# Patient Record
Sex: MALE | Race: WHITE | NOT HISPANIC OR LATINO | Employment: FULL TIME | URBAN - METROPOLITAN AREA
[De-identification: names, ages, dates, MRNs, and addresses within clinical notes are randomized per-mention and may not be internally consistent; named-entity substitution may affect disease eponyms.]

---

## 2017-01-21 ENCOUNTER — ALLSCRIPTS OFFICE VISIT (OUTPATIENT)
Dept: OTHER | Facility: OTHER | Age: 52
End: 2017-01-21

## 2017-06-13 ENCOUNTER — TRANSCRIBE ORDERS (OUTPATIENT)
Dept: ADMINISTRATIVE | Facility: HOSPITAL | Age: 52
End: 2017-06-13

## 2017-06-13 DIAGNOSIS — J34.1 CYST OF NASAL SINUS: Primary | ICD-10-CM

## 2017-06-13 DIAGNOSIS — K09.0 DEVELOPMENTAL ODONTOGENIC CYSTS: ICD-10-CM

## 2017-06-17 ENCOUNTER — HOSPITAL ENCOUNTER (OUTPATIENT)
Dept: CT IMAGING | Facility: HOSPITAL | Age: 52
Discharge: HOME | End: 2017-06-17
Payer: COMMERCIAL

## 2017-06-17 DIAGNOSIS — J34.1 CYST OF NASAL SINUS: ICD-10-CM

## 2017-06-17 DIAGNOSIS — K09.0 DEVELOPMENTAL ODONTOGENIC CYSTS: ICD-10-CM

## 2017-06-17 PROCEDURE — 70486 CT MAXILLOFACIAL W/O DYE: CPT

## 2017-12-07 ENCOUNTER — ALLSCRIPTS OFFICE VISIT (OUTPATIENT)
Dept: OTHER | Facility: OTHER | Age: 52
End: 2017-12-07

## 2017-12-20 ENCOUNTER — GENERIC CONVERSION - ENCOUNTER (OUTPATIENT)
Dept: OTHER | Facility: OTHER | Age: 52
End: 2017-12-20

## 2017-12-20 LAB — PROSTATE SPECIFIC ANTIGEN (HISTORICAL): 5.2 NG/ML (ref 0–4)

## 2017-12-21 ENCOUNTER — GENERIC CONVERSION - ENCOUNTER (OUTPATIENT)
Dept: OTHER | Facility: OTHER | Age: 52
End: 2017-12-21

## 2017-12-21 LAB
A/G RATIO (HISTORICAL): 2 (ref 1.2–2.2)
ALBUMIN SERPL BCP-MCNC: 4.5 G/DL (ref 3.5–5.5)
ALP SERPL-CCNC: 89 IU/L (ref 39–117)
ALT SERPL W P-5'-P-CCNC: 33 IU/L (ref 0–44)
AST SERPL W P-5'-P-CCNC: 18 IU/L (ref 0–40)
BILIRUB SERPL-MCNC: 0.3 MG/DL (ref 0–1.2)
BUN SERPL-MCNC: 22 MG/DL (ref 6–24)
BUN/CREA RATIO (HISTORICAL): 24 (ref 9–20)
CALCIUM SERPL-MCNC: 9.5 MG/DL (ref 8.7–10.2)
CHLORIDE SERPL-SCNC: 106 MMOL/L (ref 96–106)
CHOLEST SERPL-MCNC: 185 MG/DL (ref 100–199)
CO2 SERPL-SCNC: 25 MMOL/L (ref 18–29)
CREAT SERPL-MCNC: 0.9 MG/DL (ref 0.76–1.27)
EGFR AFRICAN AMERICAN (HISTORICAL): 115 ML/MIN/1.73
EGFR-AMERICAN CALC (HISTORICAL): 99 ML/MIN/1.73
GLUCOSE SERPL-MCNC: 98 MG/DL (ref 65–99)
HDLC SERPL-MCNC: 29 MG/DL
LDLC SERPL CALC-MCNC: 111 MG/DL (ref 0–99)
POTASSIUM SERPL-SCNC: 4.4 MMOL/L (ref 3.5–5.2)
SODIUM SERPL-SCNC: 146 MMOL/L (ref 134–144)
TOT. GLOBULIN, SERUM (HISTORICAL): 2.3 G/DL (ref 1.5–4.5)
TOTAL PROTEIN (HISTORICAL): 6.8 G/DL (ref 6–8.5)
TRIGL SERPL-MCNC: 224 MG/DL (ref 0–149)

## 2017-12-26 ENCOUNTER — GENERIC CONVERSION - ENCOUNTER (OUTPATIENT)
Dept: OTHER | Facility: OTHER | Age: 52
End: 2017-12-26

## 2018-01-03 ENCOUNTER — GENERIC CONVERSION - ENCOUNTER (OUTPATIENT)
Dept: FAMILY MEDICINE CLINIC | Facility: CLINIC | Age: 53
End: 2018-01-03

## 2018-01-13 VITALS
WEIGHT: 197 LBS | TEMPERATURE: 98 F | DIASTOLIC BLOOD PRESSURE: 80 MMHG | OXYGEN SATURATION: 98 % | HEIGHT: 67 IN | HEART RATE: 104 BPM | BODY MASS INDEX: 30.92 KG/M2 | SYSTOLIC BLOOD PRESSURE: 118 MMHG

## 2018-01-17 NOTE — RESULT NOTES
Message   Recorded as Task   Date: 11/09/2016 09:58 AM, Created By: Jared Quispe   Task Name: Call Patient with results   Assigned To:  Reece Paulino   Regarding Patient: Jackie Wells, Status: Active   CommentJeannie Guess - 09 Nov 2016 9:58 AM     Patient Phone: [phone]    Chest is nrmal- no pneumonia   Lynette Alejandro - 09 Nov 2016 10:12 AM     TASK EDITED                 Msg left C# with results        Signatures   Electronically signed by : Raymon Barahona, ; Nov 9 2016 10:12AM EST                       (Author)

## 2018-01-22 VITALS
SYSTOLIC BLOOD PRESSURE: 130 MMHG | DIASTOLIC BLOOD PRESSURE: 90 MMHG | HEIGHT: 67 IN | OXYGEN SATURATION: 97 % | WEIGHT: 190 LBS | HEART RATE: 74 BPM | RESPIRATION RATE: 16 BRPM | BODY MASS INDEX: 29.82 KG/M2

## 2018-01-23 ENCOUNTER — ALLSCRIPTS OFFICE VISIT (OUTPATIENT)
Dept: OTHER | Facility: OTHER | Age: 53
End: 2018-01-23

## 2018-01-23 DIAGNOSIS — R97.20 ELEVATED PROSTATE SPECIFIC ANTIGEN (PSA): ICD-10-CM

## 2018-01-23 LAB
CLARITY UR: NORMAL
COLOR UR: YELLOW
HGB UR QL STRIP.AUTO: NORMAL
PH UR STRIP.AUTO: 6 [PH]
PROT UR STRIP-MCNC: 30 MG/DL
SP GR UR STRIP.AUTO: 1.01

## 2018-01-23 NOTE — PROGRESS NOTES
Assessment    1  Encounter for preventive health examination (V70 0) (Z00 00)   2  Encounter for prostate cancer screening (V76 44) (Z12 5)   3  Encounter for screening colonoscopy (V76 51) (Z12 11)   4  Screening for cardiovascular condition (V81 2) (Z13 6)    Plan  Encounter for prostate cancer screening, Screening for cardiovascular condition    · (1) COMPREHENSIVE METABOLIC PANEL; Status:Active; Requested for:52Wip7613;    · (1) LIPID PANEL FASTING W DIRECT LDL REFLEX; Status:Active; Requested  for:42Jdn3422;    · (1) PSA (SCREEN) (Dx V76 44 Screen for Prostate Cancer); Status:Active; Requested  for:32Rlv4492;   Encounter for screening colonoscopy    · COLONOSCOPY; Status:Active; Requested for:59Lhv1495; Insomnia, unspecified type    · Temazepam 15 MG Oral Capsule; take 1 capsule by mouth at bedtime if needed  Strain of lumbar region, initial encounter    · Hydrocodone-Acetaminophen 5-325 MG Oral Tablet; take 1 tablet PO EVERY 6-8  HOURS AS NEEDED    Discussion/Summary  health maintenance visit eats an adequate diet Prostate cancer screening: PSA was ordered  Testicular cancer screening: testicular cancer screening is not indicated  Colorectal cancer screening: colonoscopy has been ordered  Screening lab work includes glucose and lipid profile  The risks and benefits of immunizations were discussed  He was advised to be evaluated by an optometrist and a dentist  Advice and education were given regarding aerobic exercise and seat belt use  Declines vaccines today  Labs ordered  Id colonoscopy ordered  The treatment plan was reviewed with the patient/guardian  The patient/guardian understands and agrees with the treatment plan      Chief Complaint  annual hm    Advance Directives  Advance Directive [facility]:   NO - Patient does not have an advance health care directive     Durable Power of  For Healthcare:    Name: Nancy Cooper   Relationship: spouse   Phone (home): [phone]   Phone (cell): [phone]      History of Present Illness  HM, Adult Male: The patient is being seen for a health maintenance evaluation  Social History: Household members include spouse and 2 son(s)  He is   Work status: working full time  The patient has never smoked cigarettes, is a former smokeless tobacco user and quit using smokeless tobacco 2005  He reports rare alcohol use  General Health: The patient's health since the last visit is described as good  He does not have regular dental visits  He complains of vision problems  Vision care includes wearing glasses  He has hearing loss  Immunizations status: up to date  Lifestyle:  He consumes a diverse and healthy diet  He has weight concerns  He exercises regularly  He does not use tobacco  The patient has never smoked cigarettes, is a former smokeless tobacco user and quit using smokeless tobacco: 2005  He consumes alcohol  He reports occasional alcohol use  He denies drug use  Screening: Active Problems    1  Encounter for screening colonoscopy (V76 51) (Z12 11)   2  WALE on CPAP (327 23,V46 8) (G47 33,Z99 89)   3  Strain of lumbar region, initial encounter (847 2) (S39 012A)    Surgical History    · History of Jaw Surgery   · History of Oral Surgery    Family History  Mother    · Family history of Breast CA (174 9) (C50 959)  Family History    · Denied: Family history of substance abuse   · No family history of mental disorder    Social History    · Never a smoker    Allergies    1  No Known Drug Allergies    Vitals   Recorded: 06QTA7984 08:56AM   Heart Rate 74   Respiration 16   Systolic 439   Diastolic 90   Height 5 ft 7 in   Weight 190 lb    BMI Calculated 29 76   BSA Calculated 1 98   O2 Saturation 97     Physical Exam    Constitutional   General appearance: No acute distress, well appearing and well nourished  Head and Face   Head and face: Normal     Palpation of the face and sinuses: No sinus tenderness      Eyes   Conjunctiva and lids: No erythema, swelling or discharge  Pupils and irises: Equal, round, reactive to light  Ears, Nose, Mouth, and Throat   External inspection of ears and nose: Normal     Nasal mucosa, septum, and turbinates: Normal without edema or erythema  Oropharynx: Normal with no erythema, edema, exudate or lesions  Neck   Neck: Supple, symmetric, trachea midline, no masses  Thyroid: Normal, no thyromegaly  Pulmonary   Respiratory effort: No increased work of breathing or signs of respiratory distress  Cardiovascular   Auscultation of heart: Normal rate and rhythm, normal S1 and S2, no murmurs  Carotid pulses: 2+ bilaterally  Abdominal aorta: Normal     Pedal pulses: 2+ bilaterally  Examination of extremities for edema and/or varicosities: Normal     Abdomen   Abdomen: Non-tender, no masses  Liver and spleen: No hepatomegaly or splenomegaly  Lymphatic   Palpation of lymph nodes in neck: No lymphadenopathy  Psychiatric   Orientation to person, place and time: Normal     Mood and affect: Normal        Results/Data  PHQ-2 Adult Depression Screening 50Aya4555 09:04AM User, Ahs     Test Name Result Flag Reference   PHQ-2 Adult Depression Score 0     Over the last two weeks, how often have you been bothered by any of the following problems?   Little interest or pleasure in doing things: Not at all - 0  Feeling down, depressed, or hopeless: Not at all - 0   PHQ-2 Adult Depression Screening Negative         Signatures   Electronically signed by : Kaylee Ramirez MD; Dec  7 2017  5:15PM EST                       (Author)

## 2018-01-23 NOTE — RESULT NOTES
Verified Results  (1) COMPREHENSIVE METABOLIC PANEL 29BOF7983 22:60LF Sandrine Melton     Test Name Result Flag Reference   Glucose, Serum 98 mg/dL  65-99   BUN 22 mg/dL  6-24   Creatinine, Serum 0 90 mg/dL  0 76-1 27   BUN/Creatinine Ratio 24 H 9-20   Sodium, Serum 146 mmol/L H 134-144   Potassium, Serum 4 4 mmol/L  3 5-5 2   Chloride, Serum 106 mmol/L     Carbon Dioxide, Total 25 mmol/L  18-29   Calcium, Serum 9 5 mg/dL  8 7-10 2   Protein, Total, Serum 6 8 g/dL  6 0-8 5   Albumin, Serum 4 5 g/dL  3 5-5 5   Globulin, Total 2 3 g/dL  1 5-4 5   A/G Ratio 2 0  1 2-2 2   Bilirubin, Total 0 3 mg/dL  0 0-1 2   Alkaline Phosphatase, S 89 IU/L     AST (SGOT) 18 IU/L  0-40   ALT (SGPT) 33 IU/L  0-44   eGFR If NonAfricn Am 99 mL/min/1 73  >59   eGFR If Africn Am 115 mL/min/1 73  >59     (1) LIPID PANEL FASTING W DIRECT LDL REFLEX 31Lra7539 07:27AM Sandrine Contix     Test Name Result Flag Reference   Cholesterol, Total 185 mg/dL  100-199   Triglycerides 224 mg/dL H 0-149   HDL Cholesterol 29 mg/dL L >39   LDL Cholesterol Calc 111 mg/dL H 0-99     (1) PSA (SCREEN) (Dx V76 44 Screen for Prostate Cancer) 19EZJ0112 07:27AM Deluca Contix     Test Name Result Flag Reference   Prostate Specific Ag, Serum 5 2 ng/mL H 0 0-4 0   Roche ECLIA methodology  According to the American Urological Association, Serum PSA should  decrease and remain at undetectable levels after radical  prostatectomy  The AUA defines biochemical recurrence as an initial  PSA value 0 2 ng/mL or greater followed by a subsequent confirmatory  PSA value 0 2 ng/mL or greater  Values obtained with different assay methods or kits cannot be used  interchangeably  Results cannot be interpreted as absolute evidence  of the presence or absence of malignant disease

## 2018-01-24 VITALS
HEART RATE: 102 BPM | SYSTOLIC BLOOD PRESSURE: 130 MMHG | RESPIRATION RATE: 16 BRPM | HEIGHT: 67 IN | OXYGEN SATURATION: 97 % | BODY MASS INDEX: 29.82 KG/M2 | WEIGHT: 190 LBS | DIASTOLIC BLOOD PRESSURE: 90 MMHG

## 2018-01-24 NOTE — CONSULTS
Assessment    1  Elevated PSA, less than 10 ng/ml (790 93) (R97 20)    Plan   Elevated PSA, less than 10 ng/ml    · Ciprofloxacin HCl - 500 MG Oral Tablet; TAKE 1 TABLET Every twelve hours starting  the day before procedure   Rx By: Tiana [employer]; Dispense: 3 Days ; #:6 Tablet; Refill: 0; For: Elevated PSA, less than 10 ng/ml; LEO = N; Verified Transmission to [facility]; Last Updated By: System, [employer]; 1/23/2018 9:00:12 AM   · Dulcolax 5 MG Oral Tablet Delayed Release; TAKE 2 TABLET Once on evening  before procedure   Rx By: In[employer]ger; Dispense: 1 Days ; #:2 Tablet Delayed Release; Refill: 0; For: Elevated PSA, less than 10 ng/ml; LEO = N; Verified Transmission to [facility]; Last Updated By: System, [employer]; 1/23/2018 9:00:12 AM   · LORazepam 2 MG Oral Tablet; TAKE 1 TABLET Once 1 hr prior to procedure   Rx By: InEdger; Dispense: 1 Days ; #:1 Tablet; Refill: 0; For: Elevated PSA, less than 10 ng/ml; LEO = N; Print Rx   · (1) PSA, DIAGNOSTIC (FOLLOW-UP); Status:Hold For - Exact Date; Requested  for:Approx [id number]; Perform:[facility]; Last Updated By:Samantha Merrill; 1/23/2018 9:00:12 AM;Ordered;  For:Elevated PSA, less than 10 ng/ml; Ordered By:Melina Ashford;   · Urine Dip Non-Automated- POC; Status:Complete - Retrospective By Protocol  Authorization;   Done: 18YZG6508 08:30AM   Performed: In Office; SGV:[id number]; Last Updated By:Smith Santacruz; 1/23/2018 8:31:23 AM;Ordered;  For:Elevated PSA, less than 10 ng/ml; Ordered By:Flako Ashford;    Prostate Needle Biopsy - POC; Status:Active; Requested ZQL:[id number]; Perform: In Office; ZZJ:[id number];DWXWTSO;    For:Elevated PSA, less than 10 ng/ml; Ordered By:Melina Ashford; Discussion/Summary  Discussion Summary:   49 y/o male with elevated PSA    Patient referred by Dr Belinda Villavicencio for elevated initial PSA of 5 2  Patient denies any significant family history  Prostate exam negative for any significant findings  Will repeat PSA level  We discussed possible causes of elevated PSA  Will schedule for prostate biopsy if PSA remains elevated  Reviewed risk factors, and what to expect with biopsy  Will prescribe ativan per patient request, and cipro prior to procedure  All questions answered, patient agrees with plan  Counseling Documentation With Imm: The patient was counseled regarding diagnostic results, instructions for management, prognosis, patient and family education, impressions, risks and benefits of treatment options, importance of compliance with treatment  Patient's Capacity to Self-Care: Patient is able to Self-Care  Goals and Barriers: The patent has the current Barriers: None  Medication SE Review and Pt Understands Tx: The treatment plan was reviewed with the patient/guardian  The patient/guardian understands and agrees with the treatment plan   Self Referrals:   Self Referrals: No Dr Maggi Lucas      Chief Complaint  Chief Complaint Free Text Note Form: Patient presents for elevated PSA = 5/2 (12/21/17)      History of Present Illness  HPI: Krish Iglesias is a 49 y/o male who was referred by PCP, Dr Maggi Lucas, for elevated PSA of 5 2  Patient states that this was his first PSA level, and no abnormal findings during LJ by his PCP  He denies any family history of prostate cancer  No lower urinary symptoms or gross hematuria, except occasional nocturia which is not bothersome  Had kidney stone removed with lithotripsy 10 years ago  Review of Systems  Complete-Male Urology:   Constitutional: No fever or chills, feels well, no tiredness, no recent weight gain or weight loss  Respiratory: No complaints of shortness of breath, no wheezing, no cough, no SOB on exertion, no orthopnea or PND  Cardiovascular: No complaints of slow heart rate, no fast heart rate, no chest pain, no palpitations, no leg claudication, no lower extremity     Gastrointestinal: No complaints of abdominal pain, no constipation, no nausea or vomiting, no diarrhea or bloody stools  Genitourinary: Empty sensation and stream quality good, but No complaints of dysuria, no incontinence, no hesitancy, no nocturia, no genital lesion, no testicular pain  Musculoskeletal: No complaints of arthralgia, no myalgias, no joint swelling or stiffness, no limb pain or swelling  Integumentary: No complaints of skin rash or skin lesions, no itching, no skin wound, no dry skin  Hematologic/Lymphatic: No complaints of swollen glands, no swollen glands in the neck, does not bleed easily, no easy bruising  Neurological: No compliants of headache, no confusion, no convulsions, no numbness or tingling, no dizziness or fainting, no limb weakness, no difficulty walking  ROS Reviewed:   ROS reviewed  Active Problems    1  Acute bronchitis (466 0) (J20 9)   2  Elevated PSA, less than 10 ng/ml (790 93) (R97 20)   3  Insomnia, unspecified type (780 52) (G47 00)   4  Mixed dyslipidemia (272 2) (E78 2)   5  WALE on CPAP (327 23,V46 8) (G47 33,Z99 89)   6  Strain of lumbar region, initial encounter (847 2) (H76 161Y)    Past Medical History  Active Problems And Past Medical History Reviewed: The active problems and past medical history were reviewed and updated today  Surgical History    1  History of Jaw Surgery   2  History of Oral Surgery  Surgical History Reviewed: The surgical history was reviewed and updated today  Family History  Mother    1  Family history of Breast CA (174 9) (Q71 049)  Family History    2  Denied: Family history of substance abuse   3  No family history of mental disorder  Family History Reviewed: The family history was reviewed and updated today  Social History    · Never a smoker  Social History Reviewed: The social history was reviewed and updated today  The social history was reviewed and is unchanged  Current Meds   1   Azithromycin 250 MG Oral Tablet; TAKE 2 TABLETS ON DAY 1 THEN TAKE 1 TABLET A   DAY FOR 4 DAYS;   Therapy: 28GEA7183 to (Last Rx:23Jan2018)  Requested for: 23Jan2018 Ordered   2  Hydrocodone-Acetaminophen 5-325 MG Oral Tablet; take 1 tablet PO EVERY 6-8 HOURS   AS NEEDED; Therapy: 29YXV4246 to (Last Rx:91Lhw5860) Ordered   3  Temazepam 15 MG Oral Capsule; take 1 capsule by mouth at bedtime if needed; Therapy: 10OIS0461 to (Evaluate:94Qms2526); Last Rx:86Sbr7248 Ordered  Medication List Reviewed: The medication list was reviewed and updated today  Allergies    1  No Known Drug Allergies    Vitals  Vital Signs    Recorded: 80DOZ0259 08:28AM   Heart Rate 72   Systolic 617   Diastolic 80   Height 5 ft 6 in   Weight 188 lb 8 oz   BMI Calculated 30 43   BSA Calculated 1 95     Physical Exam    Constitutional   General appearance: No acute distress, well appearing and well nourished  well developed and well nourished  Pulmonary   Respiratory effort: No increased work of breathing or signs of respiratory distress  Respiratory rate: normal  Assessment of respiratory effort revealed normal rhythm and effort  Cardiovascular   Examination of extremities for edema and/or varicosities: Normal   no edema  Abdomen   Abdomen: Non-tender, no masses  The abdomen was flat  The abdomen was soft and nontender  Genitourinary   Anus and perineum: Normal   Anus: normal  Perineum: normal    Scrotum contents: Normal size, no masses  Scrotum findings: normal    Urethral meatus: Normal, no lesions  Urethral meatus: normal    Penis: Normal, no lesions  Examination of the penis showed normal findings, no lesions and a normal meatus  Digital rectal exam of prostate: Normal size, no masses  The prostate was normal, had no palpable nodules and was not fluctuant  35 gm, smooth, no nodules, nontender  Digital rectal exam of seminal vesicles: Normal size, no masses  Seminal vesicles: normal    Anus, perineum, and rectum: Normal   Rectum examination showed a normal anus  There were no anal fissures   Internal hemorrhoid(s) were not present  External hemorrhoid(s) were not present  The sphincter tone was normal  There was no rectal tenderness  There were no rectal abscesses  No rectal masses palpated  Musculoskeletal   Gait and station: Normal   Gait evaluation demonstrated a normal gait  Skin   Skin and subcutaneous tissue: Normal without rashes or lesions  Lymphatic   Palpation of lymph nodes in groin: Normal     Additional Exam:  Neuro exam nonfocal       Results/Data  Urine Dip Non-Automated- POC 06FKX4564 08:30AM Justin Bud     Test Name Result Flag Reference   Color Yellow     Clarity Transparent     Blood -     Protein 30     Ph 6 0     Specific Gravity 1 015       (1) PSA (SCREEN) (Dx V76 44 Screen for Prostate Cancer) 37Wed9761 07:27AM Bree Oyster     Test Name Result Flag Reference   Prostate Specific Ag, Serum 5 2 ng/mL H 0 0-4 0   Roche ECLIA methodology  According to the American Urological Association, Serum PSA should  decrease and remain at undetectable levels after radical  prostatectomy  The AUA defines biochemical recurrence as an initial  PSA value 0 2 ng/mL or greater followed by a subsequent confirmatory  PSA value 0 2 ng/mL or greater  Values obtained with different assay methods or kits cannot be used  interchangeably  Results cannot be interpreted as absolute evidence  of the presence or absence of malignant disease       Signatures   Electronically signed by : AARON Washington ; Jan 23 2018 11:10AM EST                       (Author)

## 2018-01-24 NOTE — PROGRESS NOTES
Assessment    1  Acute bronchitis (466 0) (J20 9)    Plan  Acute bronchitis    · Azithromycin 250 MG Oral Tablet; TAKE 2 TABLETS ON DAY 1 THEN TAKE 1  TABLET A DAY FOR 4 DAYS    Chief Complaint  cough, congestion x 2 days, sob, cihills    History of Present Illness  HPI: see cc      Review of Systems    Constitutional: no fever or chills, feels well, no tiredness, no recent weight loss or weight gain  ENT: as noted in HPI  Cardiovascular: no complaints of slow or fast heart rate, no chest pain, no palpitations, no leg claudication or lower extremity edema  Respiratory: no complaints of shortness of breath, no wheezing or cough, no dyspnea on exertion, no orthopnea or PND  Active Problems    1  Elevated PSA, less than 10 ng/ml (790 93) (R97 20)   2  Insomnia, unspecified type (780 52) (G47 00)   3  Mixed dyslipidemia (272 2) (E78 2)   4  WALE on CPAP (327 23,V46 8) (G47 33,Z99 89)   5  Strain of lumbar region, initial encounter (847 2) (B36 617Y)    Past Medical History  Active Problems And Past Medical History Reviewed: The active problems and past medical history were reviewed and updated today  Family History  Mother    1  Family history of Breast CA (174 9) (C52 919)  Family History    2  Denied: Family history of substance abuse   3  No family history of mental disorder    Social History    · Never a smoker    Surgical History    1  History of Jaw Surgery   2  History of Oral Surgery    Current Meds   1  Hydrocodone-Acetaminophen 5-325 MG Oral Tablet; take 1 tablet PO EVERY 6-8   HOURS AS NEEDED; Therapy: 39SBB9955 to (Last Rx:56Xbr9248) Ordered   2  Temazepam 15 MG Oral Capsule; take 1 capsule by mouth at bedtime if needed; Therapy: 55OAN1659 to (Evaluate:50Sau8618); Last Rx:49Lax9166 Ordered    Allergies    1   No Known Drug Allergies    Vitals   Recorded: 72DPN6311 07:40AM   Temperature 98 3 F   Heart Rate 100   Respiration 18   Systolic 114   Diastolic 70   Height 5 ft 7 in Weight 188 lb    BMI Calculated 29 44   BSA Calculated 1 97   O2 Saturation 98     Physical Exam    Constitutional   General appearance: No acute distress, well appearing and well nourished  Ears, Nose, Mouth, and Throat   Otoscopic examination: Tympanic membrance translucent with normal light reflex  Canals patent without erythema  Nasal mucosa, septum, and turbinates: Normal without edema or erythema  Oropharynx: Normal with no erythema, edema, exudate or lesions  Pulmonary   Auscultation of lungs: Abnormal   bilat rhonchi  Cardiovascular   Auscultation of heart: Normal rate and rhythm, normal S1 and S2, without murmurs  Future Appointments    Date/Time Provider Specialty Site   01/23/2018 08:15 AM AARON Rivas   Urology [address] [facility]     Signatures   Electronically signed by : Jetty Duverney, MD; Jan 23 2018  7:54AM EST                       (Author)

## 2018-02-01 ENCOUNTER — OFFICE VISIT (OUTPATIENT)
Dept: FAMILY MEDICINE CLINIC | Facility: CLINIC | Age: 53
End: 2018-02-01
Payer: COMMERCIAL

## 2018-02-01 VITALS
BODY MASS INDEX: 29.19 KG/M2 | HEART RATE: 82 BPM | HEIGHT: 67 IN | DIASTOLIC BLOOD PRESSURE: 72 MMHG | WEIGHT: 186 LBS | SYSTOLIC BLOOD PRESSURE: 130 MMHG | OXYGEN SATURATION: 97 %

## 2018-02-01 DIAGNOSIS — R09.81 CONGESTION OF NASAL SINUS: ICD-10-CM

## 2018-02-01 DIAGNOSIS — R42 VERTIGO: Primary | ICD-10-CM

## 2018-02-01 PROCEDURE — 99213 OFFICE O/P EST LOW 20 MIN: CPT | Performed by: NURSE PRACTITIONER

## 2018-02-01 RX ORDER — MECLIZINE HYDROCHLORIDE 25 MG/1
25 TABLET ORAL EVERY 8 HOURS PRN
Qty: 30 TABLET | Refills: 1 | Status: SHIPPED | OUTPATIENT
Start: 2018-02-01

## 2018-02-01 RX ORDER — TEMAZEPAM 15 MG/1
CAPSULE ORAL
COMMUNITY
Start: 2017-12-07 | End: 2021-04-14 | Stop reason: SDUPTHER

## 2018-02-01 RX ORDER — FLUTICASONE PROPIONATE 50 MCG
2 SPRAY, SUSPENSION (ML) NASAL AS NEEDED
COMMUNITY
Start: 2017-07-19 | End: 2022-04-11

## 2018-02-01 RX ORDER — LORAZEPAM 2 MG/1
1 TABLET ORAL
COMMUNITY
Start: 2018-01-23 | End: 2021-04-14 | Stop reason: ALTCHOICE

## 2018-02-01 RX ORDER — PREDNISONE 20 MG/1
20 TABLET ORAL 2 TIMES DAILY WITH MEALS
Qty: 10 TABLET | Refills: 0 | Status: SHIPPED | OUTPATIENT
Start: 2018-02-01 | End: 2018-08-09

## 2018-02-01 NOTE — PROGRESS NOTES
Assessment/Plan:    No problem-specific Assessment & Plan notes found for this encounter  Diagnoses and all orders for this visit:    Vertigo  -     meclizine (ANTIVERT) 25 mg tablet; Take 1 tablet (25 mg total) by mouth every 8 (eight) hours as needed for dizziness    Congestion of nasal sinus  -     predniSONE 20 mg tablet; Take 1 tablet (20 mg total) by mouth 2 (two) times a day with meals    Other orders  -     temazepam (RESTORIL) 15 mg capsule; Take by mouth  -     Albuterol Sulfate (PROAIR RESPICLICK) 620 (90 Base) MCG/ACT AEPB; Inhale  -     fluticasone (FLONASE) 50 mcg/act nasal spray; 2 sprays  -     LORazepam (ATIVAN) 2 mg tablet; Take 1 tablet by mouth          Subjective:      Patient ID: Nakia Faustin is a 50 y o  male  Patient c/o sinus pressure, vertigo and dizziness x 10days  States was recently on an antibiotic for URI  Dizziness   Associated symptoms include congestion, coughing and nausea  Pertinent negatives include no chest pain, rash or vomiting  Nausea   Associated symptoms include congestion, coughing and nausea  Pertinent negatives include no chest pain, rash or vomiting  The following portions of the patient's history were reviewed and updated as appropriate: allergies, current medications, past family history, past medical history, past social history, past surgical history and problem list     Review of Systems   HENT: Positive for congestion, ear pain, postnasal drip, rhinorrhea and sinus pressure  Eyes: Negative for pain, discharge and redness  Respiratory: Positive for cough  Negative for wheezing  Cardiovascular: Negative for chest pain  Gastrointestinal: Positive for nausea  Negative for constipation, diarrhea and vomiting  Skin: Negative for rash  Neurological: Positive for dizziness  Patient Instructions   Start Meclizine and Prednisone as directed  Call or return with any problems or concerns        Objective:     Physical Exam Constitutional: He is oriented to person, place, and time  He appears well-developed and well-nourished  No distress  HENT:   Head: Normocephalic and atraumatic  Right Ear: Tympanic membrane, external ear and ear canal normal    Left Ear: Tympanic membrane, external ear and ear canal normal    Nose: Rhinorrhea present  No epistaxis  Right sinus exhibits maxillary sinus tenderness  Left sinus exhibits maxillary sinus tenderness  Mouth/Throat: Uvula is midline, oropharynx is clear and moist and mucous membranes are normal    Cardiovascular: Normal rate, regular rhythm and normal heart sounds  Exam reveals no gallop and no friction rub  No murmur heard  Pulmonary/Chest: Effort normal and breath sounds normal  No respiratory distress  He has no wheezes  He has no rales  Cough noted   Abdominal: He exhibits no distension  There is no tenderness  Neurological: He is alert and oriented to person, place, and time  Skin: He is not diaphoretic  Psychiatric: He has a normal mood and affect  His behavior is normal  Judgment and thought content normal    Nursing note and vitals reviewed

## 2018-02-08 LAB — PSA SERPL-MCNC: 3.5 NG/ML (ref 0–4)

## 2018-03-12 ENCOUNTER — TELEPHONE (OUTPATIENT)
Dept: UROLOGY | Facility: HOSPITAL | Age: 53
End: 2018-03-12

## 2018-03-12 DIAGNOSIS — R97.20 ELEVATED PSA: Primary | ICD-10-CM

## 2018-03-12 NOTE — TELEPHONE ENCOUNTER
Patient called for PSA results which dropped from 5 6 to 3 5 - per Dr Jenny Alfonso he does not need BX he is to followup in a year with a PSA

## 2018-03-20 ENCOUNTER — TELEPHONE (OUTPATIENT)
Dept: UROLOGY | Facility: AMBULATORY SURGERY CENTER | Age: 53
End: 2018-03-20

## 2018-04-27 ENCOUNTER — TRANSCRIBE ORDERS (OUTPATIENT)
Dept: ADMINISTRATIVE | Facility: HOSPITAL | Age: 53
End: 2018-04-27

## 2018-04-27 DIAGNOSIS — M23.91 DERANGEMENT OF COLLATERAL LIGAMENT OF RIGHT KNEE: Primary | ICD-10-CM

## 2018-05-01 ENCOUNTER — HOSPITAL ENCOUNTER (OUTPATIENT)
Dept: MRI IMAGING | Facility: HOSPITAL | Age: 53
Discharge: HOME | End: 2018-05-01
Payer: COMMERCIAL

## 2018-05-01 DIAGNOSIS — M23.91 DERANGEMENT OF COLLATERAL LIGAMENT OF RIGHT KNEE: ICD-10-CM

## 2018-05-01 PROCEDURE — 73721 MRI JNT OF LWR EXTRE W/O DYE: CPT

## 2018-08-09 ENCOUNTER — OFFICE VISIT (OUTPATIENT)
Dept: FAMILY MEDICINE CLINIC | Facility: CLINIC | Age: 53
End: 2018-08-09
Payer: COMMERCIAL

## 2018-08-09 VITALS
HEART RATE: 112 BPM | RESPIRATION RATE: 16 BRPM | BODY MASS INDEX: 31.86 KG/M2 | SYSTOLIC BLOOD PRESSURE: 120 MMHG | DIASTOLIC BLOOD PRESSURE: 80 MMHG | OXYGEN SATURATION: 98 % | HEIGHT: 67 IN | WEIGHT: 203 LBS

## 2018-08-09 DIAGNOSIS — S39.012A STRAIN OF LUMBAR REGION, INITIAL ENCOUNTER: Primary | ICD-10-CM

## 2018-08-09 PROBLEM — E78.2 MIXED DYSLIPIDEMIA: Status: ACTIVE | Noted: 2017-12-26

## 2018-08-09 PROCEDURE — 99214 OFFICE O/P EST MOD 30 MIN: CPT | Performed by: FAMILY MEDICINE

## 2018-08-09 PROCEDURE — 1036F TOBACCO NON-USER: CPT | Performed by: FAMILY MEDICINE

## 2018-08-09 RX ORDER — HYDROCODONE BITARTRATE AND ACETAMINOPHEN 5; 325 MG/1; MG/1
1 TABLET ORAL EVERY 6 HOURS PRN
Qty: 15 TABLET | Refills: 0 | Status: SHIPPED | OUTPATIENT
Start: 2018-08-09 | End: 2018-08-23 | Stop reason: SDUPTHER

## 2018-08-09 RX ORDER — PREDNISONE 20 MG/1
TABLET ORAL
Qty: 15 TABLET | Refills: 0 | Status: SHIPPED | OUTPATIENT
Start: 2018-08-09 | End: 2018-09-20 | Stop reason: HOSPADM

## 2018-08-09 RX ORDER — CYCLOBENZAPRINE HCL 10 MG
10 TABLET ORAL 3 TIMES DAILY PRN
Qty: 30 TABLET | Refills: 0 | Status: SHIPPED | OUTPATIENT
Start: 2018-08-09 | End: 2018-09-20 | Stop reason: HOSPADM

## 2018-08-09 NOTE — PATIENT INSTRUCTIONS
Ice for 1-2 days then switch over to medium heat  This could be 4 times a day  Prescriptions as ordered  Recheck as needed  No work note needed

## 2018-08-09 NOTE — PROGRESS NOTES
[id number]         NAME: Radha Arvizu is a 50 y o  male  : 1965    MRN: [id number]  DATE: 2018  TIME: 3:12 PM    Assessment and Plan   Strain of lumbar region, initial encounter [S39 012A]  1  Strain of lumbar region, initial encounter  cyclobenzaprine (FLEXERIL) 10 mg tablet    predniSONE 20 mg tablet    HYDROcodone-acetaminophen (NORCO) 5-325 mg per tablet         Patient Instructions     Patient Instructions   Ice for 1-2 days then switch over to medium heat  This could be 4 times a day  Prescriptions as ordered  Recheck as needed  No work note needed  Chief Complaint     Chief Complaint   Patient presents with    Back Pain     back pain         History of Present Illness       Patient comes in today valuation of back pain  He has lip in the garage and had flare of his episodic low back pain  Has been using over-the-counter medications along with heat and a stimulating unit  Has not resolved  In the past has been able to resolve with muscle relaxer and hydrocodone  Review of Systems   Review of Systems   Constitutional: Negative for chills, diaphoresis and fever  Respiratory: Negative for cough, shortness of breath and wheezing  Cardiovascular: Negative for chest pain and palpitations  Gastrointestinal: Negative for abdominal pain, constipation, diarrhea and nausea  Genitourinary: Negative for difficulty urinating and testicular pain  Musculoskeletal: Positive for back pain  Skin: Negative for rash  Neurological: Positive for numbness           Current Medications       Current Outpatient Prescriptions:     Albuterol Sulfate (PROAIR RESPICLICK) 225 (90 Base) MCG/ACT AEPB, Inhale, Disp: , Rfl:     cyclobenzaprine (FLEXERIL) 10 mg tablet, Take 1 tablet (10 mg total) by mouth 3 (three) times a day as needed for muscle spasms, Disp: 30 tablet, Rfl: 0    fluticasone (FLONASE) 50 mcg/act nasal spray, 2 sprays, Disp: , Rfl:   HYDROcodone-acetaminophen (NORCO) 5-325 mg per tablet, Take 1 tablet by mouth every 6 (six) hours as needed for pain Max Daily Amount: 4 tablets, Disp: 15 tablet, Rfl: 0    LORazepam (ATIVAN) 2 mg tablet, Take 1 tablet by mouth, Disp: , Rfl:     meclizine (ANTIVERT) 25 mg tablet, Take 1 tablet (25 mg total) by mouth every 8 (eight) hours as needed for dizziness, Disp: 30 tablet, Rfl: 1    predniSONE 20 mg tablet, 1 tab twice daily for 5 days, then 1 tab daily for 5 days  , Disp: 15 tablet, Rfl: 0    temazepam (RESTORIL) 15 mg capsule, Take by mouth, Disp: , Rfl:     Current Allergies     Allergies as of 08/09/2018    (No Known Allergies)            The following portions of the patient's history were reviewed and updated as appropriate: allergies, current medications, past family history, past medical history, past social history, past surgical history and problem list      No past medical history on file  Past Surgical History:   Procedure Laterality Date    HERNIA REPAIR      MANDIBLE FRACTURE SURGERY      MOUTH SURGERY      cyst in cheek       Family History   Problem Relation Age of Onset    Breast cancer Mother     Diabetes Father     No Known Problems Family     Substance Abuse Neg Hx     Mental illness Neg Hx          Medications have been verified  Objective   /80 (BP Location: Right arm, Patient Position: Sitting, Cuff Size: Large)   Pulse (!) 112   Resp 16   Ht 5' 7" (1 702 m)   Wt 92 1 kg (203 lb)   SpO2 98%   BMI 31 79 kg/m²        Physical Exam     Physical Exam   Constitutional: He appears well-developed and well-nourished  No distress  HENT:   Head: Atraumatic  Eyes: Conjunctivae are normal    Neck: Normal range of motion  Neck supple  No thyromegaly present  Cardiovascular: Normal rate and regular rhythm  No murmur heard  Pulmonary/Chest: Breath sounds normal  No respiratory distress     Musculoskeletal:        Lumbar back: He exhibits decreased range of motion, tenderness, bony tenderness and spasm     Hips-good range of motion negative straight leg raising for radiation of pain

## 2018-08-23 DIAGNOSIS — S39.012A STRAIN OF LUMBAR REGION, INITIAL ENCOUNTER: ICD-10-CM

## 2018-08-24 RX ORDER — HYDROCODONE BITARTRATE AND ACETAMINOPHEN 5; 325 MG/1; MG/1
1 TABLET ORAL EVERY 6 HOURS PRN
Qty: 15 TABLET | Refills: 0 | Status: SHIPPED | OUTPATIENT
Start: 2018-08-24 | End: 2018-09-20 | Stop reason: HOSPADM

## 2018-08-24 NOTE — TELEPHONE ENCOUNTER
I will send refill  This should be the last 1  He should get a physical therapy referral   See if he is out of work due to this  Should have a follow-up office visit here next week  If he starts physical therapy cm 2-3 weeks into the physical therapy

## 2018-08-24 NOTE — TELEPHONE ENCOUNTER
PC CALLED TO GET ANOTHER RX FOR HYDROCODONE--WAS SEEN 8/9/18--YOU GAVE HIM #15 HYDRO--PREDNISONE & FLEXERIL 10MG---WHAT WOULD LIKE TO DO?

## 2018-09-20 ENCOUNTER — OFFICE VISIT (OUTPATIENT)
Dept: FAMILY MEDICINE CLINIC | Facility: CLINIC | Age: 53
End: 2018-09-20
Payer: COMMERCIAL

## 2018-09-20 VITALS
WEIGHT: 204 LBS | HEART RATE: 109 BPM | OXYGEN SATURATION: 96 % | BODY MASS INDEX: 32.02 KG/M2 | HEIGHT: 67 IN | DIASTOLIC BLOOD PRESSURE: 70 MMHG | SYSTOLIC BLOOD PRESSURE: 112 MMHG

## 2018-09-20 DIAGNOSIS — G43.109 MIGRAINE WITH AURA AND WITHOUT STATUS MIGRAINOSUS, NOT INTRACTABLE: Primary | ICD-10-CM

## 2018-09-20 PROCEDURE — 99213 OFFICE O/P EST LOW 20 MIN: CPT | Performed by: NURSE PRACTITIONER

## 2018-09-20 PROCEDURE — 3008F BODY MASS INDEX DOCD: CPT | Performed by: NURSE PRACTITIONER

## 2018-09-20 RX ORDER — ELETRIPTAN HYDROBROMIDE 40 MG/1
40 TABLET, FILM COATED ORAL ONCE AS NEEDED
Qty: 18 TABLET | Refills: 3 | Status: SHIPPED | OUTPATIENT
Start: 2018-09-20 | End: 2021-04-14 | Stop reason: SDUPTHER

## 2018-09-20 NOTE — PROGRESS NOTES
[id number]         NAME: Jessica Alvarez is a 48 y o  male  : 1965    MRN: [id number]  DATE: 2018  TIME: 8:26 AM    Assessment and Plan   Migraine with aura and without status migrainosus, not intractable [G43 109]  1  Migraine with aura and without status migrainosus, not intractable  eletriptan (RELPAX) 40 MG tablet         Patient Instructions     Patient Instructions   Re-start Relpex PRN for migraines  Follow-up with any concerns or if symptoms remain uncontrolled  Chief Complaint     Chief Complaint   Patient presents with    migraine meds     refills         History of Present Illness       Patient repots long-standing history of migraines with aura  Reports several episodes per year  Typically takes Relpex however has not had a migraine in several months and prescription is   Patient here for to re-start medication  Review of Systems   Review of Systems   Constitutional: Negative for activity change, diaphoresis, fatigue and fever  HENT: Negative for congestion, facial swelling, hearing loss, rhinorrhea, sinus pain, sinus pressure, sneezing, sore throat and voice change  Eyes: Positive for photophobia (with migraines) and visual disturbance ("fireworks" with migraines)  Negative for discharge  Respiratory: Negative for cough, choking, chest tightness, shortness of breath, wheezing and stridor  Cardiovascular: Negative for chest pain, palpitations and leg swelling  Gastrointestinal: Negative for abdominal distention, abdominal pain, constipation, diarrhea, nausea and vomiting  Endocrine: Negative for polydipsia, polyphagia and polyuria  Genitourinary: Negative for difficulty urinating, dysuria, frequency and urgency  Musculoskeletal: Negative for arthralgias, back pain, gait problem, joint swelling, myalgias, neck pain and neck stiffness  Skin: Negative for color change, rash and wound     Neurological: Negative for dizziness, syncope, speech difficulty, weakness, light-headedness and headaches  Hematological: Negative for adenopathy  Does not bruise/bleed easily  Psychiatric/Behavioral: Negative for agitation, behavioral problems, confusion, hallucinations, sleep disturbance and suicidal ideas  The patient is not nervous/anxious  Current Medications       Current Outpatient Prescriptions:     Albuterol Sulfate (PROAIR RESPICLICK) 065 (90 Base) MCG/ACT AEPB, Inhale, Disp: , Rfl:     fluticasone (FLONASE) 50 mcg/act nasal spray, 2 sprays, Disp: , Rfl:     LORazepam (ATIVAN) 2 mg tablet, Take 1 tablet by mouth, Disp: , Rfl:     meclizine (ANTIVERT) 25 mg tablet, Take 1 tablet (25 mg total) by mouth every 8 (eight) hours as needed for dizziness, Disp: 30 tablet, Rfl: 1    temazepam (RESTORIL) 15 mg capsule, Take by mouth, Disp: , Rfl:     eletriptan (RELPAX) 40 MG tablet, Take 1 tablet (40 mg total) by mouth once as needed for migraine for up to 1 dose may repeat in 2 hours if necessary, Disp: 18 tablet, Rfl: 3    Current Allergies     Allergies as of 09/20/2018    (No Known Allergies)            The following portions of the patient's history were reviewed and updated as appropriate: allergies, current medications, past family history, past medical history, past social history, past surgical history and problem list      No past medical history on file  Past Surgical History:   Procedure Laterality Date    HERNIA REPAIR      MANDIBLE FRACTURE SURGERY      MOUTH SURGERY      cyst in cheek       Family History   Problem Relation Age of Onset    Breast cancer Mother     Diabetes Father     No Known Problems Family     Substance Abuse Neg Hx     Mental illness Neg Hx          Medications have been verified          Objective   /70   Pulse (!) 109   Ht 5' 7" (1 702 m)   Wt 92 5 kg (204 lb)   SpO2 96%   BMI 31 95 kg/m²        Physical Exam     Physical Exam   Constitutional: He is oriented to person, place, and time  He appears well-developed and well-nourished  No distress  Cardiovascular: Normal rate, regular rhythm and normal heart sounds  No murmur heard  Pulmonary/Chest: Effort normal and breath sounds normal  No respiratory distress  He has no wheezes  Musculoskeletal: Normal range of motion  Neurological: He is alert and oriented to person, place, and time  Skin: Skin is warm and dry  He is not diaphoretic  Psychiatric: He has a normal mood and affect  His behavior is normal  Judgment and thought content normal    Nursing note and vitals reviewed

## 2019-03-06 ENCOUNTER — TRANSCRIBE ORDERS (OUTPATIENT)
Dept: ADMINISTRATIVE | Facility: HOSPITAL | Age: 54
End: 2019-03-06

## 2019-03-06 DIAGNOSIS — M54.5 LOW BACK PAIN, UNSPECIFIED BACK PAIN LATERALITY, UNSPECIFIED CHRONICITY, WITH SCIATICA PRESENCE UNSPECIFIED: Primary | ICD-10-CM

## 2019-03-11 ENCOUNTER — OFFICE VISIT (OUTPATIENT)
Dept: URGENT CARE | Facility: CLINIC | Age: 54
End: 2019-03-11
Payer: COMMERCIAL

## 2019-03-11 ENCOUNTER — HOSPITAL ENCOUNTER (OUTPATIENT)
Dept: MRI IMAGING | Facility: HOSPITAL | Age: 54
Discharge: HOME | End: 2019-03-11
Payer: COMMERCIAL

## 2019-03-11 VITALS
TEMPERATURE: 98.3 F | RESPIRATION RATE: 16 BRPM | HEIGHT: 67 IN | DIASTOLIC BLOOD PRESSURE: 94 MMHG | BODY MASS INDEX: 31.71 KG/M2 | WEIGHT: 202 LBS | HEART RATE: 127 BPM | OXYGEN SATURATION: 97 % | SYSTOLIC BLOOD PRESSURE: 124 MMHG

## 2019-03-11 DIAGNOSIS — M54.5 LOW BACK PAIN, UNSPECIFIED BACK PAIN LATERALITY, UNSPECIFIED CHRONICITY, WITH SCIATICA PRESENCE UNSPECIFIED: ICD-10-CM

## 2019-03-11 DIAGNOSIS — Z23 NEED FOR TETANUS BOOSTER: ICD-10-CM

## 2019-03-11 DIAGNOSIS — L03.312 CELLULITIS OF BACK EXCEPT BUTTOCK: Primary | ICD-10-CM

## 2019-03-11 PROCEDURE — G0382 LEV 3 HOSP TYPE B ED VISIT: HCPCS | Performed by: PHYSICIAN ASSISTANT

## 2019-03-11 PROCEDURE — 90715 TDAP VACCINE 7 YRS/> IM: CPT

## 2019-03-11 PROCEDURE — 72148 MRI LUMBAR SPINE W/O DYE: CPT

## 2019-03-11 RX ORDER — SULFAMETHOXAZOLE AND TRIMETHOPRIM 800; 160 MG/1; MG/1
1 TABLET ORAL EVERY 12 HOURS SCHEDULED
Qty: 14 TABLET | Refills: 0 | Status: SHIPPED | OUTPATIENT
Start: 2019-03-11 | End: 2019-03-18

## 2019-03-11 RX ORDER — TRAMADOL HYDROCHLORIDE 50 MG/1
50 TABLET ORAL EVERY 8 HOURS PRN
Refills: 0 | COMMUNITY
Start: 2019-03-06 | End: 2021-04-14

## 2019-03-11 RX ORDER — ELETRIPTAN HYDROBROMIDE 40 MG/1
TABLET, FILM COATED ORAL
COMMUNITY
End: 2021-04-14 | Stop reason: SDUPTHER

## 2019-03-11 RX ORDER — CYCLOBENZAPRINE HCL 10 MG
TABLET ORAL
COMMUNITY
End: 2021-04-14

## 2019-03-11 NOTE — PROGRESS NOTES
NAME: Jose Champagne II is a 48 y o  male  : 1965    MRN: [id number]      Assessment and Plan   Cellulitis of back except buttock [L03 312]  1  Cellulitis of back except buttock  sulfamethoxazole-trimethoprim (BACTRIM DS) 800-160 mg per tablet   2  Need for tetanus booster  TDAP VACCINE GREATER THAN OR EQUAL TO 8YO IM       Bryan Snyedr was seen today for wound infection  Diagnoses and all orders for this visit:    Cellulitis of back except buttock  -     sulfamethoxazole-trimethoprim (BACTRIM DS) 800-160 mg per tablet; Take 1 tablet by mouth every 12 (twelve) hours for 7 days smx-tmp DS (BACTRIM) 800-160 mg tabs (1tab q12 D10)    Need for tetanus booster  -     TDAP VACCINE GREATER THAN OR EQUAL TO 8YO IM        Patient Instructions   Patient Instructions   Exam findings are consistent with skin infection  At this time will provide patient with Bactrim  Take medication as noted  Tetanus provided today  Recommended use of antibacterial soap  Recommended use of over-the-counter Neosporin  If fever, headache, severe pain in area go to ER  [place] Patient understands and agrees with treatment plans  Proceed to ER if symptoms worsen  Chief Complaint     Chief Complaint   Patient presents with    Wound Infection     infection on back for a week  Ortho doc told him to come here  Did not go to PCP  History of Present Illness     48year old Pt presents for possible skin infection on back x  1 wk  Patient reports he was told by ortho that he had a possible skin infection on right lower back  Pt is currently being seen by Ortho for back strain  Admits to an area of redness, mild localized swelling, and soreness and burning sensation when in shower  Pt admits to use of Neosporin and keeping area clean  Pt denies pain  Denies known injury to the area  Denies open wound or discharge from the area  Unsure  Denies any skin rashes  Denies itching  Denies any raised skin lesions or masses     Denies fever or chills  Denies any other redness or swelling  Denies streaking redness  Admits history of MRSA , 3 years ago  Last tetanus? Unsure, states he might of been 20 years ago when he was in the [employer]  Review of Systems   Review of Systems   Constitutional: Negative for chills, fatigue and fever  Respiratory: Negative for shortness of breath  Cardiovascular: Negative for chest pain and palpitations  Musculoskeletal: Positive for back pain  Skin: Positive for wound (Right lower back)  Negative for pallor and rash  Neurological: Negative for numbness  Current Medications       Current Outpatient Medications:     cyclobenzaprine (FLEXERIL) 10 mg tablet, cyclobenzaprine 10 mg tablet, Disp: , Rfl:     eletriptan (RELPAX) 40 MG tablet, Take 1 tablet (40 mg total) by mouth once as needed for migraine for up to 1 dose may repeat in 2 hours if necessary, Disp: 18 tablet, Rfl: 3    LORazepam (ATIVAN) 2 mg tablet, Take 1 tablet by mouth, Disp: , Rfl:     traMADol (ULTRAM) 50 mg tablet, Take 50 mg by mouth every 8 (eight) hours as needed, Disp: , Rfl: 0    Albuterol Sulfate (PROAIR RESPICLICK) 099 (90 Base) MCG/ACT AEPB, Inhale, Disp: , Rfl:     eletriptan (RELPAX) 40 MG tablet, eletriptan 40 mg tablet, Disp: , Rfl:     fluticasone (FLONASE) 50 mcg/act nasal spray, 2 sprays, Disp: , Rfl:     meclizine (ANTIVERT) 25 mg tablet, Take 1 tablet (25 mg total) by mouth every 8 (eight) hours as needed for dizziness (Patient not taking: Reported on 3/11/2019), Disp: 30 tablet, Rfl: 1    sulfamethoxazole-trimethoprim (BACTRIM DS) 800-160 mg per tablet, Take 1 tablet by mouth every 12 (twelve) hours for 7 days smx-tmp DS (BACTRIM) 800-160 mg tabs (1tab q12 D10), Disp: 14 tablet, Rfl: 0    temazepam (RESTORIL) 15 mg capsule, Take by mouth, Disp: , Rfl:     Current Allergies     Allergies as of 03/11/2019    (No Known Allergies)              No past medical history on file      Past Surgical History:   Procedure Laterality Date    HERNIA REPAIR      MANDIBLE FRACTURE SURGERY      MOUTH SURGERY      cyst in cheek       Family History   Problem Relation Age of Onset    Breast cancer Mother     Diabetes Father     No Known Problems Family     Substance Abuse Neg Hx     Mental illness Neg Hx          Medications have been verified  The following portions of the patient's history were reviewed and updated as appropriate: allergies, current medications, past family history, past medical history, past social history, past surgical history and problem list     Objective   /94   Pulse (!) 127   Temp 98 3 °F (36 8 °C)   Resp 16   Ht 5' 7" (1 702 m)   Wt 91 6 kg (202 lb)   SpO2 97%   BMI 31 64 kg/m²      Physical Exam     Physical Exam   Constitutional: He is oriented to person, place, and time  He appears well-developed and well-nourished  No distress  Cardiovascular: Normal rate, regular rhythm and normal heart sounds  Exam reveals no gallop and no friction rub  No murmur heard  Pulmonary/Chest: Effort normal and breath sounds normal  No stridor  No respiratory distress  He has no wheezes  He has no rales  He exhibits no tenderness  Neurological: He is alert and oriented to person, place, and time  Skin: Skin is warm  Abrasion noted  No bruising, no ecchymosis, no lesion and no rash noted  He is not diaphoretic  There is erythema  Nursing note and vitals reviewed        Julieth Mcmanus PA-C

## 2019-03-11 NOTE — PATIENT INSTRUCTIONS
Exam findings are consistent with skin infection  At this time will provide patient with Bactrim  Take medication as noted  Tetanus provided today  Recommended use of antibacterial soap  Recommended use of over-the-counter Neosporin  If fever, headache, severe pain in area go to ER  [place] Patient understands and agrees with treatment plans

## 2019-04-13 PROBLEM — J34.2 DNS (DEVIATED NASAL SEPTUM): Status: ACTIVE | Noted: 2019-04-13

## 2019-04-13 PROBLEM — J34.3 NASAL TURBINATE HYPERTROPHY: Status: ACTIVE | Noted: 2019-04-13

## 2019-04-13 PROBLEM — J30.2 SEASONAL ALLERGIC RHINITIS: Status: ACTIVE | Noted: 2019-04-13

## 2019-05-09 PROBLEM — J34.89 NASAL OBSTRUCTION: Status: ACTIVE | Noted: 2019-05-09

## 2019-05-30 PROBLEM — J34.3 HYPERTROPHY OF NASAL TURBINATES: Status: ACTIVE | Noted: 2019-05-30

## 2019-05-30 PROBLEM — J34.2 DEVIATED NASAL SEPTUM: Status: ACTIVE | Noted: 2019-05-30

## 2020-01-10 ENCOUNTER — APPOINTMENT (EMERGENCY)
Dept: CT IMAGING | Facility: HOSPITAL | Age: 55
End: 2020-01-10
Payer: COMMERCIAL

## 2020-01-10 ENCOUNTER — HOSPITAL ENCOUNTER (EMERGENCY)
Facility: HOSPITAL | Age: 55
Discharge: HOME | End: 2020-01-10
Attending: EMERGENCY MEDICINE
Payer: COMMERCIAL

## 2020-01-10 VITALS
RESPIRATION RATE: 16 BRPM | SYSTOLIC BLOOD PRESSURE: 152 MMHG | TEMPERATURE: 98 F | HEART RATE: 112 BPM | OXYGEN SATURATION: 94 % | DIASTOLIC BLOOD PRESSURE: 94 MMHG

## 2020-01-10 DIAGNOSIS — N23 RENAL COLIC ON RIGHT SIDE: ICD-10-CM

## 2020-01-10 DIAGNOSIS — N20.1 RIGHT URETERAL STONE: Primary | ICD-10-CM

## 2020-01-10 DIAGNOSIS — N20.0 RIGHT KIDNEY STONE: ICD-10-CM

## 2020-01-10 LAB
ALBUMIN SERPL BCP-MCNC: 4.2 G/DL (ref 3.5–5)
ALP SERPL-CCNC: 120 U/L (ref 46–116)
ALT SERPL W P-5'-P-CCNC: 44 U/L (ref 12–78)
ANION GAP SERPL CALCULATED.3IONS-SCNC: 13 MMOL/L (ref 4–13)
AST SERPL W P-5'-P-CCNC: 22 U/L (ref 5–45)
BASOPHILS # BLD AUTO: 0.16 THOUSANDS/ΜL (ref 0–0.1)
BASOPHILS NFR BLD AUTO: 1 % (ref 0–1)
BILIRUB SERPL-MCNC: 0.3 MG/DL (ref 0.2–1)
BUN SERPL-MCNC: 18 MG/DL (ref 5–25)
CALCIUM SERPL-MCNC: 9.2 MG/DL (ref 8.3–10.1)
CHLORIDE SERPL-SCNC: 105 MMOL/L (ref 100–108)
CLARITY, POC: NORMAL
CO2 SERPL-SCNC: 26 MMOL/L (ref 21–32)
COLOR, POC: NORMAL
CREAT SERPL-MCNC: 1.25 MG/DL (ref 0.6–1.3)
EOSINOPHIL # BLD AUTO: 0.21 THOUSAND/ΜL (ref 0–0.61)
EOSINOPHIL NFR BLD AUTO: 1 % (ref 0–6)
ERYTHROCYTE [DISTWIDTH] IN BLOOD BY AUTOMATED COUNT: 13.1 % (ref 11.6–15.1)
EXT BILIRUBIN, UA: NORMAL
EXT BLOOD URINE: NORMAL
EXT GLUCOSE, UA: NORMAL
EXT KETONES: NORMAL
EXT NITRITE, UA: NORMAL
EXT PH, UA: 5.5
EXT PROTEIN, UA: 30
EXT SPECIFIC GRAVITY, UA: 1.01
EXT UROBILINOGEN: 0.2
GFR SERPL CREATININE-BSD FRML MDRD: 66 ML/MIN/1.73SQ M
GLUCOSE SERPL-MCNC: 140 MG/DL (ref 65–140)
HCT VFR BLD AUTO: 46.4 % (ref 36.5–49.3)
HGB BLD-MCNC: 16.3 G/DL (ref 12–17)
IMM GRANULOCYTES # BLD AUTO: 0.25 THOUSAND/UL (ref 0–0.2)
IMM GRANULOCYTES NFR BLD AUTO: 2 % (ref 0–2)
LIPASE SERPL-CCNC: 54 U/L (ref 73–393)
LYMPHOCYTES # BLD AUTO: 2.36 THOUSANDS/ΜL (ref 0.6–4.47)
LYMPHOCYTES NFR BLD AUTO: 16 % (ref 14–44)
MCH RBC QN AUTO: 30.1 PG (ref 26.8–34.3)
MCHC RBC AUTO-ENTMCNC: 35.1 G/DL (ref 31.4–37.4)
MCV RBC AUTO: 86 FL (ref 82–98)
MONOCYTES # BLD AUTO: 0.91 THOUSAND/ΜL (ref 0.17–1.22)
MONOCYTES NFR BLD AUTO: 6 % (ref 4–12)
NEUTROPHILS # BLD AUTO: 11.36 THOUSANDS/ΜL (ref 1.85–7.62)
NEUTS SEG NFR BLD AUTO: 74 % (ref 43–75)
NRBC BLD AUTO-RTO: 0 /100 WBCS
PLATELET # BLD AUTO: 229 THOUSANDS/UL (ref 149–390)
PMV BLD AUTO: 8.3 FL (ref 8.9–12.7)
POTASSIUM SERPL-SCNC: 3.6 MMOL/L (ref 3.5–5.3)
PROT SERPL-MCNC: 7.8 G/DL (ref 6.4–8.2)
RBC # BLD AUTO: 5.41 MILLION/UL (ref 3.88–5.62)
SODIUM SERPL-SCNC: 144 MMOL/L (ref 136–145)
WBC # BLD AUTO: 15.25 THOUSAND/UL (ref 4.31–10.16)
WBC # BLD EST: NORMAL 10*3/UL

## 2020-01-10 PROCEDURE — 96375 TX/PRO/DX INJ NEW DRUG ADDON: CPT

## 2020-01-10 PROCEDURE — 83690 ASSAY OF LIPASE: CPT | Performed by: EMERGENCY MEDICINE

## 2020-01-10 PROCEDURE — 93005 ELECTROCARDIOGRAM TRACING: CPT

## 2020-01-10 PROCEDURE — 99284 EMERGENCY DEPT VISIT MOD MDM: CPT

## 2020-01-10 PROCEDURE — 74177 CT ABD & PELVIS W/CONTRAST: CPT

## 2020-01-10 PROCEDURE — 81002 URINALYSIS NONAUTO W/O SCOPE: CPT | Performed by: EMERGENCY MEDICINE

## 2020-01-10 PROCEDURE — 36415 COLL VENOUS BLD VENIPUNCTURE: CPT | Performed by: EMERGENCY MEDICINE

## 2020-01-10 PROCEDURE — 80053 COMPREHEN METABOLIC PANEL: CPT | Performed by: EMERGENCY MEDICINE

## 2020-01-10 PROCEDURE — 85025 COMPLETE CBC W/AUTO DIFF WBC: CPT | Performed by: EMERGENCY MEDICINE

## 2020-01-10 PROCEDURE — 99285 EMERGENCY DEPT VISIT HI MDM: CPT | Performed by: EMERGENCY MEDICINE

## 2020-01-10 PROCEDURE — 96374 THER/PROPH/DIAG INJ IV PUSH: CPT

## 2020-01-10 RX ORDER — OXYCODONE HYDROCHLORIDE 5 MG/1
5 TABLET ORAL EVERY 6 HOURS PRN
Qty: 12 TABLET | Refills: 0 | Status: SHIPPED | OUTPATIENT
Start: 2020-01-10 | End: 2021-04-14

## 2020-01-10 RX ORDER — KETOROLAC TROMETHAMINE 30 MG/ML
15 INJECTION, SOLUTION INTRAMUSCULAR; INTRAVENOUS ONCE
Status: COMPLETED | OUTPATIENT
Start: 2020-01-10 | End: 2020-01-10

## 2020-01-10 RX ORDER — ONDANSETRON 2 MG/ML
4 INJECTION INTRAMUSCULAR; INTRAVENOUS ONCE
Status: COMPLETED | OUTPATIENT
Start: 2020-01-10 | End: 2020-01-10

## 2020-01-10 RX ORDER — HYDROCODONE BITARTRATE AND ACETAMINOPHEN 5; 325 MG/1; MG/1
1 TABLET ORAL ONCE
Status: COMPLETED | OUTPATIENT
Start: 2020-01-10 | End: 2020-01-10

## 2020-01-10 RX ADMIN — IOHEXOL 100 ML: 350 INJECTION, SOLUTION INTRAVENOUS at 21:25

## 2020-01-10 RX ADMIN — ONDANSETRON 4 MG: 2 INJECTION INTRAMUSCULAR; INTRAVENOUS at 20:40

## 2020-01-10 RX ADMIN — HYDROCODONE BITARTRATE AND ACETAMINOPHEN 1 TABLET: 5; 325 TABLET ORAL at 22:31

## 2020-01-10 RX ADMIN — KETOROLAC TROMETHAMINE 15 MG: 30 INJECTION, SOLUTION INTRAMUSCULAR; INTRAVENOUS at 20:39

## 2020-01-11 ENCOUNTER — HOSPITAL ENCOUNTER (EMERGENCY)
Facility: HOSPITAL | Age: 55
Discharge: HOME | End: 2020-01-12
Attending: EMERGENCY MEDICINE | Admitting: EMERGENCY MEDICINE
Payer: COMMERCIAL

## 2020-01-11 DIAGNOSIS — R11.2 NAUSEA AND VOMITING: ICD-10-CM

## 2020-01-11 DIAGNOSIS — N20.1 RIGHT URETERAL STONE: Primary | ICD-10-CM

## 2020-01-11 LAB
ATRIAL RATE: 75 BPM
P AXIS: 56 DEGREES
PR INTERVAL: 166 MS
QRS AXIS: -13 DEGREES
QRSD INTERVAL: 94 MS
QT INTERVAL: 388 MS
QTC INTERVAL: 433 MS
T WAVE AXIS: 38 DEGREES
VENTRICULAR RATE: 75 BPM

## 2020-01-11 PROCEDURE — 99284 EMERGENCY DEPT VISIT MOD MDM: CPT

## 2020-01-11 PROCEDURE — 93010 ELECTROCARDIOGRAM REPORT: CPT | Performed by: INTERNAL MEDICINE

## 2020-01-11 PROCEDURE — 99284 EMERGENCY DEPT VISIT MOD MDM: CPT | Performed by: EMERGENCY MEDICINE

## 2020-01-11 RX ORDER — KETOROLAC TROMETHAMINE 30 MG/ML
15 INJECTION, SOLUTION INTRAMUSCULAR; INTRAVENOUS ONCE
Status: COMPLETED | OUTPATIENT
Start: 2020-01-12 | End: 2020-01-12

## 2020-01-11 RX ORDER — ONDANSETRON 2 MG/ML
4 INJECTION INTRAMUSCULAR; INTRAVENOUS ONCE
Status: COMPLETED | OUTPATIENT
Start: 2020-01-12 | End: 2020-01-12

## 2020-01-11 NOTE — DISCHARGE INSTRUCTIONS
Kidney Stones   WHAT YOU NEED TO KNOW:   Kidney stones form in the urinary system when the water and waste in your urine are out of balance  When this happens, certain types of waste crystals separate from the urine  The crystals build up and form kidney stones  You may have 1 or more kidney stones  DISCHARGE INSTRUCTIONS:   Return to the emergency department if:   · You have vomiting that is not relieved by medicine  Contact your healthcare provider if:   · You have a fever  · You have trouble passing urine  · You see blood in your urine  · You have severe pain  · You have any questions or concerns about your condition or care  Medicines:   · NSAIDs , such as ibuprofen, help decrease swelling, pain, and fever  This medicine is available with or without a doctor's order  NSAIDs can cause stomach bleeding or kidney problems in certain people  If you take blood thinner medicine, always ask your healthcare provider if NSAIDs are safe for you  Always read the medicine label and follow directions  · Prescription medicine  may be given  Ask how to take this medicine safely  · Medicines  to balance your electrolytes may be needed  · Take your medicine as directed  Contact your healthcare provider if you think your medicine is not helping or if you have side effects  Tell him or her if you are allergic to any medicine  Keep a list of the medicines, vitamins, and herbs you take  Include the amounts, and when and why you take them  Bring the list or the pill bottles to follow-up visits  Carry your medicine list with you in case of an emergency  Follow up with your healthcare provider as directed: You may need to return for more tests  Write down your questions so you remember to ask them during your visits  Self-care:   · Drink plenty of liquids  Your healthcare provider may tell you to drink at least 8 to 12 (eight-ounce) cups of liquids each day   This helps flush out the kidney stones when you urinate  Water is the best liquid to drink  · Strain your urine every time you go to the bathroom  Urinate through a strainer or a piece of thin cloth to catch the stones  Take the stones to your healthcare provider so they can be sent to the lab for tests  This will help your healthcare providers plan the best treatment for you  · Eat a variety of healthy foods  Healthy foods include fruits, vegetables, whole-grain breads, low-fat dairy products, beans, and fish  You may need to limit how much sodium (salt) or protein you eat  Ask for information about the best foods for you  · Stay active  Your stones may pass more easily by if you stay active  Ask about the best activities for you  After you pass your kidney stones:  Once you have passed your kidney stones, your healthcare provider may  order a 24-hour urine test  Results from a 24-hour urine test will help your healthcare provider plan ways to prevent more stones from forming  If you are told to do a 24-hour test, your healthcare provider will give you more instructions  © 2017 [facility] Information is for End User's use only and may not be sold, redistributed or otherwise used for commercial purposes  All illustrations and images included in CareNotes® are the copyrighted property of [employer] D [employer]  or [employer]  The above information is an  only  It is not intended as medical advice for individual conditions or treatments  Talk to your doctor, nurse or pharmacist before following any medical regimen to see if it is safe and effective for you

## 2020-01-11 NOTE — ED PROVIDER NOTES
History  No chief complaint on file  48year old male presents for evaluation of severe right flank pain radiating to the umbilicus beginning suddenly around 6:45 pm this evening  Pain is associated with nausea and vomiting with 2 episodes of emesis prior to arrival  Patient has not taken anything for pain prior to arrival  Patient reports history of left sided kidney stone, but states this is more severe  Patient has history of chronic lumbar back pain, but states this pain is different from prior episodes of back pain  Normal bowel movement this morning  No recent illness  Denies urinary symptoms  History provided by:  Patient  Abdominal Pain   Pain location:  R flank  Pain quality: sharp    Pain radiates to:  Periumbilical region  Pain severity:  Severe  Onset quality:  Sudden  Duration:  2 hours  Timing:  Constant  Progression:  Worsening  Chronicity:  New  Relieved by:  None tried  Worsened by:  Nothing  Ineffective treatments:  None tried  Associated symptoms: nausea and vomiting    Associated symptoms: no chest pain, no constipation, no cough, no diarrhea, no dysuria, no fatigue, no fever, no hematuria, no shortness of breath and no sore throat    Nausea:     Severity:  Moderate    Onset quality:  Sudden    Duration:  2 hours    Timing:  Constant    Progression:  Unchanged  Vomiting:     Quality:  Stomach contents    Number of occurrences:  2    Severity:  Moderate    Duration:  2 hours    Timing:  Sporadic    Progression:  Unchanged  Risk factors comment:  History of prior hernia repair      Prior to Admission Medications   Prescriptions Last Dose Informant Patient Reported? Taking?    Albuterol Sulfate (PROAIR RESPICLICK) 388 (90 Base) MCG/ACT AEPB  Self Yes No   Sig: Inhale   LORazepam (ATIVAN) 2 mg tablet  Self Yes No   Sig: Take 1 tablet by mouth   cyclobenzaprine (FLEXERIL) 10 mg tablet  Self Yes No   Sig: cyclobenzaprine 10 mg tablet   diclofenac sodium (VOLTAREN) 50 mg EC tablet   Yes No   Sig: Take 1 tablet(s) twice a day by oral route as needed  eletriptan (RELPAX) 40 MG tablet  Self No No   Sig: Take 1 tablet (40 mg total) by mouth once as needed for migraine for up to 1 dose may repeat in 2 hours if necessary   eletriptan (RELPAX) 40 MG tablet  Self Yes No   Sig: eletriptan 40 mg tablet   fluticasone (FLONASE) 50 mcg/act nasal spray  Self Yes No   Si sprays   meclizine (ANTIVERT) 25 mg tablet  Self No No   Sig: Take 1 tablet (25 mg total) by mouth every 8 (eight) hours as needed for dizziness   methylPREDNISolone 4 MG tablet therapy pack   Yes No   Sig: Take 1 dose pk(s) every day by oral route as directed for 6 days  oxyCODONE (ROXICODONE) 5 mg immediate release tablet   Yes No   oxyCODONE (ROXICODONE) 5 mg immediate release tablet   No No   Sig: Take 1 tablet (5 mg total) by mouth every 6 (six) hours as needed for moderate painMax Daily Amount: 20 mg   temazepam (RESTORIL) 15 mg capsule  Self Yes No   Sig: Take by mouth   traMADol (ULTRAM) 50 mg tablet  Self Yes No   Sig: Take 50 mg by mouth every 8 (eight) hours as needed      Facility-Administered Medications: None       History reviewed  No pertinent past medical history  Past Surgical History:   Procedure Laterality Date    HERNIA REPAIR      MANDIBLE FRACTURE SURGERY      MOUTH SURGERY      cyst in cheek       Family History   Problem Relation Age of Onset    Breast cancer Mother     Diabetes Father     No Known Problems Family     Substance Abuse Neg Hx     Mental illness Neg Hx      I have reviewed and agree with the history as documented  Social History     Tobacco Use    Smoking status: Never Smoker    Smokeless tobacco: Former User     Types: Snuff, Chew   Substance Use Topics    Alcohol use: No    Drug use: No        Review of Systems   Constitutional: Negative for appetite change, diaphoresis, fatigue and fever  HENT: Negative for congestion, rhinorrhea and sore throat      Respiratory: Negative for cough, chest tightness and shortness of breath  Cardiovascular: Negative for chest pain, palpitations and leg swelling  Gastrointestinal: Positive for abdominal pain, nausea and vomiting  Negative for constipation and diarrhea  Genitourinary: Negative for difficulty urinating, dysuria, frequency and hematuria  Musculoskeletal: Negative for myalgias, neck pain and neck stiffness  Skin: Negative for pallor  Neurological: Negative for syncope, weakness and headaches  All other systems reviewed and are negative  Physical Exam  Physical Exam   Constitutional: He appears well-developed and well-nourished  Non-toxic appearance  No distress  HENT:   Head: Normocephalic and atraumatic  Eyes: Pupils are equal, round, and reactive to light  EOM are normal    Neck: Normal range of motion  No tracheal deviation present  No thyromegaly present  Cardiovascular: Normal rate, regular rhythm, normal heart sounds and intact distal pulses  Pulmonary/Chest: Effort normal and breath sounds normal    Abdominal: Soft  Bowel sounds are normal  He exhibits no distension  There is tenderness in the right upper quadrant and right lower quadrant  There is CVA tenderness (right)  There is no rigidity, no rebound and no guarding  Lymphadenopathy:     He has no cervical adenopathy  Neurological: He is alert  Skin: Skin is warm and dry  He is not diaphoretic  Nursing note and vitals reviewed        Vital Signs  ED Triage Vitals   Temperature Pulse Respirations Blood Pressure SpO2   01/10/20 2021 01/10/20 2021 01/10/20 2021 01/10/20 2021 01/10/20 2021   98 °F (36 7 °C) 96 18 150/86 98 %      Temp Source Heart Rate Source Patient Position - Orthostatic VS BP Location FiO2 (%)   01/10/20 2021 01/10/20 2021 01/10/20 2021 01/10/20 2021 --   Temporal Monitor Sitting Left arm       Pain Score       01/10/20 2231       9           Vitals:    01/10/20 2021 01/10/20 2100   BP: 150/86 149/92   Pulse: 96 88   Patient Position - Orthostatic VS: Sitting          Visual Acuity      ED Medications  Medications   ketorolac (TORADOL) injection 15 mg (15 mg Intravenous Given 1/10/20 2039)   ondansetron (ZOFRAN) injection 4 mg (4 mg Intravenous Given 1/10/20 2040)   iohexol (OMNIPAQUE) 350 MG/ML injection (SINGLE-DOSE) 100 mL (100 mL Intravenous Given 1/10/20 2125)   HYDROcodone-acetaminophen (NORCO) 5-325 mg per tablet 1 tablet (1 tablet Oral Given 1/10/20 2231)       Diagnostic Studies  Results Reviewed     Procedure Component Value Units Date/Time    POCT urinalysis dipstick [[id number]]  (Normal) Resulted:  01/10/20 2150    Lab Status:  Final result Specimen:  Urine Updated:  01/10/20 2151     Color, UA HILDA     Clarity, UA HAZY     Glucose, UA (Ref: Negative) NEG     Bilirubin, UA (Ref: Negative) SMALL     Ketones, UA (Ref: Negative) TRACE     Spec Grav, UA (Ref:1 003-1 030) 1 015     Blood, UA (Ref: Negative) LARGE     pH, UA (Ref: 4 5-8 0) 5 5     Protein, UA (Ref: Negative) 30     Urobilinogen, UA (Ref: 0 2- 1 0) 0 2      Leukocytes, UA (Ref: Negative) NEG     Nitrite, UA (Ref: Negative) NEG    Comprehensive metabolic panel [[id number]]  (Abnormal) Collected:  01/10/20 2031    Lab Status:  Final result Specimen:  Blood from Arm, Right Updated:  01/10/20 2059     Sodium 144 mmol/L      Potassium 3 6 mmol/L      Chloride 105 mmol/L      CO2 26 mmol/L      ANION GAP 13 mmol/L      BUN 18 mg/dL      Creatinine 1 25 mg/dL      Glucose 140 mg/dL      Calcium 9 2 mg/dL      AST 22 U/L      ALT 44 U/L      Alkaline Phosphatase 120 U/L      Total Protein 7 8 g/dL      Albumin 4 2 g/dL      Total Bilirubin 0 30 mg/dL      eGFR 66 ml/min/1 73sq m     Narrative:       [place] guidelines for Chronic Kidney Disease (CKD):     Stage 1 with normal or high GFR (GFR > 90 mL/min/1 73 square meters)    Stage 2 Mild CKD (GFR = 60-89 mL/min/1 73 square meters)    Stage 3A Moderate CKD (GFR = 45-59 mL/min/1 73 square meters)   Stage 3B Moderate CKD (GFR = 30-44 mL/min/1 73 square meters)    Stage 4 Severe CKD (GFR = 15-29 mL/min/1 73 square meters)    Stage 5 End Stage CKD (GFR <15 mL/min/1 73 square meters)  Note: GFR calculation is accurate only with a steady state creatinine    Lipase [[id number]]  (Abnormal) Collected:  01/10/20 2031    Lab Status:  Final result Specimen:  Blood from Arm, Right Updated:  01/10/20 2059     Lipase 54 u/L     CBC and differential [[id number]]  (Abnormal) Collected:  01/10/20 2031    Lab Status:  Final result Specimen:  Blood from Arm, Right Updated:  01/10/20 2037     WBC 15 25 Thousand/uL      RBC 5 41 Million/uL      Hemoglobin 16 3 g/dL      Hematocrit 46 4 %      MCV 86 fL      MCH 30 1 pg      MCHC 35 1 g/dL      RDW 13 1 %      MPV 8 3 fL      Platelets 559 Thousands/uL      nRBC 0 /100 WBCs      Neutrophils Relative 74 %      Immat GRANS % 2 %      Lymphocytes Relative 16 %      Monocytes Relative 6 %      Eosinophils Relative 1 %      Basophils Relative 1 %      Neutrophils Absolute 11 36 Thousands/µL      Immature Grans Absolute 0 25 Thousand/uL      Lymphocytes Absolute 2 36 Thousands/µL      Monocytes Absolute 0 91 Thousand/µL      Eosinophils Absolute 0 21 Thousand/µL      Basophils Absolute 0 16 Thousands/µL                  CT abdomen pelvis with contrast   Final Result by Kamala Soriano MD (01/10 2148)      2 mm right mid ureteral calculus resulting in obstructive uropathy  2 mm right renal nonobstructing calculus  The study was marked in [facility] for immediate notification        Workstation performed: [id number]                    Procedures  ECG 12 Lead Documentation Only  Date/Time: 1/10/2020 8:48 PM  Performed by: Kylah Centeno MD  Authorized by: Kylah Centeno MD     Indications / Diagnosis:  Abdominal pain  ECG reviewed by me, the ED Provider: yes    Patient location:  ED  Previous ECG:     Previous ECG:  Compared to current    Comparison ECG info:  8/15/19 normal sinus rhythm with incomplete RBBB    Similarity:  Changes noted (left axis now present)  Interpretation:     Interpretation: abnormal    Rate:     ECG rate:  75    ECG rate assessment: normal    Rhythm:     Rhythm: other rhythm      Rhythm comment:  Sinus arrythmia  Ectopy:     Ectopy: none    QRS:     QRS axis:  Left    QRS intervals:  Normal  Conduction:     Conduction: abnormal      Abnormal conduction: incomplete RBBB    ST segments:     ST segments:  Normal  T waves:     T waves: normal               ED Course                   Initial Sepsis Screening     Row Name 01/10/20 4667                Is the patient's history suggestive of a new or worsening infection? No  -EE        Suspected source of infection          Are two or more of the following signs & symptoms of infection both present and new to the patient?         Indicate SIRS criteria          If the answer is yes to both questions, suspicion of sepsis is present          If severe sepsis is present AND tissue hypoperfusion perists in the hour after fluid resuscitation or lactate > 4, the patient meets criteria for SEPTIC SHOCK          Are any of the following organ dysfunction criteria present within 6 hours of suspected infection and SIRS criteria that are NOT considered to be chronic conditions?         Organ dysfunction          Date of presentation of severe sepsis          Time of presentation of severe sepsis          Tissue hypoperfusion persists in the hour after crystalloid fluid administration, evidenced, by either:          Was hypotension present within one hour of the conclusion of crystalloid fluid administration?           Date of presentation of septic shock          Time of presentation of septic shock            User Key  (r) = Recorded By, (t) = Taken By, (c) = Cosigned By    [address] Name Provider Type    EE Betty Louise MD Physician                  MDM  Number of Diagnoses or Management Options  Renal colic on right side: new and requires workup  Right kidney stone: new and requires workup  Right ureteral stone: new and requires workup  Diagnosis management comments: 48year old male presents for evaluation of severe right flank pain radiating to the abdomen  Right CVA as well as RUQ and RLQ tenderness on exam  Toradol given for pain  Zofran for nausea  Moderate improvement with toradol; however, prior to discharge, pain increased in intensity  Norco given with improvement  Short course of oxycodone prescribed  Patient instructed to attempt pain relief with tylenol or motrin prior to taking norco  Urology follow up  Discussed return precautions with patient  Amount and/or Complexity of Data Reviewed  Clinical lab tests: ordered and reviewed  Tests in the radiology section of CPT®: ordered and reviewed    Patient Progress  Patient progress: stable        Disposition  Final diagnoses:   Right ureteral stone - 2 mm   Renal colic on right side   Right kidney stone     Time reflects when diagnosis was documented in both MDM as applicable and the Disposition within this note     Time User Action Codes Description Comment    1/10/2020 10:21 PM [place] Add [N20 1] Right ureteral stone     1/10/2020 10:21 PM [place] Add [H90] Renal colic on right side     1/10/2020 10:21 PM Jed Zehra J Add [N20 0] Right kidney stone     1/10/2020 10:21 PM [place] Arch Modify [N20 1] Right ureteral stone 2 mm      ED Disposition     ED Disposition Condition Date/Time Comment    Discharge Stable Fri Brad 10, 2020 11:03 PM Judy Beard II discharge to home/self care              Follow-up Information     Follow up With Specialties Details Why Contact Info Additional [address] [facility] [address] [facility] Schedule an appointment as soon as possible for a visit in 1 week if stone has not passed [address] [address] [address] [facility] [id number] 050  [facility] [address], [facility], [place], [place], [address]     [address] Emergency Department Emergency Medicine Go to  If symptoms worsen, fever >101 F, unable to urinate [address] [place], [id number] [phone]  ED, [address] [place], [facility], [address]          Current Discharge Medication List      CONTINUE these medications which have CHANGED    Details   oxyCODONE (ROXICODONE) 5 mg immediate release tablet Take 1 tablet (5 mg total) by mouth every 6 (six) hours as needed for moderate painMax Daily Amount: 20 mg  Qty: 12 tablet, Refills: 0    Associated Diagnoses: Right ureteral stone         CONTINUE these medications which have NOT CHANGED    Details   Albuterol Sulfate (PROAIR RESPICLICK) 635 (90 Base) MCG/ACT AEPB Inhale      cyclobenzaprine (FLEXERIL) 10 mg tablet cyclobenzaprine 10 mg tablet      diclofenac sodium (VOLTAREN) 50 mg EC tablet Take 1 tablet(s) twice a day by oral route as needed  Refills: 0      !! eletriptan (RELPAX) 40 MG tablet Take 1 tablet (40 mg total) by mouth once as needed for migraine for up to 1 dose may repeat in 2 hours if necessary  Qty: 18 tablet, Refills: 3    Associated Diagnoses: Migraine with aura and without status migrainosus, not intractable      !! eletriptan (RELPAX) 40 MG tablet eletriptan 40 mg tablet      fluticasone (FLONASE) 50 mcg/act nasal spray 2 sprays      LORazepam (ATIVAN) 2 mg tablet Take 1 tablet by mouth      meclizine (ANTIVERT) 25 mg tablet Take 1 tablet (25 mg total) by mouth every 8 (eight) hours as needed for dizziness  Qty: 30 tablet, Refills: 1    Associated Diagnoses: Vertigo      methylPREDNISolone 4 MG tablet therapy pack Take 1 dose pk(s) every day by oral route as directed for 6 days    Refills: 0      temazepam (RESTORIL) 15 mg capsule Take by mouth      traMADol (ULTRAM) 50 mg tablet Take 50 mg by mouth every 8 (eight) hours as needed  Refills: 0       !! - Potential duplicate medications found  Please discuss with provider  No discharge procedures on file      ED Provider  Electronically Signed by           Wilbur Belle MD  01/10/20 1785

## 2020-01-12 VITALS
RESPIRATION RATE: 19 BRPM | HEIGHT: 67 IN | OXYGEN SATURATION: 93 % | WEIGHT: 201.94 LBS | DIASTOLIC BLOOD PRESSURE: 88 MMHG | SYSTOLIC BLOOD PRESSURE: 121 MMHG | TEMPERATURE: 98 F | BODY MASS INDEX: 31.7 KG/M2 | HEART RATE: 108 BPM

## 2020-01-12 LAB
ANION GAP SERPL CALCULATED.3IONS-SCNC: 11 MMOL/L (ref 4–13)
BASOPHILS # BLD AUTO: 0.09 THOUSANDS/ΜL (ref 0–0.1)
BASOPHILS NFR BLD AUTO: 1 % (ref 0–1)
BUN SERPL-MCNC: 21 MG/DL (ref 5–25)
CALCIUM SERPL-MCNC: 9.1 MG/DL (ref 8.3–10.1)
CHLORIDE SERPL-SCNC: 109 MMOL/L (ref 100–108)
CO2 SERPL-SCNC: 27 MMOL/L (ref 21–32)
CREAT SERPL-MCNC: 1.21 MG/DL (ref 0.6–1.3)
EOSINOPHIL # BLD AUTO: 0.18 THOUSAND/ΜL (ref 0–0.61)
EOSINOPHIL NFR BLD AUTO: 1 % (ref 0–6)
ERYTHROCYTE [DISTWIDTH] IN BLOOD BY AUTOMATED COUNT: 14.5 % (ref 11.6–15.1)
GFR SERPL CREATININE-BSD FRML MDRD: 68 ML/MIN/1.73SQ M
GLUCOSE SERPL-MCNC: 149 MG/DL (ref 65–140)
HCT VFR BLD AUTO: 46.1 % (ref 36.5–49.3)
HGB BLD-MCNC: 15.8 G/DL (ref 12–17)
IMM GRANULOCYTES # BLD AUTO: 0.06 THOUSAND/UL (ref 0–0.2)
IMM GRANULOCYTES NFR BLD AUTO: 0 % (ref 0–2)
LYMPHOCYTES # BLD AUTO: 1.64 THOUSANDS/ΜL (ref 0.6–4.47)
LYMPHOCYTES NFR BLD AUTO: 11 % (ref 14–44)
MCH RBC QN AUTO: 29.5 PG (ref 26.8–34.3)
MCHC RBC AUTO-ENTMCNC: 34.3 G/DL (ref 31.4–37.4)
MCV RBC AUTO: 86 FL (ref 82–98)
MONOCYTES # BLD AUTO: 1.1 THOUSAND/ΜL (ref 0.17–1.22)
MONOCYTES NFR BLD AUTO: 8 % (ref 4–12)
NEUTROPHILS # BLD AUTO: 11.42 THOUSANDS/ΜL (ref 1.85–7.62)
NEUTS SEG NFR BLD AUTO: 79 % (ref 43–75)
PLATELET # BLD AUTO: 236 THOUSANDS/UL (ref 149–390)
PMV BLD AUTO: 8.3 FL (ref 8.9–12.7)
POTASSIUM SERPL-SCNC: 4.2 MMOL/L (ref 3.5–5.3)
RBC # BLD AUTO: 5.36 MILLION/UL (ref 3.88–5.62)
SODIUM SERPL-SCNC: 147 MMOL/L (ref 136–145)
WBC # BLD AUTO: 14.49 THOUSAND/UL (ref 4.31–10.16)

## 2020-01-12 PROCEDURE — 96375 TX/PRO/DX INJ NEW DRUG ADDON: CPT

## 2020-01-12 PROCEDURE — 85025 COMPLETE CBC W/AUTO DIFF WBC: CPT | Performed by: EMERGENCY MEDICINE

## 2020-01-12 PROCEDURE — 80048 BASIC METABOLIC PNL TOTAL CA: CPT | Performed by: EMERGENCY MEDICINE

## 2020-01-12 PROCEDURE — 36415 COLL VENOUS BLD VENIPUNCTURE: CPT | Performed by: EMERGENCY MEDICINE

## 2020-01-12 PROCEDURE — 96374 THER/PROPH/DIAG INJ IV PUSH: CPT

## 2020-01-12 RX ORDER — ONDANSETRON 4 MG/1
4 TABLET, ORALLY DISINTEGRATING ORAL EVERY 8 HOURS PRN
Qty: 6 TABLET | Refills: 0 | Status: SHIPPED | OUTPATIENT
Start: 2020-01-12 | End: 2021-04-14

## 2020-01-12 RX ORDER — ACETAMINOPHEN 325 MG/1
975 TABLET ORAL ONCE
Status: COMPLETED | OUTPATIENT
Start: 2020-01-12 | End: 2020-01-12

## 2020-01-12 RX ADMIN — KETOROLAC TROMETHAMINE 15 MG: 30 INJECTION, SOLUTION INTRAMUSCULAR; INTRAVENOUS at 00:04

## 2020-01-12 RX ADMIN — ACETAMINOPHEN 975 MG: 325 TABLET, FILM COATED ORAL at 00:59

## 2020-01-12 RX ADMIN — ONDANSETRON 4 MG: 2 INJECTION INTRAMUSCULAR; INTRAVENOUS at 00:04

## 2020-01-12 NOTE — DISCHARGE INSTRUCTIONS
Take 1000 mg tylenol (acetaminophen) with 400 mg ibuprofen every 6-8 hours as needed for pain  If pain is not improved after 1 hour, take the oxycodone that was prescribed at your prior visit  Ureteral Stones   WHAT YOU NEED TO KNOW:   A ureteral stone is a stone that forms in the kidney and moves down the ureter and gets stuck there  The ureter is the tube that takes urine from the kidney to the bladder  Stones can form in the urinary system when your urine has high levels of minerals and salts  Urinary stones can be made of uric acid, calcium, phosphate, or oxalate crystals  DISCHARGE INSTRUCTIONS:   Seek care immediately if:   You have severe pain that does not improve, even after you take medicine  You have vomiting that is not relieved by medicine  Contact your healthcare provider if:   You develop a fever  You have any questions or concerns about your condition or care  Follow up with your healthcare provider as directed: You may need to return for more tests  Write down your questions so you remember to ask them during your visits  Medicines: You may need any of the following:  NSAIDs , such as ibuprofen, help decrease swelling, pain, and fever  This medicine is available with or without a doctor's order  NSAIDs can cause stomach bleeding or kidney problems in certain people  If you take blood thinner medicine, always ask your healthcare provider if NSAIDs are safe for you  Always read the medicine label and follow directions  Prescription pain medicine  may help decrease pain or help your ureteral stone pass  Do not wait until the pain is severe before you take pain medicine  Nausea medicine  may help calm your stomach and prevent vomiting  Take your medicine as directed  Contact your healthcare provider if you think your medicine is not helping or if you have side effects  Tell him or her if you are allergic to any medicine   Keep a list of the medicines, vitamins, and herbs you take  Include the amounts, and when and why you take them  Bring the list or the pill bottles to follow-up visits  Carry your medicine list with you in case of an emergency  Self-care:   Drink plenty of liquids  Your healthcare provider may tell you to drink at least 8 to 12 (eight-ounce) cups of liquids each day  This helps flush out the ureteral stones when you urinate  Water is the best liquid to drink  Strain your urine every time you go to the bathroom  Urinate through a strainer or a piece of thin cloth to catch the stones  Take the stones to your healthcare provider so they can be sent to the lab for tests  This will help your healthcare providers plan the best treatment for you  Ask your healthcare provider about any nutrition changes you need to make  You may need to limit certain foods such as foods high in sodium (salt), certain protein foods, or foods high in oxalate  After you pass your ureteral stone: Once you have passed your ureteral stone, you may need to do a 24-hour urine test  You may need to save all of your urine for 24 hours  Each time you go to the bathroom, you will urinate into a container  Then you will pour your urine into a larger container that is kept cold  You may be told to write down the time and amount of urine you passed  At the end of 24 hours, the urine is sent to a lab for tests  Results from the test will help your healthcare provider plan ways to prevent more stones from forming  © 2017 [address] Information is for End User's use only and may not be sold, redistributed or otherwise used for commercial purposes  All illustrations and images included in CareNotes® are the copyrighted property of [employer] , [employer]  or [employer]  The above information is an  only  It is not intended as medical advice for individual conditions or treatments   Talk to your doctor, nurse or pharmacist before following any medical regimen to see if it is safe and effective for you

## 2020-01-12 NOTE — ED PROVIDER NOTES
History  Chief Complaint   Patient presents with    Abdominal Pain     Pt presnest back to er again after being seen here last night and diagnose wiht 2mm kidney stone, pt c/o pain is much wors eand that he ahs been throwing up all day  48year old male presents for evaluation of severe right lower quadrant abdominal pain radiating to his testicles which has been worsening throughout the day today  Patient took oxycodone prior to coming to the ED with no relief in symptoms  He has not taken tylenol or ibuprofen  He states he has been able to urinate without difficulty  Pain is associated with nausea and vomiting  Patient was seen in this emergency department yesterday for right flank pain secondary to 2 mm ureteral stone  Pain had improved upon discharge from the ED  No fevers or chills  History provided by:  Patient and medical records  Abdominal Pain   Pain location:  RLQ  Pain quality: sharp    Pain radiates to:  Groin  Pain severity:  Severe  Onset quality:  Gradual  Duration:  1 day  Timing:  Constant  Progression:  Worsening  Chronicity:  New  Context comment:  Known 2 mm kidney stone  Relieved by:  Nothing  Worsened by:  Nothing  Ineffective treatments: oxycodone  Associated symptoms: nausea and vomiting    Associated symptoms: no chest pain, no constipation, no cough, no diarrhea, no dysuria, no fatigue, no fever, no hematuria, no shortness of breath and no sore throat    Nausea:     Severity:  Severe    Onset quality:  Gradual    Duration:  1 day    Timing:  Constant    Progression:  Waxing and waning  Vomiting:     Quality:  Stomach contents    Severity:  Mild    Duration:  1 day    Timing:  Sporadic    Progression:  Unchanged      Prior to Admission Medications   Prescriptions Last Dose Informant Patient Reported? Taking?    Albuterol Sulfate (PROAIR RESPICLICK) 105 (90 Base) MCG/ACT AEPB  Self Yes No   Sig: Inhale   LORazepam (ATIVAN) 2 mg tablet  Self Yes No   Sig: Take 1 tablet by mouth cyclobenzaprine (FLEXERIL) 10 mg tablet  Self Yes No   Sig: cyclobenzaprine 10 mg tablet   diclofenac sodium (VOLTAREN) 50 mg EC tablet   Yes No   Sig: Take 1 tablet(s) twice a day by oral route as needed  eletriptan (RELPAX) 40 MG tablet  Self No No   Sig: Take 1 tablet (40 mg total) by mouth once as needed for migraine for up to 1 dose may repeat in 2 hours if necessary   eletriptan (RELPAX) 40 MG tablet  Self Yes No   Sig: eletriptan 40 mg tablet   fluticasone (FLONASE) 50 mcg/act nasal spray  Self Yes No   Si sprays   meclizine (ANTIVERT) 25 mg tablet  Self No No   Sig: Take 1 tablet (25 mg total) by mouth every 8 (eight) hours as needed for dizziness   methylPREDNISolone 4 MG tablet therapy pack   Yes No   Sig: Take 1 dose pk(s) every day by oral route as directed for 6 days  oxyCODONE (ROXICODONE) 5 mg immediate release tablet   No No   Sig: Take 1 tablet (5 mg total) by mouth every 6 (six) hours as needed for moderate painMax Daily Amount: 20 mg   temazepam (RESTORIL) 15 mg capsule  Self Yes No   Sig: Take by mouth   traMADol (ULTRAM) 50 mg tablet  Self Yes No   Sig: Take 50 mg by mouth every 8 (eight) hours as needed      Facility-Administered Medications: None       History reviewed  No pertinent past medical history  Past Surgical History:   Procedure Laterality Date    HERNIA REPAIR      MANDIBLE FRACTURE SURGERY      MOUTH SURGERY      cyst in cheek       Family History   Problem Relation Age of Onset    Breast cancer Mother     Diabetes Father     No Known Problems Family     Substance Abuse Neg Hx     Mental illness Neg Hx      I have reviewed and agree with the history as documented  Social History     Tobacco Use    Smoking status: Never Smoker    Smokeless tobacco: Former User     Types: Snuff, Chew   Substance Use Topics    Alcohol use: No    Drug use: No        Review of Systems   Constitutional: Negative for appetite change, diaphoresis, fatigue and fever     HENT: Negative for congestion, rhinorrhea and sore throat  Respiratory: Negative for cough, chest tightness and shortness of breath  Cardiovascular: Negative for chest pain, palpitations and leg swelling  Gastrointestinal: Positive for abdominal pain, nausea and vomiting  Negative for constipation and diarrhea  Genitourinary: Negative for difficulty urinating, dysuria, frequency and hematuria  Musculoskeletal: Negative for myalgias, neck pain and neck stiffness  Skin: Negative for pallor  Neurological: Negative for syncope, weakness and headaches  All other systems reviewed and are negative  Physical Exam  Physical Exam   Constitutional: He appears well-developed and well-nourished  Non-toxic appearance  No distress  HENT:   Head: Normocephalic and atraumatic  Eyes: Pupils are equal, round, and reactive to light  EOM are normal    Neck: Normal range of motion  No tracheal deviation present  No thyromegaly present  Cardiovascular: Normal rate, regular rhythm, normal heart sounds and intact distal pulses  Pulmonary/Chest: Effort normal and breath sounds normal    Abdominal: Soft  Bowel sounds are normal  He exhibits no distension  There is tenderness in the right lower quadrant  There is no rigidity, no rebound, no guarding and no CVA tenderness  Lymphadenopathy:     He has no cervical adenopathy  Skin: Skin is warm and dry  He is not diaphoretic  Nursing note and vitals reviewed        Vital Signs  ED Triage Vitals [01/11/20 2352]   Temperature Pulse Respirations Blood Pressure SpO2   98 °F (36 7 °C) 70 19 121/88 96 %      Temp Source Heart Rate Source Patient Position - Orthostatic VS BP Location FiO2 (%)   Tympanic -- -- -- --      Pain Score       Worst Possible Pain           Vitals:    01/11/20 2352   BP: 121/88   Pulse: 70         Visual Acuity      ED Medications  Medications   acetaminophen (TYLENOL) tablet 975 mg (has no administration in time range)   ketorolac (TORADOL) injection 15 mg (15 mg Intravenous Given 1/12/20 0004)   ondansetron (ZOFRAN) injection 4 mg (4 mg Intravenous Given 1/12/20 0004)       Diagnostic Studies  Results Reviewed     Procedure Component Value Units Date/Time    Basic metabolic panel [[id number]]  (Abnormal) Collected:  01/12/20 0006    Lab Status:  Final result Specimen:  Blood from Line, Venous Updated:  01/12/20 0025     Sodium 147 mmol/L      Potassium 4 2 mmol/L      Chloride 109 mmol/L      CO2 27 mmol/L      ANION GAP 11 mmol/L      BUN 21 mg/dL      Creatinine 1 21 mg/dL      Glucose 149 mg/dL      Calcium 9 1 mg/dL      eGFR 68 ml/min/1 73sq m     Narrative:       [place] guidelines for Chronic Kidney Disease (CKD):     Stage 1 with normal or high GFR (GFR > 90 mL/min/1 73 square meters)    Stage 2 Mild CKD (GFR = 60-89 mL/min/1 73 square meters)    Stage 3A Moderate CKD (GFR = 45-59 mL/min/1 73 square meters)    Stage 3B Moderate CKD (GFR = 30-44 mL/min/1 73 square meters)    Stage 4 Severe CKD (GFR = 15-29 mL/min/1 73 square meters)    Stage 5 End Stage CKD (GFR <15 mL/min/1 73 square meters)  Note: GFR calculation is accurate only with a steady state creatinine    CBC and differential [[id number]]  (Abnormal) Collected:  01/12/20 0006    Lab Status:  Final result Specimen:  Blood from Line, Venous Updated:  01/12/20 0011     WBC 14 49 Thousand/uL      RBC 5 36 Million/uL      Hemoglobin 15 8 g/dL      Hematocrit 46 1 %      MCV 86 fL      MCH 29 5 pg      MCHC 34 3 g/dL      RDW 14 5 %      MPV 8 3 fL      Platelets 559 Thousands/uL      Neutrophils Relative 79 %      Immat GRANS % 0 %      Lymphocytes Relative 11 %      Monocytes Relative 8 %      Eosinophils Relative 1 %      Basophils Relative 1 %      Neutrophils Absolute 11 42 Thousands/µL      Immature Grans Absolute 0 06 Thousand/uL      Lymphocytes Absolute 1 64 Thousands/µL      Monocytes Absolute 1 10 Thousand/µL      Eosinophils Absolute 0 18 Thousand/µL      Basophils Absolute 0 09 Thousands/µL                  No orders to display              Procedures  Procedures         ED Course  ED Course as of Jan 12 0058   Sun Jan 12, 2020 0054 Pain significantly improved after toradol  Discussed the importance of taking tylenol and motrin in addition to the oxycodone for the pain  Zofran prescribed for nausea/vomiting associated with pain in order to improve tolerance of PO medications  Urology follow up  Patient in agreement with plan  MDM  Number of Diagnoses or Management Options  Nausea and vomiting: established and worsening  Right ureteral stone: established and worsening  Diagnosis management comments: 48year old male presents with severe pain from 2mm right ureteral stone which had been over the flank yesterday, but moved to [facility] today with radiation to the groin  Patient took oxycodone prior to arrival without improvement  He has not taken NSAIDs or tylenol  Toradol given for pain  Zofran for nausea  Significant improvement in symptoms after toradol  Tylenol added  Again discussed with patient that he needs to take tylenol and motrin before the oxycodone  Urology follow up  Return precautions provided  Amount and/or Complexity of Data Reviewed  Clinical lab tests: ordered and reviewed    Patient Progress  Patient progress: stable        Disposition  Final diagnoses:   Right ureteral stone   Nausea and vomiting     Time reflects when diagnosis was documented in both MDM as applicable and the Disposition within this note     Time User Action Codes Description Comment    1/12/2020 12:15 AM Mickey [place] Add [N20 1] Right ureteral stone     1/12/2020 12:53 AM Breanne Maguire Add [R11 2] Nausea and vomiting       ED Disposition     ED Disposition Condition Date/Time Comment    Discharge Stable Sun Jan 12, 2020 12:51 AM Marianela Pittman II discharge to home/self care              Follow-up Information     Follow up With Specialties Details Why Contact Info Additional [address] [facility] [address] [place] [facility] Urology Schedule an appointment as soon as possible for a visit in 3 days for re-evaluation if stone has still not passed [address] [address] [address] [facility] [phone]  [address]  [facility] [place] For Urology [facility], [facility], [facility] [place], [facility] [address] Emergency Department Emergency Medicine Go to  If symptoms worsen [address] [place], [id number] [phone]  ED, [address] [place], [facility], [address]          Patient's Medications   Discharge Prescriptions    ONDANSETRON (ZOFRAN-ODT) 4 MG DISINTEGRATING TABLET    Take 1 tablet (4 mg total) by mouth every 8 (eight) hours as needed for nausea or vomiting       Start Date: 1/12/2020 End Date: --       Order Dose: 4 mg       Quantity: 6 tablet    Refills: 0     No discharge procedures on file      ED Provider  Electronically Signed by           Thurl Saint, MD  01/12/20 8766

## 2020-01-13 ENCOUNTER — TELEPHONE (OUTPATIENT)
Dept: UROLOGY | Facility: MEDICAL CENTER | Age: 55
End: 2020-01-13

## 2020-01-13 NOTE — TELEPHONE ENCOUNTER
Complaint/diagnosis: Kidney stones    Insurance:[employer] PPO    History of Cancer: no    Previous Urologist:no    Outside testing/where:no    Records requested/where:no    Preferred Location:[place]

## 2020-01-13 NOTE — TELEPHONE ENCOUNTER
Called pt to schedule an appointment  I was able to get him in on 1/16/20 with Dr Guzman at the [facility] office

## 2020-01-16 ENCOUNTER — OFFICE VISIT (OUTPATIENT)
Dept: UROLOGY | Facility: CLINIC | Age: 55
End: 2020-01-16
Payer: COMMERCIAL

## 2020-01-16 VITALS
BODY MASS INDEX: 32.49 KG/M2 | HEIGHT: 67 IN | SYSTOLIC BLOOD PRESSURE: 124 MMHG | HEART RATE: 97 BPM | WEIGHT: 207 LBS | DIASTOLIC BLOOD PRESSURE: 80 MMHG

## 2020-01-16 DIAGNOSIS — N20.0 CALCULUS OF KIDNEY: Primary | ICD-10-CM

## 2020-01-16 PROCEDURE — 99213 OFFICE O/P EST LOW 20 MIN: CPT | Performed by: UROLOGY

## 2020-01-16 PROCEDURE — 82360 CALCULUS ASSAY QUANT: CPT | Performed by: UROLOGY

## 2020-01-16 PROCEDURE — 3008F BODY MASS INDEX DOCD: CPT | Performed by: UROLOGY

## 2020-01-16 NOTE — PROGRESS NOTES
1/16/2020    Ankita Narvaez III  1965  [id number]        Assessment  Nephrolithiasis    Plan  He has passed his calculus and brought in for evaluation  We will send out  We discussed the likelihood that this is a calcium oxalate type stone  Recommendation will be for increased hydration at least 64 oz water daily  He complains about drinking water, so he is advised to flavor it if needed  No other treatment or workup is necessary for at this time  If a stone composition returns in unusual results, we will call him and arrange further evaluation  Otherwise he will proceed with routine prostate evaluation with his primary care provider at his request   He will follow up here only as needed  History of Present Illness  Samm Mosqueda is a 48 y o  male developed severe right flank pain over the past week  He has history of chronic back pain and lumbar issues and assumed that his chronic pains were mostly related to his back problems  However about a week ago he developed severe flank pain and went to the emergency department  He was discharged with diagnosis of kidney stone but had to return due to the pain  He was discharged once again due to the small size of the stone  Fortunately, he passed a stone 3 days ago  He brought in to the office for evaluation  He does have history of prior calculus requiring intervention with stent placement and lithotripsy many years ago  He also has history of elevated PSA, seen by Dr Oziel Suazo 2 years ago  PSA was greater than 5 but on re-evaluation it decreased  He has not been followed up since then  He has no family history of prostate problems or prostate cancer  We reviewed his CT scan images together revealing a 2 mm right ureteral calculus on 1/10/2019  Review of Systems  Review of Systems   Constitutional: Negative  HENT: Negative  Respiratory: Negative  Cardiovascular: Negative  Gastrointestinal: Negative      Genitourinary: As per HPI   Musculoskeletal: Negative  Skin: Negative  Neurological: Negative  Hematological: Negative  Past Medical History  History reviewed  No pertinent past medical history      Past Social History  Past Surgical History:   Procedure Laterality Date    HERNIA REPAIR      MANDIBLE FRACTURE SURGERY      MOUTH SURGERY      cyst in cheek       Past Family History  Family History   Problem Relation Age of Onset   [address] [facility] Breast cancer Mother     Diabetes Father     No Known Problems Family     Substance Abuse Neg Hx     Mental illness Neg Hx        Past Social history  Social History     Socioeconomic History    Marital status: /Civil Union     Spouse name: Not on file    Number of children: Not on file    Years of education: Not on file    Highest education level: Not on file   Occupational History    Not on file   Social Needs    Financial resource strain: Not on file    Food insecurity:     Worry: Not on file     Inability: Not on file    Transportation needs:     Medical: Not on file     Non-medical: Not on file   Tobacco Use    Smoking status: Never Smoker    Smokeless tobacco: Former User     Types: Snuff, Chew   Substance and Sexual Activity    Alcohol use: No    Drug use: No    Sexual activity: Not on file   Lifestyle    Physical activity:     Days per week: Not on file     Minutes per session: Not on file    Stress: Not on file   Relationships    Social connections:     Talks on phone: Not on file     Gets together: Not on file     Attends Tenriism service: Not on file     Active member of club or organization: Not on file     Attends meetings of clubs or organizations: Not on file     Relationship status: Not on file    Intimate partner violence:     Fear of current or ex partner: Not on file     Emotionally abused: Not on file     Physically abused: Not on file     Forced sexual activity: Not on file   Other Topics Concern    Not on file   Social History Narrative    Not on file     Social History     Tobacco Use   Smoking Status Never Smoker   Smokeless Tobacco Former User    Types: Snuff, Chew       Current Medications  Current Outpatient Medications   Medication Sig Dispense Refill    Albuterol Sulfate (PROAIR RESPICLICK) 057 (90 Base) MCG/ACT AEPB Inhale      cyclobenzaprine (FLEXERIL) 10 mg tablet cyclobenzaprine 10 mg tablet      diclofenac sodium (VOLTAREN) 50 mg EC tablet Take 1 tablet(s) twice a day by oral route as needed  0    eletriptan (RELPAX) 40 MG tablet Take 1 tablet (40 mg total) by mouth once as needed for migraine for up to 1 dose may repeat in 2 hours if necessary 18 tablet 3    eletriptan (RELPAX) 40 MG tablet eletriptan 40 mg tablet      fluticasone (FLONASE) 50 mcg/act nasal spray 2 sprays      LORazepam (ATIVAN) 2 mg tablet Take 1 tablet by mouth      meclizine (ANTIVERT) 25 mg tablet Take 1 tablet (25 mg total) by mouth every 8 (eight) hours as needed for dizziness 30 tablet 1    methylPREDNISolone 4 MG tablet therapy pack Take 1 dose pk(s) every day by oral route as directed for 6 days  0    ondansetron (ZOFRAN-ODT) 4 mg disintegrating tablet Take 1 tablet (4 mg total) by mouth every 8 (eight) hours as needed for nausea or vomiting 6 tablet 0    oxyCODONE (ROXICODONE) 5 mg immediate release tablet Take 1 tablet (5 mg total) by mouth every 6 (six) hours as needed for moderate painMax Daily Amount: 20 mg 12 tablet 0    temazepam (RESTORIL) 15 mg capsule Take by mouth      traMADol (ULTRAM) 50 mg tablet Take 50 mg by mouth every 8 (eight) hours as needed  0     No current facility-administered medications for this visit  Allergies  No Known Allergies    Past Medical History, Social History, Family History, medications and allergies were reviewed      Vitals  Vitals:    01/16/20 1103   BP: 124/80   Pulse: 97   Weight: 93 9 kg (207 lb)   Height: 5' 7" (1 702 m)       Physical Exam  Physical Exam   Constitutional: He is oriented to person, place, and time  He appears well-developed and well-nourished  Cardiovascular: Normal rate  Pulmonary/Chest: Effort normal    Abdominal: Soft  Genitourinary:   Genitourinary Comments: No CVA tenderness  Musculoskeletal: Normal range of motion  Neurological: He is alert and oriented to person, place, and time  Skin: Skin is warm, dry and intact  Psychiatric: He has a normal mood and affect  Vitals reviewed          Results  Lab Results   Component Value Date    PSA 3 5 02/06/2018    PSA 5 2 (H) 12/20/2017     Lab Results   Component Value Date    GLUCOSE 98 12/20/2017    CALCIUM 9 1 01/12/2020     (H) 12/20/2017    K 4 2 01/12/2020    CO2 27 01/12/2020     (H) 01/12/2020    BUN 21 01/12/2020    CREATININE 1 21 01/12/2020     Lab Results   Component Value Date    WBC 14 49 (H) 01/12/2020    HGB 15 8 01/12/2020    HCT 46 1 01/12/2020    MCV 86 01/12/2020     01/12/2020

## 2020-02-03 LAB
CALCIUM OXALATE DIHYDRATE MFR STONE IR: 10 %
COLOR STONE: NORMAL
COM MFR STONE: 90 %
COMMENT-STONE3: NORMAL
COMPOSITION: NORMAL
LABORATORY COMMENT REPORT: NORMAL
NIDUS STONE QL: NORMAL
PHOTO: NORMAL
SIZE STONE: NORMAL MM
STONE ANALYSIS-IMP: NORMAL
WT STONE: 13.3 MG

## 2021-04-14 ENCOUNTER — OFFICE VISIT (OUTPATIENT)
Dept: FAMILY MEDICINE CLINIC | Facility: CLINIC | Age: 56
End: 2021-04-14
Payer: COMMERCIAL

## 2021-04-14 VITALS
WEIGHT: 194.8 LBS | OXYGEN SATURATION: 98 % | BODY MASS INDEX: 30.57 KG/M2 | HEIGHT: 67 IN | SYSTOLIC BLOOD PRESSURE: 126 MMHG | TEMPERATURE: 97.8 F | HEART RATE: 74 BPM | DIASTOLIC BLOOD PRESSURE: 84 MMHG

## 2021-04-14 DIAGNOSIS — R51.9 FRONTAL HEADACHE: ICD-10-CM

## 2021-04-14 DIAGNOSIS — Z00.00 ANNUAL PHYSICAL EXAM: Primary | ICD-10-CM

## 2021-04-14 DIAGNOSIS — G43.109 MIGRAINE WITH AURA AND WITHOUT STATUS MIGRAINOSUS, NOT INTRACTABLE: ICD-10-CM

## 2021-04-14 PROCEDURE — 99214 OFFICE O/P EST MOD 30 MIN: CPT | Performed by: NURSE PRACTITIONER

## 2021-04-14 PROCEDURE — 99396 PREV VISIT EST AGE 40-64: CPT | Performed by: NURSE PRACTITIONER

## 2021-04-14 PROCEDURE — 3725F SCREEN DEPRESSION PERFORMED: CPT | Performed by: NURSE PRACTITIONER

## 2021-04-14 RX ORDER — ELETRIPTAN HYDROBROMIDE 40 MG/1
40 TABLET, FILM COATED ORAL ONCE AS NEEDED
Qty: 18 TABLET | Refills: 3 | Status: SHIPPED | OUTPATIENT
Start: 2021-04-14 | End: 2021-05-26

## 2021-04-14 RX ORDER — METHOCARBAMOL 750 MG/1
TABLET, FILM COATED ORAL
COMMUNITY

## 2021-04-14 RX ORDER — TEMAZEPAM 15 MG/1
CAPSULE ORAL
COMMUNITY
Start: 2020-11-02

## 2021-04-14 NOTE — PROGRESS NOTES
[address]  [facility]        NAME: Marquez Chinchilla is a 50 y o  male  : 1965    MRN: [id number]  DATE: 2021  TIME: 11:29 AM    Assessment and Plan   Annual physical exam [M88 81]  1  Annual physical exam  Comprehensive metabolic panel    Lipid panel    Comprehensive metabolic panel    Lipid panel   2  Migraine with aura and without status migrainosus, not intractable  eletriptan (RELPAX) 40 MG tablet   3  Frontal headache  CT head wo contrast         Patient Instructions     Patient Instructions     Routine labs as ordered  CT scan head as ordered  Go to ER with severe headache, abdominal pain, leg pain, weakness, shortness of breath  Call with any problems or concerns  Wellness Visit for Adults   AMBULATORY CARE:   A wellness visit  is when you see your healthcare provider to get screened for health problems  Your healthcare provider will also give you advice on how to stay healthy  Write down your questions so you remember to ask them  Ask your healthcare provider how often you should have a wellness visit  What happens at a wellness visit:  Your healthcare provider will ask about your health, and your family history of health problems  This includes high blood pressure, heart disease, and cancer  He or she will ask if you have symptoms that concern you, if you smoke, and about your mood  You may also be asked about your intake of medicines, supplements, food, and alcohol  Any of the following may be done:  · Your weight  will be checked  Your height may also be checked so your body mass index (BMI) can be calculated  Your BMI shows if you are at a healthy weight  · Your blood pressure  and heart rate will be checked  Your temperature may also be checked  · Blood and urine tests  may be done  Blood tests may be done to check your cholesterol levels  Abnormal cholesterol levels increase your risk for heart disease and stroke   You may also need a blood or urine test to check for diabetes if you are at increased risk  Urine tests may be done to look for signs of an infection or kidney disease  · A physical exam  includes checking your heartbeat and lungs with a stethoscope  Your healthcare provider may also check your skin to look for sun damage  · Screening tests  may be recommended  A screening test is done to check for diseases that may not cause symptoms  The screening tests you may need depend on your age, gender, family history, and lifestyle habits  For example, colorectal screening may be recommended if you are 48years old or older  Screening tests you need if you are a woman:   · A Pap smear  is used to screen for cervical cancer  Pap smears are usually done every 3 to 5 years depending on your age  You may need them more often if you have had abnormal Pap smear test results in the past  Ask your healthcare provider how often you should have a Pap smear  · A mammogram  is an x-ray of your breasts to screen for breast cancer  Experts recommend mammograms every 2 years starting at age 48 years  You may need a mammogram at age 52 years or younger if you have an increased risk for breast cancer  Talk to your healthcare provider about when you should start having mammograms and how often you need them  Vaccines you may need:   · Get an influenza vaccine  every year  The influenza vaccine protects you from the flu  Several types of viruses cause the flu  The viruses change over time, so new vaccines are made each year  · Get a tetanus-diphtheria (Td) booster vaccine  every 10 years  This vaccine protects you against tetanus and diphtheria  Tetanus is a severe infection that may cause painful muscle spasms and lockjaw  Diphtheria is a severe bacterial infection that causes a thick covering in the back of your mouth and throat  · Get a human papillomavirus (HPV) vaccine  if you are female and aged 23 to 32 or male 23 to 24 and never received it   This vaccine protects you from HPV infection  HPV is the most common infection spread by sexual contact  HPV may also cause vaginal, penile, and anal cancers  · Get a pneumococcal vaccine  if you are aged 72 years or older  The pneumococcal vaccine is an injection given to protect you from pneumococcal disease  Pneumococcal disease is an infection caused by pneumococcal bacteria  The infection may cause pneumonia, meningitis, or an ear infection  · Get a shingles vaccine  if you are 60 or older, even if you have had shingles before  The shingles vaccine is an injection to protect you from the varicella-zoster virus  This is the same virus that causes chickenpox  Shingles is a painful rash that develops in people who had chickenpox or have been exposed to the virus  How to eat healthy:  My Plate is a model for planning healthy meals  It shows the types and amounts of foods that should go on your plate  Fruits and vegetables make up about half of your plate, and grains and protein make up the other half  A serving of dairy is included on the side of your plate  The amount of calories and serving sizes you need depends on your age, gender, weight, and height  Examples of healthy foods are listed below:  · Eat a variety of vegetables  such as dark green, red, and orange vegetables  You can also include canned vegetables low in sodium (salt) and frozen vegetables without added butter or sauces  · Eat a variety of fresh fruits , canned fruit in 100% juice, frozen fruit, and dried fruit  · Include whole grains  At least half of the grains you eat should be whole grains  Examples include whole-wheat bread, wheat pasta, brown rice, and whole-grain cereals such as oatmeal     · Eat a variety of protein foods such as seafood (fish and shellfish), lean meat, and poultry without skin (turkey and chicken)   Examples of lean meats include pork leg, shoulder, or tenderloin, and beef round, sirloin, tenderloin, and extra lean ground beef  Other protein foods include eggs and egg substitutes, beans, peas, soy products, nuts, and seeds  · Choose low-fat dairy products such as skim or 1% milk or low-fat yogurt, cheese, and cottage cheese  · Limit unhealthy fats  such as butter, hard margarine, and shortening  Exercise:  Exercise at least 30 minutes per day on most days of the week  Some examples of exercise include walking, biking, dancing, and swimming  You can also fit in more physical activity by taking the stairs instead of the elevator or parking farther away from stores  Include muscle strengthening activities 2 days each week  Regular exercise provides many health benefits  It helps you manage your weight, and decreases your risk for type 2 diabetes, heart disease, stroke, and high blood pressure  Exercise can also help improve your mood  Ask your healthcare provider about the best exercise plan for you  General health and safety guidelines:   · Do not smoke  Nicotine and other chemicals in cigarettes and cigars can cause lung damage  Ask your healthcare provider for information if you currently smoke and need help to quit  E-cigarettes or smokeless tobacco still contain nicotine  Talk to your healthcare provider before you use these products  · Limit alcohol  A drink of alcohol is 12 ounces of beer, 5 ounces of wine, or 1½ ounces of liquor  · Lose weight, if needed  Being overweight increases your risk of certain health conditions  These include heart disease, high blood pressure, type 2 diabetes, and certain types of cancer  · Protect your skin  Do not sunbathe or use tanning beds  Use sunscreen with a SPF 15 or higher  Apply sunscreen at least 15 minutes before you go outside  Reapply sunscreen every 2 hours  Wear protective clothing, hats, and sunglasses when you are outside  · Drive safely  Always wear your seatbelt  Make sure everyone in your car wears a seatbelt   A seatbelt can save your life if you are in an accident  Do not use your cell phone when you are driving  This could distract you and cause an accident  Pull over if you need to make a call or send a text message  · Practice safe sex  Use latex condoms if are sexually active and have more than one partner  Your healthcare provider may recommend screening tests for sexually transmitted infections (STIs)  · Wear helmets, lifejackets, and protective gear  Always wear a helmet when you ride a bike or motorcycle, go skiing, or play sports that could cause a head injury  Wear protective equipment when you play sports  Wear a lifejacket when you are on a boat or doing water sports  © Copyright [address] Information is for End User's use only and may not be sold, redistributed or otherwise used for commercial purposes  All illustrations and images included in CareNotes® are the copyrighted property of [employer]  or [employer]   The above information is an  only  It is not intended as medical advice for individual conditions or treatments  Talk to your doctor, nurse or pharmacist before following any medical regimen to see if it is safe and effective for you  Chief Complaint     Chief Complaint   Patient presents with    Headache     x1 week -- Concerned had J&J vaccine 3/24/21    Physical Exam         History of Present Illness       C/o headache and nausea x 1 week  Patient had J&J vaccine 3/24/21  Has Hx of migraines but this headache is different, Taking aspirin and Relpax which did not give him any relief  Constant squeezing pain front of head  Nausea and dry heaves  Denies shortness of breath, no chest pain, no pain in legs, no visual disturbances  He has made no changes in the last few weeks, he is drinking water keeping hydrated, limits caffeine  Review of Systems   Review of Systems   Constitutional: Positive for activity change (due to headache)   Negative for chills, diaphoresis, fatigue and fever  HENT: Positive for tinnitus  Eyes: Negative for photophobia, pain, discharge, redness, itching and visual disturbance  Respiratory: Negative for cough, chest tightness, shortness of breath and wheezing  Cardiovascular: Negative for chest pain, palpitations and leg swelling  Gastrointestinal: Positive for nausea and vomiting (dry heaves)  Negative for abdominal pain and diarrhea  Musculoskeletal: Negative for myalgias  Skin: Negative for color change and pallor  Neurological: Positive for headaches  Negative for dizziness, tremors, seizures, syncope, facial asymmetry, speech difficulty, weakness, light-headedness and numbness  Psychiatric/Behavioral: Negative for dysphoric mood  The patient is not nervous/anxious  Current Medications       Current Outpatient Medications:     temazepam (RESTORIL) 15 mg capsule, temazepam 15 mg capsule  as needed, Disp: , Rfl:     diclofenac sodium (VOLTAREN) 50 mg EC tablet, Take 1 tablet(s) twice a day by oral route as needed  , Disp: , Rfl: 0    eletriptan (RELPAX) 40 MG tablet, Take 1 tablet (40 mg total) by mouth once as needed for migraine for up to 1 dose may repeat in 2 hours if necessary, Disp: 18 tablet, Rfl: 3    fluticasone (FLONASE) 50 mcg/act nasal spray, 2 sprays, Disp: , Rfl:     meclizine (ANTIVERT) 25 mg tablet, Take 1 tablet (25 mg total) by mouth every 8 (eight) hours as needed for dizziness, Disp: 30 tablet, Rfl: 1    methocarbamol (ROBAXIN) 750 mg tablet, methocarbamol 750 mg tablet  take 1 tablet by mouth every 8 hours if needed, Disp: , Rfl:     Current Allergies     Allergies as of 04/14/2021    (No Known Allergies)            The following portions of the patient's history were reviewed and updated as appropriate: allergies, current medications, past family history, past medical history, past social history, past surgical history and problem list      History reviewed   No pertinent past medical history  Past Surgical History:   Procedure Laterality Date    HERNIA REPAIR      MANDIBLE FRACTURE SURGERY      MOUTH SURGERY      cyst in cheek       Family History   Problem Relation Age of Onset    Breast cancer Mother     Diabetes Father     No Known Problems Family     Substance Abuse Neg Hx     Mental illness Neg Hx          Medications have been verified  Objective   /84 (BP Location: Left arm, Patient Position: Sitting, Cuff Size: Standard)   Pulse 74   Temp 97 8 °F (36 6 °C) (Oral)   Ht 5' 6 5" (1 689 m)   Wt 88 4 kg (194 lb 12 8 oz)   SpO2 98%   BMI 30 97 kg/m²        Physical Exam     Physical Exam  Vitals signs and nursing note reviewed  Constitutional:       General: He is in acute distress (mild distress due to pain  Had head down with hands on front of head when I came into room  )  Appearance: Normal appearance  He is well-developed  He is not diaphoretic  HENT:      Head: Normocephalic  Eyes:      General:         Right eye: No discharge  Left eye: No discharge  Extraocular Movements: Extraocular movements intact  Conjunctiva/sclera: Conjunctivae normal       Pupils: Pupils are equal, round, and reactive to light  Neck:      Musculoskeletal: Normal range of motion and neck supple  No neck rigidity or muscular tenderness  Thyroid: No thyromegaly  Vascular: No carotid bruit  Trachea: No tracheal deviation  Cardiovascular:      Rate and Rhythm: Normal rate and regular rhythm  Heart sounds: Normal heart sounds  No murmur  No gallop  Pulmonary:      Effort: Pulmonary effort is normal  No respiratory distress  Breath sounds: Normal breath sounds  No wheezing  Chest:      Chest wall: No tenderness  Musculoskeletal: Normal range of motion  General: No swelling or tenderness  Lymphadenopathy:      Cervical: No cervical adenopathy  Skin:     General: Skin is warm and dry        Coloration: Skin is not pale       Findings: No erythema  Neurological:      General: No focal deficit present  Mental Status: He is alert and oriented to person, place, and time  Cranial Nerves: No cranial nerve deficit  Sensory: No sensory deficit  Motor: No weakness  Coordination: Coordination normal       Gait: Gait normal       Deep Tendon Reflexes: Reflexes normal    Psychiatric:         Mood and Affect: Mood normal          Speech: Speech normal          Behavior: Behavior normal          Thought Content:  Thought content normal          Judgment: Judgment normal            PHQ-9 Depression Screening    PHQ-9:   Frequency of the following problems over the past two weeks:      Little interest or pleasure in doing things: 0 - not at all  Feeling down, depressed, or hopeless: 0 - not at all  PHQ-2 Score: 0

## 2021-04-14 NOTE — PATIENT INSTRUCTIONS
Routine labs as ordered  CT scan head as ordered  Go to ER with severe headache, abdominal pain, leg pain, weakness, shortness of breath  Call with any problems or concerns  Wellness Visit for Adults   AMBULATORY CARE:   A wellness visit  is when you see your healthcare provider to get screened for health problems  Your healthcare provider will also give you advice on how to stay healthy  Write down your questions so you remember to ask them  Ask your healthcare provider how often you should have a wellness visit  What happens at a wellness visit:  Your healthcare provider will ask about your health, and your family history of health problems  This includes high blood pressure, heart disease, and cancer  He or she will ask if you have symptoms that concern you, if you smoke, and about your mood  You may also be asked about your intake of medicines, supplements, food, and alcohol  Any of the following may be done:  · Your weight  will be checked  Your height may also be checked so your body mass index (BMI) can be calculated  Your BMI shows if you are at a healthy weight  · Your blood pressure  and heart rate will be checked  Your temperature may also be checked  · Blood and urine tests  may be done  Blood tests may be done to check your cholesterol levels  Abnormal cholesterol levels increase your risk for heart disease and stroke  You may also need a blood or urine test to check for diabetes if you are at increased risk  Urine tests may be done to look for signs of an infection or kidney disease  · A physical exam  includes checking your heartbeat and lungs with a stethoscope  Your healthcare provider may also check your skin to look for sun damage  · Screening tests  may be recommended  A screening test is done to check for diseases that may not cause symptoms  The screening tests you may need depend on your age, gender, family history, and lifestyle habits   For example, colorectal screening may be recommended if you are 48years old or older  Screening tests you need if you are a woman:   · A Pap smear  is used to screen for cervical cancer  Pap smears are usually done every 3 to 5 years depending on your age  You may need them more often if you have had abnormal Pap smear test results in the past  Ask your healthcare provider how often you should have a Pap smear  · A mammogram  is an x-ray of your breasts to screen for breast cancer  Experts recommend mammograms every 2 years starting at age 48 years  You may need a mammogram at age 52 years or younger if you have an increased risk for breast cancer  Talk to your healthcare provider about when you should start having mammograms and how often you need them  Vaccines you may need:   · Get an influenza vaccine  every year  The influenza vaccine protects you from the flu  Several types of viruses cause the flu  The viruses change over time, so new vaccines are made each year  · Get a tetanus-diphtheria (Td) booster vaccine  every 10 years  This vaccine protects you against tetanus and diphtheria  Tetanus is a severe infection that may cause painful muscle spasms and lockjaw  Diphtheria is a severe bacterial infection that causes a thick covering in the back of your mouth and throat  · Get a human papillomavirus (HPV) vaccine  if you are female and aged 23 to 32 or male 23 to 24 and never received it  This vaccine protects you from HPV infection  HPV is the most common infection spread by sexual contact  HPV may also cause vaginal, penile, and anal cancers  · Get a pneumococcal vaccine  if you are aged 72 years or older  The pneumococcal vaccine is an injection given to protect you from pneumococcal disease  Pneumococcal disease is an infection caused by pneumococcal bacteria  The infection may cause pneumonia, meningitis, or an ear infection  · Get a shingles vaccine  if you are 60 or older, even if you have had shingles before   The shingles vaccine is an injection to protect you from the varicella-zoster virus  This is the same virus that causes chickenpox  Shingles is a painful rash that develops in people who had chickenpox or have been exposed to the virus  How to eat healthy:  My Plate is a model for planning healthy meals  It shows the types and amounts of foods that should go on your plate  Fruits and vegetables make up about half of your plate, and grains and protein make up the other half  A serving of dairy is included on the side of your plate  The amount of calories and serving sizes you need depends on your age, gender, weight, and height  Examples of healthy foods are listed below:  · Eat a variety of vegetables  such as dark green, red, and orange vegetables  You can also include canned vegetables low in sodium (salt) and frozen vegetables without added butter or sauces  · Eat a variety of fresh fruits , canned fruit in 100% juice, frozen fruit, and dried fruit  · Include whole grains  At least half of the grains you eat should be whole grains  Examples include whole-wheat bread, wheat pasta, brown rice, and whole-grain cereals such as oatmeal     · Eat a variety of protein foods such as seafood (fish and shellfish), lean meat, and poultry without skin (turkey and chicken)  Examples of lean meats include pork leg, shoulder, or tenderloin, and beef round, sirloin, tenderloin, and extra lean ground beef  Other protein foods include eggs and egg substitutes, beans, peas, soy products, nuts, and seeds  · Choose low-fat dairy products such as skim or 1% milk or low-fat yogurt, cheese, and cottage cheese  · Limit unhealthy fats  such as butter, hard margarine, and shortening  Exercise:  Exercise at least 30 minutes per day on most days of the week  Some examples of exercise include walking, biking, dancing, and swimming   You can also fit in more physical activity by taking the stairs instead of the elevator or parking farther away from stores  Include muscle strengthening activities 2 days each week  Regular exercise provides many health benefits  It helps you manage your weight, and decreases your risk for type 2 diabetes, heart disease, stroke, and high blood pressure  Exercise can also help improve your mood  Ask your healthcare provider about the best exercise plan for you  General health and safety guidelines:   · Do not smoke  Nicotine and other chemicals in cigarettes and cigars can cause lung damage  Ask your healthcare provider for information if you currently smoke and need help to quit  E-cigarettes or smokeless tobacco still contain nicotine  Talk to your healthcare provider before you use these products  · Limit alcohol  A drink of alcohol is 12 ounces of beer, 5 ounces of wine, or 1½ ounces of liquor  · Lose weight, if needed  Being overweight increases your risk of certain health conditions  These include heart disease, high blood pressure, type 2 diabetes, and certain types of cancer  · Protect your skin  Do not sunbathe or use tanning beds  Use sunscreen with a SPF 15 or higher  Apply sunscreen at least 15 minutes before you go outside  Reapply sunscreen every 2 hours  Wear protective clothing, hats, and sunglasses when you are outside  · Drive safely  Always wear your seatbelt  Make sure everyone in your car wears a seatbelt  A seatbelt can save your life if you are in an accident  Do not use your cell phone when you are driving  This could distract you and cause an accident  Pull over if you need to make a call or send a text message  · Practice safe sex  Use latex condoms if are sexually active and have more than one partner  Your healthcare provider may recommend screening tests for sexually transmitted infections (STIs)  · Wear helmets, lifejackets, and protective gear    Always wear a helmet when you ride a bike or motorcycle, go skiing, or play sports that could cause a head injury  Wear protective equipment when you play sports  Wear a lifejacket when you are on a boat or doing water sports  © Copyright [address] Information is for End User's use only and may not be sold, redistributed or otherwise used for commercial purposes  All illustrations and images included in CareNotes® are the copyrighted property of [employer] M , Inc  or [facility] [employer]ian [employer]   The above information is an  only  It is not intended as medical advice for individual conditions or treatments  Talk to your doctor, nurse or pharmacist before following any medical regimen to see if it is safe and effective for you

## 2021-04-14 NOTE — PROGRESS NOTES
[address]    NAME: Shayna Peraza III  AGE: 50 y o  SEX: male  : 1965     DATE: 2021     Assessment and Plan:     Problem List Items Addressed This Visit        Cardiovascular and Mediastinum    Migraine with aura and without status migrainosus, not intractable    Relevant Medications    methocarbamol (ROBAXIN) 750 mg tablet    eletriptan (RELPAX) 40 MG tablet      Other Visit Diagnoses     Severe frontal headaches    -  Primary    Relevant Orders    CT head wo contrast    Annual physical exam        Relevant Orders    Comprehensive metabolic panel    Lipid panel          Immunizations and preventive care screenings were discussed with patient today  Appropriate education was printed on patient's after visit summary  Counseling:  Alcohol/drug use: discussed moderation in alcohol intake, the recommendations for healthy alcohol use, and avoidance of illicit drug use  Dental Health: discussed importance of regular tooth brushing, flossing, and dental visits  Injury prevention: discussed safety/seat belts, safety helmets, smoke detectors, carbon dioxide detectors, and smoking near bedding or upholstery  Sexual health: discussed sexually transmitted diseases, partner selection, use of condoms, avoidance of unintended pregnancy, and contraceptive alternatives  · Exercise: the importance of regular exercise/physical activity was discussed  Recommend exercise 3-5 times per week for at least 30 minutes  BMI Counseling: Body mass index is 30 97 kg/m²  The BMI is above normal  Nutrition recommendations include decreasing portion sizes, encouraging healthy choices of fruits and vegetables, moderation in carbohydrate intake and increasing intake of lean protein  Return if symptoms worsen or fail to improve       Chief Complaint:     Chief Complaint   Patient presents with    Headache     x1 week -- Concerned had J&J vaccine 3/24/21    Physical Exam      History of Present Illness:     Adult Annual Physical   Patient here for a comprehensive physical exam  The patient reports problems - headache x 1 week  Diet and Physical Activity  · Diet/Nutrition: well balanced diet  · Exercise: walking  Depression Screening  PHQ-9 Depression Screening    PHQ-9:   Frequency of the following problems over the past two weeks:      Little interest or pleasure in doing things: 0 - not at all  Feeling down, depressed, or hopeless: 0 - not at all  PHQ-2 Score: 0       General Health  · Sleep: sleeps well  · Hearing: normal - bilateral   · Vision: goes for regular eye exams  · Dental: regular dental visits   Health  · Symptoms include: none     Review of Systems:     Review of Systems   Constitutional: Positive for activity change  Negative for appetite change, chills (due to headache), diaphoresis, fatigue and fever  HENT: Positive for tinnitus  Negative for congestion, ear pain, facial swelling, hearing loss, postnasal drip, rhinorrhea, sinus pressure, sinus pain, sneezing, sore throat and voice change  Eyes: Negative for discharge and visual disturbance  Respiratory: Negative for cough, choking, chest tightness, shortness of breath, wheezing and stridor  Cardiovascular: Negative for chest pain, palpitations and leg swelling  Gastrointestinal: Positive for nausea and vomiting (dry heaves)  Negative for abdominal distention, abdominal pain, anal bleeding, blood in stool, constipation and diarrhea  Endocrine: Negative for polydipsia, polyphagia and polyuria  Genitourinary: Negative for decreased urine volume, difficulty urinating, dysuria, flank pain, frequency and urgency  Musculoskeletal: Negative for arthralgias, back pain, gait problem, joint swelling, myalgias, neck pain and neck stiffness  Skin: Negative for color change, rash and wound  Neurological: Positive for headaches  Negative for dizziness, tremors, seizures, syncope, speech difficulty, weakness, light-headedness and numbness  Hematological: Negative for adenopathy  Does not bruise/bleed easily  Psychiatric/Behavioral: Negative for agitation, behavioral problems, confusion, dysphoric mood, hallucinations, self-injury, sleep disturbance and suicidal ideas  The patient is not nervous/anxious and is not hyperactive  Past Medical History:     History reviewed  No pertinent past medical history     Past Surgical History:     Past Surgical History:   Procedure Laterality Date    HERNIA REPAIR      MANDIBLE FRACTURE SURGERY      MOUTH SURGERY      cyst in cheek      Family History:     Family History   Problem Relation Age of Onset    Breast cancer Mother     Diabetes Father     No Known Problems Family     Substance Abuse Neg Hx     Mental illness Neg Hx       Social History:        Social History     Socioeconomic History    Marital status: /Civil Union     Spouse name: Cathryn Shahid Number of children: 2    Years of education: None    Highest education level: None   Occupational History    Occupation:      Employer: [employer] [address] Needs    Financial resource strain: None    Food insecurity     Worry: None     Inability: None    Transportation needs     Medical: None     Non-medical: None   Tobacco Use    Smoking status: Never Smoker    Smokeless tobacco: Former User     Types: Snuff   Substance and Sexual Activity    Alcohol use: No    Drug use: No    Sexual activity: None   Lifestyle    Physical activity     Days per week: None     Minutes per session: None    Stress: None   Relationships    Social connections     Talks on phone: None     Gets together: None     Attends Sikh service: None     Active member of club or organization: None     Attends meetings of clubs or organizations: None     Relationship status: None    Intimate partner violence     Fear of current or ex partner: None     Emotionally abused: None     Physically abused: None     Forced sexual activity: None   Other Topics Concern    None   Social History Narrative    None      Current Medications:     Current Outpatient Medications   Medication Sig Dispense Refill    temazepam (RESTORIL) 15 mg capsule temazepam 15 mg capsule   as needed      diclofenac sodium (VOLTAREN) 50 mg EC tablet Take 1 tablet(s) twice a day by oral route as needed  0    eletriptan (RELPAX) 40 MG tablet Take 1 tablet (40 mg total) by mouth once as needed for migraine for up to 1 dose may repeat in 2 hours if necessary 18 tablet 3    fluticasone (FLONASE) 50 mcg/act nasal spray 2 sprays      meclizine (ANTIVERT) 25 mg tablet Take 1 tablet (25 mg total) by mouth every 8 (eight) hours as needed for dizziness 30 tablet 1    methocarbamol (ROBAXIN) 750 mg tablet methocarbamol 750 mg tablet   take 1 tablet by mouth every 8 hours if needed       No current facility-administered medications for this visit  Allergies:     No Known Allergies   Physical Exam:     /84 (BP Location: Left arm, Patient Position: Sitting, Cuff Size: Standard)   Pulse 74   Temp 97 8 °F (36 6 °C) (Oral)   Ht 5' 6 5" (1 689 m)   Wt 88 4 kg (194 lb 12 8 oz)   SpO2 98%   BMI 30 97 kg/m²     Physical Exam  Vitals signs and nursing note reviewed  Constitutional:       General: He is in acute distress (appears to have mild distress due to headache  patient had head down with hands on front of head when I came into room  )  Appearance: Normal appearance  He is well-developed  He is not ill-appearing or diaphoretic  HENT:      Head: Normocephalic and atraumatic  Right Ear: Tympanic membrane, ear canal and external ear normal  There is no impacted cerumen  Left Ear: Tympanic membrane, ear canal and external ear normal  There is no impacted cerumen  Nose: Nose normal  No congestion or rhinorrhea        Mouth/Throat: Mouth: Mucous membranes are moist       Pharynx: Oropharynx is clear  No oropharyngeal exudate  Eyes:      General:         Right eye: No discharge  Left eye: No discharge  Extraocular Movements: Extraocular movements intact  Conjunctiva/sclera: Conjunctivae normal       Pupils: Pupils are equal, round, and reactive to light  Neck:      Musculoskeletal: Normal range of motion and neck supple  No neck rigidity or muscular tenderness  Vascular: No carotid bruit  Cardiovascular:      Rate and Rhythm: Normal rate and regular rhythm  Heart sounds: Normal heart sounds  No murmur  No friction rub  No gallop  Pulmonary:      Effort: Pulmonary effort is normal  No respiratory distress  Breath sounds: Normal breath sounds  No wheezing  Chest:      Chest wall: No tenderness  Abdominal:      General: Bowel sounds are normal  There is no distension  Palpations: Abdomen is soft  There is no mass  Tenderness: There is no abdominal tenderness  There is no guarding or rebound  Hernia: No hernia is present  Musculoskeletal: Normal range of motion  General: No swelling or tenderness  Right lower leg: No edema  Left lower leg: No edema  Lymphadenopathy:      Cervical: No cervical adenopathy  Skin:     General: Skin is warm and dry  Coloration: Skin is not pale  Findings: No erythema  Neurological:      General: No focal deficit present  Mental Status: He is alert and oriented to person, place, and time  Cranial Nerves: No cranial nerve deficit  Sensory: No sensory deficit  Motor: No weakness  Coordination: Coordination normal       Gait: Gait normal       Deep Tendon Reflexes: Reflexes normal    Psychiatric:         Mood and Affect: Mood normal          Behavior: Behavior normal          Thought Content:  Thought content normal          Judgment: Judgment normal         PHQ-9 Depression Screening    PHQ-9: Frequency of the following problems over the past two weeks:      Little interest or pleasure in doing things: 0 - not at all  Feeling down, depressed, or hopeless: 0 - not at all  PHQ-2 Score: 0         India Alba, [address] [place], [place] [address]

## 2021-04-22 ENCOUNTER — APPOINTMENT (EMERGENCY)
Dept: CT IMAGING | Facility: HOSPITAL | Age: 56
End: 2021-04-22
Payer: COMMERCIAL

## 2021-04-22 ENCOUNTER — HOSPITAL ENCOUNTER (OUTPATIENT)
Facility: HOSPITAL | Age: 56
Setting detail: OBSERVATION
Discharge: HOME | End: 2021-04-23
Attending: EMERGENCY MEDICINE | Admitting: INTERNAL MEDICINE
Payer: COMMERCIAL

## 2021-04-22 DIAGNOSIS — R51.9 INTRACTABLE HEADACHE: Primary | ICD-10-CM

## 2021-04-22 PROBLEM — G43.119 INTRACTABLE MIGRAINE WITH AURA WITHOUT STATUS MIGRAINOSUS: Status: ACTIVE | Noted: 2018-09-20

## 2021-04-22 LAB
ANION GAP SERPL CALCULATED.3IONS-SCNC: 13 MMOL/L (ref 4–13)
BASOPHILS # BLD AUTO: 0.14 THOUSANDS/ΜL (ref 0–0.1)
BASOPHILS NFR BLD AUTO: 2 % (ref 0–1)
BUN SERPL-MCNC: 21 MG/DL (ref 5–25)
CALCIUM SERPL-MCNC: 9 MG/DL (ref 8.3–10.1)
CHLORIDE SERPL-SCNC: 108 MMOL/L (ref 100–108)
CO2 SERPL-SCNC: 23 MMOL/L (ref 21–32)
CREAT SERPL-MCNC: 0.94 MG/DL (ref 0.6–1.3)
EOSINOPHIL # BLD AUTO: 0.34 THOUSAND/ΜL (ref 0–0.61)
EOSINOPHIL NFR BLD AUTO: 5 % (ref 0–6)
ERYTHROCYTE [DISTWIDTH] IN BLOOD BY AUTOMATED COUNT: 12.3 % (ref 11.6–15.1)
ERYTHROCYTE [SEDIMENTATION RATE] IN BLOOD: 6 MM/HOUR (ref 0–19)
GFR SERPL CREATININE-BSD FRML MDRD: 92 ML/MIN/1.73SQ M
GLUCOSE SERPL-MCNC: 93 MG/DL (ref 65–140)
HCT VFR BLD AUTO: 49.4 % (ref 36.5–49.3)
HGB BLD-MCNC: 16.9 G/DL (ref 12–17)
IMM GRANULOCYTES # BLD AUTO: 0.03 THOUSAND/UL (ref 0–0.2)
IMM GRANULOCYTES NFR BLD AUTO: 0 % (ref 0–2)
LYMPHOCYTES # BLD AUTO: 2.15 THOUSANDS/ΜL (ref 0.6–4.47)
LYMPHOCYTES NFR BLD AUTO: 29 % (ref 14–44)
MCH RBC QN AUTO: 30 PG (ref 26.8–34.3)
MCHC RBC AUTO-ENTMCNC: 34.2 G/DL (ref 31.4–37.4)
MCV RBC AUTO: 88 FL (ref 82–98)
MONOCYTES # BLD AUTO: 0.51 THOUSAND/ΜL (ref 0.17–1.22)
MONOCYTES NFR BLD AUTO: 7 % (ref 4–12)
NEUTROPHILS # BLD AUTO: 4.14 THOUSANDS/ΜL (ref 1.85–7.62)
NEUTS SEG NFR BLD AUTO: 57 % (ref 43–75)
NRBC BLD AUTO-RTO: 0 /100 WBCS
PLATELET # BLD AUTO: 226 THOUSANDS/UL (ref 149–390)
PMV BLD AUTO: 8.5 FL (ref 8.9–12.7)
POTASSIUM SERPL-SCNC: 4.3 MMOL/L (ref 3.5–5.3)
RBC # BLD AUTO: 5.64 MILLION/UL (ref 3.88–5.62)
SODIUM SERPL-SCNC: 144 MMOL/L (ref 136–145)
WBC # BLD AUTO: 7.31 THOUSAND/UL (ref 4.31–10.16)

## 2021-04-22 PROCEDURE — 96365 THER/PROPH/DIAG IV INF INIT: CPT

## 2021-04-22 PROCEDURE — 85025 COMPLETE CBC W/AUTO DIFF WBC: CPT | Performed by: EMERGENCY MEDICINE

## 2021-04-22 PROCEDURE — 70450 CT HEAD/BRAIN W/O DYE: CPT

## 2021-04-22 PROCEDURE — 99285 EMERGENCY DEPT VISIT HI MDM: CPT

## 2021-04-22 PROCEDURE — 99285 EMERGENCY DEPT VISIT HI MDM: CPT | Performed by: EMERGENCY MEDICINE

## 2021-04-22 PROCEDURE — 80048 BASIC METABOLIC PNL TOTAL CA: CPT | Performed by: EMERGENCY MEDICINE

## 2021-04-22 PROCEDURE — 99219 PR INITIAL OBSERVATION CARE/DAY 50 MINUTES: CPT | Performed by: INTERNAL MEDICINE

## 2021-04-22 PROCEDURE — 99203 OFFICE O/P NEW LOW 30 MIN: CPT | Performed by: PSYCHIATRY & NEUROLOGY

## 2021-04-22 PROCEDURE — G1004 CDSM NDSC: HCPCS

## 2021-04-22 PROCEDURE — 85652 RBC SED RATE AUTOMATED: CPT | Performed by: EMERGENCY MEDICINE

## 2021-04-22 PROCEDURE — 96375 TX/PRO/DX INJ NEW DRUG ADDON: CPT

## 2021-04-22 PROCEDURE — 36415 COLL VENOUS BLD VENIPUNCTURE: CPT

## 2021-04-22 RX ORDER — DEXAMETHASONE SODIUM PHOSPHATE 10 MG/ML
10 INJECTION, SOLUTION INTRAMUSCULAR; INTRAVENOUS ONCE
Status: COMPLETED | OUTPATIENT
Start: 2021-04-22 | End: 2021-04-22

## 2021-04-22 RX ORDER — METOCLOPRAMIDE HYDROCHLORIDE 5 MG/ML
10 INJECTION INTRAMUSCULAR; INTRAVENOUS EVERY 8 HOURS SCHEDULED
Status: DISCONTINUED | OUTPATIENT
Start: 2021-04-22 | End: 2021-04-22

## 2021-04-22 RX ORDER — DIPHENHYDRAMINE HYDROCHLORIDE 50 MG/ML
25 INJECTION INTRAMUSCULAR; INTRAVENOUS ONCE
Status: COMPLETED | OUTPATIENT
Start: 2021-04-22 | End: 2021-04-22

## 2021-04-22 RX ORDER — SODIUM CHLORIDE 9 MG/ML
75 INJECTION, SOLUTION INTRAVENOUS CONTINUOUS
Status: DISCONTINUED | OUTPATIENT
Start: 2021-04-22 | End: 2021-04-23 | Stop reason: HOSPADM

## 2021-04-22 RX ORDER — METHOCARBAMOL 500 MG/1
750 TABLET, FILM COATED ORAL 3 TIMES DAILY PRN
Status: DISCONTINUED | OUTPATIENT
Start: 2021-04-22 | End: 2021-04-23 | Stop reason: HOSPADM

## 2021-04-22 RX ORDER — DIPHENHYDRAMINE HYDROCHLORIDE 50 MG/ML
25 INJECTION INTRAMUSCULAR; INTRAVENOUS EVERY 8 HOURS PRN
Status: DISCONTINUED | OUTPATIENT
Start: 2021-04-22 | End: 2021-04-22

## 2021-04-22 RX ORDER — MAGNESIUM SULFATE HEPTAHYDRATE 40 MG/ML
2 INJECTION, SOLUTION INTRAVENOUS DAILY
Status: DISCONTINUED | OUTPATIENT
Start: 2021-04-23 | End: 2021-04-23 | Stop reason: HOSPADM

## 2021-04-22 RX ORDER — MAGNESIUM SULFATE HEPTAHYDRATE 40 MG/ML
2 INJECTION, SOLUTION INTRAVENOUS ONCE
Status: DISCONTINUED | OUTPATIENT
Start: 2021-04-23 | End: 2021-04-22

## 2021-04-22 RX ORDER — MECLIZINE HCL 12.5 MG/1
25 TABLET ORAL EVERY 8 HOURS PRN
Status: DISCONTINUED | OUTPATIENT
Start: 2021-04-22 | End: 2021-04-23 | Stop reason: HOSPADM

## 2021-04-22 RX ORDER — METOCLOPRAMIDE HYDROCHLORIDE 5 MG/ML
10 INJECTION INTRAMUSCULAR; INTRAVENOUS ONCE
Status: COMPLETED | OUTPATIENT
Start: 2021-04-22 | End: 2021-04-22

## 2021-04-22 RX ORDER — MAGNESIUM SULFATE HEPTAHYDRATE 40 MG/ML
2 INJECTION, SOLUTION INTRAVENOUS ONCE
Status: COMPLETED | OUTPATIENT
Start: 2021-04-22 | End: 2021-04-22

## 2021-04-22 RX ORDER — KETOROLAC TROMETHAMINE 30 MG/ML
30 INJECTION, SOLUTION INTRAMUSCULAR; INTRAVENOUS ONCE
Status: COMPLETED | OUTPATIENT
Start: 2021-04-22 | End: 2021-04-22

## 2021-04-22 RX ORDER — KETOROLAC TROMETHAMINE 30 MG/ML
30 INJECTION, SOLUTION INTRAMUSCULAR; INTRAVENOUS EVERY 8 HOURS
Status: DISCONTINUED | OUTPATIENT
Start: 2021-04-22 | End: 2021-04-23 | Stop reason: HOSPADM

## 2021-04-22 RX ORDER — TEMAZEPAM 15 MG/1
15 CAPSULE ORAL
Status: DISCONTINUED | OUTPATIENT
Start: 2021-04-22 | End: 2021-04-23 | Stop reason: HOSPADM

## 2021-04-22 RX ORDER — METOCLOPRAMIDE HYDROCHLORIDE 5 MG/ML
10 INJECTION INTRAMUSCULAR; INTRAVENOUS EVERY 8 HOURS SCHEDULED
Status: DISCONTINUED | OUTPATIENT
Start: 2021-04-22 | End: 2021-04-23 | Stop reason: HOSPADM

## 2021-04-22 RX ORDER — DEXAMETHASONE SODIUM PHOSPHATE 4 MG/ML
4 INJECTION, SOLUTION INTRA-ARTICULAR; INTRALESIONAL; INTRAMUSCULAR; INTRAVENOUS; SOFT TISSUE EVERY 12 HOURS SCHEDULED
Status: DISCONTINUED | OUTPATIENT
Start: 2021-04-22 | End: 2021-04-23 | Stop reason: HOSPADM

## 2021-04-22 RX ORDER — DIPHENHYDRAMINE HYDROCHLORIDE 50 MG/ML
25 INJECTION INTRAMUSCULAR; INTRAVENOUS EVERY 8 HOURS
Status: DISCONTINUED | OUTPATIENT
Start: 2021-04-22 | End: 2021-04-23 | Stop reason: HOSPADM

## 2021-04-22 RX ADMIN — SODIUM CHLORIDE 1000 ML: 0.9 INJECTION, SOLUTION INTRAVENOUS at 12:46

## 2021-04-22 RX ADMIN — KETOROLAC TROMETHAMINE 30 MG: 30 INJECTION, SOLUTION INTRAMUSCULAR; INTRAVENOUS at 12:45

## 2021-04-22 RX ADMIN — MAGNESIUM SULFATE HEPTAHYDRATE 2 G: 40 INJECTION, SOLUTION INTRAVENOUS at 12:50

## 2021-04-22 RX ADMIN — DIPHENHYDRAMINE HYDROCHLORIDE 25 MG: 50 INJECTION, SOLUTION INTRAMUSCULAR; INTRAVENOUS at 19:59

## 2021-04-22 RX ADMIN — DEXAMETHASONE SODIUM PHOSPHATE 10 MG: 10 INJECTION, SOLUTION INTRAMUSCULAR; INTRAVENOUS at 14:45

## 2021-04-22 RX ADMIN — DIPHENHYDRAMINE HYDROCHLORIDE 25 MG: 50 INJECTION, SOLUTION INTRAMUSCULAR; INTRAVENOUS at 12:47

## 2021-04-22 RX ADMIN — METOCLOPRAMIDE HYDROCHLORIDE 10 MG: 5 INJECTION INTRAMUSCULAR; INTRAVENOUS at 12:48

## 2021-04-22 RX ADMIN — METOCLOPRAMIDE 10 MG: 5 INJECTION, SOLUTION INTRAMUSCULAR; INTRAVENOUS at 19:57

## 2021-04-22 RX ADMIN — DEXAMETHASONE SODIUM PHOSPHATE 4 MG: 4 INJECTION, SOLUTION INTRA-ARTICULAR; INTRALESIONAL; INTRAMUSCULAR; INTRAVENOUS; SOFT TISSUE at 23:01

## 2021-04-22 RX ADMIN — SODIUM CHLORIDE 75 ML/HR: 0.9 INJECTION, SOLUTION INTRAVENOUS at 17:42

## 2021-04-22 RX ADMIN — METHOCARBAMOL 750 MG: 500 TABLET ORAL at 20:17

## 2021-04-22 RX ADMIN — KETOROLAC TROMETHAMINE 30 MG: 30 INJECTION, SOLUTION INTRAMUSCULAR at 22:58

## 2021-04-22 NOTE — CONSULTS
Consultation - Neurology   Mike Monday III 50 y o  male MRN: [id number]  Unit/Bed#: [facility] Encounter: [id number]      Assessment/Plan     Intractable migraine with aura without status migrainosus  Assessment & Plan  50 y o  male with history of migraine and chronic back pain who presents to the ED with intractable HA x 2 weeks and associated photo and phonophobia  Type of pain is described as "squeezing his head like a pimple" with associated photophobia and phonophobia  Current head pain is rated 6/10  Movement, light and sound make it worsen and dark room makes it a little better  Typical migraine is described as an ice pick going through eye to top of head, occurs once every 2-3 months and resolves over hours with triptan  CTH negative for intracranial abnormality  Neuro exam unremarkable with no focal deficit  Proceed with below migraine regimen:  - Decadron 4mg IV bid  - Magnesium sulfate 2g daily x3  - Benadryl 25mg q8 hrs  - Reglan 10mg q8hrs (patient has normal QT)  - Ketoralac 30mg q12 hrs  - Will re-evaluate 4/23 and determine if additional agent(s) are needed      Recommendations for outpatient neurological follow up have yet to be determined  History of Present Illness     Reason for Consult / Principal Problem: Intractable migraine  Hx and PE limited by: NA  HPI: Ericka Camargo is a 50 y o  right handed male with history of migraine and chronic back pain who presents to the ED with intractable HA and associated photo and phonophobia  Patient states the headache started slowly 2 weeks ago but has persisted  Migraine is rated 6/10, is located on the top left of his head and states it feels like something is "squeezing his head like a pimple" with associated photophobia and phonophobia  Right now patient states he feels like he is in "sensory overload" between the headache and the ringing in his ears  Movement, light and sound make it worsen and dark room makes it a little better  Headache initially began 4/7  Patient reports he took is usual abortive Eletriptan x2 without relief  4/8 couldn't go to work  Since that time pain has fluctuated but never resolved  He has taken Acetaminophen intermittent as well  Last week, on 4/14 patient presented to his primary care office due to intractable migraine  Outpatient provider prescribed Robaxin 750mg q8hrs prn and a refill of Eletriptan 40mg prn, though patient reports he never got the Robaxin  At the outpatient visit the patient did mention concern about his headache stating he recently got the J&J vaccine on 3/24/21  However, he reports that he is no longer concerned anymore  BP on arrival 138/90  Labs grossly unremarkable  CTH negative for intracranial abnormality  Patient does acknowledge chronic migraines  He does not follow with a neurologist   He states he typically gets migraines once every few months  They first began at least 5 years ago  Typical migraine pain is described as an icepick going through his eye coming out the top of his head  He also develops flashing light during his migraine  Occasionally he has an aura described as the chronic ringing in his ears changes pitch  Typically he takes 1 or 2 Eletriptan and his symptoms resolve lasting less than a day  Inpatient consult to Neurology  Consult performed by: Daniela Purdy PA-C  Consult ordered by: Fabian Max MD          Review of Systems   Constitutional: Positive for fatigue  Eyes: Positive for photophobia  Respiratory: Negative for cough and shortness of breath  Cardiovascular: Negative for chest pain  Neurological: Positive for headaches  Negative for dizziness, weakness and numbness         Historical Information   Past Medical History:   Diagnosis Date    Chronic back pain     Migraine      Past Surgical History:   Procedure Laterality Date    HERNIA REPAIR      MANDIBLE FRACTURE SURGERY      MOUTH SURGERY      cyst in cheek    NASAL SEPTUM SURGERY       Social History   Social History     Substance and Sexual Activity   Alcohol Use No     Social History     Substance and Sexual Activity   Drug Use No     E-Cigarette/Vaping    E-Cigarette Use Never User      E-Cigarette/Vaping Substances     Social History     Tobacco Use   Smoking Status Never Smoker   Smokeless Tobacco Former User    Types: Snuff     Family History:   Family History   Problem Relation Age of Onset    Breast cancer Mother     Diabetes Father     No Known Problems Family     Substance Abuse Neg Hx     Mental illness Neg Hx        Review of previous medical records was completed  Meds/Allergies   all current active meds have been reviewed    No Known Allergies    Objective   Vitals:Blood pressure 131/89, pulse 85, temperature 98 °F (36 7 °C), temperature source Temporal, resp  rate 18, height 5' 7" (1 702 m), weight 83 5 kg (184 lb), SpO2 96 %  ,Body mass index is 28 82 kg/m²  Intake/Output Summary (Last 24 hours) at 4/22/2021 1757  Last data filed at 4/22/2021 1350  Gross per 24 hour   Intake 1050 ml   Output --   Net 1050 ml       Invasive Devices: Invasive Devices     Peripheral Intravenous Line            Peripheral IV 04/22/21 Right Antecubital less than 1 day                Physical Exam  Constitutional:       General: He is in acute distress (Mild)  Appearance: Normal appearance  He is well-developed  He is not ill-appearing or toxic-appearing  HENT:      Head: Normocephalic and atraumatic  Eyes:      General: No scleral icterus  Right eye: No discharge  Left eye: No discharge  Extraocular Movements: Extraocular movements intact and EOM normal       Conjunctiva/sclera: Conjunctivae normal       Pupils: Pupils are equal, round, and reactive to light  Neck:      Musculoskeletal: Normal range of motion and neck supple  Cardiovascular:      Rate and Rhythm: Normal rate and regular rhythm     Pulmonary:      Effort: Pulmonary effort is normal  No respiratory distress  Breath sounds: No stridor  Musculoskeletal: Normal range of motion  General: No tenderness or deformity  Lymphadenopathy:      Cervical: No cervical adenopathy  Skin:     General: Skin is warm and dry  Findings: No erythema or rash  Neurological:      Mental Status: He is alert and oriented to person, place, and time  Coordination: Finger-Nose-Finger Test, Heel to [employer] and Romberg Test normal       Gait: Gait is intact  Deep Tendon Reflexes: Strength normal    Psychiatric:         Speech: Speech normal          Behavior: Behavior normal          Thought Content: Thought content normal          Judgment: Judgment normal        Neurologic Exam     Mental Status   Oriented to person, place, and time  Follows 2 step commands  Attention: normal  Concentration: normal    Speech: speech is normal   Level of consciousness: alert  Able to perform simple calculations  Able to name object  Normal comprehension  Cranial Nerves     CN II   Visual fields full to confrontation  Visual acuity: (grossly)  Right visual field deficit: none  Left visual field deficit: none     CN III, IV, VI   Pupils are equal, round, and reactive to light  Extraocular motions are normal    Nystagmus: none   Diplopia: none  Ophthalmoparesis: none  Conjugate gaze: present    CN V   Facial sensation intact  CN VII   Facial expression full, symmetric  CN VIII   Hearing: intact (grossly)    CN XI   CN XI normal      CN XII   CN XII normal      Motor Exam   Muscle bulk: normal  Overall muscle tone: normal  Right arm pronator drift: absent  Left arm pronator drift: absent    Strength   Strength 5/5 throughout  Sensory Exam   Sensation intact to light touch, temperature, and vibration throughout       Gait, Coordination, and Reflexes     Gait  Gait: normal    Coordination   Romberg: negative  Finger to nose coordination: normal  Heel to shin coordination: normal    Tremor   Resting tremor: absent    Reflexes   Right plantar: equivocal  Left plantar: normal  2+ reflexes throughout except trace to 1+ in R patella and 1+ in bilateral achilles       Lab Results: I have personally reviewed pertinent reports  Imaging Studies: I have personally reviewed pertinent films in PACS  EKG, Pathology, and Other Studies: I have personally reviewed pertinent reports

## 2021-04-22 NOTE — ED PROVIDER NOTES
History  Chief Complaint   Patient presents with    Tinnitus     Patient states that he started with a headache and ringing in the ears two weeks ago and it is not getting any better       History provided by:  Patient   used: No      Patient is a 80-year-old male presenting to emergency department with a headache and tenderness  tinnitus is chronic, has had for years  Headache is similar to previous migraines but getting worse  Started slowly 2 weeks ago  Not getting any better  Photophobia  Loud noises bothering him  Has had some nausea  No vomiting  No neck pain  Neck is supple  No chest pain or shortness of breath  No fevers or chills  No rashes  Has tried medications at home provided by family doctor but not helping  No weakness numbness or tingling  No speech changes  No difficulty speaking  No facial asymmetry  as frontal headache has gotten worse the having pain around the left eye going to the back of the head  Does not see a neurologist     MDM will check sed rate, symptomatic treatment  Re-evaluate      Minimal relief after treatment  Will admit for observation and neuro consult    Prior to Admission Medications   Prescriptions Last Dose Informant Patient Reported? Taking?   diclofenac sodium (VOLTAREN) 50 mg EC tablet   Yes No   Sig: Take 1 tablet(s) twice a day by oral route as needed     eletriptan (RELPAX) 40 MG tablet   No No   Sig: Take 1 tablet (40 mg total) by mouth once as needed for migraine for up to 1 dose may repeat in 2 hours if necessary   fluticasone (FLONASE) 50 mcg/act nasal spray  Self Yes No   Si sprays   meclizine (ANTIVERT) 25 mg tablet  Self No No   Sig: Take 1 tablet (25 mg total) by mouth every 8 (eight) hours as needed for dizziness   methocarbamol (ROBAXIN) 750 mg tablet   Yes No   Sig: methocarbamol 750 mg tablet   take 1 tablet by mouth every 8 hours if needed   temazepam (RESTORIL) 15 mg capsule   Yes No   Sig: temazepam 15 mg capsule   as needed      Facility-Administered Medications: None       Past Medical History:   Diagnosis Date    Chronic back pain     Migraine        Past Surgical History:   Procedure Laterality Date    HERNIA REPAIR      MANDIBLE FRACTURE SURGERY      MOUTH SURGERY      cyst in cheek    NASAL SEPTUM SURGERY         Family History   Problem Relation Age of Onset    Breast cancer Mother     Diabetes Father     No Known Problems Family     Substance Abuse Neg Hx     Mental illness Neg Hx      I have reviewed and agree with the history as documented  E-Cigarette/Vaping    E-Cigarette Use Never User      E-Cigarette/Vaping Substances     Social History     Tobacco Use    Smoking status: Never Smoker    Smokeless tobacco: Former User     Types: Snuff   Substance Use Topics    Alcohol use: No    Drug use: No       Review of Systems   Constitutional: Negative for chills, diaphoresis and fever  HENT: Positive for tinnitus  Negative for congestion and sore throat  Eyes: Positive for photophobia  Negative for itching  Respiratory: Negative for cough, shortness of breath, wheezing and stridor  Cardiovascular: Negative for chest pain, palpitations and leg swelling  Gastrointestinal: Negative for abdominal pain, blood in stool, diarrhea, nausea and vomiting  Genitourinary: Negative for dysuria, frequency and urgency  Musculoskeletal: Negative for neck pain and neck stiffness  Skin: Negative for pallor and rash  Neurological: Positive for headaches  Negative for dizziness, syncope, weakness and light-headedness  All other systems reviewed and are negative  Physical Exam  Physical Exam  Vitals signs reviewed  Constitutional:       Appearance: Normal appearance  He is well-developed  HENT:      Head: Normocephalic and atraumatic  Eyes:      Extraocular Movements: Extraocular movements intact  Pupils: Pupils are equal, round, and reactive to light     Neck: Musculoskeletal: Normal range of motion and neck supple  Cardiovascular:      Rate and Rhythm: Normal rate and regular rhythm  Heart sounds: Normal heart sounds  Pulmonary:      Effort: Pulmonary effort is normal  No respiratory distress  Breath sounds: Normal breath sounds  Abdominal:      General: Bowel sounds are normal       Palpations: Abdomen is soft  Tenderness: There is no abdominal tenderness  Musculoskeletal: Normal range of motion  General: No swelling or tenderness  Skin:     General: Skin is warm and dry  Capillary Refill: Capillary refill takes less than 2 seconds  Neurological:      General: No focal deficit present  Mental Status: He is alert and oriented to person, place, and time  Cranial Nerves: No cranial nerve deficit  Sensory: No sensory deficit  Motor: No weakness        Coordination: Coordination normal       Gait: Gait normal          Vital Signs  ED Triage Vitals   Temperature Pulse Respirations Blood Pressure SpO2   04/22/21 1145 04/22/21 1145 04/22/21 1145 04/22/21 1145 04/22/21 1145   98 °F (36 7 °C) (!) 108 18 138/90 96 %      Temp Source Heart Rate Source Patient Position - Orthostatic VS BP Location FiO2 (%)   04/22/21 1145 04/22/21 1445 04/22/21 1145 04/22/21 1145 --   Temporal Monitor Lying Left arm       Pain Score       04/22/21 1145       8           Vitals:    04/22/21 1445 04/22/21 1600 04/22/21 1630 04/22/21 1700   BP: 133/90 127/88 (!) 146/103 156/99   Pulse: 75 83 94 90   Patient Position - Orthostatic VS:  Lying Lying          Visual Acuity      ED Medications  Medications   ketorolac (TORADOL) injection 30 mg (has no administration in time range)   diphenhydrAMINE (BENADRYL) injection 25 mg (has no administration in time range)   sodium chloride 0 9 % infusion (has no administration in time range)   enoxaparin (LOVENOX) subcutaneous injection 40 mg (has no administration in time range)   metoclopramide (REGLAN) injection 10 mg (has no administration in time range)   diphenhydrAMINE (BENADRYL) injection 25 mg (25 mg Intravenous Given 4/22/21 1247)   metoclopramide (REGLAN) injection 10 mg (10 mg Intravenous Given 4/22/21 1248)   ketorolac (TORADOL) injection 30 mg (30 mg Intravenous Given 4/22/21 1245)   magnesium sulfate 2 g/50 mL IVPB (premix) 2 g (0 g Intravenous Stopped 4/22/21 1350)   sodium chloride 0 9 % bolus 1,000 mL (0 mL Intravenous Stopped 4/22/21 1346)   dexamethasone (PF) (DECADRON) injection 10 mg (10 mg Intravenous Given 4/22/21 1445)       Diagnostic Studies  Results Reviewed     Procedure Component Value Units Date/Time    Sedimentation rate, automated [[id number]]  (Normal) Collected: 04/22/21 1215    Lab Status: Final result Specimen: Blood from Arm, Right Updated: 04/22/21 1255     Sed Rate 6 mm/hour     Basic metabolic panel [[id number]] Collected: 04/22/21 1215    Lab Status: Final result Specimen: Blood from Arm, Right Updated: 04/22/21 1233     Sodium 144 mmol/L      Potassium 4 3 mmol/L      Chloride 108 mmol/L      CO2 23 mmol/L      ANION GAP 13 mmol/L      BUN 21 mg/dL      Creatinine 0 94 mg/dL      Glucose 93 mg/dL      Calcium 9 0 mg/dL      eGFR 92 ml/min/1 73sq m     Narrative:      [place] guidelines for Chronic Kidney Disease (CKD):     Stage 1 with normal or high GFR (GFR > 90 mL/min/1 73 square meters)    Stage 2 Mild CKD (GFR = 60-89 mL/min/1 73 square meters)    Stage 3A Moderate CKD (GFR = 45-59 mL/min/1 73 square meters)    Stage 3B Moderate CKD (GFR = 30-44 mL/min/1 73 square meters)    Stage 4 Severe CKD (GFR = 15-29 mL/min/1 73 square meters)    Stage 5 End Stage CKD (GFR <15 mL/min/1 73 square meters)  Note: GFR calculation is accurate only with a steady state creatinine    CBC and differential [[id number]]  (Abnormal) Collected: 04/22/21 1215    Lab Status: Final result Specimen: Blood from Arm, Right Updated: 04/22/21 1220     WBC 7 31 Thousand/uL RBC 5 64 Million/uL      Hemoglobin 16 9 g/dL      Hematocrit 49 4 %      MCV 88 fL      MCH 30 0 pg      MCHC 34 2 g/dL      RDW 12 3 %      MPV 8 5 fL      Platelets 217 Thousands/uL      nRBC 0 /100 WBCs      Neutrophils Relative 57 %      Immat GRANS % 0 %      Lymphocytes Relative 29 %      Monocytes Relative 7 %      Eosinophils Relative 5 %      Basophils Relative 2 %      Neutrophils Absolute 4 14 Thousands/µL      Immature Grans Absolute 0 03 Thousand/uL      Lymphocytes Absolute 2 15 Thousands/µL      Monocytes Absolute 0 51 Thousand/µL      Eosinophils Absolute 0 34 Thousand/µL      Basophils Absolute 0 14 Thousands/µL                  CT head without contrast   Final Result by Fina Smallwood MD (04/22 1513)      No acute intracranial abnormality  Workstation performed: [id number]                    Procedures  Procedures         ED Course  ED Course as of Apr 22 1716   Thu Apr 22, 2021   1419 Headache not improved  Will give dose of Decadron and CT                                               MDM    Disposition  Final diagnoses:   Intractable headache     Time reflects when diagnosis was documented in both MDM as applicable and the Disposition within this note     Time User Action Codes Description Comment    4/22/2021  3:49 PM Afia Grady Add [R51 9] Intractable headache       ED Disposition     ED Disposition Condition Date/Time Comment    Admit Stable Thu Apr 22, 2021  3:48 PM Case was discussed with medicine and the patient's admission status was agreed to be Admission Status: inpatient status to the service of Dr Rosalina Kulkarni  Follow-up Information    None         Patient's Medications   Discharge Prescriptions    No medications on file     No discharge procedures on file      PDMP Review     None          ED Provider  Electronically Signed by           Alexia French MD  04/22/21 9049

## 2021-04-22 NOTE — H&P
[facility]  [facility]- Ajay Chowdhury III 1965, 50 y o  male MRN: [id number]  Unit/Bed#: ED 01 Encounter: [id number]  Primary Care Provider: Julianna Vinson MD   Date and time admitted to hospital: 4/22/2021 12:05 PM    Deviated nasal septum  Assessment & Plan  Repaired 2 years ago    Intractable migraine with aura without status migrainosus  Assessment & Plan  Patient presenting with complaints of intractable migraines, started 2 weeks ago  Place on migrain protocol - Benadryl, Toradol and Reglan  CT head - No acute intracranial abnormality  Keep blinds closed  Minimize noise around patient  IVFs ordered - for hydration  Conservative measures  Would consult neurology given the intractability and associated visual disturbance    WALE on CPAP  Assessment & Plan  NO longer on CPAP  Lost weight- 24lbs over 6 months    VTE Prophylaxis: Enoxaparin (Lovenox)  / sequential compression device   Code Status: full code  POLST: There is no POLST form on file for this patient (pre-hospital)  Discussion with family: He would like to update his wife himself    Anticipated Length of Stay:  Patient will be admitted on an Observation basis with an anticipated length of stay of  Less than 2 midnights  Justification for Hospital Stay: intractable migraines    Total Time for Visit, including Counseling / Coordination of Care: 70 minutes  Greater than 50% of this total time spent on direct patient counseling and coordination of care  Chief Complaint:   Migraines    History of Present Illness:    Ajay Chowdhury III is a 50 y o  male with PMH of migraines who presents with intractable migraines, which started 2 weeks ago, preceded by aura which he describes as 'ringing in his ear'  Headaches is located in the left parietal area, non radiating, associated with a stabbing sensation in his left eye, left ear tinnitus, phonophobia and photophobia   His symptoms were severe enough to affect his work, and he has been unable to work in the past 2 days  HE typically works with computers  He took his usual sumatriptan without any relief thus he presented in the ED  This is his first migraine-associated hospitalization  Review of Systems:    Review of Systems   Constitutional: Negative for activity change, appetite change, chills, fatigue and fever  Respiratory: Negative for cough, shortness of breath, wheezing and stridor  Cardiovascular: Negative for chest pain, palpitations and leg swelling  Gastrointestinal: Negative for diarrhea, nausea and vomiting  Genitourinary: Negative for enuresis, flank pain, frequency and hematuria  Musculoskeletal: Negative for arthralgias, back pain, gait problem, joint swelling, myalgias and neck pain  Skin: Negative for color change, pallor, rash and wound  Neurological: Positive for headaches  Negative for seizures, syncope, facial asymmetry, speech difficulty, weakness, light-headedness and numbness  Hematological: Negative for adenopathy  Does not bruise/bleed easily  Psychiatric/Behavioral: Negative for agitation, behavioral problems, confusion, decreased concentration, dysphoric mood and hallucinations  All other systems reviewed and are negative  Past Medical and Surgical History:     Past Medical History:   Diagnosis Date    Chronic back pain     Migraine        Past Surgical History:   Procedure Laterality Date    HERNIA REPAIR      MANDIBLE FRACTURE SURGERY      MOUTH SURGERY      cyst in cheek    NASAL SEPTUM SURGERY         Meds/Allergies:    Prior to Admission medications    Medication Sig Start Date End Date Taking? Authorizing Provider   diclofenac sodium (VOLTAREN) 50 mg EC tablet Take 1 tablet(s) twice a day by oral route as needed   12/9/19   Historical Provider, MD   eletriptan (RELPAX) 40 MG tablet Take 1 tablet (40 mg total) by mouth once as needed for migraine for up to 1 dose may repeat in 2 hours if necessary 4/14/21   Mira Pompa ROSALINO   fluticasone (FLONASE) 50 mcg/act nasal spray 2 sprays 7/19/17   Historical Provider, MD   meclizine (ANTIVERT) 25 mg tablet Take 1 tablet (25 mg total) by mouth every 8 (eight) hours as needed for dizziness 2/1/18   ROSALINO Edwards   methocarbamol (ROBAXIN) 750 mg tablet methocarbamol 750 mg tablet   take 1 tablet by mouth every 8 hours if needed    Historical Provider, MD   temazepam (RESTORIL) 15 mg capsule temazepam 15 mg capsule   as needed 11/2/20   Historical Provider, MD BARRERA have reviewed home medications using allscripts  Allergies: No Known Allergies    Social History:     Marital Status: /Civil Union   Occupation: Works with [employer]  Patient Pre-hospital Living Situation: lives at home  Patient Pre-hospital Level of Mobility: independent  Patient Pre-hospital Diet Restrictions: none  Substance Use History:   Social History     Substance and Sexual Activity   Alcohol Use No     Social History     Tobacco Use   Smoking Status Never Smoker   Smokeless Tobacco Former User    Types: Snuff     Social History     Substance and Sexual Activity   Drug Use No       Family History:    Family History   Problem Relation Age of Onset    Breast cancer Mother     Diabetes Father     No Known Problems Family     Substance Abuse Neg Hx     Mental illness Neg Hx        Physical Exam:     Vitals:   Blood Pressure: 131/89 (04/22/21 1730)  Pulse: 85 (04/22/21 1730)  Temperature: 98 °F (36 7 °C) (04/22/21 1145)  Temp Source: Temporal (04/22/21 1145)  Respirations: 18 (04/22/21 1730)  Height: 5' 7" (170 2 cm) (04/22/21 1145)  Weight - Scale: 83 5 kg (184 lb) (04/22/21 1145)  SpO2: 96 % (04/22/21 1730)    Physical Exam  Vitals signs and nursing note reviewed  Constitutional:       General: He is not in acute distress  Appearance: Normal appearance  He is not ill-appearing, toxic-appearing or diaphoretic        Comments: Appears uncomfortable   Cardiovascular:      Rate and Rhythm: Normal rate and regular rhythm  Pulses: Normal pulses  Heart sounds: No murmur  No gallop  Pulmonary:      Effort: Pulmonary effort is normal  No respiratory distress  Breath sounds: Normal breath sounds  No stridor  No wheezing, rhonchi or rales  Chest:      Chest wall: No tenderness  Abdominal:      General: Bowel sounds are normal  There is no distension  Palpations: Abdomen is soft  Tenderness: There is no abdominal tenderness  There is no right CVA tenderness, left CVA tenderness, guarding or rebound  Hernia: No hernia is present  Musculoskeletal: Normal range of motion  General: No swelling, tenderness, deformity or signs of injury  Right lower leg: No edema  Left lower leg: No edema  Skin:     General: Skin is warm and dry  Capillary Refill: Capillary refill takes less than 2 seconds  Coloration: Skin is not jaundiced or pale  Findings: No bruising, erythema, lesion or rash  Neurological:      General: No focal deficit present  Mental Status: He is alert and oriented to person, place, and time  Mental status is at baseline  Cranial Nerves: No cranial nerve deficit  Psychiatric:         Mood and Affect: Mood normal          Thought Content: Thought content normal          Judgment: Judgment normal          (     Additional Data:     Lab Results: I have personally reviewed pertinent reports  Results from last 7 days   Lab Units 04/22/21  1215   WBC Thousand/uL 7 31   HEMOGLOBIN g/dL 16 9   HEMATOCRIT % 49 4*   PLATELETS Thousands/uL 226   NEUTROS PCT % 57   LYMPHS PCT % 29   MONOS PCT % 7   EOS PCT % 5     Results from last 7 days   Lab Units 04/22/21  1215   SODIUM mmol/L 144   POTASSIUM mmol/L 4 3   CHLORIDE mmol/L 108   CO2 mmol/L 23   BUN mg/dL 21   CREATININE mg/dL 0 94   ANION GAP mmol/L 13   CALCIUM mg/dL 9 0   GLUCOSE RANDOM mg/dL 93                       Imaging: I have personally reviewed pertinent reports        CT head without contrast   Final Result by Tyron Boxer, MD (04/22 0253)      No acute intracranial abnormality  Workstation performed: [id number]             EKG, Pathology, and Other Studies Reviewed on Admission:   · EKG: none done in the ED    Allscripts / Epic Records Reviewed: Yes     ** Please Note: This note has been constructed using a voice recognition system   **

## 2021-04-22 NOTE — ASSESSMENT & PLAN NOTE
50 y o  male with history of migraine and chronic back pain who presents to the ED with intractable HA x 2 weeks and associated photo and phonophobia  Type of pain is described as "squeezing his head like a pimple" with associated photophobia and phonophobia  Head pain is rated 6/10 on arrival  Movement, light and sound make it worsen and dark room makes it a little better  Typical migraine is described as an ice pick going through eye to top of head, occurs once every 2-3 months and resolves over hours with triptan  HA improved with cocktail now 3/10  CTH negative for intracranial abnormality  Neuro exam unremarkable with no focal deficit  Migraine regimen:  - Decadron 4mg IV bid  - Magnesium sulfate 2g daily x3  - Benadryl 25mg q8 hrs  - Reglan 10mg q8hrs (patient has normal QT)  - Ketoralac 30mg q12 hrs  - Can take OTC mag oxide as an out patient 400 mg, and Riboflavin 400 mg to see if this helps with headaches    - DC planning

## 2021-04-22 NOTE — ASSESSMENT & PLAN NOTE
Patient presenting with complaints of intractable migraines, started 2 weeks ago  Place on migrain protocol - Benadryl, Toradol and Reglan  CT head - No acute intracranial abnormality  Keep blinds closed  Minimize noise around patient  IVFs ordered - for hydration  Conservative measures  Would consult neurology given the intractability and associated visual disturbance

## 2021-04-22 NOTE — PLAN OF CARE
Problem: PAIN - ADULT  Goal: Verbalizes/displays adequate comfort level or baseline comfort level  Description: Interventions:  - Encourage patient to monitor pain and request assistance  - Assess pain using appropriate pain scale  - Administer analgesics based on type and severity of pain and evaluate response  - Implement non-pharmacological measures as appropriate and evaluate response  - Consider cultural and social influences on pain and pain management  - Notify physician/advanced practitioner if interventions unsuccessful or patient reports new pain  Outcome: Progressing     Problem: INFECTION - ADULT  Goal: Absence or prevention of progression during hospitalization  Description: INTERVENTIONS:  - Assess and monitor for signs and symptoms of infection  - Monitor lab/diagnostic results  - Monitor all insertion sites, i e  indwelling lines, tubes, and drains  - Monitor endotracheal if appropriate and nasal secretions for changes in amount and color  - [place] appropriate cooling/warming therapies per order  - Administer medications as ordered  - Instruct and encourage patient and family to use good hand hygiene technique  - Identify and instruct in appropriate isolation precautions for identified infection/condition  Outcome: Progressing     Problem: SAFETY ADULT  Goal: Patient will remain free of falls  Description: INTERVENTIONS:  - Assess patient frequently for physical needs  -  Identify cognitive and physical deficits and behaviors that affect risk of falls    -  [place] fall precautions as indicated by assessment   - Educate patient/family on patient safety including physical limitations  - Instruct patient to call for assistance with activity based on assessment  - Modify environment to reduce risk of injury  - Consider OT/PT consult to assist with strengthening/mobility  Outcome: Progressing  Goal: Maintain or return to baseline ADL function  Description: INTERVENTIONS:  -  Assess patient's ability to carry out ADLs; assess patient's baseline for ADL function and identify physical deficits which impact ability to perform ADLs (bathing, care of mouth/teeth, toileting, grooming, dressing, etc )  - Assess/evaluate cause of self-care deficits   - Assess range of motion  - Assess patient's mobility; develop plan if impaired  - Assess patient's need for assistive devices and provide as appropriate  - Encourage maximum independence but intervene and supervise when necessary  - Involve family in performance of ADLs  - Assess for home care needs following discharge   - Consider OT consult to assist with ADL evaluation and planning for discharge  - Provide patient education as appropriate  Outcome: Progressing  Goal: Maintain or return mobility status to optimal level  Description: INTERVENTIONS:  - Assess patient's baseline mobility status (ambulation, transfers, stairs, etc )    - Identify cognitive and physical deficits and behaviors that affect mobility  - Identify mobility aids required to assist with transfers and/or ambulation (gait belt, sit-to-stand, lift, walker, cane, etc )  - [place] fall precautions as indicated by assessment  - Record patient progress and toleration of activity level on Mobility SBAR; progress patient to next Phase/Stage  - Instruct patient to call for assistance with activity based on assessment  - Consider rehabilitation consult to assist with strengthening/weightbearing, etc   Outcome: Progressing     Problem: DISCHARGE PLANNING  Goal: Discharge to home or other facility with appropriate resources  Description: INTERVENTIONS:  - Identify barriers to discharge w/patient and caregiver  - Arrange for needed discharge resources and transportation as appropriate  - Identify discharge learning needs (meds, wound care, etc )  - Arrange for interpretive services to assist at discharge as needed  - Refer to Case Management Department for coordinating discharge planning if the patient needs post-hospital services based on physician/advanced practitioner order or complex needs related to functional status, cognitive ability, or social support system  Outcome: Progressing     Problem: Knowledge Deficit  Goal: Patient/family/caregiver demonstrates understanding of disease process, treatment plan, medications, and discharge instructions  Description: Complete learning assessment and assess knowledge base    Interventions:  - Provide teaching at level of understanding  - Provide teaching via preferred learning methods  Outcome: Progressing

## 2021-04-23 VITALS
HEART RATE: 86 BPM | SYSTOLIC BLOOD PRESSURE: 124 MMHG | DIASTOLIC BLOOD PRESSURE: 69 MMHG | OXYGEN SATURATION: 97 % | BODY MASS INDEX: 28.88 KG/M2 | RESPIRATION RATE: 21 BRPM | TEMPERATURE: 97.7 F | WEIGHT: 184 LBS | HEIGHT: 67 IN

## 2021-04-23 LAB
ANION GAP SERPL CALCULATED.3IONS-SCNC: 11 MMOL/L (ref 4–13)
BUN SERPL-MCNC: 23 MG/DL (ref 5–25)
CALCIUM SERPL-MCNC: 8.4 MG/DL (ref 8.3–10.1)
CHLORIDE SERPL-SCNC: 111 MMOL/L (ref 100–108)
CO2 SERPL-SCNC: 20 MMOL/L (ref 21–32)
CREAT SERPL-MCNC: 0.98 MG/DL (ref 0.6–1.3)
ERYTHROCYTE [DISTWIDTH] IN BLOOD BY AUTOMATED COUNT: 12.2 % (ref 11.6–15.1)
GFR SERPL CREATININE-BSD FRML MDRD: 88 ML/MIN/1.73SQ M
GLUCOSE P FAST SERPL-MCNC: 119 MG/DL (ref 65–99)
GLUCOSE SERPL-MCNC: 119 MG/DL (ref 65–140)
HCT VFR BLD AUTO: 47 % (ref 36.5–49.3)
HGB BLD-MCNC: 16.3 G/DL (ref 12–17)
MCH RBC QN AUTO: 30.2 PG (ref 26.8–34.3)
MCHC RBC AUTO-ENTMCNC: 34.7 G/DL (ref 31.4–37.4)
MCV RBC AUTO: 87 FL (ref 82–98)
PLATELET # BLD AUTO: 231 THOUSANDS/UL (ref 149–390)
PMV BLD AUTO: 8.5 FL (ref 8.9–12.7)
POTASSIUM SERPL-SCNC: 4.4 MMOL/L (ref 3.5–5.3)
RBC # BLD AUTO: 5.4 MILLION/UL (ref 3.88–5.62)
SODIUM SERPL-SCNC: 142 MMOL/L (ref 136–145)
WBC # BLD AUTO: 14.32 THOUSAND/UL (ref 4.31–10.16)

## 2021-04-23 PROCEDURE — 99217 PR OBSERVATION CARE DISCHARGE MANAGEMENT: CPT | Performed by: PHYSICIAN ASSISTANT

## 2021-04-23 PROCEDURE — 85027 COMPLETE CBC AUTOMATED: CPT | Performed by: INTERNAL MEDICINE

## 2021-04-23 PROCEDURE — 99214 OFFICE O/P EST MOD 30 MIN: CPT | Performed by: PSYCHIATRY & NEUROLOGY

## 2021-04-23 PROCEDURE — 80048 BASIC METABOLIC PNL TOTAL CA: CPT | Performed by: INTERNAL MEDICINE

## 2021-04-23 RX ORDER — METHYLPREDNISOLONE 4 MG/1
TABLET ORAL
Qty: 21 TABLET | Refills: 0 | Status: SHIPPED | OUTPATIENT
Start: 2021-04-23 | End: 2021-05-26 | Stop reason: ALTCHOICE

## 2021-04-23 RX ADMIN — DIPHENHYDRAMINE HYDROCHLORIDE 25 MG: 50 INJECTION, SOLUTION INTRAMUSCULAR; INTRAVENOUS at 12:17

## 2021-04-23 RX ADMIN — KETOROLAC TROMETHAMINE 30 MG: 30 INJECTION, SOLUTION INTRAMUSCULAR at 04:17

## 2021-04-23 RX ADMIN — SODIUM CHLORIDE 75 ML/HR: 0.9 INJECTION, SOLUTION INTRAVENOUS at 07:21

## 2021-04-23 RX ADMIN — METOCLOPRAMIDE 10 MG: 5 INJECTION, SOLUTION INTRAMUSCULAR; INTRAVENOUS at 03:39

## 2021-04-23 RX ADMIN — KETOROLAC TROMETHAMINE 30 MG: 30 INJECTION, SOLUTION INTRAMUSCULAR at 12:17

## 2021-04-23 RX ADMIN — MAGNESIUM SULFATE HEPTAHYDRATE 2 G: 40 INJECTION, SOLUTION INTRAVENOUS at 09:08

## 2021-04-23 RX ADMIN — ENOXAPARIN SODIUM 40 MG: 40 INJECTION SUBCUTANEOUS at 09:05

## 2021-04-23 RX ADMIN — METOCLOPRAMIDE 10 MG: 5 INJECTION, SOLUTION INTRAMUSCULAR; INTRAVENOUS at 12:17

## 2021-04-23 RX ADMIN — DIPHENHYDRAMINE HYDROCHLORIDE 25 MG: 50 INJECTION, SOLUTION INTRAMUSCULAR; INTRAVENOUS at 03:43

## 2021-04-23 RX ADMIN — DEXAMETHASONE SODIUM PHOSPHATE 4 MG: 4 INJECTION, SOLUTION INTRA-ARTICULAR; INTRALESIONAL; INTRAMUSCULAR; INTRAVENOUS; SOFT TISSUE at 09:05

## 2021-04-23 NOTE — PLAN OF CARE
Problem: PAIN - ADULT  Goal: Verbalizes/displays adequate comfort level or baseline comfort level  Description: Interventions:  - Encourage patient to monitor pain and request assistance  - Assess pain using appropriate pain scale  - Administer analgesics based on type and severity of pain and evaluate response  - Implement non-pharmacological measures as appropriate and evaluate response  - Consider cultural and social influences on pain and pain management  - Notify physician/advanced practitioner if interventions unsuccessful or patient reports new pain  4/23/2021 1303 by Elyssa Medina RN  Outcome: Adequate for Discharge  4/23/2021 0741 by Elyssa Medina RN  Outcome: Progressing     Problem: INFECTION - ADULT  Goal: Absence or prevention of progression during hospitalization  Description: INTERVENTIONS:  - Assess and monitor for signs and symptoms of infection  - Monitor lab/diagnostic results  - Monitor all insertion sites, i e  indwelling lines, tubes, and drains  - Monitor endotracheal if appropriate and nasal secretions for changes in amount and color  - [place] appropriate cooling/warming therapies per order  - Administer medications as ordered  - Instruct and encourage patient and family to use good hand hygiene technique  - Identify and instruct in appropriate isolation precautions for identified infection/condition  4/23/2021 1303 by Elyssa Medina RN  Outcome: Adequate for Discharge  4/23/2021 0741 by Elyssa Medina RN  Outcome: Progressing     Problem: SAFETY ADULT  Goal: Patient will remain free of falls  Description: INTERVENTIONS:  - Assess patient frequently for physical needs  -  Identify cognitive and physical deficits and behaviors that affect risk of falls    -  [place] fall precautions as indicated by assessment   - Educate patient/family on patient safety including physical limitations  - Instruct patient to call for assistance with activity based on assessment  - Modify environment to reduce risk of injury  - Consider OT/PT consult to assist with strengthening/mobility  4/23/2021 1303 by Tish Kincaid RN  Outcome: Adequate for Discharge  4/23/2021 0741 by Tish Kincaid RN  Outcome: Progressing  Goal: Maintain or return to baseline ADL function  Description: INTERVENTIONS:  -  Assess patient's ability to carry out ADLs; assess patient's baseline for ADL function and identify physical deficits which impact ability to perform ADLs (bathing, care of mouth/teeth, toileting, grooming, dressing, etc )  - Assess/evaluate cause of self-care deficits   - Assess range of motion  - Assess patient's mobility; develop plan if impaired  - Assess patient's need for assistive devices and provide as appropriate  - Encourage maximum independence but intervene and supervise when necessary  - Involve family in performance of ADLs  - Assess for home care needs following discharge   - Consider OT consult to assist with ADL evaluation and planning for discharge  - Provide patient education as appropriate  4/23/2021 1303 by Tish Kincaid RN  Outcome: Adequate for Discharge  4/23/2021 0741 by Tish Kincaid RN  Outcome: Progressing  Goal: Maintain or return mobility status to optimal level  Description: INTERVENTIONS:  - Assess patient's baseline mobility status (ambulation, transfers, stairs, etc )    - Identify cognitive and physical deficits and behaviors that affect mobility  - Identify mobility aids required to assist with transfers and/or ambulation (gait belt, sit-to-stand, lift, walker, cane, etc )  - [place] fall precautions as indicated by assessment  - Record patient progress and toleration of activity level on Mobility SBAR; progress patient to next Phase/Stage  - Instruct patient to call for assistance with activity based on assessment  - Consider rehabilitation consult to assist with strengthening/weightbearing, etc   4/23/2021 1303 by Tish Kincaid RN  Outcome: Adequate for Discharge  4/23/2021 0741 by Anastacia Willett RN  Outcome: Progressing     Problem: DISCHARGE PLANNING  Goal: Discharge to home or other facility with appropriate resources  Description: INTERVENTIONS:  - Identify barriers to discharge w/patient and caregiver  - Arrange for needed discharge resources and transportation as appropriate  - Identify discharge learning needs (meds, wound care, etc )  - Arrange for interpretive services to assist at discharge as needed  - Refer to Case Management Department for coordinating discharge planning if the patient needs post-hospital services based on physician/advanced practitioner order or complex needs related to functional status, cognitive ability, or social support system  4/23/2021 1303 by Anastacia Willett RN  Outcome: Adequate for Discharge  4/23/2021 0741 by Anastacia Willett RN  Outcome: Progressing     Problem: Knowledge Deficit  Goal: Patient/family/caregiver demonstrates understanding of disease process, treatment plan, medications, and discharge instructions  Description: Complete learning assessment and assess knowledge base    Interventions:  - Provide teaching at level of understanding  - Provide teaching via preferred learning methods  4/23/2021 1303 by Anastacia Willett RN  Outcome: Adequate for Discharge  4/23/2021 0741 by Anastacia Willett RN  Outcome: Progressing

## 2021-04-23 NOTE — PLAN OF CARE
Problem: PAIN - ADULT  Goal: Verbalizes/displays adequate comfort level or baseline comfort level  Description: Interventions:  - Encourage patient to monitor pain and request assistance  - Assess pain using appropriate pain scale  - Administer analgesics based on type and severity of pain and evaluate response  - Implement non-pharmacological measures as appropriate and evaluate response  - Consider cultural and social influences on pain and pain management  - Notify physician/advanced practitioner if interventions unsuccessful or patient reports new pain  Outcome: Progressing     Problem: INFECTION - ADULT  Goal: Absence or prevention of progression during hospitalization  Description: INTERVENTIONS:  - Assess and monitor for signs and symptoms of infection  - Monitor lab/diagnostic results  - Monitor all insertion sites, i e  indwelling lines, tubes, and drains  - Monitor endotracheal if appropriate and nasal secretions for changes in amount and color  - [place] appropriate cooling/warming therapies per order  - Administer medications as ordered  - Instruct and encourage patient and family to use good hand hygiene technique  - Identify and instruct in appropriate isolation precautions for identified infection/condition  Outcome: Progressing     Problem: SAFETY ADULT  Goal: Patient will remain free of falls  Description: INTERVENTIONS:  - Assess patient frequently for physical needs  -  Identify cognitive and physical deficits and behaviors that affect risk of falls    -  [place] fall precautions as indicated by assessment   - Educate patient/family on patient safety including physical limitations  - Instruct patient to call for assistance with activity based on assessment  - Modify environment to reduce risk of injury  - Consider OT/PT consult to assist with strengthening/mobility  Outcome: Progressing  Goal: Maintain or return to baseline ADL function  Description: INTERVENTIONS:  -  Assess patient's ability to carry out ADLs; assess patient's baseline for ADL function and identify physical deficits which impact ability to perform ADLs (bathing, care of mouth/teeth, toileting, grooming, dressing, etc )  - Assess/evaluate cause of self-care deficits   - Assess range of motion  - Assess patient's mobility; develop plan if impaired  - Assess patient's need for assistive devices and provide as appropriate  - Encourage maximum independence but intervene and supervise when necessary  - Involve family in performance of ADLs  - Assess for home care needs following discharge   - Consider OT consult to assist with ADL evaluation and planning for discharge  - Provide patient education as appropriate  Outcome: Progressing  Goal: Maintain or return mobility status to optimal level  Description: INTERVENTIONS:  - Assess patient's baseline mobility status (ambulation, transfers, stairs, etc )    - Identify cognitive and physical deficits and behaviors that affect mobility  - Identify mobility aids required to assist with transfers and/or ambulation (gait belt, sit-to-stand, lift, walker, cane, etc )  - [place] fall precautions as indicated by assessment  - Record patient progress and toleration of activity level on Mobility SBAR; progress patient to next Phase/Stage  - Instruct patient to call for assistance with activity based on assessment  - Consider rehabilitation consult to assist with strengthening/weightbearing, etc   Outcome: Progressing     Problem: DISCHARGE PLANNING  Goal: Discharge to home or other facility with appropriate resources  Description: INTERVENTIONS:  - Identify barriers to discharge w/patient and caregiver  - Arrange for needed discharge resources and transportation as appropriate  - Identify discharge learning needs (meds, wound care, etc )  - Arrange for interpretive services to assist at discharge as needed  - Refer to Case Management Department for coordinating discharge planning if the patient needs post-hospital services based on physician/advanced practitioner order or complex needs related to functional status, cognitive ability, or social support system  Outcome: Progressing     Problem: Knowledge Deficit  Goal: Patient/family/caregiver demonstrates understanding of disease process, treatment plan, medications, and discharge instructions  Description: Complete learning assessment and assess knowledge base    Interventions:  - Provide teaching at level of understanding  - Provide teaching via preferred learning methods  Outcome: Progressing

## 2021-04-23 NOTE — UTILIZATION REVIEW
Initial Clinical Review    Admission: Date/Time/Statement:   Admission Orders (From admission, onward)     Ordered        04/22/21 1549  Place in Observation  Once                   Orders Placed This Encounter   Procedures    Place in Observation     Standing Status:   Standing     Number of Occurrences:   1     Order Specific Question:   Level of Care     Answer:   Med Surg [16]     ED Arrival Information     Expected Arrival Acuity Means of Arrival Escorted By Service Admission Type    - 4/22/2021 11:38 Urgent Walk-In Self General Medicine Urgent    Arrival Complaint    headache        Chief Complaint   Patient presents with    Tinnitus     Patient states that he started with a headache and ringing in the ears two weeks ago and it is not getting any better       Initial Presentation: 51 yo male PMH of migraines to ED from home with an intractable migraine  Reports 2 week worsening migraine preceded by aura  Placed on migraine medication; IVF , consult Neurology  4/22 Neurology  Status migrainosus  Will initiate treatment migraine cocktail;  and reassess tomorrow   If still intractable, will consider other agents such as Depacon, subq sumatriptan, and potentially a DHE regimen      Date: 4/23/2021   Day 2:     ED Triage Vitals   Temperature Pulse Respirations Blood Pressure SpO2   04/22/21 1145 04/22/21 1145 04/22/21 1145 04/22/21 1145 04/22/21 1145   98 °F (36 7 °C) (!) 108 18 138/90 96 %      Temp Source Heart Rate Source Patient Position - Orthostatic VS BP Location FiO2 (%)   04/22/21 1145 04/22/21 1445 04/22/21 1145 04/22/21 1145 --   Temporal Monitor Lying Left arm       Pain Score       04/22/21 1145       8          Wt Readings from Last 1 Encounters:   04/22/21 83 5 kg (184 lb)     Additional Vital Signs:   Date/Time  Temp  Pulse  Resp  BP  MAP (mmHg)  SpO2  O2 Device  Patient Position - Orthostatic VS   04/23/21 0803  97 7 °F (36 5 °C)  86  21  124/69  92  97 %  None (Room air)  Sitting   04/23/21 9148 97 8 °F (36 6 °C)  99  18  124/74  --  96 %  None (Room air)  Sitting   04/22/21 1845  --  --  --  --  --  95 %  None (Room air)  --   04/22/21 1825  98 3 °F (36 8 °C)  94  18  163/92  117  96 %  --  --   04/22/21 1730  --  85  18  131/89  105  96 %  --  --   04/22/21 1700  --  90  18  156/99  120  97 %  None (Room air)  --   04/22/21 1630  --  94  18  146/103Abnormal   121  97 %  None (Room air)  Lying   04/22/21 1600  --  83  18  127/88  102  97 %  None (Room air)  Lying   04/22/21 1445  --  75  20  133/90  104  98 %  --  --   04/22/21 1415  --  65  --  --  --  98 %  --  --   04/22/21 1400  --  62  --  128/82  100  97 %  --  --   04/22/21 1345  --  69  --  --  --  98 %  --  --   04/22/21 1330  --  78  --  132/85  104  98 %  --  --   04/22/21 1315  --  70  --  --  --  97 %  --  --   04/22/21 1300  --  105  --  127/67  89  93 %  --  --   04/22/21 1245  --  86  --  --  --  97 %  --  --   04/22/21 1233  --  --  --  --  --  --  None (Room air)  --   04/22/21 1230  --  73  --  --  --  96 %  --  --   04/22/21 1215  --  93  --  --  --  96 %  --  --   04/22/21 1145  98 °F (36 7 °C)  108Abnormal   18  138/90  --  96 %  None (Room air)  Lying      Weights (last 14 days)    Date/Time  Weight  Weight Method  Height   04/22/21 1145  83 5 kg (184 lb)  Stated  5' 7" (1 702 m       Pertinent Labs/Diagnostic Test Results:       Results from last 7 days   Lab Units 04/23/21  0624 04/22/21  1215   WBC Thousand/uL 14 32* 7 31   HEMOGLOBIN g/dL 16 3 16 9   HEMATOCRIT % 47 0 49 4*   PLATELETS Thousands/uL 231 226   NEUTROS ABS Thousands/µL  --  4 14         Results from last 7 days   Lab Units 04/23/21  0624 04/22/21  1215   SODIUM mmol/L 142 144   POTASSIUM mmol/L 4 4 4 3   CHLORIDE mmol/L 111* 108   CO2 mmol/L 20* 23   ANION GAP mmol/L 11 13   BUN mg/dL 23 21   CREATININE mg/dL 0 98 0 94   EGFR ml/min/1 73sq m 88 92   CALCIUM mg/dL 8 4 9 0             Results from last 7 days   Lab Units 04/23/21  0624 04/22/21  1215   GLUCOSE RANDOM mg/dL 119 93     Results from last 7 days   Lab Units 04/22/21  1215   SED RATE mm/hour 6          4/22 CT brain wo contrast No acute intracranial abnormality       ED Treatment:   Medication Administration from 04/22/2021 1137 to 04/22/2021 1812       Date/Time Order Dose Route Action     04/22/2021 1247 diphenhydrAMINE (BENADRYL) injection 25 mg 25 mg Intravenous Given     04/22/2021 1248 metoclopramide (REGLAN) injection 10 mg 10 mg Intravenous Given     04/22/2021 1245 ketorolac (TORADOL) injection 30 mg 30 mg Intravenous Given     04/22/2021 1250 magnesium sulfate 2 g/50 mL IVPB (premix) 2 g 2 g Intravenous New Bag     04/22/2021 1246 sodium chloride 0 9 % bolus 1,000 mL 1,000 mL Intravenous New Bag     04/22/2021 1445 dexamethasone (PF) (DECADRON) injection 10 mg 10 mg Intravenous Given     04/22/2021 1745 ketorolac (TORADOL) injection 30 mg 0 mg Intravenous Hold     04/22/2021 1742 sodium chloride 0 9 % infusion 75 mL/hr Intravenous New Bag        Past Medical History:   Diagnosis Date    Chronic back pain     Migraine      Present on Admission:   Intractable migraine with aura without status migrainosus   Deviated nasal septum      Admitting Diagnosis: Intractable headache [R51 9]  Headache [R51 9]  Age/Sex: 50 y o  male  Admission Orders:  Up OOB    Scheduled Medications:  dexamethasone, 4 mg, Intravenous, Q12H [facility] HOME  diphenhydrAMINE, 25 mg, Intravenous, Q8H  enoxaparin, 40 mg, Subcutaneous, Daily  ketorolac, 30 mg, Intravenous, Q8H  magnesium sulfate, 2 g, Intravenous, Daily  metoclopramide, 10 mg, Intravenous, Q8H [facility]      Continuous IV Infusions:  sodium chloride, 75 mL/hr, Intravenous, Continuous      PRN Meds:  meclizine, 25 mg, Oral, Q8H PRN  methocarbamol, 750 mg, Oral, TID PRN  temazepam, 15 mg, Oral, HS PRN    IP CONSULT TO NEUROLOGY    Network Utilization Review Department  ATTENTION: Please call with any questions or concerns to [phone] and carefully listen to the prompts so that you are directed to the right person  All voicemails are confidential   [facility] all requests for admission clinical reviews, approved or denied determinations and any other requests to dedicated fax number below belonging to the campus where the patient is receiving treatment   List of dedicated fax numbers for the Facilities:  [address] DENIALS (Administrative/Medical Necessity) [phone]   [address] (Maternity/NICU/Pediatrics) [phone] [address] [place] [address] [facility] [address] [address] [place] [address] [address] [facility] [place] [address] [phone]

## 2021-04-23 NOTE — DISCHARGE SUMMARY
[facility]  Discharge- Marlise Crass III 1965, 50 y o  male MRN: [id number]  Unit/Bed#: -01 Encounter: [id number]  Primary Care Provider: Kena Pemberton MD   Date and time admitted to hospital: 4/22/2021 12:05 PM    * Intractable migraine with aura without status migrainosus  Assessment & Plan  Patient presented w/ intractable migraine x 2 weeks; atypical of his usual migraines   Appreciate neuro recs, on migraine cocktail   [place] improving, will rx medrol dose donna   CT head - No acute intracranial abnormality  IVF hydration  Conservative measures   O/p f/u in migraine clinic     [place] on CPAP  Assessment & Plan  NO longer on CPAP  Lost weight- 24lbs over 6 months    Discharging Physician / Practitioner: Esteban Stroud PA-C  PCP: Kena Pemberton MD  Admission Date:   Admission Orders (From admission, onward)     Ordered        04/22/21 1549  Place in Observation  Once                   Discharge Date: 04/23/21    Resolved Problems  Date Reviewed: 4/23/2021    None        Consultations During Hospital Stay:  · Neurology     Procedures Performed:   · None     Significant Findings / Test Results:   · Intractable migraine, resolving  · CT head no acute intracranial abnormality    Incidental Findings:   · None     Test Results Pending at Discharge (will require follow up): · None     Outpatient Tests Requested:  · Follow-up with PCP, Neurology    Complications:  None    Reason for Admission:  Intractable migraine    Hospital Course:     Denice Cervantes is a 50 y o  male patient who originally presented to the hospital on 4/22/2021 due to intractable migraine  Patient was admitted for migraine cocktail and Neurology consult  He had significant improvement with IV Toradol, Decadron, Benadryl, Reglan, IV magnesium  Headache has nearly resolved  He was cleared for discharge by Neurology    He is medically stable at this time for discharge home with close outpatient follow-up with PCP and migraine Clinic  Please see above list of diagnoses and related plan for additional information  Condition at Discharge: stable     Discharge Day Visit / Exam:     * Please refer to separate progress note for these details *    Discussion with Family:  Patient declines    Discharge instructions/Information to patient and family:   See after visit summary for information provided to patient and family  Provisions for Follow-Up Care:  See after visit summary for information related to follow-up care and any pertinent home health orders  Disposition:     Home    For Discharges to [address]:   · Not Applicable to this Patient - Not Applicable to this Patient    Planned Readmission:  None     Discharge Statement:  I spent 45 minutes discharging the patient  This time was spent on the day of discharge  I had direct contact with the patient on the day of discharge  Greater than 50% of the total time was spent examining patient, answering all patient questions, arranging and discussing plan of care with patient as well as directly providing post-discharge instructions  Additional time then spent on discharge activities  Discharge Medications:  See after visit summary for reconciled discharge medications provided to patient and family        ** Please Note: This note has been constructed using a voice recognition system **

## 2021-04-23 NOTE — PROGRESS NOTES
[place]  Progress Note - Amaya Adams III 1965, 50 y o  male MRN: [id number]  Unit/Bed#: -01 Encounter: [id number]  Primary Care Provider: Justice Jaimes MD   Date and time admitted to hospital: 2021 12:05 PM    * Intractable migraine with aura without status migrainosus  Assessment & Plan  Patient presented w/ intractable migraine x 2 weeks; atypical of his usual migraines   Appreciate neuro recs, on migraine cocktail   WINSTON improving, follow up further neuro recs  Will re-eval after noon dose of cocktail to determine stability for d/c home  CT head - No acute intracranial abnormality  IVF hydration  Conservative measures     WALE on CPAP  Assessment & Plan  NO longer on CPAP  Lost weight- 24lbs over 6 months    VTE Pharmacologic Prophylaxis:   Pharmacologic: Enoxaparin (Lovenox)  Mechanical VTE Prophylaxis in Place: Yes    Patient Centered Rounds: I have performed bedside rounds with nursing staff today  Discussions with Specialists or Other Care Team Provider: nursing, neuro     Education and Discussions with Family / Patient: patient, pt declined call to family     Time Spent for Care: 30 minutes  More than 50% of total time spent on counseling and coordination of care as described above  Current Length of Stay: 0 day(s)    Current Patient Status: Observation   Certification Statement: The patient will continue to require additional inpatient hospital stay due to pending improvement in HA on migraine cocktail     Discharge Plan: pending clinical course     Code Status: Level 1 - Full Code    Subjective:   Pt seen and examined at bedside  Notes improvement in headache 3/10 today       Objective:     Vitals:   Temp (24hrs), Av °F (36 7 °C), Min:97 7 °F (36 5 °C), Max:98 3 °F (36 8 °C)    Temp:  [97 7 °F (36 5 °C)-98 3 °F (36 8 °C)] 97 7 °F (36 5 °C)  HR:  [] 86  Resp:  [18-21] 21  BP: (124-163)/() 124/69  SpO2:  [93 %-98 %] 97 %  Body mass index is 28 82 kg/m²  Input and Output Summary (last 24 hours): Intake/Output Summary (Last 24 hours) at 4/23/2021 1112  Last data filed at 4/23/2021 0803  Gross per 24 hour   Intake 1400 ml   Output --   Net 1400 ml       Physical Exam:     Physical Exam  Constitutional:       Appearance: Normal appearance  HENT:      Head: Normocephalic and atraumatic  Mouth/Throat:      Mouth: Mucous membranes are moist    Eyes:      Extraocular Movements: Extraocular movements intact  Neck:      Musculoskeletal: Normal range of motion and neck supple  Cardiovascular:      Rate and Rhythm: Normal rate and regular rhythm  Pulmonary:      Effort: Pulmonary effort is normal       Breath sounds: Normal breath sounds  Abdominal:      General: Abdomen is flat  Palpations: Abdomen is soft  Musculoskeletal: Normal range of motion  Skin:     General: Skin is warm and dry  Neurological:      General: No focal deficit present  Mental Status: He is alert and oriented to person, place, and time  Psychiatric:         Mood and Affect: Mood normal          Behavior: Behavior normal      Additional Data:     Labs:    Results from last 7 days   Lab Units 04/23/21  0624 04/22/21  1215   WBC Thousand/uL 14 32* 7 31   HEMOGLOBIN g/dL 16 3 16 9   HEMATOCRIT % 47 0 49 4*   PLATELETS Thousands/uL 231 226   NEUTROS PCT %  --  57   LYMPHS PCT %  --  29   MONOS PCT %  --  7   EOS PCT %  --  5     Results from last 7 days   Lab Units 04/23/21  0624   SODIUM mmol/L 142   POTASSIUM mmol/L 4 4   CHLORIDE mmol/L 111*   CO2 mmol/L 20*   BUN mg/dL 23   CREATININE mg/dL 0 98   ANION GAP mmol/L 11   CALCIUM mg/dL 8 4   GLUCOSE RANDOM mg/dL 119                       * I Have Reviewed All Lab Data Listed Above  * Additional Pertinent Lab Tests Reviewed:  All Labs For Current Hospital Admission Reviewed    Imaging:    Imaging Reports Reviewed Today Include: all  Imaging Personally Reviewed by Myself Includes:  none    Recent Cultures (last 7 days):           Last 24 Hours Medication List:   Current Facility-Administered Medications   Medication Dose Route Frequency Provider Last Rate    dexamethasone  4 mg Intravenous Q12H [address] Jorge Alberto Adam PA-C      diphenhydrAMINE  25 mg Intravenous [address]      enoxaparin  40 mg Subcutaneous Daily Bishnu Schneider MD      ketorolac  30 mg Intravenous Q8H Bishnu Schneider MD      magnesium sulfate  2 g Intravenous Daily Jorge Alberto Adam PA-C Stopped (04/23/21 1049)    meclizine  25 mg Oral Q8H PRN Bishnu Schneider MD      methocarbamol  750 mg Oral TID PRN Bishnu Schneider MD      metoclopramide  10 mg Intravenous [facility] Bishnu Schneider MD      sodium chloride  75 mL/hr Intravenous Continuous Bishnu Schneider MD 75 mL/hr (04/23/21 0721)    temazepam  15 mg Oral HS PRN Bishnu Schneider MD          Today, Patient Was Seen By: Jenna Sullivan PA-C    ** Please Note: Dictation voice to text software may have been used in the creation of this document   **

## 2021-04-23 NOTE — DISCHARGE INSTRUCTIONS
Can take OTC mag oxide as an out patient 400 mg, and Riboflavin 400 mg to see if this helps with headaches  Migraine Headache   WHAT YOU NEED TO KNOW:   What is a migraine headache? A migraine is a severe headache  The pain can be so severe that it interferes with your daily activities  A migraine can last a few hours up to several days  The exact cause of migraines is not known  What can trigger a migraine headache? · Stress, eye strain, oversleeping, or not getting enough sleep     · Hormone changes in women from birth control pills, pregnancy, menopause, or during a monthly period     · Skipping meals, going too long without eating, or not drinking enough liquids     · Certain foods or drinks such as chocolate, hard cheese, red wine, or drinks that contain caffeine     · Foods that contain gluten, nitrates, MSG, or artificial sweeteners     · Sunlight, bright or flashing lights, loud noises, smoke, or strong smells     · Heat, humidity, or changes in the weather     What are the warning signs that a migraine headache is about to start? Warning signs usually start 15 to 60 minutes before the headache:  · Visual changes (auras), such as blurred vision, temporary blind or bright spots, lines, or hallucinations     · Unusual tiredness or frequent yawning     · Tingling in an arm or leg     What are the signs and symptoms of a migraine headache? A migraine headache usually begins as a dull ache around the eye or temple  The pain may get worse with movement  You may also have the following:  · Pain in your head that may increase to the point that you cannot do everyday activities     · Pain on one or both sides of your head     · Throbbing, pulsing, or pounding pain in your head     · Nausea and vomiting     · Sensitivity to light, noise, or smells     How is a migraine headache diagnosed? Your healthcare provider will ask questions about your headaches   Describe the pain and any other symptoms, such as nausea  Tell the provider if you think anything triggered the pain  The provider will also want to know what you ate and drank before the pain started  Tell the provider about any medical conditions you have or that run in your family  Include any recent stressors you have had  You may also need any of the following:  · A neurologic exam is used to check how your pupils react to light  Your healthcare provider may check your memory, hand grasp, and balance       · CT or MRI pictures may be taken of your brain  You may be given contrast liquid to help your brain show up better in the pictures  Tell the healthcare provider if you have ever had an allergic reaction to contrast liquid  Do not enter the MRI room with anything metal  Metal can cause serious injury  Tell the healthcare provider if you have any metal in or on your body      How is a migraine headache treated? Migraines cannot be cured  The goal of treatment is to reduce your symptoms  Take medicine as soon as you feel a migraine begin  · Prescription pain medicine may be given  Do not wait until the pain is severe before you take your medicine       · Migraine medicines are used to help prevent a migraine or stop it once it starts       · Antinausea medicine may be given to calm your stomach and to help prevent vomiting  This medicine can also help relieve pain      What can I do to manage my symptoms? · Rest in a dark, quiet room  This will help decrease your pain  Sleep may also help relieve the pain      · Apply ice to decrease pain  Use an ice pack, or put crushed ice in a plastic bag  Cover the ice pack with a towel and place it on your head  Apply ice for 15 to 20 minutes every hour      · Apply heat to decrease pain and muscle spasms  Use a small towel dampened with warm water or a heating pad, or sit in a warm bath  Apply heat on the area for 20 to 30 minutes every 2 hours  You may alternate heat and ice      · Keep a migraine record   Write down when your migraines start and stop  Include your symptoms and what you were doing when a migraine began  Record what you ate or drank for 24 hours before the migraine started  Keep track of what you did to treat your migraine and if it worked  Bring the migraine record with you to visits with your healthcare provider      What can I do to prevent another migraine headache? · Do not smoke  Nicotine and other chemicals in cigarettes and cigars can trigger a migraine or make it worse  Ask your healthcare provider for information if you currently smoke and need help to quit  E-cigarettes or smokeless tobacco still contain nicotine  Talk to your healthcare provider before you use these products       · Do not drink alcohol  Alcohol can trigger a migraine  It can also keep medicines used to treat your migraines from working      · Get regular exercise  Exercise may help prevent migraines  Talk to your healthcare provider about the best exercise plan for you  Try to get at least 30 minutes of exercise on most days      · Manage stress  Stress may trigger a migraine  Learn new ways to relax, such as deep breathing      · Create a sleep schedule  Go to bed and get up at the same times each day  Do not watch television before bed      · Eat regular meals  Include healthy foods such as include fruit, vegetables, whole-grain breads, low-fat dairy products, beans, lean meat, and fish  Do not have food or drinks that trigger your migraines      When should I seek immediate care? · You have a headache that seems different or much worse than your usual migraine headache      · You have a severe headache with a fever or a stiff neck       · You have new problems with speech, vision, balance, or movement      · You feel like you are going to faint, you become confused, or you have a seizure      When should I contact my healthcare provider?    · Your migraines interfere with your daily activities       · Your medicines or treatments stop working      · You have questions or concerns about your condition or care      CARE AGREEMENT:   You have the right to help plan your care  Learn about your health condition and how it may be treated  Discuss treatment options with your healthcare providers to decide what care you want to receive  You always have the right to refuse treatment  The above information is an  only  It is not intended as medical advice for individual conditions or treatments  Talk to your doctor, nurse or pharmacist before following any medical regimen to see if it is safe and effective for you  © Copyright [address] Information is for End User's use only and may not be sold, redistributed or otherwise used for commercial purposes   All illustrations and images included in CareNotes® are the copyrighted property of A D A M [employer] Inc  or [address] [place] [address]

## 2021-04-23 NOTE — ASSESSMENT & PLAN NOTE
Patient presented w/ intractable migraine x 2 weeks; atypical of his usual migraines   Appreciate neuro recs, on migraine cocktail   WINSTON improving, will rx medrol dose donna   CT head - No acute intracranial abnormality  IVF hydration  Conservative measures   O/p f/u in migraine clinic

## 2021-04-23 NOTE — PROGRESS NOTES
Progress Note - Neurology   Yaya Guadalupe III 50 y o  male MRN: [id number]  Unit/Bed#: -01 Encounter: [id number]    Addendum - reviewed with attending can be discharge on medrol dose pack, follow up with headache clinic as an out patient  Assessment/Plan   * Intractable migraine with aura without status migrainosus  Assessment & Plan  50 y o  male with history of migraine and chronic back pain who presents to the ED with intractable HA x 2 weeks and associated photo and phonophobia  Type of pain is described as "squeezing his head like a pimple" with associated photophobia and phonophobia  Head pain is rated 6/10 on arrival  Movement, light and sound make it worsen and dark room makes it a little better  Typical migraine is described as an ice pick going through eye to top of head, occurs once every 2-3 months and resolves over hours with triptan  HA improved with cocktail now 3/10  CTH negative for intracranial abnormality  Neuro exam unremarkable with no focal deficit  Migraine regimen:  - Decadron 4mg IV bid  - Magnesium sulfate 2g daily x3  - Benadryl 25mg q8 hrs  - Reglan 10mg q8hrs (patient has normal QT)  - Ketoralac 30mg q12 hrs  - Can take OTC mag oxide as an out patient 400 mg, and Riboflavin 400 mg to see if this helps with headaches  - [address] will need follow up in in 4 weeks with headache attending or advance practitioner  He will not require outpatient neurological testing  Subjective:   He reports his headache improved with the current migraine regiment, he has about a 3/10  He reports he gets infrequent migraines may be 4 5 year, in typically when he gets this he will take eletriptan, and after 1 or 2 of these it will resolve  Would like to follow-up with the Headache Clinic as an outpatient  He would like to get another dose of IV cocktail and reassess if he is ready for discharge  ROS:  HA improved, still 3/10    No one-sided weakness no problems with vision no problems with speech no problems with one-sided numbness or ataxia  No other neurological complaints  Vitals: Blood pressure 124/69, pulse 86, temperature 97 7 °F (36 5 °C), temperature source Oral, resp  rate 21, height 5' 7" (1 702 m), weight 83 5 kg (184 lb), SpO2 97 %  ,Body mass index is 28 82 kg/m²  Current Facility-Administered Medications   Medication Dose Route Frequency Provider Last Rate    dexamethasone  4 mg Intravenous Q12H [address] Tony Thomas PA-C      diphenhydrAMINE  25 mg Intravenous [address]      enoxaparin  40 mg Subcutaneous Daily Ted Kong MD      ketorolac  30 mg Intravenous Q8H Ted Kong MD      magnesium sulfate  2 g Intravenous Daily Tony Thomas PA-C Stopped (04/23/21 1049)    meclizine  25 mg Oral Q8H PRN Ted Kong MD      methocarbamol  750 mg Oral TID PRN Ted Kong MD      metoclopramide  10 mg Intravenous [facility] Ted Kong MD      sodium chloride  75 mL/hr Intravenous Continuous Ted Kong MD 75 mL/hr (04/23/21 0721)    temazepam  15 mg Oral HS PRN Ted Kong MD       Physical Exam:   Vital signs reviewed  Constitutional - in NAD  HEENT - NC/AT  Cardiac - Rate regular  Lungs - NO respiratory distress noted observed  Abdomen - Non distended  Extremities - No edema noted  Skin - no rashes noted    Neurological    Mental status - the patient is awake alert oriented x 3, with no evidence of aphasia or dysarthria, patient is able to follow simple commands, is able to follow complex commands  No paraphasic errors noted      Cranial nerves 2 through 12 are intact    Motor - 5/5 upper extremities and lower extremities, without drift, normal tone and bulk    No tremor or fixed deficit noted    Rapid movements are equal bilaterally    Sensation - nonfocal to touch    Coordination -  no ataxia noted on finger-to-nose    Toes are downgoing bilaterally    No evidence of clonus or myoclonus or tremor  No evidence of seizure activity    Lab, Imaging and other studies:   I have personally reviewed pertinent reports  , CBC:   Results from last 7 days   Lab Units 04/23/21  0624 04/22/21  1215   WBC Thousand/uL 14 32* 7 31   RBC Million/uL 5 40 5 64*   HEMOGLOBIN g/dL 16 3 16 9   HEMATOCRIT % 47 0 49 4*   MCV fL 87 88   PLATELETS Thousands/uL 231 226   , BMP/CMP:   Results from last 7 days   Lab Units 04/23/21  0624 04/22/21  1215   SODIUM mmol/L 142 144   POTASSIUM mmol/L 4 4 4 3   CHLORIDE mmol/L 111* 108   CO2 mmol/L 20* 23   BUN mg/dL 23 21   CREATININE mg/dL 0 98 0 94   CALCIUM mg/dL 8 4 9 0   EGFR ml/min/1 73sq m 88 92   , Vitamin B12:   , HgBA1C:   , TSH:   , Coagulation:   , Lipid Profile:   , Ammonia:   , Urinalysis:       Invalid input(s): URIBILINOGEN, Drug Screen:   , Medication Drug Levels:       Invalid input(s): CARBAMAZEPINE,  PHENOBARB, LACOSAMIDE, OXCARBAZEPINE     Procedure: Ct Head Without Contrast    Result Date: 4/22/2021  Narrative: CT BRAIN - WITHOUT CONTRAST INDICATION:   Headache, acute, normal neuro exam intractable headache  COMPARISON:  None  TECHNIQUE:  CT examination of the brain was performed  In addition to axial images, sagittal and coronal 2D reformatted images were created and submitted for interpretation  Radiation dose length product (DLP) for this visit:  865 mGy-cm   This examination, like all CT scans performed in the [facility], was performed utilizing techniques to minimize radiation dose exposure, including the use of iterative reconstruction and automated exposure control  IMAGE QUALITY:  Diagnostic  FINDINGS: PARENCHYMA:  No intracranial mass, mass effect or midline shift  No CT signs of acute infarction  No acute parenchymal hemorrhage  VENTRICLES AND EXTRA-AXIAL SPACES:  Normal for the patient's age  VISUALIZED ORBITS AND PARANASAL SINUSES:  Unremarkable   CALVARIUM AND EXTRACRANIAL SOFT TISSUES:  Normal  Impression: No acute intracranial abnormality  Workstation performed: [id number]     Counseling / Coordination of Care  Reviewed prior notes, examined with attending plan of care per attending physician

## 2021-04-23 NOTE — ASSESSMENT & PLAN NOTE
Patient presented w/ intractable migraine x 2 weeks; atypical of his usual migraines   Appreciate neuro recs, on migraine cocktail   WINSTON improving, follow up further neuro recs  Will re-eval after noon dose of cocktail to determine stability for d/c home     CT head - No acute intracranial abnormality  IVF hydration  Conservative measures

## 2021-04-26 ENCOUNTER — TELEPHONE (OUTPATIENT)
Dept: NEUROLOGY | Facility: CLINIC | Age: 56
End: 2021-04-26

## 2021-04-26 ENCOUNTER — TRANSITIONAL CARE MANAGEMENT (OUTPATIENT)
Dept: FAMILY MEDICINE CLINIC | Facility: CLINIC | Age: 56
End: 2021-04-26

## 2021-04-26 NOTE — TELEPHONE ENCOUNTER
1ST ATTEMPT LMOM for pt to call in to sched HFU appt  SLUB/Intractable Migraine/[employer]    NOTE FROM CHART:  Reason for Consult / Principal Problem: Intractable migraine  Para Rakes III will need follow up in in 4 weeks with headache attending or advance practitioner   He will not require outpatient neurological testing

## 2021-04-27 NOTE — TELEPHONE ENCOUNTER
Sched HFU appt 5/26/2021 with Dr Virgie Valdes in [facility]  Pt wanted [facility] office  Mailed NP paperwork to pt's home

## 2021-05-17 ENCOUNTER — TELEPHONE (OUTPATIENT)
Dept: NEUROLOGY | Facility: CLINIC | Age: 56
End: 2021-05-17

## 2021-05-17 NOTE — TELEPHONE ENCOUNTER
Called and left a voicemail for patient - Please call back to confirm upcoming appointment with Dr Rhae Fothergill  Provided patient with apt date, time and location  Informed patient that check in is at least 15 minutes prior to apt time

## 2021-05-26 ENCOUNTER — OFFICE VISIT (OUTPATIENT)
Dept: NEUROLOGY | Facility: CLINIC | Age: 56
End: 2021-05-26
Payer: COMMERCIAL

## 2021-05-26 VITALS
BODY MASS INDEX: 30.61 KG/M2 | HEIGHT: 67 IN | SYSTOLIC BLOOD PRESSURE: 140 MMHG | HEART RATE: 101 BPM | DIASTOLIC BLOOD PRESSURE: 92 MMHG | WEIGHT: 195 LBS

## 2021-05-26 DIAGNOSIS — G43.019 INTRACTABLE MIGRAINE WITHOUT AURA AND WITHOUT STATUS MIGRAINOSUS: ICD-10-CM

## 2021-05-26 DIAGNOSIS — G43.009 MIGRAINE WITHOUT AURA AND WITHOUT STATUS MIGRAINOSUS, NOT INTRACTABLE: Primary | ICD-10-CM

## 2021-05-26 PROCEDURE — 99417 PROLNG OP E/M EACH 15 MIN: CPT

## 2021-05-26 PROCEDURE — 1036F TOBACCO NON-USER: CPT

## 2021-05-26 PROCEDURE — 99215 OFFICE O/P EST HI 40 MIN: CPT

## 2021-05-26 PROCEDURE — 3008F BODY MASS INDEX DOCD: CPT

## 2021-05-26 RX ORDER — RIZATRIPTAN BENZOATE 10 MG/1
10 TABLET ORAL ONCE AS NEEDED
Qty: 9 TABLET | Refills: 6 | Status: SHIPPED | OUTPATIENT
Start: 2021-05-26 | End: 2021-09-10

## 2021-05-26 RX ORDER — KETOROLAC TROMETHAMINE 30 MG/ML
60 INJECTION, SOLUTION INTRAMUSCULAR; INTRAVENOUS ONCE
Status: COMPLETED | OUTPATIENT
Start: 2021-05-26 | End: 2021-05-26

## 2021-05-26 RX ADMIN — KETOROLAC TROMETHAMINE 60 MG: 30 INJECTION, SOLUTION INTRAMUSCULAR; INTRAVENOUS at 16:20

## 2021-05-26 NOTE — PATIENT INSTRUCTIONS
Additional Testing:   Neurodiagnostic workup: MRI Brain ordered    I do recommend considering following up with sleep medicine to treat sleep apnea as this can increase migraines, dementia, stroke, heart attack, blood pressure, fatigue, mood etc    Headache/migraine treatment:   Abortive medications (for immediate treatment of a headache): It is ok to take ibuprofen, acetaminophen or naproxen (Advil, Tylenol,  Aleve, Excedrin) if they help your headaches you should limit these to No more than 3 times a week to avoid medication overuse/rebound headaches  For your more moderate to severe migraines take this medication early   Maxalt (rizatriptan) 10mg tabs - take one at the onset of headache  May repeat one time after 1-2 hours if pain has not resolved  (Max 2 a day and 9 a month)     - some people may have some side effects from this medication, the other half typically do not  Common side effects include making you feel tired, palpitations, tingling or tightness of the face or chest   Most people report the side effects are nothing compared to their migraines and do not mind these  If you have intolerable side effects we will stop  Over the counter preventive supplements for headaches/migraines (if you try, try for 3 months straight)  (to take every day to help prevent headaches - not to take at the time of headache):  [x] Magnesium 400mg daily (If any diarrhea or upset stomach, decrease dose  as tolerated)    Prescription preventive medications for headaches/migraines   (to take every day to help prevent headaches - not to take at the time of headache):  [x] we have options if needed     Lifestyle Recommendations:  [x] SLEEP - Maintain a regular sleep schedule: Adults need at least 7-8 hours of uninterrupted a night   Maintain good sleep hygiene:  Going to bed and waking up at consistent times, avoiding excessive daytime naps, avoiding caffeinated beverages in the evening, avoid excessive stimulation in the evening and generally using bed primarily for sleeping  One hour before bedtime would recommend turning lights down lower, decreasing your activity (may read quietly, listen to music at a low volume)  When you get into bed, should eliminate all technology (no texting, emailing, playing with your phone, iPad or tablet in bed)  [x] HYDRATION - Maintain good hydration  Drink  2L of fluid a day (4 typical small water bottles)  [x] DIET - Maintain good nutrition  In particular don't skip meals and try and eat healthy balanced meals regularly  [x] TRIGGERS - Look for other triggers and avoid them: Limit caffeine to 1-2 cups a day or less  Avoid dietary triggers that you have noticed bring on your headaches (this could include aged cheese, peanuts, MSG, aspartame and nitrates)  [x] EXERCISE - physical exercise as we all know is good for you in many ways, and not only is good for your heart, but also is beneficial for your mental health, cognitive health and  chronic pain/headaches  I would encourage at the least 5 days of physical exercise weekly for at least 30 minutes  Education and Follow-up  [x] Please call with any questions or concerns  Of course if any new concerning symptoms go to the emergency department  [x] Follow up 3 months, sooner if needed     - As we discussed if there are no abnormalities on the brain MRI that need urgent intervention (very often there are incidental findings that may not mean anything significant and are better discussed in person), you will not necessarily receive a call from us, but feel free to call in to check on the status of the report (since not knowing can be anxiety provoking) and the nurses will let you know the result or make sure I have nothing to pass on regarding the results first   Ideally, all of this will be discussed in detail in the follow-up visit

## 2021-05-26 NOTE — PROGRESS NOTES
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, s/p surgery for deviated septum, BPPV referred here for evaluation of headache  My initial evaluation 2021     Migraine without aura and without status migrainosus, not intractable  He reports a history of migraines dating back to mid 45s  He does not know if he has a family history of migraines  He reports severe migraines are diffuse pressure  That used to improve over 1-2 days with rest and eletriptan until recently,  moderate migraines are typically unilateral left retro-orbital stabbing like a narrow and out the other side  That typically improve with rest and NSAIDs although still last for over 4 hours  He denies classic aura,  Does feel like his chronic tinnitus changes prior to onset of migraine  Reports typical associated migrainous features without autonomic features  - as of 2021: on average severe migraines 5-6 times a year lasting 1-2 days, moderate migraines 3-4 times a month, most recently lasted 3 weeks in 2021  Trial of magnesium for prevention, rizatriptan for abortive  If his migraines remain uncontrolled will recommend prescription preventative  Recommended treating sleep apnea  Workup:  - due to increased frequency and severity of headaches and migraines I recommend further evaluation with MRI brain with without contrast to rule out structural or treatable causes of symptoms   He has not actually even had an MRI of his brain since migraines started and they are worsening  Preventative:  - we discussed headache hygiene and lifestyle factors that may improve headaches  - discussed he could do trial of headache preventative supplements if he would like -   Start with magnesium   - Past/ failed/contraindicated: topiramate contraindicated due to history of kidney stones, amitriptyline would be contraindicated due to age   - future options:  gabapentin, verapamil,  CGRP Med    Abortive:  - discussed not taking over-the-counter or prescription pain medications more than 3 days per week to prevent medication overuse/rebound headache  - trial of rizatriptan 10 mg  Discussed proper use, possible side effects and risks  - Past/ failed/contraindicated: eletriptan does not always work   - future options: Alternative Triptan,  prochlorperazine, Toradol IM or p o , could consider trial for 5 days of Depakote or dexamethasone 4 prolonged migraine, ubrelvy, reyvow, nurtec    Intractable migraine without aura and without status migrainosus  -     ketorolac (TORADOL) 60 mg/2 mL IM injection 60 mg  Discussed proper use, possible side effects and risks  Patient instructions      Additional Testing:   Neurodiagnostic workup: MRI Brain ordered    I do recommend considering following up with sleep medicine to treat sleep apnea as this can increase migraines, dementia, stroke, heart attack, blood pressure, fatigue, mood etc    Headache/migraine treatment:   Abortive medications (for immediate treatment of a headache): It is ok to take ibuprofen, acetaminophen or naproxen (Advil, Tylenol,  Aleve, Excedrin) if they help your headaches you should limit these to No more than 3 times a week to avoid medication overuse/rebound headaches  For your more moderate to severe migraines take this medication early   Maxalt (rizatriptan) 10mg tabs - take one at the onset of headache   May repeat one time after 1-2 hours if pain has not resolved  (Max 2 a day and 9 a month)     - some people may have some side effects from this medication, the other half typically do not  Common side effects include making you feel tired, palpitations, tingling or tightness of the face or chest   Most people report the side effects are nothing compared to their migraines and do not mind these  If you have intolerable side effects we will stop  Over the counter preventive supplements for headaches/migraines (if you try, try for 3 months straight)  (to take every day to help prevent headaches - not to take at the time of headache):  [x] Magnesium 400mg daily (If any diarrhea or upset stomach, decrease dose  as tolerated)    Prescription preventive medications for headaches/migraines   (to take every day to help prevent headaches - not to take at the time of headache):  [x] we have options if needed     Lifestyle Recommendations:  [x] SLEEP - Maintain a regular sleep schedule: Adults need at least 7-8 hours of uninterrupted a night  Maintain good sleep hygiene:  Going to bed and waking up at consistent times, avoiding excessive daytime naps, avoiding caffeinated beverages in the evening, avoid excessive stimulation in the evening and generally using bed primarily for sleeping  One hour before bedtime would recommend turning lights down lower, decreasing your activity (may read quietly, listen to music at a low volume)  When you get into bed, should eliminate all technology (no texting, emailing, playing with your phone, iPad or tablet in bed)  [x] HYDRATION - Maintain good hydration  Drink  2L of fluid a day (4 typical small water bottles)  [x] DIET - Maintain good nutrition  In particular don't skip meals and try and eat healthy balanced meals regularly  [x] TRIGGERS - Look for other triggers and avoid them: Limit caffeine to 1-2 cups a day or less   Avoid dietary triggers that you have noticed bring on your headaches (this could include aged cheese, peanuts, MSG, aspartame and nitrates)  [x] EXERCISE - physical exercise as we all know is good for you in many ways, and not only is good for your heart, but also is beneficial for your mental health, cognitive health and  chronic pain/headaches  I would encourage at the least 5 days of physical exercise weekly for at least 30 minutes  Education and Follow-up  [x] Please call with any questions or concerns  Of course if any new concerning symptoms go to the emergency department  [x] Follow up 3 months, sooner if needed     - As we discussed if there are no abnormalities on the brain MRI that need urgent intervention (very often there are incidental findings that may not mean anything significant and are better discussed in person), you will not necessarily receive a call from us, but feel free to call in to check on the status of the report (since not knowing can be anxiety provoking) and the nurses will let you know the result or make sure I have nothing to pass on regarding the results first   Ideally, all of this will be discussed in detail in the follow-up visit  CC:   We had the pleasure of evaluating Dee Murray III in neurological consultation today  Dee Murray III is a   Right and left handed male who presents today for evaluation of headaches  History obtained from patient as well as available medical record review  History of Present Illness:   Current medical illnesses  or past medical history include  WALE not tolerate of CPAP, seasonal allergic rhinitis, chronic migraine, chronic back pain, kidney stones, elevated alkaline phosphatase, degenerative changes of the spine, dyslipidemia, insomnia,  Paresthesias,  Right wrist pain, elbow pain, knee pain, chronic tinnitus, around early 45s 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 Worse around 47   How often do the headaches occur?    -  Typical migraine once every 2-3 months and resolves over hours with triptan  -  04/22/2021 went to emergency room for 2 weeks of migraine  - as of 5/26/2021: on average severe migraines 5-6 times a year lasting 1-2 days, moderate migraines 3-4 times a month, most recently lasted 3 weeks in April 2021  What time of the day do the headaches start? No particular time of day   How long do the headaches last? 1-2 days usually, over 4 hours for moderate   Are you ever headache free? Yes    Aura? without aura    Prodrome: frequency of tinnitus changes    Last eye exam: glasses 1-2 years ago     Where is your headache located and pain quality?   - severe migraines whole head - pressure  - can be unilateral on the left - stabbing to left eye like an arrow out the other side     What is the intensity of pain? Average: 4/10, worst 7 when at hospital/10  Associated symptoms:   [x] Nausea       [] Vomiting       [x] Photophobia     [x]Phonophobia      [x] Osmophobia  [] Blurred vision    [x] Light-headed or dizzy  - just recently    [x] Tinnitus - chronic bilateral and worse with migraine can be louder on one ear   [] Hands or feet tingle or feel numb/paresthesias      [] Ptosis      [] Facial droop  [] Lacrimation - both   [] Nasal congestion/rhinorrhea        Things that make the headache worse? No specific movements, any movement     Headache triggers:  Unknown     Have you seen someone else for headaches or pain? Yes, neurology just recently   Have you had trigger point injection performed and how often? No  Have you had Botox injection performed and how often? No    Have you had epidural injections or transforaminal injections performed? No  Have you ever had any Brain imaging? Yes several MRI Brain     What medications do you take or have you taken for your headaches?    ABORTIVE:    OTC medications have been ineffective      eletriptan/Relpax 40 mg - 1-2 usually minimizes it     Meclizine - for BPPV in the past helped Methocarbamol - for back - prn - maybe 3 times a month   Diclofenac 50 mg - for back prn - a couple times a month      past   Cyclobenzaprine  ED for 02/20/2021: Toradol 30 mg, Benadryl 25 mg, Reglan 10 mg,  IV fluids, Decadron   Medrol Dosepak - didn't seem to help     PREVENTIVE:        for sleep: Temazepam prn     Past/ failed/contraindicated:  -topiramate contraindicated due to history of kidney stones       LIFESTYLE  Sleep - WALE not tolerate of CPAP - has seen 2 different specialists  - averages: 3-8 hours, usually 5   Problems falling asleep?:   Yes  Problems staying asleep?:  Yes    Water: 3 - 20 oz per day  Caffeine: cup in am and at work - cutting back     Mood: denies recent stress   Denies history of anxiety or depression or other diagnosed mood disorder    The following portions of the patient's history were reviewed and updated as appropriate: allergies, current medications, past family history, past medical history, past social history, past surgical history and problem list     Pertinent family history:  Family history of headaches:  no known family members with significant headaches  Any family history of aneurysms - No    Pertinent social history:  Work:  at [employer] with wife, 2 kids - 32, 21    Illicit Drugs: denies  Alcohol/tobacco: Denies alcohol use, Denies tobacco use    Past Medical History:     Past Medical History:   Diagnosis Date    Chronic back pain     Migraine        Patient Active Problem List   Diagnosis    Low back pain    Strain of lumbar region    WALE on CPAP    Mixed dyslipidemia    Intractable migraine with aura without status migrainosus    Nasal septal deviation    Seasonal allergic rhinitis    Nasal turbinate hypertrophy    Nasal obstruction    Deviated nasal septum    Hypertrophy of nasal turbinates       Medications:      Current Outpatient Medications   Medication Sig Dispense Refill    diclofenac sodium (VOLTAREN) 50 mg EC tablet Take 1 tablet(s) twice a day by oral route as needed  0    fluticasone (FLONASE) 50 mcg/act nasal spray 2 sprays      meclizine (ANTIVERT) 25 mg tablet Take 1 tablet (25 mg total) by mouth every 8 (eight) hours as needed for dizziness 30 tablet 1    methocarbamol (ROBAXIN) 750 mg tablet methocarbamol 750 mg tablet   take 1 tablet by mouth every 8 hours if needed      temazepam (RESTORIL) 15 mg capsule temazepam 15 mg capsule   as needed      magnesium oxide (MAG-OX) 400 mg Take 1 tablet (400 mg total) by mouth daily Nightly 90 tablet 3    rizatriptan (MAXALT) 10 MG tablet Take 1 tablet (10 mg total) by mouth once as needed for migraine May repeat in 2 hours if needed  Max 2/24 hours, 9/month  9 tablet 6     No current facility-administered medications for this visit           Allergies:    No Known Allergies    Family History:     Family History   Problem Relation Age of Onset    Breast cancer Mother     Diabetes Father     No Known Problems Family     Substance Abuse Neg Hx     Mental illness Neg Hx        Social History:       Social History     Socioeconomic History    Marital status: /Civil Union     Spouse name: Cathryn Shahid Number of children: 2    Years of education: Not on file    Highest education level: Not on file   Occupational History    Occupation:      Employer: [address] [place] [address] Financial resource strain: Not on file    Food insecurity     Worry: Not on file     Inability: Not on file   [employer] needs     Medical: Not on file     Non-medical: Not on file   Tobacco Use    Smoking status: Never Smoker    Smokeless tobacco: Former User     Types: Snuff   Substance and Sexual Activity    Alcohol use: Never     Frequency: Never    Drug use: No    Sexual activity: Not on file   Lifestyle    Physical activity     Days per week: Not on file     Minutes per session: Not on file    Stress: Not on file Relationships    Social connections     Talks on phone: Not on file     Gets together: Not on file     Attends Yazidism service: Not on file     Active member of club or organization: Not on file     Attends meetings of clubs or organizations: Not on file     Relationship status: Not on file    Intimate partner violence     Fear of current or ex partner: Not on file     Emotionally abused: Not on file     Physically abused: Not on file     Forced sexual activity: Not on file   Other Topics Concern    Not on file   Social History Narrative    Not on file         Objective:       Physical Exam:                                                                 Vitals:            Constitutional:    /92 (BP Location: Left arm, Patient Position: Sitting, Cuff Size: Standard)   Pulse 101   Ht 5' 7" (1 702 m)   Wt 88 5 kg (195 lb)   BMI 30 54 kg/m²   BP Readings from Last 3 Encounters:   05/26/21 140/92   04/23/21 124/69   04/14/21 126/84     Pulse Readings from Last 3 Encounters:   05/26/21 101   04/23/21 86   04/14/21 74         Well developed, well nourished, well groomed  No dysmorphic features  Squinting right eye due to current migraine        HEENT:  Normocephalic atraumatic  No meningismus  Oropharynx is clear and moist  No oral mucosal lesions  Chest:  Respirations regular and unlabored  Cardiovascular:  Regular rate, intact distal pulses  Distal extremities warm without palpable edema or tenderness, no observed significant swelling  Musculoskeletal:  Full range of motion  (see below under neurologic exam for evaluation of motor function and gait)   Skin:  warm and dry, not diaphoretic  No apparent birthmarks or stigmata of neurocutaneous disease  Psychiatric:  Normal behavior and appropriate affect        Neurological Examination:     Mental status/cognitive function:    Recent and remote memory intact   Attention span and concentration as well as fund of knowledge are appropriate for age  Normal language and spontaneous speech  Cranial Nerves:  II-visual fields full  Fundi poorly visualized due to pupillary constriction  III, IV, VI-Pupils were equal, round, and reactive to light and accomodation  Extraocular movements were full and conjugate without nystagmus  V-facial sensation symmetric  VII-facial expression symmetric, intact forehead wrinkle, strong eye closure, symmetric smile    VIII-hearing grossly intact bilaterally   IX, X-palate elevation symmetric, no dysarthria  XI-shoulder shrug strength intact    XII-tongue protrusion midline  Motor Exam: symmetric bulk and tone throughout, no pronator drift  Power/strength 5/5 bilateral upper and lower extremities, no atrophy, fasciculations or abnormal movements noted  Sensory: grossly intact light touch in all extremities  Reflexes: brachioradialis 1+, biceps 1+, knee 2+, ankle 2+ bilaterally  No ankle clonus  Coordination: Finger nose finger intact bilaterally, no apparent dysmetria, ataxia or tremor noted  Gait: steady casual and tandem gait  Pertinent lab results:    04/22/2021 BMP and CBC unremarkable, ESR 6  01/10/2020 LFTs with elevated alkaline phosphatase     Imaging:   I have personally reviewed imaging and radiology read   -  noncontrast head CT 04/22/2021:  No acute intracranial abnormality       Review of Systems:   ROS obtained by medical assistant Personally reviewed and updated if indicated  Review of Systems   Constitutional: Negative  Negative for appetite change and fever  HENT: Negative  Negative for hearing loss, tinnitus, trouble swallowing and voice change  Eyes: Negative  Negative for photophobia and pain  Respiratory: Negative  Negative for shortness of breath  Cardiovascular: Negative  Negative for palpitations  Gastrointestinal: Negative  Negative for nausea and vomiting  Endocrine: Negative  Negative for cold intolerance  Genitourinary: Negative   Negative for dysuria, frequency and urgency  Musculoskeletal: Negative  Negative for myalgias and neck pain  Skin: Negative  Negative for rash  Neurological: Positive for headaches (4-5 per year)  Negative for dizziness, tremors, seizures, syncope, facial asymmetry, speech difficulty, weakness, light-headedness and numbness  Hematological: Negative  Does not bruise/bleed easily  Psychiatric/Behavioral: Negative  Negative for confusion, hallucinations and sleep disturbance  I have spent 60 minutes with Patient today in which greater than 50% of this time was spent in counseling/coordination of care regarding Diagnostic results, Prognosis, Risks and benefits of tx options, Intructions for management, Patient education, Importance of tx compliance, Risk factor reductions and Impressions  I also spent 30 minutes non face to face for this patient the same day           Author:  Simon Katz MD 5/26/2021 5:02 PM

## 2021-05-26 NOTE — PROGRESS NOTES
Review of Systems   Constitutional: Negative  Negative for appetite change and fever  HENT: Negative  Negative for hearing loss, tinnitus, trouble swallowing and voice change  Eyes: Negative  Negative for photophobia and pain  Respiratory: Negative  Negative for shortness of breath  Cardiovascular: Negative  Negative for palpitations  Gastrointestinal: Negative  Negative for nausea and vomiting  Endocrine: Negative  Negative for cold intolerance  Genitourinary: Negative  Negative for dysuria, frequency and urgency  Musculoskeletal: Negative  Negative for myalgias and neck pain  Skin: Negative  Negative for rash  Neurological: Positive for headaches (4-5 per year)  Negative for dizziness, tremors, seizures, syncope, facial asymmetry, speech difficulty, weakness, light-headedness and numbness  Hematological: Negative  Does not bruise/bleed easily  Psychiatric/Behavioral: Negative  Negative for confusion, hallucinations and sleep disturbance

## 2021-06-13 ENCOUNTER — HOSPITAL ENCOUNTER (OUTPATIENT)
Dept: MRI IMAGING | Facility: HOSPITAL | Age: 56
Discharge: HOME | End: 2021-06-13
Attending: PSYCHIATRY & NEUROLOGY
Payer: COMMERCIAL

## 2021-06-13 DIAGNOSIS — G43.009 MIGRAINE WITHOUT AURA AND WITHOUT STATUS MIGRAINOSUS, NOT INTRACTABLE: ICD-10-CM

## 2021-06-13 PROCEDURE — G1004 CDSM NDSC: HCPCS

## 2021-06-13 PROCEDURE — 70553 MRI BRAIN STEM W/O & W/DYE: CPT

## 2021-06-13 PROCEDURE — A9585 GADOBUTROL INJECTION: HCPCS | Performed by: PSYCHIATRY & NEUROLOGY

## 2021-06-13 RX ADMIN — GADOBUTROL 8 ML: 604.72 INJECTION INTRAVENOUS at 10:35

## 2021-06-16 NOTE — RESULT ENCOUNTER NOTE
Attempted to call patient 5 times this afternoon to discuss this finding  If he calls back please let him know I would love to talk to him 6/17/21 afternoon and reassure him about the MRI  I will also be placing a referral to neurosurgery for further discussion

## 2021-06-17 ENCOUNTER — TELEPHONE (OUTPATIENT)
Dept: NEUROLOGY | Facility: CLINIC | Age: 56
End: 2021-06-17

## 2021-06-17 DIAGNOSIS — D32.9 MENINGIOMA (HCC): Primary | ICD-10-CM

## 2021-06-17 NOTE — TELEPHONE ENCOUNTER
----- Message from Paddy Platt MD sent at 6/16/2021  5:40 PM EDT -----  Attempted to call patient 5 times this afternoon to discuss this finding  If he calls back please let him know I would love to talk to him 6/17/21 afternoon and reassure him about the MRI  I will also be placing a referral to neurosurgery for further discussion

## 2021-06-28 ENCOUNTER — OFFICE VISIT (OUTPATIENT)
Dept: NEUROSURGERY | Facility: CLINIC | Age: 56
End: 2021-06-28
Payer: COMMERCIAL

## 2021-06-28 VITALS
HEIGHT: 67 IN | TEMPERATURE: 98.5 F | WEIGHT: 187 LBS | DIASTOLIC BLOOD PRESSURE: 90 MMHG | SYSTOLIC BLOOD PRESSURE: 140 MMHG | BODY MASS INDEX: 29.35 KG/M2

## 2021-06-28 DIAGNOSIS — D32.9 MENINGIOMA (HCC): Primary | ICD-10-CM

## 2021-06-28 DIAGNOSIS — R51.9 INTRACTABLE HEADACHE: ICD-10-CM

## 2021-06-28 PROCEDURE — 99204 OFFICE O/P NEW MOD 45 MIN: CPT | Performed by: NEUROLOGICAL SURGERY

## 2021-06-28 PROCEDURE — 1036F TOBACCO NON-USER: CPT | Performed by: NEUROLOGICAL SURGERY

## 2021-06-28 PROCEDURE — 3008F BODY MASS INDEX DOCD: CPT | Performed by: NEUROLOGICAL SURGERY

## 2021-06-28 NOTE — ASSESSMENT & PLAN NOTE
2 8cm right anterior cranial fossa meningioma  · Discovered incidentally during migraine workup   · Patient is c/o left sided severe headaches for 7 years, follows with neurology    Imaging:  · MRI brain w/wo, 6/13/21: Right frontal meningioma laterally within the anterior cranial fossa measuring 1 5 x 2 4 x 2 8 cm  This extends into the sylvian fissure without edema in the adjacent brain  Plan:  · Reviewed imaging with the patient today  · Explained that this meningioma is very unlikely to be causing his worsening headaches  · Discussed NCCN guidelines with the patient; will obtain repeat MRI brain w/wo in 3 months, 6 months, 1 ear then annually for 5 years   · Various treatment options were discussed with the patient  These include imaging surveillance, SRS/radation, and surgical resection   At this time I recommend continuing with short term imaging follow up  · Recommend continuing to follow with neurology for headache management  · Follow up in 3 months with repeat MRI brain w/wo contrast

## 2021-06-28 NOTE — PROGRESS NOTES
Neurosurgery Office Note  Mckeon Monday III 50 y o  male MRN: [id number]      Assessment/Plan     Meningioma [facility])  2 8cm right anterior cranial fossa meningioma  · Discovered incidentally during migraine workup   · Patient is c/o left sided severe headaches for 7 years, follows with neurology    Imaging:  · MRI brain w/wo, 6/13/21: Right frontal meningioma laterally within the anterior cranial fossa measuring 1 5 x 2 4 x 2 8 cm  This extends into the sylvian fissure without edema in the adjacent brain  Plan:  · Reviewed imaging with the patient today  · Explained that this meningioma is very unlikely to be causing his worsening headaches  · Discussed NCCN guidelines with the patient; will obtain repeat MRI brain w/wo in 3 months, 6 months, 1 ear then annually for 5 years   · Various treatment options were discussed with the patient  These include imaging surveillance, SRS/radation, and surgical resection  At this time I recommend continuing with short term imaging follow up  · Recommend continuing to follow with neurology for headache management  · Follow up in 3 months with repeat MRI brain w/wo contrast         Diagnoses and all orders for this visit:    Meningioma [facility])  -     Ambulatory referral to Neurosurgery  -     MRI brain with and without contrast; Future            CHIEF COMPLAINT    Chief Complaint   Patient presents with    Consult     Meningioma       HISTORY    This is a 65-year-old male with a past medical history significant for migraines and chronic back pain who is here today to review an MRI brain  This was obtained for workup of worsening headaches by Neurology  MRI showed an incidental finding of a 2 8 cm right anterior cranial fossa mass consistent with meningioma  There is no evidence of edema on FLAIR images  There is no prior images to compare  Currently the patient does have a migraine    He states that is on his left side behind his eye and associated with blurry vision and tinnitus  He is following with Neurology for this  He is also complaining of worsening short-term memory  He states that he can go down the [place] working completely forget where he was going  He denies seizures, syncope, vision changes, speech changes, focal weakness, difficulty with ambulation or falls, urinary or bowel incontinence  MRI brain with without contrast was reviewed with the patient today  This shows a right anterior cranial fossa meningioma  We reviewed NCCN guidelines in all of his questions were answered  At this time, we will see him back in 3 months with a repeat MRI  REVIEW OF SYSTEMS    Review of Systems   Constitutional: Negative  HENT: Positive for tinnitus (Both ears )  Eyes: Positive for visual disturbance (Floaters (wears glasses))  Respiratory: Negative  Cardiovascular: Negative  Gastrointestinal: Negative  Endocrine: Negative  Genitourinary: Negative  Musculoskeletal: Positive for back pain (low back pain )  Skin: Negative  Allergic/Immunologic: Negative  Neurological: Positive for dizziness (occasional) and headaches (migraines started 7 years ago )  Hematological: Negative  Psychiatric/Behavioral: Negative  ROS was personally reviewed and changes made as needed     Meds/Allergies     Current Outpatient Medications   Medication Sig Dispense Refill    diclofenac sodium (VOLTAREN) 50 mg EC tablet Take 1 tablet(s) twice a day by oral route as needed    0    fluticasone (FLONASE) 50 mcg/act nasal spray 2 sprays      magnesium oxide (MAG-OX) 400 mg Take 1 tablet (400 mg total) by mouth daily Nightly 90 tablet 3    meclizine (ANTIVERT) 25 mg tablet Take 1 tablet (25 mg total) by mouth every 8 (eight) hours as needed for dizziness 30 tablet 1    methocarbamol (ROBAXIN) 750 mg tablet methocarbamol 750 mg tablet   take 1 tablet by mouth every 8 hours if needed      rizatriptan (MAXALT) 10 MG tablet Take 1 tablet (10 mg total) by mouth once as needed for migraine May repeat in 2 hours if needed  Max 2/24 hours, 9/month  9 tablet 6    temazepam (RESTORIL) 15 mg capsule temazepam 15 mg capsule   as needed       No current facility-administered medications for this visit  No Known Allergies    PAST HISTORY    Past Medical History:   Diagnosis Date    Chronic back pain     Migraine        Past Surgical History:   Procedure Laterality Date    HERNIA REPAIR      MANDIBLE FRACTURE SURGERY      MOUTH SURGERY      cyst in cheek    NASAL SEPTUM SURGERY         Social History     Tobacco Use    Smoking status: Never Smoker    Smokeless tobacco: Former User     Types: Snuff   Vaping Use    Vaping Use: Never used   Substance Use Topics    Alcohol use: Never    Drug use: No       Family History   Problem Relation Age of Onset    Breast cancer Mother     Diabetes Father     No Known Problems Family     Substance Abuse Neg Hx     Mental illness Neg Hx          Above history personally reviewed  EXAM    Vitals:Blood pressure 140/90, temperature 98 5 °F (36 9 °C), temperature source Temporal, height 5' 7" (1 702 m), weight 84 8 kg (187 lb)  ,Body mass index is 29 29 kg/m²  Physical Exam  Vitals and nursing note reviewed  Constitutional:       Appearance: Normal appearance  He is well-developed and normal weight  HENT:      Head: Normocephalic and atraumatic  Eyes:      Extraocular Movements: Extraocular movements intact  Pupils: Pupils are equal, round, and reactive to light  Cardiovascular:      Rate and Rhythm: Normal rate  Pulmonary:      Effort: Pulmonary effort is normal  No respiratory distress  Abdominal:      Palpations: Abdomen is soft  Musculoskeletal:         General: Normal range of motion  Cervical back: Normal range of motion  Skin:     General: Skin is warm and dry  Neurological:      General: No focal deficit present  Mental Status: He is alert and oriented to person, place, and time  Coordination: Finger-Nose-Finger Test normal       Gait: Gait is intact  Deep Tendon Reflexes: Strength normal       Reflex Scores:       Bicep reflexes are 2+ on the right side and 2+ on the left side  Brachioradialis reflexes are 2+ on the right side and 2+ on the left side  Patellar reflexes are 2+ on the right side and 2+ on the left side  Psychiatric:         Mood and Affect: Mood normal          Speech: Speech normal          Behavior: Behavior normal          Thought Content: Thought content normal          Judgment: Judgment normal          Neurologic Exam     Mental Status   Oriented to person, place, and time  Follows 2 step commands  Attention: normal  Concentration: normal    Speech: speech is normal   Level of consciousness: alert  Knowledge: good  Able to perform simple calculations  Able to name object  Able to repeat  Normal comprehension  Cranial Nerves   Cranial nerves II through XII intact  CN III, IV, VI   Pupils are equal, round, and reactive to light  Motor Exam   Muscle bulk: normal  Overall muscle tone: normal  Right arm pronator drift: absent  Left arm pronator drift: absent    Strength   Strength 5/5 throughout  Sensory Exam   Light touch normal      Gait, Coordination, and Reflexes     Gait  Gait: normal    Coordination   Finger to nose coordination: normal    Tremor   Resting tremor: absent    Reflexes   Right brachioradialis: 2+  Left brachioradialis: 2+  Right biceps: 2+  Left biceps: 2+  Right patellar: 2+  Left patellar: 2+  Right Leslie: absent  Left Leslie: absent  Right ankle clonus: absent  Left ankle clonus: absent        MEDICAL DECISION MAKING    Imaging Studies:     MRI brain w wo contrast    Result Date: 6/16/2021  Narrative: MRI BRAIN WITH AND WITHOUT CONTRAST INDICATION: G43 [id number]: Migraine without aura, not intractable, without status migrainosus   COMPARISON:  CT brain dated 4/22/2021 TECHNIQUE: Sagittal T1, axial T2, axial FLAIR, axial T1, axial Martin, axial diffusion  Sagittal, axial T1 postcontrast   Axial bravo postcontrast with coronal reconstructions  IV Contrast:  8 mL of Gadobutrol injection (SINGLE-DOSE)  IMAGE QUALITY:   Diagnostic  FINDINGS: BRAIN PARENCHYMA:  There is a homogeneously enhancing extra-axial mass identified within the right anterior cranial fossa measuring 1 5 x 2 4 x 2 8 cm, consistent with meningioma  This is resulting in mild mass effect upon the frontal operculum  No edema within the underlying brain  There is no intracranial hemorrhage  Normal posterior fossa  Diffusion imaging is unremarkable  Several white matter hyperintensities are seen on T2 and FLAIR imaging within the frontal lobes without mass effect, diffusion abnormality or enhancement of these white matter lesions  Findings are nonspecific and may represent chronic microangiopathic change  Postcontrast imaging of the remainder of the brain parenchyma is unremarkable VENTRICLES:  Normal for the patient's age  SELLA AND PITUITARY GLAND:  Normal  ORBITS:  Normal  PARANASAL SINUSES:  Normal  VASCULATURE:  Evaluation of the major intracranial vasculature demonstrates appropriate flow voids  CALVARIUM AND SKULL BASE:  Normal  EXTRACRANIAL SOFT TISSUES:  Normal      Impression: Right frontal meningioma laterally within the anterior cranial fossa measuring 1 5 x 2 4 x 2 8 cm  This extends into the sylvian fissure without edema in the adjacent brain  Several white matter hyperintensities on T2 and FLAIR imaging, primarily within the frontal lobes are nonspecific and may represent precocious chronic microangiopathic change  Workstation performed: [id number]       I have personally reviewed pertinent reports     and I have personally reviewed pertinent films in PACS

## 2021-07-31 LAB
ALBUMIN SERPL-MCNC: 4.5 G/DL (ref 3.8–4.9)
ALBUMIN/GLOB SERPL: 2 {RATIO} (ref 1.2–2.2)
ALP SERPL-CCNC: 99 IU/L (ref 48–121)
ALT SERPL-CCNC: 30 IU/L (ref 0–44)
AST SERPL-CCNC: 19 IU/L (ref 0–40)
BILIRUB SERPL-MCNC: 0.5 MG/DL (ref 0–1.2)
BUN SERPL-MCNC: 17 MG/DL (ref 6–24)
BUN/CREAT SERPL: 18 (ref 9–20)
CALCIUM SERPL-MCNC: 9.3 MG/DL (ref 8.7–10.2)
CHLORIDE SERPL-SCNC: 106 MMOL/L (ref 96–106)
CHOLEST SERPL-MCNC: 226 MG/DL (ref 100–199)
CO2 SERPL-SCNC: 23 MMOL/L (ref 20–29)
CREAT SERPL-MCNC: 0.94 MG/DL (ref 0.76–1.27)
GLOBULIN SER-MCNC: 2.3 G/DL (ref 1.5–4.5)
GLUCOSE SERPL-MCNC: 96 MG/DL (ref 65–99)
HDLC SERPL-MCNC: 32 MG/DL
LDLC SERPL CALC-MCNC: 155 MG/DL (ref 0–99)
POTASSIUM SERPL-SCNC: 4.3 MMOL/L (ref 3.5–5.2)
PROT SERPL-MCNC: 6.8 G/DL (ref 6–8.5)
SL AMB EGFR AFRICAN AMERICAN: 107 ML/MIN/1.73
SL AMB EGFR NON AFRICAN AMERICAN: 92 ML/MIN/1.73
SL AMB VLDL CHOLESTEROL CALC: 39 MG/DL (ref 5–40)
SODIUM SERPL-SCNC: 142 MMOL/L (ref 134–144)
TRIGL SERPL-MCNC: 209 MG/DL (ref 0–149)

## 2021-08-03 ENCOUNTER — TELEPHONE (OUTPATIENT)
Dept: FAMILY MEDICINE CLINIC | Facility: CLINIC | Age: 56
End: 2021-08-03

## 2021-08-03 NOTE — TELEPHONE ENCOUNTER
----- Message from [address] [place] [address],[address] sent at 8/3/2021  8:49 AM EDT -----  Call with labs  High cholesterol/triglycerides  Lifestyle modifications-diet/exercise  CMP normal  Repeat routine fasting labs 12 months

## 2021-08-27 ENCOUNTER — TELEPHONE (OUTPATIENT)
Dept: NEUROLOGY | Facility: CLINIC | Age: 56
End: 2021-08-27

## 2021-08-27 NOTE — TELEPHONE ENCOUNTER
Called and spoke to patient - confirmed upcoming appointment with Iona Flores 09/10/21 1:30 am at the [facility] office  Provided patient with apt date, time and location  Informed patient that check in is at least 15 minutes prior to apt time  The patient is not  having any issues or concerns at this time

## 2021-08-31 ENCOUNTER — TELEPHONE (OUTPATIENT)
Dept: NEUROSURGERY | Facility: CLINIC | Age: 56
End: 2021-08-31

## 2021-08-31 NOTE — TELEPHONE ENCOUNTER
Received a call from patient looking to clarify his appt info coming up  Returned call to patient and provided him with the date/time/location of his upcoming appt  Patient was appreciative of the call back

## 2021-09-10 ENCOUNTER — OFFICE VISIT (OUTPATIENT)
Dept: NEUROLOGY | Facility: CLINIC | Age: 56
End: 2021-09-10
Payer: COMMERCIAL

## 2021-09-10 VITALS
DIASTOLIC BLOOD PRESSURE: 83 MMHG | BODY MASS INDEX: 30.69 KG/M2 | WEIGHT: 195.5 LBS | SYSTOLIC BLOOD PRESSURE: 138 MMHG | HEART RATE: 101 BPM | HEIGHT: 67 IN

## 2021-09-10 DIAGNOSIS — G43.009 MIGRAINE WITHOUT AURA AND WITHOUT STATUS MIGRAINOSUS, NOT INTRACTABLE: Primary | ICD-10-CM

## 2021-09-10 DIAGNOSIS — D32.9 MENINGIOMA (HCC): ICD-10-CM

## 2021-09-10 DIAGNOSIS — G47.33 OSA (OBSTRUCTIVE SLEEP APNEA): ICD-10-CM

## 2021-09-10 PROBLEM — G43.119 INTRACTABLE MIGRAINE WITH AURA WITHOUT STATUS MIGRAINOSUS: Status: RESOLVED | Noted: 2018-09-20 | Resolved: 2021-09-10

## 2021-09-10 PROCEDURE — 3008F BODY MASS INDEX DOCD: CPT | Performed by: PSYCHIATRY & NEUROLOGY

## 2021-09-10 PROCEDURE — 99215 OFFICE O/P EST HI 40 MIN: CPT | Performed by: PSYCHIATRY & NEUROLOGY

## 2021-09-10 PROCEDURE — 1036F TOBACCO NON-USER: CPT | Performed by: PSYCHIATRY & NEUROLOGY

## 2021-09-10 RX ORDER — RIMEGEPANT SULFATE 75 MG/75MG
TABLET, ORALLY DISINTEGRATING ORAL
Qty: 8 TABLET | Refills: 3 | Status: SHIPPED | OUTPATIENT
Start: 2021-09-10 | End: 2022-03-18 | Stop reason: SDUPTHER

## 2021-09-10 NOTE — PROGRESS NOTES
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, s/p surgery for deviated septum, BPPV referred here for evaluation of headache  My initial evaluation 2021  Follow up 9/10/2021     Migraine without aura and without status migrainosus, not intractable  He reports a history of migraines dating back to mid 45s  He does not know if he has a family history of migraines  He reports severe migraines are diffuse pressure  That used to improve over 1-2 days with rest and eletriptan until recently,  moderate migraines are typically unilateral left retro-orbital stabbing like a narrow and out the other side  That typically improve with rest and NSAIDs although still last for over 4 hours  He denies classic aura,  Does feel like his chronic tinnitus changes prior to onset of migraine  Reports typical associated migrainous features without autonomic features  - as of 2021: on average severe migraines 5-6 times a year lasting 1-2 days, moderate migraines 3-4 times a month, most recently lasted 3 weeks in 2021  Trial of magnesium for prevention, rizatriptan for abortive  If his migraines remain uncontrolled will recommend prescription preventative  Recommended treating sleep apnea    - as of 9/10/2021: Reports  Mild headaches 2-3 times per week that improved with OTC meds and migraines are stable but to 3 per month and improved with rizatriptan although he reports side effects, therefore will start trial of nurtec for abortive  Last visit migraine improved with Toradol injection  Recommended trying to take magnesium consistently for prevention  Workup:  -   MRI brain with without contrast 06/13/2021: Right frontal meningioma laterally within the anterior cranial fossa measuring 1 5 x 2 4 x 2 8 cm  This extends into the sylvian fissure without edema in the adjacent brain  Several white matter hyperintensities on T2 and FLAIR imaging, primarily within the frontal lobes are nonspecific and may represent precocious chronic microangiopathic change  Preventative:  - we discussed headache hygiene and lifestyle factors that may improve headaches  - discussed he could do trial of headache preventative supplements if he would like -   Start with magnesium   - Past/ failed/contraindicated: topiramate contraindicated due to history of kidney stones, amitriptyline would be contraindicated due to age   - future options:  gabapentin, verapamil,  CGRP Med    Abortive:  - discussed not taking over-the-counter or prescription pain medications more than 3 days per week to prevent medication overuse/rebound headache  -  He is on through other providers:  Methocarbamol, Voltaren  - trial of nurtec 75 mg  Discussed proper use, possible side effects and risks  - Past/ failed/contraindicated: eletriptan does not always work, rizatriptan works but gets panic attack  - past/helped:  toradol shot worked    - future options:   prochlorperazine, Toradol IM or p o , could consider trial for 5 days of Depakote or dexamethasone 4 prolonged migraine, ubrelvy, reyvow, nurtec    WALE (obstructive sleep apnea)   -     Ambulatory referral to Sleep Medicine;  Future     Meningioma  - continue to follow with neurosurgery    Patient instructions      Continue to follow with neurosurgery for meningioma    Referral to sleep medicine for sleep apnea     Headache/migraine treatment:   Abortive medications (for immediate treatment of a headache): It is ok to take ibuprofen, acetaminophen or naproxen (Advil, Tylenol,  Aleve, Excedrin) if they help your headaches you should limit these to No more than 3 times a week to avoid medication overuse/rebound headaches  For your more moderate to severe migraines take this medication early   Trial of nurtec 75 mg under the tongue  Do not repeat in 24 hours  Over the counter preventive supplements for headaches/migraines (if you try, try for 3 months straight)  (to take every day to help prevent headaches - not to take at the time of headache):  [x] Magnesium 400mg daily (If any diarrhea or upset stomach, decrease dose  as tolerated)    Prescription preventive medications for headaches/migraines   (to take every day to help prevent headaches - not to take at the time of headache):  [x] we have options if needed      Lifestyle Recommendations:  [x] SLEEP - Maintain a regular sleep schedule: Adults need at least 7-8 hours of uninterrupted a night  Maintain good sleep hygiene:  Going to bed and waking up at consistent times, avoiding excessive daytime naps, avoiding caffeinated beverages in the evening, avoid excessive stimulation in the evening and generally using bed primarily for sleeping  One hour before bedtime would recommend turning lights down lower, decreasing your activity (may read quietly, listen to music at a low volume)  When you get into bed, should eliminate all technology (no texting, emailing, playing with your phone, iPad or tablet in bed)  [x] HYDRATION - Maintain good hydration  Drink  2L of fluid a day (4 typical small water bottles)  [x] DIET - Maintain good nutrition  In particular don't skip meals and try and eat healthy balanced meals regularly  [x] TRIGGERS - Look for other triggers and avoid them: Limit caffeine to 1-2 cups a day or less   Avoid dietary triggers that you have noticed bring on your headaches (this could include aged cheese, peanuts, MSG, aspartame and nitrates)  [x] EXERCISE - physical exercise as we all know is good for you in many ways, and not only is good for your heart, but also is beneficial for your mental health, cognitive health and  chronic pain/headaches  I would encourage at the least 5 days of physical exercise weekly for at least 30 minutes  Education and Follow-up  [x] Please call with any questions or concerns  Of course if any new concerning symptoms go to the emergency department  [x] Follow up 6 months, sooner if needed         CC: We had the pleasure of evaluating Vladimir Adam III in neurological consultation today  Vladimir Adam III is a   Right and left handed male who presents today for evaluation of headaches  History obtained from patient as well as available medical record review  History of Present Illness:   Interval history as of 9/10/2021  - denies any new or concerning neurologic symptoms since last visit   - -MRI brain with without contrast 06/13/2021: Right frontal meningioma laterally within the anterior cranial fossa measuring 1 5 x 2 4 x 2 8 cm  This extends into the sylvian fissure without edema in the adjacent brain  Several white matter hyperintensities on T2 and FLAIR imaging, primarily within the frontal lobes are nonspecific and may represent precocious chronic microangiopathic change  -  Follow-up with Neurosurgery and getting repeat scan  -has not followed up with Sleep Medicine, but using mask more     Headaches and migraines   - migraines 2-3 per month, stable  - mild headaches 2-3 times per week that improve with OTC meds     Preventative:    - magnesium - not taking consistently     Abortive:   - Rizatriptan causes heart racing/panic attacks   6-8 pills a week ibuprofen/tylenol  - last visit toradol shot worked      Headaches started at what age? Worse around 47   How often do the headaches occur?    -  Typical migraine once every 2-3 months and resolves over hours with triptan  -  04/22/2021 went to emergency room for 2 weeks of migraine  - as of 5/26/2021: on average severe migraines 5-6 times a year lasting 1-2 days, moderate migraines 3-4 times a month, most recently lasted 3 weeks in April 2021  - As of 9/10/2021, 2-3 times a month  Last for 2-3 hours with rizatriptan and go to sleep afterward  W/o rizatriptan could last from a 4-5 hours to 3 days  No change with tinnitus, flashes of light  Patients's other headaches has no other symptoms associated with it  What time of the day do the headaches start? No particular time of day   How long do the headaches last? 1-2 days usually, over 4 hours for moderate   Are you ever headache free? Yes    Aura? without aura    Prodrome: frequency of tinnitus changes    Last eye exam: glasses 1-2 years ago     Where is your headache located and pain quality?   - severe migraines whole head - pressure  - can be unilateral on the left - stabbing to left eye like an arrow out the other side     What is the intensity of pain? Average: 4/10, worst 7 when at hospital/10  Associated symptoms:   [x] Nausea       [] Vomiting       [x] Photophobia     [x]Phonophobia      [x] Osmophobia  [] Blurred vision    [x] Light-headed or dizzy  - just recently    [x] Tinnitus - chronic bilateral and worse with migraine can be louder on one ear   [] Hands or feet tingle or feel numb/paresthesias      [] Ptosis      [] Facial droop  [] Lacrimation - both   [] Nasal congestion/rhinorrhea        Things that make the headache worse? No specific movements, any movement     Headache triggers:  Unknown     Have you seen someone else for headaches or pain? Yes, neurology just recently   Have you had trigger point injection performed and how often? No  Have you had Botox injection performed and how often? No    Have you had epidural injections or transforaminal injections performed? No  Have you ever had any Brain imaging?  Yes several MRI Brain     What medications do you take or have you taken for your headaches?    ABORTIVE:    OTC medications have been ineffective      eletriptan/Relpax 40 mg - 1-2 usually minimizes it     Meclizine - for BPPV in the past helped   Methocarbamol - for back - prn - maybe 3 times a month   Diclofenac 50 mg - for back prn - a couple times a month      past   Cyclobenzaprine  ED for 02/20/2021: Toradol 30 mg, Benadryl 25 mg, Reglan 10 mg,  IV fluids, Decadron   Medrol Dosepak - didn't seem to help     PREVENTIVE:        for sleep: Temazepam prn     Past/ failed/contraindicated:  -topiramate contraindicated due to history of kidney stones       LIFESTYLE  Sleep - WALE not tolerate of CPAP - has seen 2 different specialists  - averages: 3-8 hours, usually 5   Problems falling asleep?:   Yes  Problems staying asleep?:  Yes    Water: 3 - 20 oz per day  Caffeine: cup in am and at work - cutting back     Mood: denies recent stress   Denies history of anxiety or depression or other diagnosed mood disorder    The following portions of the patient's history were reviewed and updated as appropriate: allergies, current medications, past family history, past medical history, past social history, past surgical history and problem list     Pertinent family history:  Family history of headaches:  no known family members with significant headaches  Any family history of aneurysms - No    Pertinent social history:  Work:  at [employer] with wife, 2 kids - 32, 21    Illicit Drugs: denies  Alcohol/tobacco: Denies alcohol use, Denies tobacco use    Past Medical History:     Past Medical History:   Diagnosis Date    Chronic back pain     Migraine        Patient Active Problem List   Diagnosis    Low back pain    Strain of lumbar region    WALE on CPAP    Mixed dyslipidemia    Nasal septal deviation    Seasonal allergic rhinitis    Nasal turbinate hypertrophy    Nasal obstruction    Deviated nasal septum    Hypertrophy of nasal turbinates    Meningioma (HCC)       Medications:      Current Outpatient Medications   Medication Sig Dispense Refill    diclofenac sodium (VOLTAREN) 50 mg EC tablet Take 1 tablet(s) twice a day by oral route as needed  0    fluticasone (FLONASE) 50 mcg/act nasal spray 2 sprays into each nostril as needed       magnesium oxide (MAG-OX) 400 mg Take 1 tablet (400 mg total) by mouth daily Nightly 90 tablet 3    methocarbamol (ROBAXIN) 750 mg tablet methocarbamol 750 mg tablet   take 1 tablet by mouth every 8 hours if needed      rizatriptan (MAXALT) 10 MG tablet Take 1 tablet (10 mg total) by mouth once as needed for migraine May repeat in 2 hours if needed  Max 2/24 hours, 9/month  9 tablet 6    meclizine (ANTIVERT) 25 mg tablet Take 1 tablet (25 mg total) by mouth every 8 (eight) hours as needed for dizziness (Patient not taking: Reported on 9/10/2021) 30 tablet 1    Rimegepant Sulfate (Nurtec) 75 MG TBDP Take one NURTEC 75 mg at onset under tongue  Limit 1 in 24 hours  8 a month  8 tablet 3    temazepam (RESTORIL) 15 mg capsule temazepam 15 mg capsule   as needed (Patient not taking: Reported on 9/10/2021)       No current facility-administered medications for this visit          Allergies:    No Known Allergies    Family History:     Family History   Problem Relation Age of Onset    Breast cancer Mother     Diabetes Father     No Known Problems Family     Substance Abuse Neg Hx     Mental illness Neg Hx        Social History:     Social History     Socioeconomic History    Marital status: /Civil Union     Spouse name: Haroldo Be Number of children: 2    Years of education: Not on file    Highest education level: Not on file   Occupational History    Occupation:      Employer: [address]   Tobacco Use    Smoking status: Never Smoker    Smokeless tobacco: Former User     Types: Snuff   Vaping Use    Vaping Use: Never used   Substance and Sexual Activity    Alcohol use: Never    Drug use: No    Sexual activity: Not on file   Other Topics Concern    Not on file   Social History Narrative    Not on file     Social Determinants of Health     Financial Resource Strain:     Difficulty of Paying Living Expenses:    Food Insecurity:     Worried About Running Out of Food in the Last Year:     [address] in the Last Year:    Transportation Needs:     Lack of Transportation (Medical):  Lack of Transportation (Non-Medical):    Physical Activity:     Days of Exercise per Week:     Minutes of Exercise per Session:    Stress:     Feeling of Stress :    Social Connections:     Frequency of Communication with Friends and Family:     Frequency of Social Gatherings with Friends and Family:     Attends [employer] Services:     Active Member of Clubs or Organizations:     Attends Club or Organization Meetings:     Marital Status:    Intimate Partner Violence:     Fear of Current or Ex-Partner:     Emotionally Abused:     Physically Abused:     Sexually Abused:          Objective:       Physical Exam:                                                                 Vitals:            Constitutional:    /83 (BP Location: Left arm, Patient Position: Sitting, Cuff Size: Standard)   Pulse 101   Ht 5' 7" (1 702 m)   Wt 88 7 kg (195 lb 8 oz)   BMI 30 62 kg/m²   BP Readings from Last 3 Encounters:   09/10/21 138/83   06/28/21 140/90   05/26/21 140/92     Pulse Readings from Last 3 Encounters:   09/10/21 101   05/26/21 101   04/23/21 86         Well developed, well nourished, well groomed  No dysmorphic features  HEENT:  Normocephalic atraumatic  See neuro exam   Chest:  Respirations appear regular and unlabored  Cardiovascular:  no observed significant swelling  Musculoskeletal:  (see below under neurologic exam for evaluation of motor function and gait)   Skin:  warm and dry, not diaphoretic  Psychiatric:  Normal behavior and appropriate affect        Neurological Examination:     Mental status/cognitive function:   Recent and remote memory intact  Attention span and concentration as well as fund of knowledge are appropriate for age  Normal language and spontaneous speech  Cranial Nerves:  III, IV, VI-Pupils were equal, round  Extraocular movements were full and conjugate   VII-facial expression symmetric  VIII-hearing grossly intact bilaterally   Motor Exam: symmetric bulk throughout  no atrophy, fasciculations or abnormal movements noted  Coordination:  no apparent dysmetria, ataxia or tremor noted  Gait: steady casual gait      Pertinent lab results:   See EMR for recent labs   04/22/2021 BMP and CBC unremarkable, ESR 6  01/10/2020 LFTs with elevated alkaline phosphatase     Imaging: I have personally reviewed imaging and radiology read   -   MRI brain with without contrast 06/13/2021: Right frontal meningioma laterally within the anterior cranial fossa measuring 1 5 x 2 4 x 2 8 cm  This extends into the sylvian fissure without edema in the adjacent brain  Several white matter hyperintensities on T2 and FLAIR imaging, primarily within the frontal lobes are nonspecific and may represent precocious chronic microangiopathic change  -  noncontrast head CT 04/22/2021:  No acute intracranial abnormality    Review of Systems:   ROS obtained by medical assistant Personally reviewed and updated if indicated  I recommended PCP follow up for non neurologic problems  Review of Systems   Constitutional: Negative  HENT: Negative  Eyes: Negative  Respiratory: Negative  Cardiovascular: Negative  Gastrointestinal: Negative  Endocrine: Negative  Genitourinary: Negative  Musculoskeletal: Negative  Skin: Negative  Allergic/Immunologic: Negative  Neurological: Positive for headaches  Hematological: Negative  Psychiatric/Behavioral: Negative        I have spent 20 minutes with Patient  today in which greater than 50% of this time was spent in counseling/coordination of care regarding Diagnostic results, Prognosis, Risks and benefits of tx options, Intructions for management, Patient education, Importance of tx compliance, Risk factor reductions and Impressions  I also spent 21 minutes non face to face for this patient the same day         Author:  Marah Desouza MD 9/10/2021 2:04 PM

## 2021-09-10 NOTE — PATIENT INSTRUCTIONS
Continue to follow with neurosurgery for meningioma    Referral to sleep medicine for sleep apnea     Headache/migraine treatment:   Abortive medications (for immediate treatment of a headache): It is ok to take ibuprofen, acetaminophen or naproxen (Advil, Tylenol,  Aleve, Excedrin) if they help your headaches you should limit these to No more than 3 times a week to avoid medication overuse/rebound headaches  For your more moderate to severe migraines take this medication early   Trial of nurtec 75 mg under the tongue  Do not repeat in 24 hours  Over the counter preventive supplements for headaches/migraines (if you try, try for 3 months straight)  (to take every day to help prevent headaches - not to take at the time of headache):  [x] Magnesium 400mg daily (If any diarrhea or upset stomach, decrease dose  as tolerated)    Prescription preventive medications for headaches/migraines   (to take every day to help prevent headaches - not to take at the time of headache):  [x] we have options if needed     Lifestyle Recommendations:  [x] SLEEP - Maintain a regular sleep schedule: Adults need at least 7-8 hours of uninterrupted a night  Maintain good sleep hygiene:  Going to bed and waking up at consistent times, avoiding excessive daytime naps, avoiding caffeinated beverages in the evening, avoid excessive stimulation in the evening and generally using bed primarily for sleeping  One hour before bedtime would recommend turning lights down lower, decreasing your activity (may read quietly, listen to music at a low volume)  When you get into bed, should eliminate all technology (no texting, emailing, playing with your phone, iPad or tablet in bed)  [x] HYDRATION - Maintain good hydration  Drink  2L of fluid a day (4 typical small water bottles)  [x] DIET - Maintain good nutrition  In particular don't skip meals and try and eat healthy balanced meals regularly    [x] TRIGGERS - Look for other triggers and avoid them: Limit caffeine to 1-2 cups a day or less  Avoid dietary triggers that you have noticed bring on your headaches (this could include aged cheese, peanuts, MSG, aspartame and nitrates)  [x] EXERCISE - physical exercise as we all know is good for you in many ways, and not only is good for your heart, but also is beneficial for your mental health, cognitive health and  chronic pain/headaches  I would encourage at the least 5 days of physical exercise weekly for at least 30 minutes  Education and Follow-up  [x] Please call with any questions or concerns  Of course if any new concerning symptoms go to the emergency department    [x] Follow up 6 months, sooner if needed

## 2021-09-15 ENCOUNTER — TELEPHONE (OUTPATIENT)
Dept: NEUROLOGY | Facility: CLINIC | Age: 56
End: 2021-09-15

## 2021-09-15 NOTE — TELEPHONE ENCOUNTER
Nurtec PA completed on [facility]  Key-[id number]    Received immediate approval on [facility]  CaseId:[id number];Status:Approved; Review Type:Prior Auth; Coverage Start Date:08/16/2021; Coverage End Date:09/15/2022;    Left message for pharm making them aware of approval

## 2021-09-30 ENCOUNTER — HOSPITAL ENCOUNTER (OUTPATIENT)
Dept: MRI IMAGING | Facility: HOSPITAL | Age: 56
Discharge: HOME | End: 2021-09-30
Payer: COMMERCIAL

## 2021-09-30 DIAGNOSIS — D32.9 MENINGIOMA (HCC): ICD-10-CM

## 2021-09-30 PROCEDURE — G1004 CDSM NDSC: HCPCS

## 2021-09-30 PROCEDURE — 70553 MRI BRAIN STEM W/O & W/DYE: CPT

## 2021-09-30 PROCEDURE — A9585 GADOBUTROL INJECTION: HCPCS | Performed by: PHYSICIAN ASSISTANT

## 2021-09-30 RX ADMIN — GADOBUTROL 8 ML: 604.72 INJECTION INTRAVENOUS at 09:08

## 2021-10-01 ENCOUNTER — OFFICE VISIT (OUTPATIENT)
Dept: NEUROSURGERY | Facility: CLINIC | Age: 56
End: 2021-10-01
Payer: COMMERCIAL

## 2021-10-01 VITALS
RESPIRATION RATE: 16 BRPM | BODY MASS INDEX: 29.82 KG/M2 | HEIGHT: 67 IN | WEIGHT: 190 LBS | HEART RATE: 93 BPM | SYSTOLIC BLOOD PRESSURE: 147 MMHG | DIASTOLIC BLOOD PRESSURE: 87 MMHG | TEMPERATURE: 98.1 F

## 2021-10-01 DIAGNOSIS — R90.89 ABNORMAL FINDING ON MRI OF BRAIN: Primary | ICD-10-CM

## 2021-10-01 DIAGNOSIS — D32.9 MENINGIOMA (HCC): ICD-10-CM

## 2021-10-01 DIAGNOSIS — D33.0 BENIGN NEOPLASM OF SUPRATENTORIAL REGION OF BRAIN (HCC): ICD-10-CM

## 2021-10-01 PROCEDURE — 99214 OFFICE O/P EST MOD 30 MIN: CPT | Performed by: PHYSICIAN ASSISTANT

## 2021-10-01 PROCEDURE — 1036F TOBACCO NON-USER: CPT | Performed by: PHYSICIAN ASSISTANT

## 2021-10-01 PROCEDURE — 3008F BODY MASS INDEX DOCD: CPT | Performed by: PHYSICIAN ASSISTANT

## 2022-01-06 ENCOUNTER — TELEMEDICINE (OUTPATIENT)
Dept: FAMILY MEDICINE CLINIC | Facility: CLINIC | Age: 57
End: 2022-01-06
Payer: COMMERCIAL

## 2022-01-06 DIAGNOSIS — U07.1 COVID-19: Primary | ICD-10-CM

## 2022-01-06 PROCEDURE — 1036F TOBACCO NON-USER: CPT | Performed by: FAMILY MEDICINE

## 2022-01-06 PROCEDURE — 99213 OFFICE O/P EST LOW 20 MIN: CPT | Performed by: FAMILY MEDICINE

## 2022-01-06 PROCEDURE — 3725F SCREEN DEPRESSION PERFORMED: CPT | Performed by: FAMILY MEDICINE

## 2022-01-06 RX ORDER — BENZONATATE 200 MG/1
200 CAPSULE ORAL 3 TIMES DAILY PRN
Qty: 20 CAPSULE | Refills: 0 | Status: SHIPPED | OUTPATIENT
Start: 2022-01-06 | End: 2022-04-11

## 2022-01-06 NOTE — PROGRESS NOTES
COVID-19 Outpatient Progress Note    Assessment/Plan:    Problem List Items Addressed This Visit     None      Visit Diagnoses     COVID-19    -  Primary    Relevant Medications    benzonatate (TESSALON) 200 MG capsule         Disposition:     Patient is fully vaccinated and I recommended self quarantine for 5 days followed by strict mask use for an additional 5 days  If patient were to develop symptoms, they should immediately self isolate and call our office for further guidance  I have spent 15 minutes directly with the patient  Greater than 50% of this time was spent in counseling/coordination of care regarding: instructions for management and patient and family education  Wife had Pete Roche in December  No current symptoms  2 children the house are vaccinated  Advised to only get tested if they start developing symptoms  He should isolate from family members  They 5 would be return to work on Monday  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 today's visits and meeting all other requirements  Future Appointments  No visits were found meeting these conditions  Showing future appointments within next 150 days and meeting all other requirements     This virtual check-in was done via [address] and patient was informed that this is a secure, HIPAA-compliant platform  He agrees to proceed      Patient agrees to participate in a virtual check in via telephone or video visit instead of presenting to the office to address urgent/immediate medical needs  Patient is aware this is a billable service  After connecting through [facility], the patient was identified by name and date of birth  Fred Matta III was informed that this was a telemedicine visit and that the exam was being conducted confidentially over secure lines  My office door was closed  No one else was in the room  Fred Matta III acknowledged consent and understanding of privacy and security of the telemedicine visit  I informed the patient that I have reviewed his record in Epic and presented the opportunity for him to ask any questions regarding the visit today  The patient agreed to participate  Verification of patient location:  Patient is located in the following state in which I hold an active license: [place]    Subjective:   Liam Echeverria is a 49 y o  male who is concerned about COVID-19  Patient's symptoms include malaise, nasal congestion, sore throat, cough and headache  Patient denies fever, chills, fatigue, rhinorrhea, loss of taste, shortness of breath, chest tightness, abdominal pain, nausea, vomiting, diarrhea and myalgias       Date of symptom onset: 1/5/2022  COVID-19 vaccination status: Fully vaccinated (primary series)    Exposure:   Contact with a person who is under investigation (PUI) for or who is positive for COVID-19 within the last 14 days?: No    Hospitalized recently for fever and/or lower respiratory symptoms?: No      Currently a healthcare worker that is involved in direct patient care?: No      Works in a special setting where the risk of COVID-19 transmission may be high? (this may include long-term care, correctional and [facility] facilities; homeless shelters; assisted-living facilities and group homes ): No      Resident in a special setting where the risk of COVID-19 transmission may be high? (this may include long-term care, correctional and [facility] facilities; homeless shelters; assisted-living facilities and group homes ): No      No results found for: [address] [facility] [address], [address] [facility], [address] [facility], [address], [address] [facility]  Past Medical History:   Diagnosis Date    Chronic back pain     Migraine      Past Surgical History:   Procedure Laterality Date    HERNIA REPAIR      MANDIBLE FRACTURE SURGERY      MOUTH SURGERY      cyst in cheek    NASAL SEPTUM SURGERY       Current Outpatient Medications   Medication Sig Dispense Refill    diclofenac sodium (VOLTAREN) 50 mg EC tablet Take 1 tablet(s) twice a day by oral route as needed  0    fluticasone (FLONASE) 50 mcg/act nasal spray 2 sprays into each nostril as needed       magnesium oxide (MAG-OX) 400 mg Take 1 tablet (400 mg total) by mouth daily Nightly 90 tablet 3    methocarbamol (ROBAXIN) 750 mg tablet methocarbamol 750 mg tablet   take 1 tablet by mouth every 8 hours if needed      Rimegepant Sulfate (Nurtec) 75 MG TBDP Take one NURTEC 75 mg at onset under tongue  Limit 1 in 24 hours  8 a month  8 tablet 3    benzonatate (TESSALON) 200 MG capsule Take 1 capsule (200 mg total) by mouth 3 (three) times a day as needed for cough 20 capsule 0    meclizine (ANTIVERT) 25 mg tablet Take 1 tablet (25 mg total) by mouth every 8 (eight) hours as needed for dizziness (Patient not taking: Reported on 9/10/2021) 30 tablet 1    temazepam (RESTORIL) 15 mg capsule temazepam 15 mg capsule   as needed (Patient not taking: Reported on 9/10/2021)       No current facility-administered medications for this visit  No Known Allergies    Review of Systems   Constitutional: Negative for chills, fatigue and fever  HENT: Positive for congestion and sore throat  Negative for rhinorrhea  Respiratory: Positive for cough  Negative for chest tightness and shortness of breath  Gastrointestinal: Negative for abdominal pain, diarrhea, nausea and vomiting  Musculoskeletal: Negative for myalgias  Neurological: Positive for headaches  Objective: There were no vitals filed for this visit  Physical Exam    VIRTUAL VISIT DISCLAIMER    Palmira Barron III verbally agrees to participate in [employer]  Pt is aware that [employer] could be limited without vital signs or the ability to perform a full hands-on physical exam  Hussein Perez III understands he or the provider may request at any time to terminate the video visit and request the patient to seek care or treatment in person

## 2022-03-04 ENCOUNTER — TELEPHONE (OUTPATIENT)
Dept: NEUROLOGY | Facility: CLINIC | Age: 57
End: 2022-03-04

## 2022-03-04 NOTE — TELEPHONE ENCOUNTER
Called and spoke to patient - confirmed upcoming appointment with Dr Goldie Stewart  Provided patient with apt date, time and location  Informed patient that check in is at least 15 minutes prior to apt time

## 2022-03-17 NOTE — PROGRESS NOTES
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, s/p surgery for deviated septum, BPPV referred here for evaluation of headache  My initial evaluation 2021     Migraine without aura and without status migrainosus, not intractable  He reports a history of migraines dating back to mid 45s  He does not know if he has a family history of migraines  He reports severe migraines are diffuse pressure that used to improve over 1-2 days with rest and eletriptan until recently,  moderate migraines are typically unilateral left retro-orbital stabbing like a narrow and out the other side  That typically improve with rest and NSAIDs although still last for over 4 hours  He denies classic aura,  Does feel like his chronic tinnitus changes prior to onset of migraine  Reports typical associated migrainous features without autonomic features  - as of 2021: on average severe migraines 5-6 times a year lasting 1-2 days, moderate migraines 3-4 times a month, most recently lasted 3 weeks in 2021  Trial of magnesium for prevention, rizatriptan for abortive  If his migraines remain uncontrolled will recommend prescription preventative  Recommended treating sleep apnea    - as of 9/10/2021: Reports  Mild headaches 2-3 times per week that improved with OTC meds and migraines are stable but to 3 per month and improved with rizatriptan although he reports side effects, therefore will start trial of nurtec for abortive  Last visit migraine improved with Toradol injection  Recommended trying to take magnesium consistently for prevention   - as of 3/18/2022: Migraines stable at about 3-4 times a month  Nurtec works for abortive, slower than rizatriptan, but without side effects  Workup:  -   MRI brain with without contrast 06/13/2021: Right frontal meningioma laterally within the anterior cranial fossa measuring 1 5 x 2 4 x 2 8 cm  This extends into the sylvian fissure without edema in the adjacent brain  Several white matter hyperintensities on T2 and FLAIR imaging, primarily within the frontal lobes are nonspecific and may represent precocious chronic microangiopathic change  - now following with Neurosurgery for this     Preventative:  - we discussed headache hygiene and lifestyle factors that may improve headaches  - discussed he could do trial of headache preventative supplements if he would like -   Start with magnesium   - Past/ failed/contraindicated: topiramate contraindicated due to history of kidney stones, amitriptyline would be contraindicated due to age   - future options:  gabapentin, verapamil,  CGRP Med    Abortive:  - discussed not taking over-the-counter or prescription pain medications more than 3 days per week to prevent medication overuse/rebound headache  -  He is on through other providers:  Methocarbamol, Voltaren  - nurtec 75 mg  Discussed proper use, possible side effects and risks    - Past/ failed/contraindicated: eletriptan does not always work, rizatriptan works but gets panic attack  - past/helped:  toradol shot worked    - future options:   prochlorperazine, Toradol IM or p o , could consider trial for 5 days of Depakote or dexamethasone 4 prolonged migraine, ubrelvy, reyvow, nurtec    WALE (obstructive sleep apnea)   -     Ambulatory referral to Sleep Medicine placed 9/10/22   3/18/2022 again discussed morbidity and mortality of untreated WALE, do not drive if not feeling cognitively well, he will consider following up for known WALE      Meningioma  - continue to follow with neurosurgery     Patient instructions      Continue to follow with neurosurgery for meningioma    Referral to sleep medicine for sleep apnea placed 9/10/21    Headache/migraine treatment:   Abortive medications (for immediate treatment of a headache): It is ok to take ibuprofen, acetaminophen or naproxen (Advil, Tylenol,  Aleve, Excedrin) if they help your headaches you should limit these to No more than 3 times a week to avoid medication overuse/rebound headaches  For your more moderate to severe migraines take this medication early   nurtec 75 mg under the tongue  Do not repeat in 24 hours  Over the counter preventive supplements for headaches/migraines (if you try, try for 3 months straight)  (to take every day to help prevent headaches - not to take at the time of headache):  [x] Magnesium 400mg daily (If any diarrhea or upset stomach, decrease dose  as tolerated)    Prescription preventive medications for headaches/migraines   (to take every day to help prevent headaches - not to take at the time of headache):  [x] we have options if needed      Lifestyle Recommendations:  [x] SLEEP - Maintain a regular sleep schedule: Adults need at least 7-8 hours of uninterrupted a night  Maintain good sleep hygiene:  Going to bed and waking up at consistent times, avoiding excessive daytime naps, avoiding caffeinated beverages in the evening, avoid excessive stimulation in the evening and generally using bed primarily for sleeping  One hour before bedtime would recommend turning lights down lower, decreasing your activity (may read quietly, listen to music at a low volume)  When you get into bed, should eliminate all technology (no texting, emailing, playing with your phone, iPad or tablet in bed)  [x] HYDRATION - Maintain good hydration    Drink  2L of fluid a day (4 typical small water bottles)  [x] DIET - Maintain good nutrition  In particular don't skip meals and try and eat healthy balanced meals regularly  [x] TRIGGERS - Look for other triggers and avoid them: Limit caffeine to 1-2 cups a day or less  Avoid dietary triggers that you have noticed bring on your headaches (this could include aged cheese, peanuts, MSG, aspartame and nitrates)  [x] EXERCISE - physical exercise as we all know is good for you in many ways, and not only is good for your heart, but also is beneficial for your mental health, cognitive health and  chronic pain/headaches  I would encourage at the least 5 days of physical exercise weekly for at least 30 minutes  Education and Follow-up  [x] Please call with any questions or concerns  Of course if any new concerning symptoms go to the emergency department  [x] Follow up 6 months, sooner if needed          CC: We had the pleasure of evaluating Evie Hardy III in neurological consultation today  Evie Hardy III is a   Right and left handed male who presents today for evaluation of headaches  History obtained from patient as well as available medical record review    History of Present Illness:   Interval history as of 3/18/2022  - denies any new or concerning neurologic symptoms since last visit   - he is following with Neurosurgery for  Meningioma  - known WALE not treated, has not followed up with them, has tried 6 masks, feels worse with the mask he thinks, will consider following with them     Headaches and migraines   Migraines 3-4 times a month, feels stable    Can not recall milder headaches number    Preventative: trial of magnesium for prevention  Abortive:  trial of Nurtec-approved 08/16/2021 - works without side effects but slower than rizatriptan which works faster but makes him tired     Interval history as of 9/10/2021  - denies any new or concerning neurologic symptoms since last visit   - -MRI brain with without contrast 06/13/2021: Right frontal meningioma laterally within the anterior cranial fossa measuring 1 5 x 2 4 x 2 8 cm  This extends into the sylvian fissure without edema in the adjacent brain  Several white matter hyperintensities on T2 and FLAIR imaging, primarily within the frontal lobes are nonspecific and may represent precocious chronic microangiopathic change  -  Follow-up with Neurosurgery and getting repeat scan  -has not followed up with Sleep Medicine, but using mask more     Headaches and migraines   - migraines 2-3 per month, stable  - mild headaches 2-3 times per week that improve with OTC meds     Preventative:    - magnesium - not taking consistently     Abortive:   - Rizatriptan causes heart racing/panic attacks   6-8 pills a week ibuprofen/tylenol  - last visit toradol shot worked      Headaches started at what age? Worse around 47   How often do the headaches occur? -  Typical migraine once every 2-3 months and resolves over hours with triptan  -  04/22/2021 went to emergency room for 2 weeks of migraine  - as of 5/26/2021: on average severe migraines 5-6 times a year lasting 1-2 days, moderate migraines 3-4 times a month, most recently lasted 3 weeks in April 2021  - As of 9/10/2021, 2-3 times a month  Last for 2-3 hours with rizatriptan and go to sleep afterward  W/o rizatriptan could last from a 4-5 hours to 3 days  No change with tinnitus, flashes of light  Patients's other headaches has no other symptoms associated with it  What time of the day do the headaches start? No particular time of day   How long do the headaches last? 1-2 days usually, over 4 hours for moderate   Are you ever headache free?  Yes    Aura? without aura    Prodrome: frequency of tinnitus changes    Last eye exam: glasses 1-2 years ago     Where is your headache located and pain quality?   - severe migraines whole head - pressure  - can be unilateral on the left - stabbing to left eye like an arrow out the other side     What is the intensity of pain? Average: 4/10, worst 7 when at hospital/10  Associated symptoms:   [x] Nausea       [] Vomiting       [x] Photophobia     [x]Phonophobia      [x] Osmophobia  [] Blurred vision    [x] Light-headed or dizzy  - just recently    [x] Tinnitus - chronic bilateral and worse with migraine can be louder on one ear   [] Hands or feet tingle or feel numb/paresthesias      [] Ptosis      [] Facial droop  [] Lacrimation - both   [] Nasal congestion/rhinorrhea        Things that make the headache worse? No specific movements, any movement     Headache triggers:  Unknown     Have you seen someone else for headaches or pain? Yes, neurology just recently   Have you had trigger point injection performed and how often? No  Have you had Botox injection performed and how often? No    Have you had epidural injections or transforaminal injections performed? No  Have you ever had any Brain imaging? Yes several MRI Brain     What medications do you take or have you taken for your headaches?    ABORTIVE:    OTC medications have been ineffective      eletriptan/Relpax 40 mg - 1-2 usually minimizes it     Meclizine - for BPPV in the past helped   Methocarbamol - for back - prn - maybe 3 times a month   Diclofenac 50 mg - for back prn - a couple times a month      past   Cyclobenzaprine  ED for 02/20/2021: Toradol 30 mg, Benadryl 25 mg, Reglan 10 mg,  IV fluids, Decadron   Medrol Dosepak - didn't seem to help     PREVENTIVE:        for sleep: Temazepam prn     Past/ failed/contraindicated:  -topiramate contraindicated due to history of kidney stones       LIFESTYLE  Sleep - WALE not tolerate of CPAP - has seen 2 different specialists  - averages: 3-8 hours, usually 5   Problems falling asleep?:   Yes  Problems staying asleep?:  Yes    Water: 3 - 20 oz per day  Caffeine: cup in am and at work - cutting back     Mood: denies recent stress   Denies history of anxiety or depression or other diagnosed mood disorder    The following portions of the patient's history were reviewed and updated as appropriate: allergies, current medications, past family history, past medical history, past social history, past surgical history and problem list     Pertinent family history:  Family history of headaches:  no known family members with significant headaches  Any family history of aneurysms - No    Pertinent social history:  Work:  at [employer] with wife, 2 kids - 32, 21    Illicit Drugs: denies  Alcohol/tobacco: Denies alcohol use, Denies tobacco use    Past Medical History:     Past Medical History:   Diagnosis Date    Chronic back pain     Migraine        Patient Active Problem List   Diagnosis    Low back pain    Strain of lumbar region    WALE on CPAP    Mixed dyslipidemia    Nasal septal deviation    Seasonal allergic rhinitis    Nasal turbinate hypertrophy    Nasal obstruction    Deviated nasal septum    Hypertrophy of nasal turbinates    [facility] [address] [facility]    Abnormal finding on MRI of brain    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[facility] [address] )       Medications:      Current Outpatient Medications   Medication Sig Dispense Refill    magnesium oxide (MAG-OX) 400 mg Take 1 tablet (400 mg total) by mouth daily Nightly 90 tablet 3    methocarbamol (ROBAXIN) 750 mg tablet methocarbamol 750 mg tablet   take 1 tablet by mouth every 8 hours if needed      predniSONE 50 mg tablet Take 50 mg by mouth daily      Rimegepant Sulfate (Nurtec) 75 MG TBDP Take one NURTEC 75 mg at onset under tongue  Limit 1 in 24 hours  8 a month  8 tablet 11    benzonatate (TESSALON) 200 MG capsule Take 1 capsule (200 mg total) by mouth 3 (three) times a day as needed for cough (Patient not taking: Reported on 3/18/2022 ) 20 capsule 0    diclofenac sodium (VOLTAREN) 50 mg EC tablet Take 1 tablet(s) twice a day by oral route as needed   (Patient not taking: Reported on 3/18/2022)  0    fluticasone (FLONASE) 50 mcg/act nasal spray 2 sprays into each nostril as needed  (Patient not taking: Reported on 3/18/2022 )      meclizine (ANTIVERT) 25 mg tablet Take 1 tablet (25 mg total) by mouth every 8 (eight) hours as needed for dizziness (Patient not taking: Reported on 9/10/2021) 30 tablet 1    temazepam (RESTORIL) 15 mg capsule temazepam 15 mg capsule   as needed (Patient not taking: Reported on 9/10/2021)       No current facility-administered medications for this visit          Allergies:    No Known Allergies    Family History:     Family History   Problem Relation Age of Onset    Breast cancer Mother     Diabetes Father     No Known Problems Family     Substance Abuse Neg Hx     Mental illness Neg Hx        Social History:     Social History     Socioeconomic History    Marital status: /Civil Union     Spouse name: Sam Man Number of children: 2    Years of education: Not on file    Highest education level: Not on file   Occupational History    Occupation:      Employer: [address]   Tobacco Use    Smoking status: Never Smoker    Smokeless tobacco: Former User     Types: Snuff   Vaping Use    Vaping Use: Never used   Substance and Sexual Activity    Alcohol use: Never    Drug use: No    Sexual activity: Not on file   Other Topics Concern    Not on file   Social History Narrative    Not on file     Social Determinants of Health     Financial Resource Strain: Not on file   Food Insecurity: Not on file   Transportation Needs: Not on file   Physical Activity: Not on file   Stress: Not on file   Social Connections: Not on file   Intimate Partner Violence: Not on file   Housing Stability: Not on file         Objective:       Physical Exam:                                                                 Vitals:            Constitutional:    /79 (BP Location: Left arm, Patient Position: Sitting, Cuff Size: Standard)   Pulse 84   Temp 97 8 °F (36 6 °C) (Temporal)   Ht 5' 7" (1 702 m)   Wt 91 1 kg (200 lb 12 8 oz)   BMI 31 45 kg/m²   BP Readings from Last 3 Encounters:   03/18/22 130/79   10/01/21 147/87   09/10/21 138/83     Pulse Readings from Last 3 Encounters:   03/18/22 84   10/01/21 93   09/10/21 101         Well developed, well nourished, well groomed  No dysmorphic features  HEENT:  Normocephalic atraumatic  See neuro exam   Chest:  Respirations appear regular and unlabored  Cardiovascular:  no observed significant swelling  Musculoskeletal:  (see below under neurologic exam for evaluation of motor function and gait)   Skin:  warm and dry, not diaphoretic  Psychiatric:  Normal behavior and appropriate affect        Neurological Examination:     Mental status/cognitive function:   Attention span and concentration as well as fund of knowledge are appropriate for age  Normal language and spontaneous speech  Cranial Nerves:  VII-facial expression symmetric  VIII-hearing grossly intact bilaterally   Motor Exam: symmetric bulk throughout  no atrophy, fasciculations or abnormal movements noted  Coordination:  no apparent dysmetria, ataxia or tremor noted  Gait: steady casual gait      Pertinent lab results:   See EMR for recent labs   04/22/2021 BMP and CBC unremarkable, ESR 6  01/10/2020 LFTs with elevated alkaline phosphatase     Imaging: I have personally reviewed imaging and radiology read   -   MRI brain with without contrast 06/13/2021: Right frontal meningioma laterally within the anterior cranial fossa measuring 1 5 x 2 4 x 2 8 cm   This extends into the sylvian fissure without edema in the adjacent brain  Several white matter hyperintensities on T2 and FLAIR imaging, primarily within the frontal lobes are nonspecific and may represent precocious chronic microangiopathic change    -  noncontrast head CT 04/22/2021:  No acute intracranial abnormality    Review of Systems:   ROS obtained by medical assistant Personally reviewed and updated if indicated  I recommended PCP follow up for non neurologic problems  Review of Systems   Constitutional: Negative  HENT: Negative  Eyes: Negative  Respiratory: Negative  Cardiovascular: Negative  Gastrointestinal: Negative  Endocrine: Negative  Genitourinary: Negative  Musculoskeletal: Negative  Skin: Negative  Allergic/Immunologic: Negative  Neurological: Positive for headaches  Hematological: Negative  Psychiatric/Behavioral: Negative  I have spent 15 minutes with Patient  today in which greater than 50% of this time was spent in counseling/coordination of care  I also spent 15 minutes non face to face for this patient the same day         Author:  Fatou Delgadillo MD 3/18/2022 12:53 PM

## 2022-03-18 ENCOUNTER — OFFICE VISIT (OUTPATIENT)
Dept: NEUROLOGY | Facility: CLINIC | Age: 57
End: 2022-03-18
Payer: COMMERCIAL

## 2022-03-18 VITALS
WEIGHT: 200.8 LBS | DIASTOLIC BLOOD PRESSURE: 79 MMHG | HEART RATE: 84 BPM | BODY MASS INDEX: 31.52 KG/M2 | SYSTOLIC BLOOD PRESSURE: 130 MMHG | HEIGHT: 67 IN | TEMPERATURE: 97.8 F

## 2022-03-18 DIAGNOSIS — G43.009 MIGRAINE WITHOUT AURA AND WITHOUT STATUS MIGRAINOSUS, NOT INTRACTABLE: Primary | ICD-10-CM

## 2022-03-18 PROCEDURE — 1036F TOBACCO NON-USER: CPT | Performed by: PSYCHIATRY & NEUROLOGY

## 2022-03-18 PROCEDURE — 3008F BODY MASS INDEX DOCD: CPT | Performed by: PSYCHIATRY & NEUROLOGY

## 2022-03-18 PROCEDURE — 99214 OFFICE O/P EST MOD 30 MIN: CPT | Performed by: PSYCHIATRY & NEUROLOGY

## 2022-03-18 RX ORDER — RIMEGEPANT SULFATE 75 MG/75MG
TABLET, ORALLY DISINTEGRATING ORAL
Qty: 8 TABLET | Refills: 11 | Status: SHIPPED | OUTPATIENT
Start: 2022-03-18

## 2022-03-18 RX ORDER — PREDNISONE 50 MG/1
50 TABLET ORAL DAILY
COMMUNITY
Start: 2022-01-18 | End: 2022-04-11

## 2022-03-18 NOTE — PATIENT INSTRUCTIONS
Continue to follow with neurosurgery for meningioma    Referral to sleep medicine for sleep apnea placed 9/10/21    Headache/migraine treatment:   Abortive medications (for immediate treatment of a headache): It is ok to take ibuprofen, acetaminophen or naproxen (Advil, Tylenol,  Aleve, Excedrin) if they help your headaches you should limit these to No more than 3 times a week to avoid medication overuse/rebound headaches  For your more moderate to severe migraines take this medication early   nurtec 75 mg under the tongue  Do not repeat in 24 hours  Over the counter preventive supplements for headaches/migraines (if you try, try for 3 months straight)  (to take every day to help prevent headaches - not to take at the time of headache):  [x] Magnesium 400mg daily (If any diarrhea or upset stomach, decrease dose  as tolerated)    Prescription preventive medications for headaches/migraines   (to take every day to help prevent headaches - not to take at the time of headache):  [x] we have options if needed      Lifestyle Recommendations:  [x] SLEEP - Maintain a regular sleep schedule: Adults need at least 7-8 hours of uninterrupted a night  Maintain good sleep hygiene:  Going to bed and waking up at consistent times, avoiding excessive daytime naps, avoiding caffeinated beverages in the evening, avoid excessive stimulation in the evening and generally using bed primarily for sleeping  One hour before bedtime would recommend turning lights down lower, decreasing your activity (may read quietly, listen to music at a low volume)  When you get into bed, should eliminate all technology (no texting, emailing, playing with your phone, iPad or tablet in bed)  [x] HYDRATION - Maintain good hydration  Drink  2L of fluid a day (4 typical small water bottles)  [x] DIET - Maintain good nutrition  In particular don't skip meals and try and eat healthy balanced meals regularly    [x] TRIGGERS - Look for other triggers and avoid them: Limit caffeine to 1-2 cups a day or less  Avoid dietary triggers that you have noticed bring on your headaches (this could include aged cheese, peanuts, MSG, aspartame and nitrates)  [x] EXERCISE - physical exercise as we all know is good for you in many ways, and not only is good for your heart, but also is beneficial for your mental health, cognitive health and  chronic pain/headaches  I would encourage at the least 5 days of physical exercise weekly for at least 30 minutes  Education and Follow-up  [x] Please call with any questions or concerns  Of course if any new concerning symptoms go to the emergency department    [x] Follow up 6 months, sooner if needed

## 2022-04-02 ENCOUNTER — HOSPITAL ENCOUNTER (OUTPATIENT)
Dept: MRI IMAGING | Facility: HOSPITAL | Age: 57
Discharge: HOME | End: 2022-04-02
Payer: COMMERCIAL

## 2022-04-02 DIAGNOSIS — D33.0 BENIGN NEOPLASM OF SUPRATENTORIAL REGION OF BRAIN (HCC): ICD-10-CM

## 2022-04-02 DIAGNOSIS — R90.89 ABNORMAL FINDING ON MRI OF BRAIN: ICD-10-CM

## 2022-04-02 DIAGNOSIS — D32.9 MENINGIOMA (HCC): ICD-10-CM

## 2022-04-02 PROCEDURE — G1004 CDSM NDSC: HCPCS

## 2022-04-02 PROCEDURE — A9585 GADOBUTROL INJECTION: HCPCS | Performed by: RADIOLOGY

## 2022-04-02 PROCEDURE — 70553 MRI BRAIN STEM W/O & W/DYE: CPT

## 2022-04-02 RX ADMIN — GADOBUTROL 9 ML: 604.72 INJECTION INTRAVENOUS at 13:39

## 2022-04-11 ENCOUNTER — OFFICE VISIT (OUTPATIENT)
Dept: NEUROSURGERY | Facility: CLINIC | Age: 57
End: 2022-04-11
Payer: COMMERCIAL

## 2022-04-11 VITALS
DIASTOLIC BLOOD PRESSURE: 90 MMHG | WEIGHT: 200 LBS | TEMPERATURE: 97.7 F | BODY MASS INDEX: 31.39 KG/M2 | SYSTOLIC BLOOD PRESSURE: 140 MMHG | HEIGHT: 67 IN

## 2022-04-11 DIAGNOSIS — D32.9 MENINGIOMA (HCC): Primary | ICD-10-CM

## 2022-04-11 PROCEDURE — 99215 OFFICE O/P EST HI 40 MIN: CPT | Performed by: NEUROLOGICAL SURGERY

## 2022-04-11 NOTE — ASSESSMENT & PLAN NOTE
Pleasant 49year old male that presents for follow up and surveillance of his 2 8cm right anterior cranial fossa meningioma  · Discovered incidentally during migraine workup   · Patient is c/o left sided severe headaches for 7 years, follows with neurology  · Patient is here for his 6 month follow and review of MRI brain imaging  Imaging:  · MRI brain w/wo, 6/13/21: Right frontal meningioma laterally within the anterior cranial fossa measuring 1 5 x 2 4 x 2 8 cm  This extends into the sylvian fissure without edema in the adjacent brain  · MRI brain 9/30/21: Stable right anterior frontal operculum meningioma  Stable small white matter hyperintensities on T2 and FLAIR imaging within the cerebral hemispheres, presumably precocious chronic microangiopathic change  · MR brain 4/2/22: Right frontal opercular region meningioma is stable, measuring approximately 2 2 cm  A few white matter lesions are also unchanged, possibly precocious microangiopathy  No acute infarction, intracranial hemorrhage or new mass lesion  Plan:  · Reviewed imaging with the patient today  · Explained that this meningioma is very unlikely to be causing his worsening headaches  · Discussed that the patient should follow up with his PCP, Dr Sharonda Saxena, for his memory/focus concerns  · Discussed NCCN guidelines with the patient; will obtain repeat MRI brain w/wo in in I year, then annually for 5 years   · Various treatment options were discussed with the patient  These include imaging surveillance, SRS/radation, and surgical resection   At this time I recommend continuing with short term imaging follow up  · Recommend continuing to follow with neurology for headache management  · Follow up in 1 year repeat MRI brain w/wo contrast

## 2022-04-11 NOTE — PROGRESS NOTES
Neurosurgery Office Note  [facility] 49 y o  male MRN: [id number]      Assessment/Plan     Meningioma [facility])  Pleasant 49year old male that presents for follow up and surveillance of his 2 8cm right anterior cranial fossa meningioma  · Discovered incidentally during migraine workup   · Patient is c/o left sided severe headaches for 7 years, follows with neurology  · Patient is here for his 6 month follow and review of MRI brain imaging  Imaging:  · MRI brain w/wo, 6/13/21: Right frontal meningioma laterally within the anterior cranial fossa measuring 1 5 x 2 4 x 2 8 cm  This extends into the sylvian fissure without edema in the adjacent brain  · MRI brain 9/30/21: Stable right anterior frontal operculum meningioma  Stable small white matter hyperintensities on T2 and FLAIR imaging within the cerebral hemispheres, presumably precocious chronic microangiopathic change  · MR brain 4/2/22: Right frontal opercular region meningioma is stable, measuring approximately 2 2 cm  A few white matter lesions are also unchanged, possibly precocious microangiopathy  No acute infarction, intracranial hemorrhage or new mass lesion  Plan:  · Reviewed imaging with the patient today  · Explained that this meningioma is very unlikely to be causing his worsening headaches  · Discussed that the patient should follow up with his PCP, Dr Og Bejarano, for his memory/focus concerns  · Discussed NCCN guidelines with the patient; will obtain repeat MRI brain w/wo in in I year, then annually for 5 years   · Various treatment options were discussed with the patient  These include imaging surveillance, SRS/radation, and surgical resection   At this time I recommend continuing with short term imaging follow up  · Recommend continuing to follow with neurology for headache management  · Follow up in 1 year repeat MRI brain w/wo contrast        CHIEF COMPLAINT    Chief Complaint   Patient presents with    Follow-up     Benign neoplasm of supratentorial region of brain       HISTORY    This is a pleasant 49year old male that presents for follow up and continued survelliece of his 2 8 cm right anterior cranial fossa meningioma  Patient continues to have hos baseline left sided migraines in which takes Nurtec for control  Also, notes some problems with memory recently and focus  Patient denies any symptoms of nausea, vomiting, numbness, tingling, weakness, bowel/bladder incontinence or saddle anesthesia  REVIEW OF SYSTEMS    Review of Systems   Constitutional: Negative for activity change  HENT: Negative  Eyes:        Wears glasses   Respiratory: Negative  Cardiovascular: Negative  Gastrointestinal: Negative  Negative for nausea and vomiting  Endocrine: Negative  Genitourinary: Negative  Musculoskeletal: Negative  Negative for gait problem  Skin: Negative  Allergic/Immunologic: Negative  Neurological: Positive for headaches  Hematological: Negative  Psychiatric/Behavioral: Negative for confusion  Forgetful       ROS was personally reviewed and changes made as needed       Meds/Allergies     Current Outpatient Medications   Medication Sig Dispense Refill    diclofenac sodium (VOLTAREN) 50 mg EC tablet Take 1 tablet(s) twice a day by oral route as needed  0    magnesium oxide (MAG-OX) 400 mg Take 1 tablet (400 mg total) by mouth daily Nightly 90 tablet 3    meclizine (ANTIVERT) 25 mg tablet Take 1 tablet (25 mg total) by mouth every 8 (eight) hours as needed for dizziness 30 tablet 1    methocarbamol (ROBAXIN) 750 mg tablet methocarbamol 750 mg tablet   take 1 tablet by mouth every 8 hours if needed      Rimegepant Sulfate (Nurtec) 75 MG TBDP Take one NURTEC 75 mg at onset under tongue  Limit 1 in 24 hours  8 a month   8 tablet 11    temazepam (RESTORIL) 15 mg capsule temazepam 15 mg capsule   as needed (Patient not taking: Reported on 9/10/2021)       No current facility-administered medications for this visit  No Known Allergies    PAST HISTORY    Past Medical History:   Diagnosis Date    Chronic back pain     Migraine        Past Surgical History:   Procedure Laterality Date    HERNIA REPAIR      MANDIBLE FRACTURE SURGERY      MOUTH SURGERY      cyst in cheek    NASAL SEPTUM SURGERY         Social History     Tobacco Use    Smoking status: Never Smoker    Smokeless tobacco: Former User     Types: Snuff   Vaping Use    Vaping Use: Never used   Substance Use Topics    Alcohol use: Never    Drug use: No       Family History   Problem Relation Age of Onset    Breast cancer Mother     Diabetes Father     No Known Problems Family     Substance Abuse Neg Hx     Mental illness Neg Hx          Above history personally reviewed  EXAM    Vitals:Blood pressure 140/90, temperature 97 7 °F (36 5 °C), temperature source Temporal, height 5' 7" (1 702 m), weight 90 7 kg (200 lb)  ,Body mass index is 31 32 kg/m²  Physical Exam  Vitals reviewed  Constitutional:       General: He is awake  He is not in acute distress  Appearance: Normal appearance  He is well-developed, well-groomed and normal weight  He is not ill-appearing  HENT:      Head: Normocephalic and atraumatic  Nose: Nose normal       Mouth/Throat:      Mouth: Mucous membranes are dry  Eyes:      Extraocular Movements: Extraocular movements intact  Conjunctiva/sclera: Conjunctivae normal       Pupils: Pupils are equal, round, and reactive to light  Pulmonary:      Effort: Pulmonary effort is normal       Breath sounds: Normal breath sounds  Abdominal:      General: Abdomen is flat  Musculoskeletal:      Cervical back: Normal range of motion  Skin:     General: Skin is warm and dry  Neurological:      General: No focal deficit present  Mental Status: He is alert and oriented to person, place, and time  Mental status is at baseline  GCS: GCS eye subscore is 4   GCS verbal subscore is 5  GCS motor subscore is 6  Cranial Nerves: Cranial nerves are intact  Sensory: Sensation is intact  Motor: Motor function is intact  No weakness or pronator drift  Coordination: Coordination is intact  Finger-Nose-Finger Test normal       Gait: Gait is intact  Deep Tendon Reflexes: Strength normal    Psychiatric:         Attention and Perception: Attention and perception normal          Mood and Affect: Mood and affect normal          Speech: Speech normal          Behavior: Behavior normal  Behavior is cooperative  Thought Content: Thought content normal          Cognition and Memory: Cognition and memory normal          Judgment: Judgment normal          Neurologic Exam     Mental Status   Oriented to person, place, and time  Follows 3 step commands  Attention: normal  Concentration: normal    Speech: speech is normal   Level of consciousness: alert  Knowledge: good  Normal comprehension  Cranial Nerves   Cranial nerves II through XII intact  CN III, IV, VI   Pupils are equal, round, and reactive to light  Motor Exam   Muscle bulk: normal  Overall muscle tone: normal  Right arm pronator drift: absent  Left arm pronator drift: absent    Strength   Strength 5/5 throughout  Sensory Exam   Light touch normal      Gait, Coordination, and Reflexes     Gait  Gait: normal    Coordination   Finger to nose coordination: normal        MEDICAL DECISION MAKING    Imaging Studies:     MRI brain with and without contrast    Result Date: 4/5/2022  Narrative: MRI BRAIN WITH AND WITHOUT CONTRAST INDICATION: R90 89: Other abnormal findings on diagnostic imaging of central nervous system D33 0: Benign neoplasm of brain, supratentorial D32 9: Benign neoplasm of meninges, unspecified  History of meningioma  Follow-up evaluation  COMPARISON:  9/30/2021 TECHNIQUE: Sagittal T1, axial T2, axial FLAIR, axial T1, axial [place], axial diffusion   Sagittal, axial T1 postcontrast   Axial bravo postcontrast with coronal reconstructions  IV Contrast:  9 mL of Gadobutrol injection (SINGLE-DOSE)  IMAGE QUALITY:   Diagnostic  FINDINGS: BRAIN PARENCHYMA:  Again demonstrated is somewhat triangular wedge-shaped pleural-based extra-axial enhancing mass with uniform exuberant enhancement measuring 2 2 x 1 4 cm on image 18, series 9 lateral to the right frontal operculum, demonstrating signal dropout on susceptibility weighted imaging, likely a meningioma  This is stable  Minimal local mass effect without significant adjacent edema is stable as well  A few scattered subcortical and periventricular foci of FLAIR hyperintensity elsewhere are stable  There is no diffusion restriction  No acute intracranial hemorrhage  No extra-axial fluid collection  Cerebellar tonsils are normally positioned  VENTRICLES:  Normal for the patient's age  SELLA AND PITUITARY GLAND:  Normal  ORBITS:  Normal  PARANASAL SINUSES:  Mucosal thickening of the sinuses with no air fluid levels  VASCULATURE:  Evaluation of the major intracranial vasculature demonstrates appropriate flow voids  CALVARIUM AND SKULL BASE:  Normal  EXTRACRANIAL SOFT TISSUES:  Normal      Impression: 1  Right frontal opercular region meningioma is stable, measuring approximately 2 2 cm  2   A few white matter lesions are also unchanged, possibly precocious microangiopathy  3   No acute infarction, intracranial hemorrhage or new mass lesion  Workstation performed: [id number]       I have personally reviewed pertinent films in PACS with Dr Francisco Oro

## 2022-04-22 ENCOUNTER — OFFICE VISIT (OUTPATIENT)
Dept: FAMILY MEDICINE CLINIC | Facility: CLINIC | Age: 57
End: 2022-04-22
Payer: COMMERCIAL

## 2022-04-22 VITALS
HEART RATE: 101 BPM | DIASTOLIC BLOOD PRESSURE: 76 MMHG | SYSTOLIC BLOOD PRESSURE: 122 MMHG | WEIGHT: 192 LBS | OXYGEN SATURATION: 97 % | HEIGHT: 67 IN | BODY MASS INDEX: 30.13 KG/M2 | RESPIRATION RATE: 16 BRPM

## 2022-04-22 DIAGNOSIS — R10.32 LEFT GROIN PAIN: Primary | ICD-10-CM

## 2022-04-22 PROCEDURE — 99213 OFFICE O/P EST LOW 20 MIN: CPT | Performed by: NURSE PRACTITIONER

## 2022-04-22 PROCEDURE — 1036F TOBACCO NON-USER: CPT | Performed by: NURSE PRACTITIONER

## 2022-04-22 PROCEDURE — 3008F BODY MASS INDEX DOCD: CPT | Performed by: NURSE PRACTITIONER

## 2022-04-22 NOTE — PATIENT INSTRUCTIONS
Groin Ultrasound as ordered  Ibuprofen/Tylenol as directed for pain  No heavy lifting or strenuous activity as instructed  Apply ice/heat as instructed to groin    Call with questions/concerns

## 2022-04-22 NOTE — PROGRESS NOTES
[address]  [facility]        NAME: Kisha Rock is a 49 y o  male  : 1965    MRN: [id number]  DATE: 2022  TIME: 8:48 AM    Assessment and Plan   Left groin pain [R10 32]  1  Left groin pain  US scrotum and groin area         Patient Instructions     Patient Instructions   Groin Ultrasound as ordered  Ibuprofen/Tylenol as directed for pain  No heavy lifting or strenuous activity as instructed  Apply ice/heat as instructed to groin  Call with questions/concerns          Chief Complaint     Chief Complaint   Patient presents with    Pain     Left groin pain X few weeks         History of Present Illness       C/o left groin pain for a few weeks  No injury  Pain comes and goes  Hx of left inguinal hernia repair with mesh 20 years ago  Last week was squatting down and when he got up pain became worse  Review of Systems   Review of Systems   Constitutional: Negative for fever  Cardiovascular: Negative for chest pain  Gastrointestinal: Negative for abdominal pain  Genitourinary: Negative for decreased urine volume, flank pain, penile pain and urgency  Left groin pain   Neurological: Negative for dizziness and weakness  Psychiatric/Behavioral: Negative for dysphoric mood  All other systems reviewed and are negative  Current Medications       Current Outpatient Medications:     diclofenac sodium (VOLTAREN) 50 mg EC tablet, Take 1 tablet(s) twice a day by oral route as needed  , Disp: , Rfl: 0    magnesium oxide (MAG-OX) 400 mg, Take 1 tablet (400 mg total) by mouth daily Nightly, Disp: 90 tablet, Rfl: 3    meclizine (ANTIVERT) 25 mg tablet, Take 1 tablet (25 mg total) by mouth every 8 (eight) hours as needed for dizziness, Disp: 30 tablet, Rfl: 1    methocarbamol (ROBAXIN) 750 mg tablet, methocarbamol 750 mg tablet  take 1 tablet by mouth every 8 hours if needed, Disp: , Rfl:     Rimegepant Sulfate (Nurtec) 75 MG TBDP, Take one NURTEC 75 mg at onset under tongue  Limit 1 in 24 hours  8 a month , Disp: 8 tablet, Rfl: 11    temazepam (RESTORIL) 15 mg capsule, temazepam 15 mg capsule  as needed (Patient not taking: Reported on 9/10/2021), Disp: , Rfl:     Current Allergies     Allergies as of 04/22/2022    (No Known Allergies)            The following portions of the patient's history were reviewed and updated as appropriate: allergies, current medications, past family history, past medical history, past social history, past surgical history and problem list      Past Medical History:   Diagnosis Date    Chronic back pain     Migraine        Past Surgical History:   Procedure Laterality Date    HERNIA REPAIR      MANDIBLE FRACTURE SURGERY      MOUTH SURGERY      cyst in cheek    NASAL SEPTUM SURGERY         Family History   Problem Relation Age of Onset    Breast cancer Mother     Diabetes Father     No Known Problems Family     Substance Abuse Neg Hx     Mental illness Neg Hx          Medications have been verified  Objective   /76   Pulse 101   Resp 16   Ht 5' 7" (1 702 m)   Wt 87 1 kg (192 lb)   SpO2 97%   BMI 30 07 kg/m²        Physical Exam     Physical Exam  Vitals and nursing note reviewed  Constitutional:       General: He is not in acute distress  Appearance: Normal appearance  He is not ill-appearing  HENT:      Head: Normocephalic  Cardiovascular:      Rate and Rhythm: Normal rate and regular rhythm  Heart sounds: Normal heart sounds  No murmur heard  No friction rub  No gallop  Pulmonary:      Effort: Pulmonary effort is normal  No respiratory distress  Breath sounds: Normal breath sounds  No wheezing  Abdominal:      General: There is no distension  Palpations: Abdomen is soft  Tenderness: There is no abdominal tenderness  Hernia: There is no hernia in the left inguinal area  Comments: Pain with palpation left groin  No bulging     Musculoskeletal:         General: Normal range of motion  Cervical back: Normal range of motion  Lymphadenopathy:      Lower Body: No left inguinal adenopathy  Skin:     General: Skin is warm and dry  Coloration: Skin is not pale  Neurological:      Mental Status: He is alert and oriented to person, place, and time     Psychiatric:         Mood and Affect: Mood normal          Behavior: Behavior normal

## 2022-04-23 ENCOUNTER — HOSPITAL ENCOUNTER (OUTPATIENT)
Dept: ULTRASOUND IMAGING | Facility: HOSPITAL | Age: 57
Discharge: HOME | End: 2022-04-23
Payer: COMMERCIAL

## 2022-04-23 DIAGNOSIS — R10.32 LEFT GROIN PAIN: ICD-10-CM

## 2022-04-23 PROCEDURE — 76705 ECHO EXAM OF ABDOMEN: CPT

## 2022-04-28 ENCOUNTER — TELEPHONE (OUTPATIENT)
Dept: FAMILY MEDICINE CLINIC | Facility: CLINIC | Age: 57
End: 2022-04-28

## 2022-04-28 NOTE — TELEPHONE ENCOUNTER
Pt is aware that the test results are not yet received  Pt is advised that he will receive a call from our office once the test is resulted and interpreted

## 2022-04-28 NOTE — TELEPHONE ENCOUNTER
----- Message from Spencer Tejada III sent at 4/28/2022 12:16 PM EDT -----  Regarding: Ultrasound Results  Good afternoon,  It's been 3 business days and I don't see the results in [address] yet  Have any results been forwarded to the office?   Thank you,  Hugh Mathew

## 2022-04-29 ENCOUNTER — TELEPHONE (OUTPATIENT)
Dept: FAMILY MEDICINE CLINIC | Facility: CLINIC | Age: 57
End: 2022-04-29

## 2022-04-29 NOTE — TELEPHONE ENCOUNTER
----- Message from [address] [place] [address],[address] sent at 4/29/2022  9:03 AM EDT -----  Us is normal- no abnormality or hernia

## 2022-04-29 NOTE — TELEPHONE ENCOUNTER
----- Message from [address] [place] [address],[address] sent at 4/29/2022  9:03 AM EDT -----  Us is normal- no abnormality or hernia

## 2022-05-14 ENCOUNTER — HOSPITAL ENCOUNTER (EMERGENCY)
Facility: HOSPITAL | Age: 57
Discharge: HOME | End: 2022-05-14
Attending: EMERGENCY MEDICINE
Payer: COMMERCIAL

## 2022-05-14 ENCOUNTER — APPOINTMENT (EMERGENCY)
Dept: CT IMAGING | Facility: HOSPITAL | Age: 57
End: 2022-05-14
Payer: COMMERCIAL

## 2022-05-14 VITALS
OXYGEN SATURATION: 95 % | DIASTOLIC BLOOD PRESSURE: 79 MMHG | BODY MASS INDEX: 30.61 KG/M2 | HEIGHT: 67 IN | RESPIRATION RATE: 18 BRPM | TEMPERATURE: 97.5 F | SYSTOLIC BLOOD PRESSURE: 131 MMHG | HEART RATE: 92 BPM | WEIGHT: 195 LBS

## 2022-05-14 DIAGNOSIS — N20.1 LEFT URETERAL STONE: Primary | ICD-10-CM

## 2022-05-14 LAB
ALBUMIN SERPL BCP-MCNC: 4.3 G/DL (ref 3.5–5)
ALP SERPL-CCNC: 99 U/L (ref 46–116)
ALT SERPL W P-5'-P-CCNC: 49 U/L (ref 12–78)
ANION GAP SERPL CALCULATED.3IONS-SCNC: 10 MMOL/L (ref 4–13)
AST SERPL W P-5'-P-CCNC: 28 U/L (ref 5–45)
BACTERIA UR QL AUTO: NORMAL /HPF
BASOPHILS # BLD AUTO: 0.1 THOUSANDS/ΜL (ref 0–0.1)
BASOPHILS NFR BLD AUTO: 1 % (ref 0–1)
BILIRUB SERPL-MCNC: 0.5 MG/DL (ref 0.2–1)
BILIRUB UR QL STRIP: NEGATIVE
BUN SERPL-MCNC: 22 MG/DL (ref 5–25)
CALCIUM SERPL-MCNC: 9.4 MG/DL (ref 8.3–10.1)
CHLORIDE SERPL-SCNC: 105 MMOL/L (ref 100–108)
CLARITY UR: CLEAR
CO2 SERPL-SCNC: 28 MMOL/L (ref 21–32)
COLOR UR: YELLOW
CREAT SERPL-MCNC: 1.11 MG/DL (ref 0.6–1.3)
EOSINOPHIL # BLD AUTO: 0.07 THOUSAND/ΜL (ref 0–0.61)
EOSINOPHIL NFR BLD AUTO: 1 % (ref 0–6)
ERYTHROCYTE [DISTWIDTH] IN BLOOD BY AUTOMATED COUNT: 12.9 % (ref 11.6–15.1)
GFR SERPL CREATININE-BSD FRML MDRD: 74 ML/MIN/1.73SQ M
GLUCOSE SERPL-MCNC: 95 MG/DL (ref 65–140)
GLUCOSE UR STRIP-MCNC: NEGATIVE MG/DL
HCT VFR BLD AUTO: 47.1 % (ref 36.5–49.3)
HGB BLD-MCNC: 16 G/DL (ref 12–17)
HGB UR QL STRIP.AUTO: ABNORMAL
IMM GRANULOCYTES # BLD AUTO: 0.05 THOUSAND/UL (ref 0–0.2)
IMM GRANULOCYTES NFR BLD AUTO: 0 % (ref 0–2)
KETONES UR STRIP-MCNC: NEGATIVE MG/DL
LEUKOCYTE ESTERASE UR QL STRIP: NEGATIVE
LYMPHOCYTES # BLD AUTO: 1.73 THOUSANDS/ΜL (ref 0.6–4.47)
LYMPHOCYTES NFR BLD AUTO: 14 % (ref 14–44)
MCH RBC QN AUTO: 29.7 PG (ref 26.8–34.3)
MCHC RBC AUTO-ENTMCNC: 34 G/DL (ref 31.4–37.4)
MCV RBC AUTO: 88 FL (ref 82–98)
MONOCYTES # BLD AUTO: 1.03 THOUSAND/ΜL (ref 0.17–1.22)
MONOCYTES NFR BLD AUTO: 8 % (ref 4–12)
NEUTROPHILS # BLD AUTO: 9.79 THOUSANDS/ΜL (ref 1.85–7.62)
NEUTS SEG NFR BLD AUTO: 76 % (ref 43–75)
NITRITE UR QL STRIP: NEGATIVE
NON-SQ EPI CELLS URNS QL MICRO: NORMAL /HPF
NRBC BLD AUTO-RTO: 0 /100 WBCS
PH UR STRIP.AUTO: 6 [PH]
PLATELET # BLD AUTO: 263 THOUSANDS/UL (ref 149–390)
PMV BLD AUTO: 8.3 FL (ref 8.9–12.7)
POTASSIUM SERPL-SCNC: 4.1 MMOL/L (ref 3.5–5.3)
PROT SERPL-MCNC: 7.5 G/DL (ref 6.4–8.2)
PROT UR STRIP-MCNC: NEGATIVE MG/DL
RBC # BLD AUTO: 5.38 MILLION/UL (ref 3.88–5.62)
RBC #/AREA URNS AUTO: NORMAL /HPF
SODIUM SERPL-SCNC: 143 MMOL/L (ref 136–145)
SP GR UR STRIP.AUTO: 1.02 (ref 1–1.03)
UROBILINOGEN UR QL STRIP.AUTO: 0.2 E.U./DL
WBC # BLD AUTO: 12.77 THOUSAND/UL (ref 4.31–10.16)
WBC #/AREA URNS AUTO: NORMAL /HPF

## 2022-05-14 PROCEDURE — 99284 EMERGENCY DEPT VISIT MOD MDM: CPT

## 2022-05-14 PROCEDURE — 96374 THER/PROPH/DIAG INJ IV PUSH: CPT

## 2022-05-14 PROCEDURE — 36415 COLL VENOUS BLD VENIPUNCTURE: CPT | Performed by: EMERGENCY MEDICINE

## 2022-05-14 PROCEDURE — G1004 CDSM NDSC: HCPCS

## 2022-05-14 PROCEDURE — 74176 CT ABD & PELVIS W/O CONTRAST: CPT

## 2022-05-14 PROCEDURE — 81001 URINALYSIS AUTO W/SCOPE: CPT | Performed by: EMERGENCY MEDICINE

## 2022-05-14 PROCEDURE — 99284 EMERGENCY DEPT VISIT MOD MDM: CPT | Performed by: EMERGENCY MEDICINE

## 2022-05-14 PROCEDURE — 80053 COMPREHEN METABOLIC PANEL: CPT | Performed by: EMERGENCY MEDICINE

## 2022-05-14 PROCEDURE — 85025 COMPLETE CBC W/AUTO DIFF WBC: CPT | Performed by: EMERGENCY MEDICINE

## 2022-05-14 RX ORDER — TAMSULOSIN HYDROCHLORIDE 0.4 MG/1
0.4 CAPSULE ORAL
Qty: 7 CAPSULE | Refills: 0 | Status: SHIPPED | OUTPATIENT
Start: 2022-05-14 | End: 2022-06-02

## 2022-05-14 RX ORDER — KETOROLAC TROMETHAMINE 30 MG/ML
15 INJECTION, SOLUTION INTRAMUSCULAR; INTRAVENOUS ONCE
Status: COMPLETED | OUTPATIENT
Start: 2022-05-14 | End: 2022-05-14

## 2022-05-14 RX ADMIN — KETOROLAC TROMETHAMINE 15 MG: 30 INJECTION, SOLUTION INTRAMUSCULAR at 15:41

## 2022-05-14 NOTE — ED PROVIDER NOTES
History  Chief Complaint   Patient presents with    Flank Pain     Pt reports left flank pain that started about a month ago  Thought it was a groin pull or hernia  Pain is intermittent  Had an ultra sound to rule out hernia  States that the pain is increasing  Urinary frequency and some burning  Hx of kidney stones - procedures were done to remove one      49year old male presents for evaluation of left groin pain which has been intermittent for the past month, becoming more severe and constant since 8 am this morning  Patient describes the pain as a tugging sensation of the left groin radiating to left testicle which has been waxing and waning  Pain worsens when trying to urinate with dysuria and frequency  Mild nausea earlier this morning which improved after eating  No episodes of emesis  No cough, congestion, fevers, chills or diarrhea  Patient had believed the pain had been from a prior inguinal hernia which had been repaired, but had a negative ultrasound of the groin on 4/23/22  History of calcium oxylate stones in the past with previous stone requiring intervention  Patient has not taken anything for his symptoms prior to arrival       History provided by:  Patient  Groin Pain  Presenting symptoms: dysuria    Presenting symptoms: no penile discharge, no penile pain and no swelling    Context: during urination    Relieved by:  Nothing  Exacerbated by: urination  Ineffective treatments: increased water intake today    Associated symptoms: abdominal pain, groin pain, nausea and urinary frequency    Associated symptoms: no diarrhea, no fever, no hematuria, no scrotal swelling and no vomiting    Abdominal pain:     Location:  LLQ    Quality: tugging      Severity:  Severe    Onset quality:  Gradual    Timing:  Intermittent    Progression:  Worsening    Chronicity:  New  Risk factors: kidney stones    Risk factors: no bladder surgery, no recent infection and no STI exposure    Risk factors comment:  History of calcium oxylate stones      Prior to Admission Medications   Prescriptions Last Dose Informant Patient Reported? Taking? Rimegepant Sulfate (Nurtec) 75 MG TBDP   No No   Sig: Take one NURTEC 75 mg at onset under tongue  Limit 1 in 24 hours  8 a month  diclofenac sodium (VOLTAREN) 50 mg EC tablet  Self Yes No   Sig: Take 1 tablet(s) twice a day by oral route as needed  magnesium oxide (MAG-OX) 400 mg  Self No No   Sig: Take 1 tablet (400 mg total) by mouth daily Nightly   meclizine (ANTIVERT) 25 mg tablet  Self No No   Sig: Take 1 tablet (25 mg total) by mouth every 8 (eight) hours as needed for dizziness   methocarbamol (ROBAXIN) 750 mg tablet  Self Yes No   Sig: methocarbamol 750 mg tablet   take 1 tablet by mouth every 8 hours if needed   temazepam (RESTORIL) 15 mg capsule  Self Yes No   Sig: temazepam 15 mg capsule   as needed   Patient not taking: Reported on 9/10/2021      Facility-Administered Medications: None       Past Medical History:   Diagnosis Date    Chronic back pain     Migraine        Past Surgical History:   Procedure Laterality Date    HERNIA REPAIR      MANDIBLE FRACTURE SURGERY      MOUTH SURGERY      cyst in cheek    NASAL SEPTUM SURGERY         Family History   Problem Relation Age of Onset    Breast cancer Mother     Diabetes Father     No Known Problems Family     Substance Abuse Neg Hx     Mental illness Neg Hx      I have reviewed and agree with the history as documented  E-Cigarette/Vaping    E-Cigarette Use Never User      E-Cigarette/Vaping Substances    Nicotine No     THC No     CBD No     Flavoring No     Other No     Unknown No      Social History     Tobacco Use    Smoking status: Never Smoker    Smokeless tobacco: Former User     Types: Snuff   Vaping Use    Vaping Use: Never used   Substance Use Topics    Alcohol use: Never    Drug use: No       Review of Systems   Constitutional: Negative for chills and fever  HENT: Negative for congestion  Respiratory: Negative for cough and shortness of breath  Gastrointestinal: Positive for abdominal pain and nausea  Negative for constipation, diarrhea and vomiting  Genitourinary: Positive for dysuria and frequency  Negative for hematuria, penile discharge, penile pain and scrotal swelling  Skin: Negative for rash and wound  All other systems reviewed and are negative  Physical Exam  Physical Exam  Vitals and nursing note reviewed  Constitutional:       General: He is not in acute distress  Appearance: He is well-developed  He is not toxic-appearing or diaphoretic  HENT:      Head: Normocephalic and atraumatic  Right Ear: External ear normal       Left Ear: External ear normal       Nose: Nose normal    Eyes:      General: No scleral icterus  Cardiovascular:      Rate and Rhythm: Normal rate and regular rhythm  Heart sounds: Normal heart sounds  Pulmonary:      Effort: Pulmonary effort is normal  No respiratory distress  Breath sounds: Normal breath sounds  Abdominal:      General: There is no distension  Palpations: Abdomen is soft  Tenderness: There is abdominal tenderness in the left lower quadrant  There is no guarding or rebound  Musculoskeletal:         General: No deformity  Normal range of motion  Skin:     Findings: No rash  Neurological:      General: No focal deficit present  Mental Status: He is alert        Gait: Gait normal    Psychiatric:         Mood and Affect: Mood normal          Vital Signs  ED Triage Vitals   Temperature Pulse Respirations Blood Pressure SpO2   05/14/22 1526 05/14/22 1527 05/14/22 1526 05/14/22 1526 05/14/22 1526   97 5 °F (36 4 °C) (!) 111 18 132/83 98 %      Temp Source Heart Rate Source Patient Position - Orthostatic VS BP Location FiO2 (%)   05/14/22 1526 05/14/22 1527 05/14/22 1526 05/14/22 1545 --   Temporal Monitor Sitting Left arm       Pain Score       05/14/22 1526       8           Vitals:    05/14/22 1526 05/14/22 1527 05/14/22 1545 05/14/22 1600   BP: 132/83  143/96 131/79   Pulse:  (!) 111 99 92   Patient Position - Orthostatic VS: Sitting  Sitting Sitting         Visual Acuity      ED Medications  Medications   ketorolac (TORADOL) injection 15 mg (15 mg Intravenous Given 5/14/22 1541)       Diagnostic Studies  Results Reviewed     Procedure Component Value Units Date/Time    Comprehensive metabolic panel [[id number]] Collected: 05/14/22 1541    Lab Status: Final result Specimen: Blood from Arm, Left Updated: 05/14/22 1635     Sodium 143 mmol/L      Potassium 4 1 mmol/L      Chloride 105 mmol/L      CO2 28 mmol/L      ANION GAP 10 mmol/L      BUN 22 mg/dL      Creatinine 1 11 mg/dL      Glucose 95 mg/dL      Calcium 9 4 mg/dL      AST 28 U/L      ALT 49 U/L      Alkaline Phosphatase 99 U/L      Total Protein 7 5 g/dL      Albumin 4 3 g/dL      Total Bilirubin 0 50 mg/dL      eGFR 74 ml/min/1 73sq m     Narrative:      [place] guidelines for Chronic Kidney Disease (CKD):     Stage 1 with normal or high GFR (GFR > 90 mL/min/1 73 square meters)    Stage 2 Mild CKD (GFR = 60-89 mL/min/1 73 square meters)    Stage 3A Moderate CKD (GFR = 45-59 mL/min/1 73 square meters)    Stage 3B Moderate CKD (GFR = 30-44 mL/min/1 73 square meters)    Stage 4 Severe CKD (GFR = 15-29 mL/min/1 73 square meters)    Stage 5 End Stage CKD (GFR <15 mL/min/1 73 square meters)  Note: GFR calculation is accurate only with a steady state creatinine    Urine Microscopic [[id number]]  (Normal) Collected: 05/14/22 1528    Lab Status: Final result Specimen: Urine, Clean Catch Updated: 05/14/22 1552     RBC, UA 0-1 /hpf      WBC, UA 0-1 /hpf      Epithelial Cells None Seen /hpf      Bacteria, UA None Seen /hpf     CBC and differential [[id number]]  (Abnormal) Collected: 05/14/22 1541    Lab Status: Final result Specimen: Blood from Arm, Left Updated: 05/14/22 1547     WBC 12 77 Thousand/uL      RBC 5 38 Million/uL Hemoglobin 16 0 g/dL      Hematocrit 47 1 %      MCV 88 fL      MCH 29 7 pg      MCHC 34 0 g/dL      RDW 12 9 %      MPV 8 3 fL      Platelets 018 Thousands/uL      nRBC 0 /100 WBCs      Neutrophils Relative 76 %      Immat GRANS % 0 %      Lymphocytes Relative 14 %      Monocytes Relative 8 %      Eosinophils Relative 1 %      Basophils Relative 1 %      Neutrophils Absolute 9 79 Thousands/µL      Immature Grans Absolute 0 05 Thousand/uL      Lymphocytes Absolute 1 73 Thousands/µL      Monocytes Absolute 1 03 Thousand/µL      Eosinophils Absolute 0 07 Thousand/µL      Basophils Absolute 0 10 Thousands/µL     UA w Reflex to Microscopic w Reflex to Culture [[id number]]  (Abnormal) Collected: 05/14/22 1528    Lab Status: Final result Specimen: Urine, Clean Catch Updated: 05/14/22 1536     Color, UA Yellow     Clarity, UA Clear     Specific [place], UA 1 020     pH, UA 6 0     Leukocytes, UA Negative     Nitrite, UA Negative     Protein, UA Negative mg/dl      Glucose, UA Negative mg/dl      Ketones, UA Negative mg/dl      Urobilinogen, UA 0 2 E U /dl      Bilirubin, UA Negative     Blood, UA Moderate                 CT renal stone study abdomen pelvis without contrast   Final Result by Damián Bolden MD (05/14 1623)      Obstructive 4 mm distal left ureteral calculus causes mild left hydroureteronephrosis  The study was marked in [facility] for immediate notification  Workstation performed: [id number]                    Procedures  Procedures         ED Course                               SBIRT 22yo+    Flowsheet Row Most Recent Value   SBIRT (25 yo +)    In order to provide better care to our patients, we are screening all of our patients for alcohol and drug use  Would it be okay to ask you these screening questions? Yes Filed at: 05/14/2022 1531   Initial Alcohol Screen: US AUDIT-C     1  How often do you have a drink containing alcohol? 0 Filed at: 05/14/2022 1531   2   How many drinks containing alcohol do you have on a typical day you are drinking? 0 Filed at: 05/14/2022 1531   3a  Male UNDER 65: How often do you have five or more drinks on one occasion? 0 Filed at: 05/14/2022 1531   Audit-C Score 0 Filed at: 05/14/2022 1531   CLEMENTINA: How many times in the past year have you    Used an illegal drug or used a prescription medication for non-medical reasons? Never Filed at: 05/14/2022 1531                    MDM  Number of Diagnoses or Management Options  Left ureteral stone: new and requires workup  Diagnosis management comments: 49year old male presents for evaluation of left groin pain intermittently for 1 month  Hematuria present on UA  History of stones requiring intervention in the past  Pain well controlled with toradol  4 mm obstructing left ureteral stone on CT  Flomax x 7 days  Urology follow up  Return precautions discussed with patient  Amount and/or Complexity of Data Reviewed  Clinical lab tests: ordered and reviewed  Tests in the radiology section of CPT®: ordered and reviewed    Patient Progress  Patient progress: stable      Disposition  Final diagnoses:   Left ureteral stone     Time reflects when diagnosis was documented in both MDM as applicable and the Disposition within this note     Time User Action Codes Description Comment    5/14/2022  4:25 PM Stacey Villavicencio Add [N20 1] Left ureteral stone       ED Disposition     ED Disposition   Discharge    Condition   Stable    Date/Time   Sat May 14, 2022  4:38 PM    Comment   Veronica Amin III discharge to home/self care                 Follow-up Information     Follow up With Specialties Details Why Contact Info Additional Information    Sammy Lozoya MD Urology Schedule an appointment as soon as possible for a visit in 1 week for re-evaluation [address] [facility]  [address] Emergency Department Emergency Medicine Go to  If symptoms worsen, fever >101F, unable to urinate [address] [place],[address] Emergency [address], [address] [place], [place], [address]          Discharge Medication List as of 5/14/2022  4:38 PM      START taking these medications    Details   tamsulosin (FLOMAX) 0 4 mg Take 1 capsule (0 4 mg total) by mouth daily with dinner for 7 days, Starting Sat 5/14/2022, Until Sat 5/21/2022, Normal         CONTINUE these medications which have NOT CHANGED    Details   diclofenac sodium (VOLTAREN) 50 mg EC tablet Take 1 tablet(s) twice a day by oral route as needed , Historical Med      magnesium oxide (MAG-OX) 400 mg Take 1 tablet (400 mg total) by mouth daily Nightly, Starting Wed 5/26/2021, Normal      meclizine (ANTIVERT) 25 mg tablet Take 1 tablet (25 mg total) by mouth every 8 (eight) hours as needed for dizziness, Starting Thu 2/1/2018, Normal      methocarbamol (ROBAXIN) 750 mg tablet methocarbamol 750 mg tablet   take 1 tablet by mouth every 8 hours if needed, Historical Med      Rimegepant Sulfate (Nurtec) 75 MG TBDP Take one NURTEC 75 mg at onset under tongue  Limit 1 in 24 hours  8 a month , Normal      temazepam (RESTORIL) 15 mg capsule temazepam 15 mg capsule   as needed, Historical Med             No discharge procedures on file      PDMP Review     None          ED Provider  Electronically Signed by           Thurl Saint, MD  05/14/22 2422

## 2022-05-14 NOTE — DISCHARGE INSTRUCTIONS
Take 1000 mg of acetaminophen (Tylenol) with 400 mg of ibuprofen (Advil) every 6-8 hours as needed for pain

## 2022-05-14 NOTE — ED NOTES
Patient transported to [facility], [address] [place] [address]  05/14/22 1958

## 2022-06-02 ENCOUNTER — OFFICE VISIT (OUTPATIENT)
Dept: UROLOGY | Facility: CLINIC | Age: 57
End: 2022-06-02
Payer: COMMERCIAL

## 2022-06-02 ENCOUNTER — APPOINTMENT (EMERGENCY)
Dept: RADIOLOGY | Facility: HOSPITAL | Age: 57
End: 2022-06-02
Payer: COMMERCIAL

## 2022-06-02 ENCOUNTER — HOSPITAL ENCOUNTER (OUTPATIENT)
Facility: HOSPITAL | Age: 57
Setting detail: OBSERVATION
Discharge: HOME | End: 2022-06-03
Attending: EMERGENCY MEDICINE | Admitting: INTERNAL MEDICINE
Payer: COMMERCIAL

## 2022-06-02 VITALS
WEIGHT: 195 LBS | HEIGHT: 67 IN | BODY MASS INDEX: 30.61 KG/M2 | HEART RATE: 87 BPM | SYSTOLIC BLOOD PRESSURE: 120 MMHG | DIASTOLIC BLOOD PRESSURE: 70 MMHG

## 2022-06-02 DIAGNOSIS — N20.0 KIDNEY STONE: ICD-10-CM

## 2022-06-02 DIAGNOSIS — N20.0 KIDNEY STONE: Primary | ICD-10-CM

## 2022-06-02 DIAGNOSIS — N20.1 LEFT URETERAL STONE: ICD-10-CM

## 2022-06-02 DIAGNOSIS — R10.9 LEFT FLANK PAIN: Primary | ICD-10-CM

## 2022-06-02 DIAGNOSIS — Z12.5 PROSTATE CANCER SCREENING: ICD-10-CM

## 2022-06-02 DIAGNOSIS — N13.2 URETERAL STONE WITH HYDRONEPHROSIS: ICD-10-CM

## 2022-06-02 LAB
ALBUMIN SERPL BCP-MCNC: 3.8 G/DL (ref 3.5–5)
ALP SERPL-CCNC: 86 U/L (ref 46–116)
ALT SERPL W P-5'-P-CCNC: 44 U/L (ref 12–78)
ANION GAP SERPL CALCULATED.3IONS-SCNC: 7 MMOL/L (ref 4–13)
AST SERPL W P-5'-P-CCNC: 31 U/L (ref 5–45)
BASOPHILS # BLD AUTO: 0.1 THOUSANDS/ΜL (ref 0–0.1)
BASOPHILS NFR BLD AUTO: 2 % (ref 0–1)
BILIRUB SERPL-MCNC: 0.44 MG/DL (ref 0.2–1)
BILIRUB UR QL STRIP: NEGATIVE
BUN SERPL-MCNC: 21 MG/DL (ref 5–25)
CALCIUM SERPL-MCNC: 9.4 MG/DL (ref 8.3–10.1)
CHLORIDE SERPL-SCNC: 110 MMOL/L (ref 100–108)
CLARITY UR: CLEAR
CO2 SERPL-SCNC: 23 MMOL/L (ref 21–32)
COLOR UR: NORMAL
CREAT SERPL-MCNC: 0.88 MG/DL (ref 0.6–1.3)
EOSINOPHIL # BLD AUTO: 0.17 THOUSAND/ΜL (ref 0–0.61)
EOSINOPHIL NFR BLD AUTO: 3 % (ref 0–6)
ERYTHROCYTE [DISTWIDTH] IN BLOOD BY AUTOMATED COUNT: 13.1 % (ref 11.6–15.1)
GFR SERPL CREATININE-BSD FRML MDRD: 97 ML/MIN/1.73SQ M
GLUCOSE SERPL-MCNC: 102 MG/DL (ref 65–140)
GLUCOSE UR STRIP-MCNC: NEGATIVE MG/DL
HCT VFR BLD AUTO: 42.9 % (ref 36.5–49.3)
HGB BLD-MCNC: 15 G/DL (ref 12–17)
HGB UR QL STRIP.AUTO: NEGATIVE
IMM GRANULOCYTES # BLD AUTO: 0.02 THOUSAND/UL (ref 0–0.2)
IMM GRANULOCYTES NFR BLD AUTO: 0 % (ref 0–2)
KETONES UR STRIP-MCNC: NEGATIVE MG/DL
LEUKOCYTE ESTERASE UR QL STRIP: NEGATIVE
LYMPHOCYTES # BLD AUTO: 1.4 THOUSANDS/ΜL (ref 0.6–4.47)
LYMPHOCYTES NFR BLD AUTO: 23 % (ref 14–44)
MCH RBC QN AUTO: 29.6 PG (ref 26.8–34.3)
MCHC RBC AUTO-ENTMCNC: 35 G/DL (ref 31.4–37.4)
MCV RBC AUTO: 85 FL (ref 82–98)
MONOCYTES # BLD AUTO: 0.46 THOUSAND/ΜL (ref 0.17–1.22)
MONOCYTES NFR BLD AUTO: 8 % (ref 4–12)
NEUTROPHILS # BLD AUTO: 4.02 THOUSANDS/ΜL (ref 1.85–7.62)
NEUTS SEG NFR BLD AUTO: 64 % (ref 43–75)
NITRITE UR QL STRIP: NEGATIVE
NRBC BLD AUTO-RTO: 0 /100 WBCS
PH UR STRIP.AUTO: 6 [PH]
PLATELET # BLD AUTO: 190 THOUSANDS/UL (ref 149–390)
PLATELET # BLD AUTO: 220 THOUSANDS/UL (ref 149–390)
PMV BLD AUTO: 8.2 FL (ref 8.9–12.7)
PMV BLD AUTO: 8.3 FL (ref 8.9–12.7)
POTASSIUM SERPL-SCNC: 3.8 MMOL/L (ref 3.5–5.3)
PROT SERPL-MCNC: 7 G/DL (ref 6.4–8.2)
PROT UR STRIP-MCNC: NEGATIVE MG/DL
RBC # BLD AUTO: 5.06 MILLION/UL (ref 3.88–5.62)
SL AMB  POCT GLUCOSE, UA: NORMAL
SL AMB LEUKOCYTE ESTERASE,UA: NORMAL
SL AMB POCT BILIRUBIN,UA: NORMAL
SL AMB POCT BLOOD,UA: NORMAL
SL AMB POCT CLARITY,UA: CLEAR
SL AMB POCT COLOR,UA: YELLOW
SL AMB POCT KETONES,UA: NORMAL
SL AMB POCT NITRITE,UA: NORMAL
SL AMB POCT PH,UA: NORMAL
SL AMB POCT SPECIFIC GRAVITY,UA: 1.01
SL AMB POCT URINE PROTEIN: NORMAL
SL AMB POCT UROBILINOGEN: NORMAL
SODIUM SERPL-SCNC: 140 MMOL/L (ref 136–145)
SP GR UR STRIP.AUTO: 1.02 (ref 1–1.03)
UROBILINOGEN UR STRIP-ACNC: <2 MG/DL
WBC # BLD AUTO: 6.17 THOUSAND/UL (ref 4.31–10.16)

## 2022-06-02 PROCEDURE — 96374 THER/PROPH/DIAG INJ IV PUSH: CPT

## 2022-06-02 PROCEDURE — 99285 EMERGENCY DEPT VISIT HI MDM: CPT | Performed by: PHYSICIAN ASSISTANT

## 2022-06-02 PROCEDURE — 81002 URINALYSIS NONAUTO W/O SCOPE: CPT | Performed by: PHYSICIAN ASSISTANT

## 2022-06-02 PROCEDURE — 99214 OFFICE O/P EST MOD 30 MIN: CPT | Performed by: PHYSICIAN ASSISTANT

## 2022-06-02 PROCEDURE — G1004 CDSM NDSC: HCPCS

## 2022-06-02 PROCEDURE — 85049 AUTOMATED PLATELET COUNT: CPT | Performed by: INTERNAL MEDICINE

## 2022-06-02 PROCEDURE — 80053 COMPREHEN METABOLIC PANEL: CPT | Performed by: PHYSICIAN ASSISTANT

## 2022-06-02 PROCEDURE — 81003 URINALYSIS AUTO W/O SCOPE: CPT | Performed by: PHYSICIAN ASSISTANT

## 2022-06-02 PROCEDURE — 74176 CT ABD & PELVIS W/O CONTRAST: CPT

## 2022-06-02 PROCEDURE — 85025 COMPLETE CBC W/AUTO DIFF WBC: CPT | Performed by: PHYSICIAN ASSISTANT

## 2022-06-02 PROCEDURE — 99285 EMERGENCY DEPT VISIT HI MDM: CPT

## 2022-06-02 PROCEDURE — 36415 COLL VENOUS BLD VENIPUNCTURE: CPT | Performed by: PHYSICIAN ASSISTANT

## 2022-06-02 PROCEDURE — 96361 HYDRATE IV INFUSION ADD-ON: CPT

## 2022-06-02 PROCEDURE — 99220 PR INITIAL OBSERVATION CARE/DAY 70 MINUTES: CPT | Performed by: INTERNAL MEDICINE

## 2022-06-02 RX ORDER — HEPARIN SODIUM 5000 [USP'U]/ML
5000 INJECTION, SOLUTION INTRAVENOUS; SUBCUTANEOUS EVERY 8 HOURS SCHEDULED
Status: DISCONTINUED | OUTPATIENT
Start: 2022-06-02 | End: 2022-06-03 | Stop reason: HOSPADM

## 2022-06-02 RX ORDER — TAMSULOSIN HYDROCHLORIDE 0.4 MG/1
0.4 CAPSULE ORAL
Status: DISCONTINUED | OUTPATIENT
Start: 2022-06-02 | End: 2022-06-03 | Stop reason: HOSPADM

## 2022-06-02 RX ORDER — DOCUSATE SODIUM 100 MG/1
100 CAPSULE, LIQUID FILLED ORAL 2 TIMES DAILY
Status: DISCONTINUED | OUTPATIENT
Start: 2022-06-02 | End: 2022-06-03 | Stop reason: HOSPADM

## 2022-06-02 RX ORDER — SENNOSIDES 8.6 MG
1 TABLET ORAL DAILY
Status: DISCONTINUED | OUTPATIENT
Start: 2022-06-02 | End: 2022-06-03 | Stop reason: HOSPADM

## 2022-06-02 RX ORDER — CEFAZOLIN SODIUM 2 G/50ML
2000 SOLUTION INTRAVENOUS ONCE
Status: CANCELLED | OUTPATIENT
Start: 2022-06-02 | End: 2022-06-02

## 2022-06-02 RX ORDER — MECLIZINE HYDROCHLORIDE 25 MG/1
25 TABLET ORAL EVERY 8 HOURS PRN
Status: DISCONTINUED | OUTPATIENT
Start: 2022-06-02 | End: 2022-06-03 | Stop reason: HOSPADM

## 2022-06-02 RX ORDER — ACETAMINOPHEN 325 MG/1
975 TABLET ORAL EVERY 8 HOURS SCHEDULED
Status: DISCONTINUED | OUTPATIENT
Start: 2022-06-02 | End: 2022-06-03 | Stop reason: HOSPADM

## 2022-06-02 RX ORDER — KETOROLAC TROMETHAMINE 30 MG/ML
15 INJECTION, SOLUTION INTRAMUSCULAR; INTRAVENOUS EVERY 6 HOURS PRN
Status: DISPENSED | OUTPATIENT
Start: 2022-06-02 | End: 2022-06-03

## 2022-06-02 RX ORDER — ONDANSETRON 2 MG/ML
4 INJECTION INTRAMUSCULAR; INTRAVENOUS EVERY 6 HOURS PRN
Status: DISCONTINUED | OUTPATIENT
Start: 2022-06-02 | End: 2022-06-03 | Stop reason: HOSPADM

## 2022-06-02 RX ORDER — TEMAZEPAM 15 MG/1
15 CAPSULE ORAL
Status: DISCONTINUED | OUTPATIENT
Start: 2022-06-02 | End: 2022-06-03 | Stop reason: HOSPADM

## 2022-06-02 RX ORDER — MAGNESIUM HYDROXIDE/ALUMINUM HYDROXICE/SIMETHICONE 120; 1200; 1200 MG/30ML; MG/30ML; MG/30ML
30 SUSPENSION ORAL EVERY 6 HOURS PRN
Status: DISCONTINUED | OUTPATIENT
Start: 2022-06-02 | End: 2022-06-03 | Stop reason: HOSPADM

## 2022-06-02 RX ORDER — METHOCARBAMOL 750 MG/1
750 TABLET, FILM COATED ORAL EVERY 6 HOURS PRN
Status: DISCONTINUED | OUTPATIENT
Start: 2022-06-02 | End: 2022-06-03 | Stop reason: HOSPADM

## 2022-06-02 RX ORDER — KETOROLAC TROMETHAMINE 30 MG/ML
15 INJECTION, SOLUTION INTRAMUSCULAR; INTRAVENOUS ONCE
Status: COMPLETED | OUTPATIENT
Start: 2022-06-02 | End: 2022-06-02

## 2022-06-02 RX ADMIN — HEPARIN SODIUM 5000 UNITS: 5000 INJECTION INTRAVENOUS; SUBCUTANEOUS at 16:36

## 2022-06-02 RX ADMIN — TAMSULOSIN HYDROCHLORIDE 0.4 MG: 0.4 CAPSULE ORAL at 16:36

## 2022-06-02 RX ADMIN — KETOROLAC TROMETHAMINE 15 MG: 30 INJECTION, SOLUTION INTRAMUSCULAR; INTRAVENOUS at 20:30

## 2022-06-02 RX ADMIN — METHOCARBAMOL TABLETS 750 MG: 750 TABLET, COATED ORAL at 22:26

## 2022-06-02 RX ADMIN — ACETAMINOPHEN 975 MG: 325 TABLET, FILM COATED ORAL at 22:26

## 2022-06-02 RX ADMIN — MAGNESIUM OXIDE TAB 400 MG (241.3 MG ELEMENTAL MG) 400 MG: 400 (241.3 MG) TAB at 16:36

## 2022-06-02 RX ADMIN — SODIUM CHLORIDE 1000 ML: 0.9 INJECTION, SOLUTION INTRAVENOUS at 11:57

## 2022-06-02 RX ADMIN — ACETAMINOPHEN 975 MG: 325 TABLET, FILM COATED ORAL at 16:36

## 2022-06-02 RX ADMIN — KETOROLAC TROMETHAMINE 15 MG: 30 INJECTION, SOLUTION INTRAMUSCULAR; INTRAVENOUS at 11:58

## 2022-06-02 NOTE — ED NOTES
Questioned Dr Reagan Stein regarding labs due to patient being called by [facility] as a private exam     Inocente Groves, RN  06/02/22 [address] [place] [address] Colletta Faster, RN  06/02/22 3071

## 2022-06-02 NOTE — ED NOTES
Pt resting on stretcher, no complaints at this time, IV patent     Katerine Gooden, RN  06/02/22 7541

## 2022-06-02 NOTE — H&P
[address]  H&P- Eros Lauren III 1965, 49 y o  male MRN: [id number]  Unit/Bed#: ED 03 Encounter: [id number]  Primary Care Provider: Jayy Islas MD   Date and time admitted to hospital: 6/2/2022 11:12 AM    * Ureteral stone with hydronephrosis  Assessment & Plan  Distal left ureteral colliculus  Patient has 6 weeks of obstruction now with mild hydronephrosis  He continues to have pain  Ketoralac as needed  Tylenol standing  Monitor pain  IV fluids normal saline at 75 cc an hour  Urology consult  Urology will take patient tomorrow to OR for cystoscopy  NPO past midnight      Meningioma [facility])  Assessment & Plan  · Stable on MRI April 2, 2022  · Outpatient follow-up with Neurosurgery    WALE on CPAP  Assessment & Plan  monitor      VTE Pharmacologic Prophylaxis:   Moderate Risk (Score 3-4) - Pharmacological DVT Prophylaxis Ordered: heparin  Code Status: Prior full code   Discussion with family: Patient declined call to   Anticipated Length of Stay: Patient will be admitted on an observation basis with an anticipated length of stay of less than 2 midnights secondary to needs to go to OR tomorrow for cystoscopy  Total Time for Visit, including Counseling / Coordination of Care: 45 minutes Greater than 50% of this total time spent on direct patient counseling and coordination of care  Chief Complaint: flank pain     History of Present Illness:  Eros Lauren III is a 49 y o  male with a PMH of meningioma,  who presents with 4 mm ureteral stone obstruction stone obstruction for a week  Case discussed with urology and he will be going tomorrow  Overnight hydration and pain management  Review of Systems:  Review of Systems   Constitutional: Negative for chills and fever  HENT: Negative for ear pain and sore throat  Eyes: Negative for pain and visual disturbance  Respiratory: Negative for cough and shortness of breath      Cardiovascular: Negative for chest pain and palpitations  Gastrointestinal: Negative for abdominal pain and vomiting  Genitourinary: Positive for flank pain and frequency  Negative for dysuria and hematuria  Musculoskeletal: Negative for arthralgias and back pain  Skin: Negative for color change and rash  Neurological: Negative for seizures and syncope  All other systems reviewed and are negative  Past Medical and Surgical History:   Past Medical History:   Diagnosis Date    Chronic back pain     Migraine        Past Surgical History:   Procedure Laterality Date    HERNIA REPAIR      MANDIBLE FRACTURE SURGERY      MOUTH SURGERY      cyst in cheek    NASAL SEPTUM SURGERY         Meds/Allergies:  Prior to Admission medications    Medication Sig Start Date End Date Taking? Authorizing Provider   magnesium oxide (MAG-OX) 400 mg Take 1 tablet (400 mg total) by mouth daily Nightly 5/26/21   Roderick Cheatham MD   meclizine (ANTIVERT) 25 mg tablet Take 1 tablet (25 mg total) by mouth every 8 (eight) hours as needed for dizziness  Patient not taking: Reported on 6/2/2022 2/1/18   ROSALINO Hood   methocarbamol (ROBAXIN) 750 mg tablet methocarbamol 750 mg tablet   take 1 tablet by mouth every 8 hours if needed    Historical Provider, MD   Rimegepant Sulfate (Nurtec) 75 MG TBDP Take one NURTEC 75 mg at onset under tongue  Limit 1 in 24 hours  8 a month  3/18/22   Roderick Cheatham MD   tamsulosin [facility]) 0 4 mg Take 1 capsule (0 4 mg total) by mouth daily with dinner for 7 days  Patient not taking: Reported on 6/2/2022 5/14/22 6/2/22  Fracisco Linares MD   temazepam (RESTORIL) 15 mg capsule temazepam 15 mg capsule   as needed  Patient not taking: No sig reported 11/2/20   Historical Provider, MD   diclofenac sodium (VOLTAREN) 50 mg EC tablet Take 1 tablet(s) twice a day by oral route as needed  12/9/19 6/2/22  Historical Provider, MD     I have reviewed home medications with patient personally      Allergies: No Known Allergies    Social History:  Marital Status: /Civil Union   Occupation:    Patient Pre-hospital Living Situation: Home  Patient Pre-hospital Level of Mobility: walks  Patient Pre-hospital Diet Restrictions: regular  Substance Use History:   Social History     Substance and Sexual Activity   Alcohol Use Never     Social History     Tobacco Use   Smoking Status Never Smoker   Smokeless Tobacco Former User    Types: Snuff     Social History     Substance and Sexual Activity   Drug Use No       Family History:  Family History   Problem Relation Age of Onset    Breast cancer Mother     Diabetes Father     No Known Problems Family     Substance Abuse Neg Hx     Mental illness Neg Hx        Physical Exam:     Vitals:   Blood Pressure: 147/87 (06/02/22 1114)  Pulse: 95 (06/02/22 1114)  Temperature: 98 7 °F (37 1 °C) (06/02/22 1114)  Respirations: 20 (06/02/22 1114)  SpO2: 99 % (06/02/22 1114)    Physical Exam  Vitals and nursing note reviewed  Constitutional:       Appearance: He is well-developed  HENT:      Head: Normocephalic and atraumatic  Eyes:      Conjunctiva/sclera: Conjunctivae normal    Cardiovascular:      Rate and Rhythm: Normal rate and regular rhythm  Heart sounds: No murmur heard  Pulmonary:      Effort: Pulmonary effort is normal  No respiratory distress  Breath sounds: Normal breath sounds  Abdominal:      Palpations: Abdomen is soft  Tenderness: There is no abdominal tenderness  Musculoskeletal:         General: Tenderness present  Cervical back: Neck supple  Skin:     General: Skin is warm and dry  Neurological:      General: No focal deficit present  Mental Status: He is alert and oriented to person, place, and time  Mental status is at baseline            Additional Data:     Lab Results:  Results from last 7 days   Lab Units 06/02/22  1156   WBC Thousand/uL 6 17   HEMOGLOBIN g/dL 15 0   HEMATOCRIT % 42 9   PLATELETS Thousands/uL 220   NEUTROS PCT % 64   LYMPHS PCT % 23   MONOS PCT % 8   EOS PCT % 3     Results from last 7 days   Lab Units 06/02/22  1156   SODIUM mmol/L 140   POTASSIUM mmol/L 3 8   CHLORIDE mmol/L 110*   CO2 mmol/L 23   BUN mg/dL 21   CREATININE mg/dL 0 88   ANION GAP mmol/L 7   CALCIUM mg/dL 9 4   ALBUMIN g/dL 3 8   TOTAL BILIRUBIN mg/dL 0 44   ALK PHOS U/L 86   ALT U/L 44   AST U/L 31   GLUCOSE RANDOM mg/dL 102                       Imaging: Reviewed radiology reports from this admission including: abdominal/pelvic CT  CT renal stone study abdomen pelvis without contrast   Final Result by Ghislaine Tillman MD (06/02 1349)      4 mm left distal ureteral calculus resulting in mild hydroureteronephrosis, unchanged from May 14, 2022  Workstation performed: [id number]             EKG and Other Studies Reviewed on Admission:   · EKG: NSR  HR 70     ** Please Note: This note has been constructed using a voice recognition system   **

## 2022-06-02 NOTE — ASSESSMENT & PLAN NOTE
Distal left ureteral colliculus  Patient has 6 weeks of obstruction now with mild hydronephrosis  He continues to have pain  Ketoralac as needed  Tylenol standing  Monitor pain  IV fluids normal saline at 75 cc an hour  Urology consult  Urology will take patient tomorrow to OR for cystoscopy  NPO past midnight

## 2022-06-02 NOTE — ED PROVIDER NOTES
History  Chief Complaint   Patient presents with    Flank Pain     Pt states was sent from urology to have surgery today to remove a kidney stone     This is a 28-year-old male patient states he has had a kidney stone is left-sided pain that was intermittent x2 months  Had a CT scan on May 14, 2022 which showed a 4 mm distal stone  Was seen by Urology today and was told to come to the hospital to be a direct admit to surgery to have the stone retrieved  According or charge nurse she contact neurologist who wanted blood work done in his department so he was now changed to in ED patient  Patient states originally the pain was intermittent he took some Flonase as directed taken Motrin and Tylenol without improvement over last few days has become more constant and is in his flank and left groin but is reproducible in the left groin without testicular pain  States that since last night he has had some chills and some urgency frequency dysuria  Does not appreciate hematuria  He is nontoxic in no acute distress  This time he will have CBC CMP, CT to evaluate stone analgesic medication I will then contact Urology  He has been NPO since 0500 hours this morning          Prior to Admission Medications   Prescriptions Last Dose Informant Patient Reported? Taking? Rimegepant Sulfate (Nurtec) 75 MG TBDP   No No   Sig: Take one NURTEC 75 mg at onset under tongue  Limit 1 in 24 hours   8 a month    magnesium oxide (MAG-OX) 400 mg  Self No No   Sig: Take 1 tablet (400 mg total) by mouth daily Nightly   meclizine (ANTIVERT) 25 mg tablet  Self No No   Sig: Take 1 tablet (25 mg total) by mouth every 8 (eight) hours as needed for dizziness   Patient not taking: Reported on 6/2/2022   methocarbamol (ROBAXIN) 750 mg tablet  Self Yes No   Sig: methocarbamol 750 mg tablet   take 1 tablet by mouth every 8 hours if needed   tamsulosin (FLOMAX) 0 4 mg   No No   Sig: Take 1 capsule (0 4 mg total) by mouth daily with dinner for 7 days Patient not taking: Reported on 6/2/2022   temazepam (RESTORIL) 15 mg capsule  Self Yes No   Sig: temazepam 15 mg capsule   as needed   Patient not taking: No sig reported      Facility-Administered Medications: None       Past Medical History:   Diagnosis Date    Chronic back pain     Migraine        Past Surgical History:   Procedure Laterality Date    HERNIA REPAIR      MANDIBLE FRACTURE SURGERY      MOUTH SURGERY      cyst in cheek    NASAL SEPTUM SURGERY         Family History   Problem Relation Age of Onset    Breast cancer Mother     Diabetes Father     No Known Problems Family     Substance Abuse Neg Hx     Mental illness Neg Hx      I have reviewed and agree with the history as documented  E-Cigarette/Vaping    E-Cigarette Use Never User      E-Cigarette/Vaping Substances    Nicotine No     THC No     CBD No     Flavoring No     Other No     Unknown No      Social History     Tobacco Use    Smoking status: Never Smoker    Smokeless tobacco: Former User     Types: Snuff   Vaping Use    Vaping Use: Never used   Substance Use Topics    Alcohol use: Never    Drug use: No       Review of Systems   Constitutional: Positive for chills  Negative for diaphoresis, fatigue and fever  HENT: Negative for congestion, ear pain, nosebleeds and sore throat  Eyes: Negative for photophobia, pain, discharge and visual disturbance  Respiratory: Negative for cough, choking, chest tightness, shortness of breath and wheezing  Cardiovascular: Negative for chest pain and palpitations  Gastrointestinal: Negative for abdominal distention, abdominal pain, diarrhea and vomiting  Genitourinary: Positive for flank pain, frequency and urgency  Negative for decreased urine volume, dysuria, hematuria, penile discharge, penile pain, penile swelling, scrotal swelling and testicular pain  Musculoskeletal: Negative for arthralgias, back pain, gait problem and joint swelling     Skin: Negative for color change and rash  Neurological: Negative for dizziness, seizures, syncope and headaches  Psychiatric/Behavioral: Negative for behavioral problems and confusion  The patient is not nervous/anxious  All other systems reviewed and are negative  Physical Exam  Physical Exam  Vitals and nursing note reviewed  Constitutional:       General: He is not in acute distress  Appearance: Normal appearance  He is well-developed  He is not ill-appearing, toxic-appearing or diaphoretic  HENT:      Head: Normocephalic and atraumatic  Right Ear: Tympanic membrane, ear canal and external ear normal       Left Ear: Tympanic membrane, ear canal and external ear normal       Nose: Nose normal       Mouth/Throat:      Mouth: Mucous membranes are moist       Pharynx: Oropharynx is clear  No oropharyngeal exudate or posterior oropharyngeal erythema  Eyes:      General: No scleral icterus  Right eye: No discharge  Left eye: No discharge  Conjunctiva/sclera: Conjunctivae normal       Pupils: Pupils are equal, round, and reactive to light  Cardiovascular:      Rate and Rhythm: Normal rate and regular rhythm  Pulmonary:      Effort: Pulmonary effort is normal       Breath sounds: Normal breath sounds  Abdominal:      General: Bowel sounds are normal       Palpations: Abdomen is soft  Tenderness: There is no abdominal tenderness  There is left CVA tenderness  Hernia: No hernia is present  Genitourinary:     Penis: Normal        Testes: Normal    Musculoskeletal:         General: Normal range of motion  Cervical back: Normal range of motion and neck supple  Right lower leg: No edema  Left lower leg: No edema  Skin:     General: Skin is warm  Capillary Refill: Capillary refill takes less than 2 seconds  Neurological:      General: No focal deficit present  Mental Status: He is alert and oriented to person, place, and time  Mental status is at baseline  Psychiatric:         Mood and Affect: Mood normal          Behavior: Behavior normal          Vital Signs  ED Triage Vitals [06/02/22 1114]   Temperature Pulse Respirations Blood Pressure SpO2   98 7 °F (37 1 °C) 95 20 147/87 99 %      Temp src Heart Rate Source Patient Position - Orthostatic VS BP Location FiO2 (%)   -- Monitor -- -- --      Pain Score       8           Vitals:    06/02/22 1114   BP: 147/87   Pulse: 95         Visual Acuity      ED Medications  Medications   sodium chloride 0 9 % bolus 1,000 mL (1,000 mL Intravenous New Bag 6/2/22 1157)   ketorolac (TORADOL) injection 15 mg (15 mg Intravenous Given 6/2/22 1158)       Diagnostic Studies  Results Reviewed     Procedure Component Value Units Date/Time    Comprehensive metabolic panel [[id number]]  (Abnormal) Collected: 06/02/22 1156    Lab Status: Final result Specimen: Blood from Arm, Left Updated: 06/02/22 1324     Sodium 140 mmol/L      Potassium 3 8 mmol/L      Chloride 110 mmol/L      CO2 23 mmol/L      ANION GAP 7 mmol/L      BUN 21 mg/dL      Creatinine 0 88 mg/dL      Glucose 102 mg/dL      Calcium 9 4 mg/dL      AST 31 U/L      ALT 44 U/L      Alkaline Phosphatase 86 U/L      Total Protein 7 0 g/dL      Albumin 3 8 g/dL      Total Bilirubin 0 44 mg/dL      eGFR 97 ml/min/1 73sq m     Narrative:      Shani guidelines for Chronic Kidney Disease (CKD):     Stage 1 with normal or high GFR (GFR > 90 mL/min/1 73 square meters)    Stage 2 Mild CKD (GFR = 60-89 mL/min/1 73 square meters)    Stage 3A Moderate CKD (GFR = 45-59 mL/min/1 73 square meters)    Stage 3B Moderate CKD (GFR = 30-44 mL/min/1 73 square meters)    Stage 4 Severe CKD (GFR = 15-29 mL/min/1 73 square meters)    Stage 5 End Stage CKD (GFR <15 mL/min/1 73 square meters)  Note: GFR calculation is accurate only with a steady state creatinine    UA w Reflex to Microscopic w Reflex to Culture [[id number]] Collected: 06/02/22 1156    Lab Status: Final result Specimen: Urine, Clean Catch Updated: 06/02/22 1213     Color, UA Light Yellow     Clarity, UA Clear     Specific Gravity, UA 1 022     pH, UA 6 0     Leukocytes, UA Negative     Nitrite, UA Negative     Protein, UA Negative mg/dl      Glucose, UA Negative mg/dl      Ketones, UA Negative mg/dl      Urobilinogen, UA <2 0 mg/dl      Bilirubin, UA Negative     Blood, UA Negative    CBC and differential [[id number]]  (Abnormal) Collected: 06/02/22 1156    Lab Status: Final result Specimen: Blood from Arm, Left Updated: 06/02/22 1206     WBC 6 17 Thousand/uL      RBC 5 06 Million/uL      Hemoglobin 15 0 g/dL      Hematocrit 42 9 %      MCV 85 fL      MCH 29 6 pg      MCHC 35 0 g/dL      RDW 13 1 %      MPV 8 3 fL      Platelets 785 Thousands/uL      nRBC 0 /100 WBCs      Neutrophils Relative 64 %      Immat GRANS % 0 %      Lymphocytes Relative 23 %      Monocytes Relative 8 %      Eosinophils Relative 3 %      Basophils Relative 2 %      Neutrophils Absolute 4 02 Thousands/µL      Immature Grans Absolute 0 02 Thousand/uL      Lymphocytes Absolute 1 40 Thousands/µL      Monocytes Absolute 0 46 Thousand/µL      Eosinophils Absolute 0 17 Thousand/µL      Basophils Absolute 0 10 Thousands/µL                  CT renal stone study abdomen pelvis without contrast   Final Result by Allan Durbin MD (06/02 1349)      4 mm left distal ureteral calculus resulting in mild hydroureteronephrosis, unchanged from May 14, 2022  Workstation performed: [id number]                    Procedures  Procedures         ED Course  ED Course as of 06/02/22 1448   Thu Jun 02, 2022   1355 Spoke to Urology reviewed case  There where patient would like him admitted to Medicine has stone removed tomorrow                               SBIRT 22yo+    Flowsheet Row Most Recent Value   SBIRT (25 yo +)    In order to provide better care to our patients, we are screening all of our patients for alcohol and drug use   Would it be okay to ask you these screening questions? Unable to answer at this time Filed at: 06/02/2022 1116                    MDM    Disposition  Final diagnoses:   Left flank pain   Kidney stone     Time reflects when diagnosis was documented in both MDM as applicable and the Disposition within this note     Time User Action Codes Description Comment    6/2/2022  2:15 PM Robin Duong Add [R10 9] Left flank pain     6/2/2022  2:15 PM Robin Duong Add [N20 0] Kidney stone       ED Disposition     ED Disposition   Admit    Condition   Stable    Date/Time   Thu Jun 2, 2022  2:15 PM    Comment   Case was discussed with Dr Puma Moody and the patient's admission status was agreed to be Admission Status: observation status to the service of Dr Puma Moody   Follow-up Information    None         Patient's Medications   Discharge Prescriptions    No medications on file       No discharge procedures on file      PDMP Review     None          ED Provider  Electronically Signed by           Teja Avila PA-C  06/02/22 2699

## 2022-06-02 NOTE — PROGRESS NOTES
6/2/2022      Chief Complaint   Patient presents with    Nephrolithiasis         Assessment and Plan    49 y o  male managed by Dr Liliya Barnes    1  Distal left ureteral calculus    Concern for persistent obstruction now going on six weeks with distal left ureteral calculus and hydronephrosis  In addition to continued pain he has some new systemic symptoms concerning for renal compromise  I have suggested surgical intervention by way of cystoscopy, left retrograde pyelogram ureteral stent insertion (with or without ureteroscopy and laser lithotripsy if safe to do so in the first stage procedure)  He is unable to provide a urine specimen the office today  He is directed to [address] [facility] for admission to the hospital   He has been NPO since 5:00 a m  This morning  He will need lab work including CBC and BMP, urinalysis when able to void, KUB, and IV fluid hydration analgesia  I attempted to range direct admission through the PACS, campus is currently at capacity and they have suggested patient present to the emergency department for ED eval instead, and can be admitted from there  Inpatient urology team made aware and ADT 21 order placed on ED tracking board  History of Present Illness  Gorge Bucio III is a 49 y o  male here for evaluation of ER follow-up  Left flank/groin pain x 1 month with new urinary frequency  CT shows 4mm left distal ureteral calculus with hydroureteronephrosis  Ua and labs unremarkable at the time  He presents to office today feeling terrible  Continued left lower quadrant pain now almost two months, past week flank pain decreased urination headache malaise, felt warm yesterday today not  Has been using tylenol, tamsulosin, and ibuprofen for the past 2 weeks last dose few days ago when rx ran out  Has passed other small distal stones in recent years, calcium oxalate stones  Due for updated prostate cancer screening this year          Review of Systems Constitutional: Positive for activity change, appetite change and fatigue  Negative for chills, fever and unexpected weight change  HENT: Negative  Negative for congestion  Respiratory: Negative  Negative for cough and shortness of breath  Cardiovascular: Negative  Negative for chest pain  Gastrointestinal: Positive for abdominal pain and nausea  Negative for constipation, diarrhea and vomiting  Endocrine: Negative  Genitourinary: Positive for decreased urine volume, difficulty urinating and flank pain  Negative for dysuria, frequency, hematuria, penile pain, testicular pain and urgency  Musculoskeletal: Negative for back pain and gait problem  Skin: Negative  Negative for color change, pallor, rash and wound  Allergic/Immunologic: Negative  Neurological: Positive for dizziness  Negative for syncope and weakness  Hematological: Negative for adenopathy  Does not bruise/bleed easily  Psychiatric/Behavioral: Negative for confusion  Vitals  Vitals:    06/02/22 0954   BP: 120/70   Pulse: 87   Weight: 88 5 kg (195 lb)   Height: 5' 7" (1 702 m)       Physical Exam  Vitals and nursing note reviewed  Constitutional:       Appearance: He is well-developed  He is ill-appearing (appears fatigued, pacing in pain)  HENT:      Head: Normocephalic and atraumatic  Cardiovascular:      Rate and Rhythm: Normal rate and regular rhythm  Heart sounds: Normal heart sounds  No murmur heard  Pulmonary:      Effort: Pulmonary effort is normal       Breath sounds: Normal breath sounds  Abdominal:      General: Bowel sounds are normal       Palpations: Abdomen is soft  Tenderness: There is no abdominal tenderness  There is left CVA tenderness  There is no right CVA tenderness  Hernia: No hernia is present  Genitourinary:     Comments: Deferred today  Musculoskeletal:         General: Normal range of motion  Skin:     General: Skin is warm and dry        Capillary Refill: Capillary refill takes less than 2 seconds  Coloration: Skin is not pale  Neurological:      Mental Status: He is alert and oriented to person, place, and time  Cranial Nerves: No cranial nerve deficit        Gait: Gait normal            Past History  Past Medical History:   Diagnosis Date    Chronic back pain     Migraine      Social History     Socioeconomic History    Marital status: /Civil Union     Spouse name: Marv Guajardo Number of children: 2    Years of education: None    Highest education level: None   Occupational History    Occupation:      Employer: [employer]   Tobacco Use    Smoking status: Never Smoker    Smokeless tobacco: Former User     Types: Snuff   Vaping Use    Vaping Use: Never used   Substance and Sexual Activity    Alcohol use: Never    Drug use: No    Sexual activity: None   Other Topics Concern    None   Social History Narrative    None     Social Determinants of Health     Financial Resource Strain: Not on file   Food Insecurity: Not on file   Transportation Needs: Not on file   Physical Activity: Not on file   Stress: Not on file   Social Connections: Not on file   Intimate Partner Violence: Not on file   Housing Stability: Not on file     Social History     Tobacco Use   Smoking Status Never Smoker   Smokeless Tobacco Former User    Types: Snuff     Family History   Problem Relation Age of Onset    Breast cancer Mother     Diabetes Father     No Known Problems Family     Substance Abuse Neg Hx     Mental illness Neg Hx        The following portions of the patient's history were reviewed and updated as appropriate: allergies, current medications, past medical history, past social history, past surgical history and problem list     Results  No results found for this or any previous visit (from the past 1 hour(s)) ]  Lab Results   Component Value Date    PSA 3 5 02/06/2018    PSA 5 2 (H) 12/20/2017     Lab Results Component Value Date    GLUCOSE 98 12/20/2017    CALCIUM 9 4 05/14/2022     (H) 12/20/2017    K 4 1 05/14/2022    CO2 28 05/14/2022     05/14/2022    BUN 22 05/14/2022    CREATININE 1 11 05/14/2022     Lab Results   Component Value Date    WBC 12 77 (H) 05/14/2022    HGB 16 0 05/14/2022    HCT 47 1 05/14/2022    MCV 88 05/14/2022     05/14/2022

## 2022-06-02 NOTE — ED NOTES
Dr Peoples Kin requesting that ER order labs on the patient "like every patient in the ER"     Johann Conner, RN  06/02/22 9557

## 2022-06-03 ENCOUNTER — APPOINTMENT (OUTPATIENT)
Dept: RADIOLOGY | Facility: HOSPITAL | Age: 57
End: 2022-06-03
Payer: COMMERCIAL

## 2022-06-03 ENCOUNTER — TELEPHONE (OUTPATIENT)
Dept: OTHER | Facility: HOSPITAL | Age: 57
End: 2022-06-03

## 2022-06-03 VITALS
TEMPERATURE: 98.3 F | SYSTOLIC BLOOD PRESSURE: 155 MMHG | DIASTOLIC BLOOD PRESSURE: 97 MMHG | OXYGEN SATURATION: 99 % | HEART RATE: 86 BPM | BODY MASS INDEX: 30.54 KG/M2 | RESPIRATION RATE: 17 BRPM | HEIGHT: 67 IN

## 2022-06-03 DIAGNOSIS — N13.2 URETERAL STONE WITH HYDRONEPHROSIS: Primary | ICD-10-CM

## 2022-06-03 LAB
ANION GAP SERPL CALCULATED.3IONS-SCNC: 5 MMOL/L (ref 4–13)
BASOPHILS # BLD AUTO: 0.08 THOUSANDS/ΜL (ref 0–0.1)
BASOPHILS NFR BLD AUTO: 1 % (ref 0–1)
BUN SERPL-MCNC: 22 MG/DL (ref 5–25)
CALCIUM SERPL-MCNC: 9.1 MG/DL (ref 8.3–10.1)
CHLORIDE SERPL-SCNC: 113 MMOL/L (ref 100–108)
CO2 SERPL-SCNC: 25 MMOL/L (ref 21–32)
CREAT SERPL-MCNC: 0.92 MG/DL (ref 0.6–1.3)
EOSINOPHIL # BLD AUTO: 0.21 THOUSAND/ΜL (ref 0–0.61)
EOSINOPHIL NFR BLD AUTO: 3 % (ref 0–6)
ERYTHROCYTE [DISTWIDTH] IN BLOOD BY AUTOMATED COUNT: 13.1 % (ref 11.6–15.1)
GFR SERPL CREATININE-BSD FRML MDRD: 93 ML/MIN/1.73SQ M
GLUCOSE SERPL-MCNC: 95 MG/DL (ref 65–140)
HCT VFR BLD AUTO: 40.3 % (ref 36.5–49.3)
HGB BLD-MCNC: 14.1 G/DL (ref 12–17)
IMM GRANULOCYTES # BLD AUTO: 0.02 THOUSAND/UL (ref 0–0.2)
IMM GRANULOCYTES NFR BLD AUTO: 0 % (ref 0–2)
LYMPHOCYTES # BLD AUTO: 1.29 THOUSANDS/ΜL (ref 0.6–4.47)
LYMPHOCYTES NFR BLD AUTO: 21 % (ref 14–44)
MAGNESIUM SERPL-MCNC: 2.4 MG/DL (ref 1.6–2.6)
MCH RBC QN AUTO: 30 PG (ref 26.8–34.3)
MCHC RBC AUTO-ENTMCNC: 35 G/DL (ref 31.4–37.4)
MCV RBC AUTO: 86 FL (ref 82–98)
MONOCYTES # BLD AUTO: 0.57 THOUSAND/ΜL (ref 0.17–1.22)
MONOCYTES NFR BLD AUTO: 9 % (ref 4–12)
NEUTROPHILS # BLD AUTO: 4.01 THOUSANDS/ΜL (ref 1.85–7.62)
NEUTS SEG NFR BLD AUTO: 66 % (ref 43–75)
NRBC BLD AUTO-RTO: 0 /100 WBCS
PHOSPHATE SERPL-MCNC: 2.7 MG/DL (ref 2.7–4.5)
PLATELET # BLD AUTO: 201 THOUSANDS/UL (ref 149–390)
PMV BLD AUTO: 8.6 FL (ref 8.9–12.7)
POTASSIUM SERPL-SCNC: 3.7 MMOL/L (ref 3.5–5.3)
RBC # BLD AUTO: 4.7 MILLION/UL (ref 3.88–5.62)
SODIUM SERPL-SCNC: 143 MMOL/L (ref 136–145)
WBC # BLD AUTO: 6.18 THOUSAND/UL (ref 4.31–10.16)

## 2022-06-03 PROCEDURE — 85025 COMPLETE CBC W/AUTO DIFF WBC: CPT | Performed by: INTERNAL MEDICINE

## 2022-06-03 PROCEDURE — 84100 ASSAY OF PHOSPHORUS: CPT | Performed by: INTERNAL MEDICINE

## 2022-06-03 PROCEDURE — 82360 CALCULUS ASSAY QUANT: CPT | Performed by: UROLOGY

## 2022-06-03 PROCEDURE — 99244 OFF/OP CNSLTJ NEW/EST MOD 40: CPT | Performed by: PHYSICIAN ASSISTANT

## 2022-06-03 PROCEDURE — 80048 BASIC METABOLIC PNL TOTAL CA: CPT | Performed by: INTERNAL MEDICINE

## 2022-06-03 PROCEDURE — 83735 ASSAY OF MAGNESIUM: CPT | Performed by: INTERNAL MEDICINE

## 2022-06-03 PROCEDURE — 99217 PR OBSERVATION CARE DISCHARGE MANAGEMENT: CPT | Performed by: INTERNAL MEDICINE

## 2022-06-03 RX ADMIN — MAGNESIUM OXIDE TAB 400 MG (241.3 MG ELEMENTAL MG) 400 MG: 400 (241.3 MG) TAB at 09:07

## 2022-06-03 RX ADMIN — ACETAMINOPHEN 975 MG: 325 TABLET, FILM COATED ORAL at 16:43

## 2022-06-03 RX ADMIN — HEPARIN SODIUM 5000 UNITS: 5000 INJECTION INTRAVENOUS; SUBCUTANEOUS at 05:28

## 2022-06-03 RX ADMIN — ACETAMINOPHEN 975 MG: 325 TABLET, FILM COATED ORAL at 05:27

## 2022-06-03 RX ADMIN — TAMSULOSIN HYDROCHLORIDE 0.4 MG: 0.4 CAPSULE ORAL at 16:43

## 2022-06-03 NOTE — TELEPHONE ENCOUNTER
Patient is a 61-year-old male who presented to the office and has been attempting to pass stone for over 6 weeks  Was kept for definitive ureteroscopy but was able to pass his stone with medical expulsive therapy  He will require follow-up in 3-6 months with ultrasound and KUB and PSA  Please assist with scheduling once discharged  Patient saw Kaylyn Hicks in [employer]

## 2022-06-03 NOTE — UTILIZATION REVIEW
Initial Clinical Review    Admission: Date/Time/Statement:   Admission Orders (From admission, onward)     Ordered        06/02/22 1416  Place in Observation  Once                      Orders Placed This Encounter   Procedures    Place in Observation     Standing Status:   Standing     Number of Occurrences:   1     Order Specific Question:   Level of Care     Answer:   Med Surg [16]     ED Arrival Information     Expected   6/2/2022     Arrival   6/2/2022 11:12    Acuity   Urgent            Means of arrival   Walk-In    Escorted by   -    Service   Hospitalist    Admission type   Urgent            Arrival complaint   Kidney Stone           Chief Complaint   Patient presents with    Flank Pain     Pt states was sent from urology to have surgery today to remove a kidney stone       Initial Presentation: 49 y o  male with a PMHx of meningioma presents to ED as a walk-in from Urology office with 4 mm ureteral stone obstruction stone obstruction for a week  Urology plans on OR tomorrow  Overnight hydration and pain management  Pt had 6 wks of obstruction now with mild hydration  Continues to have pain  Admit under observation to M/S unit with URETERAL STONE with HYDRONEPHROSIS --  Ketoralac as needed  Tylenol standing  Monitor pain  IV fluids normal saline at 75 cc an hour  Urology consult   Urology will take patient tomorrow to OR for cystoscopy  NPO past midnight    Urology consult 6/3 -- at this point in time patient has been attempting to pass ureteral stone and is unsuccessful  Will plan for ureteroscopy today  Keep NPO    ADDENDUM NOTE 6/3 3 pm --Pt passed stone-okay for discharge, ordered diet, see us in 3-6 months with a PSA          ED Triage Vitals   Temperature Pulse Respirations Blood Pressure SpO2   06/02/22 1114 06/02/22 1114 06/02/22 1114 06/02/22 1114 06/02/22 1114   98 7 °F (37 1 °C) 95 20 147/87 99 %      Temp Source Heart Rate Source Patient Position - Orthostatic VS BP Location FiO2 (%) 06/02/22 2148 06/02/22 1114 -- 06/03/22 0314 --   Oral Monitor  Left arm       Pain Score       06/02/22 1114       8          Wt Readings from Last 1 Encounters:   06/02/22 88 5 kg (195 lb)     Additional Vital Signs:   Date/Time Temp Pulse Resp BP MAP (mmHg) SpO2 O2 Device   06/02/22 2245 -- -- -- -- -- 98 % None (Room air)   06/02/22 21:48:34 98 °F (36 7 °C) 85 -- 141/89 106 98 % --   06/02/22 2148 98 °F (36 7 °C) 85 20 141/89 -- -- --   06/02/22 1119 -- -- -- -- -- -- None (Room air)   06/02/22 1114 98 7 °F (37 1 °C) 95 20 147/87 -- 99 % None (Room air)       Pertinent Labs/Diagnostic Test Results:   CT renal stone study abdomen pelvis without contrast   Final Result by Yousif Lynne MD (06/02 1349)      4 mm left distal ureteral calculus resulting in mild hydroureteronephrosis, unchanged from May 14, 2022           Workstation performed: [id number]               Results from last 7 days   Lab Units 06/03/22  0647 06/02/22  2100 06/02/22  1156   WBC Thousand/uL 6 18  --  6 17   HEMOGLOBIN g/dL 14 1  --  15 0   HEMATOCRIT % 40 3  --  42 9   PLATELETS Thousands/uL 201 190 220   NEUTROS ABS Thousands/µL 4 01  --  4 02         Results from last 7 days   Lab Units 06/03/22  0647 06/02/22  1156   SODIUM mmol/L 143 140   POTASSIUM mmol/L 3 7 3 8   CHLORIDE mmol/L 113* 110*   CO2 mmol/L 25 23   ANION GAP mmol/L 5 7   BUN mg/dL 22 21   CREATININE mg/dL 0 92 0 88   EGFR ml/min/1 73sq m 93 97   CALCIUM mg/dL 9 1 9 4   MAGNESIUM mg/dL 2 4  --    PHOSPHORUS mg/dL 2 7  --      Results from last 7 days   Lab Units 06/02/22  1156   AST U/L 31   ALT U/L 44   ALK PHOS U/L 86   TOTAL PROTEIN g/dL 7 0   ALBUMIN g/dL 3 8   TOTAL BILIRUBIN mg/dL 0 44         Results from last 7 days   Lab Units 06/03/22  0647 06/02/22  1156   GLUCOSE RANDOM mg/dL 95 102     Results from last 7 days   Lab Units 06/02/22  1156 06/02/22  0953   CLARITY UA  Clear clear   COLOR UA  Light Yellow yellow   SPEC GRAV UA  1 022  --    PH UA  6 0  -- GLUCOSE UA mg/dl Negative n   KETONES UA mg/dl Negative n   BLOOD UA  Negative n   PROTEIN UA mg/dl Negative n   NITRITE UA  Negative n   BILIRUBIN UA  Negative  --    BILIRUBIN UA POC   --  n   UROBILINOGEN UA   --  n   UROBILINOGEN UA (BE) mg/dl <2 0  --    LEUKOCYTES UA  Negative n         ED Treatment:   Medication Administration from 06/02/2022 1033 to 06/02/2022 2139       Date/Time Order Dose Route Action     06/02/2022 1157 sodium chloride 0 9 % bolus 1,000 mL 1,000 mL Intravenous New Bag     06/02/2022 1158 ketorolac (TORADOL) injection 15 mg 15 mg Intravenous Given     06/02/2022 1636 magnesium oxide (MAG-OX) tablet 400 mg 400 mg Oral Given     06/02/2022 1636 tamsulosin (FLOMAX) capsule 0 4 mg 0 4 mg Oral Given     06/02/2022 2030 ketorolac (TORADOL) injection 15 mg 15 mg Intravenous Given     06/02/2022 1636 acetaminophen (TYLENOL) tablet 975 mg 975 mg Oral Given     06/02/2022 1636 heparin (porcine) subcutaneous injection 5,000 Units 5,000 Units Subcutaneous Given     Past Medical History:   Diagnosis Date    Chronic back pain     Migraine      Present on Admission:   Meningioma ([facility] [address] )   Ureteral stone with hydronephrosis      Admitting Diagnosis: Kidney stone [N20 0]  Left flank pain [R10 9]  Ureteral stone with hydronephrosis [N13 2]  Age/Sex: 49 y o  male  Admission Orders:  Scheduled Medications:  acetaminophen, 975 mg, Oral, Q8H YEHUDA  docusate sodium, 100 mg, Oral, BID  heparin (porcine), 5,000 Units, Subcutaneous, Q8H [address]  magnesium oxide, 400 mg, Oral, Daily  senna, 1 tablet, Oral, Daily  tamsulosin, 0 4 mg, Oral, Daily With Dinner    PRN Meds:  aluminum-magnesium hydroxide-simethicone, 30 mL, Oral, Q6H PRN  ketorolac, 15 mg, Intravenous, Q6H PRN 6/2 x1  meclizine, 25 mg, Oral, Q8H PRN  methocarbamol, 750 mg, Oral, Q6H PRN 6/2 x1  ondansetron, 4 mg, Intravenous, Q6H PRN  temazepam, 15 mg, Oral, HS PRN        Network Utilization Review Department  ATTENTION: Please call with any questions or concerns to [phone] and carefully listen to the prompts so that you are directed to the right person  All voicemails are confidential   Izabella Raymond all requests for admission clinical reviews, approved or denied determinations and any other requests to dedicated fax number below belonging to the campus where the patient is receiving treatment   List of dedicated fax numbers for the Facilities:  [address] DENIALS (Administrative/Medical Necessity) [phone]   [address] (Maternity/NICU/Pediatrics) [phone]   [address]  [address] [facility] [place] [address] [address] [place] [address] [address] [facility] [place] [address] [phone]

## 2022-06-03 NOTE — ASSESSMENT & PLAN NOTE
Left distal ureteral calculus  Patient passed the stone spontaneously just before the procedure  Adequate hydration discussed with the patient  He has been cleared by Urology for discharge today  Outpatient follow-up with Urology

## 2022-06-03 NOTE — DISCHARGE SUMMARY
[address]  Discharge- Yassine Mccray III 1965, 49 y o  male MRN: [id number]  Unit/Bed#: [facility] 913-01 Encounter: [id number]  Primary Care Provider: Suni Ram MD   Date and time admitted to hospital: 6/2/2022 11:12 AM    * Ureteral stone with hydronephrosis  Assessment & Plan  Left distal ureteral calculus  Patient passed the stone spontaneously just before the procedure  Adequate hydration discussed with the patient  He has been cleared by Urology for discharge today  Outpatient follow-up with Urology      Meningioma [facility])  Assessment & Plan  · Stable on MRI April 2, 2022  · Outpatient follow-up with Neurosurgery    WALE on CPAP  Assessment & Plan  CPAP compliance encouraged          Discharge Summary - [place] [address] Internal Medicine    Patient Information: Yassine Mccray III 49 y o  male MRN: [id number]  Unit/Bed#: [facility] 913-01 Encounter: [id number]    Discharging Physician / Practitioner: Matthew Harding MD  PCP: Suni Ram MD  Admission Date: 6/2/2022  Discharge Date: 06/03/22    Disposition:     Home    Reason for Admission:  Flank pain    Discharge Diagnoses:     Principal Problem:    Ureteral stone with hydronephrosis  Active Problems:    WALE on CPAP    Meningioma ([address] )  Resolved Problems:    * No resolved hospital problems  *      Consultations During Hospital Stay:  · Urology    Procedures Performed:     · CT abdomen left distal ureter calculus 4 mm mild hydroureteronephrosis      Hospital Course:     Romana Regulus is a 49 y o  male patient who originally presented to the hospital on 6/2/2022 due to flank pain  Imaging studies revealed distal left ureter calculus  He was transferred to [facility] for urology intervention  His provided with analgesics IV fluids  It seems he has passed a stone just before the procedure and has been cleared by Urology for discharge today  He will follow-up with Urology on discharge    Adequate hydration discussed with the patient  Is symptomatically feeling better hemodynamically stable and is deemed ready for discharge today  Kindly review the chart for details  Condition at Discharge: fair     Discharge Day Visit / Exam:     Subjective:      Comfortably in bed  Reports feeling okay  Encouraged adequate hydration  Outpatient follow-up with Urology primary care physician  History chart labs medications reviewed    Vitals: Blood Pressure: 155/97 (06/03/22 1525)  Pulse: 86 (06/03/22 1525)  Temperature: 98 3 °F (36 8 °C) (06/03/22 1525)  Temp Source: Oral (06/02/22 2148)  Respirations: 17 (06/03/22 1525)  Height: 5' 7" (170 2 cm) (06/02/22 2148)  SpO2: 99 % (06/03/22 1525)  Exam:   Physical Exam    Comfortably sitting up in bed  Neck supple  Lungs clear to auscultation  Vesicular breath sounds  Heart sounds S1 and S2 noted  Abdomen soft nontender  Awake alert obeys simple commands  No pedal edema  No rash    Discharge instructions/Information to patient and family:   See after visit summary for information provided to patient and family  Discharge Plan with urology noted  Discharge plan discussed the patient  Outpatient follow-up with Urology, primary care physician    Provisions for Follow-Up Care:  See after visit summary for information related to follow-up care and any pertinent home health orders  Planned Readmission: no     Discharge Statement:  I spent 40 minutes discharging the patient  This time was spent on the day of discharge  I had direct contact with the patient on the day of discharge  Greater than 50% of the total time was spent examining patient, answering all patient questions, arranging and discussing plan of care with patient as well as directly providing post-discharge instructions  Additional time then spent on discharge activities  Discharge Medications:  See after visit summary for reconciled discharge medications provided to patient and family        ** Please Note: This note has been constructed using a voice recognition system **

## 2022-06-03 NOTE — CONSULTS
Inpatient consult to Urology  Consult performed by: Brooklyn Ayers PA-C  Consult ordered by: Evelyn Back MD      Consult:   Orion Doll III 49 y o  male [id number]   Unit/Bed #: [facility] 913-01  Encounter: [id number]        Assessment  & Plan  :    Nephrolithiasis:  -CT scan from 05/14 revealed a 4 mm left distal ureteral calculus with hydronephrosis  -repeat CT scan yesterday on 06/02/2020 revealed 4 mm left distal ureteral calculus with hydronephrosis unchanged from prior imaging   -creatinine 0 92  -no leukocytosis  -UA negative for infection  -this point in time patient has been attempting to pass ureteral stone and is unsuccessful  Will plan for ureteroscopy today   -discussed procedure in depth with patient  Discussed risks of procedure including bleeding, infection, damage to nearby structures such as kidney ureter bladder and need for additional stone procedures  -consent signed at bedside and placed in patient's chart  -keep NPO      Proceed to OR plan for cystoscopy ureteroscopy holmium laser lithotripsy basket extraction ureteral stent insertion on the left  Subjective :    Namrata Milligan III  is a 49 y o  male presented yesterday to the urology office for ER follow-up  Patient has been experience left-sided flank and groin pain x1 month with new urinary frequency  CT scan revealed 4 mm left distal ureteral calculus with hydronephrosis  UA and labs are unremarkable at time of initial diagnosis  Patient was also experiencing lower quadrant pain almost 2 months, past week flank pain with decreased urination, headache, malaise  He has been using Tylenol, tamsulosin ibuprofen for 2 weeks now  And has passed stones on his own in the past   Patient was sent over to the emergency room due to worsening pain and having to attempt passage of stone for over 1 month  Repeat CT scan on ER evaluation reveals persistent 4 mm left distal ureteral stone with hydronephrosis    Additional UA and labs continued to be with within normal limits  Patient evaluated at bedside this a m  Patient currently reporting that his pain is significantly improved  He had severe pain last night but was able to fall asleep after pain medication  Denies any nausea or vomiting  Currently denies any current pain  Reports that all of his pain primarily is in his left lower quadrant and radiates down into the testicles  He reports that his pain was worsening in the again began to experience flank pain in his back  He reports that he did require prior surgical intervention approximately 10 years ago for ureteral stones  Since that time he has been able to passed stones on his own  No Known Allergies   Current Outpatient Medications   Medication Instructions    magnesium oxide (MAG-OX) 400 mg, Oral, Daily, Nightly    meclizine (ANTIVERT) 25 mg, Oral, Every 8 hours PRN    methocarbamol (ROBAXIN) 750 mg tablet methocarbamol 750 mg tablet   take 1 tablet by mouth every 8 hours if needed    Rimegepant Sulfate (Nurtec) 75 MG TBDP Take one NURTEC 75 mg at onset under tongue  Limit 1 in 24 hours  8 a month      tamsulosin (FLOMAX) 0 4 mg, Oral, Daily with dinner    temazepam (RESTORIL) 15 mg capsule temazepam 15 mg capsule   as needed      Past Medical History:   Diagnosis Date    Chronic back pain     Migraine      Past Surgical History:   Procedure Laterality Date    HERNIA REPAIR      MANDIBLE FRACTURE SURGERY      MOUTH SURGERY      cyst in cheek    NASAL SEPTUM SURGERY       Family History   Problem Relation Age of Onset    Breast cancer Mother     Diabetes Father     No Known Problems Family     Substance Abuse Neg Hx     Mental illness Neg Hx      Social History     Socioeconomic History    Marital status: /Civil Union     Spouse name: Azucena Unger Number of children: 2    Years of education: None    Highest education level: None   Occupational History    Occupation:  Employer: [employer]   Tobacco Use    Smoking status: Never Smoker    Smokeless tobacco: Former User     Types: Snuff   Vaping Use    Vaping Use: Never used   Substance and Sexual Activity    Alcohol use: Never    Drug use: No    Sexual activity: None   Other Topics Concern    None   Social History Narrative    None     Social Determinants of Health     Financial Resource Strain: Not on file   Food Insecurity: Not on file   Transportation Needs: Not on file   Physical Activity: Not on file   Stress: Not on file   Social Connections: Not on file   Intimate Partner Violence: Not on file   Housing Stability: Not on file        Review of Systems   Constitutional: Negative  Negative for chills and fever  HENT: Negative  Eyes: Negative  Respiratory: Negative  Cardiovascular: Negative  Gastrointestinal: Positive for abdominal pain  Negative for nausea and vomiting  Endocrine: Negative  Genitourinary: Positive for flank pain, frequency and urgency  Negative for difficulty urinating and dysuria  Skin: Negative  Allergic/Immunologic: Negative  Neurological: Negative  Hematological: Negative  Psychiatric/Behavioral: Negative  Objective     Physical Exam  Constitutional:       General: He is not in acute distress  Appearance: He is normal weight  He is not ill-appearing, toxic-appearing or diaphoretic  HENT:      Head: Normocephalic and atraumatic  Right Ear: External ear normal       Left Ear: External ear normal       Nose: Nose normal       Mouth/Throat:      Pharynx: Oropharynx is clear  Eyes:      General: No scleral icterus  Conjunctiva/sclera: Conjunctivae normal    Cardiovascular:      Rate and Rhythm: Normal rate and regular rhythm  Pulses: Normal pulses  Heart sounds: No murmur heard  No friction rub  No gallop  Pulmonary:      Effort: Pulmonary effort is normal  No respiratory distress  Breath sounds:  No wheezing, rhonchi or rales  Abdominal:      General: Bowel sounds are normal  There is no distension  Tenderness: There is abdominal tenderness  There is no right CVA tenderness or left CVA tenderness  Comments: Mild left lower quadrant abdominal tenderness   Musculoskeletal:         General: Normal range of motion  Cervical back: Normal range of motion  Skin:     General: Skin is warm and dry  Neurological:      General: No focal deficit present  Mental Status: He is alert and oriented to person, place, and time  Psychiatric:         Mood and Affect: Mood normal          Behavior: Behavior normal          Thought Content: Thought content normal          Judgment: Judgment normal                 Imaging:  CT ABDOMEN AND PELVIS WITHOUT IV CONTRAST - LOW DOSE RENAL STONE      INDICATION:   Flank pain, kidney stone suspected  Ongoing left flank and groin pain history of stone      COMPARISON:  5/14/2022      TECHNIQUE:  Low radiation dose thin section CT examination of the abdomen and pelvis was performed without intravenous or oral contrast according to a protocol specifically designed to evaluate for urinary tract calculus  Axial, sagittal, and coronal 2D   reformatted images were created from the source data and submitted for interpretation  Evaluation for pathology in the abdomen and pelvis that is unrelated to urinary tract calculi is limited        Radiation dose length product (DLP) for this visit:  452 3 mGy-cm   This examination, like all CT scans performed in the [facility], was performed utilizing techniques to minimize radiation dose exposure, including the use of iterative   reconstruction and automated exposure control      URINARY TRACT FINDINGS:     RIGHT KIDNEY AND URETER:  No urinary tract calculi  No hydronephrosis or hydroureter    Unchanged 3 4 cm exophytic simple cyst      LEFT KIDNEY AND URETER:  Unchanged 4 mm left distal ureteral calculus, just above the UVJ, resulting in mild proximal hydroureteronephrosis (2/131, 601/89)      URINARY BLADDER:  Unremarkable         ADDITIONAL FINDINGS:     LOWER CHEST:  No clinically significant abnormality identified in the visualized lower chest      SOLID VISCERA: Limited low radiation dose noncontrast CT evaluation demonstrates no clinically significant abnormality of the imaged portions of the liver, spleen, pancreas, or adrenal glands        GALLBLADDER/BILIARY TREE:  No calcified gallstones  No pericholecystic inflammatory change  No biliary dilatation      STOMACH AND BOWEL:  Unremarkable      APPENDIX:  Normal      ABDOMINOPELVIC CAVITY:  No ascites  No pneumoperitoneum  No lymphadenopathy      REPRODUCTIVE ORGANS:  Mildly enlarged prostate gland with dystrophic calcifications      ABDOMINAL WALL/INGUINAL REGIONS:  Unremarkable      OSSEOUS STRUCTURES:  No acute fracture or destructive osseous lesion  Mild degenerative changes of the spine      IMPRESSION:     4 mm left distal ureteral calculus resulting in mild hydroureteronephrosis, unchanged from May 14, 2022      Labs:  Lab Results   Component Value Date    SODIUM 143 06/03/2022    K 3 7 06/03/2022     (H) 06/03/2022    CO2 25 06/03/2022    BUN 22 06/03/2022    CREATININE 0 92 06/03/2022    GLUC 95 06/03/2022    CALCIUM 9 1 06/03/2022         Lab Results   Component Value Date    WBC 6 18 06/03/2022    HGB 14 1 06/03/2022    HCT 40 3 06/03/2022    MCV 86 06/03/2022     06/03/2022         VTE Pharmacologic Prophylaxis: Heparin  VTE Mechanical Prophylaxis: sequential compression device     Elisha Sorto PA-C

## 2022-06-03 NOTE — CASE MANAGEMENT
Case Management Discharge Planning Note    Patient name Aisha Mancuso  Location [facility] 913/[facility] 400-49 MRN [id number]  : 1965 Date 6/3/2022       Current Admission Date: 2022  Current Admission Diagnosis:Ureteral stone with hydronephrosis   Patient Active Problem List    Diagnosis Date Noted    Ureteral stone with hydronephrosis 2022    Abnormal finding on MRI of brain 10/01/2021    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[facility] [address] ) 10/01/2021    Meningioma ([facility] [address] [facility] ) 2021    Deviated nasal septum 2019    Hypertrophy of nasal turbinates 2019    Nasal obstruction 2019    Nasal septal deviation 2019    Seasonal allergic rhinitis 2019    Nasal turbinate hypertrophy 2019    Mixed dyslipidemia 2017    WALE on CPAP 2016    Low back pain 2016    Strain of lumbar region 2015      LOS (days): 0  Geometric Mean LOS (GMLOS) (days):   Days to GMLOS:     OBJECTIVE:            Current admission status: Observation   Preferred Pharmacy:   [address], [address] [place] [address] - [phone] [place] [address] [address] [place] [id number]  Phone: [phone] Fax: [phone]    Primary Care Provider: Cruz Vasquez MD    Primary Insurance: Morena Ramos  Secondary Insurance:     DISCHARGE DETAILS:         Other Referral/Resources/Interventions Provided:  Referral Comments: patient is currently written for discharge and  has not been notified of any needs at time of discharge  Patient is currently classified as "observation" status

## 2022-06-06 ENCOUNTER — TRANSITIONAL CARE MANAGEMENT (OUTPATIENT)
Dept: FAMILY MEDICINE CLINIC | Facility: CLINIC | Age: 57
End: 2022-06-06

## 2022-06-06 NOTE — TELEPHONE ENCOUNTER
Voicemail message left for Albino Mathis to return call to the office to schedule 3-6 month follow up with AP with imaging/blood work prior to appointment

## 2022-06-07 NOTE — TELEPHONE ENCOUNTER
Called and left 2nd voicemail message for patient to please return call to schedule follow up with AP with imaging prior

## 2022-06-08 LAB
CALCIUM OXALATE DIHYDRATE MFR STONE IR: 30 %
COLOR STONE: NORMAL
COM MFR STONE: 70 %
COMMENT-STONE3: NORMAL
COMPOSITION: NORMAL
LABORATORY COMMENT REPORT: NORMAL
PHOTO: NORMAL
SIZE STONE: NORMAL MM
SPEC SOURCE SUBJ: NORMAL
STONE ANALYSIS-IMP: NORMAL
WT STONE: 34 MG

## 2022-06-08 NOTE — TELEPHONE ENCOUNTER
3rd voicemail left for patient to please return call to schedule appointment  Also attempted patients 2 other listed phone number and spouse's phone, however there was no answer  Will have letter sent

## 2022-06-15 ENCOUNTER — OFFICE VISIT (OUTPATIENT)
Dept: FAMILY MEDICINE CLINIC | Facility: CLINIC | Age: 57
End: 2022-06-15
Payer: COMMERCIAL

## 2022-06-15 VITALS
BODY MASS INDEX: 29.98 KG/M2 | SYSTOLIC BLOOD PRESSURE: 124 MMHG | HEIGHT: 67 IN | DIASTOLIC BLOOD PRESSURE: 82 MMHG | HEART RATE: 100 BPM | WEIGHT: 191 LBS | OXYGEN SATURATION: 96 % | TEMPERATURE: 98.1 F

## 2022-06-15 DIAGNOSIS — N20.0 CALCIUM OXALATE KIDNEY STONES: Primary | ICD-10-CM

## 2022-06-15 DIAGNOSIS — D33.0 BENIGN NEOPLASM OF SUPRATENTORIAL REGION OF BRAIN (HCC): ICD-10-CM

## 2022-06-15 PROCEDURE — 99495 TRANSJ CARE MGMT MOD F2F 14D: CPT | Performed by: FAMILY MEDICINE

## 2022-06-15 NOTE — PROGRESS NOTES
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]  1  Calcium oxalate kidney stones     2  Benign neoplasm of supratentorial region of brain [facility])         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[facility] [address] )  No meningioma  Being followed by Neurosurgery  Also sees neurology  Patient Instructions     Patient Instructions     Kidney Stones   WHAT YOU NEED TO KNOW:   Kidney stones form in the urinary system when the water and waste in your urine are out of balance  When this happens, certain types of waste crystals separate from the urine  The crystals build up and form kidney stones  You may have more than one kidney stone  DISCHARGE INSTRUCTIONS:   Return to the emergency department if:   · You are vomiting and it is not relieved with medicine  Call your doctor or kidney specialist if:   · You have a fever  · You have trouble urinating  · You see blood in your urine  · You have severe pain  · You have any questions or concerns about your condition or care  Medicines: You may need any of the following:  · NSAIDs , such as ibuprofen, help decrease swelling, pain, and fever  This medicine is available with or without a doctor's order  NSAIDs can cause stomach bleeding or kidney problems in certain people  If you take blood thinner medicine, always ask your healthcare provider if NSAIDs are safe for you  Always read the medicine label and follow directions  · Acetaminophen  decreases pain and fever  It is available without a doctor's order  Ask how much to take and how often to take it  Follow directions  Read the labels of all other medicines you are using to see if they also contain acetaminophen, or ask your doctor or pharmacist  Acetaminophen can cause liver damage if not taken correctly   Do not use more than 4 grams (4,000 milligrams) total of acetaminophen in one day  · Prescription pain medicine  may be given  Ask your healthcare provider how to take this medicine safely  Some prescription pain medicines contain acetaminophen  Do not take other medicines that contain acetaminophen without talking to your healthcare provider  Too much acetaminophen may cause liver damage  Prescription pain medicine may cause constipation  Ask your healthcare provider how to prevent or treat constipation  · Medicines  to balance your electrolytes may be needed  · Take your medicine as directed  Contact your healthcare provider if you think your medicine is not helping or if you have side effects  Tell him or her if you are allergic to any medicine  Keep a list of the medicines, vitamins, and herbs you take  Include the amounts, and when and why you take them  Bring the list or the pill bottles to follow-up visits  Carry your medicine list with you in case of an emergency  What you can do to manage kidney stones:   · Drink more liquids  Your healthcare provider may tell you to drink at least 8 to 12 (eight-ounce) cups of liquids each day  This helps flush out the kidney stones when you urinate  Water is the best liquid to drink  · Strain your urine every time you go to the bathroom  Urinate through a strainer or a piece of thin cloth to catch the stones  Take the stones to your healthcare provider so they can be sent to the lab for tests  This will help your healthcare providers plan the best treatment for you  · Ask if you should avoid any foods  You may need to limit oxalate  Oxalate is a chemical found in some plant foods  The most common type of kidney stone is made up of crystals that contain calcium and oxalate  Your healthcare provider or dietitian may recommend that you limit oxalate if you get this type of kidney stone often   You may need to limit how much sodium (salt) or protein you eat  Ask for information about the best foods for you  · Be physically active as directed  Your stones may pass more easily if you stay active  Physical activity can also help you manage your weight  Ask about the best activities for you  After you pass the kidney stones: Your healthcare provider may  order a 24-hour urine test  Results from a 24-hour urine test will help your healthcare provider plan ways to prevent more stones from forming  Your healthcare provider will give you more instructions  Follow up with your doctor or kidney specialist as directed:  Write down your questions so you remember to ask them during your visits  © Copyright [employer] 2022 Information is for End User's use only and may not be sold, redistributed or otherwise used for commercial purposes  All illustrations and images included in CareNotes® are the copyrighted property of [employer] M [employer]  or [employer]   The above information is an  only  It is not intended as medical advice for individual conditions or treatments  Talk to your doctor, nurse or pharmacist before following any medical regimen to see if it is safe and effective for you  Continue current medications  Arrange for follow-up Urology in September after ultrasound of the kidneys and bladder and flat plate of the abdomen  Get her PSA at that time also  Maintain good hydration to avoid recurrent kidney stones  When new insurance is in place and you know which lab, call and we will add cholesterol testing in general, call testing to your PSA to be done in September  Then schedule full physical after that            Chief Complaint     Chief Complaint   Patient presents with    Transition of Care Management         History of Present Illness       TCM Call (since 5/15/2022)     Date and time call was made  6/6/2022 10:14 AM    Hospital care reviewed  Records reviewed    Patient was hospitialized at  [facility]    Date of Admission  06/02/22    Date of discharge  06/03/22    Diagnosis  Ureteral stone with hydronephrosis    Disposition  Home      TCM Call (since 5/15/2022)     Scheduled for follow up? Yes      Patient here for TCM following hospital admission for kidney stone  Eventually passed it without a procedure  Has follow-up with urologist in September  They have requested ultrasound and x-ray prior to that  Hospital records reviewed  Medications reviewed and reconciled  Review of Systems   Review of Systems   Constitutional: Negative for activity change, appetite change, diaphoresis and fatigue  Respiratory: Negative for cough, chest tightness, shortness of breath and wheezing  Cardiovascular: Negative for chest pain, palpitations and leg swelling  Fast or slow heart rate   Gastrointestinal: Negative for abdominal pain, blood in stool, constipation, diarrhea, nausea and vomiting  Genitourinary: Negative for difficulty urinating, dysuria, frequency and hematuria  Musculoskeletal: Negative for arthralgias, gait problem, joint swelling and myalgias  Neurological: Negative for dizziness, light-headedness and headaches  Psychiatric/Behavioral: Negative for agitation, confusion, dysphoric mood and sleep disturbance  The patient is not nervous/anxious  Current Medications       Current Outpatient Medications:     magnesium oxide (MAG-OX) 400 mg, Take 1 tablet (400 mg total) by mouth daily Nightly, Disp: 90 tablet, Rfl: 3    meclizine (ANTIVERT) 25 mg tablet, Take 1 tablet (25 mg total) by mouth every 8 (eight) hours as needed for dizziness, Disp: 30 tablet, Rfl: 1    methocarbamol (ROBAXIN) 750 mg tablet, methocarbamol 750 mg tablet  take 1 tablet by mouth every 8 hours if needed, Disp: , Rfl:     Rimegepant Sulfate (Nurtec) 75 MG TBDP, Take one NURTEC 75 mg at onset under tongue  Limit 1 in 24 hours   8 a month , Disp: 8 tablet, Rfl: 11    tamsulosin (FLOMAX) 0 4 mg, Take 1 capsule (0 4 mg total) by mouth daily with dinner for 7 days (Patient not taking: Reported on 6/2/2022), Disp: 7 capsule, Rfl: 0    temazepam (RESTORIL) 15 mg capsule, temazepam 15 mg capsule  as needed (Patient not taking: No sig reported), Disp: , Rfl:     Current Allergies     Allergies as of 06/15/2022    (No Known Allergies)            The following portions of the patient's history were reviewed and updated as appropriate: allergies, current medications, past family history, past medical history, past social history, past surgical history and problem list      Past Medical History:   Diagnosis Date    Chronic back pain     Migraine        Past Surgical History:   Procedure Laterality Date    HERNIA REPAIR      MANDIBLE FRACTURE SURGERY      MOUTH SURGERY      cyst in cheek    NASAL SEPTUM SURGERY         Family History   Problem Relation Age of Onset    Breast cancer Mother     Diabetes Father     No Known Problems Family     Substance Abuse Neg Hx     Mental illness Neg Hx          Medications have been verified  Objective   /82   Pulse 100   Temp 98 1 °F (36 7 °C) (Tympanic)   Ht 5' 7" (1 702 m)   Wt 86 6 kg (191 lb)   SpO2 96%   BMI 29 91 kg/m²        Physical Exam     Physical Exam  Constitutional:       General: He is not in acute distress  Appearance: He is well-developed  He is not diaphoretic  HENT:      Head: Normocephalic and atraumatic  Nose: Nose normal    Eyes:      Extraocular Movements: Extraocular movements intact  Pupils: Pupils are equal, round, and reactive to light  Neck:      Thyroid: No thyromegaly  Cardiovascular:      Rate and Rhythm: Normal rate and regular rhythm  Heart sounds: Normal heart sounds  Pulmonary:      Effort: Pulmonary effort is normal  No respiratory distress  Breath sounds: Normal breath sounds  No wheezing  Abdominal:      General: Bowel sounds are normal  There is no distension  Palpations: Abdomen is soft  There is no mass  Tenderness: There is no abdominal tenderness  There is no right CVA tenderness, left CVA tenderness, guarding or rebound  Hernia: No hernia is present  Musculoskeletal:      Right shoulder: No tenderness  Cervical back: Neck supple  Right lower leg: No edema  Left lower leg: No edema  Lymphadenopathy:      Cervical: No cervical adenopathy  Neurological:      Mental Status: He is alert and oriented to person, place, and time     Psychiatric:         Mood and Affect: Mood normal          Behavior: Behavior normal

## 2022-06-15 NOTE — PATIENT INSTRUCTIONS
Kidney Stones   WHAT YOU NEED TO KNOW:   Kidney stones form in the urinary system when the water and waste in your urine are out of balance  When this happens, certain types of waste crystals separate from the urine  The crystals build up and form kidney stones  You may have more than one kidney stone  DISCHARGE INSTRUCTIONS:   Return to the emergency department if:   You are vomiting and it is not relieved with medicine  Call your doctor or kidney specialist if:   You have a fever  You have trouble urinating  You see blood in your urine  You have severe pain  You have any questions or concerns about your condition or care  Medicines: You may need any of the following:  NSAIDs , such as ibuprofen, help decrease swelling, pain, and fever  This medicine is available with or without a doctor's order  NSAIDs can cause stomach bleeding or kidney problems in certain people  If you take blood thinner medicine, always ask your healthcare provider if NSAIDs are safe for you  Always read the medicine label and follow directions  Acetaminophen  decreases pain and fever  It is available without a doctor's order  Ask how much to take and how often to take it  Follow directions  Read the labels of all other medicines you are using to see if they also contain acetaminophen, or ask your doctor or pharmacist  Acetaminophen can cause liver damage if not taken correctly  Do not use more than 4 grams (4,000 milligrams) total of acetaminophen in one day  Prescription pain medicine  may be given  Ask your healthcare provider how to take this medicine safely  Some prescription pain medicines contain acetaminophen  Do not take other medicines that contain acetaminophen without talking to your healthcare provider  Too much acetaminophen may cause liver damage  Prescription pain medicine may cause constipation  Ask your healthcare provider how to prevent or treat constipation       Medicines  to balance your electrolytes may be needed  Take your medicine as directed  Contact your healthcare provider if you think your medicine is not helping or if you have side effects  Tell him or her if you are allergic to any medicine  Keep a list of the medicines, vitamins, and herbs you take  Include the amounts, and when and why you take them  Bring the list or the pill bottles to follow-up visits  Carry your medicine list with you in case of an emergency  What you can do to manage kidney stones:   Drink more liquids  Your healthcare provider may tell you to drink at least 8 to 12 (eight-ounce) cups of liquids each day  This helps flush out the kidney stones when you urinate  Water is the best liquid to drink  Strain your urine every time you go to the bathroom  Urinate through a strainer or a piece of thin cloth to catch the stones  Take the stones to your healthcare provider so they can be sent to the lab for tests  This will help your healthcare providers plan the best treatment for you  Ask if you should avoid any foods  You may need to limit oxalate  Oxalate is a chemical found in some plant foods  The most common type of kidney stone is made up of crystals that contain calcium and oxalate  Your healthcare provider or dietitian may recommend that you limit oxalate if you get this type of kidney stone often  You may need to limit how much sodium (salt) or protein you eat  Ask for information about the best foods for you  Be physically active as directed  Your stones may pass more easily if you stay active  Physical activity can also help you manage your weight  Ask about the best activities for you  After you pass the kidney stones: Your healthcare provider may  order a 24-hour urine test  Results from a 24-hour urine test will help your healthcare provider plan ways to prevent more stones from forming  Your healthcare provider will give you more instructions    Follow up with your doctor or kidney specialist as directed:  Write down your questions so you remember to ask them during your visits  © Copyright [employer] 2022 Information is for End User's use only and may not be sold, redistributed or otherwise used for commercial purposes  All illustrations and images included in CareNotes® are the copyrighted property of [employer]  or [employer]   The above information is an  only  It is not intended as medical advice for individual conditions or treatments  Talk to your doctor, nurse or pharmacist before following any medical regimen to see if it is safe and effective for you  Continue current medications  Arrange for follow-up Urology in September after ultrasound of the kidneys and bladder and flat plate of the abdomen  Get her PSA at that time also  Maintain good hydration to avoid recurrent kidney stones  When new insurance is in place and you know which lab, call and we will add cholesterol testing in general, call testing to your PSA to be done in September  Then schedule full physical after that

## 2022-09-06 ENCOUNTER — HOSPITAL ENCOUNTER (OUTPATIENT)
Dept: RADIOLOGY | Facility: HOSPITAL | Age: 57
Discharge: HOME | End: 2022-09-06
Payer: COMMERCIAL

## 2022-09-06 ENCOUNTER — HOSPITAL ENCOUNTER (OUTPATIENT)
Dept: ULTRASOUND IMAGING | Facility: HOSPITAL | Age: 57
Discharge: HOME | End: 2022-09-06
Payer: COMMERCIAL

## 2022-09-06 DIAGNOSIS — N13.2 URETERAL STONE WITH HYDRONEPHROSIS: ICD-10-CM

## 2022-09-06 PROCEDURE — 76770 US EXAM ABDO BACK WALL COMP: CPT

## 2022-09-06 PROCEDURE — 74018 RADEX ABDOMEN 1 VIEW: CPT

## 2022-09-14 ENCOUNTER — TELEPHONE (OUTPATIENT)
Dept: NEUROLOGY | Facility: CLINIC | Age: 57
End: 2022-09-14

## 2022-09-14 NOTE — TELEPHONE ENCOUNTER
Called patient and left voicemail to confirm their upcoming appointment with Dr Marcella Woodard  Informed patient about arriving in the [place] location 15 minutes prior to appointment to get checked in and go over chart

## 2022-09-21 ENCOUNTER — OFFICE VISIT (OUTPATIENT)
Dept: NEUROLOGY | Facility: CLINIC | Age: 57
End: 2022-09-21
Payer: COMMERCIAL

## 2022-09-21 VITALS
WEIGHT: 206 LBS | BODY MASS INDEX: 32.33 KG/M2 | DIASTOLIC BLOOD PRESSURE: 97 MMHG | SYSTOLIC BLOOD PRESSURE: 161 MMHG | HEART RATE: 78 BPM | HEIGHT: 67 IN | TEMPERATURE: 97.6 F

## 2022-09-21 DIAGNOSIS — G43.009 MIGRAINE WITHOUT AURA AND WITHOUT STATUS MIGRAINOSUS, NOT INTRACTABLE: Primary | ICD-10-CM

## 2022-09-21 DIAGNOSIS — G47.33 OSA (OBSTRUCTIVE SLEEP APNEA): ICD-10-CM

## 2022-09-21 PROCEDURE — 99214 OFFICE O/P EST MOD 30 MIN: CPT | Performed by: PSYCHIATRY & NEUROLOGY

## 2022-09-21 RX ORDER — GABAPENTIN 100 MG/1
CAPSULE ORAL
Qty: 90 CAPSULE | Refills: 3 | Status: SHIPPED | OUTPATIENT
Start: 2022-09-21

## 2022-09-21 NOTE — PROGRESS NOTES
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, s/p surgery for deviated septum, BPPV referred here for evaluation of headache  My initial evaluation 2021     Migraine without aura and without status migrainosus, not intractable  He reports a history of migraines dating back to mid 45s  He does not know if he has a family history of migraines  He reports severe migraines are diffuse pressure that used to improve over 1-2 days with rest and eletriptan until recently,  moderate migraines are typically unilateral left retro-orbital stabbing like a narrow and out the other side  That typically improve with rest and NSAIDs although still last for over 4 hours  He denies classic aura,  Does feel like his chronic tinnitus changes prior to onset of migraine  Reports typical associated migrainous features without autonomic features  - as of 2021: on average severe migraines 5-6 times a year lasting 1-2 days, moderate migraines 3-4 times a month, most recently lasted 3 weeks in 2021  Trial of magnesium for prevention, rizatriptan for abortive  If his migraines remain uncontrolled will recommend prescription preventative  Recommended treating sleep apnea    - as of 9/10/2021: Reports  Mild headaches 2-3 times per week that improved with OTC meds and migraines are stable but to 3 per month and improved with rizatriptan although he reports side effects, therefore will start trial of nurtec for abortive  Last visit migraine improved with Toradol injection  Recommended trying to take magnesium consistently for prevention   - as of 3/18/2022: Migraines stable at about 3-4 times a month  Nurtec works for abortive, slower than rizatriptan, but without side effects    - as of 9/21/2022: He reports significant  migraines about 3-4 times a month, milder migraines/headaches 3-4 times a week  Working on treating WALE and will follow up with Sleep Medicine  Trial of low-dose gabapentin for prevention which may also help with other issues such as sleep for other pain  Continue Nurtec for rescue which works well suggesting other CGRP meds will work well in future if needed  Workup:  -   MRI brain with without contrast 06/13/2021: Right frontal meningioma laterally within the anterior cranial fossa measuring 1 5 x 2 4 x 2 8 cm  This extends into the sylvian fissure without edema in the adjacent brain  Several white matter hyperintensities on T2 and FLAIR imaging, primarily within the frontal lobes are nonspecific and may represent precocious chronic microangiopathic change  - now following with Neurosurgery for this     Preventative:  - we discussed headache hygiene and lifestyle factors that may improve headaches  - continue magnesium 400 mg and change to pm  Discussed proper use, possible side effects and risks  -  Trial of gabapentin with gradual titration up to 300 mg nightly with room to increase further in the future under physician supervision  Discussed proper use, possible side effects and risks    - Past/ failed/contraindicated: topiramate contraindicated due to history of kidney stones, amitriptyline would be contraindicated due to age   - future options:   verapamil, CGRP Med    Abortive:  - discussed not taking over-the-counter or prescription pain medications more than 3 days per week to prevent medication overuse/rebound headache  - He is on through other providers:  Methocarbamol, Voltaren  - continue nurtec 75 mg  Discussed proper use, possible side effects and risks  - Past/ failed/contraindicated: eletriptan does not always work, rizatriptan works but gets panic attack  - past/helped:  toradol shot worked  well  - future options:   prochlorperazine, Toradol IM or p o , could consider trial for 5 days of Depakote or dexamethasone 4 prolonged migraine, ubrelvy, reyvow, nurtec    WALE (obstructive sleep apnea)   -     Ambulatory referral to Sleep Medicine placed 9/10/21 and 9/21/22  3/18/2022 again discussed morbidity and mortality of untreated WALE, do not drive if not feeling cognitively well, he will consider following up for known WALE      Meningioma  - continue to follow with neurosurgery       Patient instructions      Continue to follow with neurosurgery for meningioma    Referral to sleep medicine for sleep apnea placed    Headache/migraine treatment:   Abortive medications (for immediate treatment of a headache): It is ok to take ibuprofen, acetaminophen or naproxen (Advil, Tylenol,  Aleve, Excedrin) if they help your headaches you should limit these to No more than 3 times a week to avoid medication overuse/rebound headaches  For your more moderate to severe migraines take this medication early   nurtec 75 mg under the tongue  Do not repeat in 24 hours         Over the counter preventive supplements for headaches/migraines (if you try, try for 3 months straight)  (to take every day to help prevent headaches - not to take at the time of headache):  [x] Magnesium 400mg daily (If any diarrhea or upset stomach, decrease dose  as tolerated)    Prescription preventive medications for headaches/migraines   (to take every day to help prevent headaches - not to take at the time of headache):  [x] trial of gabapentin - 100 mg nightly for 1 week, then if needed/tolerated increase to 200 mg nightly for 1 week, then if needed/tolerated increase to 300 mg nightly and continue for 1 month then check in with doctor   ----    Then would try verapamil with gradual titration up as tolerated (Blood pressure med so common SE are dizziness/drop in BP, constipation)  -------------    Emgality/Galcanezumab - the 1st dose is 240 mg loading dose of 2 consecutive 120 mg injections  Thereafter, 120 mg injections every 30 days    If needed there is a coupon card for the copay at [employer]  com     READ INSTRUCTIONS that come with the medication  REFRIGERATE  Keep out of direct sunlight  Prior to administration, allow to come to room temperature for 30 minutes  Do not warm using a heat source (eg, microwave or hot water)  Do not shake  Administer in preferably abdomen (avoiding 2 inches around the navel), thigh, upper arm, or buttocks avoiding areas of skin that are tender, bruised, red or hard  Deliver entire contents of single-use prefilled pen or syringe  Lifestyle Recommendations:  [x] SLEEP - Maintain a regular sleep schedule: Adults need at least 7-8 hours of uninterrupted a night  Maintain good sleep hygiene:  Going to bed and waking up at consistent times, avoiding excessive daytime naps, avoiding caffeinated beverages in the evening, avoid excessive stimulation in the evening and generally using bed primarily for sleeping  One hour before bedtime would recommend turning lights down lower, decreasing your activity (may read quietly, listen to music at a low volume)  When you get into bed, should eliminate all technology (no texting, emailing, playing with your phone, iPad or tablet in bed)  [x] HYDRATION - Maintain good hydration  Drink  2L of fluid a day (4 typical small water bottles)  [x] DIET - Maintain good nutrition  In particular don't skip meals and try and eat healthy balanced meals regularly  [x] TRIGGERS - Look for other triggers and avoid them: Limit caffeine to 1-2 cups a day or less   Avoid dietary triggers that you have noticed bring on your headaches (this could include aged cheese, peanuts, MSG, aspartame and nitrates)  [x] EXERCISE - physical exercise as we all know is good for you in many ways, and not only is good for your heart, but also is beneficial for your mental health, cognitive health and  chronic pain/headaches  I would encourage at the least 5 days of physical exercise weekly for at least 30 minutes  Education and Follow-up  [x] Please call with any questions or concerns  Of course if any new concerning symptoms go to the emergency department  [x] Follow up 3-4 months, sooner if needed          CC: We had the pleasure of evaluating Jose Champagne III in neurological consultation today  Jose Champagne III is a   Right and left handed male who presents today for evaluation of headaches  History obtained from patient as well as available medical record review    History of Present Illness:   Interval history as of 9/21/2022  - denies any new or concerning neurologic symptoms since last visit   - known WALE not treated, has not followed up with them, has tried 6 masks, feels worse with the mask he thinks, trying to use the mask now and will see them - 9/14/17    Headaches and migraines   He reports migraines with visual aura about 3 to 4 times a month and milder migraines/headaches 3 to 4 times a week, can wake up with them or middle of day     Preventative:  Magnesium 400 mg   Abortive: Nurtec helps within 20 mins   Denies bothersome side effects     Interval history as of 3/18/2022  - denies any new or concerning neurologic symptoms since last visit   - he is following with Neurosurgery for  Meningioma  - known WALE not treated, has not followed up with them, has tried 6 masks, feels worse with the mask he thinks, will consider following with them     Headaches and migraines   Migraines 3-4 times a month, feels stable    Can not recall milder headaches number    Preventative: trial of magnesium for prevention  Abortive: trial of Nurtec-approved 08/16/2021 - works without side effects but slower than rizatriptan which works faster but makes him tired     Interval history as of 9/10/2021  - denies any new or concerning neurologic symptoms since last visit   - -MRI brain with without contrast 06/13/2021: Right frontal meningioma laterally within the anterior cranial fossa measuring 1 5 x 2 4 x 2 8 cm  This extends into the sylvian fissure without edema in the adjacent brain  Several white matter hyperintensities on T2 and FLAIR imaging, primarily within the frontal lobes are nonspecific and may represent precocious chronic microangiopathic change  -  Follow-up with Neurosurgery and getting repeat scan  -has not followed up with Sleep Medicine, but using mask more     Headaches and migraines   - migraines 2-3 per month, stable  - mild headaches 2-3 times per week that improve with OTC meds     Preventative:    - magnesium - not taking consistently     Abortive:   - Rizatriptan causes heart racing/panic attacks   6-8 pills a week ibuprofen/tylenol  - last visit toradol shot worked      Headaches started at what age? Worse around 47   How often do the headaches occur? -  Typical migraine once every 2-3 months and resolves over hours with triptan  -  04/22/2021 went to emergency room for 2 weeks of migraine  - as of 5/26/2021: on average severe migraines 5-6 times a year lasting 1-2 days, moderate migraines 3-4 times a month, most recently lasted 3 weeks in April 2021  - As of 9/10/2021, 2-3 times a month  Last for 2-3 hours with rizatriptan and go to sleep afterward  W/o rizatriptan could last from a 4-5 hours to 3 days  No change with tinnitus, flashes of light  Patients's other headaches has no other symptoms associated with it  What time of the day do the headaches start? No particular time of day   How long do the headaches last? 1-2 days usually, over 4 hours for moderate   Are you ever headache free?  Yes    Aura? without aura    Prodrome: frequency of tinnitus changes    Last eye exam: glasses 1-2 years ago     Where is your headache located and pain quality?   - severe migraines whole head - pressure  - can be unilateral on the left - stabbing to left eye like an arrow out the other side     What is the intensity of pain? Average: 4/10, worst 7 when at hospital/10  Associated symptoms:   [x] Nausea       [] Vomiting       [x] Photophobia     [x]Phonophobia      [x] Osmophobia  [] Blurred vision    [x] Light-headed or dizzy  - just recently    [x] Tinnitus - chronic bilateral and worse with migraine can be louder on one ear   [] Hands or feet tingle or feel numb/paresthesias      [] Ptosis      [] Facial droop  [] Lacrimation - both   [] Nasal congestion/rhinorrhea        Things that make the headache worse? No specific movements, any movement     Headache triggers:  Unknown     Have you seen someone else for headaches or pain? Yes, neurology just recently   Have you had trigger point injection performed and how often? No  Have you had Botox injection performed and how often? No    Have you had epidural injections or transforaminal injections performed? No  Have you ever had any Brain imaging? Yes several MRI Brain     What medications do you take or have you taken for your headaches?    ABORTIVE:    OTC medications have been ineffective      eletriptan/Relpax 40 mg - 1-2 usually minimizes it     Meclizine - for BPPV in the past helped   Methocarbamol - for back - prn - maybe 3 times a month   Diclofenac 50 mg - for back prn - a couple times a month      past   Cyclobenzaprine  ED for 02/20/2021: Toradol 30 mg, Benadryl 25 mg, Reglan 10 mg,  IV fluids, Decadron   Medrol Dosepak - didn't seem to help     PREVENTIVE:        for sleep: Temazepam prn     Past/ failed/contraindicated:  -topiramate contraindicated due to history of kidney stones       LIFESTYLE  Sleep - WALE not tolerate of CPAP - has seen 2 different specialists  - averages: 3-8 hours, usually 5   Problems falling asleep?:   Yes  Problems staying asleep?:  Yes    Water: 3 - 20 oz per day  Caffeine: cup in am and at work - cutting back     Mood: denies recent stress   Denies history of anxiety or depression or other diagnosed mood disorder    The following portions of the patient's history were reviewed and updated as appropriate: allergies, current medications, past family history, past medical history, past social history, past surgical history and problem list     Pertinent family history:  Family history of headaches:  no known family members with significant headaches  Any family history of aneurysms - No    Pertinent social history:  Work:  at [employer] with wife, 2 kids - 32, 21    Illicit Drugs: denies  Alcohol/tobacco: Denies alcohol use, Denies tobacco use    Past Medical History:     Past Medical History:   Diagnosis Date    Chronic back pain     Migraine        Patient Active Problem List   Diagnosis    Low back pain    Strain of lumbar region    WALE on CPAP    Mixed dyslipidemia    Nasal septal deviation    Seasonal allergic rhinitis    Nasal turbinate hypertrophy    Nasal obstruction    Deviated nasal septum    Hypertrophy of nasal turbinates    Meningioma ([address] )    Abnormal finding on MRI of brain    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[address] )    Ureteral stone with hydronephrosis       Medications:      Current Outpatient Medications   Medication Sig Dispense Refill    gabapentin (Neurontin) 100 mg capsule 100 mg nightly for 1 week, then if needed/tolerated increase to 200 mg nightly for 1 week, then if needed/tolerated increase to 300 mg nightly and continue for 1 month then check in with doctor 90 capsule 3    magnesium oxide (MAG-OX) 400 mg Take 1 tablet (400 mg total) by mouth daily Nightly 90 tablet 3    methocarbamol (ROBAXIN) 750 mg tablet methocarbamol 750 mg tablet   take 1 tablet by mouth every 8 hours if needed      Rimegepant Sulfate (Nurtec) 75 MG TBDP Take one NURTEC 75 mg at onset under tongue  Limit 1 in 24 hours  8 a month  8 tablet 11    tamsulosin (FLOMAX) 0 4 mg Take 1 capsule (0 4 mg total) by mouth daily with dinner for 7 days 7 capsule 0    temazepam (RESTORIL) 15 mg capsule temazepam 15 mg capsule   as needed      meclizine (ANTIVERT) 25 mg tablet Take 1 tablet (25 mg total) by mouth every 8 (eight) hours as needed for dizziness (Patient not taking: Reported on 9/21/2022) 30 tablet 1     No current facility-administered medications for this visit          Allergies:    No Known Allergies    Family History:     Family History   Problem Relation Age of Onset    Breast cancer Mother     Diabetes Father     No Known Problems Family     Substance Abuse Neg Hx     Mental illness Neg Hx        Social History:     Social History     Socioeconomic History    Marital status: /Civil Union     Spouse name: Diane Agosto Number of children: 2    Years of education: Not on file    Highest education level: Not on file   Occupational History    Occupation:      Employer: [address]   Tobacco Use    Smoking status: Never Smoker    Smokeless tobacco: Former User     Types: Snuff   Vaping Use    Vaping Use: Never used   Substance and Sexual Activity    Alcohol use: Never    Drug use: No    Sexual activity: Not on file   Other Topics Concern    Not on file   Social History Narrative    Not on file     Social Determinants of Health     Financial Resource Strain: Not on file   Food Insecurity: Not on file   Transportation Needs: Not on file   Physical Activity: Not on file   Stress: Not on file   Social Connections: Not on file   Intimate Partner Violence: Not on file   Housing Stability: Not on file         Objective:     Physical Exam:                                                                 Vitals:            Constitutional:    /97 (BP Location: Right arm, Patient Position: Sitting, Cuff Size: Standard)   Pulse 78   Temp 97 6 °F (36 4 °C) (Tympanic)   Ht 5' 7" (1 702 m)   Wt 93 4 kg (206 lb)   BMI 32 26 kg/m²   BP Readings from Last 3 Encounters:   09/21/22 161/97   06/15/22 124/82   06/03/22 155/97     Pulse Readings from Last 3 Encounters:   09/21/22 78   06/15/22 100   06/03/22 86         Well developed, well nourished, well groomed  No dysmorphic features  HEENT:  Normocephalic atraumatic  See neuro exam   Chest:  Respirations appear regular and unlabored  Cardiovascular:  no observed significant swelling  Musculoskeletal:  (see below under neurologic exam for evaluation of motor function and gait)   Skin:  warm and dry, not diaphoretic  Psychiatric:  Normal behavior and appropriate affect        Neurological Examination:     Mental status/cognitive function:   Recent and remote memory intact  Attention span and concentration as well as fund of knowledge are appropriate for age  Normal language and spontaneous speech  Cranial Nerves:  III, IV, VI-Pupils were equal, round  Extraocular movements were full and conjugate   VII-facial expression symmetric  VIII-hearing grossly intact bilaterally   Motor Exam: symmetric bulk throughout  no atrophy, fasciculations or abnormal movements noted  Coordination:  no apparent dysmetria, ataxia or tremor noted  Gait: steady casual gait         Pertinent lab results:   See EMR for recent labs   04/22/2021 BMP and CBC unremarkable, ESR 6  01/10/2020 LFTs with elevated alkaline phosphatase     Imaging: I have personally reviewed imaging and radiology read   -   MRI brain with without contrast 06/13/2021: Right frontal meningioma laterally within the anterior cranial fossa measuring 1 5 x 2 4 x 2 8 cm   This extends into the sylvian fissure without edema in the adjacent brain    Several white matter hyperintensities on T2 and FLAIR imaging, primarily within the frontal lobes are nonspecific and may represent precocious chronic microangiopathic change  -  noncontrast head CT 04/22/2021:  No acute intracranial abnormality    Review of Systems:   ROS obtained by medical assistant Personally reviewed and updated if indicated  I recommended PCP follow up for non neurologic problems  Review of Systems   Constitutional: Negative for appetite change and fever  HENT: Negative  Negative for hearing loss, tinnitus, trouble swallowing and voice change  Eyes: Negative  Negative for photophobia and pain  Respiratory: Negative  Negative for shortness of breath  Cardiovascular: Negative  Negative for palpitations  Gastrointestinal: Negative  Negative for nausea and vomiting  Endocrine: Negative  Negative for cold intolerance  Genitourinary: Negative  Negative for dysuria, frequency and urgency  Musculoskeletal: Negative  Negative for myalgias and neck pain  Skin: Negative  Negative for rash  Neurological: Negative  Negative for dizziness, tremors, seizures, syncope, facial asymmetry, speech difficulty, weakness, light-headedness, numbness and headaches  Hematological: Negative  Does not bruise/bleed easily  Psychiatric/Behavioral: Negative  Negative for confusion, hallucinations and sleep disturbance  All other systems reviewed and are negative        I have spent 25 minutes with Patient  today in which greater than 50% of this time was spent in counseling/coordination of care  I also spent 12 minutes non face to face for this patient the same day         Author:  Carmenza Chiang MD 9/21/2022 5:26 PM

## 2022-09-21 NOTE — PATIENT INSTRUCTIONS
Continue to follow with neurosurgery for meningioma    Referral to sleep medicine for sleep apnea placed    Headache/migraine treatment:   Abortive medications (for immediate treatment of a headache): It is ok to take ibuprofen, acetaminophen or naproxen (Advil, Tylenol,  Aleve, Excedrin) if they help your headaches you should limit these to No more than 3 times a week to avoid medication overuse/rebound headaches  For your more moderate to severe migraines take this medication early   nurtec 75 mg under the tongue  Do not repeat in 24 hours  Over the counter preventive supplements for headaches/migraines (if you try, try for 3 months straight)  (to take every day to help prevent headaches - not to take at the time of headache):  [x] Magnesium 400mg daily (If any diarrhea or upset stomach, decrease dose  as tolerated)    Prescription preventive medications for headaches/migraines   (to take every day to help prevent headaches - not to take at the time of headache):  [x] trial of gabapentin - 100 mg nightly for 1 week, then if needed/tolerated increase to 200 mg nightly for 1 week, then if needed/tolerated increase to 300 mg nightly and continue for 1 month then check in with doctor   ----    Then would try verapamil with gradual titration up as tolerated (Blood pressure med so common SE are dizziness/drop in BP, constipation)  -------------    Emgality/Galcanezumab - the 1st dose is 240 mg loading dose of 2 consecutive 120 mg injections  Thereafter, 120 mg injections every 30 days    If needed there is a coupon card for the copay at [employer]     READ INSTRUCTIONS that come with the medication  REFRIGERATE  Keep out of direct sunlight  Prior to administration, allow to come to room temperature for 30 minutes  Do not warm using a heat source (eg, microwave or hot water)  Do not shake   Administer in preferably abdomen (avoiding 2 inches around the navel), thigh, upper arm, or buttocks avoiding areas of skin that are tender, bruised, red or hard  Deliver entire contents of single-use prefilled pen or syringe  Lifestyle Recommendations:  [x] SLEEP - Maintain a regular sleep schedule: Adults need at least 7-8 hours of uninterrupted a night  Maintain good sleep hygiene:  Going to bed and waking up at consistent times, avoiding excessive daytime naps, avoiding caffeinated beverages in the evening, avoid excessive stimulation in the evening and generally using bed primarily for sleeping  One hour before bedtime would recommend turning lights down lower, decreasing your activity (may read quietly, listen to music at a low volume)  When you get into bed, should eliminate all technology (no texting, emailing, playing with your phone, iPad or tablet in bed)  [x] HYDRATION - Maintain good hydration  Drink  2L of fluid a day (4 typical small water bottles)  [x] DIET - Maintain good nutrition  In particular don't skip meals and try and eat healthy balanced meals regularly  [x] TRIGGERS - Look for other triggers and avoid them: Limit caffeine to 1-2 cups a day or less  Avoid dietary triggers that you have noticed bring on your headaches (this could include aged cheese, peanuts, MSG, aspartame and nitrates)  [x] EXERCISE - physical exercise as we all know is good for you in many ways, and not only is good for your heart, but also is beneficial for your mental health, cognitive health and  chronic pain/headaches  I would encourage at the least 5 days of physical exercise weekly for at least 30 minutes  Education and Follow-up  [x] Please call with any questions or concerns  Of course if any new concerning symptoms go to the emergency department    [x] Follow up 3-4 months, sooner if needed

## 2022-10-07 ENCOUNTER — APPOINTMENT (OUTPATIENT)
Dept: LAB | Facility: HOSPITAL | Age: 57
End: 2022-10-07
Attending: UROLOGY
Payer: COMMERCIAL

## 2022-10-07 DIAGNOSIS — Z12.5 PROSTATE CANCER SCREENING: ICD-10-CM

## 2022-10-07 LAB — PSA SERPL-MCNC: 4.6 NG/ML (ref 0–4)

## 2022-10-07 PROCEDURE — G0103 PSA SCREENING: HCPCS

## 2022-10-07 PROCEDURE — 36415 COLL VENOUS BLD VENIPUNCTURE: CPT

## 2022-10-21 ENCOUNTER — OFFICE VISIT (OUTPATIENT)
Dept: UROLOGY | Facility: CLINIC | Age: 57
End: 2022-10-21
Payer: COMMERCIAL

## 2022-10-21 VITALS
WEIGHT: 209 LBS | SYSTOLIC BLOOD PRESSURE: 140 MMHG | DIASTOLIC BLOOD PRESSURE: 74 MMHG | BODY MASS INDEX: 32.8 KG/M2 | HEART RATE: 80 BPM | HEIGHT: 67 IN

## 2022-10-21 DIAGNOSIS — Z12.5 PROSTATE CANCER SCREENING: ICD-10-CM

## 2022-10-21 DIAGNOSIS — R97.20 ELEVATED PSA: ICD-10-CM

## 2022-10-21 DIAGNOSIS — N20.0 NEPHROLITHIASIS: Primary | ICD-10-CM

## 2022-10-21 PROBLEM — N13.2 URETERAL STONE WITH HYDRONEPHROSIS: Status: RESOLVED | Noted: 2022-06-02 | Resolved: 2022-10-21

## 2022-10-21 PROCEDURE — 99214 OFFICE O/P EST MOD 30 MIN: CPT | Performed by: PHYSICIAN ASSISTANT

## 2022-10-21 NOTE — PROGRESS NOTES
10/21/2022      Chief Complaint   Patient presents with   • Follow-up   • Nephrolithiasis         Assessment and Plan    56 y o  male managed by Dr Deann Maurice    1  Prostate cancer screening  2  Nephrolithiasis  3  Right simple renal cyst    Lab Results   Component Value Date    PSA 4 6 (H) 10/07/2022    PSA 3 5 02/06/2018    PSA 5 2 (H) 12/20/2017       Stone free, pain free  Voiding well  Simple cyst stable no workup  Does have fluctuating PSA over the last five years  Recent stone passage  He also reports vigorous exercise and biking just prior to his recent lab work  I suggest a repeat PSA total and free in 3-4 weeks from now with 72 hours prior no sex/ejaculation/biking/rowing/etc   I will call with results if it remains elevated greater than 4 0 will pursue multiparametric MRI of the pelvis and prostate that could then be used for fusion biopsy technology  Patient agrees to this plan above  History of Present Illness  Nely Garrison III is a 56 y o  male here for evaluation of three-month stone follow-up  I saw him in June there is concern for persistent obstruction ongoing for 6-7 weeks from a distal left ureteral calculus and hydronephrosis  He was admitted to the hospital that day for hydration and ureteroscopy  Coincidentally he passed the stone overnight and no surgery was needed  The stone was calcium oxalate consistent with his prior episodes  Here today with updated imaging stone free resolved hydronephrosis and normal b/l ureteral jets,  and need for prostate cancer screening        Review of Systems   Constitutional: Negative for activity change, appetite change, chills, fever and unexpected weight change  HENT: Negative  Respiratory: Negative  Negative for shortness of breath  Cardiovascular: Negative  Negative for chest pain  Gastrointestinal: Negative for abdominal pain, diarrhea, nausea and vomiting  Endocrine: Negative      Genitourinary: Negative for decreased urine volume, difficulty urinating, dysuria, flank pain, frequency, hematuria, testicular pain and urgency  Musculoskeletal: Negative for back pain and gait problem  Skin: Negative  Allergic/Immunologic: Negative  Neurological: Negative  Hematological: Negative for adenopathy  Does not bruise/bleed easily  AUA SYMPTOM SCORE    Flowsheet Row Most Recent Value   AUA SYMPTOM SCORE    How often have you had a sensation of not emptying your bladder completely after you finished urinating? 1   How often have you had to urinate again less than two hours after you finished urinating? 1   How often have you found you stopped and started again several times when you urinate? 0   How often have you found it difficult to postpone urination? 1   How often have you had a weak urinary stream? 0   How often have you had to push or strain to begin urination? 0   How many times did you most typically get up to urinate from the time you went to bed at night until the time you got up in the morning? 1   Quality of Life: If you were to spend the rest of your life with your urinary condition just the way it is now, how would you feel about that? 3   AUA SYMPTOM SCORE 4           Vitals  Vitals:    10/21/22 1541   BP: 140/74   BP Location: Right arm   Patient Position: Sitting   Cuff Size: Adult   Pulse: 80   Weight: 94 8 kg (209 lb)   Height: 5' 7" (1 702 m)       Physical Exam  Vitals and nursing note reviewed  Constitutional:       General: He is not in acute distress  Appearance: Normal appearance  He is well-developed  He is not diaphoretic  HENT:      Head: Normocephalic and atraumatic  Pulmonary:      Effort: Pulmonary effort is normal       Comments: No cough or audible wheeze  Abdominal:      General: There is no distension  Tenderness: There is no abdominal tenderness  There is no right CVA tenderness or left CVA tenderness     Genitourinary:     Comments: Circumcised penis, normal phallus, orthotopic patent meatus  Testes smooth descended bilaterally into the scrotum nontender with no palpable mass  Digital rectal exam smooth prostate 30-40g, without appreciable nodule, induration or asymmetry  Musculoskeletal:      Right lower leg: No edema  Left lower leg: No edema  Skin:     General: Skin is warm and dry  Neurological:      Mental Status: He is alert and oriented to person, place, and time        Gait: Gait normal    Psychiatric:         Speech: Speech normal          Behavior: Behavior normal            Past History  Past Medical History:   Diagnosis Date   • Chronic back pain    • Migraine      Social History     Socioeconomic History   • Marital status: /Civil Union     Spouse name: Lottie Chinchilla   • Number of children: 2   • Years of education: None   • Highest education level: None   Occupational History   • Occupation:      Employer: [employer]   Tobacco Use   • Smoking status: Never Smoker   • Smokeless tobacco: Former User     Types: Snuff   Vaping Use   • Vaping Use: Never used   Substance and Sexual Activity   • Alcohol use: Never   • Drug use: No   • Sexual activity: None   Other Topics Concern   • None   Social History Narrative   • None     Social Determinants of Health     Financial Resource Strain: Not on file   Food Insecurity: Not on file   Transportation Needs: Not on file   Physical Activity: Not on file   Stress: Not on file   Social Connections: Not on file   Intimate Partner Violence: Not on file   Housing Stability: Not on file     Social History     Tobacco Use   Smoking Status Never Smoker   Smokeless Tobacco Former User   • Types: Snuff     Family History   Problem Relation Age of Onset   • Breast cancer Mother    • Diabetes Father    • No Known Problems Family    • Substance Abuse Neg Hx    • Mental illness Neg Hx        The following portions of the patient's history were reviewed and updated as appropriate: allergies, current medications, past medical history, past social history, past surgical history and problem list     Results  No results found for this or any previous visit (from the past 1 hour(s)) ]  Lab Results   Component Value Date    PSA 4 6 (H) 10/07/2022    PSA 3 5 02/06/2018    PSA 5 2 (H) 12/20/2017     Lab Results   Component Value Date    GLUCOSE 98 12/20/2017    CALCIUM 9 1 06/03/2022     (H) 12/20/2017    K 3 7 06/03/2022    CO2 25 06/03/2022     (H) 06/03/2022    BUN 22 06/03/2022    CREATININE 0 92 06/03/2022     Lab Results   Component Value Date    WBC 6 18 06/03/2022    HGB 14 1 06/03/2022    HCT 40 3 06/03/2022    MCV 86 06/03/2022     06/03/2022

## 2023-01-11 ENCOUNTER — OFFICE VISIT (OUTPATIENT)
Dept: FAMILY MEDICINE CLINIC | Facility: CLINIC | Age: 58
End: 2023-01-11

## 2023-01-11 ENCOUNTER — TELEPHONE (OUTPATIENT)
Dept: OTHER | Facility: OTHER | Age: 58
End: 2023-01-11

## 2023-01-11 VITALS
DIASTOLIC BLOOD PRESSURE: 86 MMHG | WEIGHT: 209 LBS | BODY MASS INDEX: 32.8 KG/M2 | OXYGEN SATURATION: 98 % | SYSTOLIC BLOOD PRESSURE: 128 MMHG | HEART RATE: 120 BPM | HEIGHT: 67 IN

## 2023-01-11 DIAGNOSIS — D33.0 BENIGN NEOPLASM OF SUPRATENTORIAL REGION OF BRAIN (HCC): ICD-10-CM

## 2023-01-11 DIAGNOSIS — J20.9 ACUTE BRONCHITIS, UNSPECIFIED ORGANISM: Primary | ICD-10-CM

## 2023-01-11 RX ORDER — ALBUTEROL SULFATE 90 UG/1
AEROSOL, METERED RESPIRATORY (INHALATION)
COMMUNITY
Start: 2022-12-17

## 2023-01-11 RX ORDER — BENZONATATE 200 MG/1
200 CAPSULE ORAL 3 TIMES DAILY PRN
Qty: 20 CAPSULE | Refills: 0 | Status: SHIPPED | OUTPATIENT
Start: 2023-01-11

## 2023-01-11 RX ORDER — BENZONATATE 100 MG/1
100 CAPSULE ORAL 3 TIMES DAILY PRN
COMMUNITY
Start: 2022-12-17 | End: 2023-01-11

## 2023-01-11 RX ORDER — AZITHROMYCIN 250 MG/1
TABLET, FILM COATED ORAL
Qty: 6 TABLET | Refills: 0 | Status: SHIPPED | OUTPATIENT
Start: 2023-01-11 | End: 2023-01-16

## 2023-01-11 NOTE — TELEPHONE ENCOUNTER
Patient is calling to schedule a sick appointment for a cough  Patient was transferred to the office

## 2023-01-11 NOTE — PROGRESS NOTES
[facility]        NAME: Kelsey Greenwood is a 56 y o  male  : 1965    MRN: [id number]  DATE: 2023  TIME: 9:42 AM    Assessment and Plan   Acute bronchitis, unspecified organism [J20 [id number]]  1  Acute bronchitis, unspecified organism  benzonatate (TESSALON) 200 MG capsule    azithromycin (Zithromax) 250 mg tablet      2  Benign neoplasm of supratentorial region of brain [facility])              Patient Instructions     Patient Instructions   Discussed viral vs bacterial infection  RX as ordered  Continue OTC cough/cold medications as directed  Call or return for problems/concerns          Chief Complaint     Chief Complaint   Patient presents with   • Cough     [id number] -- went to [facility]          History of Present Illness       C/o persistent cough-went to urgent care  given albuterol inhaler and tessalon-not much improvement  Cough is productive  Taking Nyquil provides some relief  No fever  Hx of Benign neoplasm of supratentorial region of brain-followed by neurology  Review of Systems   Review of Systems   Constitutional: Negative for activity change, chills, fatigue and fever  HENT: Positive for congestion and postnasal drip  Negative for ear pain, rhinorrhea, sinus pressure and sore throat  Eyes: Negative for pain, discharge and redness  Respiratory: Positive for cough, chest tightness and shortness of breath  Negative for wheezing  Cardiovascular: Negative for chest pain  Gastrointestinal: Negative for constipation, diarrhea, nausea and vomiting  Musculoskeletal: Negative for myalgias  Skin: Negative for rash  Neurological: Negative for dizziness and headaches  Current Medications       Current Outpatient Medications:   •  azithromycin (Zithromax) 250 mg tablet, Take 2 tablets (500 mg total) by mouth daily for 1 day, THEN 1 tablet (250 mg total) daily for 4 days  , Disp: 6 tablet, Rfl: 0  •  benzonatate (TESSALON) 200 MG capsule, Take 1 capsule (200 mg total) by mouth 3 (three) times a day as needed for cough, Disp: 20 capsule, Rfl: 0  •  albuterol (PROVENTIL HFA,VENTOLIN HFA) 90 mcg/act inhaler, INHALE 2 PUFFS BY MOUTH EVERY 4 TO 6 HOURS AS NEEDED BREATHING, Disp: , Rfl:   •  gabapentin (Neurontin) 100 mg capsule, 100 mg nightly for 1 week, then if needed/tolerated increase to 200 mg nightly for 1 week, then if needed/tolerated increase to 300 mg nightly and continue for 1 month then check in with doctor, Disp: 90 capsule, Rfl: 3  •  magnesium oxide (MAG-OX) 400 mg, Take 1 tablet (400 mg total) by mouth daily Nightly, Disp: 90 tablet, Rfl: 3  •  methocarbamol (ROBAXIN) 750 mg tablet, methocarbamol 750 mg tablet  take 1 tablet by mouth every 8 hours if needed, Disp: , Rfl:   •  Rimegepant Sulfate (Nurtec) 75 MG TBDP, Take one NURTEC 75 mg at onset under tongue  Limit 1 in 24 hours  8 a month , Disp: 8 tablet, Rfl: 11  •  temazepam (RESTORIL) 15 mg capsule, temazepam 15 mg capsule  as needed, Disp: , Rfl:     Current Allergies     Allergies as of 01/11/2023   • (No Known Allergies)            The following portions of the patient's history were reviewed and updated as appropriate: allergies, current medications, past family history, past medical history, past social history, past surgical history and problem list      Past Medical History:   Diagnosis Date   • Chronic back pain    • Migraine        Past Surgical History:   Procedure Laterality Date   • HERNIA REPAIR     • MANDIBLE FRACTURE SURGERY     • MOUTH SURGERY      cyst in cheek   • NASAL SEPTUM SURGERY         Family History   Problem Relation Age of Onset   • Breast cancer Mother    • Diabetes Father    • No Known Problems Family    • Substance Abuse Neg Hx    • Mental illness Neg Hx          Medications have been verified          Objective   /86   Pulse (!) 120   Ht 5' 7" (1 702 m)   Wt 94 8 kg (209 lb)   SpO2 98%   BMI 32 73 kg/m²        Physical Exam Physical Exam  Vitals and nursing note reviewed  Constitutional:       General: He is not in acute distress  Appearance: Normal appearance  He is not toxic-appearing or diaphoretic  HENT:      Head: Normocephalic  Right Ear: Tympanic membrane, ear canal and external ear normal  There is no impacted cerumen  Left Ear: Tympanic membrane, ear canal and external ear normal  There is no impacted cerumen  Mouth/Throat:      Mouth: Mucous membranes are moist       Pharynx: Posterior oropharyngeal erythema present  No oropharyngeal exudate  Eyes:      General:         Right eye: No discharge  Left eye: No discharge  Extraocular Movements: Extraocular movements intact  Conjunctiva/sclera: Conjunctivae normal    Cardiovascular:      Rate and Rhythm: Normal rate and regular rhythm  Heart sounds: Normal heart sounds  No murmur heard  No friction rub  No gallop  Pulmonary:      Effort: Pulmonary effort is normal  No respiratory distress  Breath sounds: Rhonchi present  Comments: Cough present  Musculoskeletal:         General: Normal range of motion  Cervical back: Normal range of motion and neck supple  Skin:     General: Skin is warm and dry  Neurological:      Mental Status: He is alert and oriented to person, place, and time     Psychiatric:         Mood and Affect: Mood normal          Behavior: Behavior normal

## 2023-01-26 ENCOUNTER — CLINICAL SUPPORT (OUTPATIENT)
Dept: NEUROLOGY | Facility: CLINIC | Age: 58
End: 2023-01-26

## 2023-01-26 ENCOUNTER — TELEPHONE (OUTPATIENT)
Dept: NEUROLOGY | Facility: CLINIC | Age: 58
End: 2023-01-26

## 2023-01-26 ENCOUNTER — PATIENT MESSAGE (OUTPATIENT)
Dept: NEUROLOGY | Facility: CLINIC | Age: 58
End: 2023-01-26

## 2023-01-26 DIAGNOSIS — G43.011 INTRACTABLE MIGRAINE WITHOUT AURA AND WITH STATUS MIGRAINOSUS: Primary | ICD-10-CM

## 2023-01-26 RX ORDER — KETOROLAC TROMETHAMINE 30 MG/ML
60 INJECTION, SOLUTION INTRAMUSCULAR; INTRAVENOUS ONCE
Status: COMPLETED | OUTPATIENT
Start: 2023-01-26 | End: 2023-01-26

## 2023-01-26 RX ADMIN — KETOROLAC TROMETHAMINE 60 MG: 30 INJECTION, SOLUTION INTRAMUSCULAR; INTRAVENOUS at 10:39

## 2023-01-26 NOTE — TELEPHONE ENCOUNTER
January 26, 2023  Hussein Edward III  [facility] Clinical Team 5 (supporting Laxmi Camargo MD)      9:34 AM  I called and left a voicemail around 9:00 but wanted to follow up with an e-mail.     Can I come by today and to get a toradol injection?   I did have a Nurtec approximately 6:00 AM

## 2023-01-26 NOTE — PROGRESS NOTES
Administered toradol injection into patient upper right buttocks   Patient tolerated injection   No erythema   No induration

## 2023-01-26 NOTE — TELEPHONE ENCOUNTER
Pt left vm asking if he can come in for toradol injection.  States that he took his nurtec about 6am and migraine  down a little bit but it is still bothering him.  [phone]    Ok for toradol injection?

## 2023-03-10 ENCOUNTER — TELEPHONE (OUTPATIENT)
Dept: NEUROLOGY | Facility: CLINIC | Age: 58
End: 2023-03-10

## 2023-03-10 NOTE — TELEPHONE ENCOUNTER
Called patient and left voicemail to confirm their upcoming appointment with Dr Papo Silva  Informed patient about arriving in the [place] location 15 minutes prior to appointment to get checked in and go over chart

## 2023-03-14 ENCOUNTER — OFFICE VISIT (OUTPATIENT)
Dept: NEUROLOGY | Facility: CLINIC | Age: 58
End: 2023-03-14

## 2023-03-14 VITALS
SYSTOLIC BLOOD PRESSURE: 151 MMHG | WEIGHT: 208 LBS | HEART RATE: 102 BPM | DIASTOLIC BLOOD PRESSURE: 101 MMHG | BODY MASS INDEX: 32.65 KG/M2 | TEMPERATURE: 97.1 F | HEIGHT: 67 IN

## 2023-03-14 DIAGNOSIS — G43.009 MIGRAINE WITHOUT AURA AND WITHOUT STATUS MIGRAINOSUS, NOT INTRACTABLE: ICD-10-CM

## 2023-03-14 RX ORDER — RIMEGEPANT SULFATE 75 MG/75MG
TABLET, ORALLY DISINTEGRATING ORAL
Qty: 16 TABLET | Refills: 11 | Status: SHIPPED | OUTPATIENT
Start: 2023-03-14

## 2023-03-14 NOTE — PROGRESS NOTES
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, s/p surgery for deviated septum, BPPV referred here for evaluation of headache  My initial evaluation 2021     Migraine without aura and without status migrainosus, not intractable  He reports a history of migraines dating back to mid 45s  He does not know if he has a family history of migraines  He reports severe migraines are diffuse pressure that used to improve over 1-2 days with rest and eletriptan until recently,  moderate migraines are typically unilateral left retro-orbital stabbing like a narrow and out the other side  That typically improve with rest and NSAIDs although still last for over 4 hours  He denies classic aura,  Does feel like his chronic tinnitus changes prior to onset of migraine  Reports typical associated migrainous features without autonomic features  - as of 2021: on average severe migraines 5-6 times a year lasting 1-2 days, moderate migraines 3-4 times a month, most recently lasted 3 weeks in 2021  Trial of magnesium for prevention, rizatriptan for abortive  If his migraines remain uncontrolled will recommend prescription preventative  Recommended treating sleep apnea    - as of 9/10/2021: Reports  Mild headaches 2-3 times per week that improved with OTC meds and migraines are stable but to 3 per month and improved with rizatriptan although he reports side effects, therefore will start trial of nurtec for abortive  Last visit migraine improved with Toradol injection  Recommended trying to take magnesium consistently for prevention   - as of 3/18/2022: Migraines stable at about 3-4 times a month  Nurtec works for abortive, slower than rizatriptan, but without side effects    - as of 9/21/2022: He reports significant  migraines about 3-4 times a month, milder migraines/headaches 3-4 times a week  Working on treating WALE and will follow up with Sleep Medicine  Trial of low-dose gabapentin for prevention which may also help with other issues such as sleep for other pain  Continue Nurtec for rescue which works well suggesting other CGRP meds will work well in future if needed  - as of 3/14/2023: He did not follow-up with sleep medicine and is still having difficulty tolerating the CPAP machine and we discussed I highly recommend following up with sleep medicine to find any way to treat this better since it can impact many symptoms  Migraines 3-4 a month on average, but the last 6 weeks or so has been 4 to 5 days a week  MRI brain with and without contrast scheduled for 4/11/2023 to follow-up meningioma with mild mass effect and neurosurgery follow-up 4/17/2023  Gabapentin initially seemed to help but no longer seems to be helping and we will wean off  Magnesium is helping and so is Nurtec for migraine rescue which we will transition to be used for prevention instead  Toradol IM 1/26/2023 helped migraine flare and denies needing this today  Workup:  -Following with neurosurgery for meningioma monitoring  -   MRI brain with without contrast 06/13/2021: Right frontal meningioma laterally within the anterior cranial fossa measuring 1 5 x 2 4 x 2 8 cm  This extends into the sylvian fissure without edema in the adjacent brain   Several white matter hyperintensities on T2 and FLAIR imaging, primarily within the frontal lobes are nonspecific and may represent precocious chronic microangiopathic change  -MRI brain with without contrast 9/30/2021: Stable right anterior frontal operculum meningioma  Stable small white matter hyperintensities on T2 and FLAIR imaging within the cerebral hemispheres, presumably precocious chronic microangiopathic change  -MRI brain with and without contrast 4/2/2022: 1  Right frontal opercular region meningioma is stable, measuring approximately 2 2 cm   2   A few white matter lesions are also unchanged, possibly precocious microangiopathy  3   No acute infarction, intracranial hemorrhage or new mass lesion  Preventative:  - we discussed headache hygiene and lifestyle factors that may improve headaches  -Trial Rimegepant Sulfate (Nurtec) 75 MG TBDP; Take one NURTEC 75 mg under tongue every other day  Limit 1 in 24 hours  Discussed proper use, possible side effects and risks  - continue magnesium 400 mg and change to pm  Discussed proper use, possible side effects and risks  - We will wean off gabapentin  Discussed proper use, possible side effects and risks  - Past/ failed/contraindicated: topiramate contraindicated due to history of kidney stones, amitriptyline would be contraindicated due to age, gabapentin  - future options:   Verapamil discussed next, CGRP Med    Abortive:  - discussed not taking over-the-counter or prescription pain medications more than 3 days per week to prevent medication overuse/rebound headache  -  He is on through other providers:  Methocarbamol, Voltaren  - continue nurtec 75 mg as needed  Discussed proper use, possible side effects and risks    - Past/ failed/contraindicated: eletriptan does not always work, rizatriptan works but gets panic attack  - past/helped:  toradol shot worked  well  - future options:   prochlorperazine, Toradol IM or p o , could consider trial for 5 days of Depakote or dexamethasone 4 prolonged migraine, ubrelvy, reyvow, nurtec    WALE (obstructive sleep apnea)   -     Ambulatory referral to Sleep Medicine placed 9/10/21 and 9/21/22  3/18/2022 again discussed morbidity and mortality of untreated WALE, do not drive if not feeling cognitively well, he will consider following up for known WALE       Meningioma  - continue to follow with neurosurgery       Patient instructions        If you do not get the 16 tabs of nurtec in the next few weeks let me know  Continue to follow with neurosurgery for meningioma    Do whatever you can in your power to treat the sleep apnea   I am placing a referral to the sleep medicine specialist team to further evaluate your sleep issues  Please call [phone] to schedule and I recommend you see one of the following providers:    Loreatha Comer MD Marice Rhody MD Ofelia Daub MD Elvan Soulier CRNP Fernande Picket PA-C      Headache/migraine treatment:   Abortive medications (for immediate treatment of a headache): It is ok to take ibuprofen, acetaminophen or naproxen (Advil, Tylenol,  Aleve, Excedrin) if they help your headaches you should limit these to No more than 3 times a week to avoid medication overuse/rebound headaches  For your more moderate to severe migraines take this medication early   nurtec 75 mg under the tongue  Do not repeat in 24 hours          Over the counter preventive supplements for headaches/migraines (if you try, try for 3 months straight)  (to take every day to help prevent headaches - not to take at the time of headache):  [x] Magnesium 400mg daily (If any diarrhea or upset stomach, decrease dose  as tolerated)      Prescription preventive medications for headaches/migraines   (to take every day to help prevent headaches - not to take at the time of headache):  [x] nurtec every other day for migraine prevention     Wean off  gabapentin - once back at baseline, decrease to 200 mg nightly for 1 week and then 100 mg nightly for 1 week and then discontinue Then if needed would try verapamil with gradual titration up as tolerated (Blood pressure med so common SE are dizziness/drop in BP, constipation)   -------------     Emgality/Galcanezumab - the 1st dose is 240 mg loading dose of 2 consecutive 120 mg injections  Thereafter, 120 mg injections every 30 days    If needed there is a coupon card for the copay at [employer]  com     READ INSTRUCTIONS that come with the medication  REFRIGERATE  Keep out of direct sunlight  Prior to administration, allow to come to room temperature for 30 minutes  Do not warm using a heat source (eg, microwave or hot water)  Do not shake  Administer in preferably abdomen (avoiding 2 inches around the navel), thigh, upper arm, or buttocks avoiding areas of skin that are tender, bruised, red or hard  Deliver entire contents of single-use prefilled pen or syringe  Lifestyle Recommendations:  [x] SLEEP - Maintain a regular sleep schedule: Adults need at least 7-8 hours of uninterrupted a night  Maintain good sleep hygiene:  Going to bed and waking up at consistent times, avoiding excessive daytime naps, avoiding caffeinated beverages in the evening, avoid excessive stimulation in the evening and generally using bed primarily for sleeping  One hour before bedtime would recommend turning lights down lower, decreasing your activity (may read quietly, listen to music at a low volume)  When you get into bed, should eliminate all technology (no texting, emailing, playing with your phone, iPad or tablet in bed)  [x] HYDRATION - Maintain good hydration  Drink  2L of fluid a day (4 typical small water bottles)  [x] DIET - Maintain good nutrition  In particular don't skip meals and try and eat healthy balanced meals regularly  [x] TRIGGERS - Look for other triggers and avoid them: Limit caffeine to 1-2 cups a day or less   Avoid dietary triggers that you have noticed bring on your headaches (this could include aged cheese, peanuts, MSG, aspartame and nitrates)  [x] EXERCISE - physical exercise as we all know is good for you in many ways, and not only is good for your heart, but also is beneficial for your mental health, cognitive health and  chronic pain/headaches  I would encourage at the least 5 days of physical exercise weekly for at least 30 minutes  Education and Follow-up  [x] Please call with any questions or concerns  Of course if any new concerning symptoms go to the emergency department  [x] Follow up 3-4 months, sooner if needed          CC: We had the pleasure of evaluating Godfrey Winslow III in neurological consultation today  Godfrey Winslow III is a   Right and left handed male who presents today for evaluation of headaches  History obtained from patient as well as available medical record review    History of Present Illness:   Interval history as of 3/14/2023  - no significant new or concerning neurologic symptoms since last visit   - did not call sleep medicine yet, tries using nightly     Headaches and migraines   4-5 headaches a week lately for the last 6 weeks and prior to that was 3-4 a month     Preventative:   - continue magnesium 400 mg helping  -  Trial of gabapentin with gradual titration up to 300 mg nightly - helped initially and less so now    Abortive:   nurtec works - loves it brings from 8-9/10, 6-7 last month, typically 2-3 times a month   toradol IM 1/26/23  -  He is on through other providers:  Methocarbamol, Voltaren  Denies bothersome side effects     Interval history as of 9/21/2022  - denies any new or concerning neurologic symptoms since last visit   - known WALE not treated, has not followed up with them, has tried 6 masks, feels worse with the mask he thinks, trying to use the mask now and will see them - 9/14/17    Headaches and migraines   He reports migraines with visual aura about 3 to 4 times a month and milder migraines/headaches 3 to 4 times a week, can wake up with them or middle of day Preventative:  Magnesium 400 mg   Abortive: Nurtec helps within 20 mins   Denies bothersome side effects     Interval history as of 3/18/2022  - denies any new or concerning neurologic symptoms since last visit   - he is following with Neurosurgery for  Meningioma  - known WALE not treated, has not followed up with them, has tried 6 masks, feels worse with the mask he thinks, will consider following with them     Headaches and migraines   Migraines 3-4 times a month, feels stable    Can not recall milder headaches number    Preventative: trial of magnesium for prevention  Abortive:  trial of Nurtec-approved 08/16/2021 - works without side effects but slower than rizatriptan which works faster but makes him tired     Interval history as of 9/10/2021  - denies any new or concerning neurologic symptoms since last visit   - -MRI brain with without contrast 06/13/2021: Right frontal meningioma laterally within the anterior cranial fossa measuring 1 5 x 2 4 x 2 8 cm  This extends into the sylvian fissure without edema in the adjacent brain  Several white matter hyperintensities on T2 and FLAIR imaging, primarily within the frontal lobes are nonspecific and may represent precocious chronic microangiopathic change  -  Follow-up with Neurosurgery and getting repeat scan  -has not followed up with Sleep Medicine, but using mask more     Headaches and migraines   - migraines 2-3 per month, stable  - mild headaches 2-3 times per week that improve with OTC meds     Preventative:    - magnesium - not taking consistently     Abortive:   - Rizatriptan causes heart racing/panic attacks   6-8 pills a week ibuprofen/tylenol  - last visit toradol shot worked      Headaches started at what age? Worse around 47   How often do the headaches occur?    -  Typical migraine once every 2-3 months and resolves over hours with triptan  -  04/22/2021 went to emergency room for 2 weeks of migraine  - as of 5/26/2021: on average severe migraines 5-6 times a year lasting 1-2 days, moderate migraines 3-4 times a month, most recently lasted 3 weeks in April 2021  - As of 9/10/2021, 2-3 times a month  Last for 2-3 hours with rizatriptan and go to sleep afterward  W/o rizatriptan could last from a 4-5 hours to 3 days  No change with tinnitus, flashes of light  Patients's other headaches has no other symptoms associated with it  What time of the day do the headaches start? No particular time of day   How long do the headaches last? 1-2 days usually, over 4 hours for moderate   Are you ever headache free? Yes    Aura? without aura    Prodrome: frequency of tinnitus changes    Last eye exam: glasses 1-2 years ago     Where is your headache located and pain quality?   - severe migraines whole head - pressure  - can be unilateral on the left - stabbing to left eye like an arrow out the other side     What is the intensity of pain? Average: 4/10, worst 7 when at hospital/10  Associated symptoms:   [x] Nausea       [] Vomiting       [x] Photophobia     [x]Phonophobia      [x] Osmophobia  [] Blurred vision    [x] Light-headed or dizzy  - just recently    [x] Tinnitus - chronic bilateral and worse with migraine can be louder on one ear   [] Hands or feet tingle or feel numb/paresthesias      [] Ptosis      [] Facial droop  [] Lacrimation - both   [] Nasal congestion/rhinorrhea        Things that make the headache worse? No specific movements, any movement     Headache triggers:  Unknown     Have you seen someone else for headaches or pain? Yes, neurology just recently   Have you had trigger point injection performed and how often? No  Have you had Botox injection performed and how often? No    Have you had epidural injections or transforaminal injections performed? No  Have you ever had any Brain imaging? Yes several MRI Brain     What medications do you take or have you taken for your headaches?    ABORTIVE:    OTC medications have been ineffective eletriptan/Relpax 40 mg - 1-2 usually minimizes it     Meclizine - for BPPV in the past helped   Methocarbamol - for back - prn - maybe 3 times a month   Diclofenac 50 mg - for back prn - a couple times a month      past   Cyclobenzaprine  ED for 02/20/2021: Toradol 30 mg, Benadryl 25 mg, Reglan 10 mg,  IV fluids, Decadron   Medrol Dosepak - didn't seem to help     PREVENTIVE:        for sleep: Temazepam prn     Past/ failed/contraindicated:  -topiramate contraindicated due to history of kidney stones       LIFESTYLE  Sleep - WALE not tolerate of CPAP - has seen 3 different specialists    DATA REVIEW  Sleep Study: 12/19/16  SLEEP-DISORDERED BREATHING:   Type AHI Supine AHI Lateral AHI REM AHI GALINA SaO2 Derik % SpO2% ?88% min     Baseline 7 9 95 5 5 6 15 9 5 7 89 0 0 0     - averages: 3-8 hours, usually 5   Problems falling asleep?:   Yes  Problems staying asleep?:  Yes    Water: 3 - 20 oz per day  Caffeine: cup in am and at work - cutting back     Mood: denies recent stress   Denies history of anxiety or depression or other diagnosed mood disorder    The following portions of the patient's history were reviewed and updated as appropriate: allergies, current medications, past family history, past medical history, past social history, past surgical history and problem list     Pertinent family history:  Family history of headaches:  no known family members with significant headaches  Any family history of aneurysms - No    Pertinent social history:  Work:  at [employer] with wife, 2 kids - 32, 21    Illicit Drugs: denies  Alcohol/tobacco: Denies alcohol use, Denies tobacco use    Past Medical History:     Past Medical History:   Diagnosis Date   • Chronic back pain    • Migraine        Patient Active Problem List   Diagnosis   • Low back pain   • Strain of lumbar region   • WALE on CPAP   • Mixed dyslipidemia   • Nasal septal deviation   • Seasonal allergic rhinitis   • Nasal turbinate hypertrophy   • Nasal obstruction   • Deviated nasal septum   • Hypertrophy of nasal turbinates   • Meningioma (HCC)   • Abnormal finding on MRI of brain   • Benign neoplasm of supratentorial region of brain [facility])       Medications:      Current Outpatient Medications   Medication Sig Dispense Refill   • gabapentin (Neurontin) 100 mg capsule 100 mg nightly for 1 week, then if needed/tolerated increase to 200 mg nightly for 1 week, then if needed/tolerated increase to 300 mg nightly and continue for 1 month then check in with doctor (Patient taking differently: 300 mg daily 100 mg nightly for 1 week, then if needed/tolerated increase to 200 mg nightly for 1 week, then if needed/tolerated increase to 300 mg nightly and continue for 1 month then check in with doctor) 90 capsule 3   • magnesium oxide (MAG-OX) 400 mg Take 1 tablet (400 mg total) by mouth daily Nightly 90 tablet 3   • methocarbamol (ROBAXIN) 750 mg tablet methocarbamol 750 mg tablet   take 1 tablet by mouth every 8 hours if needed     • Rimegepant Sulfate (Nurtec) 75 MG TBDP Take one NURTEC 75 mg under tongue every other day  Limit 1 in 24 hours 16 tablet 11   • temazepam (RESTORIL) 15 mg capsule temazepam 15 mg capsule   as needed     • albuterol (PROVENTIL HFA,VENTOLIN HFA) 90 mcg/act inhaler INHALE 2 PUFFS BY MOUTH EVERY 4 TO 6 HOURS AS NEEDED BREATHING (Patient not taking: Reported on 3/14/2023)     • benzonatate (TESSALON) 200 MG capsule Take 1 capsule (200 mg total) by mouth 3 (three) times a day as needed for cough (Patient not taking: Reported on 3/14/2023) 20 capsule 0     No current facility-administered medications for this visit          Allergies:    No Known Allergies    Family History:     Family History   Problem Relation Age of Onset   • Breast cancer Mother    • Diabetes Father    • No Known Problems Family    • Substance Abuse Neg Hx    • Mental illness Neg Hx        Social History:     Social History     Socioeconomic History • Marital status: /Civil Union     Spouse name: Nata Leger   • Number of children: 2   • Years of education: Not on file   • Highest education level: Not on file   Occupational History   • Occupation:      Employer: [address]   Tobacco Use   • Smoking status: Never   • Smokeless tobacco: Former     Types: Snuff     Quit date: 2003   Vaping Use   • Vaping Use: Never used   Substance and Sexual Activity   • Alcohol use: Never   • Drug use: No   • Sexual activity: Not on file   Other Topics Concern   • Not on file   Social History Narrative   • Not on file     Social Determinants of Health     Financial Resource Strain: Not on file   Food Insecurity: Not on file   Transportation Needs: Not on file   Physical Activity: Not on file   Stress: Not on file   Social Connections: Not on file   Intimate Partner Violence: Not on file   Housing Stability: Not on file         Objective:       Physical Exam:                                                                 Vitals:            Constitutional:    BP (!) 151/101 (BP Location: Right arm, Patient Position: Sitting, Cuff Size: Standard)   Pulse 102   Temp (!) 97 1 °F (36 2 °C) (Tympanic)   Ht 5' 7" (1 702 m)   Wt 94 3 kg (208 lb)   BMI 32 58 kg/m²   BP Readings from Last 3 Encounters:   03/14/23 (!) 151/101   01/11/23 128/86   10/21/22 140/74     Pulse Readings from Last 3 Encounters:   03/14/23 102   01/11/23 (!) 120   10/21/22 80         Well developed, well nourished, well groomed  Psychiatric:  Normal behavior and appropriate affect        Neurological Examination:     Mental status/cognitive function:   Recent and remote memory appear intact  Attention span and concentration as well as fund of knowledge are appropriate for age  Normal language and spontaneous speech  Cranial Nerves:   VII-facial expression symmetric  Motor Exam: symmetric bulk throughout  no atrophy, fasciculations or abnormal movements noted  Coordination:  no apparent dysmetria, ataxia or tremor noted  Gait: steady casual gait          Pertinent lab results:   See EMR for recent labs   04/22/2021 BMP and CBC unremarkable, ESR 6  01/10/2020 LFTs with elevated alkaline phosphatase     Imaging: I have personally reviewed imaging and radiology read   -  noncontrast head CT 04/22/2021:  No acute intracranial abnormality (seen, but meningioma was there hidden)  -   MRI brain with without contrast 06/13/2021: Right frontal meningioma laterally within the anterior cranial fossa measuring 1 5 x 2 4 x 2 8 cm  This extends into the sylvian fissure without edema in the adjacent brain  Several white matter hyperintensities on T2 and FLAIR imaging, primarily within the frontal lobes are nonspecific and may represent precocious chronic microangiopathic change  -MRI brain with without contrast 9/30/2021: Stable right anterior frontal operculum meningioma  Stable small white matter hyperintensities on T2 and FLAIR imaging within the cerebral hemispheres, presumably precocious chronic microangiopathic change  -MRI brain with and without contrast 4/2/2022: 1  Right frontal opercular region meningioma is stable, measuring approximately 2 2 cm   2   A few white matter lesions are also unchanged, possibly precocious microangiopathy  3   No acute infarction, intracranial hemorrhage or new mass lesion  Review of Systems:   ROS obtained by medical assistant Personally reviewed and updated if indicated  I recommended PCP follow up for non neurologic problems  Review of Systems   Constitutional: Negative for appetite change and fever  HENT: Negative  Negative for hearing loss, tinnitus, trouble swallowing and voice change  Eyes: Negative  Negative for photophobia and pain  Respiratory: Negative  Negative for shortness of breath  Cardiovascular: Negative  Negative for palpitations  Gastrointestinal: Negative  Negative for nausea and vomiting  Endocrine: Negative  Negative for cold intolerance  Genitourinary: Negative  Negative for dysuria, frequency and urgency  Musculoskeletal: Negative  Negative for myalgias and neck pain  Skin: Negative  Negative for rash  Neurological: Positive for headaches  Negative for dizziness, tremors, seizures, syncope, facial asymmetry, speech difficulty, weakness, light-headedness and numbness  Hematological: Negative  Does not bruise/bleed easily  Psychiatric/Behavioral: Negative  Negative for confusion, hallucinations and sleep disturbance  All other systems reviewed and are negative  I have spent 28 minutes with Patient  today in which greater than 50% of this time was spent in counseling/coordination of care  I also spent 14 minutes non face to face for this patient the same day         Author:  Steven Tijerina MD 3/14/2023 5:07 PM

## 2023-03-14 NOTE — PATIENT INSTRUCTIONS
If you do not get the 16 tabs of nurtec in the next few weeks let me know  Continue to follow with neurosurgery for meningioma    Do whatever you can in your power to treat the sleep apnea   I am placing a referral to the sleep medicine specialist team to further evaluate your sleep issues  Please call [phone] to schedule and I recommend you see one of the following providers:    Marta Archibald PA-C      Headache/migraine treatment:   Abortive medications (for immediate treatment of a headache): It is ok to take ibuprofen, acetaminophen or naproxen (Advil, Tylenol,  Aleve, Excedrin) if they help your headaches you should limit these to No more than 3 times a week to avoid medication overuse/rebound headaches  For your more moderate to severe migraines take this medication early   nurtec 75 mg under the tongue  Do not repeat in 24 hours  Over the counter preventive supplements for headaches/migraines (if you try, try for 3 months straight)  (to take every day to help prevent headaches - not to take at the time of headache):  [x] Magnesium 400mg daily (If any diarrhea or upset stomach, decrease dose  as tolerated)      Prescription preventive medications for headaches/migraines   (to take every day to help prevent headaches - not to take at the time of headache):  [x] nurtec every other day for migraine prevention     Wean off  gabapentin - once back at baseline, decrease to 200 mg nightly for 1 week and then 100 mg nightly for 1 week and then discontinue     Then if needed would try verapamil with gradual titration up as tolerated (Blood pressure med so common SE are dizziness/drop in BP, constipation)   -------------     Emgality/Galcanezumab - the 1st dose is 240 mg loading dose of 2 consecutive 120 mg injections    Thereafter, 120 mg injections every 30 days    If needed there is a coupon card for the copay at [facility]  com     READ INSTRUCTIONS that come with the medication  REFRIGERATE  Keep out of direct sunlight  Prior to administration, allow to come to room temperature for 30 minutes  Do not warm using a heat source (eg, microwave or hot water)  Do not shake  Administer in preferably abdomen (avoiding 2 inches around the navel), thigh, upper arm, or buttocks avoiding areas of skin that are tender, bruised, red or hard  Deliver entire contents of single-use prefilled pen or syringe  Lifestyle Recommendations:  [x] SLEEP - Maintain a regular sleep schedule: Adults need at least 7-8 hours of uninterrupted a night  Maintain good sleep hygiene:  Going to bed and waking up at consistent times, avoiding excessive daytime naps, avoiding caffeinated beverages in the evening, avoid excessive stimulation in the evening and generally using bed primarily for sleeping  One hour before bedtime would recommend turning lights down lower, decreasing your activity (may read quietly, listen to music at a low volume)  When you get into bed, should eliminate all technology (no texting, emailing, playing with your phone, iPad or tablet in bed)  [x] HYDRATION - Maintain good hydration  Drink  2L of fluid a day (4 typical small water bottles)  [x] DIET - Maintain good nutrition  In particular don't skip meals and try and eat healthy balanced meals regularly  [x] TRIGGERS - Look for other triggers and avoid them: Limit caffeine to 1-2 cups a day or less  Avoid dietary triggers that you have noticed bring on your headaches (this could include aged cheese, peanuts, MSG, aspartame and nitrates)  [x] EXERCISE - physical exercise as we all know is good for you in many ways, and not only is good for your heart, but also is beneficial for your mental health, cognitive health and  chronic pain/headaches  I would encourage at the least 5 days of physical exercise weekly for at least 30 minutes       Education and Follow-up  [x] Please call with any questions or concerns  Of course if any new concerning symptoms go to the emergency department    [x] Follow up 3-4 months, sooner if needed

## 2023-06-08 ENCOUNTER — TELEPHONE (OUTPATIENT)
Dept: NEUROLOGY | Facility: CLINIC | Age: 58
End: 2023-06-08

## 2023-06-08 NOTE — TELEPHONE ENCOUNTER
Called and spoke to patient to confirm their upcoming appointment with Dr Cheli Weiss  Informed patient about arriving in the [place] location 15 minutes prior to their appointment to get checked in and going over chart

## 2023-06-12 ENCOUNTER — OFFICE VISIT (OUTPATIENT)
Dept: NEUROLOGY | Facility: CLINIC | Age: 58
End: 2023-06-12
Payer: COMMERCIAL

## 2023-06-12 VITALS
SYSTOLIC BLOOD PRESSURE: 123 MMHG | HEIGHT: 67 IN | HEART RATE: 104 BPM | WEIGHT: 192.4 LBS | BODY MASS INDEX: 30.2 KG/M2 | DIASTOLIC BLOOD PRESSURE: 83 MMHG

## 2023-06-12 DIAGNOSIS — G43.009 MIGRAINE WITHOUT AURA AND WITHOUT STATUS MIGRAINOSUS, NOT INTRACTABLE: Primary | ICD-10-CM

## 2023-06-12 PROCEDURE — 99215 OFFICE O/P EST HI 40 MIN: CPT | Performed by: PSYCHIATRY & NEUROLOGY

## 2023-06-12 RX ORDER — ACETAZOLAMIDE 125 MG/1
TABLET ORAL
Qty: 120 TABLET | Refills: 6 | Status: SHIPPED | OUTPATIENT
Start: 2023-06-12

## 2023-06-12 NOTE — PROGRESS NOTES
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, s/p surgery for deviated septum, BPPV referred here for evaluation of headache  My initial evaluation 2021     Migraine without aura and without status migrainosus, not intractable  WALE not on CPAP regularly (2016, AHI 7 9, supine 95 5, REM 15 9, oxygen down to 89%)  He reports a history of migraines dating back to mid 45s  He does not know if he has a family history of migraines  He reports severe migraines are diffuse pressure that used to improve over 1-2 days with rest and eletriptan until recently,  moderate migraines are typically unilateral left retro-orbital stabbing like a narrow and out the other side  That typically improve with rest and NSAIDs although still last for over 4 hours  He denies classic aura,  Does feel like his chronic tinnitus changes prior to onset of migraine  Reports typical associated migrainous features without autonomic features  - as of 2021: on average severe migraines 5-6 times a year lasting 1-2 days, moderate migraines 3-4 times a month, most recently lasted 3 weeks in 2021  Trial of magnesium for prevention, rizatriptan for abortive  If his migraines remain uncontrolled will recommend prescription preventative  Recommended treating sleep apnea    - as of 9/10/2021: Reports  Mild headaches 2-3 times per week that improved with OTC meds and migraines are stable but to 3 per month and improved with rizatriptan although he reports side effects, therefore will start trial of nurtec for abortive  Last visit migraine improved with Toradol injection  Recommended trying to take magnesium consistently for prevention   - as of 3/18/2022: Migraines stable at about 3-4 times a month  Nurtec works for abortive, slower than rizatriptan, but without side effects    - as of 9/21/2022: He reports significant  migraines about 3-4 times a month, milder migraines/headaches 3-4 times a week  Working on treating WALE and will follow up with Sleep Medicine  Trial of low-dose gabapentin for prevention which may also help with other issues such as sleep for other pain  Continue Nurtec for rescue which works well suggesting other CGRP meds will work well in future if needed  - as of 3/14/2023: He did not follow-up with sleep medicine and is still having difficulty tolerating the CPAP machine and we discussed I highly recommend following up with sleep medicine to find any way to treat this better since it can impact many symptoms  Migraines 3-4 a month on average, but the last 6 weeks or so has been 4 to 5 days a week  MRI brain with and without contrast scheduled for 4/11/2023 to follow-up meningioma with mild mass effect and neurosurgery follow-up 4/17/2023  Gabapentin initially seemed to help but no longer seems to be helping and we will wean off  Magnesium is helping and so is Nurtec for migraine rescue which we will transition to be used for prevention instead  Toradol IM 1/26/2023 helped migraine flare and denies needing this today      - as of 6/12/2023: Repeat MRI brain reviewed with unchanged meningioma although there are some findings consistent with 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 from subclinical IIH picture and we discussed trial of acetazolamide/Diamox to see if this can reduce his headaches and migraines which are currently well controlled at approximately 1 to 2 a week on Nurtec 16 tabs a month which he typically takes every other day or every few days and works well  We were able to wean off gabapentin 300 mg nightly and he has an appointment to follow-up with new sleep medicine provider to get help with tolerating the CPAP  Workup:  -Following with neurosurgery for meningioma monitoring  -   MRI brain with without contrast 06/13/2021: Right frontal meningioma laterally within the anterior cranial fossa measuring 1 5 x 2 4 x 2 8 cm  This extends into the sylvian fissure without edema in the adjacent brain  Several white matter hyperintensities on T2 and FLAIR imaging, primarily within the frontal lobes are nonspecific and may represent precocious chronic microangiopathic change  -MRI brain with without contrast 9/30/2021: Stable right anterior frontal operculum meningioma  Stable small white matter hyperintensities on T2 and FLAIR imaging within the cerebral hemispheres, presumably precocious chronic microangiopathic change  -MRI brain with and without contrast 4/2/2022: 1  Right frontal opercular region meningioma is stable, measuring approximately 2 2 cm   2   A few white matter lesions are also unchanged, possibly precocious microangiopathy  3   No acute infarction, intracranial hemorrhage or new mass lesion  -MRI brain with and without contrast 4/11/2023: 1  Stable lateral right frontal convexity meningioma compared to 4/2/2022   2  No acute infarction, edema, or pathologic intra-axial enhancement  3  Mild chronic microangiopathic ischemic changes  Preventative:  - we discussed headache hygiene and lifestyle factors that may improve headaches  -  Trial of   acetaZOLAMIDE (DIAMOX) 125 mg tablet; 125 mg PO nightly for 1 week, then increase to 125 mg BID for 1 week, then 125 mg in am and 250 mg in pm for 1 week, then 250 mg BID  Discussed proper use, possible side effects and risks    - continue magnesium 400 mg in pm  Discussed proper use, possible side effects and risks  - Through other providers: Temazepam/Restoril  - Past/ failed/contraindicated: topiramate contraindicated due to history of kidney stones, amitriptyline would be contraindicated due to age, gabapentin  - future options:   Verapamil discussed next, CGRP Med    Abortive:  - discussed not taking over-the-counter or prescription pain medications more than 3 days per week to prevent medication overuse/rebound headache  -  He is on through other providers:  Methocarbamol, Voltaren  - continue nurtec 75 mg as needed -up to 16 times a month and also can help for prevention  discussed proper use, possible side effects and risks  - Past/ failed/contraindicated: eletriptan does not always work, rizatriptan works but gets panic attack  - past/helped:  toradol shot worked  well  - future options:   prochlorperazine, Toradol IM or p o , could consider trial for 5 days of Depakote or dexamethasone 4 prolonged migraine, ubrelvy, reyvow, nurtec    WALE (obstructive sleep apnea)   -     Ambulatory referral to Sleep Medicine placed 9/10/21 and 9/21/22  3/18/2022 again discussed morbidity and mortality of untreated WALE, do not drive if not feeling cognitively well, he will consider following up for known WALE    - as of 6/12/2023: He is trying to use the machine but struggling and thankfully has upcoming sleep medicine follow-up to get help     Meningioma  - continue to follow with neurosurgery       Patient instructions        Consider Zzoma pillow     Continue to follow with neurosurgery for meningioma    Do whatever you can in your power to treat the sleep apnea   Please follow-up as scheduled 6/20/2023 - Tyler Montalvo MD      Headache/migraine treatment:   Abortive medications (for immediate treatment of a headache):    It is ok to take ibuprofen, acetaminophen or naproxen (Advil, Tylenol,  Aleve, Excedrin) if they help your headaches you should limit these to No more than 3 times a week to avoid medication overuse/rebound headaches  For your more moderate to severe migraines take this medication early   nurtec 75 mg under the tongue  Do not repeat in 24 hours  Over the counter preventive supplements for headaches/migraines (if you try, try for 3 months straight)  (to take every day to help prevent headaches - not to take at the time of headache):  [x] Magnesium 400mg daily (If any diarrhea or upset stomach, decrease dose  as tolerated)      Prescription preventive medications for headaches/migraines   (to take every day to help prevent headaches - not to take at the time of headache):  [x]   -     acetaZOLAMIDE (DIAMOX) 125 mg tablet; 125 mg PO nightly for 1 week, then increase to 125 mg BID for 1 week, then 125 mg in am and 250 mg in pm for 1 week, then 250 mg in a m  and in p m  We discussed that the typical dose if this were a severe issue with changes behind your eyes/called papilledema which you do not have currently, would be to get you all up to 500 mg twice a day or 1000 mg daily or I have had patients all up to 3000 mg daily, just to give you a reference as to how low dose you are starting at  If you have improvement at a lower dose or go up to say 2 pills twice a day and feel worse, go back down to the last tolerated dose as any dose even if lower than what I recommend could help the situation potentially  Lifestyle Recommendations:  [x] SLEEP - Maintain a regular sleep schedule: Adults need at least 7-8 hours of uninterrupted a night  Maintain good sleep hygiene:  Going to bed and waking up at consistent times, avoiding excessive daytime naps, avoiding caffeinated beverages in the evening, avoid excessive stimulation in the evening and generally using bed primarily for sleeping  One hour before bedtime would recommend turning lights down lower, decreasing your activity (may read quietly, listen to music at a low volume)   When you get into bed, should eliminate all technology (no texting, emailing, playing with your phone, iPad or tablet in bed)  [x] HYDRATION - Maintain good hydration  Drink  2L of fluid a day (4 typical small water bottles)  [x] DIET - Maintain good nutrition  In particular don't skip meals and try and eat healthy balanced meals regularly  [x] TRIGGERS - Look for other triggers and avoid them: Limit caffeine to 1-2 cups a day or less  Avoid dietary triggers that you have noticed bring on your headaches (this could include aged cheese, peanuts, MSG, aspartame and nitrates)  [x] EXERCISE - physical exercise as we all know is good for you in many ways, and not only is good for your heart, but also is beneficial for your mental health, cognitive health and  chronic pain/headaches  I would encourage at the least 5 days of physical exercise weekly for at least 30 minutes  Education and Follow-up  [x] Please call with any questions or concerns  Of course if any new concerning symptoms go to the emergency department  [x] Follow up 3-4 months, sooner if needed          CC: We had the pleasure of evaluating Jose Kulkarni III in neurological consultation today  Jose Kulkarni III is a   Right and left handed male who presents today for evaluation of headaches  History obtained from patient as well as available medical record review  History of Present Illness:   Interval history as of 6/12/2023  - no significant new or concerning neurologic symptoms since last visit, he was scheduled 6/16/2023 and could not make the appointment and therefore was moved to today  -Recent eye doctor evaluation without papilledema    -MRI brain with and without contrast 4/11/2023: 1  Stable lateral right frontal convexity meningioma compared to 4/2/2022   2  No acute infarction, edema, or pathologic intra-axial enhancement  3  Mild chronic microangiopathic ischemic changes      - did call sleep medicine, tries using nightly, occasionally would fall asleep without it, apmt next week   Unfortunately he has still not followed up with sleep medicine to treat the sleep apnea and we discussed this is driving many of his symptoms could not chronic headaches, tinnitus  - 3-8 hours, brain always going 100 miles an hour and hard to wind down   - sleeping almost on face     Headaches and migraines   Much better on nurtec, maybe 1-2 a week    Preventative:   -Trial of Nurtec every other day-16 tabs   -Magnesium 40 mg nightly  - Gabapentin 300 mg    Abortive:   -Nurtec typically works well  -  He is on through other providers:  Methocarbamol Voltaren  Denies bothersome side effects      Interval history as of 3/14/2023  - no significant new or concerning neurologic symptoms since last visit   - did not call sleep medicine yet, tries using nightly     Headaches and migraines   4-5 headaches a week lately for the last 6 weeks and prior to that was 3-4 a month     Preventative:   - continue magnesium 400 mg helping  -  Trial of gabapentin with gradual titration up to 300 mg nightly - helped initially and less so now    Abortive:   nurtec works - loves it brings from 8-9/10, 6-7 last month, typically 2-3 times a month   toradol IM 1/26/23  -  He is on through other providers:  Methocarbamol Voltaren  Denies bothersome side effects     Interval history as of 9/21/2022  - denies any new or concerning neurologic symptoms since last visit   - known WALE not treated, has not followed up with them, has tried 6 masks, feels worse with the mask he thinks, trying to use the mask now and will see them - 9/14/17    Headaches and migraines   He reports migraines with visual aura about 3 to 4 times a month and milder migraines/headaches 3 to 4 times a week, can wake up with them or middle of day     Preventative:  Magnesium 400 mg   Abortive: Nurtec helps within 20 mins   Denies bothersome side effects     Interval history as of 3/18/2022  - denies any new or concerning neurologic symptoms since last visit   - he is following with Neurosurgery for  Meningioma  - known WALE not treated, has not followed up with them, has tried 6 masks, feels worse with the mask he thinks, will consider following with them     Headaches and migraines   Migraines 3-4 times a month, feels stable    Can not recall milder headaches number    Preventative: trial of magnesium for prevention  Abortive:  trial of Nurtec-approved 08/16/2021 - works without side effects but slower than rizatriptan which works faster but makes him tired     Interval history as of 9/10/2021  - denies any new or concerning neurologic symptoms since last visit   - -MRI brain with without contrast 06/13/2021: Right frontal meningioma laterally within the anterior cranial fossa measuring 1 5 x 2 4 x 2 8 cm  This extends into the sylvian fissure without edema in the adjacent brain  Several white matter hyperintensities on T2 and FLAIR imaging, primarily within the frontal lobes are nonspecific and may represent precocious chronic microangiopathic change  -  Follow-up with Neurosurgery and getting repeat scan  -has not followed up with Sleep Medicine, but using mask more     Headaches and migraines   - migraines 2-3 per month, stable  - mild headaches 2-3 times per week that improve with OTC meds     Preventative:    - magnesium - not taking consistently     Abortive:   - Rizatriptan causes heart racing/panic attacks   6-8 pills a week ibuprofen/tylenol  - last visit toradol shot worked      Headaches started at what age? Worse around 47   How often do the headaches occur? -  Typical migraine once every 2-3 months and resolves over hours with triptan  -  04/22/2021 went to emergency room for 2 weeks of migraine  - as of 5/26/2021: on average severe migraines 5-6 times a year lasting 1-2 days, moderate migraines 3-4 times a month, most recently lasted 3 weeks in April 2021  - As of 9/10/2021, 2-3 times a month   Last for 2-3 hours with rizatriptan and go to sleep afterward  W/o rizatriptan could last from a 4-5 hours to 3 days  No change with tinnitus, flashes of light  Patients's other headaches has no other symptoms associated with it  What time of the day do the headaches start? No particular time of day   How long do the headaches last? 1-2 days usually, over 4 hours for moderate   Are you ever headache free? Yes    Aura? without aura    Prodrome: frequency of tinnitus changes    Last eye exam: glasses 1-2 years ago     Where is your headache located and pain quality?   - severe migraines whole head - pressure  - can be unilateral on the left - stabbing to left eye like an arrow out the other side     What is the intensity of pain? Average: 4/10, worst 7 when at hospital/10  Associated symptoms:   [x] Nausea       [] Vomiting       [x] Photophobia     [x]Phonophobia      [x] Osmophobia  [] Blurred vision    [x] Light-headed or dizzy  - just recently    [x] Tinnitus - chronic bilateral and worse with migraine can be louder on one ear   [] Hands or feet tingle or feel numb/paresthesias      [] Ptosis      [] Facial droop  [] Lacrimation - both   [] Nasal congestion/rhinorrhea        Things that make the headache worse? No specific movements, any movement     Headache triggers:  Unknown     Have you seen someone else for headaches or pain? Yes, neurology just recently   Have you had trigger point injection performed and how often? No  Have you had Botox injection performed and how often? No    Have you had epidural injections or transforaminal injections performed? No  Have you ever had any Brain imaging? Yes several MRI Brain     What medications do you take or have you taken for your headaches?    ABORTIVE:    OTC medications have been ineffective      eletriptan/Relpax 40 mg - 1-2 usually minimizes it     Meclizine - for BPPV in the past helped   Methocarbamol - for back - prn - maybe 3 times a month   Diclofenac 50 mg - for back prn - a couple times a month      past   Cyclobenzaprine  ED for 02/20/2021: Toradol 30 mg, Benadryl 25 mg, Reglan 10 mg,  IV fluids, Decadron   Medrol Dosepak - didn't seem to help     PREVENTIVE:        for sleep: Temazepam prn     Past/ failed/contraindicated:  -topiramate contraindicated due to history of kidney stones       LIFESTYLE WALE not on CPAP regularly (2016, AHI 7 9, supine 95 5, REM 15 9, oxygen down to 89%)  Sleep - WALE not tolerate of CPAP - has seen 3 different specialists    DATA REVIEW  Sleep Study: 12/19/16  SLEEP-DISORDERED BREATHING:   Type AHI Supine AHI Lateral AHI REM AHI GALINA SaO2 Derik % SpO2% ?88% min     Baseline 7 9 95 5 5 6 15 9 5 7 89 0 0 0     - averages: 3-8 hours, usually 5   Problems falling asleep?:   Yes  Problems staying asleep?:  Yes    Water: 3 - 20 oz per day  Caffeine: cup in am and at work - cutting back     Mood: denies recent stress   Denies history of anxiety or depression or other diagnosed mood disorder    The following portions of the patient's history were reviewed and updated as appropriate: allergies, current medications, past family history, past medical history, past social history, past surgical history and problem list     Pertinent family history:  Family history of headaches:  no known family members with significant headaches  Any family history of aneurysms - No    Pertinent social history:  Work:  at [employer] with wife, 2 kids - 32, 21    Illicit Drugs: denies  Alcohol/tobacco: Denies alcohol use, Denies tobacco use    Past Medical History:     Past Medical History:   Diagnosis Date   • Chronic back pain    • Migraine        Patient Active Problem List   Diagnosis   • Low back pain   • Strain of lumbar region   • WALE on CPAP   • Mixed dyslipidemia   • Nasal septal deviation   • Seasonal allergic rhinitis   • Nasal turbinate hypertrophy   • Nasal obstruction   • Deviated nasal septum   • Hypertrophy of nasal turbinates   • Meningioma ([facility] [address] )   • Abnormal finding on MRI of brain   • Benign neoplasm of supratentorial region of brain [facility])       Medications:      Current Outpatient Medications   Medication Sig Dispense Refill   • acetaZOLAMIDE (DIAMOX) 125 mg tablet 125 mg PO nightly for 1 week, then increase to 125 mg BID for 1 week, then 125 mg in am and 250 mg in pm for 1 week, then 250 mg  tablet 6   • magnesium oxide (MAG-OX) 400 mg Take 1 tablet (400 mg total) by mouth daily Nightly 90 tablet 3   • methocarbamol (ROBAXIN) 750 mg tablet methocarbamol 750 mg tablet   take 1 tablet by mouth every 8 hours if needed     • Rimegepant Sulfate (Nurtec) 75 MG TBDP Take one NURTEC 75 mg under tongue every other day  Limit 1 in 24 hours 16 tablet 11   • temazepam (RESTORIL) 15 mg capsule temazepam 15 mg capsule   as needed       No current facility-administered medications for this visit          Allergies:    No Known Allergies    Family History:     Family History   Problem Relation Age of Onset   • Breast cancer Mother    • Diabetes Father    • No Known Problems Family    • Substance Abuse Neg Hx    • Mental illness Neg Hx        Social History:     Social History     Socioeconomic History   • Marital status: /Civil Union     Spouse name: Estella Berry   • Number of children: 2   • Years of education: Not on file   • Highest education level: Not on file   Occupational History   • Occupation:      Employer: [address]   Tobacco Use   • Smoking status: Never   • Smokeless tobacco: Former     Types: Snuff     Quit date: 2003   Vaping Use   • Vaping Use: Never used   Substance and Sexual Activity   • Alcohol use: Never   • Drug use: No   • Sexual activity: Not on file   Other Topics Concern   • Not on file   Social History Narrative   • Not on file     Social Determinants of Health     Financial Resource Strain: Not on file   Food Insecurity: Not on file   Transportation Needs: Not on file   Physical "Activity: Not on file   Stress: Not on file   Social Connections: Not on file   Intimate Partner Violence: Not on file   Housing Stability: Not on file         Objective:       Physical Exam:                                                                 Vitals:            Constitutional:    /83 (BP Location: Left arm, Patient Position: Sitting, Cuff Size: Standard)   Pulse 104   Ht 5' 7\" (1 702 m)   Wt 87 3 kg (192 lb 6 4 oz)   BMI 30 13 kg/m²   BP Readings from Last 3 Encounters:   06/12/23 123/83   04/17/23 138/82   03/14/23 (!) 151/101     Pulse Readings from Last 3 Encounters:   06/12/23 104   04/17/23 83   03/14/23 102         Well developed, well nourished, well groomed  Psychiatric:  Normal behavior and appropriate affect        Neurological Examination:     Mental status/cognitive function:   Recent and remote memory appear intact  Attention span and concentration as well as fund of knowledge are appropriate for age  Normal language and spontaneous speech  Cranial Nerves:   VII-facial expression symmetric  Motor Exam: symmetric bulk throughout  no atrophy, fasciculations or abnormal movements noted  Coordination:  no apparent dysmetria, ataxia or tremor noted  Gait: steady casual gait         Pertinent lab results:   See EMR for recent labs   04/22/2021 BMP and CBC unremarkable, ESR 6  01/10/2020 LFTs with elevated alkaline phosphatase     Imaging: I have personally reviewed imaging and radiology read   -  noncontrast head CT 04/22/2021:  No acute intracranial abnormality (seen, but meningioma was there hidden)  -   MRI brain with without contrast 06/13/2021: Right frontal meningioma laterally within the anterior cranial fossa measuring 1 5 x 2 4 x 2 8 cm   This extends into the sylvian fissure without edema in the adjacent brain   Several white matter hyperintensities on T2 and FLAIR imaging, primarily within the frontal lobes are nonspecific and may represent precocious chronic " microangiopathic change  -MRI brain with without contrast 9/30/2021: Stable right anterior frontal operculum meningioma  Stable small white matter hyperintensities on T2 and FLAIR imaging within the cerebral hemispheres, presumably precocious chronic microangiopathic change  -MRI brain with and without contrast 4/2/2022: 1   Right frontal opercular region meningioma is stable, measuring approximately 2 2 cm  2   A few white matter lesions are also unchanged, possibly precocious microangiopathy  3   No acute infarction, intracranial hemorrhage or new mass lesion      Review of Systems:   ROS obtained by medical assistant Personally reviewed and updated if indicated  I recommended PCP follow up for non neurologic problems  Review of Systems   Constitutional: Negative  Negative for appetite change and fever  HENT: Negative  Negative for hearing loss, tinnitus, trouble swallowing and voice change  Eyes: Negative  Negative for photophobia, pain and visual disturbance  Respiratory: Negative  Negative for shortness of breath  Cardiovascular: Negative  Negative for palpitations  Gastrointestinal: Negative  Negative for nausea and vomiting  Endocrine: Negative  Negative for cold intolerance  Genitourinary: Negative  Negative for dysuria, frequency and urgency  Musculoskeletal: Negative  Negative for gait problem, myalgias and neck pain  Skin: Negative  Negative for rash  Allergic/Immunologic: Negative  Neurological: Negative  Negative for dizziness, tremors, seizures, syncope, facial asymmetry, speech difficulty, weakness, light-headedness, numbness and headaches  Hematological: Negative  Does not bruise/bleed easily  Psychiatric/Behavioral: Negative    Negative for confusion, hallucinations and sleep disturbance         I have spent 26 minutes with Patient  today in which greater than 50% of this time was spent in counseling/coordination of care regarding Diagnostic results, Prognosis, Risks and benefits of tx options, Instructions for management, Patient education, Importance of tx compliance, Risk factor reductions, Impressions, Counseling / Coordination of care, Documenting in the medical record,   and Obtaining or reviewing history    I also spent 15 minutes non face to face for this patient the same day         Author:  Sunny Cobb MD 6/12/2023 6:00 PM

## 2023-06-12 NOTE — PATIENT INSTRUCTIONS
Consider Zzoma pillow     Continue to follow with neurosurgery for meningioma    Do whatever you can in your power to treat the sleep apnea   Please follow-up as scheduled 6/20/2023 - Jonel Glass MD      Headache/migraine treatment:   Abortive medications (for immediate treatment of a headache): It is ok to take ibuprofen, acetaminophen or naproxen (Advil, Tylenol,  Aleve, Excedrin) if they help your headaches you should limit these to No more than 3 times a week to avoid medication overuse/rebound headaches  For your more moderate to severe migraines take this medication early   nurtec 75 mg under the tongue  Do not repeat in 24 hours  Over the counter preventive supplements for headaches/migraines (if you try, try for 3 months straight)  (to take every day to help prevent headaches - not to take at the time of headache):  [x] Magnesium 400mg daily (If any diarrhea or upset stomach, decrease dose  as tolerated)      Prescription preventive medications for headaches/migraines   (to take every day to help prevent headaches - not to take at the time of headache):  [x]   -     acetaZOLAMIDE (DIAMOX) 125 mg tablet; 125 mg PO nightly for 1 week, then increase to 125 mg BID for 1 week, then 125 mg in am and 250 mg in pm for 1 week, then 250 mg in a m  and in p m  We discussed that the typical dose if this were a severe issue with changes behind your eyes/called papilledema which you do not have currently, would be to get you all up to 500 mg twice a day or 1000 mg daily or I have had patients all up to 3000 mg daily, just to give you a reference as to how low dose you are starting at  If you have improvement at a lower dose or go up to say 2 pills twice a day and feel worse, go back down to the last tolerated dose as any dose even if lower than what I recommend could help the situation potentially           Lifestyle Recommendations:  [x] SLEEP - Maintain a regular sleep schedule: Adults need at least 7-8 hours of uninterrupted a night  Maintain good sleep hygiene:  Going to bed and waking up at consistent times, avoiding excessive daytime naps, avoiding caffeinated beverages in the evening, avoid excessive stimulation in the evening and generally using bed primarily for sleeping  One hour before bedtime would recommend turning lights down lower, decreasing your activity (may read quietly, listen to music at a low volume)  When you get into bed, should eliminate all technology (no texting, emailing, playing with your phone, iPad or tablet in bed)  [x] HYDRATION - Maintain good hydration  Drink  2L of fluid a day (4 typical small water bottles)  [x] DIET - Maintain good nutrition  In particular don't skip meals and try and eat healthy balanced meals regularly  [x] TRIGGERS - Look for other triggers and avoid them: Limit caffeine to 1-2 cups a day or less  Avoid dietary triggers that you have noticed bring on your headaches (this could include aged cheese, peanuts, MSG, aspartame and nitrates)  [x] EXERCISE - physical exercise as we all know is good for you in many ways, and not only is good for your heart, but also is beneficial for your mental health, cognitive health and  chronic pain/headaches  I would encourage at the least 5 days of physical exercise weekly for at least 30 minutes  Education and Follow-up  [x] Please call with any questions or concerns  Of course if any new concerning symptoms go to the emergency department    [x] Follow up 3-4 months, sooner if needed

## 2023-06-12 NOTE — PROGRESS NOTES
"Patient ID: Blanca Green is a 56 y o  male  Assessment/Plan:    No problem-specific Assessment & Plan notes found for this encounter  {Assess/PlanSmartLinks:83913}       Subjective:    HPI           {[facility] HPI texts:89926}    {Common ambulatory SmartLinks:49852}         Objective:    Blood pressure 123/83, pulse 104, height 5' 7\" (1 702 m), weight 87 3 kg (192 lb 6 4 oz)  Physical Exam    Neurological Exam      ROS:    Review of Systems   Constitutional: Negative  Negative for appetite change and fever  HENT: Negative  Negative for hearing loss, tinnitus, trouble swallowing and voice change  Eyes: Negative  Negative for photophobia, pain and visual disturbance  Respiratory: Negative  Negative for shortness of breath  Cardiovascular: Negative  Negative for palpitations  Gastrointestinal: Negative  Negative for nausea and vomiting  Endocrine: Negative  Negative for cold intolerance  Genitourinary: Negative  Negative for dysuria, frequency and urgency  Musculoskeletal: Negative  Negative for gait problem, myalgias and neck pain  Skin: Negative  Negative for rash  Allergic/Immunologic: Negative  Neurological: Negative  Negative for dizziness, tremors, seizures, syncope, facial asymmetry, speech difficulty, weakness, light-headedness, numbness and headaches  Hematological: Negative  Does not bruise/bleed easily  Psychiatric/Behavioral: Negative  Negative for confusion, hallucinations and sleep disturbance                   "

## 2023-06-12 NOTE — TELEPHONE ENCOUNTER
Pt called and can not make appt on 6/16  Pt stated he can come today  appt at 2:00 today was available and pt took that appt today with Dr Tracey Casillas in [facility]

## 2023-06-15 RX ORDER — TRAMADOL HYDROCHLORIDE 50 MG/1
50 TABLET ORAL 2 TIMES DAILY PRN
COMMUNITY
Start: 2023-04-17

## 2023-06-15 RX ORDER — DIAZEPAM 10 MG/1
TABLET ORAL
COMMUNITY
Start: 2023-03-20

## 2023-06-20 ENCOUNTER — CONSULT (OUTPATIENT)
Dept: PULMONOLOGY | Facility: HOSPITAL | Age: 58
End: 2023-06-20
Payer: COMMERCIAL

## 2023-06-20 VITALS
BODY MASS INDEX: 30.45 KG/M2 | RESPIRATION RATE: 18 BRPM | HEART RATE: 90 BPM | OXYGEN SATURATION: 98 % | SYSTOLIC BLOOD PRESSURE: 120 MMHG | HEIGHT: 67 IN | TEMPERATURE: 97.5 F | DIASTOLIC BLOOD PRESSURE: 86 MMHG | WEIGHT: 194 LBS

## 2023-06-20 DIAGNOSIS — G47.33 OSA (OBSTRUCTIVE SLEEP APNEA): Primary | ICD-10-CM

## 2023-06-20 DIAGNOSIS — F51.01 PRIMARY INSOMNIA: ICD-10-CM

## 2023-06-20 PROCEDURE — 99203 OFFICE O/P NEW LOW 30 MIN: CPT | Performed by: INTERNAL MEDICINE

## 2023-06-20 RX ORDER — ZOLPIDEM TARTRATE 5 MG/1
5 TABLET ORAL
Qty: 1 TABLET | Refills: 0 | Status: SHIPPED | OUTPATIENT
Start: 2023-06-20

## 2023-06-20 NOTE — PROGRESS NOTES
Sleep Consultation   Jose Landin III 56 y o  male MRN: [id number]      Reason for consultation: Sleep Apnea    Requesting physician: Radha Lama MD    Assessment/Plan  1  Primary insomnia  Assessment & Plan:  The patient was advised to change his sleeping habits to facilitate better sleep  Sleep hygiene was discussed, and the patient was advised to try white noise over leaving his TV on to mitigate the effect of his tinnitus on his sleep  He was recommended to limit activities in the bedroom to only sleeping, and to avoid watching TV into the night  He was encouraged to continue avoiding caffeine  in the afternoons and going to bed/waking on a standard schedule      -One time dose of ambien was prescribed to facilitate sleep study  Orders:  -     zolpidem (AMBIEN) 5 mg tablet; Take 1 tablet (5 mg total) by mouth daily at bedtime as needed for sleep (FOR SLEEP STUDY)    2  WALE (obstructive sleep apnea)  Assessment & Plan:  The patient was advised to continue sleeping on his side to mitigate obstruction  Discussed that hypoglossal nerve stimulation was not indicated based on his prior sleep study showing mild WALE, but will need re-evaluation  Also discussed oral appliance therapy as a future possible treatment option  Plan  -Continuing current nightly CPAP  -Ordered follow up home sleep study  Patient denied using tramadol for the past 3 months, and was instructed to avoid taking this medication on the day of his sleep study    -One dose of Ambien was prescribed to facilitate his sleep study  Orders:  -     Ambulatory referral to Sleep Medicine  -     Home Study; Future        History of Present Illness   HPI:  Nanette Vilchis is a 56 y o  male with PMHx of WALE, Migraine headaches, nasal septal deviation s/p surgical correction, low back pain, and menigioma who comes in for evaluation of sleep apnea  He continues to have symptoms including morning time headaches, and frequent migraines   He has witnessed apneic events overnight, seen by his wife  He also has morning sleepiness, even if he sleeps through the night  He does not take any medication specifically for sleep, though his neurologist recently started him on acetazolamide which may help his sleep  He denies taking diazepam or temazepam recently, and most recently took his prescribed tramadol 3 months ago for back pain  The patient reports been being treated with CPAP nightly for at least 10 years, however he feels that no CPAP mask has fit him well  He describes trying a variety of fits including full masks and nasal only masks, with each having leaks and causing hissing that wakes him from sleep  He reports having his most recent sleep study about 5 years ago which demonstrated mild WALE    His sleep routine typically involves eating between 8-9 PM and going to bed around 10 PM and getting out of bed at 4-4:30 AM, though he does watch or listen to TV at night time  He states that the TV helps to distract him from tinnitus  He gets 5 hour of sleep per night on average, but often will find himself getting less than 2 hours of sleep due to distracting thoughts  He reports waking about 7 times per night, due to the CPAP mask hissing, and once or twice per night to urinate  He sleeps on his side most nights  He only drinks coffee in the mornings, and denies caffeinated drinks in the afternoon  He had used chewing tobacco in the past, but quit 20 years ago and no longer uses nicotine  He rarely drinks alcohol, once or twice per year  He denies other drug use  Review of Systems      Genitourinary Often waking at night to urinate   Cardiology ankle/leg swelling, no palpitations   Gastrointestinal No nausea, no diarrhea   Neurology Poor concentration, morning headaches, migraines   Constitutional fatigue   Integumentary No rashes or skin/nail/hair changes  Reports occasional skin flushing     Psychiatry Denies anxiety, but does say his mind can wander/rheumenate over ideas, interfering with sleep  Musculoskeletal joint pain and back pain   Pulmonary Occasional wheezing with temperature changes in winter, relieved by albuterol inhaler  ENT ringing in ears   Endocrine none   Hematological none               Historical Information   Past Medical History:   Diagnosis Date   • Chronic back pain    • Migraine      Past Surgical History:   Procedure Laterality Date   • HERNIA REPAIR     • MANDIBLE FRACTURE SURGERY     • MOUTH SURGERY      cyst in cheek   • NASAL SEPTUM SURGERY       Family History   Problem Relation Age of Onset   • Breast cancer Mother    • Diabetes Father    • No Known Problems Family    • Substance Abuse Neg Hx    • Mental illness Neg Hx      Social History     Socioeconomic History   • Marital status: /Civil Union     Spouse name: Betsy Lopes   • Number of children: 2   • Years of education: Not on file   • Highest education level: Not on file   Occupational History   • Occupation:      Employer: [employer]   Tobacco Use   • Smoking status: Never   • Smokeless tobacco: Former     Types: Snuff     Quit date: 2003   Vaping Use   • Vaping Use: Never used   Substance and Sexual Activity   • Alcohol use: Never   • Drug use: No   • Sexual activity: Not on file   Other Topics Concern   • Not on file   Social History Narrative   • Not on file     Social Determinants of Health     Financial Resource Strain: Not on file   Food Insecurity: Not on file   Transportation Needs: Not on file   Physical Activity: Not on file   Stress: Not on file   Social Connections: Not on file   Intimate Partner Violence: Not on file   Housing Stability: Not on file       Occupational History: Works as a   Meds/Allergies   No Known Allergies    Home medications:  Prior to Admission medications    Medication Sig Start Date End Date Taking?  Authorizing Provider   acetaZOLAMIDE (DIAMOX) 125 mg tablet 125 mg PO nightly "for 1 week, then increase to 125 mg BID for 1 week, then 125 mg in am and 250 mg in pm for 1 week, then 250 mg BID 6/12/23  Yes Aleksandr Nieves MD   magnesium oxide (MAG-OX) 400 mg Take 1 tablet (400 mg total) by mouth daily Nightly 5/26/21  Yes Aleksandr Nieves MD   methocarbamol (ROBAXIN) 750 mg tablet methocarbamol 750 mg tablet   take 1 tablet by mouth every 8 hours if needed   Yes Historical Provider, MD   Rimegepant Sulfate (Nurtec) 75 MG TBDP Take one NURTEC 75 mg under tongue every other day  Limit 1 in 24 hours 3/14/23  Yes Aleksandr Nieves MD   temazepam (RESTORIL) 15 mg capsule temazepam 15 mg capsule   as needed 11/2/20  Yes Historical Provider, MD   diazepam (VALIUM) 10 mg tablet TAKE TWO TABLETS BY MOUTH ONE HOUR BEFORE PROCEDURE  DO NOT DRIVE AFTER TAKING  Patient not taking: Reported on 6/20/2023 3/20/23   Historical Provider, MD   traMADol (ULTRAM) 50 mg tablet Take 50 mg by mouth 2 (two) times a day as needed  Patient not taking: Reported on 6/20/2023 4/17/23   Historical Provider, MD   diclofenac sodium (VOLTAREN) 50 mg EC tablet Take 1 tablet(s) twice a day by oral route as needed  12/9/19 6/2/22  Historical Provider, MD       Vitals:   Blood pressure 120/86, pulse 90, temperature 97 5 °F (36 4 °C), resp  rate 18, height 5' 7\" (1 702 m), weight 88 kg (194 lb), SpO2 98 %  , Body mass index is 30 38 kg/m²  Neck Circumference: 17    Physical Exam  General: Well appearing, Awake alert and oriented x 3, conversant without conversational dyspnea, NAD, normal affect  HEENT:   Sclera noninjected, nonicteric OU, Nares patent,  no craniofacial abnormalities, Mucous membranes, moist, no oral lesions, normal dentition  NECK: minimal fat deposition around neck, Trachea midline, no accessory muscle use, no stridor    CARDIAC: Reg, single s1/S2, no m/r/g  PULM: CTA bilaterally no wheezing, rhonchi or rales  ABD: Soft nontender, nondistended, no rebound, no rigidity, no guarding  EXT: No cyanosis, no " "clubbing, no edema, normal capillary refill  NEURO: no focal neurologic deficits, AAOx3, moving all extremities appropriately    Labs: I have personally reviewed pertinent lab results  Lab Results   Component Value Date    WBC 6 18 06/03/2022    HGB 14 1 06/03/2022    HCT 40 3 06/03/2022    MCV 86 06/03/2022     06/03/2022      Lab Results   Component Value Date    GLUCOSE 98 12/20/2017    CALCIUM 9 1 06/03/2022     (H) 12/20/2017    K 3 7 06/03/2022    CO2 25 06/03/2022     (H) 06/03/2022    BUN 22 06/03/2022    CREATININE 0 92 06/03/2022     No results found for: \"IRON\", \"TIBC\", \"FERRITIN\"  No results found for: \"JEIMYTMB16\"  No results found for: \"FOLATE\"      • Sleep studies:   Sleep Study: 12/19/16  SLEEP-DISORDERED BREATHING:   Type AHI Supine AHI Lateral AHI REM AHI GALINA SaO2 Derik % SpO2% ?88% min  Baseline 7 9 95 5 5 6 15 9 5 7 89 0 0 0     Compliance Data:  Brought SIM card from his CPAP, not readable in-office  Uses [employer] brand products, though has been buying and trying various masks via [employer]  [place] sleepiness scale today: 17    [facility]       Portions of the record may have been created with voice recognition software  Occasional wrong word or \"sound a like\" substitutions may have occurred due to the inherent limitations of voice recognition software  Read the chart carefully and recognize, using context, where substitutions have occurred    "

## 2023-06-20 NOTE — ASSESSMENT & PLAN NOTE
The patient was advised to change his sleeping habits to facilitate better sleep  Sleep hygiene was discussed, and the patient was advised to try white noise over leaving his TV on to mitigate the effect of his tinnitus on his sleep  He was recommended to limit activities in the bedroom to only sleeping, and to avoid watching TV into the night  He was encouraged to continue avoiding caffeine  in the afternoons and going to bed/waking on a standard schedule      -One time dose of ambien was prescribed to facilitate sleep study

## 2023-06-20 NOTE — ASSESSMENT & PLAN NOTE
Previous diagnosis of mild obstructive sleep apnea with significant positional component in 2016, AHI 7 9  Supine AHI was 95 5  He has been on CPAP since but he has become very intolerant of it  He has tried fullface mask and nasal masks    The patient was advised to continue sleeping on his side to mitigate obstruction  Discussed that hypoglossal nerve stimulation was not indicated based on his prior sleep study showing mild WALE, but will need re-evaluation  Also discussed oral appliance therapy as a future possible treatment option  Plan  -Continuing current nightly CPAP  -Ordered follow up home sleep study   Patient denied using tramadol for the past 3 months, and was instructed to avoid taking this medication on the day of his sleep study    -One dose of Ambien was prescribed to facilitate his sleep study  -We will discuss the results of the study at the next visit to determine what he wants to do

## 2023-07-19 ENCOUNTER — HOSPITAL ENCOUNTER (OUTPATIENT)
Dept: SLEEP CENTER | Facility: HOSPITAL | Age: 58
Discharge: HOME | End: 2023-07-19
Payer: COMMERCIAL

## 2023-07-19 DIAGNOSIS — G47.33 OSA (OBSTRUCTIVE SLEEP APNEA): ICD-10-CM

## 2023-07-19 PROCEDURE — G0399 HOME SLEEP TEST/TYPE 3 PORTA: HCPCS

## 2023-07-20 NOTE — PROGRESS NOTES
Home Sleep Study Documentation    HOME STUDY DEVICE: Noxturnal no                                           Justyna G3 yes      Pre-Sleep Home Study:    Set-up and instructions performed by: MA-Tech    Technician performed demonstration for Patient: yes    Return demonstration performed by Patient: yes    Written instructions provided to Patient: yes    Patient signed consent form: yes        Post-Sleep Home Study:    Additional comments by Patient:         Home Sleep Study Failed:no:    Failure reason: N/A    Reported or Detected: N/A    Scored by: DONALD Rendon

## 2023-07-21 PROCEDURE — 95806 SLEEP STUDY UNATT&RESP EFFT: CPT | Performed by: INTERNAL MEDICINE

## 2023-08-01 ENCOUNTER — TELEPHONE (OUTPATIENT)
Dept: SLEEP CENTER | Facility: CLINIC | Age: 58
End: 2023-08-01

## 2023-08-01 NOTE — TELEPHONE ENCOUNTER
[facility] Call placed to patient. Advised home sleep study is resulted and shows severe WALE (SIMA-32. 5) with events related desaturations. Patient scheduled to follow-up with Dr. Gladys Vásquez 9/16/2023 to discuss treatment options.

## 2023-09-12 ENCOUNTER — OFFICE VISIT (OUTPATIENT)
Age: 58
End: 2023-09-12
Payer: COMMERCIAL

## 2023-09-12 VITALS
OXYGEN SATURATION: 97 % | HEART RATE: 86 BPM | TEMPERATURE: 98.3 F | SYSTOLIC BLOOD PRESSURE: 120 MMHG | DIASTOLIC BLOOD PRESSURE: 80 MMHG | BODY MASS INDEX: 31.39 KG/M2 | WEIGHT: 200 LBS | HEIGHT: 67 IN

## 2023-09-12 DIAGNOSIS — F51.01 PRIMARY INSOMNIA: ICD-10-CM

## 2023-09-12 DIAGNOSIS — G47.00 INSOMNIA, UNSPECIFIED TYPE: ICD-10-CM

## 2023-09-12 DIAGNOSIS — G47.33 OSA (OBSTRUCTIVE SLEEP APNEA): Primary | ICD-10-CM

## 2023-09-12 PROCEDURE — 99214 OFFICE O/P EST MOD 30 MIN: CPT | Performed by: INTERNAL MEDICINE

## 2023-09-12 RX ORDER — ESZOPICLONE 1 MG/1
1 TABLET, FILM COATED ORAL
Qty: 14 TABLET | Refills: 0 | Status: SHIPPED | OUTPATIENT
Start: 2023-09-12 | End: 2023-09-26

## 2023-09-12 NOTE — PROGRESS NOTES
Progress Note - Sleep Medicine  Natali Allen III 66 y.o. male MRN: [id number]       Impression & Plan:   Patient is a 65 yo M with PMH including R frontal meningioma, HLD, low back pain, migraines, nasal septal deviation s/p septoplasty, seasonal allergic rhinitis who presents for follow up of severe obstructive sleep apnea and insomnia. Do not have access to patient's compliance report at this time. He does not have his machine with him today, he will try to bring the machine to the next office visit. Patient is interested in Sandy implantation, however he would like to exhaust all options with CPAP mask and device fittings prior to consideration of device implantation at this time. He buys his own device supplies online from [employer] as he does not have a DME supplier. Recommend he try a different interface for better CPAP compliance and comfort. Additionally, we will help patient with sleep initiation by starting 2 week trial of lunesta to get accustomed to CPAP. Discussed risks and side effect profile with patient and he is agreeable to trying the medication. Patient will send [facility] message in approximately 2 weeks with update on how he is doing with new mask and with lunesta. Will see patient back in follow up in approximately 3 months. 1. WALE (obstructive sleep apnea)  - Mask fitting only; Future    2. Insomnia, unspecified type  - eszopiclone (LUNESTA) 1 mg tablet; Take 1 tablet (1 mg total) by mouth daily at bedtime as needed for sleep for up to 14 days Take immediately before bedtime  Dispense: 14 tablet; Refill: 0   ______________________________________________________________________    HPI:    Myke Lyn is a 66 y.o. male with PMH including R frontal meningioma, HLD, low back pain, migraines, nasal septal deviation s/p septoplasty, seasonal allergic rhinitis. He presents today for follow up of WALE and insomnia.     Previous sleep study:  2016, AHI 7.9, supine 95.5, REM 15.9, SpO2 denys 89%    Patient underwent HST completed 7/20/2023 demonstrating severe sleep apnea with AHI 32.6 with AHI in supine position 67 and in R position 59 events per hour. Patient is using CPAP intermittently, he is using S10 machine but we do not have access to compliance report today. He is using full face mask. He reports mask leaking and mask hissing which wakes him up at night. He still snores with the CPAP device. Patient feels worse when he wears the CPAP compared to when he is not using it. Has tried multiple interfaces with little improvement in comfort. Feels that the full face mask is the best so far, but doesn't work well when he sleeps on his side. States his mind always races at night, and he tends to focus on the mask hissing and making noise and is unable to sleep. Denies anxiety or excessive worry. Patient reported issues with the following symptoms:   Mask leaking: +yes  Skin irritation from the mask: no  Pain or discomfort: yes has some skin outbreaks at the nasal bridge   Feeling of claustrophobia: no  Aerophagia: no  Pressure intolerance: no  Nasal congestion or rhinorrhea: +yes   Nasal and oral dryness: +yes nasal dryness   Snoring with PAP: yes still snoring     Using PAP every night/day: using it most of the time - sometimes the mask falls off at night   Using PAP the entire duration of sleep: yes  Benefiting from PAP: no feels worse with CPAP     Goes to bed a 10PM, sleep onset latency is 5 minutes up to 5 hours depending on if his mind is racing. Wakes up around 5:30AM to 8:00AM depending on what his schedule is. Thinks he gets 3-7 of sleep per night. Has always had problems with sleep initiation. Wakes up 1x at night for nocturia. Does not take medications for sleep. He has tinnitus at night, watches TV if it is bothering him. When he cannot sleep, he watches TV or reads, plays on his phone or tablet.      He takes naps for 10-15 minutes once per week but states he would nap if he had the opportunity to do so. 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     Driving while drowsy: denies    [place]: 20/24  Sitting and reading: High chance of dozing  Watching TV: High chance of dozing  Sitting, inactive in a public place (e.g. a theatre or a meeting): High chance of dozing  As a passenger in a car for an hour without a break: High chance of dozing  Lying down to rest in the afternoon when circumstances permit: High chance of dozing  Sitting and talking to someone: Slight chance of dozing  Sitting quietly after a lunch without alcohol: High chance of dozing  In a car, while stopped for a few minutes in traffic: Slight chance of dozing  Total score: 20      Patient is interested in learning more about Inspire device implantation.      Social history updates:  Social History     Tobacco Use   Smoking Status Never   Smokeless Tobacco Former   • Types: Snuff   • Quit date: 2003     Social History     Socioeconomic History   • Marital status: /Civil Union     Spouse name: Millicent Ham   • Number of children: 2   • Years of education: Not on file   • Highest education level: Not on file   Occupational History   • Occupation:      Employer: [employer]   Tobacco Use   • Smoking status: Never   • Smokeless tobacco: Former     Types: Snuff     Quit date: 2003   Vaping Use   • Vaping Use: Never used   Substance and Sexual Activity   • Alcohol use: Never   • Drug use: No   • Sexual activity: Not on file   Other Topics Concern   • Not on file   Social History Narrative   • Not on file     Social Determinants of Health     Financial Resource Strain: Not on file   Food Insecurity: Not on file   Transportation Needs: Not on file   Physical Activity: Not on file   Stress: Not on file   Social Connections: Not on file   Intimate Partner Violence: Not on file   Housing Stability: Not on file       PhysicalExamination:  Vitals:   /80 (BP Location: Left arm, Patient Position: Sitting, Cuff Size: Large)   Pulse 86   Temp 98.3 °F (36.8 °C) (Tympanic)   Ht 5' 7" (1.702 m)   Wt 90.7 kg (200 lb)   SpO2 97%   BMI 31.32 kg/m²     Physical Exam:  General: Sitting in chair, awake alert and oriented to person, place, and time. No acute distress  HEENT: PERRL, nares patent, no craniofacial abnormalities. Mucous membranes, moist, no oral lesions, and normal dentition. Mallampati class IV, tonsils 1+  NECK:  Trachea midline, no accessory muscle use, and no stridor   CARDIAC: Regular rate and rhythm  PULM: CTA bilaterally no wheezing, rhonchi or rales. No conversational dyspnea  EXT: No cyanosis, no clubbing, and no peripheral edema    NEURO: No focal neurologic deficits, moving all extremities appropriately    Diagnostic Data:  Labs: I personally reviewed the most recent laboratory data pertinent to today's visit     Lab Results   Component Value Date    WBC 6.18 06/03/2022    HGB 14.1 06/03/2022    HCT 40.3 06/03/2022    MCV 86 06/03/2022     06/03/2022     Lab Results   Component Value Date    GLUCOSE 98 12/20/2017    CALCIUM 9.1 06/03/2022     (H) 12/20/2017    K 3.7 06/03/2022    CO2 25 06/03/2022     (H) 06/03/2022    BUN 22 06/03/2022    CREATININE 0.92 06/03/2022     No results found for: "IGE"  Lab Results   Component Value Date    ALT 44 06/02/2022    AST 31 06/02/2022    ALKPHOS 86 06/02/2022    BILITOT 0.3 12/20/2017     No results found for: "IRON", "TIBC", "FERRITIN"  No results found for: "[place]"  No results found for: "FOLATE"      Arterial Blood Gas result:  N/A    Sleep studies:    Previous sleep study:  2016, AHI 7.9, in supine position AHI 95.5, REM AHI 15.9, Spo2 denys 89%      HST 7/20/2023:  IMPRESSION:     1. This test is consistent with severe obstructive sleep apnea.   Total AHI of 32.5 events per hour (Supine AHI 67 events per hour, lateral side with discrepancy between right and left with significantly higher AHI on the right position of 59 events per hour, and only 10 events per hour on the left position)  2. Baseline oxygen saturation was normal, event related desaturations were present.     RECOMMENDATION:  Continue to use CPAP versus if CPAP is intolerant consideration for BiPAP titration or alternative therapy such as mandibular advancement device or hypoglossal nerve stimulation, recommend continuing encouragement for weight loss and healthy lifestyle modifications.       [id number] [facility]  Sleep Medicine Fellow

## 2023-09-12 NOTE — LETTER
September 12, 2023     Kameron Varela MD  [facility] [id number]    Patient: Marco Rodney III   YOB: 1965   Date of Visit: 9/12/2023       Dear Dr. Jimy Prescott: Thank you for referring Zuhair Merrill to me for evaluation. Below are my notes for this consultation. If you have questions, please do not hesitate to call me. I look forward to following your patient along with you. Sincerely,        Kaur Farmer MD        CC: No Recipients    Bora Galindo MD  9/12/2023 12:27 PM  Sign when Signing Visit  Progress Note - Sleep Medicine  Marco Rodney III 66 y.o. male MRN: [id number]       Impression & Plan:          ______________________________________________________________________    HPI:    Miguelina Kelly is a 66 y.o. male with PMH including R frontal meningioma, HLD, low back pain, migraines, nasal septal deviation s/p septoplasty, seasonal allergic rhinitis. He presents today for follow up of WALE. Previous sleep study:  2016, AHI 7.9, supine 95.5, REM 15.9, SpO2 denys 89%    Patient underwent HST completed 7/20/2023 demonstrating severe sleep apnea with AHI 32.6 with AHI in supine position 67 and in R position 59 events per hour. Patient is using CPAP intermittently, he is using S10 machine but we do not have access to compliance report today. He is using full face mask. He reports mask leaking and mask hissing which wakes him up at night. He still snores with the CPAP device. Patient feels worse when he wears the CPAP compared to when he is not using it. Has tried multiple interfaces with little improvement in comfort. Feels that the full face mask is the best so far, but doesn't work well when he sleeps on his side. States his mind always races at night, and he tends to focus on the mask hissing and making noise and is unable to sleep. Denies anxiety or excessive worry.      Patient reported issues with the following symptoms:   Mask leaking: +yes  Skin irritation from the mask: no  Pain or discomfort: yes has some skin outbreaks at the nasal bridge   Feeling of claustrophobia: no  Aerophagia: no  Pressure intolerance: no  Nasal congestion or rhinorrhea: +yes   Nasal and oral dryness: +yes nasal dryness   Snoring with PAP: yes still snoring     Using PAP every night/day: using it most of the time - sometimes the mask falls off at night   Using PAP the entire duration of sleep: yes  Benefiting from PAP: no feels worse with CPAP     Goes to bed a 10PM, sleep onset latency is 5 minutes up to 5 hours depending on if his mind is racing. Wakes up around 5:30AM to 8:00AM depending on what his schedule is. Thinks he gets 3-7 of sleep per night. Has always had problems with sleep initiation. Wakes up 1x at night for nocturia. Does not take medications for sleep. He has tinnitus at night, watches TV if it is bothering him. When he cannot sleep, he watches TV or reads, plays on his phone or tablet. He takes naps for 10-15 minutes once per week but states he would nap if he had the opportunity to do so. 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     Driving while drowsy: denies    [place]: 20/24  Sitting and reading: High chance of dozing  Watching TV: High chance of dozing  Sitting, inactive in a public place (e.g. a theatre or a meeting): High chance of dozing  As a passenger in a car for an hour without a break: High chance of dozing  Lying down to rest in the afternoon when circumstances permit: High chance of dozing  Sitting and talking to someone: Slight chance of dozing  Sitting quietly after a lunch without alcohol: High chance of dozing  In a car, while stopped for a few minutes in traffic: Slight chance of dozing  Total score: 20      Patient is interested in learning more about Inspire device implantation.      Social history updates:  Social History     Tobacco Use   Smoking Status Never   Smokeless Tobacco Former   • Types: Snuff   • Quit date: 2003     Social History     Socioeconomic History   • Marital status: /Civil Union     Spouse name: Ella Cuello   • Number of children: 2   • Years of education: Not on file   • Highest education level: Not on file   Occupational History   • Occupation:      Employer: [employer]   Tobacco Use   • Smoking status: Never   • Smokeless tobacco: Former     Types: Snuff     Quit date: 2003   Vaping Use   • Vaping Use: Never used   Substance and Sexual Activity   • Alcohol use: Never   • Drug use: No   • Sexual activity: Not on file   Other Topics Concern   • Not on file   Social History Narrative   • Not on file     Social Determinants of Health     Financial Resource Strain: Not on file   Food Insecurity: Not on file   Transportation Needs: Not on file   Physical Activity: Not on file   Stress: Not on file   Social Connections: Not on file   Intimate Partner Violence: Not on file   Housing Stability: Not on file       PhysicalExamination:  Vitals:   /80 (BP Location: Left arm, Patient Position: Sitting, Cuff Size: Large)   Pulse 86   Temp 98.3 °F (36.8 °C) (Tympanic)   Ht 5' 7" (1.702 m)   Wt 90.7 kg (200 lb)   SpO2 97%   BMI 31.32 kg/m²     Physical Exam:  General: Sitting in chair, awake alert and oriented to person, place, and time. No acute distress  HEENT: PERRL, nares patent, no craniofacial abnormalities. Mucous membranes, moist, no oral lesions, and normal dentition. Mallampati class IV, tonsils 1+  NECK:  Trachea midline, no accessory muscle use, and no stridor   CARDIAC: Regular rate and rhythm  PULM: CTA bilaterally no wheezing, rhonchi or rales. No conversational dyspnea  EXT: No cyanosis, no clubbing, and no peripheral edema    NEURO: No focal neurologic deficits, moving all extremities appropriately    Diagnostic Data:  Labs:   I personally reviewed the most recent laboratory data pertinent to today's visit     Lab Results   Component Value Date    WBC 6.18 06/03/2022    HGB 14.1 06/03/2022    HCT 40.3 06/03/2022    MCV 86 06/03/2022     06/03/2022     Lab Results   Component Value Date    GLUCOSE 98 12/20/2017    CALCIUM 9.1 06/03/2022     (H) 12/20/2017    K 3.7 06/03/2022    CO2 25 06/03/2022     (H) 06/03/2022    BUN 22 06/03/2022    CREATININE 0.92 06/03/2022     No results found for: "IGE"  Lab Results   Component Value Date    ALT 44 06/02/2022    AST 31 06/02/2022    ALKPHOS 86 06/02/2022    BILITOT 0.3 12/20/2017     No results found for: "IRON", "TIBC", "FERRITIN"  No results found for: "Misti Niranjan"  No results found for: "FOLATE"      Arterial Blood Gas result:  N/A    Sleep studies:    Previous sleep study:  2016, AHI 7.9, in supine position AHI 95.5, REM AHI 15.9, Spo2 denys 89%      HST 7/20/2023:  IMPRESSION:     1. This test is consistent with severe obstructive sleep apnea. Total AHI of 32.5 events per hour (Supine AHI 67 events per hour, lateral side with discrepancy between right and left with significantly higher AHI on the right position of 59 events per hour, and only 10 events per hour on the left position)  2. Baseline oxygen saturation was normal, event related desaturations were present. RECOMMENDATION:  Continue to use CPAP versus if CPAP is intolerant consideration for BiPAP titration or alternative therapy such as mandibular advancement device or hypoglossal nerve stimulation, recommend continuing encouragement for weight loss and healthy lifestyle modifications. [id number] [facility]  Sleep Medicine Fellow     Deborah Biggs MD  9/11/2023 10:40 PM  Sign when Signing Visit  Sleep Medicine Outpatient Follow Up Note   Boogie Ray III 66 y.o. male MRN: [id number]  9/11/2023      Reason for Consultation:  No chief complaint on file. Assessment/Plan:    {There are no diagnoses linked to this encounter. (Refresh or delete this SmartLink)}  1.  WALE (obstructive sleep apnea)  Assessment & Plan:  Previous diagnosis of mild obstructive sleep apnea with significant positional component in 2016, AHI 7.9. Supine AHI was 95.5. He has been on CPAP since but he has become very intolerant of it. He has tried fullface mask and nasal masks     The patient was advised to continue sleeping on his side to mitigate obstruction. Discussed that hypoglossal nerve stimulation was not indicated based on his prior sleep study showing mild WALE, but will need re-evaluation. Also discussed oral appliance therapy as a future possible treatment option. Plan  -Continuing current nightly CPAP  -Ordered follow up home sleep study. Patient denied using tramadol for the past 3 months, and was instructed to avoid taking this medication on the day of his sleep study.   -One dose of Ambien was prescribed to facilitate his sleep study  -We will discuss the results of the study at the next visit to determine what he wants to do     Orders:  -     Ambulatory referral to Sleep Medicine  -     Home Study; Future     2. Primary insomnia  Assessment & Plan:  The patient was advised to change his sleeping habits to facilitate better sleep. Sleep hygiene was discussed, and the patient was advised to try white noise over leaving his TV on to mitigate the effect of his tinnitus on his sleep. He was recommended to limit activities in the bedroom to only sleeping, and to avoid watching TV into the night. He was encouraged to continue avoiding caffeine  in the afternoons and going to bed/waking on a standard schedule.      -One time dose of ambien was prescribed to facilitate sleep study. Orders:  -     zolpidem (AMBIEN) 5 mg tablet;  Take 1 tablet (5 mg total) by mouth daily at bedtime as needed for sleep (FOR SLEEP STUDY)      Health Maintenance  Immunization History   Administered Date(s) Administered   • COVID-19 J&J (Samuel) vaccine 0.5 mL 03/24/2021   • Influenza Quadrivalent Preservative Free 3 years and older IM 12/07/2017   • Influenza, seasonal, injectable 11/02/2016   • Tdap 03/11/2019        No follow-ups on file. History of Present Illness   HPI:  Alfie Becerril is a 66 y.o. male who has a past medical history of obstructive sleep apnea, migraine headaches, nasal septal deviation status post nasal septoplasty, lower back pain, right frontal meningioma who is presenting for evaluation of obstructive sleep apnea. He endorses snoring, morning headaches, apneic events, seen by his wife. His neurologist recently started him on acetazolamide 125 mg a day. He is prescribed diazepam and temazepam but he has not taken this recently. He is taking tramadol a few times a week for back pain. He had previously been treated with CPAP for 8-10 years although he feels like CPAP was very uncomfortable. He did use nasal masks and fullface masks. He still has a CPAP and only uses it occasionally. Previous sleep study:  2016, AHI 7.9, supine 95.5, REM 15.9, Spo2 denys 89%    His sleep routine typically involves eating between 8-9 PM and going to bed around 10 PM and getting out of bed at 4-4:30 AM, though he does watch or listen to TV at night time. He states that the TV helps to distract him from tinnitus. He gets 5 hour of sleep per night on average, but often will find himself getting less than 2 hours of sleep due to distracting thoughts. He reports waking about 7 times per night, due to the CPAP mask hissing, and once or twice per night to urinate. He sleeps on his side most nights. He only drinks coffee in the mornings, and denies caffeinated drinks in the afternoon. He had used chewing tobacco in the past, but quit 20 years ago and no longer uses nicotine. He rarely drinks alcohol, once or twice per year. He denies other drug use.        Mask type: ***  Skin irritation from the mask: ***  Pain or discomfort: ***  Feeling of claustrophobia: ***  Aerophagia: ***  Pressure intolerance: ***  Nasal congestion or rhinorrhea: ***  Nasal and oral dryness: ***  Morning headache: ***  Acid reflux symptoms: ***  Snoring with PAP: ***  Parasomnias: ***  Naps: ***    Using PAP every night/day: ***  Using PAP the entire duration of sleep: ***  Benefiting from PAP: ***    [place]:    Historical Information   Past Medical History:   Diagnosis Date   • Chronic back pain    • Migraine      Past Surgical History:   Procedure Laterality Date   • HERNIA REPAIR     • MANDIBLE FRACTURE SURGERY     • MOUTH SURGERY      cyst in cheek   • NASAL SEPTUM SURGERY       Family History   Problem Relation Age of Onset   • Breast cancer Mother    • Diabetes Father    • No Known Problems Family    • Substance Abuse Neg Hx    • Mental illness Neg Hx          Meds/Allergies     Current Outpatient Medications:   •  acetaZOLAMIDE (DIAMOX) 125 mg tablet, 125 mg PO nightly for 1 week, then increase to 125 mg BID for 1 week, then 125 mg in am and 250 mg in pm for 1 week, then 250 mg BID, Disp: 120 tablet, Rfl: 6  •  diazepam (VALIUM) 10 mg tablet, TAKE TWO TABLETS BY MOUTH ONE HOUR BEFORE PROCEDURE. DO NOT DRIVE AFTER TAKING. (Patient not taking: Reported on 6/20/2023), Disp: , Rfl:   •  magnesium oxide (MAG-OX) 400 mg, Take 1 tablet (400 mg total) by mouth daily Nightly, Disp: 90 tablet, Rfl: 3  •  methocarbamol (ROBAXIN) 750 mg tablet, methocarbamol 750 mg tablet  take 1 tablet by mouth every 8 hours if needed, Disp: , Rfl:   •  Rimegepant Sulfate (Nurtec) 75 MG TBDP, Take one NURTEC 75 mg under tongue every other day.  Limit 1 in 24 hours, Disp: 16 tablet, Rfl: 11  •  temazepam (RESTORIL) 15 mg capsule, temazepam 15 mg capsule  as needed, Disp: , Rfl:   •  traMADol (ULTRAM) 50 mg tablet, Take 50 mg by mouth 2 (two) times a day as needed (Patient not taking: Reported on 6/20/2023), Disp: , Rfl:   •  zolpidem (AMBIEN) 5 mg tablet, Take 1 tablet (5 mg total) by mouth daily at bedtime as needed for sleep (FOR SLEEP STUDY), Disp: 1 tablet, Rfl: 0  No Known Allergies    Vitals: There were no vitals taken for this visit. There is no height or weight on file to calculate BMI. Physical Exam        Labs: I have personally reviewed pertinent lab results. ABG: No results found for: "PHART", "LDI7AKP", "PO2ART", "WUO5DCJ", "Q6ONZOPC", "BEART", "SOURCE",   BNP: No results found for: "BNP",   CBC:  Lab Results   Component Value Date    WBC 6.18 06/03/2022    HGB 14.1 06/03/2022    HCT 40.3 06/03/2022    MCV 86 06/03/2022     06/03/2022    EOSPCT 3 06/03/2022    EOSABS 0.21 06/03/2022    NEUTOPHILPCT 66 06/03/2022    LYMPHOPCT 21 06/03/2022   ,   CMP:   Lab Results   Component Value Date    SODIUM 143 06/03/2022    K 3.7 06/03/2022     (H) 06/03/2022    CO2 25 06/03/2022    BUN 22 06/03/2022    CREATININE 0.92 06/03/2022    GLUCOSE 98 12/20/2017    CALCIUM 9.1 06/03/2022    AST 31 06/02/2022    ALT 44 06/02/2022    ALKPHOS 86 06/02/2022    PROT 6.8 12/20/2017    BILITOT 0.3 12/20/2017    EGFR 93 06/03/2022   ,   PT/INR: No results found for: "PT", "INR",   Ferrtin: No components found for: "FERRTIN",  Magensium: No results found for: "MAGNESIUM",      Imaging and other studies: I have personally reviewed pertinent reports. and I have personally reviewed pertinent films in PACS      Sleep Study:  7/20/23     TESTING RESULTS:  The test results are from [facility]. The total time in bed (analysis time) was 436.2 minutes. The patient had a total of 230 respiratory events made up of 214 obstructive apneas, 0 central apneas, 0 mixed apneas and 16 hypopneas resulting in a respiratory event index (SIMA) of 32.5. The lowest SpO2 recorded is 70%. IMPRESSION:     1. This test is consistent with severe obstructive sleep apnea.   Total AHI of 32.5 events per hour (Supine AHI 67 events per hour, lateral side with discrepancy between right and left with significantly higher AHI on the right position of 59 events per hour, and only 10 events per hour on the left position)  2. Baseline oxygen saturation was normal, event related desaturations were present. Compliance data:      Transthoracic Echo:        Mei Romero MD  Pulmonary, Critical Care and Sleep Medicine  [facility]     Portions of the record may have been created with voice recognition software. Occasional wrong word or "sound a like" substitutions may have occurred due to the inherent limitations of voice recognition software. Please read the chart carefully and recognize, using context, where substitutions have occurred.

## 2023-09-12 NOTE — PATIENT INSTRUCTIONS
It is very important to avoid driving while drowsy, this can be very dangerous or even cause serious injury or death. If sleepy, it is not safe to get behind the wheel. If you are driving and feels sleepy, it is very important to pull over right away. Even losing control of the car for a split second can be deadly. If you feel you cannot control when sleepiness occurs and cannot prevented, it is important to not drive at all until this improves. Please let me know if you experience this as it is very important. Sleep hygiene tips:    Keep a consistent bedtime and wake up time. This will lead to a more regular sleep schedule and avoid periods of sleep deprivation or periods of extended wakefulness during the night. Avoid watching TV in bed. If needing a form of media to help with sleep - can try sleep aid podcasts such Sleep With Me - a free podcast that helps with sleep initiation    Avoid napping, especially naps lasting longer than 1 hour or naps late in the day, which will likely affect your ability to fall asleep that night. Limit caffeine, avoiding caffeine after lunch to allow it to get out of your system and not affect your ability to fall asleep or the quality of your sleep. I usually recommend avoiding caffeine at least 6 hours before bedstime    Limit alcohol, alcohol can be sedating but also activating as it metabolizes, causing you to awaken from sleep earlier than desired. It can affect the quality of your sleep by not letting you get into the more refreshing stages of sleep. Avoid nicotine, of course not smoking or vaping at all is best, but nicotine is a stimulant and should be avoided near bedtime and during the night    Exercise and daytime physical activity is encouraged, in particular 4-6 hours before bedtime, as this may help you to fall asleep more easily and quality of sleep is improved. Rigorous exercise within 3 hours of bedtime is discouraged.     Keep the sleep environment quiet and dark - Noise and light exposure during the night can disrupt sleep. White noise or ear plugs are often recommended to reduce noise. Using black out shades or an eye mask is commonly recommended to reduce light. This also includes avoiding exposure to television or technology near bedtime, as this can have an impact on circadian rhythms by shifting sleep time later. Bedroom clock - Avoid checking the time at night  This includes alarm clocks and other time pieces such as watches and phones. Checking the time increases cognitive arousal and prolongs wakefulness. Evening eating - Avoid a large meal near bedtime, but don't go to bed hungry. Eat a healthy and filling meal in the evening without over-eating and avoid late night snacks. Insomnia tips: With great difficulty falling asleep or with difficulty falling back asleep, laying in bed for a prolonged period time is not ideal.  This can increase worry and rumination (having racing thoughts, not able to "turn off your brain". After what feels like 15 minutes, if you feel wide awake, worried, anxious, or can't clear your brain, it is better to leave the bedroom to do something relaxing , The typical recommendation is to read a boring book in dim light. In general, if you leave the room, you want to do something that is relaxing, not stimulating. Avoid bright screens, doing work, doing chores, or anything that requires a lot of mental effort. Return to bed when you feel more calm, sleepy, or mentally clear. It is common in insomnia to look at the time at night to see what time it is, how long you have been awake, etc.  However, this can negatively affect sleep. I strongly recommend avoiding looking at the time at night. Here are some ways to do this  1) Turn the clock around so you cannot see the time at night  2) Avoid wearing a watch to bed.     3) Leave your phone on the other side of the room so you cannot look at it at night  4) Set an alarm if you need to wake up in he morning. If you have not heard the alarm, assume it is still time to sleep. If you practice this consistently, sleep quality and anxiety regarding sleep may improve. There are some on-line resources that do require a fee that can be of help. Some of which will require a fee. https://abad.info/. [place].gov/apps/insomnia/index.html#dashboard (FREE)  Http://99tests/cbt-online-insomnia-treatment.html  IndoorTheaters.si    Some apps that have helped people sleep: Insomnia  (FREE)  CBT-I   Go!  To Sleep by the [facility]

## 2023-09-12 NOTE — PROGRESS NOTES
Sleep Medicine Outpatient Follow Up Note   Natali Allen III 66 y.o. male MRN: [id number]  9/11/2023      Reason for Consultation:  No chief complaint on file. Assessment/Plan:    {There are no diagnoses linked to this encounter. (Refresh or delete this SmartLink)}  1. WALE (obstructive sleep apnea)  Assessment & Plan:  Previous diagnosis of mild obstructive sleep apnea with significant positional component in 2016, AHI 7.9. Supine AHI was 95. 5.     He has been on CPAP since but he has become very intolerant of it. He has tried fullface mask and nasal masks     The patient was advised to continue sleeping on his side to mitigate obstruction. Discussed that hypoglossal nerve stimulation was not indicated based on his prior sleep study showing mild WALE, but will need re-evaluation. Also discussed oral appliance therapy as a future possible treatment option.      Plan  -Continuing current nightly CPAP  -Ordered follow up home sleep study. Patient denied using tramadol for the past 3 months, and was instructed to avoid taking this medication on the day of his sleep study.   -One dose of Ambien was prescribed to facilitate his sleep study  -We will discuss the results of the study at the next visit to determine what he wants to do     Orders:  -     Ambulatory referral to Sleep Medicine  -     Home Study; Future     2. Primary insomnia  Assessment & Plan:  The patient was advised to change his sleeping habits to facilitate better sleep. Sleep hygiene was discussed, and the patient was advised to try white noise over leaving his TV on to mitigate the effect of his tinnitus on his sleep. He was recommended to limit activities in the bedroom to only sleeping, and to avoid watching TV into the night.  He was encouraged to continue avoiding caffeine  in the afternoons and going to bed/waking on a standard schedule.      -One time dose of ambien was prescribed to facilitate sleep study.     Orders:  -     zolpidem (AMBIEN) 5 mg tablet; Take 1 tablet (5 mg total) by mouth daily at bedtime as needed for sleep (FOR SLEEP STUDY)      Health Maintenance  Immunization History   Administered Date(s) Administered   • COVID-19 J&J (Samuel) vaccine 0.5 mL 03/24/2021   • Influenza Quadrivalent Preservative Free 3 years and older IM 12/07/2017   • Influenza, seasonal, injectable 11/02/2016   • Tdap 03/11/2019        No follow-ups on file. History of Present Illness   HPI:  Giuseppe Mcdonnell is a 66 y.o. male who has a past medical history of obstructive sleep apnea, migraine headaches, nasal septal deviation status post nasal septoplasty, lower back pain, right frontal meningioma who is presenting for evaluation of obstructive sleep apnea.     He endorses snoring, morning headaches, apneic events, seen by his wife. His neurologist recently started him on acetazolamide 125 mg a day. He is prescribed diazepam and temazepam but he has not taken this recently. He is taking tramadol a few times a week for back pain.     He had previously been treated with CPAP for 8-10 years although he feels like CPAP was very uncomfortable. He did use nasal masks and fullface masks. He still has a CPAP and only uses it occasionally. Previous sleep study:  2016, AHI 7.9, supine 95.5, REM 15.9, Spo2 denys 89%    His sleep routine typically involves eating between 8-9 PM and going to bed around 10 PM and getting out of bed at 4-4:30 AM, though he does watch or listen to TV at night time. He states that the TV helps to distract him from tinnitus. He gets 5 hour of sleep per night on average, but often will find himself getting less than 2 hours of sleep due to distracting thoughts.      He reports waking about 7 times per night, due to the CPAP mask hissing, and once or twice per night to urinate. He sleeps on his side most nights. He only drinks coffee in the mornings, and denies caffeinated drinks in the afternoon.  He had used chewing tobacco in the past, but quit 20 years ago and no longer uses nicotine. He rarely drinks alcohol, once or twice per year. He denies other drug use.       Mask type: ***  Skin irritation from the mask: ***  Pain or discomfort: ***  Feeling of claustrophobia: ***  Aerophagia: ***  Pressure intolerance: ***  Nasal congestion or rhinorrhea: ***  Nasal and oral dryness: ***  Morning headache: ***  Acid reflux symptoms: ***  Snoring with PAP: ***  Parasomnias: ***  Naps: ***    Using PAP every night/day: ***  Using PAP the entire duration of sleep: ***  Benefiting from PAP: ***    [place]:    Historical Information   Past Medical History:   Diagnosis Date   • Chronic back pain    • Migraine      Past Surgical History:   Procedure Laterality Date   • HERNIA REPAIR     • MANDIBLE FRACTURE SURGERY     • MOUTH SURGERY      cyst in cheek   • NASAL SEPTUM SURGERY       Family History   Problem Relation Age of Onset   • Breast cancer Mother    • Diabetes Father    • No Known Problems Family    • Substance Abuse Neg Hx    • Mental illness Neg Hx          Meds/Allergies     Current Outpatient Medications:   •  acetaZOLAMIDE (DIAMOX) 125 mg tablet, 125 mg PO nightly for 1 week, then increase to 125 mg BID for 1 week, then 125 mg in am and 250 mg in pm for 1 week, then 250 mg BID, Disp: 120 tablet, Rfl: 6  •  diazepam (VALIUM) 10 mg tablet, TAKE TWO TABLETS BY MOUTH ONE HOUR BEFORE PROCEDURE. DO NOT DRIVE AFTER TAKING. (Patient not taking: Reported on 6/20/2023), Disp: , Rfl:   •  magnesium oxide (MAG-OX) 400 mg, Take 1 tablet (400 mg total) by mouth daily Nightly, Disp: 90 tablet, Rfl: 3  •  methocarbamol (ROBAXIN) 750 mg tablet, methocarbamol 750 mg tablet  take 1 tablet by mouth every 8 hours if needed, Disp: , Rfl:   •  Rimegepant Sulfate (Nurtec) 75 MG TBDP, Take one NURTEC 75 mg under tongue every other day.  Limit 1 in 24 hours, Disp: 16 tablet, Rfl: 11  •  temazepam (RESTORIL) 15 mg capsule, temazepam 15 mg capsule  as needed, Disp: , Rfl: •  traMADol (ULTRAM) 50 mg tablet, Take 50 mg by mouth 2 (two) times a day as needed (Patient not taking: Reported on 6/20/2023), Disp: , Rfl:   •  zolpidem (AMBIEN) 5 mg tablet, Take 1 tablet (5 mg total) by mouth daily at bedtime as needed for sleep (FOR SLEEP STUDY), Disp: 1 tablet, Rfl: 0  No Known Allergies    Vitals: There were no vitals taken for this visit. There is no height or weight on file to calculate BMI. Physical Exam        Labs: I have personally reviewed pertinent lab results. ABG: No results found for: "PHART", "KVZ1UYX", "PO2ART", "GWR0ORW", "P7XIZNJU", "BEART", "SOURCE",   BNP: No results found for: "BNP",   CBC:  Lab Results   Component Value Date    WBC 6.18 06/03/2022    HGB 14.1 06/03/2022    HCT 40.3 06/03/2022    MCV 86 06/03/2022     06/03/2022    EOSPCT 3 06/03/2022    EOSABS 0.21 06/03/2022    NEUTOPHILPCT 66 06/03/2022    LYMPHOPCT 21 06/03/2022   ,   CMP:   Lab Results   Component Value Date    SODIUM 143 06/03/2022    K 3.7 06/03/2022     (H) 06/03/2022    CO2 25 06/03/2022    BUN 22 06/03/2022    CREATININE 0.92 06/03/2022    GLUCOSE 98 12/20/2017    CALCIUM 9.1 06/03/2022    AST 31 06/02/2022    ALT 44 06/02/2022    ALKPHOS 86 06/02/2022    PROT 6.8 12/20/2017    BILITOT 0.3 12/20/2017    EGFR 93 06/03/2022   ,   PT/INR: No results found for: "PT", "INR",   Ferrtin: No components found for: "FERRTIN",  Magensium: No results found for: "MAGNESIUM",      Imaging and other studies: I have personally reviewed pertinent reports. and I have personally reviewed pertinent films in PACS      Sleep Study:  7/20/23     TESTING RESULTS:  The test results are from 1Night. The total time in bed (analysis time) was 436.2 minutes. The patient had a total of 230 respiratory events made up of 214 obstructive apneas, 0 central apneas, 0 mixed apneas and 16 hypopneas resulting in a respiratory event index (SIMA) of 32.5.   The lowest SpO2 recorded is 70%.     IMPRESSION:     1. This test is consistent with severe obstructive sleep apnea. Total AHI of 32.5 events per hour (Supine AHI 67 events per hour, lateral side with discrepancy between right and left with significantly higher AHI on the right position of 59 events per hour, and only 10 events per hour on the left position)  2. Baseline oxygen saturation was normal, event related desaturations were present. Compliance data:      Transthoracic Echo:        Sharath Awad MD  Pulmonary, Critical Care and Sleep Medicine  [facility]     Portions of the record may have been created with voice recognition software. Occasional wrong word or "sound a like" substitutions may have occurred due to the inherent limitations of voice recognition software. Please read the chart carefully and recognize, using context, where substitutions have occurred.

## 2023-09-13 NOTE — ASSESSMENT & PLAN NOTE
He is improving sleep hygiene. He is doing more stimulus control. Sleep initiation is still difficult sometimes due to racing thoughts. Continue to limit non sleep bedroom activities. Avoid caffeine in the afternoons. Trial of Lunesta 1mg for 2 weeks also to facilitate acclimation of CPAP.

## 2023-09-13 NOTE — ASSESSMENT & PLAN NOTE
Repeat HST 2023 shows SIMA 35  He has a AirSense 10 which we do not have compliance data since he does not use a DME. Advised him to bring data from machine via HARISH or bring the machine next visit  He can trial the most comfortable mask he has. Trial of Lunesta to help with CPAP compliance. He is a candidate for HGN placement. We discussed pros/cons of Inspire placement and the need for ENT eval/DISE. He will think about it and re-evaluate next time.

## 2023-09-26 DIAGNOSIS — G47.00 INSOMNIA, UNSPECIFIED TYPE: ICD-10-CM

## 2023-09-27 RX ORDER — ESZOPICLONE 1 MG/1
1 TABLET, FILM COATED ORAL
Qty: 14 TABLET | Refills: 0 | Status: SHIPPED | OUTPATIENT
Start: 2023-09-27 | End: 2023-10-11

## 2023-10-04 ENCOUNTER — TELEPHONE (OUTPATIENT)
Dept: PULMONOLOGY | Facility: CLINIC | Age: 58
End: 2023-10-04

## 2023-10-04 NOTE — TELEPHONE ENCOUNTER
I called and left Gladys Gabriel a message regarding his mask fitting and a follow up appt with Dr. Jessica Mukherjee. Please transfer call to me when patient calls back. Thank you.

## 2023-10-06 ENCOUNTER — OFFICE VISIT (OUTPATIENT)
Dept: OBGYN CLINIC | Facility: OTHER | Age: 58
End: 2023-10-06
Payer: COMMERCIAL

## 2023-10-06 ENCOUNTER — APPOINTMENT (OUTPATIENT)
Dept: RADIOLOGY | Facility: OTHER | Age: 58
End: 2023-10-06
Payer: COMMERCIAL

## 2023-10-06 VITALS
HEIGHT: 67 IN | SYSTOLIC BLOOD PRESSURE: 131 MMHG | DIASTOLIC BLOOD PRESSURE: 93 MMHG | HEART RATE: 71 BPM | BODY MASS INDEX: 31.32 KG/M2

## 2023-10-06 DIAGNOSIS — M25.522 PAIN IN LEFT ELBOW: Primary | ICD-10-CM

## 2023-10-06 DIAGNOSIS — M25.522 PAIN IN LEFT ELBOW: ICD-10-CM

## 2023-10-06 DIAGNOSIS — S46.212A STRAIN OF LEFT BICEPS TENDON: ICD-10-CM

## 2023-10-06 PROCEDURE — 73080 X-RAY EXAM OF ELBOW: CPT

## 2023-10-06 PROCEDURE — 99203 OFFICE O/P NEW LOW 30 MIN: CPT | Performed by: ORTHOPAEDIC SURGERY

## 2023-10-06 RX ORDER — AMOXICILLIN AND CLAVULANATE POTASSIUM 875; 125 MG/1; MG/1
TABLET, FILM COATED ORAL
COMMUNITY
Start: 2023-08-11

## 2023-10-06 RX ORDER — GABAPENTIN 100 MG/1
CAPSULE ORAL
COMMUNITY

## 2023-10-06 RX ORDER — ZOLPIDEM TARTRATE 5 MG/1
TABLET ORAL
COMMUNITY

## 2023-10-06 NOTE — PROGRESS NOTES
[address]  [address] [address] [facility] [address] [id number]      ASSESSMENT & PLAN:      1. Pain in left elbow        No orders of the defined types were placed in this encounter. No orders of the defined types were placed in this encounter. We have reviewed clinical exam and findings and relevant study findings with patient as well as discussed management options, reviewed natural history, and engaged in shared decision-making. Plan and instruction below was discussed with patient:  There are no Patient Instructions on file for this visit. Instruction provided for ***    Follow-Up: No follow-ups on file. IMAGING STUDIES: (I personally reviewed images in PACS and report)    • ***  o ***    PAST REPORTS:    • ***  o ***    PAST PROCEDURES:    • ***    Subjective      HPI    No chief complaint on file. 66 y.o. male with PMHX chronic LBP and lumbar spondylosis presents for initial evaluation of {traumatic, non-traumatic, acute exacerbation:58806} left elbow pain. Patient was recently seen by *** on *** for this problem with concern for***. At that visit, patient was recommended *** and instructed to ***. Review of Systems:  Review of Systems         Objective     There were no vitals taken for this visit. There is no height or weight on file to calculate BMI.    Patient Active Problem List   Diagnosis   • Low back pain   • Strain of lumbar region   • WALE (obstructive sleep apnea)   • Mixed dyslipidemia   • Nasal septal deviation   • Seasonal allergic rhinitis   • Nasal turbinate hypertrophy   • Nasal obstruction   • Deviated nasal septum   • Hypertrophy of nasal turbinates   • Meningioma (HCC)   • Abnormal finding on MRI of brain   • Benign neoplasm of supratentorial region of brain [facility])   • Primary insomnia        Historical Information     Meds/Allergies   Current Outpatient Medications on File Prior to Visit   Medication Sig   • acetaZOLAMIDE (DIAMOX) 125 mg tablet 125 mg PO nightly for 1 week, then increase to 125 mg BID for 1 week, then 125 mg in am and 250 mg in pm for 1 week, then 250 mg BID   • diazepam (VALIUM) 10 mg tablet TAKE TWO TABLETS BY MOUTH ONE HOUR BEFORE PROCEDURE. DO NOT DRIVE AFTER TAKING. (Patient not taking: Reported on 6/20/2023)   • eszopiclone (LUNESTA) 1 mg tablet Take 1 tablet (1 mg total) by mouth daily at bedtime as needed for sleep for up to 14 days Take immediately before bedtime   • magnesium oxide (MAG-OX) 400 mg Take 1 tablet (400 mg total) by mouth daily Nightly   • methocarbamol (ROBAXIN) 750 mg tablet methocarbamol 750 mg tablet   take 1 tablet by mouth every 8 hours if needed   • Rimegepant Sulfate (Nurtec) 75 MG TBDP Take one NURTEC 75 mg under tongue every other day. Limit 1 in 24 hours   • traMADol (ULTRAM) 50 mg tablet Take 50 mg by mouth 2 (two) times a day as needed (Patient not taking: Reported on 6/20/2023)   • [DISCONTINUED] diclofenac sodium (VOLTAREN) 50 mg EC tablet Take 1 tablet(s) twice a day by oral route as needed.      No Known Allergies     Historical Information   Past Medical History:   Diagnosis Date   • Chronic back pain    • Migraine      Past Surgical History:   Procedure Laterality Date   • HERNIA REPAIR     • MANDIBLE FRACTURE SURGERY     • MOUTH SURGERY      cyst in cheek   • NASAL SEPTUM SURGERY       Family History   Problem Relation Age of Onset   • Breast cancer Mother    • Diabetes Father    • No Known Problems Family    • Substance Abuse Neg Hx    • Mental illness Neg Hx        Social History   Social Determinants of Health     Tobacco Use: Medium Risk (9/12/2023)    Patient History    • Smoking Tobacco Use: Never    • Smokeless Tobacco Use: Former    • Passive Exposure: Not on file   Alcohol Use: Not At Risk (5/26/2021)    AUDIT-C    • Frequency of Alcohol Consumption: Never    • Average Number of Drinks: Not on file    • Frequency of Binge Drinking: Not on file Financial Resource Strain: Not on file   Food Insecurity: Not on file   Transportation Needs: Not on file   Physical Activity: Not on file   Stress: Not on file   Social Connections: Not on file   Intimate Partner Violence: Not on file   Depression: Not at risk (1/11/2023)    PHQ-2    • PHQ-2 Score: 0   Housing Stability: Not on file   Utilities: Not on file      Social History     Substance and Sexual Activity   Alcohol Use Never     Social History     Substance and Sexual Activity   Drug Use No     Social History     Tobacco Use   Smoking Status Never   Smokeless Tobacco Former   • Types: Snuff   • Quit date: 2003       Objective     Physical Exam   Physical Exam  Ortho Exam      Karen Knight MD  10/06/23      --------------------------------------------------    Procedures

## 2023-10-06 NOTE — PROGRESS NOTES
Assessment:       1. Pain in left elbow    2. Strain of left biceps tendon          Plan:        Explained my current clinical findings and reviewed the radiological findings with the patient today. Clinically, I suspect a left elbow distal biceps strain/partial tearing. Symptoms have now persisted for over 2 months despite conservative management. Hence, I recommend further evaluation through MRI to evaluate for any associated distal biceps tendon tearing +/- associated flexor pronator tendon injury. In the meanwhile, I have advised him to avoid doing any type of heavy lifting pulling or pushing from his left upper extremity. We will follow-up after his left elbow MRI. Subjective:     Patient ID: Shavon Dodd is a 66 y.o. male. Chief Complaint:    ANNA Whitfield is a pleasant 79-year-old gentleman who presents today for evaluation of acute left elbow pain. He sustained an injury after falling on his left outstretched hand about 2 months ago. Had acute onset left anterior distal arm and proximal forearm pain. He did try rest and NSAIDs but has continued to experience pain in this region for over 2 months. He also experiences weakness with left elbow flexion and is currently having difficulty lifting objects. He does not have any known history of previous left elbow injury or surgery. No associated distal neurovascular symptoms reported. Social History     Occupational History   • Occupation: [employer]     Employer: [employer]   Tobacco Use   • Smoking status: Never   • Smokeless tobacco: Former     Types: Snuff     Quit date: 2003   Vaping Use   • Vaping Use: Never used   Substance and Sexual Activity   • Alcohol use: Never   • Drug use: No   • Sexual activity: Not on file      Review of Systems        Objective:     Left Elbow Exam     Tenderness   Left elbow tenderness location: Tender to palpation over the distal biceps tendon.   Also has some discomfort over the flexor pronator origin. Range of Motion   Extension: normal   Left elbow flexion: Discomfort in terminal flexion. Pronation: normal   Left elbow supination: Discomfort with supination. Muscle Strength   Pronation:  5/5   Supination:  4/5     Tests   Varus: negative  Valgus: negative  Tinel's sign (cubital tunnel): negative    Comments:  Increased discomfort with biceps hook test but able to clinically palpate the distal biceps tendon. Left elbow flexion strength is 4 -/5 and left forearm supination with the elbow flexed at 90 degrees is 4/5. Physical Exam  Vitals and nursing note reviewed. Constitutional:       Appearance: He is well-developed. HENT:      Head: Normocephalic and atraumatic. Cardiovascular:      Rate and Rhythm: Normal rate. Pulmonary:      Effort: Pulmonary effort is normal. No respiratory distress. Skin:     General: Skin is warm. Findings: No erythema. Neurological:      Mental Status: He is alert and oriented to person, place, and time. Psychiatric:         Behavior: Behavior normal.         Thought Content: Thought content normal.         Judgment: Judgment normal.           I have personally reviewed pertinent films in PACS and my interpretation is Plain radiograph of the left elbow performed today does not reveal any acute osseous injury or significant degenerative changes. Jesika Angles

## 2023-10-21 ENCOUNTER — HOSPITAL ENCOUNTER (OUTPATIENT)
Dept: MRI IMAGING | Facility: HOSPITAL | Age: 58
Discharge: HOME | End: 2023-10-21
Payer: COMMERCIAL

## 2023-10-21 DIAGNOSIS — M25.522 PAIN IN LEFT ELBOW: ICD-10-CM

## 2023-10-21 DIAGNOSIS — S46.212A STRAIN OF LEFT BICEPS TENDON: ICD-10-CM

## 2023-10-21 PROCEDURE — G1004 CDSM NDSC: HCPCS

## 2023-10-21 PROCEDURE — 73221 MRI JOINT UPR EXTREM W/O DYE: CPT

## 2023-10-27 NOTE — PROGRESS NOTES
Assessment:     Diagnosis ICD-10-CM Associated Orders   1. Pain in left elbow  M25.522 Ambulatory Referral to Physical Therapy      2. Traumatic partial tear of left biceps tendon, initial encounter  S46.212A Ambulatory Referral to Physical Therapy           Plan:    We discussed clinical examination and findings with [address] laboy as outlined below. He has tenderness over distal bicep tendon with complaints of worsening pain with elbow flexion. At this time, clinical presentation and findings are consistent with distal bicep tendon partial tear. We reviewed anatomical and biomechanics of affected area. Today, we discussed the non-operative treatment options that include, but are not limited to: rest, activity modification, anti-inflammatory medication, and physical therapy Today, patient was given a prescription for formal physical therapy. We also recommended avoiding pushing or pulling over 5 pounds and start physical therapy for ROM. May wear compression sleeve while working but do not wear for long period of time. Can also use topical Voltaren gel tid prn for pain. Given note for avoiding pushing or pulling over 5 pounds with left upper extremity. Shared decision making, patient agreeable to plan. Patient Instructions   May use compression sleeve for the left elbow at work. Take it off while off work to allow range of motion. May apply Voltaren gel over the counter up to 3 times a day as needed. Follow-Up:    2 months    Chief Complaint   Patient presents with    Left Elbow - Follow-up         HPI:    Keara Mcqueen is a 66years old male patient presented for a follow up of left elbow pain. He sustained an injury after falling on his outstretched hand 3 months ago. He was suspected to have distal biceps partial tearing. Therefore, MRI of the left elbow was ordered and showed distal biceps insertional tendinosis with small areas of partial interstitial tearing.  No complete tear or tendon retraction. He reported his elbow pain is worse. Rated 10/10, in the anterior elbow. He also reported weakness in his left arm. Bending his elbow aggravated the pain. Stated he only felt 30 % better. He is right handed. I have personally reviewed pertinent films in PACS and my interpretation is left elbow partial interstitial, mid substance tearing of distal bicep tendon. 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[facility])    Primary insomnia        Current Outpatient Medications on File Prior to Visit   Medication Sig Dispense Refill    acetaZOLAMIDE (DIAMOX) 125 mg tablet 125 mg PO nightly for 1 week, then increase to 125 mg BID for 1 week, then 125 mg in am and 250 mg in pm for 1 week, then 250 mg  tablet 6    gabapentin (NEURONTIN) 100 mg capsule TAKE 1 CAPSULE BY MOUTH NIGHTLY FOR 7 DAYS THEN IF NEEDED/TOLERATED INCREASE TO 2 CAPSULES NIGHTLY FOR 7 DAYS, THEN IF NEEDED/TOLERATED INCREASE TO 3 CAPS NIGHTLY AND CONTINUE FOR 1 MONTH THEN CHECK IN WITH DOCTOR      HYDROCODONE-ACETAMINOPHEN PO       magnesium oxide (MAG-OX) 400 mg Take 1 tablet (400 mg total) by mouth daily Nightly 90 tablet 3    methocarbamol (ROBAXIN) 750 mg tablet methocarbamol 750 mg tablet   take 1 tablet by mouth every 8 hours if needed      Rimegepant Sulfate (Nurtec) 75 MG TBDP Take one NURTEC 75 mg under tongue every other day.  Limit 1 in 24 hours 16 tablet 11    zolpidem (AMBIEN) 5 mg tablet TAKE 1 TABLET BY MOUTH ONCE DAILY AT BEDTIME AS NEEDED FOR SLEEP FOR SLEEP STUDY      amoxicillin-clavulanate (AUGMENTIN) 875-125 mg per tablet TAKE 1 TABLET BY MOUTH EVERY 12 HOURS UNTIL GONE (Patient not taking: Reported on 10/6/2023)      diazepam (VALIUM) 10 mg tablet TAKE TWO TABLETS BY MOUTH ONE HOUR BEFORE PROCEDURE. DO NOT DRIVE AFTER TAKING. (Patient not taking: Reported on 6/20/2023)      eszopiclone (LUNESTA) 1 mg tablet Take 1 tablet (1 mg total) by mouth daily at bedtime as needed for sleep for up to 14 days Take immediately before bedtime 14 tablet 0    traMADol (ULTRAM) 50 mg tablet Take 50 mg by mouth 2 (two) times a day as needed (Patient not taking: Reported on 6/20/2023)      [DISCONTINUED] diclofenac sodium (VOLTAREN) 50 mg EC tablet Take 1 tablet(s) twice a day by oral route as needed. 0     No current facility-administered medications on file prior to visit.         No Known Allergies     Tobacco Use: Medium Risk (10/30/2023)    Patient History     Smoking Tobacco Use: Never     Smokeless Tobacco Use: Former     Passive Exposure: Not on file        Social Determinants of Health     Tobacco Use: Medium Risk (10/30/2023)    Patient History     Smoking Tobacco Use: Never     Smokeless Tobacco Use: Former     Passive Exposure: Not on file   Alcohol Use: Not At Risk (5/26/2021)    AUDIT-C     Frequency of Alcohol Consumption: Never     Average Number of Drinks: Not on file     Frequency of Binge Drinking: Not on file   Financial Resource Strain: Not on file   Food Insecurity: Not on file   Transportation Needs: Not on file   Physical Activity: Not on file   Stress: Not on file   Social Connections: Not on file   Intimate Partner Violence: Not on file   Depression: Not at risk (1/11/2023)    PHQ-2     PHQ-2 Score: 0   Housing Stability: Not on file   Utilities: Not on file         Physical Exam  /83 (BP Location: Right arm, Patient Position: Sitting, Cuff Size: Standard)   Pulse 90   Ht 5' 7" (1.702 m)   Wt 91.8 kg (202 lb 4.8 oz)   BMI 31.68 kg/m²     Constitutional:  see vital signs  Gen: well-developed, normocephalic/atraumatic, well-groomed  Eyes: No inflammation or discharge of conjunctiva or lids; sclera clear   Neck: supple, no masses, non-distended  MSK: no inflammation, lesion, mass, or clubbing of nails and digits except for other than mentioned below  SKIN: no visible rashes or skin lesions  Pulmonary/Chest: Effort normal. No respiratory distress. NEURO: cranial nerves grossly intact  PSYCH:  Alert and oriented to person, place, and time; recent and remote memory intact; mood normal, no depression, anxiety, or agitation, judgment and insight good and intact     Ortho Exam  Left Elbow Exam      Tenderness   Left elbow tenderness location: Tender to palpation over the distal biceps tendon with elbow flexion. Comments:  Increased discomfort with biceps hook test but able to clinically palpate the distal biceps tendon. Left elbow flexion strength is 4 -/5 and left forearm supination with the elbow flexed at 90 degrees is 4/5. Procedures             Portions of the record may have been created with voice recognition software. Occasional wrong word or "sound alike" substitutions may have occurred due to the inherent limitations of voice recognition software. Please review the chart carefully and recognize, using context, where substitutions/typographical errors may have occurred.

## 2023-10-30 ENCOUNTER — OFFICE VISIT (OUTPATIENT)
Dept: OBGYN CLINIC | Facility: OTHER | Age: 58
End: 2023-10-30

## 2023-10-30 VITALS
WEIGHT: 202.3 LBS | SYSTOLIC BLOOD PRESSURE: 135 MMHG | HEIGHT: 67 IN | BODY MASS INDEX: 31.75 KG/M2 | HEART RATE: 90 BPM | DIASTOLIC BLOOD PRESSURE: 83 MMHG

## 2023-10-30 DIAGNOSIS — M25.522 PAIN IN LEFT ELBOW: Primary | ICD-10-CM

## 2023-10-30 DIAGNOSIS — S46.212A TRAUMATIC PARTIAL TEAR OF LEFT BICEPS TENDON, INITIAL ENCOUNTER: ICD-10-CM

## 2023-10-30 NOTE — PATIENT INSTRUCTIONS
May use compression sleeve for the left elbow at work. Take it off while off work to allow range of motion. May apply Voltaren gel over the counter up to 3 times a day as needed.

## 2023-10-30 NOTE — LETTER
October 30, 2023     Patient: Benita Machuca III  YOB: 1965  Date of Visit: 10/30/2023      To Whom it May Concern:    Erick Connors is under my professional care. Ghulam Diaz was seen in my office on 10/30/2023. Ghulam Diaz may return to work with limitations of avoiding pushing or pulling over 5 pounds of the left upper extremity. .    If you have any questions or concerns, please don't hesitate to call.          Sincerely,          Reena Driver MD        CC: No Recipients

## 2023-11-07 ENCOUNTER — OFFICE VISIT (OUTPATIENT)
Dept: NEUROLOGY | Facility: CLINIC | Age: 58
End: 2023-11-07
Payer: COMMERCIAL

## 2023-11-07 VITALS
HEIGHT: 67 IN | WEIGHT: 204 LBS | HEART RATE: 107 BPM | SYSTOLIC BLOOD PRESSURE: 152 MMHG | BODY MASS INDEX: 32.02 KG/M2 | TEMPERATURE: 98.7 F | DIASTOLIC BLOOD PRESSURE: 97 MMHG

## 2023-11-07 DIAGNOSIS — G43.009 MIGRAINE WITHOUT AURA AND WITHOUT STATUS MIGRAINOSUS, NOT INTRACTABLE: Primary | ICD-10-CM

## 2023-11-07 PROCEDURE — 99214 OFFICE O/P EST MOD 30 MIN: CPT | Performed by: PSYCHIATRY & NEUROLOGY

## 2023-11-07 RX ORDER — ACETAZOLAMIDE 500 MG/1
500 CAPSULE, EXTENDED RELEASE ORAL 2 TIMES DAILY
Qty: 180 CAPSULE | Refills: 11 | Status: SHIPPED | OUTPATIENT
Start: 2023-11-07

## 2023-11-07 RX ORDER — BENZONATATE 200 MG/1
1 CAPSULE ORAL 3 TIMES DAILY PRN
COMMUNITY

## 2023-11-07 RX ORDER — LEVOCETIRIZINE DIHYDROCHLORIDE 5 MG/1
1 TABLET, FILM COATED ORAL EVERY EVENING
COMMUNITY

## 2023-11-07 NOTE — PATIENT INSTRUCTIONS
Please do follow-up with eye doctor for dilated eye exam and let me know if they see any signs of papilledema behind your eyes although that would be shocking in your situation, regardless please try and obtain note. Continue to follow with neurosurgery for meningioma    Do whatever you can in your power to treat the sleep apnea       Headache/migraine treatment:   Abortive medications (for immediate treatment of a headache): It is ok to take ibuprofen, acetaminophen or naproxen (Advil, Tylenol,  Aleve, Excedrin) if they help your headaches you should limit these to No more than 3 times a week to avoid medication overuse/rebound headaches. For your more moderate to severe migraines take this medication early   nurtec 75 mg under the tongue. Do not repeat in 24 hours. Over the counter preventive supplements for headaches/migraines (if you try, try for 3 months straight)  (to take every day to help prevent headaches - not to take at the time of headache):  [x] Magnesium 400mg daily (If any diarrhea or upset stomach, decrease dose  as tolerated)      Prescription preventive medications for headaches/migraines   (to take every day to help prevent headaches - not to take at the time of headache):  [x]   We are transitioning you from 250 mg of acetazolamide/Diamox short acting twice a day to    500 mg long-acting twice a day  But do not throw out the shorter acting dose as if needed you can absolutely take this for wearing off anytime.    - make sure to stay hydrated while on this as can cause dehydration since that is it's purpose to take fluid off.   - most common side effect is tingling of the nerves at times, especially if you had neuropathy in the past it may cause tingling in these nerves, this is common and typically should get better over time.   It also can make carbonated beverages taste flat or given metallic taste to things.  -It can cause decreased appetite and weight loss in some individuals  - can cause electrolyte disturbances, but not typically in any significant way. However, be cautious if taking with other meds that lower potassium like hydrochlorothiazide etc         Lifestyle Recommendations:  [x] SLEEP - Maintain a regular sleep schedule: Adults need at least 7-8 hours of uninterrupted a night. Maintain good sleep hygiene:  Going to bed and waking up at consistent times, avoiding excessive daytime naps, avoiding caffeinated beverages in the evening, avoid excessive stimulation in the evening and generally using bed primarily for sleeping. One hour before bedtime would recommend turning lights down lower, decreasing your activity (may read quietly, listen to music at a low volume). When you get into bed, should eliminate all technology (no texting, emailing, playing with your phone, iPad or tablet in bed). [x] HYDRATION - Maintain good hydration. Drink  2L of fluid a day (4 typical small water bottles)  [x] DIET - Maintain good nutrition. In particular don't skip meals and try and eat healthy balanced meals regularly. [x] TRIGGERS - Look for other triggers and avoid them: Limit caffeine to 1-2 cups a day or less. Avoid dietary triggers that you have noticed bring on your headaches (this could include aged cheese, peanuts, MSG, aspartame and nitrates). [x] EXERCISE - physical exercise as we all know is good for you in many ways, and not only is good for your heart, but also is beneficial for your mental health, cognitive health and  chronic pain/headaches. I would encourage at the least 5 days of physical exercise weekly for at least 30 minutes. Education and Follow-up  [x] Please call with any questions or concerns. Of course if any new concerning symptoms go to the emergency department.   [x] Follow up 3 months, sooner if needed

## 2023-11-07 NOTE — PROGRESS NOTES
[facility] Consult - Follow up   PATIENT:  Christ George  MRN:  [id number]  :  1965  DATE OF SERVICE:  2023  REFERRED BY: No ref. provider found  PMD: Jenni Flanagan MD    Assessment/Plan:   Christ George is a very pleasant 66 y.o. 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 My initial evaluation 2021     Migraine without aura and without status migrainosus, not intractable  WALE not on CPAP regularly, but trying (2016, AHI 7.9, supine 95.5, REM 15.9, oxygen down to 89%)  He reports a history of migraines dating back to mid 45s. He does not know if he has a family history of migraines. He reports severe migraines are diffuse pressure that used to improve over 1-2 days with rest and eletriptan until recently,  moderate migraines are typically unilateral left retro-orbital stabbing like a narrow and out the other side  That typically improve with rest and NSAIDs although still last for over 4 hours. He denies classic aura,  Does feel like his chronic tinnitus changes prior to onset of migraine. Reports typical associated migrainous features without autonomic features. - as of 2021: on average severe migraines 5-6 times a year lasting 1-2 days, moderate migraines 3-4 times a month, most recently lasted 3 weeks in 2021. Trial of magnesium for prevention, rizatriptan for abortive. If his migraines remain uncontrolled will recommend prescription preventative. Recommended treating sleep apnea.   - as of 9/10/2021: Reports  Mild headaches 2-3 times per week that improved with OTC meds and migraines are stable but to 3 per month and improved with rizatriptan although he reports side effects, therefore will start trial of nurtec for abortive. Last visit migraine improved with Toradol injection. Recommended trying to take magnesium consistently for prevention.  - as of 3/18/2022: Migraines stable at about 3-4 times a month. Nurtec works for abortive, slower than rizatriptan, but without side effects.   - as of 9/21/2022: He reports significant  migraines about 3-4 times a month, milder migraines/headaches 3-4 times a week. Working on treating WALE and will follow up with Sleep Medicine. Trial of low-dose gabapentin for prevention which may also help with other issues such as sleep for other pain. Continue Nurtec for rescue which works well suggesting other CGRP meds will work well in future if needed. - as of 3/14/2023: He did not follow-up with sleep medicine and is still having difficulty tolerating the CPAP machine and we discussed I highly recommend following up with sleep medicine to find any way to treat this better since it can impact many symptoms. Migraines 3-4 a month on average, but the last 6 weeks or so has been 4 to 5 days a week. MRI brain with and without contrast scheduled for 4/11/2023 to follow-up meningioma with mild mass effect and neurosurgery follow-up 4/17/2023. Gabapentin initially seemed to help but no longer seems to be helping and we will wean off. Magnesium is helping and so is Nurtec for migraine rescue which we will transition to be used for prevention instead. Toradol IM 1/26/2023 helped migraine flare and denies needing this today.     - as of 6/12/2023: Repeat MRI brain reviewed with unchanged meningioma although there are some findings consistent with possible tight cervical medullary junction with slight increased intracranial pressure picture and we discussed trial of acetazolamide/Diamox to see if this can reduce his headaches and migraines which are currently well controlled at approximately 1 to 2 a week on Nurtec 16 tabs a month which he typically takes every other day or every few days and works well. We were able to wean off gabapentin 300 mg nightly and he has an appointment to follow-up with new sleep medicine provider to get help with tolerating the CPAP. - as of 11/7/2023: He reports ups and downs since last visit, he is having approximately 9 migraines per month that typically improve with Nurtec and sometimes rest needed as well. 10 headaches per month that typically improve with acetaminophen. We discussed I hope this will improve more transitioning from short acting acetazolamide/Diamox 250 mg twice a day to long-acting 500 mg twice a day. Nurtec helps for migraine rescue and the more he takes it also can help with prevention he will be following up with the sleep specialist soon to try and maximize treatment of sleep apnea which we discussed is imperative. He also will have improvement with upcoming facet injection which typically works very well for him lasting 6 months or more. Workup:  -Following with neurosurgery for meningioma monitoring  -   MRI brain with without contrast 06/13/2021: Right frontal meningioma laterally within the anterior cranial fossa measuring 1.5 x 2.4 x 2.8 cm. This extends into the sylvian fissure without edema in the adjacent brain. Several white matter hyperintensities on T2 and FLAIR imaging, primarily within the frontal lobes are nonspecific and may represent precocious chronic microangiopathic change. -MRI brain with without contrast 9/30/2021: Stable right anterior frontal operculum meningioma. Stable small white matter hyperintensities on T2 and FLAIR imaging within the cerebral hemispheres, presumably precocious chronic microangiopathic change. -MRI brain with and without contrast 4/2/2022: 1.   Right frontal opercular region meningioma is stable, measuring approximately 2.2 cm.  2.  A few white matter lesions are also unchanged, possibly precocious microangiopathy. 3.  No acute infarction, intracranial hemorrhage or new mass lesion. -MRI brain with and without contrast 4/11/2023: 1. Stable lateral right frontal convexity meningioma compared to 4/2/2022.  2. No acute infarction, edema, or pathologic intra-axial enhancement. 3. Mild chronic microangiopathic ischemic changes. Preventative:  - we discussed headache hygiene and lifestyle factors that may improve headaches  -  We are transitioning you from 250 mg of acetazolamide/Diamox short acting twice a day to  500 mg long-acting twice a day. Discussed proper use, possible side effects and risks. - continue magnesium 400 mg in pm. Discussed proper use, possible side effects and risks. - Through other providers: ambien 5 mg through sleep med - only use with CPAP  - Past/ failed/contraindicated: topiramate contraindicated due to history of kidney stones, amitriptyline would be contraindicated due to age, gabapentin,  Temazepam/Restoril  - future options:   Verapamil discussed next, CGRP Med    Abortive:  - discussed not taking over-the-counter or prescription pain medications more than 3 days per week to prevent medication overuse/rebound headache  -  He is on through other providers:  Methocarbamol, Voltaren, Valium is only when he is getting a procedure  - continue nurtec 75 mg as needed -up to 16 times a month and also can help for prevention. discussed proper use, possible side effects and risks.   - Past/ failed/contraindicated: eletriptan does not always work, rizatriptan works but gets panic attack  - past/helped:  toradol shot worked  well  - future options:   prochlorperazine, Toradol IM or p.o., could consider trial for 5 days of Depakote or dexamethasone 4 prolonged migraine, ubrelvy, reyvow, nurtec    WALE (obstructive sleep apnea)   -     Ambulatory referral to Sleep Medicine placed 9/10/21 and 9/21/22  3/18/2022 again discussed morbidity and mortality of untreated WALE, do not drive if not feeling cognitively well, he will consider following up for known WALE    - as of 6/12/2023: He is trying to use the machine but struggling and thankfully has upcoming sleep medicine follow-up to get help     Meningioma  - continue to follow with neurosurgery       Patient instructions   Please do follow-up with eye doctor for dilated eye exam and let me know if they see any signs of papilledema behind your eyes although that would be shocking in your situation, regardless please try and obtain note. Continue to follow with neurosurgery for meningioma    Do whatever you can in your power to treat the sleep apnea       Headache/migraine treatment:   Abortive medications (for immediate treatment of a headache): It is ok to take ibuprofen, acetaminophen or naproxen (Advil, Tylenol,  Aleve, Excedrin) if they help your headaches you should limit these to No more than 3 times a week to avoid medication overuse/rebound headaches. For your more moderate to severe migraines take this medication early   nurtec 75 mg under the tongue. Do not repeat in 24 hours.         Over the counter preventive supplements for headaches/migraines (if you try, try for 3 months straight)  (to take every day to help prevent headaches - not to take at the time of headache):  [x] Magnesium 400mg daily (If any diarrhea or upset stomach, decrease dose  as tolerated)      Prescription preventive medications for headaches/migraines   (to take every day to help prevent headaches - not to take at the time of headache):  [x]   We are transitioning you from 250 mg of acetazolamide/Diamox short acting twice a day to    500 mg long-acting twice a day  But do not throw out the shorter acting dose as if needed you can absolutely take this for wearing off anytime.    - make sure to stay hydrated while on this as can cause dehydration since that is it's purpose to take fluid off.   - most common side effect is tingling of the nerves at times, especially if you had neuropathy in the past it may cause tingling in these nerves, this is common and typically should get better over time. It also can make carbonated beverages taste flat or given metallic taste to things.  -It can cause decreased appetite and weight loss in some individuals  - can cause electrolyte disturbances, but not typically in any significant way. However, be cautious if taking with other meds that lower potassium like hydrochlorothiazide etc         Lifestyle Recommendations:  [x] SLEEP - Maintain a regular sleep schedule: Adults need at least 7-8 hours of uninterrupted a night. Maintain good sleep hygiene:  Going to bed and waking up at consistent times, avoiding excessive daytime naps, avoiding caffeinated beverages in the evening, avoid excessive stimulation in the evening and generally using bed primarily for sleeping. One hour before bedtime would recommend turning lights down lower, decreasing your activity (may read quietly, listen to music at a low volume). When you get into bed, should eliminate all technology (no texting, emailing, playing with your phone, iPad or tablet in bed). [x] HYDRATION - Maintain good hydration. Drink  2L of fluid a day (4 typical small water bottles)  [x] DIET - Maintain good nutrition. In particular don't skip meals and try and eat healthy balanced meals regularly. [x] TRIGGERS - Look for other triggers and avoid them: Limit caffeine to 1-2 cups a day or less. Avoid dietary triggers that you have noticed bring on your headaches (this could include aged cheese, peanuts, MSG, aspartame and nitrates). [x] EXERCISE - physical exercise as we all know is good for you in many ways, and not only is good for your heart, but also is beneficial for your mental health, cognitive health and  chronic pain/headaches.  I would encourage at the least 5 days of physical exercise weekly for at least 30 minutes. Education and Follow-up  [x] Please call with any questions or concerns. Of course if any new concerning symptoms go to the emergency department. [x] Follow up 3 months, sooner if needed          CC: We had the pleasure of evaluating Jaqueline Storey III in neurological consultation today. Jaqueline Storey III is a   Right and left handed male who presents today for evaluation of headaches. History obtained from patient as well as available medical record review.   History of Present Illness:   Interval history as of 11/7/2023  - no significant new or concerning neurologic symptoms since last visit   - about 3 months ago -accidentally tripped and fell in his yard in a divot that is now fixed and hurt the right knee and tore his left bicep tendon of the elbow but torn away that they cannot fix it and is just rehab  - wearing his CPAP and waking up with headaches, waiting on mask fitting, beard may be playing a role, working on it - wearing most of the time, wife will wake him up if off typically   - been sick off and on - lots of sinus infection   - facet injections next week  - knee doc thinks walking issues is due to back     Headaches and migraines   9 per month   Nurtec helps, may need sleep sometimes after  Frontal on left  Tinnitus that changes in tone and frequency     10 headaches a month better with tylenol     Preventative:   - trial of acetazolamide/Diamox up to 250 mg BID - 630 BID - doesn't think he has wearing off - tingling at times where he tore his left elbow tendon  - mag ox     Abortive:   Nurtec helps  Denies bothersome side effects       Interval history as of 6/12/2023  - no significant new or concerning neurologic symptoms since last visit, he was scheduled 6/16/2023 and could not make the appointment and therefore was moved to today  -Recent eye doctor evaluation without papilledema    -MRI brain with and without contrast 4/11/2023: 1. Stable lateral right frontal convexity meningioma compared to 4/2/2022.  2. No acute infarction, edema, or pathologic intra-axial enhancement. 3. Mild chronic microangiopathic ischemic changes.     - did call sleep medicine, tries using nightly, occasionally would fall asleep without it, apmt next week   Unfortunately he has still not followed up with sleep medicine to treat the sleep apnea and we discussed this is driving many of his symptoms could not chronic headaches, tinnitus  - 3-8 hours, brain always going 100 miles an hour and hard to wind down   - sleeping almost on face     Headaches and migraines   Much better on nurtec, maybe 1-2 a week    Preventative:   -Trial of Nurtec every other day-16 tabs   -Magnesium 40 mg nightly  - Gabapentin 300 mg    Abortive:   -Nurtec typically works well  -  He is on through other providers:  Methocarbjuan a Voltaren  Denies bothersome side effects      Interval history as of 3/14/2023  - no significant new or concerning neurologic symptoms since last visit   - did not call sleep medicine yet, tries using nightly     Headaches and migraines   4-5 headaches a week lately for the last 6 weeks and prior to that was 3-4 a month     Preventative:   - continue magnesium 400 mg helping  -  Trial of gabapentin with gradual titration up to 300 mg nightly - helped initially and less so now    Abortive:   nurtec works - loves it brings from 8-9/10, 6-7 last month, typically 2-3 times a month   toradol IM 1/26/23  -  He is on through other providers:  Methocarbamol Voltaren  Denies bothersome side effects     Interval history as of 9/21/2022  - denies any new or concerning neurologic symptoms since last visit   - known WALE not treated, has not followed up with them, has tried 6 masks, feels worse with the mask he thinks, trying to use the mask now and will see them - 9/14/17    Headaches and migraines   He reports migraines with visual aura about 3 to 4 times a month and milder migraines/headaches 3 to 4 times a week, can wake up with them or middle of day     Preventative:  Magnesium 400 mg   Abortive: Nurtec helps within 20 mins   Denies bothersome side effects     Interval history as of 3/18/2022  - denies any new or concerning neurologic symptoms since last visit   - he is following with Neurosurgery for  Meningioma  - known WALE not treated, has not followed up with them, has tried 6 masks, feels worse with the mask he thinks, will consider following with them     Headaches and migraines   Migraines 3-4 times a month, feels stable    Can not recall milder headaches number    Preventative: trial of magnesium for prevention  Abortive:  trial of Nurtec-approved 08/16/2021 - works without side effects but slower than rizatriptan which works faster but makes him tired     Interval history as of 9/10/2021  - denies any new or concerning neurologic symptoms since last visit   - -MRI brain with without contrast 06/13/2021: Right frontal meningioma laterally within the anterior cranial fossa measuring 1.5 x 2.4 x 2.8 cm. This extends into the sylvian fissure without edema in the adjacent brain. Several white matter hyperintensities on T2 and FLAIR imaging, primarily within the frontal lobes are nonspecific and may represent precocious chronic microangiopathic change. -  Follow-up with Neurosurgery and getting repeat scan  -has not followed up with Sleep Medicine, but using mask more     Headaches and migraines   - migraines 2-3 per month, stable  - mild headaches 2-3 times per week that improve with OTC meds     Preventative:    - magnesium - not taking consistently     Abortive:   - Rizatriptan causes heart racing/panic attacks   6-8 pills a week ibuprofen/tylenol  - last visit toradol shot worked      Headaches started at what age? Worse around 47   How often do the headaches occur?    -  Typical migraine once every 2-3 months and resolves over hours with triptan  -  04/22/2021 went to emergency room for 2 weeks of migraine  - as of 5/26/2021: on average severe migraines 5-6 times a year lasting 1-2 days, moderate migraines 3-4 times a month, most recently lasted 3 weeks in April 2021  - As of 9/10/2021, 2-3 times a month. Last for 2-3 hours with rizatriptan and go to sleep afterward. W/o rizatriptan could last from a 4-5 hours to 3 days. No change with tinnitus, flashes of light. Patients's other headaches has no other symptoms associated with it  What time of the day do the headaches start? No particular time of day   How long do the headaches last? 1-2 days usually, over 4 hours for moderate   Are you ever headache free? Yes    Aura? without aura    Prodrome: frequency of tinnitus changes    Last eye exam: glasses 1-2 years ago     Where is your headache located and pain quality?   - severe migraines whole head - pressure  - can be unilateral on the left - stabbing to left eye like an arrow out the other side     What is the intensity of pain? Average: 4/10, worst 7 when at hospital/10  Associated symptoms:   [x] Nausea       [] Vomiting       [x] Photophobia     [x]Phonophobia      [x] Osmophobia  [] Blurred vision    [x] Light-headed or dizzy  - just recently    [x] Tinnitus - chronic bilateral and worse with migraine can be louder on one ear   [] Hands or feet tingle or feel numb/paresthesias      [] Ptosis      [] Facial droop  [] Lacrimation - both   [] Nasal congestion/rhinorrhea        Things that make the headache worse? No specific movements, any movement     Headache triggers:  Unknown     Have you seen someone else for headaches or pain? Yes, neurology just recently   Have you had trigger point injection performed and how often? No  Have you had Botox injection performed and how often? No    Have you had epidural injections or transforaminal injections performed? No  Have you ever had any Brain imaging?  Yes several MRI Brain     What medications do you take or have you taken for your headaches? ABORTIVE:    OTC medications have been ineffective      eletriptan/Relpax 40 mg - 1-2 usually minimizes it     Meclizine - for BPPV in the past helped   Methocarbamol - for back - prn - maybe 3 times a month   Diclofenac 50 mg - for back prn - a couple times a month      past   Cyclobenzaprine  ED for 02/20/2021: Toradol 30 mg, Benadryl 25 mg, Reglan 10 mg,  IV fluids, Decadron   Medrol Dosepak - didn't seem to help     PREVENTIVE:        for sleep: Temazepam prn     Past/ failed/contraindicated:  -topiramate contraindicated due to history of kidney stones       LIFESTYLE WALE not on CPAP regularly (2016, AHI 7.9, supine 95.5, REM 15.9, oxygen down to 89%)  Sleep - WALE not tolerate of CPAP - has seen 3 different specialists    DATA REVIEW  Sleep Study: 12/19/16  SLEEP-DISORDERED BREATHING:   Type AHI Supine AHI Lateral AHI REM AHI GALINA SaO2 Derik % SpO2% ?88% min.    Baseline 7.9 95.5 5.6 15.9 5.7 89.0 0.0     - averages: 3-8 hours, usually 5   Problems falling asleep?:   Yes  Problems staying asleep?:  Yes    Water: 3 - 20 oz per day  Caffeine: cup in am and at work - cutting back     Mood: denies recent stress   Denies history of anxiety or depression or other diagnosed mood disorder    The following portions of the patient's history were reviewed and updated as appropriate: allergies, current medications, past family history, past medical history, past social history, past surgical history and problem list.    Pertinent family history:  Family history of headaches:  no known family members with significant headaches  Any family history of aneurysms - No    Pertinent social history:  Work:  at [employer] with wife, 2 kids - 32, 21    Illicit Drugs: denies  Alcohol/tobacco: Denies alcohol use, Denies tobacco use      Past Medical History:     Past Medical History:   Diagnosis Date    Chronic back pain     Migraine        Patient Active Problem List   Diagnosis Low back pain    Strain of lumbar region    WALE (obstructive sleep apnea)    Mixed dyslipidemia    Nasal septal deviation    Seasonal allergic rhinitis    Nasal turbinate hypertrophy    Nasal obstruction    Deviated nasal septum    Hypertrophy of nasal turbinates    Meningioma (HCC)    Abnormal finding on MRI of brain    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)    Primary insomnia       Medications:      Current Outpatient Medications   Medication Sig Dispense Refill    acetaZOLAMIDE (DIAMOX) 500 mg capsule Take 1 capsule (500 mg total) by mouth 2 (two) times a day 180 capsule 11    benzonatate (TESSALON) 200 MG capsule Take 1 capsule by mouth 3 (three) times a day as needed      eszopiclone (LUNESTA) 1 mg tablet Take 1 tablet (1 mg total) by mouth daily at bedtime as needed for sleep for up to 14 days Take immediately before bedtime 14 tablet 0    levocetirizine (XYZAL) 5 MG tablet Take 1 tablet by mouth every evening      magnesium oxide (MAG-OX) 400 mg Take 1 tablet (400 mg total) by mouth daily Nightly 90 tablet 3    methocarbamol (ROBAXIN) 750 mg tablet methocarbamol 750 mg tablet   take 1 tablet by mouth every 8 hours if needed      Rimegepant Sulfate (Nurtec) 75 MG TBDP Take one NURTEC 75 mg under tongue every other day. Limit 1 in 24 hours 16 tablet 11    zolpidem (AMBIEN) 5 mg tablet TAKE 1 TABLET BY MOUTH ONCE DAILY AT BEDTIME AS NEEDED FOR SLEEP FOR SLEEP STUDY      diazepam (VALIUM) 10 mg tablet TAKE TWO TABLETS BY MOUTH ONE HOUR BEFORE PROCEDURE. DO NOT DRIVE AFTER TAKING. (Patient not taking: Reported on 6/20/2023)       No current facility-administered medications for this visit.         Allergies:    No Known Allergies    Family History:     Family History   Problem Relation Age of Onset    Breast cancer Mother     Diabetes Father     No Known Problems Family     Substance Abuse Neg Hx     Mental illness Neg Hx        Social History:     Social History     Socioeconomic History Marital status: /Civil Union     Spouse name: Luly Lara    Number of children: 2    Years of education: Not on file    Highest education level: Not on file   Occupational History    Occupation:      Employer: [email or web address]   Tobacco Use    Smoking status: Never    Smokeless tobacco: Former     Types: Snuff     Quit date: 2003   Vaping Use    Vaping Use: Never used   Substance and Sexual Activity    Alcohol use: Never    Drug use: No    Sexual activity: Not on file   Other Topics Concern    Not on file   Social History Narrative    Not on file     Social Determinants of Health     Financial Resource Strain: Not on file   Food Insecurity: Not on file   Transportation Needs: Not on file   Physical Activity: Not on file   Stress: Not on file   Social Connections: Not on file   Intimate Partner Violence: Not on file   Housing Stability: Not on file         Objective:         Physical Exam:                                                                 Vitals:            Constitutional:    /97 (BP Location: Right arm, Patient Position: Sitting, Cuff Size: Standard)   Pulse (!) 107   Temp 98.7 °F (37.1 °C) (Tympanic)   Ht 5' 7" (1.702 m)   Wt 92.5 kg (204 lb)   BMI 31.95 kg/m²   BP Readings from Last 3 Encounters:   11/07/23 152/97   10/30/23 135/83   10/06/23 131/93     Pulse Readings from Last 3 Encounters:   11/07/23 (!) 107   10/30/23 90   10/06/23 71         Well developed, well nourished, well groomed. Psychiatric:  Normal behavior and appropriate affect        Neurological Examination:     Mental status/cognitive function:   Recent and remote memory appear intact. Attention span and concentration as well as fund of knowledge are appropriate for age. Normal language and spontaneous speech. Cranial Nerves:   VII-facial expression symmetric  Motor Exam: symmetric bulk throughout. no atrophy, fasciculations or abnormal movements noted.    Coordination:  no apparent dysmetria, ataxia or tremor noted  Gait: steady casual gait            Pertinent lab results:   See EMR for recent labs   04/22/2021 BMP and CBC unremarkable, ESR 6  01/10/2020 LFTs with elevated alkaline phosphatase     Imaging: I have personally reviewed imaging and radiology read   -  noncontrast head CT 04/22/2021:  No acute intracranial abnormality (seen, but meningioma was there hidden)  -   MRI brain with without contrast 06/13/2021: Right frontal meningioma laterally within the anterior cranial fossa measuring 1.5 x 2.4 x 2.8 cm. This extends into the sylvian fissure without edema in the adjacent brain. Several white matter hyperintensities on T2 and FLAIR imaging, primarily within the frontal lobes are nonspecific and may represent precocious chronic microangiopathic change. -MRI brain with without contrast 9/30/2021: Stable right anterior frontal operculum meningioma. Stable small white matter hyperintensities on T2 and FLAIR imaging within the cerebral hemispheres, presumably precocious chronic microangiopathic change. -MRI brain with and without contrast 4/2/2022: 1. Right frontal opercular region meningioma is stable, measuring approximately 2.2 cm.  2.  A few white matter lesions are also unchanged, possibly precocious microangiopathy. 3.  No acute infarction, intracranial hemorrhage or new mass lesion. Review of Systems:   ROS obtained by medical assistant and personally reviewed, but if any symptoms listed below say negative, does not mean patient has not had this symptom since last visit, please see HPI for details of symptoms discussed this visit. I recommended PCP follow up for non neurologic problems. Review of Systems   Constitutional:  Negative for appetite change and fever. HENT: Negative. Negative for hearing loss, tinnitus, trouble swallowing and voice change. Eyes: Negative. Negative for photophobia and pain. Respiratory: Negative. Negative for shortness of breath. Cardiovascular: Negative. Negative for palpitations. Gastrointestinal: Negative. Negative for nausea and vomiting. Endocrine: Negative. Negative for cold intolerance. Genitourinary: Negative. Negative for dysuria, frequency and urgency. Musculoskeletal: Negative. Negative for myalgias and neck pain. Skin: Negative. Negative for rash. Neurological:  Positive for headaches. Negative for dizziness, tremors, seizures, syncope, facial asymmetry, speech difficulty, weakness, light-headedness and numbness. Hematological: Negative. Does not bruise/bleed easily. Psychiatric/Behavioral:  Positive for sleep disturbance. Negative for confusion and hallucinations. All other systems reviewed and are negative. I have spent 21 minutes with Patient  today in which greater than 50% of this time was spent in counseling/coordination of care regarding Diagnostic results, Prognosis, Risks and benefits of tx options, Instructions for management, Importance of tx compliance, Risk factor reductions, Impressions, Counseling / Coordination of care, Documenting in the medical record, Reviewing / ordering tests, medicine, procedures  , and Obtaining or reviewing history  . I also spent 10 minutes non face to face for this patient the same day.        Author:  Sammy Seth MD 11/7/2023 6:10 PM

## 2023-11-14 ENCOUNTER — TELEPHONE (OUTPATIENT)
Dept: PULMONOLOGY | Facility: CLINIC | Age: 58
End: 2023-11-14

## 2023-11-14 DIAGNOSIS — G47.33 OBSTRUCTIVE SLEEP APNEA (ADULT) (PEDIATRIC): Primary | ICD-10-CM

## 2023-12-08 LAB

## 2023-12-20 DIAGNOSIS — G47.33 OSA (OBSTRUCTIVE SLEEP APNEA): Primary | ICD-10-CM

## 2023-12-20 DIAGNOSIS — G47.00 INSOMNIA, UNSPECIFIED TYPE: ICD-10-CM

## 2024-01-17 ENCOUNTER — TELEPHONE (OUTPATIENT)
Dept: OTHER | Facility: HOSPITAL | Age: 59
End: 2024-01-17

## 2024-01-17 DIAGNOSIS — N13.2 URETERAL STONE WITH HYDRONEPHROSIS: Primary | ICD-10-CM

## 2024-01-17 RX ORDER — TAMSULOSIN HYDROCHLORIDE 0.4 MG/1
0.4 CAPSULE ORAL
Qty: 30 CAPSULE | Refills: 0 | Status: SHIPPED | OUTPATIENT
Start: 2024-01-17

## 2024-01-17 NOTE — TELEPHONE ENCOUNTER
----- Message from Kaylynn Moreland MA sent at 1/8/2024  4:02 PM EST -----  Regarding: FW: Kidney stones  Contact: [phone]    ----- Message -----  From: Hussein Edward III  Sent: 1/8/2024  10:47 AM EST  To: [facility]  Subject: Kidney stones                                    Any update on a Flomax prescription?  I spent Saturday and Sunday trying to pass the stone with no avail. It's just swirling around in my bladder randomly causing pain and the urge to use the bathroom every 5-10 minutes.  I've been drinking plenty of liquids.

## 2024-01-17 NOTE — TELEPHONE ENCOUNTER
Contacted patient and made him aware Flomax was sent to his pharmacy to assist with stone passage.

## 2024-02-01 ENCOUNTER — TELEPHONE (OUTPATIENT)
Dept: SLEEP CENTER | Facility: CLINIC | Age: 59
End: 2024-02-01

## 2024-02-01 NOTE — TELEPHONE ENCOUNTER
I called pt and left him a VM requesting a return call to schedule follow up appointment and then we can get the prescription sent over

## 2024-02-01 NOTE — TELEPHONE ENCOUNTER
Patient of Dr. Cook at [facility].    Patient requesting refill for Quviviq-25mg tablets.    Request forwarded to [place] pulmonary clinical staff.

## 2024-02-05 ENCOUNTER — TELEPHONE (OUTPATIENT)
Dept: NEUROLOGY | Facility: CLINIC | Age: 59
End: 2024-02-05

## 2024-02-05 DIAGNOSIS — G47.00 INSOMNIA, UNSPECIFIED TYPE: ICD-10-CM

## 2024-02-05 NOTE — TELEPHONE ENCOUNTER
Called and spoke to patient to confirm their upcoming appointment with Dr. Camargo. Informed patient about arriving in the [place] location 15 minutes prior to their appointment to get checked in and going over chart.

## 2024-02-12 NOTE — TELEPHONE ENCOUNTER
Pt called in to r/s his appt due to being sick. Pt did not want virtual.   New appt is now 6/26 @ 10:00 with Dr. Camargo in [facility]. Added to wait list.

## 2024-02-15 ENCOUNTER — TELEPHONE (OUTPATIENT)
Dept: FAMILY MEDICINE CLINIC | Facility: CLINIC | Age: 59
End: 2024-02-15

## 2024-02-15 NOTE — LETTER
February 15, 2024     Patient: Hussein Edward III  YOB: 1965  Date of Visit: 2/15/2024      To Whom it May Concern:    Hussein Edward is under my professional care. Hussein was seen in my office on 2/15/2024. Hussein may return to work on 2/19/24 . Please excuse his absences from work 2/12/24-2/16/24 due to illness.     If you have any questions or concerns, please don't hesitate to call.         Sincerely,          ROSALINO Soares        CC: No Recipients

## 2024-02-15 NOTE — TELEPHONE ENCOUNTER
Pt was in the hospital, got dx with the flu and needs a return to work note.       Wanted to return to work Monday or Tuesday     Needs to be excused from the 12-16th.     Pt would like it on my chart.

## 2024-02-16 ENCOUNTER — APPOINTMENT (OUTPATIENT)
Dept: RADIOLOGY | Facility: HOSPITAL | Age: 59
DRG: 208 | End: 2024-02-16
Payer: COMMERCIAL

## 2024-02-16 ENCOUNTER — HOSPITAL ENCOUNTER (INPATIENT)
Facility: HOSPITAL | Age: 59
LOS: 1 days | DRG: 208 | End: 2024-02-19
Attending: INTERNAL MEDICINE | Admitting: INTERNAL MEDICINE
Payer: COMMERCIAL

## 2024-02-16 DIAGNOSIS — J18.9 PNEUMONIA: ICD-10-CM

## 2024-02-16 DIAGNOSIS — M62.82 RHABDOMYOLYSIS: ICD-10-CM

## 2024-02-16 DIAGNOSIS — N17.9 AKI (ACUTE KIDNEY INJURY) (HCC): ICD-10-CM

## 2024-02-16 DIAGNOSIS — J96.01 ACUTE RESPIRATORY FAILURE WITH HYPOXIA (HCC): ICD-10-CM

## 2024-02-16 DIAGNOSIS — J11.1 INFLUENZA: Primary | ICD-10-CM

## 2024-02-16 PROBLEM — N18.9 CHRONIC KIDNEY DISEASE: Status: ACTIVE | Noted: 2024-02-16

## 2024-02-16 LAB
2HR DELTA HS TROPONIN: 0 NG/L
ALBUMIN SERPL BCP-MCNC: 3.8 G/DL (ref 3.5–5)
ALP SERPL-CCNC: 86 U/L (ref 34–104)
ALT SERPL W P-5'-P-CCNC: 78 U/L (ref 7–52)
ANION GAP SERPL CALCULATED.3IONS-SCNC: 11 MMOL/L
AST SERPL W P-5'-P-CCNC: 80 U/L (ref 13–39)
ATRIAL RATE: 96 BPM
BASOPHILS # BLD AUTO: 0.01 THOUSANDS/ÂΜL (ref 0–0.1)
BASOPHILS NFR BLD AUTO: 0 % (ref 0–1)
BILIRUB SERPL-MCNC: 1.01 MG/DL (ref 0.2–1)
BNP SERPL-MCNC: 21 PG/ML (ref 0–100)
BUN SERPL-MCNC: 28 MG/DL (ref 5–25)
CALCIUM SERPL-MCNC: 8.9 MG/DL (ref 8.4–10.2)
CARDIAC TROPONIN I PNL SERPL HS: 14 NG/L
CARDIAC TROPONIN I PNL SERPL HS: 14 NG/L
CHLORIDE SERPL-SCNC: 112 MMOL/L (ref 96–108)
CK SERPL-CCNC: 1054 U/L (ref 39–308)
CO2 SERPL-SCNC: 19 MMOL/L (ref 21–32)
CREAT SERPL-MCNC: 1.47 MG/DL (ref 0.6–1.3)
EOSINOPHIL # BLD AUTO: 0 THOUSAND/ÂΜL (ref 0–0.61)
EOSINOPHIL NFR BLD AUTO: 0 % (ref 0–6)
ERYTHROCYTE [DISTWIDTH] IN BLOOD BY AUTOMATED COUNT: 13.2 % (ref 11.6–15.1)
GFR SERPL CREATININE-BSD FRML MDRD: 52 ML/MIN/1.73SQ M
GLUCOSE SERPL-MCNC: 94 MG/DL (ref 65–140)
HCT VFR BLD AUTO: 45.3 % (ref 36.5–49.3)
HGB BLD-MCNC: 14.8 G/DL (ref 12–17)
IMM GRANULOCYTES # BLD AUTO: 0.04 THOUSAND/UL (ref 0–0.2)
IMM GRANULOCYTES NFR BLD AUTO: 1 % (ref 0–2)
LACTATE SERPL-SCNC: 0.8 MMOL/L (ref 0.5–2)
LYMPHOCYTES # BLD AUTO: 0.65 THOUSANDS/ÂΜL (ref 0.6–4.47)
LYMPHOCYTES NFR BLD AUTO: 11 % (ref 14–44)
MCH RBC QN AUTO: 29.1 PG (ref 26.8–34.3)
MCHC RBC AUTO-ENTMCNC: 32.7 G/DL (ref 31.4–37.4)
MCV RBC AUTO: 89 FL (ref 82–98)
MONOCYTES # BLD AUTO: 0.22 THOUSAND/ÂΜL (ref 0.17–1.22)
MONOCYTES NFR BLD AUTO: 4 % (ref 4–12)
NEUTROPHILS # BLD AUTO: 5.29 THOUSANDS/ÂΜL (ref 1.85–7.62)
NEUTS SEG NFR BLD AUTO: 84 % (ref 43–75)
NRBC BLD AUTO-RTO: 0 /100 WBCS
P AXIS: 42 DEGREES
PLATELET # BLD AUTO: 146 THOUSANDS/UL (ref 149–390)
PMV BLD AUTO: 9.3 FL (ref 8.9–12.7)
POTASSIUM SERPL-SCNC: 3.8 MMOL/L (ref 3.5–5.3)
PR INTERVAL: 150 MS
PROCALCITONIN SERPL-MCNC: 0.51 NG/ML
PROT SERPL-MCNC: 6.7 G/DL (ref 6.4–8.4)
QRS AXIS: 3 DEGREES
QRSD INTERVAL: 84 MS
QT INTERVAL: 338 MS
QTC INTERVAL: 427 MS
RBC # BLD AUTO: 5.09 MILLION/UL (ref 3.88–5.62)
SODIUM SERPL-SCNC: 142 MMOL/L (ref 135–147)
T WAVE AXIS: 5 DEGREES
VENTRICULAR RATE: 96 BPM
WBC # BLD AUTO: 6.21 THOUSAND/UL (ref 4.31–10.16)

## 2024-02-16 PROCEDURE — 87040 BLOOD CULTURE FOR BACTERIA: CPT | Performed by: PHYSICIAN ASSISTANT

## 2024-02-16 PROCEDURE — 85025 COMPLETE CBC W/AUTO DIFF WBC: CPT | Performed by: INTERNAL MEDICINE

## 2024-02-16 PROCEDURE — 82550 ASSAY OF CK (CPK): CPT | Performed by: PHYSICIAN ASSISTANT

## 2024-02-16 PROCEDURE — 93010 ELECTROCARDIOGRAM REPORT: CPT | Performed by: INTERNAL MEDICINE

## 2024-02-16 PROCEDURE — 83605 ASSAY OF LACTIC ACID: CPT | Performed by: PHYSICIAN ASSISTANT

## 2024-02-16 PROCEDURE — 94664 DEMO&/EVAL PT USE INHALER: CPT

## 2024-02-16 PROCEDURE — 36415 COLL VENOUS BLD VENIPUNCTURE: CPT

## 2024-02-16 PROCEDURE — 84484 ASSAY OF TROPONIN QUANT: CPT | Performed by: INTERNAL MEDICINE

## 2024-02-16 PROCEDURE — 93005 ELECTROCARDIOGRAM TRACING: CPT

## 2024-02-16 PROCEDURE — 84145 PROCALCITONIN (PCT): CPT | Performed by: PHYSICIAN ASSISTANT

## 2024-02-16 PROCEDURE — 80053 COMPREHEN METABOLIC PANEL: CPT | Performed by: INTERNAL MEDICINE

## 2024-02-16 PROCEDURE — 94660 CPAP INITIATION&MGMT: CPT

## 2024-02-16 PROCEDURE — 94760 N-INVAS EAR/PLS OXIMETRY 1: CPT

## 2024-02-16 PROCEDURE — 99285 EMERGENCY DEPT VISIT HI MDM: CPT

## 2024-02-16 PROCEDURE — 83880 ASSAY OF NATRIURETIC PEPTIDE: CPT | Performed by: INTERNAL MEDICINE

## 2024-02-16 PROCEDURE — 99223 1ST HOSP IP/OBS HIGH 75: CPT | Performed by: INTERNAL MEDICINE

## 2024-02-16 PROCEDURE — 99285 EMERGENCY DEPT VISIT HI MDM: CPT | Performed by: PHYSICIAN ASSISTANT

## 2024-02-16 PROCEDURE — 94002 VENT MGMT INPAT INIT DAY: CPT

## 2024-02-16 RX ORDER — IPRATROPIUM BROMIDE AND ALBUTEROL SULFATE 2.5; .5 MG/3ML; MG/3ML
3 SOLUTION RESPIRATORY (INHALATION)
Status: DISCONTINUED | OUTPATIENT
Start: 2024-02-16 | End: 2024-02-16

## 2024-02-16 RX ORDER — CEFTRIAXONE 1 G/50ML
1000 INJECTION, SOLUTION INTRAVENOUS EVERY 24 HOURS
Status: DISCONTINUED | OUTPATIENT
Start: 2024-02-17 | End: 2024-02-19

## 2024-02-16 RX ORDER — ENOXAPARIN SODIUM 100 MG/ML
40 INJECTION SUBCUTANEOUS DAILY
Status: DISCONTINUED | OUTPATIENT
Start: 2024-02-17 | End: 2024-02-16

## 2024-02-16 RX ORDER — GUAIFENESIN 600 MG/1
600 TABLET, EXTENDED RELEASE ORAL 2 TIMES DAILY
Status: DISCONTINUED | OUTPATIENT
Start: 2024-02-16 | End: 2024-02-19

## 2024-02-16 RX ORDER — HEPARIN SODIUM 5000 [USP'U]/ML
5000 INJECTION, SOLUTION INTRAVENOUS; SUBCUTANEOUS EVERY 8 HOURS SCHEDULED
Status: DISCONTINUED | OUTPATIENT
Start: 2024-02-16 | End: 2024-02-19

## 2024-02-16 RX ORDER — CEFTRIAXONE 2 G/50ML
2000 INJECTION, SOLUTION INTRAVENOUS ONCE
Status: COMPLETED | OUTPATIENT
Start: 2024-02-16 | End: 2024-02-16

## 2024-02-16 RX ORDER — BENZONATATE 100 MG/1
200 CAPSULE ORAL 3 TIMES DAILY PRN
Status: DISCONTINUED | OUTPATIENT
Start: 2024-02-16 | End: 2024-02-19

## 2024-02-16 RX ORDER — ACETAZOLAMIDE 250 MG/1
500 TABLET ORAL 2 TIMES DAILY
Status: DISCONTINUED | OUTPATIENT
Start: 2024-02-16 | End: 2024-02-17

## 2024-02-16 RX ORDER — METHYLPREDNISOLONE SODIUM SUCCINATE 40 MG/ML
20 INJECTION, POWDER, LYOPHILIZED, FOR SOLUTION INTRAMUSCULAR; INTRAVENOUS EVERY 8 HOURS SCHEDULED
Status: DISCONTINUED | OUTPATIENT
Start: 2024-02-16 | End: 2024-02-17

## 2024-02-16 RX ORDER — SODIUM CHLORIDE, SODIUM GLUCONATE, SODIUM ACETATE, POTASSIUM CHLORIDE, MAGNESIUM CHLORIDE, SODIUM PHOSPHATE, DIBASIC, AND POTASSIUM PHOSPHATE .53; .5; .37; .037; .03; .012; .00082 G/100ML; G/100ML; G/100ML; G/100ML; G/100ML; G/100ML; G/100ML
75 INJECTION, SOLUTION INTRAVENOUS CONTINUOUS
Status: DISCONTINUED | OUTPATIENT
Start: 2024-02-16 | End: 2024-02-17

## 2024-02-16 RX ORDER — LEVALBUTEROL INHALATION SOLUTION 1.25 MG/3ML
1.25 SOLUTION RESPIRATORY (INHALATION)
Status: DISCONTINUED | OUTPATIENT
Start: 2024-02-16 | End: 2024-02-19

## 2024-02-16 RX ORDER — ACETAMINOPHEN 325 MG/1
650 TABLET ORAL EVERY 6 HOURS PRN
Status: DISCONTINUED | OUTPATIENT
Start: 2024-02-16 | End: 2024-02-19

## 2024-02-16 RX ORDER — TAMSULOSIN HYDROCHLORIDE 0.4 MG/1
0.4 CAPSULE ORAL
Status: DISCONTINUED | OUTPATIENT
Start: 2024-02-16 | End: 2024-02-19

## 2024-02-16 RX ORDER — LANOLIN ALCOHOL/MO/W.PET/CERES
400 CREAM (GRAM) TOPICAL DAILY
Status: DISCONTINUED | OUTPATIENT
Start: 2024-02-17 | End: 2024-02-19

## 2024-02-16 RX ADMIN — TAMSULOSIN HYDROCHLORIDE 0.4 MG: 0.4 CAPSULE ORAL at 18:15

## 2024-02-16 RX ADMIN — ACETAMINOPHEN 650 MG: 325 TABLET, FILM COATED ORAL at 18:12

## 2024-02-16 RX ADMIN — IPRATROPIUM BROMIDE 0.5 MG: 0.5 SOLUTION RESPIRATORY (INHALATION) at 20:09

## 2024-02-16 RX ADMIN — HEPARIN SODIUM 5000 UNITS: 5000 INJECTION, SOLUTION INTRAVENOUS; SUBCUTANEOUS at 21:17

## 2024-02-16 RX ADMIN — GUAIFENESIN 600 MG: 600 TABLET ORAL at 18:15

## 2024-02-16 RX ADMIN — CEFTRIAXONE 2000 MG: 2 INJECTION, SOLUTION INTRAVENOUS at 16:34

## 2024-02-16 RX ADMIN — ACETAZOLAMIDE 500 MG: 250 TABLET ORAL at 18:15

## 2024-02-16 RX ADMIN — SODIUM CHLORIDE, SODIUM GLUCONATE, SODIUM ACETATE, POTASSIUM CHLORIDE, MAGNESIUM CHLORIDE, SODIUM PHOSPHATE, DIBASIC, AND POTASSIUM PHOSPHATE 75 ML/HR: .53; .5; .37; .037; .03; .012; .00082 INJECTION, SOLUTION INTRAVENOUS at 18:04

## 2024-02-16 RX ADMIN — METHYLPREDNISOLONE SODIUM SUCCINATE 20 MG: 40 INJECTION, POWDER, FOR SOLUTION INTRAMUSCULAR; INTRAVENOUS at 18:15

## 2024-02-16 RX ADMIN — AZITHROMYCIN MONOHYDRATE 500 MG: 500 INJECTION, POWDER, LYOPHILIZED, FOR SOLUTION INTRAVENOUS at 18:12

## 2024-02-16 RX ADMIN — SODIUM CHLORIDE 1000 ML: 0.9 INJECTION, SOLUTION INTRAVENOUS at 15:59

## 2024-02-16 RX ADMIN — LEVALBUTEROL HYDROCHLORIDE 1.25 MG: 1.25 SOLUTION RESPIRATORY (INHALATION) at 20:09

## 2024-02-16 NOTE — RESPIRATORY THERAPY NOTE
RT Protocol Note  Hussein Edward III 58 y.o. male MRN: [id number]  Unit/Bed#: -01 Encounter: [id number]    Assessment    Active Problems:    WALE (obstructive sleep apnea)    Mixed dyslipidemia    Acute kidney failure (HCC)    Non-traumatic rhabdomyolysis      Home Pulmonary Medications:  none       Past Medical History:   Diagnosis Date    Chronic back pain     Migraine      Social History     Socioeconomic History    Marital status: /Civil Union     Spouse name: Kathi    Number of children: 2    Years of education: None    Highest education level: None   Occupational History    Occupation:      Employer: [employer]   Tobacco Use    Smoking status: Never    Smokeless tobacco: Former     Types: Snuff     Quit date: 2003   Vaping Use    Vaping status: Never Used   Substance and Sexual Activity    Alcohol use: Never    Drug use: No    Sexual activity: None   Other Topics Concern    None   Social History Narrative    None     Social Determinants of Health     Financial Resource Strain: Not on file   Food Insecurity: Not on file   Transportation Needs: Not on file   Physical Activity: Not on file   Stress: Not on file   Social Connections: Not on file   Intimate Partner Violence: Not on file   Housing Stability: Not on file       Subjective         Objective    Physical Exam:        Vitals:  Blood pressure 109/70, pulse 98, temperature 98.5 °F (36.9 °C), resp. rate 18, weight 92.1 kg (203 lb), SpO2 99%.          Imaging and other studies: I have personally reviewed pertinent films in PACS          Plan    Respiratory Plan: (P) Mild Distress pathway        Resp Comments: (P) physician ordered duoneb q6/as per protocol changed to xopenex/atrovent q6

## 2024-02-16 NOTE — ED PROVIDER NOTES
"History  Chief Complaint   Patient presents with    Flu Symptoms     Patient complaint of SOB and wheezing \" dx with flu on Monday at [facility] \"    Shortness of Breath     Patient is a 59 y/o M with h/o chronic back pain that presents to the ED with cough, generalized weakness, myalgias that started a week ago.  He states he was diagnosed with influenza A 4 days ago.  His symptoms started 1 week ago.  He has had nasal congestion, productive cough and body aches.  He had a couple days of diarrhea, but that resolved.  He no longer has fevers.  He was seen at [facility] on 2/14 and discharged.  He was went to [facility] today and had a CXR and was told to go to the ED for admission.  On CXR he has b/l pneumonia.        History provided by:  Patient  Flu Symptoms  Presenting symptoms: cough, fatigue, headache, myalgias and shortness of breath    Presenting symptoms: no fever    Associated symptoms: chills and nasal congestion    Shortness of Breath  Associated symptoms: cough and headaches    Associated symptoms: no chest pain, no fever and no rash        Prior to Admission Medications   Prescriptions Last Dose Informant Patient Reported? Taking?   Daridorexant HCl 25 MG TABS   No No   Sig: Take 25 mg by mouth daily at bedtime   Magnesium Oxide 400 MG CAPS   Yes No   Sig: Take 1 tablet by mouth in the morning   Rimegepant Sulfate (Nurtec) 75 MG TBDP  Self No No   Sig: Take one NURTEC 75 mg under tongue every other day. Limit 1 in 24 hours   acetaZOLAMIDE (DIAMOX) 500 mg capsule   No No   Sig: Take 1 capsule (500 mg total) by mouth 2 (two) times a day   benzonatate (TESSALON) 200 MG capsule   Yes No   Sig: Take 1 capsule by mouth 3 (three) times a day as needed   diazepam (VALIUM) 10 mg tablet  Self Yes No   Sig: TAKE TWO TABLETS BY MOUTH ONE HOUR BEFORE PROCEDURE. DO NOT DRIVE AFTER TAKING.   Patient not taking: Reported on 6/20/2023   eszopiclone (LUNESTA) 1 mg tablet   No No   Sig: Take 1 tablet " (1 mg total) by mouth daily at bedtime as needed for sleep for up to 14 days Take immediately before bedtime   levocetirizine (XYZAL) 5 MG tablet   Yes No   Sig: Take 1 tablet by mouth every evening   magnesium oxide (MAG-OX) 400 mg  Self No No   Sig: Take 1 tablet (400 mg total) by mouth daily Nightly   methocarbamol (ROBAXIN) 750 mg tablet  Self Yes No   Sig: methocarbamol 750 mg tablet   take 1 tablet by mouth every 8 hours if needed   oxyCODONE-acetaminophen (PERCOCET) 5-325 mg per tablet   Yes No   Sig: Take 1 tablet by mouth every 8 (eight) hours as needed   tamsulosin (FLOMAX) 0.4 mg   No No   Sig: Take 1 capsule (0.4 mg total) by mouth daily with dinner      Facility-Administered Medications: None       Past Medical History:   Diagnosis Date    Chronic back pain     Migraine        Past Surgical History:   Procedure Laterality Date    HERNIA REPAIR      MANDIBLE FRACTURE SURGERY      MOUTH SURGERY      cyst in cheek    NASAL SEPTUM SURGERY         Family History   Problem Relation Age of Onset    Breast cancer Mother     Diabetes Father     No Known Problems Family     Substance Abuse Neg Hx     Mental illness Neg Hx      I have reviewed and agree with the history as documented.    E-Cigarette/Vaping    E-Cigarette Use Never User      E-Cigarette/Vaping Substances    Nicotine No     THC No     CBD No     Flavoring No     Other No     Unknown No      Social History     Tobacco Use    Smoking status: Never    Smokeless tobacco: Former     Types: Snuff     Quit date: 2003   Vaping Use    Vaping status: Never Used   Substance Use Topics    Alcohol use: Never    Drug use: No       Review of Systems   Constitutional:  Positive for chills and fatigue. Negative for fever.   HENT:  Positive for congestion.    Respiratory:  Positive for cough and shortness of breath.    Cardiovascular:  Negative for chest pain and leg swelling.   Genitourinary:  Negative for dysuria.   Musculoskeletal:  Positive for myalgias.   Skin:   Negative for color change, pallor and rash.   Neurological:  Positive for weakness (generalized weakness.) and headaches. Negative for dizziness, light-headedness and numbness.   Psychiatric/Behavioral:  Negative for confusion.    All other systems reviewed and are negative.      Physical Exam  Physical Exam  Vitals and nursing note reviewed.   Constitutional:       General: He is in acute distress (patient ill appearing.).      Appearance: Normal appearance. He is well-developed, well-groomed and normal weight. He is ill-appearing. He is not diaphoretic.   HENT:      Head: Normocephalic and atraumatic.      Right Ear: Hearing normal.      Left Ear: Hearing normal.      Nose: Nose normal.      Mouth/Throat:      Mouth: Mucous membranes are pale and dry.   Eyes:      Conjunctiva/sclera: Conjunctivae normal.   Cardiovascular:      Rate and Rhythm: Regular rhythm. Tachycardia present.      Heart sounds: Normal heart sounds.   Pulmonary:      Effort: Tachypnea present. No accessory muscle usage, respiratory distress or retractions.      Breath sounds: Wheezing (mild expiratory wheeze b/l bases.) present. No rhonchi.   Abdominal:      General: Abdomen is flat. Bowel sounds are normal.      Palpations: Abdomen is soft.      Tenderness: There is no abdominal tenderness.   Musculoskeletal:         General: Normal range of motion.      Cervical back: Normal range of motion and neck supple.      Right lower leg: No edema.      Left lower leg: No edema.   Skin:     General: Skin is warm and dry.      Coloration: Skin is pale. Skin is not jaundiced.      Findings: No rash.   Neurological:      General: No focal deficit present.      Mental Status: He is alert and oriented to person, place, and time.      Motor: No weakness.   Psychiatric:         Mood and Affect: Mood normal.         Behavior: Behavior is cooperative.         Vital Signs  ED Triage Vitals   Temperature Pulse Respirations Blood Pressure SpO2   02/16/24 1344  02/16/24 1344 02/16/24 1344 02/16/24 1344 02/16/24 1344   98.5 °F (36.9 °C) 98 18 106/70 97 %      Temp src Heart Rate Source Patient Position - Orthostatic VS BP Location FiO2 (%)   -- 02/16/24 1631 -- -- --    Monitor         Pain Score       --                  Vitals:    02/16/24 1344 02/16/24 1600 02/16/24 1631   BP: 106/70 107/73    Pulse: 98 103 96         Visual Acuity      ED Medications  Medications   sodium chloride 0.9 % bolus 1,000 mL (1,000 mL Intravenous New Bag 2/16/24 1559)   cefTRIAXone (ROCEPHIN) IVPB (premix in dextrose) 2,000 mg 50 mL (has no administration in time range)   azithromycin (ZITHROMAX) 500 mg in sodium chloride 0.9% 250mL IVPB 500 mg (has no administration in time range)       Diagnostic Studies  Results Reviewed       Procedure Component Value Units Date/Time    Blood culture #1 [[id number]] Collected: 02/16/24 1614    Lab Status: In process Specimen: Blood from Arm, Left Updated: 02/16/24 1620    Blood culture #2 [[id number]] Collected: 02/16/24 1614    Lab Status: In process Specimen: Blood from Arm, Right Updated: 02/16/24 1620    Lactic acid, plasma (w/reflex if result > 2.0) [[id number]] Collected: 02/16/24 1614    Lab Status: In process Specimen: Blood from Arm, Right Updated: 02/16/24 1620    HS Troponin I 2hr [[id number]] Collected: 02/16/24 1614    Lab Status: In process Specimen: Blood from Arm, Right Updated: 02/16/24 1620    Procalcitonin [[id number]] Collected: 02/16/24 1614    Lab Status: In process Specimen: Blood from Arm, Right Updated: 02/16/24 1620    HS Troponin I 4hr [[id number]]     Lab Status: No result Specimen: Blood     CK [[id number]]  (Abnormal) Collected: 02/16/24 1349    Lab Status: Final result Specimen: Blood from Arm, Right Updated: 02/16/24 1521     Total CK 1,054 U/L     HS Troponin 0hr (reflex protocol) [[id number]]  (Normal) Collected: 02/16/24 1349    Lab Status: Final result Specimen: Blood from Arm, Right Updated: 02/16/24 1427     hs TnI 0hr 14 ng/L      Comprehensive metabolic panel [[id number]]  (Abnormal) Collected: 02/16/24 1349    Lab Status: Final result Specimen: Blood from Arm, Right Updated: 02/16/24 1420     Sodium 142 mmol/L      Potassium 3.8 mmol/L      Chloride 112 mmol/L      CO2 19 mmol/L      ANION GAP 11 mmol/L      BUN 28 mg/dL      Creatinine 1.47 mg/dL      Glucose 94 mg/dL      Calcium 8.9 mg/dL      AST 80 U/L      ALT 78 U/L      Alkaline Phosphatase 86 U/L      Total Protein 6.7 g/dL      Albumin 3.8 g/dL      Total Bilirubin 1.01 mg/dL      eGFR 52 ml/min/1.73sq m     Narrative:      National Kidney Disease Foundation guidelines for Chronic Kidney Disease (CKD):     Stage 1 with normal or high GFR (GFR > 90 mL/min/1.73 square meters)    Stage 2 Mild CKD (GFR = 60-89 mL/min/1.73 square meters)    Stage 3A Moderate CKD (GFR = 45-59 mL/min/1.73 square meters)    Stage 3B Moderate CKD (GFR = 30-44 mL/min/1.73 square meters)    Stage 4 Severe CKD (GFR = 15-29 mL/min/1.73 square meters)    Stage 5 End Stage CKD (GFR <15 mL/min/1.73 square meters)  Note: GFR calculation is accurate only with a steady state creatinine    CBC and differential [[id number]]  (Abnormal) Collected: 02/16/24 1349    Lab Status: Final result Specimen: Blood from Arm, Right Updated: 02/16/24 1403     WBC 6.21 Thousand/uL      RBC 5.09 Million/uL      Hemoglobin 14.8 g/dL      Hematocrit 45.3 %      MCV 89 fL      MCH 29.1 pg      MCHC 32.7 g/dL      RDW 13.2 %      MPV 9.3 fL      Platelets 146 Thousands/uL      nRBC 0 /100 WBCs      Neutrophils Relative 84 %      Immat GRANS % 1 %      Lymphocytes Relative 11 %      Monocytes Relative 4 %      Eosinophils Relative 0 %      Basophils Relative 0 %      Neutrophils Absolute 5.29 Thousands/µL      Immature Grans Absolute 0.04 Thousand/uL      Lymphocytes Absolute 0.65 Thousands/µL      Monocytes Absolute 0.22 Thousand/µL      Eosinophils Absolute 0.00 Thousand/µL      Basophils Absolute 0.01 Thousands/µL                     No orders to display              Procedures  Procedures         ED Course  ED Course as of 02/16/24 1634   Fri Feb 16, 2024   1608 SLIm paged for admission                                             Medical Decision Making  Patient diagnosed with influenza A presents with generalized weakness, worsening cough, myalgias, will order labs, EKG, to r/o LUIS ALBERTO, anemia, electrolyte abnormality, dehydration, rhabdo.   Reviewed outpt CXR, patient with B/L pneumonia, will admit for IV abx.   Patient with elevated CK, will admit for IV hydration.     Amount and/or Complexity of Data Reviewed  External Data Reviewed: ECG.  Labs: ordered.  Radiology: ordered and independent interpretation performed.  ECG/medicine tests: ordered and independent interpretation performed.    Risk  Prescription drug management.  Decision regarding hospitalization.             Disposition  Final diagnoses:   Influenza   LUIS ALBERTO (acute kidney injury) (HCC)   Rhabdomyolysis   Pneumonia     Time reflects when diagnosis was documented in both MDM as applicable and the Disposition within this note       Time User Action Codes Description Comment    2/16/2024  4:03 PM Yani Knight [J11.1] Influenza     2/16/2024  4:03 PM Yani Knight [N17.9] LUIS ALBERTO (acute kidney injury) (HCC)     2/16/2024  4:03 PM Yani Knight [M62.82] Rhabdomyolysis     2/16/2024  4:04 PM Yani Knight [J18.9] Pneumonia           ED Disposition       ED Disposition   Admit    Condition   Stable    Date/Time   Fri Feb 16, 2024  4:10 PM    Comment   Case was discussed with Dr. Barker and the patient's admission status was agreed to be Admission Status: observation status to the service of Dr. Barker .               Follow-up Information    None         Patient's Medications   Discharge Prescriptions    No medications on file       No discharge procedures on file.    PDMP Review         Value Time User    PDMP Reviewed  Yes 2/5/2024  12:29 PM ROSALINO Stevens            ED Provider  Electronically Signed by             Yani Knihgt PA-C  02/16/24 2194

## 2024-02-17 PROBLEM — E87.20 METABOLIC ACIDOSIS: Status: ACTIVE | Noted: 2024-02-17

## 2024-02-17 LAB
ANION GAP SERPL CALCULATED.3IONS-SCNC: 9 MMOL/L
BUN SERPL-MCNC: 22 MG/DL (ref 5–25)
CALCIUM SERPL-MCNC: 8.2 MG/DL (ref 8.4–10.2)
CHLORIDE SERPL-SCNC: 117 MMOL/L (ref 96–108)
CK SERPL-CCNC: 775 U/L (ref 39–308)
CO2 SERPL-SCNC: 15 MMOL/L (ref 21–32)
CREAT SERPL-MCNC: 1 MG/DL (ref 0.6–1.3)
ERYTHROCYTE [DISTWIDTH] IN BLOOD BY AUTOMATED COUNT: 13.3 % (ref 11.6–15.1)
GFR SERPL CREATININE-BSD FRML MDRD: 83 ML/MIN/1.73SQ M
GLUCOSE SERPL-MCNC: 173 MG/DL (ref 65–140)
HCT VFR BLD AUTO: 40.2 % (ref 36.5–49.3)
HGB BLD-MCNC: 12.9 G/DL (ref 12–17)
MCH RBC QN AUTO: 28.9 PG (ref 26.8–34.3)
MCHC RBC AUTO-ENTMCNC: 32.1 G/DL (ref 31.4–37.4)
MCV RBC AUTO: 90 FL (ref 82–98)
PLATELET # BLD AUTO: 129 THOUSANDS/UL (ref 149–390)
PMV BLD AUTO: 10 FL (ref 8.9–12.7)
POTASSIUM SERPL-SCNC: 4 MMOL/L (ref 3.5–5.3)
PROCALCITONIN SERPL-MCNC: 0.44 NG/ML
RBC # BLD AUTO: 4.46 MILLION/UL (ref 3.88–5.62)
SODIUM SERPL-SCNC: 141 MMOL/L (ref 135–147)
WBC # BLD AUTO: 4.76 THOUSAND/UL (ref 4.31–10.16)

## 2024-02-17 PROCEDURE — 85027 COMPLETE CBC AUTOMATED: CPT | Performed by: INTERNAL MEDICINE

## 2024-02-17 PROCEDURE — 84145 PROCALCITONIN (PCT): CPT | Performed by: INTERNAL MEDICINE

## 2024-02-17 PROCEDURE — 82550 ASSAY OF CK (CPK): CPT | Performed by: INTERNAL MEDICINE

## 2024-02-17 PROCEDURE — 80048 BASIC METABOLIC PNL TOTAL CA: CPT | Performed by: INTERNAL MEDICINE

## 2024-02-17 PROCEDURE — 94640 AIRWAY INHALATION TREATMENT: CPT

## 2024-02-17 PROCEDURE — 99232 SBSQ HOSP IP/OBS MODERATE 35: CPT | Performed by: INTERNAL MEDICINE

## 2024-02-17 PROCEDURE — 94760 N-INVAS EAR/PLS OXIMETRY 1: CPT

## 2024-02-17 RX ORDER — PREDNISONE 20 MG/1
40 TABLET ORAL DAILY
Status: DISCONTINUED | OUTPATIENT
Start: 2024-02-18 | End: 2024-02-18

## 2024-02-17 RX ORDER — ECHINACEA PURPUREA EXTRACT 125 MG
1 TABLET ORAL
Status: DISCONTINUED | OUTPATIENT
Start: 2024-02-17 | End: 2024-02-19

## 2024-02-17 RX ADMIN — HEPARIN SODIUM 5000 UNITS: 5000 INJECTION, SOLUTION INTRAVENOUS; SUBCUTANEOUS at 21:16

## 2024-02-17 RX ADMIN — GUAIFENESIN 600 MG: 600 TABLET ORAL at 09:10

## 2024-02-17 RX ADMIN — LEVALBUTEROL HYDROCHLORIDE 1.25 MG: 1.25 SOLUTION RESPIRATORY (INHALATION) at 19:45

## 2024-02-17 RX ADMIN — RIMEGEPANT SULFATE 75 MG: 75 TABLET, ORALLY DISINTEGRATING ORAL at 11:36

## 2024-02-17 RX ADMIN — METHYLPREDNISOLONE SODIUM SUCCINATE 20 MG: 40 INJECTION, POWDER, FOR SOLUTION INTRAMUSCULAR; INTRAVENOUS at 02:16

## 2024-02-17 RX ADMIN — IPRATROPIUM BROMIDE 0.5 MG: 0.5 SOLUTION RESPIRATORY (INHALATION) at 13:30

## 2024-02-17 RX ADMIN — LEVALBUTEROL HYDROCHLORIDE 1.25 MG: 1.25 SOLUTION RESPIRATORY (INHALATION) at 01:39

## 2024-02-17 RX ADMIN — LEVALBUTEROL HYDROCHLORIDE 1.25 MG: 1.25 SOLUTION RESPIRATORY (INHALATION) at 07:56

## 2024-02-17 RX ADMIN — LEVALBUTEROL HYDROCHLORIDE 1.25 MG: 1.25 SOLUTION RESPIRATORY (INHALATION) at 13:30

## 2024-02-17 RX ADMIN — CEFTRIAXONE 1000 MG: 1 INJECTION, SOLUTION INTRAVENOUS at 15:15

## 2024-02-17 RX ADMIN — IPRATROPIUM BROMIDE 0.5 MG: 0.5 SOLUTION RESPIRATORY (INHALATION) at 07:56

## 2024-02-17 RX ADMIN — GUAIFENESIN 600 MG: 600 TABLET ORAL at 17:18

## 2024-02-17 RX ADMIN — Medication 400 MG: at 09:10

## 2024-02-17 RX ADMIN — SALINE NASAL SPRAY 1 SPRAY: 1.5 SOLUTION NASAL at 20:26

## 2024-02-17 RX ADMIN — HEPARIN SODIUM 5000 UNITS: 5000 INJECTION, SOLUTION INTRAVENOUS; SUBCUTANEOUS at 13:55

## 2024-02-17 RX ADMIN — IPRATROPIUM BROMIDE 0.5 MG: 0.5 SOLUTION RESPIRATORY (INHALATION) at 01:39

## 2024-02-17 RX ADMIN — ACETAMINOPHEN 650 MG: 325 TABLET, FILM COATED ORAL at 16:20

## 2024-02-17 RX ADMIN — IPRATROPIUM BROMIDE 0.5 MG: 0.5 SOLUTION RESPIRATORY (INHALATION) at 19:45

## 2024-02-17 RX ADMIN — BENZONATATE 200 MG: 100 CAPSULE ORAL at 16:20

## 2024-02-17 RX ADMIN — METHYLPREDNISOLONE SODIUM SUCCINATE 20 MG: 40 INJECTION, POWDER, FOR SOLUTION INTRAMUSCULAR; INTRAVENOUS at 09:56

## 2024-02-17 NOTE — PLAN OF CARE
Problem: PAIN - ADULT  Goal: Verbalizes/displays adequate comfort level or baseline comfort level  Description: Interventions:  - Encourage patient to monitor pain and request assistance  - Assess pain using appropriate pain scale  - Administer analgesics based on type and severity of pain and evaluate response  - Implement non-pharmacological measures as appropriate and evaluate response  - Consider cultural and social influences on pain and pain management  - Notify physician/advanced practitioner if interventions unsuccessful or patient reports new pain  Outcome: Progressing     Problem: INFECTION - ADULT  Goal: Absence or prevention of progression during hospitalization  Description: INTERVENTIONS:  - Assess and monitor for signs and symptoms of infection  - Monitor lab/diagnostic results  - Monitor all insertion sites, i.e. indwelling lines, tubes, and drains  - Monitor endotracheal if appropriate and nasal secretions for changes in amount and color  - [place] appropriate cooling/warming therapies per order  - Administer medications as ordered  - Instruct and encourage patient and family to use good hand hygiene technique  - Identify and instruct in appropriate isolation precautions for identified infection/condition  Outcome: Progressing  Goal: Absence of fever/infection during neutropenic period  Description: INTERVENTIONS:  - Monitor WBC    Outcome: Progressing     Problem: DISCHARGE PLANNING  Goal: Discharge to home or other facility with appropriate resources  Description: INTERVENTIONS:  - Identify barriers to discharge w/patient and caregiver  - Arrange for needed discharge resources and transportation as appropriate  - Identify discharge learning needs (meds, wound care, etc.)  - Arrange for interpretive services to assist at discharge as needed  - Refer to Case Management Department for coordinating discharge planning if the patient needs post-hospital services based on physician/advanced  practitioner order or complex needs related to functional status, cognitive ability, or social support system  Outcome: Progressing     Problem: Knowledge Deficit  Goal: Patient/family/caregiver demonstrates understanding of disease process, treatment plan, medications, and discharge instructions  Description: Complete learning assessment and assess knowledge base.  Interventions:  - Provide teaching at level of understanding  - Provide teaching via preferred learning methods  Outcome: Progressing

## 2024-02-17 NOTE — ASSESSMENT & PLAN NOTE
CO2 15; anion gap 9  Likely secondary to acetazolamide use, diarrhea and saline infusion  Hold acetazolamide, discontinue saline infusion

## 2024-02-17 NOTE — ASSESSMENT & PLAN NOTE
Patient reported upper respiratory symptoms a week ago.  Stated he was diagnosed with influenza A 4 days ago.  Resented to urgent care again on Wednesday because of shortness of breath however he received fluids some medication and he was sent home.  [facility] had [facility] and was told to go to emergency department  Patient on nasal cannula oxygen with 2 L, saturating 98%.  No documentation about any hypoxia since presented to the hospital  Chest x-ray showed bilateral hazy opacities basal per my reading  Procalcitonin mildly elevated  Patient does not meet criteria for sepsis  Was started on ceftriaxone and is azithromycin    Plan:  Continue ceftriaxone and azithromycin  Started Solu-Medrol as the patient has significant wheezing on exam  DuoNebs  Mucinex and Tessalon Perles as needed  As needed

## 2024-02-17 NOTE — ASSESSMENT & PLAN NOTE
Lab Results   Component Value Date    EGFR 83 02/17/2024    EGFR 52 02/16/2024    EGFR 93 06/03/2022    CREATININE 1.00 02/17/2024    CREATININE 1.47 (H) 02/16/2024    CREATININE 0.92 06/03/2022       Creatinine 0.8-1.1  Does not meet criteria for acute kidney injury  Elevated creatinine likely hypovolemia.  Patient reported poor oral intake lately also he had diarrhea  Avoid hypotension and nephrotoxins  Monitor

## 2024-02-17 NOTE — PROGRESS NOTES
[facility]  Progress Note  Name: Hussein Edward III I  MRN: [id number]  Unit/Bed#: MS 212Len I Date of Admission: 2/16/2024   Date of Service: 2/17/2024 I Hospital Day: 0    Assessment/Plan   * Pneumonia  Assessment & Plan  Patient reported upper respiratory symptoms a week ago.  Stated he was diagnosed with influenza A 4 days ago.  Resented to urgent care again on Wednesday because of shortness of breath however he received fluids some medication and he was sent home.  [facility] and was told to go to emergency department  Patient on nasal cannula oxygen with 2 L, saturating 98%.  No documentation about any hypoxia since presented to the hospital  Chest x-ray showed bilateral hazy opacities basal per my reading  Procalcitonin mildly elevated  Patient does not meet criteria for sepsis  Was started on ceftriaxone     Plan:  Continue ceftriaxone   Started Solu-Medrol as the patient has significant wheezing on exam. Switch to oral tomorrow   DuoNebs  Mucinex and Tessalon Perles as needed  Wean off nasal cannula oxygen.      Metabolic acidosis  Assessment & Plan  CO2 15; anion gap 9  Likely secondary to acetazolamide use, diarrhea and saline infusion  Hold acetazolamide, discontinue saline infusion    Chronic kidney disease  Assessment & Plan  Lab Results   Component Value Date    EGFR 83 02/17/2024    EGFR 52 02/16/2024    EGFR 93 06/03/2022    CREATININE 1.00 02/17/2024    CREATININE 1.47 (H) 02/16/2024    CREATININE 0.92 06/03/2022       Creatinine 0.8-1.1  Does not meet criteria for acute kidney injury  Elevated creatinine likely hypovolemia.  Patient reported poor oral intake lately also he had diarrhea  Avoid hypotension and nephrotoxins  Monitor    Non-traumatic rhabdomyolysis  Assessment & Plan  CK elevated.  Patient denied any trauma.  Improved, monitor    Meningioma (HCC)  Assessment & Plan  History of meningioma  On Acetazolamide- will hold 2/2  metabolic acidosis     WALE (obstructive sleep apnea)  Assessment & Plan  Continue CPAP at bedtime             VTE Pharmacologic Prophylaxis: VTE Score: 2 Moderate Risk (Score 3-4) - Pharmacological DVT Prophylaxis Ordered: heparin.    Mobility:   Basic Mobility Inpatient Raw Score: 23  JH-HLM Goal: 7: Walk 25 feet or more  JH-HLM Achieved: 6: Walk 10 steps or more  HLM Goal achieved. Continue to encourage appropriate mobility.    Patient Centered Rounds: I performed bedside rounds with nursing staff today.   Discussions with Specialists or Other Care Team Provider: jae    Education and Discussions with Family / Patient: Patient declined call to .     Total Time Spent on Date of Encounter in care of patient: 40 mins. This time was spent on one or more of the following: performing physical exam; counseling and coordination of care; obtaining or reviewing history; documenting in the medical record; reviewing/ordering tests, medications or procedures; communicating with other healthcare professionals and discussing with patient's family/caregivers.    Current Length of Stay: 0 day(s)  Current Patient Status: Observation   Certification Statement: The patient will continue to require additional inpatient hospital stay due to metabolic acidosis, pneumonia  Discharge Plan: Anticipate discharge tomorrow to home.    Code Status: Level 1 - Full Code    Subjective:     Slightly improved however still with some shortness of breath.    Objective:     Vitals:   Temp (24hrs), Av.1 °F (36.7 °C), Min:97.9 °F (36.6 °C), Max:98.3 °F (36.8 °C)    Temp:  [97.9 °F (36.6 °C)-98.3 °F (36.8 °C)] 97.9 °F (36.6 °C)  HR:  [] 98  Resp:  [18-22] 20  BP: (107-118)/(63-73) 110/67  SpO2:  [93 %-99 %] 96 %  Body mass index is 31.79 kg/m².     Input and Output Summary (last 24 hours):     Intake/Output Summary (Last 24 hours) at 2024 1416  Last data filed at 2024 1704  Gross per 24 hour   Intake 50 ml   Output --    Net 50 ml       Physical Exam:   Physical Exam  Vitals and nursing note reviewed.   Constitutional:       General: He is not in acute distress.     Appearance: He is not diaphoretic.   HENT:      Head: Normocephalic.   Eyes:      General: No scleral icterus.        Right eye: No discharge.         Left eye: No discharge.   Cardiovascular:      Rate and Rhythm: Normal rate and regular rhythm.      Heart sounds: No murmur heard.  Pulmonary:      Effort: Pulmonary effort is normal. No respiratory distress.      Breath sounds: Normal breath sounds. No wheezing, rhonchi or rales.   Abdominal:      General: There is no distension.      Palpations: Abdomen is soft.      Tenderness: There is no abdominal tenderness. There is no guarding or rebound.   Musculoskeletal:      Cervical back: Normal range of motion.      Right lower leg: No edema.      Left lower leg: No edema.   Skin:     General: Skin is warm.   Neurological:      Mental Status: He is alert.   Psychiatric:         Mood and Affect: Mood normal.         Behavior: Behavior normal.          Additional Data:     Labs:  Results from last 7 days   Lab Units 02/17/24  0444 02/16/24  1349   WBC Thousand/uL 4.76 6.21   HEMOGLOBIN g/dL 12.9 14.8   HEMATOCRIT % 40.2 45.3   PLATELETS Thousands/uL 129* 146*   NEUTROS PCT %  --  84*   LYMPHS PCT %  --  11*   MONOS PCT %  --  4   EOS PCT %  --  0     Results from last 7 days   Lab Units 02/17/24  0444 02/16/24  1349   SODIUM mmol/L 141 142   POTASSIUM mmol/L 4.0 3.8   CHLORIDE mmol/L 117* 112*   CO2 mmol/L 15* 19*   BUN mg/dL 22 28*   CREATININE mg/dL 1.00 1.47*   ANION GAP mmol/L 9 11   CALCIUM mg/dL 8.2* 8.9   ALBUMIN g/dL  --  3.8   TOTAL BILIRUBIN mg/dL  --  1.01*   ALK PHOS U/L  --  86   ALT U/L  --  78*   AST U/L  --  80*   GLUCOSE RANDOM mg/dL 173* 94                 Results from last 7 days   Lab Units 02/17/24  0444 02/16/24  1614   LACTIC ACID mmol/L  --  0.8   PROCALCITONIN ng/ml 0.44* 0.51*        Lines/Drains:  Invasive Devices       Peripheral Intravenous Line  Duration             Peripheral IV 02/16/24 Right Antecubital <1 day                          Imaging: No pertinent imaging reviewed.    Recent Cultures (last 7 days):   Results from last 7 days   Lab Units 02/16/24  1614   BLOOD CULTURE  Received in Microbiology Lab. Culture in Progress.  Received in Microbiology Lab. Culture in Progress.       Last 24 Hours Medication List:   Current Facility-Administered Medications   Medication Dose Route Frequency Provider Last Rate    acetaminophen  650 mg Oral Q6H PRN Annmarie Barker MD      benzonatate  200 mg Oral TID PRN Annmarie Barker MD      cefTRIAXone  1,000 mg Intravenous Q24H Annmarie Barker MD      Daridorexant HCl  25 mg Oral HS PRN Annmarie Barker MD      guaiFENesin  600 mg Oral BID Annmarie Barker MD      heparin (porcine)  5,000 Units Subcutaneous Q8H [facility] Annmarie Barker MD      ipratropium  0.5 mg Nebulization Q6H Annmarie Barker MD      levalbuterol  1.25 mg Nebulization Q6H Annmarie Barker MD      magnesium Oxide  400 mg Oral Daily Annmarie Barker MD      [START ON 2/18/2024] predniSONE  40 mg Oral Daily Annmarie Barker MD      rimegepant sulfate  75 mg Oral Every Other Day Annmarie Barker MD      tamsulosin  0.4 mg Oral Daily With Dinner Annmarie Barker MD          Today, Patient Was Seen By: Annmarie Barker MD    **Please Note: This note may have been constructed using a voice recognition system.**

## 2024-02-17 NOTE — ASSESSMENT & PLAN NOTE
Patient reported upper respiratory symptoms a week ago.  Stated he was diagnosed with influenza A 4 days ago.  Resented to urgent care again on Wednesday because of shortness of breath however he received fluids some medication and he was sent home.  [facility] and was told to go to emergency department  Patient on nasal cannula oxygen with 2 L, saturating 98%.  No documentation about any hypoxia since presented to the hospital  Chest x-ray showed bilateral hazy opacities basal per my reading  Procalcitonin mildly elevated  Patient does not meet criteria for sepsis  Was started on ceftriaxone     Plan:  Continue ceftriaxone   Started Solu-Medrol as the patient has significant wheezing on exam. Switch to oral tomorrow   DuoNebs  Mucinex and Tessalon Perles as needed  Wean off nasal cannula oxygen.

## 2024-02-17 NOTE — CASE MANAGEMENT
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    Meningioma (HCC) 2021    Deviated nasal septum 2019    Hypertrophy of nasal turbinates 2019    Nasal obstruction 2019    Nasal septal deviation 2019    Seasonal allergic rhinitis 2019    Nasal turbinate hypertrophy 2019    Mixed dyslipidemia 2017    WALE (obstructive sleep apnea) 2016    Low back pain 2016    Strain of lumbar region 2015      LOS (days): 0  Geometric Mean LOS (GMLOS) (days):   Days to GMLOS:     OBJECTIVE:              Current admission status: Observation       Preferred Pharmacy:   [facility] [address] [place] [address]  Phone: [phone] Fax: [phone]    [facility] [address] [place] [address] [place] [facility] [address]  [facility] [address]  Phone: [phone] Fax: [phone]    Primary Care Provider: Iftikhar Roque MD    Primary Insurance: AET[employer]  Secondary Insurance:     ASSESSMENT:  Active Health Care Proxies       Kathi Edward Health Care Representative - Spouse   Primary Phone: [phone] (Mobile)  Home Phone: [phone]                 Advance Directives  Does patient have a Health Care POA?: No  Was patient offered paperwork?: Yes (declined)  Does patient currently have a Health Care decision maker?: Yes,  please see Health Care Proxy section  Does patient have Advance Directives?: No  Was patient offered paperwork?: Yes (declined)    Readmission Root Cause  30 Day Readmission: No    Patient Information  Admitted from:: Home  Mental Status: Alert  During Assessment patient was accompanied by: Not accompanied during assessment  Assessment information provided by:: Patient  Primary Caregiver: Self  Support Systems: Spouse/significant other, Son  Home entry access options. Select all that apply.: Stairs  Number of steps to enter home.: 2  Type of Current Residence: [facility]  Living Arrangements: Lives w/ Spouse/significant other    Activities of Daily Living Prior to Admission  Functional Status: Independent  Completes ADLs independently?: Yes  Ambulates independently?: Yes  Does patient use assisted devices?: No  Does patient currently own DME?: Yes  What DME does the patient currently own?: CPAP  Does patient have a history of Outpatient Therapy (PT/OT)?: No  Does the patient have a history of Short-Term Rehab?: No  Does patient have a history of HHC?: No  Does patient currently have HHC?: No      Patient Information Continued  Does patient have prescription coverage?: Yes  Does patient receive dialysis treatments?: No  Does patient have a history of substance abuse?: No  Does patient have a history of Mental Health Diagnosis?: No    Means of Transportation  Means of Transport to Appts:: Family transport      Social Determinants of Health (SDOH)      Flowsheet Row Most Recent Value   Housing Stability    In the last 12 months, was there a time when you were not able to pay the mortgage or rent on time? N   In the last 12 months, how many places have you lived? 1   In the last 12 months, was there a time when you did not have a steady place to sleep or slept in a shelter (including now)? N   Transportation Needs    In the past 12 months, has lack of transportation kept you from medical appointments or from getting  medications? no   In the past 12 months, has lack of transportation kept you from meetings, work, or from getting things needed for daily living? No   Food Insecurity    Within the past 12 months, you worried that your food would run out before you got the money to buy more. Never true   Within the past 12 months, the food you bought just didn't last and you didn't have money to get more. Never true   Utilities    In the past 12 months has the electric, gas, oil, or water company threatened to shut off services in your home? No            DISCHARGE DETAILS:    Discharge planning discussed with:: patient  Freedom of Choice: Yes  Comments - Freedom of Choice: no anticipated dc needs  CM contacted family/caregiver?: No- see comments (reports being in contact with family)  Were Treatment Team discharge recommendations reviewed with patient/caregiver?: Yes  Did patient/caregiver verbalize understanding of patient care needs?: Yes  Were patient/caregiver advised of the risks associated with not following Treatment Team discharge recommendations?: Yes    Requested Home Health Care         Is the patient interested in HHC at discharge?: No    DME Referral Provided  Referral made for DME?: No    Treatment Team Recommendation: Home  Discharge Destination Plan:: Home  Transport at Discharge : Family     Additional Comments: SPoke with pt to discuss the role of CM and to discuss any help pt may need prior to dc. Pt lives with his wife and son in a 1st floor home with 2 ROSA. Pt performed ADL's indptly pta, no use of ambulatory DME. Pt has a CPAP. No hx of HHC or rehab. No hx of mental health or D&A treatment. Pt's preferred pharmacy is [facility] in [place]. Pt drives but not recently. Pt's family will transport home at dc.

## 2024-02-17 NOTE — PLAN OF CARE
Problem: PAIN - ADULT  Goal: Verbalizes/displays adequate comfort level or baseline comfort level  Description: Interventions:  - Encourage patient to monitor pain and request assistance  - Assess pain using appropriate pain scale  - Administer analgesics based on type and severity of pain and evaluate response  - Implement non-pharmacological measures as appropriate and evaluate response  - Consider cultural and social influences on pain and pain management  - Notify physician/advanced practitioner if interventions unsuccessful or patient reports new pain  Outcome: Progressing     Problem: INFECTION - ADULT  Goal: Absence or prevention of progression during hospitalization  Description: INTERVENTIONS:  - Assess and monitor for signs and symptoms of infection  - Monitor lab/diagnostic results  - Monitor all insertion sites, i.e. indwelling lines, tubes, and drains  - Monitor endotracheal if appropriate and nasal secretions for changes in amount and color  - [place] appropriate cooling/warming therapies per order  - Administer medications as ordered  - Instruct and encourage patient and family to use good hand hygiene technique  - Identify and instruct in appropriate isolation precautions for identified infection/condition  Outcome: Progressing  Goal: Absence of fever/infection during neutropenic period  Description: INTERVENTIONS:  - Monitor WBC    Outcome: Progressing     Problem: DISCHARGE PLANNING  Goal: Discharge to home or other facility with appropriate resources  Description: INTERVENTIONS:  - Identify barriers to discharge w/patient and caregiver  - Arrange for needed discharge resources and transportation as appropriate  - Identify discharge learning needs (meds, wound care, etc.)  - Arrange for interpretive services to assist at discharge as needed  - Refer to Case Management Department for coordinating discharge planning if the patient needs post-hospital services based on physician/advanced  practitioner order or complex needs related to functional status, cognitive ability, or social support system  Outcome: Progressing     Problem: Knowledge Deficit  Goal: Patient/family/caregiver demonstrates understanding of disease process, treatment plan, medications, and discharge instructions  Description: Complete learning assessment and assess knowledge base.  Interventions:  - Provide teaching at level of understanding  - Provide teaching via preferred learning methods  Outcome: Progressing

## 2024-02-17 NOTE — ASSESSMENT & PLAN NOTE
Lab Results   Component Value Date    EGFR 52 02/16/2024    EGFR 93 06/03/2022    EGFR 97 06/02/2022    CREATININE 1.47 (H) 02/16/2024    CREATININE 0.92 06/03/2022    CREATININE 0.88 06/02/2022       Creatinine 0.8-1.1  Does not meet criteria for acute kidney injury  Elevated creatinine likely hypovolemia.  Patient reported poor oral intake lately also he had diarrhea  Avoid hypotension and nephrotoxins  Monitor

## 2024-02-18 ENCOUNTER — APPOINTMENT (OUTPATIENT)
Dept: CT IMAGING | Facility: HOSPITAL | Age: 59
DRG: 208 | End: 2024-02-18
Payer: COMMERCIAL

## 2024-02-18 PROBLEM — J96.01 ACUTE RESPIRATORY FAILURE WITH HYPOXIA (HCC): Status: ACTIVE | Noted: 2024-02-18

## 2024-02-18 LAB
ANION GAP SERPL CALCULATED.3IONS-SCNC: 10 MMOL/L
ANION GAP SERPL CALCULATED.3IONS-SCNC: 8 MMOL/L
BUN SERPL-MCNC: 21 MG/DL (ref 5–25)
BUN SERPL-MCNC: 25 MG/DL (ref 5–25)
CALCIUM SERPL-MCNC: 8.4 MG/DL (ref 8.4–10.2)
CALCIUM SERPL-MCNC: 8.5 MG/DL (ref 8.4–10.2)
CHLORIDE SERPL-SCNC: 112 MMOL/L (ref 96–108)
CHLORIDE SERPL-SCNC: 116 MMOL/L (ref 96–108)
CK SERPL-CCNC: 556 U/L (ref 39–308)
CO2 SERPL-SCNC: 17 MMOL/L (ref 21–32)
CO2 SERPL-SCNC: 18 MMOL/L (ref 21–32)
CREAT SERPL-MCNC: 0.88 MG/DL (ref 0.6–1.3)
CREAT SERPL-MCNC: 0.98 MG/DL (ref 0.6–1.3)
ERYTHROCYTE [DISTWIDTH] IN BLOOD BY AUTOMATED COUNT: 13.2 % (ref 11.6–15.1)
GFR SERPL CREATININE-BSD FRML MDRD: 85 ML/MIN/1.73SQ M
GFR SERPL CREATININE-BSD FRML MDRD: 96 ML/MIN/1.73SQ M
GLUCOSE P FAST SERPL-MCNC: 110 MG/DL (ref 65–99)
GLUCOSE P FAST SERPL-MCNC: 132 MG/DL (ref 65–99)
GLUCOSE SERPL-MCNC: 110 MG/DL (ref 65–140)
GLUCOSE SERPL-MCNC: 132 MG/DL (ref 65–140)
HCT VFR BLD AUTO: 39.9 % (ref 36.5–49.3)
HGB BLD-MCNC: 13.2 G/DL (ref 12–17)
MAGNESIUM SERPL-MCNC: 2.1 MG/DL (ref 1.9–2.7)
MCH RBC QN AUTO: 29.1 PG (ref 26.8–34.3)
MCHC RBC AUTO-ENTMCNC: 33.1 G/DL (ref 31.4–37.4)
MCV RBC AUTO: 88 FL (ref 82–98)
PLATELET # BLD AUTO: 175 THOUSANDS/UL (ref 149–390)
PMV BLD AUTO: 9 FL (ref 8.9–12.7)
POTASSIUM SERPL-SCNC: 3.6 MMOL/L (ref 3.5–5.3)
POTASSIUM SERPL-SCNC: 5.5 MMOL/L (ref 3.5–5.3)
RBC # BLD AUTO: 4.54 MILLION/UL (ref 3.88–5.62)
SODIUM SERPL-SCNC: 140 MMOL/L (ref 135–147)
SODIUM SERPL-SCNC: 141 MMOL/L (ref 135–147)
WBC # BLD AUTO: 8.06 THOUSAND/UL (ref 4.31–10.16)

## 2024-02-18 PROCEDURE — 94640 AIRWAY INHALATION TREATMENT: CPT

## 2024-02-18 PROCEDURE — 83735 ASSAY OF MAGNESIUM: CPT | Performed by: NURSE PRACTITIONER

## 2024-02-18 PROCEDURE — 80048 BASIC METABOLIC PNL TOTAL CA: CPT | Performed by: INTERNAL MEDICINE

## 2024-02-18 PROCEDURE — 82550 ASSAY OF CK (CPK): CPT | Performed by: INTERNAL MEDICINE

## 2024-02-18 PROCEDURE — 94003 VENT MGMT INPAT SUBQ DAY: CPT

## 2024-02-18 PROCEDURE — G1004 CDSM NDSC: HCPCS

## 2024-02-18 PROCEDURE — 94002 VENT MGMT INPAT INIT DAY: CPT

## 2024-02-18 PROCEDURE — 85027 COMPLETE CBC AUTOMATED: CPT | Performed by: INTERNAL MEDICINE

## 2024-02-18 PROCEDURE — 94760 N-INVAS EAR/PLS OXIMETRY 1: CPT

## 2024-02-18 PROCEDURE — 93005 ELECTROCARDIOGRAM TRACING: CPT

## 2024-02-18 PROCEDURE — 71275 CT ANGIOGRAPHY CHEST: CPT

## 2024-02-18 PROCEDURE — 80048 BASIC METABOLIC PNL TOTAL CA: CPT | Performed by: NURSE PRACTITIONER

## 2024-02-18 PROCEDURE — 99291 CRITICAL CARE FIRST HOUR: CPT | Performed by: INTERNAL MEDICINE

## 2024-02-18 PROCEDURE — 99233 SBSQ HOSP IP/OBS HIGH 50: CPT | Performed by: INTERNAL MEDICINE

## 2024-02-18 RX ORDER — AZITHROMYCIN 500 MG/1
500 TABLET, FILM COATED ORAL EVERY 24 HOURS
Status: DISCONTINUED | OUTPATIENT
Start: 2024-02-18 | End: 2024-02-19

## 2024-02-18 RX ORDER — LORAZEPAM 2 MG/ML
0.5 INJECTION INTRAMUSCULAR ONCE
Status: COMPLETED | OUTPATIENT
Start: 2024-02-18 | End: 2024-02-18

## 2024-02-18 RX ORDER — METHYLPREDNISOLONE SODIUM SUCCINATE 40 MG/ML
40 INJECTION, POWDER, LYOPHILIZED, FOR SOLUTION INTRAMUSCULAR; INTRAVENOUS EVERY 12 HOURS SCHEDULED
Status: DISCONTINUED | OUTPATIENT
Start: 2024-02-18 | End: 2024-02-19

## 2024-02-18 RX ORDER — LORAZEPAM 2 MG/ML
INJECTION INTRAMUSCULAR
Status: COMPLETED
Start: 2024-02-18 | End: 2024-02-18

## 2024-02-18 RX ORDER — OSELTAMIVIR PHOSPHATE 75 MG/1
75 CAPSULE ORAL EVERY 12 HOURS SCHEDULED
Status: DISCONTINUED | OUTPATIENT
Start: 2024-02-18 | End: 2024-02-19

## 2024-02-18 RX ORDER — DEXMEDETOMIDINE HYDROCHLORIDE 4 UG/ML
.1-.7 INJECTION, SOLUTION INTRAVENOUS
Status: DISCONTINUED | OUTPATIENT
Start: 2024-02-18 | End: 2024-02-19

## 2024-02-18 RX ORDER — FUROSEMIDE 10 MG/ML
20 INJECTION INTRAMUSCULAR; INTRAVENOUS ONCE
Status: COMPLETED | OUTPATIENT
Start: 2024-02-18 | End: 2024-02-18

## 2024-02-18 RX ADMIN — Medication 400 MG: at 08:54

## 2024-02-18 RX ADMIN — HEPARIN SODIUM 5000 UNITS: 5000 INJECTION, SOLUTION INTRAVENOUS; SUBCUTANEOUS at 13:03

## 2024-02-18 RX ADMIN — LEVALBUTEROL HYDROCHLORIDE 1.25 MG: 1.25 SOLUTION RESPIRATORY (INHALATION) at 13:57

## 2024-02-18 RX ADMIN — IOHEXOL 85 ML: 350 INJECTION, SOLUTION INTRAVENOUS at 01:52

## 2024-02-18 RX ADMIN — LEVALBUTEROL HYDROCHLORIDE 1.25 MG: 1.25 SOLUTION RESPIRATORY (INHALATION) at 19:51

## 2024-02-18 RX ADMIN — FUROSEMIDE 20 MG: 10 INJECTION, SOLUTION INTRAMUSCULAR; INTRAVENOUS at 13:03

## 2024-02-18 RX ADMIN — IPRATROPIUM BROMIDE 0.5 MG: 0.5 SOLUTION RESPIRATORY (INHALATION) at 08:00

## 2024-02-18 RX ADMIN — IPRATROPIUM BROMIDE 0.5 MG: 0.5 SOLUTION RESPIRATORY (INHALATION) at 19:51

## 2024-02-18 RX ADMIN — GUAIFENESIN 600 MG: 600 TABLET ORAL at 18:24

## 2024-02-18 RX ADMIN — PREDNISONE 40 MG: 20 TABLET ORAL at 08:54

## 2024-02-18 RX ADMIN — METHYLPREDNISOLONE SODIUM SUCCINATE 40 MG: 40 INJECTION, POWDER, FOR SOLUTION INTRAMUSCULAR; INTRAVENOUS at 21:33

## 2024-02-18 RX ADMIN — LORAZEPAM 0.5 MG: 2 INJECTION INTRAMUSCULAR; INTRAVENOUS at 11:07

## 2024-02-18 RX ADMIN — HEPARIN SODIUM 5000 UNITS: 5000 INJECTION, SOLUTION INTRAVENOUS; SUBCUTANEOUS at 22:29

## 2024-02-18 RX ADMIN — LEVALBUTEROL HYDROCHLORIDE 1.25 MG: 1.25 SOLUTION RESPIRATORY (INHALATION) at 01:02

## 2024-02-18 RX ADMIN — CEFTRIAXONE 1000 MG: 1 INJECTION, SOLUTION INTRAVENOUS at 15:40

## 2024-02-18 RX ADMIN — LORAZEPAM 0.5 MG: 2 INJECTION INTRAMUSCULAR; INTRAVENOUS at 23:43

## 2024-02-18 RX ADMIN — LEVALBUTEROL HYDROCHLORIDE 1.25 MG: 1.25 SOLUTION RESPIRATORY (INHALATION) at 08:01

## 2024-02-18 RX ADMIN — LORAZEPAM 0.5 MG: 2 INJECTION INTRAMUSCULAR at 23:43

## 2024-02-18 RX ADMIN — DEXMEDETOMIDINE HYDROCHLORIDE 0.2 MCG/KG/HR: 4 INJECTION, SOLUTION INTRAVENOUS at 19:28

## 2024-02-18 RX ADMIN — OSELTAMIVIR PHOSPHATE 75 MG: 75 CAPSULE ORAL at 21:51

## 2024-02-18 RX ADMIN — IPRATROPIUM BROMIDE 0.5 MG: 0.5 SOLUTION RESPIRATORY (INHALATION) at 01:02

## 2024-02-18 RX ADMIN — IPRATROPIUM BROMIDE 0.5 MG: 0.5 SOLUTION RESPIRATORY (INHALATION) at 13:57

## 2024-02-18 RX ADMIN — ACETAMINOPHEN 650 MG: 325 TABLET, FILM COATED ORAL at 01:55

## 2024-02-18 RX ADMIN — ACETAMINOPHEN 650 MG: 325 TABLET, FILM COATED ORAL at 15:51

## 2024-02-18 RX ADMIN — GUAIFENESIN 600 MG: 600 TABLET ORAL at 08:54

## 2024-02-18 RX ADMIN — AZITHROMYCIN 500 MG: 500 TABLET, FILM COATED ORAL at 08:54

## 2024-02-18 NOTE — UTILIZATION REVIEW
"Initial Clinical Review - admitted as OBS 2/16/24, converted to inpatient 2/18/24 due to acute respiratory failure.      Admission: Date/Time/Statement:   Admission Orders (From admission, onward)       Ordered        02/18/24 1309  Inpatient Admission  Once            02/16/24 1610  Place in Observation  Once                          Orders Placed This Encounter   Procedures    Inpatient Admission     Standing Status:   Standing     Number of Occurrences:   1     Order Specific Question:   Level of Care     Answer:   Level 2 Stepdown / HOT [14]     Order Specific Question:   Estimated length of stay     Answer:   More than 2 Midnights     Order Specific Question:   Certification     Answer:   I certify that inpatient services are medically necessary for this patient for a duration of greater than two midnights. See H&P and MD Progress Notes for additional information about the patient's course of treatment.     ED Arrival Information       Expected   -    Arrival   2/16/2024 13:38    Acuity   Urgent              Means of arrival   Walk-In    Escorted by   Spouse    Service   Hospitalist    Admission type   Emergency              Arrival complaint   caugh,chest pain             Chief Complaint   Patient presents with    Flu Symptoms     Patient complaint of SOB and wheezing \" dx with flu on Monday at [facility] \"    Shortness of Breath       Initial Presentation: 58 y.o. male with a PMH of meningioma and WALE, who presents to the ED from home with shortness of breath.  Patient reported he started to have upper respiratory symptoms about a week ago.  Four days ago he was diagnosed with influenza A.  He was seen in urgent care on Wednesday with shortness of breath however he was discharged home.  Today he presented to urgent care, CXR revealed B/L pneumonia and he was sent to the ED. ED labs revealed elevated CK. Patient denied trauma.  PE: AOx3. Wheezing.     2/16 Admit to Observation for evaluation and " treatment of pneumonia, non-traumatic rhabdomyolysis:  Antibiotics, Solu-Medrol, DuoNebs. IVF and monitor CK.     2/17 Internal Medicine: Slightly improved, however, still with some shortness of breath. Continue ceftriaxone, Solu-Medrol, switch to oral tomorrow. Wean nasal cannula O2. Metabolic acidosis, hold acetazolamide, D/C saline infusion. CK improved. PE: Normal breath sounds.     2/18 Internal Medicine: Patient was saturating at 98% 2 L nasal cannula oxygen yesterday morning however overnight and was placed on nonrebreather.  Patient has WALE however is not compliant with CPAP so he did not wear the CPAP last night.  This morning patient very anxious, on 15 L nasal cannula oxygen said he cannot take deep breaths. CT chest showed lateral groundglass opacities consistent with viral and/or pneumonia as influenza/atypical pneumonia.  No PE.  Etiology likely pneumonia also anxiety. No suspicion for fluid overload however will give one time lasix 20 mg. Will transfer to Stepdown Level 2.  PE: AOx3. Tachycardia. Rhonchi. The patient will continue to require additional inpatient hospital stay due to acute respiratory failure .  Critical Care consult: Acute hypoxic respiratory failure requiring high flow oxygen.  Influenza A pneumonia. Possible superimposed bacterial pneumonia. LUIS ALBERTO on CKD. Currently on HFNC, wean as tolerated. Give one dose of IV Lasix now.  Switch to IV Solu-Medrol, continue Rocephin and Zithromax. Given elevated procalcitonin. Start Tamiflu.  Continue Xopenex and Atrovent. Repeat BMP later today. Keep NPO except for medications. PE: AOx3. Tachypnea, coarse breath sounds, inspiratory and expiratory wheezing.     2/19 Critical Care:  24 hour events:   Overnight had continued tachypnea and was placed on bipap with mild improvement. Pt still able to converse and no increasing hypoxia. Started on Precedex.  Diuresed with 40 mg IV lasix. Remains high risk for intubation. Continue Solu-Medrol, increase to  Q8H dosing, continue ceftriaxone and azithromycin, Xopenex and atrovent. Maintain BiPap for now, wean FiO2 to maintain SpO2 >94%. Follow up urine antigens and sputum culture, trend BMP.  PE: AOx3. Tachypnea.          ED Triage Vitals   Temperature Pulse Respirations Blood Pressure SpO2   02/16/24 1344 02/16/24 1344 02/16/24 1344 02/16/24 1344 02/16/24 1344   98.5 °F (36.9 °C) 98 18 106/70 97 %      Temp Source Heart Rate Source Patient Position - Orthostatic VS BP Location FiO2 (%)   02/16/24 1812 02/16/24 1631 02/17/24 0630 02/16/24 2006 02/17/24 2352   Tympanic Monitor Lying Left arm (S) 55      Pain Score       02/16/24 1704       7          Wt Readings from Last 1 Encounters:   02/19/24 92.1 kg (203 lb)     Additional Vital Signs:         Date/Time Temp Pulse Resp BP MAP (mmHg) SpO2 FiO2 (%) Calculated FIO2 (%) - Nasal Cannula O2 Flow Rate (L/min) Nasal Cannula O2 Flow Rate (L/min) O2 Device O2 Interface Device   02/19/24 1100 -- 61 52 Abnormal  127/83 101 94 % -- -- -- -- -- --   02/19/24 1027 96.5 °F (35.8 °C) Abnormal  59 45 Abnormal  121/79 96 92 % -- -- -- -- -- --   02/19/24 1000 -- 57 47 Abnormal  120/78 94 92 % -- -- -- -- -- --   02/19/24 0945 -- 76 -- 122/71 -- -- -- -- -- -- -- --   02/19/24 0757 -- -- -- -- -- 95 % -- -- -- -- -- --   02/19/24 0743 -- -- -- -- -- 95 % 80 -- -- -- BiPAP Face mask   02/19/24 0700 96.4 °F (35.8 °C) Abnormal  54 Abnormal  41 Abnormal  149/88 114 94 % -- -- -- -- -- --   02/19/24 0600 -- 56 45 Abnormal  144/85 110 93 % -- -- -- -- -- --   02/19/24 0500 -- 60 51 Abnormal  148/92 115 94 % -- -- -- -- -- --   02/19/24 0400 98.2 °F (36.8 °C) 63 49 Abnormal  139/85 107 -- -- -- -- -- -- --   02/19/24 0241 -- -- -- -- -- 95 % 80 -- -- -- BiPAP Face mask   02/19/24 0211 -- -- -- -- -- 94 % 80 -- -- -- BiPAP Full face mask   02/19/24 0000 -- -- -- -- -- 99 % 100 -- -- -- -- --   02/18/24 2330 -- -- -- -- -- 98 % -- -- -- -- -- Face mask   02/18/24 2300 98.5 °F (36.9 °C) 76 54  Abnormal   122/71 92 94 % -- -- -- -- -- --   02/18/24 2200 -- 80 52 Abnormal  104/61 77 96 % -- -- -- -- -- --   02/18/24 2100 -- 98 58 Abnormal  101/65 78 93 % -- -- -- -- -- --   02/18/24 2000 -- 91 42 Abnormal  133/83 103 95 % 100 -- -- -- -- --   02/18/24 1951 -- -- -- -- -- 94 % 100 -- 50 L/min -- High flow nasal cannula HFNC prongs   02/18/24 1600 -- 99 29 Abnormal  146/86 108 96 % 100 -- 50 L/min -- High flow nasal cannula --       Date/Time Temp Pulse Resp BP MAP (mmHg) SpO2 FiO2 (%) Calculated FIO2 (%) - Nasal Cannula O2 Flow Rate (L/min) Nasal Cannula O2 Flow Rate (L/min) O2 Device O2 Interface Device   02/18/24 1536 98.5 °F (36.9 °C) -- -- -- -- -- -- -- -- -- -- --   02/18/24 1500 -- 104 37 Abnormal  134/84 103 94 % -- -- -- -- -- --   02/18/24 1400 98.2 °F (36.8 °C) 102 28 Abnormal  125/87 102 96 % -- -- -- -- -- --   02/18/24 1354 -- -- -- -- -- 97 % 100 -- 50 L/min -- High flow nasal cannula --   02/18/24 1353 -- -- -- -- -- 96 % -- -- -- -- -- HFNC prongs   02/18/24 0900 -- -- -- -- -- 95 % -- 80 -- 15 L/min Mid flow nasal cannula  --   02/18/24 0802 -- -- -- -- -- 90 % -- -- -- -- -- --   02/18/24 0801 -- -- -- -- -- 91 % -- 44 -- 6 L/min Nasal cannula --   02/18/24 06:51:31 97.9 °F (36.6 °C) 96 16 136/85 102 93 % -- -- -- -- Nasal cannula --   02/18/24 0242 -- -- -- -- -- -- 45  -- 10 L/min  -- Venturi mask --   02/18/24 0102 -- -- -- -- -- 95 % 55 -- 14 L/min -- Venturi mask --   02/17/24 2352 -- -- -- -- -- 94 % 55  -- 14 L/min  -- Venturi mask --   02/17/24 2327 -- -- -- -- -- 90 % -- 44 -- 6 L/min  Nasal cannula --   02/17/24 2208 -- -- -- -- -- -- -- 32 -- 3 L/min Nasal cannula Full face mask   02/17/24 21:18:59 97.9 °F (36.6 °C) 103 -- 133/83 100 87 % Abnormal  -- -- -- -- -- --   02/17/24 2100 -- -- -- -- -- 87 % Abnormal   -- 32 -- 3 L/min Nasal cannula --   SpO2: pt talking to wife at 02/17/24 2100 02/17/24 1945 -- -- -- -- -- 92 % -- 32 -- 3 L/min Nasal cannula --   02/17/24 15:31:59  97.7 °F (36.5 °C) 117 Abnormal  20 116/75 89 91 % -- -- -- -- -- --   02/17/24 1500 -- -- -- -- -- 93 % -- -- -- -- -- --   02/17/24 0900 -- -- -- -- -- -- -- 28 -- 2 L/min Nasal cannula --   02/17/24 0700 -- -- -- -- -- 98 % -- -- -- -- -- --   02/17/24 06:30:49 97.9 °F (36.6 °C) 98 20 110/67 81 96 % -- 28 -- 2 L/min Nasal cannula --   02/17/24 0141 -- -- -- -- -- 93 % -- 28 -- 2 L/min Nasal cannula --   02/16/24 2200 -- -- -- -- -- 95 % -- -- -- -- -- Full face mask   02/16/24 2100 -- -- -- -- -- 93 % -- 28 -- 2 L/min Nasal cannula --   02/16/24 2011 -- -- -- -- -- 95 % -- 28 -- 2 L/min Nasal cannula --   02/16/24 20:06:56 98.2 °F (36.8 °C) 104 -- 118/63 81 95 % -- -- -- -- Nasal cannula --   02/16/24 1812 98.3 °F (36.8 °C) 96 22 109/70 83 -- -- -- -- -- -- --   02/16/24 18:03:13 -- 98 -- 109/70 83 99 % -- -- -- -- -- --   02/16/24 1631 -- 96 -- -- -- -- -- -- -- -- -- --   02/16/24 1628 -- -- -- -- -- -- -- -- -- -- Nasal cannula --   02/16/24 1600 -- 103 18 107/73 87 98 % -- -- -- -- -- --           Pertinent Labs/Diagnostic Test Results:         XR chest portable ICU   Final Result by Nelida Kern MD (02/19 1144)      No change in multifocal bilateral consolidation.               Workstation performed: [id number]         CTA chest pe study   Final Result by Sukhjinder Eid MD (02/18 0304)      No evidence for pulmonary embolus.      Extensive bilateral groundglass and consolidative opacities with predominantly perihilar and peripheral distribution compatible with viral and/or atypical pneumonia, likely influenza given clinical history. Recommend clinical correlation and interval    follow-up to resolution.            Workstation performed: [id number]         XR chest portable ICU    (Results Pending)        2/16 EKG:    Normal sinus rhythm  Possible Left atrial enlargement  Borderline ECG  When compared with ECG of 10-MAGDALENE-2020 20:48,  No significant change was found    Results from last 7 days   Lab Units  02/19/24  1130 02/19/24 0250 02/18/24 0348 02/17/24 0444 02/16/24  1349   WBC Thousand/uL  --  7.40 8.06 4.76 6.21   HEMOGLOBIN g/dL  --  13.9 13.2 12.9 14.8   I STAT HEMOGLOBIN g/dl 13.3  --   --   --   --    HEMATOCRIT %  --  42.2 39.9 40.2 45.3   HEMATOCRIT, ISTAT % 39  --   --   --   --    PLATELETS Thousands/uL  --  206 175 129* 146*   NEUTROS ABS Thousands/µL  --   --   --   --  5.29         Results from last 7 days   Lab Units 02/19/24 1130 02/19/24 0250 02/19/24 0031 02/18/24 1943 02/18/24 0348 02/17/24 0444   SODIUM mmol/L  --  142 141 140 141 141   POTASSIUM mmol/L  --  4.3 4.1 5.5* 3.6 4.0   CHLORIDE mmol/L  --  112* 112* 112* 116* 117*   CO2 mmol/L  --  21 19* 18* 17* 15*   CO2, I-STAT mmol/L 22  --   --   --   --   --    ANION GAP mmol/L  --  9 10 10 8 9   BUN mg/dL  --  28* 27* 25 21 22   CREATININE mg/dL  --  0.90 0.90 0.98 0.88 1.00   EGFR ml/min/1.73sq m  --  95 95 85 96 83   CALCIUM mg/dL  --  8.4 8.5 8.4 8.5 8.2*   CALCIUM, IONIZED, ISTAT mmol/L 1.17  --   --   --   --   --    MAGNESIUM mg/dL  --  2.1  --  2.1  --   --    PHOSPHORUS mg/dL  --  3.9  --   --   --   --      Results from last 7 days   Lab Units 02/16/24  1349   AST U/L 80*   ALT U/L 78*   ALK PHOS U/L 86   TOTAL PROTEIN g/dL 6.7   ALBUMIN g/dL 3.8   TOTAL BILIRUBIN mg/dL 1.01*         Results from last 7 days   Lab Units 02/19/24 0250 02/19/24 0031 02/18/24 1943 02/18/24  0348 02/17/24  0444 02/16/24  1349   GLUCOSE RANDOM mg/dL 158* 139 110 132 173* 94       Results from last 7 days   Lab Units 02/19/24  1130   I STAT BASE EXC mmol/L -3*   I STAT O2 SAT % 95*   ISTAT PH ART  7.423   I STAT ART PCO2 mm HG 32.2*   I STAT ART PO2 mm HG 74.0*   I STAT ART HCO3 mmol/L 21.1*     Results from last 7 days   Lab Units 02/19/24  0250 02/18/24  0348 02/17/24  0444   CK TOTAL U/L 321* 556* 775*     Results from last 7 days   Lab Units 02/16/24  1614 02/16/24  1349   HS TNI 0HR ng/L  --  14   HS TNI 2HR ng/L 14  --    HSTNI D2 ng/L 0   --                  Results from last 7 days   Lab Units 02/19/24  0250 02/17/24  0444 02/16/24  1614   PROCALCITONIN ng/ml 0.31* 0.44* 0.51*     Results from last 7 days   Lab Units 02/19/24  0250 02/16/24  1614   LACTIC ACID mmol/L 1.0 0.8             Results from last 7 days   Lab Units 02/16/24  1349   BNP pg/mL 21               Results from last 7 days   Lab Units 02/19/24  0302 02/19/24  0249 02/16/24  1614   BLOOD CULTURE  Received in Microbiology Lab. Culture in Progress. Received in Microbiology Lab. Culture in Progress. No Growth at 48 hrs.  No Growth at 48 hrs.                   ED Treatment:   Medication Administration from 02/16/2024 1338 to 02/16/2024 1749         Date/Time Order Dose Route Action     02/16/2024 1559 EST sodium chloride 0.9 % bolus 1,000 mL 1,000 mL Intravenous New Bag     02/16/2024 1704 EST cefTRIAXone (ROCEPHIN) IVPB (premix in dextrose) 2,000 mg 50 mL 0 mg Intravenous Stopped     02/16/2024 1634 EST cefTRIAXone (ROCEPHIN) IVPB (premix in dextrose) 2,000 mg 50 mL 2,000 mg Intravenous New Bag          Past Medical History:   Diagnosis Date    Chronic back pain     Migraine      Present on Admission:   WALE (obstructive sleep apnea)   Non-traumatic rhabdomyolysis   (Resolved) Chronic kidney disease   Pneumonia   Metabolic acidosis   Influenza A   Insomnia   Migraine      Admitting Diagnosis: Rhabdomyolysis [M62.82]  Influenza [J11.1]  Pneumonia [J18.9]  LUIS ALBERTO (acute kidney injury) (HCC) [N17.9]  Flu-like symptoms [R68.89]  Age/Sex: 58 y.o. male      Admission Orders: HFNC, keep SpO2 above 88%, SCD.       Scheduled Medications:  azithromycin, 500 mg, Oral, Q24H  cefTRIAXone, 1,000 mg, Intravenous, Q24H  guaiFENesin, 600 mg, Oral, BID  heparin (porcine), 5,000 Units, Subcutaneous, Q8H YEHUDA  ipratropium, 0.5 mg, Nebulization, Q6H  levalbuterol, 1.25 mg, Nebulization, Q6H  magnesium Oxide, 400 mg, Oral, Daily  predniSONE, 40 mg, Oral, Daily  rimegepant sulfate, 75 mg, Oral, Every Other  Day  tamsulosin, 0.4 mg, Oral, Daily With Dinner    methylPREDNISolone sodium succinate (Solu-MEDROL) injection 125 mg  Dose: 125 mg  Freq: Once Route: IV  Start: 02/19/24 0945 End: 02/19/24 0946     methylPREDNISolone sodium succinate (Solu-MEDROL) injection 40 mg  Dose: 40 mg  Freq: Every 12 hours scheduled Route: IV  Start: 02/18/24 2100     predniSONE tablet 40 mg  Dose: 40 mg  Freq: Daily Route: PO  Start: 02/18/24 0900 End: 02/18/24 1635     methylPREDNISolone sodium succinate (Solu-MEDROL) injection 20 mg  Dose: 20 mg  Freq: Every 8 hours scheduled Route: IV  Start: 02/16/24 1730 End: 02/17/24 1416     furosemide (LASIX) injection 40 mg  Dose: 40 mg  Freq: Once Route: IV  Start: 02/19/24 0900 End: 02/19/24 0900       furosemide (LASIX) injection 20 mg  Dose: 20 mg  Freq: Once Route: IV  Start: 02/18/24 1230 End: 02/18/24 1303         Continuous IV Infusions:        dexmedeTOMIDine (Precedex) 400 mcg in sodium chloride 0.9% 100 mL  Rate: 2.3-16.1 mL/hr Dose: 0.1-0.7 mcg/kg/hr  Weight Dosing Info: 92.1 kg  Freq: Titrated Route: IV  Last Dose: Stopped (02/19/24 1241)  Start: 02/18/24 1915       multi-electrolyte (PLASMALYTE-A/ISOLYTE-S PH 7.4) IV solution  Rate: 75 mL/hr Dose: 75 mL/hr  Freq: Continuous Route: IV  Last Dose: Stopped (02/17/24 0744)  Start: 02/16/24 1730 End: 02/17/24 0740         PRN Meds:  acetaminophen, 650 mg, Oral, Q6H PRN  benzonatate, 200 mg, Oral, TID PRN  Daridorexant HCl, 25 mg, Oral, HS PRN  sodium chloride, 1 spray, Each Nare, Q1H PRN            Network Utilization Review Department  ATTENTION: Please call with any questions or concerns to [phone] and carefully listen to the prompts so that you are directed to the right person. All voicemails are confidential.   For Discharge needs, contact Care Management DC Support Team at [phone] opt. [phone]  Send all requests for admission clinical reviews, approved or denied determinations and any other requests to dedicated fax number below  belonging to the campus where the patient is receiving treatment. List of dedicated fax numbers for the Facilities:  FACILITY NAME UR FAX NUMBER   ADMISSION DENIALS (Administrative/Medical Necessity) [phone]   DISCHARGE SUPPORT TEAM (NETWORK) [phone]   PARENT CHILD HEALTH (Maternity/NICU/Pediatrics) [phone]   [facility] [phone]   [facility] [phone]   [facility] [phone]   [facility] [phone]   [facility] [phone]   [facility] [phone]   [facility] [phone]   [facility] [phone]   [facility] [phone]   [facility] [phone]   [facility] [phone]   [facility] [phone]

## 2024-02-18 NOTE — CONSULTS
Acceptance Note - Critical Care   Hussein Edward III 58 y.o. male MRN: [id number]  Unit/Bed#: -01 SDU Encounter: [id number]    Code Status: Level 1 - Full Code  POA:      Reason for Admission / Principal Problem: Pneumonia    Assessment   Acute hypoxic respiratory failure requiring high flow oxygen   Influenza A pneumonia  Possible superimposed bacterial pneumonia  LUIS ALBERTO on CKD  Metabolic acidosis - likely from LUIS ALBERTO +/- Rhabdo  Nontraumatic rhabdomyolysis   Meningioma  WALE on CPAP  Insomnia on Daridorexant   Migraine headaches on diamox, nurtek  BPH on flomax    Plan:  Neuro:  Continue daridorexant for insomnia.  Can consider addition of melatonin.  Continue nurtek for migaines.  Hold diamox for now.    CV: Hemodynamically stable at this time.  Gave 1 dose of lasix now.  Follow I/O and urine output.  May need to consider another dose.    Lung: Currently on HFNC.  Wean down as tolerated. Diuresis as above.  Although out of typical window, would consider starting Tamiflu in hope this will decrease severity of infection.   Continue Xopenex and Atrovent. Due to severity of infiltrates, would switch to IV solumedrol in place of PO prednisone to help with inflammation.  I agree to continue Rocephin and Zithromax for possible superimposed infection. Check strep, legionella urine antigens and sputum culture.  Renal/: Given lasix.  Follow urine output.  Continue Flomax.  Renal function has improved.  Repeat BMP after lasix later today.  Follow CK.    GI: Keep NPO for now except for medications.  ID: Continue Rocephin and Zithromax given elevated procalcitonin.  Start Tamiflu.  Await cultures.    Heme: Continue Heparin prophylaxis.    Endo: No acute issues.  Follow BMP daily.      Disposition:     CC time for this patient is 45 minutes not including time for procedures documented elsewhere or time spent teaching.    HPI: Hussein Edward III is a 58 y.o. male who presents to with a PMH of meningioma, insomnia, WALE, who  presents with shortness of breath.  He states that he went to ED at [place] [facility] for headache and viral syndrome and was diagnosed with influenza A and discharged home without any specific treatment.  He states that due to worsening dyspnea, cough, chest tightness, he decided to come to a different hospital.  He was then admitted to the hospitalist service for Influenza A treatment and LUIS ALBERTO.  He was given IV fluids which improved LUIS ALBERTO but he also noted progressive hypoxia with escalation of oxygen requirement.  He was on 15L and tachypneic to 30 bpm and was brought to Stepdown level 1 for high flow oxygen and management of influenza.       History obtained from chart review and some from patient but he could not provide much history as he was noting significant shortness of breath.    PMH:   Past Medical History:   Diagnosis Date    Chronic back pain     Migraine        PSH:   Past Surgical History:   Procedure Laterality Date    HERNIA REPAIR      MANDIBLE FRACTURE SURGERY      MOUTH SURGERY      cyst in cheek    NASAL SEPTUM SURGERY         Family History:   Family History   Problem Relation Age of Onset    Breast cancer Mother     Diabetes Father     No Known Problems Family     Substance Abuse Neg Hx     Mental illness Neg Hx        Social History:   Social History     Tobacco Use   Smoking Status Never   Smokeless Tobacco Former    Types: Snuff    Quit date: 2003      Social History     Substance and Sexual Activity   Alcohol Use Never      Marital Status: /Civil Union    ROS: 14-point ROS is negative and/or as stated in the HPI.   Allergies: No Known Allergies    Home Medications:   Prior to Admission medications    Medication Sig Start Date End Date Taking? Authorizing Provider   acetaZOLAMIDE (DIAMOX) 500 mg capsule Take 1 capsule (500 mg total) by mouth 2 (two) times a day 11/7/23  Yes Laxmi Camargo MD   benzonatate (TESSALON) 200 MG capsule Take 1 capsule by mouth 3 (three) times a day as needed    Yes Historical Provider, MD   Daridorexant HCl 25 MG TABS Take 25 mg by mouth daily at bedtime 24  Yes ROSALINO Stevens   magnesium oxide (MAG-OX) 400 mg Take 1 tablet (400 mg total) by mouth daily Nightly 21  Yes Laxmi Camargo MD   Rimegepant Sulfate (Nurtec) 75 MG TBDP Take one NURTEC 75 mg under tongue every other day. Limit 1 in 24 hours 3/14/23  Yes Laxmi Camargo MD   tamsulosin (FLOMAX) 0.4 mg Take 1 capsule (0.4 mg total) by mouth daily with dinner 24  Yes Leann Eugene PA-C   eszopiclone (LUNESTA) 1 mg tablet Take 1 tablet (1 mg total) by mouth daily at bedtime as needed for sleep for up to 14 days Take immediately before bedtime 23  Marlon Garcia MD   levocetirizine (XYZAL) 5 MG tablet Take 1 tablet by mouth every evening  Patient not taking: Reported on 2024    Historical Provider, MD   Magnesium Oxide 400 MG CAPS Take 1 tablet by mouth in the morning  Patient not taking: Reported on 2024    Historical Provider, MD   methocarbamol (ROBAXIN) 750 mg tablet methocarbamol 750 mg tablet   take 1 tablet by mouth every 8 hours if needed    Historical Provider, MD   diclofenac sodium (VOLTAREN) 50 mg EC tablet Take 1 tablet(s) twice a day by oral route as needed. 19  Historical Provider, MD       Invasive lines and devices:  Invasive Devices       Peripheral Intravenous Line  Duration             Peripheral IV 24 Right Antecubital 2 days                    Vitals:   Vitals:    24 1400 24 1500 24 1536 24 1600   BP: 125/87 134/84     BP Location: Left arm      Pulse: 102 104     Resp: (!) 28 (!) 37     Temp: 98.2 °F (36.8 °C)  98.5 °F (36.9 °C)    TempSrc: Oral  Oral    SpO2: 96% 94%  96%   Weight:         SpO2: SpO2: 96 % on HFNC 50L 100%    Temperature: Temp (24hrs), Av.1 °F (36.7 °C), Min:97.9 °F (36.6 °C), Max:98.5 °F (36.9 °C)  Current: Temperature: 98.5 °F (36.9 °C)    Weights:    Body mass index is 31.79  "kg/m².    VTE Pharmacologic Prophylaxis: Heparin  VTE Mechanical Prophylaxis: sequential compression device    Invasive/non-invasive ventilation settings:   Respiratory      Lab Data (Last 4 hours)      None           O2/Vent Data (Last 4 hours)        02/18 1353          Non-Invasive Ventilation Mode HFNC (High flow)                       Physical Exam  General: Anxious, tachypneic, Awake alert and oriented x 3, conversant with significant conversational dyspnea, NAD, normal affect  HEENT:  PERRL, Sclera noninjected, nonicteric OU, Nares patent,  no craniofacial abnormalities, Mucous membranes, moist, no oral lesions, normal dentition  NECK: Trachea midline, no accessory muscle use, no stridor, no cervical or supraclavicular adenopathy, JVP not elevated  CARDIAC: Reg, single s1/S2, no m/r/g  PULM: tachypnea, Coarse breath sounds, inspiratory and expiratory wheezing.    ABD: Normoactive bowel sounds, soft nontender, nondistended, no rebound, no rigidity, no guarding  EXT: No cyanosis, no clubbing, no edema, normal capillary refill  NEURO: no focal neurologic deficits, AAOx3, moving all extremities appropriately        Labs:   Results from last 7 days   Lab Units 02/18/24 0348 02/17/24  0444 02/16/24  1349   WBC Thousand/uL 8.06 4.76 6.21   HEMOGLOBIN g/dL 13.2 12.9 14.8   HEMATOCRIT % 39.9 40.2 45.3   PLATELETS Thousands/uL 175 129* 146*   NEUTROS PCT %  --   --  84*   MONOS PCT %  --   --  4   EOS PCT %  --   --  0     Results from last 7 days   Lab Units 02/18/24 0348 02/17/24  0444 02/16/24  1349   POTASSIUM mmol/L 3.6 4.0 3.8   CHLORIDE mmol/L 116* 117* 112*   CO2 mmol/L 17* 15* 19*   BUN mg/dL 21 22 28*   CREATININE mg/dL 0.88 1.00 1.47*   CALCIUM mg/dL 8.5 8.2* 8.9   ALK PHOS U/L  --   --  86   ALT U/L  --   --  78*   AST U/L  --   --  80*             No results found for: \"TROPONINT\"  ABG:No results found for: \"PHART\", \"DGR1AEB\", \"PO2ART\", \"FVR2AIC\", \"B4FXXWYQ\", \"BEART\", \"SOURCE\"    Imaging: CTA chest " "  IMPRESSION:  No evidence for pulmonary embolus.  Extensive bilateral groundglass and consolidative opacities with predominantly perihilar and peripheral distribution compatible with viral and/or atypical pneumonia, likely influenza given clinical history. Recommend clinical correlation and interval   follow-up to resolution.    I have personally reviewed pertinent reports.    EKG:         Narrative & Impression    Normal sinus rhythm  Possible Left atrial enlargement  Borderline ECG  When compared with ECG of 10-MAGDALENE-2020 20:48,  No significant change was found  Confirmed by Tylro Rey ([id number]) on 2/16/2024 5:04:02 PM          This was personally reviewed by myself.   Micro:  Blood Culture:   Lab Results   Component Value Date    BLOODCX No Growth at 24 hrs. 02/16/2024    BLOODCX No Growth at 24 hrs. 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

## 2024-02-18 NOTE — PROGRESS NOTES
[facility]  Progress Note  Name: Hussein Edward III I  MRN: [id number]  Unit/Bed#: MS 212Len I Date of Admission: 2/16/2024   Date of Service: 2/18/2024 I Hospital Day: 0    Assessment/Plan   * Pneumonia  Assessment & Plan  Patient reported upper respiratory symptoms a week ago.  Stated he was diagnosed with influenza A 4 days ago.  Resented to urgent care again on Wednesday because of shortness of breath however he received fluids some medication and he was sent home.  [facility] and was told to go to emergency department  Patient on nasal cannula oxygen with 2 L, saturating 98%.  No documentation about any hypoxia since presented to the hospital  Chest x-ray showed bilateral hazy opacities basal per my reading  Procalcitonin mildly elevated  Patient does not meet criteria for sepsis  Was started on ceftriaxone     Plan:  Continue ceftriaxone; add azithromycin   Continue prednisone 40 mg daily   DuoNebs  Mucinex and Tessalon Perles as needed  Wean off nasal cannula oxygen.      Acute respiratory failure with hypoxia (HCC)  Assessment & Plan  Saturating 98% 2 L nasal cannula oxygen yesterday morning however overnight and was placed on nonrebreather.  Patient has WALE however is not compliant with CPAP so he did not wear the CPAP last night  This morning examined the patient.  Patient very anxious, on 15 L nasal cannula oxygen said he cannot take deep breaths.  CT chest showed lateral groundglass opacities consistent with viral and/or pneumonia as influenza/atypical pneumonia.  No PE  Etiology likely pneumonia also anxiety. No suspicion for fluid overload however will give one time lasix 20 mg   Discussed with CC   Will transfer SD 2    Metabolic acidosis  Assessment & Plan  CO2 15; anion gap 9  Likely secondary to acetazolamide use, diarrhea and saline infusion  Continue to hold acetazolamide  Improved     Chronic kidney disease  Assessment & Plan  Lab  Results   Component Value Date    EGFR 96 02/18/2024    EGFR 83 02/17/2024    EGFR 52 02/16/2024    CREATININE 0.88 02/18/2024    CREATININE 1.00 02/17/2024    CREATININE 1.47 (H) 02/16/2024       Creatinine 0.8-1.1  Does not meet criteria for acute kidney injury  Elevated creatinine likely hypovolemia.  Patient reported poor oral intake lately also he had diarrhea  Avoid hypotension and nephrotoxins  Monitor    Non-traumatic rhabdomyolysis  Assessment & Plan  CK elevated.  Patient denied any trauma.  Improved    Meningioma (HCC)  Assessment & Plan  History of meningioma  On Acetazolamide- will hold 2/2 metabolic acidosis     WALE (obstructive sleep apnea)  Assessment & Plan  Continue CPAP at bedtime- noncompliant          VTE Pharmacologic Prophylaxis: VTE Score: 2 Moderate Risk (Score 3-4) - Pharmacological DVT Prophylaxis Ordered: heparin.    Mobility:   Basic Mobility Inpatient Raw Score: 23  JH-HLM Goal: 7: Walk 25 feet or more  JH-HLM Achieved: 6: Walk 10 steps or more  HLM Goal achieved. Continue to encourage appropriate mobility.    Patient Centered Rounds: I performed bedside rounds with nursing staff today.   Discussions with Specialists or Other Care Team Provider:  critical care     Education and Discussions with Family / Patient: Patient declined call to .     Total Time Spent on Date of Encounter in care of patient: 50 mins. This time was spent on one or more of the following: performing physical exam; counseling and coordination of care; obtaining or reviewing history; documenting in the medical record; reviewing/ordering tests, medications or procedures; communicating with other healthcare professionals and discussing with patient's family/caregivers.    Current Length of Stay: 0 day(s)  Current Patient Status: Inpatient   Certification Statement: The patient will continue to require additional inpatient hospital stay due to acute respiratory failure   Discharge Plan: Anticipate  discharge in 48-72 hrs to home.    Code Status: Level 1 - Full Code    Subjective:     Feeling short or breath and that he can not take a full breath. No cp    Objective:     Vitals:   Temp (24hrs), Av.8 °F (36.6 °C), Min:97.7 °F (36.5 °C), Max:97.9 °F (36.6 °C)    Temp:  [97.7 °F (36.5 °C)-97.9 °F (36.6 °C)] 97.9 °F (36.6 °C)  HR:  [] 96  Resp:  [16-20] 16  BP: (116-136)/(75-85) 136/85  SpO2:  [87 %-95 %] 95 %  Body mass index is 31.79 kg/m².     Input and Output Summary (last 24 hours):   No intake or output data in the 24 hours ending 24 1317    Physical Exam:   Physical Exam  Vitals and nursing note reviewed.   Constitutional:       General: He is not in acute distress.     Appearance: He is not diaphoretic.   HENT:      Head: Normocephalic.   Eyes:      General: No scleral icterus.        Right eye: No discharge.         Left eye: No discharge.   Cardiovascular:      Rate and Rhythm: Regular rhythm. Tachycardia present.   Pulmonary:      Effort: No respiratory distress.      Breath sounds: Rhonchi present. No wheezing or rales.   Abdominal:      General: There is no distension.      Palpations: Abdomen is soft.      Tenderness: There is no abdominal tenderness. There is no guarding or rebound.   Musculoskeletal:      Cervical back: Normal range of motion.      Right lower leg: No edema.      Left lower leg: No edema.   Skin:     General: Skin is warm.   Neurological:      Mental Status: He is alert and oriented to person, place, and time.   Psychiatric:         Mood and Affect: Mood normal.         Behavior: Behavior normal.         Thought Content: Thought content normal.         Judgment: Judgment normal.          Additional Data:     Labs:  Results from last 7 days   Lab Units 24  0348 24  0444 24  1349   WBC Thousand/uL 8.06   < > 6.21   HEMOGLOBIN g/dL 13.2   < > 14.8   HEMATOCRIT % 39.9   < > 45.3   PLATELETS Thousands/uL 175   < > 146*   NEUTROS PCT %  --   --  84*    LYMPHS PCT %  --   --  11*   MONOS PCT %  --   --  4   EOS PCT %  --   --  0    < > = values in this interval not displayed.     Results from last 7 days   Lab Units 02/18/24  0348 02/17/24  0444 02/16/24  1349   SODIUM mmol/L 141   < > 142   POTASSIUM mmol/L 3.6   < > 3.8   CHLORIDE mmol/L 116*   < > 112*   CO2 mmol/L 17*   < > 19*   BUN mg/dL 21   < > 28*   CREATININE mg/dL 0.88   < > 1.47*   ANION GAP mmol/L 8   < > 11   CALCIUM mg/dL 8.5   < > 8.9   ALBUMIN g/dL  --   --  3.8   TOTAL BILIRUBIN mg/dL  --   --  1.01*   ALK PHOS U/L  --   --  86   ALT U/L  --   --  78*   AST U/L  --   --  80*   GLUCOSE RANDOM mg/dL 132   < > 94    < > = values in this interval not displayed.                 Results from last 7 days   Lab Units 02/17/24  0444 02/16/24  1614   LACTIC ACID mmol/L  --  0.8   PROCALCITONIN ng/ml 0.44* 0.51*       Lines/Drains:  Invasive Devices       Peripheral Intravenous Line  Duration             Peripheral IV 02/16/24 Right Antecubital 1 day                          Imaging: Reviewed radiology reports from this admission including: chest CT scan    Recent Cultures (last 7 days):   Results from last 7 days   Lab Units 02/16/24  1614   BLOOD CULTURE  No Growth at 24 hrs.  No Growth at 24 hrs.       Last 24 Hours Medication List:   Current Facility-Administered Medications   Medication Dose Route Frequency Provider Last Rate    acetaminophen  650 mg Oral Q6H PRN Annmarie Barker MD      azithromycin  500 mg Oral Q24H Annmarie Barker MD      benzonatate  200 mg Oral TID PRN Annmarie Barker MD      cefTRIAXone  1,000 mg Intravenous Q24H Annmarie Barker MD 1,000 mg (02/17/24 1515)    Daridorexant HCl  25 mg Oral HS PRN Annmarie Barker MD      guaiFENesin  600 mg Oral BID Annmarie Barker MD      heparin (porcine)  5,000 Units Subcutaneous Q8H Pending sale to [facility] Annmarie Barker MD      ipratropium  0.5 mg Nebulization Q6H Annmarie Barker MD      levalbuterol  1.25 mg Nebulization  Q6H Annmarie Barker MD      magnesium Oxide  400 mg Oral Daily Annmarie Barker MD      predniSONE  40 mg Oral Daily Annmarie Barker MD      rimegepant sulfate  75 mg Oral Every Other Day Annmarie Barker MD      sodium chloride  1 spray Each Nare Q1H PRN Ada Lima MD      tamsulosin  0.4 mg Oral Daily With Dinner Annmarie Barker MD          Today, Patient Was Seen By: Annmarie Barker MD    **Please Note: This note may have been constructed using a voice recognition system.**

## 2024-02-18 NOTE — ASSESSMENT & PLAN NOTE
CO2 15; anion gap 9  Likely secondary to acetazolamide use, diarrhea and saline infusion  Continue to hold acetazolamide  Improved

## 2024-02-18 NOTE — ASSESSMENT & PLAN NOTE
Saturating 98% 2 L nasal cannula oxygen yesterday morning however overnight and was placed on nonrebreather.  Patient has WALE however is not compliant with CPAP so he did not wear the CPAP last night  This morning examined the patient.  Patient very anxious, on 15 L nasal cannula oxygen said he cannot take deep breaths.  CT chest showed lateral groundglass opacities consistent with viral and/or pneumonia as influenza/atypical pneumonia.  No PE  Etiology likely pneumonia also some anxiety. No suspicion for fluid overload however will give one time lasix 20 mg   Discussed with CC   Will transfer SD 2

## 2024-02-18 NOTE — ASSESSMENT & PLAN NOTE
Patient reported upper respiratory symptoms a week ago.  Stated he was diagnosed with influenza A 4 days ago.  Resented to urgent care again on Wednesday because of shortness of breath however he received fluids some medication and he was sent home.  [facility] and was told to go to emergency department  Patient on nasal cannula oxygen with 2 L, saturating 98%.  No documentation about any hypoxia since presented to the hospital  Chest x-ray showed bilateral hazy opacities basal per my reading  Procalcitonin mildly elevated  Patient does not meet criteria for sepsis  Was started on ceftriaxone     Plan:  Continue ceftriaxone; add azithromycin   Continue prednisone 40 mg daily   DuoNebs  Mucinex and Tessalon Perles as needed  Wean off nasal cannula oxygen.

## 2024-02-18 NOTE — PLAN OF CARE
Problem: PAIN - ADULT  Goal: Verbalizes/displays adequate comfort level or baseline comfort level  Description: Interventions:  - Encourage patient to monitor pain and request assistance  - Assess pain using appropriate pain scale  - Administer analgesics based on type and severity of pain and evaluate response  - Implement non-pharmacological measures as appropriate and evaluate response  - Consider cultural and social influences on pain and pain management  - Notify physician/advanced practitioner if interventions unsuccessful or patient reports new pain  Outcome: Progressing     Problem: INFECTION - ADULT  Goal: Absence or prevention of progression during hospitalization  Description: INTERVENTIONS:  - Assess and monitor for signs and symptoms of infection  - Monitor lab/diagnostic results  - Monitor all insertion sites, i.e. indwelling lines, tubes, and drains  - Monitor endotracheal if appropriate and nasal secretions for changes in amount and color  - Girard appropriate cooling/warming therapies per order  - Administer medications as ordered  - Instruct and encourage patient and family to use good hand hygiene technique  - Identify and instruct in appropriate isolation precautions for identified infection/condition  Outcome: Progressing  Goal: Absence of fever/infection during neutropenic period  Description: INTERVENTIONS:  - Monitor WBC    Outcome: Progressing     Problem: DISCHARGE PLANNING  Goal: Discharge to home or other facility with appropriate resources  Description: INTERVENTIONS:  - Identify barriers to discharge w/patient and caregiver  - Arrange for needed discharge resources and transportation as appropriate  - Identify discharge learning needs (meds, wound care, etc.)  - Arrange for interpretive services to assist at discharge as needed  - Refer to Case Management Department for coordinating discharge planning if the patient needs post-hospital services based on physician/advanced  practitioner order or complex needs related to functional status, cognitive ability, or social support system  Outcome: Progressing     Problem: Knowledge Deficit  Goal: Patient/family/caregiver demonstrates understanding of disease process, treatment plan, medications, and discharge instructions  Description: Complete learning assessment and assess knowledge base.  Interventions:  - Provide teaching at level of understanding  - Provide teaching via preferred learning methods  Outcome: Progressing

## 2024-02-18 NOTE — ASSESSMENT & PLAN NOTE
Lab Results   Component Value Date    EGFR 96 02/18/2024    EGFR 83 02/17/2024    EGFR 52 02/16/2024    CREATININE 0.88 02/18/2024    CREATININE 1.00 02/17/2024    CREATININE 1.47 (H) 02/16/2024       Creatinine 0.8-1.1  Does not meet criteria for acute kidney injury  Elevated creatinine likely hypovolemia.  Patient reported poor oral intake lately also he had diarrhea  Avoid hypotension and nephrotoxins  Monitor

## 2024-02-18 NOTE — PLAN OF CARE
Problem: PAIN - ADULT  Goal: Verbalizes/displays adequate comfort level or baseline comfort level  Description: Interventions:  - Encourage patient to monitor pain and request assistance  - Assess pain using appropriate pain scale  - Administer analgesics based on type and severity of pain and evaluate response  - Implement non-pharmacological measures as appropriate and evaluate response  - Consider cultural and social influences on pain and pain management  - Notify physician/advanced practitioner if interventions unsuccessful or patient reports new pain  Outcome: Progressing     Problem: INFECTION - ADULT  Goal: Absence or prevention of progression during hospitalization  Description: INTERVENTIONS:  - Assess and monitor for signs and symptoms of infection  - Monitor lab/diagnostic results  - Monitor all insertion sites, i.e. indwelling lines, tubes, and drains  - Monitor endotracheal if appropriate and nasal secretions for changes in amount and color  - [place] appropriate cooling/warming therapies per order  - Administer medications as ordered  - Instruct and encourage patient and family to use good hand hygiene technique  - Identify and instruct in appropriate isolation precautions for identified infection/condition  2/18/2024 1015 by Mala Balderas RN  Outcome: Progressing  Goal: Absence of fever/infection during neutropenic period  Description: INTERVENTIONS:  - Monitor WBC    2/18/2024 1015 by Mala Balderas RN  Outcome: Progressing       Problem: DISCHARGE PLANNING  Goal: Discharge to home or other facility with appropriate resources  Description: INTERVENTIONS:  - Identify barriers to discharge w/patient and caregiver  - Arrange for needed discharge resources and transportation as appropriate  - Identify discharge learning needs (meds, wound care, etc.)  - Arrange for interpretive services to assist at discharge as needed  - Refer to Case Management Department for coordinating discharge  planning if the patient needs post-hospital services based on physician/advanced practitioner order or complex needs related to functional status, cognitive ability, or social support system  2/18/2024 1015 by Mala Balderas RN  Outcome: Progressing    Problem: Knowledge Deficit  Goal: Patient/family/caregiver demonstrates understanding of disease process, treatment plan, medications, and discharge instructions  Description: Complete learning assessment and assess knowledge base.  Interventions:  - Provide teaching at level of understanding  - Provide teaching via preferred learning methods  2/18/2024 1015 by Mala Balderas RN  Outcome: Progressing       Problem: RESPIRATORY - ADULT  Goal: Achieves optimal ventilation and oxygenation  Description: INTERVENTIONS:  - Assess for changes in respiratory status  - Assess for changes in mentation and behavior  - Position to facilitate oxygenation and minimize respiratory effort  - Oxygen administered by appropriate delivery if ordered  - Initiate smoking cessation education as indicated  - Encourage broncho-pulmonary hygiene including cough, deep breathe, Incentive Spirometry  - Assess the need for suctioning and aspirate as needed  - Assess and instruct to report SOB or any respiratory difficulty  - Respiratory Therapy support as indicated  Outcome: Progressing

## 2024-02-18 NOTE — RESPIRATORY THERAPY NOTE
0815 Pt anxious upon RRT arrival. RR in high 30s, shallow, not able to finish sentence. NC liter flow increased to from 2 to 6. Spo2 92%. Bronchodilator tx was given as ordered. Plan: mid flow cannula ?    0900 Spo2 88% , mid flow 12 L initiated with spo2 still at 90%. Flow increased to 15 L  - spo2 93% . Asked pt to do IS, pt was able to take one deep breath of 700cc but stated it hurts and the following breaths were only 100-150cc despite encouragement. Dr. Barker made aware .    1200 spo2 continues to be low in 90-92% range . While sleeping spo2 improved to 95%. ICU team was notified of increased and continues oxygen requirements. Pt will be given lasix     1300 Spo2 consistently 88%     1400 Pt was transferred to ICU and placed on HFNC, report was given to ICU RRT

## 2024-02-18 NOTE — NURSING NOTE
Contacted provider around 23:30 on 2/17 bc pt complaining increase sob. Pt was noticeably anxious about his sob and was hyperventilating.Respiratory and RN was at bedside. RT and RN educated pt on breathing through nose and out through mouth with nc and to try to remain calm.  Provider came to access pt.

## 2024-02-18 NOTE — PROGRESS NOTES
"Patient seeming anxious upon nurse entering room. RR were 40. Breathing shallow. Instructed patient to take slower breaths in through his nose and out through his mouth. Asked patient sit upright instead on his side curled up. Patient states \" back hurts and I can not lay flat\". NC was placed at 6 liters after breathing tx was done. Patient Spo2 at 89%. Respiratory Therapist, Va Gonzalez, made aware.   "

## 2024-02-19 ENCOUNTER — APPOINTMENT (INPATIENT)
Dept: RADIOLOGY | Facility: HOSPITAL | Age: 59
DRG: 870 | End: 2024-02-19
Payer: COMMERCIAL

## 2024-02-19 ENCOUNTER — APPOINTMENT (OUTPATIENT)
Dept: RADIOLOGY | Facility: HOSPITAL | Age: 59
DRG: 208 | End: 2024-02-19
Payer: COMMERCIAL

## 2024-02-19 ENCOUNTER — ANESTHESIA EVENT (OUTPATIENT)
Dept: ANESTHESIOLOGY | Facility: HOSPITAL | Age: 59
DRG: 208 | End: 2024-02-19
Payer: COMMERCIAL

## 2024-02-19 ENCOUNTER — HOSPITAL ENCOUNTER (INPATIENT)
Facility: HOSPITAL | Age: 59
LOS: 24 days | DRG: 870 | End: 2024-03-14
Attending: INTERNAL MEDICINE | Admitting: INTERNAL MEDICINE
Payer: COMMERCIAL

## 2024-02-19 ENCOUNTER — ANESTHESIA (OUTPATIENT)
Dept: ANESTHESIOLOGY | Facility: HOSPITAL | Age: 59
DRG: 208 | End: 2024-02-19
Payer: COMMERCIAL

## 2024-02-19 ENCOUNTER — APPOINTMENT (INPATIENT)
Dept: GASTROENTEROLOGY | Facility: HOSPITAL | Age: 59
DRG: 870 | End: 2024-02-19
Payer: COMMERCIAL

## 2024-02-19 ENCOUNTER — APPOINTMENT (OUTPATIENT)
Dept: NON INVASIVE DIAGNOSTICS | Facility: HOSPITAL | Age: 59
DRG: 208 | End: 2024-02-19
Payer: COMMERCIAL

## 2024-02-19 VITALS
OXYGEN SATURATION: 95 % | WEIGHT: 203 LBS | HEART RATE: 113 BPM | DIASTOLIC BLOOD PRESSURE: 82 MMHG | HEIGHT: 65 IN | SYSTOLIC BLOOD PRESSURE: 134 MMHG | RESPIRATION RATE: 32 BRPM | BODY MASS INDEX: 33.82 KG/M2 | TEMPERATURE: 96.5 F

## 2024-02-19 DIAGNOSIS — R00.0 TACHYCARDIA: ICD-10-CM

## 2024-02-19 DIAGNOSIS — G47.33 OBSTRUCTIVE SLEEP APNEA (ADULT) (PEDIATRIC): ICD-10-CM

## 2024-02-19 DIAGNOSIS — K56.7 ILEUS (HCC): ICD-10-CM

## 2024-02-19 DIAGNOSIS — J30.2 SEASONAL ALLERGIC RHINITIS, UNSPECIFIED TRIGGER: ICD-10-CM

## 2024-02-19 DIAGNOSIS — J11.1 INFLUENZA: ICD-10-CM

## 2024-02-19 DIAGNOSIS — J96.01 ACUTE RESPIRATORY FAILURE WITH HYPOXIA (HCC): Primary | ICD-10-CM

## 2024-02-19 DIAGNOSIS — R78.81 BACTEREMIA: ICD-10-CM

## 2024-02-19 DIAGNOSIS — F41.9 ANXIETY: ICD-10-CM

## 2024-02-19 DIAGNOSIS — J80 ARDS (ADULT RESPIRATORY DISTRESS SYNDROME) (HCC): ICD-10-CM

## 2024-02-19 DIAGNOSIS — J34.2 NASAL SEPTAL DEVIATION: ICD-10-CM

## 2024-02-19 DIAGNOSIS — G93.41 ACUTE METABOLIC ENCEPHALOPATHY: ICD-10-CM

## 2024-02-19 PROBLEM — G43.909 MIGRAINE: Status: ACTIVE | Noted: 2024-02-19

## 2024-02-19 PROBLEM — R93.89 ABNORMAL CT OF THE CHEST: Status: ACTIVE | Noted: 2024-02-19

## 2024-02-19 PROBLEM — G47.00 INSOMNIA: Status: ACTIVE | Noted: 2024-02-19

## 2024-02-19 PROBLEM — N18.9 CHRONIC KIDNEY DISEASE: Status: RESOLVED | Noted: 2024-02-16 | Resolved: 2024-02-19

## 2024-02-19 PROBLEM — J10.1 INFLUENZA A: Status: ACTIVE | Noted: 2024-02-19

## 2024-02-19 PROBLEM — R93.89 ABNORMAL CT OF THE CHEST: Status: RESOLVED | Noted: 2024-02-19 | Resolved: 2024-02-19

## 2024-02-19 LAB
ALBUMIN SERPL BCP-MCNC: 3.2 G/DL (ref 3.5–5)
ALP SERPL-CCNC: 106 U/L (ref 34–104)
ALT SERPL W P-5'-P-CCNC: 59 U/L (ref 7–52)
ANION GAP SERPL CALCULATED.3IONS-SCNC: 10 MMOL/L
ANION GAP SERPL CALCULATED.3IONS-SCNC: 11 MMOL/L
ANION GAP SERPL CALCULATED.3IONS-SCNC: 9 MMOL/L
AORTIC ROOT: 3.5 CM
APICAL FOUR CHAMBER EJECTION FRACTION: 57 %
ASCENDING AORTA: 3.1 CM
AST SERPL W P-5'-P-CCNC: 49 U/L (ref 13–39)
BASE EX.OXY STD BLDV CALC-SCNC: 95.4 % (ref 60–80)
BASE EXCESS BLDA CALC-SCNC: -3 MMOL/L (ref -2–3)
BASE EXCESS BLDA CALC-SCNC: -3 MMOL/L (ref -2–3)
BASE EXCESS BLDA CALC-SCNC: -5.4 MMOL/L
BASE EXCESS BLDA CALC-SCNC: -7.6 MMOL/L
BASE EXCESS BLDV CALC-SCNC: -2.9 MMOL/L
BASOPHILS # BLD AUTO: 0.03 THOUSANDS/ÂΜL (ref 0–0.1)
BASOPHILS NFR BLD AUTO: 0 % (ref 0–1)
BILIRUB SERPL-MCNC: 1.77 MG/DL (ref 0.2–1)
BSA FOR ECHO PROCEDURE: 1.99 M2
BUN SERPL-MCNC: 27 MG/DL (ref 5–25)
BUN SERPL-MCNC: 28 MG/DL (ref 5–25)
BUN SERPL-MCNC: 38 MG/DL (ref 5–25)
CA-I BLD-SCNC: 1.16 MMOL/L (ref 1.12–1.32)
CA-I BLD-SCNC: 1.17 MMOL/L (ref 1.12–1.32)
CALCIUM ALBUM COR SERPL-MCNC: 8.7 MG/DL (ref 8.3–10.1)
CALCIUM SERPL-MCNC: 8.1 MG/DL (ref 8.4–10.2)
CALCIUM SERPL-MCNC: 8.4 MG/DL (ref 8.4–10.2)
CALCIUM SERPL-MCNC: 8.5 MG/DL (ref 8.4–10.2)
CHLORIDE SERPL-SCNC: 108 MMOL/L (ref 96–108)
CHLORIDE SERPL-SCNC: 112 MMOL/L (ref 96–108)
CHLORIDE SERPL-SCNC: 112 MMOL/L (ref 96–108)
CK SERPL-CCNC: 321 U/L (ref 39–308)
CO2 SERPL-SCNC: 19 MMOL/L (ref 21–32)
CO2 SERPL-SCNC: 21 MMOL/L (ref 21–32)
CO2 SERPL-SCNC: 24 MMOL/L (ref 21–32)
CREAT SERPL-MCNC: 0.9 MG/DL (ref 0.6–1.3)
CREAT SERPL-MCNC: 0.9 MG/DL (ref 0.6–1.3)
CREAT SERPL-MCNC: 1.12 MG/DL (ref 0.6–1.3)
CRP SERPL QL: 82.9 MG/L
DOP CALC LVOT AREA: 3.14 CM2
DOP CALC LVOT DIAMETER: 2 CM
E WAVE DECELERATION TIME: 204 MS
E/A RATIO: 0.7
EOSINOPHIL # BLD AUTO: 0 THOUSAND/ÂΜL (ref 0–0.61)
EOSINOPHIL NFR BLD AUTO: 0 % (ref 0–6)
ERYTHROCYTE [DISTWIDTH] IN BLOOD BY AUTOMATED COUNT: 13.1 % (ref 11.6–15.1)
ERYTHROCYTE [DISTWIDTH] IN BLOOD BY AUTOMATED COUNT: 13.4 % (ref 11.6–15.1)
FIO2 GAS DIL.REBREATH: 100 L
FIO2 GAS DIL.REBREATH: 80 L
FRACTIONAL SHORTENING: 39 (ref 28–44)
GFR SERPL CREATININE-BSD FRML MDRD: 73 ML/MIN/1.73SQ M
GFR SERPL CREATININE-BSD FRML MDRD: 95 ML/MIN/1.73SQ M
GFR SERPL CREATININE-BSD FRML MDRD: 95 ML/MIN/1.73SQ M
GLUCOSE P FAST SERPL-MCNC: 139 MG/DL (ref 65–99)
GLUCOSE P FAST SERPL-MCNC: 158 MG/DL (ref 65–99)
GLUCOSE SERPL-MCNC: 139 MG/DL (ref 65–140)
GLUCOSE SERPL-MCNC: 158 MG/DL (ref 65–140)
GLUCOSE SERPL-MCNC: 186 MG/DL (ref 65–140)
GLUCOSE SERPL-MCNC: 189 MG/DL (ref 65–140)
GLUCOSE SERPL-MCNC: 223 MG/DL (ref 65–140)
GLUCOSE SERPL-MCNC: 249 MG/DL (ref 65–140)
HCO3 BLDA-SCNC: 21.1 MMOL/L (ref 22–28)
HCO3 BLDA-SCNC: 22.2 MMOL/L (ref 22–28)
HCO3 BLDA-SCNC: 24.1 MMOL/L (ref 22–28)
HCO3 BLDA-SCNC: 26.7 MMOL/L (ref 22–28)
HCO3 BLDV-SCNC: 18.5 MMOL/L (ref 24–30)
HCT VFR BLD AUTO: 42.2 % (ref 36.5–49.3)
HCT VFR BLD AUTO: 45.6 % (ref 36.5–49.3)
HCT VFR BLD CALC: 39 % (ref 36.5–49.3)
HCT VFR BLD CALC: 42 % (ref 36.5–49.3)
HGB BLD-MCNC: 13.9 G/DL (ref 12–17)
HGB BLD-MCNC: 14.6 G/DL (ref 12–17)
HGB BLDA-MCNC: 13.3 G/DL (ref 12–17)
HGB BLDA-MCNC: 14.3 G/DL (ref 12–17)
IMM GRANULOCYTES # BLD AUTO: 0.25 THOUSAND/UL (ref 0–0.2)
IMM GRANULOCYTES NFR BLD AUTO: 2 % (ref 0–2)
INTERVENTRICULAR SEPTUM IN DIASTOLE (PARASTERNAL SHORT AXIS VIEW): 1.3 CM
INTERVENTRICULAR SEPTUM: 1.3 CM (ref 0.6–1.1)
IPAP: 10
L PNEUMO1 AG UR QL IA.RAPID: NEGATIVE
LAAS-AP2: 16.5 CM2
LAAS-AP4: 15.5 CM2
LACTATE SERPL-SCNC: 1 MMOL/L (ref 0.5–2)
LEFT ATRIUM AREA SYSTOLE SINGLE PLANE A4C: 15.5 CM2
LEFT ATRIUM SIZE: 4.3 CM
LEFT ATRIUM VOLUME (MOD BIPLANE): 46 ML
LEFT ATRIUM VOLUME INDEX (MOD BIPLANE): 23.1 ML/M2
LEFT INTERNAL DIMENSION IN SYSTOLE: 2.7 CM (ref 2.1–4)
LEFT VENTRICULAR INTERNAL DIMENSION IN DIASTOLE: 4.4 CM (ref 3.5–6)
LEFT VENTRICULAR POSTERIOR WALL IN END DIASTOLE: 1.1 CM
LEFT VENTRICULAR STROKE VOLUME: 63 ML
LVSV (TEICH): 63 ML
LYMPHOCYTES # BLD AUTO: 0.54 THOUSANDS/ÂΜL (ref 0.6–4.47)
LYMPHOCYTES NFR BLD AUTO: 18 %
LYMPHOCYTES NFR BLD AUTO: 19 %
LYMPHOCYTES NFR BLD AUTO: 4 % (ref 14–44)
MACROPHAGES NFR FLD: 32 %
MACROPHAGES NFR FLD: 37 %
MAGNESIUM SERPL-MCNC: 2.1 MG/DL (ref 1.9–2.7)
MCH RBC QN AUTO: 29.3 PG (ref 26.8–34.3)
MCH RBC QN AUTO: 29.4 PG (ref 26.8–34.3)
MCHC RBC AUTO-ENTMCNC: 32 G/DL (ref 31.4–37.4)
MCHC RBC AUTO-ENTMCNC: 32.9 G/DL (ref 31.4–37.4)
MCV RBC AUTO: 89 FL (ref 82–98)
MCV RBC AUTO: 91 FL (ref 82–98)
MONOCYTES # BLD AUTO: 0.52 THOUSAND/ÂΜL (ref 0.17–1.22)
MONOCYTES NFR BLD AUTO: 4 % (ref 4–12)
MV E'TISSUE VEL-SEP: 6 CM/S
MV PEAK A VEL: 0.88 M/S
MV PEAK E VEL: 62 CM/S
MV STENOSIS PRESSURE HALF TIME: 59 MS
MV VALVE AREA P 1/2 METHOD: 3.73
NEUTROPHILS # BLD AUTO: 13.15 THOUSANDS/ÂΜL (ref 1.85–7.62)
NEUTS SEG NFR BLD AUTO: 45 %
NEUTS SEG NFR BLD AUTO: 49 %
NEUTS SEG NFR BLD AUTO: 90 % (ref 43–75)
NON VENT- BIPAP: ABNORMAL
NRBC BLD AUTO-RTO: 0 /100 WBCS
O2 CT BLDA-SCNC: 18.5 ML/DL (ref 16–23)
O2 CT BLDA-SCNC: 24.3 ML/DL (ref 16–23)
O2 CT BLDV-SCNC: 19.8 ML/DL
OXYHGB MFR BLDA: 88.9 % (ref 94–97)
OXYHGB MFR BLDA: 97.6 % (ref 94–97)
PCO2 BLD: 22 MMOL/L (ref 21–32)
PCO2 BLD: 26 MMOL/L (ref 21–32)
PCO2 BLD: 32.2 MM HG (ref 36–44)
PCO2 BLD: 51.2 MM HG (ref 36–44)
PCO2 BLDA: 102.3 MM HG (ref 36–44)
PCO2 BLDA: 51.2 MM HG (ref 36–44)
PCO2 BLDV: 24.9 MM HG (ref 42–50)
PEEP MAX SETTING VENT: 6 CM[H2O]
PH BLD: 7.28 [PH] (ref 7.35–7.45)
PH BLD: 7.42 [PH] (ref 7.35–7.45)
PH BLDA: 7.04 [PH] (ref 7.35–7.45)
PH BLDA: 7.25 [PH] (ref 7.35–7.45)
PH BLDV: 7.49 [PH] (ref 7.3–7.4)
PHOSPHATE SERPL-MCNC: 3.9 MG/DL (ref 2.7–4.5)
PLATELET # BLD AUTO: 206 THOUSANDS/UL (ref 149–390)
PLATELET # BLD AUTO: 325 THOUSANDS/UL (ref 149–390)
PMV BLD AUTO: 8.6 FL (ref 8.9–12.7)
PMV BLD AUTO: 9.1 FL (ref 8.9–12.7)
PO2 BLD: 115 MM HG (ref 75–129)
PO2 BLD: 74 MM HG (ref 75–129)
PO2 BLDA: 163.7 MM HG (ref 75–129)
PO2 BLDA: 67.6 MM HG (ref 75–129)
PO2 BLDV: 107.6 MM HG (ref 35–45)
POTASSIUM BLD-SCNC: 3.6 MMOL/L (ref 3.5–5.3)
POTASSIUM BLD-SCNC: 3.8 MMOL/L (ref 3.5–5.3)
POTASSIUM SERPL-SCNC: 3.7 MMOL/L (ref 3.5–5.3)
POTASSIUM SERPL-SCNC: 4.1 MMOL/L (ref 3.5–5.3)
POTASSIUM SERPL-SCNC: 4.3 MMOL/L (ref 3.5–5.3)
PROCALCITONIN SERPL-MCNC: 0.31 NG/ML
PROT SERPL-MCNC: 6.1 G/DL (ref 6.4–8.4)
RBC # BLD AUTO: 4.72 MILLION/UL (ref 3.88–5.62)
RBC # BLD AUTO: 4.99 MILLION/UL (ref 3.88–5.62)
RIGHT ATRIUM AREA SYSTOLE A4C: 14.8 CM2
S PNEUM AG UR QL: NEGATIVE
SAO2 % BLD FROM PO2: 95 % (ref 60–85)
SAO2 % BLD FROM PO2: 98 % (ref 60–85)
SL CV LEFT ATRIUM LENGTH A2C: 5.3 CM
SL CV LV EF: 60
SL CV PED ECHO LEFT VENTRICLE DIASTOLIC VOLUME (MOD BIPLANE) 2D: 88 ML
SL CV PED ECHO LEFT VENTRICLE SYSTOLIC VOLUME (MOD BIPLANE) 2D: 26 ML
SODIUM BLD-SCNC: 143 MMOL/L (ref 136–145)
SODIUM BLD-SCNC: 145 MMOL/L (ref 136–145)
SODIUM SERPL-SCNC: 141 MMOL/L (ref 135–147)
SODIUM SERPL-SCNC: 142 MMOL/L (ref 135–147)
SODIUM SERPL-SCNC: 143 MMOL/L (ref 135–147)
SPECIMEN SOURCE: ABNORMAL
TOTAL CELLS COUNTED SPEC: 100
TOTAL CELLS COUNTED SPEC: 100
TRICUSPID ANNULAR PLANE SYSTOLIC EXCURSION: 2.5 CM
VENT BIPAP FIO2: 100 %
WBC # BLD AUTO: 14.49 THOUSAND/UL (ref 4.31–10.16)
WBC # BLD AUTO: 7.4 THOUSAND/UL (ref 4.31–10.16)

## 2024-02-19 PROCEDURE — 94002 VENT MGMT INPAT INIT DAY: CPT

## 2024-02-19 PROCEDURE — 99291 CRITICAL CARE FIRST HOUR: CPT | Performed by: INTERNAL MEDICINE

## 2024-02-19 PROCEDURE — 0B948ZZ DRAINAGE OF RIGHT UPPER LOBE BRONCHUS, VIA NATURAL OR ARTIFICIAL OPENING ENDOSCOPIC: ICD-10-PCS | Performed by: INTERNAL MEDICINE

## 2024-02-19 PROCEDURE — 80048 BASIC METABOLIC PNL TOTAL CA: CPT | Performed by: PHYSICIAN ASSISTANT

## 2024-02-19 PROCEDURE — 0B928ZZ DRAINAGE OF CARINA, VIA NATURAL OR ARTIFICIAL OPENING ENDOSCOPIC: ICD-10-PCS | Performed by: INTERNAL MEDICINE

## 2024-02-19 PROCEDURE — 94640 AIRWAY INHALATION TREATMENT: CPT

## 2024-02-19 PROCEDURE — NC001 PR NO CHARGE

## 2024-02-19 PROCEDURE — 87040 BLOOD CULTURE FOR BACTERIA: CPT

## 2024-02-19 PROCEDURE — 02HV33Z INSERTION OF INFUSION DEVICE INTO SUPERIOR VENA CAVA, PERCUTANEOUS APPROACH: ICD-10-PCS | Performed by: INTERNAL MEDICINE

## 2024-02-19 PROCEDURE — 0B9B8ZZ DRAINAGE OF LEFT LOWER LOBE BRONCHUS, VIA NATURAL OR ARTIFICIAL OPENING ENDOSCOPIC: ICD-10-PCS | Performed by: INTERNAL MEDICINE

## 2024-02-19 PROCEDURE — 4A133B1 MONITORING OF ARTERIAL PRESSURE, PERIPHERAL, PERCUTANEOUS APPROACH: ICD-10-PCS | Performed by: INTERNAL MEDICINE

## 2024-02-19 PROCEDURE — 87081 CULTURE SCREEN ONLY: CPT | Performed by: PHYSICIAN ASSISTANT

## 2024-02-19 PROCEDURE — 87252 VIRUS INOCULATION TISSUE: CPT | Performed by: STUDENT IN AN ORGANIZED HEALTH CARE EDUCATION/TRAINING PROGRAM

## 2024-02-19 PROCEDURE — 89051 BODY FLUID CELL COUNT: CPT | Performed by: STUDENT IN AN ORGANIZED HEALTH CARE EDUCATION/TRAINING PROGRAM

## 2024-02-19 PROCEDURE — 71045 X-RAY EXAM CHEST 1 VIEW: CPT

## 2024-02-19 PROCEDURE — 4A133J1 MONITORING OF ARTERIAL PULSE, PERIPHERAL, PERCUTANEOUS APPROACH: ICD-10-PCS | Performed by: INTERNAL MEDICINE

## 2024-02-19 PROCEDURE — 94150 VITAL CAPACITY TEST: CPT

## 2024-02-19 PROCEDURE — 0B978ZZ DRAINAGE OF LEFT MAIN BRONCHUS, VIA NATURAL OR ARTIFICIAL OPENING ENDOSCOPIC: ICD-10-PCS | Performed by: INTERNAL MEDICINE

## 2024-02-19 PROCEDURE — 82947 ASSAY GLUCOSE BLOOD QUANT: CPT

## 2024-02-19 PROCEDURE — 93005 ELECTROCARDIOGRAM TRACING: CPT

## 2024-02-19 PROCEDURE — 0B988ZZ DRAINAGE OF LEFT UPPER LOBE BRONCHUS, VIA NATURAL OR ARTIFICIAL OPENING ENDOSCOPIC: ICD-10-PCS | Performed by: INTERNAL MEDICINE

## 2024-02-19 PROCEDURE — 0B958ZZ DRAINAGE OF RIGHT MIDDLE LOBE BRONCHUS, VIA NATURAL OR ARTIFICIAL OPENING ENDOSCOPIC: ICD-10-PCS | Performed by: INTERNAL MEDICINE

## 2024-02-19 PROCEDURE — 0B9H8ZZ DRAINAGE OF LUNG LINGULA, VIA NATURAL OR ARTIFICIAL OPENING ENDOSCOPIC: ICD-10-PCS | Performed by: INTERNAL MEDICINE

## 2024-02-19 PROCEDURE — 0BH17EZ INSERTION OF ENDOTRACHEAL AIRWAY INTO TRACHEA, VIA NATURAL OR ARTIFICIAL OPENING: ICD-10-PCS | Performed by: INTERNAL MEDICINE

## 2024-02-19 PROCEDURE — 03HY32Z INSERTION OF MONITORING DEVICE INTO UPPER ARTERY, PERCUTANEOUS APPROACH: ICD-10-PCS | Performed by: INTERNAL MEDICINE

## 2024-02-19 PROCEDURE — 82948 REAGENT STRIP/BLOOD GLUCOSE: CPT

## 2024-02-19 PROCEDURE — 82805 BLOOD GASES W/O2 SATURATION: CPT | Performed by: PHYSICIAN ASSISTANT

## 2024-02-19 PROCEDURE — 80053 COMPREHEN METABOLIC PANEL: CPT

## 2024-02-19 PROCEDURE — 99292 CRITICAL CARE ADDL 30 MIN: CPT | Performed by: INTERNAL MEDICINE

## 2024-02-19 PROCEDURE — 31500 INSERT EMERGENCY AIRWAY: CPT

## 2024-02-19 PROCEDURE — 87070 CULTURE OTHR SPECIMN AEROBIC: CPT | Performed by: STUDENT IN AN ORGANIZED HEALTH CARE EDUCATION/TRAINING PROGRAM

## 2024-02-19 PROCEDURE — 36556 INSERT NON-TUNNEL CV CATH: CPT | Performed by: INTERNAL MEDICINE

## 2024-02-19 PROCEDURE — 82803 BLOOD GASES ANY COMBINATION: CPT

## 2024-02-19 PROCEDURE — 84295 ASSAY OF SERUM SODIUM: CPT

## 2024-02-19 PROCEDURE — 85014 HEMATOCRIT: CPT

## 2024-02-19 PROCEDURE — 94760 N-INVAS EAR/PLS OXIMETRY 1: CPT

## 2024-02-19 PROCEDURE — 84132 ASSAY OF SERUM POTASSIUM: CPT

## 2024-02-19 PROCEDURE — 87205 SMEAR GRAM STAIN: CPT | Performed by: STUDENT IN AN ORGANIZED HEALTH CARE EDUCATION/TRAINING PROGRAM

## 2024-02-19 PROCEDURE — 93306 TTE W/DOPPLER COMPLETE: CPT | Performed by: INTERNAL MEDICINE

## 2024-02-19 PROCEDURE — 31624 DX BRONCHOSCOPE/LAVAGE: CPT | Performed by: INTERNAL MEDICINE

## 2024-02-19 PROCEDURE — 85027 COMPLETE CBC AUTOMATED: CPT | Performed by: PHYSICIAN ASSISTANT

## 2024-02-19 PROCEDURE — 93306 TTE W/DOPPLER COMPLETE: CPT

## 2024-02-19 PROCEDURE — 84145 PROCALCITONIN (PCT): CPT | Performed by: PHYSICIAN ASSISTANT

## 2024-02-19 PROCEDURE — 87106 FUNGI IDENTIFICATION YEAST: CPT | Performed by: STUDENT IN AN ORGANIZED HEALTH CARE EDUCATION/TRAINING PROGRAM

## 2024-02-19 PROCEDURE — 87449 NOS EACH ORGANISM AG IA: CPT | Performed by: NURSE PRACTITIONER

## 2024-02-19 PROCEDURE — 5A1955Z RESPIRATORY VENTILATION, GREATER THAN 96 CONSECUTIVE HOURS: ICD-10-PCS | Performed by: INTERNAL MEDICINE

## 2024-02-19 PROCEDURE — 82330 ASSAY OF CALCIUM: CPT

## 2024-02-19 PROCEDURE — 87040 BLOOD CULTURE FOR BACTERIA: CPT | Performed by: PHYSICIAN ASSISTANT

## 2024-02-19 PROCEDURE — 82550 ASSAY OF CK (CPK): CPT | Performed by: PHYSICIAN ASSISTANT

## 2024-02-19 PROCEDURE — 83735 ASSAY OF MAGNESIUM: CPT | Performed by: PHYSICIAN ASSISTANT

## 2024-02-19 PROCEDURE — 76937 US GUIDE VASCULAR ACCESS: CPT | Performed by: INTERNAL MEDICINE

## 2024-02-19 PROCEDURE — 86140 C-REACTIVE PROTEIN: CPT

## 2024-02-19 PROCEDURE — 0B968ZZ DRAINAGE OF RIGHT LOWER LOBE BRONCHUS, VIA NATURAL OR ARTIFICIAL OPENING ENDOSCOPIC: ICD-10-PCS | Performed by: INTERNAL MEDICINE

## 2024-02-19 PROCEDURE — 82805 BLOOD GASES W/O2 SATURATION: CPT

## 2024-02-19 PROCEDURE — 82805 BLOOD GASES W/O2 SATURATION: CPT | Performed by: STUDENT IN AN ORGANIZED HEALTH CARE EDUCATION/TRAINING PROGRAM

## 2024-02-19 PROCEDURE — 84100 ASSAY OF PHOSPHORUS: CPT | Performed by: PHYSICIAN ASSISTANT

## 2024-02-19 PROCEDURE — 36600 WITHDRAWAL OF ARTERIAL BLOOD: CPT

## 2024-02-19 PROCEDURE — 94003 VENT MGMT INPAT SUBQ DAY: CPT

## 2024-02-19 PROCEDURE — 5A1935Z RESPIRATORY VENTILATION, LESS THAN 24 CONSECUTIVE HOURS: ICD-10-PCS | Performed by: INTERNAL MEDICINE

## 2024-02-19 PROCEDURE — 85025 COMPLETE CBC W/AUTO DIFF WBC: CPT

## 2024-02-19 PROCEDURE — 83605 ASSAY OF LACTIC ACID: CPT | Performed by: PHYSICIAN ASSISTANT

## 2024-02-19 PROCEDURE — 36620 INSERTION CATHETER ARTERY: CPT

## 2024-02-19 PROCEDURE — 0B938ZZ DRAINAGE OF RIGHT MAIN BRONCHUS, VIA NATURAL OR ARTIFICIAL OPENING ENDOSCOPIC: ICD-10-PCS | Performed by: INTERNAL MEDICINE

## 2024-02-19 RX ORDER — GUAIFENESIN 600 MG/1
600 TABLET, EXTENDED RELEASE ORAL 2 TIMES DAILY
Status: DISCONTINUED | OUTPATIENT
Start: 2024-02-19 | End: 2024-02-19

## 2024-02-19 RX ORDER — FENTANYL CITRATE 50 UG/ML
INJECTION, SOLUTION INTRAMUSCULAR; INTRAVENOUS
Status: COMPLETED
Start: 2024-02-19 | End: 2024-02-19

## 2024-02-19 RX ORDER — FUROSEMIDE 10 MG/ML
40 INJECTION INTRAMUSCULAR; INTRAVENOUS ONCE
Status: COMPLETED | OUTPATIENT
Start: 2024-02-19 | End: 2024-02-19

## 2024-02-19 RX ORDER — FENTANYL CITRATE 50 UG/ML
100 INJECTION, SOLUTION INTRAMUSCULAR; INTRAVENOUS ONCE
Status: COMPLETED | OUTPATIENT
Start: 2024-02-19 | End: 2024-02-19

## 2024-02-19 RX ORDER — CHLORHEXIDINE GLUCONATE ORAL RINSE 1.2 MG/ML
15 SOLUTION DENTAL EVERY 12 HOURS SCHEDULED
Status: CANCELLED | OUTPATIENT
Start: 2024-02-19

## 2024-02-19 RX ORDER — FAMOTIDINE 10 MG/ML
20 INJECTION, SOLUTION INTRAVENOUS 2 TIMES DAILY
Status: DISCONTINUED | OUTPATIENT
Start: 2024-02-19 | End: 2024-02-21

## 2024-02-19 RX ORDER — AZITHROMYCIN 500 MG/1
500 TABLET, FILM COATED ORAL EVERY 24 HOURS
Status: CANCELLED | OUTPATIENT
Start: 2024-02-20

## 2024-02-19 RX ORDER — AZITHROMYCIN 250 MG/1
500 TABLET, FILM COATED ORAL EVERY 24 HOURS
Status: DISCONTINUED | OUTPATIENT
Start: 2024-02-20 | End: 2024-02-20

## 2024-02-19 RX ORDER — HEPARIN SODIUM 5000 [USP'U]/ML
5000 INJECTION, SOLUTION INTRAVENOUS; SUBCUTANEOUS EVERY 8 HOURS SCHEDULED
Status: DISCONTINUED | OUTPATIENT
Start: 2024-02-19 | End: 2024-02-20

## 2024-02-19 RX ORDER — CEFTRIAXONE 1 G/50ML
1000 INJECTION, SOLUTION INTRAVENOUS EVERY 24 HOURS
Status: CANCELLED | OUTPATIENT
Start: 2024-02-20

## 2024-02-19 RX ORDER — INSULIN LISPRO 100 [IU]/ML
1-5 INJECTION, SOLUTION INTRAVENOUS; SUBCUTANEOUS EVERY 6 HOURS
Status: DISCONTINUED | OUTPATIENT
Start: 2024-02-19 | End: 2024-02-22

## 2024-02-19 RX ORDER — PROPOFOL 10 MG/ML
5-50 INJECTION, EMULSION INTRAVENOUS
Status: DISCONTINUED | OUTPATIENT
Start: 2024-02-19 | End: 2024-02-19

## 2024-02-19 RX ORDER — LEVALBUTEROL INHALATION SOLUTION 1.25 MG/3ML
1.25 SOLUTION RESPIRATORY (INHALATION)
Status: CANCELLED | OUTPATIENT
Start: 2024-02-19

## 2024-02-19 RX ORDER — OSELTAMIVIR PHOSPHATE 75 MG/1
75 CAPSULE ORAL EVERY 12 HOURS SCHEDULED
Status: CANCELLED | OUTPATIENT
Start: 2024-02-19 | End: 2024-02-23

## 2024-02-19 RX ORDER — MINERAL OIL AND PETROLATUM 150; 830 MG/G; MG/G
OINTMENT OPHTHALMIC
Status: CANCELLED | OUTPATIENT
Start: 2024-02-19

## 2024-02-19 RX ORDER — SUCCINYLCHOLINE/SOD CL,ISO/PF 100 MG/5ML
100 SYRINGE (ML) INTRAVENOUS ONCE
Status: COMPLETED | OUTPATIENT
Start: 2024-02-19 | End: 2024-02-19

## 2024-02-19 RX ORDER — VECURONIUM BROMIDE 1 MG/ML
10 INJECTION, POWDER, LYOPHILIZED, FOR SOLUTION INTRAVENOUS ONCE
Status: COMPLETED | OUTPATIENT
Start: 2024-02-19 | End: 2024-02-19

## 2024-02-19 RX ORDER — VECURONIUM BROMIDE 1 MG/ML
5 INJECTION, POWDER, LYOPHILIZED, FOR SOLUTION INTRAVENOUS ONCE
Status: DISCONTINUED | OUTPATIENT
Start: 2024-02-19 | End: 2024-02-20

## 2024-02-19 RX ORDER — VANCOMYCIN HYDROCHLORIDE 1 G/200ML
15 INJECTION, SOLUTION INTRAVENOUS EVERY 12 HOURS
Status: DISCONTINUED | OUTPATIENT
Start: 2024-02-20 | End: 2024-02-20

## 2024-02-19 RX ORDER — WATER 10 ML/10ML
INJECTION INTRAMUSCULAR; INTRAVENOUS; SUBCUTANEOUS
Status: DISPENSED
Start: 2024-02-19 | End: 2024-02-20

## 2024-02-19 RX ORDER — LANOLIN ALCOHOL/MO/W.PET/CERES
400 CREAM (GRAM) TOPICAL DAILY
Status: CANCELLED | OUTPATIENT
Start: 2024-02-20

## 2024-02-19 RX ORDER — CISATRACURIUM BESYLATE 2 MG/ML
0.2 INJECTION, SOLUTION INTRAVENOUS ONCE
Status: DISCONTINUED | OUTPATIENT
Start: 2024-02-19 | End: 2024-02-19

## 2024-02-19 RX ORDER — ETOMIDATE 2 MG/ML
20 INJECTION INTRAVENOUS ONCE
Status: COMPLETED | OUTPATIENT
Start: 2024-02-19 | End: 2024-02-19

## 2024-02-19 RX ORDER — MINERAL OIL AND PETROLATUM 150; 830 MG/G; MG/G
OINTMENT OPHTHALMIC
Status: DISCONTINUED | OUTPATIENT
Start: 2024-02-19 | End: 2024-02-21

## 2024-02-19 RX ORDER — CHLORHEXIDINE GLUCONATE ORAL RINSE 1.2 MG/ML
15 SOLUTION DENTAL EVERY 12 HOURS SCHEDULED
Status: DISCONTINUED | OUTPATIENT
Start: 2024-02-19 | End: 2024-02-19

## 2024-02-19 RX ORDER — PROPOFOL 10 MG/ML
INJECTION, EMULSION INTRAVENOUS
Status: COMPLETED
Start: 2024-02-19 | End: 2024-02-19

## 2024-02-19 RX ORDER — SODIUM CHLORIDE, SODIUM GLUCONATE, SODIUM ACETATE, POTASSIUM CHLORIDE, MAGNESIUM CHLORIDE, SODIUM PHOSPHATE, DIBASIC, AND POTASSIUM PHOSPHATE .53; .5; .37; .037; .03; .012; .00082 G/100ML; G/100ML; G/100ML; G/100ML; G/100ML; G/100ML; G/100ML
100 INJECTION, SOLUTION INTRAVENOUS ONCE
Status: COMPLETED | OUTPATIENT
Start: 2024-02-19 | End: 2024-02-19

## 2024-02-19 RX ORDER — FENTANYL CITRATE 50 UG/ML
50 INJECTION, SOLUTION INTRAMUSCULAR; INTRAVENOUS ONCE
Status: COMPLETED | OUTPATIENT
Start: 2024-02-19 | End: 2024-02-19

## 2024-02-19 RX ORDER — PROPOFOL 10 MG/ML
5-50 INJECTION, EMULSION INTRAVENOUS
Status: DISCONTINUED | OUTPATIENT
Start: 2024-02-19 | End: 2024-02-22

## 2024-02-19 RX ORDER — MIDAZOLAM HYDROCHLORIDE 2 MG/2ML
4 INJECTION, SOLUTION INTRAMUSCULAR; INTRAVENOUS ONCE
Status: COMPLETED | OUTPATIENT
Start: 2024-02-19 | End: 2024-02-19

## 2024-02-19 RX ORDER — TAMSULOSIN HYDROCHLORIDE 0.4 MG/1
0.4 CAPSULE ORAL
Status: DISCONTINUED | OUTPATIENT
Start: 2024-02-19 | End: 2024-02-19

## 2024-02-19 RX ORDER — LANOLIN ALCOHOL/MO/W.PET/CERES
400 CREAM (GRAM) TOPICAL DAILY
Status: DISCONTINUED | OUTPATIENT
Start: 2024-02-20 | End: 2024-03-14 | Stop reason: HOSPADM

## 2024-02-19 RX ORDER — FUROSEMIDE 10 MG/ML
40 INJECTION INTRAMUSCULAR; INTRAVENOUS ONCE
Status: DISCONTINUED | OUTPATIENT
Start: 2024-02-19 | End: 2024-02-19 | Stop reason: HOSPADM

## 2024-02-19 RX ORDER — VANCOMYCIN HYDROCHLORIDE 1 G/200ML
15 INJECTION, SOLUTION INTRAVENOUS EVERY 12 HOURS
Status: DISCONTINUED | OUTPATIENT
Start: 2024-02-19 | End: 2024-02-19

## 2024-02-19 RX ORDER — GUAIFENESIN 600 MG/1
600 TABLET, EXTENDED RELEASE ORAL 2 TIMES DAILY
Status: CANCELLED | OUTPATIENT
Start: 2024-02-19

## 2024-02-19 RX ORDER — EPINEPHRINE 0.1 MG/ML
INJECTION INTRAVENOUS
Status: DISCONTINUED
Start: 2024-02-19 | End: 2024-02-19 | Stop reason: HOSPADM

## 2024-02-19 RX ORDER — FENTANYL CITRATE 50 UG/ML
100 INJECTION, SOLUTION INTRAMUSCULAR; INTRAVENOUS ONCE
Status: DISCONTINUED | OUTPATIENT
Start: 2024-02-19 | End: 2024-02-20

## 2024-02-19 RX ORDER — ECHINACEA PURPUREA EXTRACT 125 MG
1 TABLET ORAL
Status: CANCELLED | OUTPATIENT
Start: 2024-02-19

## 2024-02-19 RX ORDER — PROPOFOL 10 MG/ML
5-50 INJECTION, EMULSION INTRAVENOUS
Status: CANCELLED | OUTPATIENT
Start: 2024-02-19

## 2024-02-19 RX ORDER — HEPARIN SODIUM 5000 [USP'U]/ML
5000 INJECTION, SOLUTION INTRAVENOUS; SUBCUTANEOUS EVERY 8 HOURS SCHEDULED
Status: CANCELLED | OUTPATIENT
Start: 2024-02-19

## 2024-02-19 RX ORDER — BENZONATATE 100 MG/1
200 CAPSULE ORAL 3 TIMES DAILY PRN
Status: DISCONTINUED | OUTPATIENT
Start: 2024-02-19 | End: 2024-02-19

## 2024-02-19 RX ORDER — ENOXAPARIN SODIUM 100 MG/ML
40 INJECTION SUBCUTANEOUS DAILY
Status: CANCELLED | OUTPATIENT
Start: 2024-02-19

## 2024-02-19 RX ORDER — FENTANYL CITRATE-0.9 % NACL/PF 10 MCG/ML
100 PLASTIC BAG, INJECTION (ML) INTRAVENOUS CONTINUOUS
Status: CANCELLED | OUTPATIENT
Start: 2024-02-19

## 2024-02-19 RX ORDER — TAMSULOSIN HYDROCHLORIDE 0.4 MG/1
0.4 CAPSULE ORAL
Status: CANCELLED | OUTPATIENT
Start: 2024-02-19

## 2024-02-19 RX ORDER — MIDAZOLAM HYDROCHLORIDE 2 MG/2ML
2 INJECTION, SOLUTION INTRAMUSCULAR; INTRAVENOUS ONCE
Status: DISCONTINUED | OUTPATIENT
Start: 2024-02-19 | End: 2024-02-20

## 2024-02-19 RX ORDER — BENZONATATE 100 MG/1
200 CAPSULE ORAL 3 TIMES DAILY PRN
Status: CANCELLED | OUTPATIENT
Start: 2024-02-19

## 2024-02-19 RX ORDER — MIDAZOLAM HYDROCHLORIDE 2 MG/2ML
2 INJECTION, SOLUTION INTRAMUSCULAR; INTRAVENOUS ONCE
Status: DISCONTINUED | OUTPATIENT
Start: 2024-02-19 | End: 2024-02-19 | Stop reason: HOSPADM

## 2024-02-19 RX ORDER — ACETAMINOPHEN 325 MG/1
650 TABLET ORAL EVERY 6 HOURS PRN
Status: DISCONTINUED | OUTPATIENT
Start: 2024-02-19 | End: 2024-02-23

## 2024-02-19 RX ORDER — CHLORHEXIDINE GLUCONATE ORAL RINSE 1.2 MG/ML
15 SOLUTION DENTAL EVERY 12 HOURS SCHEDULED
Status: DISCONTINUED | OUTPATIENT
Start: 2024-02-19 | End: 2024-03-01

## 2024-02-19 RX ORDER — METHYLPREDNISOLONE SODIUM SUCCINATE 40 MG/ML
40 INJECTION, POWDER, LYOPHILIZED, FOR SOLUTION INTRAMUSCULAR; INTRAVENOUS EVERY 12 HOURS SCHEDULED
Status: CANCELLED | OUTPATIENT
Start: 2024-02-19

## 2024-02-19 RX ORDER — OSELTAMIVIR PHOSPHATE 75 MG/1
75 CAPSULE ORAL EVERY 12 HOURS SCHEDULED
Status: DISCONTINUED | OUTPATIENT
Start: 2024-02-19 | End: 2024-02-23

## 2024-02-19 RX ORDER — MIDAZOLAM HYDROCHLORIDE 1 MG/ML
6 INJECTION INTRAMUSCULAR; INTRAVENOUS ONCE
Qty: 12 ML | Refills: 0 | Status: COMPLETED | OUTPATIENT
Start: 2024-02-19 | End: 2024-02-19

## 2024-02-19 RX ORDER — ACETAMINOPHEN 325 MG/1
650 TABLET ORAL EVERY 6 HOURS PRN
Status: CANCELLED | OUTPATIENT
Start: 2024-02-19

## 2024-02-19 RX ORDER — DIGOXIN 0.25 MG/ML
250 INJECTION INTRAMUSCULAR; INTRAVENOUS ONCE
Status: COMPLETED | OUTPATIENT
Start: 2024-02-19 | End: 2024-02-19

## 2024-02-19 RX ORDER — LIDOCAINE HYDROCHLORIDE 10 MG/ML
INJECTION, SOLUTION EPIDURAL; INFILTRATION; INTRACAUDAL; PERINEURAL
Status: DISPENSED
Start: 2024-02-19 | End: 2024-02-20

## 2024-02-19 RX ORDER — MINERAL OIL AND PETROLATUM 150; 830 MG/G; MG/G
OINTMENT OPHTHALMIC
Status: DISCONTINUED | OUTPATIENT
Start: 2024-02-19 | End: 2024-02-19

## 2024-02-19 RX ORDER — METHYLPREDNISOLONE SODIUM SUCCINATE 125 MG/2ML
125 INJECTION, POWDER, LYOPHILIZED, FOR SOLUTION INTRAMUSCULAR; INTRAVENOUS ONCE
Status: COMPLETED | OUTPATIENT
Start: 2024-02-19 | End: 2024-02-19

## 2024-02-19 RX ORDER — AMIODARONE HYDROCHLORIDE 50 MG/ML
INJECTION, SOLUTION INTRAVENOUS
Status: COMPLETED
Start: 2024-02-19 | End: 2024-02-19

## 2024-02-19 RX ORDER — VANCOMYCIN HYDROCHLORIDE 1 G/200ML
15 INJECTION, SOLUTION INTRAVENOUS EVERY 12 HOURS
Status: CANCELLED | OUTPATIENT
Start: 2024-02-20

## 2024-02-19 RX ORDER — FENTANYL CITRATE-0.9 % NACL/PF 10 MCG/ML
100 PLASTIC BAG, INJECTION (ML) INTRAVENOUS CONTINUOUS
Status: DISCONTINUED | OUTPATIENT
Start: 2024-02-19 | End: 2024-02-19

## 2024-02-19 RX ORDER — ECHINACEA PURPUREA EXTRACT 125 MG
1 TABLET ORAL
Status: DISCONTINUED | OUTPATIENT
Start: 2024-02-19 | End: 2024-03-14 | Stop reason: HOSPADM

## 2024-02-19 RX ORDER — METOPROLOL TARTRATE 1 MG/ML
5 INJECTION, SOLUTION INTRAVENOUS ONCE
Status: COMPLETED | OUTPATIENT
Start: 2024-02-19 | End: 2024-02-19

## 2024-02-19 RX ORDER — LEVALBUTEROL INHALATION SOLUTION 1.25 MG/3ML
1.25 SOLUTION RESPIRATORY (INHALATION)
Status: DISCONTINUED | OUTPATIENT
Start: 2024-02-19 | End: 2024-02-29

## 2024-02-19 RX ORDER — VECURONIUM BROMIDE 1 MG/ML
10 INJECTION, POWDER, LYOPHILIZED, FOR SOLUTION INTRAVENOUS ONCE
Status: DISCONTINUED | OUTPATIENT
Start: 2024-02-19 | End: 2024-02-19

## 2024-02-19 RX ORDER — FENTANYL CITRATE-0.9 % NACL/PF 10 MCG/ML
75 PLASTIC BAG, INJECTION (ML) INTRAVENOUS CONTINUOUS
Status: DISCONTINUED | OUTPATIENT
Start: 2024-02-19 | End: 2024-02-29

## 2024-02-19 RX ORDER — METHYLPREDNISOLONE SODIUM SUCCINATE 40 MG/ML
40 INJECTION, POWDER, LYOPHILIZED, FOR SOLUTION INTRAMUSCULAR; INTRAVENOUS EVERY 12 HOURS SCHEDULED
Status: DISCONTINUED | OUTPATIENT
Start: 2024-02-19 | End: 2024-02-23

## 2024-02-19 RX ADMIN — METHYLPREDNISOLONE SODIUM SUCCINATE 40 MG: 40 INJECTION, POWDER, FOR SOLUTION INTRAMUSCULAR; INTRAVENOUS at 20:34

## 2024-02-19 RX ADMIN — VECURONIUM BROMIDE 10 MG: 1 INJECTION, POWDER, LYOPHILIZED, FOR SOLUTION INTRAVENOUS at 13:38

## 2024-02-19 RX ADMIN — NOREPINEPHRINE BITARTRATE 5 MCG/MIN: 1 INJECTION, SOLUTION, CONCENTRATE INTRAVENOUS at 16:50

## 2024-02-19 RX ADMIN — PROPOFOL 50 MCG/KG/MIN: 10 INJECTION, EMULSION INTRAVENOUS at 17:30

## 2024-02-19 RX ADMIN — AMIODARONE HYDROCHLORIDE 150 MG: 50 INJECTION, SOLUTION INTRAVENOUS at 20:28

## 2024-02-19 RX ADMIN — Medication 50 MCG/HR: at 13:54

## 2024-02-19 RX ADMIN — DEXMEDETOMIDINE HYDROCHLORIDE 0.6 MCG/KG/HR: 4 INJECTION, SOLUTION INTRAVENOUS at 02:54

## 2024-02-19 RX ADMIN — ETOMIDATE 20 MG: 20 INJECTION, SOLUTION INTRAVENOUS at 12:45

## 2024-02-19 RX ADMIN — CISATRACURIUM BESYLATE 0.5 MCG/KG/MIN: 10 INJECTION INTRAVENOUS at 18:37

## 2024-02-19 RX ADMIN — DEXMEDETOMIDINE HYDROCHLORIDE 0.7 MCG/KG/HR: 4 INJECTION, SOLUTION INTRAVENOUS at 09:25

## 2024-02-19 RX ADMIN — IPRATROPIUM BROMIDE 0.5 MG: 0.5 SOLUTION RESPIRATORY (INHALATION) at 13:49

## 2024-02-19 RX ADMIN — SODIUM CHLORIDE, SODIUM GLUCONATE, SODIUM ACETATE, POTASSIUM CHLORIDE, MAGNESIUM CHLORIDE, SODIUM PHOSPHATE, DIBASIC, AND POTASSIUM PHOSPHATE 100 ML: .53; .5; .37; .037; .03; .012; .00082 INJECTION, SOLUTION INTRAVENOUS at 02:56

## 2024-02-19 RX ADMIN — WHITE PETROLATUM 57.7 %-MINERAL OIL 31.9 % EYE OINTMENT: at 20:05

## 2024-02-19 RX ADMIN — LEVALBUTEROL HYDROCHLORIDE 1.25 MG: 1.25 SOLUTION RESPIRATORY (INHALATION) at 02:41

## 2024-02-19 RX ADMIN — AMIODARONE HYDROCHLORIDE 1 MG/MIN: 50 INJECTION, SOLUTION INTRAVENOUS at 21:13

## 2024-02-19 RX ADMIN — CHLORHEXIDINE GLUCONATE 15 ML: 1.2 SOLUTION ORAL at 20:33

## 2024-02-19 RX ADMIN — Medication 100 MG: at 13:10

## 2024-02-19 RX ADMIN — LEVALBUTEROL HYDROCHLORIDE 1.25 MG: 1.25 SOLUTION RESPIRATORY (INHALATION) at 19:22

## 2024-02-19 RX ADMIN — PROPOFOL 50 MCG/KG/MIN: 10 INJECTION, EMULSION INTRAVENOUS at 15:22

## 2024-02-19 RX ADMIN — NOREPINEPHRINE BITARTRATE 4 MCG/MIN: 1 INJECTION, SOLUTION, CONCENTRATE INTRAVENOUS at 16:52

## 2024-02-19 RX ADMIN — DEXTROSE 150 MG: 50 INJECTION, SOLUTION INTRAVENOUS at 20:23

## 2024-02-19 RX ADMIN — PROPOFOL 10 MCG/KG/MIN: 10 INJECTION, EMULSION INTRAVENOUS at 12:41

## 2024-02-19 RX ADMIN — FAMOTIDINE 20 MG: 10 INJECTION INTRAVENOUS at 18:14

## 2024-02-19 RX ADMIN — WHITE PETROLATUM 57.7 %-MINERAL OIL 31.9 % EYE OINTMENT: at 18:39

## 2024-02-19 RX ADMIN — Medication 100 MG: at 12:50

## 2024-02-19 RX ADMIN — Medication 100 MCG/HR: at 16:35

## 2024-02-19 RX ADMIN — IPRATROPIUM BROMIDE 0.5 MG: 0.5 SOLUTION RESPIRATORY (INHALATION) at 19:22

## 2024-02-19 RX ADMIN — FENTANYL CITRATE 100 MCG: 50 INJECTION INTRAMUSCULAR; INTRAVENOUS at 16:34

## 2024-02-19 RX ADMIN — FENTANYL CITRATE 100 MCG: 50 INJECTION, SOLUTION INTRAMUSCULAR; INTRAVENOUS at 13:10

## 2024-02-19 RX ADMIN — IPRATROPIUM BROMIDE 0.5 MG: 0.5 SOLUTION RESPIRATORY (INHALATION) at 07:43

## 2024-02-19 RX ADMIN — DIGOXIN 250 MCG: 0.25 INJECTION INTRAMUSCULAR; INTRAVENOUS at 18:15

## 2024-02-19 RX ADMIN — HEPARIN SODIUM 5000 UNITS: 5000 INJECTION, SOLUTION INTRAVENOUS; SUBCUTANEOUS at 04:58

## 2024-02-19 RX ADMIN — PERFLUTREN 0.4 ML/MIN: 6.52 INJECTION, SUSPENSION INTRAVENOUS at 11:55

## 2024-02-19 RX ADMIN — KETAMINE HYDROCHLORIDE 0.5 MG/KG/HR: 100 INJECTION INTRAMUSCULAR; INTRAVENOUS at 18:37

## 2024-02-19 RX ADMIN — PROPOFOL 50 MCG/KG/MIN: 10 INJECTION, EMULSION INTRAVENOUS at 16:35

## 2024-02-19 RX ADMIN — METOROPROLOL TARTRATE 5 MG: 5 INJECTION, SOLUTION INTRAVENOUS at 18:33

## 2024-02-19 RX ADMIN — LEVALBUTEROL HYDROCHLORIDE 1.25 MG: 1.25 SOLUTION RESPIRATORY (INHALATION) at 07:43

## 2024-02-19 RX ADMIN — HEPARIN SODIUM 5000 UNITS: 5000 INJECTION INTRAVENOUS; SUBCUTANEOUS at 22:06

## 2024-02-19 RX ADMIN — FENTANYL CITRATE 100 MCG: 50 INJECTION INTRAMUSCULAR; INTRAVENOUS at 13:10

## 2024-02-19 RX ADMIN — LEVALBUTEROL HYDROCHLORIDE 1.25 MG: 1.25 SOLUTION RESPIRATORY (INHALATION) at 13:49

## 2024-02-19 RX ADMIN — FENTANYL CITRATE 50 MCG: 50 INJECTION, SOLUTION INTRAMUSCULAR; INTRAVENOUS at 13:30

## 2024-02-19 RX ADMIN — MIDAZOLAM 4 MG: 1 INJECTION INTRAMUSCULAR; INTRAVENOUS at 16:34

## 2024-02-19 RX ADMIN — NOREPINEPHRINE BITARTRATE 1 MCG/MIN: 1 SOLUTION INTRAVENOUS at 13:20

## 2024-02-19 RX ADMIN — IPRATROPIUM BROMIDE 0.5 MG: 0.5 SOLUTION RESPIRATORY (INHALATION) at 02:41

## 2024-02-19 RX ADMIN — ETOMIDATE 20 MG: 20 INJECTION, SOLUTION INTRAVENOUS at 13:10

## 2024-02-19 RX ADMIN — PROPOFOL 50 MCG/KG/MIN: 10 INJECTION, EMULSION INTRAVENOUS at 22:00

## 2024-02-19 RX ADMIN — FENTANYL CITRATE 50 MCG: 50 INJECTION INTRAMUSCULAR; INTRAVENOUS at 13:30

## 2024-02-19 RX ADMIN — VANCOMYCIN HYDROCHLORIDE 1000 MG: 1 INJECTION, SOLUTION INTRAVENOUS at 14:26

## 2024-02-19 RX ADMIN — FUROSEMIDE 40 MG: 10 INJECTION, SOLUTION INTRAMUSCULAR; INTRAVENOUS at 09:00

## 2024-02-19 RX ADMIN — METHYLPREDNISOLONE SODIUM SUCCINATE 40 MG: 40 INJECTION, POWDER, FOR SOLUTION INTRAMUSCULAR; INTRAVENOUS at 09:29

## 2024-02-19 RX ADMIN — VANCOMYCIN HYDROCHLORIDE 1750 MG: 1 INJECTION, POWDER, LYOPHILIZED, FOR SOLUTION INTRAVENOUS at 03:48

## 2024-02-19 RX ADMIN — VECURONIUM BROMIDE 10 MG: 10 INJECTION, POWDER, LYOPHILIZED, FOR SOLUTION INTRAVENOUS at 16:54

## 2024-02-19 RX ADMIN — Medication 150 MCG/HR: at 17:29

## 2024-02-19 RX ADMIN — METHYLPREDNISOLONE SODIUM SUCCINATE 125 MG: 125 INJECTION, POWDER, FOR SOLUTION INTRAMUSCULAR; INTRAVENOUS at 09:43

## 2024-02-19 NOTE — ASSESSMENT & PLAN NOTE
Presented with shortness of breath. Recently seen at [facility] and diagnosed with Flu A  Admitted for Flu A with likely bacterial superinfection  2/18 escalating O2 requirements to HFNC. Overnight started on bipap due to tachypnea  CTA PE: No evidence for pulmonary embolus. Extensive bilateral groundglass and consolidative opacities with predominantly perihilar and peripheral distribution compatible with viral and/or atypical pneumonia, likely influenza given clinical history.  Started on solumedrol for severe PNA  Procal 0.51 --> 0.44  Sputum culture and urine antigens pending    Plan:  Continue ceftriaxone and azithromycin  Continue solumedrol- consider increasing to q8h dosing  Continue xopenex and atrovent  Maintain on BiPAP for now. If he continues with severe tachypnea, may need to intubate for impending respiratory failure  Wean FiO2 to maintain spO2 > 94%

## 2024-02-19 NOTE — SEPSIS NOTE
Sepsis Note   Hussein Edward III 58 y.o. male MRN: [id number]  Unit/Bed#: -01 Encounter: [id number]       Initial Sepsis Screening       Row Name 02/19/24 0217                Is the patient's history suggestive of a new or worsening infection? Yes (Proceed)  -SB        Suspected source of infection pneumonia  -SB        Indicate SIRS criteria Tachycardia > 90 bpm;Tachypnea > 20 resp per min  -SB        Are two or more of the above signs & symptoms of infection both present and new to the patient? Yes (Proceed)  -SB        Assess for evidence of organ dysfunction: Are any of the below criteria present within 6 hours of suspected infection and SIRS criteria that are NOT considered to be chronic conditions? --                  User Key  (r) = Recorded By, (t) = Taken By, (c) = Cosigned By      Initials Name Provider Type    SB Judi Amezquita PA-C Physician Assistant                        Body mass index is 33.78 kg/m².  Wt Readings from Last 1 Encounters:   02/19/24 92.1 kg (203 lb)     IBW (Ideal Body Weight): 61.5 kg    Ideal body weight: 61.5 kg (135 lb 9.3 oz)  Adjusted ideal body weight: 73.7 kg (162 lb 8.8 oz)

## 2024-02-19 NOTE — ASSESSMENT & PLAN NOTE
CTA PE study: No evidence for pulmonary embolus. Extensive bilateral groundglass and consolidative opacities with predominantly perihilar and peripheral distribution compatible with viral and/or atypical pneumonia, likely influenza given clinical history.   Flu A + at [facility]  Procal 0.5 --> 0.4  Concern for possible bacterial superinfection  Started on Tamiflu (D2)  Continue CTX and Azithro (D4)  Follow-up urine antigens and sputum culture

## 2024-02-19 NOTE — H&P
[facility]  H&P: Critical Care  Name: Hussein Edward III 58 y.o. male I MRN: [id number]  [facility] Date of Admission: (Not on file)   Date of Service: 2/19/2024 I Hospital Day: 0      Assessment/Plan   Neuro:   Diagnosis: Post-intubation and sedation  Pt underwent RSI w/ etomidate and succinylcholine  Now maintained w/ propofol 50 mcg/kg/hr & fentanyl 100 mcg/hr  Plan: PRN fentanyl for agitation  RASS goal -5  On Ketamine, double concentrated  Paralysis: On Nimbex, Goal ToF 2/4  Diagnosis: hx of migraines  Home meds include nurtek and diamox  Plan: holding nurtek and diamox at this time  Diagnosis: hx of insomnia  Plan: hold daridorexant at this time     CV:   Diagnosis: Hypotension likely 2/2 induction for RSI  Plan: On levo 1 mcg peripherally, now on levo 5  A-line for hemodynamic monitoring  TTE 02/19: LVEF 55-60%. G1DD.  Diagnosis: New onset atrial fibrillation  Pt found to have rates to 150s s/p CVC placement  Plan: gave one time dose of lopressor  Continue to monitor    Pulm:  Diagnosis: Acute Respiratory Distress Syndrome  Pt initially presented influenza A positive w/ increasing O2 requirements, requiring HFNC -> BiPAP -> intubation  CTA on 02/18 without evidence of PE but findings of extensive bilateral groundglass and consolidative opacities with predominantly perihilar and peripheral distribution compatible with viral and/or atypical pneumonia, likely influenza given clinical history.  Procal 0.51 -> 0.44  On 02/19, pt began having increasing WOB and tachypnea to 50s  Subsequently required RSI, was on AC/VC 32/400/100%/10  Post-intubation ABG 7.2/51/115/24  Etiology appears likely 2/2 pulmonary infection from influenza superimposed with possible bacterial infection  Strep pneumo and legionella urine antigen negative  Plan: Antibiotics as mentioned below under ID but on Ceftriaxone, Azithromycin and Vancomycin  Q4H ABGs  Prone CXR  Underwent bronchoscopy on  02/19, will follow up on results  On Solumedrol IV 40 mg Q12H  Nebs- Xopenex, Atrovent  Sputum cultures pending  Blood cultures pending    GI:   Diagnosis: post-intubation  Plan: On GI prophylaxis with famotidine 20 mg IV  Currently NPO, will continue on this for now if plans for prone positioning  Can switch to tube feeds following this if tolerated (in non-prone hours)    :   Diagnosis: CKD Stage II  Plan: sCr  baseline range of 0.8-1.0  Avoid nephrotoxic medications  Monitor sCr with daily BMPs    F/E/N:    F: Not currently on any IV fluids  E: Will monitor and correct any electrolyte derangements  N: Currently NPO    Heme/Onc:   DVT prophylaxis: On heparin subcutaneous    Endo:   Diagnosis: Steroid induced hyperglycemia  Glucose within ranges of  158-249  No prior A1c for review but based on elevated glucose readings and this being day 4 of glucocorticoids, it maybe steroid induced hyperglycemia  Plan: On SSI #1 for now    ID:   Diagnosis: Flu with superimposed bacterial multifocal pneumonia  Pt had ongoing influenza that was untreated for 4 days PTA  Likely lead to new bacterial infection, in setting of viral infection  Plan: Will continue tamiflu for now, Tamiflu 75 mg Q12H  Procalcitonin reviewed, at 0.31  On Ceftriaxone 1g Q24H and azithromycin 500 mg Q24H  On Vancomycin 1g Q12H, MRSA pending  See plan above    MSK/Skin:   Frequent turning and repositioning  Wound care as needed    Disposition: Critical care       History of Present Illness     HPI: Hussein Edward III is a 58 y.o. with a PMH of meningioma, WALE who presented to [facility] on 02/16 with worsening shortness of breath. Pt was recently diagnosed w/ influenza A 4 days PTA. Was seen in urgent care for is sx d/t increasing SOB but was discharged home without any specific treatment. On presentation, there was concern pt may have pneumonia given CXR findings at [facility]. Otherwise, pt denied chest pain, light-headedness, numbness, weakness,  abdominal pain, nausea, vomiting, or diarrhea.    While at [facility], pt became increasingly hypoxic, requiring 15L and tachypneic to 30 bpm, requiring upgrading his level of care to critical care. Pt was started on Tamiflu in the hopes of decreasing severity of infection. He was also started on IV solumedrol to help w/ inflammation. Unfortunately, pt's oxygen demands continued to rise, requiring BiPAP, and continued to be increasingly tachypneic to 50s. Due to his worsening respiratory status, decision was made to intubate the pt, which pt was amenable to. Pt was subsequently transferred to [facility] for evaluation by CT surgery for possible VV-ECMO.    History obtained from chart review and unobtainable from patient due to being currently intubated and sedated.    Review of Systems   Unable to perform ROS: Intubated      Historical Information   Past Medical History:  No date: Chronic back pain  No date: Migraine Past Surgical History:  No date: HERNIA REPAIR  No date: MANDIBLE FRACTURE SURGERY  No date: MOUTH SURGERY      Comment:  cyst in cheek  No date: NASAL SEPTUM SURGERY   Current Outpatient Medications   Medication Instructions    acetaZOLAMIDE (DIAMOX) 500 mg, Oral, 2 times daily    benzonatate (TESSALON) 200 MG capsule 1 capsule, Oral, 3 times daily PRN    Daridorexant HCl 25 mg, Oral, Daily at bedtime    eszopiclone (LUNESTA) 1 mg, Oral, Daily at bedtime PRN, Take immediately before bedtime    levocetirizine (XYZAL) 5 MG tablet 1 tablet, Every evening    magnesium oxide (MAG-OX) 400 mg, Oral, Daily, Nightly    Magnesium Oxide 400 MG CAPS 1 tablet, Daily    methocarbamol (ROBAXIN) 750 mg tablet methocarbamol 750 mg tablet   take 1 tablet by mouth every 8 hours if needed    Rimegepant Sulfate (Nurtec) 75 MG TBDP Take one NURTEC 75 mg under tongue every other day. Limit 1 in 24 hours    tamsulosin (FLOMAX) 0.4 mg, Oral, Daily with dinner    No Known Allergies   Social History     Tobacco Use    Smoking  status: Never    Smokeless tobacco: Former     Types: Snuff     Quit date: 2003   Vaping Use    Vaping status: Never Used   Substance Use Topics    Alcohol use: Never    Drug use: No    Family History   Problem Relation Age of Onset    Breast cancer Mother     Diabetes Father     No Known Problems Family     Substance Abuse Neg Hx     Mental illness Neg Hx           Objective                            Vitals I/O      Most Recent Min/Max in 24hrs   Temp   Temp  Min: 96.4 °F (35.8 °C)  Max: 98.5 °F (36.9 °C)   Pulse   Pulse  Min: 54  Max: 116   Resp   Resp  Min: 29  Max: 58   BP   BP  Min: 101/65  Max: 196/121   O2 Sat   SpO2  Min: 91 %  Max: 99 %    No intake or output data in the 24 hours ending 02/19/24 1451    No diet orders on file    Invasive Monitoring           Physical Exam   Physical Exam  Vitals and nursing note reviewed.   Eyes:      Conjunctiva/sclera: Conjunctivae normal.      Pupils: Pupils are equal, round, and reactive to light.   Skin:     General: Skin is warm.      Capillary Refill: Capillary refill takes less than 2 seconds.      Coloration: Skin is not jaundiced.   HENT:      Head: Atraumatic.      Mouth/Throat:      Mouth: Mucous membranes are dry.   Cardiovascular:      Rate and Rhythm: Normal rate and regular rhythm.      Pulses: Normal pulses.      Heart sounds: Normal heart sounds.   Musculoskeletal:      Right lower leg: No edema.      Left lower leg: No edema.   Abdominal:      Palpations: Abdomen is soft.   Constitutional:       Interventions: He is sedated and intubated.   Pulmonary:      Effort: He is intubated.      Breath sounds: Normal breath sounds.   Neurological:      Mental Status: He is unresponsive.   Genitourinary/Anorectal:  Shields present.          Diagnostic Studies      EKG: Reviewed  Imaging: I have personally reviewed pertinent reports.       Medications:  Scheduled PRN        Continuous    No current facility-administered medications for this encounter.        Labs:    CBC    Recent Labs     02/18/24 0348 02/19/24  0250 02/19/24  1130 02/19/24  1435   WBC 8.06 7.40  --   --    HGB 13.2 13.9 13.3 14.3   HCT 39.9 42.2 39 42    206  --   --      BMP    Recent Labs     02/19/24  0031 02/19/24  0250 02/19/24  1130 02/19/24  1435   SODIUM 141 142  --   --    K 4.1 4.3  --   --    * 112*  --   --    CO2 19* 21 22 26   AGAP 10 9  --   --    BUN 27* 28*  --   --    CREATININE 0.90 0.90  --   --    CALCIUM 8.5 8.4  --   --        Coags    No recent results     Additional Electrolytes  Recent Labs     02/18/24 1943 02/19/24 0250 02/19/24  1130 02/19/24  1435   MG 2.1 2.1  --   --    PHOS  --  3.9  --   --    CAIONIZED  --   --  1.17 1.16          Blood Gas    No recent results  Recent Labs     02/19/24  0031   PHVEN 7.490*   [id number] 24.9*   PO2VEN 107.6*   [id number] 18.5*   BEVEN -2.9   [id number] 95.4*    LFTs  No recent results    Infectious  Recent Labs     02/19/24  0250   PROCALCITONI 0.31*     Glucose  Recent Labs     02/18/24 0348 02/18/24 1943 02/19/24  0031 02/19/24  0250   GLUC 132 110 139 158*                 Monty Pires MD

## 2024-02-19 NOTE — PROGRESS NOTES
[facility]  Progress Note  Name: Hussein Edward III I  MRN: [id number]  Unit/Bed#: -01 I Date of Admission: 2/16/2024   Date of Service: 2/19/2024 I Hospital Day: 1    Assessment/Plan   * Acute respiratory failure with hypoxia (HCC)  Assessment & Plan  Presented with shortness of breath. Recently seen at [facility] and diagnosed with Flu A  Admitted for Flu A with likely bacterial superinfection  2/18 escalating O2 requirements to HFNC. Overnight started on bipap due to tachypnea  CTA PE: No evidence for pulmonary embolus. Extensive bilateral groundglass and consolidative opacities with predominantly perihilar and peripheral distribution compatible with viral and/or atypical pneumonia, likely influenza given clinical history.  Started on solumedrol for severe PNA  Procal 0.51 --> 0.44  Sputum culture and urine antigens pending    Plan:  Continue ceftriaxone and azithromycin  Continue solumedrol- consider increasing to q8h dosing  Continue xopenex and atrovent  Maintain on BiPAP for now. If he continues with severe tachypnea, may need to intubate for impending respiratory failure  Wean FiO2 to maintain spO2 > 94%    Pneumonia  Assessment & Plan  CTA PE study: No evidence for pulmonary embolus. Extensive bilateral groundglass and consolidative opacities with predominantly perihilar and peripheral distribution compatible with viral and/or atypical pneumonia, likely influenza given clinical history.   Flu A + at [facility]  Procal 0.5 --> 0.4  Concern for possible bacterial superinfection  Started on Tamiflu (D2)  Continue CTX and Azithro (D4)  Follow-up urine antigens and sputum culture    Anxiety  Assessment & Plan  Severe tachypnea and anxiety  Continue precedex  PRN ativan    Influenza A  Assessment & Plan  Noted at [facility]   Continue Tamiflu (D2)    Migraine  Assessment & Plan  Home regimen: Diamox 500 mg BID, Nurtec, Daridorexant PRN  Hold home Diamox  for now    Insomnia  Assessment & Plan  Daridorexant PRN    Metabolic acidosis  Assessment & Plan  Likely secondary to primary respiratory alkalosis  Continue to trend bmp      Non-traumatic rhabdomyolysis  Assessment & Plan  Baseline Cr 0.8-0.9  Cr on admssion 1.47  CK 1054  Now resolved    Meningioma (HCC)  Assessment & Plan      WALE (obstructive sleep apnea)  Assessment & Plan  Continue BiPAP             Disposition: Critical care    ICU Core Measures     A: Assess, Prevent, and Manage Pain Has pain been assessed? Yes  Need for changes to pain regimen? No   B: Both SAT/SAT  N/A   C: Choice of Sedation RASS Goal: 0 Alert and Calm  Need for changes to sedation or analgesia regimen? No   D: Delirium CAM-ICU: Negative   E: Early Mobility  Plan for early mobility? Yes   F: Family Engagement Plan for family engagement today? Yes       Antibiotic Review: Awaiting culture results.       Prophylaxis:  VTE VTE covered by:  heparin (porcine), Subcutaneous, 5,000 Units at 02/18/24 2227       Stress Ulcer  not ordered         Significant 24hr Events     24hr events: Patient upgraded to CC service due to escalating oxygen requirements. Overnight had continued tachypnea and was placed on bipap with mild improvement. Pt still able to converse and no increasing hypoxia. Started on precedex and ativan. Diuresed with 40 mg IV lasix. Remains high risk for intubation.     Subjective   Review of Systems   Constitutional:  Negative for chills.   Respiratory:  Positive for shortness of breath. Negative for cough.    Cardiovascular:  Negative for chest pain and palpitations.   Gastrointestinal:  Negative for abdominal distention, abdominal pain, nausea and vomiting.   Neurological:  Negative for dizziness, weakness, light-headedness and headaches.   All other systems reviewed and are negative.     Objective                            Vitals I/O      Most Recent Min/Max in 24hrs   Temp 98.5 °F (36.9 °C) Temp  Min: 97.9 °F (36.6 °C)  Max:  98.5 °F (36.9 °C)   Pulse 76 Pulse  Min: 76  Max: 104   Resp (!) 54 (Simultaneous filing. User may not have seen previous data.) Resp  Min: 16  Max: 58   /71 BP  Min: 101/65  Max: 146/86   O2 Sat 94 % SpO2  Min: 90 %  Max: 97 %      Intake/Output Summary (Last 24 hours) at 2/19/2024 0131  Last data filed at 2/18/2024 2200  Gross per 24 hour   Intake 210 ml   Output 500 ml   Net -290 ml       Diet NPO    Invasive Monitoring           Physical Exam   Physical Exam  Vitals reviewed.   Eyes:      Extraocular Movements: Extraocular movements intact.      Pupils: Pupils are equal, round, and reactive to light.   Skin:     General: Skin is warm and dry.   HENT:      Mouth/Throat:      Mouth: Mucous membranes are dry.   Cardiovascular:      Rate and Rhythm: Normal rate and regular rhythm.      Heart sounds: No murmur heard.     No friction rub. No gallop.   Musculoskeletal:      Right lower leg: No edema.      Left lower leg: No edema.   Abdominal: General: There is no distension.      Palpations: Abdomen is soft.      Tenderness: There is no abdominal tenderness.   Constitutional:       Appearance: He is well-developed and well-nourished. He is ill-appearing.   Pulmonary:      Effort: Tachypnea present. No accessory muscle usage, respiratory distress or accessory muscle usage.      Breath sounds: No wheezing, rhonchi or rales.   Neurological:      General: No focal deficit present.      Mental Status: He is alert and oriented to person, place and time. Mental status is at baseline.      Motor: Strength full and intact in all extremities.            Diagnostic Studies      EKG: NSR  Imaging:  I have personally reviewed pertinent reports.       Medications:  Scheduled PRN   azithromycin, 500 mg, Q24H  cefTRIAXone, 1,000 mg, Q24H  furosemide, 40 mg, Once  guaiFENesin, 600 mg, BID  heparin (porcine), 5,000 Units, Q8H YEHUDA  ipratropium, 0.5 mg, Q6H  levalbuterol, 1.25 mg, Q6H  magnesium Oxide, 400 mg,  Daily  methylPREDNISolone sodium succinate, 40 mg, Q12H YEHUDA  oseltamivir, 75 mg, Q12H YEHUDA  rimegepant sulfate, 75 mg, Every Other Day  tamsulosin, 0.4 mg, Daily With Dinner      acetaminophen, 650 mg, Q6H PRN  benzonatate, 200 mg, TID PRN  Daridorexant HCl, 25 mg, HS PRN  sodium chloride, 1 spray, Q1H PRN       Continuous    dexmedetomidine, 0.1-0.7 mcg/kg/hr, Last Rate: 0.7 mcg/kg/hr (02/18/24 4238)         Labs:    CBC    Recent Labs     02/17/24 0444 02/18/24 0348   WBC 4.76 8.06   HGB 12.9 13.2   HCT 40.2 39.9   * 175     BMP    Recent Labs     02/18/24 1943 02/19/24  0031   SODIUM 140 141   K 5.5* 4.1   * 112*   CO2 18* 19*   AGAP 10 10   BUN 25 27*   CREATININE 0.98 0.90   CALCIUM 8.4 8.5       Coags    No recent results     Additional Electrolytes  Recent Labs     02/18/24 1943   MG 2.1          Blood Gas    No recent results  Recent Labs     02/19/24  0031   PHVEN 7.490*   [id number] 24.9*   PO2VEN 107.6*   HRT3LOW 18.5*   BEVEN -2.9   [id number] 95.4*    LFTs  No recent results    Infectious  Recent Labs     02/17/24 0444   PROCALCITONI 0.44*     Glucose  Recent Labs     02/17/24 0444 02/18/24 0348 02/18/24 1943 02/19/24  0031   GLUC 173* 132 110 139             Judi Amezquita PA-C

## 2024-02-19 NOTE — QUICK NOTE
Provided update to patients wife Kathi. I informed Kathi that Hussein began to get worse over the night and was on continuous BIPAP. I reported that the critical care team was concerned that Hussein's RR was in the 50-60's for a prolonged time and that their was a low threshold to intubate Hussein if he gets any worse. I did share with Kathi that Hussein's XR this morning was concerning for ARDS and that we were attempting to give him dieretics to help his breathing. I informed Kathi that it would be an ongoing assessment to determine if Hussein required intubation. If he required intubation he would likely need to be proned. In that case he would need an arterial line and central line for definitive access. I explained these procedures to Kathi and discussed their risks and benefits. Kathi was appreciative of update and consented for any procedure Hussein may require.

## 2024-02-19 NOTE — ASSESSMENT & PLAN NOTE
Presented with shortness of breath. Recently seen at [facility] and diagnosed with Flu A  Admitted for Flu A with likely bacterial superinfection  2/18 escalating O2 requirements to HFNC. Overnight started on bipap due to tachypnea  CTA PE: No evidence for pulmonary embolus. Extensive bilateral groundglass and consolidative opacities with predominantly perihilar and peripheral distribution compatible with viral and/or atypical pneumonia, likely influenza given clinical history.  Started on solumedrol for severe PNA  Procal 0.51 --> 0.44  Sputum culture and urine antigens pending  Patient throughout the day had tachypnea and increased work of breathing.  Over the afternoon patient started to get tired reported that his breathing was worse than before.  Patient intubated for acute respiratory failure.  Patient currently on AC/VC 32/400/100%/10  Patient required push of vecuronium for vent dyssynchrony post intubation  Postintubation ABG 7.2 80/51/115/24  P:F ratio 115.  Plateau pressure 25, peak pressure 27    Plan:  Continue ceftriaxone, Vancomycin, and azithromycin  Continue solumedrol 40 mg IV Q12 hours. Given 125 mg IV x 1 due to elevated CRP  Continue xopenex and atrovent  Continue Mechanical ventilation Wean FiO2 to maintain SPO2 > 88%  Continue with propofol at 50 mcg/KG/HR and fentanyl 100 mcg/HR RASS goal -5  Patient may benefit from paralytic drip  Allow for permissive hypercapnia for now  Place for frequent ABGs-ABG and 4 hours  Transfer to [facility] for CT surgery evaluation, patient may benefit from VV ECMO

## 2024-02-19 NOTE — PLAN OF CARE
Problem: PAIN - ADULT  Goal: Verbalizes/displays adequate comfort level or baseline comfort level  Description: Interventions:  - Encourage patient to monitor pain and request assistance  - Assess pain using appropriate pain scale  - Administer analgesics based on type and severity of pain and evaluate response  - Implement non-pharmacological measures as appropriate and evaluate response  - Consider cultural and social influences on pain and pain management  - Notify physician/advanced practitioner if interventions unsuccessful or patient reports new pain  Outcome: Progressing     Problem: INFECTION - ADULT  Goal: Absence or prevention of progression during hospitalization  Description: INTERVENTIONS:  - Assess and monitor for signs and symptoms of infection  - Monitor lab/diagnostic results  - Monitor all insertion sites, i.e. indwelling lines, tubes, and drains  - Monitor endotracheal if appropriate and nasal secretions for changes in amount and color  - Johnson appropriate cooling/warming therapies per order  - Administer medications as ordered  - Instruct and encourage patient and family to use good hand hygiene technique  - Identify and instruct in appropriate isolation precautions for identified infection/condition  Outcome: Progressing  Goal: Absence of fever/infection during neutropenic period  Description: INTERVENTIONS:  - Monitor WBC    Outcome: Progressing     Problem: DISCHARGE PLANNING  Goal: Discharge to home or other facility with appropriate resources  Description: INTERVENTIONS:  - Identify barriers to discharge w/patient and caregiver  - Arrange for needed discharge resources and transportation as appropriate  - Identify discharge learning needs (meds, wound care, etc.)  - Arrange for interpretive services to assist at discharge as needed  - Refer to Case Management Department for coordinating discharge planning if the patient needs post-hospital services based on physician/advanced  practitioner order or complex needs related to functional status, cognitive ability, or social support system  Outcome: Progressing     Problem: Knowledge Deficit  Goal: Patient/family/caregiver demonstrates understanding of disease process, treatment plan, medications, and discharge instructions  Description: Complete learning assessment and assess knowledge base.  Interventions:  - Provide teaching at level of understanding  - Provide teaching via preferred learning methods  Outcome: Progressing     Problem: RESPIRATORY - ADULT  Goal: Achieves optimal ventilation and oxygenation  Description: INTERVENTIONS:  - Assess for changes in respiratory status  - Assess for changes in mentation and behavior  - Position to facilitate oxygenation and minimize respiratory effort  - Oxygen administered by appropriate delivery if ordered  - Initiate smoking cessation education as indicated  - Encourage broncho-pulmonary hygiene including cough, deep breathe, Incentive Spirometry  - Assess the need for suctioning and aspirate as needed  - Assess and instruct to report SOB or any respiratory difficulty  - Respiratory Therapy support as indicated  Outcome: Progressing

## 2024-02-19 NOTE — PROCEDURES
Intubation    Date/Time: 2/19/2024 1:00 PM    Performed by: ROSALINO Riley  Authorized by: ROSALINO Riley    Patient location:  Bedside  Other Assisting Provider: No    Consent:     Consent obtained:  Verbal    Consent given by:  Spouse    Risks discussed:  Aspiration, brain injury, dental trauma, laryngeal injury, pneumothorax, hypoxia, death and bleeding    Alternatives discussed:  No treatment  Universal protocol:     Procedure explained and questions answered to patient or proxy's satisfaction: yes      Relevant documents present and verified: yes      Test results available and properly labeled: yes      Radiology Images displayed and confirmed.  If images not available, report reviewed: yes      Required blood products, implants, devices, and special equipment available: yes      Site/side marked: yes      Immediately prior to procedure, a time out was called: yes      Patient identity confirmed:  Arm band and hospital-assigned identification number  Pre-procedure details:     Patient status:  Awake    Mallampati score:  3    Pretreatment medications:  Etomidate    Paralytics:  Succinylcholine  Indications:     Indications for intubation: respiratory distress, respiratory failure and airway protection    Procedure details:     Preoxygenation:  BiPAP    CPR in progress: no      Intubation method:  Oral    Oral intubation technique:  Direct (McGraft)    Laryngoscope blade:  Mac 3    Tube size (mm):  7.5    Tube type:  Cuffed    Number of attempts:  3 or more    Ventilation between attempts: yes      Cricoid pressure: no      Tube visualized through cords: no    Post-procedure details:     Complication (if applicable):  Unable to secure airway. Pateint bagged until anesthesia able to come to bedside.

## 2024-02-19 NOTE — PROGRESS NOTES
"[facility]  Interval Progress Note: Critical Care  Name: Hussein CARVER I  MRN: [id number]  Unit/Bed#: -01 I Date of Admission: 2/16/2024   Date of Service: 2/19/2024 I Hospital Day: 1    Interval Events:        Worsening respiratory failure. Patient getting tired on BIPAP. Patient reported \"My breathing is worse than this morning, and I am getting tired\". Decision was made to intubate.     Pertinent New Data:   blood pressure, pulse, temperature, respirations, and pulse oximetry      ABG: No results found for: \"PHART\", \"OBR0WWN\", \"PO2ART\", \"QRW5IFD\", \"L0KWQGUA\", \"BEART\", \"SOURCE\"  chest xrayI have personally reviewed pertinent reports.   and I have personally reviewed pertinent films in PACS      Assessment and Plan  Neuro: Post intubation. Pain/sedation: Continue with Propofol 50 mcg/kg/hr & Fentanyl 100 mcg/hr. RASS goal -5. PRN Fentanyl for agitation.    CV: Hypotension. Borderline low BP after induction for RSI. Likely in the setting of sedation. Patient on 1 mcg of Levo peripherally. A-line placed for hemodynamic monitoring and frequent lab draws    Pulm: Intubated and mechanically ventilated. CXR confirmed placement of ETT. On the vent: ACVC 32/400/100%/10. Patient with ventilator de-synchrony after ventilation. Improved after 10 mg IV Vecuronium. Post Vec ABG- 7.280/51.2/115/24. Plateau pressures 25, Peak pressure 27. Allow for permissive hypercapnia. Patient to be transferred to [facility] for evaluation by CT surgery for VV-ECMO.    GI: OGT in place. Set to LIS. Abdominal distention much improved. Keep NPO.     : Received 40 mg IV lasix earlier in the day. Patient is  negative 1800 ml so far today.    Dispo: Transfer to [facility]    Billing Level:  During this visit, Critical care services were medically necessary as this patient had a high probability of imminent or life-threatening deterioration due to acute respiratory failure and ARDS , required my direct attention, " intervention, and personal management to implement the following: arterial puncture, CXR interpretation , vent management, directing of titration of vasoactive drugs, directing of titration of sedation, bedside management, case discussed with consultants , and direct patient care.    I have personally provided 45 minutes on 02/19/24 of critical care time, exclusive of procedures, teaching, family meetings, and any prior time recorded by providers other than myself.    SIGNATURE: ROSALINO Riley

## 2024-02-19 NOTE — PROCEDURES
Arterial Line Insertion    Date/Time: 2/19/2024 2:57 PM    Performed by: ROSALINO Riley  Authorized by: ROSALINO Riley    Patient location:  ICU  Other Assisting Provider: No    Consent:     Consent obtained:  Verbal    Consent given by:  Spouse    Risks discussed:  Bleeding, ischemia, repeat procedure, infection and pain  Universal protocol:     Procedure explained and questions answered to patient or proxy's satisfaction: yes      Relevant documents present and verified: yes      Test results available and properly labeled: yes      Radiology Images displayed and confirmed.  If images not available, report reviewed: yes      Required blood products, implants, devices, and special equipment available: yes      Site/side marked: yes      Immediately prior to procedure a time out was called: yes      Patient identity confirmed:  Verbally with patient and hospital-assigned identification number  Indications:     Indications: hemodynamic monitoring, multiple ABGs, continuous blood pressure monitoring and frequent labs / infusion    Pre-procedure details:     Skin preparation:  Chlorhexidine    Preparation: Patient was prepped and draped in sterile fashion    Anesthesia (see MAR for exact dosages):     Anesthesia method:  None  Procedure details:     Location / Tip of Catheter:  Radial    Laterality:  Right    Zay's test performed: yes      Zay's test abnormal: no      Needle gauge:  20 G    Placement technique:  Ultrasound guided    Ultrasound image availability:  Not obtained due to urgency    Number of attempts:  4    Successful placement: yes      Transducer: waveform confirmed    Post-procedure details:     Post-procedure:  Secured with tape, sterile dressing applied and sutured    CMS:  Normal and unchanged    Patient tolerance of procedure:  Tolerated well, no immediate complications

## 2024-02-19 NOTE — PROCEDURES
Central Line Insertion    Date/Time: 2/19/2024 6:28 PM    Performed by: Monty Pires MD  Authorized by: Zach Smith MD    Patient location:  ICU and bedside  Other Assisting Provider: Yes (comment) (Dr. Rashel Jain)    Consent:     Consent obtained:  Emergent situation    Alternatives discussed:  No treatment  Universal protocol:     Procedure explained and questions answered to patient or proxy's satisfaction: yes      Relevant documents present and verified: yes      Test results available and properly labeled: yes      Radiology Images displayed and confirmed.  If images not available, report reviewed: yes      Required blood products, implants, devices, and special equipment available: yes      Site/side marked: yes      Immediately prior to procedure, a time out was called: yes      Patient identity confirmed:  Arm band  Pre-procedure details:     Hand hygiene: Hand hygiene performed prior to insertion      Sterile barrier technique: All elements of maximal sterile technique followed      Skin preparation:  2% chlorhexidine    Skin preparation agent: Skin preparation agent completely dried prior to procedure    Indications:     Central line indications: medications requiring central line and hemodynamic monitoring      Site selection rationale:  L IJ in case R IJ needed for ECMO  Anesthesia (see MAR for exact dosages):     Anesthesia method:  Local infiltration    Local anesthetic:  Lidocaine 1% w/o epi  Procedure details:     Location:  Left internal jugular    Vessel type: vein      Laterality:  Left    Approach: percutaneous technique used      Patient position:  Trendelenburg    Catheter type:  Triple lumen 20cm    Catheter size:  7 Fr    Landmarks identified: yes      Ultrasound guidance: yes      Ultrasound image availability:  Images available in PACS    Sterile ultrasound techniques: Sterile gel and sterile probe covers were used      Manometry confirmation: yes      Number of attempts:  2     Successful placement: yes      Catheter tip vessel location: atriocaval junction    Post-procedure details:     Post-procedure:  Dressing applied and line sutured    Assessment:  Blood return through all ports, no pneumothorax on x-ray and placement verified by x-ray    Post-procedure complications: none      Patient tolerance of procedure:  Tolerated well, no immediate complications

## 2024-02-19 NOTE — ASSESSMENT & PLAN NOTE
Severe tachypnea and anxiety quired Precedex drip to be able to tolerate BiPAP.  Precedex discontinued prior to intubation as the patient was bradycardia down to the 40s.  Asymptomatic.

## 2024-02-19 NOTE — ASSESSMENT & PLAN NOTE
CTA PE study: No evidence for pulmonary embolus. Extensive bilateral groundglass and consolidative opacities with predominantly perihilar and peripheral distribution compatible with viral and/or atypical pneumonia, likely influenza given clinical history.   Flu A + at [facility]  Procal 0.5 --> 0.4  Concern for possible bacterial superinfection  Started on Tamiflu (D2)  Continue CTX, Vancomycin, and Azithro (D4)  Strep pneumo and Legionella negative

## 2024-02-19 NOTE — DISCHARGE SUMMARY
[facility]  Discharge- Hussein Edward III 1965, 58 y.o. male MRN: [id number]  Unit/Bed#: -01 Encounter: [id number]  Primary Care Provider: Iftikhar Roque MD   Date and time admitted to hospital: 2/16/2024  3:27 PM    * Acute respiratory failure with hypoxia (HCC)  Assessment & Plan  Presented with shortness of breath. Recently seen at [facility] and diagnosed with Flu A  Admitted for Flu A with likely bacterial superinfection  2/18 escalating O2 requirements to HFNC. Overnight started on bipap due to tachypnea  CTA PE: No evidence for pulmonary embolus. Extensive bilateral groundglass and consolidative opacities with predominantly perihilar and peripheral distribution compatible with viral and/or atypical pneumonia, likely influenza given clinical history.  Started on solumedrol for severe PNA  Procal 0.51 --> 0.44  Sputum culture and urine antigens pending  Patient throughout the day had tachypnea and increased work of breathing.  Over the afternoon patient started to get tired reported that his breathing was worse than before.  Patient intubated for acute respiratory failure.  Patient currently on AC/VC 32/400/100%/10  Patient required push of vecuronium for vent dyssynchrony post intubation  Postintubation ABG 7.2 80/51/115/24  P:F ratio 115.  Plateau pressure 25, peak pressure 27    Plan:  Continue ceftriaxone, Vancomycin, and azithromycin  Continue solumedrol 40 mg IV Q12 hours. Given 125 mg IV x 1 due to elevated CRP  Continue xopenex and atrovent  Continue Mechanical ventilation Wean FiO2 to maintain SPO2 > 88%  Continue with propofol at 50 mcg/KG/HR and fentanyl 100 mcg/HR RASS goal -5  Patient may benefit from paralytic drip  Allow for permissive hypercapnia for now  Place for frequent ABGs-ABG and 4 hours  Transfer to [facility] for CT surgery evaluation, patient may benefit from VV ECMO    Pneumonia  Assessment & Plan  CTA PE study: No evidence for pulmonary  embolus. Extensive bilateral groundglass and consolidative opacities with predominantly perihilar and peripheral distribution compatible with viral and/or atypical pneumonia, likely influenza given clinical history.   Flu A + at [facility]  Procal 0.5 --> 0.4  Concern for possible bacterial superinfection  Started on Tamiflu (D2)  Continue CTX, Vancomycin, and Azithro (D4)  Strep pneumo and Legionella negative    Anxiety  Assessment & Plan  Severe tachypnea and anxiety quired Precedex drip to be able to tolerate BiPAP.  Precedex discontinued prior to intubation as the patient was bradycardia down to the 40s.  Asymptomatic.    Influenza A  Assessment & Plan  Noted at [facility]   Continue Tamiflu (D2)    Migraine  Assessment & Plan  Home regimen: Diamox 500 mg BID, Nurtec, Daridorexant PRN  Hold home Diamox for now    Insomnia  Assessment & Plan  Hold home dose Daridorexant for now    Metabolic acidosis  Assessment & Plan  Likely secondary to primary respiratory alkalosis  Continue to trend bmp      Non-traumatic rhabdomyolysis  Assessment & Plan  Baseline Cr 0.8-0.9  Cr on admssion 1.47  CK 1054  Now resolved    WALE (obstructive sleep apnea)  Assessment & Plan  Continue with mechanical ventilation              Medical Problems       Resolved Problems  Date Reviewed: 2/19/2024            Resolved    Chronic kidney disease 2/19/2024     Resolved by  Judi Amezquita PA-C    Abnormal CT of the chest 2/19/2024     Resolved by  Judi Amezquita PA-C          Admission Date:   Admission Orders (From admission, onward)       Ordered        02/18/24 1309  Inpatient Admission  Once            02/16/24 1610  Place in Observation  Once                            Admitting Diagnosis: Rhabdomyolysis [M62.82]  Influenza [J11.1]  Pneumonia [J18.9]  LUIS ALBERTO (acute kidney injury) (HCC) [N17.9]  Flu-like symptoms [R68.89]    HPI: Hussein Edward is a 58-year-old male with a past medical history significant for: Meningioma,  insomnia, WALE.  Presented to [facility] ED 2/16/2024 for evaluation of shortness of breath.  Patient reported that he had went to the ED at [facility] for headache and viral syndrome and was diagnosed with influenza A.  Patient was discharged home without any specific treatment.  Patient states that he came back in the hospital for worsening dyspnea, cough, chest tightness, and this time he decided to come to a different hospital system.  Patient was admitted for influenza A treatment and LUIS ALBERTO.  Initially patient was given IV fluids which improved his LUIS ALBERTO however he had progressive hypoxia requiring up to 15 L of mid flow.  Patient started to get tachypneic the night of 2/18 and critical care was consulted for evaluation as the patient required high flow nasal cannula.  Patient was placed on high flow nasal cannula and was tachypneic in the 40s to 50s.  However, patient without distress, speaking in full sentences, not hypoxic, reported not feeling short of breath.  Attempted to help tachypnea by placing patient on BiPAP.  Initially patient was hesitant, but was placed on a Precedex drip to allow patient to tolerate the BiPAP.  On the BiPAP patient was still was tachypneic in the 40s and 50s but again was not in distress.  Patient was left on BiPAP over the night.  In the morning patient was on the BiPAP and was comfortable but still tachypneic.  Repeat chest x-ray showed fluffy bilateral infiltrates concerning for fluid overload versus ARDS based on patient's history of recent viral illness.  Patient was given 40 mg IV Lasix to try and stabilize respiratory pattern, however diuresis was not successful.  In the early afternoon patient began tripoding.  On speaking with the patient patient reported that he felt short of breath suddenly and that he was getting tired out.  Decision was made to intubate the patient.  Once intubated patient had difficulty with vent synchrony requiring paralytics.  CT surgery  was reached out to at [facility].  Recommended transfer for evaluation of VV ECMO.    Procedures Performed:   Orders Placed This Encounter   Procedures    Intubation       Summary of Hospital Course: Admitted 2/16/2024 for LUIS ALBERTO and influenza A.  Started on IV fluids, broad-spectrum antibiotics with ceftriaxone, azithromycin, and vancomycin.  LUIS ALBERTO improving but patient with increasing oxygen requirements.  Patient moved over to stepdown to for high amounts of mid flow.  Night of 2/18 patient requiring high flow nasal cannula, patient tachypneic with a respiratory rate between 40 and 50.  Patient placed on BiPAP with no improvement.  2/19 patient given IV Lasix for fluid overload, chest x-ray was concerning for ARDS.  Without improvement.  Afternoon of 2/19 patient intubated for respiratory failure.  Patient for transfer to [facility] for evaluation by CT surgery for VV ECMO    Significant Findings, Care, Treatment and Services Provided:   CTA chest PE study 2/18: Negative PE. Extensive bilateral groundglass and consolidative opacities with predominantly perihilar and peripheral distribution compatible with viral and/or atypical pneumonia, likely influenza given clinical history   CXR 2/19: No change in multifocal bilateral consolidation    Complications: N/a    Condition at Discharge: critical         Discharge instructions/Information to patient and family:   See after visit summary for information provided to patient and family.      Provisions for Follow-Up Care:  See after visit summary for information related to follow-up care and any pertinent home health orders.      PCP: Iftikhar Roque MD    Disposition:  To [facility] for CT surgery evaluation    Planned Readmission: Yes     Discharge Statement   I spent 20 minutes discharging the patient. This time was spent on the day of discharge. I had direct contact with the patient on the day of discharge. Additional documentation is required if more than 30 minutes were spent on discharge.      Discharge Medications:  See after visit summary for reconciled discharge medications provided to patient and family.

## 2024-02-19 NOTE — PROGRESS NOTES
Hussein Edward III is a 58 y.o. male who is currently ordered Vancomycin IV with management by the Pharmacy Consult service.  Relevant clinical data and objective / subjective history reviewed.  Vancomycin Assessment:  Indication and Goal AUC/Trough: Pneumonia (goal -600, trough >10)  Clinical Status:  Initial dosing  Micro:     Renal Function:  SCr: 0.9 mg/dL  CrCl: 95.5 mL/min  Renal replacement: Not on dialysis  Days of Therapy: 1  Current Dose: 1750mg loading dose (25mg/kg ABW)  Vancomycin Plan:  New Dosinmg q12h, starting at 1100  Estimated AUC: 443 mcg*hr/mL  Estimated Trough: 13.9 mcg/mL  Next Level: 0600 on   Renal Function Monitoring: Daily BMP and UOP  Pharmacy will continue to follow closely for s/sx of nephrotoxicity, infusion reactions and appropriateness of therapy.  BMP and CBC will be ordered per protocol. We will continue to follow the patient’s culture results and clinical progress daily.    Sebas Tran, Pharmacist

## 2024-02-19 NOTE — PROGRESS NOTES
Hussein Edward III is a 58 y.o. male who is currently ordered Vancomycin IV with management by the Pharmacy Consult service.  Relevant clinical data and objective / subjective history reviewed.  Vancomycin Assessment:  Indication and Goal AUC/Trough: Pneumonia (goal -600, trough >10)  Clinical Status: stable  Micro:     Renal Function:  SCr: 0.9 mg/dL  CrCl: 95.5 mL/min  Renal replacement: Not on dialysis  Days of Therapy: 1  Current Dose: 1000mg Q 12H  Vancomycin Plan:  New Dosing: Continue 1000mg Q 12H  Estimated AUC: 445 mcg*hr/mL  Estimated Trough: 14 mcg/mL  Next Level: 2/20 @ 06:00  Renal Function Monitoring: Daily BMP and UOP  Pharmacy will continue to follow closely for s/sx of nephrotoxicity, infusion reactions and appropriateness of therapy.  BMP and CBC will be ordered per protocol. We will continue to follow the patient’s culture results and clinical progress daily.    Bradley Robles, Pharmacist

## 2024-02-20 ENCOUNTER — APPOINTMENT (INPATIENT)
Dept: RADIOLOGY | Facility: HOSPITAL | Age: 59
DRG: 870 | End: 2024-02-20
Payer: COMMERCIAL

## 2024-02-20 ENCOUNTER — TELEPHONE (OUTPATIENT)
Dept: FAMILY MEDICINE CLINIC | Facility: CLINIC | Age: 59
End: 2024-02-20

## 2024-02-20 LAB
ANION GAP SERPL CALCULATED.3IONS-SCNC: 9 MMOL/L
ANISOCYTOSIS BLD QL SMEAR: PRESENT
APTT PPP: 68 SECONDS (ref 23–37)
ATRIAL RATE: 197 BPM
ATRIAL RATE: 82 BPM
ATRIAL RATE: 98 BPM
BASE EXCESS BLDA CALC-SCNC: -0.3 MMOL/L
BASE EXCESS BLDA CALC-SCNC: -0.6 MMOL/L
BASE EXCESS BLDA CALC-SCNC: -3.9 MMOL/L
BASE EXCESS BLDA CALC-SCNC: -4.8 MMOL/L
BASE EXCESS BLDA CALC-SCNC: -5.1 MMOL/L
BASE EXCESS BLDA CALC-SCNC: 0.1 MMOL/L
BASE EXCESS BLDA CALC-SCNC: 1.4 MMOL/L
BASE EXCESS BLDA CALC-SCNC: 2.8 MMOL/L
BASOPHILS # BLD MANUAL: 0 THOUSAND/UL (ref 0–0.1)
BASOPHILS NFR MAR MANUAL: 0 % (ref 0–1)
BODY TEMPERATURE: 97.7 DEGREES FEHRENHEIT
BODY TEMPERATURE: 97.8 DEGREES FEHRENHEIT
BUN SERPL-MCNC: 39 MG/DL (ref 5–25)
CALCIUM SERPL-MCNC: 7.6 MG/DL (ref 8.4–10.2)
CHLORIDE SERPL-SCNC: 109 MMOL/L (ref 96–108)
CO2 SERPL-SCNC: 26 MMOL/L (ref 21–32)
CREAT SERPL-MCNC: 1.12 MG/DL (ref 0.6–1.3)
CRP SERPL QL: 40.3 MG/L
EOSINOPHIL # BLD MANUAL: 0 THOUSAND/UL (ref 0–0.4)
EOSINOPHIL NFR BLD MANUAL: 0 % (ref 0–6)
ERYTHROCYTE [DISTWIDTH] IN BLOOD BY AUTOMATED COUNT: 13.5 % (ref 11.6–15.1)
GFR SERPL CREATININE-BSD FRML MDRD: 73 ML/MIN/1.73SQ M
GIANT PLATELETS BLD QL SMEAR: PRESENT
GLUCOSE SERPL-MCNC: 161 MG/DL (ref 65–140)
GLUCOSE SERPL-MCNC: 173 MG/DL (ref 65–140)
GLUCOSE SERPL-MCNC: 193 MG/DL (ref 65–140)
GLUCOSE SERPL-MCNC: 204 MG/DL (ref 65–140)
GLUCOSE SERPL-MCNC: 245 MG/DL (ref 65–140)
HCO3 BLDA-SCNC: 22 MMOL/L (ref 22–28)
HCO3 BLDA-SCNC: 24.3 MMOL/L (ref 22–28)
HCO3 BLDA-SCNC: 25.2 MMOL/L (ref 22–28)
HCO3 BLDA-SCNC: 25.6 MMOL/L (ref 22–28)
HCO3 BLDA-SCNC: 26 MMOL/L (ref 22–28)
HCO3 BLDA-SCNC: 27 MMOL/L (ref 22–28)
HCO3 BLDA-SCNC: 27.3 MMOL/L (ref 22–28)
HCO3 BLDA-SCNC: 27.8 MMOL/L (ref 22–28)
HCT VFR BLD AUTO: 43.9 % (ref 36.5–49.3)
HGB BLD-MCNC: 13.8 G/DL (ref 12–17)
HOROWITZ INDEX BLDA+IHG-RTO: 100 MM[HG]
HOROWITZ INDEX BLDA+IHG-RTO: 40 MM[HG]
HOROWITZ INDEX BLDA+IHG-RTO: 40 MM[HG]
HOROWITZ INDEX BLDA+IHG-RTO: 50 MM[HG]
HOROWITZ INDEX BLDA+IHG-RTO: 70 MM[HG]
LYMPHOCYTES # BLD AUTO: 1.09 THOUSAND/UL (ref 0.6–4.47)
LYMPHOCYTES # BLD AUTO: 3 % (ref 14–44)
MAGNESIUM SERPL-MCNC: 2.5 MG/DL (ref 1.9–2.7)
MCH RBC QN AUTO: 29.6 PG (ref 26.8–34.3)
MCHC RBC AUTO-ENTMCNC: 31.4 G/DL (ref 31.4–37.4)
MCV RBC AUTO: 94 FL (ref 82–98)
MICROCYTES BLD QL AUTO: PRESENT
MONOCYTES # BLD AUTO: 0.78 THOUSAND/UL (ref 0–1.22)
MONOCYTES NFR BLD: 5 % (ref 4–12)
MRSA NOSE QL CULT: NORMAL
NEUTROPHILS # BLD MANUAL: 13.7 THOUSAND/UL (ref 1.85–7.62)
NEUTS BAND NFR BLD MANUAL: 4 % (ref 0–8)
NEUTS SEG NFR BLD AUTO: 84 % (ref 43–75)
O2 CT BLDA-SCNC: 18.2 ML/DL (ref 16–23)
O2 CT BLDA-SCNC: 18.6 ML/DL (ref 16–23)
O2 CT BLDA-SCNC: 18.9 ML/DL (ref 16–23)
O2 CT BLDA-SCNC: 19 ML/DL (ref 16–23)
O2 CT BLDA-SCNC: 19.1 ML/DL (ref 16–23)
O2 CT BLDA-SCNC: 19.8 ML/DL (ref 16–23)
O2 CT BLDA-SCNC: 20.7 ML/DL (ref 16–23)
O2 CT BLDA-SCNC: 21.4 ML/DL (ref 16–23)
OXYHGB MFR BLDA: 95.4 % (ref 94–97)
OXYHGB MFR BLDA: 96.6 % (ref 94–97)
OXYHGB MFR BLDA: 96.8 % (ref 94–97)
OXYHGB MFR BLDA: 97.7 % (ref 94–97)
OXYHGB MFR BLDA: 97.8 % (ref 94–97)
OXYHGB MFR BLDA: 98.1 % (ref 94–97)
OXYHGB MFR BLDA: 98.1 % (ref 94–97)
OXYHGB MFR BLDA: 98.2 % (ref 94–97)
P AXIS: 41 DEGREES
P AXIS: 47 DEGREES
PCO2 BLDA: 44.4 MM HG (ref 36–44)
PCO2 BLDA: 45.8 MM HG (ref 36–44)
PCO2 BLDA: 46.2 MM HG (ref 36–44)
PCO2 BLDA: 47.2 MM HG (ref 36–44)
PCO2 BLDA: 49.2 MM HG (ref 36–44)
PCO2 BLDA: 54.5 MM HG (ref 36–44)
PCO2 BLDA: 57.3 MM HG (ref 36–44)
PCO2 BLDA: 75.2 MM HG (ref 36–44)
PCO2 TEMP ADJ BLDA: 43.4 MM HG (ref 36–44)
PCO2 TEMP ADJ BLDA: 46.4 MM HG (ref 36–44)
PEEP RESPIRATORY: 10 CM[H2O]
PEEP RESPIRATORY: 12 CM[H2O]
PEEP RESPIRATORY: 14 CM[H2O]
PH BLD: 7.29 [PH] (ref 7.35–7.45)
PH BLD: 7.42 [PH] (ref 7.35–7.45)
PH BLDA: 7.15 [PH] (ref 7.35–7.45)
PH BLDA: 7.25 [PH] (ref 7.35–7.45)
PH BLDA: 7.29 [PH] (ref 7.35–7.45)
PH BLDA: 7.32 [PH] (ref 7.35–7.45)
PH BLDA: 7.34 [PH] (ref 7.35–7.45)
PH BLDA: 7.36 [PH] (ref 7.35–7.45)
PH BLDA: 7.38 [PH] (ref 7.35–7.45)
PH BLDA: 7.42 [PH] (ref 7.35–7.45)
PHOSPHATE SERPL-MCNC: 3.3 MG/DL (ref 2.7–4.5)
PLATELET # BLD AUTO: 295 THOUSANDS/UL (ref 149–390)
PLATELET BLD QL SMEAR: ADEQUATE
PMV BLD AUTO: 8.9 FL (ref 8.9–12.7)
PO2 BLD: 83.9 MM HG (ref 75–129)
PO2 BLD: 95.4 MM HG (ref 75–129)
PO2 BLDA: 104.2 MM HG (ref 75–129)
PO2 BLDA: 126.6 MM HG (ref 75–129)
PO2 BLDA: 138.9 MM HG (ref 75–129)
PO2 BLDA: 148.2 MM HG (ref 75–129)
PO2 BLDA: 189.4 MM HG (ref 75–129)
PO2 BLDA: 269.9 MM HG (ref 75–129)
PO2 BLDA: 86.1 MM HG (ref 75–129)
PO2 BLDA: 98.3 MM HG (ref 75–129)
POLYCHROMASIA BLD QL SMEAR: PRESENT
POTASSIUM SERPL-SCNC: 4.4 MMOL/L (ref 3.5–5.3)
PR INTERVAL: 154 MS
PR INTERVAL: 162 MS
PROCALCITONIN SERPL-MCNC: 0.36 NG/ML
QRS AXIS: -11 DEGREES
QRS AXIS: -21 DEGREES
QRS AXIS: 5 DEGREES
QRSD INTERVAL: 88 MS
QRSD INTERVAL: 90 MS
QRSD INTERVAL: 92 MS
QT INTERVAL: 310 MS
QT INTERVAL: 370 MS
QT INTERVAL: 372 MS
QTC INTERVAL: 434 MS
QTC INTERVAL: 472 MS
QTC INTERVAL: 489 MS
RBC # BLD AUTO: 4.67 MILLION/UL (ref 3.88–5.62)
RBC MORPH BLD: PRESENT
SODIUM SERPL-SCNC: 144 MMOL/L (ref 135–147)
SPECIMEN SOURCE: ABNORMAL
T WAVE AXIS: 31 DEGREES
T WAVE AXIS: 35 DEGREES
T WAVE AXIS: 46 DEGREES
VANCOMYCIN SERPL-MCNC: 23.7 UG/ML (ref 10–20)
VARIANT LYMPHS # BLD AUTO: 4 %
VENT AC: 21
VENT AC: 25
VENT AC: 25
VENT AC: 30
VENT- AC: AC
VENTRICULAR RATE: 150 BPM
VENTRICULAR RATE: 82 BPM
VENTRICULAR RATE: 98 BPM
VT SETTING VENT: 350 ML
VT SETTING VENT: 370 ML
VT SETTING VENT: 375 ML
WBC # BLD AUTO: 15.57 THOUSAND/UL (ref 4.31–10.16)

## 2024-02-20 PROCEDURE — 94640 AIRWAY INHALATION TREATMENT: CPT

## 2024-02-20 PROCEDURE — 93010 ELECTROCARDIOGRAM REPORT: CPT | Performed by: INTERNAL MEDICINE

## 2024-02-20 PROCEDURE — 99291 CRITICAL CARE FIRST HOUR: CPT | Performed by: INTERNAL MEDICINE

## 2024-02-20 PROCEDURE — 83735 ASSAY OF MAGNESIUM: CPT

## 2024-02-20 PROCEDURE — 82948 REAGENT STRIP/BLOOD GLUCOSE: CPT

## 2024-02-20 PROCEDURE — 80202 ASSAY OF VANCOMYCIN: CPT

## 2024-02-20 PROCEDURE — 82805 BLOOD GASES W/O2 SATURATION: CPT | Performed by: STUDENT IN AN ORGANIZED HEALTH CARE EDUCATION/TRAINING PROGRAM

## 2024-02-20 PROCEDURE — 85730 THROMBOPLASTIN TIME PARTIAL: CPT | Performed by: INTERNAL MEDICINE

## 2024-02-20 PROCEDURE — 94760 N-INVAS EAR/PLS OXIMETRY 1: CPT

## 2024-02-20 PROCEDURE — 84145 PROCALCITONIN (PCT): CPT

## 2024-02-20 PROCEDURE — 71045 X-RAY EXAM CHEST 1 VIEW: CPT

## 2024-02-20 PROCEDURE — 94003 VENT MGMT INPAT SUBQ DAY: CPT

## 2024-02-20 PROCEDURE — 85007 BL SMEAR W/DIFF WBC COUNT: CPT

## 2024-02-20 PROCEDURE — 80048 BASIC METABOLIC PNL TOTAL CA: CPT

## 2024-02-20 PROCEDURE — 94664 DEMO&/EVAL PT USE INHALER: CPT

## 2024-02-20 PROCEDURE — 82805 BLOOD GASES W/O2 SATURATION: CPT

## 2024-02-20 PROCEDURE — 86140 C-REACTIVE PROTEIN: CPT

## 2024-02-20 PROCEDURE — 85027 COMPLETE CBC AUTOMATED: CPT

## 2024-02-20 PROCEDURE — 84100 ASSAY OF PHOSPHORUS: CPT

## 2024-02-20 RX ORDER — DIGOXIN 0.25 MG/ML
500 INJECTION INTRAMUSCULAR; INTRAVENOUS ONCE
Status: COMPLETED | OUTPATIENT
Start: 2024-02-20 | End: 2024-02-20

## 2024-02-20 RX ORDER — FUROSEMIDE 10 MG/ML
20 INJECTION INTRAMUSCULAR; INTRAVENOUS ONCE
Status: COMPLETED | OUTPATIENT
Start: 2024-02-20 | End: 2024-02-20

## 2024-02-20 RX ORDER — HEPARIN SODIUM 1000 [USP'U]/ML
4000 INJECTION, SOLUTION INTRAVENOUS; SUBCUTANEOUS ONCE
Status: COMPLETED | OUTPATIENT
Start: 2024-02-20 | End: 2024-02-20

## 2024-02-20 RX ORDER — HEPARIN SODIUM 10000 [USP'U]/100ML
3-20 INJECTION, SOLUTION INTRAVENOUS
Status: DISCONTINUED | OUTPATIENT
Start: 2024-02-20 | End: 2024-02-21

## 2024-02-20 RX ORDER — HEPARIN SODIUM 1000 [USP'U]/ML
2000 INJECTION, SOLUTION INTRAVENOUS; SUBCUTANEOUS EVERY 6 HOURS PRN
Status: DISCONTINUED | OUTPATIENT
Start: 2024-02-20 | End: 2024-02-21

## 2024-02-20 RX ORDER — HEPARIN SODIUM 1000 [USP'U]/ML
4000 INJECTION, SOLUTION INTRAVENOUS; SUBCUTANEOUS EVERY 6 HOURS PRN
Status: DISCONTINUED | OUTPATIENT
Start: 2024-02-20 | End: 2024-02-21

## 2024-02-20 RX ORDER — FUROSEMIDE 10 MG/ML
40 INJECTION INTRAMUSCULAR; INTRAVENOUS ONCE
Status: COMPLETED | OUTPATIENT
Start: 2024-02-20 | End: 2024-02-20

## 2024-02-20 RX ORDER — VANCOMYCIN HYDROCHLORIDE 750 MG/150ML
750 INJECTION, SOLUTION INTRAVENOUS EVERY 12 HOURS
Status: DISCONTINUED | OUTPATIENT
Start: 2024-02-20 | End: 2024-02-20

## 2024-02-20 RX ADMIN — FUROSEMIDE 40 MG: 10 INJECTION, SOLUTION INTRAMUSCULAR; INTRAVENOUS at 16:43

## 2024-02-20 RX ADMIN — HEPARIN SODIUM 11 UNITS/KG/HR: 10000 INJECTION, SOLUTION INTRAVENOUS at 12:47

## 2024-02-20 RX ADMIN — WHITE PETROLATUM 57.7 %-MINERAL OIL 31.9 % EYE OINTMENT: at 08:17

## 2024-02-20 RX ADMIN — OSELTAMIVIR PHOSPHATE 75 MG: 75 CAPSULE ORAL at 20:03

## 2024-02-20 RX ADMIN — HEPARIN SODIUM 4000 UNITS: 1000 INJECTION INTRAVENOUS; SUBCUTANEOUS at 12:40

## 2024-02-20 RX ADMIN — MAGNESIUM OXIDE TAB 400 MG (241.3 MG ELEMENTAL MG) 400 MG: 400 (241.3 MG) TAB at 08:35

## 2024-02-20 RX ADMIN — LEVALBUTEROL HYDROCHLORIDE 1.25 MG: 1.25 SOLUTION RESPIRATORY (INHALATION) at 19:48

## 2024-02-20 RX ADMIN — IPRATROPIUM BROMIDE 0.5 MG: 0.5 SOLUTION RESPIRATORY (INHALATION) at 14:12

## 2024-02-20 RX ADMIN — INSULIN LISPRO 1 UNITS: 100 INJECTION, SOLUTION INTRAVENOUS; SUBCUTANEOUS at 23:53

## 2024-02-20 RX ADMIN — FUROSEMIDE 20 MG: 10 INJECTION, SOLUTION INTRAMUSCULAR; INTRAVENOUS at 11:35

## 2024-02-20 RX ADMIN — CHLORHEXIDINE GLUCONATE 15 ML: 1.2 SOLUTION ORAL at 08:35

## 2024-02-20 RX ADMIN — LEVALBUTEROL HYDROCHLORIDE 1.25 MG: 1.25 SOLUTION RESPIRATORY (INHALATION) at 14:12

## 2024-02-20 RX ADMIN — WHITE PETROLATUM 57.7 %-MINERAL OIL 31.9 % EYE OINTMENT: at 20:03

## 2024-02-20 RX ADMIN — IPRATROPIUM BROMIDE 0.5 MG: 0.5 SOLUTION RESPIRATORY (INHALATION) at 02:43

## 2024-02-20 RX ADMIN — OSELTAMIVIR PHOSPHATE 75 MG: 75 CAPSULE ORAL at 08:35

## 2024-02-20 RX ADMIN — WHITE PETROLATUM 57.7 %-MINERAL OIL 31.9 % EYE OINTMENT: at 10:25

## 2024-02-20 RX ADMIN — WHITE PETROLATUM 57.7 %-MINERAL OIL 31.9 % EYE OINTMENT: at 02:11

## 2024-02-20 RX ADMIN — WHITE PETROLATUM 57.7 %-MINERAL OIL 31.9 % EYE OINTMENT: at 16:29

## 2024-02-20 RX ADMIN — CHLORHEXIDINE GLUCONATE 15 ML: 1.2 SOLUTION ORAL at 20:03

## 2024-02-20 RX ADMIN — INSULIN LISPRO 1 UNITS: 100 INJECTION, SOLUTION INTRAVENOUS; SUBCUTANEOUS at 05:40

## 2024-02-20 RX ADMIN — PROPOFOL 50 MCG/KG/MIN: 10 INJECTION, EMULSION INTRAVENOUS at 04:59

## 2024-02-20 RX ADMIN — Medication 100 MCG/HR: at 00:56

## 2024-02-20 RX ADMIN — WHITE PETROLATUM 57.7 %-MINERAL OIL 31.9 % EYE OINTMENT: at 14:33

## 2024-02-20 RX ADMIN — LEVALBUTEROL HYDROCHLORIDE 1.25 MG: 1.25 SOLUTION RESPIRATORY (INHALATION) at 07:29

## 2024-02-20 RX ADMIN — KETAMINE HYDROCHLORIDE 0.5 MG/KG/HR: 100 INJECTION INTRAMUSCULAR; INTRAVENOUS at 15:55

## 2024-02-20 RX ADMIN — NOREPINEPHRINE BITARTRATE 2 MCG/MIN: 1 INJECTION, SOLUTION, CONCENTRATE INTRAVENOUS at 07:20

## 2024-02-20 RX ADMIN — PROPOFOL 50 MCG/KG/MIN: 10 INJECTION, EMULSION INTRAVENOUS at 11:33

## 2024-02-20 RX ADMIN — PROPOFOL 50 MCG/KG/MIN: 10 INJECTION, EMULSION INTRAVENOUS at 22:47

## 2024-02-20 RX ADMIN — PROPOFOL 50 MCG/KG/MIN: 10 INJECTION, EMULSION INTRAVENOUS at 02:05

## 2024-02-20 RX ADMIN — VANCOMYCIN HYDROCHLORIDE 1000 MG: 1 INJECTION, SOLUTION INTRAVENOUS at 01:00

## 2024-02-20 RX ADMIN — IPRATROPIUM BROMIDE 0.5 MG: 0.5 SOLUTION RESPIRATORY (INHALATION) at 19:48

## 2024-02-20 RX ADMIN — PHENYLEPHRINE HYDROCHLORIDE 25 MCG/MIN: 10 INJECTION INTRAVENOUS at 08:21

## 2024-02-20 RX ADMIN — Medication 100 MCG/HR: at 05:56

## 2024-02-20 RX ADMIN — DIGOXIN 500 MCG: 0.25 INJECTION INTRAMUSCULAR; INTRAVENOUS at 15:48

## 2024-02-20 RX ADMIN — WHITE PETROLATUM 57.7 %-MINERAL OIL 31.9 % EYE OINTMENT: at 18:02

## 2024-02-20 RX ADMIN — METHYLPREDNISOLONE SODIUM SUCCINATE 40 MG: 40 INJECTION, POWDER, FOR SOLUTION INTRAMUSCULAR; INTRAVENOUS at 08:35

## 2024-02-20 RX ADMIN — KETAMINE HYDROCHLORIDE 0.5 MG/KG/HR: 100 INJECTION INTRAMUSCULAR; INTRAVENOUS at 05:11

## 2024-02-20 RX ADMIN — WHITE PETROLATUM 57.7 %-MINERAL OIL 31.9 % EYE OINTMENT: at 12:40

## 2024-02-20 RX ADMIN — INSULIN LISPRO 1 UNITS: 100 INJECTION, SOLUTION INTRAVENOUS; SUBCUTANEOUS at 11:53

## 2024-02-20 RX ADMIN — IPRATROPIUM BROMIDE 0.5 MG: 0.5 SOLUTION RESPIRATORY (INHALATION) at 07:29

## 2024-02-20 RX ADMIN — PROPOFOL 50 MCG/KG/MIN: 10 INJECTION, EMULSION INTRAVENOUS at 08:35

## 2024-02-20 RX ADMIN — METHYLPREDNISOLONE SODIUM SUCCINATE 40 MG: 40 INJECTION, POWDER, FOR SOLUTION INTRAMUSCULAR; INTRAVENOUS at 20:03

## 2024-02-20 RX ADMIN — INSULIN LISPRO 1 UNITS: 100 INJECTION, SOLUTION INTRAVENOUS; SUBCUTANEOUS at 00:33

## 2024-02-20 RX ADMIN — LEVALBUTEROL HYDROCHLORIDE 1.25 MG: 1.25 SOLUTION RESPIRATORY (INHALATION) at 02:43

## 2024-02-20 RX ADMIN — CEFTRIAXONE SODIUM 1000 MG: 10 INJECTION, POWDER, FOR SOLUTION INTRAVENOUS at 15:44

## 2024-02-20 RX ADMIN — FAMOTIDINE 20 MG: 10 INJECTION INTRAVENOUS at 08:40

## 2024-02-20 RX ADMIN — HEPARIN SODIUM 5000 UNITS: 5000 INJECTION INTRAVENOUS; SUBCUTANEOUS at 05:55

## 2024-02-20 RX ADMIN — CISATRACURIUM BESYLATE 3 MCG/KG/MIN: 10 INJECTION INTRAVENOUS at 14:30

## 2024-02-20 RX ADMIN — PROPOFOL 50 MCG/KG/MIN: 10 INJECTION, EMULSION INTRAVENOUS at 19:00

## 2024-02-20 RX ADMIN — Medication 100 MCG/HR: at 16:19

## 2024-02-20 RX ADMIN — PROPOFOL 50 MCG/KG/MIN: 10 INJECTION, EMULSION INTRAVENOUS at 15:01

## 2024-02-20 RX ADMIN — WHITE PETROLATUM 57.7 %-MINERAL OIL 31.9 % EYE OINTMENT: at 22:16

## 2024-02-20 RX ADMIN — INSULIN LISPRO 1 UNITS: 100 INJECTION, SOLUTION INTRAVENOUS; SUBCUTANEOUS at 18:18

## 2024-02-20 RX ADMIN — WHITE PETROLATUM 57.7 %-MINERAL OIL 31.9 % EYE OINTMENT: at 23:53

## 2024-02-20 RX ADMIN — FAMOTIDINE 20 MG: 10 INJECTION INTRAVENOUS at 18:18

## 2024-02-20 RX ADMIN — AZITHROMYCIN MONOHYDRATE 500 MG: 500 INJECTION, POWDER, LYOPHILIZED, FOR SOLUTION INTRAVENOUS at 08:06

## 2024-02-20 NOTE — CONSULTS
Vancomycin IV Pharmacy-to-Dose Consultation    Hussein Edward III is a 58 y.o. male who was receiving Vancomycin IV with management by the Pharmacy Consult service for treatment of Pneumonia (goal -600, trough >10)  .       The patient’s Vancomycin therapy has been discontinued. Thank you for allowing us to take part in this patient's care. Pharmacy will sign-off now; please call or re-consult if there are any questions.        Sy Llamas, PharmD, BCCCP  Critical Care Clinical Pharmacist  Available via [employer]

## 2024-02-20 NOTE — RESPIRATORY THERAPY NOTE
RT Ventilator Management Note  Hussein Edward III 58 y.o. male MRN: [id number]  Unit/Bed#: [facility] 06 Encounter: [id number]      Daily Screen    No data found in the last 10 encounters.           Physical Exam:   Assessment Type: (P) Assess only  General Appearance: Sedated  Respiratory Pattern: Assisted  Chest Assessment: Chest expansion symmetrical  Bilateral Breath Sounds: Diminished, Coarse  Cough: Non-productive  Suction: ET Tube  O2 Device: Ventilator      Resp Comments: (P) Pt. doing well on CMV. FiO2 titrated to 40%, rate changed to 25, Vt changed to 370 and I time decreased to .9. No other changes throughout the night.

## 2024-02-20 NOTE — TELEPHONE ENCOUNTER
----- Message from Iftikhar Roque MD sent at 2/20/2024  6:28 AM EST -----  NL echo  ----- Message -----  From: Nilesh Saenz MD  Sent: 2/19/2024   5:05 PM EST  To: Iftikhar Roque MD

## 2024-02-20 NOTE — PROGRESS NOTES
[facility], [place]  Progress Note: Critical Care  Name: Hussein Edward III 58 y.o. male I MRN: [id number]  Unit/Bed#: MICU 06 I Date of Admission: 2/19/2024   Date of Service: 2/20/2024 I Hospital Day: 1    Assessment/Plan   Neuro:   Diagnosis: Post-intubation and sedation  Pt underwent RSI w/ etomidate and succinylcholine  Now maintained w/ propofol 50 mcg/kg/hr & fentanyl 100 mcg/hr.   Plan: PRN fentanyl for agitation  RASS goal -5  On Ketamine, double concentrated  Paralysis: On Nimbex, Goal Train of Four 2/4  Consider decreasing sedation once supine, consider ketamine due to patient's tachycardia  Diagnosis: hx of migraines  Home meds include nurtek and diamox  Plan: holding nurtek and diamox at this time  Diagnosis: hx of insomnia  Plan: hold daridorexant at this time      CV:   Diagnosis: Hypotension likely 2/2 induction for RSI  Plan: D/c Levo this AM, started Max gtt  A-line for hemodynamic monitoring  TTE 02/19: LVEF 55-60%. G1DD.  Diagnosis: New onset atrial fibrillation  Pt found to have rates to 150s s/p CVC placement  Plan: gave one time dose of lopressor, patient started on amiodarone drip  Continue to monitor, HR better at low 100s  Consider d/c ketamine when patient supine     Pulm:  Diagnosis: Acute Respiratory Distress Syndrome  Pt initially presented influenza A positive w/ increasing O2 requirements, requiring HFNC -> BiPAP -> intubation  CTA on 02/18 without evidence of PE but findings of extensive bilateral groundglass and consolidative opacities with predominantly perihilar and peripheral distribution compatible with viral and/or atypical pneumonia, likely influenza given clinical history.  Procal 0.51 -> 0.44 --> 0.31 --> 0.36  On 02/19, pt began having increasing WOB and tachypnea to 50s  Subsequently required RSI, was on AC/VC 32/400/100%/10  Post-intubation ABG 7.2/51/115/24  Most recent ABG 7.2/47/86/22, respiratory acidosis improving  PaO2/FiO2 260, mild  ARDS  Etiology appears likely 2/2 pulmonary infection from influenza superimposed with possible bacterial infection  Lungs sound clear to ausc. Consider diuretic, patient 0.5L net positive for I/O.  Consider Veletri to improve oxygenation  Strep pneumo and legionella urine antigen negative  Plan: Antibiotics as mentioned below under ID but on Ceftriaxone, Azithromycin and Vancomycin  Q4H ABGs  Prone CXR  Underwent bronchoscopy on 02/19, will follow up on results  On Solumedrol IV 40 mg Q12H  Nebs- Xopenex, Atrovent  Sputum cultures pending  Blood cultures pending     GI:   Diagnosis: post-intubation  Plan: On GI prophylaxis with famotidine 20 mg IV  Currently NPO, will continue on this for now if plans for prone positioning  Can switch to tube feeds following this if tolerated (in non-prone hours)     :   Diagnosis: CKD Stage II  Plan: sCr  baseline range of 0.8-1.0  Avoid nephrotoxic medications  Monitor sCr with daily BMPs     F/E/N:    F: Not currently on any IV fluids  E: Will monitor and correct any electrolyte derangements  N: Currently NPO     Heme/Onc:   DVT prophylaxis: On heparin subcutaneous     Endo:   Diagnosis: Steroid induced hyperglycemia  Glucose within ranges of  158-249  No prior A1c for review but based on elevated glucose readings and this being day 4 of glucocorticoids, it maybe steroid induced hyperglycemia  Plan: On SSI #1 for now     ID:   Diagnosis: Flu with superimposed bacterial multifocal pneumonia  Pt had ongoing influenza that was untreated for 4 days PTA  Likely lead to new bacterial infection, in setting of viral infection  Sputum cultures pending  Blood cultures pending  Underwent bronchoscopy on 02/19, will follow up on results  Plan: Will continue tamiflu for now, Tamiflu 75 mg Q12H  Procalcitonin reviewed, at 0.36  On Ceftriaxone 1g Q24H and azithromycin 500 mg Q24H  On Vancomycin 1g Q12H, MRSA pending  See plan above     MSK/Skin:   Frequent turning and repositioning  Wound  care as needed       Disposition: Critical care    ICU Core Measures     Vented Patient  VAP Bundle  VAP bundle ordered     A: Assess, Prevent, and Manage Pain Has pain been assessed? NA  Need for changes to pain regimen? NA   B: Both Spontaneous Awakening Trials (SATs) and Spontaneous Breathing Trials (SBTs) Plan to perform spontaneous awakening trial today? Modified and patient remained on sedation other than precedex  Plan to perform spontaneous breathing trial today? Yes   Obvious barriers to extubation? Yes   C: Choice of Sedation RASS Goal: -5 Unarousable or 0 Alert and Calm  Need for changes to sedation or analgesia regimen? No   D: Delirium CAM-ICU: Unable to perform secondary to Acute cognitive dysfunction   E: Early Mobility  Plan for early mobility? Yes   F: Family Engagement Plan for family engagement today? Yes       Antibiotic Review: Awaiting culture results.     Review of Invasive Devices:    Shields Plan: Continue for accurate I/O monitoring for 48 hours  Central access plan: Medications requiring central line Hemodynamic monitoring  [place] Plan: Keep arterial line for hemodynamic monitoring and frequent ABGs    Prophylaxis:  VTE VTE covered by:  heparin (porcine), Subcutaneous, 5,000 Units at 02/20/24 0555       Stress Ulcer  covered byFamotidine (PF) (PEPCID) injection 20 mg [[id number]]        Significant 24hr Events     24hr events: Overnight, patient was made prone at 9 PM. Put on amio drip for afib. He improved but still in Afib. No diuresis overnight.     Subjective     Review of Systems   Unable to perform ROS: Intubated        Objective                            Vitals I/O      Most Recent Min/Max in 24hrs   Temp 99.1 °F (37.3 °C) Temp  Min: 96.5 °F (35.8 °C)  Max: 99.3 °F (37.4 °C)   Pulse (!) 114 Pulse  Min: 52  Max: 156   Resp (!) 25 Resp  Min: 15  Max: 52   BP 90/51 BP  Min: 90/51  Max: 196/121   O2 Sat 98 % SpO2  Min: 90 %  Max: 99 %      Intake/Output Summary (Last 24 hours) at 2/20/2024  0905  Last data filed at 2/20/2024 0821  Gross per 24 hour   Intake 1961.5 ml   Output 1800 ml   Net 161.5 ml       Diet NPO    Invasive Monitoring   Arterial Line  Prudence /62  Arterial Line BP  Min: 91/51  Max: 145/80   MAP 78 mmHg  Arterial Line MAP (mmHg)  Min: 63 mmHg  Max: 103 mmHg           Physical Exam   Physical Exam  HENT:      Head: Normocephalic and atraumatic.   Cardiovascular:      Pulses: Normal pulses.   Musculoskeletal:      Right lower leg: No edema.      Left lower leg: No edema.   Constitutional:       Interventions: He is sedated and intubated.      Comments: prone   Pulmonary:      Effort: Pulmonary effort is normal. He is intubated.      Breath sounds: Normal breath sounds.      Comments: intubated  Genitourinary/Anorectal:  Shields present.          Diagnostic Studies      EKG: reviewed.  Imaging:  I have personally reviewed pertinent reports.       Medications:  Scheduled PRN   artificial tear, , Q2H  azithromycin, 500 mg, Q24H  cefTRIAXone, 1,000 mg, Q24H  chlorhexidine, 15 mL, Q12H YEHUDA  Famotidine (PF), 20 mg, BID  fentanyl citrate (PF), 100 mcg, Once  heparin (porcine), 5,000 Units, Q8H YEHUDA  insulin lispro, 1-5 Units, Q6H  ipratropium, 0.5 mg, Q6H  levalbuterol, 1.25 mg, Q6H  magnesium Oxide, 400 mg, Daily  methylPREDNISolone sodium succinate, 40 mg, Q12H YEHUDA  midazolam, 2 mg, Once  oseltamivir, 75 mg, Q12H YEHUDA  vancomycin, 750 mg, Q12H      acetaminophen, 650 mg, Q6H PRN  sodium chloride, 1 spray, Q1H PRN       Continuous    amiodarone (CORDARONE) 900 mg in dextrose 5 % 500 mL infusion, 0.5 mg/min, Last Rate: 0.5 mg/min (02/20/24 0320)  cisatracurium (NIMBEX) in 0.9% sodium chloride infusion, 0.1-5 mcg/kg/min, Last Rate: 3 mcg/kg/min (02/20/24 0631)  fentaNYL, 100 mcg/hr, Last Rate: 100 mcg/hr (02/20/24 0556)  ketamine, 0.5 mg/kg/hr, Last Rate: 0.5 mg/kg/hr (02/20/24 0544)  phenylephine,  mcg/min, Last Rate: 25 mcg/min (02/20/24 0821)  propofol, 5-50 mcg/kg/min, Last Rate: 50  mcg/kg/min (02/20/24 0835)         Labs:    CBC    Recent Labs     02/19/24  1649 02/20/24  0527 02/20/24  0527   WBC 14.49* 15.57*  --    HGB 14.6 13.8  --    HCT 45.6 43.9  --     295  --    BANDSPCT  --   --  4     BMP    Recent Labs     02/19/24  1649 02/20/24  0502   SODIUM 143 144   K 3.7 4.4    109*   CO2 24 26   AGAP 11 9   BUN 38* 39*   CREATININE 1.12 1.12   CALCIUM 8.1* 7.6*       Coags    No recent results     Additional Electrolytes  Recent Labs     02/19/24  0250 02/19/24  1130 02/19/24  1435 02/20/24  0502   MG 2.1  --   --  2.5   PHOS 3.9  --   --  3.3   CAIONIZED  --  1.17 1.16  --           Blood Gas    Recent Labs     02/20/24  0817   PHART 7.317*   [id number] 54.5*   PO2ART 104.2   XUW5OUT 27.3   BEART 0.1   SOURCE Line, Arterial     Recent Labs     02/19/24 0031 02/19/24  1650 02/20/24  0817   PHVEN 7.490*  --   --    DQJ6KCW 24.9*  --   --    PO2VEN 107.6*  --   --    JOW4BCK 18.5*  --   --    BEVEN -2.9  --   --    [id number] 95.4*  --   --    SOURCE  --    < > Line, Arterial    < > = values in this interval not displayed.    LFTs  Recent Labs     02/19/24 1649   ALT 59*   AST 49*   ALKPHOS 106*   ALB 3.2*   TBILI 1.77*       Infectious  Recent Labs     02/19/24  0250 02/20/24  0508   PROCALCITONI 0.31* 0.36*     Glucose  Recent Labs     02/19/24  0031 02/19/24  0250 02/19/24  1649 02/20/24  0502   GLUC 139 158* 249* 245*                   Marija Mccollum

## 2024-02-20 NOTE — CASE MANAGEMENT
Case Management Assessment & Discharge Planning Note    Patient name Hussein Edward III  [facility] [id number]  : 1965 Date 2024       Current Admission Date: 2024  Current Admission Diagnosis:ARDS (adult respiratory distress syndrome) (HCC)   Patient Active Problem List    Diagnosis Date Noted    Influenza A 2024    Insomnia 2024    Migraine 2024    Anxiety 2024    Acute respiratory failure with hypoxia (HCC) 2024    Metabolic acidosis 2024    Non-traumatic rhabdomyolysis 2024    Pneumonia 2024    Influenza 2024    Obstructive sleep apnea (adult) (pediatric) 2023    Primary insomnia 2023    Abnormal finding on MRI of brain 10/01/2021    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 [address] [place] [id number]  Phone: [phone] Fax: [phone]    [facility] [address] [facility] [place] - [address]  [address]  [facility] [address]  Phone: [phone] Fax: [phone]    Primary Care Provider: Iftikhar Roque MD    Primary Insurance: AET[employer]  Secondary Insurance:     ASSESSMENT:  Active Health Care Proxies       Cheyanne Kathi Health Care Representative - Spouse   Primary Phone: [phone]  (Mobile)  Home Phone: [phone]                           Readmission Root Cause  30 Day Readmission: No    Patient Information  Admitted from:: Home  Mental Status: Sedated, Intubated  During Assessment patient was accompanied by: Spouse  Assessment information provided by:: Spouse  Primary Caregiver: Self  Support Systems: Spouse/significant other, Son, Family members (2 sons)  County [place] Residence: [facility]  What city do you live in?: [facility] [place] [facility]  Home entry access options. Select all that apply.: Stairs  Number of steps to enter home.: 2  Do the steps have railings?: Yes  Type of Current Residence: [facility]  Living Arrangements: Lives w/ Spouse/significant other, Lives w/ Son  Is patient a ?: No    Activities of Daily Living Prior to Admission  Functional Status: Independent  Completes ADLs independently?: Yes  Ambulates independently?: Yes  Does patient use assisted devices?: Yes  Assisted Devices (DME) used: CPAP  Does patient currently own DME?: Yes  What DME does the patient currently own?: CPAP  Does patient have a history of Outpatient Therapy (PT/OT)?: No  Does the patient have a history of Short-Term Rehab?: No  Does patient have a history of HHC?: No  Does patient currently have HHC?: No         Patient Information Continued  Income Source: Employed  Does patient have prescription coverage?: Yes  Does patient receive dialysis treatments?: No  Does patient have a history of substance abuse?: No  Does patient have a history of Mental Health Diagnosis?: No         Means of Transportation  Means of Transport to Appts:: Drives Self      Social Determinants of Health (SDOH)      Flowsheet Row Most Recent Value   Housing Stability    In the last 12 months, was there a time when you were not able to pay the mortgage or rent on time? N   In the last 12 months, how many places have you lived? 1   In the last 12 months, was there a time when you did not have a steady place to sleep or slept in a shelter  (including now)? N   Transportation Needs    In the past 12 months, has lack of transportation kept you from medical appointments or from getting medications? no   In the past 12 months, has lack of transportation kept you from meetings, work, or from getting things needed for daily living? No   Food Insecurity    Within the past 12 months, you worried that your food would run out before you got the money to buy more. Never true   Within the past 12 months, the food you bought just didn't last and you didn't have money to get more. Never true   Utilities    In the past 12 months has the electric, gas, oil, or water company threatened to shut off services in your home? No            DISCHARGE DETAILS:    Discharge planning discussed with:: wife--pt intubated. Introduced role of CM and possible need for rehab when ready for discharge. Wife receptive  Freedom of Choice: Yes  Comments - Freedom of Choice: acute vs snf rehab anticipated  CM contacted family/caregiver?: Yes  Were Treatment Team discharge recommendations reviewed with patient/caregiver?: Yes  Did patient/caregiver verbalize understanding of patient care needs?: Yes  Were patient/caregiver advised of the risks associated with not following Treatment Team discharge recommendations?: Yes    Contacts  Patient Contacts: wife Kathi Edward  Relationship to Patient:: Family  Contact Method: Phone  Phone Number: [phone]  Reason/Outcome: Continuity of Care, Emergency Contact, Discharge Planning    Requested Home Health Care         Is the patient interested in HHC at discharge?: No    DME Referral Provided  Referral made for DME?: No    Other Referral/Resources/Interventions Provided:  Interventions: Acute Rehab  Referral Comments: level of rehab needed tbd    Would you like to participate in our [facility] service program?  : No - Declined    Treatment Team Recommendation: Other (tbd)  Discharge Destination Plan:: Other (tbd)  Transport at Discharge :  [facility], [facility]                                      Additional Comments: CM spoke with wife, pt in MICU on ventilator. Dx ARDS, respiratory failure, influenza A, PNA, shock. On Vent @70%, On Zithromax, Ceftriaxone and Vancomycin IV and Solumedrol IV. Also on 6 gtts. Pt. is employed full time, active, they have 2 sons, one lives with them in [place] community. She is recsptive to discussion about acute rehabs when needed. Pt. has Rx plan

## 2024-02-20 NOTE — RESPIRATORY THERAPY NOTE
RT Ventilator Management Note  Hussein Edward III 58 y.o. male MRN: [id number]  Unit/Bed#: [facility] 06 Encounter: [id number]      Daily Screen    No data found in the last 10 encounters.           Physical Exam:   Assessment Type: Assess only  General Appearance: Sedated  Respiratory Pattern: Assisted  Chest Assessment: Chest expansion symmetrical  Bilateral Breath Sounds: Diminished, Coarse  Cough: Non-productive  Suction: ET Tube  O2 Device: Ventilator      Resp Comments: (P) Pt's head turned to left. ETT moved to the left side of mouth as well. Pt. doing well post turn. Will continue to monitor.

## 2024-02-20 NOTE — RESPIRATORY THERAPY NOTE
RT Ventilator Management Note  Hussein Edward III 58 y.o. male MRN: [id number]  Unit/Bed#: [facility] 06 Encounter: [id number]      Daily Screen    No data found in the last 10 encounters.           Physical Exam:   Assessment Type: Assess only  General Appearance: Sedated  Respiratory Pattern: Assisted  Chest Assessment: Chest expansion symmetrical  Bilateral Breath Sounds: Diminished, Coarse  Cough: Non-productive  Suction: ET Tube  O2 Device: Ventilator      Resp Comments: (P) Pt's. head turned to the right and ETT moved to the right side of the mouth. Pt. doing well post turn. Will continue to monitor.

## 2024-02-20 NOTE — UTILIZATION REVIEW
Initial Clinical Review    Admission: Date/Time/Statement:   Admission Orders (From admission, onward)       Ordered        02/19/24 1618  Inpatient Admission  Once                          Orders Placed This Encounter   Procedures    Inpatient Admission     Standing Status:   Standing     Number of Occurrences:   1     Order Specific Question:   Level of Care     Answer:   Critical Care [15]     Order Specific Question:   Estimated length of stay     Answer:   More than 2 Midnights     Order Specific Question:   Certification     Answer:   I certify that inpatient services are medically necessary for this patient for a duration of greater than two midnights. See H&P and MD Progress Notes for additional information about the patient's course of treatment.     ED Arrival Information       Patient not seen in ED                       No chief complaint on file.      Initial Presentation: 58 y.o. male w/ PMH of meningioma, WALE was transferred from [facility] to [facility] for a higher level of care.  Pt initially presented to [facility] on 02/16 w/ worsening SOB. She was recently dx w/ Influenza A 4 days PTA. Seen in , discharged home w/o any specific tx. In the ED, she became increasingly hypoxic, requiring 15L and tachypneic to 30. Given Tamiflu and IV Solu Medrol. O2 requirement continued to rise requiring BIPAP. Continued to be increasingly tachypneic to 50s. Intubated d/t worsening resp status.  Transferred to [facility] for evaluation by CT surgery for possible VV-ECMO.   On arrival to [facility], pt intubated, unresponsive.    Admit as Inpatient for evaluation and treatment of acute hypoxic and hypercapnic respiratory failure 2/2 ARDS,  Influenza A pneumonia, Acute metabolic encephalopathy, shock status/hypotension.  Plan: cont MV w/ VAC. Cont sedation. Cont as needed Versed and vecuronium for now. Start ketamine infusion. Start Nimbex infusion. Continue norepinephrine and titrate for MAP > 65. Consider Veletri. Continue IV  steroids, cont antibiotics. Bronchoscopy to obtain BAL. Will prone patient tonight. Start trickle tube feed tomorrow after supine position then continue. trend LFTs. Start trickle tube feed tomorrow after supine position then continue. trend LFTs. follow cultures. Accu-Cheks and sliding scale insulin if needed while on steroids.    Date: 02/20   Day 2: Overnight, patient was made prone at 9 PM. Put on amio drip for afib. He improved but still in Afib. Currently intubated and sedate, cont. Cont Nimbex, wean when on supine position. Cont phenylephrine and titrate for MAP >65. Continue amiodarone infusion. Start heparin infusion. Give 20 mg IV Lasix and may repeat as needed. switch patient to supine position at noon time and then again proning at night. Check ABG after supine, start weaning PEEP as tolerated to maintain SaO2 >88% and PaO2 >55. Follow BAL results. Start trickle tube feeds after supine position. Continue GI ppx. Monitor UOP, diuresis with IV Lasix . Continue ceftriaxone and azithromycin. Continue Tamiflu. DC vancomycin. Continue Accu-Cheks and sliding scale insulin     ED Triage Vitals   Temperature Pulse Respirations Blood Pressure SpO2   02/19/24 1700 02/19/24 1700 02/19/24 1700 02/19/24 2205 02/19/24 1615   99.3 °F (37.4 °C) (!) 118 15 90/51 90 %      Temp Source Heart Rate Source Patient Position - Orthostatic VS BP Location FiO2 (%)   02/19/24 1700 02/20/24 0000 -- -- 02/19/24 2000   Axillary Monitor   100      Pain Score       02/19/24 1634       Med Not Given for Pain - for MAR use only          Wt Readings from Last 1 Encounters:   02/20/24 92.1 kg (203 lb 0.7 oz)     Additional Vital Signs:   Date/Time Temp Pulse Resp BP MAP (mmHg) Arterial Line BP MAP SpO2 FiO2 (%) O2 Device   02/20/24 1430 99.9 °F (37.7 °C) 128 Abnormal  30 Abnormal  -- -- 97/49 65 mmHg 99 % -- --   02/20/24 1400 99.9 °F (37.7 °C) 118 Abnormal  30 Abnormal  -- -- 96/52 65 mmHg 99 % -- --   02/20/24 1355 99.9 °F (37.7 °C) 116  Abnormal  30 Abnormal  -- -- 92/51 63 mmHg Abnormal  99 % 40  --   FiO2 (%): peep=12 at 02/20/24 1355   02/20/24 1330 99.9 °F (37.7 °C) 117 Abnormal  30 Abnormal  -- -- 94/51 64 mmHg Abnormal  99 % -- --   02/20/24 1327 99.9 °F (37.7 °C) 117 Abnormal  30 Abnormal  -- -- 91/51 64 mmHg Abnormal  99 % -- --   02/20/24 1230 99.7 °F (37.6 °C) 128 Abnormal  30 Abnormal  -- -- 115/61 79 mmHg 98 % -- --   02/20/24 1223 -- -- -- -- -- -- -- 95 % -- --   02/20/24 1200 99.5 °F (37.5 °C) 119 Abnormal  30 Abnormal  -- -- 120/62 78 mmHg 98 % -- --   02/20/24 1130 99.5 °F (37.5 °C) 117 Abnormal  30 Abnormal  -- -- 114/60 76 mmHg 97 % -- --   02/20/24 1106 -- -- -- -- -- -- -- 98 % -- --   02/20/24 1100 99.3 °F (37.4 °C) 112 Abnormal  30 Abnormal  -- -- 116/60 76 mmHg 98 % -- --   02/20/24 1030 99.1 °F (37.3 °C) 112 Abnormal  30 Abnormal  -- -- 119/62 79 mmHg 98 % -- --   02/20/24 1000 99.1 °F (37.3 °C) 110 Abnormal  30 Abnormal  -- -- 120/63 80 mmHg 98 % -- --   02/20/24 0930 99.1 °F (37.3 °C) 111 Abnormal  30 Abnormal  -- -- 115/63 79 mmHg 98 % -- --   02/20/24 0900 99.1 °F (37.3 °C) 114 Abnormal  25 Abnormal  -- -- 116/62 78 mmHg 98 % -- --   02/20/24 0830 99.1 °F (37.3 °C) 120 Abnormal  25 Abnormal  122/62 80 122/62 80 mmHg 98 % -- --   02/20/24 0800 99.1 °F (37.3 °C) 127 Abnormal  25 Abnormal  -- -- 108/50 69 mmHg 97 % 40 Ventilator   02/20/24 0730 -- 115 Abnormal  24 Abnormal  -- -- 104/79 89 mmHg 97 % -- --   02/20/24 0729 99 °F (37.2 °C) 113 Abnormal  25 Abnormal  -- -- 107/77 87 mmHg 96 % -- --   02/20/24 0700 -- 108 Abnormal  -- -- -- 112/65 79 mmHg 97 % -- --   02/20/24 0500 -- 117 Abnormal  -- -- -- 110/62 76 mmHg 96 % -- --   02/20/24 0400 97.8 °F (36.6 °C) 117 Abnormal  -- -- -- 113/63 77 mmHg 98 % 40 Ventilator   02/20/24 0300 -- 109 Abnormal  -- -- -- 113/61 77 mmHg 94 % -- --   02/20/24 0246 -- -- -- -- -- -- -- 94 % -- --   02/20/24 0200 -- 101 -- -- -- 118/61 80 mmHg 99 % -- --   02/20/24 0100 -- 113 Abnormal  --  -- -- 108/63 77 mmHg 98 % -- --   02/20/24 0000 97.8 °F (36.6 °C) 109 Abnormal  -- -- -- 100/59 73 mmHg 99 % 100 Ventilator   02/19/24 2330 97.5 °F (36.4 °C) -- -- -- -- -- -- -- -- --   02/19/24 2319 -- -- -- -- -- -- -- 99 % -- --   02/19/24 2300 -- 115 Abnormal  -- -- -- 99/56 69 mmHg 99 % -- --   02/19/24 2205 -- 127 Abnormal  -- 90/51 -- -- -- -- -- --   02/19/24 2200 -- 129 Abnormal  -- -- -- 91/51 63 mmHg Abnormal  99 % -- --   02/19/24 2100 99 °F (37.2 °C) 132 Abnormal  -- -- -- -- -- 97 % -- --   02/19/24 2000 98.4 °F (36.9 °C) 132 Abnormal  -- -- -- 101/57 69 mmHg 97 % 100 Ventilator   02/19/24 1926 -- -- -- -- -- -- -- 97 % -- --   02/19/24 1922 -- -- -- -- -- -- -- 98 % -- --   02/19/24 1900 -- 119 Abnormal  -- -- -- 99/55 68 mmHg 97 % -- --   02/19/24 1830 -- 153 Abnormal  -- -- -- 95/52 65 mmHg 95 % -- --   02/19/24 1810 -- 154 Abnormal  -- -- -- 100/56 70 mmHg 91 % -- --   02/19/24 1809 -- 155 Abnormal  -- -- -- 99/56 69 mmHg 91 % -- --   02/19/24 1808 -- 151 Abnormal  -- -- -- 102/57 70 mmHg 91 % -- --   02/19/24 1807 -- 156 Abnormal  -- -- -- 97/55 69 mmHg 91 % -- --   02/19/24 1806 -- 155 Abnormal  -- -- -- 98/55 69 mmHg 91 % -- --   02/19/24 1805 -- 156 Abnormal  -- -- -- 95/55 68 mmHg 91 % -- --   02/19/24 1804 -- 154 Abnormal  -- -- -- 94/52 65 mmHg 92 % -- --   02/19/24 1800 -- 119 Abnormal  -- -- -- 110/61 75 mmHg 92 % -- --   02/19/24 1700 99.3 °F (37.4 °C) 118 Abnormal  15 -- -- 134/65 87 mmHg 93 % -- --       Pertinent Labs/Diagnostic Test Results:   Pre-prone CXR   Final Result by Iftikhar Castro MD (02/20 1335)      Interval placement of left internal jugular central venous catheter with tip overlying the right atrium without pneumothorax.      Unchanged bilateral multifocal airspace opacities.         Resident: SHIRLEY FRTIZ I, the attending radiologist, have reviewed the images and agree with the final report above.      Workstation performed: [id number]         XR chest  portable ICU    (Results Pending)     02/19 ECHO:    Left Ventricle: Left ventricular cavity size is normal. Wall thickness is mildly increased. There is concentric remodeling. The left ventricular ejection fraction is 55-60%. Systolic function is normal. Wall motion is normal. Diastolic function is mildly abnormal, consistent with grade I (abnormal) relaxation.    Right Ventricle: Right ventricular cavity size is normal. Systolic function is normal.    Left Atrium: The atrium is normal in size.    Right Atrium: The atrium is normal in size.      Results from last 7 days   Lab Units 02/20/24  0527 02/20/24  0527 02/19/24  1649 02/19/24  1435 02/19/24  1130 02/19/24  0250 02/17/24  0444 02/16/24  1349   WBC Thousand/uL  --  15.57* 14.49*  --   --  7.40   < > 6.21   HEMOGLOBIN g/dL  --  13.8 14.6  --   --  13.9   < > 14.8   I STAT HEMOGLOBIN g/dl  --   --   --  14.3 13.3  --   --   --    HEMATOCRIT %  --  43.9 45.6  --   --  42.2   < > 45.3   HEMATOCRIT, ISTAT %  --   --   --  42 39  --   --   --    PLATELETS Thousands/uL  --  295 325  --   --  206   < > 146*   NEUTROS ABS Thousands/µL  --   --  13.15*  --   --   --   --  5.29   BANDS PCT % 4  --   --   --   --   --   --   --     < > = values in this interval not displayed.         Results from last 7 days   Lab Units 02/20/24  0502 02/19/24  1649 02/19/24  1435 02/19/24  1130 02/19/24  0250 02/19/24  0031 02/18/24  1943   SODIUM mmol/L 144 143  --   --  142 141 140   POTASSIUM mmol/L 4.4 3.7  --   --  4.3 4.1 5.5*   CHLORIDE mmol/L 109* 108  --   --  112* 112* 112*   CO2 mmol/L 26 24  --   --  21 19* 18*   CO2, I-STAT mmol/L  --   --  26 22  --   --   --    ANION GAP mmol/L 9 11  --   --  9 10 10   BUN mg/dL 39* 38*  --   --  28* 27* 25   CREATININE mg/dL 1.12 1.12  --   --  0.90 0.90 0.98   EGFR ml/min/1.73sq m 73 73  --   --  95 95 85   CALCIUM mg/dL 7.6* 8.1*  --   --  8.4 8.5 8.4   CALCIUM, IONIZED, ISTAT mmol/L  --   --  1.16 1.17  --   --   --    MAGNESIUM  "mg/dL 2.5  --   --   --  2.1  --  2.1   PHOSPHORUS mg/dL 3.3  --   --   --  3.9  --   --      Results from last 7 days   Lab Units 02/19/24  1649 02/16/24  1349   AST U/L 49* 80*   ALT U/L 59* 78*   ALK PHOS U/L 106* 86   TOTAL PROTEIN g/dL 6.1* 6.7   ALBUMIN g/dL 3.2* 3.8   TOTAL BILIRUBIN mg/dL 1.77* 1.01*     Results from last 7 days   Lab Units 02/20/24  1150 02/20/24  0535 02/19/24  2348   POC GLUCOSE mg/dl 161* 204* 186*     Results from last 7 days   Lab Units 02/20/24  0502 02/19/24  1649 02/19/24  0250 02/19/24  0031 02/18/24  1943 02/18/24  0348 02/17/24  0444 02/16/24  1349   GLUCOSE RANDOM mg/dL 245* 249* 158* 139 110 132 173* 94             No results found for: \"BETA-HYDROXYBUTYRATE\"   Results from last 7 days   Lab Units 02/20/24  1320 02/20/24  1151 02/20/24  0817   PH ART  7.359 7.384 7.317*   PCO2 ART mm Hg 45.8* 46.2* 54.5*   PO2 ART mm Hg 148.2* 126.6 104.2   HCO3 ART mmol/L 25.2 27.0 27.3   BASE EXC ART mmol/L -0.6 1.4 0.1   O2 CONTENT ART mL/dL 19.1 19.0 18.9   O2 HGB, ARTERIAL % 98.1* 97.8* 96.6   ABG SOURCE  Line, Arterial Line, Arterial Line, Arterial     Results from last 7 days   Lab Units 02/19/24  0031   PH ELSIE  7.490*   PCO2 ELSIE mm Hg 24.9*   PO2 ELSIE mm Hg 107.6*   HCO3 ELSIE mmol/L 18.5*   BASE EXC ELSIE mmol/L -2.9   O2 CONTENT ELSIE ml/dL 19.8   O2 HGB, VENOUS % 95.4*     Results from last 7 days   Lab Units 02/19/24  1435 02/19/24  1130   I STAT BASE EXC mmol/L -3* -3*   I STAT O2 SAT % 98* 95*   ISTAT PH ART  7.280* 7.423   I STAT ART PCO2 mm HG 51.2* 32.2*   I STAT ART PO2 mm .0 74.0*   I STAT ART HCO3 mmol/L 24.1 21.1*     Results from last 7 days   Lab Units 02/19/24  0250 02/18/24  0348 02/17/24  0444   CK TOTAL U/L 321* 556* 775*     Results from last 7 days   Lab Units 02/16/24  1614 02/16/24  1349   HS TNI 0HR ng/L  --  14   HS TNI 2HR ng/L 14  --    HSTNI D2 ng/L 0  --                  Results from last 7 days   Lab Units 02/20/24  0508 02/19/24  0250 02/17/24  0444 " 02/16/24  1614   PROCALCITONIN ng/ml 0.36* 0.31* 0.44* 0.51*     Results from last 7 days   Lab Units 02/19/24  0250 02/16/24  1614   LACTIC ACID mmol/L 1.0 0.8             Results from last 7 days   Lab Units 02/16/24  1349   BNP pg/mL 21                         Results from last 7 days   Lab Units 02/20/24  0415 02/19/24  0250   CRP mg/L 40.3* 82.9*                 Results from last 7 days   Lab Units 02/19/24  0346   STREP PNEUMONIAE ANTIGEN, URINE  Negative   LEGIONELLA URINARY ANTIGEN  Negative                             Results from last 7 days   Lab Units 02/19/24  1830 02/19/24  1713 02/19/24  1711 02/19/24  1710 02/19/24  1655 02/19/24  0302 02/19/24  0249 02/16/24  1614   BLOOD CULTURE  Received in Microbiology Lab. Culture in Progress.  --   --   --  Received in Microbiology Lab. Culture in Progress. No Growth at 24 hrs. No Growth at 24 hrs. No Growth at 72 hrs.  No Growth at 72 hrs.   GRAM STAIN RESULT   --  No Polys or Bacteria seen 1+ Polys  No bacteria seen No Polys or Bacteria seen  --   --   --   --      Results from last 7 days   Lab Units 02/19/24  1712   TOTAL COUNTED  100  100         Results from last 7 days   Lab Units 02/20/24  0502   VANCOMYCIN RM ug/mL 23.7*       ED Treatment:   Medication Administration - No Administrations Displayed (No Start Event Found)       None          Past Medical History:   Diagnosis Date    Chronic back pain     Migraine      Present on Admission:  **None**      Admitting Diagnosis: ARDS (adult respiratory distress syndrome) ([facility]) [J80]  Age/Sex: 58 y.o. male  Admission Orders:  SCD  Cardio-Pulmonary Monitoring, Neuro Checks, Nursing dysphagia screen, I/O, Daily weights, Vital signs    Scheduled Medications:  artificial tear, , Both Eyes, Q2H  azithromycin, 500 mg, Intravenous, Q24H  cefTRIAXone, 1,000 mg, Intravenous, Q24H  chlorhexidine, 15 mL, Mouth/Throat, Q12H YEHUDA  Famotidine (PF), 20 mg, Intravenous, BID  insulin lispro, 1-5 Units, Subcutaneous,  Q6H  ipratropium, 0.5 mg, Nebulization, Q6H  levalbuterol, 1.25 mg, Nebulization, Q6H  magnesium Oxide, 400 mg, Oral, Daily  methylPREDNISolone sodium succinate, 40 mg, Intravenous, Q12H YEHUDA  oseltamivir, 75 mg, Oral, Q12H YEHUDA      Continuous IV Infusions:  cisatracurium (NIMBEX) in 0.9% sodium chloride infusion, 0.1-5 mcg/kg/min, Intravenous, Titrated  fentaNYL, 100 mcg/hr, Intravenous, Continuous  heparin (porcine), 3-20 Units/kg/hr (Order-Specific), Intravenous, Titrated  ketamine, 0.5 mg/kg/hr, Intravenous, Continuous  phenylephine,  mcg/min, Intravenous, Titrated  propofol, 5-50 mcg/kg/min, Intravenous, Titrated      PRN Meds:  acetaminophen, 650 mg, Oral, Q6H PRN  heparin (porcine), 2,000 Units, Intravenous, Q6H PRN  heparin (porcine), 4,000 Units, Intravenous, Q6H PRN  sodium chloride, 1 spray, Each Nare, Q1H PRN        IP CONSULT TO CASE MANAGEMENT  IP CONSULT TO PHARMACY    Network Utilization Review Department  ATTENTION: Please call with any questions or concerns to [phone] and carefully listen to the prompts so that you are directed to the right person. All voicemails are confidential.   For Discharge needs, contact Care Management DC Support Team at [phone] opt. 2  Send all requests for admission clinical reviews, approved or denied determinations and any other requests to dedicated fax number below belonging to the [place] where the patient is receiving treatment. List of dedicated fax numbers for the Facilities:  FACILITY NAME UR FAX NUMBER   ADMISSION DENIALS (Administrative/Medical Necessity) [phone]   DISCHARGE SUPPORT TEAM (NETWORK) [phone]   PARENT CHILD HEALTH (Maternity/NICU/Pediatrics) [phone]   [facility] [phone]   [facility] [phone]   [facility] [phone]   [facility] [phone]   [facility] [phone]   [facility]  [facility] [phone]   [facility] [phone]   [facility] [phone]   [facility] [phone]   [facility] [phone]   [facility] [phone]   [facility] [phone]

## 2024-02-20 NOTE — UTILIZATION REVIEW
NOTIFICATION OF ADMISSION DISCHARGE   This is a Notification of Discharge from [facility]. Please be advised that this patient has been discharge from our facility. Below you will find the admission and discharge date and time including the patient’s disposition.   UTILIZATION REVIEW CONTACT:  Annmarie Penny  Utilization   Network Utilization Review Department  Phone: [phone] x carefully listen to the prompts. All voicemails are confidential.  Email: [email or web address]     ADMISSION INFORMATION  PRESENTATION DATE: 2/16/2024  3:27 PM  OBERVATION ADMISSION DATE:   INPATIENT ADMISSION DATE: 2/18/24 12:21 PM   DISCHARGE DATE: 2/19/2024  4:33 PM   DISPOSITION:[facility] Utilization Review Department  ATTENTION: Please call with any questions or concerns to [phone] and carefully listen to the prompts so that you are directed to the right person. All voicemails are confidential.   For Discharge needs, contact Care Management DC Support Team at [phone] opt. 2  Send all requests for admission clinical reviews, approved or denied determinations and any other requests to dedicated fax number below belonging to the campus where the patient is receiving treatment. List of dedicated fax numbers for the Facilities:  FACILITY NAME UR FAX NUMBER   ADMISSION DENIALS (Administrative/Medical Necessity) [phone]   DISCHARGE SUPPORT TEAM ([facility]) [phone]   PARENT CHILD HEALTH (Maternity/NICU/Pediatrics) [phone]   [facility] [phone]   [facility] [phone]   [facility] [phone]   General acute hospital [phone]   [facility] [phone]   [facility] [phone]   [facility] [phone]   [facility] [phone]    [facility] [phone]   [facility] [phone]   [facility] [phone]   [facility] [phone]

## 2024-02-20 NOTE — RESPIRATORY THERAPY NOTE
RT Ventilator Management Note  Hussein Edward III 58 y.o. male MRN: [id number]  Unit/Bed#: [facility] 06 Encounter: [id number]      Daily Screen    No data found in the last 10 encounters.           Physical Exam:   Assessment Type: Assess only  General Appearance: Sedated  Respiratory Pattern: Assisted  Chest Assessment: Chest expansion symmetrical  Bilateral Breath Sounds: Diminished, Coarse  Cough: Non-productive  Suction: ET Tube  O2 Device: Ventilator      Resp Comments: (P) Pt's. head turned to the right and ETT moved to the right side of the mouth. Pt. doing well post turn. Will continue to monitor.

## 2024-02-20 NOTE — PLAN OF CARE
Problem: Prexisting or High Potential for Compromised Skin Integrity  Goal: Skin integrity is maintained or improved  Description: INTERVENTIONS:  - Identify patients at risk for skin breakdown  - Assess and monitor skin integrity  - Assess and monitor nutrition and hydration status  - Monitor labs   - Assess for incontinence   - Turn and reposition patient  - Assist with mobility/ambulation  - Relieve pressure over bony prominences  - Avoid friction and shearing  - Provide appropriate hygiene as needed including keeping skin clean and dry  - Evaluate need for skin moisturizer/barrier cream  - Collaborate with interdisciplinary team   - Patient/family teaching  - Consider wound care consult   Outcome: Progressing     Problem: PAIN - ADULT  Goal: Verbalizes/displays adequate comfort level or baseline comfort level  Description: Interventions:  - Encourage patient to monitor pain and request assistance  - Assess pain using appropriate pain scale  - Administer analgesics based on type and severity of pain and evaluate response  - Implement non-pharmacological measures as appropriate and evaluate response  - Consider cultural and social influences on pain and pain management  - Notify physician/advanced practitioner if interventions unsuccessful or patient reports new pain  Outcome: Progressing     Problem: INFECTION - ADULT  Goal: Absence or prevention of progression during hospitalization  Description: INTERVENTIONS:  - Assess and monitor for signs and symptoms of infection  - Monitor lab/diagnostic results  - Monitor all insertion sites, i.e. indwelling lines, tubes, and drains  - Monitor endotracheal if appropriate and nasal secretions for changes in amount and color  - [place] appropriate cooling/warming therapies per order  - Administer medications as ordered  - Instruct and encourage patient and family to use good hand hygiene technique  - Identify and instruct in appropriate isolation precautions for  identified infection/condition  Outcome: Progressing  Goal: Absence of fever/infection during neutropenic period  Description: INTERVENTIONS:  - Monitor WBC    Outcome: Progressing     Problem: SAFETY ADULT  Goal: Patient will remain free of falls  Description: INTERVENTIONS:  - Educate patient/family on patient safety including physical limitations  - Instruct patient to call for assistance with activity   - Consult OT/PT to assist with strengthening/mobility   - Keep Call bell within reach  - Keep bed low and locked with side rails adjusted as appropriate  - Keep care items and personal belongings within reach  - Initiate and maintain comfort rounds  - Make Fall Risk Sign visible to staff  - Offer Toileting every 2 Hours, in advance of need  - Initiate/Maintain bed alarm  - Obtain necessary fall risk management equipment: bed alarm  - Apply yellow socks and bracelet for high fall risk patients  - Consider moving patient to room near nurses station  Outcome: Progressing  Goal: Maintain or return to baseline ADL function  Description: INTERVENTIONS:  -  Assess patient's ability to carry out ADLs; assess patient's baseline for ADL function and identify physical deficits which impact ability to perform ADLs (bathing, care of mouth/teeth, toileting, grooming, dressing, etc.)  - Assess/evaluate cause of self-care deficits   - Assess range of motion  - Assess patient's mobility; develop plan if impaired  - Assess patient's need for assistive devices and provide as appropriate  - Encourage maximum independence but intervene and supervise when necessary  - Involve family in performance of ADLs  - Assess for home care needs following discharge   - Consider OT consult to assist with ADL evaluation and planning for discharge  - Provide patient education as appropriate  Outcome: Progressing  Goal: Maintains/Returns to pre admission functional level  Description: INTERVENTIONS:  - Perform AM-PAC 6 Click Basic Mobility/ Daily  Activity assessment daily.  - Set and communicate daily mobility goal to care team and patient/family/caregiver.   - Collaborate with rehabilitation services on mobility goals if consulted  - Perform Range of Motion 4 times a day.  - Reposition patient every 2 hours.  - Dangle patient 0 times a day  - Stand patient 0 times a day  - Ambulate patient 0 times a day  - Out of bed to chair 0 times a day   - Out of bed for meals 0 times a day  - Out of bed for toileting  - Record patient progress and toleration of activity level   Outcome: Progressing     Problem: DISCHARGE PLANNING  Goal: Discharge to home or other facility with appropriate resources  Description: INTERVENTIONS:  - Identify barriers to discharge w/patient and caregiver  - Arrange for needed discharge resources and transportation as appropriate  - Identify discharge learning needs (meds, wound care, etc.)  - Arrange for interpretive services to assist at discharge as needed  - Refer to Case Management Department for coordinating discharge planning if the patient needs post-hospital services based on physician/advanced practitioner order or complex needs related to functional status, cognitive ability, or social support system  Outcome: Progressing     Problem: Knowledge Deficit  Goal: Patient/family/caregiver demonstrates understanding of disease process, treatment plan, medications, and discharge instructions  Description: Complete learning assessment and assess knowledge base.  Interventions:  - Provide teaching at level of understanding  - Provide teaching via preferred learning methods  Outcome: Progressing

## 2024-02-20 NOTE — PLAN OF CARE
Problem: Prexisting or High Potential for Compromised Skin Integrity  Goal: Skin integrity is maintained or improved  Description: INTERVENTIONS:  - Identify patients at risk for skin breakdown  - Assess and monitor skin integrity  - Assess and monitor nutrition and hydration status  - Monitor labs   - Assess for incontinence   - Turn and reposition patient  - Assist with mobility/ambulation  - Relieve pressure over bony prominences  - Avoid friction and shearing  - Provide appropriate hygiene as needed including keeping skin clean and dry  - Evaluate need for skin moisturizer/barrier cream  - Collaborate with interdisciplinary team   - Patient/family teaching  - Consider wound care consult   2/20/2024 1057 by Shaka Knight, SAEED  Outcome: Progressing  2/20/2024 1056 by Shaka Knight RN  Outcome: Progressing     Problem: PAIN - ADULT  Goal: Verbalizes/displays adequate comfort level or baseline comfort level  Description: Interventions:  - Encourage patient to monitor pain and request assistance  - Assess pain using appropriate pain scale  - Administer analgesics based on type and severity of pain and evaluate response  - Implement non-pharmacological measures as appropriate and evaluate response  - Consider cultural and social influences on pain and pain management  - Notify physician/advanced practitioner if interventions unsuccessful or patient reports new pain  2/20/2024 1057 by Shaka Knight RN  Outcome: Progressing  2/20/2024 1056 by Shaka Knight RN  Outcome: Progressing     Problem: INFECTION - ADULT  Goal: Absence or prevention of progression during hospitalization  Description: INTERVENTIONS:  - Assess and monitor for signs and symptoms of infection  - Monitor lab/diagnostic results  - Monitor all insertion sites, i.e. indwelling lines, tubes, and drains  - Monitor endotracheal if appropriate and nasal secretions for changes in amount and color  - [place] appropriate  cooling/warming therapies per order  - Administer medications as ordered  - Instruct and encourage patient and family to use good hand hygiene technique  - Identify and instruct in appropriate isolation precautions for identified infection/condition  2/20/2024 1057 by Shaka Knight RN  Outcome: Progressing  2/20/2024 1056 by Shaka Knight RN  Outcome: Progressing     Problem: SAFETY ADULT  Goal: Patient will remain free of falls  Description: INTERVENTIONS:  - Educate patient/family on patient safety including physical limitations  - Instruct patient to call for assistance with activity   - Consult OT/PT to assist with strengthening/mobility   - Keep Call bell within reach  - Keep bed low and locked with side rails adjusted as appropriate  - Keep care items and personal belongings within reach  - Initiate and maintain comfort rounds  - Make Fall Risk Sign visible to staff  - Offer Toileting every 2 Hours, in advance of need  - Initiate/Maintain all alarms      - Apply yellow socks and bracelet for high fall risk patients  - Consider moving patient to room near nurses station  2/20/2024 1057 by Shaka Knight RN  Outcome: Progressing  2/20/2024 1056 by Shaka Knight RN  Outcome: Progressing     Problem: SAFETY ADULT  Goal: Patient will remain free of falls  Description: INTERVENTIONS:  - Educate patient/family on patient safety including physical limitations  - Instruct patient to call for assistance with activity   - Consult OT/PT to assist with strengthening/mobility   - Keep Call bell within reach  - Keep bed low and locked with side rails adjusted as appropriate  - Keep care items and personal belongings within reach  - Initiate and maintain comfort rounds  - Make Fall Risk Sign visible to staff  - Offer Toileting every 2 Hours, in advance of need  - Initiate/Maintain all alarm  - Obtain necessary fall risk management equipment:  - Apply yellow socks and bracelet for high fall risk  patients  - Consider moving patient to room near nurses station  2/20/2024 1057 by Shaka Knight RN  Outcome: Progressing  2/20/2024 1056 by Shaka Knight RN  Outcome: Progressing     Problem: Knowledge Deficit  Goal: Patient/family/caregiver demonstrates understanding of disease process, treatment plan, medications, and discharge instructions  Description: Complete learning assessment and assess knowledge base.  Interventions:  - Provide teaching at level of understanding  - Provide teaching via preferred learning methods  2/20/2024 1057 by Shaka Knight RN  Outcome: Progressing  2/20/2024 1056 by Shaka Knight RN  Outcome: Progressing

## 2024-02-21 ENCOUNTER — APPOINTMENT (INPATIENT)
Dept: RADIOLOGY | Facility: HOSPITAL | Age: 59
DRG: 870 | End: 2024-02-21
Payer: COMMERCIAL

## 2024-02-21 PROBLEM — J18.9 PNEUMONIA: Status: RESOLVED | Noted: 2024-02-16 | Resolved: 2024-02-21

## 2024-02-21 PROBLEM — J10.1 INFLUENZA A: Status: RESOLVED | Noted: 2024-02-19 | Resolved: 2024-02-21

## 2024-02-21 PROBLEM — J11.1 INFLUENZA: Status: RESOLVED | Noted: 2024-02-16 | Resolved: 2024-02-21

## 2024-02-21 LAB
ALBUMIN SERPL BCP-MCNC: 2.8 G/DL (ref 3.5–5)
ALP SERPL-CCNC: 77 U/L (ref 34–104)
ALT SERPL W P-5'-P-CCNC: 65 U/L (ref 7–52)
ANION GAP SERPL CALCULATED.3IONS-SCNC: 7 MMOL/L
APTT PPP: 75 SECONDS (ref 23–37)
APTT PPP: 77 SECONDS (ref 23–37)
AST SERPL W P-5'-P-CCNC: 43 U/L (ref 13–39)
BACTERIA BLD CULT: NORMAL
BACTERIA BLD CULT: NORMAL
BACTERIA BRONCH AEROBE CULT: NO GROWTH
BASE EXCESS BLDA CALC-SCNC: 3.3 MMOL/L
BASE EXCESS BLDA CALC-SCNC: 3.8 MMOL/L
BASE EXCESS BLDA CALC-SCNC: 4.4 MMOL/L
BASE EXCESS BLDA CALC-SCNC: 4.5 MMOL/L
BASE EXCESS BLDA CALC-SCNC: 6.4 MMOL/L
BILIRUB SERPL-MCNC: 1.07 MG/DL (ref 0.2–1)
BODY TEMPERATURE: 100.2 DEGREES FEHRENHEIT
BODY TEMPERATURE: 99.7 DEGREES FEHRENHEIT
BUN SERPL-MCNC: 34 MG/DL (ref 5–25)
CALCIUM ALBUM COR SERPL-MCNC: 8.6 MG/DL (ref 8.3–10.1)
CALCIUM SERPL-MCNC: 7.6 MG/DL (ref 8.4–10.2)
CHLORIDE SERPL-SCNC: 109 MMOL/L (ref 96–108)
CO2 SERPL-SCNC: 30 MMOL/L (ref 21–32)
CREAT SERPL-MCNC: 1.09 MG/DL (ref 0.6–1.3)
ERYTHROCYTE [DISTWIDTH] IN BLOOD BY AUTOMATED COUNT: 13.3 % (ref 11.6–15.1)
GFR SERPL CREATININE-BSD FRML MDRD: 75 ML/MIN/1.73SQ M
GLUCOSE SERPL-MCNC: 124 MG/DL (ref 65–140)
GLUCOSE SERPL-MCNC: 165 MG/DL (ref 65–140)
GLUCOSE SERPL-MCNC: 176 MG/DL (ref 65–140)
GLUCOSE SERPL-MCNC: 179 MG/DL (ref 65–140)
GRAM STN SPEC: NORMAL
HCO3 BLDA-SCNC: 28 MMOL/L (ref 22–28)
HCO3 BLDA-SCNC: 28.7 MMOL/L (ref 22–28)
HCO3 BLDA-SCNC: 28.9 MMOL/L (ref 22–28)
HCO3 BLDA-SCNC: 30.1 MMOL/L (ref 22–28)
HCO3 BLDA-SCNC: 30.6 MMOL/L (ref 22–28)
HCT VFR BLD AUTO: 40.4 % (ref 36.5–49.3)
HGB BLD-MCNC: 12.7 G/DL (ref 12–17)
HOROWITZ INDEX BLDA+IHG-RTO: 40 MM[HG]
HOROWITZ INDEX BLDA+IHG-RTO: 50 MM[HG]
HOROWITZ INDEX BLDA+IHG-RTO: 50 MM[HG]
HOROWITZ INDEX BLDA+IHG-RTO: 60 MM[HG]
MAGNESIUM SERPL-MCNC: 2.7 MG/DL (ref 1.9–2.7)
MCH RBC QN AUTO: 29.3 PG (ref 26.8–34.3)
MCHC RBC AUTO-ENTMCNC: 31.4 G/DL (ref 31.4–37.4)
MCV RBC AUTO: 93 FL (ref 82–98)
O2 CT BLDA-SCNC: 17.1 ML/DL (ref 16–23)
O2 CT BLDA-SCNC: 17.7 ML/DL (ref 16–23)
O2 CT BLDA-SCNC: 18.1 ML/DL (ref 16–23)
O2 CT BLDA-SCNC: 18.2 ML/DL (ref 16–23)
O2 CT BLDA-SCNC: 18.3 ML/DL (ref 16–23)
OXYHGB MFR BLDA: 94 % (ref 94–97)
OXYHGB MFR BLDA: 94.3 % (ref 94–97)
OXYHGB MFR BLDA: 94.6 % (ref 94–97)
OXYHGB MFR BLDA: 96.4 % (ref 94–97)
OXYHGB MFR BLDA: 97.1 % (ref 94–97)
PCO2 BLDA: 41.6 MM HG (ref 36–44)
PCO2 BLDA: 42.4 MM HG (ref 36–44)
PCO2 BLDA: 43.2 MM HG (ref 36–44)
PCO2 BLDA: 45.2 MM HG (ref 36–44)
PCO2 BLDA: 48.8 MM HG (ref 36–44)
PCO2 TEMP ADJ BLDA: 46.4 MM HG (ref 36–44)
PCO2 TEMP ADJ BLDA: 50.8 MM HG (ref 36–44)
PEEP RESPIRATORY: 10 CM[H2O]
PEEP RESPIRATORY: 6 CM[H2O]
PEEP RESPIRATORY: 8 CM[H2O]
PEEP RESPIRATORY: 8 CM[H2O]
PH BLD: 7.39 [PH] (ref 7.35–7.45)
PH BLD: 7.41 [PH] (ref 7.35–7.45)
PH BLDA: 7.41 [PH] (ref 7.35–7.45)
PH BLDA: 7.42 [PH] (ref 7.35–7.45)
PH BLDA: 7.43 [PH] (ref 7.35–7.45)
PH BLDA: 7.46 [PH] (ref 7.35–7.45)
PH BLDA: 7.48 [PH] (ref 7.35–7.45)
PHOSPHATE SERPL-MCNC: 1.9 MG/DL (ref 2.7–4.5)
PLATELET # BLD AUTO: 279 THOUSANDS/UL (ref 149–390)
PMV BLD AUTO: 8.9 FL (ref 8.9–12.7)
PO2 BLD: 115.3 MM HG (ref 75–129)
PO2 BLD: 98.8 MM HG (ref 75–129)
PO2 BLDA: 111.5 MM HG (ref 75–129)
PO2 BLDA: 73.4 MM HG (ref 75–129)
PO2 BLDA: 74.2 MM HG (ref 75–129)
PO2 BLDA: 79.4 MM HG (ref 75–129)
PO2 BLDA: 93.4 MM HG (ref 75–129)
POTASSIUM SERPL-SCNC: 4 MMOL/L (ref 3.5–5.3)
PROT SERPL-MCNC: 5.2 G/DL (ref 6.4–8.4)
RBC # BLD AUTO: 4.34 MILLION/UL (ref 3.88–5.62)
SODIUM SERPL-SCNC: 146 MMOL/L (ref 135–147)
SPECIMEN SOURCE: ABNORMAL
SPECIMEN SOURCE: NORMAL
TRIGL SERPL-MCNC: 477 MG/DL
VENT AC: 30
VENT- AC: AC
VT SETTING VENT: 370 ML
WBC # BLD AUTO: 14.81 THOUSAND/UL (ref 4.31–10.16)

## 2024-02-21 PROCEDURE — 82805 BLOOD GASES W/O2 SATURATION: CPT

## 2024-02-21 PROCEDURE — 84100 ASSAY OF PHOSPHORUS: CPT | Performed by: STUDENT IN AN ORGANIZED HEALTH CARE EDUCATION/TRAINING PROGRAM

## 2024-02-21 PROCEDURE — 82948 REAGENT STRIP/BLOOD GLUCOSE: CPT

## 2024-02-21 PROCEDURE — 94640 AIRWAY INHALATION TREATMENT: CPT

## 2024-02-21 PROCEDURE — 83735 ASSAY OF MAGNESIUM: CPT | Performed by: STUDENT IN AN ORGANIZED HEALTH CARE EDUCATION/TRAINING PROGRAM

## 2024-02-21 PROCEDURE — 84478 ASSAY OF TRIGLYCERIDES: CPT | Performed by: STUDENT IN AN ORGANIZED HEALTH CARE EDUCATION/TRAINING PROGRAM

## 2024-02-21 PROCEDURE — 85027 COMPLETE CBC AUTOMATED: CPT | Performed by: STUDENT IN AN ORGANIZED HEALTH CARE EDUCATION/TRAINING PROGRAM

## 2024-02-21 PROCEDURE — 85730 THROMBOPLASTIN TIME PARTIAL: CPT | Performed by: INTERNAL MEDICINE

## 2024-02-21 PROCEDURE — 94664 DEMO&/EVAL PT USE INHALER: CPT

## 2024-02-21 PROCEDURE — 82805 BLOOD GASES W/O2 SATURATION: CPT | Performed by: STUDENT IN AN ORGANIZED HEALTH CARE EDUCATION/TRAINING PROGRAM

## 2024-02-21 PROCEDURE — 71045 X-RAY EXAM CHEST 1 VIEW: CPT

## 2024-02-21 PROCEDURE — 80053 COMPREHEN METABOLIC PANEL: CPT | Performed by: STUDENT IN AN ORGANIZED HEALTH CARE EDUCATION/TRAINING PROGRAM

## 2024-02-21 PROCEDURE — 99291 CRITICAL CARE FIRST HOUR: CPT | Performed by: INTERNAL MEDICINE

## 2024-02-21 PROCEDURE — 94760 N-INVAS EAR/PLS OXIMETRY 1: CPT

## 2024-02-21 PROCEDURE — 94003 VENT MGMT INPAT SUBQ DAY: CPT

## 2024-02-21 RX ORDER — FUROSEMIDE 10 MG/ML
40 INJECTION INTRAMUSCULAR; INTRAVENOUS ONCE
Status: COMPLETED | OUTPATIENT
Start: 2024-02-21 | End: 2024-02-21

## 2024-02-21 RX ORDER — FENTANYL CITRATE 50 UG/ML
50 INJECTION, SOLUTION INTRAMUSCULAR; INTRAVENOUS
Status: DISCONTINUED | OUTPATIENT
Start: 2024-02-21 | End: 2024-02-21

## 2024-02-21 RX ORDER — DEXMEDETOMIDINE HYDROCHLORIDE 4 UG/ML
.1-1.2 INJECTION, SOLUTION INTRAVENOUS
Status: DISCONTINUED | OUTPATIENT
Start: 2024-02-21 | End: 2024-02-21

## 2024-02-21 RX ORDER — FENTANYL CITRATE 50 UG/ML
100 INJECTION, SOLUTION INTRAMUSCULAR; INTRAVENOUS EVERY 2 HOUR PRN
Status: DISCONTINUED | OUTPATIENT
Start: 2024-02-21 | End: 2024-02-23

## 2024-02-21 RX ORDER — ATORVASTATIN CALCIUM 20 MG/1
20 TABLET, FILM COATED ORAL
Status: DISCONTINUED | OUTPATIENT
Start: 2024-02-21 | End: 2024-02-22

## 2024-02-21 RX ORDER — FENTANYL CITRATE/PF 50 MCG/ML
SYRINGE (ML) INJECTION
Status: COMPLETED
Start: 2024-02-21 | End: 2024-02-21

## 2024-02-21 RX ORDER — HEPARIN SODIUM 5000 [USP'U]/ML
5000 INJECTION, SOLUTION INTRAVENOUS; SUBCUTANEOUS EVERY 8 HOURS SCHEDULED
Status: DISCONTINUED | OUTPATIENT
Start: 2024-02-21 | End: 2024-02-23

## 2024-02-21 RX ORDER — FAMOTIDINE 40 MG/5ML
20 POWDER, FOR SUSPENSION ORAL DAILY
Status: DISCONTINUED | OUTPATIENT
Start: 2024-02-22 | End: 2024-02-26

## 2024-02-21 RX ORDER — FUROSEMIDE 10 MG/ML
20 INJECTION INTRAMUSCULAR; INTRAVENOUS ONCE
Status: COMPLETED | OUTPATIENT
Start: 2024-02-21 | End: 2024-02-21

## 2024-02-21 RX ORDER — NOREPINEPHRINE BITARTRATE 1 MG/ML
INJECTION, SOLUTION INTRAVENOUS
Status: COMPLETED
Start: 2024-02-21 | End: 2024-02-21

## 2024-02-21 RX ORDER — FENTANYL CITRATE 50 UG/ML
50 INJECTION, SOLUTION INTRAMUSCULAR; INTRAVENOUS ONCE
Status: COMPLETED | OUTPATIENT
Start: 2024-02-21 | End: 2024-02-21

## 2024-02-21 RX ORDER — VECURONIUM BROMIDE 1 MG/ML
10 INJECTION, POWDER, LYOPHILIZED, FOR SOLUTION INTRAVENOUS ONCE
Status: DISCONTINUED | OUTPATIENT
Start: 2024-02-21 | End: 2024-02-21

## 2024-02-21 RX ADMIN — OSELTAMIVIR PHOSPHATE 75 MG: 75 CAPSULE ORAL at 09:27

## 2024-02-21 RX ADMIN — INSULIN LISPRO 1 UNITS: 100 INJECTION, SOLUTION INTRAVENOUS; SUBCUTANEOUS at 05:17

## 2024-02-21 RX ADMIN — FENTANYL CITRATE 50 MCG: 50 INJECTION, SOLUTION INTRAMUSCULAR; INTRAVENOUS at 04:24

## 2024-02-21 RX ADMIN — Medication 150 MCG/HR: at 11:55

## 2024-02-21 RX ADMIN — FUROSEMIDE 40 MG: 10 INJECTION, SOLUTION INTRAMUSCULAR; INTRAVENOUS at 13:45

## 2024-02-21 RX ADMIN — FENTANYL CITRATE 50 MCG: 50 INJECTION INTRAMUSCULAR; INTRAVENOUS at 04:24

## 2024-02-21 RX ADMIN — PROPOFOL 50 MCG/KG/MIN: 10 INJECTION, EMULSION INTRAVENOUS at 01:20

## 2024-02-21 RX ADMIN — WHITE PETROLATUM 57.7 %-MINERAL OIL 31.9 % EYE OINTMENT: at 12:12

## 2024-02-21 RX ADMIN — WHITE PETROLATUM 57.7 %-MINERAL OIL 31.9 % EYE OINTMENT: at 04:22

## 2024-02-21 RX ADMIN — LEVALBUTEROL HYDROCHLORIDE 1.25 MG: 1.25 SOLUTION RESPIRATORY (INHALATION) at 08:10

## 2024-02-21 RX ADMIN — WHITE PETROLATUM 57.7 %-MINERAL OIL 31.9 % EYE OINTMENT: at 05:56

## 2024-02-21 RX ADMIN — METHYLPREDNISOLONE SODIUM SUCCINATE 40 MG: 40 INJECTION, POWDER, FOR SOLUTION INTRAMUSCULAR; INTRAVENOUS at 09:27

## 2024-02-21 RX ADMIN — Medication 100 MCG/HR: at 02:18

## 2024-02-21 RX ADMIN — IPRATROPIUM BROMIDE 0.5 MG: 0.5 SOLUTION RESPIRATORY (INHALATION) at 19:45

## 2024-02-21 RX ADMIN — LEVALBUTEROL HYDROCHLORIDE 1.25 MG: 1.25 SOLUTION RESPIRATORY (INHALATION) at 02:07

## 2024-02-21 RX ADMIN — FUROSEMIDE 20 MG: 10 INJECTION, SOLUTION INTRAMUSCULAR; INTRAVENOUS at 08:11

## 2024-02-21 RX ADMIN — FAMOTIDINE 20 MG: 10 INJECTION INTRAVENOUS at 09:28

## 2024-02-21 RX ADMIN — HEPARIN SODIUM 11 UNITS/KG/HR: 10000 INJECTION, SOLUTION INTRAVENOUS at 08:32

## 2024-02-21 RX ADMIN — DEXMEDETOMIDINE HYDROCHLORIDE 0.2 MCG/KG/HR: 4 INJECTION, SOLUTION INTRAVENOUS at 07:28

## 2024-02-21 RX ADMIN — POTASSIUM PHOSPHATE, MONOBASIC POTASSIUM PHOSPHATE, DIBASIC 6 MMOL: 224; 236 INJECTION, SOLUTION, CONCENTRATE INTRAVENOUS at 07:48

## 2024-02-21 RX ADMIN — IPRATROPIUM BROMIDE 0.5 MG: 0.5 SOLUTION RESPIRATORY (INHALATION) at 13:18

## 2024-02-21 RX ADMIN — PROPOFOL 50 MCG/KG/MIN: 10 INJECTION, EMULSION INTRAVENOUS at 23:06

## 2024-02-21 RX ADMIN — LEVALBUTEROL HYDROCHLORIDE 1.25 MG: 1.25 SOLUTION RESPIRATORY (INHALATION) at 13:18

## 2024-02-21 RX ADMIN — PROPOFOL 50 MCG/KG/MIN: 10 INJECTION, EMULSION INTRAVENOUS at 20:29

## 2024-02-21 RX ADMIN — IPRATROPIUM BROMIDE 0.5 MG: 0.5 SOLUTION RESPIRATORY (INHALATION) at 08:10

## 2024-02-21 RX ADMIN — INSULIN LISPRO 1 UNITS: 100 INJECTION, SOLUTION INTRAVENOUS; SUBCUTANEOUS at 18:00

## 2024-02-21 RX ADMIN — NOREPINEPHRINE BITARTRATE 2 MCG/MIN: 1 SOLUTION INTRAVENOUS at 20:29

## 2024-02-21 RX ADMIN — FENTANYL CITRATE 100 MCG: 50 INJECTION INTRAMUSCULAR; INTRAVENOUS at 20:30

## 2024-02-21 RX ADMIN — INSULIN LISPRO 1 UNITS: 100 INJECTION, SOLUTION INTRAVENOUS; SUBCUTANEOUS at 23:39

## 2024-02-21 RX ADMIN — KETAMINE HYDROCHLORIDE 0.5 MG/KG/HR: 100 INJECTION INTRAMUSCULAR; INTRAVENOUS at 23:06

## 2024-02-21 RX ADMIN — FENTANYL CITRATE 100 MCG: 50 INJECTION INTRAMUSCULAR; INTRAVENOUS at 23:59

## 2024-02-21 RX ADMIN — IPRATROPIUM BROMIDE 0.5 MG: 0.5 SOLUTION RESPIRATORY (INHALATION) at 02:07

## 2024-02-21 RX ADMIN — CHLORHEXIDINE GLUCONATE 15 ML: 1.2 SOLUTION ORAL at 09:26

## 2024-02-21 RX ADMIN — FENTANYL CITRATE 100 MCG: 50 INJECTION INTRAMUSCULAR; INTRAVENOUS at 22:06

## 2024-02-21 RX ADMIN — ATORVASTATIN CALCIUM 20 MG: 20 TABLET, FILM COATED ORAL at 21:47

## 2024-02-21 RX ADMIN — LEVALBUTEROL HYDROCHLORIDE 1.25 MG: 1.25 SOLUTION RESPIRATORY (INHALATION) at 19:45

## 2024-02-21 RX ADMIN — NOREPINEPHRINE BITARTRATE 8 MG: 1 INJECTION, SOLUTION, CONCENTRATE INTRAVENOUS at 02:39

## 2024-02-21 RX ADMIN — NOREPINEPHRINE BITARTRATE 2 MCG/MIN: 1 SOLUTION INTRAVENOUS at 02:52

## 2024-02-21 RX ADMIN — WHITE PETROLATUM 57.7 %-MINERAL OIL 31.9 % EYE OINTMENT: at 10:28

## 2024-02-21 RX ADMIN — PROPOFOL 50 MCG/KG/MIN: 10 INJECTION, EMULSION INTRAVENOUS at 04:51

## 2024-02-21 RX ADMIN — HEPARIN SODIUM 5000 UNITS: 5000 INJECTION INTRAVENOUS; SUBCUTANEOUS at 13:46

## 2024-02-21 RX ADMIN — MAGNESIUM OXIDE TAB 400 MG (241.3 MG ELEMENTAL MG) 400 MG: 400 (241.3 MG) TAB at 09:27

## 2024-02-21 RX ADMIN — WHITE PETROLATUM 57.7 %-MINERAL OIL 31.9 % EYE OINTMENT: at 08:11

## 2024-02-21 RX ADMIN — Medication 150 MCG/HR: at 20:29

## 2024-02-21 RX ADMIN — PROPOFOL 45 MCG/KG/MIN: 10 INJECTION, EMULSION INTRAVENOUS at 08:51

## 2024-02-21 RX ADMIN — WHITE PETROLATUM 57.7 %-MINERAL OIL 31.9 % EYE OINTMENT: at 02:14

## 2024-02-21 RX ADMIN — PROPOFOL 50 MCG/KG/MIN: 10 INJECTION, EMULSION INTRAVENOUS at 11:26

## 2024-02-21 RX ADMIN — FENTANYL CITRATE 100 MCG: 50 INJECTION INTRAMUSCULAR; INTRAVENOUS at 10:00

## 2024-02-21 RX ADMIN — METHYLPREDNISOLONE SODIUM SUCCINATE 40 MG: 40 INJECTION, POWDER, FOR SOLUTION INTRAMUSCULAR; INTRAVENOUS at 21:47

## 2024-02-21 RX ADMIN — AZITHROMYCIN MONOHYDRATE 500 MG: 500 INJECTION, POWDER, LYOPHILIZED, FOR SOLUTION INTRAVENOUS at 07:48

## 2024-02-21 RX ADMIN — OSELTAMIVIR PHOSPHATE 75 MG: 75 CAPSULE ORAL at 21:47

## 2024-02-21 RX ADMIN — CEFTRIAXONE SODIUM 1000 MG: 10 INJECTION, POWDER, FOR SOLUTION INTRAVENOUS at 16:52

## 2024-02-21 RX ADMIN — HEPARIN SODIUM 5000 UNITS: 5000 INJECTION INTRAVENOUS; SUBCUTANEOUS at 21:47

## 2024-02-21 RX ADMIN — CHLORHEXIDINE GLUCONATE 15 ML: 1.2 SOLUTION ORAL at 21:47

## 2024-02-21 NOTE — PLAN OF CARE
Problem: Prexisting or High Potential for Compromised Skin Integrity  Goal: Skin integrity is maintained or improved  Description: INTERVENTIONS:  - Identify patients at risk for skin breakdown  - Assess and monitor skin integrity  - Assess and monitor nutrition and hydration status  - Monitor labs   - Assess for incontinence   - Turn and reposition patient  - Assist with mobility/ambulation  - Relieve pressure over bony prominences  - Avoid friction and shearing  - Provide appropriate hygiene as needed including keeping skin clean and dry  - Evaluate need for skin moisturizer/barrier cream  - Collaborate with interdisciplinary team   - Patient/family teaching  - Consider wound care consult   Outcome: Progressing     Problem: PAIN - ADULT  Goal: Verbalizes/displays adequate comfort level or baseline comfort level  Description: Interventions:  - Encourage patient to monitor pain and request assistance  - Assess pain using appropriate pain scale  - Administer analgesics based on type and severity of pain and evaluate response  - Implement non-pharmacological measures as appropriate and evaluate response  - Consider cultural and social influences on pain and pain management  - Notify physician/advanced practitioner if interventions unsuccessful or patient reports new pain  Outcome: Progressing     Problem: INFECTION - ADULT  Goal: Absence or prevention of progression during hospitalization  Description: INTERVENTIONS:  - Assess and monitor for signs and symptoms of infection  - Monitor lab/diagnostic results  - Monitor all insertion sites, i.e. indwelling lines, tubes, and drains  - Monitor endotracheal if appropriate and nasal secretions for changes in amount and color  - [place] appropriate cooling/warming therapies per order  - Administer medications as ordered  - Instruct and encourage patient and family to use good hand hygiene technique  - Identify and instruct in appropriate isolation precautions for  identified infection/condition  Outcome: Progressing  Goal: Absence of fever/infection during neutropenic period  Description: INTERVENTIONS:  - Monitor WBC    Outcome: Progressing     Problem: SAFETY ADULT  Goal: Patient will remain free of falls  Description: INTERVENTIONS:  - Educate patient/family on patient safety including physical limitations  - Instruct patient to call for assistance with activity   - Consult OT/PT to assist with strengthening/mobility   - Keep Call bell within reach  - Keep bed low and locked with side rails adjusted as appropriate  - Keep care items and personal belongings within reach  - Initiate and maintain comfort rounds  - Make Fall Risk Sign visible to staff  - Apply yellow socks and bracelet for high fall risk patients  - Consider moving patient to room near nurses station  Outcome: Progressing  Goal: Maintain or return to baseline ADL function  Description: INTERVENTIONS:  -  Assess patient's ability to carry out ADLs; assess patient's baseline for ADL function and identify physical deficits which impact ability to perform ADLs (bathing, care of mouth/teeth, toileting, grooming, dressing, etc.)  - Assess/evaluate cause of self-care deficits   - Assess range of motion  - Assess patient's mobility; develop plan if impaired  - Assess patient's need for assistive devices and provide as appropriate  - Encourage maximum independence but intervene and supervise when necessary  - Involve family in performance of ADLs  - Assess for home care needs following discharge   - Consider OT consult to assist with ADL evaluation and planning for discharge  - Provide patient education as appropriate  Outcome: Progressing  Goal: Maintains/Returns to pre admission functional level  Description: INTERVENTIONS:  - Perform AM-PAC 6 Click Basic Mobility/ Daily Activity assessment daily.  - Set and communicate daily mobility goal to care team and patient/family/caregiver.   - Collaborate with rehabilitation  services on mobility goals if consulted  - Record patient progress and toleration of activity level   Outcome: Progressing     Problem: DISCHARGE PLANNING  Goal: Discharge to home or other facility with appropriate resources  Description: INTERVENTIONS:  - Identify barriers to discharge w/patient and caregiver  - Arrange for needed discharge resources and transportation as appropriate  - Identify discharge learning needs (meds, wound care, etc.)  - Arrange for interpretive services to assist at discharge as needed  - Refer to Case Management Department for coordinating discharge planning if the patient needs post-hospital services based on physician/advanced practitioner order or complex needs related to functional status, cognitive ability, or social support system  Outcome: Progressing     Problem: Knowledge Deficit  Goal: Patient/family/caregiver demonstrates understanding of disease process, treatment plan, medications, and discharge instructions  Description: Complete learning assessment and assess knowledge base.  Interventions:  - Provide teaching at level of understanding  - Provide teaching via preferred learning methods  Outcome: Progressing     Problem: Nutrition/Hydration-ADULT  Goal: Nutrient/Hydration intake appropriate for improving, restoring or maintaining nutritional needs  Description: Monitor and assess patient's nutrition/hydration status for malnutrition. Collaborate with interdisciplinary team and initiate plan and interventions as ordered.  Monitor patient's weight and dietary intake as ordered or per policy. Utilize nutrition screening tool and intervene as necessary. Determine patient's food preferences and provide high-protein, high-caloric foods as appropriate.     INTERVENTIONS:  - Monitor oral intake, urinary output, labs, and treatment plans  - Assess nutrition and hydration status and recommend course of action  - Evaluate amount of meals eaten  - Assist patient with eating if  necessary   - Allow adequate time for meals  - Recommend/ encourage appropriate diets, oral nutritional supplements, and vitamin/mineral supplements  - Order, calculate, and assess calorie counts as needed  - Recommend, monitor, and adjust tube feedings and TPN/PPN based on assessed needs  - Assess need for intravenous fluids  - Provide specific nutrition/hydration education as appropriate  - Include patient/family/caregiver in decisions related to nutrition  Outcome: Progressing

## 2024-02-21 NOTE — PROGRESS NOTES
[facility]  Progress Note: Critical Care  Name: Hussein Edward III 58 y.o. male I MRN: [id number]  Unit/Bed#: MICU 06 I Date of Admission: 2/19/2024   Date of Service: 2/21/2024 I Hospital Day: 2    Assessment/Plan   Neuro:   Diagnosis: Post-intubation and sedation  Pt underwent RSI w/ etomidate and succinylcholine  Now maintained fentanyl 100 mcg/hr. D/c propofol, started precedex but d/c because patient went norma to 30s.  Plan: PRN fentanyl for agitation  RASS goal -5  Ketamine d/c  Paralysis Nimbex d/c  Consider decreasing sedation once supine  Diagnosis: hx of migraines  Home meds include nurtek and diamox  Plan: holding nurtek and diamox at this time  Diagnosis: hx of insomnia  Plan: hold daridorexant at this time      CV:   Diagnosis: Hypotension likely 2/2 induction for RSI  Plan: On Levo, wean as possible to maintain MAP>65  A-line for hemodynamic monitoring  TTE 02/19: LVEF 55-60%. G1DD.  Diagnosis: New onset atrial fibrillation, resolved, Now NSR.  Pt found to have rates to 150s s/p CVC placement  Plan: gave one time dose of lopressor, patient started on amiodarone drip. Patient's HR still elevated with afib. Given dose of digoxin.  Continue to monitor, HR better in 90s  On heparin ACS protocol, consider d/c and restarting DVT prophylactic heparin       Pulm:  Diagnosis: Acute Respiratory Distress Syndrome  Pt initially presented influenza A positive w/ increasing O2 requirements, requiring HFNC -> BiPAP -> intubation  CTA on 02/18 without evidence of PE but findings of extensive bilateral groundglass and consolidative opacities with predominantly perihilar and peripheral distribution compatible with viral and/or atypical pneumonia, likely influenza given clinical history.  Procal 0.51 -> 0.44 --> 0.31 --> 0.36  On 02/19, pt began having increasing WOB and tachypnea to 50s  Subsequently required RSI, was on AC/VC 32/400/100%/10  Post-intubation ABG 7.2/51/115/24  Most  recent ABG 7.46/42/74/29, respiratory acidosis improving  PaO2/FiO2 186, improving  Etiology appears likely 2/2 pulmonary infection from influenza superimposed with possible bacterial infection  Lungs sound clear to ausc. Consider diuretic, patient 0.5L net positive for I/O in 24hrs.  Consider Veletri to improve oxygenation, hold for now since oxygenation improving on current measures  Strep pneumo and legionella urine antigen negative  Plan: Antibiotics as mentioned below under ID but on Ceftriaxone, Azithromycin, d/c vanc 02/20  Q4H ABGs  Underwent bronchoscopy on 02/19 -> prelim result no growth  On Solumedrol IV 40 mg Q12H  Nebs- Xopenex, Atrovent  Sputum cultures pending  Blood cultures -> prelim. No growth at 24 hours     GI:   Diagnosis: post-intubation  Plan: On GI prophylaxis with famotidine 20 mg IV  Currently NPO, consider starting tube feeds  Can switch to tube feeds following this if tolerated (in non-prone hours)     :   Diagnosis: CKD Stage II  Plan: sCr  baseline range of 0.8-1.0  Avoid nephrotoxic medications  Monitor sCr with daily BMPs     F/E/N:    F: Not currently on any IV fluids  E: Will monitor and correct any electrolyte derangements  N: Currently NPO, consider switching to tube feeds     Heme/Onc:   DVT prophylaxis: On heparin subcutaneous ACS protocol. Consider restarting DVT prophylaxis heparin     Endo:   Diagnosis: Steroid induced hyperglycemia  Glucose within ranges of  158-249  No prior A1c for review but based on elevated glucose readings and this being day 4 of glucocorticoids, it maybe steroid induced hyperglycemia  Plan: On SSI #1 for now     ID:   Diagnosis: Flu with superimposed bacterial multifocal pneumonia  Pt had ongoing influenza that was untreated for 4 days PTA  Likely lead to new bacterial infection, in setting of viral infection  Sputum cultures pending  Blood cultures -> prelim. No growth at 24 hours  Underwent bronchoscopy on 02/19 -> prelim result no growth  Plan:  Will continue tamiflu for now, Tamiflu 75 mg Q12H  Procalcitonin reviewed, at 0.36  On Ceftriaxone 1g Q24H and azithromycin 500 mg Q24H  D/c Vancomycin 02/20, MRSA neg  See plan above     MSK/Skin:   Frequent turning and repositioning  Wound care as needed       Disposition: Critical care    ICU Core Measures     Vented Patient  VAP Bundle  VAP bundle ordered     A: Assess, Prevent, and Manage Pain Has pain been assessed? NA  Need for changes to pain regimen? NA   B: Both Spontaneous Awakening Trials (SATs) and Spontaneous Breathing Trials (SBTs) Plan to perform spontaneous awakening trial today? Modified and patient remained on sedation other than precedex  Plan to perform spontaneous breathing trial today? Yes   Obvious barriers to extubation? Yes   C: Choice of Sedation RASS Goal: -5 Unarousable or 0 Alert and Calm  Need for changes to sedation or analgesia regimen? No   D: Delirium CAM-ICU: Unable to perform secondary to Acute cognitive dysfunction   E: Early Mobility  Plan for early mobility? Yes   F: Family Engagement Plan for family engagement today? Yes       Antibiotic Review: Awaiting culture results.     Review of Invasive Devices:    Shields Plan: Continue for accurate I/O monitoring for 48 hours  Central access plan: Medications requiring central line Hemodynamic monitoring  [place] Plan: Keep arterial line for hemodynamic monitoring and frequent ABGs    Prophylaxis:  VTE VTE covered by:  heparin (porcine), Intravenous, 11 Units/kg/hr at 02/20/24 1247  heparin (porcine), Intravenous  heparin (porcine), Intravenous       Stress Ulcer  covered byFamotidine (PF) (PEPCID) injection 20 mg [[id number]]        Significant 24hr Events     24hr events: Overnight, patient had no acute events. Nimbex and Ketamine were discontinued. Max was discontinued, and started on Levo 1.      Subjective     Review of Systems   Unable to perform ROS: Intubated        Objective                            Vitals I/O      Most Recent  Min/Max in 24hrs   Temp 99.5 °F (37.5 °C) Temp  Min: 99.1 °F (37.3 °C)  Max: 100.2 °F (37.9 °C)   Pulse 93 Pulse  Min: 90  Max: 128   Resp (!) 30 Resp  Min: 25  Max: 30   /62 BP  Min: 122/62  Max: 122/62   O2 Sat 94 % SpO2  Min: 94 %  Max: 99 %      Intake/Output Summary (Last 24 hours) at 2/21/2024 0823  Last data filed at 2/21/2024 0600  Gross per 24 hour   Intake 2878.75 ml   Output 2475 ml   Net 403.75 ml       Diet NPO    Invasive Monitoring   Arterial Line  [place] /56  Arterial Line BP  Min: 91/51  Max: 170/82   MAP 78 mmHg  Arterial Line MAP (mmHg)  Min: 63 mmHg  Max: 110 mmHg           Physical Exam   Physical Exam  HENT:      Head: Normocephalic and atraumatic.   Cardiovascular:      Rate and Rhythm: Normal rate and regular rhythm.      Pulses: Normal pulses.      Heart sounds: Normal heart sounds.   Musculoskeletal:      Right lower leg: No edema.      Left lower leg: No edema.   Abdominal: General: Bowel sounds are normal. There is no distension.   Constitutional:       Interventions: He is sedated and intubated.   Pulmonary:      Effort: Pulmonary effort is normal. He is intubated.      Breath sounds: Normal breath sounds.      Comments: Crackles RML  Neurological:      Comments: Sedated (RASS -5)   Genitourinary/Anorectal:  Shields present.          Diagnostic Studies      EKG: reviewed.  Imaging:  I have personally reviewed pertinent reports.       Medications:  Scheduled PRN   artificial tear, , Q2H  azithromycin, 500 mg, Q24H  cefTRIAXone, 1,000 mg, Q24H  chlorhexidine, 15 mL, Q12H YEHUDA  Famotidine (PF), 20 mg, BID  insulin lispro, 1-5 Units, Q6H  ipratropium, 0.5 mg, Q6H  levalbuterol, 1.25 mg, Q6H  magnesium Oxide, 400 mg, Daily  methylPREDNISolone sodium succinate, 40 mg, Q12H YEHUDA  oseltamivir, 75 mg, Q12H YEHUDA  potassium phosphate, 6 mmol, Once      acetaminophen, 650 mg, Q6H PRN  fentanyl citrate (PF), 50 mcg, Q1H PRN  heparin (porcine), 2,000 Units, Q6H PRN  heparin (porcine), 4,000  Units, Q6H PRN  sodium chloride, 1 spray, Q1H PRN       Continuous    cisatracurium (NIMBEX) in 0.9% sodium chloride infusion, 0.1-5 mcg/kg/min, Last Rate: Stopped (02/21/24 0219)  dexmedetomidine, 0.1-1.2 mcg/kg/hr, Last Rate: Stopped (02/21/24 0755)  fentaNYL, 100 mcg/hr, Last Rate: 100 mcg/hr (02/21/24 0218)  heparin (porcine), 3-20 Units/kg/hr (Order-Specific), Last Rate: 11 Units/kg/hr (02/20/24 1247)  norepinephrine, 1-30 mcg/min, Last Rate: Stopped (02/21/24 0715)  phenylephine,  mcg/min, Last Rate: Stopped (02/21/24 0255)  propofol, 5-50 mcg/kg/min, Last Rate: 45 mcg/kg/min (02/21/24 0741)         Labs:    CBC    Recent Labs     02/20/24  0527 02/20/24  0527 02/21/24  0514   WBC 15.57*  --  14.81*   HGB 13.8  --  12.7   HCT 43.9  --  40.4     --  279   BANDSPCT  --  4  --      BMP    Recent Labs     02/20/24  0502 02/21/24  0514   SODIUM 144 146   K 4.4 4.0   * 109*   CO2 26 30   AGAP 9 7   BUN 39* 34*   CREATININE 1.12 1.09   CALCIUM 7.6* 7.6*       Coags    Recent Labs     02/20/24  2353 02/21/24  0514   PTT 77* 75*        Additional Electrolytes  Recent Labs     02/19/24  1130 02/19/24  1435 02/20/24  0502 02/21/24  0514   MG  --   --  2.5 2.7   PHOS  --   --  3.3 1.9*   CAIONIZED 1.17 1.16  --   --           Blood Gas    Recent Labs     02/21/24  0806   PHART 7.456*   [id number] 41.6   PO2ART 74.2*   [id number] 28.7*   BEART 4.4   SOURCE Line, Arterial     Recent Labs     02/21/24  0806   SOURCE Line, Arterial    LFTs  Recent Labs     02/19/24  1649 02/21/24  0514   ALT 59* 65*   AST 49* 43*   ALKPHOS 106* 77   ALB 3.2* 2.8*   TBILI 1.77* 1.07*       Infectious  Recent Labs     02/20/24  0508   PROCALCITONI 0.36*     Glucose  Recent Labs     02/19/24  1649 02/20/24  0502 02/21/24  0514   GLUC 249* 245* 179*                   Marija Mccollum

## 2024-02-21 NOTE — RESPIRATORY THERAPY NOTE
RT Ventilator Management Note  Hussein Edward III 58 y.o. male MRN: [id number]  Unit/Bed#: [facility] 06 Encounter: [id number]      Daily Screen         2/20/2024  0764             Patient safety screen outcome:: Failed    Not Ready for Weaning due to:: Underline problem not resolved              Physical Exam:   Assessment Type: (P) Assess only  General Appearance: Sedated  Respiratory Pattern: Assisted  Chest Assessment: Chest expansion symmetrical  Bilateral Breath Sounds: Clear      Resp Comments: (P) Pt. doing well on CMV. FiO2 titrated to 40%. No other changes throughout the night.

## 2024-02-21 NOTE — RESPIRATORY THERAPY NOTE
02/21/24 0811   Respiratory Assessment   Assessment Type Assess only   General Appearance Sedated   Respiratory Pattern Assisted   Chest Assessment Chest expansion symmetrical   Bilateral Breath Sounds Diminished   Cough Non-productive   Suction ET Tube   Resp Comments Rec'd pt. on documented CMV settintgs, tolerating well. Will continue to monitor resp status.   O2 Device G5   Vent Information   Vent ID [id number]   Vent type Brown G5   Brown Vent Mode (S)CMV   $ Pulse Oximetry Spot Check Charge Completed   (S)CMV Settings   Resp Rate (BPM) 30 BPM   VT (mL) 370 mL   FIO2 (%) 40 %   PEEP (cmH2O) 6 cmH2O   I:E Ratio 1:1.5   Insp Time (%) 0.8 %   Flow Trigger (LPM) 5   Humidification Heater   Heater Temperature (Set) 95 °F (35 °C)   (S)CMV Actuals   Resp Rate (BPM) 30 BPM   VT (mL) 373   MV 11.3   MAP (cmH2O) 12 cmH2O   Peak Pressure (cmH2O) 21 cmH2O   I:E Ratio (Obs) 1:1.5   Insp Resistance 11   Heater Temperature (Obs) 87.8 °F (31 °C)   Static Compliance (mL/cmH20) 31.8 mL/cmH2O   Plateau Pressure (cm H2O) 19.8 cm H2O   (S)CMV Alarms   High Peak Pressure (cmH2O) 40   Low Pressure (cmH2O) 5 cm H2O   High Resp Rate (BPM) 40 BPM   Low Resp Rate (BPM) 8 BPM   High MV (L/min) 20 L/min   Low MV (L/min) 4 L/min   High VT (mL) 1000 mL   Low VT (mL) 200 mL   Apnea Time (s) 20 S   Maintenance   Alarm (pink) cable attached No   Resuscitation bag with peep valve at bedside Yes   Water bag changed No   Circuit changed No   Daily Screen   Patient safety screen outcome: Failed   Not Ready for Weaning due to: Underline problem not resolved   IHI Ventilator Associated Pneumonia Bundle   Daily Assessment of Readiness to Extubate Yes   Head of Bed Elevated HOB 30   ETT  Cuffed 7.5 mm   Placement Date/Time: 02/19/24 1310   Mask Ventilation: Ventilated by mask (1)  Preoxygenated: Yes  Technique: Direct laryngoscopy  Type: Cuffed  Tube Size: 7.5 mm  Laryngoscope: Other (Comment)  Placement Verification: Auscultation;Chest  x-ray;End tid...   Secured at (cm) 25   Measured from Lips   Secured Location Left   Repositioned Center to Left   Secured by Commercial tube peng   Site Condition Dry   Cuff Pressure (color) Green   HI-LO Suction  Intermittent suction   HI-LO Secretions Scant   HI-LO Intervention Patent     RT Ventilator Management Note  Hussein Edward III 58 y.o. male MRN: [id number]  Unit/Bed#: MICU 06 Encounter: [id number]      Daily Screen         2/20/2024  0739 2/21/2024  0811          Patient safety screen outcome:: Failed Failed (P)       Not Ready for Weaning due to:: Underline problem not resolved Underline problem not resolved (P)                 Physical Exam:   Assessment Type: Assess only  General Appearance: Sedated  Respiratory Pattern: Assisted  Chest Assessment: Chest expansion symmetrical  Bilateral Breath Sounds: Diminished  Cough: Non-productive  Suction: ET Tube  O2 Device: G5      Resp Comments: Rec'd pt. on documented CMV settintgs, tolerating well. Will continue to monitor resp status.

## 2024-02-21 NOTE — QUICK NOTE
Informed by nursing pt's SpO2 dropped to 2%. Pt was seen and evaluated at bedside. Saturation was down at 2%. Pt was immediately bag ventilated, sedation was increased d/t concern pt may not be appropriately sedated. It was found pt had been disconnected from the vent, therefore pt was re-connected with appropriate improvement in SpO2, HR, and BP. No other changes to plan at this time.    Monty Pires MD  [facility]  Internal Medicine Residency PGY-1

## 2024-02-22 ENCOUNTER — APPOINTMENT (INPATIENT)
Dept: RADIOLOGY | Facility: HOSPITAL | Age: 59
DRG: 870 | End: 2024-02-22
Payer: COMMERCIAL

## 2024-02-22 LAB
ANION GAP SERPL CALCULATED.3IONS-SCNC: 5 MMOL/L
ANION GAP SERPL CALCULATED.3IONS-SCNC: 9 MMOL/L
APTT PPP: 24 SECONDS (ref 23–37)
ATRIAL RATE: 131 BPM
BACTERIA BRONCH AEROBE CULT: ABNORMAL
BASE EXCESS BLDA CALC-SCNC: 10.1 MMOL/L
BASE EXCESS BLDA CALC-SCNC: 5.9 MMOL/L
BASE EXCESS BLDA CALC-SCNC: 6.6 MMOL/L
BASE EXCESS BLDA CALC-SCNC: 8.5 MMOL/L
BUN SERPL-MCNC: 34 MG/DL (ref 5–25)
BUN SERPL-MCNC: 34 MG/DL (ref 5–25)
CALCIUM SERPL-MCNC: 7.5 MG/DL (ref 8.4–10.2)
CALCIUM SERPL-MCNC: 7.7 MG/DL (ref 8.4–10.2)
CHLORIDE SERPL-SCNC: 108 MMOL/L (ref 96–108)
CHLORIDE SERPL-SCNC: 110 MMOL/L (ref 96–108)
CO2 SERPL-SCNC: 32 MMOL/L (ref 21–32)
CO2 SERPL-SCNC: 33 MMOL/L (ref 21–32)
CREAT SERPL-MCNC: 0.65 MG/DL (ref 0.6–1.3)
CREAT SERPL-MCNC: 0.77 MG/DL (ref 0.6–1.3)
ERYTHROCYTE [DISTWIDTH] IN BLOOD BY AUTOMATED COUNT: 13.1 % (ref 11.6–15.1)
FLUAV RNA RESP QL NAA+PROBE: NEGATIVE
FLUBV RNA RESP QL NAA+PROBE: NEGATIVE
GFR SERPL CREATININE-BSD FRML MDRD: 101 ML/MIN/1.73SQ M
GFR SERPL CREATININE-BSD FRML MDRD: 108 ML/MIN/1.73SQ M
GLUCOSE SERPL-MCNC: 111 MG/DL (ref 65–140)
GLUCOSE SERPL-MCNC: 141 MG/DL (ref 65–140)
GLUCOSE SERPL-MCNC: 162 MG/DL (ref 65–140)
GLUCOSE SERPL-MCNC: 207 MG/DL (ref 65–140)
GLUCOSE SERPL-MCNC: 215 MG/DL (ref 65–140)
GLUCOSE SERPL-MCNC: 243 MG/DL (ref 65–140)
GRAM STN SPEC: ABNORMAL
HCO3 BLDA-SCNC: 29.9 MMOL/L (ref 22–28)
HCO3 BLDA-SCNC: 30 MMOL/L (ref 22–28)
HCO3 BLDA-SCNC: 34.2 MMOL/L (ref 22–28)
HCO3 BLDA-SCNC: 35.1 MMOL/L (ref 22–28)
HCT VFR BLD AUTO: 38.7 % (ref 36.5–49.3)
HGB BLD-MCNC: 12.6 G/DL (ref 12–17)
HOROWITZ INDEX BLDA+IHG-RTO: 40 MM[HG]
HOROWITZ INDEX BLDA+IHG-RTO: 50 MM[HG]
HOROWITZ INDEX BLDA+IHG-RTO: 50 MM[HG]
LIPASE SERPL-CCNC: 38 U/L (ref 11–82)
LYMPHOCYTES NFR BLD: 1 % (ref 14–44)
MAGNESIUM SERPL-MCNC: 2.4 MG/DL (ref 1.9–2.7)
MCH RBC QN AUTO: 29.5 PG (ref 26.8–34.3)
MCHC RBC AUTO-ENTMCNC: 32.6 G/DL (ref 31.4–37.4)
MCV RBC AUTO: 91 FL (ref 82–98)
METAMYELOCYTES NFR BLD MANUAL: 1 % (ref 0–1)
MONOCYTES NFR BLD AUTO: 2 % (ref 4–12)
MYELOCYTES NFR BLD: 2 % (ref 0–1)
NEUTS SEG NFR BLD AUTO: 94 % (ref 45–77)
O2 CT BLDA-SCNC: 17 ML/DL (ref 16–23)
O2 CT BLDA-SCNC: 18.1 ML/DL (ref 16–23)
O2 CT BLDA-SCNC: 18.2 ML/DL (ref 16–23)
O2 CT BLDA-SCNC: 20.1 ML/DL (ref 16–23)
OXYHGB MFR BLDA: 92.9 % (ref 94–97)
OXYHGB MFR BLDA: 94.3 % (ref 94–97)
OXYHGB MFR BLDA: 95.4 % (ref 94–97)
OXYHGB MFR BLDA: 96.1 % (ref 94–97)
P AXIS: 58 DEGREES
PCO2 BLDA: 38.1 MM HG (ref 36–44)
PCO2 BLDA: 41.8 MM HG (ref 36–44)
PCO2 BLDA: 47.4 MM HG (ref 36–44)
PCO2 BLDA: 51.5 MM HG (ref 36–44)
PEEP RESPIRATORY: 6 CM[H2O]
PH BLDA: 7.44 [PH] (ref 7.35–7.45)
PH BLDA: 7.47 [PH] (ref 7.35–7.45)
PH BLDA: 7.49 [PH] (ref 7.35–7.45)
PH BLDA: 7.51 [PH] (ref 7.35–7.45)
PHOSPHATE SERPL-MCNC: 2.1 MG/DL (ref 2.7–4.5)
PLATELET # BLD AUTO: 278 THOUSANDS/UL (ref 149–390)
PLATELET BLD QL SMEAR: ADEQUATE
PMV BLD AUTO: 8.9 FL (ref 8.9–12.7)
PO2 BLDA: 68.6 MM HG (ref 75–129)
PO2 BLDA: 77.6 MM HG (ref 75–129)
PO2 BLDA: 82.1 MM HG (ref 75–129)
PO2 BLDA: 92.2 MM HG (ref 75–129)
POTASSIUM SERPL-SCNC: 3.9 MMOL/L (ref 3.5–5.3)
POTASSIUM SERPL-SCNC: 3.9 MMOL/L (ref 3.5–5.3)
PR INTERVAL: 140 MS
PRESSURE CONTROL: 10
PRESSURE CONTROL: 8
QRS AXIS: 2 DEGREES
QRSD INTERVAL: 78 MS
QT INTERVAL: 308 MS
QTC INTERVAL: 454 MS
RBC # BLD AUTO: 4.27 MILLION/UL (ref 3.88–5.62)
RBC MORPH BLD: NORMAL
RSV RNA RESP QL NAA+PROBE: NEGATIVE
SARS-COV-2 RNA RESP QL NAA+PROBE: NEGATIVE
SODIUM SERPL-SCNC: 148 MMOL/L (ref 135–147)
SODIUM SERPL-SCNC: 149 MMOL/L (ref 135–147)
SPECIMEN SOURCE: ABNORMAL
T WAVE AXIS: 68 DEGREES
TOTAL CELLS COUNTED SPEC: 100
TRIGL SERPL-MCNC: 644 MG/DL
VENT AC: 30
VENT- AC: AC
VENTRICULAR RATE: 131 BPM
VT SETTING VENT: 370 ML
WBC # BLD AUTO: 19.37 THOUSAND/UL (ref 4.31–10.16)

## 2024-02-22 PROCEDURE — 93010 ELECTROCARDIOGRAM REPORT: CPT | Performed by: INTERNAL MEDICINE

## 2024-02-22 PROCEDURE — 94003 VENT MGMT INPAT SUBQ DAY: CPT

## 2024-02-22 PROCEDURE — 80048 BASIC METABOLIC PNL TOTAL CA: CPT

## 2024-02-22 PROCEDURE — 87070 CULTURE OTHR SPECIMN AEROBIC: CPT

## 2024-02-22 PROCEDURE — 84478 ASSAY OF TRIGLYCERIDES: CPT | Performed by: PSYCHIATRY & NEUROLOGY

## 2024-02-22 PROCEDURE — 93005 ELECTROCARDIOGRAM TRACING: CPT

## 2024-02-22 PROCEDURE — 94640 AIRWAY INHALATION TREATMENT: CPT | Performed by: SOCIAL WORKER

## 2024-02-22 PROCEDURE — 0241U HB NFCT DS VIR RESP RNA 4 TRGT: CPT

## 2024-02-22 PROCEDURE — 99291 CRITICAL CARE FIRST HOUR: CPT | Performed by: INTERNAL MEDICINE

## 2024-02-22 PROCEDURE — 85730 THROMBOPLASTIN TIME PARTIAL: CPT | Performed by: STUDENT IN AN ORGANIZED HEALTH CARE EDUCATION/TRAINING PROGRAM

## 2024-02-22 PROCEDURE — 87106 FUNGI IDENTIFICATION YEAST: CPT

## 2024-02-22 PROCEDURE — 85027 COMPLETE CBC AUTOMATED: CPT

## 2024-02-22 PROCEDURE — 84100 ASSAY OF PHOSPHORUS: CPT

## 2024-02-22 PROCEDURE — 83690 ASSAY OF LIPASE: CPT

## 2024-02-22 PROCEDURE — 94640 AIRWAY INHALATION TREATMENT: CPT

## 2024-02-22 PROCEDURE — 71045 X-RAY EXAM CHEST 1 VIEW: CPT

## 2024-02-22 PROCEDURE — 82948 REAGENT STRIP/BLOOD GLUCOSE: CPT

## 2024-02-22 PROCEDURE — 83735 ASSAY OF MAGNESIUM: CPT

## 2024-02-22 PROCEDURE — 82805 BLOOD GASES W/O2 SATURATION: CPT

## 2024-02-22 PROCEDURE — 94664 DEMO&/EVAL PT USE INHALER: CPT

## 2024-02-22 PROCEDURE — 85007 BL SMEAR W/DIFF WBC COUNT: CPT

## 2024-02-22 PROCEDURE — 82550 ASSAY OF CK (CPK): CPT | Performed by: STUDENT IN AN ORGANIZED HEALTH CARE EDUCATION/TRAINING PROGRAM

## 2024-02-22 PROCEDURE — 94760 N-INVAS EAR/PLS OXIMETRY 1: CPT

## 2024-02-22 PROCEDURE — 87205 SMEAR GRAM STAIN: CPT

## 2024-02-22 RX ORDER — MIDAZOLAM HYDROCHLORIDE 2 MG/2ML
2 INJECTION, SOLUTION INTRAMUSCULAR; INTRAVENOUS ONCE
Status: COMPLETED | OUTPATIENT
Start: 2024-02-22 | End: 2024-02-22

## 2024-02-22 RX ORDER — MIDAZOLAM HYDROCHLORIDE 2 MG/2ML
4 INJECTION, SOLUTION INTRAMUSCULAR; INTRAVENOUS ONCE
Status: COMPLETED | OUTPATIENT
Start: 2024-02-22 | End: 2024-02-22

## 2024-02-22 RX ORDER — GABAPENTIN 250 MG/5ML
100 SOLUTION ORAL 3 TIMES DAILY
Status: DISCONTINUED | OUTPATIENT
Start: 2024-02-22 | End: 2024-02-29

## 2024-02-22 RX ORDER — POLYETHYLENE GLYCOL 3350 17 G/17G
17 POWDER, FOR SOLUTION ORAL DAILY
Status: DISCONTINUED | OUTPATIENT
Start: 2024-02-22 | End: 2024-02-23

## 2024-02-22 RX ORDER — INSULIN LISPRO 100 [IU]/ML
1-6 INJECTION, SOLUTION INTRAVENOUS; SUBCUTANEOUS EVERY 6 HOURS SCHEDULED
Status: DISCONTINUED | OUTPATIENT
Start: 2024-02-22 | End: 2024-03-05

## 2024-02-22 RX ORDER — FUROSEMIDE 10 MG/ML
40 INJECTION INTRAMUSCULAR; INTRAVENOUS
Status: DISCONTINUED | OUTPATIENT
Start: 2024-02-22 | End: 2024-02-22

## 2024-02-22 RX ORDER — PROPOFOL 10 MG/ML
5-50 INJECTION, EMULSION INTRAVENOUS
Status: DISCONTINUED | OUTPATIENT
Start: 2024-02-22 | End: 2024-02-22

## 2024-02-22 RX ORDER — MIDAZOLAM HYDROCHLORIDE 2 MG/2ML
1 INJECTION, SOLUTION INTRAMUSCULAR; INTRAVENOUS ONCE
Status: COMPLETED | OUTPATIENT
Start: 2024-02-23 | End: 2024-02-23

## 2024-02-22 RX ORDER — MAGNESIUM SULFATE 1 G/100ML
1 INJECTION INTRAVENOUS ONCE
Status: COMPLETED | OUTPATIENT
Start: 2024-02-22 | End: 2024-02-22

## 2024-02-22 RX ORDER — FUROSEMIDE 10 MG/ML
80 INJECTION INTRAMUSCULAR; INTRAVENOUS
Status: DISCONTINUED | OUTPATIENT
Start: 2024-02-22 | End: 2024-02-22

## 2024-02-22 RX ADMIN — FUROSEMIDE 80 MG: 10 INJECTION, SOLUTION INTRAMUSCULAR; INTRAVENOUS at 07:42

## 2024-02-22 RX ADMIN — FAMOTIDINE 20 MG: 40 POWDER, FOR SUSPENSION ORAL at 08:05

## 2024-02-22 RX ADMIN — MAGNESIUM OXIDE TAB 400 MG (241.3 MG ELEMENTAL MG) 400 MG: 400 (241.3 MG) TAB at 08:04

## 2024-02-22 RX ADMIN — HEPARIN SODIUM 5000 UNITS: 5000 INJECTION INTRAVENOUS; SUBCUTANEOUS at 05:04

## 2024-02-22 RX ADMIN — METHYLPREDNISOLONE SODIUM SUCCINATE 40 MG: 40 INJECTION, POWDER, FOR SOLUTION INTRAMUSCULAR; INTRAVENOUS at 08:04

## 2024-02-22 RX ADMIN — LEVALBUTEROL HYDROCHLORIDE 1.25 MG: 1.25 SOLUTION RESPIRATORY (INHALATION) at 13:28

## 2024-02-22 RX ADMIN — IPRATROPIUM BROMIDE 0.5 MG: 0.5 SOLUTION RESPIRATORY (INHALATION) at 19:30

## 2024-02-22 RX ADMIN — INSULIN LISPRO 2 UNITS: 100 INJECTION, SOLUTION INTRAVENOUS; SUBCUTANEOUS at 11:50

## 2024-02-22 RX ADMIN — MIDAZOLAM 1 MG/HR: 5 INJECTION INTRAMUSCULAR; INTRAVENOUS at 07:23

## 2024-02-22 RX ADMIN — FENTANYL CITRATE 100 MCG: 50 INJECTION INTRAMUSCULAR; INTRAVENOUS at 01:47

## 2024-02-22 RX ADMIN — LEVALBUTEROL HYDROCHLORIDE 1.25 MG: 1.25 SOLUTION RESPIRATORY (INHALATION) at 00:26

## 2024-02-22 RX ADMIN — POTASSIUM PHOSPHATE, MONOBASIC POTASSIUM PHOSPHATE, DIBASIC 12 MMOL: 224; 236 INJECTION, SOLUTION, CONCENTRATE INTRAVENOUS at 11:12

## 2024-02-22 RX ADMIN — IPRATROPIUM BROMIDE 0.5 MG: 0.5 SOLUTION RESPIRATORY (INHALATION) at 07:29

## 2024-02-22 RX ADMIN — INSULIN LISPRO 1 UNITS: 100 INJECTION, SOLUTION INTRAVENOUS; SUBCUTANEOUS at 12:20

## 2024-02-22 RX ADMIN — ACETAMINOPHEN 650 MG: 325 TABLET, FILM COATED ORAL at 22:27

## 2024-02-22 RX ADMIN — ACETAMINOPHEN 650 MG: 325 TABLET, FILM COATED ORAL at 00:02

## 2024-02-22 RX ADMIN — HEPARIN SODIUM 5000 UNITS: 5000 INJECTION INTRAVENOUS; SUBCUTANEOUS at 13:52

## 2024-02-22 RX ADMIN — PROPOFOL 40 MCG/KG/MIN: 10 INJECTION, EMULSION INTRAVENOUS at 07:17

## 2024-02-22 RX ADMIN — CHLORHEXIDINE GLUCONATE 15 ML: 1.2 SOLUTION ORAL at 20:08

## 2024-02-22 RX ADMIN — OSELTAMIVIR PHOSPHATE 75 MG: 75 CAPSULE ORAL at 08:04

## 2024-02-22 RX ADMIN — MAGNESIUM SULFATE HEPTAHYDRATE 1 G: 1 INJECTION, SOLUTION INTRAVENOUS at 12:39

## 2024-02-22 RX ADMIN — CEFEPIME 1000 MG: 1 INJECTION, POWDER, FOR SOLUTION INTRAMUSCULAR; INTRAVENOUS at 11:20

## 2024-02-22 RX ADMIN — IPRATROPIUM BROMIDE 0.5 MG: 0.5 SOLUTION RESPIRATORY (INHALATION) at 13:28

## 2024-02-22 RX ADMIN — CHLORHEXIDINE GLUCONATE 15 ML: 1.2 SOLUTION ORAL at 08:04

## 2024-02-22 RX ADMIN — Medication 200 MCG/HR: at 07:37

## 2024-02-22 RX ADMIN — INSULIN LISPRO 1 UNITS: 100 INJECTION, SOLUTION INTRAVENOUS; SUBCUTANEOUS at 04:50

## 2024-02-22 RX ADMIN — KETAMINE HYDROCHLORIDE 1 MG/KG/HR: 100 INJECTION INTRAMUSCULAR; INTRAVENOUS at 11:50

## 2024-02-22 RX ADMIN — PROPOFOL 40 MCG/KG/MIN: 10 INJECTION, EMULSION INTRAVENOUS at 06:42

## 2024-02-22 RX ADMIN — Medication 200 MCG/HR: at 12:35

## 2024-02-22 RX ADMIN — LEVALBUTEROL HYDROCHLORIDE 1.25 MG: 1.25 SOLUTION RESPIRATORY (INHALATION) at 07:29

## 2024-02-22 RX ADMIN — MIDAZOLAM 4 MG: 1 INJECTION INTRAMUSCULAR; INTRAVENOUS at 07:21

## 2024-02-22 RX ADMIN — KETAMINE HYDROCHLORIDE 1 MG/KG/HR: 100 INJECTION INTRAMUSCULAR; INTRAVENOUS at 20:09

## 2024-02-22 RX ADMIN — HEPARIN SODIUM 5000 UNITS: 5000 INJECTION INTRAVENOUS; SUBCUTANEOUS at 22:30

## 2024-02-22 RX ADMIN — PROPOFOL 40 MCG/KG/MIN: 10 INJECTION, EMULSION INTRAVENOUS at 02:00

## 2024-02-22 RX ADMIN — LEVALBUTEROL HYDROCHLORIDE 1.25 MG: 1.25 SOLUTION RESPIRATORY (INHALATION) at 19:30

## 2024-02-22 RX ADMIN — CEFEPIME 1000 MG: 1 INJECTION, POWDER, FOR SOLUTION INTRAMUSCULAR; INTRAVENOUS at 20:08

## 2024-02-22 RX ADMIN — ACETAMINOPHEN 650 MG: 325 TABLET, FILM COATED ORAL at 12:18

## 2024-02-22 RX ADMIN — GABAPENTIN 100 MG: 250 SOLUTION ORAL at 20:28

## 2024-02-22 RX ADMIN — IPRATROPIUM BROMIDE 0.5 MG: 0.5 SOLUTION RESPIRATORY (INHALATION) at 00:26

## 2024-02-22 RX ADMIN — FENTANYL CITRATE 100 MCG: 50 INJECTION INTRAMUSCULAR; INTRAVENOUS at 22:30

## 2024-02-22 RX ADMIN — FENTANYL CITRATE 100 MCG: 50 INJECTION INTRAMUSCULAR; INTRAVENOUS at 09:43

## 2024-02-22 RX ADMIN — MIDAZOLAM 2 MG: 1 INJECTION INTRAMUSCULAR; INTRAVENOUS at 11:22

## 2024-02-22 RX ADMIN — NOREPINEPHRINE BITARTRATE 1 MCG/MIN: 1 SOLUTION INTRAVENOUS at 12:46

## 2024-02-22 RX ADMIN — PROPOFOL 35 MCG/KG/MIN: 10 INJECTION, EMULSION INTRAVENOUS at 04:50

## 2024-02-22 RX ADMIN — OSELTAMIVIR PHOSPHATE 75 MG: 75 CAPSULE ORAL at 20:08

## 2024-02-22 RX ADMIN — POLYETHYLENE GLYCOL 3350 17 G: 17 POWDER, FOR SOLUTION ORAL at 10:18

## 2024-02-22 RX ADMIN — ACETAMINOPHEN 650 MG: 325 TABLET, FILM COATED ORAL at 07:06

## 2024-02-22 RX ADMIN — Medication 175 MCG/HR: at 01:48

## 2024-02-22 RX ADMIN — GABAPENTIN 100 MG: 250 SOLUTION ORAL at 12:22

## 2024-02-22 RX ADMIN — METHYLPREDNISOLONE SODIUM SUCCINATE 40 MG: 40 INJECTION, POWDER, FOR SOLUTION INTRAMUSCULAR; INTRAVENOUS at 20:08

## 2024-02-22 RX ADMIN — KETAMINE HYDROCHLORIDE 1 MG/KG/HR: 100 INJECTION INTRAMUSCULAR; INTRAVENOUS at 05:17

## 2024-02-22 RX ADMIN — Medication 200 MCG/HR: at 17:52

## 2024-02-22 RX ADMIN — GABAPENTIN 100 MG: 250 SOLUTION ORAL at 17:43

## 2024-02-22 RX ADMIN — FENTANYL CITRATE 100 MCG: 50 INJECTION INTRAMUSCULAR; INTRAVENOUS at 06:25

## 2024-02-22 RX ADMIN — FENTANYL CITRATE 100 MCG: 50 INJECTION INTRAMUSCULAR; INTRAVENOUS at 20:42

## 2024-02-22 NOTE — PLAN OF CARE
Problem: Prexisting or High Potential for Compromised Skin Integrity  Goal: Skin integrity is maintained or improved  Description: INTERVENTIONS:  - Identify patients at risk for skin breakdown  - Assess and monitor skin integrity  - Assess and monitor nutrition and hydration status  - Monitor labs   - Assess for incontinence   - Turn and reposition patient  - Assist with mobility/ambulation  - Relieve pressure over bony prominences  - Avoid friction and shearing  - Provide appropriate hygiene as needed including keeping skin clean and dry  - Evaluate need for skin moisturizer/barrier cream  - Collaborate with interdisciplinary team   - Patient/family teaching  - Consider wound care consult   Outcome: Progressing     Problem: PAIN - ADULT  Goal: Verbalizes/displays adequate comfort level or baseline comfort level  Description: Interventions:  - Encourage patient to monitor pain and request assistance  - Assess pain using appropriate pain scale  - Administer analgesics based on type and severity of pain and evaluate response  - Implement non-pharmacological measures as appropriate and evaluate response  - Consider cultural and social influences on pain and pain management  - Notify physician/advanced practitioner if interventions unsuccessful or patient reports new pain  Outcome: Progressing     Problem: INFECTION - ADULT  Goal: Absence or prevention of progression during hospitalization  Description: INTERVENTIONS:  - Assess and monitor for signs and symptoms of infection  - Monitor lab/diagnostic results  - Monitor all insertion sites, i.e. indwelling lines, tubes, and drains  - Monitor endotracheal if appropriate and nasal secretions for changes in amount and color  - [place] appropriate cooling/warming therapies per order  - Administer medications as ordered  - Instruct and encourage patient and family to use good hand hygiene technique  - Identify and instruct in appropriate isolation precautions for  identified infection/condition  Outcome: Progressing     Problem: SAFETY ADULT  Goal: Patient will remain free of falls  Description: INTERVENTIONS:  - Educate patient/family on patient safety including physical limitations  - Instruct patient to call for assistance with activity   - Consult OT/PT to assist with strengthening/mobility   - Keep Call bell within reach  - Keep bed low and locked with side rails adjusted as appropriate  - Keep care items and personal belongings within reach  - Initiate and maintain comfort rounds  - Make Fall Risk Sign visible to staff  - Offer Toileting every 2 Hours, in advance of need  - Initiate/Maintain alarm    - Apply yellow socks and bracelet for high fall risk patients  - Consider moving patient to room near nurses station  Outcome: Progressing     Problem: SAFETY ADULT  Goal: Maintains/Returns to pre admission functional level  Description: INTERVENTIONS:  - Perform AM-PAC 6 Click Basic Mobility/ Daily Activity assessment daily.  - Set and communicate daily mobility goal to care team and patient/family/caregiver.   - Collaborate with rehabilitation services on mobility goals if consulted  - Perform Range of Motion 3 times a day.  - Reposition patient every 2 hours.  - Dangle patient 0 times a day  - Sta    - Record patient progressd toleration of activity level   Outcome: Progressing     Problem: SAFETY ADULT  Goal: Maintain or return to baseline ADL function  Description: INTERVENTIONS:  -  Assess patient's ability to carry out ADLs; assess patient's baseline for ADL function and identify physical deficits which impact ability to perform ADLs (bathing, care of mouth/teeth, toileting, grooming, dressing, etc.)  - Assess/evaluate cause of self-care deficits   - Assess range of motion  - Assess patient's mobility; develop plan if impaired  - Assess patient's need for assistive devices and provide as appropriate  - Encourage maximum independence but intervene and supervise when  necessary  - Involve family in performance of ADLs  - Assess for home care needs following discharge   - Consider OT consult to assist with ADL evaluation and planning for discharge  - Provide patient education as appropriate  Outcome: Progressing     Problem: Knowledge Deficit  Goal: Patient/family/caregiver demonstrates understanding of disease process, treatment plan, medications, and discharge instructions  Description: Complete learning assessment and assess knowledge base.  Interventions:  - Provide teaching at level of understanding  - Provide teaching via preferred learning methods  Outcome: Progressing     Problem: Nutrition/Hydration-ADULT  Goal: Nutrient/Hydration intake appropriate for improving, restoring or maintaining nutritional needs  Description: Monitor and assess patient's nutrition/hydration status for malnutrition. Collaborate with interdisciplinary team and initiate plan and interventions as ordered.  Monitor patient's weight and dietary intake as ordered or per policy. Utilize nutrition screening tool and intervene as necessary. Determine patient's food preferences and provide high-protein, high-caloric foods as appropriate.     INTERVENTIONS:  - Monitor oral intake, urinary output, labs, and treatment plans  - Assess nutrition and hydration status and recommend course of action  - Evaluate amount of meals eaten  - Assist patient with eating if necessary   - Allow adequate time for meals  - Recommend/ encourage appropriate diets, oral nutritional supplements, and vitamin/mineral supplements  - Order, calculate, and assess calorie counts as needed  - Recommend, monitor, and adjust tube feedings and TPN/PPN based on assessed needs  - Assess need for intravenous fluids  - Provide specific nutrition/hydration education as appropriate  - Include patient/family/caregiver in decisions related to nutrition  Outcome: Progressing     Problem: SAFETY,RESTRAINT: NV/NON-SELF DESTRUCTIVE BEHAVIOR  Goal:  Remains free of harm/injury (restraint for non violent/non self-detsructive behavior)  Description: INTERVENTIONS:  - Instruct patient/family regarding restraint use   - Assess and monitor physiologic and psychological status   - Provide interventions and comfort measures to meet assessed patient needs   - Identify and implement measures to help patient regain control  - Assess readiness for release of restraint   Outcome: Progressing     Problem: SAFETY,RESTRAINT: NV/NON-SELF DESTRUCTIVE BEHAVIOR  Goal: Returns to optimal restraint-free functioning  Description: INTERVENTIONS:  - Assess the patient's behavior and symptoms that indicate continued need for restraint  - Identify and implement measures to help patient regain control  - Assess readiness for release of restraint   Outcome: Progressing

## 2024-02-22 NOTE — RESPIRATORY THERAPY NOTE
RT Ventilator Management Note  Hussein Edward III 58 y.o. male MRN: [id number]  [facility] En[facility]: [id number]      Daily Screen         2/20/2024  0739 2/21/2024  0811          Patient safety screen outcome:: Failed Failed      Not Ready for Weaning due to:: Underline problem not resolved Underline problem not resolved                Physical Exam:   Assessment Type: Pre-treatment  General Appearance: Sedated  Respiratory Pattern: (P) Assisted  Chest Assessment: (P) Chest expansion symmetrical  Bilateral Breath Sounds: (P) Coarse  Cough: Productive  Suction: ET Tube  O2 Device: vent      Resp Comments: (P) Pt has been stable on [facility] settings. No changes made to vent at ths time

## 2024-02-22 NOTE — PROGRESS NOTES
[facility]  Progress Note: Critical Care  Name: Hussein Edward III 58 y.o. male I MRN: [id number]  Unit/Bed#: MICU 06 I Date of Admission: 2/19/2024   Date of Service: 2/22/2024 I Hospital Day: 3    Assessment/Plan   Neuro:   Diagnosis: Post-intubation and sedation  Pt underwent RSI w/ etomidate and succinylcholine  Now maintained fentanyl 200 mcg/hr. Currently on propofol 10, weaning due to increase in triglycerides. Switched to versed 1.  Patient was put on precedex on 02/21, and became bradycardic to 30s, prompting dc of the precedex.  Plan: PRN fentanyl for agitation  RASS goal -5  Ketamine double concentrate at 1  Paralysis Nimbex d/c  Consider decreasing sedation as appropriate  Diagnosis: hx of migraines  Home meds include nurtek and diamox  Plan: holding nurtek and diamox at this time  Diagnosis: hx of insomnia  Plan: hold daridorexant at this time      CV:   Diagnosis: Hypotension likely 2/2 induction for RSI  Plan: On Levo, wean as possible to maintain MAP>65  A-line for hemodynamic monitoring  TTE 02/19: LVEF 55-60%. G1DD.  Diagnosis: New onset atrial fibrillation, resolved, Now NSR.  Pt found to have rates to 150s s/p CVC placement  Plan: gave one time dose of lopressor, patient started on amiodarone drip. Patient's HR still elevated with afib. Given dose of digoxin, resolved.  Continue to monitor, HR better in 90s  Not on heparin ACS protocol anymore, was restarted on DVT prophylactic heparin       Pulm:  Diagnosis: Acute Respiratory Distress Syndrome  Pt initially presented influenza A positive w/ increasing O2 requirements, requiring HFNC -> BiPAP -> intubation  CTA on 02/18 without evidence of PE but findings of extensive bilateral groundglass and consolidative opacities with predominantly perihilar and peripheral distribution compatible with viral and/or atypical pneumonia, likely influenza given clinical history.  Procal 0.51 -> 0.44 --> 0.31 --> 0.36  On 02/19,  pt began having increasing WOB and tachypnea to 50s  Subsequently required RSI, was on AC/VC 32/400/100%/10  Post-intubation ABG 7.2/51/115/24  Most recent ABG 7.47/42/92/30, respiratory acidosis improving, mildly alkalotic, consider decreasing RR.  PaO2/FiO2 184, improving  Etiology appears likely 2/2 pulmonary infection from influenza superimposed with possible bacterial infection  Lungs sound clear to ausc. Give diuretic to maintain patient in goal of net -500 to -1L.   Consider Veletri to improve oxygenation, hold for now since oxygenation improving on current measures  Strep pneumo and legionella urine antigen negative  Plan: Antibiotics as mentioned below under ID but finished 5/5 of Ceftriaxone and Azithromycin on 02/21, not currently on abx. Patient spiking fevers, refer to ID section.  Q4H ABGs  CXR appears stable.  Underwent bronchoscopy on 02/19 -> no growth   On Solumedrol IV 40 mg Q12H  Nebs- Xopenex, Atrovent  Sputum cultures pending  Blood cultures -> prelim. No growth at 48 hours     GI:   Diagnosis: post-intubation  Plan: On GI prophylaxis with famotidine 20 mg IV  On tube feeds  Patient's abdomen mildly distended, has not have bowel movement recently. If tube feeds do not prompt bowel movements, consider adding bowel regimen, especially since starting Versed.       :   Diagnosis: CKD Stage II  Plan: sCr baseline range of 0.8-1.0, stable currently  Avoid nephrotoxic medications  Monitor sCr with daily BMPs     F/E/N:    F: Not currently on any IV fluids  E: Will monitor and correct any electrolyte derangements. Hypernatremic today at 149. Likely due to diuretic use due to volume depletion. CVP 2. Can also be possibly due to tube feeds, IV piggybacks. Held off on repleting phosphate due to increased sodium. Free water deficit calculated as 3.4 L. Consider free water replacement.  N: tube feeds     Heme/Onc:   DVT prophylaxis: DVT prophylaxis heparin.     Endo:   Diagnosis: Steroid induced  hyperglycemia  Glucose within ranges of  158-249  No prior A1c for review but based on elevated glucose readings and this being day 4 of glucocorticoids, it maybe steroid induced hyperglycemia  Plan: On SSI #1 for now     ID:   Diagnosis: Flu with superimposed bacterial multifocal pneumonia, less suspicion of bacterial superimposition due to negative blood cultures.  Pt had ongoing influenza that was untreated for 4 days PTA  Likely lead to new bacterial infection, in setting of viral infection  Sputum cultures pending  Blood cultures -> prelim. No growth at 48 hours  Underwent bronchoscopy on 02/19 -> prelim result no growth  Plan: Will continue tamiflu for now, Day 4 Tamiflu 75 mg Q12H  Procalcitonin reviewed, at 0.36  Finished 5/5 of Ceftriaxone and Azithromycin on 02/21, not currently on abx.  Patient spiked fevers overnight. Consider etiology infectious, sedation use, or reactive. Patient's cultures have been negative and patient has completed course of antibiotics. Patient's CXR appears stable. Less likely infectious source. Consider UA.   On Solumedrol IV 40 mg Q12H, consider d/c if thinking this was viral induced ARDS  Patient hasn't had viral panel, consider ordering  D/c Vancomycin 02/20, MRSA neg  See plan above     MSK/Skin:   Frequent turning and repositioning  Wound care as needed       Disposition: Critical care    ICU Core Measures     Vented Patient  VAP Bundle  VAP bundle ordered     A: Assess, Prevent, and Manage Pain Has pain been assessed? NA  Need for changes to pain regimen? NA   B: Both Spontaneous Awakening Trials (SATs) and Spontaneous Breathing Trials (SBTs) Plan to perform spontaneous awakening trial today? Modified and patient remained on sedation other than precedex  Plan to perform spontaneous breathing trial today? Yes   Obvious barriers to extubation? Yes   C: Choice of Sedation RASS Goal: -5 Unarousable or 0 Alert and Calm  Need for changes to sedation or analgesia regimen? No   D:  Delirium CAM-ICU: Unable to perform secondary to Acute cognitive dysfunction   E: Early Mobility  Plan for early mobility? Yes   F: Family Engagement Plan for family engagement today? Yes       Antibiotic Review: Awaiting culture results.     Review of Invasive Devices:    Shields Plan: Continue for accurate I/O monitoring for 48 hours  Central access plan: Medications requiring central line Hemodynamic monitoring  [place] Plan: Keep arterial line for hemodynamic monitoring and frequent ABGs    Prophylaxis:  VTE VTE covered by:  heparin (porcine), Subcutaneous, 5,000 Units at 02/22/24 0504       Stress Ulcer  covered byFamotidine (PF) (PEPCID) injection 20 mg [[id number]]        Significant 24hr Events     24hr events: Overnight, patient had no acute events. Ketamine increased to 1. Fentanyl increased to 175. Propofol decreased to 40. Patient has been requiring 2-3 Levo to maintain MAP>65.      Subjective     Review of Systems   Unable to perform ROS: Intubated        Objective                            Vitals I/O      Most Recent Min/Max in 24hrs   Temp 100.4 °F (38 °C) Temp  Min: 99.5 °F (37.5 °C)  Max: 101.3 °F (38.5 °C)   Pulse (!) 111 Pulse  Min: 81  Max: 145   Resp (!) 29 Resp  Min: 29  Max: 46   /62 No data recorded   O2 Sat 94 % SpO2  Min: 89 %  Max: 98 %      Intake/Output Summary (Last 24 hours) at 2/22/2024 1017  Last data filed at 2/22/2024 1001  Gross per 24 hour   Intake 2318.42 ml   Output 3175 ml   Net -856.58 ml       Diet Enteral/Parenteral; Tube Feeding No Oral Diet; Jevity 1.2 Adrián; Continuous; 40; 80; Every 6 hours    Invasive Monitoring   Arterial Line  [place] /78  Arterial Line BP  Min: 83/42  Max: 168/76    mmHg  Arterial Line MAP (mmHg)  Min: 55 mmHg  Max: 108 mmHg           Physical Exam   Physical Exam  HENT:      Head: Normocephalic and atraumatic.   Cardiovascular:      Rate and Rhythm: Normal rate and regular rhythm.      Pulses: Normal pulses.      Heart sounds: Normal  heart sounds.   Musculoskeletal:      Right lower leg: No edema.      Left lower leg: No edema.   Abdominal: General: Bowel sounds are normal. There is distension (mildly).  Constitutional:       Interventions: He is sedated and intubated.   Pulmonary:      Effort: Pulmonary effort is normal. He is intubated.      Breath sounds: Normal breath sounds.      Comments: Crackles RML  Neurological:      Comments: Sedated (RASS -5)   Genitourinary/Anorectal:  Shields present.          Diagnostic Studies      EKG: reviewed.  Imaging:  I have personally reviewed pertinent reports.       Medications:  Scheduled PRN   chlorhexidine, 15 mL, Q12H YEHUDA  famotidine, 20 mg, Daily  furosemide, 40 mg, BID (diuretic)  heparin (porcine), 5,000 Units, Q8H YEHUDA  insulin lispro, 1-5 Units, Q6H  ipratropium, 0.5 mg, Q6H  levalbuterol, 1.25 mg, Q6H  magnesium Oxide, 400 mg, Daily  methylPREDNISolone sodium succinate, 40 mg, Q12H YEHUDA  oseltamivir, 75 mg, Q12H YEHUDA  polyethylene glycol, 17 g, Daily      acetaminophen, 650 mg, Q6H PRN  fentanyl citrate (PF), 100 mcg, Q2H PRN  sodium chloride, 1 spray, Q1H PRN       Continuous    fentaNYL, 200 mcg/hr, Last Rate: 200 mcg/hr (02/22/24 0800)  ketamine, 1 mg/kg/hr, Last Rate: 1 mg/kg/hr (02/22/24 0800)  midazolam, 1 mg/hr, Last Rate: 1 mg/hr (02/22/24 0800)  norepinephrine, 1-30 mcg/min, Last Rate: Stopped (02/22/24 0941)  propofol, 5-50 mcg/kg/min, Last Rate: 5 mcg/kg/min (02/22/24 0911)         Labs:    CBC    Recent Labs     02/21/24  0514 02/22/24  0437   WBC 14.81* 19.37*   HGB 12.7 12.6   HCT 40.4 38.7    278     BMP    Recent Labs     02/21/24  0514 02/22/24  0437   SODIUM 146 149*   K 4.0 3.9   * 108   CO2 30 32   AGAP 7 9   BUN 34* 34*   CREATININE 1.09 0.77   CALCIUM 7.6* 7.7*       Coags    Recent Labs     02/21/24  0514 02/22/24  0437   PTT 75* 24        Additional Electrolytes  Recent Labs     02/21/24  0514 02/22/24  0437   MG 2.7 2.4   PHOS 1.9* 2.1*          Blood  Gas    Recent Labs     02/22/24  0437   PHART 7.474*   UPN5FHB 41.8   PO2ART 92.2   RTJ2FFL 30.0*   BEART 5.9   SOURCE Line, Arterial     Recent Labs     02/22/24  0437   SOURCE Line, Arterial    LFTs  Recent Labs     02/21/24  0514   ALT 65*   AST 43*   ALKPHOS 77   ALB 2.8*   TBILI 1.07*       Infectious  No recent results    Glucose  Recent Labs     02/21/24  0514 02/22/24  0437   GLUC 179* 207*                   Marija Mccollum

## 2024-02-22 NOTE — RESPIRATORY THERAPY NOTE
RT Ventilator Management Note  Hussein Edward III 58 y.o. male MRN: [id number]  [facility]: [id number]      Daily Screen         2/21/2024  0811 2/22/2024  0729          Patient safety screen outcome:: Failed Failed (P)       Not Ready for Weaning due to:: Underline problem not resolved Underline problem not resolved;PEEP > 8cmH2O (P)                 Physical Exam:   Assessment Type: (P) Assess only  General Appearance: (P) Sedated  Respiratory Pattern: (P) Assisted  Chest Assessment: (P) Chest expansion symmetrical  Bilateral Breath Sounds: (P) Coarse  Cough: Productive  Suction: ET Tube  O2 Device: vent      Resp Comments: Pt cont on S-cmv settings. No resp distress noted. Pt tolerating current settings, no changes made at this time. Will cont to monitor per resp protocol.

## 2024-02-22 NOTE — CONSULTS
Nutrition Recommendations:    Propofol being weaned off d/t high triglycerides so did not consider these calories in TF recs.    Updated TF order with recommendations to meet both estimated calorie and protein needs:   Vital High Protein with goal rate of 45mL/hr x24 hrs, add 2 packets Prosource Liquid Protein daily.  In total, provides 1200 kcals, 124g protein, 1023mL water including water for Prosource administration.   With current free water flushes of 200mL q6 hrs, will provide a total of 1823mL water (increase from 1575mL water presently).

## 2024-02-23 ENCOUNTER — APPOINTMENT (INPATIENT)
Dept: RADIOLOGY | Facility: HOSPITAL | Age: 59
DRG: 870 | End: 2024-02-23
Payer: COMMERCIAL

## 2024-02-23 LAB
ANION GAP SERPL CALCULATED.3IONS-SCNC: 5 MMOL/L
ANISOCYTOSIS BLD QL SMEAR: PRESENT
BACTERIA UR QL AUTO: ABNORMAL /HPF
BASE EXCESS BLDA CALC-SCNC: 10.9 MMOL/L
BASE EXCESS BLDA CALC-SCNC: 7.2 MMOL/L
BASE EXCESS BLDA CALC-SCNC: 7.4 MMOL/L
BASE EXCESS BLDA CALC-SCNC: 7.4 MMOL/L
BASE EXCESS BLDA CALC-SCNC: 9.2 MMOL/L
BASOPHILS # BLD MANUAL: 0 THOUSAND/UL (ref 0–0.1)
BASOPHILS NFR MAR MANUAL: 0 % (ref 0–1)
BILIRUB UR QL STRIP: NEGATIVE
BUN SERPL-MCNC: 33 MG/DL (ref 5–25)
CALCIUM SERPL-MCNC: 7.6 MG/DL (ref 8.4–10.2)
CHLORIDE SERPL-SCNC: 108 MMOL/L (ref 96–108)
CK SERPL-CCNC: 445 U/L (ref 39–308)
CLARITY UR: CLEAR
CO2 SERPL-SCNC: 33 MMOL/L (ref 21–32)
COLOR UR: YELLOW
CREAT SERPL-MCNC: 0.64 MG/DL (ref 0.6–1.3)
EOSINOPHIL # BLD MANUAL: 0 THOUSAND/UL (ref 0–0.4)
EOSINOPHIL NFR BLD MANUAL: 0 % (ref 0–6)
ERYTHROCYTE [DISTWIDTH] IN BLOOD BY AUTOMATED COUNT: 13 % (ref 11.6–15.1)
GFR SERPL CREATININE-BSD FRML MDRD: 109 ML/MIN/1.73SQ M
GLUCOSE SERPL-MCNC: 118 MG/DL (ref 65–140)
GLUCOSE SERPL-MCNC: 122 MG/DL (ref 65–140)
GLUCOSE SERPL-MCNC: 190 MG/DL (ref 65–140)
GLUCOSE SERPL-MCNC: 196 MG/DL (ref 65–140)
GLUCOSE SERPL-MCNC: 247 MG/DL (ref 65–140)
GLUCOSE UR STRIP-MCNC: NEGATIVE MG/DL
HCO3 BLDA-SCNC: 30.9 MMOL/L (ref 22–28)
HCO3 BLDA-SCNC: 31.1 MMOL/L (ref 22–28)
HCO3 BLDA-SCNC: 32.1 MMOL/L (ref 22–28)
HCO3 BLDA-SCNC: 33.2 MMOL/L (ref 22–28)
HCO3 BLDA-SCNC: 36.4 MMOL/L (ref 22–28)
HCT VFR BLD AUTO: 35.6 % (ref 36.5–49.3)
HGB BLD-MCNC: 11.2 G/DL (ref 12–17)
HGB UR QL STRIP.AUTO: ABNORMAL
HOROWITZ INDEX BLDA+IHG-RTO: 40 MM[HG]
HOROWITZ INDEX BLDA+IHG-RTO: 50 MM[HG]
KETONES UR STRIP-MCNC: ABNORMAL MG/DL
LEUKOCYTE ESTERASE UR QL STRIP: NEGATIVE
LYMPHOCYTES # BLD AUTO: 0.56 THOUSAND/UL (ref 0.6–4.47)
LYMPHOCYTES # BLD AUTO: 3 % (ref 14–44)
MAGNESIUM SERPL-MCNC: 2.4 MG/DL (ref 1.9–2.7)
MCH RBC QN AUTO: 29.3 PG (ref 26.8–34.3)
MCHC RBC AUTO-ENTMCNC: 31.5 G/DL (ref 31.4–37.4)
MCV RBC AUTO: 93 FL (ref 82–98)
MICROCYTES BLD QL AUTO: PRESENT
MONOCYTES # BLD AUTO: 0.14 THOUSAND/UL (ref 0–1.22)
MONOCYTES NFR BLD: 1 % (ref 4–12)
NEUTROPHILS # BLD MANUAL: 13.37 THOUSAND/UL (ref 1.85–7.62)
NEUTS SEG NFR BLD AUTO: 95 % (ref 43–75)
NITRITE UR QL STRIP: NEGATIVE
NON-SQ EPI CELLS URNS QL MICRO: ABNORMAL /HPF
O2 CT BLDA-SCNC: 15.5 ML/DL (ref 16–23)
O2 CT BLDA-SCNC: 15.9 ML/DL (ref 16–23)
O2 CT BLDA-SCNC: 16.1 ML/DL (ref 16–23)
O2 CT BLDA-SCNC: 17 ML/DL (ref 16–23)
O2 CT BLDA-SCNC: 17.2 ML/DL (ref 16–23)
OXYHGB MFR BLDA: 90.8 % (ref 94–97)
OXYHGB MFR BLDA: 91.5 % (ref 94–97)
OXYHGB MFR BLDA: 92.6 % (ref 94–97)
OXYHGB MFR BLDA: 93 % (ref 94–97)
OXYHGB MFR BLDA: 94 % (ref 94–97)
PCO2 BLDA: 40.4 MM HG (ref 36–44)
PCO2 BLDA: 40.4 MM HG (ref 36–44)
PCO2 BLDA: 42.9 MM HG (ref 36–44)
PCO2 BLDA: 45.4 MM HG (ref 36–44)
PCO2 BLDA: 51.9 MM HG (ref 36–44)
PEEP RESPIRATORY: 6 CM[H2O]
PEEP RESPIRATORY: 6 CM[H2O]
PH BLDA: 7.46 [PH] (ref 7.35–7.45)
PH BLDA: 7.47 [PH] (ref 7.35–7.45)
PH BLDA: 7.5 [PH] (ref 7.35–7.45)
PH BLDA: 7.5 [PH] (ref 7.35–7.45)
PH BLDA: 7.51 [PH] (ref 7.35–7.45)
PH UR STRIP.AUTO: 6.5 [PH]
PHOSPHATE SERPL-MCNC: 3 MG/DL (ref 2.7–4.5)
PLATELET # BLD AUTO: 221 THOUSANDS/UL (ref 149–390)
PLATELET BLD QL SMEAR: ADEQUATE
PMV BLD AUTO: 9.4 FL (ref 8.9–12.7)
PO2 BLDA: 60.3 MM HG (ref 75–129)
PO2 BLDA: 67.2 MM HG (ref 75–129)
PO2 BLDA: 68.7 MM HG (ref 75–129)
PO2 BLDA: 69.6 MM HG (ref 75–129)
PO2 BLDA: 74.8 MM HG (ref 75–129)
POLYCHROMASIA BLD QL SMEAR: PRESENT
POTASSIUM SERPL-SCNC: 4.1 MMOL/L (ref 3.5–5.3)
PROT UR STRIP-MCNC: ABNORMAL MG/DL
RBC # BLD AUTO: 3.82 MILLION/UL (ref 3.88–5.62)
RBC #/AREA URNS AUTO: ABNORMAL /HPF
RBC MORPH BLD: PRESENT
SODIUM SERPL-SCNC: 146 MMOL/L (ref 135–147)
SP GR UR STRIP.AUTO: 1.03 (ref 1–1.03)
SPECIMEN SOURCE: ABNORMAL
TRIGL SERPL-MCNC: 322 MG/DL
UROBILINOGEN UR STRIP-ACNC: <2 MG/DL
VARIANT LYMPHS # BLD AUTO: 1 %
VENT AC: 30
VENT AC: 30
VENT- AC: AC
VENT- AC: AC
VT SETTING VENT: 370 ML
VT SETTING VENT: 370 ML
WBC # BLD AUTO: 14.07 THOUSAND/UL (ref 4.31–10.16)
WBC #/AREA URNS AUTO: ABNORMAL /HPF

## 2024-02-23 PROCEDURE — 94664 DEMO&/EVAL PT USE INHALER: CPT

## 2024-02-23 PROCEDURE — 84100 ASSAY OF PHOSPHORUS: CPT

## 2024-02-23 PROCEDURE — 82805 BLOOD GASES W/O2 SATURATION: CPT

## 2024-02-23 PROCEDURE — 80048 BASIC METABOLIC PNL TOTAL CA: CPT

## 2024-02-23 PROCEDURE — 84478 ASSAY OF TRIGLYCERIDES: CPT

## 2024-02-23 PROCEDURE — 85027 COMPLETE CBC AUTOMATED: CPT

## 2024-02-23 PROCEDURE — 99291 CRITICAL CARE FIRST HOUR: CPT | Performed by: INTERNAL MEDICINE

## 2024-02-23 PROCEDURE — 81001 URINALYSIS AUTO W/SCOPE: CPT | Performed by: STUDENT IN AN ORGANIZED HEALTH CARE EDUCATION/TRAINING PROGRAM

## 2024-02-23 PROCEDURE — 82948 REAGENT STRIP/BLOOD GLUCOSE: CPT

## 2024-02-23 PROCEDURE — 85007 BL SMEAR W/DIFF WBC COUNT: CPT

## 2024-02-23 PROCEDURE — 71045 X-RAY EXAM CHEST 1 VIEW: CPT

## 2024-02-23 PROCEDURE — 94640 AIRWAY INHALATION TREATMENT: CPT

## 2024-02-23 PROCEDURE — 94003 VENT MGMT INPAT SUBQ DAY: CPT

## 2024-02-23 PROCEDURE — 94760 N-INVAS EAR/PLS OXIMETRY 1: CPT

## 2024-02-23 PROCEDURE — 83735 ASSAY OF MAGNESIUM: CPT

## 2024-02-23 RX ORDER — FENTANYL CITRATE 50 UG/ML
100 INJECTION, SOLUTION INTRAMUSCULAR; INTRAVENOUS EVERY 2 HOUR PRN
Status: DISCONTINUED | OUTPATIENT
Start: 2024-02-23 | End: 2024-02-29

## 2024-02-23 RX ORDER — MIDAZOLAM HYDROCHLORIDE 2 MG/2ML
INJECTION, SOLUTION INTRAMUSCULAR; INTRAVENOUS
Status: COMPLETED
Start: 2024-02-23 | End: 2024-02-23

## 2024-02-23 RX ORDER — FUROSEMIDE 10 MG/ML
40 INJECTION INTRAMUSCULAR; INTRAVENOUS ONCE
Status: COMPLETED | OUTPATIENT
Start: 2024-02-23 | End: 2024-02-23

## 2024-02-23 RX ORDER — AMOXICILLIN 250 MG
1 CAPSULE ORAL 2 TIMES DAILY
Status: DISCONTINUED | OUTPATIENT
Start: 2024-02-23 | End: 2024-02-27

## 2024-02-23 RX ORDER — POLYETHYLENE GLYCOL 3350 17 G/17G
17 POWDER, FOR SOLUTION ORAL 2 TIMES DAILY
Status: DISCONTINUED | OUTPATIENT
Start: 2024-02-23 | End: 2024-03-11

## 2024-02-23 RX ORDER — POTASSIUM CHLORIDE 20MEQ/15ML
40 LIQUID (ML) ORAL ONCE
Status: COMPLETED | OUTPATIENT
Start: 2024-02-23 | End: 2024-02-23

## 2024-02-23 RX ORDER — VECURONIUM BROMIDE 1 MG/ML
10 INJECTION, POWDER, LYOPHILIZED, FOR SOLUTION INTRAVENOUS ONCE
Status: COMPLETED | OUTPATIENT
Start: 2024-02-23 | End: 2024-02-23

## 2024-02-23 RX ORDER — MINERAL OIL AND PETROLATUM 150; 830 MG/G; MG/G
OINTMENT OPHTHALMIC
Status: DISCONTINUED | OUTPATIENT
Start: 2024-02-23 | End: 2024-02-23

## 2024-02-23 RX ORDER — FUROSEMIDE 10 MG/ML
40 INJECTION INTRAMUSCULAR; INTRAVENOUS ONCE
Status: DISCONTINUED | OUTPATIENT
Start: 2024-02-23 | End: 2024-02-23

## 2024-02-23 RX ORDER — ACETAMINOPHEN 10 MG/ML
1000 INJECTION, SOLUTION INTRAVENOUS EVERY 6 HOURS PRN
Status: DISCONTINUED | OUTPATIENT
Start: 2024-02-23 | End: 2024-03-03

## 2024-02-23 RX ORDER — ENOXAPARIN SODIUM 100 MG/ML
40 INJECTION SUBCUTANEOUS
Status: DISCONTINUED | OUTPATIENT
Start: 2024-02-23 | End: 2024-03-14 | Stop reason: HOSPADM

## 2024-02-23 RX ORDER — AMOXICILLIN 250 MG
1 CAPSULE ORAL
Status: DISCONTINUED | OUTPATIENT
Start: 2024-02-23 | End: 2024-02-23

## 2024-02-23 RX ORDER — PROPOFOL 10 MG/ML
5-50 INJECTION, EMULSION INTRAVENOUS
Status: DISCONTINUED | OUTPATIENT
Start: 2024-02-23 | End: 2024-02-24

## 2024-02-23 RX ORDER — MIDAZOLAM HYDROCHLORIDE 2 MG/2ML
2 INJECTION, SOLUTION INTRAMUSCULAR; INTRAVENOUS ONCE
Status: COMPLETED | OUTPATIENT
Start: 2024-02-23 | End: 2024-02-23

## 2024-02-23 RX ORDER — PROPOFOL 10 MG/ML
INJECTION, EMULSION INTRAVENOUS
Status: COMPLETED
Start: 2024-02-23 | End: 2024-02-23

## 2024-02-23 RX ORDER — BISACODYL 10 MG
10 SUPPOSITORY, RECTAL RECTAL DAILY
Status: DISCONTINUED | OUTPATIENT
Start: 2024-02-23 | End: 2024-02-27

## 2024-02-23 RX ORDER — MINERAL OIL AND PETROLATUM 150; 830 MG/G; MG/G
OINTMENT OPHTHALMIC
Status: DISCONTINUED | OUTPATIENT
Start: 2024-02-23 | End: 2024-02-24

## 2024-02-23 RX ADMIN — FENTANYL CITRATE 100 MCG: 50 INJECTION INTRAMUSCULAR; INTRAVENOUS at 13:30

## 2024-02-23 RX ADMIN — MAGNESIUM OXIDE TAB 400 MG (241.3 MG ELEMENTAL MG) 400 MG: 400 (241.3 MG) TAB at 08:12

## 2024-02-23 RX ADMIN — SENNOSIDES, DOCUSATE SODIUM 1 TABLET: 8.6; 5 TABLET ORAL at 08:12

## 2024-02-23 RX ADMIN — ENOXAPARIN SODIUM 40 MG: 40 INJECTION SUBCUTANEOUS at 16:58

## 2024-02-23 RX ADMIN — MIDAZOLAM 2 MG/HR: 5 INJECTION INTRAMUSCULAR; INTRAVENOUS at 23:23

## 2024-02-23 RX ADMIN — WHITE PETROLATUM 57.7 %-MINERAL OIL 31.9 % EYE OINTMENT: at 18:33

## 2024-02-23 RX ADMIN — WHITE PETROLATUM 57.7 %-MINERAL OIL 31.9 % EYE OINTMENT: at 19:45

## 2024-02-23 RX ADMIN — INSULIN LISPRO 2 UNITS: 100 INJECTION, SOLUTION INTRAVENOUS; SUBCUTANEOUS at 05:21

## 2024-02-23 RX ADMIN — FUROSEMIDE 40 MG: 10 INJECTION, SOLUTION INTRAMUSCULAR; INTRAVENOUS at 11:29

## 2024-02-23 RX ADMIN — FAMOTIDINE 20 MG: 40 POWDER, FOR SUSPENSION ORAL at 08:12

## 2024-02-23 RX ADMIN — POLYETHYLENE GLYCOL 3350 17 G: 17 POWDER, FOR SOLUTION ORAL at 21:11

## 2024-02-23 RX ADMIN — INSULIN LISPRO 3 UNITS: 100 INJECTION, SOLUTION INTRAVENOUS; SUBCUTANEOUS at 00:28

## 2024-02-23 RX ADMIN — CHLORHEXIDINE GLUCONATE 15 ML: 1.2 SOLUTION ORAL at 21:10

## 2024-02-23 RX ADMIN — IPRATROPIUM BROMIDE 0.5 MG: 0.5 SOLUTION RESPIRATORY (INHALATION) at 08:12

## 2024-02-23 RX ADMIN — LEVALBUTEROL HYDROCHLORIDE 1.25 MG: 1.25 SOLUTION RESPIRATORY (INHALATION) at 20:43

## 2024-02-23 RX ADMIN — ACETAMINOPHEN 1000 MG: 10 INJECTION INTRAVENOUS at 21:04

## 2024-02-23 RX ADMIN — FENTANYL CITRATE 100 MCG: 50 INJECTION INTRAMUSCULAR; INTRAVENOUS at 11:14

## 2024-02-23 RX ADMIN — LEVALBUTEROL HYDROCHLORIDE 1.25 MG: 1.25 SOLUTION RESPIRATORY (INHALATION) at 13:44

## 2024-02-23 RX ADMIN — IPRATROPIUM BROMIDE 0.5 MG: 0.5 SOLUTION RESPIRATORY (INHALATION) at 13:44

## 2024-02-23 RX ADMIN — IPRATROPIUM BROMIDE 0.5 MG: 0.5 SOLUTION RESPIRATORY (INHALATION) at 02:56

## 2024-02-23 RX ADMIN — WHITE PETROLATUM 57.7 %-MINERAL OIL 31.9 % EYE OINTMENT: at 21:45

## 2024-02-23 RX ADMIN — OSELTAMIVIR PHOSPHATE 75 MG: 75 CAPSULE ORAL at 08:12

## 2024-02-23 RX ADMIN — CISATRACURIUM BESYLATE 0.5 MCG/KG/MIN: 10 INJECTION INTRAVENOUS at 18:00

## 2024-02-23 RX ADMIN — CHLORHEXIDINE GLUCONATE 15 ML: 1.2 SOLUTION ORAL at 08:12

## 2024-02-23 RX ADMIN — Medication 100 MCG/HR: at 13:42

## 2024-02-23 RX ADMIN — FUROSEMIDE 40 MG: 10 INJECTION, SOLUTION INTRAMUSCULAR; INTRAVENOUS at 18:16

## 2024-02-23 RX ADMIN — IPRATROPIUM BROMIDE 0.5 MG: 0.5 SOLUTION RESPIRATORY (INHALATION) at 20:43

## 2024-02-23 RX ADMIN — HEPARIN SODIUM 5000 UNITS: 5000 INJECTION INTRAVENOUS; SUBCUTANEOUS at 05:21

## 2024-02-23 RX ADMIN — PROPOFOL 50 MCG/KG/MIN: 10 INJECTION, EMULSION INTRAVENOUS at 20:46

## 2024-02-23 RX ADMIN — GABAPENTIN 100 MG: 250 SOLUTION ORAL at 21:13

## 2024-02-23 RX ADMIN — POTASSIUM CHLORIDE 40 MEQ: 1.5 SOLUTION ORAL at 16:39

## 2024-02-23 RX ADMIN — LEVALBUTEROL HYDROCHLORIDE 1.25 MG: 1.25 SOLUTION RESPIRATORY (INHALATION) at 02:56

## 2024-02-23 RX ADMIN — Medication 200 MCG/HR: at 00:14

## 2024-02-23 RX ADMIN — ACETAMINOPHEN 650 MG: 325 TABLET, FILM COATED ORAL at 13:25

## 2024-02-23 RX ADMIN — CEFEPIME 1000 MG: 1 INJECTION, POWDER, FOR SOLUTION INTRAMUSCULAR; INTRAVENOUS at 04:10

## 2024-02-23 RX ADMIN — FUROSEMIDE 40 MG: 10 INJECTION, SOLUTION INTRAMUSCULAR; INTRAVENOUS at 06:59

## 2024-02-23 RX ADMIN — MIDAZOLAM 2 MG: 1 INJECTION INTRAMUSCULAR; INTRAVENOUS at 11:29

## 2024-02-23 RX ADMIN — Medication 200 MCG/HR: at 04:05

## 2024-02-23 RX ADMIN — PROPOFOL 5 MCG/KG/MIN: 10 INJECTION, EMULSION INTRAVENOUS at 14:44

## 2024-02-23 RX ADMIN — Medication 150 MCG/HR: at 21:03

## 2024-02-23 RX ADMIN — VECURONIUM BROMIDE 10 MG: 10 INJECTION, POWDER, LYOPHILIZED, FOR SOLUTION INTRAVENOUS at 18:44

## 2024-02-23 RX ADMIN — POLYETHYLENE GLYCOL 3350 17 G: 17 POWDER, FOR SOLUTION ORAL at 08:12

## 2024-02-23 RX ADMIN — GABAPENTIN 100 MG: 250 SOLUTION ORAL at 08:12

## 2024-02-23 RX ADMIN — PROPOFOL 45 MCG/KG/MIN: 10 INJECTION, EMULSION INTRAVENOUS at 23:22

## 2024-02-23 RX ADMIN — MIDAZOLAM HYDROCHLORIDE 2 MG: 2 INJECTION, SOLUTION INTRAMUSCULAR; INTRAVENOUS at 11:29

## 2024-02-23 RX ADMIN — FENTANYL CITRATE 100 MCG: 50 INJECTION INTRAMUSCULAR; INTRAVENOUS at 15:51

## 2024-02-23 RX ADMIN — LEVALBUTEROL HYDROCHLORIDE 1.25 MG: 1.25 SOLUTION RESPIRATORY (INHALATION) at 08:12

## 2024-02-23 RX ADMIN — MIDAZOLAM 1 MG: 1 INJECTION INTRAMUSCULAR; INTRAVENOUS at 00:15

## 2024-02-23 RX ADMIN — KETAMINE HYDROCHLORIDE 0.5 MG/KG/HR: 100 INJECTION INTRAMUSCULAR; INTRAVENOUS at 04:05

## 2024-02-23 RX ADMIN — GABAPENTIN 100 MG: 250 SOLUTION ORAL at 16:39

## 2024-02-23 RX ADMIN — MIDAZOLAM 3 MG/HR: 5 INJECTION INTRAMUSCULAR; INTRAVENOUS at 04:06

## 2024-02-23 RX ADMIN — Medication 200 MCG/HR: at 08:57

## 2024-02-23 NOTE — RESPIRATORY THERAPY NOTE
RT Ventilator Management Note  Hussein Edward III 58 y.o. male MRN: [id number]  [facility] [id number]      Daily Screen         2/21/2024  0811 2/22/2024  0729          Patient safety screen outcome:: Failed Failed      Not Ready for Weaning due to:: Underline problem not resolved Underline problem not resolved;PEEP > 8cmH2O                Physical Exam:   Assessment Type: (P) During-treatment  General Appearance: (P) Sedated  Respiratory Pattern: (P) Assisted  Chest Assessment: (P) Chest expansion symmetrical  Bilateral Breath Sounds: (P) Diminished  Cough: (P) Productive  Suction: (P) ET Tube  O2 Device: (P) vent      Resp Comments: (P) Pt tolerating vent well, no changes made to vent at this time. tx given through [facility]ogen

## 2024-02-23 NOTE — PROGRESS NOTES
-- DO NOT REPLY / DO NOT REPLY ALL --  -- Message is from [facility]agement [facility] [employer] ([facility]) --    General Patient Message: Mom called back in looking for sooner appointment. She stated she called yesterday but nobody called her back. She took patient to Urgent Care. UC unable to find anything wrong and noted to give Tylenol/Motrin. Patient is still battling fevers of 102 for the past three days and Mom is worried. Fevers started Sunday. Please call Mom as she would like patient to be seen asap.       Caller Information       Type Contact Phone/Fax    07/18/2023 09:03 PM CDT Phone (Incoming) Sisi Augusto (Mother) [phone]    07/19/2023 08:03 AM CDT Phone (Incoming) Sisi Casas (Mother) [phone]        Alternative phone number: [place] [phone]    Can a detailed message be left? Yes    Message Turnaround:     Is it Working Hours? Yes - Working Hours     [place]:    Please give this turnaround time to the caller:   \"This message will be sent to [state Provider's name]. The clinical team will fulfill your request as soon as they review your message.\"                 [facility]  Progress Note: Critical Care  Name: Hussein Edward III 58 y.o. male I MRN: [id number]  Unit/Bed#: MICU 06 I Date of Admission: 2/19/2024   Date of Service: 2/23/2024 I Hospital Day: 4    Assessment/Plan   Neuro:   Diagnosis: Post-intubation and sedation  Pt underwent RSI w/ etomidate and succinylcholine  Now maintained fentanyl 200 mcg/hr. Versed 3.  Off propofol 10, due to increase in triglycerides.  Patient was put on precedex on 02/21, and became bradycardic to 30s, prompting dc of the precedex.  Plan: PRN fentanyl for agitation  RASS goal -5  Ketamine off  Paralysis Nimbex d/c  Consider decreasing sedation as appropriate  Diagnosis: hx of migraines  Home meds include nurtek and diamox  Plan: holding nurtek and diamox at this time  Diagnosis: hx of insomnia  Plan: hold daridorexant at this time      CV:   Diagnosis: Hypotension likely 2/2 induction for RSI  Plan: On Levo, wean as possible to maintain MAP>65  A-line for hemodynamic monitoring  TTE 02/19: LVEF 55-60%. G1DD.  Diagnosis: New onset atrial fibrillation, resolved, Now NSR.  Pt found to have rates to 150s s/p CVC placement  Plan: gave one time dose of lopressor, patient started on amiodarone drip. Patient's HR still elevated with afib. Given dose of digoxin, resolved.  Continue to monitor, HR better in 90s. Has some periods of tachy that correspond to low grade fevers.  Not on heparin ACS protocol anymore, was restarted on DVT prophylactic heparin       Pulm:  Diagnosis: Acute Respiratory Distress Syndrome  Pt initially presented influenza A positive w/ increasing O2 requirements, requiring HFNC -> BiPAP -> intubation  CTA on 02/18 without evidence of PE but findings of extensive bilateral groundglass and consolidative opacities with predominantly perihilar and peripheral distribution compatible with viral and/or atypical pneumonia, likely influenza given clinical history.  Procal 0.51 -> 0.44 --> 0.31 -->  0.36  On 02/19, pt began having increasing WOB and tachypnea to 50s  Subsequently required RSI, was on AC/VC 32/400/100%/10  Post-intubation ABG 7.2/51/115/24  Etiology appears likely 2/2 pulmonary infection from influenza, maybe superimposed with possible bacterial infection  Most recent ABG 7.50/40/69/31, mildly alkalotic, consider changing vent settings to normalize.  PaO2/FiO2 137, ratio has been decreasing. Possibly due to fluid overload or infectious etiology. Repeat CXR ordered AM unchanged, white out appearance. Consider prone positioning or starting nimbex again.  Give diuretic to maintain patient in goal of net -500 to -1L.   Consider Veletri to improve oxygenation, hold for now since oxygenation improving on current measures  Strep pneumo and legionella urine antigen negative  Plan: Antibiotics as mentioned below under ID but finished 5/5 of Ceftriaxone and Azithromycin on 02/21, not currently on abx. Patient started spiking fevers, on cefepime Day 2, refer to ID section.  Q4H ABGs  Nebs- Xopenex, Atrovent  CXR appears stable.  Underwent bronchoscopy on 02/19 -> grew Candida glabrata, viral culture pending  Had d/c Solumedrol IV 40 mg Q12H 02/22, consider restarting since on abx  Sputum cultures 02/22 -> preliminary result, no growth  Blood cultures -> prelim. No growth at 72 hours     GI:   Diagnosis: post-intubation  Plan: On GI prophylaxis with famotidine 20 mg IV  On tube feeds  Patient's abdomen increasingly distended, has not have bowel movement recently. Will give suppository and miralax. Hold tube feeds for now.        :   Diagnosis: CKD Stage II  Plan: sCr baseline range of 0.8-1.0, stable currently  Avoid nephrotoxic medications  Monitor sCr with daily BMPs  -Diagnosis: hematuria. UA results:   - (1+) ketones and protein -> likely due to dehydration  - large occult blood, with innumerable RBCs -> might be associated with acute drop in hgb. Consider traumatic injury by raines catheter.      F/E/N:    F: Not currently on any IV fluids  E: Will monitor and correct any electrolyte derangements. Sodium levels normalized.  N: tube feeds held for now due to abdominal distention and possible proning     Heme/Onc:   DVT prophylaxis: DVT prophylaxis heparin.     Endo:   Diagnosis: Steroid induced hyperglycemia  Glucose within ranges of  158-249  No prior A1c for review but based on elevated glucose readings and this being day 4 of glucocorticoids, it maybe steroid induced hyperglycemia  Plan: On SSI #1 for now     ID:   Diagnosis: Flu with superimposed bacterial multifocal pneumonia, less suspicion of bacterial superimposition due to negative blood cultures.  Pt had ongoing influenza that was untreated for 4 days PTA  Likely not bacterial superimposed, likely just in setting of viral infection  Sputum cultures 02/22 -> prelim no growth  Blood cultures -> prelim. No growth at 72 hours  Underwent bronchoscopy on 02/19 -> grew Pat glabrata  Plan: Will continue tamiflu for now, Day 5 Tamiflu 75 mg Q12H  Procalcitonin reviewed, at 0.36  Finished 5/5 of Ceftriaxone and Azithromycin on 02/21, currently on Cefepime Day 2. Consider continuing cefepime d/t improvement in hyperthermia and decrease in WBC. Consider d/c if not infectious source found.  UA:  4-10 WBCs, less likely to be infectious cause  Viral panel negative  D/c Solumedrol IV 40 mg Q12H on 02/22, consider restarting  D/c Vancomycin 02/20, MRSA neg  See plan above     MSK/Skin:   Frequent turning and repositioning  Wound care as needed       Disposition: Critical care    ICU Core Measures     Vented Patient  VAP Bundle  VAP bundle ordered     A: Assess, Prevent, and Manage Pain Has pain been assessed? NA  Need for changes to pain regimen? NA   B: Both Spontaneous Awakening Trials (SATs) and Spontaneous Breathing Trials (SBTs) Plan to perform spontaneous awakening trial today? Modified and patient remained on sedation other than precedex  Plan to  perform spontaneous breathing trial today? Yes   Obvious barriers to extubation? Yes   C: Choice of Sedation RASS Goal: -5 Unarousable or 0 Alert and Calm  Need for changes to sedation or analgesia regimen? No   D: Delirium CAM-ICU: Unable to perform secondary to Acute cognitive dysfunction   E: Early Mobility  Plan for early mobility? Yes   F: Family Engagement Plan for family engagement today? Yes       Antibiotic Review: Awaiting culture results.     Review of Invasive Devices:    Shields Plan: Continue for accurate I/O monitoring for 48 hours  Central access plan: Medications requiring central line Hemodynamic monitoring  [place] Plan: Keep arterial line for hemodynamic monitoring and frequent ABGs    Prophylaxis:  VTE VTE covered by:  heparin (porcine), Subcutaneous, 5,000 Units at 02/23/24 0521       Stress Ulcer  covered byFamotidine (PF) (PEPCID) injection 20 mg [[id number]]        Significant 24hr Events     24hr events: Overnight, patient had no acute events. Ketamine discontinued. Fentanyl at 200. Patient has been requiring 1-2 Levo to maintain MAP>65. Patient's P/F ratio decreased and blood gas showed milk alkalosis. Vent settings adjusted.     Subjective     Review of Systems   Unable to perform ROS: Intubated        Objective                            Vitals I/O      Most Recent Min/Max in 24hrs   Temp 99.1 °F (37.3 °C) Temp  Min: 98.6 °F (37 °C)  Max: 101.1 °F (38.4 °C)   Pulse 101 Pulse  Min: 70  Max: 129   Resp (!) 30 Resp  Min: 29  Max: 30   /62 No data recorded   O2 Sat 95 % SpO2  Min: 90 %  Max: 100 %      Intake/Output Summary (Last 24 hours) at 2/23/2024 0652  Last data filed at 2/23/2024 0601  Gross per 24 hour   Intake 2851.75 ml   Output 2865 ml   Net -13.25 ml       Diet Enteral/Parenteral; Tube Feeding No Oral Diet; Vital High Protein; Continuous; 45; Prosource Protein Liquid - Two Packets; OD; 200; Water; Every 6 hours    Invasive Monitoring   Arterial Line  [place] /48  Arterial  Line BP  Min: 75/40  Max: 207/82   MAP 67 mmHg  Arterial Line MAP (mmHg)  Min: 51 mmHg  Max: 125 mmHg           Physical Exam   Physical Exam  Skin:     General: Skin is warm and dry.   HENT:      Head: Normocephalic and atraumatic.   Cardiovascular:      Rate and Rhythm: Normal rate and regular rhythm.      Pulses: Normal pulses.      Heart sounds: Normal heart sounds.   Musculoskeletal:      Right lower leg: No edema.      Left lower leg: No edema.   Abdominal: General: Bowel sounds are normal. There is distension.  Constitutional:       Interventions: He is sedated and intubated.   Pulmonary:      Effort: Pulmonary effort is normal. He is intubated.      Breath sounds: Rales (L lung) present.   Neurological:      Comments: Sedated (RASS -5)   Genitourinary/Anorectal:  Shields present.          Diagnostic Studies      EKG: reviewed.  Imaging:  I have personally reviewed pertinent reports.       Medications:  Scheduled PRN   cefepime, 1,000 mg, Q8H  chlorhexidine, 15 mL, Q12H YEHUDA  famotidine, 20 mg, Daily  gabapentin, 100 mg, TID  heparin (porcine), 5,000 Units, Q8H YEHUDA  insulin lispro, 1-6 Units, Q6H YEHUDA  ipratropium, 0.5 mg, Q6H  levalbuterol, 1.25 mg, Q6H  magnesium Oxide, 400 mg, Daily  oseltamivir, 75 mg, Q12H YEHUDA  polyethylene glycol, 17 g, Daily      acetaminophen, 650 mg, Q6H PRN  fentanyl citrate (PF), 100 mcg, Q2H PRN  sodium chloride, 1 spray, Q1H PRN       Continuous    fentaNYL, 200 mcg/hr, Last Rate: 200 mcg/hr (02/23/24 0405)  midazolam, 3 mg/hr, Last Rate: 3 mg/hr (02/23/24 0406)  norepinephrine, 1-30 mcg/min, Last Rate: 2 mcg/min (02/23/24 0035)         Labs:    CBC    Recent Labs     02/22/24  0437 02/23/24  0453   WBC 19.37* 14.07*   HGB 12.6 11.2*   HCT 38.7 35.6*    221     BMP    Recent Labs     02/22/24  1818 02/23/24  0453   SODIUM 148* 146   K 3.9 4.1   * 108   CO2 33* 33*   AGAP 5 5   BUN 34* 33*   CREATININE 0.65 0.64   CALCIUM 7.5* 7.6*       Coags    Recent Labs      02/22/24  0437   PTT 24        Additional Electrolytes  Recent Labs     02/22/24  0437 02/23/24  0453   MG 2.4 2.4   PHOS 2.1* 3.0          Blood Gas    Recent Labs     02/23/24  0454   PHART 7.502*   CFT9HGA 40.4   PO2ART 68.7*   EFH3OOP 30.9*   BEART 7.2   SOURCE Line, Arterial     Recent Labs     02/23/24  0454   SOURCE Line, Arterial    LFTs  No recent results      Infectious  No recent results    Glucose  Recent Labs     02/22/24  0437 02/22/24  1818 02/23/24  0453   GLUC 207* 141* 196*                   Marija Mccollum

## 2024-02-23 NOTE — RESPIRATORY THERAPY NOTE
Resp care   02/23/24 0802   Respiratory Assessment   Assessment Type Pre-treatment   General Appearance Sedated   Respiratory Pattern Assisted   Chest Assessment Chest expansion symmetrical   Bilateral Breath Sounds Diminished   Resp Comments Received pt on A/C with settings per flow sheet. Vent changes made by cecil Freitas pending. No weaning at this time. Plan is for possible bronch vs proning. Will cont to monitor.   Vent Information   Vent ID [id number]   Vent type Brown G5   Brown Vent Mode (S)CMV   $ Pulse Oximetry Spot Check Charge Completed   (S)CMV Settings   Resp Rate (BPM) 26 BPM   VT (mL) 37 mL   FIO2 (%) 50 %   PEEP (cmH2O) 10 cmH2O   I:E Ratio 1:1.2   Insp Time (%) 0.8 %   Flow Trigger (LPM) 5   Humidification Heater   Heater Temperature (Set) 95 °F (35 °C)   (S)CMV Actuals   Resp Rate (BPM) 32 BPM   VT (mL) 370   MV 9.8   MAP (cmH2O) 15 cmH2O   Peak Pressure (cmH2O) 25 cmH2O   I:E Ratio (Obs) 1:1.2   Insp Resistance 9   Heater Temperature (Obs) 98.4 °F (36.9 °C)   Static Compliance (mL/cmH20) 30 mL/cmH2O   (S)CMV Alarms   High Peak Pressure (cmH2O) 40   Low Pressure (cmH2O) 5 cm H2O   High Resp Rate (BPM) 40 BPM   Low Resp Rate (BPM) 8 BPM   High MV (L/min) 20 L/min   Low MV (L/min) 4 L/min   High VT (mL) 1000 mL   Low VT (mL) 200 mL   Apnea Time (s) 20 S   Maintenance   Alarm (pink) cable attached No   Resuscitation bag with peep valve at bedside Yes   Water bag changed No   Circuit changed No   Daily Screen   Patient safety screen outcome: Failed   Not Ready for Weaning due to: Underline problem not resolved;PEEP > 8cmH2O   IHI Ventilator Associated Pneumonia Bundle   Daily Awakening Trials Performed Not applicable (Comment)   ETT  Cuffed 7.5 mm   Placement Date/Time: 02/19/24 1310   Mask Ventilation: Ventilated by mask (1)  Preoxygenated: Yes  Technique: Direct laryngoscopy  Type: Cuffed  Tube Size: 7.5 mm  Laryngoscope: Other (Comment)  Placement Verification: Auscultation;Chest x-ray;End tid...    Secured at (cm) 28   Measured from Lips   Secured Location Left   Repositioned Left to Center   Secured by Commercial tube peng   Site Condition Dry   Cuff Pressure (color) Green   HI-LO Suction  Intermittent suction   HI-LO Intervention Patent

## 2024-02-24 ENCOUNTER — APPOINTMENT (INPATIENT)
Dept: RADIOLOGY | Facility: HOSPITAL | Age: 59
DRG: 870 | End: 2024-02-24
Payer: COMMERCIAL

## 2024-02-24 LAB
ANION GAP SERPL CALCULATED.3IONS-SCNC: 10 MMOL/L
ANION GAP SERPL CALCULATED.3IONS-SCNC: 7 MMOL/L
BACTERIA BLD CULT: NORMAL
BACTERIA SPT RESP CULT: ABNORMAL
BACTERIA SPT RESP CULT: ABNORMAL
BACTERIA UR QL AUTO: ABNORMAL /HPF
BASE EXCESS BLDA CALC-SCNC: 10.9 MMOL/L
BASE EXCESS BLDA CALC-SCNC: 5.7 MMOL/L
BASE EXCESS BLDA CALC-SCNC: 8.5 MMOL/L
BASE EXCESS BLDA CALC-SCNC: 9 MMOL/L
BILIRUB UR QL STRIP: NEGATIVE
BUN SERPL-MCNC: 31 MG/DL (ref 5–25)
BUN SERPL-MCNC: 36 MG/DL (ref 5–25)
CALCIUM SERPL-MCNC: 8.1 MG/DL (ref 8.4–10.2)
CALCIUM SERPL-MCNC: 8.5 MG/DL (ref 8.4–10.2)
CHLORIDE SERPL-SCNC: 105 MMOL/L (ref 96–108)
CHLORIDE SERPL-SCNC: 98 MMOL/L (ref 96–108)
CLARITY UR: CLEAR
CO2 SERPL-SCNC: 34 MMOL/L (ref 21–32)
CO2 SERPL-SCNC: 35 MMOL/L (ref 21–32)
COLOR UR: YELLOW
CREAT SERPL-MCNC: 0.64 MG/DL (ref 0.6–1.3)
CREAT SERPL-MCNC: 0.75 MG/DL (ref 0.6–1.3)
ERYTHROCYTE [DISTWIDTH] IN BLOOD BY AUTOMATED COUNT: 13.2 % (ref 11.6–15.1)
GFR SERPL CREATININE-BSD FRML MDRD: 102 ML/MIN/1.73SQ M
GFR SERPL CREATININE-BSD FRML MDRD: 109 ML/MIN/1.73SQ M
GLUCOSE SERPL-MCNC: 111 MG/DL (ref 65–140)
GLUCOSE SERPL-MCNC: 113 MG/DL (ref 65–140)
GLUCOSE SERPL-MCNC: 119 MG/DL (ref 65–140)
GLUCOSE SERPL-MCNC: 126 MG/DL (ref 65–140)
GLUCOSE SERPL-MCNC: 126 MG/DL (ref 65–140)
GLUCOSE SERPL-MCNC: 137 MG/DL (ref 65–140)
GLUCOSE UR STRIP-MCNC: NEGATIVE MG/DL
GRAM STN SPEC: ABNORMAL
HCO3 BLDA-SCNC: 30 MMOL/L (ref 22–28)
HCO3 BLDA-SCNC: 33.3 MMOL/L (ref 22–28)
HCO3 BLDA-SCNC: 34.4 MMOL/L (ref 22–28)
HCO3 BLDA-SCNC: 35.4 MMOL/L (ref 22–28)
HCT VFR BLD AUTO: 40.8 % (ref 36.5–49.3)
HGB BLD-MCNC: 12.5 G/DL (ref 12–17)
HGB UR QL STRIP.AUTO: ABNORMAL
HOROWITZ INDEX BLDA+IHG-RTO: 40 MM[HG]
HOROWITZ INDEX BLDA+IHG-RTO: 50 MM[HG]
KETONES UR STRIP-MCNC: NEGATIVE MG/DL
LACTATE SERPL-SCNC: 1 MMOL/L (ref 0.5–2)
LEUKOCYTE ESTERASE UR QL STRIP: NEGATIVE
MAGNESIUM SERPL-MCNC: 2.3 MG/DL (ref 1.9–2.7)
MCH RBC QN AUTO: 29.1 PG (ref 26.8–34.3)
MCHC RBC AUTO-ENTMCNC: 30.6 G/DL (ref 31.4–37.4)
MCV RBC AUTO: 95 FL (ref 82–98)
MUCOUS THREADS UR QL AUTO: ABNORMAL
NITRITE UR QL STRIP: NEGATIVE
NON-SQ EPI CELLS URNS QL MICRO: ABNORMAL /HPF
O2 CT BLDA-SCNC: 17.4 ML/DL (ref 16–23)
O2 CT BLDA-SCNC: 17.6 ML/DL (ref 16–23)
O2 CT BLDA-SCNC: 17.9 ML/DL (ref 16–23)
O2 CT BLDA-SCNC: 18.3 ML/DL (ref 16–23)
OXYHGB MFR BLDA: 93 % (ref 94–97)
OXYHGB MFR BLDA: 93.3 % (ref 94–97)
OXYHGB MFR BLDA: 94.4 % (ref 94–97)
OXYHGB MFR BLDA: 94.8 % (ref 94–97)
PCO2 BLDA: 42.5 MM HG (ref 36–44)
PCO2 BLDA: 45.8 MM HG (ref 36–44)
PCO2 BLDA: 46.1 MM HG (ref 36–44)
PCO2 BLDA: 49.9 MM HG (ref 36–44)
PEEP RESPIRATORY: 10 CM[H2O]
PEEP RESPIRATORY: 10 CM[H2O]
PEEP RESPIRATORY: 12 CM[H2O]
PEEP RESPIRATORY: 12 CM[H2O]
PH BLDA: 7.46 [PH] (ref 7.35–7.45)
PH BLDA: 7.47 [PH] (ref 7.35–7.45)
PH BLDA: 7.48 [PH] (ref 7.35–7.45)
PH BLDA: 7.51 [PH] (ref 7.35–7.45)
PH UR STRIP.AUTO: 6 [PH]
PHOSPHATE SERPL-MCNC: 3.6 MG/DL (ref 2.7–4.5)
PLATELET # BLD AUTO: 281 THOUSANDS/UL (ref 149–390)
PMV BLD AUTO: 9.2 FL (ref 8.9–12.7)
PO2 BLDA: 71.1 MM HG (ref 75–129)
PO2 BLDA: 71.9 MM HG (ref 75–129)
PO2 BLDA: 82.6 MM HG (ref 75–129)
PO2 BLDA: 82.8 MM HG (ref 75–129)
POTASSIUM SERPL-SCNC: 4.2 MMOL/L (ref 3.5–5.3)
POTASSIUM SERPL-SCNC: 4.2 MMOL/L (ref 3.5–5.3)
PROCALCITONIN SERPL-MCNC: 0.41 NG/ML
PROT UR STRIP-MCNC: ABNORMAL MG/DL
RBC # BLD AUTO: 4.29 MILLION/UL (ref 3.88–5.62)
RBC #/AREA URNS AUTO: ABNORMAL /HPF
SODIUM SERPL-SCNC: 143 MMOL/L (ref 135–147)
SODIUM SERPL-SCNC: 146 MMOL/L (ref 135–147)
SP GR UR STRIP.AUTO: 1.03 (ref 1–1.03)
SPECIMEN SOURCE: ABNORMAL
TRIGL SERPL-MCNC: 566 MG/DL
UROBILINOGEN UR STRIP-ACNC: 2 MG/DL
VENT AC: 24
VENT- AC: AC
VT SETTING VENT: 400 ML
WBC # BLD AUTO: 17.06 THOUSAND/UL (ref 4.31–10.16)
WBC #/AREA URNS AUTO: ABNORMAL /HPF

## 2024-02-24 PROCEDURE — 94640 AIRWAY INHALATION TREATMENT: CPT

## 2024-02-24 PROCEDURE — 94664 DEMO&/EVAL PT USE INHALER: CPT

## 2024-02-24 PROCEDURE — 80048 BASIC METABOLIC PNL TOTAL CA: CPT

## 2024-02-24 PROCEDURE — 82805 BLOOD GASES W/O2 SATURATION: CPT

## 2024-02-24 PROCEDURE — 71045 X-RAY EXAM CHEST 1 VIEW: CPT

## 2024-02-24 PROCEDURE — 84100 ASSAY OF PHOSPHORUS: CPT

## 2024-02-24 PROCEDURE — 94003 VENT MGMT INPAT SUBQ DAY: CPT

## 2024-02-24 PROCEDURE — 82948 REAGENT STRIP/BLOOD GLUCOSE: CPT

## 2024-02-24 PROCEDURE — 84478 ASSAY OF TRIGLYCERIDES: CPT

## 2024-02-24 PROCEDURE — 84145 PROCALCITONIN (PCT): CPT | Performed by: STUDENT IN AN ORGANIZED HEALTH CARE EDUCATION/TRAINING PROGRAM

## 2024-02-24 PROCEDURE — 87040 BLOOD CULTURE FOR BACTERIA: CPT | Performed by: STUDENT IN AN ORGANIZED HEALTH CARE EDUCATION/TRAINING PROGRAM

## 2024-02-24 PROCEDURE — 99291 CRITICAL CARE FIRST HOUR: CPT | Performed by: INTERNAL MEDICINE

## 2024-02-24 PROCEDURE — 83735 ASSAY OF MAGNESIUM: CPT

## 2024-02-24 PROCEDURE — 83605 ASSAY OF LACTIC ACID: CPT | Performed by: STUDENT IN AN ORGANIZED HEALTH CARE EDUCATION/TRAINING PROGRAM

## 2024-02-24 PROCEDURE — 85027 COMPLETE CBC AUTOMATED: CPT

## 2024-02-24 PROCEDURE — 81001 URINALYSIS AUTO W/SCOPE: CPT | Performed by: STUDENT IN AN ORGANIZED HEALTH CARE EDUCATION/TRAINING PROGRAM

## 2024-02-24 PROCEDURE — 94760 N-INVAS EAR/PLS OXIMETRY 1: CPT

## 2024-02-24 RX ORDER — FUROSEMIDE 10 MG/ML
10 SYRINGE (ML) INJECTION CONTINUOUS
Status: DISCONTINUED | OUTPATIENT
Start: 2024-02-24 | End: 2024-02-25

## 2024-02-24 RX ORDER — OXYCODONE HCL 5 MG/5 ML
5 SOLUTION, ORAL ORAL EVERY 6 HOURS
Status: DISCONTINUED | OUTPATIENT
Start: 2024-02-24 | End: 2024-02-26

## 2024-02-24 RX ORDER — DIAZEPAM 5 MG/1
5 TABLET ORAL EVERY 6 HOURS
Status: DISCONTINUED | OUTPATIENT
Start: 2024-02-24 | End: 2024-02-29

## 2024-02-24 RX ADMIN — ACETAMINOPHEN 1000 MG: 10 INJECTION INTRAVENOUS at 05:49

## 2024-02-24 RX ADMIN — ACETAMINOPHEN 1000 MG: 10 INJECTION INTRAVENOUS at 23:55

## 2024-02-24 RX ADMIN — WHITE PETROLATUM 57.7 %-MINERAL OIL 31.9 % EYE OINTMENT: at 03:17

## 2024-02-24 RX ADMIN — LEVALBUTEROL HYDROCHLORIDE 1.25 MG: 1.25 SOLUTION RESPIRATORY (INHALATION) at 14:42

## 2024-02-24 RX ADMIN — CHLORHEXIDINE GLUCONATE 15 ML: 1.2 SOLUTION ORAL at 21:16

## 2024-02-24 RX ADMIN — ACETAMINOPHEN 1000 MG: 10 INJECTION INTRAVENOUS at 12:22

## 2024-02-24 RX ADMIN — DIAZEPAM 5 MG: 5 TABLET ORAL at 21:17

## 2024-02-24 RX ADMIN — Medication 10 MG/HR: at 10:59

## 2024-02-24 RX ADMIN — CISATRACURIUM BESYLATE 4.5 MCG/KG/MIN: 10 INJECTION INTRAVENOUS at 06:22

## 2024-02-24 RX ADMIN — Medication 150 MCG/HR: at 23:55

## 2024-02-24 RX ADMIN — WHITE PETROLATUM 57.7 %-MINERAL OIL 31.9 % EYE OINTMENT: at 02:07

## 2024-02-24 RX ADMIN — SENNOSIDES, DOCUSATE SODIUM 1 TABLET: 8.6; 5 TABLET ORAL at 13:51

## 2024-02-24 RX ADMIN — LEVALBUTEROL HYDROCHLORIDE 1.25 MG: 1.25 SOLUTION RESPIRATORY (INHALATION) at 20:00

## 2024-02-24 RX ADMIN — SENNOSIDES, DOCUSATE SODIUM 1 TABLET: 8.6; 5 TABLET ORAL at 18:24

## 2024-02-24 RX ADMIN — POLYETHYLENE GLYCOL 3350 17 G: 17 POWDER, FOR SOLUTION ORAL at 21:16

## 2024-02-24 RX ADMIN — Medication 150 MCG/HR: at 16:52

## 2024-02-24 RX ADMIN — GABAPENTIN 100 MG: 250 SOLUTION ORAL at 16:33

## 2024-02-24 RX ADMIN — IPRATROPIUM BROMIDE 0.5 MG: 0.5 SOLUTION RESPIRATORY (INHALATION) at 08:02

## 2024-02-24 RX ADMIN — CHLORHEXIDINE GLUCONATE 15 ML: 1.2 SOLUTION ORAL at 08:28

## 2024-02-24 RX ADMIN — LEVALBUTEROL HYDROCHLORIDE 1.25 MG: 1.25 SOLUTION RESPIRATORY (INHALATION) at 01:59

## 2024-02-24 RX ADMIN — WHITE PETROLATUM 57.7 %-MINERAL OIL 31.9 % EYE OINTMENT: at 00:25

## 2024-02-24 RX ADMIN — BISACODYL 10 MG: 10 SUPPOSITORY RECTAL at 08:28

## 2024-02-24 RX ADMIN — MAGNESIUM OXIDE TAB 400 MG (241.3 MG ELEMENTAL MG) 400 MG: 400 (241.3 MG) TAB at 13:51

## 2024-02-24 RX ADMIN — WHITE PETROLATUM 57.7 %-MINERAL OIL 31.9 % EYE OINTMENT: at 07:54

## 2024-02-24 RX ADMIN — FAMOTIDINE 20 MG: 40 POWDER, FOR SUSPENSION ORAL at 13:51

## 2024-02-24 RX ADMIN — PROPOFOL 45 MCG/KG/MIN: 10 INJECTION, EMULSION INTRAVENOUS at 07:54

## 2024-02-24 RX ADMIN — PROPOFOL 45 MCG/KG/MIN: 10 INJECTION, EMULSION INTRAVENOUS at 10:08

## 2024-02-24 RX ADMIN — IPRATROPIUM BROMIDE 0.5 MG: 0.5 SOLUTION RESPIRATORY (INHALATION) at 20:00

## 2024-02-24 RX ADMIN — ACETAMINOPHEN 1000 MG: 10 INJECTION INTRAVENOUS at 18:24

## 2024-02-24 RX ADMIN — MIDAZOLAM 2 MG/HR: 5 INJECTION INTRAMUSCULAR; INTRAVENOUS at 23:56

## 2024-02-24 RX ADMIN — LEVALBUTEROL HYDROCHLORIDE 1.25 MG: 1.25 SOLUTION RESPIRATORY (INHALATION) at 08:02

## 2024-02-24 RX ADMIN — FENTANYL CITRATE 100 MCG: 50 INJECTION INTRAMUSCULAR; INTRAVENOUS at 21:16

## 2024-02-24 RX ADMIN — IPRATROPIUM BROMIDE 0.5 MG: 0.5 SOLUTION RESPIRATORY (INHALATION) at 14:42

## 2024-02-24 RX ADMIN — GABAPENTIN 100 MG: 250 SOLUTION ORAL at 21:21

## 2024-02-24 RX ADMIN — Medication 150 MCG/HR: at 10:07

## 2024-02-24 RX ADMIN — NOREPINEPHRINE BITARTRATE 5 MCG/MIN: 1 SOLUTION INTRAVENOUS at 20:01

## 2024-02-24 RX ADMIN — OXYCODONE HYDROCHLORIDE 5 MG: 5 SOLUTION ORAL at 21:16

## 2024-02-24 RX ADMIN — WHITE PETROLATUM 57.7 %-MINERAL OIL 31.9 % EYE OINTMENT 1 APPLICATION: at 09:36

## 2024-02-24 RX ADMIN — KETAMINE HYDROCHLORIDE 0.5 MG/KG/HR: 100 INJECTION, SOLUTION, CONCENTRATE INTRAMUSCULAR; INTRAVENOUS at 15:52

## 2024-02-24 RX ADMIN — FENTANYL CITRATE 100 MCG: 50 INJECTION INTRAMUSCULAR; INTRAVENOUS at 17:15

## 2024-02-24 RX ADMIN — PROPOFOL 45 MCG/KG/MIN: 10 INJECTION, EMULSION INTRAVENOUS at 03:17

## 2024-02-24 RX ADMIN — WHITE PETROLATUM 57.7 %-MINERAL OIL 31.9 % EYE OINTMENT: at 05:15

## 2024-02-24 RX ADMIN — IPRATROPIUM BROMIDE 0.5 MG: 0.5 SOLUTION RESPIRATORY (INHALATION) at 01:59

## 2024-02-24 RX ADMIN — POLYETHYLENE GLYCOL 3350 17 G: 17 POWDER, FOR SOLUTION ORAL at 13:51

## 2024-02-24 RX ADMIN — ENOXAPARIN SODIUM 40 MG: 40 INJECTION SUBCUTANEOUS at 16:33

## 2024-02-24 RX ADMIN — Medication 150 MCG/HR: at 03:17

## 2024-02-24 NOTE — PROGRESS NOTES
[facility]  Progress Note: Critical Care  Name: Hussein Edward III 58 y.o. male I MRN: [id number]  Unit/Bed#: MICU 06 I Date of Admission: 2/19/2024   Date of Service: 2/23/2024 I Hospital Day: 4    Assessment/Plan   Neuro:   #Sedated  On propofol, versed, nimbex, fentanyl for proning  Patient was put on precedex on 02/21, and became bradycardic to 30s, prompting dc of the precedex.  Ketamine off     CV:   #Hypotension likely 2/2 induction for RSI  On Levo 4, wean as possible to maintain MAP>65  A-line for hemodynamic monitoring  TTE 02/19: LVEF 55-60%. G1DD.    #New onset atrial fibrillation, resolved, Now NSR.  Pt found to have rates to 150s s/p CVC placement  gave one time dose of lopressor, patient started on amiodarone drip. Patient's HR still elevated with afib. Given dose of digoxin, resolved.  Continue to monitor, HR better in 90s. Has some periods of tachy that correspond to low grade fevers.  Not on heparin ACS protocol anymore, was restarted on DVT prophylactic heparin        Pulm:  #Acute Respiratory Distress Syndrome  2/2 influenza A pneumonia, possible superimposed bacterial pna  CTA on 02/18 without evidence of PE but findings of extensive bilateral groundglass and consolidative opacities with predominantly perihilar and peripheral distribution compatible with viral and/or atypical pneumonia, likely influenza given clinical history.  PaO2/FiO2 ratio 180 this am, 207 overnight  Lasix yesterday, -1.4/24hr, -600mL net  Give diuretic to maintain patient in goal of net -500 to -1L.   Antibiotics as mentioned below under ID but finished 5/5 of Ceftriaxone and Azithromycin on 02/21, not currently on abx. Patient started spiking fevers, on cefepime Day 2, refer to ID section.  Q4H ABGs  Nebs- Xopenex, Atrovent  CXR appears stable or improved  Underwent bronchoscopy on 02/19 -> grew Candida glabrata, viral culture pending  S/p 5 days of solu-medrol  Sputum cultures 02/22 no  growth  Blood cultures NGTD  Wean PEEP and FIO2 as tolerated     GI:   Diagnosis: No active issues  Continue pepcid prophylaxis  Continue tube feeds, hold while prone  Bowel regimen     :   #CKD Stage II  Plan: sCr baseline range of 0.8-1.0, stable currently  Avoid nephrotoxic medications  Monitor sCr with daily BMPs  Diuresing, monitor renal function    #hematuria. :  (1+) ketones and protein -> likely due to dehydration  large occult blood, with innumerable RBCs -> might be associated with acute drop in hgb. Consider traumatic injury by raines catheter.     F/E/N:    F: Not currently on any IV fluids  E: Will monitor and correct any electrolyte derangements. Sodium levels normalized.  N: tube feeds held for now due to abdominal distention and proning     Heme/Onc:   DVT prophylaxis heparin.     Endo:   #Steroid induced hyperglycemia  Hyperglycemia resolving with discontinuation of steroids  Continue SSI1 for now     ID:   Pt had ongoing influenza that was untreated for 4 days PTA  Likely not bacterial superimposed, likely just in setting of viral infection  Blood culture, sputum culture, bronchial culture unrevealing  S/p ceftriaxone/cefepime, azithromycin  UA:  4-10 WBCs, less likely to be infectious cause  Viral panel negative     MSK/Skin:   Frequent turning and repositioning  Wound care as needed        Disposition: Critical care    ICU Core Measures     Vented Patient  VAP Bundle  VAP bundle ordered     A: Assess, Prevent, and Manage Pain Has pain been assessed? Yes  Need for changes to pain regimen? No   B: Both Spontaneous Awakening Trials (SATs) and Spontaneous Breathing Trials (SBTs) Plan to perform spontaneous awakening trial today? Yes   Plan to perform spontaneous breathing trial today? Yes   Obvious barriers to extubation? Yes   C: Choice of Sedation RASS Goal: 0 Alert and Calm  Need for changes to sedation or analgesia regimen? No   D: Delirium CAM-ICU: Unable to perform secondary to Acute cognitive  dysfunction   E: Early Mobility  Plan for early mobility? Yes   F: Family Engagement Plan for family engagement today? Yes       Review of Invasive Devices:    Shields Plan: Continue for accurate I/O monitoring for 48 hours  Central access plan: Patient has multiple central venous catheters.   [place] Plan: Keep arterial line for hemodynamic monitoring    Prophylaxis:  VTE VTE covered by:  enoxaparin, Subcutaneous, 40 mg at 02/23/24 1658       Stress Ulcer  covered byfamotidine (PEPCID) oral suspension 20 mg [[id number]]        Significant 24hr Events     24hr events: no acute events     Subjective     Review of Systems     Objective                            Vitals I/O      Most Recent Min/Max in 24hrs   Temp (!) 101.7 °F (38.7 °C) Temp  Min: 98.6 °F (37 °C)  Max: 101.7 °F (38.7 °C)   Pulse (!) 109 Pulse  Min: 70  Max: 129   Resp (!) 24 Resp  Min: 21  Max: 30   /65 BP  Min: 103/65  Max: 115/66   O2 Sat 97 % SpO2  Min: 90 %  Max: 100 %      Intake/Output Summary (Last 24 hours) at 2/23/2024 2202  Last data filed at 2/23/2024 2104  Gross per 24 hour   Intake 1657.69 ml   Output 3600 ml   Net -1942.31 ml       Diet Enteral/Parenteral; Tube Feeding No Oral Diet; Vital High Protein; Continuous; 45; Prosource Protein Liquid - Two Packets; OD; 200; Water; Every 6 hours    Invasive Monitoring           Physical Exam   Physical Exam  Skin:     General: Skin is warm and dry.   HENT:      Head: Normocephalic and atraumatic.      Nose: Nasogastric tube present.   Neck:      Vascular: Central line present.   Cardiovascular:      Rate and Rhythm: Normal rate and regular rhythm.      Heart sounds: Normal heart sounds.   Constitutional:       Appearance: He is ill-appearing.      Interventions: He is intubated.   Pulmonary:      Effort: He is intubated.      Breath sounds: Rales present.      Comments: Mechanically ventilated breath sounds  Genitourinary/Anorectal:  Shields present.          Diagnostic Studies      EKG:  reviewed  Imaging: I have personally reviewed pertinent reports.       Medications:  Scheduled PRN   artificial tear, , Q2H  bisacodyl, 10 mg, Daily  chlorhexidine, 15 mL, Q12H YEHUDA  enoxaparin, 40 mg, Q24H YEHUDA  famotidine, 20 mg, Daily  gabapentin, 100 mg, TID  insulin lispro, 1-6 Units, Q6H YEHUDA  ipratropium, 0.5 mg, Q6H  levalbuterol, 1.25 mg, Q6H  magnesium Oxide, 400 mg, Daily  polyethylene glycol, 17 g, BID  senna-docusate sodium, 1 tablet, BID      acetaminophen, 1,000 mg, Q6H PRN  fentanyl citrate (PF), 100 mcg, Q2H PRN  sodium chloride, 1 spray, Q1H PRN       Continuous    cisatracurium (NIMBEX) in 0.9% sodium chloride infusion, 0.1-5 mcg/kg/min, Last Rate: 2.5 mcg/kg/min (02/23/24 2146)  fentaNYL, 150 mcg/hr, Last Rate: 150 mcg/hr (02/23/24 2103)  midazolam, 2 mg/hr, Last Rate: 2 mg/hr (02/23/24 2102)  norepinephrine, 1-30 mcg/min, Last Rate: 4 mcg/min (02/23/24 2045)  propofol, 5-50 mcg/kg/min, Last Rate: 45 mcg/kg/min (02/23/24 2129)         Labs:    CBC    Recent Labs     02/22/24 0437 02/23/24  0453   WBC 19.37* 14.07*   HGB 12.6 11.2*   HCT 38.7 35.6*    221     BMP    Recent Labs     02/22/24  1818 02/23/24  0453   SODIUM 148* 146   K 3.9 4.1   * 108   CO2 33* 33*   AGAP 5 5   BUN 34* 33*   CREATININE 0.65 0.64   CALCIUM 7.5* 7.6*       Coags    Recent Labs     02/22/24  0437   PTT 24        Additional Electrolytes  Recent Labs     02/22/24  0437 02/23/24  0453   MG 2.4 2.4   PHOS 2.1* 3.0          Blood Gas    Recent Labs     02/23/24  1604   PHART 7.507*   ALA6JBL 42.9   PO2ART 69.6*   TLR4VXY 33.2*   BEART 9.2   SOURCE Line, Arterial     Recent Labs     02/23/24  1604   SOURCE Line, Arterial    LFTs  No recent results    Infectious  No recent results  Glucose  Recent Labs     02/22/24  0437 02/22/24  1818 02/23/24  0453   GLUC 207* 141* 196*               Willem Pearson MD

## 2024-02-24 NOTE — RESPIRATORY THERAPY NOTE
RT Ventilator Management Note  Hussein Edward III 58 y.o. male MRN: [id number]  Unit/Bed#: [facility] 06 Encounter: [id number]      Daily Screen         2/23/2024  0802 2/24/2024 0727          Patient safety screen outcome:: Failed Failed      Not Ready for Weaning due to:: Underline problem not resolved;PEEP > 8cmH2O Underline problem not resolved                Physical Exam:   Assessment Type: Pre-treatment  General Appearance: Sedated  Respiratory Pattern: Assisted  Chest Assessment: Chest expansion symmetrical  Bilateral Breath Sounds: Clear, Diminished  Cough: None  Suction: ET Tube  O2 Device: G5      Resp Comments: head turned

## 2024-02-24 NOTE — PLAN OF CARE
Problem: Prexisting or High Potential for Compromised Skin Integrity  Goal: Skin integrity is maintained or improved  Description: INTERVENTIONS:  - Identify patients at risk for skin breakdown  - Assess and monitor skin integrity  - Assess and monitor nutrition and hydration status  - Monitor labs   - Assess for incontinence   - Turn and reposition patient  - Assist with mobility/ambulation  - Relieve pressure over bony prominences  - Avoid friction and shearing  - Provide appropriate hygiene as needed including keeping skin clean and dry  - Evaluate need for skin moisturizer/barrier cream  - Collaborate with interdisciplinary team   - Patient/family teaching  - Consider wound care consult   Outcome: Progressing     Problem: PAIN - ADULT  Goal: Verbalizes/displays adequate comfort level or baseline comfort level  Description: Interventions:  - Encourage patient to monitor pain and request assistance  - Assess pain using appropriate pain scale  - Administer analgesics based on type and severity of pain and evaluate response  - Implement non-pharmacological measures as appropriate and evaluate response  - Consider cultural and social influences on pain and pain management  - Notify physician/advanced practitioner if interventions unsuccessful or patient reports new pain  Outcome: Progressing     Problem: INFECTION - ADULT  Goal: Absence or prevention of progression during hospitalization  Description: INTERVENTIONS:  - Assess and monitor for signs and symptoms of infection  - Monitor lab/diagnostic results  - Monitor all insertion sites, i.e. indwelling lines, tubes, and drains  - Monitor endotracheal if appropriate and nasal secretions for changes in amount and color  - [place] appropriate cooling/warming therapies per order  - Administer medications as ordered  - Instruct and encourage patient and family to use good hand hygiene technique  - Identify and instruct in appropriate isolation precautions for  identified infection/condition  Outcome: Progressing  Goal: Absence of fever/infection during neutropenic period  Description: INTERVENTIONS:  - Monitor WBC    Outcome: Progressing     Problem: SAFETY ADULT  Goal: Patient will remain free of falls  Description: INTERVENTIONS:  - Educate patient/family on patient safety including physical limitations  - Instruct patient to call for assistance with activity   - Consult OT/PT to assist with strengthening/mobility   - Keep Call bell within reach  - Keep bed low and locked with side rails adjusted as appropriate  - Keep care items and personal belongings within reach  - Initiate and maintain comfort rounds  - Make Fall Risk Sign visible to staff  - Offer Toileting every 2 Hours, in advance of need  - Initiate/Maintain alarm  - Obtain necessary fall risk management equipment  - Apply yellow socks and bracelet for high fall risk patients  - Consider moving patient to room near nurses station  Outcome: Progressing  Goal: Maintain or return to baseline ADL function  Description: INTERVENTIONS:  -  Assess patient's ability to carry out ADLs; assess patient's baseline for ADL function and identify physical deficits which impact ability to perform ADLs (bathing, care of mouth/teeth, toileting, grooming, dressing, etc.)  - Assess/evaluate cause of self-care deficits   - Assess range of motion  - Assess patient's mobility; develop plan if impaired  - Assess patient's need for assistive devices and provide as appropriate  - Encourage maximum independence but intervene and supervise when necessary  - Involve family in performance of ADLs  - Assess for home care needs following discharge   - Consider OT consult to assist with ADL evaluation and planning for discharge  - Provide patient education as appropriate  Outcome: Progressing  Goal: Maintains/Returns to pre admission functional level  Description: INTERVENTIONS:  - Perform AM-PAC 6 Click Basic Mobility/ Daily Activity  assessment daily.  - Set and communicate daily mobility goal to care team and patient/family/caregiver.   - Collaborate with rehabilitation services on mobility goals if consulted  - Perform Range of Motion 4 times a day.  - Reposition patient every 2 hours.  - Dangle patient  times a day  - Stand patient  times a day  - Ambulate patient  times a day  - Out of bed to chair  times a day   - Out of bed for meals  times a day  - Out of bed for toileting  - Record patient progress and toleration of activity level   Outcome: Progressing     Problem: DISCHARGE PLANNING  Goal: Discharge to home or other facility with appropriate resources  Description: INTERVENTIONS:  - Identify barriers to discharge w/patient and caregiver  - Arrange for needed discharge resources and transportation as appropriate  - Identify discharge learning needs (meds, wound care, etc.)  - Arrange for interpretive services to assist at discharge as needed  - Refer to Case Management Department for coordinating discharge planning if the patient needs post-hospital services based on physician/advanced practitioner order or complex needs related to functional status, cognitive ability, or social support system  Outcome: Progressing     Problem: Knowledge Deficit  Goal: Patient/family/caregiver demonstrates understanding of disease process, treatment plan, medications, and discharge instructions  Description: Complete learning assessment and assess knowledge base.  Interventions:  - Provide teaching at level of understanding  - Provide teaching via preferred learning methods  Outcome: Progressing     Problem: Nutrition/Hydration-ADULT  Goal: Nutrient/Hydration intake appropriate for improving, restoring or maintaining nutritional needs  Description: Monitor and assess patient's nutrition/hydration status for malnutrition. Collaborate with interdisciplinary team and initiate plan and interventions as ordered.  Monitor patient's weight and dietary intake  as ordered or per policy. Utilize nutrition screening tool and intervene as necessary. Determine patient's food preferences and provide high-protein, high-caloric foods as appropriate.     INTERVENTIONS:  - Monitor oral intake, urinary output, labs, and treatment plans  - Assess nutrition and hydration status and recommend course of action  - Evaluate amount of meals eaten  - Assist patient with eating if necessary   - Allow adequate time for meals  - Recommend/ encourage appropriate diets, oral nutritional supplements, and vitamin/mineral supplements  - Order, calculate, and assess calorie counts as needed  - Recommend, monitor, and adjust tube feedings and TPN/PPN based on assessed needs  - Assess need for intravenous fluids  - Provide specific nutrition/hydration education as appropriate  - Include patient/family/caregiver in decisions related to nutrition  Outcome: Progressing     Problem: SAFETY,RESTRAINT: NV/NON-SELF DESTRUCTIVE BEHAVIOR  Goal: Remains free of harm/injury (restraint for non violent/non self-detsructive behavior)  Description: INTERVENTIONS:  - Instruct patient/family regarding restraint use   - Assess and monitor physiologic and psychological status   - Provide interventions and comfort measures to meet assessed patient needs   - Identify and implement measures to help patient regain control  - Assess readiness for release of restraint   Outcome: Progressing  Goal: Returns to optimal restraint-free functioning  Description: INTERVENTIONS:  - Assess the patient's behavior and symptoms that indicate continued need for restraint  - Identify and implement measures to help patient regain control  - Assess readiness for release of restraint   Outcome: Progressing

## 2024-02-25 ENCOUNTER — APPOINTMENT (INPATIENT)
Dept: RADIOLOGY | Facility: HOSPITAL | Age: 59
DRG: 870 | End: 2024-02-25
Payer: COMMERCIAL

## 2024-02-25 LAB
ANION GAP SERPL CALCULATED.3IONS-SCNC: 12 MMOL/L
ANION GAP SERPL CALCULATED.3IONS-SCNC: 13 MMOL/L
BASE EXCESS BLDA CALC-SCNC: 10.3 MMOL/L
BASE EXCESS BLDA CALC-SCNC: 11.2 MMOL/L
BASE EXCESS BLDA CALC-SCNC: 11.2 MMOL/L
BASE EXCESS BLDA CALC-SCNC: 11.5 MMOL/L
BASE EXCESS BLDA CALC-SCNC: 13.9 MMOL/L
BUN SERPL-MCNC: 27 MG/DL (ref 5–25)
BUN SERPL-MCNC: 28 MG/DL (ref 5–25)
CA-I BLD-SCNC: 1.05 MMOL/L (ref 1.12–1.32)
CALCIUM SERPL-MCNC: 8.4 MG/DL (ref 8.4–10.2)
CALCIUM SERPL-MCNC: 8.5 MG/DL (ref 8.4–10.2)
CHLORIDE SERPL-SCNC: 94 MMOL/L (ref 96–108)
CHLORIDE SERPL-SCNC: 94 MMOL/L (ref 96–108)
CO2 SERPL-SCNC: 36 MMOL/L (ref 21–32)
CO2 SERPL-SCNC: 36 MMOL/L (ref 21–32)
CREAT SERPL-MCNC: 0.78 MG/DL (ref 0.6–1.3)
CREAT SERPL-MCNC: 0.83 MG/DL (ref 0.6–1.3)
ERYTHROCYTE [DISTWIDTH] IN BLOOD BY AUTOMATED COUNT: 13.1 % (ref 11.6–15.1)
GFR SERPL CREATININE-BSD FRML MDRD: 100 ML/MIN/1.73SQ M
GFR SERPL CREATININE-BSD FRML MDRD: 98 ML/MIN/1.73SQ M
GLUCOSE SERPL-MCNC: 110 MG/DL (ref 65–140)
GLUCOSE SERPL-MCNC: 114 MG/DL (ref 65–140)
GLUCOSE SERPL-MCNC: 120 MG/DL (ref 65–140)
GLUCOSE SERPL-MCNC: 127 MG/DL (ref 65–140)
GLUCOSE SERPL-MCNC: 129 MG/DL (ref 65–140)
GLUCOSE SERPL-MCNC: 166 MG/DL (ref 65–140)
HCO3 BLDA-SCNC: 35.2 MMOL/L (ref 22–28)
HCO3 BLDA-SCNC: 35.2 MMOL/L (ref 22–28)
HCO3 BLDA-SCNC: 35.8 MMOL/L (ref 22–28)
HCO3 BLDA-SCNC: 36.2 MMOL/L (ref 22–28)
HCO3 BLDA-SCNC: 39.8 MMOL/L (ref 22–28)
HCT VFR BLD AUTO: 42.3 % (ref 36.5–49.3)
HGB BLD-MCNC: 13.2 G/DL (ref 12–17)
HOROWITZ INDEX BLDA+IHG-RTO: 40 MM[HG]
MAGNESIUM SERPL-MCNC: 2.2 MG/DL (ref 1.9–2.7)
MCH RBC QN AUTO: 29.5 PG (ref 26.8–34.3)
MCHC RBC AUTO-ENTMCNC: 31.2 G/DL (ref 31.4–37.4)
MCV RBC AUTO: 94 FL (ref 82–98)
O2 CT BLDA-SCNC: 17.1 ML/DL (ref 16–23)
O2 CT BLDA-SCNC: 17.2 ML/DL (ref 16–23)
O2 CT BLDA-SCNC: 17.5 ML/DL (ref 16–23)
O2 CT BLDA-SCNC: 19 ML/DL (ref 16–23)
O2 CT BLDA-SCNC: 19.4 ML/DL (ref 16–23)
OXYHGB MFR BLDA: 88 % (ref 94–97)
OXYHGB MFR BLDA: 92.8 % (ref 94–97)
OXYHGB MFR BLDA: 93.4 % (ref 94–97)
OXYHGB MFR BLDA: 93.6 % (ref 94–97)
OXYHGB MFR BLDA: 96 % (ref 94–97)
PCO2 BLDA: 42.2 MM HG (ref 36–44)
PCO2 BLDA: 46.4 MM HG (ref 36–44)
PCO2 BLDA: 47.5 MM HG (ref 36–44)
PCO2 BLDA: 48.5 MM HG (ref 36–44)
PCO2 BLDA: 54.7 MM HG (ref 36–44)
PEEP RESPIRATORY: 10 CM[H2O]
PEEP RESPIRATORY: 10 CM[H2O]
PEEP RESPIRATORY: 8 CM[H2O]
PH BLDA: 7.48 [PH] (ref 7.35–7.45)
PH BLDA: 7.49 [PH] (ref 7.35–7.45)
PH BLDA: 7.49 [PH] (ref 7.35–7.45)
PH BLDA: 7.5 [PH] (ref 7.35–7.45)
PH BLDA: 7.54 [PH] (ref 7.35–7.45)
PHOSPHATE SERPL-MCNC: 4.7 MG/DL (ref 2.7–4.5)
PLATELET # BLD AUTO: 322 THOUSANDS/UL (ref 149–390)
PMV BLD AUTO: 9.7 FL (ref 8.9–12.7)
PO2 BLDA: 56.4 MM HG (ref 75–129)
PO2 BLDA: 71.3 MM HG (ref 75–129)
PO2 BLDA: 72.1 MM HG (ref 75–129)
PO2 BLDA: 75.6 MM HG (ref 75–129)
PO2 BLDA: 90.9 MM HG (ref 75–129)
POTASSIUM SERPL-SCNC: 4 MMOL/L (ref 3.5–5.3)
POTASSIUM SERPL-SCNC: 4 MMOL/L (ref 3.5–5.3)
PROCALCITONIN SERPL-MCNC: 0.79 NG/ML
RBC # BLD AUTO: 4.48 MILLION/UL (ref 3.88–5.62)
SIMV VENT: ABNORMAL
SODIUM SERPL-SCNC: 142 MMOL/L (ref 135–147)
SODIUM SERPL-SCNC: 143 MMOL/L (ref 135–147)
SPECIMEN SOURCE: ABNORMAL
TRIGL SERPL-MCNC: 553 MG/DL
VENT AC: 24
VENT- AC: AC
VT SETTING VENT: 400 ML
WBC # BLD AUTO: 19.27 THOUSAND/UL (ref 4.31–10.16)

## 2024-02-25 PROCEDURE — 84478 ASSAY OF TRIGLYCERIDES: CPT

## 2024-02-25 PROCEDURE — 82805 BLOOD GASES W/O2 SATURATION: CPT

## 2024-02-25 PROCEDURE — 84100 ASSAY OF PHOSPHORUS: CPT

## 2024-02-25 PROCEDURE — 94664 DEMO&/EVAL PT USE INHALER: CPT

## 2024-02-25 PROCEDURE — 94640 AIRWAY INHALATION TREATMENT: CPT

## 2024-02-25 PROCEDURE — 94003 VENT MGMT INPAT SUBQ DAY: CPT

## 2024-02-25 PROCEDURE — 85027 COMPLETE CBC AUTOMATED: CPT

## 2024-02-25 PROCEDURE — 84145 PROCALCITONIN (PCT): CPT

## 2024-02-25 PROCEDURE — 80048 BASIC METABOLIC PNL TOTAL CA: CPT

## 2024-02-25 PROCEDURE — 83735 ASSAY OF MAGNESIUM: CPT

## 2024-02-25 PROCEDURE — 71045 X-RAY EXAM CHEST 1 VIEW: CPT

## 2024-02-25 PROCEDURE — 80048 BASIC METABOLIC PNL TOTAL CA: CPT | Performed by: STUDENT IN AN ORGANIZED HEALTH CARE EDUCATION/TRAINING PROGRAM

## 2024-02-25 PROCEDURE — 99291 CRITICAL CARE FIRST HOUR: CPT | Performed by: INTERNAL MEDICINE

## 2024-02-25 PROCEDURE — 82948 REAGENT STRIP/BLOOD GLUCOSE: CPT

## 2024-02-25 PROCEDURE — 82330 ASSAY OF CALCIUM: CPT

## 2024-02-25 PROCEDURE — 94760 N-INVAS EAR/PLS OXIMETRY 1: CPT

## 2024-02-25 RX ORDER — FUROSEMIDE 10 MG/ML
5 SYRINGE (ML) INJECTION CONTINUOUS
Status: DISCONTINUED | OUTPATIENT
Start: 2024-02-25 | End: 2024-02-26

## 2024-02-25 RX ORDER — ALBUMIN (HUMAN) 12.5 G/50ML
12.5 SOLUTION INTRAVENOUS EVERY 6 HOURS
Status: COMPLETED | OUTPATIENT
Start: 2024-02-25 | End: 2024-02-26

## 2024-02-25 RX ORDER — DEXMEDETOMIDINE HYDROCHLORIDE 4 UG/ML
.1-1.2 INJECTION, SOLUTION INTRAVENOUS
Status: DISCONTINUED | OUTPATIENT
Start: 2024-02-25 | End: 2024-02-26

## 2024-02-25 RX ORDER — MIDODRINE HYDROCHLORIDE 5 MG/1
10 TABLET ORAL
Status: DISCONTINUED | OUTPATIENT
Start: 2024-02-25 | End: 2024-02-28

## 2024-02-25 RX ADMIN — MIDODRINE HYDROCHLORIDE 10 MG: 5 TABLET ORAL at 10:31

## 2024-02-25 RX ADMIN — LEVALBUTEROL HYDROCHLORIDE 1.25 MG: 1.25 SOLUTION RESPIRATORY (INHALATION) at 14:27

## 2024-02-25 RX ADMIN — MAGNESIUM OXIDE TAB 400 MG (241.3 MG ELEMENTAL MG) 400 MG: 400 (241.3 MG) TAB at 10:00

## 2024-02-25 RX ADMIN — LEVALBUTEROL HYDROCHLORIDE 1.25 MG: 1.25 SOLUTION RESPIRATORY (INHALATION) at 19:45

## 2024-02-25 RX ADMIN — OXYCODONE HYDROCHLORIDE 5 MG: 5 SOLUTION ORAL at 03:34

## 2024-02-25 RX ADMIN — OXYCODONE HYDROCHLORIDE 5 MG: 5 SOLUTION ORAL at 21:48

## 2024-02-25 RX ADMIN — IPRATROPIUM BROMIDE 0.5 MG: 0.5 SOLUTION RESPIRATORY (INHALATION) at 07:55

## 2024-02-25 RX ADMIN — SENNOSIDES, DOCUSATE SODIUM 1 TABLET: 8.6; 5 TABLET ORAL at 18:07

## 2024-02-25 RX ADMIN — IPRATROPIUM BROMIDE 0.5 MG: 0.5 SOLUTION RESPIRATORY (INHALATION) at 02:01

## 2024-02-25 RX ADMIN — ENOXAPARIN SODIUM 40 MG: 40 INJECTION SUBCUTANEOUS at 17:12

## 2024-02-25 RX ADMIN — LEVALBUTEROL HYDROCHLORIDE 1.25 MG: 1.25 SOLUTION RESPIRATORY (INHALATION) at 07:55

## 2024-02-25 RX ADMIN — ACETAMINOPHEN 1000 MG: 10 INJECTION INTRAVENOUS at 22:11

## 2024-02-25 RX ADMIN — OXYCODONE HYDROCHLORIDE 5 MG: 5 SOLUTION ORAL at 14:24

## 2024-02-25 RX ADMIN — SENNOSIDES, DOCUSATE SODIUM 1 TABLET: 8.6; 5 TABLET ORAL at 10:01

## 2024-02-25 RX ADMIN — IPRATROPIUM BROMIDE 0.5 MG: 0.5 SOLUTION RESPIRATORY (INHALATION) at 14:27

## 2024-02-25 RX ADMIN — ACETAMINOPHEN 1000 MG: 10 INJECTION INTRAVENOUS at 14:24

## 2024-02-25 RX ADMIN — Medication 150 MCG/HR: at 07:30

## 2024-02-25 RX ADMIN — DEXMEDETOMIDINE HYDROCHLORIDE 0.2 MCG/KG/HR: 4 INJECTION, SOLUTION INTRAVENOUS at 09:35

## 2024-02-25 RX ADMIN — POLYETHYLENE GLYCOL 3350 17 G: 17 POWDER, FOR SOLUTION ORAL at 10:01

## 2024-02-25 RX ADMIN — KETAMINE HYDROCHLORIDE 0.5 MG/KG/HR: 100 INJECTION, SOLUTION, CONCENTRATE INTRAMUSCULAR; INTRAVENOUS at 04:44

## 2024-02-25 RX ADMIN — DIAZEPAM 5 MG: 5 TABLET ORAL at 14:24

## 2024-02-25 RX ADMIN — POLYETHYLENE GLYCOL 3350 17 G: 17 POWDER, FOR SOLUTION ORAL at 21:48

## 2024-02-25 RX ADMIN — CEFEPIME 2000 MG: 2 INJECTION, POWDER, FOR SOLUTION INTRAVENOUS at 12:05

## 2024-02-25 RX ADMIN — CHLORHEXIDINE GLUCONATE 15 ML: 1.2 SOLUTION ORAL at 10:00

## 2024-02-25 RX ADMIN — ALBUMIN (HUMAN) 12.5 G: 0.25 INJECTION, SOLUTION INTRAVENOUS at 21:49

## 2024-02-25 RX ADMIN — FENTANYL CITRATE 100 MCG: 50 INJECTION INTRAMUSCULAR; INTRAVENOUS at 20:10

## 2024-02-25 RX ADMIN — Medication 5 MG/HR: at 10:06

## 2024-02-25 RX ADMIN — DIAZEPAM 5 MG: 5 TABLET ORAL at 10:01

## 2024-02-25 RX ADMIN — Medication 150 MCG/HR: at 19:06

## 2024-02-25 RX ADMIN — ALBUMIN (HUMAN) 12.5 G: 0.25 INJECTION, SOLUTION INTRAVENOUS at 10:28

## 2024-02-25 RX ADMIN — MIDODRINE HYDROCHLORIDE 10 MG: 5 TABLET ORAL at 17:11

## 2024-02-25 RX ADMIN — OXYCODONE HYDROCHLORIDE 5 MG: 5 SOLUTION ORAL at 10:00

## 2024-02-25 RX ADMIN — GABAPENTIN 100 MG: 250 SOLUTION ORAL at 10:00

## 2024-02-25 RX ADMIN — FENTANYL CITRATE 100 MCG: 50 INJECTION INTRAMUSCULAR; INTRAVENOUS at 07:36

## 2024-02-25 RX ADMIN — FENTANYL CITRATE 100 MCG: 50 INJECTION INTRAMUSCULAR; INTRAVENOUS at 22:11

## 2024-02-25 RX ADMIN — GABAPENTIN 100 MG: 250 SOLUTION ORAL at 21:48

## 2024-02-25 RX ADMIN — ALBUMIN (HUMAN) 12.5 G: 0.25 INJECTION, SOLUTION INTRAVENOUS at 15:02

## 2024-02-25 RX ADMIN — LEVALBUTEROL HYDROCHLORIDE 1.25 MG: 1.25 SOLUTION RESPIRATORY (INHALATION) at 02:01

## 2024-02-25 RX ADMIN — GABAPENTIN 100 MG: 250 SOLUTION ORAL at 17:12

## 2024-02-25 RX ADMIN — ACETAMINOPHEN 1000 MG: 10 INJECTION INTRAVENOUS at 07:53

## 2024-02-25 RX ADMIN — IPRATROPIUM BROMIDE 0.5 MG: 0.5 SOLUTION RESPIRATORY (INHALATION) at 19:45

## 2024-02-25 RX ADMIN — BISACODYL 10 MG: 10 SUPPOSITORY RECTAL at 09:43

## 2024-02-25 RX ADMIN — INSULIN LISPRO 1 UNITS: 100 INJECTION, SOLUTION INTRAVENOUS; SUBCUTANEOUS at 18:12

## 2024-02-25 RX ADMIN — KETAMINE HYDROCHLORIDE 0.5 MG/KG/HR: 100 INJECTION, SOLUTION, CONCENTRATE INTRAMUSCULAR; INTRAVENOUS at 15:40

## 2024-02-25 RX ADMIN — DIAZEPAM 5 MG: 5 TABLET ORAL at 03:34

## 2024-02-25 RX ADMIN — FENTANYL CITRATE 100 MCG: 50 INJECTION INTRAMUSCULAR; INTRAVENOUS at 17:00

## 2024-02-25 RX ADMIN — Medication 150 MCG/HR: at 12:03

## 2024-02-25 RX ADMIN — CHLORHEXIDINE GLUCONATE 15 ML: 1.2 SOLUTION ORAL at 21:49

## 2024-02-25 RX ADMIN — FAMOTIDINE 20 MG: 40 POWDER, FOR SUSPENSION ORAL at 10:00

## 2024-02-25 RX ADMIN — NOREPINEPHRINE BITARTRATE 6.99 MCG/MIN: 1 SOLUTION INTRAVENOUS at 18:18

## 2024-02-25 RX ADMIN — FENTANYL CITRATE 100 MCG: 50 INJECTION INTRAMUSCULAR; INTRAVENOUS at 09:46

## 2024-02-25 RX ADMIN — DIAZEPAM 5 MG: 5 TABLET ORAL at 21:48

## 2024-02-25 NOTE — PROGRESS NOTES
[facility]  Progress Note: Critical Care  Name: Hussein Edward III 58 y.o. male I MRN: [id number]  Unit/Bed#: MICU 06 I Date of Admission: 2/19/2024   Date of Service: 2/25/2024 I Hospital Day: 6    Assessment/Plan   Acute hypoxic and hypercapnic respiratory failure 2/2 ARDS  Influenza A pneumonia  Persistent fever  Acute metabolic encephalopathy  New onset atrial fibrillation, resolved  Morbid obesity  Hypertriglyceridemia 2/2 propofol    Neuro:   #Sedated  On Ketamine, versed, and fentanyl  Patient was put on precedex on 02/21, and became bradycardic to 30s, prompting dc of the precedex.  Off propofol and nimbex at this time  Wean off versed as tolerated  On gabapentin 200 mg TID  Added YEHUDA valium & oxycodone q6H     CV:   #Hypotension likely 2/2 induction for RSI  On Levo 4, wean as possible to maintain MAP>65  A-line for hemodynamic monitoring  TTE 02/19: LVEF 55-60%. G1DD.  #New onset atrial fibrillation, resolved, Now NSR.  Pt found to have rates to 150s s/p CVC placement  gave one time dose of lopressor, patient started on amiodarone drip. Patient's HR still elevated with afib. Given dose of digoxin, resolved.  Continue to monitor, HR better in 90s. Has some periods of tachy that correspond to low grade fevers.  Not on heparin ACS protocol anymore, was restarted on DVT prophylactic heparin     Pulm:  #Acute Respiratory Distress Syndrome  2/2 influenza A pneumonia, possible superimposed bacterial pna  CTA on 02/18 without evidence of PE but findings of extensive bilateral groundglass and consolidative opacities with predominantly perihilar and peripheral distribution compatible with viral and/or atypical pneumonia, likely influenza given clinical history.  PaO2/FiO2 ratio 228 on 2/25  Lasix gtt yesterday, -1.4/24hr, -600mL net  Give diuretic to maintain patient in goal of net -500 to -1L.   Antibiotics as mentioned below under ID but finished 5/5 of Ceftriaxone and  Azithromycin on 02/21, not currently on abx. Patient started spiking fevers, on cefepime Day 2, refer to ID section.  Q4H ABGs  Nebs- Xopenex, Atrovent  CXR appears stable or improved  Underwent bronchoscopy on 02/19 -> grew Candida glabrata, viral culture pending  Consider possible bronch given pt now supine  S/p 5 days of solu-medrol  Sputum cultures 02/22 no growth  Blood cultures NGTD  Wean PEEP and FIO2 as tolerated   On lasix gtt d/t volume overload 2/2 ARDS, continue to maintain net negative 0.5-1.0 L  Can consider dc today    GI:   Diagnosis: No active issues  Continue pepcid prophylaxis  Continue tube feeds, hold while prone  1 small BM on 02/24 PM  Bowel regimen (miralax BID + senna BID)     :   #CKD Stage II  Plan: sCr baseline range of 0.8-1.0, stable currently  Avoid nephrotoxic medications  Monitor sCr with daily BMPs  Diuresing, monitor renal function  #Hematuria  (1+) ketones and protein -> likely due to dehydration  large occult blood, with innumerable RBCs -> might be associated with acute drop in hgb. Consider traumatic injury by raines catheter.     F/E/N:    F: Not currently on any IV fluids  E: Will monitor and correct any electrolyte derangements. Sodium levels normalized.  N: tube feeds held for now due to abdominal distention and proning     Heme/Onc:   DVT prophylaxis heparin     Endo:   #Steroid induced hyperglycemia  Hyperglycemia resolving with discontinuation of steroids  Continue SSI1 for now     ID:   #New-onset fever while on antibiotics  Pt had ongoing influenza that was untreated for 4 days PTA  Likely not bacterial superimposed, likely just in setting of viral infection  Blood culture, sputum culture, bronchial culture unrevealing  S/p ceftriaxone/cefepime, azithromycin, and tamiflu  UA:  4-10 WBCs, less likely to be infectious cause  Viral panel negative  Can consider performing bronch  Otherwise, continue to monitor off abx     MSK/Skin:   Frequent turning and  repositioning  Wound care as needed    Disposition: Critical care    ICU Core Measures     Vented Patient  VAP Bundle  VAP bundle ordered     A: Assess, Prevent, and Manage Pain Has pain been assessed? Yes  Need for changes to pain regimen? No   B: Both Spontaneous Awakening Trials (SATs) and Spontaneous Breathing Trials (SBTs) Plan to perform spontaneous awakening trial today? Yes   Plan to perform spontaneous breathing trial today? Yes   Obvious barriers to extubation? Yes   C: Choice of Sedation RASS Goal: -2 Light Sedation  Need for changes to sedation or analgesia regimen? Yes   D: Delirium CAM-ICU: Negative   E: Early Mobility  Plan for early mobility? No   F: Family Engagement Plan for family engagement today? Yes       Review of Invasive Devices:    [place] Plan: Continue for accurate I/O monitoring for 48 hours  Central access plan: Patient has multiple central venous catheters.   Prudence Plan: Keep arterial line for hemodynamic monitoring    Prophylaxis:  VTE VTE covered by:  enoxaparin, Subcutaneous, 40 mg at 02/24/24 1633       Stress Ulcer  covered byfamotidine (PEPCID) oral suspension 20 mg [[id number]]        Significant 24hr Events     24hr events: No acute events overnight.  Weaned off propofol.  Currently on ketamine, Versed, and fentanyl.  Also being given scheduled Valium plus oxycodone every 6 hours.     Subjective     Review of Systems   Unable to perform ROS: Intubated        Objective                            Vitals I/O      Most Recent Min/Max in 24hrs   Temp 100.2 °F (37.9 °C) Temp  Min: 95.4 °F (35.2 °C)  Max: 101.3 °F (38.5 °C)   Pulse 100 Pulse  Min: 82  Max: 118   Resp (!) 24 Resp  Min: 24  Max: 26   BP (!) 88/63 BP  Min: 88/63  Max: 113/73   O2 Sat 96 % SpO2  Min: 92 %  Max: 97 %      Intake/Output Summary (Last 24 hours) at 2/25/2024 0659  Last data filed at 2/25/2024 0600  Gross per 24 hour   Intake 2645.33 ml   Output 3985 ml   Net -1339.67 ml       Diet Enteral/Parenteral; Tube  Feeding No Oral Diet; Vital High Protein; Continuous; 45; Prosource Protein Liquid - Two Packets; OD; 250; Water; Every 6 hours    Invasive Monitoring           Physical Exam   Physical Exam  Vitals and nursing note reviewed.   Skin:     General: Skin is warm and dry.      Capillary Refill: Capillary refill takes less than 2 seconds.      Coloration: Skin is not jaundiced.   HENT:      Head: Normocephalic and atraumatic.      Nose: Nasogastric tube present.   Neck:      Vascular: Central line present.   Cardiovascular:      Rate and Rhythm: Regular rhythm. Tachycardia present.      Pulses: Normal pulses.      Heart sounds: Normal heart sounds.   Musculoskeletal:      Right lower leg: No edema.      Left lower leg: No edema.   Abdominal: General: Bowel sounds are normal. There is distension.     Palpations: Abdomen is soft.   Constitutional:       Interventions: He is sedated and intubated.   Pulmonary:      Effort: No accessory muscle usage, respiratory distress or accessory muscle usage. He is intubated.      Breath sounds: Rales present.      Comments: Mechanically ventilated breath sounds  Neurological:      Mental Status: He is unresponsive.   Genitourinary/Anorectal:  Shields present.          Diagnostic Studies      EKG:   Imaging: I have personally reviewed pertinent reports.       Medications:  Scheduled PRN   bisacodyl, 10 mg, Daily  chlorhexidine, 15 mL, Q12H YEHUDA  diazepam, 5 mg, Q6H  enoxaparin, 40 mg, Q24H YEHUDA  famotidine, 20 mg, Daily  gabapentin, 100 mg, TID  insulin lispro, 1-6 Units, Q6H YEHUDA  ipratropium, 0.5 mg, Q6H  levalbuterol, 1.25 mg, Q6H  magnesium Oxide, 400 mg, Daily  oxyCODONE, 5 mg, Q6H  polyethylene glycol, 17 g, BID  senna-docusate sodium, 1 tablet, BID      acetaminophen, 1,000 mg, Q6H PRN  fentanyl citrate (PF), 100 mcg, Q2H PRN  sodium chloride, 1 spray, Q1H PRN       Continuous    fentaNYL, 150 mcg/hr, Last Rate: 150 mcg/hr (02/24/24 9296)  furosemide, 10 mg/hr, Last Rate: 10 mg/hr  (02/24/24 2000)  ketamine, 0.5 mg/kg/hr, Last Rate: 0.5 mg/kg/hr (02/25/24 0444)  midazolam, 1 mg/hr, Last Rate: 1 mg/hr (02/25/24 0538)  norepinephrine, 1-30 mcg/min, Last Rate: 7 mcg/min (02/25/24 0112)         Labs:    CBC    Recent Labs     02/24/24  0540 02/25/24  0444   WBC 17.06* 19.27*   HGB 12.5 13.2   HCT 40.8 42.3    322     BMP    Recent Labs     02/25/24  0000 02/25/24  0444   SODIUM 142 143   K 4.0 4.0   CL 94* 94*   CO2 36* 36*   AGAP 12 13   BUN 28* 27*   CREATININE 0.83 0.78   CALCIUM 8.4 8.5       Coags    No recent results     Additional Electrolytes  Recent Labs     02/24/24  0540 02/25/24  0444   MG 2.3 2.2   PHOS 3.6 4.7*   CAIONIZED  --  1.05*          Blood Gas    Recent Labs     02/25/24  0551   PHART 7.491*   PHN1BFS 48.5*   PO2ART 90.9   ROB5OJV 36.2*   BEART 11.2   SOURCE Line, Arterial     Recent Labs     02/25/24  0551   SOURCE Line, Arterial    LFTs  No recent results    Infectious  Recent Labs     02/24/24  0747   PROCALCITONI 0.41*     Glucose  Recent Labs     02/24/24  0540 02/24/24  1658 02/25/24  0000 02/25/24  0444   GLUC 126 113 114 127               Monty Pires MD

## 2024-02-25 NOTE — PLAN OF CARE
Problem: Prexisting or High Potential for Compromised Skin Integrity  Goal: Skin integrity is maintained or improved  Description: INTERVENTIONS:  - Identify patients at risk for skin breakdown  - Assess and monitor skin integrity  - Assess and monitor nutrition and hydration status  - Monitor labs   - Assess for incontinence   - Turn and reposition patient  - Assist with mobility/ambulation  - Relieve pressure over bony prominences  - Avoid friction and shearing  - Provide appropriate hygiene as needed including keeping skin clean and dry  - Evaluate need for skin moisturizer/barrier cream  - Collaborate with interdisciplinary team   - Patient/family teaching  - Consider wound care consult   Outcome: Progressing     Problem: PAIN - ADULT  Goal: Verbalizes/displays adequate comfort level or baseline comfort level  Description: Interventions:  - Encourage patient to monitor pain and request assistance  - Assess pain using appropriate pain scale  - Administer analgesics based on type and severity of pain and evaluate response  - Implement non-pharmacological measures as appropriate and evaluate response  - Consider cultural and social influences on pain and pain management  - Notify physician/advanced practitioner if interventions unsuccessful or patient reports new pain  Outcome: Progressing     Problem: INFECTION - ADULT  Goal: Absence or prevention of progression during hospitalization  Description: INTERVENTIONS:  - Assess and monitor for signs and symptoms of infection  - Monitor lab/diagnostic results  - Monitor all insertion sites, i.e. indwelling lines, tubes, and drains  - Monitor endotracheal if appropriate and nasal secretions for changes in amount and color  - [place] appropriate cooling/warming therapies per order  - Administer medications as ordered  - Instruct and encourage patient and family to use good hand hygiene technique  - Identify and instruct in appropriate isolation precautions for  identified infection/condition  Outcome: Progressing  Goal: Absence of fever/infection during neutropenic period  Description: INTERVENTIONS:  - Monitor WBC    Outcome: Progressing     Problem: SAFETY ADULT  Goal: Patient will remain free of falls  Description: INTERVENTIONS:  - Educate patient/family on patient safety including physical limitations  - Instruct patient to call for assistance with activity   - Consult OT/PT to assist with strengthening/mobility   - Keep Call bell within reach  - Keep bed low and locked with side rails adjusted as appropriate  - Keep care items and personal belongings within reach  - Initiate and maintain comfort rounds  - Make Fall Risk Sign visible to staff  - Offer Toileting every 2 Hours, in advance of need  - Initiate/Maintain alarm  - Obtain necessary fall risk management equipment  - Apply yellow socks and bracelet for high fall risk patients  - Consider moving patient to room near nurses station  Outcome: Progressing  Goal: Maintain or return to baseline ADL function  Description: INTERVENTIONS:  -  Assess patient's ability to carry out ADLs; assess patient's baseline for ADL function and identify physical deficits which impact ability to perform ADLs (bathing, care of mouth/teeth, toileting, grooming, dressing, etc.)  - Assess/evaluate cause of self-care deficits   - Assess range of motion  - Assess patient's mobility; develop plan if impaired  - Assess patient's need for assistive devices and provide as appropriate  - Encourage maximum independence but intervene and supervise when necessary  - Involve family in performance of ADLs  - Assess for home care needs following discharge   - Consider OT consult to assist with ADL evaluation and planning for discharge  - Provide patient education as appropriate  Outcome: Progressing  Goal: Maintains/Returns to pre admission functional level  Description: INTERVENTIONS:  - Perform AM-PAC 6 Click Basic Mobility/ Daily Activity  assessment daily.  - Set and communicate daily mobility goal to care team and patient/family/caregiver.   - Collaborate with rehabilitation services on mobility goals if consulted  - Perform Range of Motion 4 times a day.  - Reposition patient every 2 hours.  - Dangle patient  times a day  - Stand patient  times a day  - Ambulate patient  times a day  - Out of bed to chair  times a day   - Out of bed for meals times a day  - Out of bed for toileting  - Record patient progress and toleration of activity level   Outcome: Progressing     Problem: DISCHARGE PLANNING  Goal: Discharge to home or other facility with appropriate resources  Description: INTERVENTIONS:  - Identify barriers to discharge w/patient and caregiver  - Arrange for needed discharge resources and transportation as appropriate  - Identify discharge learning needs (meds, wound care, etc.)  - Arrange for interpretive services to assist at discharge as needed  - Refer to Case Management Department for coordinating discharge planning if the patient needs post-hospital services based on physician/advanced practitioner order or complex needs related to functional status, cognitive ability, or social support system  Outcome: Progressing     Problem: Knowledge Deficit  Goal: Patient/family/caregiver demonstrates understanding of disease process, treatment plan, medications, and discharge instructions  Description: Complete learning assessment and assess knowledge base.  Interventions:  - Provide teaching at level of understanding  - Provide teaching via preferred learning methods  Outcome: Progressing     Problem: Nutrition/Hydration-ADULT  Goal: Nutrient/Hydration intake appropriate for improving, restoring or maintaining nutritional needs  Description: Monitor and assess patient's nutrition/hydration status for malnutrition. Collaborate with interdisciplinary team and initiate plan and interventions as ordered.  Monitor patient's weight and dietary intake as  ordered or per policy. Utilize nutrition screening tool and intervene as necessary. Determine patient's food preferences and provide high-protein, high-caloric foods as appropriate.     INTERVENTIONS:  - Monitor oral intake, urinary output, labs, and treatment plans  - Assess nutrition and hydration status and recommend course of action  - Evaluate amount of meals eaten  - Assist patient with eating if necessary   - Allow adequate time for meals  - Recommend/ encourage appropriate diets, oral nutritional supplements, and vitamin/mineral supplements  - Order, calculate, and assess calorie counts as needed  - Recommend, monitor, and adjust tube feedings and TPN/PPN based on assessed needs  - Assess need for intravenous fluids  - Provide specific nutrition/hydration education as appropriate  - Include patient/family/caregiver in decisions related to nutrition  Outcome: Progressing     Problem: SAFETY,RESTRAINT: NV/NON-SELF DESTRUCTIVE BEHAVIOR  Goal: Remains free of harm/injury (restraint for non violent/non self-detsructive behavior)  Description: INTERVENTIONS:  - Instruct patient/family regarding restraint use   - Assess and monitor physiologic and psychological status   - Provide interventions and comfort measures to meet assessed patient needs   - Identify and implement measures to help patient regain control  - Assess readiness for release of restraint   Outcome: Progressing  Goal: Returns to optimal restraint-free functioning  Description: INTERVENTIONS:  - Assess the patient's behavior and symptoms that indicate continued need for restraint  - Identify and implement measures to help patient regain control  - Assess readiness for release of restraint   Outcome: Progressing

## 2024-02-26 ENCOUNTER — APPOINTMENT (INPATIENT)
Dept: NON INVASIVE DIAGNOSTICS | Facility: HOSPITAL | Age: 59
DRG: 870 | End: 2024-02-26
Payer: COMMERCIAL

## 2024-02-26 ENCOUNTER — APPOINTMENT (INPATIENT)
Dept: RADIOLOGY | Facility: HOSPITAL | Age: 59
DRG: 870 | End: 2024-02-26
Payer: COMMERCIAL

## 2024-02-26 LAB
ALBUMIN SERPL BCP-MCNC: 3.2 G/DL (ref 3.5–5)
ALP SERPL-CCNC: 70 U/L (ref 34–104)
ALT SERPL W P-5'-P-CCNC: 49 U/L (ref 7–52)
ANION GAP SERPL CALCULATED.3IONS-SCNC: 8 MMOL/L
ARTERIAL PATENCY WRIST A: YES
AST SERPL W P-5'-P-CCNC: 39 U/L (ref 13–39)
B PARAP IS1001 DNA NPH QL NAA+NON-PROBE: NOT DETECTED
B PERT.PT PRMT NPH QL NAA+NON-PROBE: NOT DETECTED
BACTERIA UR QL AUTO: ABNORMAL /HPF
BASE EXCESS BLDA CALC-SCNC: 10 MMOL/L
BASE EXCESS BLDA CALC-SCNC: 12.3 MMOL/L
BASE EXCESS BLDA CALC-SCNC: 7.6 MMOL/L
BASE EXCESS BLDA CALC-SCNC: 7.9 MMOL/L
BASOPHILS # BLD AUTO: 0.03 THOUSANDS/ÂΜL (ref 0–0.1)
BASOPHILS NFR BLD AUTO: 0 % (ref 0–1)
BILIRUB DIRECT SERPL-MCNC: 0.37 MG/DL (ref 0–0.2)
BILIRUB SERPL-MCNC: 0.95 MG/DL (ref 0.2–1)
BILIRUB UR QL STRIP: NEGATIVE
BODY TEMPERATURE: 100.8 DEGREES FEHRENHEIT
BUN SERPL-MCNC: 28 MG/DL (ref 5–25)
C PNEUM DNA NPH QL NAA+NON-PROBE: NOT DETECTED
CALCIUM SERPL-MCNC: 8.8 MG/DL (ref 8.4–10.2)
CHLORIDE SERPL-SCNC: 96 MMOL/L (ref 96–108)
CLARITY UR: CLEAR
CO2 SERPL-SCNC: 37 MMOL/L (ref 21–32)
COLOR UR: ABNORMAL
CREAT SERPL-MCNC: 0.7 MG/DL (ref 0.6–1.3)
EOSINOPHIL # BLD AUTO: 0.18 THOUSAND/ÂΜL (ref 0–0.61)
EOSINOPHIL NFR BLD AUTO: 1 % (ref 0–6)
ERYTHROCYTE [DISTWIDTH] IN BLOOD BY AUTOMATED COUNT: 12.9 % (ref 11.6–15.1)
FLUAV RNA NPH QL NAA+NON-PROBE: NOT DETECTED
FLUBV RNA NPH QL NAA+NON-PROBE: NOT DETECTED
GFR SERPL CREATININE-BSD FRML MDRD: 105 ML/MIN/1.73SQ M
GLUCOSE SERPL-MCNC: 109 MG/DL (ref 65–140)
GLUCOSE SERPL-MCNC: 116 MG/DL (ref 65–140)
GLUCOSE SERPL-MCNC: 127 MG/DL (ref 65–140)
GLUCOSE SERPL-MCNC: 161 MG/DL (ref 65–140)
GLUCOSE SERPL-MCNC: 166 MG/DL (ref 65–140)
GLUCOSE UR STRIP-MCNC: NEGATIVE MG/DL
HADV DNA NPH QL NAA+NON-PROBE: NOT DETECTED
HCO3 BLDA-SCNC: 31.8 MMOL/L (ref 22–28)
HCO3 BLDA-SCNC: 32.4 MMOL/L (ref 22–28)
HCO3 BLDA-SCNC: 34.6 MMOL/L (ref 22–28)
HCO3 BLDA-SCNC: 36.8 MMOL/L (ref 22–28)
HCOV 229E RNA NPH QL NAA+NON-PROBE: NOT DETECTED
HCOV HKU1 RNA NPH QL NAA+NON-PROBE: NOT DETECTED
HCOV NL63 RNA NPH QL NAA+NON-PROBE: NOT DETECTED
HCOV OC43 RNA NPH QL NAA+NON-PROBE: NOT DETECTED
HCT VFR BLD AUTO: 35.6 % (ref 36.5–49.3)
HGB BLD-MCNC: 11.3 G/DL (ref 12–17)
HGB UR QL STRIP.AUTO: ABNORMAL
HMPV RNA NPH QL NAA+NON-PROBE: NOT DETECTED
HOROWITZ INDEX BLDA+IHG-RTO: 40 MM[HG]
HPIV1 RNA NPH QL NAA+NON-PROBE: NOT DETECTED
HPIV2 RNA NPH QL NAA+NON-PROBE: NOT DETECTED
HPIV3 RNA NPH QL NAA+NON-PROBE: NOT DETECTED
HPIV4 RNA NPH QL NAA+NON-PROBE: NOT DETECTED
IMM GRANULOCYTES # BLD AUTO: 0.27 THOUSAND/UL (ref 0–0.2)
IMM GRANULOCYTES NFR BLD AUTO: 2 % (ref 0–2)
KETONES UR STRIP-MCNC: NEGATIVE MG/DL
LEUKOCYTE ESTERASE UR QL STRIP: ABNORMAL
LIPASE SERPL-CCNC: 14 U/L (ref 11–82)
LYMPHOCYTES # BLD AUTO: 0.97 THOUSANDS/ÂΜL (ref 0.6–4.47)
LYMPHOCYTES NFR BLD AUTO: 6 % (ref 14–44)
M PNEUMO DNA NPH QL NAA+NON-PROBE: NOT DETECTED
MAGNESIUM SERPL-MCNC: 2.1 MG/DL (ref 1.9–2.7)
MCH RBC QN AUTO: 29.6 PG (ref 26.8–34.3)
MCHC RBC AUTO-ENTMCNC: 31.7 G/DL (ref 31.4–37.4)
MCV RBC AUTO: 93 FL (ref 82–98)
MONOCYTES # BLD AUTO: 1.53 THOUSAND/ÂΜL (ref 0.17–1.22)
MONOCYTES NFR BLD AUTO: 9 % (ref 4–12)
NEUTROPHILS # BLD AUTO: 14.45 THOUSANDS/ÂΜL (ref 1.85–7.62)
NEUTS SEG NFR BLD AUTO: 82 % (ref 43–75)
NITRITE UR QL STRIP: NEGATIVE
NON-SQ EPI CELLS URNS QL MICRO: ABNORMAL /HPF
NRBC BLD AUTO-RTO: 0 /100 WBCS
O2 CT BLDA-SCNC: 16.3 ML/DL (ref 16–23)
O2 CT BLDA-SCNC: 16.4 ML/DL (ref 16–23)
O2 CT BLDA-SCNC: 16.8 ML/DL (ref 16–23)
O2 CT BLDA-SCNC: 17.2 ML/DL (ref 16–23)
OXYHGB MFR BLDA: 94.6 % (ref 94–97)
OXYHGB MFR BLDA: 94.8 % (ref 94–97)
OXYHGB MFR BLDA: 96 % (ref 94–97)
OXYHGB MFR BLDA: 96.3 % (ref 94–97)
PCO2 BLDA: 41.5 MM HG (ref 36–44)
PCO2 BLDA: 46.5 MM HG (ref 36–44)
PCO2 BLDA: 46.5 MM HG (ref 36–44)
PCO2 BLDA: 46.8 MM HG (ref 36–44)
PCO2 TEMP ADJ BLDA: 49 MM HG (ref 36–44)
PEEP RESPIRATORY: 6 CM[H2O]
PEEP RESPIRATORY: 8 CM[H2O]
PEEP RESPIRATORY: 8 CM[H2O]
PH BLD: 7.44 [PH] (ref 7.35–7.45)
PH BLDA: 7.46 [PH] (ref 7.35–7.45)
PH BLDA: 7.49 [PH] (ref 7.35–7.45)
PH BLDA: 7.5 [PH] (ref 7.35–7.45)
PH BLDA: 7.51 [PH] (ref 7.35–7.45)
PH UR STRIP.AUTO: 5.5 [PH]
PHOSPHATE SERPL-MCNC: 2.8 MG/DL (ref 2.7–4.5)
PLATELET # BLD AUTO: 270 THOUSANDS/UL (ref 149–390)
PMV BLD AUTO: 9.2 FL (ref 8.9–12.7)
PO2 BLD: 91.8 MM HG (ref 75–129)
PO2 BLDA: 77.6 MM HG (ref 75–129)
PO2 BLDA: 85 MM HG (ref 75–129)
PO2 BLDA: 91.5 MM HG (ref 75–129)
PO2 BLDA: 97.3 MM HG (ref 75–129)
POTASSIUM SERPL-SCNC: 3.8 MMOL/L (ref 3.5–5.3)
PROT SERPL-MCNC: 6.2 G/DL (ref 6.4–8.4)
PROT UR STRIP-MCNC: ABNORMAL MG/DL
RBC # BLD AUTO: 3.82 MILLION/UL (ref 3.88–5.62)
RBC #/AREA URNS AUTO: ABNORMAL /HPF
RSV RNA NPH QL NAA+NON-PROBE: NOT DETECTED
RV+EV RNA NPH QL NAA+NON-PROBE: NOT DETECTED
SARS-COV-2 RNA NPH QL NAA+NON-PROBE: NOT DETECTED
SODIUM SERPL-SCNC: 141 MMOL/L (ref 135–147)
SP GR UR STRIP.AUTO: 1.02 (ref 1–1.03)
SPECIMEN SOURCE: ABNORMAL
TRIGL SERPL-MCNC: 383 MG/DL
UROBILINOGEN UR STRIP-ACNC: <2 MG/DL
VENT - PS: ABNORMAL
VENT AC: 18
VENT AC: 22
VENT AC: 24
VENT- AC: AC
VT SETTING VENT: 400 ML
WBC # BLD AUTO: 17.43 THOUSAND/UL (ref 4.31–10.16)
WBC #/AREA URNS AUTO: ABNORMAL /HPF

## 2024-02-26 PROCEDURE — 84100 ASSAY OF PHOSPHORUS: CPT

## 2024-02-26 PROCEDURE — 71045 X-RAY EXAM CHEST 1 VIEW: CPT

## 2024-02-26 PROCEDURE — 94760 N-INVAS EAR/PLS OXIMETRY 1: CPT

## 2024-02-26 PROCEDURE — 74018 RADEX ABDOMEN 1 VIEW: CPT

## 2024-02-26 PROCEDURE — 80048 BASIC METABOLIC PNL TOTAL CA: CPT

## 2024-02-26 PROCEDURE — 94640 AIRWAY INHALATION TREATMENT: CPT

## 2024-02-26 PROCEDURE — 84478 ASSAY OF TRIGLYCERIDES: CPT

## 2024-02-26 PROCEDURE — 93970 EXTREMITY STUDY: CPT

## 2024-02-26 PROCEDURE — 82948 REAGENT STRIP/BLOOD GLUCOSE: CPT

## 2024-02-26 PROCEDURE — 82805 BLOOD GASES W/O2 SATURATION: CPT

## 2024-02-26 PROCEDURE — 85025 COMPLETE CBC W/AUTO DIFF WBC: CPT

## 2024-02-26 PROCEDURE — 81001 URINALYSIS AUTO W/SCOPE: CPT | Performed by: STUDENT IN AN ORGANIZED HEALTH CARE EDUCATION/TRAINING PROGRAM

## 2024-02-26 PROCEDURE — 93970 EXTREMITY STUDY: CPT | Performed by: SURGERY

## 2024-02-26 PROCEDURE — 87081 CULTURE SCREEN ONLY: CPT | Performed by: STUDENT IN AN ORGANIZED HEALTH CARE EDUCATION/TRAINING PROGRAM

## 2024-02-26 PROCEDURE — 94003 VENT MGMT INPAT SUBQ DAY: CPT

## 2024-02-26 PROCEDURE — 87205 SMEAR GRAM STAIN: CPT

## 2024-02-26 PROCEDURE — 80076 HEPATIC FUNCTION PANEL: CPT | Performed by: STUDENT IN AN ORGANIZED HEALTH CARE EDUCATION/TRAINING PROGRAM

## 2024-02-26 PROCEDURE — 83690 ASSAY OF LIPASE: CPT

## 2024-02-26 PROCEDURE — 83735 ASSAY OF MAGNESIUM: CPT

## 2024-02-26 PROCEDURE — 99291 CRITICAL CARE FIRST HOUR: CPT | Performed by: INTERNAL MEDICINE

## 2024-02-26 PROCEDURE — 0202U NFCT DS 22 TRGT SARS-COV-2: CPT | Performed by: STUDENT IN AN ORGANIZED HEALTH CARE EDUCATION/TRAINING PROGRAM

## 2024-02-26 PROCEDURE — 87070 CULTURE OTHR SPECIMN AEROBIC: CPT

## 2024-02-26 PROCEDURE — NC001 PR NO CHARGE: Performed by: INTERNAL MEDICINE

## 2024-02-26 RX ORDER — MIDAZOLAM HYDROCHLORIDE 2 MG/2ML
1 INJECTION, SOLUTION INTRAMUSCULAR; INTRAVENOUS
Status: DISCONTINUED | OUTPATIENT
Start: 2024-02-26 | End: 2024-02-29

## 2024-02-26 RX ORDER — MIDAZOLAM HYDROCHLORIDE 2 MG/2ML
1 INJECTION, SOLUTION INTRAMUSCULAR; INTRAVENOUS ONCE
Status: DISCONTINUED | OUTPATIENT
Start: 2024-02-26 | End: 2024-02-26

## 2024-02-26 RX ORDER — MIDAZOLAM HYDROCHLORIDE 2 MG/2ML
2 INJECTION, SOLUTION INTRAMUSCULAR; INTRAVENOUS ONCE
Status: COMPLETED | OUTPATIENT
Start: 2024-02-26 | End: 2024-02-26

## 2024-02-26 RX ORDER — FAMOTIDINE 40 MG/5ML
20 POWDER, FOR SUSPENSION ORAL 2 TIMES DAILY
Status: DISCONTINUED | OUTPATIENT
Start: 2024-02-26 | End: 2024-02-29

## 2024-02-26 RX ORDER — FUROSEMIDE 10 MG/ML
40 INJECTION INTRAMUSCULAR; INTRAVENOUS ONCE
Status: COMPLETED | OUTPATIENT
Start: 2024-02-26 | End: 2024-02-26

## 2024-02-26 RX ORDER — MIDAZOLAM HYDROCHLORIDE 2 MG/2ML
INJECTION, SOLUTION INTRAMUSCULAR; INTRAVENOUS
Status: COMPLETED
Start: 2024-02-26 | End: 2024-02-26

## 2024-02-26 RX ORDER — MIDAZOLAM HYDROCHLORIDE 2 MG/2ML
1 INJECTION, SOLUTION INTRAMUSCULAR; INTRAVENOUS EVERY 4 HOURS PRN
Status: DISCONTINUED | OUTPATIENT
Start: 2024-02-26 | End: 2024-02-26

## 2024-02-26 RX ORDER — VANCOMYCIN HYDROCHLORIDE 1 G/200ML
1000 INJECTION, SOLUTION INTRAVENOUS EVERY 12 HOURS
Status: DISCONTINUED | OUTPATIENT
Start: 2024-02-26 | End: 2024-02-27

## 2024-02-26 RX ORDER — MIDAZOLAM HYDROCHLORIDE 2 MG/2ML
INJECTION, SOLUTION INTRAMUSCULAR; INTRAVENOUS
Status: DISCONTINUED
Start: 2024-02-26 | End: 2024-02-26 | Stop reason: WASHOUT

## 2024-02-26 RX ORDER — FUROSEMIDE 10 MG/ML
40 INJECTION INTRAMUSCULAR; INTRAVENOUS ONCE
Status: DISCONTINUED | OUTPATIENT
Start: 2024-02-27 | End: 2024-02-26

## 2024-02-26 RX ORDER — OXYCODONE HCL 5 MG/5 ML
10 SOLUTION, ORAL ORAL EVERY 6 HOURS
Status: DISCONTINUED | OUTPATIENT
Start: 2024-02-26 | End: 2024-02-29

## 2024-02-26 RX ADMIN — GABAPENTIN 100 MG: 250 SOLUTION ORAL at 08:13

## 2024-02-26 RX ADMIN — DIAZEPAM 5 MG: 5 TABLET ORAL at 14:02

## 2024-02-26 RX ADMIN — MAGNESIUM OXIDE TAB 400 MG (241.3 MG ELEMENTAL MG) 400 MG: 400 (241.3 MG) TAB at 08:04

## 2024-02-26 RX ADMIN — DEXMEDETOMIDINE HYDROCHLORIDE 0.4 MCG/KG/HR: 4 INJECTION, SOLUTION INTRAVENOUS at 00:37

## 2024-02-26 RX ADMIN — MIDAZOLAM HYDROCHLORIDE 2 MG: 2 INJECTION, SOLUTION INTRAMUSCULAR; INTRAVENOUS at 17:03

## 2024-02-26 RX ADMIN — FENTANYL CITRATE 100 MCG: 50 INJECTION INTRAMUSCULAR; INTRAVENOUS at 03:57

## 2024-02-26 RX ADMIN — GABAPENTIN 100 MG: 250 SOLUTION ORAL at 17:00

## 2024-02-26 RX ADMIN — IPRATROPIUM BROMIDE 0.5 MG: 0.5 SOLUTION RESPIRATORY (INHALATION) at 19:22

## 2024-02-26 RX ADMIN — ALBUMIN (HUMAN) 12.5 G: 0.25 INJECTION, SOLUTION INTRAVENOUS at 03:05

## 2024-02-26 RX ADMIN — MIDODRINE HYDROCHLORIDE 10 MG: 5 TABLET ORAL at 17:00

## 2024-02-26 RX ADMIN — CHLORHEXIDINE GLUCONATE 15 ML: 1.2 SOLUTION ORAL at 08:06

## 2024-02-26 RX ADMIN — KETAMINE HYDROCHLORIDE 0.5 MG/KG/HR: 100 INJECTION, SOLUTION, CONCENTRATE INTRAMUSCULAR; INTRAVENOUS at 03:14

## 2024-02-26 RX ADMIN — OXYCODONE HYDROCHLORIDE 5 MG: 5 SOLUTION ORAL at 08:04

## 2024-02-26 RX ADMIN — MIDODRINE HYDROCHLORIDE 10 MG: 5 TABLET ORAL at 11:30

## 2024-02-26 RX ADMIN — IPRATROPIUM BROMIDE 0.5 MG: 0.5 SOLUTION RESPIRATORY (INHALATION) at 13:06

## 2024-02-26 RX ADMIN — IPRATROPIUM BROMIDE 0.5 MG: 0.5 SOLUTION RESPIRATORY (INHALATION) at 07:28

## 2024-02-26 RX ADMIN — OXYCODONE HYDROCHLORIDE 5 MG: 5 SOLUTION ORAL at 14:02

## 2024-02-26 RX ADMIN — Medication 100 MCG/HR: at 09:18

## 2024-02-26 RX ADMIN — IPRATROPIUM BROMIDE 0.5 MG: 0.5 SOLUTION RESPIRATORY (INHALATION) at 00:18

## 2024-02-26 RX ADMIN — FENTANYL CITRATE 100 MCG: 50 INJECTION INTRAMUSCULAR; INTRAVENOUS at 16:47

## 2024-02-26 RX ADMIN — LEVALBUTEROL HYDROCHLORIDE 1.25 MG: 1.25 SOLUTION RESPIRATORY (INHALATION) at 13:06

## 2024-02-26 RX ADMIN — Medication 150 MCG/HR: at 01:40

## 2024-02-26 RX ADMIN — VANCOMYCIN HYDROCHLORIDE 1750 MG: 10 INJECTION, POWDER, LYOPHILIZED, FOR SOLUTION INTRAVENOUS at 14:02

## 2024-02-26 RX ADMIN — FENTANYL CITRATE 100 MCG: 50 INJECTION INTRAMUSCULAR; INTRAVENOUS at 18:44

## 2024-02-26 RX ADMIN — OXYCODONE HYDROCHLORIDE 5 MG: 5 SOLUTION ORAL at 03:05

## 2024-02-26 RX ADMIN — CEFEPIME 2000 MG: 2 INJECTION, POWDER, FOR SOLUTION INTRAVENOUS at 00:18

## 2024-02-26 RX ADMIN — DIAZEPAM 5 MG: 5 TABLET ORAL at 08:04

## 2024-02-26 RX ADMIN — FENTANYL CITRATE 50 MCG: 50 INJECTION INTRAMUSCULAR; INTRAVENOUS at 05:57

## 2024-02-26 RX ADMIN — CEFEPIME 2000 MG: 2 INJECTION, POWDER, FOR SOLUTION INTRAVENOUS at 18:18

## 2024-02-26 RX ADMIN — MIDAZOLAM 1 MG: 1 INJECTION INTRAMUSCULAR; INTRAVENOUS at 19:47

## 2024-02-26 RX ADMIN — KETAMINE HYDROCHLORIDE 0.5 MG/KG/HR: 100 INJECTION, SOLUTION, CONCENTRATE INTRAMUSCULAR; INTRAVENOUS at 16:31

## 2024-02-26 RX ADMIN — CHLORHEXIDINE GLUCONATE 15 ML: 1.2 SOLUTION ORAL at 20:42

## 2024-02-26 RX ADMIN — CEFEPIME 2000 MG: 2 INJECTION, POWDER, FOR SOLUTION INTRAVENOUS at 11:30

## 2024-02-26 RX ADMIN — FUROSEMIDE 40 MG: 10 INJECTION, SOLUTION INTRAMUSCULAR; INTRAVENOUS at 17:32

## 2024-02-26 RX ADMIN — FENTANYL CITRATE 100 MCG: 50 INJECTION INTRAMUSCULAR; INTRAVENOUS at 00:45

## 2024-02-26 RX ADMIN — FAMOTIDINE 20 MG: 40 POWDER, FOR SUSPENSION ORAL at 08:13

## 2024-02-26 RX ADMIN — FENTANYL CITRATE 100 MCG: 50 INJECTION INTRAMUSCULAR; INTRAVENOUS at 20:35

## 2024-02-26 RX ADMIN — Medication 100 MCG/HR: at 20:12

## 2024-02-26 RX ADMIN — LEVALBUTEROL HYDROCHLORIDE 1.25 MG: 1.25 SOLUTION RESPIRATORY (INHALATION) at 19:22

## 2024-02-26 RX ADMIN — LEVALBUTEROL HYDROCHLORIDE 1.25 MG: 1.25 SOLUTION RESPIRATORY (INHALATION) at 07:28

## 2024-02-26 RX ADMIN — ACETAMINOPHEN 1000 MG: 10 INJECTION INTRAVENOUS at 12:27

## 2024-02-26 RX ADMIN — VANCOMYCIN HYDROCHLORIDE 1000 MG: 1 INJECTION, SOLUTION INTRAVENOUS at 20:32

## 2024-02-26 RX ADMIN — DEXMEDETOMIDINE HYDROCHLORIDE 1 MCG/KG/HR: 4 INJECTION, SOLUTION INTRAVENOUS at 09:02

## 2024-02-26 RX ADMIN — MIDAZOLAM 1 MG/HR: 5 INJECTION INTRAMUSCULAR; INTRAVENOUS at 18:10

## 2024-02-26 RX ADMIN — OXYCODONE HYDROCHLORIDE 10 MG: 5 SOLUTION ORAL at 20:42

## 2024-02-26 RX ADMIN — LEVALBUTEROL HYDROCHLORIDE 1.25 MG: 1.25 SOLUTION RESPIRATORY (INHALATION) at 00:18

## 2024-02-26 RX ADMIN — FUROSEMIDE 40 MG: 10 INJECTION, SOLUTION INTRAMUSCULAR; INTRAVENOUS at 18:17

## 2024-02-26 RX ADMIN — FAMOTIDINE 20 MG: 40 POWDER, FOR SUSPENSION ORAL at 17:32

## 2024-02-26 RX ADMIN — FENTANYL CITRATE 100 MCG: 50 INJECTION INTRAMUSCULAR; INTRAVENOUS at 12:27

## 2024-02-26 RX ADMIN — GABAPENTIN 100 MG: 250 SOLUTION ORAL at 20:42

## 2024-02-26 RX ADMIN — DIAZEPAM 5 MG: 5 TABLET ORAL at 03:05

## 2024-02-26 RX ADMIN — ENOXAPARIN SODIUM 40 MG: 40 INJECTION SUBCUTANEOUS at 17:32

## 2024-02-26 RX ADMIN — INSULIN LISPRO 1 UNITS: 100 INJECTION, SOLUTION INTRAVENOUS; SUBCUTANEOUS at 05:30

## 2024-02-26 RX ADMIN — DIAZEPAM 5 MG: 5 TABLET ORAL at 20:42

## 2024-02-26 RX ADMIN — MIDAZOLAM 2 MG: 1 INJECTION INTRAMUSCULAR; INTRAVENOUS at 17:03

## 2024-02-26 RX ADMIN — MIDODRINE HYDROCHLORIDE 10 MG: 5 TABLET ORAL at 06:02

## 2024-02-26 RX ADMIN — MIDAZOLAM 1 MG: 1 INJECTION INTRAMUSCULAR; INTRAVENOUS at 21:46

## 2024-02-26 RX ADMIN — FENTANYL CITRATE 100 MCG: 50 INJECTION INTRAMUSCULAR; INTRAVENOUS at 07:48

## 2024-02-26 NOTE — RESPIRATORY THERAPY NOTE
RT Ventilator Management Note  Hussein Edward III 58 y.o. male MRN: [id number]  [facility] Encounter: [id number]      Daily Screen         2/24/2024  0727 2/25/2024  7112          Patient safety screen outcome:: Failed Failed      Not Ready for Weaning due to:: Underline problem not resolved Underline problem not resolved                Physical Exam:   Assessment Type: Assess only  General Appearance: Sedated  Respiratory Pattern: Assisted, Tachypneic  Chest Assessment: Chest expansion symmetrical  Bilateral Breath Sounds: Coarse  Cough: Productive  Suction: ET Tube  O2 Device: Ventilator      Resp Comments: Pt. remains on CMV/VC mode.  No changes at this time.  Will continue to monitor pt per protocol.

## 2024-02-26 NOTE — PROGRESS NOTES
Vancomycin IV Pharmacy-to-Dose Consultation    Hussein Edward III is a 58 y.o. male who is currently receiving Vancomycin IV with management by the Pharmacy Consult service.    Relevant clinical data and objective / subjective history reviewed.      Vancomycin Assessment:  Indication: Pneumonia (goal -600, trough >10)    Status: critically ill   Micro:   - 2/26 Sputum: GVR  - 2/22 Sputum: Candida glabrata  - 2/22 Bronchial: Candida glabrata  Procalcitonin: 0.79 on 2/25  Renal Function:     Lab Results   Component Value Date    CREATININE 0.70 02/26/2024     Estimated Creatinine Clearance: 116.8 mL/min (by C-G formula based on SCr of 0.7 mg/dL).  Dialysis: no  Days of Therapy: 1  Current Dose: 1000mg Q 12H   Goal AUC / Trough: -600, trough >10   Last Level: None  Model Fit: Good  Assessment: WBC elevated, febrile.       Vancomycin Plan:  New Dosing: Continue 1000mg Q 12H   Predicted Trough / AUC: 12.2 / 428  Next Level: on 2/27 at 0600  Renal Function Monitoring: Daily BMP and UOP      Pharmacy will continue to follow closely for s/sx of nephrotoxicity, infusion reactions and appropriateness of therapy.  BMP and CBC will be ordered per protocol. We will continue to follow the patient’s culture results and clinical progress daily.       Sy Llamas, PharmD, BCCCP  Critical Care Clinical Pharmacist  Available via [employer]

## 2024-02-26 NOTE — NUTRITION
Nutrition Follow-Up:       02/26/24 1640   Recommendations/Interventions   Intervention Comments Continue TF as ordered for now with adjustments in free water flushes per critical care. Will continue to reassess and adjust as appropriate.

## 2024-02-26 NOTE — PROGRESS NOTES
[facility]  Progress Note: Critical Care  Name: Hussein Edward III 58 y.o. male I MRN: [id number]  Unit/Bed#: MICU 06 I Date of Admission: 2/19/2024   Date of Service: 2/26/2024 I Hospital Day: 7    Assessment/Plan   Acute hypoxic and hypercapnic respiratory failure 2/2 ARDS  Influenza A pneumonia  Persistent fever  Acute metabolic encephalopathy  New onset atrial fibrillation, resolved  Morbid obesity  Hypertriglyceridemia 2/2 propofol    Neuro:   #Sedated  On Ketamine (0.5 mg/kg/hr), precedex (0.6 mg/kg/hr) and fentanyl (100 mcg/hr)  Previously unable to tolerate precedex, became bradycardic to 30s on 02/21  Off propofol and nimbex at this time  Off versed  On gabapentin 200 mg TID  Added YEHUDA valium & oxycodone q6H     CV:   #Hypotension likely 2/2 induction for RSI  On Levo 3, wean as possible to maintain MAP>65  A-line for hemodynamic monitoring  TTE 02/19: LVEF 55-60%. G1DD.  #New onset atrial fibrillation, resolved, Now NSR.  Pt found to have rates to 150s s/p CVC placement  gave one time dose of lopressor, patient started on amiodarone drip. Patient's HR still elevated with afib. Given dose of digoxin, resolved.  Continue to monitor, HR better in 90s. Has some periods of tachy that correspond to low grade fevers.  Not on heparin ACS protocol anymore, was restarted on DVT prophylactic heparin     Pulm:  #Acute Respiratory Distress Syndrome  2/2 influenza A pneumonia, possible superimposed bacterial pna  CTA on 02/18 without evidence of PE but findings of extensive bilateral groundglass and consolidative opacities with predominantly perihilar and peripheral distribution compatible with viral and/or atypical pneumonia, likely influenza given clinical history.  PaO2/FiO2 ratio 243 on 2/26  Give diuretic to maintain patient in goal of net -500 to -1L.   Antibiotics as mentioned below under ID but finished 5/5 of Ceftriaxone and Azithromycin on 02/21, not currently on abx.  Patient started spiking fevers, on cefepime, refer to ID section.  Q4H ABGs  Nebs- Xopenex, Atrovent  CXR appears stable or improved  Underwent bronchoscopy on 02/19 -> grew Candida glabrata, viral culture pending  Consider possible bronch given pt now supine  S/p 5 days of solu-medrol  Sputum cultures 02/22 no growth  Blood cultures NGTD  Wean PEEP and FiO2 as tolerated   On lasix gtt d/t volume overload 2/2 ARDS, continue to maintain net negative 0.5-1.0 L  Running at 0.5 mg/hr, net +1.1 L over 24 hrs  Can consider dc today    GI:   Diagnosis: No active issues  Continue pepcid prophylaxis  Continue tube feeds, hold while prone  Most recent BM 02/26 AM  Bowel regimen (miralax BID + senna BID)     :   #CKD Stage II  Plan: sCr baseline range of 0.8-1.0, stable currently  Avoid nephrotoxic medications  Monitor sCr with daily BMPs  Diuresing, monitor renal function  #Hematuria  (1+) ketones and protein -> likely due to dehydration  large occult blood, with innumerable RBCs -> might be associated with acute drop in hgb. Consider [id number] injury by raines catheter.  Improved on repeat UA     F/E/N:    F: Not currently on any IV fluids  E: Will monitor and correct any electrolyte derangements. Sodium levels normalized.  N: continuing TF to goal     Heme/Onc:   DVT prophylaxis heparin     Endo:   #Steroid induced hyperglycemia  Hyperglycemia resolving with discontinuation of steroids  Continue SSI1 for now     ID:   #New-onset fever while on antibiotics  Pt had ongoing influenza that was untreated for 4 days PTA  Likely not bacterial superimposed, likely just in setting of viral infection  Blood culture, sputum culture, bronchial culture unrevealing  S/p ceftriaxone/cefepime, azithromycin, and tamiflu  UA:  4-10 WBCs, less likely to be infectious cause  Viral panel negative  Can consider performing bronch  Continues to spike fevers off abx, unclear source of infection, possibly pulmonary source  Unlikely to be urinary  or gastrointestinal source at this time  Started cefepime for broad spectrum coverage     MSK/Skin:   Frequent turning and repositioning  Wound care as needed    Disposition: Critical care    ICU Core Measures     Vented Patient  VAP Bundle  VAP bundle ordered     A: Assess, Prevent, and Manage Pain Has pain been assessed? Yes  Need for changes to pain regimen? No   B: Both Spontaneous Awakening Trials (SATs) and Spontaneous Breathing Trials (SBTs) Plan to perform spontaneous awakening trial today? Yes   Plan to perform spontaneous breathing trial today? Yes   Obvious barriers to extubation? No   C: Choice of Sedation RASS Goal: -2 Light Sedation  Need for changes to sedation or analgesia regimen? Yes   D: Delirium CAM-ICU: Negative   E: Early Mobility  Plan for early mobility? Yes   F: Family Engagement Plan for family engagement today? Yes       Antibiotic Review: Awaiting culture results.  and Continue broad spectrum secondary to severity of illness.     Review of Invasive Devices:    Shields Plan: Continue for accurate I/O monitoring for 48 hours  Central access plan: Patient has multiple central venous catheters.  Medications requiring central line  [place] Plan: Keep arterial line for hemodynamic monitoring    Prophylaxis:  VTE VTE covered by:  enoxaparin, Subcutaneous, 40 mg at 02/25/24 1712       Stress Ulcer  covered byfamotidine (PEPCID) oral suspension 20 mg [[id number]]        Significant 24hr Events     58 y.o. male PMH of meningioma, insomnia, WALE, who presents as a transfer SLUB for evaluation of ECMO for concern of ARDS 2/2 influenza pna with superimposed bacterial pna. Currently intubated, on vanc/ceftriaxone/azithro/tamiflu, on methylpred 40 q12, and proned on 02/19 PM, plan to re-prone on 02/23, off solumedrol.     24hr events: Net positive 1 L overnight. RR decreased to 22, otherwise weaning off sedation as tolerated. Decreased fentanyl to 100.      Subjective     Review of Systems   Unable to perform  ROS: Intubated      Objective                            Vitals I/O      Most Recent Min/Max in 24hrs   Temp (!) 100.8 °F (38.2 °C) Temp  Min: 99.1 °F (37.3 °C)  Max: 101.5 °F (38.6 °C)   Pulse (!) 138 Pulse  Min: 78  Max: 139   Resp (!) 34 Resp  Min: 22  Max: 36   /80 BP  Min: 101/60  Max: 162/90   O2 Sat 91 % SpO2  Min: 89 %  Max: 96 %      Intake/Output Summary (Last 24 hours) at 2/26/2024 0722  Last data filed at 2/26/2024 0611  Gross per 24 hour   Intake 3733.56 ml   Output 2610 ml   Net 1123.56 ml       Diet Enteral/Parenteral; Tube Feeding No Oral Diet; Vital High Protein; Continuous; 45; Prosource Protein Liquid - Two Packets; OD; 250; Water; Every 6 hours    Invasive Monitoring           Physical Exam   Physical Exam  Vitals and nursing note reviewed.   Skin:     General: Skin is warm and dry.      Capillary Refill: Capillary refill takes less than 2 seconds.      Coloration: Skin is not jaundiced.   HENT:      Head: Normocephalic and atraumatic.      Nose: Nasogastric tube present.   Neck:      Vascular: Central line present.   Cardiovascular:      Rate and Rhythm: Regular rhythm. Tachycardia present.      Pulses: Normal pulses.      Heart sounds: Normal heart sounds.   Musculoskeletal:      Right lower leg: No edema.      Left lower leg: No edema.   Abdominal: General: Bowel sounds are normal. There is distension.     Palpations: Abdomen is soft.   Constitutional:       Interventions: He is sedated and intubated.   Pulmonary:      Effort: No accessory muscle usage, respiratory distress or accessory muscle usage. He is intubated.      Breath sounds: Rales present.      Comments: Mechanically ventilated breath sounds  Neurological:      Mental Status: He is unresponsive.   Genitourinary/Anorectal:  Shields present.          Diagnostic Studies      EKG: Reviewed  Imaging: I have personally reviewed pertinent reports.       Medications:  Scheduled PRN   bisacodyl, 10 mg, Daily  cefepime, 2,000 mg,  Q12H  chlorhexidine, 15 mL, Q12H YEHUDA  diazepam, 5 mg, Q6H  enoxaparin, 40 mg, Q24H YEHUDA  famotidine, 20 mg, Daily  gabapentin, 100 mg, TID  insulin lispro, 1-6 Units, Q6H YEHUDA  ipratropium, 0.5 mg, Q6H  levalbuterol, 1.25 mg, Q6H  magnesium Oxide, 400 mg, Daily  midodrine, 10 mg, TID AC  oxyCODONE, 5 mg, Q6H  polyethylene glycol, 17 g, BID  senna-docusate sodium, 1 tablet, BID      acetaminophen, 1,000 mg, Q6H PRN  fentanyl citrate (PF), 100 mcg, Q2H PRN  sodium chloride, 1 spray, Q1H PRN       Continuous    dexmedetomidine, 0.1-1.2 mcg/kg/hr, Last Rate: 0.6 mcg/kg/hr (02/26/24 0215)  fentaNYL, 150 mcg/hr, Last Rate: 150 mcg/hr (02/26/24 0140)  furosemide, 5 mg/hr, Last Rate: 5 mg/hr (02/25/24 1006)  ketamine, 0.5 mg/kg/hr, Last Rate: 0.5 mg/kg/hr (02/26/24 0314)  midazolam, 1 mg/hr, Last Rate: Stopped (02/25/24 0919)  norepinephrine, 1-30 mcg/min, Last Rate: 1 mcg/min (02/26/24 0618)         Labs:    CBC    Recent Labs     02/25/24 0444 02/26/24 0449   WBC 19.27* 17.43*   HGB 13.2 11.3*   HCT 42.3 35.6*    270     BMP    Recent Labs     02/25/24 0444 02/26/24 0449   SODIUM 143 141   K 4.0 3.8   CL 94* 96   CO2 36* 37*   AGAP 13 8   BUN 27* 28*   CREATININE 0.78 0.70   CALCIUM 8.5 8.8       Coags    No recent results     Additional Electrolytes  Recent Labs     02/25/24 0444 02/26/24 0449   MG 2.2 2.1   PHOS 4.7* 2.8   CAIONIZED 1.05*  --           Blood Gas    Recent Labs     02/26/24 0526   PHART 7.490*   [id number] 46.5*   PO2ART 97.3   [id number] 34.6*   BEART 10.0   SOURCE Line, Arterial     Recent Labs     02/26/24  0526   SOURCE Line, Arterial    LFTs  No recent results    Infectious  Recent Labs     02/24/24  0747 02/25/24  0444   PROCALCITONI 0.41* 0.79*     Glucose  Recent Labs     02/24/24  1658 02/25/24  0000 02/25/24  0444 02/26/24  0449   GLUC 113 114 127 166*               Monty Pires MD

## 2024-02-26 NOTE — CASE MANAGEMENT
Case Management Discharge Planning Note    Patient name Hussein Edward III  Location MICU 06/MICU 06 MRN [id number]  : 1965 Date 2024       Current Admission Date: 2024  Current Admission Diagnosis:ARDS (adult respiratory distress syndrome) ([facility], located within 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    Meningioma (HCC) 2021    Deviated nasal septum 2019    Hypertrophy of nasal turbinates 2019    Nasal obstruction 2019    Nasal septal deviation 2019    Seasonal allergic rhinitis 2019    Nasal turbinate hypertrophy 2019    Mixed dyslipidemia 2017    WALE (obstructive sleep apnea) 2016    Low back pain 2016    Strain of lumbar region 2015      LOS (days): 7  Geometric Mean LOS (GMLOS) (days):   Days to GMLOS:     OBJECTIVE:  Risk of Unplanned Readmission Score: 16.9         Current admission status: Inpatient   Preferred Pharmacy:   [facility] [id number] [address]  Phone: [phone] Fax: [phone]    [facility] [id number] [place], [place] - [address]  [address]  [facility] [address]  Phone: [phone] Fax: [phone]    Primary Care Provider: Iftikhar Roque MD    Primary Insurance: [employer]  Secondary Insurance:     DISCHARGE DETAILS:               Additional Comments: Per chart review: + Flu/ARDS. Intubated/sedated.  On Ketamine, precede and fentanyl drips. Persistent fever. CM to continue to follow for dcp.

## 2024-02-26 NOTE — PROGRESS NOTES
Critical Care Attending Note    58 year old man with WALE, meningioma. He presented 2/16 to [facility] for shortness of breath, recent FLU A infection and pneumonia. Required intubation on 2/19, transferred for possible VVECMO needs. He required NMB and PP x 2 last 2/23.      24hr events - remained intubated - weaning RR, noted fevers overnight.  Resumed cefepime yesterday.      VS .3, .8, HR 's, MAPS 's, SpO2 91% 0.4/8P, I/Os +1.1L  Exam  GEN middle aged man, intubated on sedation, RASS -3, intermittently agitated and asynchronous with vent  HEENT PERRL, no scleral icterus, no nystagmus  NECK no accessory muscle use, left CVC in place, no purulence  CV reg, single s1/2, no m/r  Pulm mechanical BS, mild tan secretions, mild central rhonchi, no wheeze or rales  ABD +BS - high pitched, mildly distended, non rigid, no rebound, no grimace to deep palpation  EXT 1+ edema diffusely, warm brisk cap refill   raines in place yellow urine    Laboratory and Diagnostics  Results from last 7 days   Lab Units 02/26/24  0449 02/25/24  0444 02/24/24  0540 02/23/24  0453 02/22/24  0437 02/21/24  0514 02/20/24  0527 02/20/24  0527 02/19/24  1649   WBC Thousand/uL 17.43* 19.27* 17.06* 14.07* 19.37* 14.81*  --  15.57* 14.49*   HEMOGLOBIN g/dL 11.3* 13.2 12.5 11.2* 12.6 12.7  --  13.8 14.6   HEMATOCRIT % 35.6* 42.3 40.8 35.6* 38.7 40.4  --  43.9 45.6   PLATELETS Thousands/uL 270 322 281 221 278 279  --  295 325   NEUTROS PCT % 82*  --   --   --   --   --   --   --  90*   BANDS PCT %  --   --   --   --   --   --  4  --   --    MONOS PCT % 9  --   --   --   --   --   --   --  4   MONO PCT %  --   --   --  1*  --   --  5  --   --    EOS PCT % 1  --   --  0  --   --  0  --  0     Results from last 7 days   Lab Units 02/26/24  0449 02/25/24  0444 02/25/24  0000 02/24/24  1658 02/24/24  0540 02/23/24  0453 02/22/24  1818 02/22/24  0437 02/21/24  0514 02/20/24  0502 02/19/24  1649   SODIUM mmol/L 141 143 142 143 146 146  148*   < > 146   < > 143   POTASSIUM mmol/L 3.8 4.0 4.0 4.2 4.2 4.1 3.9   < > 4.0   < > 3.7   CHLORIDE mmol/L 96 94* 94* 98 105 108 110*   < > 109*   < > 108   CO2 mmol/L 37* 36* 36* 35* 34* 33* 33*   < > 30   < > 24   ANION GAP mmol/L 8 13 12 10 7 5 5   < > 7   < > 11   BUN mg/dL 28* 27* 28* 31* 36* 33* 34*   < > 34*   < > 38*   CREATININE mg/dL 0.70 0.78 0.83 0.75 0.64 0.64 0.65   < > 1.09   < > 1.12   CALCIUM mg/dL 8.8 8.5 8.4 8.5 8.1* 7.6* 7.5*   < > 7.6*   < > 8.1*   GLUCOSE RANDOM mg/dL 166* 127 114 113 126 196* 141*   < > 179*   < > 249*   ALT U/L  --   --   --   --   --   --   --   --  65*  --  59*   AST U/L  --   --   --   --   --   --   --   --  43*  --  49*   ALK PHOS U/L  --   --   --   --   --   --   --   --  77  --  106*   ALBUMIN g/dL  --   --   --   --   --   --   --   --  2.8*  --  3.2*   TOTAL BILIRUBIN mg/dL  --   --   --   --   --   --   --   --  1.07*  --  1.77*    < > = values in this interval not displayed.     Results from last 7 days   Lab Units 02/26/24 0449 02/25/24  0444 02/24/24  0540 02/23/24  0453 02/22/24  0437 02/21/24  0514 02/20/24  0502   MAGNESIUM mg/dL 2.1 2.2 2.3 2.4 2.4 2.7 2.5   PHOSPHORUS mg/dL 2.8 4.7* 3.6 3.0 2.1* 1.9* 3.3      Results from last 7 days   Lab Units 02/22/24 0437 02/21/24  0514 02/20/24  2353 02/20/24  1827   PTT seconds 24 75* 77* 68*          Results from last 7 days   Lab Units 02/24/24  0747   LACTIC ACID mmol/L 1.0     Results from last 7 days   Lab Units 02/20/24  0415   CRP mg/L 40.3*                 Results from last 7 days   Lab Units 02/25/24  0444 02/24/24  0747 02/20/24  0508   PROCALCITONIN ng/ml 0.79* 0.41* 0.36*       ABG:   Results from last 7 days   Lab Units 02/26/24  0526   PH ART  7.490*   PCO2 ART mm Hg 46.5*   PO2 ART mm Hg 97.3   HCO3 ART mmol/L 34.6*   BASE EXC ART mmol/L 10.0   ABG SOURCE  Line, Arterial       MICRO  Bld CX 2/24 NGTD  Sputum 2/25 - pending  Sputum 2/22 2+ Candida  FLU/RSV/COVID neg 2/22    RADIOGRAPHS - images  personally reviewed  KUB 2/26 - OGT in place, diffuse bowel gas, no AFL, no free air appreciated    CXR 2/26- T/L good position, no PTX, improving right sided infiltrates, persistent left sided infiltrates slightly increased    CT angio 2/18 - no PE, extensive bilateral mixed ground glass and consolidative infiltrates, no sig effusions    TTE 2/2024 - EF 60%, grade I diastolic dysfunction, normal RV    Assessment  Acute hypoxic and hypercarbic respiratory failure  Severe ARDS secondary to FLU A and bacterial superinfection  SIRS/Sepsis POA with septic shock  FLU A and suspected bacterial superinfection  Persistent fever - possible secondary infectious insult vs drug fever vs non-infectious etiology  Acute diastolic CHF  New onset atrial fibrillation  Abnormal LFTs  Elevated TG secondary propofol  Suspected ileus     PLAN  NEURO - stop precedex now, continue fentanyl gtt - may consider dilaudid given tachyphylaxis, remain on ketamine for now, has oral diazepam and oxycodone for now, prn Versed bolus if needed, goal RASS - 2 to 0.    CARDIAC - remains on midodrine, off vasopressors, not on AC, now SR  PULM - continue MV support, neuro status does not allow for SBT today, wean PEEP to 0.6, may consider trial of PSV - MV D#8 -will need to discuss trach with family  GI - Noted KUB, diarrhea and distention, continue TFs for now follow residuals  RENAL - stop lasix gtt, replete electrolytes, goal I/Os even today prn 80mg lasix to achieve goal  ID - D#2 cefepime (on/off previously)- cultures remain negative, recheck UA, check RP2 panel, stop precedex, check other etiologies - check 4 ext US r/o DVT, check LFTs, Lipase, and TG, check MRSA, add vanc, check POCUS pleural space to r/o occult effusion  ENDO - ISS  HEME - no active issues  ICU Care - SCDs, LMWH, pepcid, CHG, HOB >30deg  Full Code   Wife updated at bedside, reviewed current care plans, may need to consider tracheostomy if clinical improvements towards extubation  are not demonstrated    My personal critical care time excluding procedures, teaching and updates is 46 minutes    Iftikhar Flaherty, DO

## 2024-02-26 NOTE — PLAN OF CARE
Problem: Prexisting or High Potential for Compromised Skin Integrity  Goal: Skin integrity is maintained or improved  Description: INTERVENTIONS:  - Identify patients at risk for skin breakdown  - Assess and monitor skin integrity  - Assess and monitor nutrition and hydration status  - Monitor labs   - Assess for incontinence   - Turn and reposition patient  - Assist with mobility/ambulation  - Relieve pressure over bony prominences  - Avoid friction and shearing  - Provide appropriate hygiene as needed including keeping skin clean and dry  - Evaluate need for skin moisturizer/barrier cream  - Collaborate with interdisciplinary team   - Patient/family teaching  - Consider wound care consult   Outcome: Progressing     Problem: PAIN - ADULT  Goal: Verbalizes/displays adequate comfort level or baseline comfort level  Description: Interventions:  - Encourage patient to monitor pain and request assistance  - Assess pain using appropriate pain scale  - Administer analgesics based on type and severity of pain and evaluate response  - Implement non-pharmacological measures as appropriate and evaluate response  - Consider cultural and social influences on pain and pain management  - Notify physician/advanced practitioner if interventions unsuccessful or patient reports new pain  Outcome: Progressing     Problem: INFECTION - ADULT  Goal: Absence or prevention of progression during hospitalization  Description: INTERVENTIONS:  - Assess and monitor for signs and symptoms of infection  - Monitor lab/diagnostic results  - Monitor all insertion sites, i.e. indwelling lines, tubes, and drains  - Monitor endotracheal if appropriate and nasal secretions for changes in amount and color  - [place] appropriate cooling/warming therapies per order  - Administer medications as ordered  - Instruct and encourage patient and family to use good hand hygiene technique  - Identify and instruct in appropriate isolation precautions for  identified infection/condition  Outcome: Progressing  Goal: Absence of fever/infection during neutropenic period  Description: INTERVENTIONS:  - Monitor WBC    Outcome: Progressing     Problem: SAFETY ADULT  Goal: Patient will remain free of falls  Description: INTERVENTIONS:  - Educate patient/family on patient safety including physical limitations  - Instruct patient to call for assistance with activity   - Consult OT/PT to assist with strengthening/mobility   - Keep Call bell within reach  - Keep bed low and locked with side rails adjusted as appropriate  - Keep care items and personal belongings within reach  - Initiate and maintain comfort rounds  - Make Fall Risk Sign visible to staff  - Offer Toileting every 2 Hours, in advance of need  - Initiate/Maintain alarm  - Obtain necessary fall risk management equipment:   - Apply yellow socks and bracelet for high fall risk patients  - Consider moving patient to room near nurses station  Outcome: Progressing  Goal: Maintain or return to baseline ADL function  Description: INTERVENTIONS:  -  Assess patient's ability to carry out ADLs; assess patient's baseline for ADL function and identify physical deficits which impact ability to perform ADLs (bathing, care of mouth/teeth, toileting, grooming, dressing, etc.)  - Assess/evaluate cause of self-care deficits   - Assess range of motion  - Assess patient's mobility; develop plan if impaired  - Assess patient's need for assistive devices and provide as appropriate  - Encourage maximum independence but intervene and supervise when necessary  - Involve family in performance of ADLs  - Assess for home care needs following discharge   - Consider OT consult to assist with ADL evaluation and planning for discharge  - Provide patient education as appropriate  Outcome: Progressing  Goal: Maintains/Returns to pre admission functional level  Description: INTERVENTIONS:  - Perform AM-PAC 6 Click Basic Mobility/ Daily Activity  assessment daily.  - Set and communicate daily mobility goal to care team and patient/family/caregiver.   - Collaborate with rehabilitation services on mobility goals if consulted  - Perform Range of Motion 3 times a day.  - Reposition patient every 2 hours.  - Dangle patient  times a day  - Stand patient  times a day  - Ambulate patient  times a day  - Out of bed to chair  times a day   - Out of bed for meals  times a day  - Out of bed for toileting  - Record patient progress and toleration of activity level   Outcome: Progressing     Problem: DISCHARGE PLANNING  Goal: Discharge to home or other facility with appropriate resources  Description: INTERVENTIONS:  - Identify barriers to discharge w/patient and caregiver  - Arrange for needed discharge resources and transportation as appropriate  - Identify discharge learning needs (meds, wound care, etc.)  - Arrange for interpretive services to assist at discharge as needed  - Refer to Case Management Department for coordinating discharge planning if the patient needs post-hospital services based on physician/advanced practitioner order or complex needs related to functional status, cognitive ability, or social support system  Outcome: Progressing     Problem: Knowledge Deficit  Goal: Patient/family/caregiver demonstrates understanding of disease process, treatment plan, medications, and discharge instructions  Description: Complete learning assessment and assess knowledge base.  Interventions:  - Provide teaching at level of understanding  - Provide teaching via preferred learning methods  Outcome: Progressing     Problem: Nutrition/Hydration-ADULT  Goal: Nutrient/Hydration intake appropriate for improving, restoring or maintaining nutritional needs  Description: Monitor and assess patient's nutrition/hydration status for malnutrition. Collaborate with interdisciplinary team and initiate plan and interventions as ordered.  Monitor patient's weight and dietary intake  as ordered or per policy. Utilize nutrition screening tool and intervene as necessary. Determine patient's food preferences and provide high-protein, high-caloric foods as appropriate.     INTERVENTIONS:  - Monitor oral intake, urinary output, labs, and treatment plans  - Assess nutrition and hydration status and recommend course of action  - Evaluate amount of meals eaten  - Assist patient with eating if necessary   - Allow adequate time for meals  - Recommend/ encourage appropriate diets, oral nutritional supplements, and vitamin/mineral supplements  - Order, calculate, and assess calorie counts as needed  - Recommend, monitor, and adjust tube feedings and TPN/PPN based on assessed needs  - Assess need for intravenous fluids  - Provide specific nutrition/hydration education as appropriate  - Include patient/family/caregiver in decisions related to nutrition  Outcome: Progressing     Problem: SAFETY,RESTRAINT: NV/NON-SELF DESTRUCTIVE BEHAVIOR  Goal: Remains free of harm/injury (restraint for non violent/non self-detsructive behavior)  Description: INTERVENTIONS:  - Instruct patient/family regarding restraint use   - Assess and monitor physiologic and psychological status   - Provide interventions and comfort measures to meet assessed patient needs   - Identify and implement measures to help patient regain control  - Assess readiness for release of restraint   Outcome: Progressing  Goal: Returns to optimal restraint-free functioning  Description: INTERVENTIONS:  - Assess the patient's behavior and symptoms that indicate continued need for restraint  - Identify and implement measures to help patient regain control  - Assess readiness for release of restraint   Outcome: Progressing

## 2024-02-26 NOTE — PROGRESS NOTES
"Spiritual Care Progress Note    2024  Patient: Hussein Edward III : 1965  Admission Date & Time: 2024 1635  MRN: [id number] CSN: [id number]         met with pt and pt's wife Kathi. Upon arrival pt was laying in bed and somewhat alert. Pt's wife and family was attempting to hold conversation with pt. Kathi happily expressed that today her  was able to \"somewhat talk to us\" and \"respond slightly\" to conversation. Family is in good spirits today and hopeful. No further needs were expressed at this time.    Chaplains still remain available.       24 1500   Clinical Encounter Type   Visited With Patient and family together                  Chaplaincy Interventions Utilized:   Empowerment: Encouraged assertiveness, Encouraged focus on present, Encouraged self-care, and Normalized experience of patient/family    Exploration: Explored hope, Facilitated life review, Facilitated story telling, and Identified, evaluated & reinforced appropriate coping strategies    Collaboration: Consulted with interdisciplinary team    Relationship Building: Cultivated a relationship of care and support, Listened empathically, Hospitality, and Provided silent and supportive presence    Chaplaincy Outcomes Achieved:  Expressed gratitude, Expressed humor, Expressed intermediate hope, Expressed peace, Progressed toward meaning, Progressed toward purpose, Tearfully processed emotions, and Verbally processed emotions    Spiritual Coping Strategies Utilized:   No spiritual coping    "

## 2024-02-26 NOTE — RESPIRATORY THERAPY NOTE
Reap care   02/26/24 0700   Respiratory Assessment   Assessment Type Pre-treatment   General Appearance Sedated   Respiratory Pattern Assisted   Chest Assessment Chest expansion symmetrical   Bilateral Breath Sounds Diminished;Coarse   Cough Strong   Suction ET Tube   Resp Comments Received pt on A/C with settings per flow sheet. Pt placed on spont 12/8 times 10 minutes. RR 38. Placed back on A/C. RN weaning sedation, Plan is for another spont trial this AM.   Vent Information   Vent ID [id number]   Vent type Brown G5   Brown Vent Mode (S)CMV   $ Pulse Oximetry Spot Check Charge Completed   (S)CMV Settings   Resp Rate (BPM) 22 BPM   VT (mL) 400 mL   FIO2 (%) 40 %   PEEP (cmH2O) 8 cmH2O   Insp Time (%) 0.8 %   Flow Trigger (LPM) 4   Humidification Heater   Heater Temperature (Set) 95 °F (35 °C)   (S)CMV Actuals   Resp Rate (BPM) 28 BPM   VT (mL) 402   MV 8.9   MAP (cmH2O) 15 cmH2O   Peak Pressure (cmH2O) 25 cmH2O   I:E Ratio (Obs) 1:1.5   Insp Resistance 21   Heater Temperature (Obs) 98.6 °F (37 °C)   Static Compliance (mL/cmH20) 54 mL/cmH2O   (S)CMV Alarms   High Peak Pressure (cmH2O) 50   Low Pressure (cmH2O) 4 cm H2O   High Resp Rate (BPM) 40 BPM   Low Resp Rate (BPM) 8 BPM   High MV (L/min) 20 L/min   Low MV (L/min) 4 L/min   High VT (mL) 1200 mL   Low VT (mL) 200 mL   Apnea Time (s) 20 S   Daily Screen   Patient safety screen outcome: Passed   Spont breathing trial % for 30 min Yes   Spont breathing trial outcome: Failed   Spont breathing trial reason failed RR > 35 bpm;Dyspnea;RSBI > 100   IHI Ventilator Associated Pneumonia Bundle   Daily Awakening Trials Performed Not applicable (Comment)   ETT  Cuffed 7.5 mm   Placement Date/Time: 02/19/24 1310   Mask Ventilation: Ventilated by mask (1)  Preoxygenated: Yes  Technique: Direct laryngoscopy  Type: Cuffed  Tube Size: 7.5 mm  Laryngoscope: Other (Comment)  Placement Verification: Auscultation;Chest x-ray;End tid...   Secured at (cm) 27   Measured from Lips    Secured Location Center   Repositioned Left to Center   Secured by Commercial tube peng   Site Condition Dry   Cuff Pressure (color) Green   HI-LO Suction  Intermittent suction   HI-LO Intervention Patent

## 2024-02-27 ENCOUNTER — APPOINTMENT (INPATIENT)
Dept: RADIOLOGY | Facility: HOSPITAL | Age: 59
DRG: 870 | End: 2024-02-27
Payer: COMMERCIAL

## 2024-02-27 PROBLEM — R50.9 PERSISTENT FEVER: Status: ACTIVE | Noted: 2024-02-27

## 2024-02-27 PROBLEM — E66.01 MORBID OBESITY (HCC): Status: ACTIVE | Noted: 2024-02-27

## 2024-02-27 PROBLEM — J80 ARDS (ADULT RESPIRATORY DISTRESS SYNDROME) (HCC): Status: ACTIVE | Noted: 2024-02-27

## 2024-02-27 PROBLEM — G93.41 ACUTE METABOLIC ENCEPHALOPATHY: Status: ACTIVE | Noted: 2024-02-27

## 2024-02-27 PROBLEM — E66.01 MORBID OBESITY (HCC): Status: RESOLVED | Noted: 2024-02-27 | Resolved: 2024-02-27

## 2024-02-27 LAB
ANION GAP SERPL CALCULATED.3IONS-SCNC: 10 MMOL/L
ANION GAP SERPL CALCULATED.3IONS-SCNC: 9 MMOL/L
BASE EXCESS BLDA CALC-SCNC: 7 MMOL/L
BASE EXCESS BLDA CALC-SCNC: 8.3 MMOL/L
BASE EXCESS BLDA CALC-SCNC: 8.6 MMOL/L
BASE EXCESS BLDA CALC-SCNC: 9.1 MMOL/L
BASOPHILS # BLD AUTO: 0.03 THOUSANDS/ÂΜL (ref 0–0.1)
BASOPHILS NFR BLD AUTO: 0 % (ref 0–1)
BUN SERPL-MCNC: 30 MG/DL (ref 5–25)
BUN SERPL-MCNC: 31 MG/DL (ref 5–25)
CALCIUM SERPL-MCNC: 8.5 MG/DL (ref 8.4–10.2)
CALCIUM SERPL-MCNC: 8.7 MG/DL (ref 8.4–10.2)
CHLORIDE SERPL-SCNC: 96 MMOL/L (ref 96–108)
CHLORIDE SERPL-SCNC: 99 MMOL/L (ref 96–108)
CO2 SERPL-SCNC: 32 MMOL/L (ref 21–32)
CO2 SERPL-SCNC: 32 MMOL/L (ref 21–32)
CREAT SERPL-MCNC: 0.71 MG/DL (ref 0.6–1.3)
CREAT SERPL-MCNC: 0.77 MG/DL (ref 0.6–1.3)
EOSINOPHIL # BLD AUTO: 0.09 THOUSAND/ÂΜL (ref 0–0.61)
EOSINOPHIL NFR BLD AUTO: 1 % (ref 0–6)
ERYTHROCYTE [DISTWIDTH] IN BLOOD BY AUTOMATED COUNT: 12.7 % (ref 11.6–15.1)
GFR SERPL CREATININE-BSD FRML MDRD: 101 ML/MIN/1.73SQ M
GFR SERPL CREATININE-BSD FRML MDRD: 104 ML/MIN/1.73SQ M
GLUCOSE SERPL-MCNC: 105 MG/DL (ref 65–140)
GLUCOSE SERPL-MCNC: 112 MG/DL (ref 65–140)
GLUCOSE SERPL-MCNC: 113 MG/DL (ref 65–140)
GLUCOSE SERPL-MCNC: 132 MG/DL (ref 65–140)
GLUCOSE SERPL-MCNC: 144 MG/DL (ref 65–140)
GLUCOSE SERPL-MCNC: 155 MG/DL (ref 65–140)
GLUCOSE SERPL-MCNC: 91 MG/DL (ref 65–140)
HCO3 BLDA-SCNC: 31.5 MMOL/L (ref 22–28)
HCO3 BLDA-SCNC: 32.5 MMOL/L (ref 22–28)
HCO3 BLDA-SCNC: 33.2 MMOL/L (ref 22–28)
HCO3 BLDA-SCNC: 33.6 MMOL/L (ref 22–28)
HCT VFR BLD AUTO: 37.5 % (ref 36.5–49.3)
HGB BLD-MCNC: 11.8 G/DL (ref 12–17)
IMM GRANULOCYTES # BLD AUTO: 0.22 THOUSAND/UL (ref 0–0.2)
IMM GRANULOCYTES NFR BLD AUTO: 1 % (ref 0–2)
LYMPHOCYTES # BLD AUTO: 0.82 THOUSANDS/ÂΜL (ref 0.6–4.47)
LYMPHOCYTES NFR BLD AUTO: 5 % (ref 14–44)
MAGNESIUM SERPL-MCNC: 2.3 MG/DL (ref 1.9–2.7)
MCH RBC QN AUTO: 29 PG (ref 26.8–34.3)
MCHC RBC AUTO-ENTMCNC: 31.5 G/DL (ref 31.4–37.4)
MCV RBC AUTO: 92 FL (ref 82–98)
MONOCYTES # BLD AUTO: 1.68 THOUSAND/ÂΜL (ref 0.17–1.22)
MONOCYTES NFR BLD AUTO: 10 % (ref 4–12)
MRSA NOSE QL CULT: NORMAL
NEUTROPHILS # BLD AUTO: 14.8 THOUSANDS/ÂΜL (ref 1.85–7.62)
NEUTS SEG NFR BLD AUTO: 83 % (ref 43–75)
NRBC BLD AUTO-RTO: 0 /100 WBCS
O2 CT BLDA-SCNC: 16.5 ML/DL (ref 16–23)
O2 CT BLDA-SCNC: 17 ML/DL (ref 16–23)
O2 CT BLDA-SCNC: 17.2 ML/DL (ref 16–23)
O2 CT BLDA-SCNC: 21.3 ML/DL (ref 16–23)
OXYHGB MFR BLDA: 93.6 % (ref 94–97)
OXYHGB MFR BLDA: 93.7 % (ref 94–97)
OXYHGB MFR BLDA: 95.1 % (ref 94–97)
OXYHGB MFR BLDA: 95.2 % (ref 94–97)
PCO2 BLDA: 43 MM HG (ref 36–44)
PCO2 BLDA: 43.6 MM HG (ref 36–44)
PCO2 BLDA: 43.6 MM HG (ref 36–44)
PCO2 BLDA: 48 MM HG (ref 36–44)
PH BLDA: 7.46 [PH] (ref 7.35–7.45)
PH BLDA: 7.48 [PH] (ref 7.35–7.45)
PH BLDA: 7.5 [PH] (ref 7.35–7.45)
PH BLDA: 7.5 [PH] (ref 7.35–7.45)
PHOSPHATE SERPL-MCNC: 2.5 MG/DL (ref 2.7–4.5)
PLATELET # BLD AUTO: 304 THOUSANDS/UL (ref 149–390)
PMV BLD AUTO: 9.3 FL (ref 8.9–12.7)
PO2 BLDA: 71.2 MM HG (ref 75–129)
PO2 BLDA: 76.4 MM HG (ref 75–129)
PO2 BLDA: 80.8 MM HG (ref 75–129)
PO2 BLDA: 88 MM HG (ref 75–129)
POTASSIUM SERPL-SCNC: 3.3 MMOL/L (ref 3.5–5.3)
POTASSIUM SERPL-SCNC: 3.5 MMOL/L (ref 3.5–5.3)
RBC # BLD AUTO: 4.07 MILLION/UL (ref 3.88–5.62)
SODIUM SERPL-SCNC: 137 MMOL/L (ref 135–147)
SODIUM SERPL-SCNC: 141 MMOL/L (ref 135–147)
SPECIMEN SOURCE: ABNORMAL
VANCOMYCIN SERPL-MCNC: 13.2 UG/ML (ref 10–20)
WBC # BLD AUTO: 17.64 THOUSAND/UL (ref 4.31–10.16)

## 2024-02-27 PROCEDURE — NC001 PR NO CHARGE: Performed by: INTERNAL MEDICINE

## 2024-02-27 PROCEDURE — 83735 ASSAY OF MAGNESIUM: CPT

## 2024-02-27 PROCEDURE — 94640 AIRWAY INHALATION TREATMENT: CPT | Performed by: SOCIAL WORKER

## 2024-02-27 PROCEDURE — 71045 X-RAY EXAM CHEST 1 VIEW: CPT

## 2024-02-27 PROCEDURE — 80202 ASSAY OF VANCOMYCIN: CPT | Performed by: INTERNAL MEDICINE

## 2024-02-27 PROCEDURE — 80048 BASIC METABOLIC PNL TOTAL CA: CPT

## 2024-02-27 PROCEDURE — 80048 BASIC METABOLIC PNL TOTAL CA: CPT | Performed by: STUDENT IN AN ORGANIZED HEALTH CARE EDUCATION/TRAINING PROGRAM

## 2024-02-27 PROCEDURE — 93005 ELECTROCARDIOGRAM TRACING: CPT

## 2024-02-27 PROCEDURE — 82805 BLOOD GASES W/O2 SATURATION: CPT

## 2024-02-27 PROCEDURE — 84100 ASSAY OF PHOSPHORUS: CPT

## 2024-02-27 PROCEDURE — 70486 CT MAXILLOFACIAL W/O DYE: CPT

## 2024-02-27 PROCEDURE — 71250 CT THORAX DX C-: CPT

## 2024-02-27 PROCEDURE — 74176 CT ABD & PELVIS W/O CONTRAST: CPT

## 2024-02-27 PROCEDURE — 82948 REAGENT STRIP/BLOOD GLUCOSE: CPT

## 2024-02-27 PROCEDURE — 85025 COMPLETE CBC W/AUTO DIFF WBC: CPT

## 2024-02-27 PROCEDURE — 99291 CRITICAL CARE FIRST HOUR: CPT | Performed by: INTERNAL MEDICINE

## 2024-02-27 PROCEDURE — 94640 AIRWAY INHALATION TREATMENT: CPT

## 2024-02-27 PROCEDURE — 94003 VENT MGMT INPAT SUBQ DAY: CPT

## 2024-02-27 PROCEDURE — 94664 DEMO&/EVAL PT USE INHALER: CPT | Performed by: SOCIAL WORKER

## 2024-02-27 PROCEDURE — 94760 N-INVAS EAR/PLS OXIMETRY 1: CPT

## 2024-02-27 RX ORDER — METOPROLOL TARTRATE 1 MG/ML
5 INJECTION, SOLUTION INTRAVENOUS ONCE
Status: COMPLETED | OUTPATIENT
Start: 2024-02-27 | End: 2024-02-27

## 2024-02-27 RX ORDER — FUROSEMIDE 10 MG/ML
40 INJECTION INTRAMUSCULAR; INTRAVENOUS ONCE
Status: COMPLETED | OUTPATIENT
Start: 2024-02-27 | End: 2024-02-27

## 2024-02-27 RX ORDER — MAGNESIUM SULFATE HEPTAHYDRATE 40 MG/ML
2 INJECTION, SOLUTION INTRAVENOUS ONCE
Status: COMPLETED | OUTPATIENT
Start: 2024-02-27 | End: 2024-02-28

## 2024-02-27 RX ORDER — POTASSIUM CHLORIDE 20 MEQ/1
60 TABLET, EXTENDED RELEASE ORAL ONCE
Status: COMPLETED | OUTPATIENT
Start: 2024-02-27 | End: 2024-02-27

## 2024-02-27 RX ORDER — POTASSIUM CHLORIDE 14.9 MG/ML
20 INJECTION INTRAVENOUS ONCE
Status: COMPLETED | OUTPATIENT
Start: 2024-02-27 | End: 2024-02-28

## 2024-02-27 RX ADMIN — DIAZEPAM 5 MG: 5 TABLET ORAL at 02:27

## 2024-02-27 RX ADMIN — MIDAZOLAM 1 MG: 1 INJECTION INTRAMUSCULAR; INTRAVENOUS at 21:20

## 2024-02-27 RX ADMIN — IPRATROPIUM BROMIDE 0.5 MG: 0.5 SOLUTION RESPIRATORY (INHALATION) at 07:20

## 2024-02-27 RX ADMIN — CHLORHEXIDINE GLUCONATE 15 ML: 1.2 SOLUTION ORAL at 09:01

## 2024-02-27 RX ADMIN — MAGNESIUM OXIDE TAB 400 MG (241.3 MG ELEMENTAL MG) 400 MG: 400 (241.3 MG) TAB at 09:01

## 2024-02-27 RX ADMIN — Medication 100 MCG/HR: at 05:07

## 2024-02-27 RX ADMIN — OXYCODONE HYDROCHLORIDE 10 MG: 5 SOLUTION ORAL at 02:27

## 2024-02-27 RX ADMIN — MIDAZOLAM 1 MG: 1 INJECTION INTRAMUSCULAR; INTRAVENOUS at 00:19

## 2024-02-27 RX ADMIN — FAMOTIDINE 20 MG: 40 POWDER, FOR SUSPENSION ORAL at 09:02

## 2024-02-27 RX ADMIN — GABAPENTIN 100 MG: 250 SOLUTION ORAL at 09:02

## 2024-02-27 RX ADMIN — FENTANYL CITRATE 100 MCG: 50 INJECTION INTRAMUSCULAR; INTRAVENOUS at 02:27

## 2024-02-27 RX ADMIN — SENNOSIDES, DOCUSATE SODIUM 1 TABLET: 8.6; 5 TABLET ORAL at 09:01

## 2024-02-27 RX ADMIN — VANCOMYCIN HYDROCHLORIDE 1250 MG: 10 INJECTION, POWDER, LYOPHILIZED, FOR SOLUTION INTRAVENOUS at 09:32

## 2024-02-27 RX ADMIN — DIAZEPAM 5 MG: 5 TABLET ORAL at 14:03

## 2024-02-27 RX ADMIN — BISACODYL 10 MG: 10 SUPPOSITORY RECTAL at 09:01

## 2024-02-27 RX ADMIN — MIDAZOLAM 1 MG: 1 INJECTION INTRAMUSCULAR; INTRAVENOUS at 02:27

## 2024-02-27 RX ADMIN — GABAPENTIN 100 MG: 250 SOLUTION ORAL at 15:23

## 2024-02-27 RX ADMIN — LEVALBUTEROL HYDROCHLORIDE 1.25 MG: 1.25 SOLUTION RESPIRATORY (INHALATION) at 20:20

## 2024-02-27 RX ADMIN — LEVALBUTEROL HYDROCHLORIDE 1.25 MG: 1.25 SOLUTION RESPIRATORY (INHALATION) at 14:00

## 2024-02-27 RX ADMIN — ENOXAPARIN SODIUM 40 MG: 40 INJECTION SUBCUTANEOUS at 16:30

## 2024-02-27 RX ADMIN — CHLORHEXIDINE GLUCONATE 15 ML: 1.2 SOLUTION ORAL at 21:43

## 2024-02-27 RX ADMIN — Medication 100 MCG/HR: at 16:30

## 2024-02-27 RX ADMIN — IPRATROPIUM BROMIDE 0.5 MG: 0.5 SOLUTION RESPIRATORY (INHALATION) at 20:20

## 2024-02-27 RX ADMIN — MAGNESIUM SULFATE HEPTAHYDRATE 2 G: 40 INJECTION, SOLUTION INTRAVENOUS at 18:55

## 2024-02-27 RX ADMIN — FUROSEMIDE 40 MG: 10 INJECTION, SOLUTION INTRAMUSCULAR; INTRAVENOUS at 09:02

## 2024-02-27 RX ADMIN — OXYCODONE HYDROCHLORIDE 10 MG: 5 SOLUTION ORAL at 09:01

## 2024-02-27 RX ADMIN — OXYCODONE HYDROCHLORIDE 10 MG: 5 SOLUTION ORAL at 21:45

## 2024-02-27 RX ADMIN — MIDAZOLAM 1 MG: 1 INJECTION INTRAMUSCULAR; INTRAVENOUS at 19:05

## 2024-02-27 RX ADMIN — MIDODRINE HYDROCHLORIDE 10 MG: 5 TABLET ORAL at 09:01

## 2024-02-27 RX ADMIN — MIDODRINE HYDROCHLORIDE 10 MG: 5 TABLET ORAL at 11:52

## 2024-02-27 RX ADMIN — POTASSIUM CHLORIDE 20 MEQ: 14.9 INJECTION, SOLUTION INTRAVENOUS at 21:38

## 2024-02-27 RX ADMIN — CEFEPIME 2000 MG: 2 INJECTION, POWDER, FOR SOLUTION INTRAVENOUS at 18:34

## 2024-02-27 RX ADMIN — IPRATROPIUM BROMIDE 0.5 MG: 0.5 SOLUTION RESPIRATORY (INHALATION) at 14:00

## 2024-02-27 RX ADMIN — MIDAZOLAM 1 MG: 1 INJECTION INTRAMUSCULAR; INTRAVENOUS at 13:58

## 2024-02-27 RX ADMIN — POTASSIUM CHLORIDE 60 MEQ: 1500 TABLET, EXTENDED RELEASE ORAL at 21:43

## 2024-02-27 RX ADMIN — METOROPROLOL TARTRATE 5 MG: 5 INJECTION, SOLUTION INTRAVENOUS at 18:55

## 2024-02-27 RX ADMIN — CEFEPIME 2000 MG: 2 INJECTION, POWDER, FOR SOLUTION INTRAVENOUS at 11:58

## 2024-02-27 RX ADMIN — LEVALBUTEROL HYDROCHLORIDE 1.25 MG: 1.25 SOLUTION RESPIRATORY (INHALATION) at 00:30

## 2024-02-27 RX ADMIN — FAMOTIDINE 20 MG: 40 POWDER, FOR SUSPENSION ORAL at 18:05

## 2024-02-27 RX ADMIN — MIDODRINE HYDROCHLORIDE 10 MG: 5 TABLET ORAL at 15:23

## 2024-02-27 RX ADMIN — POTASSIUM PHOSPHATE, MONOBASIC POTASSIUM PHOSPHATE, DIBASIC 21 MMOL: 224; 236 INJECTION, SOLUTION, CONCENTRATE INTRAVENOUS at 11:57

## 2024-02-27 RX ADMIN — GABAPENTIN 100 MG: 250 SOLUTION ORAL at 21:45

## 2024-02-27 RX ADMIN — DIAZEPAM 5 MG: 5 TABLET ORAL at 09:01

## 2024-02-27 RX ADMIN — OXYCODONE HYDROCHLORIDE 10 MG: 5 SOLUTION ORAL at 14:03

## 2024-02-27 RX ADMIN — KETAMINE HYDROCHLORIDE 0.5 MG/KG/HR: 100 INJECTION, SOLUTION, CONCENTRATE INTRAMUSCULAR; INTRAVENOUS at 05:07

## 2024-02-27 RX ADMIN — CEFEPIME 2000 MG: 2 INJECTION, POWDER, FOR SOLUTION INTRAVENOUS at 03:49

## 2024-02-27 RX ADMIN — VANCOMYCIN HYDROCHLORIDE 1250 MG: 10 INJECTION, POWDER, LYOPHILIZED, FOR SOLUTION INTRAVENOUS at 21:14

## 2024-02-27 RX ADMIN — IPRATROPIUM BROMIDE 0.5 MG: 0.5 SOLUTION RESPIRATORY (INHALATION) at 00:30

## 2024-02-27 RX ADMIN — FENTANYL CITRATE 100 MCG: 50 INJECTION INTRAMUSCULAR; INTRAVENOUS at 00:19

## 2024-02-27 RX ADMIN — LEVALBUTEROL HYDROCHLORIDE 1.25 MG: 1.25 SOLUTION RESPIRATORY (INHALATION) at 07:20

## 2024-02-27 RX ADMIN — POTASSIUM PHOSPHATE, MONOBASIC POTASSIUM PHOSPHATE, DIBASIC 6 MMOL: 224; 236 INJECTION, SOLUTION, CONCENTRATE INTRAVENOUS at 09:02

## 2024-02-27 RX ADMIN — POLYETHYLENE GLYCOL 3350 17 G: 17 POWDER, FOR SOLUTION ORAL at 09:03

## 2024-02-27 RX ADMIN — ACETAMINOPHEN 1000 MG: 10 INJECTION INTRAVENOUS at 09:02

## 2024-02-27 RX ADMIN — DIAZEPAM 5 MG: 5 TABLET ORAL at 21:44

## 2024-02-27 RX ADMIN — FUROSEMIDE 40 MG: 10 INJECTION, SOLUTION INTRAMUSCULAR; INTRAVENOUS at 14:03

## 2024-02-27 NOTE — RESPIRATORY THERAPY NOTE
RT Ventilator Management Note  Hussein Edward III 58 y.o. male MRN: [id number]  [facility]: [id number]      Daily Screen         2/25/2024  0756 2/26/2024  0700          Patient safety screen outcome:: Failed Passed      Not Ready for Weaning due to:: Underline problem not resolved --      Spont breathing trial % for 30 min: -- Yes      Spont breathing trial outcome:: -- Failed      Spont breathing trial reason failed: -- RR > 35 bpm;Dyspnea;RSBI > 100                Physical Exam:   Assessment Type: (P) Assess only  General Appearance: (P) Sleeping  Respiratory Pattern: (P) Assisted  Chest Assessment: (P) Chest expansion symmetrical  Bilateral Breath Sounds: (P) Diminished, Coarse  Suction: ET Tube  O2 Device: (P) rashaun t      Resp Comments: (P) no changes made to the CMV settings at this time, pt is stable.

## 2024-02-27 NOTE — PROGRESS NOTES
Vancomycin IV Pharmacy-to-Dose Consultation    Hussein Edward III is a 58 y.o. male who is currently receiving Vancomycin IV with management by the Pharmacy Consult service.    Relevant clinical data and objective / subjective history reviewed.      Vancomycin Assessment:  Indication: Pneumonia (goal -600, trough >10)    Status: critically ill   Micro:   -  Sputum: GVR  -  Sputum: Candida glabrata  -  Bronchial: Candida glabrata  Procalcitonin: 0.79 on   Renal Function:     Lab Results   Component Value Date    CREATININE 0.71 2024     Estimated Creatinine Clearance: 114.2 mL/min (by C-G formula based on SCr of 0.71 mg/dL).  Dialysis: no  Days of Therapy: 2  Current Dose: 1000mg Q 12H   Goal AUC / Trough: -600, trough >10   Last Level: 13.2 on   Model Fit: Good  Assessment: WBC elevated, afebrile.       Vancomycin Plan:  New Dosin mg IV q12h   Predicted Trough / AUC: 14.2 / 481  Next Level: on 3/5 at 0600  Renal Function Monitoring: Daily BMP and UOP      Pharmacy will continue to follow closely for s/sx of nephrotoxicity, infusion reactions and appropriateness of therapy.  BMP and CBC will be ordered per protocol. We will continue to follow the patient’s culture results and clinical progress daily.       Sy Llamas, PharmD, BCCCP  Critical Care Clinical Pharmacist  Available via [employer]

## 2024-02-27 NOTE — PLAN OF CARE
Problem: Prexisting or High Potential for Compromised Skin Integrity  Goal: Skin integrity is maintained or improved  Description: INTERVENTIONS:  - Identify patients at risk for skin breakdown  - Assess and monitor skin integrity  - Assess and monitor nutrition and hydration status  - Monitor labs   - Assess for incontinence   - Turn and reposition patient  - Assist with mobility/ambulation  - Relieve pressure over bony prominences  - Avoid friction and shearing  - Provide appropriate hygiene as needed including keeping skin clean and dry  - Evaluate need for skin moisturizer/barrier cream  - Collaborate with interdisciplinary team   - Patient/family teaching  - Consider wound care consult   Outcome: Progressing     Problem: PAIN - ADULT  Goal: Verbalizes/displays adequate comfort level or baseline comfort level  Description: Interventions:  - Encourage patient to monitor pain and request assistance  - Assess pain using appropriate pain scale  - Administer analgesics based on type and severity of pain and evaluate response  - Implement non-pharmacological measures as appropriate and evaluate response  - Consider cultural and social influences on pain and pain management  - Notify physician/advanced practitioner if interventions unsuccessful or patient reports new pain  Outcome: Progressing     Problem: INFECTION - ADULT  Goal: Absence or prevention of progression during hospitalization  Description: INTERVENTIONS:  - Assess and monitor for signs and symptoms of infection  - Monitor lab/diagnostic results  - Monitor all insertion sites, i.e. indwelling lines, tubes, and drains  - Monitor endotracheal if appropriate and nasal secretions for changes in amount and color  - [place] appropriate cooling/warming therapies per order  - Administer medications as ordered  - Instruct and encourage patient and family to use good hand hygiene technique  - Identify and instruct in appropriate isolation precautions for  identified infection/condition  Outcome: Progressing  Goal: Absence of fever/infection during neutropenic period  Description: INTERVENTIONS:  - Monitor WBC    Outcome: Progressing     Problem: SAFETY ADULT  Goal: Patient will remain free of falls  Description: INTERVENTIONS:  - Educate patient/family on patient safety including physical limitations  - Instruct patient to call for assistance with activity   - Consult OT/PT to assist with strengthening/mobility   - Keep Call bell within reach  - Keep bed low and locked with side rails adjusted as appropriate  - Keep care items and personal belongings within reach  - Initiate and maintain comfort rounds  - Make Fall Risk Sign visible to staff  - Apply yellow socks and bracelet for high fall risk patients  - Consider moving patient to room near nurses station  Outcome: Progressing  Goal: Maintain or return to baseline ADL function  Description: INTERVENTIONS:  -  Assess patient's ability to carry out ADLs; assess patient's baseline for ADL function and identify physical deficits which impact ability to perform ADLs (bathing, care of mouth/teeth, toileting, grooming, dressing, etc.)  - Assess/evaluate cause of self-care deficits   - Assess range of motion  - Assess patient's mobility; develop plan if impaired  - Assess patient's need for assistive devices and provide as appropriate  - Encourage maximum independence but intervene and supervise when necessary  - Involve family in performance of ADLs  - Assess for home care needs following discharge   - Consider OT consult to assist with ADL evaluation and planning for discharge  - Provide patient education as appropriate  Outcome: Progressing  Goal: Maintains/Returns to pre admission functional level  Description: INTERVENTIONS:  - Perform AM-PAC 6 Click Basic Mobility/ Daily Activity assessment daily.  - Set and communicate daily mobility goal to care team and patient/family/caregiver.   - Collaborate with rehabilitation  services on mobility goals if consulted  - Out of bed for toileting  - Record patient progress and toleration of activity level   Outcome: Progressing     Problem: DISCHARGE PLANNING  Goal: Discharge to home or other facility with appropriate resources  Description: INTERVENTIONS:  - Identify barriers to discharge w/patient and caregiver  - Arrange for needed discharge resources and transportation as appropriate  - Identify discharge learning needs (meds, wound care, etc.)  - Arrange for interpretive services to assist at discharge as needed  - Refer to Case Management Department for coordinating discharge planning if the patient needs post-hospital services based on physician/advanced practitioner order or complex needs related to functional status, cognitive ability, or social support system  Outcome: Progressing     Problem: Knowledge Deficit  Goal: Patient/family/caregiver demonstrates understanding of disease process, treatment plan, medications, and discharge instructions  Description: Complete learning assessment and assess knowledge base.  Interventions:  - Provide teaching at level of understanding  - Provide teaching via preferred learning methods  Outcome: Progressing     Problem: Nutrition/Hydration-ADULT  Goal: Nutrient/Hydration intake appropriate for improving, restoring or maintaining nutritional needs  Description: Monitor and assess patient's nutrition/hydration status for malnutrition. Collaborate with interdisciplinary team and initiate plan and interventions as ordered.  Monitor patient's weight and dietary intake as ordered or per policy. Utilize nutrition screening tool and intervene as necessary. Determine patient's food preferences and provide high-protein, high-caloric foods as appropriate.     INTERVENTIONS:  - Monitor oral intake, urinary output, labs, and treatment plans  - Assess nutrition and hydration status and recommend course of action  - Evaluate amount of meals eaten  - Assist  patient with eating if necessary   - Allow adequate time for meals  - Recommend/ encourage appropriate diets, oral nutritional supplements, and vitamin/mineral supplements  - Order, calculate, and assess calorie counts as needed  - Recommend, monitor, and adjust tube feedings and TPN/PPN based on assessed needs  - Assess need for intravenous fluids  - Provide specific nutrition/hydration education as appropriate  - Include patient/family/caregiver in decisions related to nutrition  Outcome: Progressing     Problem: SAFETY,RESTRAINT: NV/NON-SELF DESTRUCTIVE BEHAVIOR  Goal: Remains free of harm/injury (restraint for non violent/non self-detsructive behavior)  Description: INTERVENTIONS:  - Instruct patient/family regarding restraint use   - Assess and monitor physiologic and psychological status   - Provide interventions and comfort measures to meet assessed patient needs   - Identify and implement measures to help patient regain control  - Assess readiness for release of restraint   Outcome: Progressing  Goal: Returns to optimal restraint-free functioning  Description: INTERVENTIONS:  - Assess the patient's behavior and symptoms that indicate continued need for restraint  - Identify and implement measures to help patient regain control  - Assess readiness for release of restraint   Outcome: Progressing

## 2024-02-27 NOTE — PROGRESS NOTES
[facility]  Progress Note: Critical Care  Name: Hussein Edward III 58 y.o. male I MRN: [id number]  Unit/Bed#: MICU 06 I Date of Admission: 2/19/2024   Date of Service: 2/27/2024 I Hospital Day: 8    Assessment/Plan   Acute hypoxic and hypercapnic respiratory failure 2/2 ARDS  Influenza A pneumonia  Persistent fever  New abdominal distention  Acute metabolic encephalopathy  New onset atrial fibrillation, resolved  Morbid obesity  Hypertriglyceridemia 2/2 propofol    Neuro:   #Sedated  Stop precedex, continue fentanyl gtt, ketamine gtt  Previously unable to tolerate precedex, became bradycardic to 30s on 02/21  May consider switching to dilaudid d/t tachyphylaxis  Off propofol and nimbex at this time  Off versed  On gabapentin 200 mg TID  Added YEHUDA valium & oxycodone q6H  PRN versed bolus to attain RASS -2 to 0.     CV:   #Hypotension likely 2/2 induction for RSI  On Levo 3, wean as possible to maintain MAP>65  A-line for hemodynamic monitoring  TTE 02/19: LVEF 55-60%. G1DD.  Started midodrine 10 mg TID  #New onset atrial fibrillation, resolved, Now NSR.  Pt found to have rates to 150s s/p CVC placement  gave one time dose of lopressor, patient started on amiodarone drip. Patient's HR still elevated with afib. Given dose of digoxin, resolved.  Continue to monitor, HR better in 90s. Has some periods of tachy that correspond to low grade fevers.  Not on heparin ACS protocol anymore, was restarted on DVT prophylactic heparin     Pulm:  #Acute Respiratory Distress Syndrome  2/2 influenza A pneumonia, possible superimposed bacterial pna  CTA on 02/18 without evidence of PE but findings of extensive bilateral groundglass and consolidative opacities with predominantly perihilar and peripheral distribution compatible with viral and/or atypical pneumonia, likely influenza given clinical history.  PaO2/FiO2 ratio 243 on 2/26  Intubation day 8, consider discussion w/ family regarding possible  trach   Antibiotics as mentioned below under ID but finished 5/5 of Ceftriaxone and Azithromycin on 02/21, not currently on abx. Patient started spiking fevers, on cefepime, refer to ID section.  Q4H ABGs  Nebs- Xopenex, Atrovent  CXR appears stable or improved  Underwent bronchoscopy on 02/19 -> grew Candida glabrata, viral culture pending  Consider possible bronch given pt now supine  S/p 5 days of solu-medrol  Sputum cultures 02/22 no growth  Blood cultures NGTD  Wean PEEP and FiO2 as tolerated   Will attempt SBT on 02/27, consider possible discussion regarding trach  Previously on lasix gtt, now receiving lasix PRN to reach net goal of 0.5-1L  Consider Diuril for pt's ongoing contraction alkalosis    GI:   Diagnosis: Abdominal distention  Continue pepcid prophylaxis  Hold TF d/t worsening distention  Unclear etiology  Most recent BM 02/26 AM  Will check residuals  Set NG to suction  Bowel regimen (miralax BID + senna BID)  Rectal enema  KUB 02/27: Multiple air-filled small bowel loops and air-filled large bowel loops with nonobstructive bowel gas pattern   Consider CT abdomen/pelvis     :   #CKD Stage II  Plan: sCr baseline range of 0.8-1.0, stable currently  Avoid nephrotoxic medications  Monitor sCr with daily BMPs  Diuresing, monitor renal function  #Hematuria  (1+) ketones and protein -> likely due to dehydration  large occult blood, with innumerable RBCs -> might be associated with acute drop in hgb. Consider [id number] injury by raines catheter.  Improved on repeat UA     F/E/N:    F: Not currently on any IV fluids  E: Will monitor and correct any electrolyte derangements. Repleting phos  N: continuing TF to goal     Heme/Onc:   DVT prophylaxis heparin     Endo:   #Steroid induced hyperglycemia  Hyperglycemia resolving with discontinuation of steroids  Continue SSI1 for now     ID:   #New-onset fever while on antibiotics  Pt had ongoing influenza that was untreated for 4 days PTA  Likely not bacterial  superimposed, likely just in setting of viral infection  Blood culture, sputum culture, bronchial culture unrevealing  S/p ceftriaxone/cefepime, azithromycin, and tamiflu  UA:  4-10 WBCs, less likely to be infectious cause  Viral panel negative  Can consider performing bronch  Continues to spike fevers off abx, unclear source of infection, possibly pulmonary source  VAS duplex findings of acute superficial thrombophlebitis was noted in the cephalic vein at the distal upper arm  Would not explain pt's current ongoing fevers  New onset abdominal distention, may need to consider GI sources  Unlikely to be urinary source at this time  RP2 panel negative  LFT, lipase, TG negative  MRSA pending  Started cefepime and vanc for broad spectrum coverage, consider switch to zosyn     MSK/Skin:   Frequent turning and repositioning  Wound care as needed    Disposition: Critical care    ICU Core Measures     Vented Patient  VAP Bundle  VAP bundle ordered     A: Assess, Prevent, and Manage Pain Has pain been assessed? Yes  Need for changes to pain regimen? No   B: Both Spontaneous Awakening Trials (SATs) and Spontaneous Breathing Trials (SBTs) Plan to perform spontaneous awakening trial today? Yes   Plan to perform spontaneous breathing trial today? Yes   Obvious barriers to extubation? Yes   C: Choice of Sedation RASS Goal: -2 Light Sedation or 0 Alert and Calm  Need for changes to sedation or analgesia regimen? Yes   D: Delirium CAM-ICU: Negative   E: Early Mobility  Plan for early mobility? Yes   F: Family Engagement Plan for family engagement today? Yes       Antibiotic Review: Awaiting culture results.  and Continue broad spectrum secondary to severity of illness.     Review of Invasive Devices:    Shields Plan: Continue for accurate I/O monitoring for 48 hours  Central access plan: Patient has multiple central venous catheters.   Prudecne Plan: Keep arterial line for hemodynamic monitoring, frequent ABGs, and frequent  labs    Prophylaxis:  VTE VTE covered by:  enoxaparin, Subcutaneous, 40 mg at 02/26/24 1732       Stress Ulcer  covered byfamotidine (PEPCID) oral suspension 20 mg [[id number]]        Significant 24hr Events     58 year old man with WALE, meningioma. He presented 2/16 to [facility] for shortness of breath, recent FLU A infection and pneumonia. Required intubation on 2/19, transferred for possible VVECMO needs. He required NMB and PP x 2 last 2/23.     24hr events: At end of day yesterday, pt was becoming agitated with current sedation regimen, requiring PRN versed. Caused pt to become acutely hypotensive, briefly required levo. Currently not requiring pressor support. Pt continues awaken more, continues to require ventilatory support but weaned to RR 22, now 16. Able to follow commands, open eyes, and nod his head. Denies being in any pain at this time. Appearing more distended this morning.     Subjective     Review of Systems   Unable to perform ROS: Intubated        Objective                            Vitals I/O      Most Recent Min/Max in 24hrs   Temp (!) 101.1 °F (38.4 °C) Temp  Min: 99.5 °F (37.5 °C)  Max: 101.3 °F (38.5 °C)   Pulse (!) 128 Pulse  Min: 98  Max: 135   Resp (!) 27 Resp  Min: 20  Max: 39   /71 BP  Min: 86/51  Max: 150/83   O2 Sat 94 % SpO2  Min: 92 %  Max: 100 %      Intake/Output Summary (Last 24 hours) at 2/27/2024 0705  Last data filed at 2/27/2024 0600  Gross per 24 hour   Intake 5015.84 ml   Output 4065 ml   Net 950.84 ml       Diet Enteral/Parenteral; Tube Feeding No Oral Diet; Vital High Protein; Continuous; 45; Prosource Protein Liquid - Two Packets; OD; 250; Water; Every 6 hours    Invasive Monitoring           Physical Exam   Physical Exam  Vitals and nursing note reviewed.   Skin:     General: Skin is warm and dry.      Capillary Refill: Capillary refill takes less than 2 seconds.      Coloration: Skin is not jaundiced.   HENT:      Head: Normocephalic and atraumatic.      Nose:  Nasogastric tube present.   Neck:      Vascular: Central line present.   Cardiovascular:      Rate and Rhythm: Regular rhythm. Tachycardia present.      Pulses: Normal pulses.      Heart sounds: Normal heart sounds.   Musculoskeletal:      Right lower leg: No edema.      Left lower leg: No edema.   Abdominal: General: Bowel sounds are normal. There is distension (worsening).     Palpations: Abdomen is soft.   Constitutional:       Interventions: He is sedated and intubated.   Pulmonary:      Effort: No accessory muscle usage, respiratory distress or accessory muscle usage. He is intubated.      Breath sounds: Rales present.      Comments: Mechanically ventilated breath sounds  Neurological:      Mental Status: He is unresponsive.   Genitourinary/Anorectal:  Shields present.          Diagnostic Studies      EKG:   Imaging: I have personally reviewed pertinent reports.       Medications:  Scheduled PRN   bisacodyl, 10 mg, Daily  cefepime, 2,000 mg, Q8H  chlorhexidine, 15 mL, Q12H YEHUDA  diazepam, 5 mg, Q6H  enoxaparin, 40 mg, Q24H YEHUDA  famotidine, 20 mg, BID  gabapentin, 100 mg, TID  insulin lispro, 1-6 Units, Q6H YEHUDA  ipratropium, 0.5 mg, Q6H  levalbuterol, 1.25 mg, Q6H  magnesium Oxide, 400 mg, Daily  midodrine, 10 mg, TID AC  oxyCODONE, 10 mg, Q6H  polyethylene glycol, 17 g, BID  senna-docusate sodium, 1 tablet, BID  vancomycin, 1,000 mg, Q12H      acetaminophen, 1,000 mg, Q6H PRN  fentanyl citrate (PF), 100 mcg, Q2H PRN  midazolam, 1 mg, Q2H PRN Max 8/day  sodium chloride, 1 spray, Q1H PRN       Continuous    fentaNYL, 100 mcg/hr, Last Rate: 100 mcg/hr (02/27/24 0600)  ketamine, 0.5 mg/kg/hr, Last Rate: 0.5 mg/kg/hr (02/27/24 0600)  norepinephrine, 1-30 mcg/min, Last Rate: Stopped (02/26/24 1824)         Labs:    CBC    Recent Labs     02/26/24  0449 02/27/24  0445   WBC 17.43* 17.64*   HGB 11.3* 11.8*   HCT 35.6* 37.5    304     BMP    Recent Labs     02/26/24  0449 02/27/24  0445   SODIUM 141 137   K 3.8 3.5    CL 96 96   CO2 37* 32   AGAP 8 9   BUN 28* 31*   CREATININE 0.70 0.71   CALCIUM 8.8 8.7       Coags    No recent results     Additional Electrolytes  Recent Labs     02/26/24  0449 02/27/24  0445   MG 2.1 2.3   PHOS 2.8 2.5*          Blood Gas    Recent Labs     02/27/24  0445   PHART 7.496*   [id number] 43.0   PO2ART 71.2*   [id number] 32.5*   BEART 8.3   SOURCE Line, Arterial     Recent Labs     02/27/24  0445   SOURCE Line, Arterial    LFTs  Recent Labs     02/26/24  1238   ALT 49   AST 39   ALKPHOS 70   ALB 3.2*   TBILI 0.95       Infectious  No recent results  Glucose  Recent Labs     02/26/24  0449 02/27/24  0445   GLUC 166* 155*               Monty Pires MD

## 2024-02-27 NOTE — PROGRESS NOTES
Critical Care Attending Note     58 year old man with WALE, meningioma. He presented 2/16 to [facility] for shortness of breath, recent FLU A infection and pneumonia. Required intubation on 2/19, transferred for possible VVECMO needs. He required NMB and PP x 2 last 2/23.       24hr events - remained intubated, weaned PEEP, weaned off vasopressors, recurrent fever transiently afebrile, weaned off precedex, given versed 1mg prn x 4 doses.  Denied pain this am.       VS .3, .3, -120's, MAPS 's, SpO2 95% 0.4/6P, I/Os +950cc  Exam  GEN middle aged man, intubated on sedation RASS -1, arouses, keeps attention and follow simple commands all extremities  HEENT AT, MMM, no thrush, no scleral icterus   NECK no accessory muscle use, IJ TLC w/o purulence   CV reg, mildly tachy, single s1/2, no m/r  Pulm mechanical BS, tan secretions - mild, no wheeze or rales, slight central rhonchi cleared with suction, pltP 23  ABD +BS mild distention, no rebound, nonrigid, NT  EXT 1+ diffuse edema all ext, warm, brisk cap refill   raines in place yellow urine    Laboratory and Diagnostics  Results from last 7 days   Lab Units 02/27/24  0445 02/26/24  0449 02/25/24  0444 02/24/24  0540 02/23/24  0453 02/22/24  0437 02/21/24  0514   WBC Thousand/uL 17.64* 17.43* 19.27* 17.06* 14.07* 19.37* 14.81*   HEMOGLOBIN g/dL 11.8* 11.3* 13.2 12.5 11.2* 12.6 12.7   HEMATOCRIT % 37.5 35.6* 42.3 40.8 35.6* 38.7 40.4   PLATELETS Thousands/uL 304 270 322 281 221 278 279   NEUTROS PCT % 83* 82*  --   --   --   --   --    MONOS PCT % 10 9  --   --   --   --   --    MONO PCT %  --   --   --   --  1*  --   --    EOS PCT % 1 1  --   --  0  --   --      Results from last 7 days   Lab Units 02/27/24  0445 02/26/24  1238 02/26/24  0449 02/25/24  0444 02/25/24  0000 02/24/24  1658 02/24/24  0540 02/23/24  0453 02/22/24  0437 02/21/24  0514   SODIUM mmol/L 137  --  141 143 142 143 146 146   < > 146   POTASSIUM mmol/L 3.5  --  3.8 4.0 4.0 4.2 4.2 4.1    < > 4.0   CHLORIDE mmol/L 96  --  96 94* 94* 98 105 108   < > 109*   CO2 mmol/L 32  --  37* 36* 36* 35* 34* 33*   < > 30   ANION GAP mmol/L 9  --  8 13 12 10 7 5   < > 7   BUN mg/dL 31*  --  28* 27* 28* 31* 36* 33*   < > 34*   CREATININE mg/dL 0.71  --  0.70 0.78 0.83 0.75 0.64 0.64   < > 1.09   CALCIUM mg/dL 8.7  --  8.8 8.5 8.4 8.5 8.1* 7.6*   < > 7.6*   GLUCOSE RANDOM mg/dL 155*  --  166* 127 114 113 126 196*   < > 179*   ALT U/L  --  49  --   --   --   --   --   --   --  65*   AST U/L  --  39  --   --   --   --   --   --   --  43*   ALK PHOS U/L  --  70  --   --   --   --   --   --   --  77   ALBUMIN g/dL  --  3.2*  --   --   --   --   --   --   --  2.8*   TOTAL BILIRUBIN mg/dL  --  0.95  --   --   --   --   --   --   --  1.07*    < > = values in this interval not displayed.     Results from last 7 days   Lab Units 02/27/24 0445 02/26/24 0449 02/25/24 0444 02/24/24  0540 02/23/24  0453 02/22/24  0437 02/21/24  0514   MAGNESIUM mg/dL 2.3 2.1 2.2 2.3 2.4 2.4 2.7   PHOSPHORUS mg/dL 2.5* 2.8 4.7* 3.6 3.0 2.1* 1.9*      Results from last 7 days   Lab Units 02/22/24 0437 02/21/24  0514 02/20/24  2353 02/20/24  1827   PTT seconds 24 75* 77* 68*          Results from last 7 days   Lab Units 02/24/24  0747   LACTIC ACID mmol/L 1.0                     Results from last 7 days   Lab Units 02/25/24 0444 02/24/24  0747   PROCALCITONIN ng/ml 0.79* 0.41*       ABG:   Results from last 7 days   Lab Units 02/27/24  0445   PH ART  7.496*   PCO2 ART mm Hg 43.0   PO2 ART mm Hg 71.2*   HCO3 ART mmol/L 32.5*   BASE EXC ART mmol/L 8.3   ABG SOURCE  Line, Arterial     , lipase 14    MICRO  RP2 2/26 neg  MRSA - pending  UA 2/26 neg  Sputum 2/24 - 1+Epi, gram variable rods  Bld CX 2/24 NGTD  Sputum 2/25 - pending  Sputum 2/22 2+ Candida  FLU/RSV/COVID neg 2/22     RADIOGRAPHS - images personally reviewed  CXR 2/27 - T/L good position, increasing bilateral alveolar infiltrates R>L, questionable volume component, blunted CP  angles L>R    4 Ext US 2/26 - neg for DVT, bilateral cephalic upper ext superficial thrombophlebitis     KUB 2/26 - OGT in place, diffuse bowel gas, no AFL, no free air appreciated     CXR 2/26- T/L good position, no PTX, improving right sided infiltrates, persistent left sided infiltrates slightly increased     CT angio 2/18 - no PE, extensive bilateral mixed ground glass and consolidative infiltrates, no sig effusions     TTE 2/2024 - EF 60%, grade I diastolic dysfunction, normal RV     Assessment  Acute hypoxic and hypercarbic respiratory failure  Severe ARDS secondary to FLU A and bacterial superinfection  SIRS/Sepsis POA with septic shock  FLU A and suspected bacterial superinfection  Persistent fever - possible secondary infectious insult vs drug fever vs non-infectious etiology  Acute diastolic CHF  New onset atrial fibrillation  Abnormal LFTs  Elevated TG secondary propofol  Suspected ileus   Bilateral superficial upper ext thrombophlebitis      PLAN  NEURO - continue fentanyl gtt, prn versed, after CTs, attempt to wean ketamine, goal RASS - 2 to 0, continue PO valium/oxycodone  CARDIAC - remains on midodrine, off vasopressors, not on AC, now SR - will continue current course  PULM - continue MV support, failed PSV trial this am. Remain AC/VC, PltP 23 - MV D#9 - consider trach if not improving over next few days  GI - Noted KUB, diarrhea and distention, will check CT abd/pelvis, likely resume TFs if stable  RENAL - goal I/Os neg 500-1000cc prn 80mg lasix to achieve goal  ID - D#3 cefepime (on/off previously)- cultures remain negative, no focal source of infection, possible sinusitis vs pulm abscess, will check CT sinus, chest, abd/pelvis   ENDO - ISS  HEME - no active issues  ICU Care - SCDs, LMWH, pepcid, CHG, HOB >30deg  Full Code   Wife updated at bedside, reviewed current care plans    My personal critical care time excluding procedures, teaching and updates is 41 minutes.    Iftikhar Flaherty, DO

## 2024-02-27 NOTE — RESPIRATORY THERAPY NOTE
RT Ventilator Management Note  Hussein Edward III 58 y.o. male MRN: [id number]  Unit/Bed#: MICU 06 Encounter: [id number]      Daily Screen         2/26/2024  0700 2/27/2024  0721          Patient safety screen outcome:: Passed Passed (P)       Spont breathing trial % for 30 min: Yes Yes (P)       Spont breathing trial outcome:: Failed Failed (P)       Spont breathing trial reason failed: RR > 35 bpm;Dyspnea;RSBI > 100 Dyspnea;RR > 35 bpm (P)       Name of Medical Team Notified:: -- Dr. Jain (P)                 Physical Exam:   Assessment Type: (P) Assess only  General Appearance: (P) Sedated  Respiratory Pattern: (P) Assisted  Chest Assessment: (P) Chest expansion symmetrical  Bilateral Breath Sounds: (P) Diminished, Coarse  Suction: (P) ET Tube  O2 Device: [email or web address] t      Resp Comments: (P) received pt on A/C with settings per flow sheet. Pt placed pn pressure support-12, peep of 6. RR-40's with increased work of breathing noted. Pt placed back on CMV. MD aware.

## 2024-02-28 PROBLEM — K56.7 ILEUS (HCC): Status: ACTIVE | Noted: 2024-02-28

## 2024-02-28 LAB
ALBUMIN SERPL BCP-MCNC: 3.1 G/DL (ref 3.5–5)
ALP SERPL-CCNC: 73 U/L (ref 34–104)
ALT SERPL W P-5'-P-CCNC: 64 U/L (ref 7–52)
ANION GAP SERPL CALCULATED.3IONS-SCNC: 7 MMOL/L
ANION GAP SERPL CALCULATED.3IONS-SCNC: 9 MMOL/L
AST SERPL W P-5'-P-CCNC: 39 U/L (ref 13–39)
ATRIAL RATE: 121 BPM
BASE EXCESS BLDA CALC-SCNC: 4.5 MMOL/L
BASE EXCESS BLDA CALC-SCNC: 6.9 MMOL/L
BASE EXCESS BLDA CALC-SCNC: 7 MMOL/L
BASE EXCESS BLDA CALC-SCNC: 7.5 MMOL/L
BASOPHILS # BLD AUTO: 0.04 THOUSANDS/ÂΜL (ref 0–0.1)
BASOPHILS NFR BLD AUTO: 0 % (ref 0–1)
BILIRUB DIRECT SERPL-MCNC: 0.42 MG/DL (ref 0–0.2)
BILIRUB SERPL-MCNC: 0.85 MG/DL (ref 0.2–1)
BUN SERPL-MCNC: 29 MG/DL (ref 5–25)
BUN SERPL-MCNC: 35 MG/DL (ref 5–25)
CALCIUM SERPL-MCNC: 8.2 MG/DL (ref 8.4–10.2)
CALCIUM SERPL-MCNC: 8.7 MG/DL (ref 8.4–10.2)
CHLORIDE SERPL-SCNC: 103 MMOL/L (ref 96–108)
CHLORIDE SERPL-SCNC: 104 MMOL/L (ref 96–108)
CO2 SERPL-SCNC: 31 MMOL/L (ref 21–32)
CO2 SERPL-SCNC: 32 MMOL/L (ref 21–32)
CREAT SERPL-MCNC: 0.72 MG/DL (ref 0.6–1.3)
CREAT SERPL-MCNC: 0.79 MG/DL (ref 0.6–1.3)
EOSINOPHIL # BLD AUTO: 0.17 THOUSAND/ÂΜL (ref 0–0.61)
EOSINOPHIL NFR BLD AUTO: 1 % (ref 0–6)
ERYTHROCYTE [DISTWIDTH] IN BLOOD BY AUTOMATED COUNT: 13 % (ref 11.6–15.1)
GFR SERPL CREATININE-BSD FRML MDRD: 100 ML/MIN/1.73SQ M
GFR SERPL CREATININE-BSD FRML MDRD: 104 ML/MIN/1.73SQ M
GLUCOSE SERPL-MCNC: 106 MG/DL (ref 65–140)
GLUCOSE SERPL-MCNC: 115 MG/DL (ref 65–140)
GLUCOSE SERPL-MCNC: 89 MG/DL (ref 65–140)
GLUCOSE SERPL-MCNC: 89 MG/DL (ref 65–140)
GLUCOSE SERPL-MCNC: 99 MG/DL (ref 65–140)
HCO3 BLDA-SCNC: 29.1 MMOL/L (ref 22–28)
HCO3 BLDA-SCNC: 31.4 MMOL/L (ref 22–28)
HCO3 BLDA-SCNC: 31.6 MMOL/L (ref 22–28)
HCO3 BLDA-SCNC: 31.8 MMOL/L (ref 22–28)
HCT VFR BLD AUTO: 34.6 % (ref 36.5–49.3)
HGB BLD-MCNC: 11 G/DL (ref 12–17)
HOROWITZ INDEX BLDA+IHG-RTO: 40 MM[HG]
HOROWITZ INDEX BLDA+IHG-RTO: 40 MM[HG]
IMM GRANULOCYTES # BLD AUTO: 0.14 THOUSAND/UL (ref 0–0.2)
IMM GRANULOCYTES NFR BLD AUTO: 1 % (ref 0–2)
LYMPHOCYTES # BLD AUTO: 1.19 THOUSANDS/ÂΜL (ref 0.6–4.47)
LYMPHOCYTES NFR BLD AUTO: 9 % (ref 14–44)
MAGNESIUM SERPL-MCNC: 2.6 MG/DL (ref 1.9–2.7)
MCH RBC QN AUTO: 29.7 PG (ref 26.8–34.3)
MCHC RBC AUTO-ENTMCNC: 31.8 G/DL (ref 31.4–37.4)
MCV RBC AUTO: 94 FL (ref 82–98)
MONOCYTES # BLD AUTO: 1.44 THOUSAND/ÂΜL (ref 0.17–1.22)
MONOCYTES NFR BLD AUTO: 11 % (ref 4–12)
NEUTROPHILS # BLD AUTO: 9.78 THOUSANDS/ÂΜL (ref 1.85–7.62)
NEUTS SEG NFR BLD AUTO: 78 % (ref 43–75)
NRBC BLD AUTO-RTO: 0 /100 WBCS
O2 CT BLDA-SCNC: 10.8 ML/DL (ref 16–23)
O2 CT BLDA-SCNC: 15.9 ML/DL (ref 16–23)
O2 CT BLDA-SCNC: 16 ML/DL (ref 16–23)
O2 CT BLDA-SCNC: 16.1 ML/DL (ref 16–23)
OXYHGB MFR BLDA: 94 % (ref 94–97)
OXYHGB MFR BLDA: 95.7 % (ref 94–97)
OXYHGB MFR BLDA: 95.8 % (ref 94–97)
OXYHGB MFR BLDA: 96 % (ref 94–97)
P AXIS: 48 DEGREES
PCO2 BLDA: 43.2 MM HG (ref 36–44)
PCO2 BLDA: 43.8 MM HG (ref 36–44)
PCO2 BLDA: 44 MM HG (ref 36–44)
PCO2 BLDA: 45.8 MM HG (ref 36–44)
PEEP RESPIRATORY: 6 CM[H2O]
PEEP RESPIRATORY: 6 CM[H2O]
PH BLDA: 7.45 [PH] (ref 7.35–7.45)
PH BLDA: 7.46 [PH] (ref 7.35–7.45)
PH BLDA: 7.47 [PH] (ref 7.35–7.45)
PH BLDA: 7.48 [PH] (ref 7.35–7.45)
PHOSPHATE SERPL-MCNC: 2.7 MG/DL (ref 2.7–4.5)
PLATELET # BLD AUTO: 319 THOUSANDS/UL (ref 149–390)
PMV BLD AUTO: 9 FL (ref 8.9–12.7)
PO2 BLDA: 77 MM HG (ref 75–129)
PO2 BLDA: 92.7 MM HG (ref 75–129)
PO2 BLDA: 94.3 MM HG (ref 75–129)
PO2 BLDA: 95 MM HG (ref 75–129)
POTASSIUM SERPL-SCNC: 3.7 MMOL/L (ref 3.5–5.3)
POTASSIUM SERPL-SCNC: 4.1 MMOL/L (ref 3.5–5.3)
PR INTERVAL: 140 MS
PROT SERPL-MCNC: 6 G/DL (ref 6.4–8.4)
QRS AXIS: -28 DEGREES
QRSD INTERVAL: 84 MS
QT INTERVAL: 326 MS
QTC INTERVAL: 462 MS
RBC # BLD AUTO: 3.7 MILLION/UL (ref 3.88–5.62)
SODIUM SERPL-SCNC: 142 MMOL/L (ref 135–147)
SODIUM SERPL-SCNC: 144 MMOL/L (ref 135–147)
SPECIMEN SOURCE: ABNORMAL
T WAVE AXIS: 18 DEGREES
VENT AC: 16
VENT AC: 16
VENT- AC: AC
VENT- AC: AC
VENTRICULAR RATE: 121 BPM
VT SETTING VENT: 400 ML
VT SETTING VENT: 400 ML
WBC # BLD AUTO: 12.76 THOUSAND/UL (ref 4.31–10.16)

## 2024-02-28 PROCEDURE — 80048 BASIC METABOLIC PNL TOTAL CA: CPT

## 2024-02-28 PROCEDURE — 83735 ASSAY OF MAGNESIUM: CPT | Performed by: STUDENT IN AN ORGANIZED HEALTH CARE EDUCATION/TRAINING PROGRAM

## 2024-02-28 PROCEDURE — 94760 N-INVAS EAR/PLS OXIMETRY 1: CPT

## 2024-02-28 PROCEDURE — 85025 COMPLETE CBC W/AUTO DIFF WBC: CPT

## 2024-02-28 PROCEDURE — 82948 REAGENT STRIP/BLOOD GLUCOSE: CPT

## 2024-02-28 PROCEDURE — 82805 BLOOD GASES W/O2 SATURATION: CPT

## 2024-02-28 PROCEDURE — 94640 AIRWAY INHALATION TREATMENT: CPT

## 2024-02-28 PROCEDURE — NC001 PR NO CHARGE: Performed by: INTERNAL MEDICINE

## 2024-02-28 PROCEDURE — 99291 CRITICAL CARE FIRST HOUR: CPT | Performed by: INTERNAL MEDICINE

## 2024-02-28 PROCEDURE — 93010 ELECTROCARDIOGRAM REPORT: CPT | Performed by: INTERNAL MEDICINE

## 2024-02-28 PROCEDURE — 84100 ASSAY OF PHOSPHORUS: CPT | Performed by: STUDENT IN AN ORGANIZED HEALTH CARE EDUCATION/TRAINING PROGRAM

## 2024-02-28 PROCEDURE — 80076 HEPATIC FUNCTION PANEL: CPT | Performed by: INTERNAL MEDICINE

## 2024-02-28 PROCEDURE — 94003 VENT MGMT INPAT SUBQ DAY: CPT

## 2024-02-28 RX ORDER — FUROSEMIDE 10 MG/ML
80 INJECTION INTRAMUSCULAR; INTRAVENOUS ONCE
Status: COMPLETED | OUTPATIENT
Start: 2024-02-28 | End: 2024-02-28

## 2024-02-28 RX ORDER — FUROSEMIDE 10 MG/ML
40 INJECTION INTRAMUSCULAR; INTRAVENOUS ONCE
Status: COMPLETED | OUTPATIENT
Start: 2024-02-28 | End: 2024-02-28

## 2024-02-28 RX ORDER — MIDODRINE HYDROCHLORIDE 5 MG/1
10 TABLET ORAL EVERY 8 HOURS
Status: DISCONTINUED | OUTPATIENT
Start: 2024-02-28 | End: 2024-03-01

## 2024-02-28 RX ADMIN — CEFEPIME 2000 MG: 2 INJECTION, POWDER, FOR SOLUTION INTRAVENOUS at 18:01

## 2024-02-28 RX ADMIN — POLYETHYLENE GLYCOL 3350 17 G: 17 POWDER, FOR SOLUTION ORAL at 09:10

## 2024-02-28 RX ADMIN — LEVALBUTEROL HYDROCHLORIDE 1.25 MG: 1.25 SOLUTION RESPIRATORY (INHALATION) at 14:08

## 2024-02-28 RX ADMIN — POLYETHYLENE GLYCOL 3350 17 G: 17 POWDER, FOR SOLUTION ORAL at 20:29

## 2024-02-28 RX ADMIN — CEFEPIME 2000 MG: 2 INJECTION, POWDER, FOR SOLUTION INTRAVENOUS at 11:26

## 2024-02-28 RX ADMIN — FAMOTIDINE 20 MG: 40 POWDER, FOR SUSPENSION ORAL at 17:36

## 2024-02-28 RX ADMIN — CHLORHEXIDINE GLUCONATE 15 ML: 1.2 SOLUTION ORAL at 09:09

## 2024-02-28 RX ADMIN — VANCOMYCIN HYDROCHLORIDE 1250 MG: 10 INJECTION, POWDER, LYOPHILIZED, FOR SOLUTION INTRAVENOUS at 07:09

## 2024-02-28 RX ADMIN — CEFEPIME 2000 MG: 2 INJECTION, POWDER, FOR SOLUTION INTRAVENOUS at 02:49

## 2024-02-28 RX ADMIN — DIAZEPAM 5 MG: 5 TABLET ORAL at 15:50

## 2024-02-28 RX ADMIN — CHLORHEXIDINE GLUCONATE 15 ML: 1.2 SOLUTION ORAL at 20:29

## 2024-02-28 RX ADMIN — FAMOTIDINE 20 MG: 40 POWDER, FOR SUSPENSION ORAL at 09:10

## 2024-02-28 RX ADMIN — LEVALBUTEROL HYDROCHLORIDE 1.25 MG: 1.25 SOLUTION RESPIRATORY (INHALATION) at 17:54

## 2024-02-28 RX ADMIN — OXYCODONE HYDROCHLORIDE 10 MG: 5 SOLUTION ORAL at 15:50

## 2024-02-28 RX ADMIN — MIDODRINE HYDROCHLORIDE 10 MG: 5 TABLET ORAL at 07:09

## 2024-02-28 RX ADMIN — Medication 100 MCG/HR: at 02:42

## 2024-02-28 RX ADMIN — OXYCODONE HYDROCHLORIDE 10 MG: 5 SOLUTION ORAL at 09:09

## 2024-02-28 RX ADMIN — DIAZEPAM 5 MG: 5 TABLET ORAL at 09:09

## 2024-02-28 RX ADMIN — OXYCODONE HYDROCHLORIDE 10 MG: 5 SOLUTION ORAL at 20:29

## 2024-02-28 RX ADMIN — IPRATROPIUM BROMIDE 0.5 MG: 0.5 SOLUTION RESPIRATORY (INHALATION) at 17:54

## 2024-02-28 RX ADMIN — DIAZEPAM 5 MG: 5 TABLET ORAL at 02:50

## 2024-02-28 RX ADMIN — GABAPENTIN 100 MG: 250 SOLUTION ORAL at 20:29

## 2024-02-28 RX ADMIN — MIDAZOLAM 1 MG: 1 INJECTION INTRAMUSCULAR; INTRAVENOUS at 05:23

## 2024-02-28 RX ADMIN — DIAZEPAM 5 MG: 5 TABLET ORAL at 20:29

## 2024-02-28 RX ADMIN — IPRATROPIUM BROMIDE 0.5 MG: 0.5 SOLUTION RESPIRATORY (INHALATION) at 02:35

## 2024-02-28 RX ADMIN — IPRATROPIUM BROMIDE 0.5 MG: 0.5 SOLUTION RESPIRATORY (INHALATION) at 14:08

## 2024-02-28 RX ADMIN — GABAPENTIN 100 MG: 250 SOLUTION ORAL at 16:15

## 2024-02-28 RX ADMIN — LEVALBUTEROL HYDROCHLORIDE 1.25 MG: 1.25 SOLUTION RESPIRATORY (INHALATION) at 02:35

## 2024-02-28 RX ADMIN — OXYCODONE HYDROCHLORIDE 10 MG: 5 SOLUTION ORAL at 02:50

## 2024-02-28 RX ADMIN — MAGNESIUM OXIDE TAB 400 MG (241.3 MG ELEMENTAL MG) 400 MG: 400 (241.3 MG) TAB at 09:09

## 2024-02-28 RX ADMIN — MIDAZOLAM 1 MG: 1 INJECTION INTRAMUSCULAR; INTRAVENOUS at 09:09

## 2024-02-28 RX ADMIN — LEVALBUTEROL HYDROCHLORIDE 1.25 MG: 1.25 SOLUTION RESPIRATORY (INHALATION) at 08:00

## 2024-02-28 RX ADMIN — FUROSEMIDE 40 MG: 10 INJECTION, SOLUTION INTRAMUSCULAR; INTRAVENOUS at 02:53

## 2024-02-28 RX ADMIN — GABAPENTIN 100 MG: 250 SOLUTION ORAL at 09:10

## 2024-02-28 RX ADMIN — FUROSEMIDE 80 MG: 10 INJECTION, SOLUTION INTRAMUSCULAR; INTRAVENOUS at 11:24

## 2024-02-28 RX ADMIN — MIDODRINE HYDROCHLORIDE 10 MG: 5 TABLET ORAL at 22:16

## 2024-02-28 RX ADMIN — Medication 75 MCG/HR: at 15:55

## 2024-02-28 RX ADMIN — IPRATROPIUM BROMIDE 0.5 MG: 0.5 SOLUTION RESPIRATORY (INHALATION) at 08:00

## 2024-02-28 RX ADMIN — MIDODRINE HYDROCHLORIDE 10 MG: 5 TABLET ORAL at 15:51

## 2024-02-28 RX ADMIN — ENOXAPARIN SODIUM 40 MG: 40 INJECTION SUBCUTANEOUS at 16:15

## 2024-02-28 NOTE — QUICK NOTE
Called pt's wife, Mrs. Kathi Edward, but went to voicemail.  Left voicemail stating patient's current condition is improving and will call again tomorrow.    Monty Pires MD  [facility]  Internal Medicine Residency PGY-1

## 2024-02-28 NOTE — PROGRESS NOTES
[facility]  Progress Note: Critical Care  Name: Hussein Edward III 58 y.o. male I MRN: [id number]  Unit/Bed#: MICU 06 I Date of Admission: 2/19/2024   Date of Service: 2/28/2024 I Hospital Day: 9    Assessment/Plan   Acute hypoxic and hypercapnic respiratory failure 2/2 ARDS  Influenza A pneumonia  Persistent fever  Ileus  Acute metabolic encephalopathy  New onset atrial fibrillation, resolved  Morbid obesity  Hypertriglyceridemia 2/2 propofol    Neuro:   #Sedated  Currently on fentanyl 100 gtt  Previously unable to tolerate precedex, became bradycardic to 30s on 02/21  May consider switching to dilaudid d/t tachyphylaxis  Off propofol, ketamine and nimbex at this time  Off versed  On gabapentin 200 mg TID  Added YEHUDA valium & oxycodone q6H  PRN versed bolus to attain RASS -1 to 0. (x5 doses on 02/27)     CV:   #Hypotension likely 2/2 induction for RSI  On Levo 3, wean as possible to maintain MAP>65  A-line for hemodynamic monitoring  TTE 02/19: LVEF 55-60%. G1DD.  Started midodrine 10 mg TID  #New onset atrial fibrillation, resolved, Now NSR.  Pt found to have rates to 150s s/p CVC placement  gave one time dose of lopressor, patient started on amiodarone drip. Patient's HR still elevated with afib. Given dose of digoxin, resolved.  Continue to monitor, HR better in 90s. Has some periods of tachy that correspond to low grade fevers.  Not on heparin ACS protocol anymore, was restarted on DVT prophylactic heparin     Pulm:  #Acute Respiratory Distress Syndrome  2/2 influenza A pneumonia, possible superimposed bacterial pna  CTA on 02/18 without evidence of PE but findings of extensive bilateral groundglass and consolidative opacities with predominantly perihilar and peripheral distribution compatible with viral and/or atypical pneumonia, likely influenza given clinical history.  PaO2/FiO2 ratio 243 on 2/26  Intubation day 10, consider discussion w/ family regarding possible trach    Antibiotics as mentioned below under ID but finished 5/5 of Ceftriaxone and Azithromycin on 02/21, not currently on abx. Patient started spiking fevers, on cefepime, refer to ID section.  Q4H ABGs  Nebs- Xopenex, Atrovent  CXR appears stable or improved  Underwent bronchoscopy on 02/19 -> grew Candida glabrata, viral culture pending  Consider possible bronch given pt now supine  S/p 5 days of solu-medrol  Sputum cultures 02/22 no growth  Blood cultures NGTD  Wean PEEP and FiO2 as tolerated   Will attempt SBTagain on 02/28, consider possible discussion regarding trach  Can consider PSV trial today, RSBI 78.  Previously on lasix gtt, now receiving lasix PRN to reach net goal of 0.5-1L (received 120 total 02/27)    GI:   Diagnosis: Ileus  Continue pepcid prophylaxis  Hold TF d/t worsening distention  Unclear etiology, does not appear to be infectious, although can be considered  Appears to be 2/2 multiple days of TF w/o BM earlier in admission  Total of 4 liquid BM on 02/27  Bowel regimen (reduced to miralax qd + senna qd)  KUB 02/26: Multiple air-filled small bowel loops and air-filled large bowel loops with nonobstructive bowel gas pattern   CT CAP 02/27: New colonic distention to the level of the rectum is more likely related to ileus without any zone of transition. Clinical correlation for any signs of colitis whether infectious or postinfectious. Follow-up x-ray is advised to assess for any progressive distention. The right colon measures approximately 7.3 cm in its greatest caliber at the time of the scan.  Can consider repeat KUB on 02/28     :   #CKD Stage II  Plan: sCr baseline range of 0.8-1.0, stable currently  Avoid nephrotoxic medications  Monitor sCr with daily BMPs  Diuresing, monitor renal function  #Hematuria  (1+) ketones and protein -> likely due to dehydration  large occult blood, with innumerable RBCs -> might be associated with acute drop in hgb. Consider [id number] injury by raines  catheter.  Improved on repeat UA     F/E/N:    F: Not currently on any IV fluids  E: Will monitor and correct any electrolyte derangements. Repleting phos  N: continuing TF to goal     Heme/Onc:   DVT prophylaxis heparin     Endo:   #Steroid induced hyperglycemia  Hyperglycemia resolving with discontinuation of steroids  Continue SSI1 for now     ID:   #New-onset fever while on antibiotics  Pt had ongoing influenza that was untreated for 4 days PTA  Likely not bacterial superimposed, likely just in setting of viral infection  Blood culture, sputum culture, bronchial culture unrevealing  S/p ceftriaxone/cefepime, azithromycin, and tamiflu  UA:  4-10 WBCs, less likely to be infectious cause  Viral panel negative  Can consider performing bronch  Continues to spike fevers off abx, unclear source of infection, possibly pulmonary source  VAS duplex findings of acute superficial thrombophlebitis was noted in the cephalic vein at the distal upper arm  Would not explain pt's current ongoing fevers  New onset abdominal distention, may need to consider GI sources  Unlikely to be urinary source at this time  RP2 panel negative  LFT, lipase, TG negative  MRSA pending  Started cefepime and vanc for broad spectrum coverage     MSK/Skin:   Frequent turning and repositioning  Wound care as needed    Disposition: Critical care    ICU Core Measures     Vented Patient  VAP Bundle  VAP bundle ordered     A: Assess, Prevent, and Manage Pain Has pain been assessed? Yes  Need for changes to pain regimen? No   B: Both Spontaneous Awakening Trials (SATs) and Spontaneous Breathing Trials (SBTs) Plan to perform spontaneous awakening trial today? Yes   Plan to perform spontaneous breathing trial today? Yes   Obvious barriers to extubation? Yes   C: Choice of Sedation RASS Goal: -1 Drowsy or 0 Alert and Calm  Need for changes to sedation or analgesia regimen? Yes   D: Delirium CAM-ICU: Negative   E: Early Mobility  Plan for early mobility? Yes    F: Family Engagement Plan for family engagement today? Yes       Antibiotic Review: Awaiting culture results.  and Continue broad spectrum secondary to severity of illness.     Review of Invasive Devices:    Shields Plan: Continue for accurate I/O monitoring for 48 hours  Central access plan: Patient has multiple central venous catheters.   Thelma Plan: Keep arterial line for hemodynamic monitoring and frequent ABGs    Prophylaxis:  VTE VTE covered by:  enoxaparin, Subcutaneous, 40 mg at 02/27/24 1630       Stress Ulcer  covered byfamotidine (PEPCID) oral suspension 20 mg [[id number]]        Significant 24hr Events     24hr events: Currently on SCMV. Continues to follow commands, opens eyes to names, and able to nod his head. Not requiring further pressor support. Able to wean down sedation requirements. Currently not in any pain at this time.      Subjective     Review of Systems   Unable to perform ROS: Intubated        Objective                            Vitals I/O      Most Recent Min/Max in 24hrs   Temp 100 °F (37.8 °C) Temp  Min: 99.7 °F (37.6 °C)  Max: 101.1 °F (38.4 °C)   Pulse 105 Pulse  Min: 90  Max: 133   Resp (!) 28 Resp  Min: 20  Max: 31   /64 BP  Min: 86/54  Max: 141/83   O2 Sat 95 % SpO2  Min: 93 %  Max: 97 %      Intake/Output Summary (Last 24 hours) at 2/28/2024 0749  Last data filed at 2/28/2024 0620  Gross per 24 hour   Intake 2216.68 ml   Output 2590 ml   Net -373.32 ml       Diet Enteral/Parenteral; Tube Feeding No Oral Diet; Vital High Protein; Continuous; 45; Prosource Protein Liquid - Two Packets; OD; 250; Water; Every 6 hours    Invasive Monitoring           Physical Exam   Physical Exam  Vitals and nursing note reviewed.   Eyes:      Extraocular Movements: Extraocular movements intact.   Skin:     General: Skin is warm and dry.      Capillary Refill: Capillary refill takes less than 2 seconds.      Coloration: Skin is not jaundiced.   HENT:      Head: Normocephalic and atraumatic.       Nose: Nasogastric tube present.   Neck:      Vascular: Central line present.   Cardiovascular:      Rate and Rhythm: Normal rate and regular rhythm.      Pulses: Normal pulses.      Heart sounds: Normal heart sounds.   Musculoskeletal:      Right lower leg: No edema.      Left lower leg: No edema.   Abdominal: General: Bowel sounds are normal. There is distension (worsening).     Palpations: Abdomen is soft.   Constitutional:       General: He is not in acute distress.     Interventions: He is sedated and intubated.   Pulmonary:      Effort: No accessory muscle usage, respiratory distress or accessory muscle usage. He is intubated.      Breath sounds: Rales present.      Comments: Mechanically ventilated breath sounds  Neurological:      Mental Status: He is calm and unresponsive.   Genitourinary/Anorectal:  Shields present.          Diagnostic Studies      EKG:   Imaging:  I have personally reviewed pertinent reports.       Medications:  Scheduled PRN   cefepime, 2,000 mg, Q8H  chlorhexidine, 15 mL, Q12H YEHUDA  diazepam, 5 mg, Q6H  enoxaparin, 40 mg, Q24H YEHUDA  famotidine, 20 mg, BID  gabapentin, 100 mg, TID  insulin lispro, 1-6 Units, Q6H YEHUDA  ipratropium, 0.5 mg, Q6H  levalbuterol, 1.25 mg, Q6H  magnesium Oxide, 400 mg, Daily  midodrine, 10 mg, TID AC  oxyCODONE, 10 mg, Q6H  polyethylene glycol, 17 g, BID  vancomycin, 1,250 mg, Q12H      acetaminophen, 1,000 mg, Q6H PRN  fentanyl citrate (PF), 100 mcg, Q2H PRN  midazolam, 1 mg, Q2H PRN Max 8/day  sodium chloride, 1 spray, Q1H PRN       Continuous    fentaNYL, 75 mcg/hr, Last Rate: 75 mcg/hr (02/28/24 0747)  norepinephrine, 1-30 mcg/min, Last Rate: Stopped (02/26/24 1824)         Labs:    CBC    Recent Labs     02/27/24 0445 02/28/24  0439   WBC 17.64* 12.76*   HGB 11.8* 11.0*   HCT 37.5 34.6*    319     BMP    Recent Labs     02/27/24  0445 02/27/24  1854   SODIUM 137 141   K 3.5 3.3*   CL 96 99   CO2 32 32   AGAP 9 10   BUN 31* 30*   CREATININE 0.71 0.77    CALCIUM 8.7 8.5       Coags    No recent results     Additional Electrolytes  Recent Labs     02/27/24  0445 02/28/24  0439   MG 2.3 2.6   PHOS 2.5* 2.7          Blood Gas    Recent Labs     02/28/24  0440   PHART 7.479*   NZV2BAD 43.8   PO2ART 77.0   VFF0UHK 31.8*   BEART 7.5   SOURCE Line, Arterial     Recent Labs     02/28/24  0440   SOURCE Line, Arterial    LFTs  Recent Labs     02/26/24  1238   ALT 49   AST 39   ALKPHOS 70   ALB 3.2*   TBILI 0.95       Infectious  No recent results  Glucose  Recent Labs     02/27/24  0445 02/27/24  1854   GLUC 155* 113               Monty Pires MD

## 2024-02-28 NOTE — CASE MANAGEMENT
Case Management Discharge Planning Note    Patient name Hussein Edward III  Location MICU 06/MICU 06 MRN [id number]  : 1965 Date 2024       Current Admission Date: 2024  Current Admission Diagnosis:Influenza A   Patient Active Problem List    Diagnosis Date Noted    Ileus (HCC) 2024    Persistent fever 2024    ARDS (adult respiratory distress syndrome) ([facility]) 2024    Acute metabolic encephalopathy 2024    Influenza A 2024    Insomnia 2024    Migraine 2024    Anxiety 2024    Acute respiratory failure with hypoxia (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) 2021    Deviated nasal septum 2019    Hypertrophy of nasal turbinates 2019    Nasal obstruction 2019    Nasal septal deviation 2019    Seasonal allergic rhinitis 2019    Nasal turbinate hypertrophy 2019    Mixed dyslipidemia 2017    WALE (obstructive sleep apnea) 2016    Low back pain 2016    Strain of lumbar region 2015      LOS (days): 9  Geometric Mean LOS (GMLOS) (days):   Days to GMLOS:     OBJECTIVE:  Risk of Unplanned Readmission Score: 20.56         Current admission status: Inpatient   Preferred Pharmacy:   [id number] - [address] [address]  Phone: [phone] Fax: [phone]    [id number] [address]  [address]  [facility] [address]  Phone: [phone] Fax: [phone]    Primary Care Provider: Iftikhar Roque MD    Primary Insurance: [employer]  Secondary Insurance:     DISCHARGE DETAILS:           Additional Comments: Per chart review, pt  to remain inpatient. Pt remains intubated and sedated. Pt off of propofol, ketamine and nimbex at this time. CM to continue to follow for discharge planning needs.

## 2024-02-28 NOTE — PLAN OF CARE
Problem: Prexisting or High Potential for Compromised Skin Integrity  Goal: Skin integrity is maintained or improved  Description: INTERVENTIONS:  - Identify patients at risk for skin breakdown  - Assess and monitor skin integrity  - Assess and monitor nutrition and hydration status  - Monitor labs   - Assess for incontinence   - Turn and reposition patient  - Assist with mobility/ambulation  - Relieve pressure over bony prominences  - Avoid friction and shearing  - Provide appropriate hygiene as needed including keeping skin clean and dry  - Evaluate need for skin moisturizer/barrier cream  - Collaborate with interdisciplinary team   - Patient/family teaching  - Consider wound care consult   Outcome: Progressing     Problem: PAIN - ADULT  Goal: Verbalizes/displays adequate comfort level or baseline comfort level  Description: Interventions:  - Encourage patient to monitor pain and request assistance  - Assess pain using appropriate pain scale  - Administer analgesics based on type and severity of pain and evaluate response  - Implement non-pharmacological measures as appropriate and evaluate response  - Consider cultural and social influences on pain and pain management  - Notify physician/advanced practitioner if interventions unsuccessful or patient reports new pain  Outcome: Progressing     Problem: INFECTION - ADULT  Goal: Absence or prevention of progression during hospitalization  Description: INTERVENTIONS:  - Assess and monitor for signs and symptoms of infection  - Monitor lab/diagnostic results  - Monitor all insertion sites, i.e. indwelling lines, tubes, and drains  - Monitor endotracheal if appropriate and nasal secretions for changes in amount and color  - [place] appropriate cooling/warming therapies per order  - Administer medications as ordered  - Instruct and encourage patient and family to use good hand hygiene technique  - Identify and instruct in appropriate isolation precautions for  identified infection/condition  Outcome: Progressing  Goal: Absence of fever/infection during neutropenic period  Description: INTERVENTIONS:  - Monitor WBC    Outcome: Progressing     Problem: SAFETY ADULT  Goal: Patient will remain free of falls  Description: INTERVENTIONS:  - Educate patient/family on patient safety including physical limitations  - Instruct patient to call for assistance with activity   - Consult OT/PT to assist with strengthening/mobility   - Keep Call bell within reach  - Keep bed low and locked with side rails adjusted as appropriate  - Keep care items and personal belongings within reach  - Initiate and maintain comfort rounds  - Make Fall Risk Sign visible to staff  - Offer Toileting every 2 Hours, in advance of need  - Initiate/Maintain bed alarm  - Apply yellow socks and bracelet for high fall risk patients  - Consider moving patient to room near nurses station  Outcome: Progressing     Problem: DISCHARGE PLANNING  Goal: Discharge to home or other facility with appropriate resources  Description: INTERVENTIONS:  - Identify barriers to discharge w/patient and caregiver  - Arrange for needed discharge resources and transportation as appropriate  - Identify discharge learning needs (meds, wound care, etc.)  - Arrange for interpretive services to assist at discharge as needed  - Refer to Case Management Department for coordinating discharge planning if the patient needs post-hospital services based on physician/advanced practitioner order or complex needs related to functional status, cognitive ability, or social support system  Outcome: Progressing     Problem: Knowledge Deficit  Goal: Patient/family/caregiver demonstrates understanding of disease process, treatment plan, medications, and discharge instructions  Description: Complete learning assessment and assess knowledge base.  Interventions:  - Provide teaching at level of understanding  - Provide teaching via preferred learning  methods  Outcome: Progressing     Problem: SAFETY,RESTRAINT: NV/NON-SELF DESTRUCTIVE BEHAVIOR  Goal: Remains free of harm/injury (restraint for non violent/non self-detsructive behavior)  Description: INTERVENTIONS:  - Instruct patient/family regarding restraint use   - Assess and monitor physiologic and psychological status   - Provide interventions and comfort measures to meet assessed patient needs   - Identify and implement measures to help patient regain control  - Assess readiness for release of restraint   Outcome: Progressing

## 2024-02-28 NOTE — PROGRESS NOTES
Critical Care Attending Note     58 year old man with WALE, meningioma. He presented 2/16 to [facility]- for shortness of breath, recent FLU A infection and pneumonia. Required intubation on 2/19, transferred for possible VVECMO needs. He required NMB and PP x 2 last 2/23.       24hr events - remained intubated, failed SBT this am with tachypnea and lower VT - RSBI 120's at 30 min, returned to AC/VC.      VS .1, now 100.0, HR 's, MAPS 60-70's, SpO2 95% 0.4/6P, I/Os -400cc  Exam  GEN older man, intubated, was off sedation awake, RASS 0, following commands all extremities, during SBT increased fatigue and some diaphoresis - resolved post SBT  HEENT AT, MMM, no thrush, no scleral icterus  NECK no accessory muscle use after return to AC/VC, CVC w/o purulence  CV reg, single s1/2, no m/r  Pulm mechanical BS, scant white secretions, no wheeze or rales  ABD +BS, soft, now ND, no rebound, NT  EXT 1+ diffuse edema, warm, brisk cap refill   raines in place  yellow urine     Laboratory and Diagnostics  Results from last 7 days   Lab Units 02/28/24 0439 02/27/24  0445 02/26/24  0449 02/25/24  0444 02/24/24  0540 02/23/24  0453 02/22/24  0437   WBC Thousand/uL 12.76* 17.64* 17.43* 19.27* 17.06* 14.07* 19.37*   HEMOGLOBIN g/dL 11.0* 11.8* 11.3* 13.2 12.5 11.2* 12.6   HEMATOCRIT % 34.6* 37.5 35.6* 42.3 40.8 35.6* 38.7   PLATELETS Thousands/uL 319 304 270 322 281 221 278   NEUTROS PCT % 78* 83* 82*  --   --   --   --    MONOS PCT % 11 10 9  --   --   --   --    MONO PCT %  --   --   --   --   --  1*  --    EOS PCT % 1 1 1  --   --  0  --      Results from last 7 days   Lab Units 02/28/24  0439 02/27/24  1854 02/27/24  0445 02/26/24  1238 02/26/24  0449 02/25/24  0444 02/25/24  0000 02/24/24  1658   SODIUM mmol/L 142 141 137  --  141 143 142 143   POTASSIUM mmol/L 4.1 3.3* 3.5  --  3.8 4.0 4.0 4.2   CHLORIDE mmol/L 104 99 96  --  96 94* 94* 98   CO2 mmol/L 31 32 32  --  37* 36* 36* 35*   ANION GAP mmol/L 7 10 9  --  8 13 12  10   BUN mg/dL 29* 30* 31*  --  28* 27* 28* 31*   CREATININE mg/dL 0.79 0.77 0.71  --  0.70 0.78 0.83 0.75   CALCIUM mg/dL 8.2* 8.5 8.7  --  8.8 8.5 8.4 8.5   GLUCOSE RANDOM mg/dL 99 113 155*  --  166* 127 114 113   ALT U/L 64*  --   --  49  --   --   --   --    AST U/L 39  --   --  39  --   --   --   --    ALK PHOS U/L 73  --   --  70  --   --   --   --    ALBUMIN g/dL 3.1*  --   --  3.2*  --   --   --   --    TOTAL BILIRUBIN mg/dL 0.85  --   --  0.95  --   --   --   --      Results from last 7 days   Lab Units 02/28/24  0439 02/27/24  0445 02/26/24  0449 02/25/24  0444 02/24/24  0540 02/23/24  0453 02/22/24  0437   MAGNESIUM mg/dL 2.6 2.3 2.1 2.2 2.3 2.4 2.4   PHOSPHORUS mg/dL 2.7 2.5* 2.8 4.7* 3.6 3.0 2.1*      Results from last 7 days   Lab Units 02/22/24  0437   PTT seconds 24          Results from last 7 days   Lab Units 02/24/24  0747   LACTIC ACID mmol/L 1.0                     Results from last 7 days   Lab Units 02/25/24  0444 02/24/24  0747   PROCALCITONIN ng/ml 0.79* 0.41*       ABG:   Results from last 7 days   Lab Units 02/28/24  0440   PH ART  7.479*   PCO2 ART mm Hg 43.8   PO2 ART mm Hg 77.0   HCO3 ART mmol/L 31.8*   BASE EXC ART mmol/L 7.5   ABG SOURCE  Line, Arterial       , lipase 14     MICRO  RP2 2/26 neg  MRSA - pending  UA 2/26 neg  Sputum 2/24 - 1+Epi, gram variable rods  Bld CX 2/24 NGTD  Sputum 2/25 - pending  Sputum 2/22 2+ Candida  FLU/RSV/COVID neg 2/22     RADIOGRAPHS - images personally reviewed  CT C/A/P 2/27 - scattered ground glass infiltrates, basilar consolidations overall stable, no sig effusions, distended GB, some colonic distention without transition point    CT sinus 2/27 - no sig sinus disease    CXR 2/27 - T/L good position, increasing bilateral alveolar infiltrates R>L, questionable volume component, blunted CP angles L>R     4 Ext US 2/26 - neg for DVT, bilateral cephalic upper ext superficial thrombophlebitis      KUB 2/26 - OGT in place, diffuse bowel gas, no AFL,  no free air appreciated     CXR 2/26- T/L good position, no PTX, improving right sided infiltrates, persistent left sided infiltrates slightly increased     CT angio 2/18 - no PE, extensive bilateral mixed ground glass and consolidative infiltrates, no sig effusions     TTE 2/2024 - EF 60%, grade I diastolic dysfunction, normal RV     Assessment  Acute hypoxic and hypercarbic respiratory failure  Severe ARDS secondary to FLU A and bacterial superinfection  SIRS/Sepsis POA with septic shock  FLU A and suspected bacterial superinfection  Persistent fever - possible secondary infectious insult vs drug fever vs non-infectious etiology  Acute diastolic CHF  New onset atrial fibrillation  Abnormal LFTs  Elevated TG secondary propofol  Suspected ileus   Bilateral superficial upper ext thrombophlebitis      PLAN  NEURO - continue fentanyl gtt, prn versed, goal RASS - 2 to 0, continue PO valium/oxycodone for now  CARDIAC - remains on midodrine, off vasopressors, not on AC, now SR - will continue current course  PULM - continue MV support, failed SBT trial this am. Remain AC/VC, may try PSV later today - MV D#10 - consider trach if not improving over next few days  GI - can resume TFs, monitor abd exam, bowel regimen - if further fevers plan RUQ US and possible HIDA  RENAL - goal I/Os neg 500-1000cc prn 80mg lasix to achieve goal  ID - D#4/7 cefepime (on/off previously)- cultures remain negative, no focal source of infection likely pneumonia   ENDO - ISS  HEME - no active issues  ICU Care - SCDs, LMWH, pepcid, CHG, HOB >30deg  Full Code   Will update his wife upon her arrival    My personal critical care time excluding procedures, teaching and updates is 49 minutes    Iftikhar Flaherty, DO

## 2024-02-29 LAB
ANION GAP SERPL CALCULATED.3IONS-SCNC: 8 MMOL/L
ANION GAP SERPL CALCULATED.3IONS-SCNC: 9 MMOL/L
BACTERIA BLD CULT: NORMAL
BACTERIA BLD CULT: NORMAL
BACTERIA SPT RESP CULT: ABNORMAL
BACTERIA SPT RESP CULT: ABNORMAL
BASE EXCESS BLDA CALC-SCNC: 6.9 MMOL/L
BASOPHILS # BLD AUTO: 0.03 THOUSANDS/ÂΜL (ref 0–0.1)
BASOPHILS NFR BLD AUTO: 0 % (ref 0–1)
BUN SERPL-MCNC: 33 MG/DL (ref 5–25)
BUN SERPL-MCNC: 38 MG/DL (ref 5–25)
CALCIUM SERPL-MCNC: 8.8 MG/DL (ref 8.4–10.2)
CALCIUM SERPL-MCNC: 9.2 MG/DL (ref 8.4–10.2)
CHLORIDE SERPL-SCNC: 101 MMOL/L (ref 96–108)
CHLORIDE SERPL-SCNC: 102 MMOL/L (ref 96–108)
CO2 SERPL-SCNC: 32 MMOL/L (ref 21–32)
CO2 SERPL-SCNC: 33 MMOL/L (ref 21–32)
CREAT SERPL-MCNC: 0.63 MG/DL (ref 0.6–1.3)
CREAT SERPL-MCNC: 0.63 MG/DL (ref 0.6–1.3)
EOSINOPHIL # BLD AUTO: 0.22 THOUSAND/ÂΜL (ref 0–0.61)
EOSINOPHIL NFR BLD AUTO: 2 % (ref 0–6)
ERYTHROCYTE [DISTWIDTH] IN BLOOD BY AUTOMATED COUNT: 13.1 % (ref 11.6–15.1)
GFR SERPL CREATININE-BSD FRML MDRD: 110 ML/MIN/1.73SQ M
GFR SERPL CREATININE-BSD FRML MDRD: 110 ML/MIN/1.73SQ M
GLUCOSE SERPL-MCNC: 101 MG/DL (ref 65–140)
GLUCOSE SERPL-MCNC: 108 MG/DL (ref 65–140)
GLUCOSE SERPL-MCNC: 118 MG/DL (ref 65–140)
GLUCOSE SERPL-MCNC: 120 MG/DL (ref 65–140)
GLUCOSE SERPL-MCNC: 130 MG/DL (ref 65–140)
GRAM STN SPEC: ABNORMAL
HCO3 BLDA-SCNC: 32 MMOL/L (ref 22–28)
HCT VFR BLD AUTO: 32.8 % (ref 36.5–49.3)
HGB BLD-MCNC: 10.4 G/DL (ref 12–17)
HOROWITZ INDEX BLDA+IHG-RTO: 40 MM[HG]
IMM GRANULOCYTES # BLD AUTO: 0.08 THOUSAND/UL (ref 0–0.2)
IMM GRANULOCYTES NFR BLD AUTO: 1 % (ref 0–2)
LYMPHOCYTES # BLD AUTO: 0.75 THOUSANDS/ÂΜL (ref 0.6–4.47)
LYMPHOCYTES NFR BLD AUTO: 8 % (ref 14–44)
MAGNESIUM SERPL-MCNC: 2.5 MG/DL (ref 1.9–2.7)
MCH RBC QN AUTO: 29.5 PG (ref 26.8–34.3)
MCHC RBC AUTO-ENTMCNC: 31.7 G/DL (ref 31.4–37.4)
MCV RBC AUTO: 93 FL (ref 82–98)
MONOCYTES # BLD AUTO: 0.98 THOUSAND/ÂΜL (ref 0.17–1.22)
MONOCYTES NFR BLD AUTO: 11 % (ref 4–12)
NEUTROPHILS # BLD AUTO: 7.27 THOUSANDS/ÂΜL (ref 1.85–7.62)
NEUTS SEG NFR BLD AUTO: 78 % (ref 43–75)
NRBC BLD AUTO-RTO: 0 /100 WBCS
O2 CT BLDA-SCNC: 17 ML/DL (ref 16–23)
OXYHGB MFR BLDA: 95.4 % (ref 94–97)
PCO2 BLDA: 47.7 MM HG (ref 36–44)
PEEP RESPIRATORY: 6 CM[H2O]
PH BLDA: 7.45 [PH] (ref 7.35–7.45)
PHOSPHATE SERPL-MCNC: 3.4 MG/DL (ref 2.7–4.5)
PLATELET # BLD AUTO: 324 THOUSANDS/UL (ref 149–390)
PMV BLD AUTO: 9.1 FL (ref 8.9–12.7)
PO2 BLDA: 84 MM HG (ref 75–129)
POTASSIUM SERPL-SCNC: 3.4 MMOL/L (ref 3.5–5.3)
POTASSIUM SERPL-SCNC: 3.9 MMOL/L (ref 3.5–5.3)
RBC # BLD AUTO: 3.53 MILLION/UL (ref 3.88–5.62)
SODIUM SERPL-SCNC: 142 MMOL/L (ref 135–147)
SODIUM SERPL-SCNC: 143 MMOL/L (ref 135–147)
SPECIMEN SOURCE: ABNORMAL
VENT AC: 12
VENT- AC: AC
VT SETTING VENT: 400 ML
WBC # BLD AUTO: 9.33 THOUSAND/UL (ref 4.31–10.16)

## 2024-02-29 PROCEDURE — 80048 BASIC METABOLIC PNL TOTAL CA: CPT | Performed by: STUDENT IN AN ORGANIZED HEALTH CARE EDUCATION/TRAINING PROGRAM

## 2024-02-29 PROCEDURE — 94003 VENT MGMT INPAT SUBQ DAY: CPT

## 2024-02-29 PROCEDURE — 94640 AIRWAY INHALATION TREATMENT: CPT

## 2024-02-29 PROCEDURE — NC001 PR NO CHARGE: Performed by: INTERNAL MEDICINE

## 2024-02-29 PROCEDURE — 82948 REAGENT STRIP/BLOOD GLUCOSE: CPT

## 2024-02-29 PROCEDURE — 94760 N-INVAS EAR/PLS OXIMETRY 1: CPT

## 2024-02-29 PROCEDURE — 99291 CRITICAL CARE FIRST HOUR: CPT | Performed by: INTERNAL MEDICINE

## 2024-02-29 PROCEDURE — 83735 ASSAY OF MAGNESIUM: CPT

## 2024-02-29 PROCEDURE — 85025 COMPLETE CBC W/AUTO DIFF WBC: CPT

## 2024-02-29 PROCEDURE — 80048 BASIC METABOLIC PNL TOTAL CA: CPT

## 2024-02-29 PROCEDURE — 92610 EVALUATE SWALLOWING FUNCTION: CPT

## 2024-02-29 PROCEDURE — 94660 CPAP INITIATION&MGMT: CPT

## 2024-02-29 PROCEDURE — 84100 ASSAY OF PHOSPHORUS: CPT

## 2024-02-29 PROCEDURE — 82805 BLOOD GASES W/O2 SATURATION: CPT

## 2024-02-29 RX ORDER — FUROSEMIDE 10 MG/ML
80 INJECTION INTRAMUSCULAR; INTRAVENOUS ONCE
Status: COMPLETED | OUTPATIENT
Start: 2024-02-29 | End: 2024-02-29

## 2024-02-29 RX ORDER — HYDROMORPHONE HCL/PF 1 MG/ML
0.5 SYRINGE (ML) INJECTION ONCE
Status: COMPLETED | OUTPATIENT
Start: 2024-02-29 | End: 2024-02-29

## 2024-02-29 RX ORDER — DIAZEPAM 5 MG/ML
5 INJECTION, SOLUTION INTRAMUSCULAR; INTRAVENOUS EVERY 12 HOURS
Status: DISCONTINUED | OUTPATIENT
Start: 2024-02-29 | End: 2024-02-29

## 2024-02-29 RX ORDER — DIAZEPAM 5 MG/1
5 TABLET ORAL EVERY 12 HOURS
Status: DISCONTINUED | OUTPATIENT
Start: 2024-02-29 | End: 2024-02-29

## 2024-02-29 RX ORDER — HYDROMORPHONE HCL/PF 1 MG/ML
1 SYRINGE (ML) INJECTION EVERY 4 HOURS PRN
Status: DISCONTINUED | OUTPATIENT
Start: 2024-02-29 | End: 2024-03-01

## 2024-02-29 RX ORDER — POTASSIUM CHLORIDE 14.9 MG/ML
20 INJECTION INTRAVENOUS ONCE
Status: COMPLETED | OUTPATIENT
Start: 2024-02-29 | End: 2024-02-29

## 2024-02-29 RX ORDER — LORATADINE 10 MG/1
5 TABLET ORAL DAILY
Status: DISCONTINUED | OUTPATIENT
Start: 2024-02-29 | End: 2024-03-14 | Stop reason: HOSPADM

## 2024-02-29 RX ORDER — POTASSIUM CHLORIDE 20MEQ/15ML
20 LIQUID (ML) ORAL ONCE
Status: COMPLETED | OUTPATIENT
Start: 2024-02-29 | End: 2024-02-29

## 2024-02-29 RX ORDER — GABAPENTIN 100 MG/1
100 CAPSULE ORAL 3 TIMES DAILY
Status: DISCONTINUED | OUTPATIENT
Start: 2024-02-29 | End: 2024-03-01

## 2024-02-29 RX ORDER — IODINE/SODIUM IODIDE 2 %
TINCTURE TOPICAL 4 TIMES DAILY
Status: DISCONTINUED | OUTPATIENT
Start: 2024-02-29 | End: 2024-03-11

## 2024-02-29 RX ORDER — DIAZEPAM 5 MG/ML
5 INJECTION, SOLUTION INTRAMUSCULAR; INTRAVENOUS EVERY 12 HOURS
Status: DISCONTINUED | OUTPATIENT
Start: 2024-02-29 | End: 2024-03-01

## 2024-02-29 RX ORDER — ALBUTEROL SULFATE 2.5 MG/3ML
2.5 SOLUTION RESPIRATORY (INHALATION) EVERY 4 HOURS PRN
Status: DISCONTINUED | OUTPATIENT
Start: 2024-02-29 | End: 2024-03-02

## 2024-02-29 RX ORDER — LEVALBUTEROL INHALATION SOLUTION 1.25 MG/3ML
1.25 SOLUTION RESPIRATORY (INHALATION) EVERY 6 HOURS PRN
Status: DISCONTINUED | OUTPATIENT
Start: 2024-02-29 | End: 2024-02-29

## 2024-02-29 RX ADMIN — HYDROMORPHONE HYDROCHLORIDE 0.5 MG: 1 INJECTION, SOLUTION INTRAMUSCULAR; INTRAVENOUS; SUBCUTANEOUS at 11:28

## 2024-02-29 RX ADMIN — GABAPENTIN 100 MG: 100 CAPSULE ORAL at 21:23

## 2024-02-29 RX ADMIN — GABAPENTIN 100 MG: 250 SOLUTION ORAL at 16:45

## 2024-02-29 RX ADMIN — OXYCODONE HYDROCHLORIDE 10 MG: 5 SOLUTION ORAL at 02:32

## 2024-02-29 RX ADMIN — MIDODRINE HYDROCHLORIDE 10 MG: 5 TABLET ORAL at 05:42

## 2024-02-29 RX ADMIN — POLYETHYLENE GLYCOL 3350 17 G: 17 POWDER, FOR SOLUTION ORAL at 21:23

## 2024-02-29 RX ADMIN — POTASSIUM CHLORIDE 20 MEQ: 1.5 SOLUTION ORAL at 06:29

## 2024-02-29 RX ADMIN — ENOXAPARIN SODIUM 40 MG: 40 INJECTION SUBCUTANEOUS at 16:45

## 2024-02-29 RX ADMIN — FAMOTIDINE 20 MG: 40 POWDER, FOR SUSPENSION ORAL at 08:10

## 2024-02-29 RX ADMIN — IPRATROPIUM BROMIDE 0.5 MG: 0.5 SOLUTION RESPIRATORY (INHALATION) at 08:00

## 2024-02-29 RX ADMIN — LORATADINE 5 MG: 10 TABLET ORAL at 15:19

## 2024-02-29 RX ADMIN — Medication 75 MCG/HR: at 04:23

## 2024-02-29 RX ADMIN — OXYCODONE HYDROCHLORIDE 10 MG: 5 SOLUTION ORAL at 08:09

## 2024-02-29 RX ADMIN — DIAZEPAM 5 MG: 5 TABLET ORAL at 08:10

## 2024-02-29 RX ADMIN — GABAPENTIN 100 MG: 250 SOLUTION ORAL at 08:10

## 2024-02-29 RX ADMIN — CEFEPIME 2000 MG: 2 INJECTION, POWDER, FOR SOLUTION INTRAVENOUS at 02:32

## 2024-02-29 RX ADMIN — FUROSEMIDE 80 MG: 10 INJECTION, SOLUTION INTRAMUSCULAR; INTRAVENOUS at 09:59

## 2024-02-29 RX ADMIN — MIDODRINE HYDROCHLORIDE 10 MG: 5 TABLET ORAL at 23:55

## 2024-02-29 RX ADMIN — IPRATROPIUM BROMIDE 0.5 MG: 0.5 SOLUTION RESPIRATORY (INHALATION) at 02:41

## 2024-02-29 RX ADMIN — CHLORHEXIDINE GLUCONATE 15 ML: 1.2 SOLUTION ORAL at 21:23

## 2024-02-29 RX ADMIN — CEFEPIME 2000 MG: 2 INJECTION, POWDER, FOR SOLUTION INTRAVENOUS at 11:55

## 2024-02-29 RX ADMIN — DIAZEPAM 5 MG: 10 INJECTION, SOLUTION INTRAMUSCULAR; INTRAVENOUS at 21:23

## 2024-02-29 RX ADMIN — POLYETHYLENE GLYCOL 3350 17 G: 17 POWDER, FOR SOLUTION ORAL at 08:10

## 2024-02-29 RX ADMIN — CHLORHEXIDINE GLUCONATE 15 ML: 1.2 SOLUTION ORAL at 08:09

## 2024-02-29 RX ADMIN — POTASSIUM CHLORIDE 20 MEQ: 14.9 INJECTION, SOLUTION INTRAVENOUS at 06:29

## 2024-02-29 RX ADMIN — MIDODRINE HYDROCHLORIDE 10 MG: 5 TABLET ORAL at 15:19

## 2024-02-29 RX ADMIN — MAGNESIUM OXIDE TAB 400 MG (241.3 MG ELEMENTAL MG) 400 MG: 400 (241.3 MG) TAB at 08:10

## 2024-02-29 RX ADMIN — CEFEPIME 2000 MG: 2 INJECTION, POWDER, FOR SOLUTION INTRAVENOUS at 18:17

## 2024-02-29 RX ADMIN — LEVALBUTEROL HYDROCHLORIDE 1.25 MG: 1.25 SOLUTION RESPIRATORY (INHALATION) at 02:41

## 2024-02-29 RX ADMIN — LEVALBUTEROL HYDROCHLORIDE 1.25 MG: 1.25 SOLUTION RESPIRATORY (INHALATION) at 07:52

## 2024-02-29 RX ADMIN — DIAZEPAM 5 MG: 5 TABLET ORAL at 02:32

## 2024-02-29 NOTE — PLAN OF CARE
Problem: Prexisting or High Potential for Compromised Skin Integrity  Goal: Skin integrity is maintained or improved  Description: INTERVENTIONS:  - Identify patients at risk for skin breakdown  - Assess and monitor skin integrity  - Assess and monitor nutrition and hydration status  - Monitor labs   - Assess for incontinence   - Turn and reposition patient  - Assist with mobility/ambulation  - Relieve pressure over bony prominences  - Avoid friction and shearing  - Provide appropriate hygiene as needed including keeping skin clean and dry  - Evaluate need for skin moisturizer/barrier cream  - Collaborate with interdisciplinary team   - Patient/family teaching  - Consider wound care consult   Outcome: Progressing     Problem: PAIN - ADULT  Goal: Verbalizes/displays adequate comfort level or baseline comfort level  Description: Interventions:  - Encourage patient to monitor pain and request assistance  - Assess pain using appropriate pain scale  - Administer analgesics based on type and severity of pain and evaluate response  - Implement non-pharmacological measures as appropriate and evaluate response  - Consider cultural and social influences on pain and pain management  - Notify physician/advanced practitioner if interventions unsuccessful or patient reports new pain  Outcome: Progressing     Problem: INFECTION - ADULT  Goal: Absence or prevention of progression during hospitalization  Description: INTERVENTIONS:  - Assess and monitor for signs and symptoms of infection  - Monitor lab/diagnostic results  - Monitor all insertion sites, i.e. indwelling lines, tubes, and drains  - Monitor endotracheal if appropriate and nasal secretions for changes in amount and color  - [place] appropriate cooling/warming therapies per order  - Administer medications as ordered  - Instruct and encourage patient and family to use good hand hygiene technique  - Identify and instruct in appropriate isolation precautions for  Problem: Asthma (Adult)  Goal: Signs and Symptoms of Listed Potential Problems Will be Absent, Minimized or Managed (Asthma)  Signs and symptoms of listed potential problems will be absent, minimized or managed by discharge/transition of care (reference Asthma (Adult) CPG).  Outcome: Ongoing (interventions implemented as appropriate)  Intervention: Pt remains free of acute changes and/or distress    Reassess breath sounds and oxygenation    Encourage activity as tolerated             identified infection/condition  Outcome: Progressing  Goal: Absence of fever/infection during neutropenic period  Description: INTERVENTIONS:  - Monitor WBC    Outcome: Progressing     Problem: SAFETY ADULT  Goal: Patient will remain free of falls  Description: INTERVENTIONS:  - Educate patient/family on patient safety including physical limitations  - Instruct patient to call for assistance with activity   - Consult OT/PT to assist with strengthening/mobility   - Keep Call bell within reach  - Keep bed low and locked with side rails adjusted as appropriate  - Keep care items and personal belongings within reach  - Initiate and maintain comfort rounds  - Make Fall Risk Sign visible to staff  - Apply yellow socks and bracelet for high fall risk patients  - Consider moving patient to room near nurses station  Outcome: Progressing  Goal: Maintain or return to baseline ADL function  Description: INTERVENTIONS:  -  Assess patient's ability to carry out ADLs; assess patient's baseline for ADL function and identify physical deficits which impact ability to perform ADLs (bathing, care of mouth/teeth, toileting, grooming, dressing, etc.)  - Assess/evaluate cause of self-care deficits   - Assess range of motion  - Assess patient's mobility; develop plan if impaired  - Assess patient's need for assistive devices and provide as appropriate  - Encourage maximum independence but intervene and supervise when necessary  - Involve family in performance of ADLs  - Assess for home care needs following discharge   - Consider OT consult to assist with ADL evaluation and planning for discharge  - Provide patient education as appropriate  Outcome: Progressing     Problem: DISCHARGE PLANNING  Goal: Discharge to home or other facility with appropriate resources  Description: INTERVENTIONS:  - Identify barriers to discharge w/patient and caregiver  - Arrange for needed discharge resources and transportation as appropriate  - Identify  discharge learning needs (meds, wound care, etc.)  - Arrange for interpretive services to assist at discharge as needed  - Refer to Case Management Department for coordinating discharge planning if the patient needs post-hospital services based on physician/advanced practitioner order or complex needs related to functional status, cognitive ability, or social support system  Outcome: Progressing     Problem: Knowledge Deficit  Goal: Patient/family/caregiver demonstrates understanding of disease process, treatment plan, medications, and discharge instructions  Description: Complete learning assessment and assess knowledge base.  Interventions:  - Provide teaching at level of understanding  - Provide teaching via preferred learning methods  Outcome: Progressing     Problem: Nutrition/Hydration-ADULT  Goal: Nutrient/Hydration intake appropriate for improving, restoring or maintaining nutritional needs  Description: Monitor and assess patient's nutrition/hydration status for malnutrition. Collaborate with interdisciplinary team and initiate plan and interventions as ordered.  Monitor patient's weight and dietary intake as ordered or per policy. Utilize nutrition screening tool and intervene as necessary. Determine patient's food preferences and provide high-protein, high-caloric foods as appropriate.     INTERVENTIONS:  - Monitor oral intake, urinary output, labs, and treatment plans  - Assess nutrition and hydration status and recommend course of action  - Evaluate amount of meals eaten  - Assist patient with eating if necessary   - Allow adequate time for meals  - Recommend/ encourage appropriate diets, oral nutritional supplements, and vitamin/mineral supplements  - Order, calculate, and assess calorie counts as needed  - Recommend, monitor, and adjust tube feedings and TPN/PPN based on assessed needs  - Assess need for intravenous fluids  - Provide specific nutrition/hydration education as appropriate  - Include  patient/family/caregiver in decisions related to nutrition  Outcome: Progressing     Problem: SAFETY,RESTRAINT: NV/NON-SELF DESTRUCTIVE BEHAVIOR  Goal: Remains free of harm/injury (restraint for non violent/non self-detsructive behavior)  Description: INTERVENTIONS:  - Instruct patient/family regarding restraint use   - Assess and monitor physiologic and psychological status   - Provide interventions and comfort measures to meet assessed patient needs   - Identify and implement measures to help patient regain control  - Assess readiness for release of restraint   Outcome: Progressing

## 2024-02-29 NOTE — RESPIRATORY THERAPY NOTE
02/29/24 0947   Respiratory Assessment   Assessment Type Assess only   General Appearance Awake   Respiratory Pattern Assisted   Chest Assessment Chest expansion symmetrical   Bilateral Breath Sounds Diminished;Coarse   Cough Strong;Productive   Suction Oral   Resp Comments Pt extubated per order to bipap 12/6. + cough, + vocalizations, - stridor.   O2 Device v60   Non-Invasive Information   O2 Interface Device Face mask   Non-Invasive Ventilation Mode BiPAP   $ Pulse Oximetry Spot Check Charge Completed   Non-Invasive Settings   IPAP (cm) 12 cm   EPAP (cm) 6 cm   Rate (Set) 12   FiO2 (%) 60   Rise Time 3   Inspiratory Time (Set) 1   Non-Invasive Readings   Total Rate 30   Spontaneous MV (mL) 17   Peak Pressure (Obs) 13   Spontaneous Vt (mL) 571   Leak (lpm) 31   Non-Invasive Alarms   Insp Pressure High (cm H20) 40   Insp Pressure Low (cm H20) 4   Low Insp Pressure Time (sec) 20 sec   MV Low (L/min) 4   Vt High (mL) 1250   Vt Low (mL) 250   High Resp Rate (BPM) 45 BPM   Low Resp Rate (BPM) 8 BPM   Apnea Interval (sec) 20   Apnea Rate 12

## 2024-02-29 NOTE — PROGRESS NOTES
Critical Care Attending Note     58 year old man with WALE, meningioma. He presented 2/16 to [facility] for shortness of breath, recent FLU A infection and pneumonia. Required intubation on 2/19, transferred for possible VVECMO needs. He required NMB and PP x 2 last 2/23.       24hr events - negative fluid balance with diuresis, remained intubated, tolerated PSV this am and SBT, remained off vasopressors, denied pain.    VS AF, HR 80-90's, MAPS 60-90's, SpO2 95% 0.4/6P, I/Os -500cc  Exam  GEN middle aged man, intubated and nontoxic, post intubation tolerating BiPAP well, follows commands  HEENT MMM, no thrush, ATNC  NECK no accessory muscle use, JVD not elevated, CVC w/o purulence  CV reg, single s1/2, no m/r  Pulm No sig secretions pre extubation, BiPAP BS currently, mild tachypnea, no wheze or rales, no rhonchi  ABD +BS soft NTND, no rebound  EXT  mild dependent edema, warm, brisk cap refill   raines in place, yellow urine    Laboratory and Diagnostics  Results from last 7 days   Lab Units 02/29/24 0458 02/28/24  0439 02/27/24  0445 02/26/24  0449 02/25/24  0444 02/24/24  0540 02/23/24  0453   WBC Thousand/uL 9.33 12.76* 17.64* 17.43* 19.27* 17.06* 14.07*   HEMOGLOBIN g/dL 10.4* 11.0* 11.8* 11.3* 13.2 12.5 11.2*   HEMATOCRIT % 32.8* 34.6* 37.5 35.6* 42.3 40.8 35.6*   PLATELETS Thousands/uL 324 319 304 270 322 281 221   NEUTROS PCT % 78* 78* 83* 82*  --   --   --    MONOS PCT % 11 11 10 9  --   --   --    MONO PCT %  --   --   --   --   --   --  1*   EOS PCT % 2 1 1 1  --   --  0     Results from last 7 days   Lab Units 02/29/24 0458 02/28/24  1800 02/28/24  0439 02/27/24  1854 02/27/24  0445 02/26/24  1238 02/26/24  0449 02/25/24  0444   SODIUM mmol/L 142 144 142 141 137  --  141 143   POTASSIUM mmol/L 3.4* 3.7 4.1 3.3* 3.5  --  3.8 4.0   CHLORIDE mmol/L 101 103 104 99 96  --  96 94*   CO2 mmol/L 33* 32 31 32 32  --  37* 36*   ANION GAP mmol/L 8 9 7 10 9  --  8 13   BUN mg/dL 38* 35* 29* 30* 31*  --  28* 27*    CREATININE mg/dL 0.63 0.72 0.79 0.77 0.71  --  0.70 0.78   CALCIUM mg/dL 8.8 8.7 8.2* 8.5 8.7  --  8.8 8.5   GLUCOSE RANDOM mg/dL 130 115 99 113 155*  --  166* 127   ALT U/L  --   --  64*  --   --  49  --   --    AST U/L  --   --  39  --   --  39  --   --    ALK PHOS U/L  --   --  73  --   --  70  --   --    ALBUMIN g/dL  --   --  3.1*  --   --  3.2*  --   --    TOTAL BILIRUBIN mg/dL  --   --  0.85  --   --  0.95  --   --      Results from last 7 days   Lab Units 02/29/24  0458 02/28/24  0439 02/27/24  0445 02/26/24  0449 02/25/24  0444 02/24/24  0540 02/23/24  0453   MAGNESIUM mg/dL 2.5 2.6 2.3 2.1 2.2 2.3 2.4   PHOSPHORUS mg/dL 3.4 2.7 2.5* 2.8 4.7* 3.6 3.0               Results from last 7 days   Lab Units 02/24/24  0747   LACTIC ACID mmol/L 1.0                     Results from last 7 days   Lab Units 02/25/24  0444 02/24/24  0747   PROCALCITONIN ng/ml 0.79* 0.41*       ABG:   Results from last 7 days   Lab Units 02/29/24 0458   PH ART  7.445   PCO2 ART mm Hg 47.7*   PO2 ART mm Hg 84.0   HCO3 ART mmol/L 32.0*   BASE EXC ART mmol/L 6.9   ABG SOURCE  Line, Arterial       , lipase 14     MICRO  RP2 2/26 neg  MRSA - neg  UA 2/26 neg  Sputum 2/24 - 1+Epi, gram variable rods  Bld CX 2/24 NGTD  Sputum 2/25 - pending  Sputum 2/22 2+ Candida  FLU/RSV/COVID neg 2/22     RADIOGRAPHS - images personally reviewed  CT C/A/P 2/27 - scattered ground glass infiltrates, basilar consolidations overall stable, no sig effusions, distended GB, some colonic distention without transition point     CT sinus 2/27 - no sig sinus disease     CXR 2/27 - T/L good position, increasing bilateral alveolar infiltrates R>L, questionable volume component, blunted CP angles L>R     4 Ext US 2/26 - neg for DVT, bilateral cephalic upper ext superficial thrombophlebitis      KUB 2/26 - OGT in place, diffuse bowel gas, no AFL, no free air appreciated     CXR 2/26- T/L good position, no PTX, improving right sided infiltrates, persistent left  sided infiltrates slightly increased     CT angio 2/18 - no PE, extensive bilateral mixed ground glass and consolidative infiltrates, no sig effusions     TTE 2/2024 - EF 60%, grade I diastolic dysfunction, normal RV     Assessment  Acute hypoxic and hypercarbic respiratory failure - extubated 2/29  Severe ARDS secondary to FLU A and bacterial superinfection  SIRS/Sepsis POA with septic shock  FLU A and suspected bacterial superinfection  Persistent fever - possible secondary infectious insult vs drug fever vs non-infectious etiology - now resolved  Acute diastolic CHF  New onset atrial fibrillation - now SR  Abnormal LFTs  Elevated TG secondary propofol  Suspected ileus - improving  Bilateral superficial upper ext thrombophlebitis      PLAN  NEURO - stopping fentanyl gtt, stop oral oxycodone and valium. Start valium 5mg IV q12hrs for now and dilaudid 1mg q4hrs prn and monitor needs.  May resume oral taper if able to take PO, monitor for w/d signs/symptoms  CARDIAC - remains on midodrine - hopeful to wean tomorrow, off vasopressors, not on AC, now SR - will continue current course  PULM - MV D#11 - RSBI 90's today with overall optimized status, extubate to BiPAP, taper through day as tolerated  GI - NPO for now post extubation, formal speech evaluation   RENAL - goal I/Os neg 500-1000cc prn 80mg lasix to achieve goal, replete potassium, recheck at 1600 today  ID - D#5/7 cefepime (on/off previously)- cultures remain negative, no focal source of infection likely pneumonia   ENDO - ISS  HEME - no active issues  ICU Care - SCDs, LMWH, CHG, HOB >30deg  Full Code   D/C Prudence d/c yanna this afternoon after lasix dosing, PIVs and then d/c CVC  Will update his wife upon her arrival, updated yesterday at bedside     My personal critical care time excluding procedures, teaching and updates is 46 minutes.    Iftikhar Flaherty,

## 2024-02-29 NOTE — PROGRESS NOTES
[facility]  Progress Note: Critical Care  Name: Hussein Edward III 58 y.o. male I MRN: [id number]  Unit/Bed#: MICU 06 I Date of Admission: 2/19/2024   Date of Service: 2/29/2024 I Hospital Day: 10    Assessment/Plan   Acute hypoxic and hypercapnic respiratory failure 2/2 ARDS  Influenza A pneumonia  Persistent fever, resolved  Ileus, resolved  Acute metabolic encephalopathy  New onset atrial fibrillation, resolved  Morbid obesity  Hypertriglyceridemia 2/2 propofol    Neuro:   #Sedated  Currently on fentanyl 75 gtt, plan to dc all IV sedation today  Previously unable to tolerate precedex, became bradycardic to 30s on 02/21  May consider switching to dilaudid d/t tachyphylaxis  Off propofol, ketamine and nimbex at this time  Off versed  On gabapentin 200 mg TID  Added YEHUDA valium & oxycodone q6H, consider weaning  PRN versed bolus to attain RASS -1 to 0. (x2 doses on 02/28)     CV:   #Hypotension likely 2/2 induction for RSI  On Levo 3, wean as possible to maintain MAP>65  A-line for hemodynamic monitoring  TTE 02/19: LVEF 55-60%. G1DD.  Started midodrine 10 mg TID  #New onset atrial fibrillation, resolved, Now NSR.  Pt found to have rates to 150s s/p CVC placement  gave one time dose of lopressor, patient started on amiodarone drip. Patient's HR still elevated with afib. Given dose of digoxin, resolved.  Continue to monitor, HR better in 90s. Has some periods of tachy that correspond to low grade fevers.  Not on heparin ACS protocol anymore, was restarted on DVT prophylactic heparin     Pulm:  #Acute Respiratory Distress Syndrome  2/2 influenza A pneumonia, possible superimposed bacterial pna  CTA on 02/18 without evidence of PE but findings of extensive bilateral groundglass and consolidative opacities with predominantly perihilar and peripheral distribution compatible with viral and/or atypical pneumonia, likely influenza given clinical history.  PaO2/FiO2 ratio 243 on  2/26  Intubation day 11, planning for extubation today pending SBT   Antibiotics as mentioned below under ID but finished 5/5 of Ceftriaxone and Azithromycin on 02/21, not currently on abx. Patient started spiking fevers, on cefepime, refer to ID section.  Q4H ABGs  Nebs- Xopenex, Atrovent  CXR appears stable or improved  Underwent bronchoscopy on 02/19 -> grew Candida glabrata, viral culture pending  Consider possible bronch given pt now supine  S/p 5 days of solu-medrol  Sputum cultures 02/22 no growth  Blood cultures NGTD  Wean PEEP and FiO2 as tolerated   Will attempt SBT again on 02/28, consider possible discussion regarding trach  Can consider PSV trial today, RSBI 100s.  Previously on lasix gtt, now receiving lasix PRN to reach net goal of 0.5-1L    GI:   #Ileus, resolved  Continue pepcid prophylaxis  Hold TF d/t worsening distention  Unclear etiology, does not appear to be infectious, although can be considered  Appears to be 2/2 multiple days of TF w/o BM earlier in admission  Total of 4 liquid BM on 02/27  Bowel regimen (reduced to miralax qd + senna qd)  KUB 02/26: Multiple air-filled small bowel loops and air-filled large bowel loops with nonobstructive bowel gas pattern   CT CAP 02/27: New colonic distention to the level of the rectum is more likely related to ileus without any zone of transition. Clinical correlation for any signs of colitis whether infectious or postinfectious. Follow-up x-ray is advised to assess for any progressive distention. The right colon measures approximately 7.3 cm in its greatest caliber at the time of the scan.  No longer distended with multiple bowel movements since 02/27  Reporting feeling gassy on 02/29, if distention worsens will consider increasing bowel regimen     :   #CKD Stage II  Plan: sCr baseline range of 0.8-1.0, stable currently  Avoid nephrotoxic medications  Monitor sCr with daily BMPs  Diuresing, monitor renal function  #Hematuria, resolved  (1+) ketones  and protein -> likely due to dehydration  large occult blood, with innumerable RBCs -> might be associated with acute drop in hgb.   Improved on repeat UA  Likely traumatic injury by raines catheter.     F/E/N:    F: Not currently on any IV fluids  E: Will monitor and correct any electrolyte derangements. Repleting K.  N: continuing TF to goal     Heme/Onc:   DVT prophylaxis heparin     Endo:   #Steroid induced hyperglycemia  Hyperglycemia resolving with discontinuation of steroids  Continue SSI1 for now     ID:   #New-onset fever while on antibiotics  Pt had ongoing influenza that was untreated for 4 days PTA  Likely not bacterial superimposed, likely just in setting of viral infection  Blood culture, sputum culture, bronchial culture unrevealing  S/p ceftriaxone/cefepime, azithromycin, and tamiflu  UA:  4-10 WBCs, less likely to be infectious cause  Viral panel negative  Can consider performing bronch  Continues to spike fevers off abx, unclear source of infection, liikely pulmonary source  VAS duplex findings of acute superficial thrombophlebitis was noted in the cephalic vein at the distal upper arm  Would not explain pt's current ongoing fevers  New onset abdominal distention, may need to consider GI sources  No longer spiking fevers since 02/27  Unlikely to be urinary source at this time  RP2 panel negative  LFT, lipase, TG negative  MRSA negative, stopped vanc  On cefepime, day 5/7     MSK/Skin:   Frequent turning and repositioning  Wound care as needed    Disposition: Critical care    ICU Core Measures     Vented Patient  VAP Bundle  VAP bundle ordered     A: Assess, Prevent, and Manage Pain Has pain been assessed? Yes  Need for changes to pain regimen? No   B: Both Spontaneous Awakening Trials (SATs) and Spontaneous Breathing Trials (SBTs) Plan to perform spontaneous awakening trial today? Yes   Plan to perform spontaneous breathing trial today? Yes   Obvious barriers to extubation? No   C: Choice of  Sedation RASS Goal: -1 Drowsy or 0 Alert and Calm  Need for changes to sedation or analgesia regimen? Yes   D: Delirium CAM-ICU: Negative   E: Early Mobility  Plan for early mobility? Yes   F: Family Engagement Plan for family engagement today? Yes       Antibiotic Review: Continue broad spectrum secondary to severity of illness.     Review of Invasive Devices:    Shields Plan: Continue for accurate I/O monitoring for 48 hours  Central access plan: Patient has multiple central venous catheters.   [place] Plan: Keep arterial line for hemodynamic monitoring, frequent ABGs, and frequent labs    Prophylaxis:  VTE VTE covered by:  enoxaparin, Subcutaneous, 40 mg at 02/28/24 1615       Stress Ulcer  covered byfamotidine (PEPCID) oral suspension 20 mg [[id number]]        Significant 24hr Events     24hr events: Continues to require ventilatory support.  No longer on pressors.  On minimal sedation.  Continues to mentate well.  Nods head appropriately to questions.  Able to follow commands.  No complaints of any pain at this time.  Reporting feeling gassy this morning but without any abdominal pain.     Subjective     Review of Systems   Unable to perform ROS: Intubated   Cardiovascular:  Negative for chest pain.   Gastrointestinal:  Negative for abdominal pain.   Genitourinary:  Negative for flank pain.        Objective                            Vitals I/O      Most Recent Min/Max in 24hrs   Temp 97.9 °F (36.6 °C) Temp  Min: 97.9 °F (36.6 °C)  Max: 100 °F (37.8 °C)   Pulse 93 Pulse  Min: 81  Max: 110   Resp (!) 23 Resp  Min: 18  Max: 36   /69 BP  Min: 92/52  Max: 143/80   O2 Sat 95 % SpO2  Min: 92 %  Max: 98 %      Intake/Output Summary (Last 24 hours) at 2/29/2024 0619  Last data filed at 2/29/2024 0554  Gross per 24 hour   Intake 1752.42 ml   Output 2355 ml   Net -602.58 ml       Diet Enteral/Parenteral; Tube Feeding No Oral Diet; Vital High Protein; Continuous; 45; Prosource Protein Liquid - Two Packets; OD; 250;  Water; Every 6 hours    Invasive Monitoring           Physical Exam   Physical Exam  Vitals and nursing note reviewed.   Eyes:      Extraocular Movements: Extraocular movements intact.   Skin:     General: Skin is warm and dry.      Capillary Refill: Capillary refill takes less than 2 seconds.      Coloration: Skin is not jaundiced.   HENT:      Head: Normocephalic and atraumatic.      Nose: Nasogastric tube present.   Neck:      Vascular: Central line present.   Cardiovascular:      Rate and Rhythm: Normal rate and regular rhythm.      Pulses: Normal pulses.      Heart sounds: Normal heart sounds.   Musculoskeletal:         General: No swelling.      Right lower leg: No edema.      Left lower leg: No edema.   Abdominal: General: Bowel sounds are normal. There is no distension.      Palpations: Abdomen is soft.   Constitutional:       General: He is not in acute distress.     Interventions: He is intubated. He is not sedated.  Pulmonary:      Effort: No accessory muscle usage, respiratory distress or accessory muscle usage. He is intubated.      Breath sounds: No rales.      Comments: Mechanically ventilated breath sounds  Neurological:      Mental Status: He is alert. He is calm.   Genitourinary/Anorectal:  Shields present.          Diagnostic Studies      EKG:  Imaging: I have personally reviewed pertinent reports.       Medications:  Scheduled PRN   cefepime, 2,000 mg, Q8H  chlorhexidine, 15 mL, Q12H YEHUDA  diazepam, 5 mg, Q6H  enoxaparin, 40 mg, Q24H YEHUDA  famotidine, 20 mg, BID  gabapentin, 100 mg, TID  insulin lispro, 1-6 Units, Q6H YEHUDA  ipratropium, 0.5 mg, Q6H  levalbuterol, 1.25 mg, Q6H  magnesium Oxide, 400 mg, Daily  midodrine, 10 mg, Q8H  oxyCODONE, 10 mg, Q6H  polyethylene glycol, 17 g, BID  potassium chloride, 20 mEq, Once  potassium chloride, 20 mEq, Once      acetaminophen, 1,000 mg, Q6H PRN  fentanyl citrate (PF), 100 mcg, Q2H PRN  midazolam, 1 mg, Q2H PRN Max 8/day  sodium chloride, 1 spray, Q1H PRN        Continuous    fentaNYL, 75 mcg/hr, Last Rate: 75 mcg/hr (02/29/24 0423)  norepinephrine, 1-30 mcg/min, Last Rate: Stopped (02/26/24 1824)         Labs:    CBC    Recent Labs     02/28/24 0439 02/29/24 0458   WBC 12.76* 9.33   HGB 11.0* 10.4*   HCT 34.6* 32.8*    324     BMP    Recent Labs     02/28/24  1800 02/29/24 0458   SODIUM 144 142   K 3.7 3.4*    101   CO2 32 33*   AGAP 9 8   BUN 35* 38*   CREATININE 0.72 0.63   CALCIUM 8.7 8.8       Coags    No recent results     Additional Electrolytes  Recent Labs     02/28/24 0439 02/29/24 0458   MG 2.6 2.5   PHOS 2.7 3.4          Blood Gas    Recent Labs     02/29/24 0458   PHART 7.445   DUQ1ACM 47.7*   PO2ART 84.0   JMP9ZHS 32.0*   BEART 6.9   SOURCE Line, Arterial     Recent Labs     02/29/24 0458   SOURCE Line, Arterial    LFTs  Recent Labs     02/28/24 0439   ALT 64*   AST 39   ALKPHOS 73   ALB 3.1*   TBILI 0.85       Infectious  No recent results  Glucose  Recent Labs     02/27/24  1854 02/28/24 0439 02/28/24  1800 02/29/24 0458   GLUC 113 99 115 130               Monty Pires MD   (0) independent

## 2024-02-29 NOTE — SPEECH THERAPY NOTE
Speech-Language Pathology Bedside Swallow Evaluation      Patient Name: Hussein Edward III    Today's Date: 2/29/2024     Problem List  Active Problems:    Acute respiratory failure with hypoxia (HCC)    Influenza A    Persistent fever    ARDS (adult respiratory distress syndrome) (HCC)    Acute metabolic encephalopathy    Ileus (HCC)      Past Medical History  Past Medical History:   Diagnosis Date    Chronic back pain     Migraine        Past Surgical History  Past Surgical History:   Procedure Laterality Date    HERNIA REPAIR      MANDIBLE FRACTURE SURGERY      MOUTH SURGERY      cyst in cheek    NASAL SEPTUM SURGERY         Summary  Pt was seen for post extubation swallow evaluation, intubated 2/19/24. He had a weak but clear vocal quality and reported significant oral dryness. Pt presented with s/s suggestive of mild-moderate oropharyngeal dysphagia. Symptoms or concerns included decreased mastication and oral residue with mech soft, as well as mildly delayed swallow initiation with mildly reduced hyolaryngeal rise. Min vocal wetness detected with trials of ice chips, as well as immediate cough with tsp trials of thin x2/2. No s/s aspiration occurred with honey thick liquids or solids. Pt feels weak and requested to begin with purees only which is judged to be appropriate. Will rec initiation of puree and honey thick liquid diet, with plan to f/u tomorrow with MBS.     Risk/s for Aspiration: mod-high with thin    Recommended Diet: puree/level 1 diet and honey thick liquids   Recommended Form of Meds: crushed with puree   Aspiration precautions and swallowing strategies: upright posture, slow rate of feeding, small bites/sips, and alternating bites and sips  Other Recommendations: Continue frequent oral care      Current Medical Status per critical care progress note 2/29/24  58 year old man with WALE, meningioma. He presented 2/16 to [facility] for shortness of breath, recent FLU A infection and pneumonia. Required  intubation on 2/19, transferred for possible VVECMO needs. He required NMB and PP x 2 last 2/23.       per H&P 2/19/24  Hussein Edward III is a 58 y.o. with a PMH of meningioma, WALE who presented to [facility] on 02/16 with worsening shortness of breath. Pt was recently diagnosed w/ influenza A 4 days PTA. Was seen in urgent care for is sx d/t increasing SOB but was discharged home without any specific treatment. On presentation, there was concern pt may have pneumonia given CXR findings at [facility]. Otherwise, pt denied chest pain, light-headedness, numbness, weakness, abdominal pain, nausea, vomiting, or diarrhea.  While at [facility], pt became increasingly hypoxic, requiring 15L and tachypneic to 30 bpm, requiring upgrading his level of care to critical care. Pt was started on Tamiflu in the hopes of decreasing severity of infection. He was also started on IV solumedrol to help w/ inflammation. Unfortunately, pt's oxygen demands continued to rise, requiring BiPAP, and continued to be increasingly tachypneic to 50s. Due to his worsening respiratory status, decision was made to intubate the pt, which pt was amenable to. Pt was subsequently transferred to [facility] for evaluation by CT surgery for possible VV-ECMO.  History obtained from chart review and unobtainable from patient due to being currently intubated and sedated.      Special Studies:  CT chest 2/27/24 IMPRESSION:  Multiple airspace opacities is consistent with bronchopneumonia. There is been some improvement since the study of February 18.  New colonic distention to the level of the rectum is more likely related to ileus without any zone of transition. Clinical correlation for any signs of colitis whether infectious or postinfectious. Follow-up x-ray is advised to assess for any progressive distention. The right colon measures approximately 7.3 cm in its greatest caliber at the time of the scan.  Small amount of ascites.  No evidence of  pneumoperitoneum.  Nonobstructing renal calculi.  This was discussed with Dr. Yu in the ICU at 4:44 p.m. apparently the patient has passed significant gas since the prior CT with less distention.    Social/Education/Vocational Hx:  Pt lives  with his spouse    Swallow Information   Current Risks for Dysphagia & Aspiration: recent intubation and prolonged intubation  Current Diet: NPO   Baseline Diet: regular diet and thin liquids      Baseline Assessment   Behavior/Cognition: alert  Speech/Language Status: able to participate in conversation and able to follow commands  Patient Positioning: upright in bed  Pain Status/Interventions/Response to Interventions: No report of or nonverbal indications of pain.       Swallow Mechanism Exam  Facial: symmetrical  Labial: WFL  Lingual: WFL  Velum: symmetrical  Mandible: adequate ROM  Dentition: adequate  Vocal quality: weak, mildly strained   Volitional Cough: weak   Respiratory Status: on nasal cannula      Consistencies Assessed and Performance   Consistencies Administered: ice chips, thin liquids, honey thick, puree, and soft solids    Oral Stage: mild-moderate, decreased mastication, and oral residue with mech soft solids     Pharyngeal Stage: mild-moderate, suspected pharyngeal swallow delay, and suspected decreased hyolaryngeal elevation upon palpation    Esophageal Concerns: none reported      Summary and Recommendations (see above)    Results Reviewed with: patient, RN, and MD     Treatment Recommended: yes     Frequency of treatment: 3-5x/week      Dysphagia LTG  -Patient will demonstrate safe and effective oral intake (without overt s/s significant oral/pharyngeal dysphagia including s/s penetration or aspiration) for the highest appropriate diet level.     Short Term Goals:  -Pt will tolerate Dysphagia 1/pureed diet and honey thick liquid with no significant s/s oral or pharyngeal dysphagia across 1-3 diagnostic sessions.    -Patient will tolerate trials of  upgraded food and/or liquid texture with no significant s/s of oral or pharyngeal dysphagia including aspiration across 1-3 diagnostic sessions.    -Patient will comply with a Video/Modified Barium Swallow study for more complete assessment of swallowing anatomy/physiology/aspiration risk and to assess efficacy of treatment techniques so as to best guide treatment plan.

## 2024-03-01 ENCOUNTER — APPOINTMENT (INPATIENT)
Dept: RADIOLOGY | Facility: HOSPITAL | Age: 59
DRG: 870 | End: 2024-03-01
Payer: COMMERCIAL

## 2024-03-01 LAB
ANION GAP SERPL CALCULATED.3IONS-SCNC: 11 MMOL/L
BASOPHILS # BLD AUTO: 0.02 THOUSANDS/ÂΜL (ref 0–0.1)
BASOPHILS NFR BLD AUTO: 0 % (ref 0–1)
BUN SERPL-MCNC: 34 MG/DL (ref 5–25)
CALCIUM SERPL-MCNC: 9.2 MG/DL (ref 8.4–10.2)
CHLORIDE SERPL-SCNC: 102 MMOL/L (ref 96–108)
CO2 SERPL-SCNC: 32 MMOL/L (ref 21–32)
CREAT SERPL-MCNC: 0.61 MG/DL (ref 0.6–1.3)
EOSINOPHIL # BLD AUTO: 0.22 THOUSAND/ÂΜL (ref 0–0.61)
EOSINOPHIL NFR BLD AUTO: 3 % (ref 0–6)
ERYTHROCYTE [DISTWIDTH] IN BLOOD BY AUTOMATED COUNT: 12.7 % (ref 11.6–15.1)
GFR SERPL CREATININE-BSD FRML MDRD: 111 ML/MIN/1.73SQ M
GLUCOSE SERPL-MCNC: 104 MG/DL (ref 65–140)
GLUCOSE SERPL-MCNC: 114 MG/DL (ref 65–140)
GLUCOSE SERPL-MCNC: 114 MG/DL (ref 65–140)
GLUCOSE SERPL-MCNC: 127 MG/DL (ref 65–140)
GLUCOSE SERPL-MCNC: 131 MG/DL (ref 65–140)
GLUCOSE SERPL-MCNC: 133 MG/DL (ref 65–140)
HCT VFR BLD AUTO: 38.6 % (ref 36.5–49.3)
HGB BLD-MCNC: 12.1 G/DL (ref 12–17)
IMM GRANULOCYTES # BLD AUTO: 0.08 THOUSAND/UL (ref 0–0.2)
IMM GRANULOCYTES NFR BLD AUTO: 1 % (ref 0–2)
LYMPHOCYTES # BLD AUTO: 0.64 THOUSANDS/ÂΜL (ref 0.6–4.47)
LYMPHOCYTES NFR BLD AUTO: 7 % (ref 14–44)
MAGNESIUM SERPL-MCNC: 2.5 MG/DL (ref 1.9–2.7)
MCH RBC QN AUTO: 29.2 PG (ref 26.8–34.3)
MCHC RBC AUTO-ENTMCNC: 31.3 G/DL (ref 31.4–37.4)
MCV RBC AUTO: 93 FL (ref 82–98)
MONOCYTES # BLD AUTO: 0.81 THOUSAND/ÂΜL (ref 0.17–1.22)
MONOCYTES NFR BLD AUTO: 9 % (ref 4–12)
NEUTROPHILS # BLD AUTO: 7.2 THOUSANDS/ÂΜL (ref 1.85–7.62)
NEUTS SEG NFR BLD AUTO: 80 % (ref 43–75)
NRBC BLD AUTO-RTO: 0 /100 WBCS
PHOSPHATE SERPL-MCNC: 3 MG/DL (ref 2.7–4.5)
PLATELET # BLD AUTO: 394 THOUSANDS/UL (ref 149–390)
PMV BLD AUTO: 9.1 FL (ref 8.9–12.7)
POTASSIUM SERPL-SCNC: 3.7 MMOL/L (ref 3.5–5.3)
RBC # BLD AUTO: 4.15 MILLION/UL (ref 3.88–5.62)
SODIUM SERPL-SCNC: 145 MMOL/L (ref 135–147)
WBC # BLD AUTO: 8.97 THOUSAND/UL (ref 4.31–10.16)

## 2024-03-01 PROCEDURE — 85025 COMPLETE CBC W/AUTO DIFF WBC: CPT

## 2024-03-01 PROCEDURE — NC001 PR NO CHARGE: Performed by: INTERNAL MEDICINE

## 2024-03-01 PROCEDURE — 99233 SBSQ HOSP IP/OBS HIGH 50: CPT | Performed by: INTERNAL MEDICINE

## 2024-03-01 PROCEDURE — 82948 REAGENT STRIP/BLOOD GLUCOSE: CPT

## 2024-03-01 PROCEDURE — 97163 PT EVAL HIGH COMPLEX 45 MIN: CPT

## 2024-03-01 PROCEDURE — 92611 MOTION FLUOROSCOPY/SWALLOW: CPT

## 2024-03-01 PROCEDURE — 97167 OT EVAL HIGH COMPLEX 60 MIN: CPT

## 2024-03-01 PROCEDURE — 83735 ASSAY OF MAGNESIUM: CPT

## 2024-03-01 PROCEDURE — 74230 X-RAY XM SWLNG FUNCJ C+: CPT

## 2024-03-01 PROCEDURE — 84100 ASSAY OF PHOSPHORUS: CPT

## 2024-03-01 PROCEDURE — 94640 AIRWAY INHALATION TREATMENT: CPT

## 2024-03-01 PROCEDURE — 80048 BASIC METABOLIC PNL TOTAL CA: CPT

## 2024-03-01 RX ORDER — LORAZEPAM 0.5 MG/1
0.5 TABLET ORAL EVERY 8 HOURS PRN
Status: DISCONTINUED | OUTPATIENT
Start: 2024-03-01 | End: 2024-03-14 | Stop reason: HOSPADM

## 2024-03-01 RX ORDER — MIDODRINE HYDROCHLORIDE 5 MG/1
5 TABLET ORAL EVERY 8 HOURS
Status: DISCONTINUED | OUTPATIENT
Start: 2024-03-01 | End: 2024-03-12

## 2024-03-01 RX ORDER — BENZOCAINE/MENTHOL 6 MG-10 MG
LOZENGE MUCOUS MEMBRANE 2 TIMES DAILY
Status: DISCONTINUED | OUTPATIENT
Start: 2024-03-01 | End: 2024-03-11

## 2024-03-01 RX ORDER — DIAZEPAM 5 MG/ML
2.5 INJECTION, SOLUTION INTRAMUSCULAR; INTRAVENOUS EVERY 12 HOURS
Status: DISCONTINUED | OUTPATIENT
Start: 2024-03-01 | End: 2024-03-01

## 2024-03-01 RX ORDER — TAMSULOSIN HYDROCHLORIDE 0.4 MG/1
0.4 CAPSULE ORAL
Status: DISCONTINUED | OUTPATIENT
Start: 2024-03-01 | End: 2024-03-14 | Stop reason: HOSPADM

## 2024-03-01 RX ORDER — HYDROMORPHONE HCL/PF 1 MG/ML
1 SYRINGE (ML) INJECTION EVERY 4 HOURS PRN
Status: DISCONTINUED | OUTPATIENT
Start: 2024-03-01 | End: 2024-03-14 | Stop reason: HOSPADM

## 2024-03-01 RX ORDER — PAROXETINE 10 MG/1
10 TABLET, FILM COATED ORAL DAILY
Status: DISCONTINUED | OUTPATIENT
Start: 2024-03-01 | End: 2024-03-14 | Stop reason: HOSPADM

## 2024-03-01 RX ORDER — OXYCODONE HYDROCHLORIDE 5 MG/1
5 TABLET ORAL EVERY 6 HOURS PRN
Status: DISCONTINUED | OUTPATIENT
Start: 2024-03-01 | End: 2024-03-14 | Stop reason: HOSPADM

## 2024-03-01 RX ORDER — POTASSIUM CHLORIDE 20 MEQ/1
40 TABLET, EXTENDED RELEASE ORAL ONCE
Status: DISCONTINUED | OUTPATIENT
Start: 2024-03-01 | End: 2024-03-01

## 2024-03-01 RX ORDER — POTASSIUM CHLORIDE 20MEQ/15ML
20 LIQUID (ML) ORAL ONCE
Status: COMPLETED | OUTPATIENT
Start: 2024-03-01 | End: 2024-03-01

## 2024-03-01 RX ADMIN — CHLORHEXIDINE GLUCONATE 15 ML: 1.2 SOLUTION ORAL at 10:29

## 2024-03-01 RX ADMIN — FERRIC OXIDE RED: 8; 8 LOTION TOPICAL at 21:00

## 2024-03-01 RX ADMIN — PAROXETINE 10 MG: 10 TABLET, FILM COATED ORAL at 18:43

## 2024-03-01 RX ADMIN — POTASSIUM CHLORIDE 20 MEQ: 1.5 SOLUTION ORAL at 10:29

## 2024-03-01 RX ADMIN — HYDROCORTISONE: 1 CREAM TOPICAL at 10:30

## 2024-03-01 RX ADMIN — FERRIC OXIDE RED: 8; 8 LOTION TOPICAL at 10:30

## 2024-03-01 RX ADMIN — GABAPENTIN 100 MG: 100 CAPSULE ORAL at 10:29

## 2024-03-01 RX ADMIN — MIDODRINE HYDROCHLORIDE 5 MG: 5 TABLET ORAL at 23:44

## 2024-03-01 RX ADMIN — ENOXAPARIN SODIUM 40 MG: 40 INJECTION SUBCUTANEOUS at 16:47

## 2024-03-01 RX ADMIN — CEFEPIME 2000 MG: 2 INJECTION, POWDER, FOR SOLUTION INTRAVENOUS at 20:54

## 2024-03-01 RX ADMIN — TAMSULOSIN HYDROCHLORIDE 0.4 MG: 0.4 CAPSULE ORAL at 16:47

## 2024-03-01 RX ADMIN — CEFEPIME 2000 MG: 2 INJECTION, POWDER, FOR SOLUTION INTRAVENOUS at 13:21

## 2024-03-01 RX ADMIN — OXYCODONE HYDROCHLORIDE 5 MG: 5 TABLET ORAL at 18:48

## 2024-03-01 RX ADMIN — LORATADINE 5 MG: 10 TABLET ORAL at 10:28

## 2024-03-01 RX ADMIN — MIDODRINE HYDROCHLORIDE 5 MG: 5 TABLET ORAL at 16:47

## 2024-03-01 RX ADMIN — MAGNESIUM OXIDE TAB 400 MG (241.3 MG ELEMENTAL MG) 400 MG: 400 (241.3 MG) TAB at 10:28

## 2024-03-01 RX ADMIN — MIDODRINE HYDROCHLORIDE 10 MG: 5 TABLET ORAL at 06:10

## 2024-03-01 RX ADMIN — CEFEPIME 2000 MG: 2 INJECTION, POWDER, FOR SOLUTION INTRAVENOUS at 02:51

## 2024-03-01 NOTE — PROGRESS NOTES
[facility]  Progress Note: Critical Care  Name: Hussein Edward III 58 y.o. male I MRN: [id number]  Unit/Bed#: MICU 06 I Date of Admission: 2/19/2024   Date of Service: 3/1/2024 I Hospital Day: 11    Assessment/Plan   Acute hypoxic and hypercapnic respiratory failure 2/2 ARDS  Influenza A pneumonia  Persistent fever, resolved  Ileus, resolved  Acute metabolic encephalopathy  New onset atrial fibrillation, resolved  Morbid obesity  Hypertriglyceridemia 2/2 propofol    Neuro:   #Sedated  Currently on fentanyl 75 gtt, plan to dc all IV sedation today  Previously unable to tolerate precedex, became bradycardic to 30s on 02/21  May consider switching to dilaudid d/t tachyphylaxis  Off propofol, ketamine and nimbex at this time  Off versed  On gabapentin 200 mg TID  Added YEHUDA valium & oxycodone q6H  Weaned to valium 5 mg q12h and dilaudid 1mg q4h PRN  PRN versed bolus to attain RASS -1 to 0. (x2 doses on 02/28)     CV:   #Hypotension likely 2/2 induction for RSI  On Levo 3, wean as possible to maintain MAP>65  A-line for hemodynamic monitoring  TTE 02/19: LVEF 55-60%. G1DD.  Started midodrine 10 mg TID  #New onset atrial fibrillation, resolved, Now NSR.  Pt found to have rates to 150s s/p CVC placement  gave one time dose of lopressor, patient started on amiodarone drip. Patient's HR still elevated with afib. Given dose of digoxin, resolved.  Continue to monitor, HR better in 90s. Has some periods of tachy that correspond to low grade fevers.  Not on heparin ACS protocol anymore, was restarted on DVT prophylactic heparin     Pulm:  #Acute Respiratory Distress Syndrome  2/2 influenza A pneumonia, possible superimposed bacterial pna  CTA on 02/18 without evidence of PE but findings of extensive bilateral groundglass and consolidative opacities with predominantly perihilar and peripheral distribution compatible with viral and/or atypical pneumonia, likely influenza given clinical  history.  PaO2/FiO2 ratio 243 on 2/26  Antibiotics as mentioned below under ID but finished 5/5 of Ceftriaxone and Azithromycin on 02/21, not currently on abx. Patient started spiking fevers, on cefepime, refer to ID section.  Q4H ABGs  Nebs- Xopenex, Atrovent  CXR appears stable or improved  Underwent bronchoscopy on 02/19 -> grew Candida glabrata, viral culture NGTD  S/p 5 days of solu-medrol  Sputum cultures 02/26 ct to grow Candida glabarata  Blood cultures NGTD  Currently extubated, on 5L  Can consider repeat CXR    GI:   #Ileus, resolved  Continue pepcid prophylaxis  Hold TF d/t worsening distention  Unclear etiology, does not appear to be infectious, although can be considered  Appears to be 2/2 multiple days of TF w/o BM earlier in admission  Total of 4 liquid BM on 02/27  Bowel regimen (reduced to miralax qd + senna qd)  KUB 02/26: Multiple air-filled small bowel loops and air-filled large bowel loops with nonobstructive bowel gas pattern   CT CAP 02/27: New colonic distention to the level of the rectum is more likely related to ileus without any zone of transition. Clinical correlation for any signs of colitis whether infectious or postinfectious. Follow-up x-ray is advised to assess for any progressive distention. The right colon measures approximately 7.3 cm in its greatest caliber at the time of the scan.  No longer distended with multiple bowel movements since 02/27     :   #CKD Stage II  Plan: sCr baseline range of 0.8-1.0, stable currently  Avoid nephrotoxic medications  Monitor sCr with daily BMPs  Minimal UO over 24 hrs on 03/01  Previously on lasix gtt, now receiving lasix PRN to reach net goal of 0.5-1L  Net negative 1.2L, will hold off on any further diuresis  #Hematuria, resolved  (1+) ketones and protein -> likely due to dehydration  large occult blood, with innumerable RBCs -> might be associated with acute drop in hgb.   Improved on repeat UA  Likely traumatic injury by raines catheter.      F/E/N:    F: Not currently on any IV fluids  E: Will monitor and correct any electrolyte derangements. Repleting K.  N: continuing TF to goal     Heme/Onc:   DVT prophylaxis heparin     Endo:   #Steroid induced hyperglycemia  Hyperglycemia resolving with discontinuation of steroids  Continue SSI1 for now     ID:   #New-onset fever while on antibiotics  Pt had ongoing influenza that was untreated for 4 days PTA  Likely not bacterial superimposed, likely just in setting of viral infection  Blood culture, sputum culture, bronchial culture unrevealing  S/p ceftriaxone/cefepime, azithromycin, and tamiflu  UA:  4-10 WBCs, less likely to be infectious cause  Viral panel negative  Can consider performing bronch  Continues to spike fevers off abx, unclear source of infection, liikely pulmonary source  VAS duplex findings of acute superficial thrombophlebitis was noted in the cephalic vein at the distal upper arm  Would not explain pt's current ongoing fevers  New onset abdominal distention, may need to consider GI sources  No longer spiking fevers since 02/27  Unlikely to be urinary source at this time  RP2 panel negative  LFT, lipase, TG negative  MRSA negative, stopped vanc  On cefepime, day 6/7     MSK/Skin:   #Erythematous Rash  New erythematous rash noted on pt's back but non-pruritic  Per family, reports pt has had similar sx in the past, possible heat rash vs. Drug reaction vs. infectious rash, less likely  Plan: calamine lotion and hydrocortisone cream  Frequent turning and repositioning  Wound care as needed    Disposition: Med Surg    ICU Core Measures     A: Assess, Prevent, and Manage Pain Has pain been assessed? Yes  Need for changes to pain regimen? No   B: Both SAT/SAT  N/A   C: Choice of Sedation RASS Goal: 0 Alert and Calm  Need for changes to sedation or analgesia regimen? No   D: Delirium CAM-ICU: Negative   E: Early Mobility  Plan for early mobility? Yes   F: Family Engagement Plan for family engagement  today? Yes       Antibiotic Review: Continue broad spectrum secondary to severity of illness.     Review of Invasive Devices:            Prophylaxis:  VTE VTE covered by:  enoxaparin, Subcutaneous, 40 mg at 02/29/24 1645       Stress Ulcer  not ordered        Significant 24hr Events     58 year old man with WALE, meningioma. He presented 2/16 to [facility] for shortness of breath, recent FLU A infection and pneumonia. Required intubation on 2/19, transferred for possible VVECMO needs. He required NMB and PP x 2 last 2/23.  Now extubated w/ 5L NC.    24hr events: Continues to be on 5 LNC but saturating well.  Reports feeling tired, otherwise no other complaints including chest pain, abdominal pain, back pain, difficulty urinating.  No new rash on his back that was noted.  But denies any purulent or itchiness.     Subjective     Review of Systems   Constitutional:  Positive for fatigue. Negative for chills and fever.   HENT:  Positive for sore throat. Negative for congestion, ear pain, facial swelling, rhinorrhea and sinus pain.    Respiratory: Negative.  Negative for shortness of breath, wheezing and stridor.    Cardiovascular: Negative.  Negative for chest pain, palpitations and leg swelling.   Gastrointestinal: Negative.  Negative for abdominal pain, constipation, diarrhea, nausea and vomiting.   Skin:  Positive for rash.   Neurological:  Negative for light-headedness and headaches.   All other systems reviewed and are negative.       Objective                            Vitals I/O      Most Recent Min/Max in 24hrs   Temp 98.7 °F (37.1 °C) Temp  Min: 98.6 °F (37 °C)  Max: 99.1 °F (37.3 °C)   Pulse 75 Pulse  Min: 75  Max: 116   Resp (!) 33 Resp  Min: 20  Max: 47   /77 BP  Min: 96/58  Max: 140/71   O2 Sat 99 % SpO2  Min: 85 %  Max: 100 %      Intake/Output Summary (Last 24 hours) at 3/1/2024 0631  Last data filed at 3/1/2024 0401  Gross per 24 hour   Intake 895.75 ml   Output 2120 ml   Net -1224.25 ml       Diet  Dysphagia/Modified Consistency; Dysphagia 1-Pureed; Honey Thick Liquid; Honey Thick Liquid    Invasive Monitoring           Physical Exam   Physical Exam  Vitals and nursing note reviewed.   Eyes:      Extraocular Movements: Extraocular movements intact.      Conjunctiva/sclera: Conjunctivae normal.      Pupils: Pupils are equal, round, and reactive to light.   Skin:     General: Skin is warm and dry.      Capillary Refill: Capillary refill takes less than 2 seconds.      Coloration: Skin is not jaundiced.      Findings: Rash present.   HENT:      Head: Normocephalic and atraumatic.      Mouth/Throat:      Mouth: Mucous membranes are dry.      Pharynx: No oropharyngeal exudate.   Cardiovascular:      Rate and Rhythm: Normal rate and regular rhythm.      Pulses: Normal pulses.      Heart sounds: Normal heart sounds.   Musculoskeletal:         General: No swelling.      Right lower leg: No edema.      Left lower leg: No edema.   Abdominal: General: Bowel sounds are normal. There is no distension.      Palpations: Abdomen is soft.   Constitutional:       General: He is not in acute distress.     Appearance: He is well-developed and well-nourished.   Pulmonary:      Effort: No accessory muscle usage, respiratory distress or accessory muscle usage.      Breath sounds: No rales.   Neurological:      Mental Status: He is alert. He is calm.            Diagnostic Studies      EKG:  Imaging: I have personally reviewed pertinent reports.       Medications:  Scheduled PRN   calamine-zinc oxide, , 4x Daily  cefepime, 2,000 mg, Q8H  chlorhexidine, 15 mL, Q12H YEHUDA  diazepam, 5 mg, Q12H  enoxaparin, 40 mg, Q24H YEHUDA  gabapentin, 100 mg, TID  hydrocortisone, , BID  insulin lispro, 1-6 Units, Q6H YEHUDA  loratadine, 5 mg, Daily  magnesium Oxide, 400 mg, Daily  midodrine, 10 mg, Q8H  polyethylene glycol, 17 g, BID      acetaminophen, 1,000 mg, Q6H PRN  albuterol, 2.5 mg, Q4H PRN  HYDROmorphone, 1 mg, Q4H PRN  sodium chloride, 1 spray, Q1H  PRN       Continuous          Labs:    CBC    Recent Labs     02/29/24  0458 03/01/24  0511   WBC 9.33 8.97   HGB 10.4* 12.1   HCT 32.8* 38.6    394*     BMP    Recent Labs     02/29/24  1651 03/01/24  0511   SODIUM 143 145   K 3.9 3.7    102   CO2 32 32   AGAP 9 11   BUN 33* 34*   CREATININE 0.63 0.61   CALCIUM 9.2 9.2       Coags    No recent results     Additional Electrolytes  Recent Labs     02/29/24  0458 03/01/24  0511   MG 2.5 2.5   PHOS 3.4 3.0          Blood Gas    Recent Labs     02/29/24  0458   PHART 7.445   [id number] 47.7*   PO2ART 84.0   [id number] 32.0*   BEART 6.9   SOURCE Line, Arterial     Recent Labs     02/29/24  0458   SOURCE Line, Arterial    LFTs  No recent results    Infectious  No recent results  Glucose  Recent Labs     02/28/24  1800 02/29/24  0458 02/29/24  1651 03/01/24  0511   GLUC 115 130 101 114               Monty Pires MD

## 2024-03-01 NOTE — PLAN OF CARE
Problem: OCCUPATIONAL THERAPY ADULT  Goal: Performs self-care activities at highest level of function for planned discharge setting.  See evaluation for individualized goals.  Description: Treatment Interventions: ADL retraining, Functional transfer training, Endurance training, Patient/family training, Equipment evaluation/education, Compensatory technique education, Activityengagement, Energy conservation          See flowsheet documentation for full assessment, interventions and recommendations.   Note: Limitation: Decreased ADL status, Decreased endurance, Decreased self-care trans, Decreased high-level ADLs  Prognosis: Good  Assessment: Pt is a 58 y.o. male who was admitted to [facility] on 2/19/2024 with Acute respiratory failure with hypoxia (HCC). Patient   has a past medical history of Chronic back pain and Migraine.At baseline pt was completing adls and mobility independently w/o ad - I iadls - shares homemaking with family. Pt lives with wife and son in ranch style home in [place] community. Currently pt requires mod to max assist for overall ADLS and mod a x 2  for functional mobility/transfers. Pt currently presents with impairments in the following categories -difficulty performing ADLS, difficulty performing IADLS , and environment activity tolerance, endurance, standing balance/tolerance, and sitting balance/tolerance. These impairments, as well as pt's fatigue, pain, risk for falls, and home environment  limit pt's ability to safely engage in all baseline areas of occupation, includingbathing, dressing, toileting, functional mobility/transfers, community mobility, laundry , driving, house maintenance, meal prep, cleaning, work/volunteer work , social participation , and leisure activities  From OT standpoint, recommend Level I resources upon D/C. OT will continue to follow to address the below stated goals.     Rehab Resource Intensity Level, OT: I (Maximum Resource Intensity)

## 2024-03-01 NOTE — PROCEDURES
Speech Pathology - Modified Barium Swallow Study    Patient Name: Hussein Edward III    Today's Date: 3/1/2024     Problem List  Principal Problem:    Acute respiratory failure with hypoxia (HCC)  Active Problems:    Influenza A    Persistent fever    ARDS (adult respiratory distress syndrome) (HCC)    Acute metabolic encephalopathy    Ileus (HCC)    Past Medical History  Past Medical History:   Diagnosis Date    Chronic back pain     Migraine      Past Surgical History  Past Surgical History:   Procedure Laterality Date    HERNIA REPAIR      MANDIBLE FRACTURE SURGERY      MOUTH SURGERY      cyst in cheek    NASAL SEPTUM SURGERY         Assessment Summary:    Pt presents with mild oropharyngeal dysphagia characterized by reduced bolus manipulation and control with premature spillage of liquids, delayed swallow initiation, reduced airway closure, and reduced pharyngeal stripping wave/base of tongue retraction. Aspiration occurred with sequential swallows of thin and thin cookie wash, with inconsistent cough response. Penetration occurred with nectar thick liquids. No penetration or aspiration seen with puree or honey thick liquid. Pt became more lethargic towards the end of the evaluation but was able to maintain alertness with consistent cueing. Note: Images are available for review in PACS as desired.    Recommendations:   Recommended Diet: puree/level 1 diet and honey thick liquids now, ST will f/u to advance  Recommended Form of Medications: crushed with puree   Aspiration precautions and compensatory swallowing strategies: upright posture, only feed when fully alert, slow rate of feeding, and small bites/sips  SLP Dysphagia therapy recommended: yes          General Information;  Pt is a 58 y.o. male with a PMH remarkable for menengioma, WALE, PNA and respiratory failure intubated 2/19/24-2/29/24.     Current concerns for dysphagia include s/s aspiration at the bedside. MBS was recommended to assess oropharyngeal  stage swallowing skills at this time.       Pt was viewed sitting upright in the lateral and AP positions. Trials administered were consistent with MBSImP Validated Protocol: Pt was given 5-mL thin liquid x2, 20-mL cup sip thin, 40-mL sequential swallow thin, 5-mL nectar thick, 20-mL cup sip nectar thick, 40-mL sequential swallow nectar thick, 5-mL honey thick, 5-mL pudding, ½ cookie coated with 3-mL pudding, 5-mL nectar thick in the AP position, and 5-mL pudding in the AP position. Pt was also given thin liquids by straw, as well as a barium tablet pudding which he chewed rather than swallow whole.     Initial view observations/comments: Clear view of the upper airway      8-Point Penetration-Aspiration Scale   Thin liquid 8 - Material enters the airway, passes below the vocal folds, and no effort is made to eject     Nectar thick liquid 4 - Material enters the airway, contacts the vocal folds, and is ejected from the airway    Honey thick liquid 1 - Material does not enter the airway   Puree (pudding) 1 - Material does not enter the airway   Solid N/a, liquid wash needed to swallow cookie which resulted in aspiration     Strategies and Efficacy: Trial chin tuck still yielded deep penetration    Aspiration Response and Efficacy: No cough response (silent aspiration)    MBS IMP Rating    ORAL Impairment  Compinent 1--Lip Closure  Judged at any point during the swallow.  2 - Escape from interlabial space or lateral juncture; no extension beyond vermilion border    Component 2--Tongue Control During Bolus Hold  Judged on held liquid boluses only and prior to productive tongue movement.   2 - Posterior escape of less than half of bolus    Component 3--Bolus Preparation/Mastication  Judged only during presentation of 1/2 shortbread cookie coated in pudding.   1 - Slow prolonged chewing/mashing with complete re-collection    Component 4--Bolus Transport/Lingual Motion  Judged after first productive tongue movement for  oral bolus transport.  3 - Repetitive/disorganized tongue motion    Component 5--Oral Residue  Judged after first swallow or after the last swallow of the sequential swallow task.  2 - Residue collection on oral structures   Location   B - Palate and C - Tongue    Component 6--Initiation of Pharyngeal Swallow  Judged at first movement of the brisk superior-anterior hyoid trajectory.  3 - Bolus head in pyriforms      PHARYNGEAL Impairment  Component 7--Soft Palate Elevation  Judged during maximum displacement of soft palate.  0 - No bolus between the soft palate (SP)/pharyngeal wall (PW)    Component 8--Laryngeal Elevation  Judged when epiglottis is in its most horizontal position.  0 - Complete superior movement of thyroid cartilage with complete approximation of arytenoids to epiglottic petiole    Component 9--Anterior Hyoid Excursion  Judged at height of swallow/maximal anterior hyoid displacement.  0 - Complete anterior movement    Component 10--Epiglottic Movement  Judged at height of swallow/maximal anterior hyoid displacement.  0 - Complete inversion    Component 11--Laryngeal Vestibular Closure  Judged at height of swallow/maximal anterior hyoid displacement.  1 - Incomplete; narrow column air/contrast in laryngeal vestibule    Component 12--Pharyngeal Stripping Wave  Judged during the full duration of the pharyngeal swallow.  1 - Present - diminished    Component 13--Pharyngeal Contraction  Judged in AP view at rest and throughout maximum movement of structures.  0 - Complete    Component 14--Pharyngoesophageal Segment Opening  Judged during maximum distension of PES and throughout opening and closure.  0 - Complete distension and complete duration; no obstruction of flow    Component 15--Tongue Base (TB) Retraction  Judged during maximum retraction of the tongue base.  2 - Narrow column of contrast or air between TB and PW    Component 16--Pharyngeal Residue  Judged after first swallow or after the last  swallow of the sequential swallow task.  2 - Collection of residue within or on pharyngeal structures   Location   B - Valleculae and E - Pyriform sinuses      ESOPHAGEAL Impairment  Component 17--Esophageal Clearance Upright Position  Judged in AP view during bolus transit through the oral cavity to the LES  1 - Esophageal retention

## 2024-03-01 NOTE — PLAN OF CARE
Problem: PHYSICAL THERAPY ADULT  Goal: Performs mobility at highest level of function for planned discharge setting.  See evaluation for individualized goals.  Description: Treatment/Interventions: OT, Spoke to case management, Spoke to nursing, Gait training, Bed mobility, Patient/family training, Endurance training, Functional transfer training, LE strengthening/ROM  Equipment Recommended:  (TBD)       See flowsheet documentation for full assessment, interventions and recommendations.  Note: Prognosis: Fair  Problem List: Decreased strength, Decreased range of motion, Decreased endurance, Impaired balance, Decreased coordination, Decreased mobility, Impaired judgement, Decreased cognition, Decreased safety awareness  Assessment: Pt is 58 y.o. male seen for PT evaluation s/p admit to [facility] on 2/19/2024 w/ Acute respiratory failure with hypoxia (HCC). PT consulted to assess pt's functional mobility and d/c needs. Order placed for PT eval and tx, w/ up w/ A order. Comorbidities affecting pt's physical performance at time of assessment include:  has a past medical history of Chronic back pain and Migraine. PTA, pt was ambulates community distances and elevations. Personal factors affecting pt at time of IE include: inaccessible home environment, inability to ambulate household distances, limited home support, unable to perform physical activity, inability to perform IADLs, and inability to perform ADLs. Please find objective findings from PT assessment regarding body systems outlined above with impairments and limitations including weakness, decreased ROM, impaired balance, decreased endurance, impaired coordination, gait deviations, pain, decreased activity tolerance, decreased functional mobility tolerance, decreased safety awareness, and fall risk. Pt required increased time for bed mobility with A to upright trunk. Required A for transfers and gait with deficits in strength and balance. The  following objective measures performed on IE also reveal limitations: The patient's AM-PAC Basic Mobility Inpatient Short Form Raw Score is 10. A standardized score less than 42.9 suggests the patient may benefit from discharge to post-acute rehabilitation services. Please also refer to the recommendation of the Physical Therapist for safe discharge planning. Pt's clinical presentation is currently unstable/unpredictable seen in pt's presentation of critical care monitoring. Pt to benefit from continued PT tx to address deficits as defined above and maximize level of functional independent mobility and consistency. From PT/mobility standpoint, recommendation at time of d/c would be post acute rehabilitation services pending progress in order to facilitate return to PLOF.  Barriers to Discharge: Inaccessible home environment, Decreased caregiver support     Rehab Resource Intensity Level, PT: I (Maximum Resource Intensity)    See flowsheet documentation for full assessment.

## 2024-03-01 NOTE — PROGRESS NOTES
Critical Care Attending Note     58 year old man with WALE, meningioma. He presented 2/16 to [facility]- for shortness of breath, recent FLU A infection and pneumonia. Required intubation on 2/19, transferred for possible VVECMO needs. He required NMB and PP x 2 last 2/23.  He was extubated 2/29.      24hr events - extubated to BiPAP and then weaned to NC yesterday, able to wean O2 needs overnight, diet advanced.  Feeling improved this am, noted some questions regarding time intubation and sedation periods - voices, nightmares, etc.  He denied dyspnea, able to clear secretions, denied pain.  Eager to get to chair.      VS AF,HR 70-90's, MAPS 80-90's, SpO2 100% 6LNC, I/Os -1.2L  Exam  GEN middle aged man in bed, awake, interactive, nontoxic   HEENT MMM, on thrush, no scleral icterus  NECK no accessory muscle use, trachea midline  CV reg, single s1/2, no m/r  Pulm slight central rhonchi cleared with cough, no purulent sputum, no rales or wheeze  ABD +BS soft NTND, no rebound  EXT warm, brisk cap refill trace diffuse edema, noted some proximal muscle weakness and fine motor weakness in hands    Laboratory and Diagnostics  Results from last 7 days   Lab Units 03/01/24  0511 02/29/24  0458 02/28/24  0439 02/27/24  0445 02/26/24  0449 02/25/24  0444 02/24/24  0540   WBC Thousand/uL 8.97 9.33 12.76* 17.64* 17.43* 19.27* 17.06*   HEMOGLOBIN g/dL 12.1 10.4* 11.0* 11.8* 11.3* 13.2 12.5   HEMATOCRIT % 38.6 32.8* 34.6* 37.5 35.6* 42.3 40.8   PLATELETS Thousands/uL 394* 324 319 304 270 322 281   NEUTROS PCT % 80* 78* 78* 83* 82*  --   --    MONOS PCT % 9 11 11 10 9  --   --    EOS PCT % 3 2 1 1 1  --   --      Results from last 7 days   Lab Units 03/01/24  0511 02/29/24  1651 02/29/24  0458 02/28/24  1800 02/28/24  0439 02/27/24  1854 02/27/24  0445 02/26/24  1238   SODIUM mmol/L 145 143 142 144 142 141 137  --    POTASSIUM mmol/L 3.7 3.9 3.4* 3.7 4.1 3.3* 3.5  --    CHLORIDE mmol/L 102 102 101 103 104 99 96  --    CO2 mmol/L 32 32  33* 32 31 32 32  --    ANION GAP mmol/L 11 9 8 9 7 10 9  --    BUN mg/dL 34* 33* 38* 35* 29* 30* 31*  --    CREATININE mg/dL 0.61 0.63 0.63 0.72 0.79 0.77 0.71  --    CALCIUM mg/dL 9.2 9.2 8.8 8.7 8.2* 8.5 8.7  --    GLUCOSE RANDOM mg/dL 114 101 130 115 99 113 155*  --    ALT U/L  --   --   --   --  64*  --   --  49   AST U/L  --   --   --   --  39  --   --  39   ALK PHOS U/L  --   --   --   --  73  --   --  70   ALBUMIN g/dL  --   --   --   --  3.1*  --   --  3.2*   TOTAL BILIRUBIN mg/dL  --   --   --   --  0.85  --   --  0.95     Results from last 7 days   Lab Units 03/01/24  0511 02/29/24  0458 02/28/24  0439 02/27/24 0445 02/26/24 0449 02/25/24 0444 02/24/24  0540   MAGNESIUM mg/dL 2.5 2.5 2.6 2.3 2.1 2.2 2.3   PHOSPHORUS mg/dL 3.0 3.4 2.7 2.5* 2.8 4.7* 3.6               Results from last 7 days   Lab Units 02/24/24  0747   LACTIC ACID mmol/L 1.0                     Results from last 7 days   Lab Units 02/25/24  0444 02/24/24  0747   PROCALCITONIN ng/ml 0.79* 0.41*       ABG:   Results from last 7 days   Lab Units 02/29/24  0458   PH ART  7.445   PCO2 ART mm Hg 47.7*   PO2 ART mm Hg 84.0   HCO3 ART mmol/L 32.0*   BASE EXC ART mmol/L 6.9   ABG SOURCE  Line, Arterial     , lipase 14     MICRO  RP2 2/26 neg  MRSA - neg  UA 2/26 neg  Sputum 2/24 - 1+Epi, gram variable rods  Bld CX 2/24 NGTD  Sputum 2/25 - pending  Sputum 2/22 2+ Candida  FLU/RSV/COVID neg 2/22     RADIOGRAPHS - images personally reviewed  CT C/A/P 2/27 - scattered ground glass infiltrates, basilar consolidations overall stable, no sig effusions, distended GB, some colonic distention without transition point     CT sinus 2/27 - no sig sinus disease     CXR 2/27 - T/L good position, increasing bilateral alveolar infiltrates R>L, questionable volume component, blunted CP angles L>R     4 Ext US 2/26 - neg for DVT, bilateral cephalic upper ext superficial thrombophlebitis      KUB 2/26 - OGT in place, diffuse bowel gas, no AFL, no free air  appreciated     CXR 2/26- T/L good position, no PTX, improving right sided infiltrates, persistent left sided infiltrates slightly increased     CT angio 2/18 - no PE, extensive bilateral mixed ground glass and consolidative infiltrates, no sig effusions     TTE 2/2024 - EF 60%, grade I diastolic dysfunction, normal RV     Assessment  Acute hypoxic and hypercarbic respiratory failure - extubated 2/29  Severe ARDS secondary to FLU A and bacterial superinfection  SIRS/Sepsis POA with septic shock  FLU A and suspected bacterial superinfection  Persistent fever - possible secondary infectious insult vs drug fever vs non-infectious etiology - now resolved  Acute diastolic CHF - improved  New onset atrial fibrillation - now SR  Abnormal LFTs  Elevated TG secondary propofol  Suspected ileus - resolved  Bilateral superficial upper ext thrombophlebitis   Critical illness myopathy     PLAN  NEURO - stop scheduled valium, stop neurontin, will have prn ativan and oxycodone, monitor for w/d symptoms, may consider paxil initiation if PTSD symptoms/depression symptoms emerge - would benefit from Post ICU Support group at d/c  CARDIAC - wean midodrine now, not on AC, now SR - will continue current course  PULM - extubated, weaning O2, OOB-chair, IS  GI - dysphagia diet, VBS per speech, bowel regimen  RENAL - replete electrolytes, prn lasix for euvolemia to -500cc NS  ID - D#6/7 cefepime (on/off previously)- cultures remain negative, no focal source of infection likely pneumonia   ENDO - ISS  HEME - no active issues  ICU Care - SCDs, LMWH, CHG, HOB >30deg  Full Code   PT/OT, OOB-chair today  Stable for downgrade M/S, will plan for pulmonary follow up, please call with any questions or concerns over the weekend     Iftikhar Flaherty, DO

## 2024-03-01 NOTE — PROGRESS NOTES
-----------------------------------------------------------------------------------------------------------------------------------------------------------    Code Status: Level 1 - Full Code    Reason for ICU/Stepdown Admission:   Acute hypoxic respiratory failure secondary to ARDS from influenza A pneumonia with superimposed bacterial pneumonia.    Consultants:  Pharmacy  Nutrition  Critical care    HPI:   Hussein Edward III is a 58 y.o. with a PMH of meningioma, WALE who presented to [facility] on 02/16 with worsening shortness of breath. Pt was recently diagnosed w/ influenza A 4 days PTA. Was seen in urgent care for is sx d/t increasing SOB but was discharged home without any specific treatment. On presentation, there was concern pt may have pneumonia given CXR findings at [facility]. Otherwise, pt denied chest pain, light-headedness, numbness, weakness, abdominal pain, nausea, vomiting, or diarrhea.     While at [facility], pt became increasingly hypoxic, requiring 15L and tachypneic to 30 bpm, requiring upgrading his level of care to critical care. Pt was started on Tamiflu in the hopes of decreasing severity of infection. He was also started on IV solumedrol to help w/ inflammation. Unfortunately, pt's oxygen demands continued to rise, requiring BiPAP, and continued to be increasingly tachypneic to 50s. Due to his worsening respiratory status, decision was made to intubate the pt, which pt was amenable to. Pt was subsequently transferred to \[facility]\"" for evaluation by CT surgery for possible VV-ECMO.     History obtained from chart review and unobtainable from patient due to being currently intubated and sedated.    Summary of clinical course:  For patient's acute respiratory distress syndrome, CTA was obtained with findings of extensive bilateral groundglass and consolidative opacities compatible with viral and/or atypical pneumonia.  Initial P/F ratio was 6.  Patient was started on ceftriaxone and  azithromycin on 02/21 and completed a 5-day course of both.  He also underwent proning on 2/19 and 2/23, also underwent bronchoscopy on 2/19 which primarily grew Candida glabrata but otherwise did not grow any further bacteria or viruses.  Blood cultures also had no growth to date.  Significant sedation including fentanyl, Precedex, ketamine, propofol and gabapentin.  Due to the significant number of drips patient was consistently net positive and fluid balance and therefore required multiple doses of Lasix throughout his admission.  He was also placed on a Lasix drip to help obtain a net negative fluid balance of 0.5-1 L.  Lasix was made as needed after fluid balance goal was achieved after extubation.  Sedation plan was weaned as tolerated and was also started on oxycodone per NG which were discontinued at time of transfer.  During placement of central line early in admission patient went into was given one-time dose of Lopressor and started on amiodarone drip which converted patient to sinus rhythm and did not have any further episodes of A-fib.  Patient's course was also complicated by new onset ileus found on KUB and confirmed with CT chest abdomen pelvis with right colon measured at approximately 7.3 cm in its greatest caliber.  Patient was started on a significant bowel regimen and had multiple bowel episodes and passing gas which ultimately resolved his ileus.  Also of note, patient had multiple episodes of fever spikes after completion of ceftriaxone and azithromycin.  It was unclear what the likely source of the infection could have been.  Further infectious and noninfectious workup was performed along with imaging studies which all returned negative, only with CT CAP showing ileus.  Given multiple episodes of fever spikes after completing antibiotics patient was started again on cefepime for 7 days, treating as a likely pulmonary source.  On day of transfer, patient was on day 6/7 of cefepime. Patient  showed slow steady improvement to extubation on 02/29 and continued to show significant clinical improvement s/p extubation.  On day of transfer, patient was found to have a new erythematous rash on his back and left arm.  It was believed to be likely a heat rash versus drug reaction versus infectious rash, although infection less likely.  He was given calamine lotion, Claritin and hydrocortisone cream.  Otherwise he was considered stable for downgrade from critical care on 03/01.    Please refer to today's progress note for further clinical details    Recent procedures:  Central line  Intubation  Arterial line    Outstanding or pending diagnostics/cultures/procedures:  None    Mobilization Plan:  UOOB as tolerated      LDA's and reason for remaining devices:  Invasive Devices       Peripheral Intravenous Line  Duration             Peripheral IV 02/28/24 Left;Ventral (anterior) Forearm 2 days    Peripheral IV 03/01/24 Dorsal (posterior);Right Forearm <1 day    Peripheral IV 03/01/24 Right;Ventral (anterior) Forearm <1 day                    Discharge Plan:  Pending improvement in pulmonary status status post ARDS.    Family aware of transfer out of critical care? Yes    Home medications not resumed and why:  Diamox  Tessalon  Daridorexant  Eszopiclone  Magnesium oxide  Methocarbamol    [facility]

## 2024-03-01 NOTE — OCCUPATIONAL THERAPY NOTE
Occupational Therapy Evaluation     Patient Name: Hussein Edward III  Today's Date: 3/1/2024  Problem List  Principal Problem:    Acute respiratory failure with hypoxia (HCC)  Active Problems:    Influenza A    Persistent fever    ARDS (adult respiratory distress syndrome) (HCC)    Acute metabolic encephalopathy    Ileus (HCC)    Past Medical History  Past Medical History:   Diagnosis Date    Chronic back pain     Migraine      Past Surgical History  Past Surgical History:   Procedure Laterality Date    HERNIA REPAIR      MANDIBLE FRACTURE SURGERY      MOUTH SURGERY      cyst in cheek    NASAL SEPTUM SURGERY           03/01/24 1135   OT Last Visit   OT Visit Date 03/01/24   Note Type   Note type Evaluation   Pain Assessment   Pain Assessment Tool 0-10   Pain Score No Pain   Restrictions/Precautions   Weight Bearing Precautions Per Order No   Other Precautions Chair Alarm;Bed Alarm;Multiple lines;O2;Fall Risk   Home Living   Type of Home House   Home Layout One level  (55+ community)   Bathroom Shower/Tub Walk-in shower   Bathroom Toilet Standard   Prior Function   Level of [place] Independent with ADLs;Independent with functional mobility;Independent with IADLS   Lives With Spouse;Son   Receives Help From Family   IADLs Independent with driving;Independent with meal prep;Independent with medication management   Falls in the last 6 months 0   Vocational Full time employment   Lifestyle   Autonomy I adls and mobility w/o ad - i iadls - shares homemaking with family   Reciprocal Relationships supportive family   Service to Others works FT   Intrinsic Gratification active pta   Subjective   Subjective offers no c/o   ADL   Eating Assistance 5  Supervision/Setup   Grooming Assistance 5  Supervision/Setup   UB Bathing Assistance 3  Moderate Assistance   LB Bathing Assistance 2  Maximal Assistance   UB Dressing Assistance 3  Moderate Assistance   LB Dressing Assistance 2  Maximal Assistance   Toileting Assistance   2  Maximal Assistance   Bed Mobility   Supine to Sit 3  Moderate assistance   Additional items Assist x 2   Transfers   Sit to Stand 3  Moderate assistance   Additional items Assist x 2   Stand to Sit 3  Moderate assistance   Additional items Assist x 2   Stand pivot 3  Moderate assistance   Additional items Assist x 2   Functional Mobility   Functional Mobility 3  Moderate assistance   Additional Comments x2 to take a few step out to chair   Additional items Hand hold assistance   Balance   Static Sitting Fair   Dynamic Sitting Fair -   Static Standing Poor +   Dynamic Standing Poor   Ambulatory Poor   Activity Tolerance   Activity Tolerance Patient limited by pain;Patient limited by fatigue;Treatment limited secondary to medical complications (Comment)   RUE Assessment   RUE Assessment WFL   LUE Assessment   LUE Assessment WFL   Cognition   Overall Cognitive Status WFL   Assessment   Limitation Decreased ADL status;Decreased endurance;Decreased self-care trans;Decreased high-level ADLs   Prognosis Good   Assessment Pt is a 58 y.o. male who was admitted to [facility] on 2/19/2024 with Acute respiratory failure with hypoxia (HCC). Patient   has a past medical history of Chronic back pain and Migraine.At baseline pt was completing adls and mobility independently w/o ad - I iadls - shares homemaking with family. Pt lives with wife and son in ranch style home in 55+ community. Currently pt requires mod to max assist for overall ADLS and mod a x 2  for functional mobility/transfers. Pt currently presents with impairments in the following categories -difficulty performing ADLS, difficulty performing IADLS , and environment activity tolerance, endurance, standing balance/tolerance, and sitting balance/tolerance. These impairments, as well as pt's fatigue, pain, risk for falls, and home environment  limit pt's ability to safely engage in all baseline areas of occupation, includingbathing, dressing, toileting,  functional mobility/transfers, community mobility, laundry , driving, house maintenance, meal prep, cleaning, work/volunteer work , social participation , and leisure activities  From OT standpoint, recommend Level I resources upon D/C. OT will continue to follow to address the below stated goals.   Goals   Patient Goals get better   LTG Time Frame 10-14   Long Term Goal #1 1) Mod I UB/LB adls after setup with use of LHAE PRN  2)  Mod I toileting and clothing management  3) Mod I bed mobility  4) Mod I functional mob/transfers to and from all surfaces with fair+ to good balance/safety   5) Increase activity tolerance to 30-35min for participation in adls and enjoyable activities  6) Assess DME needs   7) Demonstrate good carryover with safe use of AD, EC/pacing and pursed lip breathing during functional tasks   8) Assess DME needs   9) Assist with safe d/c recommendations   Plan   Treatment Interventions ADL retraining;Functional transfer training;Endurance training;Patient/family training;Equipment evaluation/education;Compensatory technique education;Activityengagement;Energy conservation   Goal Expiration Date 03/15/24   OT Frequency 2-3x/wk   Discharge Recommendation   Rehab Resource Intensity Level, OT I (Maximum Resource Intensity)   AM-PAC Daily Activity Inpatient   Lower Body Dressing 2   Bathing 2   Toileting 2   Upper Body Dressing 2   Grooming 3   Eating 4   Daily Activity Raw Score 15   Daily Activity Standardized Score (Calc for Raw Score >=11) 34.69   AM-PAC Applied Cognition Inpatient   Following a Speech/Presentation 4   Understanding Ordinary Conversation 4   Taking Medications 4   Remembering Where Things Are Placed or Put Away 4   Remembering List of 4-5 Errands 3   Taking Care of Complicated Tasks 3   Applied Cognition Raw Score 22   Applied Cognition Standardized Score 47.83   End of Consult   Education Provided Yes   Patient Position at End of Consult Bedside chair;Bed/Chair alarm activated;All  needs within reach   Nurse Communication Nurse aware of consult       The patient's raw score on the AM-PAC Daily Activity Inpatient Short Form is 15. A raw score of less than 19 suggests the patient may benefit from discharge to post-acute rehabilitation services. Please refer to the recommendation of the Occupational Therapist for safe discharge planning.      Ursula Willis

## 2024-03-01 NOTE — PHYSICAL THERAPY NOTE
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)    Primary insomnia    Obstructive sleep apnea (adult) (pediatric)    Non-traumatic rhabdomyolysis    Metabolic acidosis    Acute respiratory failure with hypoxia (HCC)    Influenza A    Insomnia    Migraine    Anxiety    Persistent fever    ARDS (adult respiratory distress syndrome) ([facility])    Acute metabolic encephalopathy    Ileus (HCC)       Past Medical History  Past Medical History:   Diagnosis Date    Chronic back pain     Migraine        Past Surgical History  Past Surgical History:   Procedure Laterality Date    HERNIA REPAIR      MANDIBLE FRACTURE SURGERY      MOUTH SURGERY      cyst in cheek    NASAL SEPTUM SURGERY          03/01/24 1136   PT Last Visit   PT Visit Date 03/01/24   Note Type   Note type Evaluation   Pain Assessment   Pain Assessment Tool 0-10   Pain Score No Pain   Restrictions/Precautions   Weight Bearing Precautions Per Order No   Other Precautions Chair Alarm;Bed Alarm;O2;Fall Risk   Home Living   Type of Home House   Home Layout One level  (55+)   Bathroom Shower/Tub Walk-in shower   Bathroom Toilet Standard   Prior Function   Level of [place] Independent with functional mobility   Lives With Spouse;Son   Receives Help From Family   Falls in the last 6 months 0   Vocational Full time employment   General   Family/Caregiver Present No   Cognition   Orientation Level Oriented X4   Subjective   Subjective Pt  willing and agreeable to PT session   RLE Assessment   RLE Assessment WFL   LLE Assessment   LLE Assessment WFL   Coordination   Movements are Fluid and Coordinated 0   Coordination and Movement Description slow and guarded with fatigue, weakness, and balance compared to baseline mobility   Bed Mobility   Supine to Sit 3  Moderate assistance   Additional items Assist x 2   Sit to Supine Unable to assess   Additional Comments Pt left resting in chair, call bell in reach, chair alarm active   Transfers   Sit to Stand 3  Moderate assistance   Additional items Assist x 2   Stand to Sit 3  Moderate assistance   Additional items Assist x 2   Stand pivot 3  Moderate assistance   Additional items Assist x 2   Ambulation/Elevation   Gait pattern Shuffling;Decreased foot clearance;Redundant gait at times;Short stride;Excessively slow   Gait Assistance 3  Moderate assist   Additional items Assist x 2   Assistive Device Other (Comment)  (HHA)   Distance 3'   Balance   Static Sitting Fair   Dynamic Sitting Fair -   Static Standing Poor +   Dynamic Standing Poor   Ambulatory Poor   Endurance Deficit   Endurance Deficit Yes   Endurance Deficit Description limited by fatigue, weakness, balance   Activity Tolerance   Activity Tolerance Patient limited by fatigue;Patient limited by pain   Medical Staff Made Aware OT for D/C planning   Nurse Made Aware yes, nsg gave clearance to work with pt   Assessment   Prognosis Fair   Problem List Decreased strength;Decreased range of motion;Decreased endurance;Impaired balance;Decreased coordination;Decreased mobility;Impaired judgement;Decreased cognition;Decreased safety awareness   Assessment Pt is 58 y.o. male seen for PT evaluation s/p admit to [facility] on 2/19/2024 w/ Acute respiratory failure with hypoxia (HCC). PT consulted to assess pt's functional mobility and d/c needs. Order placed for PT eval and tx, w/ up w/ A order. Comorbidities affecting pt's physical performance at  time of assessment include:  has a past medical history of Chronic back pain and Migraine. PTA, pt was ambulates community distances and elevations. Personal factors affecting pt at time of IE include: inaccessible home environment, inability to ambulate household distances, limited home support, unable to perform physical activity, inability to perform IADLs, and inability to perform ADLs. Please find objective findings from PT assessment regarding body systems outlined above with impairments and limitations including weakness, decreased ROM, impaired balance, decreased endurance, impaired coordination, gait deviations, pain, decreased activity tolerance, decreased functional mobility tolerance, decreased safety awareness, and fall risk. Pt required increased time for bed mobility with A to upright trunk. Required A for transfers and gait with deficits in strength and balance. The following objective measures performed on IE also reveal limitations: The patient's AM-PAC Basic Mobility Inpatient Short Form Raw Score is 10. A standardized score less than 42.9 suggests the patient may benefit from discharge to post-acute rehabilitation services. Please also refer to the recommendation of the Physical Therapist for safe discharge planning. Pt's clinical presentation is currently unstable/unpredictable seen in pt's presentation of critical care monitoring. Pt to benefit from continued PT tx to address deficits as defined above and maximize level of functional independent mobility and consistency. From PT/mobility standpoint, recommendation at time of d/c would be post acute rehabilitation services pending progress in order to facilitate return to PLOF.   Barriers to Discharge Inaccessible home environment;Decreased caregiver support   Goals   Patient Goals To get stronger   STG Expiration Date 03/13/24   Short Term Goal #1 1. Complete bed mobility and transfers I to decrease need for caregiver in home. 2. Ambulate 300'  I to complete household and community mobility without A. 3. Improve dynamic balance to good to decrease need for UE support during ambulation. 4. Be educated & demonstate 12 steps to be able to enter home without A.   Plan   Treatment/Interventions OT;Spoke to case management;Spoke to nursing;Gait training;Bed mobility;Patient/family training;Endurance training;Functional transfer training;LE strengthening/ROM   PT Frequency 3-5x/wk   Discharge Recommendation   Rehab Resource Intensity Level, PT I (Maximum Resource Intensity)   Equipment Recommended   (TBD)   AM-PAC Basic Mobility Inpatient   Turning in Flat Bed Without Bedrails 2   Lying on Back to Sitting on Edge of Flat Bed Without Bedrails 2   Moving Bed to Chair 2   Standing Up From Chair Using Arms 2   Walk in Room 1   Climb 3-5 Stairs With Railing 1   Basic Mobility Inpatient Raw Score 10   Turning Head Towards Sound 3   Follow Simple Instructions 3   Low Function Basic Mobility Raw Score  16   Low Function Basic Mobility Standardized Score  25.72   Highest Level Of Mobility   JH-HLM Goal 4: Move to chair/commode   JH-HLM Achieved 4: Move to chair/commode           Casi Pride, PT

## 2024-03-01 NOTE — PLAN OF CARE
Problem: Prexisting or High Potential for Compromised Skin Integrity  Goal: Skin integrity is maintained or improved  Description: INTERVENTIONS:  - Identify patients at risk for skin breakdown  - Assess and monitor skin integrity  - Assess and monitor nutrition and hydration status  - Monitor labs   - Assess for incontinence   - Turn and reposition patient  - Assist with mobility/ambulation  - Relieve pressure over bony prominences  - Avoid friction and shearing  - Provide appropriate hygiene as needed including keeping skin clean and dry  - Evaluate need for skin moisturizer/barrier cream  - Collaborate with interdisciplinary team   - Patient/family teaching  - Consider wound care consult   Outcome: Progressing     Problem: PAIN - ADULT  Goal: Verbalizes/displays adequate comfort level or baseline comfort level  Description: Interventions:  - Encourage patient to monitor pain and request assistance  - Assess pain using appropriate pain scale  - Administer analgesics based on type and severity of pain and evaluate response  - Implement non-pharmacological measures as appropriate and evaluate response  - Consider cultural and social influences on pain and pain management  - Notify physician/advanced practitioner if interventions unsuccessful or patient reports new pain  Outcome: Progressing     Problem: INFECTION - ADULT  Goal: Absence or prevention of progression during hospitalization  Description: INTERVENTIONS:  - Assess and monitor for signs and symptoms of infection  - Monitor lab/diagnostic results  - Monitor all insertion sites, i.e. indwelling lines, tubes, and drains  - Monitor endotracheal if appropriate and nasal secretions for changes in amount and color  - [place] appropriate cooling/warming therapies per order  - Administer medications as ordered  - Instruct and encourage patient and family to use good hand hygiene technique  - Identify and instruct in appropriate isolation precautions for  identified infection/condition  Outcome: Progressing  Goal: Absence of fever/infection during neutropenic period  Description: INTERVENTIONS:  - Monitor WBC    Outcome: Progressing     Problem: SAFETY ADULT  Goal: Patient will remain free of falls  Description: INTERVENTIONS:  - Educate patient/family on patient safety including physical limitations  - Instruct patient to call for assistance with activity   - Consult OT/PT to assist with strengthening/mobility   - Keep Call bell within reach  - Keep bed low and locked with side rails adjusted as appropriate  - Keep care items and personal belongings within reach  - Initiate and maintain comfort rounds  - Make Fall Risk Sign visible to staff  - Apply yellow socks and bracelet for high fall risk patients  - Consider moving patient to room near nurses station  Outcome: Progressing  Goal: Maintain or return to baseline ADL function  Description: INTERVENTIONS:  -  Assess patient's ability to carry out ADLs; assess patient's baseline for ADL function and identify physical deficits which impact ability to perform ADLs (bathing, care of mouth/teeth, toileting, grooming, dressing, etc.)  - Assess/evaluate cause of self-care deficits   - Assess range of motion  - Assess patient's mobility; develop plan if impaired  - Assess patient's need for assistive devices and provide as appropriate  - Encourage maximum independence but intervene and supervise when necessary  - Involve family in performance of ADLs  - Assess for home care needs following discharge   - Consider OT consult to assist with ADL evaluation and planning for discharge  - Provide patient education as appropriate  Outcome: Progressing  Goal: Maintains/Returns to pre admission functional level  Description: INTERVENTIONS:  - Perform AM-PAC 6 Click Basic Mobility/ Daily Activity assessment daily.  - Set and communicate daily mobility goal to care team and patient/family/caregiver.   - Collaborate with rehabilitation  services on mobility goals if consulted  - Out of bed for toileting  - Record patient progress and toleration of activity level   Outcome: Progressing     Problem: DISCHARGE PLANNING  Goal: Discharge to home or other facility with appropriate resources  Description: INTERVENTIONS:  - Identify barriers to discharge w/patient and caregiver  - Arrange for needed discharge resources and transportation as appropriate  - Identify discharge learning needs (meds, wound care, etc.)  - Arrange for interpretive services to assist at discharge as needed  - Refer to Case Management Department for coordinating discharge planning if the patient needs post-hospital services based on physician/advanced practitioner order or complex needs related to functional status, cognitive ability, or social support system  Outcome: Progressing     Problem: Knowledge Deficit  Goal: Patient/family/caregiver demonstrates understanding of disease process, treatment plan, medications, and discharge instructions  Description: Complete learning assessment and assess knowledge base.  Interventions:  - Provide teaching at level of understanding  - Provide teaching via preferred learning methods  Outcome: Progressing     Problem: Nutrition/Hydration-ADULT  Goal: Nutrient/Hydration intake appropriate for improving, restoring or maintaining nutritional needs  Description: Monitor and assess patient's nutrition/hydration status for malnutrition. Collaborate with interdisciplinary team and initiate plan and interventions as ordered.  Monitor patient's weight and dietary intake as ordered or per policy. Utilize nutrition screening tool and intervene as necessary. Determine patient's food preferences and provide high-protein, high-caloric foods as appropriate.     INTERVENTIONS:  - Monitor oral intake, urinary output, labs, and treatment plans  - Assess nutrition and hydration status and recommend course of action  - Evaluate amount of meals eaten  - Assist  patient with eating if necessary   - Allow adequate time for meals  - Recommend/ encourage appropriate diets, oral nutritional supplements, and vitamin/mineral supplements  - Order, calculate, and assess calorie counts as needed  - Recommend, monitor, and adjust tube feedings and TPN/PPN based on assessed needs  - Assess need for intravenous fluids  - Provide specific nutrition/hydration education as appropriate  - Include patient/family/caregiver in decisions related to nutrition  Outcome: Progressing

## 2024-03-02 ENCOUNTER — APPOINTMENT (INPATIENT)
Dept: RADIOLOGY | Facility: HOSPITAL | Age: 59
DRG: 870 | End: 2024-03-02
Payer: COMMERCIAL

## 2024-03-02 LAB
ARTERIAL PATENCY WRIST A: YES
BASE EXCESS BLDA CALC-SCNC: 3.5 MMOL/L
GLUCOSE SERPL-MCNC: 113 MG/DL (ref 65–140)
GLUCOSE SERPL-MCNC: 116 MG/DL (ref 65–140)
GLUCOSE SERPL-MCNC: 129 MG/DL (ref 65–140)
GLUCOSE SERPL-MCNC: 155 MG/DL (ref 65–140)
GLUCOSE SERPL-MCNC: 156 MG/DL (ref 65–140)
GLUCOSE SERPL-MCNC: 191 MG/DL (ref 65–140)
HCO3 BLDA-SCNC: 26 MMOL/L (ref 22–28)
NASAL CANNULA: 4
O2 CT BLDA-SCNC: 17.8 ML/DL (ref 16–23)
OXYHGB MFR BLDA: 96.6 % (ref 94–97)
PCO2 BLDA: 32.6 MM HG (ref 36–44)
PH BLDA: 7.52 [PH] (ref 7.35–7.45)
PO2 BLDA: 95.3 MM HG (ref 75–129)
SPECIMEN SOURCE: ABNORMAL

## 2024-03-02 PROCEDURE — 99232 SBSQ HOSP IP/OBS MODERATE 35: CPT | Performed by: INTERNAL MEDICINE

## 2024-03-02 PROCEDURE — 36600 WITHDRAWAL OF ARTERIAL BLOOD: CPT

## 2024-03-02 PROCEDURE — 82948 REAGENT STRIP/BLOOD GLUCOSE: CPT

## 2024-03-02 PROCEDURE — 71045 X-RAY EXAM CHEST 1 VIEW: CPT

## 2024-03-02 PROCEDURE — 82805 BLOOD GASES W/O2 SATURATION: CPT | Performed by: NURSE PRACTITIONER

## 2024-03-02 PROCEDURE — 94760 N-INVAS EAR/PLS OXIMETRY 1: CPT

## 2024-03-02 PROCEDURE — 94664 DEMO&/EVAL PT USE INHALER: CPT

## 2024-03-02 PROCEDURE — 94640 AIRWAY INHALATION TREATMENT: CPT

## 2024-03-02 RX ORDER — METHYLPREDNISOLONE SODIUM SUCCINATE 40 MG/ML
40 INJECTION, POWDER, LYOPHILIZED, FOR SOLUTION INTRAMUSCULAR; INTRAVENOUS EVERY 8 HOURS SCHEDULED
Status: DISCONTINUED | OUTPATIENT
Start: 2024-03-02 | End: 2024-03-03

## 2024-03-02 RX ORDER — ALBUTEROL SULFATE 90 UG/1
2 AEROSOL, METERED RESPIRATORY (INHALATION) EVERY 4 HOURS PRN
Status: DISCONTINUED | OUTPATIENT
Start: 2024-03-02 | End: 2024-03-14 | Stop reason: HOSPADM

## 2024-03-02 RX ORDER — IPRATROPIUM BROMIDE AND ALBUTEROL SULFATE 2.5; .5 MG/3ML; MG/3ML
3 SOLUTION RESPIRATORY (INHALATION) EVERY 8 HOURS
Status: DISCONTINUED | OUTPATIENT
Start: 2024-03-02 | End: 2024-03-02

## 2024-03-02 RX ORDER — FUROSEMIDE 10 MG/ML
20 INJECTION INTRAMUSCULAR; INTRAVENOUS ONCE
Status: COMPLETED | OUTPATIENT
Start: 2024-03-02 | End: 2024-03-02

## 2024-03-02 RX ORDER — MAGNESIUM SULFATE HEPTAHYDRATE 40 MG/ML
2 INJECTION, SOLUTION INTRAVENOUS ONCE
Status: COMPLETED | OUTPATIENT
Start: 2024-03-02 | End: 2024-03-02

## 2024-03-02 RX ORDER — LEVALBUTEROL INHALATION SOLUTION 1.25 MG/3ML
1.25 SOLUTION RESPIRATORY (INHALATION)
Status: DISCONTINUED | OUTPATIENT
Start: 2024-03-02 | End: 2024-03-12

## 2024-03-02 RX ADMIN — INSULIN LISPRO 1 UNITS: 100 INJECTION, SOLUTION INTRAVENOUS; SUBCUTANEOUS at 16:42

## 2024-03-02 RX ADMIN — METHYLPREDNISOLONE SODIUM SUCCINATE 40 MG: 40 INJECTION, POWDER, FOR SOLUTION INTRAMUSCULAR; INTRAVENOUS at 21:27

## 2024-03-02 RX ADMIN — METHYLPREDNISOLONE SODIUM SUCCINATE 40 MG: 40 INJECTION, POWDER, FOR SOLUTION INTRAMUSCULAR; INTRAVENOUS at 12:41

## 2024-03-02 RX ADMIN — LORATADINE 5 MG: 10 TABLET ORAL at 08:19

## 2024-03-02 RX ADMIN — INSULIN LISPRO 2 UNITS: 100 INJECTION, SOLUTION INTRAVENOUS; SUBCUTANEOUS at 11:26

## 2024-03-02 RX ADMIN — LEVALBUTEROL HYDROCHLORIDE 1.25 MG: 1.25 SOLUTION RESPIRATORY (INHALATION) at 13:52

## 2024-03-02 RX ADMIN — FERRIC OXIDE RED: 8; 8 LOTION TOPICAL at 21:26

## 2024-03-02 RX ADMIN — HYDROCORTISONE: 1 CREAM TOPICAL at 16:42

## 2024-03-02 RX ADMIN — CEFEPIME 2000 MG: 2 INJECTION, POWDER, FOR SOLUTION INTRAVENOUS at 05:25

## 2024-03-02 RX ADMIN — MAGNESIUM SULFATE HEPTAHYDRATE 2 G: 40 INJECTION, SOLUTION INTRAVENOUS at 12:41

## 2024-03-02 RX ADMIN — PAROXETINE 10 MG: 10 TABLET, FILM COATED ORAL at 08:19

## 2024-03-02 RX ADMIN — MIDODRINE HYDROCHLORIDE 5 MG: 5 TABLET ORAL at 16:42

## 2024-03-02 RX ADMIN — FUROSEMIDE 20 MG: 10 INJECTION, SOLUTION INTRAMUSCULAR; INTRAVENOUS at 12:41

## 2024-03-02 RX ADMIN — MAGNESIUM OXIDE TAB 400 MG (241.3 MG ELEMENTAL MG) 400 MG: 400 (241.3 MG) TAB at 08:19

## 2024-03-02 RX ADMIN — ENOXAPARIN SODIUM 40 MG: 40 INJECTION SUBCUTANEOUS at 16:42

## 2024-03-02 RX ADMIN — MIDODRINE HYDROCHLORIDE 5 MG: 5 TABLET ORAL at 08:19

## 2024-03-02 RX ADMIN — HYDROCORTISONE: 1 CREAM TOPICAL at 08:20

## 2024-03-02 RX ADMIN — POLYETHYLENE GLYCOL 3350 17 G: 17 POWDER, FOR SOLUTION ORAL at 08:19

## 2024-03-02 RX ADMIN — MIDODRINE HYDROCHLORIDE 5 MG: 5 TABLET ORAL at 23:39

## 2024-03-02 RX ADMIN — TAMSULOSIN HYDROCHLORIDE 0.4 MG: 0.4 CAPSULE ORAL at 16:42

## 2024-03-02 RX ADMIN — LEVALBUTEROL HYDROCHLORIDE 1.25 MG: 1.25 SOLUTION RESPIRATORY (INHALATION) at 20:44

## 2024-03-02 NOTE — PLAN OF CARE
Problem: PAIN - ADULT  Goal: Verbalizes/displays adequate comfort level or baseline comfort level  Description: Interventions:  - Encourage patient to monitor pain and request assistance  - Assess pain using appropriate pain scale  - Administer analgesics based on type and severity of pain and evaluate response  - Implement non-pharmacological measures as appropriate and evaluate response  - Consider cultural and social influences on pain and pain management  - Notify physician/advanced practitioner if interventions unsuccessful or patient reports new pain  Outcome: Progressing     Problem: INFECTION - ADULT  Goal: Absence or prevention of progression during hospitalization  Description: INTERVENTIONS:  - Assess and monitor for signs and symptoms of infection  - Monitor lab/diagnostic results  - Monitor all insertion sites, i.e. indwelling lines, tubes, and drains  - Monitor endotracheal if appropriate and nasal secretions for changes in amount and color  - [place] appropriate cooling/warming therapies per order  - Administer medications as ordered  - Instruct and encourage patient and family to use good hand hygiene technique  - Identify and instruct in appropriate isolation precautions for identified infection/condition  Outcome: Progressing     Problem: DISCHARGE PLANNING  Goal: Discharge to home or other facility with appropriate resources  Description: INTERVENTIONS:  - Identify barriers to discharge w/patient and caregiver  - Arrange for needed discharge resources and transportation as appropriate  - Identify discharge learning needs (meds, wound care, etc.)  - Arrange for interpretive services to assist at discharge as needed  - Refer to Case Management Department for coordinating discharge planning if the patient needs post-hospital services based on physician/advanced practitioner order or complex needs related to functional status, cognitive ability, or social support system  Outcome: Progressing      Problem: Knowledge Deficit  Goal: Patient/family/caregiver demonstrates understanding of disease process, treatment plan, medications, and discharge instructions  Description: Complete learning assessment and assess knowledge base.  Interventions:  - Provide teaching at level of understanding  - Provide teaching via preferred learning methods  Outcome: Progressing     Problem: Nutrition/Hydration-ADULT  Goal: Nutrient/Hydration intake appropriate for improving, restoring or maintaining nutritional needs  Description: Monitor and assess patient's nutrition/hydration status for malnutrition. Collaborate with interdisciplinary team and initiate plan and interventions as ordered.  Monitor patient's weight and dietary intake as ordered or per policy. Utilize nutrition screening tool and intervene as necessary. Determine patient's food preferences and provide high-protein, high-caloric foods as appropriate.     INTERVENTIONS:  - Monitor oral intake, urinary output, labs, and treatment plans  - Assess nutrition and hydration status and recommend course of action  - Evaluate amount of meals eaten  - Assist patient with eating if necessary   - Allow adequate time for meals  - Recommend/ encourage appropriate diets, oral nutritional supplements, and vitamin/mineral supplements  - Order, calculate, and assess calorie counts as needed  - Recommend, monitor, and adjust tube feedings and TPN/PPN based on assessed needs  - Assess need for intravenous fluids  - Provide specific nutrition/hydration education as appropriate  - Include patient/family/caregiver in decisions related to nutrition  Outcome: Progressing     Problem: CARDIOVASCULAR - ADULT  Goal: Maintains optimal cardiac output and hemodynamic stability  Description: INTERVENTIONS:  - Monitor I/O, vital signs and rhythm  - Monitor for S/S and trends of decreased cardiac output  - Administer and titrate ordered vasoactive medications to optimize hemodynamic stability  -  Assess quality of pulses, skin color and temperature  - Assess for signs of decreased coronary artery perfusion  - Instruct patient to report change in severity of symptoms  Outcome: Progressing     Problem: CARDIOVASCULAR - ADULT  Goal: Absence of cardiac dysrhythmias or at baseline rhythm  Description: INTERVENTIONS:  - Continuous cardiac monitoring, vital signs, obtain 12 lead EKG if ordered  - Administer antiarrhythmic and heart rate control medications as ordered  - Monitor electrolytes and administer replacement therapy as ordered  Outcome: Progressing     Problem: RESPIRATORY - ADULT  Goal: Achieves optimal ventilation and oxygenation  Description: INTERVENTIONS:  - Assess for changes in respiratory status  - Assess for changes in mentation and behavior  - Position to facilitate oxygenation and minimize respiratory effort  - Oxygen administered by appropriate delivery if ordered  - Initiate smoking cessation education as indicated  - Encourage broncho-pulmonary hygiene including cough, deep breathe, Incentive Spirometry  - Assess the need for suctioning and aspirate as needed  - Assess and instruct to report SOB or any respiratory difficulty  - Respiratory Therapy support as indicated  Outcome: Progressing     Problem: METABOLIC, FLUID AND ELECTROLYTES - ADULT  Goal: Electrolytes maintained within normal limits  Description: INTERVENTIONS:  - Monitor labs and assess patient for signs and symptoms of electrolyte imbalances  - Administer electrolyte replacement as ordered  - Monitor response to electrolyte replacements, including repeat lab results as appropriate  - Instruct patient on fluid and nutrition as appropriate  Outcome: Progressing     Problem: METABOLIC, FLUID AND ELECTROLYTES - ADULT  Goal: Fluid balance maintained  Description: INTERVENTIONS:  - Monitor labs   - Monitor I/O and WT  - Instruct patient on fluid and nutrition as appropriate  - Assess for signs & symptoms of volume excess or  deficit  Outcome: Progressing

## 2024-03-02 NOTE — PROGRESS NOTES
Reported by pca respirations 42; respirations on assessment 36; o2 saturations 95% on 4L o2; fine crackles noted in lung bases; dr guan aware

## 2024-03-02 NOTE — PROGRESS NOTES
"Progress Note - Pulmonary   Hussein Edward III 58 y.o. male MRN: [id number]  Unit/Bed#: [facility] 829-01 Encounter: [id number]    Assessment:  Acute hypoxic respiratory failure intubated to 19-2 29  ARDS severe due to influenza A and super per infection with bacteria  Sepsis present on admission with septic shock  Acute diastolic failure with new onset A-fib  Transaminitis  Ileus  Critical illness myopathy    Plan:  Patient clinically improving currently on 3 L oxygen saturating in the mid 90s  I gave patient incentive spirometer and instructed on its use  Stop cefepime today as patient finished 10-day course wean midodrine as tolerated  Continue physical therapy    Chief Complaint:   I am OK    Subjective:   Patient denies new complaints    Objective:     Vitals: Blood pressure 110/70, pulse 101, temperature (!) 97.4 °F (36.3 °C), resp. rate 16, height 5' 5\" (1.651 m), weight 83.4 kg (183 lb 13.8 oz), SpO2 91%.,Body mass index is 30.6 kg/m².      Intake/Output Summary (Last 24 hours) at 3/2/2024 0835  Last data filed at 3/2/2024 0129  Gross per 24 hour   Intake 420 ml   Output 1025 ml   Net -605 ml       Invasive Devices       Peripheral Intravenous Line  Duration             Peripheral IV 02/28/24 Left;Ventral (anterior) Forearm 3 days    Peripheral IV 03/01/24 Dorsal (posterior);Right Forearm 1 day    Peripheral IV 03/01/24 Right;Ventral (anterior) Forearm 1 day                    Physical Exam: /74   Pulse 91   Temp (!) 97.1 °F (36.2 °C)   Resp (!) 42   Ht 5' 5\" (1.651 m)   Wt 83.4 kg (183 lb 13.8 oz)   SpO2 94%   BMI 30.60 kg/m²   General appearance: alert and oriented, in no acute distress  Neck: no adenopathy, no carotid bruit, no JVD, supple, symmetrical, trachea midline, and thyroid not enlarged, symmetric, no tenderness/mass/nodules  Lungs: clear to auscultation bilaterally  Chest wall: no tenderness  Heart: regular rate and rhythm, S1, S2 normal, no murmur, click, rub or gallop  Abdomen: soft, " "non-tender; bowel sounds normal; no masses,  no organomegaly  Extremities: extremities normal, warm and well-perfused; no cyanosis, clubbing, or edema     Labs: CBC: No results found for: \"WBC\", \"HGB\", \"HCT\", \"MCV\", \"PLT\", \"ADJUSTEDWBC\", \"RBC\", \"MCH\", \"MCHC\", \"RDW\", \"MPV\", \"NRBC\", CMP: No results found for: \"SODIUM\", \"K\", \"CL\", \"CO2\", \"ANIONGAP\", \"BUN\", \"CREATININE\", \"GLUCOSE\", \"CALCIUM\", \"AST\", \"ALT\", \"ALKPHOS\", \"PROT\", \"BILITOT\", \"EGFR\"  Imaging and other studies: I have personally reviewed pertinent films in PACS    "

## 2024-03-02 NOTE — PROGRESS NOTES
Notified by pca, pt is having a hard trouble breathing; into assess patient, respirations 38, lower lung fields noted with crackles, bp 112/72; pulse ox 93% on 5L o2; dr guan aware and up to  see pt

## 2024-03-02 NOTE — PLAN OF CARE
Problem: Prexisting or High Potential for Compromised Skin Integrity  Goal: Skin integrity is maintained or improved  Description: INTERVENTIONS:  - Identify patients at risk for skin breakdown  - Assess and monitor skin integrity  - Assess and monitor nutrition and hydration status  - Monitor labs   - Assess for incontinence   - Turn and reposition patient  - Assist with mobility/ambulation  - Relieve pressure over bony prominences  - Avoid friction and shearing  - Provide appropriate hygiene as needed including keeping skin clean and dry  - Evaluate need for skin moisturizer/barrier cream  - Collaborate with interdisciplinary team   - Patient/family teaching  - Consider wound care consult   Outcome: Progressing     Problem: PAIN - ADULT  Goal: Verbalizes/displays adequate comfort level or baseline comfort level  Description: Interventions:  - Encourage patient to monitor pain and request assistance  - Assess pain using appropriate pain scale  - Administer analgesics based on type and severity of pain and evaluate response  - Implement non-pharmacological measures as appropriate and evaluate response  - Consider cultural and social influences on pain and pain management  - Notify physician/advanced practitioner if interventions unsuccessful or patient reports new pain  Outcome: Progressing     Problem: INFECTION - ADULT  Goal: Absence or prevention of progression during hospitalization  Description: INTERVENTIONS:  - Assess and monitor for signs and symptoms of infection  - Monitor lab/diagnostic results  - Monitor all insertion sites, i.e. indwelling lines, tubes, and drains  - Monitor endotracheal if appropriate and nasal secretions for changes in amount and color  - Harrison appropriate cooling/warming therapies per order  - Administer medications as ordered  - Instruct and encourage patient and family to use good hand hygiene technique  - Identify and instruct in appropriate isolation precautions for  identified infection/condition  Outcome: Progressing  Goal: Absence of fever/infection during neutropenic period  Description: INTERVENTIONS:  - Monitor WBC    Outcome: Progressing     Problem: SAFETY ADULT  Goal: Patient will remain free of falls  Description: INTERVENTIONS:  - Educate patient/family on patient safety including physical limitations  - Instruct patient to call for assistance with activity   - Consult OT/PT to assist with strengthening/mobility   - Keep Call bell within reach  - Keep bed low and locked with side rails adjusted as appropriate  - Keep care items and personal belongings within reach  - Initiate and maintain comfort rounds  - Make Fall Risk Sign visible to staff  - Apply yellow socks and bracelet for high fall risk patients  - Consider moving patient to room near nurses station  Outcome: Progressing  Goal: Maintain or return to baseline ADL function  Description: INTERVENTIONS:  -  Assess patient's ability to carry out ADLs; assess patient's baseline for ADL function and identify physical deficits which impact ability to perform ADLs (bathing, care of mouth/teeth, toileting, grooming, dressing, etc.)  - Assess/evaluate cause of self-care deficits   - Assess range of motion  - Assess patient's mobility; develop plan if impaired  - Assess patient's need for assistive devices and provide as appropriate  - Encourage maximum independence but intervene and supervise when necessary  - Involve family in performance of ADLs  - Assess for home care needs following discharge   - Consider OT consult to assist with ADL evaluation and planning for discharge  - Provide patient education as appropriate  Outcome: Progressing  Goal: Maintains/Returns to pre admission functional level  Description: INTERVENTIONS:  - Perform AM-PAC 6 Click Basic Mobility/ Daily Activity assessment daily.  - Set and communicate daily mobility goal to care team and patient/family/caregiver.   - Collaborate with rehabilitation  services on mobility goals if consulted  - Perform Range of Motion 3 times a day.  - Reposition patient every 2 hours.  - Dangle patient 3 times a day  - Stand patient 3 times a day  - Ambulate patient 3 times a day  - Out of bed to chair 3 times a day   - Out of bed for meals 3 times a day  - Out of bed for toileting  - Record patient progress and toleration of activity level   Outcome: Progressing

## 2024-03-02 NOTE — RESPIRATORY THERAPY NOTE
03/01/24 2055   Respiratory Assessment   Resp Comments pt refuse cpap at this time, will d/c order

## 2024-03-03 LAB
ANION GAP SERPL CALCULATED.3IONS-SCNC: 11 MMOL/L
ARTERIAL PATENCY WRIST A: YES
BASE EXCESS BLDA CALC-SCNC: 5.5 MMOL/L
BASOPHILS # BLD AUTO: 0.03 THOUSANDS/ÂΜL (ref 0–0.1)
BASOPHILS NFR BLD AUTO: 0 % (ref 0–1)
BUN SERPL-MCNC: 37 MG/DL (ref 5–25)
CALCIUM SERPL-MCNC: 9.2 MG/DL (ref 8.4–10.2)
CHLORIDE SERPL-SCNC: 102 MMOL/L (ref 96–108)
CO2 SERPL-SCNC: 28 MMOL/L (ref 21–32)
CREAT SERPL-MCNC: 0.72 MG/DL (ref 0.6–1.3)
EOSINOPHIL # BLD AUTO: 0.02 THOUSAND/ÂΜL (ref 0–0.61)
EOSINOPHIL NFR BLD AUTO: 0 % (ref 0–6)
ERYTHROCYTE [DISTWIDTH] IN BLOOD BY AUTOMATED COUNT: 12.8 % (ref 11.6–15.1)
GFR SERPL CREATININE-BSD FRML MDRD: 104 ML/MIN/1.73SQ M
GLUCOSE SERPL-MCNC: 116 MG/DL (ref 65–140)
GLUCOSE SERPL-MCNC: 127 MG/DL (ref 65–140)
GLUCOSE SERPL-MCNC: 129 MG/DL (ref 65–140)
GLUCOSE SERPL-MCNC: 165 MG/DL (ref 65–140)
GLUCOSE SERPL-MCNC: 199 MG/DL (ref 65–140)
HCO3 BLDA-SCNC: 28 MMOL/L (ref 22–28)
HCT VFR BLD AUTO: 38.4 % (ref 36.5–49.3)
HGB BLD-MCNC: 12.4 G/DL (ref 12–17)
IMM GRANULOCYTES # BLD AUTO: 0.09 THOUSAND/UL (ref 0–0.2)
IMM GRANULOCYTES NFR BLD AUTO: 1 % (ref 0–2)
LYMPHOCYTES # BLD AUTO: 0.98 THOUSANDS/ÂΜL (ref 0.6–4.47)
LYMPHOCYTES NFR BLD AUTO: 9 % (ref 14–44)
MCH RBC QN AUTO: 29.2 PG (ref 26.8–34.3)
MCHC RBC AUTO-ENTMCNC: 32.3 G/DL (ref 31.4–37.4)
MCV RBC AUTO: 91 FL (ref 82–98)
MONOCYTES # BLD AUTO: 0.38 THOUSAND/ÂΜL (ref 0.17–1.22)
MONOCYTES NFR BLD AUTO: 4 % (ref 4–12)
NASAL CANNULA: 4
NEUTROPHILS # BLD AUTO: 8.91 THOUSANDS/ÂΜL (ref 1.85–7.62)
NEUTS SEG NFR BLD AUTO: 86 % (ref 43–75)
NRBC BLD AUTO-RTO: 0 /100 WBCS
O2 CT BLDA-SCNC: 16.2 ML/DL (ref 16–23)
OXYHGB MFR BLDA: 85.4 % (ref 94–97)
PCO2 BLDA: 34.3 MM HG (ref 36–44)
PH BLDA: 7.53 [PH] (ref 7.35–7.45)
PLATELET # BLD AUTO: 442 THOUSANDS/UL (ref 149–390)
PMV BLD AUTO: 9.5 FL (ref 8.9–12.7)
PO2 BLDA: 48.3 MM HG (ref 75–129)
POTASSIUM SERPL-SCNC: 4.6 MMOL/L (ref 3.5–5.3)
RBC # BLD AUTO: 4.24 MILLION/UL (ref 3.88–5.62)
SODIUM SERPL-SCNC: 141 MMOL/L (ref 135–147)
SPECIMEN SOURCE: ABNORMAL
WBC # BLD AUTO: 10.41 THOUSAND/UL (ref 4.31–10.16)

## 2024-03-03 PROCEDURE — 82948 REAGENT STRIP/BLOOD GLUCOSE: CPT

## 2024-03-03 PROCEDURE — 82805 BLOOD GASES W/O2 SATURATION: CPT | Performed by: INTERNAL MEDICINE

## 2024-03-03 PROCEDURE — 99232 SBSQ HOSP IP/OBS MODERATE 35: CPT | Performed by: INTERNAL MEDICINE

## 2024-03-03 PROCEDURE — 85025 COMPLETE CBC W/AUTO DIFF WBC: CPT | Performed by: INTERNAL MEDICINE

## 2024-03-03 PROCEDURE — 94640 AIRWAY INHALATION TREATMENT: CPT

## 2024-03-03 PROCEDURE — 36600 WITHDRAWAL OF ARTERIAL BLOOD: CPT

## 2024-03-03 PROCEDURE — 80048 BASIC METABOLIC PNL TOTAL CA: CPT | Performed by: INTERNAL MEDICINE

## 2024-03-03 PROCEDURE — 94760 N-INVAS EAR/PLS OXIMETRY 1: CPT

## 2024-03-03 PROCEDURE — 94664 DEMO&/EVAL PT USE INHALER: CPT

## 2024-03-03 RX ORDER — FUROSEMIDE 10 MG/ML
20 INJECTION INTRAMUSCULAR; INTRAVENOUS ONCE
Status: COMPLETED | OUTPATIENT
Start: 2024-03-03 | End: 2024-03-03

## 2024-03-03 RX ORDER — LANOLIN ALCOHOL/MO/W.PET/CERES
3 CREAM (GRAM) TOPICAL
Status: DISCONTINUED | OUTPATIENT
Start: 2024-03-03 | End: 2024-03-14 | Stop reason: HOSPADM

## 2024-03-03 RX ORDER — ACETAMINOPHEN 325 MG/1
650 TABLET ORAL EVERY 6 HOURS PRN
Status: DISCONTINUED | OUTPATIENT
Start: 2024-03-03 | End: 2024-03-14 | Stop reason: HOSPADM

## 2024-03-03 RX ORDER — METHYLPREDNISOLONE SODIUM SUCCINATE 40 MG/ML
40 INJECTION, POWDER, LYOPHILIZED, FOR SOLUTION INTRAMUSCULAR; INTRAVENOUS EVERY 12 HOURS SCHEDULED
Status: DISCONTINUED | OUTPATIENT
Start: 2024-03-03 | End: 2024-03-05

## 2024-03-03 RX ADMIN — FERRIC OXIDE RED: 8; 8 LOTION TOPICAL at 21:52

## 2024-03-03 RX ADMIN — FERRIC OXIDE RED: 8; 8 LOTION TOPICAL at 11:18

## 2024-03-03 RX ADMIN — ENOXAPARIN SODIUM 40 MG: 40 INJECTION SUBCUTANEOUS at 17:46

## 2024-03-03 RX ADMIN — PAROXETINE 10 MG: 10 TABLET, FILM COATED ORAL at 09:07

## 2024-03-03 RX ADMIN — MIDODRINE HYDROCHLORIDE 5 MG: 5 TABLET ORAL at 09:07

## 2024-03-03 RX ADMIN — INSULIN LISPRO 2 UNITS: 100 INJECTION, SOLUTION INTRAVENOUS; SUBCUTANEOUS at 11:19

## 2024-03-03 RX ADMIN — FERRIC OXIDE RED: 8; 8 LOTION TOPICAL at 09:25

## 2024-03-03 RX ADMIN — MAGNESIUM OXIDE TAB 400 MG (241.3 MG ELEMENTAL MG) 400 MG: 400 (241.3 MG) TAB at 09:07

## 2024-03-03 RX ADMIN — MIDODRINE HYDROCHLORIDE 5 MG: 5 TABLET ORAL at 17:46

## 2024-03-03 RX ADMIN — MELATONIN 3 MG: at 22:19

## 2024-03-03 RX ADMIN — LEVALBUTEROL HYDROCHLORIDE 1.25 MG: 1.25 SOLUTION RESPIRATORY (INHALATION) at 19:38

## 2024-03-03 RX ADMIN — LEVALBUTEROL HYDROCHLORIDE 1.25 MG: 1.25 SOLUTION RESPIRATORY (INHALATION) at 13:59

## 2024-03-03 RX ADMIN — LORATADINE 5 MG: 10 TABLET ORAL at 09:07

## 2024-03-03 RX ADMIN — LEVALBUTEROL HYDROCHLORIDE 1.25 MG: 1.25 SOLUTION RESPIRATORY (INHALATION) at 08:29

## 2024-03-03 RX ADMIN — METHYLPREDNISOLONE SODIUM SUCCINATE 40 MG: 40 INJECTION, POWDER, FOR SOLUTION INTRAMUSCULAR; INTRAVENOUS at 05:36

## 2024-03-03 RX ADMIN — FUROSEMIDE 20 MG: 10 INJECTION, SOLUTION INTRAMUSCULAR; INTRAVENOUS at 09:07

## 2024-03-03 RX ADMIN — HYDROCORTISONE: 1 CREAM TOPICAL at 17:47

## 2024-03-03 RX ADMIN — TAMSULOSIN HYDROCHLORIDE 0.4 MG: 0.4 CAPSULE ORAL at 17:48

## 2024-03-03 RX ADMIN — HYDROCORTISONE: 1 CREAM TOPICAL at 09:25

## 2024-03-03 RX ADMIN — FERRIC OXIDE RED: 8; 8 LOTION TOPICAL at 17:47

## 2024-03-03 RX ADMIN — METHYLPREDNISOLONE SODIUM SUCCINATE 40 MG: 40 INJECTION, POWDER, FOR SOLUTION INTRAMUSCULAR; INTRAVENOUS at 21:50

## 2024-03-03 RX ADMIN — POLYETHYLENE GLYCOL 3350 17 G: 17 POWDER, FOR SOLUTION ORAL at 09:07

## 2024-03-03 NOTE — PROGRESS NOTES
[facility]  Progress Note  Name: Hussein CARVER I  MRN: [id number]  Unit/Bed#: [facility] 829-01 I Date of Admission: 2/19/2024   Date of Service: 3/2/2024 I Hospital Day: 12    Assessment/Plan   Acute metabolic encephalopathy  Assessment & Plan  Has improved alert and oriented x 3 today    Influenza A  Assessment & Plan  Status post course of Tamiflu with    * Acute respiratory failure with hypoxia (HCC)  Assessment & Plan  Patient with acute respiratory failure secondary to influenza A with superimposed bacterial infection status post completion of cefepime  Currently on3 to 4 L nasal cannula will wean as tolerated               VTE Pharmacologic Prophylaxis:   Moderate Risk (Score 3-4) - Pharmacological DVT Prophylaxis Ordered: enoxaparin (Lovenox).    Mobility:   Basic Mobility Inpatient Raw Score: 10  -HLM Goal: 4: Move to chair/commode  JH-HLM Achieved: 3: Sit at edge of bed  HLM Goal achieved. Continue to encourage appropriate mobility.    Patient Centered Rounds: I performed bedside rounds with nursing staff today.   Discussions with Specialists or Other Care Team Provider:     Education and Discussions with Family / Patient: Updated  (significant other) at bedside.    Total Time Spent on Date of Encounter in care of patient:  mins. This time was spent on one or more of the following: performing physical exam; counseling and coordination of care; obtaining or reviewing history; documenting in the medical record; reviewing/ordering tests, medications or procedures; communicating with other healthcare professionals and discussing with patient's family/caregivers.    Current Length of Stay: 12 day(s)  Current Patient Status: Inpatient   Certification Statement: The patient will continue to require additional inpatient hospital stay due to    Discharge Plan: Anticipate discharge in 48 hrs to discharge location to be determined pending rehab evaluations.    Code  Status: Level 1 - Full Code    Subjective:   Patient alert and oriented, reported some shortness of breath noted to have tachypnea earlier this afternoon improved with breathing treatment and Lasix.  On reevaluation denied any acute complaints    Objective:     Vitals:   Temp (24hrs), Av.8 °F (36.6 °C), Min:97.1 °F (36.2 °C), Max:98.7 °F (37.1 °C)    Temp:  [97.1 °F (36.2 °C)-98.7 °F (37.1 °C)] 97.1 °F (36.2 °C)  HR:  [] 91  Resp:  [16-42] 36  BP: (107-112)/(70-74) 107/74  SpO2:  [91 %-94 %] 94 %  Body mass index is 30.6 kg/m².     Input and Output Summary (last 24 hours):     Intake/Output Summary (Last 24 hours) at 3/2/2024 1912  Last data filed at 3/2/2024 1757  Gross per 24 hour   Intake 410 ml   Output 1350 ml   Net -940 ml       Physical Exam:   Physical Exam  Vitals and nursing note reviewed.   Constitutional:       General: He is not in acute distress.     Appearance: He is well-developed. He is not toxic-appearing or diaphoretic.   HENT:      Head: Normocephalic and atraumatic.   Eyes:      General: No scleral icterus.     Conjunctiva/sclera: Conjunctivae normal.   Cardiovascular:      Rate and Rhythm: Normal rate and regular rhythm.      Heart sounds: No murmur heard.     No friction rub. No gallop.   Pulmonary:      Effort: Pulmonary effort is normal. No respiratory distress.      Breath sounds: Normal breath sounds. No stridor. No wheezing, rhonchi or rales.   Chest:      Chest wall: No tenderness.   Abdominal:      General: There is no distension.      Palpations: Abdomen is soft. There is no mass.      Tenderness: There is no abdominal tenderness. There is no guarding or rebound.      Hernia: No hernia is present.   Musculoskeletal:         General: No swelling or tenderness.      Cervical back: Neck supple.   Skin:     General: Skin is warm and dry.      Capillary Refill: Capillary refill takes less than 2 seconds.   Neurological:      Mental Status: He is alert and oriented to person,  place, and time.   Psychiatric:         Mood and Affect: Mood normal.          Additional Data:     Labs:  Results from last 7 days   Lab Units 03/01/24  0511   WBC Thousand/uL 8.97   HEMOGLOBIN g/dL 12.1   HEMATOCRIT % 38.6   PLATELETS Thousands/uL 394*   NEUTROS PCT % 80*   LYMPHS PCT % 7*   MONOS PCT % 9   EOS PCT % 3     Results from last 7 days   Lab Units 03/01/24  0511 02/28/24  1800 02/28/24  0439   SODIUM mmol/L 145   < > 142   POTASSIUM mmol/L 3.7   < > 4.1   CHLORIDE mmol/L 102   < > 104   CO2 mmol/L 32   < > 31   BUN mg/dL 34*   < > 29*   CREATININE mg/dL 0.61   < > 0.79   ANION GAP mmol/L 11   < > 7   CALCIUM mg/dL 9.2   < > 8.2*   ALBUMIN g/dL  --   --  3.1*   TOTAL BILIRUBIN mg/dL  --   --  0.85   ALK PHOS U/L  --   --  73   ALT U/L  --   --  64*   AST U/L  --   --  39   GLUCOSE RANDOM mg/dL 114   < > 99    < > = values in this interval not displayed.         Results from last 7 days   Lab Units 03/02/24  1600 03/02/24  1125 03/02/24  0524 03/01/24  2344 03/01/24  2119 03/01/24  1741 03/01/24  1205 03/01/24  0538 02/29/24  2357 02/29/24  1753 02/29/24  1141 02/28/24  2349   POC GLUCOSE mg/dl 156* 191* 113 116 133 127 114 131 104 118 108 120         Results from last 7 days   Lab Units 02/25/24  0444   PROCALCITONIN ng/ml 0.79*       Lines/Drains:  Invasive Devices       Peripheral Intravenous Line  Duration             Peripheral IV 02/28/24 Left;Ventral (anterior) Forearm 3 days    Peripheral IV 03/01/24 Dorsal (posterior);Right Forearm 1 day    Peripheral IV 03/01/24 Right;Ventral (anterior) Forearm 1 day                          Imaging: No pertinent imaging reviewed.    Recent Cultures (last 7 days):   Results from last 7 days   Lab Units 02/26/24  0830   SPUTUM CULTURE  2+ Growth of - presumptive Candida glabrata*  1+ Growth of   GRAM STAIN RESULT  1+ Epithelial cells per low power field*  No polys seen*  Rare Gram variable rods*       Last 24 Hours Medication List:   Current  Facility-Administered Medications   Medication Dose Route Frequency Provider Last Rate    acetaminophen  1,000 mg Intravenous Q6H PRN Dayne Garcia MD Stopped (02/27/24 7108)    albuterol  2 puff Inhalation Q4H PRN Kenny Menard DO      calamine-zinc oxide   Topical 4x Daily Dayne Garcia MD      enoxaparin  40 mg Subcutaneous Q24H [facility] Dayne Garcia MD      hydrocortisone   Topical BID Dayne Garcia MD      HYDROmorphone  1 mg Intravenous Q4H PRN Dayne Garcia MD      insulin lispro  1-6 Units Subcutaneous Q6H [facility] Dayne Garcia MD      levalbuterol  1.25 mg Nebulization TID Kenny Menard DO      loratadine  5 mg Oral Daily Dayne Garcia MD      LORazepam  0.5 mg Oral Q8H PRN Dayne Garcia MD      magnesium Oxide  400 mg Oral Daily Dayne Garcai MD      methylPREDNISolone sodium succinate  40 mg Intravenous Q8H [facility] Kenny Menard DO      midodrine  5 mg Oral Q8H Dayne Garcia MD      oxyCODONE  5 mg Oral Q6H PRN Dayne Garcia MD      PARoxetine  10 mg Oral Daily Dayne Garcia MD      polyethylene glycol  17 g Per NG Tube BID Dayne Garcia MD      sodium chloride  1 spray Each Nare Q1H PRN Dayne Garcia MD      tamsulosin  0.4 mg Oral Daily With Dinner Dayne Garcia MD          Today, Patient Was Seen By: Kenny Menard DO    **Please Note: This note may have been constructed using a voice recognition system.**

## 2024-03-03 NOTE — PROGRESS NOTES
[facility]  Progress Note  Name: Hussein ACRVER I  MRN: [id number]  Unit/Bed#: [facility] 829-01 I Date of Admission: 2/19/2024   Date of Service: 3/3/2024 I Hospital Day: 13    Assessment/Plan   Acute metabolic encephalopathy  Assessment & Plan  Has improved alert and oriented x 3 today    Influenza A  Assessment & Plan  Status post course of Tamiflu with    * Acute respiratory failure with hypoxia (HCC)  Assessment & Plan  Patient with acute respiratory failure secondary to influenza A with superimposed bacterial infection status post completion of cefepime  Currently on 5 L nasal cannula               VTE Pharmacologic Prophylaxis:   Moderate Risk (Score 3-4) - Pharmacological DVT Prophylaxis Ordered: enoxaparin (Lovenox).    Mobility:   Basic Mobility Inpatient Raw Score: 10  JH-HLM Goal: 4: Move to chair/commode  JH-HLM Achieved: 4: Move to chair/commode  HLM Goal achieved. Continue to encourage appropriate mobility.    Patient Centered Rounds: I performed bedside rounds with nursing staff today.   Discussions with Specialists or Other Care Team Provider:     Education and Discussions with Family / Patient: Updated  (significant other) at bedside.    Total Time Spent on Date of Encounter in care of patient:  mins. This time was spent on one or more of the following: performing physical exam; counseling and coordination of care; obtaining or reviewing history; documenting in the medical record; reviewing/ordering tests, medications or procedures; communicating with other healthcare professionals and discussing with patient's family/caregivers.    Current Length of Stay: 13 day(s)  Current Patient Status: Inpatient   Certification Statement: The patient will continue to require additional inpatient hospital stay due to resp failure  Discharge Plan: Anticipate discharge in 48-72 hrs to discharge location to be determined pending rehab evaluations.    Code Status:  Level 1 - Full Code    Subjective:   Patient reports some mild shortness of breath this morning he is performing incentive spirometry today with improvement in volumes    Objective:     Vitals:   Temp (24hrs), Av.8 °F (36.6 °C), Min:97 °F (36.1 °C), Max:98.8 °F (37.1 °C)    Temp:  [97 °F (36.1 °C)-98.8 °F (37.1 °C)] 98.8 °F (37.1 °C)  HR:  [101-116] 116  Resp:  [16-26] 26  BP: (108-116)/(72-77) 108/72  SpO2:  [89 %-98 %] 93 %  Body mass index is 30.6 kg/m².     Input and Output Summary (last 24 hours):     Intake/Output Summary (Last 24 hours) at 3/3/2024 1615  Last data filed at 3/3/2024 1300  Gross per 24 hour   Intake 720 ml   Output 1600 ml   Net -880 ml       Physical Exam:   Physical Exam  Vitals and nursing note reviewed.   Constitutional:       General: He is not in acute distress.     Appearance: He is well-developed. He is not toxic-appearing or diaphoretic.   HENT:      Head: Normocephalic and atraumatic.   Eyes:      General: No scleral icterus.     Conjunctiva/sclera: Conjunctivae normal.   Cardiovascular:      Rate and Rhythm: Normal rate and regular rhythm.      Heart sounds: No murmur heard.     No friction rub. No gallop.   Pulmonary:      Effort: Pulmonary effort is normal. No respiratory distress.      Breath sounds: Normal breath sounds. No stridor. No wheezing, rhonchi or rales.   Chest:      Chest wall: No tenderness.   Abdominal:      General: There is no distension.      Palpations: Abdomen is soft. There is no mass.      Tenderness: There is no abdominal tenderness. There is no guarding or rebound.      Hernia: No hernia is present.   Musculoskeletal:         General: No swelling or tenderness.      Cervical back: Neck supple.   Skin:     General: Skin is warm and dry.      Capillary Refill: Capillary refill takes less than 2 seconds.   Neurological:      Mental Status: He is alert and oriented to person, place, and time.   Psychiatric:         Mood and Affect: Mood normal.           Additional Data:     Labs:  Results from last 7 days   Lab Units 03/03/24  0658   WBC Thousand/uL 10.41*   HEMOGLOBIN g/dL 12.4   HEMATOCRIT % 38.4   PLATELETS Thousands/uL 442*   NEUTROS PCT % 86*   LYMPHS PCT % 9*   MONOS PCT % 4   EOS PCT % 0     Results from last 7 days   Lab Units 03/03/24  0658 02/28/24  1800 02/28/24  0439   SODIUM mmol/L 141   < > 142   POTASSIUM mmol/L 4.6   < > 4.1   CHLORIDE mmol/L 102   < > 104   CO2 mmol/L 28   < > 31   BUN mg/dL 37*   < > 29*   CREATININE mg/dL 0.72   < > 0.79   ANION GAP mmol/L 11   < > 7   CALCIUM mg/dL 9.2   < > 8.2*   ALBUMIN g/dL  --   --  3.1*   TOTAL BILIRUBIN mg/dL  --   --  0.85   ALK PHOS U/L  --   --  73   ALT U/L  --   --  64*   AST U/L  --   --  39   GLUCOSE RANDOM mg/dL 116   < > 99    < > = values in this interval not displayed.         Results from last 7 days   Lab Units 03/03/24  1608 03/03/24  1055 03/03/24  0535 03/02/24  2338 03/02/24  2101 03/02/24  1600 03/02/24  1125 03/02/24  0524 03/01/24  2344 03/01/24  2119 03/01/24  1741 03/01/24  1205   POC GLUCOSE mg/dl 129 199* 127 129 155* 156* 191* 113 116 133 127 114               Lines/Drains:  Invasive Devices       Peripheral Intravenous Line  Duration             Peripheral IV 02/28/24 Left;Ventral (anterior) Forearm 4 days    Peripheral IV 03/01/24 Dorsal (posterior);Right Forearm 2 days    Peripheral IV 03/01/24 Right;Ventral (anterior) Forearm 2 days                          Imaging: No pertinent imaging reviewed.    Recent Cultures (last 7 days):   Results from last 7 days   Lab Units 02/26/24  0830   SPUTUM CULTURE  2+ Growth of - presumptive Candida glabrata*  1+ Growth of   GRAM STAIN RESULT  1+ Epithelial cells per low power field*  No polys seen*  Rare Gram variable rods*       Last 24 Hours Medication List:   Current Facility-Administered Medications   Medication Dose Route Frequency Provider Last Rate    acetaminophen  650 mg Oral Q6H PRN Samira Bratis,       albuterol  2 puff  Inhalation Q4H PRN Kenny Menard DO      calamine-zinc oxide   Topical 4x Daily Dayne Garcia MD      enoxaparin  40 mg Subcutaneous Q24H [facility] Dayne Garcia MD      hydrocortisone   Topical BID Dayne Garcia MD      HYDROmorphone  1 mg Intravenous Q4H PRN Dayne Garcia MD      insulin lispro  1-6 Units Subcutaneous Q6H [facility] Dayne Garcia MD      levalbuterol  1.25 mg Nebulization TID Kenny Menard DO      loratadine  5 mg Oral Daily Dayne Garcia MD      LORazepam  0.5 mg Oral Q8H PRN Dayne Garcia MD      magnesium Oxide  400 mg Oral Daily Dayne Garcia MD      methylPREDNISolone sodium succinate  40 mg Intravenous Q12H [facility],       midodrine  5 mg Oral Q8H Dayne Garcia MD      oxyCODONE  5 mg Oral Q6H PRN Dayne Garcia MD      PARoxetine  10 mg Oral Daily Dayne Garcia MD      polyethylene glycol  17 g Per NG Tube BID Dayne Garcia MD      sodium chloride  1 spray Each Nare Q1H PRN Dayne Garcia MD      tamsulosin  0.4 mg Oral Daily With Dinner Dayne Garcia MD          Today, Patient Was Seen By: Kenny Menard DO    **Please Note: This note may have been constructed using a voice recognition system.**

## 2024-03-03 NOTE — PLAN OF CARE
Problem: PAIN - ADULT  Goal: Verbalizes/displays adequate comfort level or baseline comfort level  Description: Interventions:  - Encourage patient to monitor pain and request assistance  - Assess pain using appropriate pain scale  - Administer analgesics based on type and severity of pain and evaluate response  - Implement non-pharmacological measures as appropriate and evaluate response  - Consider cultural and social influences on pain and pain management  - Notify physician/advanced practitioner if interventions unsuccessful or patient reports new pain  Outcome: Progressing     Problem: INFECTION - ADULT  Goal: Absence or prevention of progression during hospitalization  Description: INTERVENTIONS:  - Assess and monitor for signs and symptoms of infection  - Monitor lab/diagnostic results  - Monitor all insertion sites, i.e. indwelling lines, tubes, and drains  - Monitor endotracheal if appropriate and nasal secretions for changes in amount and color  - [place] appropriate cooling/warming therapies per order  - Administer medications as ordered  - Instruct and encourage patient and family to use good hand hygiene technique  - Identify and instruct in appropriate isolation precautions for identified infection/condition  Outcome: Progressing     Problem: DISCHARGE PLANNING  Goal: Discharge to home or other facility with appropriate resources  Description: INTERVENTIONS:  - Identify barriers to discharge w/patient and caregiver  - Arrange for needed discharge resources and transportation as appropriate  - Identify discharge learning needs (meds, wound care, etc.)  - Arrange for interpretive services to assist at discharge as needed  - Refer to Case Management Department for coordinating discharge planning if the patient needs post-hospital services based on physician/advanced practitioner order or complex needs related to functional status, cognitive ability, or social support system  Outcome: Progressing      Problem: Knowledge Deficit  Goal: Patient/family/caregiver demonstrates understanding of disease process, treatment plan, medications, and discharge instructions  Description: Complete learning assessment and assess knowledge base.  Interventions:  - Provide teaching at level of understanding  - Provide teaching via preferred learning methods  Outcome: Progressing     Problem: Nutrition/Hydration-ADULT  Goal: Nutrient/Hydration intake appropriate for improving, restoring or maintaining nutritional needs  Description: Monitor and assess patient's nutrition/hydration status for malnutrition. Collaborate with interdisciplinary team and initiate plan and interventions as ordered.  Monitor patient's weight and dietary intake as ordered or per policy. Utilize nutrition screening tool and intervene as necessary. Determine patient's food preferences and provide high-protein, high-caloric foods as appropriate.     INTERVENTIONS:  - Monitor oral intake, urinary output, labs, and treatment plans  - Assess nutrition and hydration status and recommend course of action  - Evaluate amount of meals eaten  - Assist patient with eating if necessary   - Allow adequate time for meals  - Recommend/ encourage appropriate diets, oral nutritional supplements, and vitamin/mineral supplements  - Order, calculate, and assess calorie counts as needed  - Recommend, monitor, and adjust tube feedings and TPN/PPN based on assessed needs  - Assess need for intravenous fluids  - Provide specific nutrition/hydration education as appropriate  - Include patient/family/caregiver in decisions related to nutrition  Outcome: Progressing     Problem: CARDIOVASCULAR - ADULT  Goal: Maintains optimal cardiac output and hemodynamic stability  Description: INTERVENTIONS:  - Monitor I/O, vital signs and rhythm  - Monitor for S/S and trends of decreased cardiac output  - Administer and titrate ordered vasoactive medications to optimize hemodynamic stability  -  Assess quality of pulses, skin color and temperature  - Assess for signs of decreased coronary artery perfusion  - Instruct patient to report change in severity of symptoms  Outcome: Progressing     Problem: CARDIOVASCULAR - ADULT  Goal: Absence of cardiac dysrhythmias or at baseline rhythm  Description: INTERVENTIONS:  - Continuous cardiac monitoring, vital signs, obtain 12 lead EKG if ordered  - Administer antiarrhythmic and heart rate control medications as ordered  - Monitor electrolytes and administer replacement therapy as ordered  Outcome: Progressing     Problem: RESPIRATORY - ADULT  Goal: Achieves optimal ventilation and oxygenation  Description: INTERVENTIONS:  - Assess for changes in respiratory status  - Assess for changes in mentation and behavior  - Position to facilitate oxygenation and minimize respiratory effort  - Oxygen administered by appropriate delivery if ordered  - Initiate smoking cessation education as indicated  - Encourage broncho-pulmonary hygiene including cough, deep breathe, Incentive Spirometry  - Assess the need for suctioning and aspirate as needed  - Assess and instruct to report SOB or any respiratory difficulty  - Respiratory Therapy support as indicated  Outcome: Progressing     Problem: METABOLIC, FLUID AND ELECTROLYTES - ADULT  Goal: Electrolytes maintained within normal limits  Description: INTERVENTIONS:  - Monitor labs and assess patient for signs and symptoms of electrolyte imbalances  - Administer electrolyte replacement as ordered  - Monitor response to electrolyte replacements, including repeat lab results as appropriate  - Instruct patient on fluid and nutrition as appropriate  Outcome: Progressing     Problem: METABOLIC, FLUID AND ELECTROLYTES - ADULT  Goal: Fluid balance maintained  Description: INTERVENTIONS:  - Monitor labs   - Monitor I/O and WT  - Instruct patient on fluid and nutrition as appropriate  - Assess for signs & symptoms of volume excess or  deficit  Outcome: Progressing

## 2024-03-03 NOTE — PROGRESS NOTES
"Progress Note - Pulmonary   Hussein Edward III 58 y.o. male MRN: [id number]  Unit/Bed#: [facility] 829-01 Encounter: [id number]    Assessment:  Acute hypoxic respiratory failure intubated 2/19 through 2/29  ARDS secondary to influenza A and bacterial superinfection  Septic shock  Acute diastolic heart failure with new onset A-fib  Transaminitis  Ileus  Critical illness myopathy    Plan:  Patient remains to have waxing and waning pulse ox partially due to accuracy of pulse oximeter will check ABG today currently on 4 L saturating 89-91  Chest x-ray reviewed shows persistent ARDS which is typical for this course of illness will redose Lasix as patient tolerated it well  Monitor off antibiotics  It is paramount that patient work with incentive spirometer I instructed him today he had a lot of difficulty understanding what to do continued coaching as needed out of bed to chair will wean steroids to twice daily    Chief Complaint:   I feel okay    Subjective:   Patient denies any shortness of breath cough or wheeze    Objective:     Vitals: Blood pressure 116/77, pulse (!) 106, temperature 97.6 °F (36.4 °C), resp. rate 16, height 5' 5\" (1.651 m), weight 83.4 kg (183 lb 13.8 oz), SpO2 (!) 89%.,Body mass index is 30.6 kg/m².      Intake/Output Summary (Last 24 hours) at 3/3/2024 0850  Last data filed at 3/3/2024 0548  Gross per 24 hour   Intake 410 ml   Output 1375 ml   Net -965 ml       Invasive Devices       Peripheral Intravenous Line  Duration             Peripheral IV 02/28/24 Left;Ventral (anterior) Forearm 4 days    Peripheral IV 03/01/24 Dorsal (posterior);Right Forearm 2 days    Peripheral IV 03/01/24 Right;Ventral (anterior) Forearm 2 days                    Physical Exam: /77   Pulse (!) 106   Temp 97.6 °F (36.4 °C)   Resp 16   Ht 5' 5\" (1.651 m)   Wt 83.4 kg (183 lb 13.8 oz)   SpO2 (!) 89%   BMI 30.60 kg/m²   General appearance: alert and oriented, in no acute distress  Neck: no adenopathy, no carotid " bruit, no JVD, supple, symmetrical, trachea midline, and thyroid not enlarged, symmetric, no tenderness/mass/nodules  Lungs: clear to auscultation bilaterally  Heart: regular rate and rhythm, S1, S2 normal, no murmur, click, rub or gallop  Abdomen: soft, non-tender; bowel sounds normal; no masses,  no organomegaly  Extremities: extremities normal, warm and well-perfused; no cyanosis, clubbing, or edema     Labs: CBC:   Lab Results   Component Value Date    WBC 10.41 (H) 03/03/2024    HGB 12.4 03/03/2024    HCT 38.4 03/03/2024    MCV 91 03/03/2024     (H) 03/03/2024    RBC 4.24 03/03/2024    MCH 29.2 03/03/2024    MCHC 32.3 03/03/2024    RDW 12.8 03/03/2024    MPV 9.5 03/03/2024    NRBC 0 03/03/2024   , CMP:   Lab Results   Component Value Date    SODIUM 141 03/03/2024    K 4.6 03/03/2024     03/03/2024    CO2 28 03/03/2024    BUN 37 (H) 03/03/2024    CREATININE 0.72 03/03/2024    CALCIUM 9.2 03/03/2024    EGFR 104 03/03/2024     Imaging and other studies: I have personally reviewed pertinent films in PACS

## 2024-03-03 NOTE — PLAN OF CARE
Problem: Prexisting or High Potential for Compromised Skin Integrity  Goal: Skin integrity is maintained or improved  Description: INTERVENTIONS:  - Identify patients at risk for skin breakdown  - Assess and monitor skin integrity  - Assess and monitor nutrition and hydration status  - Monitor labs   - Assess for incontinence   - Turn and reposition patient  - Assist with mobility/ambulation  - Relieve pressure over bony prominences  - Avoid friction and shearing  - Provide appropriate hygiene as needed including keeping skin clean and dry  - Evaluate need for skin moisturizer/barrier cream  - Collaborate with interdisciplinary team   - Patient/family teaching  - Consider wound care consult   Outcome: Progressing     Problem: PAIN - ADULT  Goal: Verbalizes/displays adequate comfort level or baseline comfort level  Description: Interventions:  - Encourage patient to monitor pain and request assistance  - Assess pain using appropriate pain scale  - Administer analgesics based on type and severity of pain and evaluate response  - Implement non-pharmacological measures as appropriate and evaluate response  - Consider cultural and social influences on pain and pain management  - Notify physician/advanced practitioner if interventions unsuccessful or patient reports new pain  Outcome: Progressing     Problem: INFECTION - ADULT  Goal: Absence or prevention of progression during hospitalization  Description: INTERVENTIONS:  - Assess and monitor for signs and symptoms of infection  - Monitor lab/diagnostic results  - Monitor all insertion sites, i.e. indwelling lines, tubes, and drains  - Monitor endotracheal if appropriate and nasal secretions for changes in amount and color  - [place] appropriate cooling/warming therapies per order  - Administer medications as ordered  - Instruct and encourage patient and family to use good hand hygiene technique  - Identify and instruct in appropriate isolation precautions for  identified infection/condition  Outcome: Progressing     Problem: SAFETY ADULT  Goal: Patient will remain free of falls  Description: INTERVENTIONS:  - Educate patient/family on patient safety including physical limitations  - Instruct patient to call for assistance with activity   - Consult OT/PT to assist with strengthening/mobility   - Keep Call bell within reach  - Keep bed low and locked with side rails adjusted as appropriate  - Keep care items and personal belongings within reach  - Initiate and maintain comfort rounds  - Make Fall Risk Sign visible to staff  - Apply yellow socks and bracelet for high fall risk patients  - Consider moving patient to room near nurses station  Outcome: Progressing  Goal: Maintain or return to baseline ADL function  Description: INTERVENTIONS:  -  Assess patient's ability to carry out ADLs; assess patient's baseline for ADL function and identify physical deficits which impact ability to perform ADLs (bathing, care of mouth/teeth, toileting, grooming, dressing, etc.)  - Assess/evaluate cause of self-care deficits   - Assess range of motion  - Assess patient's mobility; develop plan if impaired  - Assess patient's need for assistive devices and provide as appropriate  - Encourage maximum independence but intervene and supervise when necessary  - Involve family in performance of ADLs  - Assess for home care needs following discharge   - Consider OT consult to assist with ADL evaluation and planning for discharge  - Provide patient education as appropriate  Outcome: Progressing  Goal: Maintains/Returns to pre admission functional level  Description: INTERVENTIONS:  - Perform AM-PAC 6 Click Basic Mobility/ Daily Activity assessment daily.  - Set and communicate daily mobility goal to care team and patient/family/caregiver.   - Collaborate with rehabilitation services on mobility goals if consulted  - Out of bed for toileting  - Record patient progress and toleration of activity level    Outcome: Progressing     Problem: DISCHARGE PLANNING  Goal: Discharge to home or other facility with appropriate resources  Description: INTERVENTIONS:  - Identify barriers to discharge w/patient and caregiver  - Arrange for needed discharge resources and transportation as appropriate  - Identify discharge learning needs (meds, wound care, etc.)  - Arrange for interpretive services to assist at discharge as needed  - Refer to Case Management Department for coordinating discharge planning if the patient needs post-hospital services based on physician/advanced practitioner order or complex needs related to functional status, cognitive ability, or social support system  Outcome: Progressing     Problem: Knowledge Deficit  Goal: Patient/family/caregiver demonstrates understanding of disease process, treatment plan, medications, and discharge instructions  Description: Complete learning assessment and assess knowledge base.  Interventions:  - Provide teaching at level of understanding  - Provide teaching via preferred learning methods  Outcome: Progressing     Problem: Nutrition/Hydration-ADULT  Goal: Nutrient/Hydration intake appropriate for improving, restoring or maintaining nutritional needs  Description: Monitor and assess patient's nutrition/hydration status for malnutrition. Collaborate with interdisciplinary team and initiate plan and interventions as ordered.  Monitor patient's weight and dietary intake as ordered or per policy. Utilize nutrition screening tool and intervene as necessary. Determine patient's food preferences and provide high-protein, high-caloric foods as appropriate.     INTERVENTIONS:  - Monitor oral intake, urinary output, labs, and treatment plans  - Assess nutrition and hydration status and recommend course of action  - Evaluate amount of meals eaten  - Assist patient with eating if necessary   - Allow adequate time for meals  - Recommend/ encourage appropriate diets, oral nutritional  supplements, and vitamin/mineral supplements  - Order, calculate, and assess calorie counts as needed  - Recommend, monitor, and adjust tube feedings and TPN/PPN based on assessed needs  - Assess need for intravenous fluids  - Provide specific nutrition/hydration education as appropriate  - Include patient/family/caregiver in decisions related to nutrition  Outcome: Progressing     Problem: CARDIOVASCULAR - ADULT  Goal: Maintains optimal cardiac output and hemodynamic stability  Description: INTERVENTIONS:  - Monitor I/O, vital signs and rhythm  - Monitor for S/S and trends of decreased cardiac output  - Administer and titrate ordered vasoactive medications to optimize hemodynamic stability  - Assess quality of pulses, skin color and temperature  - Assess for signs of decreased coronary artery perfusion  - Instruct patient to report change in severity of symptoms  Outcome: Progressing  Goal: Absence of cardiac dysrhythmias or at baseline rhythm  Description: INTERVENTIONS:  - Continuous cardiac monitoring, vital signs, obtain 12 lead EKG if ordered  - Administer antiarrhythmic and heart rate control medications as ordered  - Monitor electrolytes and administer replacement therapy as ordered  Outcome: Progressing     Problem: RESPIRATORY - ADULT  Goal: Achieves optimal ventilation and oxygenation  Description: INTERVENTIONS:  - Assess for changes in respiratory status  - Assess for changes in mentation and behavior  - Position to facilitate oxygenation and minimize respiratory effort  - Oxygen administered by appropriate delivery if ordered  - Initiate smoking cessation education as indicated  - Encourage broncho-pulmonary hygiene including cough, deep breathe, Incentive Spirometry  - Assess the need for suctioning and aspirate as needed  - Assess and instruct to report SOB or any respiratory difficulty  - Respiratory Therapy support as indicated  Outcome: Progressing     Problem: METABOLIC, FLUID AND ELECTROLYTES -  ADULT  Goal: Electrolytes maintained within normal limits  Description: INTERVENTIONS:  - Monitor labs and assess patient for signs and symptoms of electrolyte imbalances  - Administer electrolyte replacement as ordered  - Monitor response to electrolyte replacements, including repeat lab results as appropriate  - Instruct patient on fluid and nutrition as appropriate  Outcome: Progressing  Goal: Fluid balance maintained  Description: INTERVENTIONS:  - Monitor labs   - Monitor I/O and WT  - Instruct patient on fluid and nutrition as appropriate  - Assess for signs & symptoms of volume excess or deficit  Outcome: Progressing

## 2024-03-03 NOTE — ASSESSMENT & PLAN NOTE
Patient with acute respiratory failure secondary to influenza A with superimposed bacterial infection status post completion of cefepime  Currently on 5 L nasal cannula

## 2024-03-03 NOTE — ASSESSMENT & PLAN NOTE
Patient with acute respiratory failure secondary to influenza A with superimposed bacterial infection status post completion of cefepime  Currently on3 to 4 L nasal cannula will wean as tolerated

## 2024-03-03 NOTE — RESPIRATORY THERAPY NOTE
03/03/24 1822   Respiratory Assessment   Resp Comments Pt refused ABG, Per Dr. Cano, please wean oxygen based on pulse ox.

## 2024-03-04 LAB
ALBUMIN SERPL BCP-MCNC: 3.4 G/DL (ref 3.5–5)
ALP SERPL-CCNC: 90 U/L (ref 34–104)
ALT SERPL W P-5'-P-CCNC: 167 U/L (ref 7–52)
ANION GAP SERPL CALCULATED.3IONS-SCNC: 10 MMOL/L
AST SERPL W P-5'-P-CCNC: 69 U/L (ref 13–39)
BASOPHILS # BLD AUTO: 0.05 THOUSANDS/ÂΜL (ref 0–0.1)
BASOPHILS NFR BLD AUTO: 0 % (ref 0–1)
BILIRUB SERPL-MCNC: 0.68 MG/DL (ref 0.2–1)
BUN SERPL-MCNC: 39 MG/DL (ref 5–25)
CALCIUM ALBUM COR SERPL-MCNC: 9.3 MG/DL (ref 8.3–10.1)
CALCIUM SERPL-MCNC: 8.8 MG/DL (ref 8.4–10.2)
CHLORIDE SERPL-SCNC: 102 MMOL/L (ref 96–108)
CO2 SERPL-SCNC: 30 MMOL/L (ref 21–32)
CREAT SERPL-MCNC: 0.62 MG/DL (ref 0.6–1.3)
CRP SERPL QL: 12.8 MG/L
EOSINOPHIL # BLD AUTO: 0.05 THOUSAND/ÂΜL (ref 0–0.61)
EOSINOPHIL NFR BLD AUTO: 0 % (ref 0–6)
ERYTHROCYTE [DISTWIDTH] IN BLOOD BY AUTOMATED COUNT: 12.9 % (ref 11.6–15.1)
GFR SERPL CREATININE-BSD FRML MDRD: 110 ML/MIN/1.73SQ M
GLUCOSE SERPL-MCNC: 118 MG/DL (ref 65–140)
GLUCOSE SERPL-MCNC: 126 MG/DL (ref 65–140)
GLUCOSE SERPL-MCNC: 134 MG/DL (ref 65–140)
GLUCOSE SERPL-MCNC: 134 MG/DL (ref 65–140)
GLUCOSE SERPL-MCNC: 148 MG/DL (ref 65–140)
GLUCOSE SERPL-MCNC: 162 MG/DL (ref 65–140)
HCT VFR BLD AUTO: 37.2 % (ref 36.5–49.3)
HGB BLD-MCNC: 11.8 G/DL (ref 12–17)
IMM GRANULOCYTES # BLD AUTO: 0.12 THOUSAND/UL (ref 0–0.2)
IMM GRANULOCYTES NFR BLD AUTO: 1 % (ref 0–2)
LYMPHOCYTES # BLD AUTO: 0.97 THOUSANDS/ÂΜL (ref 0.6–4.47)
LYMPHOCYTES NFR BLD AUTO: 8 % (ref 14–44)
MCH RBC QN AUTO: 29 PG (ref 26.8–34.3)
MCHC RBC AUTO-ENTMCNC: 31.7 G/DL (ref 31.4–37.4)
MCV RBC AUTO: 91 FL (ref 82–98)
MONOCYTES # BLD AUTO: 0.45 THOUSAND/ÂΜL (ref 0.17–1.22)
MONOCYTES NFR BLD AUTO: 4 % (ref 4–12)
NEUTROPHILS # BLD AUTO: 10.68 THOUSANDS/ÂΜL (ref 1.85–7.62)
NEUTS SEG NFR BLD AUTO: 87 % (ref 43–75)
NRBC BLD AUTO-RTO: 0 /100 WBCS
PLATELET # BLD AUTO: 480 THOUSANDS/UL (ref 149–390)
PMV BLD AUTO: 8.9 FL (ref 8.9–12.7)
POTASSIUM SERPL-SCNC: 4.5 MMOL/L (ref 3.5–5.3)
PROCALCITONIN SERPL-MCNC: 0.12 NG/ML
PROT SERPL-MCNC: 6.6 G/DL (ref 6.4–8.4)
RBC # BLD AUTO: 4.07 MILLION/UL (ref 3.88–5.62)
SODIUM SERPL-SCNC: 142 MMOL/L (ref 135–147)
WBC # BLD AUTO: 12.32 THOUSAND/UL (ref 4.31–10.16)

## 2024-03-04 PROCEDURE — 84145 PROCALCITONIN (PCT): CPT | Performed by: INTERNAL MEDICINE

## 2024-03-04 PROCEDURE — 94668 MNPJ CHEST WALL SBSQ: CPT

## 2024-03-04 PROCEDURE — 82948 REAGENT STRIP/BLOOD GLUCOSE: CPT

## 2024-03-04 PROCEDURE — 86140 C-REACTIVE PROTEIN: CPT | Performed by: STUDENT IN AN ORGANIZED HEALTH CARE EDUCATION/TRAINING PROGRAM

## 2024-03-04 PROCEDURE — 80053 COMPREHEN METABOLIC PANEL: CPT | Performed by: INTERNAL MEDICINE

## 2024-03-04 PROCEDURE — 85025 COMPLETE CBC W/AUTO DIFF WBC: CPT | Performed by: INTERNAL MEDICINE

## 2024-03-04 PROCEDURE — 97530 THERAPEUTIC ACTIVITIES: CPT

## 2024-03-04 PROCEDURE — 94640 AIRWAY INHALATION TREATMENT: CPT

## 2024-03-04 PROCEDURE — 99232 SBSQ HOSP IP/OBS MODERATE 35: CPT | Performed by: INTERNAL MEDICINE

## 2024-03-04 PROCEDURE — 97116 GAIT TRAINING THERAPY: CPT

## 2024-03-04 PROCEDURE — 97110 THERAPEUTIC EXERCISES: CPT

## 2024-03-04 PROCEDURE — 94664 DEMO&/EVAL PT USE INHALER: CPT

## 2024-03-04 PROCEDURE — 94760 N-INVAS EAR/PLS OXIMETRY 1: CPT

## 2024-03-04 PROCEDURE — 92526 ORAL FUNCTION THERAPY: CPT

## 2024-03-04 RX ORDER — SODIUM CHLORIDE FOR INHALATION 3 %
4 VIAL, NEBULIZER (ML) INHALATION
Status: DISCONTINUED | OUTPATIENT
Start: 2024-03-04 | End: 2024-03-12

## 2024-03-04 RX ADMIN — FERRIC OXIDE RED: 8; 8 LOTION TOPICAL at 22:25

## 2024-03-04 RX ADMIN — INSULIN LISPRO 1 UNITS: 100 INJECTION, SOLUTION INTRAVENOUS; SUBCUTANEOUS at 11:54

## 2024-03-04 RX ADMIN — MAGNESIUM OXIDE TAB 400 MG (241.3 MG ELEMENTAL MG) 400 MG: 400 (241.3 MG) TAB at 09:06

## 2024-03-04 RX ADMIN — MIDODRINE HYDROCHLORIDE 5 MG: 5 TABLET ORAL at 00:15

## 2024-03-04 RX ADMIN — HYDROCORTISONE: 1 CREAM TOPICAL at 09:07

## 2024-03-04 RX ADMIN — TAMSULOSIN HYDROCHLORIDE 0.4 MG: 0.4 CAPSULE ORAL at 17:16

## 2024-03-04 RX ADMIN — FERRIC OXIDE RED: 8; 8 LOTION TOPICAL at 09:07

## 2024-03-04 RX ADMIN — FERRIC OXIDE RED: 8; 8 LOTION TOPICAL at 17:18

## 2024-03-04 RX ADMIN — LEVALBUTEROL HYDROCHLORIDE 1.25 MG: 1.25 SOLUTION RESPIRATORY (INHALATION) at 07:42

## 2024-03-04 RX ADMIN — SODIUM CHLORIDE SOLN NEBU 3% 4 ML: 3 NEBU SOLN at 20:04

## 2024-03-04 RX ADMIN — ENOXAPARIN SODIUM 40 MG: 40 INJECTION SUBCUTANEOUS at 17:16

## 2024-03-04 RX ADMIN — PAROXETINE 10 MG: 10 TABLET, FILM COATED ORAL at 09:06

## 2024-03-04 RX ADMIN — MIDODRINE HYDROCHLORIDE 5 MG: 5 TABLET ORAL at 17:16

## 2024-03-04 RX ADMIN — MIDODRINE HYDROCHLORIDE 5 MG: 5 TABLET ORAL at 09:06

## 2024-03-04 RX ADMIN — METHYLPREDNISOLONE SODIUM SUCCINATE 40 MG: 40 INJECTION, POWDER, FOR SOLUTION INTRAMUSCULAR; INTRAVENOUS at 09:07

## 2024-03-04 RX ADMIN — HYDROCORTISONE: 1 CREAM TOPICAL at 17:18

## 2024-03-04 RX ADMIN — METHYLPREDNISOLONE SODIUM SUCCINATE 40 MG: 40 INJECTION, POWDER, FOR SOLUTION INTRAMUSCULAR; INTRAVENOUS at 22:25

## 2024-03-04 RX ADMIN — LEVALBUTEROL HYDROCHLORIDE 1.25 MG: 1.25 SOLUTION RESPIRATORY (INHALATION) at 20:03

## 2024-03-04 RX ADMIN — LORATADINE 5 MG: 10 TABLET ORAL at 09:06

## 2024-03-04 NOTE — PROGRESS NOTES
[facility]  Progress Note  Name: Hussein CARVER I  MRN: [id number]  Unit/Bed#: [facility] 829-01 I Date of Admission: 2/19/2024   Date of Service: 3/4/2024 I Hospital Day: 14    Assessment/Plan   Acute metabolic encephalopathy  Assessment & Plan  Has improved alert and oriented x 3 today    Influenza A  Assessment & Plan  Status post course of Tamiflu with    * Acute respiratory failure with hypoxia (HCC)  Assessment & Plan  Patient with acute respiratory failure secondary to influenza A with superimposed bacterial infection status post completion of cefepime  Currently on 4 L nasal cannula               VTE Pharmacologic Prophylaxis:   Moderate Risk (Score 3-4) - Pharmacological DVT Prophylaxis Ordered: enoxaparin (Lovenox).    Mobility:   Basic Mobility Inpatient Raw Score: 12  -HLM Goal: 4: Move to chair/commode  JH-HLM Achieved: 5: Stand (1 or more minutes)  HLM Goal achieved. Continue to encourage appropriate mobility.    Patient Centered Rounds: I performed bedside rounds with nursing staff today.   Discussions with Specialists or Other Care Team Provider:     Education and Discussions with Family / Patient: Patient declined call to .     Total Time Spent on Date of Encounter in care of patient:  mins. This time was spent on one or more of the following: performing physical exam; counseling and coordination of care; obtaining or reviewing history; documenting in the medical record; reviewing/ordering tests, medications or procedures; communicating with other healthcare professionals and discussing with patient's family/caregivers.    Current Length of Stay: 14 day(s)  Current Patient Status: Inpatient   Certification Statement: The patient will continue to require additional inpatient hospital stay due to weaning oxygen  Discharge Plan: Anticipate discharge in 48 hrs to discharge location to be determined pending rehab evaluations.    Code Status: Level 1 -  Full Code    Subjective:   Patient reports significant anxiety today but overall improving denies worsening shortness of breath    Objective:     Vitals:   Temp (24hrs), Av.1 °F (36.7 °C), Min:97.1 °F (36.2 °C), Max:99 °F (37.2 °C)    Temp:  [97.1 °F (36.2 °C)-99 °F (37.2 °C)] 97.1 °F (36.2 °C)  HR:  [94-98] 94  Resp:  [18-21] 18  BP: (116)/(71-73) 116/73  SpO2:  [92 %-99 %] 92 %  Body mass index is 30.6 kg/m².     Input and Output Summary (last 24 hours):     Intake/Output Summary (Last 24 hours) at 3/4/2024 1533  Last data filed at 3/4/2024 1201  Gross per 24 hour   Intake 660 ml   Output 650 ml   Net 10 ml       Physical Exam:   Physical Exam  Vitals and nursing note reviewed.   Constitutional:       General: He is not in acute distress.     Appearance: He is well-developed. He is not toxic-appearing or diaphoretic.   HENT:      Head: Normocephalic and atraumatic.   Eyes:      General: No scleral icterus.     Conjunctiva/sclera: Conjunctivae normal.   Cardiovascular:      Rate and Rhythm: Normal rate and regular rhythm.      Heart sounds: No murmur heard.     No friction rub. No gallop.   Pulmonary:      Effort: Pulmonary effort is normal. No respiratory distress.      Breath sounds: Normal breath sounds. No stridor. No wheezing, rhonchi or rales.   Chest:      Chest wall: No tenderness.   Abdominal:      General: There is no distension.      Palpations: Abdomen is soft. There is no mass.      Tenderness: There is no abdominal tenderness. There is no guarding or rebound.      Hernia: No hernia is present.   Musculoskeletal:         General: No swelling or tenderness.      Cervical back: Neck supple.   Skin:     General: Skin is warm and dry.      Capillary Refill: Capillary refill takes less than 2 seconds.   Neurological:      Mental Status: He is alert and oriented to person, place, and time.   Psychiatric:         Mood and Affect: Mood normal.          Additional Data:     Labs:  Results from last 7 days    Lab Units 03/04/24  0610   WBC Thousand/uL 12.32*   HEMOGLOBIN g/dL 11.8*   HEMATOCRIT % 37.2   PLATELETS Thousands/uL 480*   NEUTROS PCT % 87*   LYMPHS PCT % 8*   MONOS PCT % 4   EOS PCT % 0     Results from last 7 days   Lab Units 03/04/24  0610   SODIUM mmol/L 142   POTASSIUM mmol/L 4.5   CHLORIDE mmol/L 102   CO2 mmol/L 30   BUN mg/dL 39*   CREATININE mg/dL 0.62   ANION GAP mmol/L 10   CALCIUM mg/dL 8.8   ALBUMIN g/dL 3.4*   TOTAL BILIRUBIN mg/dL 0.68   ALK PHOS U/L 90   ALT U/L 167*   AST U/L 69*   GLUCOSE RANDOM mg/dL 134         Results from last 7 days   Lab Units 03/04/24  1058 03/04/24  0606 03/04/24  0015 03/03/24  2058 03/03/24  1608 03/03/24  1055 03/03/24  0535 03/02/24  2338 03/02/24  2101 03/02/24  1600 03/02/24  1125 03/02/24  0524   POC GLUCOSE mg/dl 162* 148* 126 165* 129 199* 127 129 155* 156* 191* 113         Results from last 7 days   Lab Units 03/04/24  0610   PROCALCITONIN ng/ml 0.12       Lines/Drains:  Invasive Devices       Peripheral Intravenous Line  Duration             Peripheral IV 03/01/24 Dorsal (posterior);Right Forearm 3 days    Peripheral IV 03/01/24 Right;Ventral (anterior) Forearm 3 days                          Imaging: No pertinent imaging reviewed.    Recent Cultures (last 7 days):         Last 24 Hours Medication List:   Current Facility-Administered Medications   Medication Dose Route Frequency Provider Last Rate    acetaminophen  650 mg Oral Q6H PRN Samira BratisDO      albuterol  2 puff Inhalation Q4H PRN Kenny Menard DO      calamine-zinc oxide   Topical 4x Daily Dayne Garcia MD      enoxaparin  40 mg Subcutaneous Q24H YEHUDA Dayne Garcia MD      hydrocortisone   Topical BID Dayne Garcia MD      HYDROmorphone  1 mg Intravenous Q4H PRN Dayne Garcia MD      insulin lispro  1-6 Units Subcutaneous Q6H [facility] Dayne Garcia MD      levalbuterol  1.25 mg Nebulization TID Kenny Menard DO      loratadine  5 mg Oral Daily  Dayne Garcia MD      LORazepam  0.5 mg Oral Q8H PRN Dayne Garcia MD      magnesium Oxide  400 mg Oral Daily Dayne Garcia MD      melatonin  3 mg Oral HS PRN Teresa Crabtree PA-C      methylPREDNISolone sodium succinate  40 mg Intravenous Q12H YEHUDA Cano DO      midodrine  5 mg Oral Q8H aDyne Garcia MD      oxyCODONE  5 mg Oral Q6H PRN Dayne Garcia MD      PARoxetine  10 mg Oral Daily Dayne Garcia MD      polyethylene glycol  17 g Per NG Tube BID Dayne Garcia MD      sodium chloride  1 spray Each Nare Q1H PRN Dayne Garcia MD      sodium chloride  4 mL Nebulization TID Kirby Alberts MD      tamsulosin  0.4 mg Oral Daily With Dinner Dayne Garcia MD          Today, Patient Was Seen By: Kenny Menard DO    **Please Note: This note may have been constructed using a voice recognition system.**

## 2024-03-04 NOTE — PROGRESS NOTES
PULMONOLOGY PROGRESS NOTE     Name: Hussein Edward III   Age & Sex: 58 y.o. male   MRN: [id number]  Unit/Bed#: [facility] 829-01   Encounter: [id number]    PATIENT INFORMATION     Name: Hussein Edward III   Age & Sex: 58 y.o. male   MRN: [id number]  Hospital Stay Days: 14    ASSESSMENT/PLAN     58 M with hx of WALE on Cpap, presented on  due to flu A infection and pneumonia, requiring intubation on , transferred to [facility] for ECMO evaluation. He was extubated on . He was treated for cefepime for superimposed bacterial pneumonia    Assessment:   Acute hypoxic respiratory failure, s/p ETT+MV -  ARDS secondary to influenza A and bacterial superinfection  Septic shock, improving  Acute diastolic heart failure with new onset A-fib  LUIS ALBERTO on CKD  WALE on Cpap  Critical illness myopathy  Hypotesntion on midodrine  dysphagia    Plan:  Continue to wean Oxygen as possible. Now on 5L. Keep SpO2> 92%  Continue Cpap at night  Add on 3% NS and xopenex TID and chest Vest PT for airway clearance. He should continue to use flutter valve  Out of bed to chair. Encouraged PT session to encourage pulmonary function  SLP continues to follow for dysphagia      SUBJECTIVE     Patient seen and examined. No acute events overnight. Still on 5L of N/C support     OBJECTIVE     Vitals:    24 1533 24 2154 24 0743 24 0846   BP: 108/72 116/71  116/73   Pulse: (!) 116 98  94   Resp: (!) 26 21  18   Temp: 98.8 °F (37.1 °C) 99 °F (37.2 °C)  (!) 97.1 °F (36.2 °C)   TempSrc:       SpO2: 93% 99% 97% 92%   Weight:       Height:          Temperature:   Temp (24hrs), Av.3 °F (36.8 °C), Min:97.1 °F (36.2 °C), Max:99 °F (37.2 °C)    Temperature: (!) 97.1 °F (36.2 °C)  Intake & Output:  I/O          0701  / 0700  0701   0700 / 0701  03/05 0700    P.O. 360 720 240    IV Piggyback 50      Total Intake(mL/kg) 410 (4.9) 720 (8.6) 240 (2.9)    Urine (mL/kg/hr) 1375 (0.7) 900 (0.4) 350 (0.8)    Stool 0 0      Total Output 1375 900 350    Net -965 -180 -110           Unmeasured Urine Occurrence 2 x      Unmeasured Stool Occurrence 1 x 1 x           Weights:   IBW (Ideal Body Weight): 61.5 kg    Body mass index is 30.6 kg/m².  Weight (last 2 days)       Date/Time Weight    03/02/24 0600 --     Weight: pt can't stand. pt doesnt have a bed that weighs at 03/02/24 0600          Physical Exam  Constitutional:       Appearance: He is ill-appearing.   Cardiovascular:      Rate and Rhythm: Normal rate and regular rhythm.      Pulses: Normal pulses.   Pulmonary:      Effort: Pulmonary effort is normal.      Breath sounds: Rhonchi and rales present. No wheezing.   Abdominal:      General: Abdomen is flat.      Palpations: Abdomen is soft.   Musculoskeletal:      Right lower leg: No edema.      Left lower leg: No edema.   Skin:     General: Skin is warm.      Capillary Refill: Capillary refill takes less than 2 seconds.   Neurological:      General: No focal deficit present.      Mental Status: He is alert.           LABORATORY DATA     Labs: I have personally reviewed pertinent reports.  Results from last 7 days   Lab Units 03/04/24  0610 03/03/24  0658 03/01/24  0511   WBC Thousand/uL 12.32* 10.41* 8.97   HEMOGLOBIN g/dL 11.8* 12.4 12.1   HEMATOCRIT % 37.2 38.4 38.6   PLATELETS Thousands/uL 480* 442* 394*   NEUTROS PCT % 87* 86* 80*   MONOS PCT % 4 4 9   EOS PCT % 0 0 3      Results from last 7 days   Lab Units 03/04/24  0610 03/03/24  0658 03/01/24  0511 02/28/24  1800 02/28/24  0439 02/27/24  0445 02/26/24  1238   POTASSIUM mmol/L 4.5 4.6 3.7   < > 4.1   < >  --    CHLORIDE mmol/L 102 102 102   < > 104   < >  --    CO2 mmol/L 30 28 32   < > 31   < >  --    BUN mg/dL 39* 37* 34*   < > 29*   < >  --    CREATININE mg/dL 0.62 0.72 0.61   < > 0.79   < >  --    CALCIUM mg/dL 8.8 9.2 9.2   < > 8.2*   < >  --    ALK PHOS U/L 90  --   --   --  73  --  70   ALT U/L 167*  --   --   --  64*  --  49   AST U/L 69*  --   --   --  39  --  39     < > = values in this interval not displayed.     Results from last 7 days   Lab Units 03/01/24  0511 02/29/24  0458 02/28/24  0439   MAGNESIUM mg/dL 2.5 2.5 2.6     Results from last 7 days   Lab Units 03/01/24  0511 02/29/24  0458 02/28/24  0439   PHOSPHORUS mg/dL 3.0 3.4 2.7                      ABG:   Results from last 7 days   Lab Units 03/03/24  1452   PH ART  7.530*   PCO2 ART mm Hg 34.3*   PO2 ART mm Hg 48.3*   HCO3 ART mmol/L 28.0   BASE EXC ART mmol/L 5.5   ABG SOURCE  Brachial, Right       Micro:   Results from last 7 days   Lab Units 02/26/24  1252   MRSA CULTURE ONLY  No Methicillin Resistant Staphlyococcus aureus (MRSA) isolated     FluA positive 2/12.  Bld CX 2/24 NGTD  Sputum 2/25 - candida  Sputum 2/22 2+ Candida  FLU/RSV/COVID neg 2/22    IMAGING & DIAGNOSTIC TESTING     Radiology Results: I have personally reviewed pertinent reports.    CXR 3/2: bilateral patchy consolidation on the middle and lower lung field  3/1 SLP: barium swallow with evidence of aspiration    2/27 CT chest: bilateral bronchopneumonia. Slightly improving compares to 2/18 2/18 CT chest: extensive GGO and consolidation of bialteral lung field     Other Diagnostic Testing: I have personally reviewed pertinent reports.        ACTIVE MEDICATIONS     Current Facility-Administered Medications   Medication Dose Route Frequency    acetaminophen (TYLENOL) tablet 650 mg  650 mg Oral Q6H PRN    albuterol (PROVENTIL HFA,VENTOLIN HFA) inhaler 2 puff  2 puff Inhalation Q4H PRN    calamine-zinc oxide lotion   Topical 4x Daily    enoxaparin (LOVENOX) subcutaneous injection 40 mg  40 mg Subcutaneous Q24H YEHUDA    hydrocortisone 1 % cream   Topical BID    HYDROmorphone (DILAUDID) injection 1 mg  1 mg Intravenous Q4H PRN    insulin lispro (HumaLOG) 100 units/mL subcutaneous injection 1-6 Units  1-6 Units Subcutaneous Q6H YEHUDA    levalbuterol (XOPENEX) inhalation solution 1.25 mg  1.25 mg Nebulization TID    loratadine (CLARITIN) tablet 5 mg  5 mg  "Oral Daily    LORazepam (ATIVAN) tablet 0.5 mg  0.5 mg Oral Q8H PRN    magnesium Oxide (MAG-OX) tablet 400 mg  400 mg Oral Daily    melatonin tablet 3 mg  3 mg Oral HS PRN    methylPREDNISolone sodium succinate (Solu-MEDROL) injection 40 mg  40 mg Intravenous Q12H YEHUDA    midodrine (PROAMATINE) tablet 5 mg  5 mg Oral Q8H    oxyCODONE (ROXICODONE) IR tablet 5 mg  5 mg Oral Q6H PRN    PARoxetine (PAXIL) tablet 10 mg  10 mg Oral Daily    polyethylene glycol (MIRALAX) packet 17 g  17 g Per NG Tube BID    sodium chloride (OCEAN) 0.65 % nasal spray 1 spray  1 spray Each Nare Q1H PRN    sodium chloride 3 % inhalation solution 4 mL  4 mL Nebulization TID    tamsulosin (FLOMAX) capsule 0.4 mg  0.4 mg Oral Daily With Dinner         Disclaimer: Portions of the record may have been created with voice recognition software. Occasional wrong word or \"sound a like\" substitutions may have occurred due to the inherent limitations of voice recognition software. Careful consideration should be taken to recognize, using context, where substitutions have occurred.    Kirby Alberts MD   Pulmonary and Critical Care Fellow, PGY-IV  [facility]      "

## 2024-03-04 NOTE — PLAN OF CARE
Problem: Prexisting or High Potential for Compromised Skin Integrity  Goal: Skin integrity is maintained or improved  Description: INTERVENTIONS:  - Identify patients at risk for skin breakdown  - Assess and monitor skin integrity  - Assess and monitor nutrition and hydration status  - Monitor labs   - Assess for incontinence   - Turn and reposition patient  - Assist with mobility/ambulation  - Relieve pressure over bony prominences  - Avoid friction and shearing  - Provide appropriate hygiene as needed including keeping skin clean and dry  - Evaluate need for skin moisturizer/barrier cream  - Collaborate with interdisciplinary team   - Patient/family teaching  - Consider wound care consult   Outcome: Progressing     Problem: PAIN - ADULT  Goal: Verbalizes/displays adequate comfort level or baseline comfort level  Description: Interventions:  - Encourage patient to monitor pain and request assistance  - Assess pain using appropriate pain scale  - Administer analgesics based on type and severity of pain and evaluate response  - Implement non-pharmacological measures as appropriate and evaluate response  - Consider cultural and social influences on pain and pain management  - Notify physician/advanced practitioner if interventions unsuccessful or patient reports new pain  Outcome: Progressing     Problem: INFECTION - ADULT  Goal: Absence or prevention of progression during hospitalization  Description: INTERVENTIONS:  - Assess and monitor for signs and symptoms of infection  - Monitor lab/diagnostic results  - Monitor all insertion sites, i.e. indwelling lines, tubes, and drains  - Monitor endotracheal if appropriate and nasal secretions for changes in amount and color  - [place] appropriate cooling/warming therapies per order  - Administer medications as ordered  - Instruct and encourage patient and family to use good hand hygiene technique  - Identify and instruct in appropriate isolation precautions for  identified infection/condition  Outcome: Progressing     Problem: SAFETY ADULT  Goal: Patient will remain free of falls  Description: INTERVENTIONS:  - Educate patient/family on patient safety including physical limitations  - Instruct patient to call for assistance with activity   - Consult OT/PT to assist with strengthening/mobility   - Keep Call bell within reach  - Keep bed low and locked with side rails adjusted as appropriate  - Keep care items and personal belongings within reach  - Initiate and maintain comfort rounds  - Make Fall Risk Sign visible to staff  - Apply yellow socks and bracelet for high fall risk patients  - Consider moving patient to room near nurses station  Outcome: Progressing  Goal: Maintain or return to baseline ADL function  Description: INTERVENTIONS:  -  Assess patient's ability to carry out ADLs; assess patient's baseline for ADL function and identify physical deficits which impact ability to perform ADLs (bathing, care of mouth/teeth, toileting, grooming, dressing, etc.)  - Assess/evaluate cause of self-care deficits   - Assess range of motion  - Assess patient's mobility; develop plan if impaired  - Assess patient's need for assistive devices and provide as appropriate  - Encourage maximum independence but intervene and supervise when necessary  - Involve family in performance of ADLs  - Assess for home care needs following discharge   - Consider OT consult to assist with ADL evaluation and planning for discharge  - Provide patient education as appropriate  Outcome: Progressing  Goal: Maintains/Returns to pre admission functional level  Description: INTERVENTIONS:  - Perform AM-PAC 6 Click Basic Mobility/ Daily Activity assessment daily.  - Set and communicate daily mobility goal to care team and patient/family/caregiver.   - Collaborate with rehabilitation services on mobility goals if consulted  - Out of bed for toileting  - Record patient progress and toleration of activity level    Outcome: Progressing     Problem: DISCHARGE PLANNING  Goal: Discharge to home or other facility with appropriate resources  Description: INTERVENTIONS:  - Identify barriers to discharge w/patient and caregiver  - Arrange for needed discharge resources and transportation as appropriate  - Identify discharge learning needs (meds, wound care, etc.)  - Arrange for interpretive services to assist at discharge as needed  - Refer to Case Management Department for coordinating discharge planning if the patient needs post-hospital services based on physician/advanced practitioner order or complex needs related to functional status, cognitive ability, or social support system  Outcome: Progressing     Problem: Knowledge Deficit  Goal: Patient/family/caregiver demonstrates understanding of disease process, treatment plan, medications, and discharge instructions  Description: Complete learning assessment and assess knowledge base.  Interventions:  - Provide teaching at level of understanding  - Provide teaching via preferred learning methods  Outcome: Progressing     Problem: Nutrition/Hydration-ADULT  Goal: Nutrient/Hydration intake appropriate for improving, restoring or maintaining nutritional needs  Description: Monitor and assess patient's nutrition/hydration status for malnutrition. Collaborate with interdisciplinary team and initiate plan and interventions as ordered.  Monitor patient's weight and dietary intake as ordered or per policy. Utilize nutrition screening tool and intervene as necessary. Determine patient's food preferences and provide high-protein, high-caloric foods as appropriate.     INTERVENTIONS:  - Monitor oral intake, urinary output, labs, and treatment plans  - Assess nutrition and hydration status and recommend course of action  - Evaluate amount of meals eaten  - Assist patient with eating if necessary   - Allow adequate time for meals  - Recommend/ encourage appropriate diets, oral nutritional  supplements, and vitamin/mineral supplements  - Order, calculate, and assess calorie counts as needed  - Recommend, monitor, and adjust tube feedings and TPN/PPN based on assessed needs  - Assess need for intravenous fluids  - Provide specific nutrition/hydration education as appropriate  - Include patient/family/caregiver in decisions related to nutrition  Outcome: Progressing     Problem: CARDIOVASCULAR - ADULT  Goal: Maintains optimal cardiac output and hemodynamic stability  Description: INTERVENTIONS:  - Monitor I/O, vital signs and rhythm  - Monitor for S/S and trends of decreased cardiac output  - Administer and titrate ordered vasoactive medications to optimize hemodynamic stability  - Assess quality of pulses, skin color and temperature  - Assess for signs of decreased coronary artery perfusion  - Instruct patient to report change in severity of symptoms  Outcome: Progressing  Goal: Absence of cardiac dysrhythmias or at baseline rhythm  Description: INTERVENTIONS:  - Continuous cardiac monitoring, vital signs, obtain 12 lead EKG if ordered  - Administer antiarrhythmic and heart rate control medications as ordered  - Monitor electrolytes and administer replacement therapy as ordered  Outcome: Progressing     Problem: RESPIRATORY - ADULT  Goal: Achieves optimal ventilation and oxygenation  Description: INTERVENTIONS:  - Assess for changes in respiratory status  - Assess for changes in mentation and behavior  - Position to facilitate oxygenation and minimize respiratory effort  - Oxygen administered by appropriate delivery if ordered  - Initiate smoking cessation education as indicated  - Encourage broncho-pulmonary hygiene including cough, deep breathe, Incentive Spirometry  - Assess the need for suctioning and aspirate as needed  - Assess and instruct to report SOB or any respiratory difficulty  - Respiratory Therapy support as indicated  Outcome: Progressing     Problem: METABOLIC, FLUID AND ELECTROLYTES -  ADULT  Goal: Electrolytes maintained within normal limits  Description: INTERVENTIONS:  - Monitor labs and assess patient for signs and symptoms of electrolyte imbalances  - Administer electrolyte replacement as ordered  - Monitor response to electrolyte replacements, including repeat lab results as appropriate  - Instruct patient on fluid and nutrition as appropriate  Outcome: Progressing  Goal: Fluid balance maintained  Description: INTERVENTIONS:  - Monitor labs   - Monitor I/O and WT  - Instruct patient on fluid and nutrition as appropriate  - Assess for signs & symptoms of volume excess or deficit  Outcome: Progressing

## 2024-03-04 NOTE — ASSESSMENT & PLAN NOTE
Patient with acute respiratory failure secondary to influenza A with superimposed bacterial infection status post completion of cefepime  Currently on 4 L nasal cannula

## 2024-03-04 NOTE — SPEECH THERAPY NOTE
Speech Language/Pathology    Speech/Language Pathology Progress Note    Patient Name: Hussein Edward III  Today's Date: 3/4/2024    Subjective:  Pt was alert and awake. He was sitting upright in his recliner having his lunch tray.     Objective:  Pt was seen today for dysphagia therapy. The focus of today's session was to assess diet tolerance, PO intake safety and potential to advance diet. Pt was on 5L O2 via nasal cannula. Current diet is Level 1/ puree w/ honey thick liquids. Pt was assessed while having his lunch tray which consisted of: pureed chicken, pureed green beans, mashed potatoes, apple sauce, and honey thick coffee. To assess for potential advancement, pt was given cup sip of thin liquid x1, cup sip of nectar thick liquid x2, and a small piece of john cracker.   Swallow function:  Mastication of solids was slow w/ a majority of the bolus remaining in the oral cavity. Bolus manipulation and AP transfer of puree and honey thick liquids was functional. Possible reduced control of thin and nectar thick liquids. Coughing noted w/ cup sip of thin and in 1 out of 2 trials of NTL. No s/s of aspiration noted w/ puree or HTL.   Voice remains weak and breathy, possibly indicating incomplete airway closure.     Assessment:  Swallow function appears stable w/ current diet. S/s of aspiration noted w/ thin and NTL. Per pt, not yet ready to advance to solids.     Plan/Recommendations:  Continue current diet of Level 1/ puree w/ honey thick liquids. ST to continue f/u. Subsequent sessions to focus on po intake safety and potential for diet advancement.

## 2024-03-04 NOTE — PLAN OF CARE
Problem: PHYSICAL THERAPY ADULT  Goal: Performs mobility at highest level of function for planned discharge setting.  See evaluation for individualized goals.  Description: Treatment/Interventions: OT, Spoke to case management, Spoke to nursing, Gait training, Bed mobility, Patient/family training, Endurance training, Functional transfer training, LE strengthening/ROM  Equipment Recommended:  (TBD)       See flowsheet documentation for full assessment, interventions and recommendations.  Outcome: Progressing  Note: Prognosis: Fair  Problem List: Decreased strength, Decreased range of motion, Decreased endurance, Impaired balance, Decreased mobility, Decreased coordination, Decreased cognition, Impaired judgement, Decreased safety awareness  Assessment: pt seen for PT tx session. pt required modA for stnading trials and was limited by gross LE weakness + knee buckling w/ fatigue.   Pt is very fearful of falling and required cues + encouragement for participation and stadning trials. Pt pleased w/ being able to transfer OOB to chair . tolerated therex well and w/o SOB; Spo2 was > 90% on 5Lthroughout session. chair alarm intact post session  Barriers to Discharge: Inaccessible home environment, Decreased caregiver support     Rehab Resource Intensity Level, PT: I (Maximum Resource Intensity)    See flowsheet documentation for full assessment.

## 2024-03-04 NOTE — PHYSICAL THERAPY NOTE
PHYSICAL THERAPY TREATMENT  NAME:  Hussein Edward III  DATE: 03/04/24    AGE:   58 y.o.  Mrn:   [id number]  ADMIT DX:  ARDS (adult respiratory distress syndrome) (HCC) [J80]    Past Medical History:   Diagnosis Date    Chronic back pain     Migraine      Past Surgical History:   Procedure Laterality Date    HERNIA REPAIR      MANDIBLE FRACTURE SURGERY      MOUTH SURGERY      cyst in cheek    NASAL SEPTUM SURGERY         Length Of Stay: 14     03/04/24 1146   PT Last Visit   PT Visit Date 03/04/24   Note Type   Note Type Treatment   Pain Assessment   Pain Assessment Tool 0-10   Pain Score No Pain   Restrictions/Precautions   Weight Bearing Precautions Per Order No   Other Precautions Chair Alarm;Bed Alarm;Multiple lines;O2;Fall Risk  (5Lo2)   General   Chart Reviewed Yes   Family/Caregiver Present No   Cognition   Overall Cognitive Status Impaired   Arousal/Participation Alert;Cooperative   Attention Attends with cues to redirect   Orientation Level Oriented X4   Following Commands Follows one step commands with increased time or repetition   Comments pt is pleasent and motivated- agreable to work w/ PT despite reporting he did not sleep well at night   Subjective   Subjective pt is willing and agreeable to participate in PT   Bed Mobility   Supine to Sit 3  Moderate assistance   Additional items Assist x 1;Increased time required;Verbal cues;LE management   Sit to Supine Unable to assess   Additional Comments pt sits EOB w/ F balance ~12 mins for seeated therex/ + seated balance activities.   Transfers   Sit to Stand 3  Moderate assistance   Additional items Assist x 1;Increased time required;Verbal cues   Stand to Sit 3  Moderate assistance   Additional items Assist x 1;Increased time required;Verbal cues   Stand pivot 3  Moderate assistance   Additional items Assist x 1;Increased time required;Verbal cues   Additional Comments pt performed STS x3 total at EOB w/ standing tolerance of 30; 45 secs x2. pt  required max cues + encouragement for attempt adn was heavily reliant w/ UE on RW to prevent B/L knee buckling; progressed to pre gait; lateral weight shifting and pt experienced knee buckling and modA was required for controlled descent to bed surface. pt was fearful of attempting further. SPV w/ modA x1 w/o use of RW was performed to chair for safety. vitals   Balance   Static Sitting Fair   Dynamic Sitting Fair -   Static Standing Poor +   Dynamic Standing Poor   Ambulatory Poor -   Endurance Deficit   Endurance Deficit Yes   Endurance Deficit Description fatigue; weakness; balance fear/ anxiety   Activity Tolerance   Activity Tolerance Patient tolerated treatment well   Medical Staff Made Aware RN and CM for d/c planning recommendations   Exercises   Knee AROM Long Arc Quad Sitting;15 reps   Ankle Pumps Sitting;25 reps   Marching Sitting;15 reps  (x2)   Balance training  sitting/ stnaidng w/ RW support   Assessment   Prognosis Fair   Problem List Decreased strength;Decreased range of motion;Decreased endurance;Impaired balance;Decreased mobility;Decreased coordination;Decreased cognition;Impaired judgement;Decreased safety awareness   Assessment pt seen for PT tx session. pt required modA for stnading trials and was limited by gross LE weakness + knee buckling w/ fatigue.   Pt is very fearful of falling and required cues + encouragement for participation and stadning trials. Pt pleased w/ being able to transfer OOB to chair . tolerated therex well and w/o SOB; Spo2 was > 90% on 5Lthroughout session. chair alarm intact post session   Barriers to Discharge Inaccessible home environment;Decreased caregiver support   Goals   Patient Goals to get better and be able to go home   STG Expiration Date 03/13/24   PT Treatment Day 1   Plan   Treatment/Interventions ADL retraining;Functional transfer training;LE strengthening/ROM;Elevations;Therapeutic exercise;Endurance training;Cognitive reorientation;Patient/family  training;Equipment eval/education;Bed mobility;Gait training;Compensatory technique education;Continued evaluation;Spoke to nursing;Spoke to case management;OT;Spoke to advanced practitioner   Progress Slow progress, decreased activity tolerance   PT Frequency 3-5x/wk   Discharge Recommendation   Rehab Resource Intensity Level, PT I (Maximum Resource Intensity)   Equipment Recommended   (TBD)   AM-PAC Basic Mobility Inpatient   Turning in Flat Bed Without Bedrails 3   Lying on Back to Sitting on Edge of Flat Bed Without Bedrails 3   Moving Bed to Chair 2   Standing Up From Chair Using Arms 2   Walk in Room 1   Climb 3-5 Stairs With Railing 1   Basic Mobility Inpatient Raw Score 12   Basic Mobility Standardized Score 32.23   Turning Head Towards Sound 4   Follow Simple Instructions 3   Low Function Basic Mobility Raw Score  19   Low Function Basic Mobility Standardized Score  31.06   Highest Level Of Mobility   -HLM Goal 4: Move to chair/commode   JH-HLM Achieved 5: Stand (1 or more minutes)   End of Consult   Patient Position at End of Consult Bedside chair;Bed/Chair alarm activated;All needs within reach       The patient's AM-PAC Basic Mobility Inpatient Short Form Raw Score is 12. A Raw score of less than or equal to 16 suggests the patient may benefit from discharge to post-acute rehabilitation services. Please also refer to the recommendation of the Physical Therapist for safe discharge planning.          Rosa Hopkins, PT

## 2024-03-05 PROBLEM — G93.41 ACUTE METABOLIC ENCEPHALOPATHY: Status: RESOLVED | Noted: 2024-02-27 | Resolved: 2024-03-05

## 2024-03-05 PROBLEM — K56.7 ILEUS (HCC): Status: RESOLVED | Noted: 2024-02-28 | Resolved: 2024-03-05

## 2024-03-05 PROBLEM — R13.12 OROPHARYNGEAL DYSPHAGIA: Status: ACTIVE | Noted: 2024-03-05

## 2024-03-05 PROBLEM — R26.2 AMBULATORY DYSFUNCTION: Status: ACTIVE | Noted: 2024-03-05

## 2024-03-05 LAB
ANION GAP SERPL CALCULATED.3IONS-SCNC: 9 MMOL/L
BASOPHILS # BLD AUTO: 0.07 THOUSANDS/ÂΜL (ref 0–0.1)
BASOPHILS NFR BLD AUTO: 1 % (ref 0–1)
BUN SERPL-MCNC: 33 MG/DL (ref 5–25)
CALCIUM SERPL-MCNC: 9 MG/DL (ref 8.4–10.2)
CHLORIDE SERPL-SCNC: 103 MMOL/L (ref 96–108)
CO2 SERPL-SCNC: 30 MMOL/L (ref 21–32)
CREAT SERPL-MCNC: 0.67 MG/DL (ref 0.6–1.3)
EOSINOPHIL # BLD AUTO: 0.08 THOUSAND/ÂΜL (ref 0–0.61)
EOSINOPHIL NFR BLD AUTO: 1 % (ref 0–6)
ERYTHROCYTE [DISTWIDTH] IN BLOOD BY AUTOMATED COUNT: 13 % (ref 11.6–15.1)
GFR SERPL CREATININE-BSD FRML MDRD: 107 ML/MIN/1.73SQ M
GLUCOSE SERPL-MCNC: 130 MG/DL (ref 65–140)
GLUCOSE SERPL-MCNC: 147 MG/DL (ref 65–140)
GLUCOSE SERPL-MCNC: 149 MG/DL (ref 65–140)
GLUCOSE SERPL-MCNC: 156 MG/DL (ref 65–140)
GLUCOSE SERPL-MCNC: 211 MG/DL (ref 65–140)
HCT VFR BLD AUTO: 38.7 % (ref 36.5–49.3)
HGB BLD-MCNC: 12 G/DL (ref 12–17)
IMM GRANULOCYTES # BLD AUTO: 0.3 THOUSAND/UL (ref 0–0.2)
IMM GRANULOCYTES NFR BLD AUTO: 2 % (ref 0–2)
LYMPHOCYTES # BLD AUTO: 1.11 THOUSANDS/ÂΜL (ref 0.6–4.47)
LYMPHOCYTES NFR BLD AUTO: 9 % (ref 14–44)
MCH RBC QN AUTO: 29 PG (ref 26.8–34.3)
MCHC RBC AUTO-ENTMCNC: 31 G/DL (ref 31.4–37.4)
MCV RBC AUTO: 94 FL (ref 82–98)
MONOCYTES # BLD AUTO: 0.44 THOUSAND/ÂΜL (ref 0.17–1.22)
MONOCYTES NFR BLD AUTO: 3 % (ref 4–12)
NEUTROPHILS # BLD AUTO: 11.01 THOUSANDS/ÂΜL (ref 1.85–7.62)
NEUTS SEG NFR BLD AUTO: 84 % (ref 43–75)
NRBC BLD AUTO-RTO: 0 /100 WBCS
PLATELET # BLD AUTO: 492 THOUSANDS/UL (ref 149–390)
PMV BLD AUTO: 8.7 FL (ref 8.9–12.7)
POTASSIUM SERPL-SCNC: 4.4 MMOL/L (ref 3.5–5.3)
PROCALCITONIN SERPL-MCNC: 0.13 NG/ML
RBC # BLD AUTO: 4.14 MILLION/UL (ref 3.88–5.62)
SODIUM SERPL-SCNC: 142 MMOL/L (ref 135–147)
WBC # BLD AUTO: 13.01 THOUSAND/UL (ref 4.31–10.16)

## 2024-03-05 PROCEDURE — 94640 AIRWAY INHALATION TREATMENT: CPT

## 2024-03-05 PROCEDURE — 85025 COMPLETE CBC W/AUTO DIFF WBC: CPT | Performed by: INTERNAL MEDICINE

## 2024-03-05 PROCEDURE — 80048 BASIC METABOLIC PNL TOTAL CA: CPT | Performed by: INTERNAL MEDICINE

## 2024-03-05 PROCEDURE — 97116 GAIT TRAINING THERAPY: CPT

## 2024-03-05 PROCEDURE — 82948 REAGENT STRIP/BLOOD GLUCOSE: CPT

## 2024-03-05 PROCEDURE — 84145 PROCALCITONIN (PCT): CPT | Performed by: INTERNAL MEDICINE

## 2024-03-05 PROCEDURE — 99232 SBSQ HOSP IP/OBS MODERATE 35: CPT | Performed by: STUDENT IN AN ORGANIZED HEALTH CARE EDUCATION/TRAINING PROGRAM

## 2024-03-05 PROCEDURE — 97530 THERAPEUTIC ACTIVITIES: CPT

## 2024-03-05 PROCEDURE — 94664 DEMO&/EVAL PT USE INHALER: CPT

## 2024-03-05 PROCEDURE — 97535 SELF CARE MNGMENT TRAINING: CPT

## 2024-03-05 PROCEDURE — 94668 MNPJ CHEST WALL SBSQ: CPT

## 2024-03-05 PROCEDURE — 94760 N-INVAS EAR/PLS OXIMETRY 1: CPT

## 2024-03-05 PROCEDURE — 99232 SBSQ HOSP IP/OBS MODERATE 35: CPT | Performed by: INTERNAL MEDICINE

## 2024-03-05 RX ORDER — INSULIN LISPRO 100 [IU]/ML
1-6 INJECTION, SOLUTION INTRAVENOUS; SUBCUTANEOUS
Status: DISCONTINUED | OUTPATIENT
Start: 2024-03-05 | End: 2024-03-11

## 2024-03-05 RX ORDER — PREDNISONE 20 MG/1
40 TABLET ORAL DAILY
Status: COMPLETED | OUTPATIENT
Start: 2024-03-05 | End: 2024-03-07

## 2024-03-05 RX ADMIN — LORATADINE 5 MG: 10 TABLET ORAL at 08:31

## 2024-03-05 RX ADMIN — MIDODRINE HYDROCHLORIDE 5 MG: 5 TABLET ORAL at 23:12

## 2024-03-05 RX ADMIN — TAMSULOSIN HYDROCHLORIDE 0.4 MG: 0.4 CAPSULE ORAL at 17:04

## 2024-03-05 RX ADMIN — MIDODRINE HYDROCHLORIDE 5 MG: 5 TABLET ORAL at 08:30

## 2024-03-05 RX ADMIN — MIDODRINE HYDROCHLORIDE 5 MG: 5 TABLET ORAL at 17:04

## 2024-03-05 RX ADMIN — POLYETHYLENE GLYCOL 3350 17 G: 17 POWDER, FOR SOLUTION ORAL at 08:30

## 2024-03-05 RX ADMIN — INSULIN LISPRO 1 UNITS: 100 INJECTION, SOLUTION INTRAVENOUS; SUBCUTANEOUS at 05:02

## 2024-03-05 RX ADMIN — METHYLPREDNISOLONE SODIUM SUCCINATE 40 MG: 40 INJECTION, POWDER, FOR SOLUTION INTRAMUSCULAR; INTRAVENOUS at 08:30

## 2024-03-05 RX ADMIN — MIDODRINE HYDROCHLORIDE 5 MG: 5 TABLET ORAL at 00:11

## 2024-03-05 RX ADMIN — ENOXAPARIN SODIUM 40 MG: 40 INJECTION SUBCUTANEOUS at 17:04

## 2024-03-05 RX ADMIN — HYDROCORTISONE: 1 CREAM TOPICAL at 17:04

## 2024-03-05 RX ADMIN — PREDNISONE 40 MG: 20 TABLET ORAL at 17:04

## 2024-03-05 RX ADMIN — LEVALBUTEROL HYDROCHLORIDE 1.25 MG: 1.25 SOLUTION RESPIRATORY (INHALATION) at 07:15

## 2024-03-05 RX ADMIN — INSULIN LISPRO 2 UNITS: 100 INJECTION, SOLUTION INTRAVENOUS; SUBCUTANEOUS at 23:11

## 2024-03-05 RX ADMIN — SODIUM CHLORIDE SOLN NEBU 3% 4 ML: 3 NEBU SOLN at 20:15

## 2024-03-05 RX ADMIN — LEVALBUTEROL HYDROCHLORIDE 1.25 MG: 1.25 SOLUTION RESPIRATORY (INHALATION) at 20:15

## 2024-03-05 RX ADMIN — FERRIC OXIDE RED: 8; 8 LOTION TOPICAL at 23:13

## 2024-03-05 RX ADMIN — HYDROCORTISONE 1 APPLICATION: 1 CREAM TOPICAL at 08:31

## 2024-03-05 RX ADMIN — SODIUM CHLORIDE SOLN NEBU 3% 4 ML: 3 NEBU SOLN at 13:28

## 2024-03-05 RX ADMIN — PAROXETINE 10 MG: 10 TABLET, FILM COATED ORAL at 08:30

## 2024-03-05 RX ADMIN — LEVALBUTEROL HYDROCHLORIDE 1.25 MG: 1.25 SOLUTION RESPIRATORY (INHALATION) at 13:28

## 2024-03-05 RX ADMIN — MAGNESIUM OXIDE TAB 400 MG (241.3 MG ELEMENTAL MG) 400 MG: 400 (241.3 MG) TAB at 08:30

## 2024-03-05 RX ADMIN — SODIUM CHLORIDE SOLN NEBU 3% 4 ML: 3 NEBU SOLN at 07:15

## 2024-03-05 RX ADMIN — FERRIC OXIDE RED: 8; 8 LOTION TOPICAL at 17:12

## 2024-03-05 NOTE — CASE MANAGEMENT
Case Management Discharge Planning Note    Patient name Hussein Edward III  Location [facility] 829/[facility] 829-01 MRN [id number]  : 1965 Date 3/5/2024       Current Admission Date: 2024  Current Admission Diagnosis:Acute respiratory failure with hypoxia (HCC)   Patient Active Problem List    Diagnosis Date Noted    Ileus (HCC) 2024    Persistent fever 2024    ARDS (adult respiratory distress syndrome) (HCC) 2024    Acute metabolic encephalopathy 2024    Influenza A 2024    Insomnia 2024    Migraine 2024    Anxiety 2024    Acute respiratory failure with hypoxia (HCC) 2024    Metabolic acidosis 2024    Non-traumatic rhabdomyolysis 2024    Obstructive sleep apnea (adult) (pediatric) 2023    Primary insomnia 2023    Abnormal finding on MRI of brain 10/01/2021    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    Meningioma (HCC) 2021    Deviated nasal septum 2019    Hypertrophy of nasal turbinates 2019    Nasal obstruction 2019    Nasal septal deviation 2019    Seasonal allergic rhinitis 2019    Nasal turbinate hypertrophy 2019    Mixed dyslipidemia 2017    WALE (obstructive sleep apnea) 2016    Low back pain 2016    Strain of lumbar region 2015      LOS (days): 15  Geometric Mean LOS (GMLOS) (days):   Days to GMLOS:     OBJECTIVE:  Risk of Unplanned Readmission Score: 15.88         Current admission status: Inpatient   Preferred Pharmacy:   [id number] - [place] - [address] [address] [address] [place] [address]  Phone: [phone] Fax: [phone]    [id number] - [place] - [address]  [address]  [place] [address]  Phone: [phone] Fax: [phone]    Primary Care Provider: Iftikhar Roque MD    Primary Insurance: [employer]  Secondary Insurance:     DISCHARGE DETAILS:                                      Additional Comments: Met with patient at bedside and spoke to wife Kathi via phone.  Discussed current recommendation for STR, placed blanket referrals for both acute and subacute facilities.  CM to follow.

## 2024-03-05 NOTE — OCCUPATIONAL THERAPY NOTE
"  Occupational Therapy Progress Note     Patient Name: Hussein Edward III  Today's Date: 3/5/2024  Problem List  Principal Problem:    Acute respiratory failure with hypoxia (HCC)  Active Problems:    Influenza A    Persistent fever    ARDS (adult respiratory distress syndrome) (HCC)    Acute metabolic encephalopathy    Ileus (HCC)            03/05/24 1425   OT Last Visit   OT Visit Date 03/05/24   Note Type   Note Type Treatment   Pain Assessment   Pain Assessment Tool 0-10   Pain Score No Pain   Restrictions/Precautions   Weight Bearing Precautions Per Order No   Other Precautions Cognitive;Chair Alarm;Bed Alarm;Multiple lines;Fall Risk;O2  (4L NC)   Lifestyle   Autonomy I adls and mobility w/o ad - i iadls - shares homemaking with family   Reciprocal Relationships supportive family   Service to Others works FT   Intrinsic Gratification active pta   ADL   Where Assessed Chair   Grooming Assistance 5  Supervision/Setup   Grooming Deficit Wash/dry hands;Wash/dry face   UB Dressing Assistance 4  Minimal Assistance   UB Dressing Deficit Thread RUE;Thread LUE;Pull around back   LB Dressing Assistance 4  Minimal Assistance   LB Dressing Deficit Don/doff R sock;Don/doff L sock   Bed Mobility   Supine to Sit Unable to assess   Sit to Supine Unable to assess   Additional Comments Pt OOB in recliner pre/post session, all needs met, call bell within reach   Transfers   Sit to Stand 3  Moderate assistance   Additional items Assist x 2;Increased time required;Verbal cues   Stand to Sit 3  Moderate assistance   Additional items Assist x 2;Increased time required;Verbal cues   Additional Comments RW in stance. 2x trialled standing, with L knee buckling noted.   Functional Mobility   Additional Comments Unable to assess, Pt knee buckling with standing at chair   Subjective   Subjective \"I used to be a runner\"   Cognition   Overall Cognitive Status Impaired   Arousal/Participation Alert;Cooperative   Attention Attends with cues to " redirect   Orientation Level Oriented X4  (VCs for date)   Memory Decreased recall of precautions;Decreased recall of recent events   Following Commands Follows one step commands with increased time or repetition   Comments Pt cooperative t/o session, anxious about falling, increased emotional support/encouragement provided   Activity Tolerance   Activity Tolerance Patient limited by fatigue;Patient limited by pain   Medical Staff Made Aware RN cleared for therapy   Assessment   Assessment Patient participated in Skilled OT session this date with interventions consisting of ADL re training with the use of correct body mechnaics, Energy Conservation techniques, safety awareness and fall prevention techniques,  therapeutic activities to: increase activity tolerance, and increase OOB/ sitting tolerance . Patient agreeable to OT treatment session, upon arrival patient was found seated OOB to Chair, alert, responsive , in no apparent distress, and on 4L NC, O2 in high 80s, -120. Upon standing, HR dropped to 80s, returned to 120 with sitting .  In comparison to previous session, patient with improvements in ADL completion functional mobility . Patient requiring verbal cues for safety, frequent rest periods, and ocassional safety reminders. Pt completed functional STS txfs with mod AX2, RW in stance. L knee buckling noted with standing. Pt required SUP for grooming. Min A For UB Dressing and gown management. Mod A For LB dressing to adjust socks. Patient continues to be functioning below baseline level, occupational performance remains limited secondary to factors listed above and increased risk for falls and injury. The patient's raw score on the AM-PAC Daily Activity Inpatient Short Form is 16. A raw score of less than 19 suggests the patient may benefit from discharge to post-acute rehabilitation services. Please refer to the recommendation of the Occupational Therapist for safe discharge planning. From OT  standpoint, recommendation at time of d/c would be Level 1 resources - rehab. Patient to benefit from continued Occupational Therapy treatment while in the hospital to address deficits as defined above and maximize level of functional independence with ADLs and functional mobility.   Plan   Treatment Interventions ADL retraining;Functional transfer training;Endurance training;Cognitive reorientation;Patient/family training;Equipment evaluation/education;Compensatory technique education;Continued evaluation;Energy conservation;Activityengagement   Goal Expiration Date 03/15/24   OT Treatment Day 1   OT Frequency 2-3x/wk   Discharge Recommendation   Rehab Resource Intensity Level, OT I (Maximum Resource Intensity)   AM-PAC Daily Activity Inpatient   Lower Body Dressing 2   Bathing 2   Toileting 2   Upper Body Dressing 3   Grooming 3   Eating 4   Daily Activity Raw Score 16   Daily Activity Standardized Score (Calc for Raw Score >=11) 35.96   AM-PAC Applied Cognition Inpatient   Following a Speech/Presentation 3   Understanding Ordinary Conversation 4   Taking Medications 3   Remembering Where Things Are Placed or Put Away 3   Remembering List of 4-5 Errands 3   Taking Care of Complicated Tasks 3   Applied Cognition Raw Score 19   Applied Cognition Standardized Score 39.77   End of Consult   Education Provided Yes   Patient Position at End of Consult Bedside chair;Bed/Chair alarm activated;All needs within reach   Nurse Communication Nurse aware of consult       YUDY Walker, OTR/L

## 2024-03-05 NOTE — PLAN OF CARE
Problem: OCCUPATIONAL THERAPY ADULT  Goal: Performs self-care activities at highest level of function for planned discharge setting.  See evaluation for individualized goals.  Description: Treatment Interventions: ADL retraining, Functional transfer training, Endurance training, Patient/family training, Equipment evaluation/education, Compensatory technique education, Activityengagement, Energy conservation          See flowsheet documentation for full assessment, interventions and recommendations.   Note: Limitation: Decreased ADL status, Decreased endurance, Decreased self-care trans, Decreased high-level ADLs  Prognosis: Good  Assessment: Patient participated in Skilled OT session this date with interventions consisting of ADL re training with the use of correct body mechnaics, Energy Conservation techniques, safety awareness and fall prevention techniques,  therapeutic activities to: increase activity tolerance, and increase OOB/ sitting tolerance . Patient agreeable to OT treatment session, upon arrival patient was found seated OOB to Chair, alert, responsive , in no apparent distress, and on 4L NC, O2 in high 80s, -120. Upon standing, HR dropped to 80s, returned to 120 with sitting .  In comparison to previous session, patient with improvements in ADL completion functional mobility . Patient requiring verbal cues for safety, frequent rest periods, and ocassional safety reminders. Pt completed functional STS txfs with mod AX2, RW in stance. L knee buckling noted with standing. Pt required SUP for grooming. Min A For UB Dressing and gown management. Mod A For LB dressing to adjust socks. Patient continues to be functioning below baseline level, occupational performance remains limited secondary to factors listed above and increased risk for falls and injury. The patient's raw score on the -PAC Daily Activity Inpatient Short Form is 16. A raw score of less than 19 suggests the patient may benefit from  discharge to post-acute rehabilitation services. Please refer to the recommendation of the Occupational Therapist for safe discharge planning. From OT standpoint, recommendation at time of d/c would be Level 1 resources - rehab. Patient to benefit from continued Occupational Therapy treatment while in the hospital to address deficits as defined above and maximize level of functional independence with ADLs and functional mobility.     Rehab Resource Intensity Level, OT: I (Maximum Resource Intensity)

## 2024-03-05 NOTE — UTILIZATION REVIEW
Continued Stay Review    Date: 3/5                          Current Patient Class: INpatient   Current Level of Care: MEd/surg    HPI:58 y.o. male initially admitted on 2/19     Assessment/Plan:   Case management sent referrals for TCU/acute rehab.  CRP improving, repeat CXR tomorrow.  Currently requiring 3LNC.  OOB, ambulate with pt/ot.     Vital Signs: Vital Signs (last 2 days)    Date/Time Temp Pulse Resp BP MAP (mmHg) SpO2 Calculated FIO2 (%) - Nasal Cannula Nasal Cannula O2 Flow Rate (L/min) O2 Device   03/05/24 15:34:52 97.4 °F (36.3 °C) Abnormal  110 Abnormal  16 127/82 97 92 % -- -- --   03/05/24 07:43:24 -- 102 18 130/83 99 96 % -- -- --   03/05/24 00:10:58 -- 95 -- 135/86 102 93 % -- --      Pertinent Labs/Diagnostic Results:       Results from last 7 days   Lab Units 03/05/24 0457 03/04/24  0610 03/03/24  0658 03/01/24  0511 02/29/24  0458   WBC Thousand/uL 13.01* 12.32* 10.41* 8.97 9.33   HEMOGLOBIN g/dL 12.0 11.8* 12.4 12.1 10.4*   HEMATOCRIT % 38.7 37.2 38.4 38.6 32.8*   PLATELETS Thousands/uL 492* 480* 442* 394* 324   NEUTROS ABS Thousands/µL 11.01* 10.68* 8.91* 7.20 7.27         Results from last 7 days   Lab Units 03/05/24  0457 03/04/24  0610 03/03/24  0658 03/01/24  0511 02/29/24  1651 02/29/24  0458 02/28/24  1800 02/28/24  0439   SODIUM mmol/L 142 142 141 145 143 142   < > 142   POTASSIUM mmol/L 4.4 4.5 4.6 3.7 3.9 3.4*   < > 4.1   CHLORIDE mmol/L 103 102 102 102 102 101   < > 104   CO2 mmol/L 30 30 28 32 32 33*   < > 31   ANION GAP mmol/L 9 10 11 11 9 8   < > 7   BUN mg/dL 33* 39* 37* 34* 33* 38*   < > 29*   CREATININE mg/dL 0.67 0.62 0.72 0.61 0.63 0.63   < > 0.79   EGFR ml/min/1.73sq m 107 110 104 111 110 110   < > 100   CALCIUM mg/dL 9.0 8.8 9.2 9.2 9.2 8.8   < > 8.2*   MAGNESIUM mg/dL  --   --   --  2.5  --  2.5  --  2.6   PHOSPHORUS mg/dL  --   --   --  3.0  --  3.4  --  2.7    < > = values in this interval not displayed.     Results from last 7 days   Lab Units 03/04/24  9587  "02/28/24  0439   AST U/L 69* 39   ALT U/L 167* 64*   ALK PHOS U/L 90 73   TOTAL PROTEIN g/dL 6.6 6.0*   ALBUMIN g/dL 3.4* 3.1*   TOTAL BILIRUBIN mg/dL 0.68 0.85   BILIRUBIN DIRECT mg/dL  --  0.42*     Results from last 7 days   Lab Units 03/05/24  1108 03/05/24  0456 03/04/24  2349 03/04/24  1605 03/04/24  1058 03/04/24  0606 03/04/24  0015 03/03/24  2058 03/03/24  1608 03/03/24  1055 03/03/24  0535 03/02/24  2338   POC GLUCOSE mg/dl 147* 156* 134 118 162* 148* 126 165* 129 199* 127 129     Results from last 7 days   Lab Units 03/05/24  0457 03/04/24  0610 03/03/24  0658 03/01/24  0511 02/29/24  1651 02/29/24  0458 02/28/24  1800 02/28/24  0439 02/27/24  1854   GLUCOSE RANDOM mg/dL 149* 134 116 114 101 130 115 99 113             No results found for: \"BETA-HYDROXYBUTYRATE\"   Results from last 7 days   Lab Units 03/03/24  1452 03/02/24  1625 02/29/24  0458   PH ART  7.530* 7.519* 7.445   PCO2 ART mm Hg 34.3* 32.6* 47.7*   PO2 ART mm Hg 48.3* 95.3 84.0   HCO3 ART mmol/L 28.0 26.0 32.0*   BASE EXC ART mmol/L 5.5 3.5 6.9   O2 CONTENT ART mL/dL 16.2 17.8 17.0   O2 HGB, ARTERIAL % 85.4* 96.6 95.4   ABG SOURCE  Brachial, Right Radial, Right Line, Arterial           Results from last 7 days   Lab Units 03/05/24  0457 03/04/24  0610   PROCALCITONIN ng/ml 0.13 0.12         Results from last 7 days   Lab Units 03/04/24  0610   CRP mg/L 12.8*       Medications:   Scheduled Medications:  calamine-zinc oxide, , Topical, 4x Daily  enoxaparin, 40 mg, Subcutaneous, Q24H YEHUDA  hydrocortisone, , Topical, BID  insulin lispro, 1-6 Units, Subcutaneous, Q6H YEHUDA  levalbuterol, 1.25 mg, Nebulization, TID  loratadine, 5 mg, Oral, Daily  magnesium Oxide, 400 mg, Oral, Daily  midodrine, 5 mg, Oral, Q8H  PARoxetine, 10 mg, Oral, Daily  polyethylene glycol, 17 g, Per NG Tube, BID  predniSONE, 40 mg, Oral, Daily  sodium chloride, 4 mL, Nebulization, TID  tamsulosin, 0.4 mg, Oral, Daily With Dinner      Continuous IV Infusions:     PRN " Meds:  acetaminophen, 650 mg, Oral, Q6H PRN  albuterol, 2 puff, Inhalation, Q4H PRN  HYDROmorphone, 1 mg, Intravenous, Q4H PRN  LORazepam, 0.5 mg, Oral, Q8H PRN  melatonin, 3 mg, Oral, HS PRN  oxyCODONE, 5 mg, Oral, Q6H PRN  sodium chloride, 1 spray, Each Nare, Q1H PRN        Discharge Plan: TBD    Network Utilization Review Department  ATTENTION: Please call with any questions or concerns to [phone] and carefully listen to the prompts so that you are directed to the right person. All voicemails are confidential.   For Discharge needs, contact Care Management DC Support Team at [phone] opt. 2  Send all requests for admission clinical reviews, approved or denied determinations and any other requests to dedicated fax number below belonging to the [facility] where the patient is receiving treatment. List of dedicated fax numbers for the Facilities:  FACILITY NAME UR FAX NUMBER   ADMISSION DENIALS (Administrative/Medical Necessity) [phone]   DISCHARGE SUPPORT TEAM (NETWORK) [phone]   PARENT CHILD HEALTH (Maternity/NICU/Pediatrics) [phone]   [facility] [phone]   [facility] [phone]   [facility] [phone]   [facility] [phone]   [facility] [phone]   [facility] [phone]   [facility] [phone]   [facility] [phone]   [facility] [phone]   [facility] [phone]   [facility] [phone]   [facility] [phone]

## 2024-03-05 NOTE — QUICK NOTE
Referral received for consideration of patient for TCU rehab, will review patients case and follow patients progress until a determination can be made.

## 2024-03-05 NOTE — ASSESSMENT & PLAN NOTE
Secondary to influenza A with suspected bacterial superinfection  Requiring ETT and MD from 2/19-2/29  S/p bronchoscopy on 2/19 with cultures primarily groin Candida glabrata, no bacteria  Blood cultures with no growth to date  While in the ICU patient require multiple doses of IV Lasix and ultimately was placed on a Lasix drip until net negative fluid balance was achieved  Completed 5 days of ceftriaxone azithromycin, followed by 7 days of cefepime for fever thought to be pulmonary source   Currently on 3-4 L nasal cannula.   Continue to wean O2 as tolerated to maintain sats above 90%   Continue incentive spirometer and flutter valve  Continue hypertonic saline and chest best PT for airway clearance  CPAP at bedtime

## 2024-03-05 NOTE — PROGRESS NOTES
PULMONOLOGY PROGRESS NOTE     Name: Hussein Edward III   Age & Sex: 58 y.o. male   MRN: [id number]  Unit/Bed#: [facility] 829-01   Encounter: [id number]    PATIENT INFORMATION     Name: Hussein Edward III   Age & Sex: 58 y.o. male   MRN: [id number]  Hospital Stay Days: 15    ASSESSMENT/PLAN     58 M with hx of WALE on Cpap, presented on  due to flu A infection and pneumonia, requiring intubation on , transferred to [facility] for ECMO evaluation. He was extubated on . He was treated for cefepime for superimposed bacterial pneumonia (-3/2)    Assessment:   Acute hypoxic respiratory failure, s/p ETT+MV -  ARDS secondary to influenza A and bacterial superinfection  Septic shock, improving  Acute diastolic heart failure with new onset A-fib  LUIS ALBERTO on CKD  WALE on Cpap  Critical illness myopathy  Hypotesntion on midodrine  dysphagia    Plan:  Continue to wean Oxygen as possible. Now on 3L. Keep SpO2> 92%  Continue monitor off abx. Will check CXR tomorrow  Continue Cpap at night. Will place an order to set up tonight  continue on 3% NS and xopenex TID and chest Vest PT for airway clearance. He should continue to use flutter valve and incentive spirometry  Out of bed to chair. Encouraged PT session to encourage pulmonary function  SLP continues to follow for dysphagia      SUBJECTIVE     Patient seen and examined. No acute events overnight. Still on 3L of N/C support, getting better overall    OBJECTIVE     Vitals:    24 2217 24 0010 24 0743   BP:  112/76 135/86 130/83   Pulse:  104 95 102   Resp:  14  18   Temp:  97.7 °F (36.5 °C)     TempSrc:       SpO2: 93% 93% 93% 96%   Weight:       Height:          Temperature:   Temp (24hrs), Av.7 °F (36.5 °C), Min:97.6 °F (36.4 °C), Max:97.7 °F (36.5 °C)    Temperature: 97.7 °F (36.5 °C)  Intake & Output:  I/O          0701   0700  0701  03/ 07    P.O. 360 360 821    IV Piggyback 50      Total  Intake(mL/kg) 410 (4.9) 720 (8.6) 240 (2.9)    Urine (mL/kg/hr) 1375 (0.7) 900 (0.4) 350 (0.8)    Stool 0 0     Total Output 1375 900 350    Net -965 -180 -110           Unmeasured Urine Occurrence 2 x      Unmeasured Stool Occurrence 1 x 1 x           Weights:   IBW (Ideal Body Weight): 61.5 kg    Body mass index is 30.6 kg/m².  Weight (last 2 days)       None          Physical Exam  Constitutional:       Appearance: He is ill-appearing.   Cardiovascular:      Rate and Rhythm: Normal rate and regular rhythm.      Pulses: Normal pulses.   Pulmonary:      Effort: Pulmonary effort is normal.      Breath sounds: Rhonchi and rales present. No wheezing.   Abdominal:      General: Abdomen is flat.      Palpations: Abdomen is soft.   Musculoskeletal:      Right lower leg: No edema.      Left lower leg: No edema.   Skin:     General: Skin is warm.      Capillary Refill: Capillary refill takes less than 2 seconds.   Neurological:      General: No focal deficit present.      Mental Status: He is alert.           LABORATORY DATA     Labs: I have personally reviewed pertinent reports.  Results from last 7 days   Lab Units 03/05/24 0457 03/04/24  0610 03/03/24  0658   WBC Thousand/uL 13.01* 12.32* 10.41*   HEMOGLOBIN g/dL 12.0 11.8* 12.4   HEMATOCRIT % 38.7 37.2 38.4   PLATELETS Thousands/uL 492* 480* 442*   NEUTROS PCT % 84* 87* 86*   MONOS PCT % 3* 4 4   EOS PCT % 1 0 0      Results from last 7 days   Lab Units 03/05/24 0457 03/04/24  0610 03/03/24  0658 02/28/24  1800 02/28/24  0439   POTASSIUM mmol/L 4.4 4.5 4.6   < > 4.1   CHLORIDE mmol/L 103 102 102   < > 104   CO2 mmol/L 30 30 28   < > 31   BUN mg/dL 33* 39* 37*   < > 29*   CREATININE mg/dL 0.67 0.62 0.72   < > 0.79   CALCIUM mg/dL 9.0 8.8 9.2   < > 8.2*   ALK PHOS U/L  --  90  --   --  73   ALT U/L  --  167*  --   --  64*   AST U/L  --  69*  --   --  39    < > = values in this interval not displayed.     Results from last 7 days   Lab Units 03/01/24  05  02/29/24  0458 02/28/24  0439   MAGNESIUM mg/dL 2.5 2.5 2.6     Results from last 7 days   Lab Units 03/01/24  0511 02/29/24  0458 02/28/24  0439   PHOSPHORUS mg/dL 3.0 3.4 2.7                      ABG:   Results from last 7 days   Lab Units 03/03/24  1452   PH ART  7.530*   PCO2 ART mm Hg 34.3*   PO2 ART mm Hg 48.3*   HCO3 ART mmol/L 28.0   BASE EXC ART mmol/L 5.5   ABG SOURCE  Brachial, Right       Micro:         FluA positive 2/12.  Bld CX 2/24 NGTD  Sputum 2/25 - candida  Sputum 2/22 2+ Candida  FLU/RSV/COVID neg 2/22    IMAGING & DIAGNOSTIC TESTING     Radiology Results: I have personally reviewed pertinent reports.    CXR 3/2: bilateral patchy consolidation on the middle and lower lung field  3/1 SLP: barium swallow with evidence of aspiration    2/27 CT chest: bilateral bronchopneumonia. Slightly improving compares to 2/18 2/18 CT chest: extensive GGO and consolidation of bialteral lung field     Other Diagnostic Testing: I have personally reviewed pertinent reports.        ACTIVE MEDICATIONS     Current Facility-Administered Medications   Medication Dose Route Frequency    acetaminophen (TYLENOL) tablet 650 mg  650 mg Oral Q6H PRN    albuterol (PROVENTIL HFA,VENTOLIN HFA) inhaler 2 puff  2 puff Inhalation Q4H PRN    calamine-zinc oxide lotion   Topical 4x Daily    enoxaparin (LOVENOX) subcutaneous injection 40 mg  40 mg Subcutaneous Q24H [facility]    hydrocortisone 1 % cream   Topical BID    HYDROmorphone (DILAUDID) injection 1 mg  1 mg Intravenous Q4H PRN    insulin lispro (HumaLOG) 100 units/mL subcutaneous injection 1-6 Units  1-6 Units Subcutaneous Q6H [facility]    levalbuterol (XOPENEX) inhalation solution 1.25 mg  1.25 mg Nebulization TID    loratadine (CLARITIN) tablet 5 mg  5 mg Oral Daily    LORazepam (ATIVAN) tablet 0.5 mg  0.5 mg Oral Q8H PRN    magnesium Oxide (MAG-OX) tablet 400 mg  400 mg Oral Daily    melatonin tablet 3 mg  3 mg Oral HS PRN    midodrine (PROAMATINE) tablet 5 mg  5 mg Oral Q8H     "oxyCODONE (ROXICODONE) IR tablet 5 mg  5 mg Oral Q6H PRN    PARoxetine (PAXIL) tablet 10 mg  10 mg Oral Daily    polyethylene glycol (MIRALAX) packet 17 g  17 g Per NG Tube BID    predniSONE tablet 40 mg  40 mg Oral Daily    sodium chloride (OCEAN) 0.65 % nasal spray 1 spray  1 spray Each Nare Q1H PRN    sodium chloride 3 % inhalation solution 4 mL  4 mL Nebulization TID    tamsulosin (FLOMAX) capsule 0.4 mg  0.4 mg Oral Daily With Dinner         Disclaimer: Portions of the record may have been created with voice recognition software. Occasional wrong word or \"sound a like\" substitutions may have occurred due to the inherent limitations of voice recognition software. Careful consideration should be taken to recognize, using context, where substitutions have occurred.    Kirby Alberts MD   Pulmonary and Critical Care Fellow, PGY-IV  [facility]      "

## 2024-03-05 NOTE — ASSESSMENT & PLAN NOTE
Found on KUB and confirmed with CT chest abdomen pelvis with right colon measured at approximately 7.3 cm in its greatest caliber.  Patient was started on a significant bowel regimen and had multiple bowel episodes and passing gas which ultimately resolved his ileus

## 2024-03-05 NOTE — ASSESSMENT & PLAN NOTE
Evaluated by PT/OT--recommended rehab at discharge  [facility] following  Fall precautions  Ambulate patient

## 2024-03-05 NOTE — PLAN OF CARE
Problem: Prexisting or High Potential for Compromised Skin Integrity  Goal: Skin integrity is maintained or improved  Description: INTERVENTIONS:  - Identify patients at risk for skin breakdown  - Assess and monitor skin integrity  - Assess and monitor nutrition and hydration status  - Monitor labs   - Assess for incontinence   - Turn and reposition patient  - Assist with mobility/ambulation  - Relieve pressure over bony prominences  - Avoid friction and shearing  - Provide appropriate hygiene as needed including keeping skin clean and dry  - Evaluate need for skin moisturizer/barrier cream  - Collaborate with interdisciplinary team   - Patient/family teaching  - Consider wound care consult   Outcome: Progressing     Problem: PAIN - ADULT  Goal: Verbalizes/displays adequate comfort level or baseline comfort level  Description: Interventions:  - Encourage patient to monitor pain and request assistance  - Assess pain using appropriate pain scale  - Administer analgesics based on type and severity of pain and evaluate response  - Implement non-pharmacological measures as appropriate and evaluate response  - Consider cultural and social influences on pain and pain management  - Notify physician/advanced practitioner if interventions unsuccessful or patient reports new pain  Outcome: Progressing     Problem: INFECTION - ADULT  Goal: Absence or prevention of progression during hospitalization  Description: INTERVENTIONS:  - Assess and monitor for signs and symptoms of infection  - Monitor lab/diagnostic results  - Monitor all insertion sites, i.e. indwelling lines, tubes, and drains  - Monitor endotracheal if appropriate and nasal secretions for changes in amount and color  - [place] appropriate cooling/warming therapies per order  - Administer medications as ordered  - Instruct and encourage patient and family to use good hand hygiene technique  - Identify and instruct in appropriate isolation precautions for  identified infection/condition  Outcome: Progressing     Problem: DISCHARGE PLANNING  Goal: Discharge to home or other facility with appropriate resources  Description: INTERVENTIONS:  - Identify barriers to discharge w/patient and caregiver  - Arrange for needed discharge resources and transportation as appropriate  - Identify discharge learning needs (meds, wound care, etc.)  - Arrange for interpretive services to assist at discharge as needed  - Refer to Case Management Department for coordinating discharge planning if the patient needs post-hospital services based on physician/advanced practitioner order or complex needs related to functional status, cognitive ability, or social support system  Outcome: Progressing     Problem: Knowledge Deficit  Goal: Patient/family/caregiver demonstrates understanding of disease process, treatment plan, medications, and discharge instructions  Description: Complete learning assessment and assess knowledge base.  Interventions:  - Provide teaching at level of understanding  - Provide teaching via preferred learning methods  Outcome: Progressing     Problem: Nutrition/Hydration-ADULT  Goal: Nutrient/Hydration intake appropriate for improving, restoring or maintaining nutritional needs  Description: Monitor and assess patient's nutrition/hydration status for malnutrition. Collaborate with interdisciplinary team and initiate plan and interventions as ordered.  Monitor patient's weight and dietary intake as ordered or per policy. Utilize nutrition screening tool and intervene as necessary. Determine patient's food preferences and provide high-protein, high-caloric foods as appropriate.     INTERVENTIONS:  - Monitor oral intake, urinary output, labs, and treatment plans  - Assess nutrition and hydration status and recommend course of action  - Evaluate amount of meals eaten  - Assist patient with eating if necessary   - Allow adequate time for meals  - Recommend/ encourage  appropriate diets, oral nutritional supplements, and vitamin/mineral supplements  - Order, calculate, and assess calorie counts as needed  - Recommend, monitor, and adjust tube feedings and TPN/PPN based on assessed needs  - Assess need for intravenous fluids  - Provide specific nutrition/hydration education as appropriate  - Include patient/family/caregiver in decisions related to nutrition  Outcome: Progressing     Problem: CARDIOVASCULAR - ADULT  Goal: Maintains optimal cardiac output and hemodynamic stability  Description: INTERVENTIONS:  - Monitor I/O, vital signs and rhythm  - Monitor for S/S and trends of decreased cardiac output  - Administer and titrate ordered vasoactive medications to optimize hemodynamic stability  - Assess quality of pulses, skin color and temperature  - Assess for signs of decreased coronary artery perfusion  - Instruct patient to report change in severity of symptoms  Outcome: Progressing     Problem: CARDIOVASCULAR - ADULT  Goal: Absence of cardiac dysrhythmias or at baseline rhythm  Description: INTERVENTIONS:  - Continuous cardiac monitoring, vital signs, obtain 12 lead EKG if ordered  - Administer antiarrhythmic and heart rate control medications as ordered  - Monitor electrolytes and administer replacement therapy as ordered  Outcome: Progressing     Problem: RESPIRATORY - ADULT  Goal: Achieves optimal ventilation and oxygenation  Description: INTERVENTIONS:  - Assess for changes in respiratory status  - Assess for changes in mentation and behavior  - Position to facilitate oxygenation and minimize respiratory effort  - Oxygen administered by appropriate delivery if ordered  - Initiate smoking cessation education as indicated  - Encourage broncho-pulmonary hygiene including cough, deep breathe, Incentive Spirometry  - Assess the need for suctioning and aspirate as needed  - Assess and instruct to report SOB or any respiratory difficulty  - Respiratory Therapy support as  indicated  Outcome: Progressing     Problem: METABOLIC, FLUID AND ELECTROLYTES - ADULT  Goal: Electrolytes maintained within normal limits  Description: INTERVENTIONS:  - Monitor labs and assess patient for signs and symptoms of electrolyte imbalances  - Administer electrolyte replacement as ordered  - Monitor response to electrolyte replacements, including repeat lab results as appropriate  - Instruct patient on fluid and nutrition as appropriate  Outcome: Progressing     Problem: METABOLIC, FLUID AND ELECTROLYTES - ADULT  Goal: Fluid balance maintained  Description: INTERVENTIONS:  - Monitor labs   - Monitor I/O and WT  - Instruct patient on fluid and nutrition as appropriate  - Assess for signs & symptoms of volume excess or deficit  Outcome: Progressing

## 2024-03-05 NOTE — ASSESSMENT & PLAN NOTE
Lab Results   Component Value Date    EGFR 107 03/05/2024    EGFR 110 03/04/2024    EGFR 104 03/03/2024    CREATININE 0.67 03/05/2024    CREATININE 0.62 03/04/2024    CREATININE 0.72 03/03/2024   At baseline  Continue to monitor every other day  Avoid nephrotoxins

## 2024-03-05 NOTE — PHYSICAL THERAPY NOTE
PHYSICAL THERAPY NOTE          Patient Name: Hussein Edward III  Today's Date: 3/5/2024     03/05/24 1430   PT Last Visit   PT Visit Date 03/05/24   Note Type   Note Type Treatment   Education   Education Provided Yes   Pain Assessment   Pain Assessment Tool 0-10   Pain Score No Pain   Restrictions/Precautions   Weight Bearing Precautions Per Order No   Other Precautions Chair Alarm;Bed Alarm;Multiple lines;Fall Risk;O2  (4L O2)   General   Chart Reviewed Yes   Family/Caregiver Present No   Cognition   Overall Cognitive Status Impaired   Arousal/Participation Alert   Attention Attends with cues to redirect   Following Commands Follows one step commands with increased time or repetition   Comments Pt cooperative during session. Pt anxious about falling, reassurance provided, pt willing to attempt   Bed Mobility   Supine to Sit Unable to assess   Sit to Supine Unable to assess   Additional Comments Pt OOB in chair upon arrival.   Transfers   Sit to Stand 3  Moderate assistance   Additional items Assist x 2;Verbal cues   Stand to Sit 3  Moderate assistance   Additional items Assist x 1;Increased time required;Verbal cues   Additional Comments Pt performed STS from recliner X 2 stands- 30sec + 45 sec. Limited standing time 2* knee buckling L more than R. Pt unable to attempt weight shifts/ambulation. Pt's O2 sats on 4L to 86% , HR range between 87-130s during session. Pt's O2 sats recover to 91% with rest and breathing techniques. Pt requires VC for hand placement.   Balance   Static Sitting Fair   Dynamic Sitting Fair -   Static Standing Poor +   Dynamic Standing Poor   Endurance Deficit   Endurance Deficit Yes   Activity Tolerance   Activity Tolerance Patient limited by fatigue   Nurse Made Aware RN cleared /updated post session   Exercises   Balance training  STS X2   Assessment   Prognosis Fair   Problem List Decreased  strength;Decreased endurance;Decreased mobility;Decreased safety awareness   Assessment Pt seen for PT treatment this date. Pt anxious regarding mobility and about falling . Reassurance provided ,pt cooperative through session. Pt able to follow commands, VC for hand placement during transfers. Pt able to complete 2 STS with AX 1-2 with standing time as detailed in flowsheet. Pt educated regarding LE AROM in seated position. Limiting factors at this time are LE weakness, Knee buckling, impaired balance, decreased endurance level , GUERRA. Pt will benefit from continued .Pt services to address remaining limitations. POst dc recommendation is Level I at this time.   Barriers to Discharge Inaccessible home environment;Decreased caregiver support   Goals   Patient Goals to not fall   STG Expiration Date 03/13/24   PT Treatment Day 2   Plan   Treatment/Interventions Functional transfer training;Therapeutic exercise;Bed mobility;Gait training;Spoke to nursing;OT   Progress Slow progress, decreased activity tolerance   PT Frequency 3-5x/wk   Discharge Recommendation   Rehab Resource Intensity Level, PT I (Maximum Resource Intensity)   AM-PAC Basic Mobility Inpatient   Turning in Flat Bed Without Bedrails 3   Lying on Back to Sitting on Edge of Flat Bed Without Bedrails 2   Moving Bed to Chair 2   Standing Up From Chair Using Arms 2   Walk in Room 1   Climb 3-5 Stairs With Railing 1   Basic Mobility Inpatient Raw Score 11   Basic Mobility Standardized Score 30.25   Highest Level Of Mobility   -HL Goal 4: Move to chair/commode     Laxmi Klein PT DPT

## 2024-03-05 NOTE — [facility] ADMISSION
[facility]  met with patient at bedside /called pt's wife:introduced self, explained role, reviewed [facility] program,services offered,acute rehab criteria, review of referral by [facility] Medical   Director, insurance authorization process,the three [facility] locations and anticipated rehab length of stay. [facility] rehab folder left w patient; all questions answered. Patient / family first choice is [facility] and second is [facility]. Patient/family made aware [facility] reviewer rosa communicate with the  who rosa keep patient updated on referral status.

## 2024-03-05 NOTE — RESTORATIVE TECHNICIAN NOTE
Restorative Technician Note      Patient Name: Hussein Edward III     Restorative Tech Visit Date: 03/05/24  Note Type: Mobility  Patient Position Upon Consult: Bedside chair  Activity Performed: Transferred; Other (Comment); Range of motion (multiple sit to stands; educated to perform seated therex throughout the day)  Assistive Device: Other (Comment) (to and from commode; HA x2)  Education Provided: Yes  Patient Position at End of Consult: Bedside chair; All needs within reach; Bed/Chair alarm activated    Praveena Cunningham  DPT, Restorative Technician

## 2024-03-05 NOTE — PROGRESS NOTES
[facility]  Progress Note  Name: Hussein Edward III I  MRN: [id number]  Unit/Bed#: [facility] 829-01 I Date of Admission: 2/19/2024   Date of Service: 3/5/2024 I Hospital Day: 15    Assessment/Plan   * Acute respiratory failure with hypoxia (HCC)  Assessment & Plan  Secondary to influenza A with suspected bacterial superinfection  Requiring ETT and MD from 2/19-2/29  S/p bronchoscopy on 2/19 with cultures primarily groin Candida glabrata, no bacteria  Blood cultures with no growth to date  While in the ICU patient require multiple doses of IV Lasix and ultimately was placed on a Lasix drip until net negative fluid balance was achieved  Completed 5 days of ceftriaxone azithromycin, followed by 7 days of cefepime for fever thought to be pulmonary source   Currently on 3-4 L nasal cannula.   Continue to wean O2 as tolerated to maintain sats above 90%   Continue incentive spirometer and flutter valve  Continue hypertonic saline and chest best PT for airway clearance  CPAP at bedtime    Ambulatory dysfunction  Assessment & Plan  Evaluated by PT/OT--recommended rehab at discharge  ARC following  Fall precautions  Ambulate patient    Oropharyngeal dysphagia  Assessment & Plan  Mild  Evaluated by speech  Continue modified Level 1/ puree w/ honey thick liquids diet  Aspiration precautions    ARDS (adult respiratory distress syndrome) (HCC)  Assessment & Plan  Secondary to influenza A and suspected bacterial superinfection  Requiring supplemental O2, wean as tolerated  Pulmonology following    Anxiety  Assessment & Plan  Ativan as needed    Insomnia  Assessment & Plan  PTA quviviq 25 mg at bedtime, not on formulary  Continue melatonin as needed    Influenza A  Assessment & Plan  Completed Tamiflu course     WALE (obstructive sleep apnea)  Assessment & Plan  Continue CPAP    Ileus (HCC)-resolved as of 3/5/2024  Assessment & Plan  Found on KUB and confirmed with CT chest abdomen pelvis with right  colon measured at approximately 7.3 cm in its greatest caliber.  Patient was started on a significant bowel regimen and had multiple bowel episodes and passing gas which ultimately resolved his ileus    Acute metabolic encephalopathy-resolved as of 3/5/2024  Assessment & Plan  Secondary to infection  Resolved               VTE Pharmacologic Prophylaxis: VTE Score: 4 Moderate Risk (Score 3-4) - Pharmacological DVT Prophylaxis Ordered: enoxaparin (Lovenox).    Mobility:   Basic Mobility Inpatient Raw Score: 11  -HLM Goal: 4: Move to chair/commode  JH-HLM Achieved: 4: Move to chair/commode  HLM Goal achieved. Continue to encourage appropriate mobility.    Patient Centered Rounds: I performed bedside rounds with nursing staff today.   Discussions with Specialists or Other Care Team Provider:     Education and Discussions with Family / Patient: Attempted to update  (wife) via phone. Left voicemail.     Total Time Spent on Date of Encounter in care of patient: 35 mins. This time was spent on one or more of the following: performing physical exam; counseling and coordination of care; obtaining or reviewing history; documenting in the medical record; reviewing/ordering tests, medications or procedures; communicating with other healthcare professionals and discussing with patient's family/caregivers.    Current Length of Stay: 15 day(s)  Current Patient Status: Inpatient   Certification Statement: The patient will continue to require additional inpatient hospital stay due to weaning of oxygen  Discharge Plan: Anticipate discharge in 48-72 hrs to rehab facility.    Code Status: Level 1 - Full Code    Subjective:   No acute events overnight.  Patient continues to report shortness of breath however feels improved from admission but not back to his baseline.  Tolerating oral intake.    Objective:     Vitals:   Temp (24hrs), Av.6 °F (36.4 °C), Min:97.4 °F (36.3 °C), Max:97.7 °F (36.5 °C)    Temp:   [97.4 °F (36.3 °C)-97.7 °F (36.5 °C)] 97.4 °F (36.3 °C)  HR:  [] 110  Resp:  [14-18] 16  BP: (112-135)/(76-86) 127/82  SpO2:  [92 %-96 %] 92 %  Body mass index is 30.6 kg/m².     Input and Output Summary (last 24 hours):     Intake/Output Summary (Last 24 hours) at 3/5/2024 1629  Last data filed at 3/5/2024 1300  Gross per 24 hour   Intake 480 ml   Output 875 ml   Net -395 ml       Physical Exam:   Physical Exam  Vitals and nursing note reviewed.   Constitutional:       General: He is not in acute distress.     Appearance: He is well-developed.   HENT:      Head: Normocephalic and atraumatic.   Eyes:      Conjunctiva/sclera: Conjunctivae normal.   Cardiovascular:      Rate and Rhythm: Normal rate and regular rhythm.      Heart sounds: No murmur heard.  Pulmonary:      Effort: Pulmonary effort is normal. No respiratory distress.      Breath sounds: Rhonchi present. No wheezing.      Comments: On 4 L nasal cannula saturating 92%  Musculoskeletal:         General: No swelling.      Cervical back: Neck supple.   Skin:     General: Skin is warm and dry.   Neurological:      Mental Status: He is alert.          Additional Data:     Labs:  Results from last 7 days   Lab Units 03/05/24  0457   WBC Thousand/uL 13.01*   HEMOGLOBIN g/dL 12.0   HEMATOCRIT % 38.7   PLATELETS Thousands/uL 492*   NEUTROS PCT % 84*   LYMPHS PCT % 9*   MONOS PCT % 3*   EOS PCT % 1     Results from last 7 days   Lab Units 03/05/24  0457 03/04/24  0610   SODIUM mmol/L 142 142   POTASSIUM mmol/L 4.4 4.5   CHLORIDE mmol/L 103 102   CO2 mmol/L 30 30   BUN mg/dL 33* 39*   CREATININE mg/dL 0.67 0.62   ANION GAP mmol/L 9 10   CALCIUM mg/dL 9.0 8.8   ALBUMIN g/dL  --  3.4*   TOTAL BILIRUBIN mg/dL  --  0.68   ALK PHOS U/L  --  90   ALT U/L  --  167*   AST U/L  --  69*   GLUCOSE RANDOM mg/dL 149* 134         Results from last 7 days   Lab Units 03/05/24  1619 03/05/24  1108 03/05/24  0456 03/04/24  2349 03/04/24  1605 03/04/24  1058 03/04/24  0606  03/04/24  0015 03/03/24  2058 03/03/24  1608 03/03/24  1055 03/03/24  0535   POC GLUCOSE mg/dl 130 147* 156* 134 118 162* 148* 126 165* 129 199* 127         Results from last 7 days   Lab Units 03/05/24  0457 03/04/24  0610   PROCALCITONIN ng/ml 0.13 0.12       Lines/Drains:  Invasive Devices       Peripheral Intravenous Line  Duration             Peripheral IV 03/05/24 Left;Ventral (anterior) Hand <1 day                          Imaging: No pertinent imaging reviewed.    Recent Cultures (last 7 days):         Last 24 Hours Medication List:   Current Facility-Administered Medications   Medication Dose Route Frequency Provider Last Rate    acetaminophen  650 mg Oral Q6H PRN Samira BratisDO      albuterol  2 puff Inhalation Q4H PRN Kenny Menard DO      calamine-zinc oxide   Topical 4x Daily Dayne Garcia MD      enoxaparin  40 mg Subcutaneous Q24H YEHUDA Dayne Garcia MD      hydrocortisone   Topical BID Dayne Garcia MD      HYDROmorphone  1 mg Intravenous Q4H PRN Dayne Garcia MD      insulin lispro  1-6 Units Subcutaneous Q6H YEHUDA Dayne Garcia MD      levalbuterol  1.25 mg Nebulization TID Kenny Menard DO      loratadine  5 mg Oral Daily Dayne Garcia MD      LORazepam  0.5 mg Oral Q8H PRN Dayne Garcia MD      magnesium Oxide  400 mg Oral Daily Dayne Garcia MD      melatonin  3 mg Oral HS PRN Teresa Crabtree PA-C      midodrine  5 mg Oral Q8H Dayne Garcia MD      oxyCODONE  5 mg Oral Q6H PRN Dayne Garcia MD      PARoxetine  10 mg Oral Daily Dayne Garcia MD      polyethylene glycol  17 g Per NG Tube BID Dayne Garcia MD      predniSONE  40 mg Oral Daily Imani Lyles MD      sodium chloride  1 spray Each Nare Q1H PRN Dayne Garcia MD      sodium chloride  4 mL Nebulization TID Kirby Alberts MD      tamsulosin  0.4 mg Oral Daily With Aniceto Garcia MD           Today, Patient Was Seen By: Imani Lyles MD    **Please Note: This note may have been constructed using a voice recognition system.**

## 2024-03-05 NOTE — ASSESSMENT & PLAN NOTE
Secondary to influenza A and suspected bacterial superinfection  Requiring supplemental O2, wean as tolerated  Pulmonology following

## 2024-03-05 NOTE — PLAN OF CARE
Problem: PHYSICAL THERAPY ADULT  Goal: Performs mobility at highest level of function for planned discharge setting.  See evaluation for individualized goals.  Description: Treatment/Interventions: OT, Spoke to case management, Spoke to nursing, Gait training, Bed mobility, Patient/family training, Endurance training, Functional transfer training, LE strengthening/ROM  Equipment Recommended:  (TBD)       See flowsheet documentation for full assessment, interventions and recommendations.  Outcome: Progressing  Note: Prognosis: Fair  Problem List: Decreased strength, Decreased endurance, Decreased mobility, Decreased safety awareness  Assessment: Pt seen for PT treatment this date. Pt anxious regarding mobility and about falling . Reassurance provided ,pt cooperative through session. Pt able to follow commands, VC for hand placement during transfers. Pt able to complete 2 STS with AX 1-2 with standing time as detailed in flowsheet. Pt educated regarding LE AROM in seated position. Limiting factors at this time are LE weakness, Knee buckling, impaired balance, decreased endurance level , GUERRA. Pt will benefit from continued .Pt services to address remaining limitations. POst dc recommendation is Level I at this time.  Barriers to Discharge: Inaccessible home environment, Decreased caregiver support     Rehab Resource Intensity Level, PT: I (Maximum Resource Intensity)    See flowsheet documentation for full assessment.

## 2024-03-05 NOTE — ASSESSMENT & PLAN NOTE
Mild  Evaluated by speech  Continue modified Level 1/ puree w/ honey thick liquids diet  Aspiration precautions

## 2024-03-06 ENCOUNTER — APPOINTMENT (INPATIENT)
Dept: RADIOLOGY | Facility: HOSPITAL | Age: 59
DRG: 870 | End: 2024-03-06
Payer: COMMERCIAL

## 2024-03-06 LAB
GLUCOSE SERPL-MCNC: 102 MG/DL (ref 65–140)
GLUCOSE SERPL-MCNC: 142 MG/DL (ref 65–140)
GLUCOSE SERPL-MCNC: 158 MG/DL (ref 65–140)
GLUCOSE SERPL-MCNC: 83 MG/DL (ref 65–140)

## 2024-03-06 PROCEDURE — 99232 SBSQ HOSP IP/OBS MODERATE 35: CPT | Performed by: INTERNAL MEDICINE

## 2024-03-06 PROCEDURE — 82948 REAGENT STRIP/BLOOD GLUCOSE: CPT

## 2024-03-06 PROCEDURE — 97116 GAIT TRAINING THERAPY: CPT

## 2024-03-06 PROCEDURE — 99232 SBSQ HOSP IP/OBS MODERATE 35: CPT | Performed by: STUDENT IN AN ORGANIZED HEALTH CARE EDUCATION/TRAINING PROGRAM

## 2024-03-06 PROCEDURE — 94640 AIRWAY INHALATION TREATMENT: CPT

## 2024-03-06 PROCEDURE — 97110 THERAPEUTIC EXERCISES: CPT

## 2024-03-06 PROCEDURE — 71045 X-RAY EXAM CHEST 1 VIEW: CPT

## 2024-03-06 PROCEDURE — 94668 MNPJ CHEST WALL SBSQ: CPT

## 2024-03-06 PROCEDURE — 94760 N-INVAS EAR/PLS OXIMETRY 1: CPT

## 2024-03-06 PROCEDURE — 94664 DEMO&/EVAL PT USE INHALER: CPT

## 2024-03-06 RX ADMIN — FERRIC OXIDE RED: 8; 8 LOTION TOPICAL at 08:02

## 2024-03-06 RX ADMIN — LEVALBUTEROL HYDROCHLORIDE 1.25 MG: 1.25 SOLUTION RESPIRATORY (INHALATION) at 08:08

## 2024-03-06 RX ADMIN — LORATADINE 5 MG: 10 TABLET ORAL at 08:02

## 2024-03-06 RX ADMIN — PAROXETINE 10 MG: 10 TABLET, FILM COATED ORAL at 08:01

## 2024-03-06 RX ADMIN — LEVALBUTEROL HYDROCHLORIDE 1.25 MG: 1.25 SOLUTION RESPIRATORY (INHALATION) at 21:19

## 2024-03-06 RX ADMIN — MIDODRINE HYDROCHLORIDE 5 MG: 5 TABLET ORAL at 17:29

## 2024-03-06 RX ADMIN — ENOXAPARIN SODIUM 40 MG: 40 INJECTION SUBCUTANEOUS at 17:29

## 2024-03-06 RX ADMIN — FERRIC OXIDE RED: 8; 8 LOTION TOPICAL at 21:24

## 2024-03-06 RX ADMIN — FERRIC OXIDE RED: 8; 8 LOTION TOPICAL at 17:30

## 2024-03-06 RX ADMIN — PREDNISONE 40 MG: 20 TABLET ORAL at 08:02

## 2024-03-06 RX ADMIN — MIDODRINE HYDROCHLORIDE 5 MG: 5 TABLET ORAL at 08:01

## 2024-03-06 RX ADMIN — MAGNESIUM OXIDE TAB 400 MG (241.3 MG ELEMENTAL MG) 400 MG: 400 (241.3 MG) TAB at 08:02

## 2024-03-06 RX ADMIN — SODIUM CHLORIDE SOLN NEBU 3% 4 ML: 3 NEBU SOLN at 21:19

## 2024-03-06 RX ADMIN — SODIUM CHLORIDE SOLN NEBU 3% 4 ML: 3 NEBU SOLN at 14:19

## 2024-03-06 RX ADMIN — SODIUM CHLORIDE SOLN NEBU 3% 4 ML: 3 NEBU SOLN at 08:08

## 2024-03-06 RX ADMIN — HYDROCORTISONE: 1 CREAM TOPICAL at 08:02

## 2024-03-06 RX ADMIN — TAMSULOSIN HYDROCHLORIDE 0.4 MG: 0.4 CAPSULE ORAL at 17:29

## 2024-03-06 RX ADMIN — LEVALBUTEROL HYDROCHLORIDE 1.25 MG: 1.25 SOLUTION RESPIRATORY (INHALATION) at 14:18

## 2024-03-06 RX ADMIN — HYDROCORTISONE: 1 CREAM TOPICAL at 17:30

## 2024-03-06 RX ADMIN — INSULIN LISPRO 1 UNITS: 100 INJECTION, SOLUTION INTRAVENOUS; SUBCUTANEOUS at 21:24

## 2024-03-06 NOTE — RESTORATIVE TECHNICIAN NOTE
Restorative Technician Note      Patient Name: Hussein Edward III     Restorative Tech Visit Date: 03/06/24  Note Type: Mobility  Patient Position Upon Consult: Bedside chair  Activity Performed: Ambulated; Dangled; Stood  Assistive Device: Standard walker  Education Provided: Yes  Patient Position at End of Consult: Bedside chair; All needs within reach; Other (comment) (Left in the care of PT)    Assisted PT with session; see PT notes for details.    Praveena Cunningham  DPT, Restorative Technician

## 2024-03-06 NOTE — CASE MANAGEMENT
Case Management Discharge Planning Note    Patient name Hussein Edward III  Location [facility] 829/[facility] 829-01 MRN [id number]  : 1965 Date 3/6/2024       Current Admission Date: 2024  Current Admission Diagnosis:Acute respiratory failure with hypoxia (HCC)   Patient Active Problem List    Diagnosis Date Noted    Oropharyngeal dysphagia 2024    Ambulatory dysfunction 2024    Persistent fever 2024    ARDS (adult respiratory distress syndrome) ([facility]) 2024    Influenza A 2024    Insomnia 2024    Migraine 2024    Anxiety 2024    Acute respiratory failure with hypoxia ([facility]) 2024    Metabolic acidosis 2024    Non-traumatic rhabdomyolysis 2024    Chronic kidney disease 2024    Obstructive sleep apnea (adult) (pediatric) 2023    Primary insomnia 2023    Abnormal finding on MRI of brain 10/01/2021    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    Meningioma ([facility]) 2021    Deviated nasal septum 2019    Hypertrophy of nasal turbinates 2019    Nasal obstruction 2019    Nasal septal deviation 2019    Seasonal allergic rhinitis 2019    Nasal turbinate hypertrophy 2019    Mixed dyslipidemia 2017    WALE (obstructive sleep apnea) 2016    Low back pain 2016    Strain of lumbar region 2015      LOS (days): 16  Geometric Mean LOS (GMLOS) (days):   Days to GMLOS:     OBJECTIVE:  Risk of Unplanned Readmission Score: 17.03         Current admission status: Inpatient   Preferred Pharmacy:   [id number] - [address] [address] [address] [facility] [address]  Phone: [phone] Fax: [phone]    [id number] - [place] - [address]  [address]  [place] [address]  Phone: [phone] Fax: [phone]    Primary Care Provider: Iftikhar Roque MD    Primary Insurance: [facility]  Secondary  Insurance:     DISCHARGE DETAILS:               Treatment Team Recommendation: Acute Rehab  Discharge Destination Plan:: Acute Rehab  Transport at Discharge : Other (Comment) (TBD)               Additional Comments: Spoke with patient's wife Kathi via phone, e-mailed current list of rehab referrals/responses to [email or web address].  Wife will review and get back to CM with choices.        ADDENDUM: Received call back from wife she would like [facility] as first choice.  Reserved in AIDIN.

## 2024-03-06 NOTE — PROGRESS NOTES
Patient:  CHARLIE HERRERA    MRN:  [id number]    Aidin Request ID:  [id number]    Level of care reserved:  Inpatient Rehab Facility    Partner Reserved:  [facility] ([place] [place] [address] [phone]    Clinical needs requested:    Geography searched:  20 miles around [address]    Start of Service:    Request sent:  2:00pm EST on 3/5/2024 by Christine Alanis    Partner reserved:  11:42am EST on 3/6/2024 by Christine Alanis    Choice list shared:  11:42am EST on 3/6/2024 by Christine Alanis

## 2024-03-06 NOTE — PROGRESS NOTES
PULMONOLOGY PROGRESS NOTE     Name: Hussein Edward III   Age & Sex: 58 y.o. male   MRN: [id number]  Unit/Bed#: [facility] 829-01   Encounter: [id number]    PATIENT INFORMATION     Name: Hussein Edward III   Age & Sex: 58 y.o. male   MRN: [id number]  Hospital Stay Days: 16    ASSESSMENT/PLAN     58 M with hx of WALE on Cpap, presented on  due to flu A infection and pneumonia, requiring intubation on , transferred to [facility] for ECMO evaluation. He was extubated on . He was treated for cefepime for superimposed bacterial pneumonia (-3/2)    Assessment:   Acute hypoxic respiratory failure, s/p ETT+MV -  ARDS secondary to influenza A and bacterial superinfection  Septic shock, improving  Acute diastolic heart failure with new onset A-fib  LUIS ALBERTO on CKD  WALE on Cpap  Critical illness myopathy  Hypotesntion on midodrine  dysphagia    Plan:  Continue to wean Oxygen as possible. Now on 3L. Keep SpO2> 92%  Continue monitor off abx. CXR showed mild improvement on LLL consolidation  Continue Cpap at night. Will communicate that patient should be able to use his own Cpap machine while in hosptial  continue on 3% NS and xopenex TID and chest Vest PT for airway clearance. He should continue to use flutter valve and incentive spirometry  Out of bed to chair. Encouraged PT session to encourage pulmonary function  SLP continues to follow for dysphagia      SUBJECTIVE     Patient seen and examined. No acute events overnight. Still on 3L of N/C support, getting better overall    OBJECTIVE     Vitals:    24 0010 24 0743 24 1534 24 2231   BP: 135/86 130/83 127/82 119/79   Pulse: 95 102 (!) 110 100   Resp:  18 16 17   Temp:   (!) 97.4 °F (36.3 °C) 97.7 °F (36.5 °C)   TempSrc:       SpO2: 93% 96% 92% 96%   Weight:       Height:          Temperature:   Temp (24hrs), Av.6 °F (36.4 °C), Min:97.4 °F (36.3 °C), Max:97.7 °F (36.5 °C)    Temperature: 97.7 °F (36.5 °C)  Intake & Output:  I/O           0701 03/03 0700 03/03 0701 03/04 0700 03/04 0701 03/05 0700    P.O. 360 720 240    IV Piggyback 50      Total Intake(mL/kg) 410 (4.9) 720 (8.6) 240 (2.9)    Urine (mL/kg/hr) 1375 (0.7) 900 (0.4) 350 (0.8)    Stool 0 0     Total Output 1375 900 350    Net -965 -180 -110           Unmeasured Urine Occurrence 2 x      Unmeasured Stool Occurrence 1 x 1 x           Weights:   IBW (Ideal Body Weight): 61.5 kg    Body mass index is 30.6 kg/m².  Weight (last 2 days)       Date/Time Weight    03/06/24 0600 --     Weight: Pt in chair and cannot stand to be weighed at 03/06/24 0600          Physical Exam  Constitutional:       Appearance: He is ill-appearing.   Cardiovascular:      Rate and Rhythm: Normal rate and regular rhythm.      Pulses: Normal pulses.   Pulmonary:      Effort: Pulmonary effort is normal.      Breath sounds: Rhonchi and rales present. No wheezing.   Abdominal:      General: Abdomen is flat.      Palpations: Abdomen is soft.   Musculoskeletal:      Right lower leg: No edema.      Left lower leg: No edema.   Skin:     General: Skin is warm.      Capillary Refill: Capillary refill takes less than 2 seconds.   Neurological:      General: No focal deficit present.      Mental Status: He is alert.           LABORATORY DATA     Labs: I have personally reviewed pertinent reports.  Results from last 7 days   Lab Units 03/05/24 0457 03/04/24  0610 03/03/24  0658   WBC Thousand/uL 13.01* 12.32* 10.41*   HEMOGLOBIN g/dL 12.0 11.8* 12.4   HEMATOCRIT % 38.7 37.2 38.4   PLATELETS Thousands/uL 492* 480* 442*   NEUTROS PCT % 84* 87* 86*   MONOS PCT % 3* 4 4   EOS PCT % 1 0 0      Results from last 7 days   Lab Units 03/05/24 0457 03/04/24  0610 03/03/24  0658   POTASSIUM mmol/L 4.4 4.5 4.6   CHLORIDE mmol/L 103 102 102   CO2 mmol/L 30 30 28   BUN mg/dL 33* 39* 37*   CREATININE mg/dL 0.67 0.62 0.72   CALCIUM mg/dL 9.0 8.8 9.2   ALK PHOS U/L  --  90  --    ALT U/L  --  167*  --    AST U/L  --  69*  --      Results from  last 7 days   Lab Units 03/01/24  0511 02/29/24  0458   MAGNESIUM mg/dL 2.5 2.5     Results from last 7 days   Lab Units 03/01/24  0511 02/29/24  0458   PHOSPHORUS mg/dL 3.0 3.4                      ABG:   Results from last 7 days   Lab Units 03/03/24  1452   PH ART  7.530*   PCO2 ART mm Hg 34.3*   PO2 ART mm Hg 48.3*   HCO3 ART mmol/L 28.0   BASE EXC ART mmol/L 5.5   ABG SOURCE  Brachial, Right       Micro:         FluA positive 2/12.  Bld CX 2/24 NGTD  Sputum 2/25 - candida  Sputum 2/22 2+ Candida  FLU/RSV/COVID neg 2/22    IMAGING & DIAGNOSTIC TESTING     Radiology Results: I have personally reviewed pertinent reports.  CXR 3/6: continues to improve LLL consolidation    CXR 3/2: bilateral patchy consolidation on the middle and lower lung field  3/1 SLP: barium swallow with evidence of aspiration    2/27 CT chest: bilateral bronchopneumonia. Slightly improving compares to 2/18 2/18 CT chest: extensive GGO and consolidation of bialteral lung field     Other Diagnostic Testing: I have personally reviewed pertinent reports.        ACTIVE MEDICATIONS     Current Facility-Administered Medications   Medication Dose Route Frequency    acetaminophen (TYLENOL) tablet 650 mg  650 mg Oral Q6H PRN    albuterol (PROVENTIL HFA,VENTOLIN HFA) inhaler 2 puff  2 puff Inhalation Q4H PRN    calamine-zinc oxide lotion   Topical 4x Daily    enoxaparin (LOVENOX) subcutaneous injection 40 mg  40 mg Subcutaneous Q24H [facility]    hydrocortisone 1 % cream   Topical BID    HYDROmorphone (DILAUDID) injection 1 mg  1 mg Intravenous Q4H PRN    insulin lispro (HumALOG/ADMELOG) 100 units/mL subcutaneous injection 1-6 Units  1-6 Units Subcutaneous 4x Daily (AC & HS)    levalbuterol (XOPENEX) inhalation solution 1.25 mg  1.25 mg Nebulization TID    loratadine (CLARITIN) tablet 5 mg  5 mg Oral Daily    LORazepam (ATIVAN) tablet 0.5 mg  0.5 mg Oral Q8H PRN    magnesium Oxide (MAG-OX) tablet 400 mg  400 mg Oral Daily    melatonin tablet 3 mg  3 mg Oral HS  "PRN    midodrine (PROAMATINE) tablet 5 mg  5 mg Oral Q8H    oxyCODONE (ROXICODONE) IR tablet 5 mg  5 mg Oral Q6H PRN    PARoxetine (PAXIL) tablet 10 mg  10 mg Oral Daily    polyethylene glycol (MIRALAX) packet 17 g  17 g Per NG Tube BID    predniSONE tablet 40 mg  40 mg Oral Daily    sodium chloride (OCEAN) 0.65 % nasal spray 1 spray  1 spray Each Nare Q1H PRN    sodium chloride 3 % inhalation solution 4 mL  4 mL Nebulization TID    tamsulosin (FLOMAX) capsule 0.4 mg  0.4 mg Oral Daily With Dinner         Disclaimer: Portions of the record may have been created with voice recognition software. Occasional wrong word or \"sound a like\" substitutions may have occurred due to the inherent limitations of voice recognition software. Careful consideration should be taken to recognize, using context, where substitutions have occurred.    Kirby Alberts MD   Pulmonary and Critical Care Fellow, PGY-IV  [facility]      "

## 2024-03-06 NOTE — PLAN OF CARE
Problem: Prexisting or High Potential for Compromised Skin Integrity  Goal: Skin integrity is maintained or improved  Description: INTERVENTIONS:  - Identify patients at risk for skin breakdown  - Assess and monitor skin integrity  - Assess and monitor nutrition and hydration status  - Monitor labs   - Assess for incontinence   - Turn and reposition patient  - Assist with mobility/ambulation  - Relieve pressure over bony prominences  - Avoid friction and shearing  - Provide appropriate hygiene as needed including keeping skin clean and dry  - Evaluate need for skin moisturizer/barrier cream  - Collaborate with interdisciplinary team   - Patient/family teaching  - Consider wound care consult   Outcome: Progressing     Problem: PAIN - ADULT  Goal: Verbalizes/displays adequate comfort level or baseline comfort level  Description: Interventions:  - Encourage patient to monitor pain and request assistance  - Assess pain using appropriate pain scale  - Administer analgesics based on type and severity of pain and evaluate response  - Implement non-pharmacological measures as appropriate and evaluate response  - Consider cultural and social influences on pain and pain management  - Notify physician/advanced practitioner if interventions unsuccessful or patient reports new pain  Outcome: Progressing     Problem: INFECTION - ADULT  Goal: Absence or prevention of progression during hospitalization  Description: INTERVENTIONS:  - Assess and monitor for signs and symptoms of infection  - Monitor lab/diagnostic results  - Monitor all insertion sites, i.e. indwelling lines, tubes, and drains  - Monitor endotracheal if appropriate and nasal secretions for changes in amount and color  - [place] appropriate cooling/warming therapies per order  - Administer medications as ordered  - Instruct and encourage patient and family to use good hand hygiene technique  - Identify and instruct in appropriate isolation precautions for  identified infection/condition  Outcome: Progressing     Problem: SAFETY ADULT  Goal: Patient will remain free of falls  Description: INTERVENTIONS:  - Educate patient/family on patient safety including physical limitations  - Instruct patient to call for assistance with activity   - Consult OT/PT to assist with strengthening/mobility   - Keep Call bell within reach  - Keep bed low and locked with side rails adjusted as appropriate  - Keep care items and personal belongings within reach  - Initiate and maintain comfort rounds  - Make Fall Risk Sign visible to staff  - Apply yellow socks and bracelet for high fall risk patients  - Consider moving patient to room near nurses station  Outcome: Progressing  Goal: Maintain or return to baseline ADL function  Description: INTERVENTIONS:  -  Assess patient's ability to carry out ADLs; assess patient's baseline for ADL function and identify physical deficits which impact ability to perform ADLs (bathing, care of mouth/teeth, toileting, grooming, dressing, etc.)  - Assess/evaluate cause of self-care deficits   - Assess range of motion  - Assess patient's mobility; develop plan if impaired  - Assess patient's need for assistive devices and provide as appropriate  - Encourage maximum independence but intervene and supervise when necessary  - Involve family in performance of ADLs  - Assess for home care needs following discharge   - Consider OT consult to assist with ADL evaluation and planning for discharge  - Provide patient education as appropriate  Outcome: Progressing  Goal: Maintains/Returns to pre admission functional level  Description: INTERVENTIONS:  - Perform AM-PAC 6 Click Basic Mobility/ Daily Activity assessment daily.  - Set and communicate daily mobility goal to care team and patient/family/caregiver.   - Collaborate with rehabilitation services on mobility goals if consulted  - Out of bed for toileting  - Record patient progress and toleration of activity level    Outcome: Progressing     Problem: DISCHARGE PLANNING  Goal: Discharge to home or other facility with appropriate resources  Description: INTERVENTIONS:  - Identify barriers to discharge w/patient and caregiver  - Arrange for needed discharge resources and transportation as appropriate  - Identify discharge learning needs (meds, wound care, etc.)  - Arrange for interpretive services to assist at discharge as needed  - Refer to Case Management Department for coordinating discharge planning if the patient needs post-hospital services based on physician/advanced practitioner order or complex needs related to functional status, cognitive ability, or social support system  Outcome: Progressing     Problem: Knowledge Deficit  Goal: Patient/family/caregiver demonstrates understanding of disease process, treatment plan, medications, and discharge instructions  Description: Complete learning assessment and assess knowledge base.  Interventions:  - Provide teaching at level of understanding  - Provide teaching via preferred learning methods  Outcome: Progressing     Problem: Nutrition/Hydration-ADULT  Goal: Nutrient/Hydration intake appropriate for improving, restoring or maintaining nutritional needs  Description: Monitor and assess patient's nutrition/hydration status for malnutrition. Collaborate with interdisciplinary team and initiate plan and interventions as ordered.  Monitor patient's weight and dietary intake as ordered or per policy. Utilize nutrition screening tool and intervene as necessary. Determine patient's food preferences and provide high-protein, high-caloric foods as appropriate.     INTERVENTIONS:  - Monitor oral intake, urinary output, labs, and treatment plans  - Assess nutrition and hydration status and recommend course of action  - Evaluate amount of meals eaten  - Assist patient with eating if necessary   - Allow adequate time for meals  - Recommend/ encourage appropriate diets, oral nutritional  supplements, and vitamin/mineral supplements  - Order, calculate, and assess calorie counts as needed  - Recommend, monitor, and adjust tube feedings and TPN/PPN based on assessed needs  - Assess need for intravenous fluids  - Provide specific nutrition/hydration education as appropriate  - Include patient/family/caregiver in decisions related to nutrition  Outcome: Progressing     Problem: CARDIOVASCULAR - ADULT  Goal: Maintains optimal cardiac output and hemodynamic stability  Description: INTERVENTIONS:  - Monitor I/O, vital signs and rhythm  - Monitor for S/S and trends of decreased cardiac output  - Administer and titrate ordered vasoactive medications to optimize hemodynamic stability  - Assess quality of pulses, skin color and temperature  - Assess for signs of decreased coronary artery perfusion  - Instruct patient to report change in severity of symptoms  Outcome: Progressing  Goal: Absence of cardiac dysrhythmias or at baseline rhythm  Description: INTERVENTIONS:  - Continuous cardiac monitoring, vital signs, obtain 12 lead EKG if ordered  - Administer antiarrhythmic and heart rate control medications as ordered  - Monitor electrolytes and administer replacement therapy as ordered  Outcome: Progressing     Problem: RESPIRATORY - ADULT  Goal: Achieves optimal ventilation and oxygenation  Description: INTERVENTIONS:  - Assess for changes in respiratory status  - Assess for changes in mentation and behavior  - Position to facilitate oxygenation and minimize respiratory effort  - Oxygen administered by appropriate delivery if ordered  - Initiate smoking cessation education as indicated  - Encourage broncho-pulmonary hygiene including cough, deep breathe, Incentive Spirometry  - Assess the need for suctioning and aspirate as needed  - Assess and instruct to report SOB or any respiratory difficulty  - Respiratory Therapy support as indicated  Outcome: Progressing     Problem: METABOLIC, FLUID AND ELECTROLYTES -  ADULT  Goal: Electrolytes maintained within normal limits  Description: INTERVENTIONS:  - Monitor labs and assess patient for signs and symptoms of electrolyte imbalances  - Administer electrolyte replacement as ordered  - Monitor response to electrolyte replacements, including repeat lab results as appropriate  - Instruct patient on fluid and nutrition as appropriate  Outcome: Progressing  Goal: Fluid balance maintained  Description: INTERVENTIONS:  - Monitor labs   - Monitor I/O and WT  - Instruct patient on fluid and nutrition as appropriate  - Assess for signs & symptoms of volume excess or deficit  Outcome: Progressing

## 2024-03-06 NOTE — PHYSICAL THERAPY NOTE
PHYSICAL THERAPY NOTE          Patient Name: Hussein Edward III  Today's Date: 3/6/2024     03/06/24 1445   PT Last Visit   PT Visit Date 03/06/24   Note Type   Note Type Treatment   Education   Education Provided Yes   Pain Assessment   Pain Assessment Tool 0-10   Pain Score No Pain   Restrictions/Precautions   Weight Bearing Precautions Per Order No   Other Precautions Chair Alarm;Bed Alarm;Fall Risk;Pain;O2;Multiple lines  (4L O2)   General   Chart Reviewed Yes   Family/Caregiver Present No   Cognition   Arousal/Participation Responsive;Alert   Attention Within functional limits   Orientation Level Oriented X4   Following Commands Follows one step commands with increased time or repetition   Comments Pt cooperative and very pleasant during session   Subjective   Subjective P motivated to participate   Bed Mobility   Supine to Sit Unable to assess   Sit to Supine Unable to assess   Additional Comments Pt OOB in chair upon arrival.   Transfers   Sit to Stand 3  Moderate assistance   Additional items Assist x 1;Increased time required;Verbal cues   Stand to Sit 4  Minimal assistance   Additional items Assist x 1;Increased time required;Verbal cues   Additional Comments Completes STS X 3 with RW with A X 1 + SBA from 2nd person   Ambulation/Elevation   Gait pattern Forward Flexion;Excessively slow;Short stride   Gait Assistance 3  Moderate assist   Additional items Assist x 1;Verbal cues;Tactile cues   Assistive Device Rolling walker   Distance 4ft + 4ft   Ambulation/Elevation Additional Comments Pt able to ambulate few feet today with Mod Ax 1 and RW + SBA from 2nd person. Pt required seated rest breaks 2* fatigue and GUERRA, O2 sats on 4 L at 92% , HR 120s with activity.   Balance   Static Sitting Fair +   Dynamic Sitting Fair   Static Standing Fair -   Dynamic Standing Poor +   Ambulatory Poor   Endurance Deficit   Endurance Deficit Yes    Activity Tolerance   Activity Tolerance Patient limited by fatigue   Medical Staff Made Aware [employer] Praveena   Nurse Made Aware RN cleared pt for therapy   Exercises   Hip Abduction Sitting;10 reps;Bilateral   Hip Adduction Sitting;10 reps;Bilateral   Knee AROM Long Arc Quad Sitting;15 reps;Bilateral   Ankle Pumps Sitting;20 reps;Bilateral   Balance training  STS x3, Static + Dynamic Standing with weight shifts   Assessment   Prognosis Fair   Problem List Decreased strength;Decreased endurance;Decreased mobility;Impaired balance;Pain   Assessment Pt seen for PT treatment this date. Pt motivated to participate, follows commands through session with intermittent VC for hand placement with AD. Pt able to tolerate increased activity this date, with completing multiple functional transfers with Mod Ax 1. Pt able to attempt ambulation , limited 2* fatigue, weakness, GUERRA. Pt will benefit from continued PT services while in hospital to address remaining limitations. POst dc recommendation is Level I at this time.   Barriers to Discharge Inaccessible home environment;Decreased caregiver support   Goals   Patient Goals to get better   STG Expiration Date 03/13/24   PT Treatment Day 3   Plan   Treatment/Interventions Functional transfer training;Therapeutic exercise;Patient/family training;Bed mobility;Gait training;Spoke to nursing;OT   Progress Progressing toward goals   PT Frequency 3-5x/wk   Discharge Recommendation   Rehab Resource Intensity Level, PT I (Maximum Resource Intensity)   Equipment Recommended Walker   AM-PAC Basic Mobility Inpatient   Turning in Flat Bed Without Bedrails 3   Lying on Back to Sitting on Edge of Flat Bed Without Bedrails 2   Moving Bed to Chair 2   Standing Up From Chair Using Arms 2   Walk in Room 1   Climb 3-5 Stairs With Railing 1   Basic Mobility Inpatient Raw Score 11   Basic Mobility Standardized Score 30.25   Highest Level Of Mobility   -[facility] Goal 4: Move to chair/commode    RAJAN Achieved 5: Stand (1 or more minutes)   Education   Education Provided Mobility training;Assistive device   Patient Demonstrates verbal understanding   End of Consult   Patient Position at End of Consult All needs within reach;Bed/Chair alarm activated;Bedside chair   Laxmi Klein PT DPT

## 2024-03-06 NOTE — ARC ADMISSION
Reviewed patient's case with ARC physician - patient is preapproved for [facility] pending medical stability, functional progress, insurance authorization and bed availability. Patient needs to demonstrate improved tolerance for aggressive rehab program, as well as decreased O2 requirements at rest. CM has been updated. Will continue to follow at this time.

## 2024-03-06 NOTE — PROGRESS NOTES
[facility]  Progress Note  Name: Hussein Edward III I  MRN: [id number]  Unit/Bed#: [facility] 829-01 I Date of Admission: 2/19/2024   Date of Service: 3/6/2024 I Hospital Day: 16    Assessment/Plan   * Acute respiratory failure with hypoxia (HCC)  Assessment & Plan  Secondary to influenza A with suspected bacterial superinfection  Requiring ETT and MD from 2/19-2/29  S/p bronchoscopy on 2/19 with cultures primarily groin Candida glabrata, no bacteria  Blood cultures with no growth to date  While in the ICU patient require multiple doses of IV Lasix and ultimately was placed on a Lasix drip until net negative fluid balance was achieved  Completed 5 days of ceftriaxone azithromycin, followed by 7 days of cefepime for fever thought to be pulmonary source   Continue prednisone 40 mg daily  Currently on 3-4 L nasal cannula,  wean O2 as tolerated to maintain sats above 90%   Continue incentive spirometer and flutter valve  Continue hypertonic saline and chest vest PT for airway clearance  CXR 3/6 No significant improvement of pulmonary infiltrates presumably representing bilateral pneumonia  CPAP at bedtime    Ambulatory dysfunction  Assessment & Plan  Evaluated by PT/OT--recommended rehab at discharge  ARC following  Fall precautions  Ambulate patient    Oropharyngeal dysphagia  Assessment & Plan  Mild  Evaluated by speech  Continue modified Level 1/ puree w/ honey thick liquids diet  Aspiration precautions    ARDS (adult respiratory distress syndrome) (HCC)  Assessment & Plan  Secondary to influenza A and suspected bacterial superinfection  Requiring supplemental O2, wean as tolerated  Pulmonology following    Anxiety  Assessment & Plan  Ativan as needed    Insomnia  Assessment & Plan  PTA quviviq 25 mg at bedtime, not on formulary  Continue melatonin as needed    Influenza A  Assessment & Plan  Completed Tamiflu course     Chronic kidney disease  Assessment & Plan  Lab Results   Component  Value Date    EGFR 107 2024    EGFR 110 2024    EGFR 104 2024    CREATININE 0.67 2024    CREATININE 0.62 2024    CREATININE 0.72 2024   At baseline  Continue to monitor every other day  Avoid nephrotoxins    WALE (obstructive sleep apnea)  Assessment & Plan  Continue CPAP               VTE Pharmacologic Prophylaxis: VTE Score: 4 Moderate Risk (Score 3-4) - Pharmacological DVT Prophylaxis Ordered: enoxaparin (Lovenox).    Mobility:   Basic Mobility Inpatient Raw Score: 11  JH-HLM Goal: 4: Move to chair/commode  JH-HLM Achieved: 4: Move to chair/commode  HLM Goal achieved. Continue to encourage appropriate mobility.    Patient Centered Rounds: I performed bedside rounds with nursing staff today.   Discussions with Specialists or Other Care Team Provider: CM, pulm    Education and Discussions with Family / Patient:  Will update wife.     Total Time Spent on Date of Encounter in care of patient: 35 mins. This time was spent on one or more of the following: performing physical exam; counseling and coordination of care; obtaining or reviewing history; documenting in the medical record; reviewing/ordering tests, medications or procedures; communicating with other healthcare professionals and discussing with patient's family/caregivers.    Current Length of Stay: 16 day(s)  Current Patient Status: Inpatient   Certification Statement: The patient will continue to require additional inpatient hospital stay due to weaning of oxygen  Discharge Plan: Anticipate discharge in 48-72 hrs to rehab facility.    Code Status: Level 1 - Full Code    Subjective:   No events overnight. Patient continues to feel short of breath. Tolearting oral intake.     Objective:     Vitals:   Temp (24hrs), Av.7 °F (36.5 °C), Min:97.6 °F (36.4 °C), Max:97.7 °F (36.5 °C)    Temp:  [97.6 °F (36.4 °C)-97.7 °F (36.5 °C)] 97.6 °F (36.4 °C)  HR:  [] 109  Resp:  [16-17] 16  BP: (112-119)/(72-84) 112/72  SpO2:  [95  %-99 %] 95 %  Body mass index is 30.6 kg/m².     Input and Output Summary (last 24 hours):     Intake/Output Summary (Last 24 hours) at 3/6/2024 1609  Last data filed at 3/6/2024 1300  Gross per 24 hour   Intake 780 ml   Output 200 ml   Net 580 ml       Physical Exam:   Physical Exam  Vitals and nursing note reviewed.   Constitutional:       General: He is not in acute distress.     Appearance: He is well-developed.   HENT:      Head: Normocephalic and atraumatic.   Eyes:      Conjunctiva/sclera: Conjunctivae normal.   Cardiovascular:      Rate and Rhythm: Normal rate and regular rhythm.   Pulmonary:      Effort: Pulmonary effort is normal. No respiratory distress.      Breath sounds: Wheezing, rhonchi and rales present.   Abdominal:      Palpations: Abdomen is soft.   Musculoskeletal:         General: No swelling.      Cervical back: Neck supple.   Skin:     General: Skin is warm and dry.   Neurological:      Mental Status: He is alert.          Additional Data:     Labs:  Results from last 7 days   Lab Units 03/05/24  0457   WBC Thousand/uL 13.01*   HEMOGLOBIN g/dL 12.0   HEMATOCRIT % 38.7   PLATELETS Thousands/uL 492*   NEUTROS PCT % 84*   LYMPHS PCT % 9*   MONOS PCT % 3*   EOS PCT % 1     Results from last 7 days   Lab Units 03/05/24  0457 03/04/24  0610   SODIUM mmol/L 142 142   POTASSIUM mmol/L 4.4 4.5   CHLORIDE mmol/L 103 102   CO2 mmol/L 30 30   BUN mg/dL 33* 39*   CREATININE mg/dL 0.67 0.62   ANION GAP mmol/L 9 10   CALCIUM mg/dL 9.0 8.8   ALBUMIN g/dL  --  3.4*   TOTAL BILIRUBIN mg/dL  --  0.68   ALK PHOS U/L  --  90   ALT U/L  --  167*   AST U/L  --  69*   GLUCOSE RANDOM mg/dL 149* 134         Results from last 7 days   Lab Units 03/06/24  1125 03/06/24  0758 03/05/24  2229 03/05/24  1619 03/05/24  1108 03/05/24  0456 03/04/24  2349 03/04/24  1605 03/04/24  1058 03/04/24  0606 03/04/24  0015 03/03/24  2058   POC GLUCOSE mg/dl 102 83 211* 130 147* 156* 134 118 162* 148* 126 165*         Results from last  7 days   Lab Units 03/05/24  0457 03/04/24  0610   PROCALCITONIN ng/ml 0.13 0.12       Lines/Drains:  Invasive Devices       Peripheral Intravenous Line  Duration             Peripheral IV 03/05/24 Left;Ventral (anterior) Hand 1 day                          Imaging: Reviewed radiology reports from this admission including: chest xray    Recent Cultures (last 7 days):         Last 24 Hours Medication List:   Current Facility-Administered Medications   Medication Dose Route Frequency Provider Last Rate    acetaminophen  650 mg Oral Q6H PRN Samira Bratis,       albuterol  2 puff Inhalation Q4H PRN Kenny Derian,       calamine-zinc oxide   Topical 4x Daily Dayne Garcia MD      enoxaparin  40 mg Subcutaneous Q24H YEHUDA Dayne Garcia MD      hydrocortisone   Topical BID Dayne Garcia MD      HYDROmorphone  1 mg Intravenous Q4H PRN Dayne Garcia MD      insulin lispro  1-6 Units Subcutaneous 4x Daily (AC & HS) Teresa Crabtree PA-C      levalbuterol  1.25 mg Nebulization TID Kenny Menard DO      loratadine  5 mg Oral Daily Dayne Garcia MD      LORazepam  0.5 mg Oral Q8H PRN Dyane Garcia MD      magnesium Oxide  400 mg Oral Daily Dayne Garcia MD      melatonin  3 mg Oral HS PRN Teresa Crabtree PA-C      midodrine  5 mg Oral Q8H Dayne Garcia MD      oxyCODONE  5 mg Oral Q6H PRN Dayne Garcia MD      PARoxetine  10 mg Oral Daily Dayne Garcia MD      polyethylene glycol  17 g Per NG Tube BID Dayne Garcia MD      predniSONE  40 mg Oral Daily Imani Lyles MD      sodium chloride  1 spray Each Nare Q1H PRN Dayne Garcia MD      sodium chloride  4 mL Nebulization TID Kirby Alberts MD      tamsulosin  0.4 mg Oral Daily With Dinner Dayne Garcia MD          Today, Patient Was Seen By: Imani Lyles MD    **Please Note: This note may have been constructed using a voice recognition  system.**

## 2024-03-06 NOTE — ASSESSMENT & PLAN NOTE
Secondary to influenza A with suspected bacterial superinfection  Requiring ETT and MD from 2/19-2/29  S/p bronchoscopy on 2/19 with cultures primarily groin Candida glabrata, no bacteria  Blood cultures with no growth to date  While in the ICU patient require multiple doses of IV Lasix and ultimately was placed on a Lasix drip until net negative fluid balance was achieved  Completed 5 days of ceftriaxone azithromycin, followed by 7 days of cefepime for fever thought to be pulmonary source   Continue prednisone 40 mg daily  Currently on 3-4 L nasal cannula,  wean O2 as tolerated to maintain sats above 90%   Continue incentive spirometer and flutter valve  Continue hypertonic saline and chest vest PT for airway clearance  CXR 3/6 No significant improvement of pulmonary infiltrates presumably representing bilateral pneumonia  CPAP at bedtime

## 2024-03-06 NOTE — PLAN OF CARE
Problem: PAIN - ADULT  Goal: Verbalizes/displays adequate comfort level or baseline comfort level  Description: Interventions:  - Encourage patient to monitor pain and request assistance  - Assess pain using appropriate pain scale  - Administer analgesics based on type and severity of pain and evaluate response  - Implement non-pharmacological measures as appropriate and evaluate response  - Consider cultural and social influences on pain and pain management  - Notify physician/advanced practitioner if interventions unsuccessful or patient reports new pain  Outcome: Progressing     Problem: INFECTION - ADULT  Goal: Absence or prevention of progression during hospitalization  Description: INTERVENTIONS:  - Assess and monitor for signs and symptoms of infection  - Monitor lab/diagnostic results  - Monitor all insertion sites, i.e. indwelling lines, tubes, and drains  - Monitor endotracheal if appropriate and nasal secretions for changes in amount and color  - [place] appropriate cooling/warming therapies per order  - Administer medications as ordered  - Instruct and encourage patient and family to use good hand hygiene technique  - Identify and instruct in appropriate isolation precautions for identified infection/condition  Outcome: Progressing     Problem: METABOLIC, FLUID AND ELECTROLYTES - ADULT  Goal: Electrolytes maintained within normal limits  Description: INTERVENTIONS:  - Monitor labs and assess patient for signs and symptoms of electrolyte imbalances  - Administer electrolyte replacement as ordered  - Monitor response to electrolyte replacements, including repeat lab results as appropriate  - Instruct patient on fluid and nutrition as appropriate  Outcome: Progressing  Goal: Fluid balance maintained  Description: INTERVENTIONS:  - Monitor labs   - Monitor I/O and WT  - Instruct patient on fluid and nutrition as appropriate  - Assess for signs & symptoms of volume excess or deficit  Outcome:  Progressing

## 2024-03-06 NOTE — RESPIRATORY THERAPY NOTE
03/05/24 2015   Respiratory Assessment   Assessment Type During-treatment   General Appearance Alert;Awake   Respiratory Pattern Shallow;Tachypneic   Chest Assessment Chest expansion symmetrical   Bilateral Breath Sounds Diminished;Clear;Other (Comment)  (Basilar crackles.)   Cough None   Resp Comments Patient refusing CPAP therapy due to intolerance of hospital equipment. Does not want to use home unit due to lack of cleaning.   O2 Device NC   Non-Invasive Information   $ Pulse Oximetry Spot Check Charge Completed

## 2024-03-06 NOTE — PROGRESS NOTES
Patient stood up to use the bedside commode. Patient O2 dropped to 87% on 3 liters while standing. This RN turned oxygen up to 4 liters. Patient's O2 returned to 93% on 4 liters

## 2024-03-06 NOTE — UTILIZATION REVIEW
Continued Stay Review    Date: 3/6/24                           Current Patient Class: IP  Current Level of Care: MS    HPI:58 y.o. male initially admitted on 2/19/24 - DX:  Acute respiratory failure with hypoxia / Ambulatory dysfunction / Oropharyngeal dysphagia / ARDS (adult respiratory distress syndrome) / Chronic kidney disease     Assessment/Plan:   3/6/24: Patient continues to feel short of breath. Tachy; O2 @ 4L via nc; CXR 3/6 No significant improvement of pulmonary infiltrates presumably representing bilateral pneumonia; Evaluated by PT/OT--recommended rehab at discharge   Plan: cont DuoNebs; titrate O2; Continue prednisone 40 mg daily; Accuchecks with ssic; CM following for D/C to rehab - Acute rehab      Vital Signs:   Date/Time Temp Pulse Resp BP MAP (mmHg) SpO2 Calculated FIO2 (%) - Nasal Cannula Nasal Cannula O2 Flow Rate (L/min) O2 Device   03/06/24 15:19:16 97.6 °F (36.4 °C) 109 Abnormal  16 112/72 85 95 % -- -- --   03/06/24 1419 -- -- -- -- -- 96 % 36 4 L/min Nasal cannula   03/06/24 08:12:33 97.7 °F (36.5 °C) 90 16 119/84 96 99 % -- -- --   03/06/24 0810 -- -- -- -- -- -- 36 4 L/min Nasal cannula   03/06/24 0800 -- -- -- -- -- -- 36 4 L/min Nasal cannula       Pertinent Labs/Diagnostic Results:     Results from last 7 days   Lab Units 03/05/24  0457 03/04/24  0610 03/03/24  0658 03/01/24  0511 02/29/24  0458   WBC Thousand/uL 13.01* 12.32* 10.41* 8.97 9.33   HEMOGLOBIN g/dL 12.0 11.8* 12.4 12.1 10.4*   HEMATOCRIT % 38.7 37.2 38.4 38.6 32.8*   PLATELETS Thousands/uL 492* 480* 442* 394* 324   NEUTROS ABS Thousands/µL 11.01* 10.68* 8.91* 7.20 7.27       Results from last 7 days   Lab Units 03/05/24  0457 03/04/24  0610 03/03/24  0658 03/01/24  0511 02/29/24  1651 02/29/24  0458   SODIUM mmol/L 142 142 141 145 143 142   POTASSIUM mmol/L 4.4 4.5 4.6 3.7 3.9 3.4*   CHLORIDE mmol/L 103 102 102 102 102 101   CO2 mmol/L 30 30 28 32 32 33*   ANION GAP mmol/L 9 10 11 11 9 8   BUN mg/dL 33* 39* 37* 34* 33*  38*   CREATININE mg/dL 0.67 0.62 0.72 0.61 0.63 0.63   EGFR ml/min/1.73sq m 107 110 104 111 110 110   CALCIUM mg/dL 9.0 8.8 9.2 9.2 9.2 8.8   MAGNESIUM mg/dL  --   --   --  2.5  --  2.5   PHOSPHORUS mg/dL  --   --   --  3.0  --  3.4     Results from last 7 days   Lab Units 03/04/24  0610   AST U/L 69*   ALT U/L 167*   ALK PHOS U/L 90   TOTAL PROTEIN g/dL 6.6   ALBUMIN g/dL 3.4*   TOTAL BILIRUBIN mg/dL 0.68     Results from last 7 days   Lab Units 03/06/24  1609 03/06/24  1125 03/06/24  0758 03/05/24  2229 03/05/24  1619 03/05/24  1108 03/05/24  0456 03/04/24  2349 03/04/24  1605 03/04/24  1058 03/04/24  0606 03/04/24  0015   POC GLUCOSE mg/dl 142* 102 83 211* 130 147* 156* 134 118 162* 148* 126     Results from last 7 days   Lab Units 03/05/24  0457 03/04/24  0610 03/03/24  0658 03/01/24  0511 02/29/24  1651 02/29/24  0458 02/28/24  1800   GLUCOSE RANDOM mg/dL 149* 134 116 114 101 130 115       Results from last 7 days   Lab Units 03/03/24  1452 03/02/24  1625 02/29/24  0458   PH ART  7.530* 7.519* 7.445   PCO2 ART mm Hg 34.3* 32.6* 47.7*   PO2 ART mm Hg 48.3* 95.3 84.0   HCO3 ART mmol/L 28.0 26.0 32.0*   BASE EXC ART mmol/L 5.5 3.5 6.9   O2 CONTENT ART mL/dL 16.2 17.8 17.0   O2 HGB, ARTERIAL % 85.4* 96.6 95.4   ABG SOURCE  Brachial, Right Radial, Right Line, Arterial       Results from last 7 days   Lab Units 03/05/24  0457 03/04/24  0610   PROCALCITONIN ng/ml 0.13 0.12       Results from last 7 days   Lab Units 03/04/24  0610   CRP mg/L 12.8*       Medications:   Scheduled Medications:  calamine-zinc oxide, , Topical, 4x Daily  enoxaparin, 40 mg, Subcutaneous, Q24H YEHUDA  hydrocortisone, , Topical, BID  insulin lispro, 1-6 Units, Subcutaneous, 4x Daily (AC & HS)  levalbuterol, 1.25 mg, Nebulization, TID  loratadine, 5 mg, Oral, Daily  magnesium Oxide, 400 mg, Oral, Daily  midodrine, 5 mg, Oral, Q8H  PARoxetine, 10 mg, Oral, Daily  polyethylene glycol, 17 g, Per NG Tube, BID  predniSONE, 40 mg, Oral, Daily  sodium  chloride, 4 mL, Nebulization, TID  tamsulosin, 0.4 mg, Oral, Daily With Dinner      Continuous IV Infusions:     PRN Meds:  acetaminophen, 650 mg, Oral, Q6H PRN  albuterol, 2 puff, Inhalation, Q4H PRN  HYDROmorphone, 1 mg, Intravenous, Q4H PRN  LORazepam, 0.5 mg, Oral, Q8H PRN  melatonin, 3 mg, Oral, HS PRN  oxyCODONE, 5 mg, Oral, Q6H PRN  sodium chloride, 1 spray, Each Nare, Q1H PRN        Discharge Plan: TBD - Acute rehab    Network Utilization Review Department  ATTENTION: Please call with any questions or concerns to [phone] and carefully listen to the prompts so that you are directed to the right person. All voicemails are confidential.   For Discharge needs, contact Care Management DC Support Team at [phone] opt. [phone]  Send all requests for admission clinical reviews, approved or denied determinations and any other requests to dedicated fax number below belonging to the [place] where the patient is receiving treatment. List of dedicated fax numbers for the Facilities:  FACILITY NAME UR FAX NUMBER   ADMISSION DENIALS (Administrative/Medical Necessity) [phone]   DISCHARGE SUPPORT TEAM (NETWORK) [phone]   PARENT CHILD HEALTH (Maternity/NICU/Pediatrics) [phone]   [facility] [phone]   [facility] [phone]   [facility] [phone]   [facility] [phone]   [facility] [phone]   [facility] [phone]   [facility] [phone]   [facility] [phone]   [facility] [phone]   [facility] [phone]   [facility] [phone]   [facility] [phone]

## 2024-03-06 NOTE — PLAN OF CARE
Problem: PHYSICAL THERAPY ADULT  Goal: Performs mobility at highest level of function for planned discharge setting.  See evaluation for individualized goals.  Description: Treatment/Interventions: OT, Spoke to case management, Spoke to nursing, Gait training, Bed mobility, Patient/family training, Endurance training, Functional transfer training, LE strengthening/ROM  Equipment Recommended:  (TBD)       See flowsheet documentation for full assessment, interventions and recommendations.  Outcome: Progressing  Note: Prognosis: Fair  Problem List: Decreased strength, Decreased endurance, Decreased mobility, Impaired balance, Pain  Assessment: Pt seen for PT treatment this date. Pt motivated to participate, follows commands through session with intermittent VC for hand placement with AD. Pt able to tolerate increased activity this date, with completing multiple functional transfers with Mod Ax 1. Pt able to attempt ambulation , limited 2* fatigue, weakness, GUERRA. Pt will benefit from continued PT services while in hospital to address remaining limitations. POst dc recommendation is Level I at this time.  Barriers to Discharge: Inaccessible home environment, Decreased caregiver support     Rehab Resource Intensity Level, PT: I (Maximum Resource Intensity)    See flowsheet documentation for full assessment.

## 2024-03-07 DIAGNOSIS — R91.8 ABNORMAL CT SCAN OF LUNG: Primary | ICD-10-CM

## 2024-03-07 LAB
BASOPHILS # BLD AUTO: 0.06 THOUSANDS/ÂΜL (ref 0–0.1)
BASOPHILS NFR BLD AUTO: 0 % (ref 0–1)
EOSINOPHIL # BLD AUTO: 0.35 THOUSAND/ÂΜL (ref 0–0.61)
EOSINOPHIL NFR BLD AUTO: 3 % (ref 0–6)
ERYTHROCYTE [DISTWIDTH] IN BLOOD BY AUTOMATED COUNT: 13.2 % (ref 11.6–15.1)
GLUCOSE SERPL-MCNC: 121 MG/DL (ref 65–140)
GLUCOSE SERPL-MCNC: 177 MG/DL (ref 65–140)
GLUCOSE SERPL-MCNC: 188 MG/DL (ref 65–140)
GLUCOSE SERPL-MCNC: 89 MG/DL (ref 65–140)
HCT VFR BLD AUTO: 39.9 % (ref 36.5–49.3)
HGB BLD-MCNC: 12.7 G/DL (ref 12–17)
IMM GRANULOCYTES # BLD AUTO: 0.26 THOUSAND/UL (ref 0–0.2)
IMM GRANULOCYTES NFR BLD AUTO: 2 % (ref 0–2)
LYMPHOCYTES # BLD AUTO: 1.44 THOUSANDS/ÂΜL (ref 0.6–4.47)
LYMPHOCYTES NFR BLD AUTO: 10 % (ref 14–44)
MCH RBC QN AUTO: 29.5 PG (ref 26.8–34.3)
MCHC RBC AUTO-ENTMCNC: 31.8 G/DL (ref 31.4–37.4)
MCV RBC AUTO: 93 FL (ref 82–98)
MONOCYTES # BLD AUTO: 0.65 THOUSAND/ÂΜL (ref 0.17–1.22)
MONOCYTES NFR BLD AUTO: 5 % (ref 4–12)
NEUTROPHILS # BLD AUTO: 11.13 THOUSANDS/ÂΜL (ref 1.85–7.62)
NEUTS SEG NFR BLD AUTO: 80 % (ref 43–75)
NRBC BLD AUTO-RTO: 0 /100 WBCS
PLATELET # BLD AUTO: 435 THOUSANDS/UL (ref 149–390)
PMV BLD AUTO: 8.3 FL (ref 8.9–12.7)
PROCALCITONIN SERPL-MCNC: 0.07 NG/ML
RBC # BLD AUTO: 4.3 MILLION/UL (ref 3.88–5.62)
WBC # BLD AUTO: 13.89 THOUSAND/UL (ref 4.31–10.16)

## 2024-03-07 PROCEDURE — 94668 MNPJ CHEST WALL SBSQ: CPT

## 2024-03-07 PROCEDURE — 82948 REAGENT STRIP/BLOOD GLUCOSE: CPT

## 2024-03-07 PROCEDURE — 87040 BLOOD CULTURE FOR BACTERIA: CPT

## 2024-03-07 PROCEDURE — 99232 SBSQ HOSP IP/OBS MODERATE 35: CPT | Performed by: INTERNAL MEDICINE

## 2024-03-07 PROCEDURE — 85025 COMPLETE CBC W/AUTO DIFF WBC: CPT

## 2024-03-07 PROCEDURE — 99232 SBSQ HOSP IP/OBS MODERATE 35: CPT | Performed by: STUDENT IN AN ORGANIZED HEALTH CARE EDUCATION/TRAINING PROGRAM

## 2024-03-07 PROCEDURE — 94760 N-INVAS EAR/PLS OXIMETRY 1: CPT

## 2024-03-07 PROCEDURE — 94664 DEMO&/EVAL PT USE INHALER: CPT

## 2024-03-07 PROCEDURE — 84145 PROCALCITONIN (PCT): CPT

## 2024-03-07 PROCEDURE — 94640 AIRWAY INHALATION TREATMENT: CPT

## 2024-03-07 RX ADMIN — MIDODRINE HYDROCHLORIDE 5 MG: 5 TABLET ORAL at 17:15

## 2024-03-07 RX ADMIN — MIDODRINE HYDROCHLORIDE 5 MG: 5 TABLET ORAL at 01:08

## 2024-03-07 RX ADMIN — LEVALBUTEROL HYDROCHLORIDE 1.25 MG: 1.25 SOLUTION RESPIRATORY (INHALATION) at 19:17

## 2024-03-07 RX ADMIN — HYDROCORTISONE: 1 CREAM TOPICAL at 17:15

## 2024-03-07 RX ADMIN — LORATADINE 5 MG: 10 TABLET ORAL at 08:34

## 2024-03-07 RX ADMIN — MAGNESIUM OXIDE TAB 400 MG (241.3 MG ELEMENTAL MG) 400 MG: 400 (241.3 MG) TAB at 08:34

## 2024-03-07 RX ADMIN — INSULIN LISPRO 1 UNITS: 100 INJECTION, SOLUTION INTRAVENOUS; SUBCUTANEOUS at 17:15

## 2024-03-07 RX ADMIN — FERRIC OXIDE RED: 8; 8 LOTION TOPICAL at 08:36

## 2024-03-07 RX ADMIN — INSULIN LISPRO 1 UNITS: 100 INJECTION, SOLUTION INTRAVENOUS; SUBCUTANEOUS at 21:18

## 2024-03-07 RX ADMIN — ACETAMINOPHEN 650 MG: 325 TABLET, FILM COATED ORAL at 06:41

## 2024-03-07 RX ADMIN — LEVALBUTEROL HYDROCHLORIDE 1.25 MG: 1.25 SOLUTION RESPIRATORY (INHALATION) at 08:54

## 2024-03-07 RX ADMIN — SODIUM CHLORIDE SOLN NEBU 3% 4 ML: 3 NEBU SOLN at 19:17

## 2024-03-07 RX ADMIN — MIDODRINE HYDROCHLORIDE 5 MG: 5 TABLET ORAL at 08:34

## 2024-03-07 RX ADMIN — LEVALBUTEROL HYDROCHLORIDE 1.25 MG: 1.25 SOLUTION RESPIRATORY (INHALATION) at 13:31

## 2024-03-07 RX ADMIN — ENOXAPARIN SODIUM 40 MG: 40 INJECTION SUBCUTANEOUS at 17:15

## 2024-03-07 RX ADMIN — ACETAMINOPHEN 650 MG: 325 TABLET, FILM COATED ORAL at 01:08

## 2024-03-07 RX ADMIN — SODIUM CHLORIDE SOLN NEBU 3% 4 ML: 3 NEBU SOLN at 13:31

## 2024-03-07 RX ADMIN — FERRIC OXIDE RED: 8; 8 LOTION TOPICAL at 17:15

## 2024-03-07 RX ADMIN — PAROXETINE 10 MG: 10 TABLET, FILM COATED ORAL at 08:34

## 2024-03-07 RX ADMIN — HYDROCORTISONE: 1 CREAM TOPICAL at 08:36

## 2024-03-07 RX ADMIN — PREDNISONE 40 MG: 20 TABLET ORAL at 08:35

## 2024-03-07 RX ADMIN — TAMSULOSIN HYDROCHLORIDE 0.4 MG: 0.4 CAPSULE ORAL at 17:15

## 2024-03-07 RX ADMIN — FERRIC OXIDE RED: 8; 8 LOTION TOPICAL at 11:55

## 2024-03-07 RX ADMIN — SODIUM CHLORIDE SOLN NEBU 3% 4 ML: 3 NEBU SOLN at 08:54

## 2024-03-07 NOTE — PLAN OF CARE
Problem: PAIN - ADULT  Goal: Verbalizes/displays adequate comfort level or baseline comfort level  Description: Interventions:  - Encourage patient to monitor pain and request assistance  - Assess pain using appropriate pain scale  - Administer analgesics based on type and severity of pain and evaluate response  - Implement non-pharmacological measures as appropriate and evaluate response  - Consider cultural and social influences on pain and pain management  - Notify physician/advanced practitioner if interventions unsuccessful or patient reports new pain  Outcome: Progressing     Problem: INFECTION - ADULT  Goal: Absence or prevention of progression during hospitalization  Description: INTERVENTIONS:  - Assess and monitor for signs and symptoms of infection  - Monitor lab/diagnostic results  - Monitor all insertion sites, i.e. indwelling lines, tubes, and drains  - Monitor endotracheal if appropriate and nasal secretions for changes in amount and color  - [place] appropriate cooling/warming therapies per order  - Administer medications as ordered  - Instruct and encourage patient and family to use good hand hygiene technique  - Identify and instruct in appropriate isolation precautions for identified infection/condition  Outcome: Progressing

## 2024-03-07 NOTE — PROGRESS NOTES
"Progress Note - Pulmonary   Hussein Edward III 58 y.o. male MRN: [id number]  Unit/Bed#: [facility] 829-01 Encounter: [id number]    Assessment:    Acute hypoxic respiratory failure- required ETT and MV from 02/19-02/29  ARDS 2/2 influenza A and suspected bacterial superinfection  WALE on CPAP  Afib    Plan:    Down to 2L NC today with O2 sat 97%  Continue hypertonic saline nebs with xopenex  Continue flutter valve, incentive spirometry  OOB, PT/OT  CPAP at bedtime  Repeat CXR PA and Lat in 4 weeks    Chief Complaint:   \"I feel better today\"    Subjective:   Patient seen and examined bedside. No acute complaints. Feels as though his lungs are \"clearing up\"    Objective:     Vitals: Blood pressure 102/69, pulse 88, temperature 98.6 °F (37 °C), resp. rate 16, height 5' 5\" (1.651 m), weight 81.8 kg (180 lb 5.4 oz), SpO2 97%.,Body mass index is 30.01 kg/m².      Intake/Output Summary (Last 24 hours) at 3/7/2024 1303  Last data filed at 3/7/2024 0830  Gross per 24 hour   Intake 600 ml   Output 600 ml   Net 0 ml       Invasive Devices       Peripheral Intravenous Line  Duration             Peripheral IV 03/05/24 Left;Ventral (anterior) Hand 2 days                    Physical Exam: /69   Pulse 88   Temp 98.6 °F (37 °C)   Resp 16   Ht 5' 5\" (1.651 m)   Wt 81.8 kg (180 lb 5.4 oz)   SpO2 97%   BMI 30.01 kg/m²   General appearance: alert and oriented, in no acute distress  Lungs:  bilateral rhonchi, no wheezing  Heart: regular rate and rhythm, S1, S2 normal, no murmur, click, rub or gallop  Abdomen: soft, non-tender; bowel sounds normal; no masses,  no organomegaly  Extremities: extremities normal, warm and well-perfused; no cyanosis, clubbing, or edema  Skin: Skin color, texture, turgor normal. No rashes or lesions     Labs: I have personally reviewed pertinent lab results.  Imaging and other studies: I have personally reviewed pertinent reports.   and I have personally reviewed pertinent films in PACS    "

## 2024-03-07 NOTE — PROGRESS NOTES
[facility]  Progress Note  Name: Hussein Edward III I  MRN: [id number]  Unit/Bed#: [facility] 829-01 I Date of Admission: 2/19/2024   Date of Service: 3/7/2024 I Hospital Day: 17    Assessment/Plan   * Acute respiratory failure with hypoxia (HCC)  Assessment & Plan  Secondary to influenza A with suspected bacterial superinfection  TTE with normal systolic function and grade 1 diastolic dysfunction  Requiring ETT and MD from 2/19-2/29  S/p bronchoscopy on 2/19 with cultures primarily groin Candida glabrata, no bacteria  Blood cultures with no growth to date  While in the ICU patient require multiple doses of IV Lasix and ultimately was placed on a Lasix drip until net negative fluid balance was achieved  Completed 5 days of ceftriaxone azithromycin, followed by 7 days of cefepime for fever thought to be pulmonary source   Completed steroid course  Continue to wean oxygen as tolerated  Continue incentive spirometer and flutter valve  Continue hypertonic saline and chest vest PT for airway clearance  CXR 3/6 No significant improvement of pulmonary infiltrates presumably representing bilateral pneumonia  Continue CPAP at bedtime  Pulmonology recommending outpatient CT chest in 4 weeks to ensure resolution    Ambulatory dysfunction  Assessment & Plan  Evaluated by PT/OT--recommended rehab at discharge  ARC following  Fall precautions  Ambulate patient    Oropharyngeal dysphagia  Assessment & Plan  Mild  Evaluated by speech, last on 3/4.  Messaged for reevaluation  Continue modified Level 1/ puree w/ honey thick liquids diet  Aspiration precautions    ARDS (adult respiratory distress syndrome) (HCC)  Assessment & Plan  Secondary to influenza A and suspected bacterial superinfection  Requiring supplemental O2, wean as tolerated  Pulmonology following    Anxiety  Assessment & Plan  Ativan as needed    Insomnia  Assessment & Plan  PTA quviviq 25 mg at bedtime, not on formulary  Continue  melatonin as needed    Influenza A  Assessment & Plan  Completed Tamiflu course     Chronic kidney disease  Assessment & Plan  Lab Results   Component Value Date    EGFR 107 03/05/2024    EGFR 110 03/04/2024    EGFR 104 03/03/2024    CREATININE 0.67 03/05/2024    CREATININE 0.62 03/04/2024    CREATININE 0.72 03/03/2024   At baseline  Continue to monitor every other day  Avoid nephrotoxins    WALE (obstructive sleep apnea)  Assessment & Plan  Continue CPAP               VTE Pharmacologic Prophylaxis: VTE Score: 4 Moderate Risk (Score 3-4) - Pharmacological DVT Prophylaxis Ordered: enoxaparin (Lovenox).    Mobility:   Basic Mobility Inpatient Raw Score: 11  JH-HLM Goal: 4: Move to chair/commode  JH-HLM Achieved: 4: Move to chair/commode  HLM Goal achieved. Continue to encourage appropriate mobility.    Patient Centered Rounds: I performed bedside rounds with nursing staff today.   Discussions with Specialists or Other Care Team Provider:     Education and Discussions with Family / Patient: Attempted to update  (wife) via phone. Left voicemail.     Total Time Spent on Date of Encounter in care of patient: 35 mins. This time was spent on one or more of the following: performing physical exam; counseling and coordination of care; obtaining or reviewing history; documenting in the medical record; reviewing/ordering tests, medications or procedures; communicating with other healthcare professionals and discussing with patient's family/caregivers.    Current Length of Stay: 17 day(s)  Current Patient Status: Inpatient   Certification Statement: The patient will continue to require additional inpatient hospital stay due to improvement of oxygen sat, dispo planning  Discharge Plan: Anticipate discharge in 48-72 hrs to rehab facility.    Code Status: Level 1 - Full Code    Subjective:   No events overnight. Patient continues to reports cough but sob has improved.     Objective:     Vitals:   Temp  (24hrs), Av.4 °F (36.9 °C), Min:98 °F (36.7 °C), Max:98.6 °F (37 °C)    Temp:  [98 °F (36.7 °C)-98.6 °F (37 °C)] 98.6 °F (37 °C)  HR:  [83-92] 88  Resp:  [16-22] 16  BP: (102-118)/(68-69) 102/69  SpO2:  [93 %-99 %] 97 %  Body mass index is 30.01 kg/m².     Input and Output Summary (last 24 hours):     Intake/Output Summary (Last 24 hours) at 3/7/2024 1539  Last data filed at 3/7/2024 1200  Gross per 24 hour   Intake 1230 ml   Output 600 ml   Net 630 ml       Physical Exam:   Physical Exam  Vitals and nursing note reviewed.   Constitutional:       General: He is not in acute distress.     Appearance: He is well-developed.   HENT:      Head: Normocephalic and atraumatic.   Eyes:      Conjunctiva/sclera: Conjunctivae normal.   Cardiovascular:      Rate and Rhythm: Normal rate and regular rhythm.   Pulmonary:      Effort: Pulmonary effort is normal. No respiratory distress.      Breath sounds: Rhonchi present. No wheezing or rales.   Musculoskeletal:         General: No swelling.      Cervical back: Neck supple.   Skin:     General: Skin is warm and dry.   Neurological:      Mental Status: He is alert.   Psychiatric:         Mood and Affect: Mood normal.         Behavior: Behavior normal.              Additional Data:     Labs:  Results from last 7 days   Lab Units 24  0457   WBC Thousand/uL 13.01*   HEMOGLOBIN g/dL 12.0   HEMATOCRIT % 38.7   PLATELETS Thousands/uL 492*   NEUTROS PCT % 84*   LYMPHS PCT % 9*   MONOS PCT % 3*   EOS PCT % 1     Results from last 7 days   Lab Units 24  0457 24  0610   SODIUM mmol/L 142 142   POTASSIUM mmol/L 4.4 4.5   CHLORIDE mmol/L 103 102   CO2 mmol/L 30 30   BUN mg/dL 33* 39*   CREATININE mg/dL 0.67 0.62   ANION GAP mmol/L 9 10   CALCIUM mg/dL 9.0 8.8   ALBUMIN g/dL  --  3.4*   TOTAL BILIRUBIN mg/dL  --  0.68   ALK PHOS U/L  --  90   ALT U/L  --  167*   AST U/L  --  69*   GLUCOSE RANDOM mg/dL 149* 134         Results from last 7 days   Lab Units 24  1136  03/07/24  0720 03/06/24  2103 03/06/24  1609 03/06/24  1125 03/06/24  0758 03/05/24  2229 03/05/24  1619 03/05/24  1108 03/05/24  0456 03/04/24  2349 03/04/24  1605   POC GLUCOSE mg/dl 121 89 158* 142* 102 83 211* 130 147* 156* 134 118         Results from last 7 days   Lab Units 03/05/24  0457 03/04/24  0610   PROCALCITONIN ng/ml 0.13 0.12       Lines/Drains:  Invasive Devices       Peripheral Intravenous Line  Duration             Peripheral IV 03/05/24 Left;Ventral (anterior) Hand 2 days                          Imaging: No pertinent imaging reviewed.    Recent Cultures (last 7 days):         Last 24 Hours Medication List:   Current Facility-Administered Medications   Medication Dose Route Frequency Provider Last Rate    acetaminophen  650 mg Oral Q6H PRN Samira Cano DO      albuterol  2 puff Inhalation Q4H PRN Kenny Menard DO      calamine-zinc oxide   Topical 4x Daily Dayne Garcia MD      enoxaparin  40 mg Subcutaneous Q24H YEHUDA Dayne Garcia MD      hydrocortisone   Topical BID Dayne Garcia MD      HYDROmorphone  1 mg Intravenous Q4H PRN Dayne Garcia MD      insulin lispro  1-6 Units Subcutaneous 4x Daily (AC & HS) Teresa Crabtree PA-C      levalbuterol  1.25 mg Nebulization TID Kenny Menard DO      loratadine  5 mg Oral Daily Dayne Garcia MD      LORazepam  0.5 mg Oral Q8H PRN Dayne Garcia MD      magnesium Oxide  400 mg Oral Daily Dayne Garcia MD      melatonin  3 mg Oral HS PRN Teresa Crabtree PA-C      midodrine  5 mg Oral Q8H Dayne Garcia MD      oxyCODONE  5 mg Oral Q6H PRN Dayne Garcia MD      PARoxetine  10 mg Oral Daily Dayne Garcia MD      polyethylene glycol  17 g Per NG Tube BID Dayne Garcia MD      sodium chloride  1 spray Each Nare Q1H PRN Dayne Garcia MD      sodium chloride  4 mL Nebulization TID Kirby Alberts MD      tamsulosin  0.4 mg Oral Daily With  Aniceto Garcia MD          Today, Patient Was Seen By: Imani Lyles MD    **Please Note: This note may have been constructed using a voice recognition system.**

## 2024-03-07 NOTE — RESTORATIVE TECHNICIAN NOTE
Restorative Technician Note      Patient Name: Hussein Edward III     Restorative Tech Visit Date: 03/07/24  Note Type: Mobility  Patient Position Upon Consult: Bedside chair  Education Provided: Yes  Patient Position at End of Consult: Supine; All needs within reach; Bed/Chair alarm activated    Pt in deep sleep; able to wake him and requested I come back in afternoon; will follow up; RN aware and will reach out once pt is ready to participate    Praveena Cunningham  DPT, Restorative Technician

## 2024-03-07 NOTE — ASSESSMENT & PLAN NOTE
Secondary to influenza A with suspected bacterial superinfection  TTE with normal systolic function and grade 1 diastolic dysfunction  Requiring ETT and MD from 2/19-2/29  S/p bronchoscopy on 2/19 with cultures primarily groin Candida glabrata, no bacteria  Blood cultures with no growth to date  While in the ICU patient require multiple doses of IV Lasix and ultimately was placed on a Lasix drip until net negative fluid balance was achieved  Completed 5 days of ceftriaxone azithromycin, followed by 7 days of cefepime for fever thought to be pulmonary source   Completed steroid course  Continue to wean oxygen as tolerated  Continue incentive spirometer and flutter valve  Continue hypertonic saline and chest vest PT for airway clearance  CXR 3/6 No significant improvement of pulmonary infiltrates presumably representing bilateral pneumonia  Continue CPAP at bedtime  Pulmonology recommending outpatient CT chest in 4 weeks to ensure resolution

## 2024-03-07 NOTE — ASSESSMENT & PLAN NOTE
Mild  Evaluated by speech, last on 3/4.  Messaged for reevaluation  Continue modified Level 1/ puree w/ honey thick liquids diet  Aspiration precautions

## 2024-03-08 ENCOUNTER — APPOINTMENT (INPATIENT)
Dept: RADIOLOGY | Facility: HOSPITAL | Age: 59
DRG: 870 | End: 2024-03-08
Attending: INTERNAL MEDICINE
Payer: COMMERCIAL

## 2024-03-08 ENCOUNTER — APPOINTMENT (INPATIENT)
Dept: RADIOLOGY | Facility: HOSPITAL | Age: 59
DRG: 870 | End: 2024-03-08
Attending: STUDENT IN AN ORGANIZED HEALTH CARE EDUCATION/TRAINING PROGRAM
Payer: COMMERCIAL

## 2024-03-08 LAB
GLUCOSE SERPL-MCNC: 107 MG/DL (ref 65–140)
GLUCOSE SERPL-MCNC: 123 MG/DL (ref 65–140)
GLUCOSE SERPL-MCNC: 53 MG/DL (ref 65–140)
GLUCOSE SERPL-MCNC: 77 MG/DL (ref 65–140)
GLUCOSE SERPL-MCNC: 98 MG/DL (ref 65–140)
LACTATE SERPL-SCNC: 1 MMOL/L (ref 0.5–2)

## 2024-03-08 PROCEDURE — 71046 X-RAY EXAM CHEST 2 VIEWS: CPT

## 2024-03-08 PROCEDURE — 94760 N-INVAS EAR/PLS OXIMETRY 1: CPT

## 2024-03-08 PROCEDURE — 82948 REAGENT STRIP/BLOOD GLUCOSE: CPT

## 2024-03-08 PROCEDURE — 94664 DEMO&/EVAL PT USE INHALER: CPT

## 2024-03-08 PROCEDURE — 99232 SBSQ HOSP IP/OBS MODERATE 35: CPT | Performed by: STUDENT IN AN ORGANIZED HEALTH CARE EDUCATION/TRAINING PROGRAM

## 2024-03-08 PROCEDURE — 83605 ASSAY OF LACTIC ACID: CPT

## 2024-03-08 PROCEDURE — 74230 X-RAY XM SWLNG FUNCJ C+: CPT

## 2024-03-08 PROCEDURE — 92611 MOTION FLUOROSCOPY/SWALLOW: CPT

## 2024-03-08 PROCEDURE — 99232 SBSQ HOSP IP/OBS MODERATE 35: CPT | Performed by: INTERNAL MEDICINE

## 2024-03-08 PROCEDURE — 94640 AIRWAY INHALATION TREATMENT: CPT

## 2024-03-08 PROCEDURE — 92526 ORAL FUNCTION THERAPY: CPT

## 2024-03-08 RX ADMIN — FERRIC OXIDE RED: 8; 8 LOTION TOPICAL at 10:01

## 2024-03-08 RX ADMIN — PAROXETINE 10 MG: 10 TABLET, FILM COATED ORAL at 10:05

## 2024-03-08 RX ADMIN — SODIUM CHLORIDE SOLN NEBU 3% 4 ML: 3 NEBU SOLN at 19:42

## 2024-03-08 RX ADMIN — MAGNESIUM OXIDE TAB 400 MG (241.3 MG ELEMENTAL MG) 400 MG: 400 (241.3 MG) TAB at 10:05

## 2024-03-08 RX ADMIN — LORATADINE 5 MG: 10 TABLET ORAL at 10:05

## 2024-03-08 RX ADMIN — MIDODRINE HYDROCHLORIDE 5 MG: 5 TABLET ORAL at 23:55

## 2024-03-08 RX ADMIN — ENOXAPARIN SODIUM 40 MG: 40 INJECTION SUBCUTANEOUS at 17:25

## 2024-03-08 RX ADMIN — LEVALBUTEROL HYDROCHLORIDE 1.25 MG: 1.25 SOLUTION RESPIRATORY (INHALATION) at 19:42

## 2024-03-08 RX ADMIN — LEVALBUTEROL HYDROCHLORIDE 1.25 MG: 1.25 SOLUTION RESPIRATORY (INHALATION) at 13:10

## 2024-03-08 RX ADMIN — TAMSULOSIN HYDROCHLORIDE 0.4 MG: 0.4 CAPSULE ORAL at 17:25

## 2024-03-08 RX ADMIN — SODIUM CHLORIDE SOLN NEBU 3% 4 ML: 3 NEBU SOLN at 07:24

## 2024-03-08 RX ADMIN — LEVALBUTEROL HYDROCHLORIDE 1.25 MG: 1.25 SOLUTION RESPIRATORY (INHALATION) at 07:24

## 2024-03-08 RX ADMIN — MIDODRINE HYDROCHLORIDE 5 MG: 5 TABLET ORAL at 00:36

## 2024-03-08 RX ADMIN — HYDROCORTISONE: 1 CREAM TOPICAL at 10:02

## 2024-03-08 RX ADMIN — SODIUM CHLORIDE SOLN NEBU 3% 4 ML: 3 NEBU SOLN at 13:10

## 2024-03-08 RX ADMIN — FERRIC OXIDE RED: 8; 8 LOTION TOPICAL at 17:25

## 2024-03-08 RX ADMIN — MIDODRINE HYDROCHLORIDE 5 MG: 5 TABLET ORAL at 10:05

## 2024-03-08 RX ADMIN — MIDODRINE HYDROCHLORIDE 5 MG: 5 TABLET ORAL at 17:25

## 2024-03-08 RX ADMIN — HYDROCORTISONE: 1 CREAM TOPICAL at 17:25

## 2024-03-08 NOTE — PLAN OF CARE
Problem: Prexisting or High Potential for Compromised Skin Integrity  Goal: Skin integrity is maintained or improved  Description: INTERVENTIONS:  - Identify patients at risk for skin breakdown  - Assess and monitor skin integrity  - Assess and monitor nutrition and hydration status  - Monitor labs   - Assess for incontinence   - Turn and reposition patient  - Assist with mobility/ambulation  - Relieve pressure over bony prominences  - Avoid friction and shearing  - Provide appropriate hygiene as needed including keeping skin clean and dry  - Evaluate need for skin moisturizer/barrier cream  - Collaborate with interdisciplinary team   - Patient/family teaching  - Consider wound care consult   Outcome: Progressing     Problem: PAIN - ADULT  Goal: Verbalizes/displays adequate comfort level or baseline comfort level  Description: Interventions:  - Encourage patient to monitor pain and request assistance  - Assess pain using appropriate pain scale  - Administer analgesics based on type and severity of pain and evaluate response  - Implement non-pharmacological measures as appropriate and evaluate response  - Consider cultural and social influences on pain and pain management  - Notify physician/advanced practitioner if interventions unsuccessful or patient reports new pain  Outcome: Progressing     Problem: INFECTION - ADULT  Goal: Absence or prevention of progression during hospitalization  Description: INTERVENTIONS:  - Assess and monitor for signs and symptoms of infection  - Monitor lab/diagnostic results  - Monitor all insertion sites, i.e. indwelling lines, tubes, and drains  - Monitor endotracheal if appropriate and nasal secretions for changes in amount and color  - [place] appropriate cooling/warming therapies per order  - Administer medications as ordered  - Instruct and encourage patient and family to use good hand hygiene technique  - Identify and instruct in appropriate isolation precautions for  identified infection/condition  Outcome: Progressing     Problem: SAFETY ADULT  Goal: Patient will remain free of falls  Description: INTERVENTIONS:  - Educate patient/family on patient safety including physical limitations  - Instruct patient to call for assistance with activity   - Consult OT/PT to assist with strengthening/mobility   - Keep Call bell within reach  - Keep bed low and locked with side rails adjusted as appropriate  - Keep care items and personal belongings within reach  - Initiate and maintain comfort rounds  - Make Fall Risk Sign visible to staff  - Offer Toileting every 4 Hours, in advance of need  - Initiate/Maintain bed alarm  - Obtain necessary fall risk management equipment:   - Apply yellow socks and bracelet for high fall risk patients  - Consider moving patient to room near nurses station  Outcome: Progressing  Goal: Maintain or return to baseline ADL function  Description: INTERVENTIONS:  -  Assess patient's ability to carry out ADLs; assess patient's baseline for ADL function and identify physical deficits which impact ability to perform ADLs (bathing, care of mouth/teeth, toileting, grooming, dressing, etc.)  - Assess/evaluate cause of self-care deficits   - Assess range of motion  - Assess patient's mobility; develop plan if impaired  - Assess patient's need for assistive devices and provide as appropriate  - Encourage maximum independence but intervene and supervise when necessary  - Involve family in performance of ADLs  - Assess for home care needs following discharge   - Consider OT consult to assist with ADL evaluation and planning for discharge  - Provide patient education as appropriate  Outcome: Progressing  Goal: Maintains/Returns to pre admission functional level  Description: INTERVENTIONS:  - Perform AM-PAC 6 Click Basic Mobility/ Daily Activity assessment daily.  - Set and communicate daily mobility goal to care team and patient/family/caregiver.   - Collaborate with  rehabilitation services on mobility goals if consulted  - Perform Range of Motion 3 times a day.  - Reposition patient every 3 hours.  - Dangle patient 3 times a day  - Stand patient 3 times a day  - Ambulate patient 3 times a day  - Out of bed to chair 3 times a day   - Out of bed for meals 3 times a day  - Out of bed for toileting  - Record patient progress and toleration of activity level   Outcome: Progressing     Problem: DISCHARGE PLANNING  Goal: Discharge to home or other facility with appropriate resources  Description: INTERVENTIONS:  - Identify barriers to discharge w/patient and caregiver  - Arrange for needed discharge resources and transportation as appropriate  - Identify discharge learning needs (meds, wound care, etc.)  - Arrange for interpretive services to assist at discharge as needed  - Refer to Case Management Department for coordinating discharge planning if the patient needs post-hospital services based on physician/advanced practitioner order or complex needs related to functional status, cognitive ability, or social support system  Outcome: Progressing     Problem: Knowledge Deficit  Goal: Patient/family/caregiver demonstrates understanding of disease process, treatment plan, medications, and discharge instructions  Description: Complete learning assessment and assess knowledge base.  Interventions:  - Provide teaching at level of understanding  - Provide teaching via preferred learning methods  Outcome: Progressing     Problem: Nutrition/Hydration-ADULT  Goal: Nutrient/Hydration intake appropriate for improving, restoring or maintaining nutritional needs  Description: Monitor and assess patient's nutrition/hydration status for malnutrition. Collaborate with interdisciplinary team and initiate plan and interventions as ordered.  Monitor patient's weight and dietary intake as ordered or per policy. Utilize nutrition screening tool and intervene as necessary. Determine patient's food  preferences and provide high-protein, high-caloric foods as appropriate.     INTERVENTIONS:  - Monitor oral intake, urinary output, labs, and treatment plans  - Assess nutrition and hydration status and recommend course of action  - Evaluate amount of meals eaten  - Assist patient with eating if necessary   - Allow adequate time for meals  - Recommend/ encourage appropriate diets, oral nutritional supplements, and vitamin/mineral supplements  - Order, calculate, and assess calorie counts as needed  - Recommend, monitor, and adjust tube feedings and TPN/PPN based on assessed needs  - Assess need for intravenous fluids  - Provide specific nutrition/hydration education as appropriate  - Include patient/family/caregiver in decisions related to nutrition  Outcome: Progressing     Problem: CARDIOVASCULAR - ADULT  Goal: Maintains optimal cardiac output and hemodynamic stability  Description: INTERVENTIONS:  - Monitor I/O, vital signs and rhythm  - Monitor for S/S and trends of decreased cardiac output  - Administer and titrate ordered vasoactive medications to optimize hemodynamic stability  - Assess quality of pulses, skin color and temperature  - Assess for signs of decreased coronary artery perfusion  - Instruct patient to report change in severity of symptoms  Outcome: Progressing  Goal: Absence of cardiac dysrhythmias or at baseline rhythm  Description: INTERVENTIONS:  - Continuous cardiac monitoring, vital signs, obtain 12 lead EKG if ordered  - Administer antiarrhythmic and heart rate control medications as ordered  - Monitor electrolytes and administer replacement therapy as ordered  Outcome: Progressing     Problem: RESPIRATORY - ADULT  Goal: Achieves optimal ventilation and oxygenation  Description: INTERVENTIONS:  - Assess for changes in respiratory status  - Assess for changes in mentation and behavior  - Position to facilitate oxygenation and minimize respiratory effort  - Oxygen administered by appropriate  delivery if ordered  - Initiate smoking cessation education as indicated  - Encourage broncho-pulmonary hygiene including cough, deep breathe, Incentive Spirometry  - Assess the need for suctioning and aspirate as needed  - Assess and instruct to report SOB or any respiratory difficulty  - Respiratory Therapy support as indicated  Outcome: Progressing     Problem: METABOLIC, FLUID AND ELECTROLYTES - ADULT  Goal: Electrolytes maintained within normal limits  Description: INTERVENTIONS:  - Monitor labs and assess patient for signs and symptoms of electrolyte imbalances  - Administer electrolyte replacement as ordered  - Monitor response to electrolyte replacements, including repeat lab results as appropriate  - Instruct patient on fluid and nutrition as appropriate  Outcome: Progressing  Goal: Fluid balance maintained  Description: INTERVENTIONS:  - Monitor labs   - Monitor I/O and WT  - Instruct patient on fluid and nutrition as appropriate  - Assess for signs & symptoms of volume excess or deficit  Outcome: Progressing

## 2024-03-08 NOTE — PLAN OF CARE
Problem: Prexisting or High Potential for Compromised Skin Integrity  Goal: Skin integrity is maintained or improved  Description: INTERVENTIONS:  - Identify patients at risk for skin breakdown  - Assess and monitor skin integrity  - Assess and monitor nutrition and hydration status  - Monitor labs   - Assess for incontinence   - Turn and reposition patient  - Assist with mobility/ambulation  - Relieve pressure over bony prominences  - Avoid friction and shearing  - Provide appropriate hygiene as needed including keeping skin clean and dry  - Evaluate need for skin moisturizer/barrier cream  - Collaborate with interdisciplinary team   - Patient/family teaching  - Consider wound care consult   Outcome: Progressing     Problem: PAIN - ADULT  Goal: Verbalizes/displays adequate comfort level or baseline comfort level  Description: Interventions:  - Encourage patient to monitor pain and request assistance  - Assess pain using appropriate pain scale  - Administer analgesics based on type and severity of pain and evaluate response  - Implement non-pharmacological measures as appropriate and evaluate response  - Consider cultural and social influences on pain and pain management  - Notify physician/advanced practitioner if interventions unsuccessful or patient reports new pain  Outcome: Progressing     Problem: INFECTION - ADULT  Goal: Absence or prevention of progression during hospitalization  Description: INTERVENTIONS:  - Assess and monitor for signs and symptoms of infection  - Monitor lab/diagnostic results  - Monitor all insertion sites, i.e. indwelling lines, tubes, and drains  - Monitor endotracheal if appropriate and nasal secretions for changes in amount and color  - [place] appropriate cooling/warming therapies per order  - Administer medications as ordered  - Instruct and encourage patient and family to use good hand hygiene technique  - Identify and instruct in appropriate isolation precautions for  identified infection/condition  Outcome: Progressing     Problem: SAFETY ADULT  Goal: Patient will remain free of falls  Description: INTERVENTIONS:  - Educate patient/family on patient safety including physical limitations  - Instruct patient to call for assistance with activity   - Consult OT/PT to assist with strengthening/mobility   - Keep Call bell within reach  - Keep bed low and locked with side rails adjusted as appropriate  - Keep care items and personal belongings within reach  - Initiate and maintain comfort rounds  - Make Fall Risk Sign visible to staff  - Offer Toileting every 2 Hours, in advance of need  - I  - Obtain necessary fall risk management equipment: walker  - Apply yellow socks and bracelet for high fall risk patients  - Consider moving patient to room near nurses station  Outcome: Progressing  Goal: Maintain or return to baseline ADL function  Description: INTERVENTIONS:  -  Assess patient's ability to carry out ADLs; assess patient's baseline for ADL function and identify physical deficits which impact ability to perform ADLs (bathing, care of mouth/teeth, toileting, grooming, dressing, etc.)  - Assess/evaluate cause of self-care deficits   - Assess range of motion  - Assess patient's mobility; develop plan if impaired  - Assess patient's need for assistive devices and provide as appropriate  - Encourage maximum independence but intervene and supervise when necessary  - Involve family in performance of ADLs  - Assess for home care needs following discharge   - Consider OT consult to assist with ADL evaluation and planning for discharge  - Provide patient education as appropriate  Outcome: Progressing  Goal: Maintains/Returns to pre admission functional level  Description: INTERVENTIONS:  - Perform AM-PAC 6 Click Basic Mobility/ Daily Activity assessment daily.  - Set and communicate daily mobility goal to care team and patient/family/caregiver.   - Collaborate with rehabilitation services  on mobility goals if consulted  - Perform Range of Motion 3 times a day.  - Reposition patient every 2 hours.  - Dangle patient 3 times a day  - Stand patient 3 times a day  - Ambulate patient 3 times a day  - Out of bed to chair 3 times a day   - Out of bed for meals 3 times a day  - Out of bed for toileting  - Record patient progress and toleration of activity level   Outcome: Progressing     Problem: DISCHARGE PLANNING  Goal: Discharge to home or other facility with appropriate resources  Description: INTERVENTIONS:  - Identify barriers to discharge w/patient and caregiver  - Arrange for needed discharge resources and transportation as appropriate  - Identify discharge learning needs (meds, wound care, etc.)  - Arrange for interpretive services to assist at discharge as needed  - Refer to Case Management Department for coordinating discharge planning if the patient needs post-hospital services based on physician/advanced practitioner order or complex needs related to functional status, cognitive ability, or social support system  Outcome: Progressing     Problem: Knowledge Deficit  Goal: Patient/family/caregiver demonstrates understanding of disease process, treatment plan, medications, and discharge instructions  Description: Complete learning assessment and assess knowledge base.  Interventions:  - Provide teaching at level of understanding  - Provide teaching via preferred learning methods  Outcome: Progressing     Problem: Nutrition/Hydration-ADULT  Goal: Nutrient/Hydration intake appropriate for improving, restoring or maintaining nutritional needs  Description: Monitor and assess patient's nutrition/hydration status for malnutrition. Collaborate with interdisciplinary team and initiate plan and interventions as ordered.  Monitor patient's weight and dietary intake as ordered or per policy. Utilize nutrition screening tool and intervene as necessary. Determine patient's food preferences and provide  high-protein, high-caloric foods as appropriate.     INTERVENTIONS:  - Monitor oral intake, urinary output, labs, and treatment plans  - Assess nutrition and hydration status and recommend course of action  - Evaluate amount of meals eaten  - Assist patient with eating if necessary   - Allow adequate time for meals  - Recommend/ encourage appropriate diets, oral nutritional supplements, and vitamin/mineral supplements  - Order, calculate, and assess calorie counts as needed  - Recommend, monitor, and adjust tube feedings and TPN/PPN based on assessed needs  - Assess need for intravenous fluids  - Provide specific nutrition/hydration education as appropriate  - Include patient/family/caregiver in decisions related to nutrition  Outcome: Progressing     Problem: CARDIOVASCULAR - ADULT  Goal: Maintains optimal cardiac output and hemodynamic stability  Description: INTERVENTIONS:  - Monitor I/O, vital signs and rhythm  - Monitor for S/S and trends of decreased cardiac output  - Administer and titrate ordered vasoactive medications to optimize hemodynamic stability  - Assess quality of pulses, skin color and temperature  - Assess for signs of decreased coronary artery perfusion  - Instruct patient to report change in severity of symptoms  Outcome: Progressing  Goal: Absence of cardiac dysrhythmias or at baseline rhythm  Description: INTERVENTIONS:  - Continuous cardiac monitoring, vital signs, obtain 12 lead EKG if ordered  - Administer antiarrhythmic and heart rate control medications as ordered  - Monitor electrolytes and administer replacement therapy as ordered  Outcome: Progressing     Problem: RESPIRATORY - ADULT  Goal: Achieves optimal ventilation and oxygenation  Description: INTERVENTIONS:  - Assess for changes in respiratory status  - Assess for changes in mentation and behavior  - Position to facilitate oxygenation and minimize respiratory effort  - Oxygen administered by appropriate delivery if ordered  -  Initiate smoking cessation education as indicated  - Encourage broncho-pulmonary hygiene including cough, deep breathe, Incentive Spirometry  - Assess the need for suctioning and aspirate as needed  - Assess and instruct to report SOB or any respiratory difficulty  - Respiratory Therapy support as indicated  Outcome: Progressing     Problem: METABOLIC, FLUID AND ELECTROLYTES - ADULT  Goal: Electrolytes maintained within normal limits  Description: INTERVENTIONS:  - Monitor labs and assess patient for signs and symptoms of electrolyte imbalances  - Administer electrolyte replacement as ordered  - Monitor response to electrolyte replacements, including repeat lab results as appropriate  - Instruct patient on fluid and nutrition as appropriate  Outcome: Progressing  Goal: Fluid balance maintained  Description: INTERVENTIONS:  - Monitor labs   - Monitor I/O and WT  - Instruct patient on fluid and nutrition as appropriate  - Assess for signs & symptoms of volume excess or deficit  Outcome: Progressing

## 2024-03-08 NOTE — ASSESSMENT & PLAN NOTE
Secondary to influenza A with suspected bacterial superinfection  TTE with normal systolic function and grade 1 diastolic dysfunction  Requiring ETT and MD from 2/19-2/29  S/p bronchoscopy on 2/19 with cultures primarily groin Candida glabrata, no bacteria  Blood cultures with no growth to date  While in the ICU patient require multiple doses of IV Lasix and ultimately was placed on a Lasix drip until net negative fluid balance was achieved  Completed 5 days of ceftriaxone azithromycin, followed by 7 days of cefepime for fever thought to be pulmonary source   Completed steroid course  Continue to wean oxygen as tolerated  Continue IS, flutter valve, hypertonic saline and chest vest PT for airway clearance  CXR 3/6 No significant improvement of pulmonary infiltrates presumably representing bilateral pneumonia  Continue CPAP at bedtime  Pulmonology recommending outpatient CT chest in 4 weeks to ensure resolution

## 2024-03-08 NOTE — PROCEDURES
Speech Pathology - Modified Barium Swallow Study    Patient Name: Hussein Edward III    Today's Date: 3/8/2024     Problem List  Principal Problem:    Acute respiratory failure with hypoxia (HCC)  Active Problems:    WALE (obstructive sleep apnea)    Chronic kidney disease    Influenza A    Insomnia    Anxiety    ARDS (adult respiratory distress syndrome) (HCC)    Oropharyngeal dysphagia    Ambulatory dysfunction      Past Medical History  Past Medical History:   Diagnosis Date    Chronic back pain     Migraine        Past Surgical History  Past Surgical History:   Procedure Laterality Date    HERNIA REPAIR      MANDIBLE FRACTURE SURGERY      MOUTH SURGERY      cyst in cheek    NASAL SEPTUM SURGERY         Assessment Summary:    Pt presents with mild oropharyngeal dysphagia characterized primarily by slow prolonged mastication and delayed swallow initiation. Oral and pharyngeal stages of the swallow have improve significantly since previous MBS 03/01/24. No penetration or aspiration seen during examination. Overall, swallow function appears adequate for a regular diet w/ thin liquids.   Note: Images are available for review in PACS as desired.    Recommendations:   Recommended Diet: regular diet and thin liquids   Recommended Form of Medications: whole with puree   Aspiration precautions and compensatory swallowing strategies: upright posture, small bites/sips, and effortful swallow    General Information;  Pt is a 58 y.o. male with a PMH remarkable for meningioma, WALE, PNA and respiratory failure. Pt was intubated 2/19-2/29. Pt was seen for prior MBS 03/01 and was diagnosed w/ mild oropharyngeal dysphagia. Pt appears to have improved since previous evaluation, however, mildly weak voice remains. Silent aspiration seen on previous study. MBS was recommended to assess oropharyngeal stage swallowing skills at this time.     Prior MBS 03/01/24  Assessment Summary:    Pt presents with mild oropharyngeal dysphagia  characterized by reduced bolus manipulation and control with premature spillage of liquids, delayed swallow initiation, reduced airway closure, and reduced pharyngeal stripping wave/base of tongue retraction. Aspiration occurred with sequential swallows of thin and thin cookie wash, with inconsistent cough response. Penetration occurred with nectar thick liquids. No penetration or aspiration seen with puree or honey thick liquid. Pt became more lethargic towards the end of the evaluation but was able to maintain alertness with consistent cueing. Note: Images are available for review in PACS as desired.  Recommendations:   Recommended Diet: puree/level 1 diet and honey thick liquids now, ST will f/u to advance  Recommended Form of Medications: crushed with puree   Aspiration precautions and compensatory swallowing strategies: upright posture, only feed when fully alert, slow rate of feeding, and small bites/sips  SLP Dysphagia therapy recommended: yes      8-Point Penetration-Aspiration Scale   Thin liquid 8 - Material enters the airway, passes below the vocal folds, and no effort is made to eject     Nectar thick liquid 4 - Material enters the airway, contacts the vocal folds, and is ejected from the airway    Honey thick liquid 1 - Material does not enter the airway   Puree (pudding) 1 - Material does not enter the airway   Solid N/a, liquid wash needed to swallow cookie which resulted in aspiration        Current [facility] 3/8/24    Pt was viewed sitting upright in the lateral and AP positions. Trials administered were consistent with [facility] Validated Protocol: Pt was given 5-mL thin liquid x2, 20-mL cup sip thin, 40-mL sequential swallow thin, 5-mL nectar thick, 20-mL cup sip nectar thick, 40-mL sequential swallow nectar thick, 5-mL honey thick, 5-mL pudding, ½ cookie coated with 3-mL pudding, 5-mL nectar thick in the AP position, and 5-mL pudding in the AP position. Pt was also given thin liquids by straw, as well as  a barium tablet with thin liquid and pudding.     Initial view observations/comments: Clear view of the upper airway      8-Point Penetration-Aspiration Scale   Thin liquid 1 - Material does not enter the airway   Nectar thick liquid 1 - Material does not enter the airway   Honey thick liquid 1 - Material does not enter the airway   Puree (pudding) 1 - Material does not enter the airway   Solid 1 - Material does not enter the airway       MBS IMP Rating    ORAL Impairment  Compinent 1--Lip Closure  Judged at any point during the swallow.  1 - Interlabial escape; no progression to anterior lip (while laughing)    Component 2--Tongue Control During Bolus Hold  Judged on held liquid boluses only and prior to productive tongue movement.   2 - Posterior escape of less than half of bolus    Component 3--Bolus Preparation/Mastication  Judged only during presentation of 1/2 shortbread cookie coated in pudding.   1 - Slow prolonged chewing/mashing with complete re-collection    Component 4--Bolus Transport/Lingual Motion  Judged after first productive tongue movement for oral bolus transport.  0 - Brisk tongue motion    Component 5--Oral Residue  Judged after first swallow or after the last swallow of the sequential swallow task.  2 - Residue collection on oral structures   Location   A - Floor of Mouth, B - Palate, C - Tongue, and D - Lateral Sulci    Component 6--Initiation of Pharyngeal Swallow  Judged at first movement of the brisk superior-anterior hyoid trajectory.  3 - Bolus head in pyriforms      PHARYNGEAL Impairment  Component 7--Soft Palate Elevation  Judged during maximum displacement of soft palate.  0 - No bolus between the soft palate (SP)/pharyngeal wall (PW)    Component 8--Laryngeal Elevation  Judged when epiglottis is in its most horizontal position.  0 - Complete superior movement of thyroid cartilage with complete approximation of arytenoids to epiglottic petiole    Component 9--Anterior Hyoid  Excursion  Judged at height of swallow/maximal anterior hyoid displacement.  0 - Complete anterior movement    Component 10--Epiglottic Movement  Judged at height of swallow/maximal anterior hyoid displacement.  2 - No inversion    Component 11--Laryngeal Vestibular Closure  Judged at height of swallow/maximal anterior hyoid displacement.  0 - Complete; no air/contrast in laryngeal vestibule    Component 12--Pharyngeal Stripping Wave  Judged during the full duration of the pharyngeal swallow.  1 - Present - diminished    Component 13--Pharyngeal Contraction  Judged in AP view at rest and throughout maximum movement of structures.  0 - Complete    Component 14--Pharyngoesophageal Segment Opening  Judged during maximum distension of PES and throughout opening and closure.  1 - Partial distension/partial duration; partial obstruction to flow    Component 15--Tongue Base (TB) Retraction  Judged during maximum retraction of the tongue base.  2 - Narrow column of contrast or air between TB and PW    Component 16--Pharyngeal Residue  Judged after first swallow or after the last swallow of the sequential swallow task.  2 - Collection of residue within or on pharyngeal structures   Location   F - Diffuse (>3 areas)      ESOPHAGEAL Impairment  Component 17--Esophageal Clearance Upright Position  Judged in AP view during bolus transit through the oral cavity to the LES  2 - Esophageal retention with retrograde flow below pharyngoesophageal segment (PES)

## 2024-03-08 NOTE — PROGRESS NOTES
"Progress Note - Pulmonary   Hussein Edward III 58 y.o. male MRN: [id number]  Unit/Bed#: [facility] 829-01 Encounter: [id number]      Assessment:  Acute hypoxic respiratory failure- required ETT and MV from 02/19-02/29  ARDS 2/2 influenza A and suspected bacterial superinfection  WALE on CPAP  Afib    Plan:  Repeat CXR pa/lat today  Continue with airway clearance protocol  Wean O2 as able, maintain O2 sat > 88%  Continue with IS use, OOB to chair and ambulate as able    Subjective:   Patient states he feels he is improving. He denies any fevers, chills, Cp, SOB. He is ambulating to commode.     Objective:       Vitals: Blood pressure 109/85, pulse 78, temperature 97.9 °F (36.6 °C), resp. rate 18, height 5' 5\" (1.651 m), weight 81.1 kg (178 lb 12.7 oz), SpO2 97%., 3LNC, Body mass index is 29.75 kg/m².      Intake/Output Summary (Last 24 hours) at 3/8/2024 1129  Last data filed at 3/8/2024 0622  Gross per 24 hour   Intake 510 ml   Output 600 ml   Net -90 ml         Physical Exam  Physical Exam  Vitals and nursing note reviewed.   Constitutional:       Appearance: He is normal weight. He is ill-appearing.   HENT:      Head: Normocephalic and atraumatic.      Right Ear: External ear normal.      Left Ear: External ear normal.      Nose: Nose normal.      Mouth/Throat:      Mouth: Mucous membranes are moist.      Pharynx: Oropharynx is clear.   Eyes:      Conjunctiva/sclera: Conjunctivae normal.   Cardiovascular:      Rate and Rhythm: Normal rate and regular rhythm.      Pulses: Normal pulses.      Heart sounds: Normal heart sounds.   Pulmonary:      Effort: Pulmonary effort is normal.      Comments: Diminished breath sounds  Abdominal:      General: Abdomen is flat. Bowel sounds are normal.      Palpations: Abdomen is soft.   Musculoskeletal:         General: No swelling or tenderness.      Cervical back: Neck supple. No muscular tenderness.   Skin:     General: Skin is warm and dry.      Capillary Refill: Capillary refill takes " less than 2 seconds.   Neurological:      Mental Status: He is alert and oriented to person, place, and time. Mental status is at baseline.   Psychiatric:         Mood and Affect: Mood normal.         Behavior: Behavior normal.         Thought Content: Thought content normal.         Judgment: Judgment normal.         Labs: I have personally reviewed pertinent lab results.  Laboratory and Diagnostics  Results from last 7 days   Lab Units 03/07/24 2214 03/05/24 0457 03/04/24  0610 03/03/24  0658   WBC Thousand/uL 13.89* 13.01* 12.32* 10.41*   HEMOGLOBIN g/dL 12.7 12.0 11.8* 12.4   HEMATOCRIT % 39.9 38.7 37.2 38.4   PLATELETS Thousands/uL 435* 492* 480* 442*   NEUTROS PCT % 80* 84* 87* 86*   MONOS PCT % 5 3* 4 4   EOS PCT % 3 1 0 0     Results from last 7 days   Lab Units 03/05/24 0457 03/04/24 0610 03/03/24  0658   SODIUM mmol/L 142 142 141   POTASSIUM mmol/L 4.4 4.5 4.6   CHLORIDE mmol/L 103 102 102   CO2 mmol/L 30 30 28   ANION GAP mmol/L 9 10 11   BUN mg/dL 33* 39* 37*   CREATININE mg/dL 0.67 0.62 0.72   CALCIUM mg/dL 9.0 8.8 9.2   GLUCOSE RANDOM mg/dL 149* 134 116   ALT U/L  --  167*  --    AST U/L  --  69*  --    ALK PHOS U/L  --  90  --    ALBUMIN g/dL  --  3.4*  --    TOTAL BILIRUBIN mg/dL  --  0.68  --                    Results from last 7 days   Lab Units 03/08/24  0134   LACTIC ACID mmol/L 1.0     Results from last 7 days   Lab Units 03/04/24  0610   CRP mg/L 12.8*                 Results from last 7 days   Lab Units 03/07/24 2214 03/05/24 0457 03/04/24  0610   PROCALCITONIN ng/ml 0.07 0.13 0.12       ABG:   Results from last 7 days   Lab Units 03/03/24  1452   PH ART  7.530*   PCO2 ART mm Hg 34.3*   PO2 ART mm Hg 48.3*   HCO3 ART mmol/L 28.0   BASE EXC ART mmol/L 5.5   ABG SOURCE  Brachial, Right       Microbiology:  RP2 negative    Imaging and other studies: I have personally reviewed pertinent reports.   and I have personally reviewed pertinent films in PACS  CXR 3/6/24:Persistent significant  "bilateral pulmonary infiltrates are redemonstrated, left side slightly more prominent than right. No significant improvement. Possible small effusions. No pneumothorax. Cardiomediastinal silhouette mostly obscured.     Trev Martin MD  Pulmonary/Critical Care Fellow PGY-V  [facility]      Disclaimer: Portions of the record may have been created with voice recognition software. Occasional wrong word or \"sound a like\" substitutions may have occurred due to the inherent limitations of voice recognition software. Careful consideration should be taken to recognize, using context, where substitutions have occurred.   "

## 2024-03-08 NOTE — SPEECH THERAPY NOTE
"Speech Language/Pathology    Speech/Language Pathology Progress Note    Patient Name: Hussein Edward III  Today's Date: 3/8/2024       Subjective:  Pt was alert and awake. He was sitting upright in his recliner. Pt stated, \"I like the puree.\"    Objective:  Pt was seen today for dysphagia therapy. Current diet is puree with honey thick liquids. Pt was on 2L O2 via nasal cannula. Oral care was completed by the pt at the start of the session. The focus of today's session was to assess potential for diet advancement. Pt explained that he had some trouble using thin liquids to swallow a pill. He also reported coughing after meals. Voice mildly weak compared to baseline per pt, improved from prior session. Today's trials included: ice chip x2, thin via tsp x2, thin cup sip, and thin cup sequential.   Swallow function:  Bolus acceptance was functional, however, pt did have anterior loss of 1 ice chip during manipulation. Bolus manipulation and transfer appeared mildly disorganized. Pharyngeal swallow appeared slightly delayed and hylolaryngeal rise was diminished. No s/s of aspiration noted during today's session, however, silent aspiration is a concern d/t pt's prior MBS 3/1 as well as pt's breathy voice which may indicate incomplete airway closure.     Assessment:  Swallow function appears to have improved w/ current diet. MBS required to r/o silent aspiration. Diet recommendations to follow [facility]. Pt should continure frequent oral care. Pt's voice appears to have improved, however, it is still weak compared to baseline and pt may benefit from ENT.    Plan/Recommendations:  Continue current diet of level 1/ puree w/ honey thick liquids for now. Diet recommendations to follow [facility] tenetive for today 3/8 @ 1400.      "

## 2024-03-08 NOTE — RESTORATIVE TECHNICIAN NOTE
Restorative Technician Note      Patient Name: Husseni Edward III     Restorative Tech Visit Date: 03/08/24  Note Type: Mobility  Patient Position Upon Consult: Bedside chair  Activity Performed: Dangled; Stood; Range of motion; Ambulated; Other (Comment) (second person for safety 2/2 knee buckling)  Assistive Device: Standard walker  Education Provided: Yes  Patient Position at End of Consult: Bedside chair; All needs within reach    Praveena Cunningham  DPT, Restorative Technician

## 2024-03-08 NOTE — PHYSICAL THERAPY NOTE
Physical Therapy Cancellation Note     03/08/24 1436   PT Last Visit   PT Visit Date 03/08/24   Note Type   Note type Cancelled Session   Cancel Reasons Patient off floor/test   Additional Comments Pt off the floor for Barium swallow test.     Laxmi Klein PT DPT

## 2024-03-08 NOTE — CASE MANAGEMENT
Case Management Discharge Planning Note    Patient name Hussein Edward III  Location [facility] MRN [id number]  : 1965 Date 3/8/2024       Current Admission Date: 2024  Current Admission Diagnosis:Acute respiratory failure with hypoxia (HCC)   Patient Active Problem List    Diagnosis Date Noted    Oropharyngeal dysphagia 2024    Ambulatory dysfunction 2024    Persistent fever 2024    ARDS (adult respiratory distress syndrome) ([facility]) 2024    Influenza A 2024    Insomnia 2024    Migraine 2024    Anxiety 2024    Acute respiratory failure with hypoxia ([facility]) 2024    Metabolic acidosis 2024    Non-traumatic rhabdomyolysis 2024    Chronic kidney disease 2024    Obstructive sleep apnea (adult) (pediatric) 2023    Primary insomnia 2023    Abnormal finding on MRI of brain 10/01/2021    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    Meningioma ([facility]) 2021    Deviated nasal septum 2019    Hypertrophy of nasal turbinates 2019    Nasal obstruction 2019    Nasal septal deviation 2019    Seasonal allergic rhinitis 2019    Nasal turbinate hypertrophy 2019    Mixed dyslipidemia 2017    WALE (obstructive sleep apnea) 2016    Low back pain 2016    Strain of lumbar region 2015      LOS (days): 18  Geometric Mean LOS (GMLOS) (days):   Days to GMLOS:     OBJECTIVE:  Risk of Unplanned Readmission Score: 15.43         Current admission status: Inpatient   [facility]   [facility]""[place] - [address] [address] [address] [facility] [address]  Phone: [phone] Fax: [phone]    [facility] - [place] - [address]  [address]  [place] [address]  Phone: [phone] Fax: [phone]    Primary Care Provider: Iftikhar Roque MD    Primary Insurance: [facility]  Secondary  Insurance:     DISCHARGE DETAILS:           Additional Comments: Patient is not medically ready for d/c at this time, [facility] following, will need insurance authorization.  CM spoke with wife Kathi today who has expressed they would like patient to remain at [facility] and not go to another site.

## 2024-03-08 NOTE — OCCUPATIONAL THERAPY NOTE
Occupational Therapy         Patient Name: Hussein Edward III  Today's Date: 3/8/2024         03/08/24 8928   Note Type   Note type Cancelled Session   Cancel Reasons Patient off floor/test     Pt off floor on this date for fluoroscopy. Will continue to follow and treat as appropriate.     Per Moreno MS, OTR/L

## 2024-03-08 NOTE — PROGRESS NOTES
[facility]  Progress Note  Name: Hussein Edward III I  MRN: [id number]  Unit/Bed#: [facility] 829-01 I Date of Admission: 2/19/2024   Date of Service: 3/8/2024 I Hospital Day: 18    Assessment/Plan   * Acute respiratory failure with hypoxia (HCC)  Assessment & Plan  Secondary to influenza A with suspected bacterial superinfection  TTE with normal systolic function and grade 1 diastolic dysfunction  Requiring ETT and MD from 2/19-2/29  S/p bronchoscopy on 2/19 with cultures primarily groin Candida glabrata, no bacteria  Blood cultures with no growth to date  While in the ICU patient require multiple doses of IV Lasix and ultimately was placed on a Lasix drip until net negative fluid balance was achieved  Completed 5 days of ceftriaxone azithromycin, followed by 7 days of cefepime for fever thought to be pulmonary source   Completed steroid course  Continue to wean oxygen as tolerated  Continue IS, flutter valve, hypertonic saline and chest vest PT for airway clearance  CXR 3/6 No significant improvement of pulmonary infiltrates presumably representing bilateral pneumonia  Continue CPAP at bedtime  Pulmonology recommending outpatient CT chest in 4 weeks to ensure resolution    Ambulatory dysfunction  Assessment & Plan  Evaluated by PT/OT--recommended rehab at discharge  ARC following  Fall precautions  Ambulate patient    Oropharyngeal dysphagia  Assessment & Plan  Mild  SLP re consulted  Barium swallow study ordered  Aspiration precautions    ARDS (adult respiratory distress syndrome) (HCC)  Assessment & Plan  Secondary to influenza A and suspected bacterial superinfection  Requiring supplemental O2, wean as tolerated  Pulmonology following    Anxiety  Assessment & Plan  Ativan as needed    Insomnia  Assessment & Plan  PTA quviviq 25 mg at bedtime, not on formulary  Continue melatonin as needed    Influenza A  Assessment & Plan  Completed Tamiflu course     Chronic kidney  disease  Assessment & Plan  Lab Results   Component Value Date    EGFR 107 2024    EGFR 110 2024    EGFR 104 2024    CREATININE 0.67 2024    CREATININE 0.62 2024    CREATININE 0.72 2024     At baseline  Continue to monitor every other day  Avoid nephrotoxins    WALE (obstructive sleep apnea)  Assessment & Plan  Continue CPAP               VTE Pharmacologic Prophylaxis: VTE Score: 4 Moderate Risk (Score 3-4) - Pharmacological DVT Prophylaxis Ordered: enoxaparin (Lovenox).    Mobility:   Basic Mobility Inpatient Raw Score: 11  JH-HLM Goal: 4: Move to chair/commode  JH-HLM Achieved: 4: Move to chair/commode  HLM Goal achieved. Continue to encourage appropriate mobility.    Patient Centered Rounds: I performed bedside rounds with nursing staff today.   Discussions with Specialists or Other Care Team Provider:     Education and Discussions with Family / Patient: Updated  (wife) via phone.    Total Time Spent on Date of Encounter in care of patient: 35 mins. This time was spent on one or more of the following: performing physical exam; counseling and coordination of care; obtaining or reviewing history; documenting in the medical record; reviewing/ordering tests, medications or procedures; communicating with other healthcare professionals and discussing with patient's family/caregivers.    Current Length of Stay: 18 day(s)  Current Patient Status: Inpatient   Certification Statement: The patient will continue to require additional inpatient hospital stay due to weaning off oxygen, dispo planning  Discharge Plan: Anticipate discharge in 24-48 hrs to rehab facility.    Code Status: Level 1 - Full Code    Subjective:   No acute events overnight.  Patient reports feeling significantly better.  Still has some shortness of breath but no cough or sputum production.    Objective:     Vitals:   Temp (24hrs), Av.1 °F (36.7 °C), Min:97.9 °F (36.6 °C), Max:98.4 °F (36.9  °C)    Temp:  [97.9 °F (36.6 °C)-98.4 °F (36.9 °C)] 97.9 °F (36.6 °C)  HR:  [] 78  Resp:  [16-18] 18  BP: (107-109)/(66-86) 109/85  SpO2:  [93 %-99 %] 97 %  Body mass index is 29.75 kg/m².     Input and Output Summary (last 24 hours):     Intake/Output Summary (Last 24 hours) at 3/8/2024 1156  Last data filed at 3/8/2024 0622  Gross per 24 hour   Intake 510 ml   Output 600 ml   Net -90 ml       Physical Exam:   Physical Exam  Vitals and nursing note reviewed.   Constitutional:       General: He is not in acute distress.     Appearance: He is well-developed.   HENT:      Head: Normocephalic and atraumatic.   Eyes:      Conjunctiva/sclera: Conjunctivae normal.   Cardiovascular:      Rate and Rhythm: Normal rate and regular rhythm.   Pulmonary:      Effort: Pulmonary effort is normal. No respiratory distress.      Breath sounds: Normal breath sounds. No wheezing, rhonchi or rales.   Abdominal:      Palpations: Abdomen is soft.   Musculoskeletal:      Cervical back: Neck supple.   Skin:     General: Skin is warm and dry.   Neurological:      Mental Status: He is alert.   Psychiatric:         Mood and Affect: Mood normal.         Behavior: Behavior normal.          Additional Data:     Labs:  Results from last 7 days   Lab Units 03/07/24  2214   WBC Thousand/uL 13.89*   HEMOGLOBIN g/dL 12.7   HEMATOCRIT % 39.9   PLATELETS Thousands/uL 435*   NEUTROS PCT % 80*   LYMPHS PCT % 10*   MONOS PCT % 5   EOS PCT % 3     Results from last 7 days   Lab Units 03/05/24  0457 03/04/24  0610   SODIUM mmol/L 142 142   POTASSIUM mmol/L 4.4 4.5   CHLORIDE mmol/L 103 102   CO2 mmol/L 30 30   BUN mg/dL 33* 39*   CREATININE mg/dL 0.67 0.62   ANION GAP mmol/L 9 10   CALCIUM mg/dL 9.0 8.8   ALBUMIN g/dL  --  3.4*   TOTAL BILIRUBIN mg/dL  --  0.68   ALK PHOS U/L  --  90   ALT U/L  --  167*   AST U/L  --  69*   GLUCOSE RANDOM mg/dL 149* 134         Results from last 7 days   Lab Units 03/08/24  1108 03/08/24  0821 03/08/24  0742  03/07/24  2102 03/07/24  1611 03/07/24  1133 03/07/24  0720 03/06/24  2103 03/06/24  1609 03/06/24  1125 03/06/24  0758 03/05/24  2229   POC GLUCOSE mg/dl 107 77 53* 177* 188* 121 89 158* 142* 102 83 211*         Results from last 7 days   Lab Units 03/08/24  0134 03/07/24  2214 03/05/24  0457 03/04/24  0610   LACTIC ACID mmol/L 1.0  --   --   --    PROCALCITONIN ng/ml  --  0.07 0.13 0.12       Lines/Drains:  Invasive Devices       Peripheral Intravenous Line  Duration             Peripheral IV 03/05/24 Left;Ventral (anterior) Hand 3 days                          Imaging: No pertinent imaging reviewed.    Recent Cultures (last 7 days):   Results from last 7 days   Lab Units 03/07/24  2217   BLOOD CULTURE  Received in Microbiology Lab. Culture in Progress.  Received in Microbiology Lab. Culture in Progress.       Last 24 Hours Medication List:   Current Facility-Administered Medications   Medication Dose Route Frequency Provider Last Rate    acetaminophen  650 mg Oral Q6H PRN Samira Bratis,       albuterol  2 puff Inhalation Q4H PRN Kenny Menard DO      calamine-zinc oxide   Topical 4x Daily Dayne Garcia MD      enoxaparin  40 mg Subcutaneous Q24H YEHUDA Dayne Garcia MD      hydrocortisone   Topical BID Dayne Garcia MD      HYDROmorphone  1 mg Intravenous Q4H PRN Dayne Garcia MD      insulin lispro  1-6 Units Subcutaneous 4x Daily (AC & HS) Teresa Crabtree PA-C      levalbuterol  1.25 mg Nebulization TID Kenny Menard DO      loratadine  5 mg Oral Daily Dayne Garcia MD      LORazepam  0.5 mg Oral Q8H PRN Dayne Garcia MD      magnesium Oxide  400 mg Oral Daily Dayne Garcia MD      melatonin  3 mg Oral HS PRN Teresa Crabtree PA-C      midodrine  5 mg Oral Q8H Dayne Garcia MD      oxyCODONE  5 mg Oral Q6H PRN Dayne Garcia MD      PARoxetine  10 mg Oral Daily Dayne Garcia MD      polyethylene glycol  17 g  Per NG Tube BID Dayne Garcia MD      sodium chloride  1 spray Each Nare Q1H PRN Dayne Garcia MD      sodium chloride  4 mL Nebulization TID Kirby Alberts MD      tamsulosin  0.4 mg Oral Daily With Dinner Dayne Garcia MD          Today, Patient Was Seen By: Imani Lyles MD    **Please Note: This note may have been constructed using a voice recognition system.**

## 2024-03-09 PROBLEM — J10.1 INFLUENZA A: Status: RESOLVED | Noted: 2024-02-19 | Resolved: 2024-03-09

## 2024-03-09 LAB
ANION GAP SERPL CALCULATED.3IONS-SCNC: 8 MMOL/L
BASOPHILS # BLD AUTO: 0.05 THOUSANDS/ÂΜL (ref 0–0.1)
BASOPHILS NFR BLD AUTO: 0 % (ref 0–1)
BUN SERPL-MCNC: 18 MG/DL (ref 5–25)
CALCIUM SERPL-MCNC: 8.8 MG/DL (ref 8.4–10.2)
CHLORIDE SERPL-SCNC: 104 MMOL/L (ref 96–108)
CO2 SERPL-SCNC: 28 MMOL/L (ref 21–32)
CREAT SERPL-MCNC: 0.68 MG/DL (ref 0.6–1.3)
EOSINOPHIL # BLD AUTO: 1.11 THOUSAND/ÂΜL (ref 0–0.61)
EOSINOPHIL NFR BLD AUTO: 10 % (ref 0–6)
ERYTHROCYTE [DISTWIDTH] IN BLOOD BY AUTOMATED COUNT: 13.4 % (ref 11.6–15.1)
GFR SERPL CREATININE-BSD FRML MDRD: 106 ML/MIN/1.73SQ M
GLUCOSE SERPL-MCNC: 100 MG/DL (ref 65–140)
GLUCOSE SERPL-MCNC: 114 MG/DL (ref 65–140)
GLUCOSE SERPL-MCNC: 122 MG/DL (ref 65–140)
GLUCOSE SERPL-MCNC: 126 MG/DL (ref 65–140)
GLUCOSE SERPL-MCNC: 149 MG/DL (ref 65–140)
GLUCOSE SERPL-MCNC: 69 MG/DL (ref 65–140)
HCT VFR BLD AUTO: 40.3 % (ref 36.5–49.3)
HGB BLD-MCNC: 13.1 G/DL (ref 12–17)
IMM GRANULOCYTES # BLD AUTO: 0.14 THOUSAND/UL (ref 0–0.2)
IMM GRANULOCYTES NFR BLD AUTO: 1 % (ref 0–2)
LYMPHOCYTES # BLD AUTO: 1.24 THOUSANDS/ÂΜL (ref 0.6–4.47)
LYMPHOCYTES NFR BLD AUTO: 11 % (ref 14–44)
MCH RBC QN AUTO: 29.5 PG (ref 26.8–34.3)
MCHC RBC AUTO-ENTMCNC: 32.5 G/DL (ref 31.4–37.4)
MCV RBC AUTO: 91 FL (ref 82–98)
MONOCYTES # BLD AUTO: 0.68 THOUSAND/ÂΜL (ref 0.17–1.22)
MONOCYTES NFR BLD AUTO: 6 % (ref 4–12)
NEUTROPHILS # BLD AUTO: 8.32 THOUSANDS/ÂΜL (ref 1.85–7.62)
NEUTS SEG NFR BLD AUTO: 72 % (ref 43–75)
NRBC BLD AUTO-RTO: 0 /100 WBCS
PLATELET # BLD AUTO: 379 THOUSANDS/UL (ref 149–390)
PMV BLD AUTO: 8.4 FL (ref 8.9–12.7)
POTASSIUM SERPL-SCNC: 4.2 MMOL/L (ref 3.5–5.3)
RBC # BLD AUTO: 4.44 MILLION/UL (ref 3.88–5.62)
SODIUM SERPL-SCNC: 140 MMOL/L (ref 135–147)
WBC # BLD AUTO: 11.54 THOUSAND/UL (ref 4.31–10.16)

## 2024-03-09 PROCEDURE — 94760 N-INVAS EAR/PLS OXIMETRY 1: CPT

## 2024-03-09 PROCEDURE — 82948 REAGENT STRIP/BLOOD GLUCOSE: CPT

## 2024-03-09 PROCEDURE — 99232 SBSQ HOSP IP/OBS MODERATE 35: CPT | Performed by: INTERNAL MEDICINE

## 2024-03-09 PROCEDURE — 99232 SBSQ HOSP IP/OBS MODERATE 35: CPT | Performed by: STUDENT IN AN ORGANIZED HEALTH CARE EDUCATION/TRAINING PROGRAM

## 2024-03-09 PROCEDURE — 85025 COMPLETE CBC W/AUTO DIFF WBC: CPT | Performed by: STUDENT IN AN ORGANIZED HEALTH CARE EDUCATION/TRAINING PROGRAM

## 2024-03-09 PROCEDURE — 94640 AIRWAY INHALATION TREATMENT: CPT

## 2024-03-09 PROCEDURE — 80048 BASIC METABOLIC PNL TOTAL CA: CPT | Performed by: STUDENT IN AN ORGANIZED HEALTH CARE EDUCATION/TRAINING PROGRAM

## 2024-03-09 RX ADMIN — SODIUM CHLORIDE SOLN NEBU 3% 4 ML: 3 NEBU SOLN at 07:17

## 2024-03-09 RX ADMIN — HYDROCORTISONE: 1 CREAM TOPICAL at 17:09

## 2024-03-09 RX ADMIN — MELATONIN 3 MG: at 21:49

## 2024-03-09 RX ADMIN — SODIUM CHLORIDE SOLN NEBU 3% 4 ML: 3 NEBU SOLN at 20:50

## 2024-03-09 RX ADMIN — MIDODRINE HYDROCHLORIDE 5 MG: 5 TABLET ORAL at 17:10

## 2024-03-09 RX ADMIN — FERRIC OXIDE RED: 8; 8 LOTION TOPICAL at 17:09

## 2024-03-09 RX ADMIN — MAGNESIUM OXIDE TAB 400 MG (241.3 MG ELEMENTAL MG) 400 MG: 400 (241.3 MG) TAB at 08:23

## 2024-03-09 RX ADMIN — LEVALBUTEROL HYDROCHLORIDE 1.25 MG: 1.25 SOLUTION RESPIRATORY (INHALATION) at 20:50

## 2024-03-09 RX ADMIN — FERRIC OXIDE RED: 8; 8 LOTION TOPICAL at 21:49

## 2024-03-09 RX ADMIN — FERRIC OXIDE RED: 8; 8 LOTION TOPICAL at 08:25

## 2024-03-09 RX ADMIN — FERRIC OXIDE RED: 8; 8 LOTION TOPICAL at 12:48

## 2024-03-09 RX ADMIN — LORATADINE 5 MG: 10 TABLET ORAL at 08:23

## 2024-03-09 RX ADMIN — MIDODRINE HYDROCHLORIDE 5 MG: 5 TABLET ORAL at 08:24

## 2024-03-09 RX ADMIN — TAMSULOSIN HYDROCHLORIDE 0.4 MG: 0.4 CAPSULE ORAL at 17:10

## 2024-03-09 RX ADMIN — PAROXETINE 10 MG: 10 TABLET, FILM COATED ORAL at 08:24

## 2024-03-09 RX ADMIN — ENOXAPARIN SODIUM 40 MG: 40 INJECTION SUBCUTANEOUS at 17:09

## 2024-03-09 RX ADMIN — MELATONIN 3 MG: at 00:00

## 2024-03-09 RX ADMIN — HYDROCORTISONE: 1 CREAM TOPICAL at 08:25

## 2024-03-09 RX ADMIN — POLYETHYLENE GLYCOL 3350 17 G: 17 POWDER, FOR SOLUTION ORAL at 21:49

## 2024-03-09 RX ADMIN — LEVALBUTEROL HYDROCHLORIDE 1.25 MG: 1.25 SOLUTION RESPIRATORY (INHALATION) at 07:17

## 2024-03-09 NOTE — ASSESSMENT & PLAN NOTE
Lab Results   Component Value Date    EGFR 107 03/05/2024    EGFR 110 03/04/2024    EGFR 104 03/03/2024    CREATININE 0.67 03/05/2024    CREATININE 0.62 03/04/2024    CREATININE 0.72 03/03/2024     At baseline  Obtain BMP today  Avoid nephrotoxins

## 2024-03-09 NOTE — PROGRESS NOTES
"Progress Note - Pulmonary   Hussein Edward III 58 y.o. male MRN: [id number]  Unit/Bed#: [facility] 829-01 Encounter: [id number]      Assessment/Plan:    Acute hypoxic respiratory failure-required mechanical ventilation from 2/19 through 2/29.  Doing well on room air.  Saturations are 94%.  Continue incentive spirometry.    ARDS due to influenza A and superimposed bacterial pneumonia-chest x-ray is overall stable and will take several weeks to clear.    Obstructive sleep apnea on CPAP-patient has CPAP machine, but is questioning whether he needs a new device.  Planning to follow-up with Dr. Cook after discharge from rehab    Patient is stable for acute rehab from pulmonary perspective.  Will make arrangements for outpatient follow-up in 2-3 weeks.  If he remains in rehab, that visit can be pushed back further - message was sent to our office to coordinate.  Patient is willing to see Dr. Cook at any of his locations        Chief Complaint: \"Feeling okay\"    Subjective:   Patient feels better today.  Reports less shortness of breath.  No significant cough or sputum production    Objective:     Vitals: Blood pressure 114/80, pulse 95, temperature (!) 97.4 °F (36.3 °C), temperature source Axillary, resp. rate 19, height 5' 5\" (1.651 m), weight 81.1 kg (178 lb 12.7 oz), SpO2 100%.,  Room air, Body mass index is 29.75 kg/m².      Intake/Output Summary (Last 24 hours) at 3/9/2024 1347  Last data filed at 3/9/2024 0800  Gross per 24 hour   Intake 660 ml   Output 400 ml   Net 260 ml       Physical Exam:      General:  Awake, alert, no distress   HEENT: No scleral icterus, EOMI, moist mucosa    Heart:  Regular rate and rhythm, no murmur   Lungs: Scant bilateral crackles, no wheeze   Abdomen: Soft, nontender, normal bowel sounds   Extremities: No clubbing, cyanosis or edema    Labs: I have personally reviewed pertinent lab results.    Results from last 7 days   Lab Units 03/07/24  2214 03/05/24  0457 03/04/24  0610   WBC Thousand/uL " 13.89* 13.01* 12.32*   HEMOGLOBIN g/dL 12.7 12.0 11.8*   HEMATOCRIT % 39.9 38.7 37.2   PLATELETS Thousands/uL 435* 492* 480*         Results from last 7 days   Lab Units 03/05/24  0457 03/04/24  0610 03/03/24  0658   POTASSIUM mmol/L 4.4 4.5 4.6   CHLORIDE mmol/L 103 102 102   CO2 mmol/L 30 30 28   BUN mg/dL 33* 39* 37*   CREATININE mg/dL 0.67 0.62 0.72   CALCIUM mg/dL 9.0 8.8 9.2   ALK PHOS U/L  --  90  --    ALT U/L  --  167*  --    AST U/L  --  69*  --        Chest x-ray performed on 3/8/2024 is personally reviewed and shows stable bilateral patchy airspace disease and a small right pleural effusion

## 2024-03-09 NOTE — PLAN OF CARE
Problem: PAIN - ADULT  Goal: Verbalizes/displays adequate comfort level or baseline comfort level  Description: Interventions:  - Encourage patient to monitor pain and request assistance  - Assess pain using appropriate pain scale  - Administer analgesics based on type and severity of pain and evaluate response  - Implement non-pharmacological measures as appropriate and evaluate response  - Consider cultural and social influences on pain and pain management  - Notify physician/advanced practitioner if interventions unsuccessful or patient reports new pain  Outcome: Progressing     Problem: INFECTION - ADULT  Goal: Absence or prevention of progression during hospitalization  Description: INTERVENTIONS:  - Assess and monitor for signs and symptoms of infection  - Monitor lab/diagnostic results  - Monitor all insertion sites, i.e. indwelling lines, tubes, and drains  - Monitor endotracheal if appropriate and nasal secretions for changes in amount and color  - Hollis appropriate cooling/warming therapies per order  - Administer medications as ordered  - Instruct and encourage patient and family to use good hand hygiene technique  - Identify and instruct in appropriate isolation precautions for identified infection/condition  Outcome: Progressing     Problem: DISCHARGE PLANNING  Goal: Discharge to home or other facility with appropriate resources  Description: INTERVENTIONS:  - Identify barriers to discharge w/patient and caregiver  - Arrange for needed discharge resources and transportation as appropriate  - Identify discharge learning needs (meds, wound care, etc.)  - Arrange for interpretive services to assist at discharge as needed  - Refer to Case Management Department for coordinating discharge planning if the patient needs post-hospital services based on physician/advanced practitioner order or complex needs related to functional status, cognitive ability, or social support system  Outcome: Progressing      Problem: Knowledge Deficit  Goal: Patient/family/caregiver demonstrates understanding of disease process, treatment plan, medications, and discharge instructions  Description: Complete learning assessment and assess knowledge base.  Interventions:  - Provide teaching at level of understanding  - Provide teaching via preferred learning methods  Outcome: Progressing     Problem: Nutrition/Hydration-ADULT  Goal: Nutrient/Hydration intake appropriate for improving, restoring or maintaining nutritional needs  Description: Monitor and assess patient's nutrition/hydration status for malnutrition. Collaborate with interdisciplinary team and initiate plan and interventions as ordered.  Monitor patient's weight and dietary intake as ordered or per policy. Utilize nutrition screening tool and intervene as necessary. Determine patient's food preferences and provide high-protein, high-caloric foods as appropriate.     INTERVENTIONS:  - Monitor oral intake, urinary output, labs, and treatment plans  - Assess nutrition and hydration status and recommend course of action  - Evaluate amount of meals eaten  - Assist patient with eating if necessary   - Allow adequate time for meals  - Recommend/ encourage appropriate diets, oral nutritional supplements, and vitamin/mineral supplements  - Order, calculate, and assess calorie counts as needed  - Recommend, monitor, and adjust tube feedings and TPN/PPN based on assessed needs  - Assess need for intravenous fluids  - Provide specific nutrition/hydration education as appropriate  - Include patient/family/caregiver in decisions related to nutrition  Outcome: Progressing     Problem: CARDIOVASCULAR - ADULT  Goal: Maintains optimal cardiac output and hemodynamic stability  Description: INTERVENTIONS:  - Monitor I/O, vital signs and rhythm  - Monitor for S/S and trends of decreased cardiac output  - Administer and titrate ordered vasoactive medications to optimize hemodynamic stability  -  Assess quality of pulses, skin color and temperature  - Assess for signs of decreased coronary artery perfusion  - Instruct patient to report change in severity of symptoms  Outcome: Progressing  Goal: Absence of cardiac dysrhythmias or at baseline rhythm  Description: INTERVENTIONS:  - Continuous cardiac monitoring, vital signs, obtain 12 lead EKG if ordered  - Administer antiarrhythmic and heart rate control medications as ordered  - Monitor electrolytes and administer replacement therapy as ordered  Outcome: Progressing     Problem: RESPIRATORY - ADULT  Goal: Achieves optimal ventilation and oxygenation  Description: INTERVENTIONS:  - Assess for changes in respiratory status  - Assess for changes in mentation and behavior  - Position to facilitate oxygenation and minimize respiratory effort  - Oxygen administered by appropriate delivery if ordered  - Initiate smoking cessation education as indicated  - Encourage broncho-pulmonary hygiene including cough, deep breathe, Incentive Spirometry  - Assess the need for suctioning and aspirate as needed  - Assess and instruct to report SOB or any respiratory difficulty  - Respiratory Therapy support as indicated  Outcome: Progressing     Problem: METABOLIC, FLUID AND ELECTROLYTES - ADULT  Goal: Electrolytes maintained within normal limits  Description: INTERVENTIONS:  - Monitor labs and assess patient for signs and symptoms of electrolyte imbalances  - Administer electrolyte replacement as ordered  - Monitor response to electrolyte replacements, including repeat lab results as appropriate  - Instruct patient on fluid and nutrition as appropriate  Outcome: Progressing  Goal: Fluid balance maintained  Description: INTERVENTIONS:  - Monitor labs   - Monitor I/O and WT  - Instruct patient on fluid and nutrition as appropriate  - Assess for signs & symptoms of volume excess or deficit  Outcome: Progressing

## 2024-03-09 NOTE — ASSESSMENT & PLAN NOTE
Evaluated by PT/OT--recommended rehab at discharge  Plan for ARC at discharge.  Patient reaching medically stability in 24-48 hours  Fall precautions  Ambulate patient

## 2024-03-09 NOTE — ASSESSMENT & PLAN NOTE
Patient with mild oropharyngeal dysphagia characterized primarily by slow prolonged mastication and delayed swallow initiation.    Repeat barium swallow on 3/9 with no penetration or aspiration seen.  Cleared for regular diet and thin liquids by speech

## 2024-03-09 NOTE — ASSESSMENT & PLAN NOTE
Secondary to influenza A with suspected bacterial superinfection  TTE with normal systolic function and grade 1 diastolic dysfunction  Requiring ETT and MD from 2/19-2/29  S/p bronchoscopy on 2/19 with cultures primarily groin Candida glabrata, no bacteria  Blood cultures with no growth to date  While in the ICU patient require multiple doses of IV Lasix and ultimately was placed on a Lasix drip until net negative fluid balance was achieved  Completed 5 days of ceftriaxone azithromycin, followed by 7 days of cefepime for fever thought to be pulmonary source   Completed steroid course  Encouraged IS, flutter valve.  C  CXR 3/8 with stable diffuse bilateral patchy airspace disease.   Pulmonology recommending outpatient CT chest in 4 weeks to ensure resolution  Patient on room air saturating above 92%.  RN to check pulse oximetry with ambulation

## 2024-03-09 NOTE — PROGRESS NOTES
[facility]  Progress Note  Name: Hussein Edward III I  MRN: [id number]  Unit/Bed#: [facility] 829-01 I Date of Admission: 2/19/2024   Date of Service: 3/9/2024 I Hospital Day: 19    Assessment/Plan   * Acute respiratory failure with hypoxia (HCC)  Assessment & Plan  Secondary to influenza A with suspected bacterial superinfection  TTE with normal systolic function and grade 1 diastolic dysfunction  Requiring ETT and MD from 2/19-2/29  S/p bronchoscopy on 2/19 with cultures primarily groin Candida glabrata, no bacteria  Blood cultures with no growth to date  While in the ICU patient require multiple doses of IV Lasix and ultimately was placed on a Lasix drip until net negative fluid balance was achieved  Completed 5 days of ceftriaxone azithromycin, followed by 7 days of cefepime for fever thought to be pulmonary source   Completed steroid course  Encouraged IS, flutter valve.  C  CXR 3/8 with stable diffuse bilateral patchy airspace disease.   Pulmonology recommending outpatient CT chest in 4 weeks to ensure resolution  Patient on room air saturating above 92%.  RN to check pulse oximetry with ambulation    Ambulatory dysfunction  Assessment & Plan  Evaluated by PT/OT--recommended rehab at discharge  Plan for ARC at discharge.  Patient reaching medically stability in 24-48 hours  Fall precautions  Ambulate patient    Oropharyngeal dysphagia  Assessment & Plan    Patient with mild oropharyngeal dysphagia characterized primarily by slow prolonged mastication and delayed swallow initiation.    Repeat barium swallow on 3/9 with no penetration or aspiration seen.  Cleared for regular diet and thin liquids by speech    ARDS (adult respiratory distress syndrome) (HCC)  Assessment & Plan  Secondary to influenza A and suspected bacterial superinfection  Requiring supplemental O2, wean as tolerated  Pulmonology following    Anxiety  Assessment & Plan  Ativan as needed    Insomnia  Assessment &  Plan  PTA quviviq 25 mg at bedtime, not on formulary  Continue melatonin as needed    Chronic kidney disease  Assessment & Plan  Lab Results   Component Value Date    EGFR 107 03/05/2024    EGFR 110 03/04/2024    EGFR 104 03/03/2024    CREATININE 0.67 03/05/2024    CREATININE 0.62 03/04/2024    CREATININE 0.72 03/03/2024     At baseline  Obtain BMP today  Avoid nephrotoxins    WALE (obstructive sleep apnea)  Assessment & Plan  Continue CPAP    Influenza A-resolved as of 3/9/2024  Assessment & Plan  Completed Tamiflu course                VTE Pharmacologic Prophylaxis: VTE Score: 4 Moderate Risk (Score 3-4) - Pharmacological DVT Prophylaxis Ordered: enoxaparin (Lovenox).    Mobility:   Basic Mobility Inpatient Raw Score: 20  JH-HLM Goal: 6: Walk 10 steps or more  JH-HLM Achieved: 3: Sit at edge of bed  HLM Goal NOT achieved. Continue with multidisciplinary rounding and encourage appropriate mobility to improve upon HLM goals.    Patient Centered Rounds: I performed bedside rounds with nursing staff today.   Discussions with Specialists or Other Care Team Provider: None    Education and Discussions with Family / Patient:  Patient will update family members.     Total Time Spent on Date of Encounter in care of patient: 35 mins. This time was spent on one or more of the following: performing physical exam; counseling and coordination of care; obtaining or reviewing history; documenting in the medical record; reviewing/ordering tests, medications or procedures; communicating with other healthcare professionals and discussing with patient's family/caregivers.    Current Length of Stay: 19 day(s)  Current Patient Status: Inpatient   Certification Statement: The patient will continue to require additional inpatient hospital stay due to monitoring of O2 saturation on room air, increase ambulation,  Discharge Plan: Anticipate discharge in 24-48 hrs to rehab facility.    Code Status: Level 1 - Full Code    Subjective:   No  acute events overnight.  Patient feels better this morning.  He continues to feel short of breath with exertion.  No cough.  Tolerating oral intake.  Overall feels much improved    Objective:     Vitals:   Temp (24hrs), Av °F (36.7 °C), Min:97.4 °F (36.3 °C), Max:98.5 °F (36.9 °C)    Temp:  [97.4 °F (36.3 °C)-98.5 °F (36.9 °C)] 97.4 °F (36.3 °C)  HR:  [] 95  Resp:  [18-20] 19  BP: (111-114)/(80-85) 114/80  SpO2:  [99 %-100 %] 100 %  Body mass index is 29.75 kg/m².     Input and Output Summary (last 24 hours):     Intake/Output Summary (Last 24 hours) at 3/9/2024 1250  Last data filed at 3/9/2024 0800  Gross per 24 hour   Intake 660 ml   Output 400 ml   Net 260 ml       Physical Exam:   Physical Exam  Vitals and nursing note reviewed.   Constitutional:       General: He is not in acute distress.     Appearance: He is well-developed.   HENT:      Head: Normocephalic and atraumatic.   Eyes:      Conjunctiva/sclera: Conjunctivae normal.   Cardiovascular:      Rate and Rhythm: Normal rate and regular rhythm.   Pulmonary:      Effort: Pulmonary effort is normal. No respiratory distress.      Breath sounds: Rhonchi present. No wheezing.   Musculoskeletal:      Cervical back: Neck supple.      Right lower leg: No edema.      Left lower leg: No edema.   Skin:     General: Skin is warm and dry.   Neurological:      Mental Status: He is alert.   Psychiatric:         Mood and Affect: Mood normal.         Behavior: Behavior normal.          Additional Data:     Labs:  Results from last 7 days   Lab Units 24  2214   WBC Thousand/uL 13.89*   HEMOGLOBIN g/dL 12.7   HEMATOCRIT % 39.9   PLATELETS Thousands/uL 435*   NEUTROS PCT % 80*   LYMPHS PCT % 10*   MONOS PCT % 5   EOS PCT % 3     Results from last 7 days   Lab Units 24  0457 24  0610   SODIUM mmol/L 142 142   POTASSIUM mmol/L 4.4 4.5   CHLORIDE mmol/L 103 102   CO2 mmol/L 30 30   BUN mg/dL 33* 39*   CREATININE mg/dL 0.67 0.62   ANION GAP mmol/L 9 10    CALCIUM mg/dL 9.0 8.8   ALBUMIN g/dL  --  3.4*   TOTAL BILIRUBIN mg/dL  --  0.68   ALK PHOS U/L  --  90   ALT U/L  --  167*   AST U/L  --  69*   GLUCOSE RANDOM mg/dL 149* 134         Results from last 7 days   Lab Units 03/09/24  1121 03/09/24  0822 03/08/24  2106 03/08/24  1600 03/08/24  1108 03/08/24  0821 03/08/24  0742 03/07/24  2102 03/07/24  1611 03/07/24  1133 03/07/24  0720 03/06/24  2103   POC GLUCOSE mg/dl 69 149* 123 98 107 77 53* 177* 188* 121 89 158*         Results from last 7 days   Lab Units 03/08/24  0134 03/07/24  2214 03/05/24  0457 03/04/24  0610   LACTIC ACID mmol/L 1.0  --   --   --    PROCALCITONIN ng/ml  --  0.07 0.13 0.12       Lines/Drains:  Invasive Devices       Peripheral Intravenous Line  Duration             Peripheral IV 03/09/24 Distal;Right;Upper;Ventral (anterior) Arm <1 day                          Imaging: No pertinent imaging reviewed.    Recent Cultures (last 7 days):   Results from last 7 days   Lab Units 03/07/24 2217   BLOOD CULTURE  No Growth at 24 hrs.  No Growth at 24 hrs.       Last 24 Hours Medication List:   Current Facility-Administered Medications   Medication Dose Route Frequency Provider Last Rate    acetaminophen  650 mg Oral Q6H PRN Samira Bratis,       albuterol  2 puff Inhalation Q4H PRN Kenny Menard DO      calamine-zinc oxide   Topical 4x Daily Dayne Garcia MD      enoxaparin  40 mg Subcutaneous Q24H YEHUDA Dayne Garcia MD      hydrocortisone   Topical BID Dayne Garcia MD      HYDROmorphone  1 mg Intravenous Q4H PRN Dayne Garcia MD      insulin lispro  1-6 Units Subcutaneous 4x Daily (AC & HS) Teresa Crabtree PA-C      levalbuterol  1.25 mg Nebulization TID Kenny Menard DO      loratadine  5 mg Oral Daily Dayne Garcia MD      LORazepam  0.5 mg Oral Q8H PRN Dayne Garcia MD      magnesium Oxide  400 mg Oral Daily Dayne Garcia MD      melatonin  3 mg Oral HS PRN Teresa H  MAYKEL Crabtree      midodrine  5 mg Oral Q8H Dayne Garcia MD      oxyCODONE  5 mg Oral Q6H PRN Dayne Garcia MD      PARoxetine  10 mg Oral Daily Dayne Garcia MD      polyethylene glycol  17 g Per NG Tube BID Dayne Garcia MD      sodium chloride  1 spray Each Nare Q1H PRN Dayne Garcia MD      sodium chloride  4 mL Nebulization TID Kirby Alberts MD      tamsulosin  0.4 mg Oral Daily With Dinner Dayne Garcia MD          Today, Patient Was Seen By: Imani Lyles MD    **Please Note: This note may have been constructed using a voice recognition system.**

## 2024-03-09 NOTE — ASSESSMENT & PLAN NOTE
PTA quviviq 25 mg at bedtime, not on formulary  Continue melatonin as needed   - will continue with home meds as pt tolerates  - EF 20% on last echo

## 2024-03-10 ENCOUNTER — APPOINTMENT (INPATIENT)
Dept: RADIOLOGY | Facility: HOSPITAL | Age: 59
DRG: 870 | End: 2024-03-10
Payer: COMMERCIAL

## 2024-03-10 PROBLEM — R00.0 TACHYCARDIA: Status: ACTIVE | Noted: 2024-03-10

## 2024-03-10 PROBLEM — R13.12 OROPHARYNGEAL DYSPHAGIA: Status: RESOLVED | Noted: 2024-03-05 | Resolved: 2024-03-10

## 2024-03-10 LAB
ATRIAL RATE: 108 BPM
ATRIAL RATE: 108 BPM
BASOPHILS # BLD AUTO: 0.09 THOUSANDS/ÂΜL (ref 0–0.1)
BASOPHILS NFR BLD AUTO: 1 % (ref 0–1)
EOSINOPHIL # BLD AUTO: 1.17 THOUSAND/ÂΜL (ref 0–0.61)
EOSINOPHIL NFR BLD AUTO: 12 % (ref 0–6)
ERYTHROCYTE [DISTWIDTH] IN BLOOD BY AUTOMATED COUNT: 13.4 % (ref 11.6–15.1)
GLUCOSE SERPL-MCNC: 119 MG/DL (ref 65–140)
GLUCOSE SERPL-MCNC: 120 MG/DL (ref 65–140)
GLUCOSE SERPL-MCNC: 93 MG/DL (ref 65–140)
GLUCOSE SERPL-MCNC: 95 MG/DL (ref 65–140)
HCT VFR BLD AUTO: 39.5 % (ref 36.5–49.3)
HGB BLD-MCNC: 12.3 G/DL (ref 12–17)
IMM GRANULOCYTES # BLD AUTO: 0.13 THOUSAND/UL (ref 0–0.2)
IMM GRANULOCYTES NFR BLD AUTO: 1 % (ref 0–2)
LYMPHOCYTES # BLD AUTO: 1.6 THOUSANDS/ÂΜL (ref 0.6–4.47)
LYMPHOCYTES NFR BLD AUTO: 16 % (ref 14–44)
MCH RBC QN AUTO: 29.1 PG (ref 26.8–34.3)
MCHC RBC AUTO-ENTMCNC: 31.1 G/DL (ref 31.4–37.4)
MCV RBC AUTO: 94 FL (ref 82–98)
MONOCYTES # BLD AUTO: 0.86 THOUSAND/ÂΜL (ref 0.17–1.22)
MONOCYTES NFR BLD AUTO: 9 % (ref 4–12)
NEUTROPHILS # BLD AUTO: 6.3 THOUSANDS/ÂΜL (ref 1.85–7.62)
NEUTS SEG NFR BLD AUTO: 61 % (ref 43–75)
NRBC BLD AUTO-RTO: 0 /100 WBCS
P AXIS: 41 DEGREES
P AXIS: 43 DEGREES
PLATELET # BLD AUTO: 299 THOUSANDS/UL (ref 149–390)
PMV BLD AUTO: 8.5 FL (ref 8.9–12.7)
PR INTERVAL: 156 MS
PR INTERVAL: 156 MS
QRS AXIS: -16 DEGREES
QRS AXIS: -16 DEGREES
QRSD INTERVAL: 86 MS
QRSD INTERVAL: 88 MS
QT INTERVAL: 310 MS
QT INTERVAL: 312 MS
QTC INTERVAL: 415 MS
QTC INTERVAL: 418 MS
RBC # BLD AUTO: 4.22 MILLION/UL (ref 3.88–5.62)
T WAVE AXIS: -6 DEGREES
T WAVE AXIS: -7 DEGREES
VENTRICULAR RATE: 108 BPM
VENTRICULAR RATE: 108 BPM
WBC # BLD AUTO: 10.15 THOUSAND/UL (ref 4.31–10.16)

## 2024-03-10 PROCEDURE — 97530 THERAPEUTIC ACTIVITIES: CPT

## 2024-03-10 PROCEDURE — 97116 GAIT TRAINING THERAPY: CPT

## 2024-03-10 PROCEDURE — 94640 AIRWAY INHALATION TREATMENT: CPT

## 2024-03-10 PROCEDURE — 82948 REAGENT STRIP/BLOOD GLUCOSE: CPT

## 2024-03-10 PROCEDURE — 94664 DEMO&/EVAL PT USE INHALER: CPT

## 2024-03-10 PROCEDURE — 71045 X-RAY EXAM CHEST 1 VIEW: CPT

## 2024-03-10 PROCEDURE — 85025 COMPLETE CBC W/AUTO DIFF WBC: CPT | Performed by: STUDENT IN AN ORGANIZED HEALTH CARE EDUCATION/TRAINING PROGRAM

## 2024-03-10 PROCEDURE — 93005 ELECTROCARDIOGRAM TRACING: CPT

## 2024-03-10 PROCEDURE — 99232 SBSQ HOSP IP/OBS MODERATE 35: CPT | Performed by: STUDENT IN AN ORGANIZED HEALTH CARE EDUCATION/TRAINING PROGRAM

## 2024-03-10 PROCEDURE — 97535 SELF CARE MNGMENT TRAINING: CPT

## 2024-03-10 PROCEDURE — 93010 ELECTROCARDIOGRAM REPORT: CPT | Performed by: INTERNAL MEDICINE

## 2024-03-10 PROCEDURE — 94760 N-INVAS EAR/PLS OXIMETRY 1: CPT

## 2024-03-10 RX ADMIN — HYDROCORTISONE: 1 CREAM TOPICAL at 17:29

## 2024-03-10 RX ADMIN — ENOXAPARIN SODIUM 40 MG: 40 INJECTION SUBCUTANEOUS at 17:28

## 2024-03-10 RX ADMIN — FERRIC OXIDE RED: 8; 8 LOTION TOPICAL at 17:29

## 2024-03-10 RX ADMIN — LEVALBUTEROL HYDROCHLORIDE 1.25 MG: 1.25 SOLUTION RESPIRATORY (INHALATION) at 13:23

## 2024-03-10 RX ADMIN — FERRIC OXIDE RED: 8; 8 LOTION TOPICAL at 07:48

## 2024-03-10 RX ADMIN — SODIUM CHLORIDE 500 ML: 0.9 INJECTION, SOLUTION INTRAVENOUS at 12:14

## 2024-03-10 RX ADMIN — MIDODRINE HYDROCHLORIDE 5 MG: 5 TABLET ORAL at 07:46

## 2024-03-10 RX ADMIN — TAMSULOSIN HYDROCHLORIDE 0.4 MG: 0.4 CAPSULE ORAL at 17:28

## 2024-03-10 RX ADMIN — PAROXETINE 10 MG: 10 TABLET, FILM COATED ORAL at 07:46

## 2024-03-10 RX ADMIN — SODIUM CHLORIDE SOLN NEBU 3% 4 ML: 3 NEBU SOLN at 07:32

## 2024-03-10 RX ADMIN — MIDODRINE HYDROCHLORIDE 5 MG: 5 TABLET ORAL at 00:08

## 2024-03-10 RX ADMIN — LORATADINE 5 MG: 10 TABLET ORAL at 07:47

## 2024-03-10 RX ADMIN — MAGNESIUM OXIDE TAB 400 MG (241.3 MG ELEMENTAL MG) 400 MG: 400 (241.3 MG) TAB at 07:46

## 2024-03-10 RX ADMIN — SODIUM CHLORIDE SOLN NEBU 3% 4 ML: 3 NEBU SOLN at 19:29

## 2024-03-10 RX ADMIN — OXYCODONE HYDROCHLORIDE 5 MG: 5 TABLET ORAL at 05:57

## 2024-03-10 RX ADMIN — SODIUM CHLORIDE SOLN NEBU 3% 4 ML: 3 NEBU SOLN at 13:23

## 2024-03-10 RX ADMIN — MIDODRINE HYDROCHLORIDE 5 MG: 5 TABLET ORAL at 17:28

## 2024-03-10 RX ADMIN — HYDROCORTISONE: 1 CREAM TOPICAL at 07:48

## 2024-03-10 RX ADMIN — LEVALBUTEROL HYDROCHLORIDE 1.25 MG: 1.25 SOLUTION RESPIRATORY (INHALATION) at 19:29

## 2024-03-10 RX ADMIN — LEVALBUTEROL HYDROCHLORIDE 1.25 MG: 1.25 SOLUTION RESPIRATORY (INHALATION) at 07:32

## 2024-03-10 NOTE — PLAN OF CARE
Problem: PAIN - ADULT  Goal: Verbalizes/displays adequate comfort level or baseline comfort level  Description: Interventions:  - Encourage patient to monitor pain and request assistance  - Assess pain using appropriate pain scale  - Administer analgesics based on type and severity of pain and evaluate response  - Implement non-pharmacological measures as appropriate and evaluate response  - Consider cultural and social influences on pain and pain management  - Notify physician/advanced practitioner if interventions unsuccessful or patient reports new pain  Outcome: Progressing     Problem: INFECTION - ADULT  Goal: Absence or prevention of progression during hospitalization  Description: INTERVENTIONS:  - Assess and monitor for signs and symptoms of infection  - Monitor lab/diagnostic results  - Monitor all insertion sites, i.e. indwelling lines, tubes, and drains  - Monitor endotracheal if appropriate and nasal secretions for changes in amount and color  - [place] appropriate cooling/warming therapies per order  - Administer medications as ordered  - Instruct and encourage patient and family to use good hand hygiene technique  - Identify and instruct in appropriate isolation precautions for identified infection/condition  Outcome: Progressing

## 2024-03-10 NOTE — ASSESSMENT & PLAN NOTE
Patient's heart rate noted to be in the 100s  Upon discussion with patient he reports chronic tachycardia for years, average heart rate in the 90s.  I reviewed outpatient notes with vital signs showing heart rate in the 90s.  He also endorses poor water intake  Patient is asymptomatic  Unclear etiology, suspect chronic sinus tachycardia likely related to poor water intake, deconditioning, albuterol use.  Less likely infection  Patient had a brief episode of A-fib with RVR in the ICU heart rate in the 150s s/p CVC placement that resolved with IV medications  Continue trial of IV fluis  EKG with sinus tachycardia, reviewed by cardiology  24-hour telemetry with sinus tachycardia, continue  Chest x-ray stable  Obtain UA for completion of infectious workup   Will obtain cardiology evaluation

## 2024-03-10 NOTE — PHYSICAL THERAPY NOTE
Physical Therapy Treatment Note    Patient's Name: Hussein Edward III  : 1965     03/10/24 1413   PT Last Visit   PT Visit Date 03/10/24   Note Type   Note Type Treatment   Pain Assessment   Pain Assessment Tool 0-10   Pain Score No Pain   Pain Location/Orientation Location: Back  (reports resolution of pain w/ Oxycodone)   Restrictions/Precautions   Weight Bearing Precautions Per Order No   Other Precautions Fall Risk;Aspiration  (puree/HTL)   General   Chart Reviewed Yes   Response to Previous Treatment Patient with no complaints from previous session.   Family/Caregiver Present No   Subjective   Subjective Agreeable to mobilize.   Bed Mobility   Supine to Sit Unable to assess   Sit to Supine Unable to assess   Additional Comments Pt greeted in chair.   Transfers   Sit to Stand 4  Minimal assistance   Additional items Assist x 1;Armrests;Increased time required;Verbal cues   Stand to Sit 4  Minimal assistance   Additional items Assist x 1;Armrests;Increased time required;Verbal cues   Additional Comments RW; 4x STS throughout session   Ambulation/Elevation   Gait pattern Excessively slow;Short stride;Improper Weight shift   Gait Assistance 4  Minimal assist   Additional items Assist x 1;Verbal cues;Tactile cues   Assistive Device Rolling walker  (+ chair follow)   Distance 40' + 50' + 40' (w/ seated rests)   Stair Management Assistance Not tested   Balance   Static Sitting Fair +   Dynamic Sitting Fair   Static Standing Fair -   Dynamic Standing Poor +   Ambulatory Poor +  (RW)   Endurance Deficit   Endurance Deficit Yes   Endurance Deficit Description GUERRA, SpO2 79-93% RA (~2 min to recover to 90% w/ ambulation), tachcardia to 120's w/ exertion   Activity Tolerance   Activity Tolerance Patient limited by fatigue   Medical Staff Made Aware COLIN Gaxiola   Assessment   Prognosis Excellent   Problem List Decreased strength;Decreased endurance;Decreased mobility;Impaired balance;Pain   Assessment Pt seen for  PT treatment session w/ interventions consisting of t/f training + gait training. Pt highly motivated to work w/ therapy in order to see how much he can tolerate. Pt demonstrated excellent progress this session w/ decreased assistance required for t/f and ambulation. Pt's activity tolerance remains limited, evident by seated rest periods required to resaturate on RA w/ ambulation. Pt remains limited from I functional baseline and would benefit from acute medical rehab to return to his baseline.   Goals   Patient Goals to be independent   PT Treatment Day 4   Plan   Treatment/Interventions Functional transfer training;LE strengthening/ROM;Therapeutic exercise;Endurance training;Patient/family training;Equipment eval/education;Bed mobility;Gait training;Compensatory technique education;OT   Progress Progressing toward goals   PT Frequency 3-5x/wk   Discharge Recommendation   Rehab Resource Intensity Level, PT I (Maximum Resource Intensity)   AM-PAC Basic Mobility Inpatient   Turning in Flat Bed Without Bedrails 3   Lying on Back to Sitting on Edge of Flat Bed Without Bedrails 3   Moving Bed to Chair 3   Standing Up From Chair Using Arms 3   Walk in Room 3   Climb 3-5 Stairs With Railing 2   Basic Mobility Inpatient Raw Score 17   Basic Mobility Standardized Score 39.67   Highest Level Of Mobility   JH-HLM Goal 5: Stand one or more mins   JH-HLM Achieved 7: Walk 25 feet or more   Education   Education Provided Mobility training;Assistive device   Patient Demonstrates acceptance/verbal understanding;Reinforcement needed   End of Consult   Patient Position at End of Consult Bedside chair;All needs within reach       Geeta Barrios, PT, DPT

## 2024-03-10 NOTE — PLAN OF CARE
Problem: OCCUPATIONAL THERAPY ADULT  Goal: Performs self-care activities at highest level of function for planned discharge setting.  See evaluation for individualized goals.  Description: Treatment Interventions: ADL retraining, Functional transfer training, Endurance training, Patient/family training, Equipment evaluation/education, Compensatory technique education, Activityengagement, Energy conservation          See flowsheet documentation for full assessment, interventions and recommendations.   Outcome: Progressing  Note: Limitation: Decreased ADL status, Decreased endurance, Decreased self-care trans, Decreased high-level ADLs  Prognosis: Good  Assessment: Pt seen for Occupational Therapy session with focus on activity tolerance, functional transfers/mob, sitting balance and tolerance and standing tolerance and balance for pt engagement in LB self-care tasks and energy conservation techniques. Pt cleared by RN/ Else for pt participated in OT session. Pt presented sitting out of bed to bedside chair and agreeable to participate in therapy following pt identifiers confirmed.Pt reported his therapy goal to go for a walk today.  Pt required assist for LB self-care 2* easily short of breath with activity He was able to tolerate functional mob with frequent seated rest breaks 2* pt SPo2 levels drop to 79%. Pt recovers well.  Pt will require post acute rehab service to continue to address these above noted pt deficit which currently impair pt ADL and functional mob. Pt set up to bedside chair post session, and all needs within pt reach     Rehab Resource Intensity Level, OT: I (Maximum Resource Intensity)

## 2024-03-10 NOTE — ASSESSMENT & PLAN NOTE
Patient's heart rate noted to be in the 100s today  Unclear etiology, suspect sinus tachycardia in the setting of decreased oral intake and levalbuterol use  Obtain EKG    500 cc IV fluid bolus  24-hour telemetry to monitor for arrhythmias  Obtain chest x-ray

## 2024-03-10 NOTE — ASSESSMENT & PLAN NOTE
Lab Results   Component Value Date    EGFR 106 03/09/2024    EGFR 107 03/05/2024    EGFR 110 03/04/2024    CREATININE 0.68 03/09/2024    CREATININE 0.67 03/05/2024    CREATININE 0.62 03/04/2024     At baseline  Avoid nephrotoxins

## 2024-03-10 NOTE — ASSESSMENT & PLAN NOTE
Evaluated by PT/OT--recommended rehab at discharge  Plan for ARC at discharge.    Patient work with PT today and O2 dropped to mid 80s while on room air with exertion.  Will continue to monitor  Fall precautions  Ambulate patient

## 2024-03-10 NOTE — ASSESSMENT & PLAN NOTE
Secondary to influenza A with suspected bacterial superinfection  TTE with normal systolic function and grade 1 diastolic dysfunction  Requiring ETT and MD from 2/19-2/29  S/p bronchoscopy on 2/19 with cultures primarily groin Candida glabrata, no bacteria  Blood cultures with no growth to date  While in the ICU patient require multiple doses of IV Lasix and ultimately was placed on a Lasix drip until net negative fluid balance was achieved  Completed 5 days of ceftriaxone azithromycin, followed by 7 days of cefepime for fever thought to be pulmonary source   Completed steroid course  Encouraged IS, flutter valve.   CXR 3/8 with stable diffuse bilateral patchy airspace disease.   Pulmonology recommending outpatient CT chest in 4 weeks to ensure resolution.  Cleared on their end for discharge to rehab  Patient on room air with saturations >92% ar rest, sats drop to mid 80s with ambulation on RA. Recovers quickly without oxygen supplementation

## 2024-03-10 NOTE — OCCUPATIONAL THERAPY NOTE
Occupational Therapy Progress Note     Patient Name: Hussein Edward III  Today's Date: 3/10/2024  Problem List  Principal Problem:    Acute respiratory failure with hypoxia ([facility])  Active Problems:    WALE (obstructive sleep apnea)    Chronic kidney disease    Insomnia    Anxiety    ARDS (adult respiratory distress syndrome) ([facility])    Ambulatory dysfunction    Tachycardia         Occupational Therapy Treatment Note     03/10/24 1412   OT Last Visit   OT Visit Date 03/10/24   Note Type   Note Type Treatment for insurance authorization   Pain Assessment   Pain Assessment Tool 0-10   Pain Rating: FLACC (Rest) - Face 0   Pain Rating: FLACC (Rest) - Legs 0   Pain Rating: FLACC (Rest) - Activity 0   Pain Rating: FLACC (Rest) - Cry 0   Pain Rating: FLACC (Rest) - Consolability 0   Score: FLACC (Rest) 0   Pain Rating: FLACC (Activity) - Face 0   Pain Rating: FLACC (Activity) - Legs 0   Pain Rating: FLACC (Activity) - Activity 0   Pain Rating: FLACC (Activity) - Cry 0   Pain Rating: FLACC (Activity) - Consolability 0   Score: FLACC (Activity) 0   Restrictions/Precautions   Weight Bearing Precautions Per Order No   Other Precautions Fall Risk   Lifestyle   Autonomy I adls and mobility w/o ad - i iadls - shares homemaking with family   Reciprocal Relationships supportive family   Service to Others works FT   Intrinsic Gratification active pta   ADL   Where Assessed Chair   Grooming Assistance 5  Supervision/Setup   UB Bathing Assistance 5  Supervision/Setup   LB Bathing Assistance 4  Minimal Assistance   UB Dressing Assistance 5  Supervision/Setup   LB Dressing Assistance 4  Minimal Assistance   LB Dressing Deficit Pull up over hips   Bed Mobility   Supine to Sit Unable to assess   Transfers   Sit to Stand 4  Minimal assistance   Additional items Assist x 1   Stand to Sit 4  Minimal assistance   Additional items Assist x 1   Functional Mobility   Functional Mobility 4  Minimal assistance   Additional items Rolling walker  "  Subjective   Subjective pt stated \" I close my eyes a lot\" to focus on breathing.   Cognition   Overall Cognitive Status Impaired   Arousal/Participation Responsive;Alert   Attention Within functional limits   Orientation Level Oriented X4   Memory Decreased recall of precautions;Decreased recall of recent events   Following Commands Follows one step commands with increased time or repetition   Activity Tolerance   Activity Tolerance Patient limited by fatigue;Patient limited by pain   Assessment   Assessment Pt seen for Occupational Therapy session with focus on activity tolerance, functional transfers/mob, sitting balance and tolerance and standing tolerance and balance for pt engagement in LB self-care tasks and energy conservation techniques. Pt cleared by RN/ Else for pt participated in OT session. Pt presented sitting out of bed to bedside chair and agreeable to participate in therapy following pt identifiers confirmed.Pt reported his therapy goal to go for a walk today.  Pt required assist for LB self-care 2* easily short of breath with activity He was able to tolerate functional mob with frequent seated rest breaks 2* pt SPo2 levels drop to 79% at one point. Pt however recovered well.  He will benefit from post acute rehab service to continue to address his above noted functional deficits which currently impair pt ADL and functional mob. Pt set up to bedside chair post session, and all needs within pt reach   Plan   Treatment Interventions ADL retraining   Goal Expiration Date 03/15/24   OT Treatment Day 2   OT Frequency 2-3x/wk   Discharge Recommendation   Rehab Resource Intensity Level, OT I (Maximum Resource Intensity)   AM-PAC Daily Activity Inpatient   Lower Body Dressing 3   Bathing 3   Toileting 3   Upper Body Dressing 3   Grooming 3   Eating 4   Daily Activity Raw Score 19   Daily Activity Standardized Score (Calc for Raw Score >=11) 40.22   AM-PAC Applied Cognition Inpatient   Following a " Speech/Presentation 3   Understanding Ordinary Conversation 4   Taking Medications 3   Remembering Where Things Are Placed or Put Away 3   Remembering List of 4-5 Errands 3   Taking Care of Complicated Tasks 3   Applied Cognition Raw Score 19   Applied Cognition Standardized Score 39.77       Kahlida SHAW/WOODY

## 2024-03-10 NOTE — PLAN OF CARE
Problem: PHYSICAL THERAPY ADULT  Goal: Performs mobility at highest level of function for planned discharge setting.  See evaluation for individualized goals.  Description: Treatment/Interventions: OT, Spoke to case management, Spoke to nursing, Gait training, Bed mobility, Patient/family training, Endurance training, Functional transfer training, LE strengthening/ROM  Equipment Recommended:  (TBD)       See flowsheet documentation for full assessment, interventions and recommendations.  Outcome: Progressing  Note: Prognosis: Excellent  Problem List: Decreased strength, Decreased endurance, Decreased mobility, Impaired balance, Pain  Assessment: Pt seen for PT treatment session w/ interventions consisting of t/f training + gait training. Pt highly motivated to work w/ therapy in order to see how much he can tolerate. Pt demonstrated excellent progress this session w/ decreased assistance required for t/f and ambulation. Pt's activity tolerance remains limited, evident by seated rest periods required to resaturate on RA w/ ambulation. Pt remains limited from I functional baseline and would benefit from acute medical rehab to return to his baseline.  Barriers to Discharge: Inaccessible home environment, Decreased caregiver support     Rehab Resource Intensity Level, PT: I (Maximum Resource Intensity)    See flowsheet documentation for full assessment.

## 2024-03-10 NOTE — PROGRESS NOTES
[facility]  Progress Note  Name: Hussein Edward III I  MRN: [id number]  Unit/Bed#: [facility] 829-01 I Date of Admission: 2/19/2024   Date of Service: 3/10/2024 I Hospital Day: 20    Assessment/Plan   * Acute respiratory failure with hypoxia (HCC)  Assessment & Plan  Secondary to influenza A with suspected bacterial superinfection  TTE with normal systolic function and grade 1 diastolic dysfunction  Requiring ETT and MD from 2/19-2/29  S/p bronchoscopy on 2/19 with cultures primarily groin Candida glabrata, no bacteria  Blood cultures with no growth to date  While in the ICU patient require multiple doses of IV Lasix and ultimately was placed on a Lasix drip until net negative fluid balance was achieved  Completed 5 days of ceftriaxone azithromycin, followed by 7 days of cefepime for fever thought to be pulmonary source   Completed steroid course  Encouraged IS, flutter valve.   CXR 3/8 with stable diffuse bilateral patchy airspace disease.   Pulmonology recommending outpatient CT chest in 4 weeks to ensure resolution.  Cleared on their end for discharge to rehab  Patient on room air with saturations >92% ar rest, sats drop to mid 80s with ambulation on RA. Recovers quickly without oxygen supplementation    Tachycardia  Assessment & Plan  Patient's heart rate noted to be in the 100s today  Unclear etiology, suspect sinus tachycardia in the setting of decreased oral intake and levalbuterol use  Obtain EKG    500 cc IV fluid bolus  24-hour telemetry to monitor for arrhythmias  Obtain chest x-ray    Ambulatory dysfunction  Assessment & Plan  Evaluated by PT/OT--recommended rehab at discharge  Plan for ARC at discharge.    Patient work with PT today and O2 dropped to mid 80s while on room air with exertion.  Will continue to monitor  Fall precautions  Ambulate patient    ARDS (adult respiratory distress syndrome) (HCC)  Assessment & Plan  Secondary to influenza A and suspected bacterial  superinfection  Requiring supplemental O2, wean as tolerated  Pulmonology following    Anxiety  Assessment & Plan  Ativan as needed    Insomnia  Assessment & Plan  PTA quviviq 25 mg at bedtime, not on formulary  Continue melatonin as needed    Chronic kidney disease  Assessment & Plan  Lab Results   Component Value Date    EGFR 106 03/09/2024    EGFR 107 03/05/2024    EGFR 110 03/04/2024    CREATININE 0.68 03/09/2024    CREATININE 0.67 03/05/2024    CREATININE 0.62 03/04/2024     At baseline  Avoid nephrotoxins    WALE (obstructive sleep apnea)  Assessment & Plan  Continue CPAP    Oropharyngeal dysphagia-resolved as of 3/10/2024  Assessment & Plan    Patient with mild oropharyngeal dysphagia characterized primarily by slow prolonged mastication and delayed swallow initiation.    Repeat barium swallow on 3/9 with no penetration or aspiration seen.  Cleared for regular diet and thin liquids by speech               VTE Pharmacologic Prophylaxis: VTE Score: 4 Moderate Risk (Score 3-4) - Pharmacological DVT Prophylaxis Ordered: enoxaparin (Lovenox).    Mobility:   Basic Mobility Inpatient Raw Score: 20  JH-HLM Goal: 6: Walk 10 steps or more  JH-HLM Achieved: 6: Walk 10 steps or more  HLM Goal achieved. Continue to encourage appropriate mobility.    Patient Centered Rounds: I performed bedside rounds with nursing staff today.   Discussions with Specialists or Other Care Team Provider: , PT    Education and Discussions with Family / Patient:  Patient will update family members.     Total Time Spent on Date of Encounter in care of patient: 35 mins. This time was spent on one or more of the following: performing physical exam; counseling and coordination of care; obtaining or reviewing history; documenting in the medical record; reviewing/ordering tests, medications or procedures; communicating with other healthcare professionals and discussing with patient's family/caregivers.    Current Length of Stay: 20  day(s)  Current Patient Status: Inpatient   Certification Statement: The patient will continue to require additional inpatient hospital stay due to reevaluation by PT, dispo planning  Discharge Plan: Anticipate discharge in 24-48 hrs to rehab facility.    Code Status: Level 1 - Full Code    Subjective:   No acute events overnight.  Patient feels significantly better.  Shortness of breath and cough improved.  No production of sputum.    Objective:     Vitals:   Temp (24hrs), Av.6 °F (37 °C), Min:98.5 °F (36.9 °C), Max:98.6 °F (37 °C)    Temp:  [98.5 °F (36.9 °C)-98.6 °F (37 °C)] 98.6 °F (37 °C)  HR:  [104-124] 106  Resp:  [17-20] 20  BP: (102-104)/(69-70) 102/70  SpO2:  [92 %-99 %] 96 %  Body mass index is 29.75 kg/m².     Input and Output Summary (last 24 hours):     Intake/Output Summary (Last 24 hours) at 3/10/2024 1408  Last data filed at 3/10/2024 0830  Gross per 24 hour   Intake 410 ml   Output 300 ml   Net 110 ml       Physical Exam:   Physical Exam  Vitals and nursing note reviewed.   Constitutional:       General: He is not in acute distress.     Appearance: He is well-developed.   HENT:      Head: Normocephalic and atraumatic.   Eyes:      Conjunctiva/sclera: Conjunctivae normal.   Cardiovascular:      Rate and Rhythm: Normal rate and regular rhythm.   Pulmonary:      Effort: Pulmonary effort is normal. No respiratory distress.      Breath sounds: Normal breath sounds. No wheezing or rales.      Comments: On room air  Musculoskeletal:      Cervical back: Neck supple.      Right lower leg: No edema.      Left lower leg: No edema.   Skin:     General: Skin is warm and dry.   Neurological:      Mental Status: He is alert.   Psychiatric:         Mood and Affect: Mood normal.         Behavior: Behavior normal.          Additional Data:     Labs:  Results from last 7 days   Lab Units 03/10/24  1320   WBC Thousand/uL 10.15   HEMOGLOBIN g/dL 12.3   HEMATOCRIT % 39.5   PLATELETS Thousands/uL 299   NEUTROS PCT %  61   LYMPHS PCT % 16   MONOS PCT % 9   EOS PCT % 12*     Results from last 7 days   Lab Units 03/09/24  1355 03/05/24  0457 03/04/24  0610   SODIUM mmol/L 140   < > 142   POTASSIUM mmol/L 4.2   < > 4.5   CHLORIDE mmol/L 104   < > 102   CO2 mmol/L 28   < > 30   BUN mg/dL 18   < > 39*   CREATININE mg/dL 0.68   < > 0.62   ANION GAP mmol/L 8   < > 10   CALCIUM mg/dL 8.8   < > 8.8   ALBUMIN g/dL  --   --  3.4*   TOTAL BILIRUBIN mg/dL  --   --  0.68   ALK PHOS U/L  --   --  90   ALT U/L  --   --  167*   AST U/L  --   --  69*   GLUCOSE RANDOM mg/dL 126   < > 134    < > = values in this interval not displayed.         Results from last 7 days   Lab Units 03/10/24  1118 03/10/24  0732 03/09/24  2043 03/09/24  1635 03/09/24  1252 03/09/24  1121 03/09/24  0822 03/08/24  2106 03/08/24  1600 03/08/24  1108 03/08/24  0821 03/08/24  0742   POC GLUCOSE mg/dl 119 95 114 100 122 69 149* 123 98 107 77 53*         Results from last 7 days   Lab Units 03/08/24  0134 03/07/24  2214 03/05/24  0457 03/04/24  0610   LACTIC ACID mmol/L 1.0  --   --   --    PROCALCITONIN ng/ml  --  0.07 0.13 0.12       Lines/Drains:  Invasive Devices       Peripheral Intravenous Line  Duration             Peripheral IV 03/09/24 Distal;Right;Upper;Ventral (anterior) Arm 1 day                          Imaging: No pertinent imaging reviewed.    Recent Cultures (last 7 days):   Results from last 7 days   Lab Units 03/07/24 2217   BLOOD CULTURE  No Growth at 48 hrs.  No Growth at 48 hrs.       Last 24 Hours Medication List:   Current Facility-Administered Medications   Medication Dose Route Frequency Provider Last Rate    acetaminophen  650 mg Oral Q6H PRN Samira Bratis, DO      albuterol  2 puff Inhalation Q4H PRN Kenny Menard, DO      calamine-zinc oxide   Topical 4x Daily Dayne Garcia MD      enoxaparin  40 mg Subcutaneous Q24H [facility] Dayne Garcia MD      hydrocortisone   Topical BID Dayne Garcia MD      HYDROmorphone  1 mg  Intravenous Q4H PRN Dayne Garcia MD      insulin lispro  1-6 Units Subcutaneous 4x Daily (AC & HS) Teresa Crabtree PA-C      levalbuterol  1.25 mg Nebulization TID Kenny Menard DO      loratadine  5 mg Oral Daily Dayne Garcia MD      LORazepam  0.5 mg Oral Q8H PRN Dayne Garcia MD      magnesium Oxide  400 mg Oral Daily Dayne Garcia MD      melatonin  3 mg Oral HS PRN Teresa Crabtree PA-C      midodrine  5 mg Oral Q8H Dayne Garcia MD      oxyCODONE  5 mg Oral Q6H PRN Dayne Garcia MD      PARoxetine  10 mg Oral Daily Dayne Gacria MD      polyethylene glycol  17 g Per NG Tube BID Dayne Garcia MD      sodium chloride  1 spray Each Nare Q1H PRN Dayne Garcia MD      sodium chloride  4 mL Nebulization TID Kirby Alberts MD      tamsulosin  0.4 mg Oral Daily With Dinner Dayne Garcia MD          Today, Patient Was Seen By: Imani Lyles MD    **Please Note: This note may have been constructed using a voice recognition system.**

## 2024-03-11 ENCOUNTER — APPOINTMENT (INPATIENT)
Dept: RADIOLOGY | Facility: HOSPITAL | Age: 59
DRG: 870 | End: 2024-03-11
Attending: STUDENT IN AN ORGANIZED HEALTH CARE EDUCATION/TRAINING PROGRAM
Payer: COMMERCIAL

## 2024-03-11 LAB
ANION GAP SERPL CALCULATED.3IONS-SCNC: 9 MMOL/L
BASOPHILS # BLD AUTO: 0.07 THOUSANDS/ÂΜL (ref 0–0.1)
BASOPHILS NFR BLD AUTO: 1 % (ref 0–1)
BILIRUB UR QL STRIP: NEGATIVE
BUN SERPL-MCNC: 13 MG/DL (ref 5–25)
CALCIUM SERPL-MCNC: 8.7 MG/DL (ref 8.4–10.2)
CHLORIDE SERPL-SCNC: 106 MMOL/L (ref 96–108)
CLARITY UR: CLEAR
CO2 SERPL-SCNC: 25 MMOL/L (ref 21–32)
COLOR UR: NORMAL
CREAT SERPL-MCNC: 0.82 MG/DL (ref 0.6–1.3)
EOSINOPHIL # BLD AUTO: 1.16 THOUSAND/ÂΜL (ref 0–0.61)
EOSINOPHIL NFR BLD AUTO: 13 % (ref 0–6)
ERYTHROCYTE [DISTWIDTH] IN BLOOD BY AUTOMATED COUNT: 13.4 % (ref 11.6–15.1)
GFR SERPL CREATININE-BSD FRML MDRD: 98 ML/MIN/1.73SQ M
GLUCOSE SERPL-MCNC: 101 MG/DL (ref 65–140)
GLUCOSE SERPL-MCNC: 132 MG/DL (ref 65–140)
GLUCOSE SERPL-MCNC: 91 MG/DL (ref 65–140)
GLUCOSE UR STRIP-MCNC: NEGATIVE MG/DL
HCT VFR BLD AUTO: 39.7 % (ref 36.5–49.3)
HGB BLD-MCNC: 12.5 G/DL (ref 12–17)
HGB UR QL STRIP.AUTO: NEGATIVE
IMM GRANULOCYTES # BLD AUTO: 0.11 THOUSAND/UL (ref 0–0.2)
IMM GRANULOCYTES NFR BLD AUTO: 1 % (ref 0–2)
KETONES UR STRIP-MCNC: NEGATIVE MG/DL
LEUKOCYTE ESTERASE UR QL STRIP: NEGATIVE
LYMPHOCYTES # BLD AUTO: 1.54 THOUSANDS/ÂΜL (ref 0.6–4.47)
LYMPHOCYTES NFR BLD AUTO: 18 % (ref 14–44)
MCH RBC QN AUTO: 29 PG (ref 26.8–34.3)
MCHC RBC AUTO-ENTMCNC: 31.5 G/DL (ref 31.4–37.4)
MCV RBC AUTO: 92 FL (ref 82–98)
MONOCYTES # BLD AUTO: 0.81 THOUSAND/ÂΜL (ref 0.17–1.22)
MONOCYTES NFR BLD AUTO: 9 % (ref 4–12)
NEUTROPHILS # BLD AUTO: 5.09 THOUSANDS/ÂΜL (ref 1.85–7.62)
NEUTS SEG NFR BLD AUTO: 58 % (ref 43–75)
NITRITE UR QL STRIP: NEGATIVE
NRBC BLD AUTO-RTO: 0 /100 WBCS
PH UR STRIP.AUTO: 6.5 [PH]
PLATELET # BLD AUTO: 312 THOUSANDS/UL (ref 149–390)
PMV BLD AUTO: 8.5 FL (ref 8.9–12.7)
POTASSIUM SERPL-SCNC: 4.3 MMOL/L (ref 3.5–5.3)
PROT UR STRIP-MCNC: NEGATIVE MG/DL
RBC # BLD AUTO: 4.31 MILLION/UL (ref 3.88–5.62)
SODIUM SERPL-SCNC: 140 MMOL/L (ref 135–147)
SP GR UR STRIP.AUTO: 1.01 (ref 1–1.03)
TSH SERPL DL<=0.05 MIU/L-ACNC: 1.77 UIU/ML (ref 0.45–4.5)
UROBILINOGEN UR STRIP-ACNC: <2 MG/DL
WBC # BLD AUTO: 8.78 THOUSAND/UL (ref 4.31–10.16)

## 2024-03-11 PROCEDURE — 99232 SBSQ HOSP IP/OBS MODERATE 35: CPT | Performed by: STUDENT IN AN ORGANIZED HEALTH CARE EDUCATION/TRAINING PROGRAM

## 2024-03-11 PROCEDURE — 92526 ORAL FUNCTION THERAPY: CPT

## 2024-03-11 PROCEDURE — 71045 X-RAY EXAM CHEST 1 VIEW: CPT

## 2024-03-11 PROCEDURE — 94640 AIRWAY INHALATION TREATMENT: CPT

## 2024-03-11 PROCEDURE — 81003 URINALYSIS AUTO W/O SCOPE: CPT | Performed by: STUDENT IN AN ORGANIZED HEALTH CARE EDUCATION/TRAINING PROGRAM

## 2024-03-11 PROCEDURE — 94664 DEMO&/EVAL PT USE INHALER: CPT

## 2024-03-11 PROCEDURE — 84443 ASSAY THYROID STIM HORMONE: CPT | Performed by: STUDENT IN AN ORGANIZED HEALTH CARE EDUCATION/TRAINING PROGRAM

## 2024-03-11 PROCEDURE — 80048 BASIC METABOLIC PNL TOTAL CA: CPT | Performed by: STUDENT IN AN ORGANIZED HEALTH CARE EDUCATION/TRAINING PROGRAM

## 2024-03-11 PROCEDURE — 82948 REAGENT STRIP/BLOOD GLUCOSE: CPT

## 2024-03-11 PROCEDURE — 99222 1ST HOSP IP/OBS MODERATE 55: CPT | Performed by: INTERNAL MEDICINE

## 2024-03-11 PROCEDURE — 99232 SBSQ HOSP IP/OBS MODERATE 35: CPT | Performed by: INTERNAL MEDICINE

## 2024-03-11 PROCEDURE — 94760 N-INVAS EAR/PLS OXIMETRY 1: CPT

## 2024-03-11 PROCEDURE — 85025 COMPLETE CBC W/AUTO DIFF WBC: CPT | Performed by: STUDENT IN AN ORGANIZED HEALTH CARE EDUCATION/TRAINING PROGRAM

## 2024-03-11 RX ORDER — POLYETHYLENE GLYCOL 3350 17 G/17G
17 POWDER, FOR SOLUTION ORAL 2 TIMES DAILY
Status: DISCONTINUED | OUTPATIENT
Start: 2024-03-11 | End: 2024-03-14 | Stop reason: HOSPADM

## 2024-03-11 RX ORDER — SODIUM CHLORIDE 9 MG/ML
100 INJECTION, SOLUTION INTRAVENOUS CONTINUOUS
Status: DISPENSED | OUTPATIENT
Start: 2024-03-11 | End: 2024-03-12

## 2024-03-11 RX ORDER — METOPROLOL SUCCINATE 25 MG/1
25 TABLET, EXTENDED RELEASE ORAL 2 TIMES DAILY
Status: DISCONTINUED | OUTPATIENT
Start: 2024-03-11 | End: 2024-03-12

## 2024-03-11 RX ADMIN — SODIUM CHLORIDE SOLN NEBU 3% 4 ML: 3 NEBU SOLN at 19:37

## 2024-03-11 RX ADMIN — LEVALBUTEROL HYDROCHLORIDE 1.25 MG: 1.25 SOLUTION RESPIRATORY (INHALATION) at 07:37

## 2024-03-11 RX ADMIN — PAROXETINE 10 MG: 10 TABLET, FILM COATED ORAL at 09:07

## 2024-03-11 RX ADMIN — METOPROLOL SUCCINATE 25 MG: 25 TABLET, FILM COATED, EXTENDED RELEASE ORAL at 22:43

## 2024-03-11 RX ADMIN — FERRIC OXIDE RED: 8; 8 LOTION TOPICAL at 09:11

## 2024-03-11 RX ADMIN — OXYCODONE HYDROCHLORIDE 5 MG: 5 TABLET ORAL at 01:18

## 2024-03-11 RX ADMIN — SODIUM CHLORIDE 100 ML/HR: 0.9 INJECTION, SOLUTION INTRAVENOUS at 12:09

## 2024-03-11 RX ADMIN — SODIUM CHLORIDE SOLN NEBU 3% 4 ML: 3 NEBU SOLN at 07:37

## 2024-03-11 RX ADMIN — MAGNESIUM OXIDE TAB 400 MG (241.3 MG ELEMENTAL MG) 400 MG: 400 (241.3 MG) TAB at 09:05

## 2024-03-11 RX ADMIN — MIDODRINE HYDROCHLORIDE 5 MG: 5 TABLET ORAL at 01:15

## 2024-03-11 RX ADMIN — HYDROCORTISONE: 1 CREAM TOPICAL at 09:11

## 2024-03-11 RX ADMIN — SODIUM CHLORIDE SOLN NEBU 3% 4 ML: 3 NEBU SOLN at 13:29

## 2024-03-11 RX ADMIN — MIDODRINE HYDROCHLORIDE 5 MG: 5 TABLET ORAL at 09:07

## 2024-03-11 RX ADMIN — TAMSULOSIN HYDROCHLORIDE 0.4 MG: 0.4 CAPSULE ORAL at 16:49

## 2024-03-11 RX ADMIN — LEVALBUTEROL HYDROCHLORIDE 1.25 MG: 1.25 SOLUTION RESPIRATORY (INHALATION) at 13:29

## 2024-03-11 RX ADMIN — ENOXAPARIN SODIUM 40 MG: 40 INJECTION SUBCUTANEOUS at 16:49

## 2024-03-11 RX ADMIN — LORATADINE 5 MG: 10 TABLET ORAL at 09:06

## 2024-03-11 RX ADMIN — LORAZEPAM 0.5 MG: 0.5 TABLET ORAL at 15:31

## 2024-03-11 RX ADMIN — MIDODRINE HYDROCHLORIDE 5 MG: 5 TABLET ORAL at 16:49

## 2024-03-11 RX ADMIN — LEVALBUTEROL HYDROCHLORIDE 1.25 MG: 1.25 SOLUTION RESPIRATORY (INHALATION) at 19:37

## 2024-03-11 NOTE — PLAN OF CARE
Problem: Prexisting or High Potential for Compromised Skin Integrity  Goal: Skin integrity is maintained or improved  Description: INTERVENTIONS:  - Identify patients at risk for skin breakdown  - Assess and monitor skin integrity  - Assess and monitor nutrition and hydration status  - Monitor labs   - Assess for incontinence   - Turn and reposition patient  - Assist with mobility/ambulation  - Relieve pressure over bony prominences  - Avoid friction and shearing  - Provide appropriate hygiene as needed including keeping skin clean and dry  - Evaluate need for skin moisturizer/barrier cream  - Collaborate with interdisciplinary team   - Patient/family teaching  - Consider wound care consult   Outcome: Progressing     Problem: PAIN - ADULT  Goal: Verbalizes/displays adequate comfort level or baseline comfort level  Description: Interventions:  - Encourage patient to monitor pain and request assistance  - Assess pain using appropriate pain scale  - Administer analgesics based on type and severity of pain and evaluate response  - Implement non-pharmacological measures as appropriate and evaluate response  - Consider cultural and social influences on pain and pain management  - Notify physician/advanced practitioner if interventions unsuccessful or patient reports new pain  Outcome: Progressing     Problem: INFECTION - ADULT  Goal: Absence or prevention of progression during hospitalization  Description: INTERVENTIONS:  - Assess and monitor for signs and symptoms of infection  - Monitor lab/diagnostic results  - Monitor all insertion sites, i.e. indwelling lines, tubes, and drains  - Monitor endotracheal if appropriate and nasal secretions for changes in amount and color  - [place] appropriate cooling/warming therapies per order  - Administer medications as ordered  - Instruct and encourage patient and family to use good hand hygiene technique  - Identify and instruct in appropriate isolation precautions for  identified infection/condition  Outcome: Progressing     Problem: SAFETY ADULT  Goal: Patient will remain free of falls  Description: INTERVENTIONS:  - Educate patient/family on patient safety including physical limitations  - Instruct patient to call for assistance with activity   - Consult OT/PT to assist with strengthening/mobility   - Keep Call bell within reach  - Keep bed low and locked with side rails adjusted as appropriate  - Keep care items and personal belongings within reach  - Initiate and maintain comfort rounds  - Make Fall Risk Sign visible to staff  - Obtain necessary fall risk management equipment: walker  - Apply yellow socks and bracelet for high fall risk patients  - Consider moving patient to room near nurses station  Outcome: Progressing  Goal: Maintain or return to baseline ADL function  Description: INTERVENTIONS:  -  Assess patient's ability to carry out ADLs; assess patient's baseline for ADL function and identify physical deficits which impact ability to perform ADLs (bathing, care of mouth/teeth, toileting, grooming, dressing, etc.)  - Assess/evaluate cause of self-care deficits   - Assess range of motion  - Assess patient's mobility; develop plan if impaired  - Assess patient's need for assistive devices and provide as appropriate  - Encourage maximum independence but intervene and supervise when necessary  - Involve family in performance of ADLs  - Assess for home care needs following discharge   - Consider OT consult to assist with ADL evaluation and planning for discharge  - Provide patient education as appropriate  Outcome: Progressing  Goal: Maintains/Returns to pre admission functional level  Description: INTERVENTIONS:  - Perform AM-PAC 6 Click Basic Mobility/ Daily Activity assessment daily.  - Set and communicate daily mobility goal to care team and patient/family/caregiver.   - Collaborate with rehabilitation services on mobility goals if consulted  - Out of bed for  toileting  - Record patient progress and toleration of activity level   Outcome: Progressing     Problem: DISCHARGE PLANNING  Goal: Discharge to home or other facility with appropriate resources  Description: INTERVENTIONS:  - Identify barriers to discharge w/patient and caregiver  - Arrange for needed discharge resources and transportation as appropriate  - Identify discharge learning needs (meds, wound care, etc.)  - Arrange for interpretive services to assist at discharge as needed  - Refer to Case Management Department for coordinating discharge planning if the patient needs post-hospital services based on physician/advanced practitioner order or complex needs related to functional status, cognitive ability, or social support system  Outcome: Progressing     Problem: Knowledge Deficit  Goal: Patient/family/caregiver demonstrates understanding of disease process, treatment plan, medications, and discharge instructions  Description: Complete learning assessment and assess knowledge base.  Interventions:  - Provide teaching at level of understanding  - Provide teaching via preferred learning methods  Outcome: Progressing     Problem: Nutrition/Hydration-ADULT  Goal: Nutrient/Hydration intake appropriate for improving, restoring or maintaining nutritional needs  Description: Monitor and assess patient's nutrition/hydration status for malnutrition. Collaborate with interdisciplinary team and initiate plan and interventions as ordered.  Monitor patient's weight and dietary intake as ordered or per policy. Utilize nutrition screening tool and intervene as necessary. Determine patient's food preferences and provide high-protein, high-caloric foods as appropriate.     INTERVENTIONS:  - Monitor oral intake, urinary output, labs, and treatment plans  - Assess nutrition and hydration status and recommend course of action  - Evaluate amount of meals eaten  - Assist patient with eating if necessary   - Allow adequate time for  meals  - Recommend/ encourage appropriate diets, oral nutritional supplements, and vitamin/mineral supplements  - Order, calculate, and assess calorie counts as needed  - Recommend, monitor, and adjust tube feedings and TPN/PPN based on assessed needs  - Assess need for intravenous fluids  - Provide specific nutrition/hydration education as appropriate  - Include patient/family/caregiver in decisions related to nutrition  Outcome: Progressing     Problem: CARDIOVASCULAR - ADULT  Goal: Maintains optimal cardiac output and hemodynamic stability  Description: INTERVENTIONS:  - Monitor I/O, vital signs and rhythm  - Monitor for S/S and trends of decreased cardiac output  - Administer and titrate ordered vasoactive medications to optimize hemodynamic stability  - Assess quality of pulses, skin color and temperature  - Assess for signs of decreased coronary artery perfusion  - Instruct patient to report change in severity of symptoms  Outcome: Progressing  Goal: Absence of cardiac dysrhythmias or at baseline rhythm  Description: INTERVENTIONS:  - Continuous cardiac monitoring, vital signs, obtain 12 lead EKG if ordered  - Administer antiarrhythmic and heart rate control medications as ordered  - Monitor electrolytes and administer replacement therapy as ordered  Outcome: Progressing     Problem: RESPIRATORY - ADULT  Goal: Achieves optimal ventilation and oxygenation  Description: INTERVENTIONS:  - Assess for changes in respiratory status  - Assess for changes in mentation and behavior  - Position to facilitate oxygenation and minimize respiratory effort  - Oxygen administered by appropriate delivery if ordered  - Initiate smoking cessation education as indicated  - Encourage broncho-pulmonary hygiene including cough, deep breathe, Incentive Spirometry  - Assess the need for suctioning and aspirate as needed  - Assess and instruct to report SOB or any respiratory difficulty  - Respiratory Therapy support as  indicated  Outcome: Progressing     Problem: METABOLIC, FLUID AND ELECTROLYTES - ADULT  Goal: Electrolytes maintained within normal limits  Description: INTERVENTIONS:  - Monitor labs and assess patient for signs and symptoms of electrolyte imbalances  - Administer electrolyte replacement as ordered  - Monitor response to electrolyte replacements, including repeat lab results as appropriate  - Instruct patient on fluid and nutrition as appropriate  Outcome: Progressing  Goal: Fluid balance maintained  Description: INTERVENTIONS:  - Monitor labs   - Monitor I/O and WT  - Instruct patient on fluid and nutrition as appropriate  - Assess for signs & symptoms of volume excess or deficit  Outcome: Progressing

## 2024-03-11 NOTE — ASSESSMENT & PLAN NOTE
Lab Results   Component Value Date    EGFR 98 03/11/2024    EGFR 106 03/09/2024    EGFR 107 03/05/2024    CREATININE 0.82 03/11/2024    CREATININE 0.68 03/09/2024    CREATININE 0.67 03/05/2024     At baseline  Avoid nephrotoxins

## 2024-03-11 NOTE — ASSESSMENT & PLAN NOTE
Evaluated by PT/OT--recommended rehab at discharge  Plan for ARC at discharge.    Patient work with PT on 3/10 and O2 dropped to mid 80s while on room air with exertion.  Will continue to monitor  Fall precautions  Ambulate patient

## 2024-03-11 NOTE — CONSULTS
Consultation - Cardiology   Hussein Edward III 58 y.o. male MRN: [id number]  Unit/Bed#: [facility] 829-01 Encounter: [id number]    Inpatient consult to Cardiology  Consult performed by: Alexandria Verma MD  Consult ordered by: Imani Lyles MD          History of Present Illness   Physician Requesting Consult: Imani Lyles MD  Reason for Consult / Principal Problem: Tachycardia    Assessment:  Principal Problem:    Acute respiratory failure with hypoxia ([facility])  Active Problems:    WALE (obstructive sleep apnea)    Obstructive sleep apnea (adult) (pediatric)    Chronic kidney disease    Insomnia    Anxiety    ARDS (adult respiratory distress syndrome) ([facility])    Ambulatory dysfunction    Tachycardia      Assessment/ Plan:    Sinus tachycardia  Chronic, noted throughout his hospital course  Symptomatic since this morning from the time he was questioned about his tachycardia  Not in pain, hemoglobin 12, no recent TSH, currently being hydrated, no fever  Tachycardia could be from ongoing respiratory illness however he is currently maintaining SpO2 above 90%.  There is also a component of anxiety for his ongoing symptoms.  Did have a brief episode of atrial fibrillation on 2/19 noted in EKG during a procedure which converted to normal sinus rhythm with beta-blocker and digoxin  Telemetry sinus tachycardia    Atrial fibrillation - resolved   No prior history, atrial fibrillation on 2/19 during line placement  Status post metoprolol and digoxin, now in sinus rhythm  Has WALE, having issues with CPAP machine, awaiting replacement    Influenza A with ARDS    Plan  Continue hydration with IV normal saline  Will trial low-dose beta-blocker Toprol-XL 25 mg twice daily, he has mild degree of occasional wheezing.  However this is likely from recent respiratory illness  Check TSH  Correct other potential underlying causes [dehydration, pain, anxiety]    HPI: Hussein Edward III is a 58 y.o. year old male who has a history of  meningioma, migraine who had a complicated hospital course.  He first presented to [facility] on 2/16/2024 with upper respiratory symptoms.  He was diagnosed of influenza 4 days prior to presentation.  Showed evidence of pneumonia.  He was started on antibiotics, steroids, and nebulization.  During the hospital course, his respiratory status worsened, and required increased oxygen needs due to ARDS.  He required high flow oxygen, which escalated to BiPAP and eventually intubation on 2/19/2024.  He was transferred to [facility] for possible VV ECMO needs.  He was extubated on 2/29.  Cardiology has been consulted for chronic sinus tachycardia.  Of note patient had a brief episode of fibrillation during line placement on 2/19/2024 which required digoxin and amiodarone with eventual conversion to normal sinus rhythm.     Currently patient is tachypneic.  States that he may have had tachycardia chronically, however since he was made aware of at this morning, he has been short of breath and feeling palpitations.  Denies chest pain/pleuritic chest pain.  He denies ongoing pain.  No fever.  No recent TSH.  He may have a component of anxiety however his tachycardia has been persistent for many days and has not had anxiety in the last few days.  Hemoglobin 12.5.  Electrolytes within normal limits.  Telemetry shows sinus tachycardia.    Review of Systems   Constitutional: Positive for malaise/fatigue.   HENT: Negative.     Eyes: Negative.    Cardiovascular:  Positive for dyspnea on exertion and palpitations. Negative for chest pain, irregular heartbeat, leg swelling, near-syncope, orthopnea, paroxysmal nocturnal dyspnea and syncope.   Respiratory:  Positive for shortness of breath, snoring and wheezing.    Endocrine: Negative.    Hematologic/Lymphatic: Negative.    Skin: Negative.    Musculoskeletal: Negative.    Genitourinary: Negative.    Neurological:  Positive for dizziness.   Psychiatric/Behavioral: Negative.        Historical Information   Past Medical History:   Diagnosis Date    Chronic back pain     Migraine      Past Surgical History:   Procedure Laterality Date    HERNIA REPAIR      MANDIBLE FRACTURE SURGERY      MOUTH SURGERY      cyst in cheek    NASAL SEPTUM SURGERY       Social History     Substance and Sexual Activity   Alcohol Use Never     Social History     Substance and Sexual Activity   Drug Use No     Social History     Tobacco Use   Smoking Status Never   Smokeless Tobacco Former    Types: Snuff    Quit date: 2003     Family History:   Family History   Problem Relation Age of Onset    Breast cancer Mother     Diabetes Father     No Known Problems Family     Substance Abuse Neg Hx     Mental illness Neg Hx        Meds/Allergies   all current active meds have been reviewed, current meds:   Current Facility-Administered Medications   Medication Dose Route Frequency    acetaminophen (TYLENOL) tablet 650 mg  650 mg Oral Q6H PRN    albuterol (PROVENTIL HFA,VENTOLIN HFA) inhaler 2 puff  2 puff Inhalation Q4H PRN    enoxaparin (LOVENOX) subcutaneous injection 40 mg  40 mg Subcutaneous Q24H YEHUDA    HYDROmorphone (DILAUDID) injection 1 mg  1 mg Intravenous Q4H PRN    levalbuterol (XOPENEX) inhalation solution 1.25 mg  1.25 mg Nebulization TID    loratadine (CLARITIN) tablet 5 mg  5 mg Oral Daily    LORazepam (ATIVAN) tablet 0.5 mg  0.5 mg Oral Q8H PRN    magnesium Oxide (MAG-OX) tablet 400 mg  400 mg Oral Daily    melatonin tablet 3 mg  3 mg Oral HS PRN    metoprolol succinate (TOPROL-XL) 24 hr tablet 25 mg  25 mg Oral BID    midodrine (PROAMATINE) tablet 5 mg  5 mg Oral Q8H    oxyCODONE (ROXICODONE) IR tablet 5 mg  5 mg Oral Q6H PRN    PARoxetine (PAXIL) tablet 10 mg  10 mg Oral Daily    polyethylene glycol (MIRALAX) packet 17 g  17 g Oral BID    sodium chloride (OCEAN) 0.65 % nasal spray 1 spray  1 spray Each Nare Q1H PRN    sodium chloride 0.9 % infusion  100 mL/hr Intravenous Continuous    sodium chloride 3 %  "inhalation solution 4 mL  4 mL Nebulization TID    tamsulosin (FLOMAX) capsule 0.4 mg  0.4 mg Oral Daily With Dinner   , and PTA meds:   Prior to Admission Medications   Prescriptions Last Dose Informant Patient Reported? Taking?   Daridorexant HCl 25 MG TABS Unknown  No No   Sig: Take 25 mg by mouth daily at bedtime   Magnesium Oxide 400 MG CAPS Unknown  Yes No   Sig: Take 1 tablet by mouth in the morning   Patient not taking: Reported on 2/16/2024   Rimegepant Sulfate (Nurtec) 75 MG TBDP Unknown Self No No   Sig: Take one NURTEC 75 mg under tongue every other day. Limit 1 in 24 hours   acetaZOLAMIDE (DIAMOX) 500 mg capsule Unknown  No No   Sig: Take 1 capsule (500 mg total) by mouth 2 (two) times a day   benzonatate (TESSALON) 200 MG capsule Unknown  Yes No   Sig: Take 1 capsule by mouth 3 (three) times a day as needed   eszopiclone (LUNESTA) 1 mg tablet   No No   Sig: Take 1 tablet (1 mg total) by mouth daily at bedtime as needed for sleep for up to 14 days Take immediately before bedtime   levocetirizine (XYZAL) 5 MG tablet Unknown  Yes No   Sig: Take 1 tablet by mouth every evening   Patient not taking: Reported on 2/16/2024   magnesium oxide (MAG-OX) 400 mg Unknown Self No No   Sig: Take 1 tablet (400 mg total) by mouth daily Nightly   methocarbamol (ROBAXIN) 750 mg tablet Unknown Self Yes No   Sig: methocarbamol 750 mg tablet   take 1 tablet by mouth every 8 hours if needed   tamsulosin (FLOMAX) 0.4 mg Unknown  No No   Sig: Take 1 capsule (0.4 mg total) by mouth daily with dinner      Facility-Administered Medications: None     sodium chloride, 100 mL/hr, Last Rate: 100 mL/hr (03/11/24 1209)        No Known Allergies    Objective   Vitals: Blood pressure 107/74, pulse (!) 127, temperature 97.6 °F (36.4 °C), resp. rate (!) 58, height 5' 5\" (1.651 m), weight 79 kg (174 lb 2.6 oz), SpO2 99%., Body mass index is 28.98 kg/m².,   Orthostatic Blood Pressures      Flowsheet Row Most Recent Value   Blood Pressure " 107/74 filed at 2024 1510   Patient Position - Orthostatic VS Sitting filed at 2024 0729          Systolic (24hrs), Av , Min:91 , Max:113     Diastolic (24hrs), Av, Min:57, Max:81      Intake/Output Summary (Last 24 hours) at 3/11/2024 1518  Last data filed at 3/11/2024 0234  Gross per 24 hour   Intake --   Output 600 ml   Net -600 ml       Weight (last 2 days)       Date/Time Weight    24 0600 79 (174.16)            Invasive Devices       Peripheral Intravenous Line  Duration             Peripheral IV 24 Distal;Right;Upper;Ventral (anterior) Arm 2 days                        Physical Exam:   Physical Exam  Vitals and nursing note reviewed.   Constitutional:       General: He is in acute distress.   HENT:      Head: Normocephalic.      Nose: Nose normal.      Mouth/Throat:      Mouth: Mucous membranes are moist.   Eyes:      Pupils: Pupils are equal, round, and reactive to light.   Cardiovascular:      Rate and Rhythm: Regular rhythm. Tachycardia present.      Heart sounds: No murmur heard.  Pulmonary:      Breath sounds: Wheezing (occasional) and rales present.      Comments: Tachypneic  Musculoskeletal:      Right lower leg: No edema.      Left lower leg: No edema.   Skin:     General: Skin is warm.      Capillary Refill: Capillary refill takes less than 2 seconds.   Neurological:      General: No focal deficit present.      Mental Status: He is alert.   Psychiatric:         Mood and Affect: Mood normal.             Laboratory Results:        CBC with diff:   Results from last 7 days   Lab Units 24  0459 03/10/24  1320 24  1355   WBC Thousand/uL 8.78 10.15 11.54*   HEMOGLOBIN g/dL 12.5 12.3 13.1   HEMATOCRIT % 39.7 39.5 40.3   MCV fL 92 94 91   PLATELETS Thousands/uL 312 299 379   RBC Million/uL 4.31 4.22 4.44   MCH pg 29.0 29.1 29.5   MCHC g/dL 31.5 31.1* 32.5   RDW % 13.4 13.4 13.4   MPV fL 8.5* 8.5* 8.4*   NRBC AUTO /100 WBCs 0 0 0         CMP:  Results from last 7  "days   Lab Units 03/11/24  0459 03/09/24  1355 03/05/24  0457   POTASSIUM mmol/L 4.3 4.2 4.4   CHLORIDE mmol/L 106 104 103   CO2 mmol/L 25 28 30   BUN mg/dL 13 18 33*   CREATININE mg/dL 0.82 0.68 0.67   CALCIUM mg/dL 8.7 8.8 9.0   EGFR ml/min/1.73sq m 98 106 107         BMP:  Results from last 7 days   Lab Units 03/11/24  0459 03/09/24  1355 03/05/24  0457   POTASSIUM mmol/L 4.3 4.2 4.4   CHLORIDE mmol/L 106 104 103   CO2 mmol/L 25 28 30   BUN mg/dL 13 18 33*   CREATININE mg/dL 0.82 0.68 0.67   CALCIUM mg/dL 8.7 8.8 9.0       BNP:  No results for input(s): \"BNP\" in the last 72 hours.    Magnesium:       Coags:       TSH:       Hemoglobin A1C       Lipid Profile:         Cardiac testing:   No results found for this or any previous visit.    No results found for this or any previous visit.    No results found for this or any previous visit.    No results found for this or any previous visit.        Imaging: I have personally reviewed pertinent reports.    XR chest 1 view    Result Date: 3/11/2024  Narrative: XR CHEST 1 VIEW INDICATION: follow up pna. COMPARISON: 3/8/2024 FINDINGS: No significant change in bilateral lung consolidation, left greater than right. No pneumothorax or pleural effusion. Normal cardiomediastinal silhouette. Bones are unremarkable for age. Normal upper abdomen.     Impression: Unchanged bilateral airspace disease. Workstation performed: [id number]     XR chest pa & lateral    Result Date: 3/8/2024  Narrative: XR CHEST PA & LATERAL INDICATION: resp failure. COMPARISON: 3/6/2024 FINDINGS: Stable bilateral patchy airspace disease. Probable small right pleural effusion. Normal cardiomediastinal silhouette. Bones are unremarkable for age. Normal upper abdomen.     Impression: Stable diffuse bilateral patchy airspace disease. Workstation performed: [id number]     FL barium swallow video w speech    Result Date: 3/8/2024  Narrative: A video barium swallow study was performed by the Department of Speech " Pathology. Please refer to the report for the official interpretation. The images are stored for archival purposes only. Study images were not formally reviewed by the Radiology Department.    XR chest portable    Result Date: 3/6/2024  Narrative: XR CHEST PORTABLE INDICATION: follow up pulmonary consolidation. COMPARISON: 3/2/2024 FINDINGS: Persistent significant bilateral pulmonary infiltrates are redemonstrated, left side slightly more prominent than right. No significant improvement. Possible small effusions. No pneumothorax. Cardiomediastinal silhouette mostly obscured.    Impression: No significant improvement of pulmonary infiltrates presumably representing bilateral pneumonia Workstation performed: [id number]     XR chest portable    Result Date: 3/2/2024  Narrative: XR CHEST PORTABLE INDICATION: tachypnea. COMPARISON: CXR and chest CT 2/27/2024. FINDINGS: Low lung volumes. Persistent patchy bilateral consolidation and groundglass opacity. No pneumothorax or pleural effusion. Normal cardiomediastinal silhouette. Bones are unremarkable for age. Normal upper abdomen.     Impression: No significant change from prior with persistent moderate bilateral patchy consolidation and groundglass opacity. Workstation performed: [id number]     FL barium swallow video w speech    Result Date: 3/1/2024  Narrative: A video barium swallow study was performed by the Department of Speech Pathology. Please refer to the report for the official interpretation. The images are stored for archival purposes only. Study images were not formally reviewed by the Radiology Department.    CT chest abdomen pelvis wo contrast    Result Date: 2/27/2024  Narrative: CT CHEST, ABDOMEN AND PELVIS WITHOUT IV CONTRAST INDICATION: fevers. COMPARISON: 2/18/2024; 6/2/2022; x-ray abdomen from 2/26/2024 TECHNIQUE: CT examination of the chest, abdomen and pelvis was performed without intravenous contrast. Multiplanar 2D reformatted images were created  from the source data. The study is limited without contrast. This examination, like all CT scans performed in the [facility], was performed utilizing techniques to minimize radiation dose exposure, including the use of iterative reconstruction and automated exposure control. Radiation dose length product (DLP) for this visit: 818.92 mGy-cm Enteric Contrast: Not administered. FINDINGS: CHEST Endotracheal tube is noted above the manuel. Nasogastric tube extends into the stomach. LUNGS: Patchy groundglass and airspace opacities are scattered throughout the lungs somewhat denser in the lower lobes with air bronchograms and somewhat heterogeneous in distribution. Findings suggest bronchopneumonia. Asymmetric pulm edema is less likely given the lack of effusions. PLEURA: Unremarkable. HEART/GREAT VESSELS: Heart is unremarkable for patient's age. No thoracic aortic aneurysm. Small pericardial effusion. MEDIASTINUM AND RAVI: No adenopathy. The esophagus is somewhat distended with gas with nasogastric tube. CHEST WALL AND LOWER NECK: Unremarkable. ABDOMEN LIVER/BILIARY TREE: Unremarkable. GALLBLADDER: The gallbladder is distended without opaque stones. SPLEEN: The spleen is borderline measuring 13.4 cm. PANCREAS: Unremarkable. ADRENAL GLANDS: Unremarkable. KIDNEYS/URETERS: No hydronephrosis. Low-density right renal cyst. Bilateral nonobstructing calculi. STOMACH AND BOWEL: The stomach is mildly distended with gas. Small bowel loops demonstrate mild fluid distention without any significant zone of transition or caliber change to the level of the colon. The colon demonstrates more diffuse distention with air-fluid levels without significant wall thickening to the level of the rectum. Colonic distention is increased since the study of 2/26/2024 x-ray.. No pericolonic fat infiltration. APPENDIX: Appendicolith is noted in the proximal appendix. The proximal appendix is borderline in caliber measuring 6 mm  although the tip appears decompressed without much inflammatory change although there is some ascites in the vicinity. The appendix measures 6 mm in caliber on prior CT of 2022 but gas-filled at that time. ABDOMINOPELVIC CAVITY: Trace ascites in the right paracolic gutter. Also trace ascites at the inferior pole of the spleen. No pneumoperitoneum. No lymphadenopathy. VESSELS: Unremarkable for patient's age. PELVIS REPRODUCTIVE ORGANS: Unremarkable for patient's age. URINARY BLADDER: The bladder is decompressed with Shields catheter. ABDOMINAL WALL/INGUINAL REGIONS: Tiny periumbilical hernia fat. Patchy areas of increased attenuation subcutaneous tissues probably is related to injection sites.. BONES: No acute fracture or suspicious osseous lesion. Degenerative changes of the spine are noted. The endplates are well-defined.     Impression: Multiple airspace opacities is consistent with bronchopneumonia. There is been some improvement since the study of February 18. New colonic distention to the level of the rectum is more likely related to ileus without any zone of transition. Clinical correlation for any signs of colitis whether infectious or postinfectious. Follow-up x-ray is advised to assess for any progressive  distention. The right colon measures approximately 7.3 cm in its greatest caliber at the time of the scan. Small amount of ascites. No evidence of pneumoperitoneum. Nonobstructing renal calculi. This was discussed with Dr. Yu in the ICU at 4:44 p.m. apparently the patient has passed significant gas since the prior CT with less distention. Workstation performed: [id number]     CT sinus wo contrast    Result Date: 2/27/2024  Narrative: CT SINUSES INDICATION:   sinusitis. COMPARISON: CT facial bones 6/17/2017 TECHNIQUE:  Axial CT imaging through the sinuses was performed.  In addition, sagittal and coronal reformatted images were submitted for interpretation. Radiation dose length product (DLP) for this  visit:  165.18 mGy-cm .  This examination, like all CT scans performed in the [facility], was performed utilizing techniques to minimize radiation dose exposure, including the use of iterative  reconstruction and automated exposure control. IMAGE QUALITY:  Diagnostic. FINDINGS: FRONTAL SINUSES:  Clear. ETHMOID AIR CELLS:  Clear. MAXILLARY SINUSES:  Clear. SPHENOID SINUSES: Minimal frothy secretions within the left sphenoid sinus. The right sphenoid sinus is clear. NASAL SEPTUM AND CAVITY:  Midline septum. Normal nasal cavity. ANTERIOR OSTEOMEATAL COMPLEX:  Patent. SPHENOETHMOIDAL RECESS: Patent. OSSEOUS STRUCTURES:  No bony erosion or destruction. Normal cribriform plate and planum sphenoidale.  Visualized mastoid temporal bones are clear. The bony walls of the carotid canals are intact. SOFT TISSUES:  Normal. Endotracheal and nasogastric tubes are noted in place.     Impression: Minimal left sphenoid sinus secretions. There are no sinus air-fluid levels to suggest acute sinusitis. Endotracheal and nasogastric tubes are noted in place. Workstation performed: [id number]     XR chest portable ICU    Result Date: 2/27/2024  Narrative: XR CHEST PORTABLE ICU INDICATION: fever. COMPARISON: 3/26/2024 FINDINGS: ET tube tip is 3 cm above the manuel. Esophageal temperature probe tip terminates over the distal third of the esophagus. Distal aspect of NG tube is coursing below the left hemidiaphragm. Left sided central venous catheter tip terminates near cavoatrial junction. Similar appearance of moderate airspace opacity in the left mid to lower lung field. Mild progression of right lung base opacity. No pneumothorax or large pleural effusion. Unchanged cardiomediastinal silhouette. No acute osseous abnormality within the limitations of portable radiography. Normal upper abdomen.     Impression: Similar appearance of moderate airspace opacity in the left mid to lower lung field. Mild progression of right lung  base opacity. Workstation performed: [id number]     XR abdomen 1 view kub    Result Date: 2/27/2024  Narrative: XR ABDOMEN 1 VIEW KUB INDICATION: Patient admitted with acute hypoxic respiratory failure, now with abdominal distention that is worsening. COMPARISON: Chest radiograph 2/26/2024; radiographs of abdomen 9/6/2022 FINDINGS: Enteric tube with distal tip projecting over the stomach. Esophageal temperature probe is present. Multiple air-filled small bowel loops seen without dilatation. Mild dilation of the colonic loops. Air noted within the rectum No evidence of pneumoperitoneum on this supine study. Upright or left lateral decubitus imaging is more sensitive to detect subtle free air in the appropriate setting. No pathologic calcification or soft tissue mass. Patchy airspace opacifications in the lower left lung are better visualized on the dedicated chest radiograph from the same day. Mild degenerative changes of the visualized thoracolumbar spine.     Impression: Multiple air-filled small bowel loops and air-filled large bowel loops with nonobstructive bowel gas pattern Patchy airspace opacification at the left lung base as seen on the previous study Resident: CARLOS RENE I, the attending radiologist, have reviewed the images and agree with the final report above. Workstation performed: [id number]     US bedside procedure    Result Date: 2/27/2024  Narrative: [id number]    VAS upper limb venous duplex scan, complete, bilateral    Result Date: 2/26/2024  Narrative:  [facility] REPORT [facility]INICAL: Indications:  Physician wants to determine patency of the venous system secondary to fever.  FINDINGS:  Segment                         Right           Left                                                            Impression      Impression            Int. Jugular                                    Not Visualized        Innominate Vein                 Not Visualized  Not  Visualized        Ceph Mid Arm                                    Subacute vs. chronic  Cephalic Upper Arm Distal       Acute           Subacute vs. chronic  Cephalic Vein - Forearm Distal  Not visualized                           CONCLUSION: Impression  RIGHT UPPER LIMB: No gross evidence of acute or chronic deep vein thrombosis. Evidence of acute superficial thrombophlebitis was noted in the cephalic cara at the distal upper arm. Doppler evaluation shows a normal response to augmentation maneuvers.  LEFT UPPER LIMB: No gross evidence of acute or chronic deep vein thrombosis. Evidence of subacute vs. chronic superficial thrombophlebitis was noted in the cephalic vein at the mid to distal upper arm. Doppler evaluation shows a normal response to augmentation maneuvers.  Technical findings were given to Eric Garcia at 7:16 PM.  SIGNATURE: Electronically Signed by: NAMAN SALAS MD on 2024-02-26 10:59:50 PM     VAS VENOUS DUPLEX - LOWER LIMB BILATERAL    Result Date: 2/26/2024  Narrative:  [facility] REPORT CLINICAL: Indications: Physician wants to determine patency of the venous system secondary to fever.   CONCLUSION: Impression:  RIGHT LOWER LIMB: No evidence of acute or chronic deep vein thrombosis. No evidence of superficial thrombophlebitis noted. Doppler evaluation shows a normal response to augmentation maneuvers.. Popliteal, posterior tibial and anterior tibial arterial Doppler waveform's are triphasic.  LEFT LOWER LIMB: No evidence of acute or chronic deep vein thrombosis. No evidence of superficial thrombophlebitis noted. Doppler evaluation shows a normal response to augmentation maneuvers. Popliteal, posterior tibial and anterior tibial arterial Doppler waveform's are triphasic.   SIGNATURE: Electronically Signed by: NAMAN SALAS MD on 2024-02-26 10:01:56 PM    XR chest portable ICU    Result Date: 2/26/2024  Narrative: XR CHEST PORTABLE ICU INDICATION: ARDS Day 8. COMPARISON: 2/25/2024  FINDINGS: Left sided central venous catheter tip terminates near cavoatrial junction. Esophageal temperature probe tip terminates over the distal third of the esophagus. ET tube tip is 4 cm above the manuel. Distal aspect of NG tube is coursing below the  left hemidiaphragm. Interval progression of patchy airspace opacity in the left mid to lower lung field with silhouetting of the left heart border. Mild opacity along the right hemidiaphragm, atelectasis versus pneumonitis. No pneumothorax or large pleural effusion. Unchanged cardiomediastinal silhouette. No acute osseous abnormality within the limitations of portable radiography. Normal upper abdomen.     Impression: Interval progression of airspace opacity in the left mid and lower lung field. Mild opacity along the right hemidiaphragm, atelectasis versus pneumonitis. Workstation performed: [id number]      bedside procedure    Result Date: 2/26/2024  Narrative: [id number]    XR chest portable ICU    Result Date: 2/25/2024  Narrative: XR CHEST PORTABLE ICU INDICATION: ARDS Day 7. COMPARISON: CXR 2/24/2024 and 2/23/2024, chest CT 2/18/2024. FINDINGS: ET tube 5 cm above the manuel. NG tube below the diaphragm. Temperature probe in the lower esophagus. Left jugular catheter at cavoatrial junction. Persistent bilateral basilar consolidation/groundglass opacity. No pneumothorax or pleural effusion. Normal cardiomediastinal silhouette. Bones are unremarkable for age. Normal upper abdomen.     Impression: Persistent bilateral basilar predominant consolidation/groundglass opacity. Workstation performed: [id number]     XR chest portable ICU    Result Date: 2/24/2024  Narrative: XR CHEST PORTABLE ICU INDICATION: ARDS Day 5. COMPARISON: 3/23/2024 FINDINGS: ET tube tip is 5.5 cm above the manuel. Distal aspect of NG tube is coursing below the left hemidiaphragm. Left sided central venous catheter tip terminates near cavoatrial junction. Stable   elevation/eventration of the right hemidiaphragm. Similar appearance of bilateral patchy airspace opacity, left greater than right. No pneumothorax or pleural effusion. Unchanged cardiomediastinal silhouette. No acute osseous abnormality within the limitations of portable radiography. Normal upper abdomen.     Impression: Similar appearance of bilateral patchy airspace opacity, left greater than right. Workstation performed: [id number]     XR chest portable ICU    Result Date: 2/23/2024  Narrative: XR CHEST PORTABLE ICU INDICATION: ARDS Day 4. COMPARISON: Chest radiograph 2/22/2024 and chest CTA 2/18/2024 FINDINGS: Endotracheal tube tip projects 2.5 cm from the manuel. Enteric tube projects below the diaphragm and appears to be coiled in the gastric body. Left internal jugular central venous catheter with tip in unchanged position. Airspace opacities Retention previous day's radiograph. No pneumothorax or pleural effusion. Pacer pad overlies the right midlung, limiting evaluation. Normal cardiomediastinal silhouette. Bones are unremarkable for age. Normal upper abdomen.     Impression: Bilateral airspace opacities not significantly changed from radiograph of the previous day. Resident: SHIRLEY FRITZ I, the attending radiologist, have reviewed the images and agree with the final report above. Workstation performed: [id number]     XR chest portable ICU    Result Date: 2/22/2024  Narrative: XR CHEST PORTABLE ICU INDICATION: ARDS Day 3. COMPARISON: Chest radiograph performed the previous day. FINDINGS: Endotracheal tube terminates 3.1 cm above the manuel. Nasogastric tube terminates within the stomach. Left-sided IJ CVC is seen with its tip in stable position at the approximate level of the superior cavoatrial junction. Bilateral airspace opacities are not significantly changed from previous day's radiograph when accounting for differences technique. No appreciable pneumothorax or pleural effusion. Pacer pad overlies  the right midlung, limiting evaluation. Normal cardiomediastinal silhouette. No evidence of acute osseous abnormality. Normal upper abdomen.     Impression: 1. Redemonstration of bilateral airspace opacities, not significantly changed from radiograph of the previous day when accounting for difference in technique. Workstation performed: [id number]     XR chest portable ICU    Result Date: 2/21/2024  Narrative: XR CHEST PORTABLE ICU INDICATION: routiine. COMPARISON: 2/20/2024 FINDINGS: The endotracheal and endogastric tubes and left IJ approach catheter are in stable satisfactory position. Redemonstrated bilateral airspace opacities, not significantly changed given differences in technique. No pneumothorax or pleural effusion. Normal cardiomediastinal silhouette. Bones are unremarkable for age. Normal upper abdomen.     Impression: No significant interval change. Workstation performed: [id number]     XR chest portable ICU    Result Date: 2/21/2024  Narrative: XR CHEST PORTABLE ICU INDICATION: routiine. COMPARISON: 2/20/2024 FINDINGS: The endotracheal and endogastric tubes and left IJ approach catheter are in stable satisfactory position. Redemonstrated bilateral airspace opacities, not significantly changed given differences in technique. No pneumothorax or pleural effusion. Normal cardiomediastinal silhouette. Bones are unremarkable for age. Normal upper abdomen.     Impression: No significant interval change. Workstation performed: [id number]     XR chest portable ICU    Result Date: 2/20/2024  Narrative: XR CHEST PORTABLE ICU INDICATION: Post-prone CXR. COMPARISON: 2/19/2024 FINDINGS: ET tube tip is 4.3 cm above the manuel. Distal aspect of NG tube is coursing below the left hemidiaphragm. Left sided central venous catheter tip terminates near cavoatrial junction. Stable appearance of moderate diffuse patchy airspace opacity. No pneumothorax or pleural effusion. Unchanged cardiomediastinal silhouette. No acute  osseous abnormality within the limitations of portable radiography. Normal upper abdomen.     Impression: Stable appearance of moderate diffuse bilateral patchy airspace opacity. Workstation performed: [id number]     Pre-prone CXR    Result Date: 2/20/2024  Narrative: XR CHEST PORTABLE ICU INDICATION: Pre-pronation CXR. Severe ARDS. COMPARISON: 2/19/2024 14:41 FINDINGS: Interval placement of left internal jugular central venous catheter with tip overlying the right atrium. Endotracheal tube tip 3.7 cm from the manuel. Enteric tube extends beyond the lower margin of the image, below the diaphragm. Unchanged bilateral multifocal airspace opacities. No pneumothorax or pleural effusion. Unchanged enlarged cardiomediastinal silhouette. Bones are unremarkable for age. Normal upper abdomen.     Impression: Interval placement of left internal jugular central venous catheter with tip overlying the right atrium without pneumothorax. Unchanged bilateral multifocal airspace opacities. Resident: SHIRLEY FRITZ I, the attending radiologist, have reviewed the images and agree with the final report above. Workstation performed: [id number]     XR chest portable ICU    Result Date: 2/20/2024  Narrative: XR CHEST PORTABLE ICU INDICATION: OGT placement confirmation. COMPARISON: February 19, 2024 FINDINGS: ET tube is noted with tip about 3.8 cm from the manuel Feeding tube is seen extending down below the diaphragm Patchy opacity seen in the both lungs. No pneumothorax or pleural effusion. Normal cardiomediastinal silhouette. Bones are unremarkable for age. Normal upper abdomen.     Impression: Patchy opacity seen in the both lungs Feeding tube in appropriate position extending down below the dome of the diaphragm Workstation performed: [id number]     XR chest portable ICU    Result Date: 2/20/2024  Narrative: XR CHEST PORTABLE ICU INDICATION: check ET tube placement. COMPARISON: Radiograph from 2/19/2014 FINDINGS: Endotracheal tube  tip 3.8 cm above the manuel in satisfactory position. Moderate patchy bilateral airspace opacities, not significantly changed. Low lung volumes. No pneumothorax or pleural effusion. Stable enlarged cardiomediastinal silhouette. Bones are unremarkable for age. Normal upper abdomen.     Impression: 1. Satisfactory positioning of the ETT, tip 3.8 cm above the manuel. 2. Stable multifocal lung consolidation. Workstation performed: [id number]     US bedside procedure    Result Date: 2/20/2024  Narrative: [id number]    Bronchoscopy    Result Date: 2/19/2024  Narrative: Table formatting from the original result was not included. [facility] [address] [facility] [id number] [phone] DATE OF SERVICE: 2/19/24 PHYSICIAN(S): Attending: Zach Smith MD Fellow: Olivier Jain DO INDICATION: ARDS (adult respiratory distress syndrome) (HCC), Acute respiratory failure with hypoxia (HCC) POST-OP DIAGNOSIS: See the impression below. PREPROCEDURE: Standard airway preparation completed per respiratory therapy protocol.  Informed consent was obtained. Images reviewed prior to the procedure.  A Time Out was performed. No suspicion or identified risk for TB or other airborne infectious disease; bronchoscopy procedure being performed for diagnostic purposes. PROCEDURE: Bronchoscopy DETAILS OF PROCEDURE: Patient was taken to the procedure room where a time out was performed to confirm correct patient and correct procedure. The patient underwent moderate sedation, which was administered by a sedation nurse. The patient's blood pressure, ECG, heart rate, level of consciousness, oxygen and respirations were monitored throughout the procedure. The patient experienced no blood loss. The scope was introduced through the endotracheal tube. The procedure was not difficult. The patient tolerated the procedure well. There were no apparent adverse events. ANESTHESIA INFORMATION: ASA: ASA  status not filed in the log. Anesthesia Type: Anesthesia type not filed in the log. FINDINGS: The main manuel, left main stem, DAVI, lingula, LLL, right main stem, right upper lobar bronchus, bronchus intermedius, right middle lobar bronchus and right lower lobar bronchus appeared normal. Minimal, thin and clear secretions present in all observed locations, including the middle trachea, main manuel, left main stem, left upper lobar bronchus, lingular lobar bronchus, left lower lobar bronchus, right main stem, right upper lobar bronchus, bronchus intermedius, right middle lobar bronchus and right lower lobar bronchus; secretions were easily removed and the airway was cleared Mild, generalized erythematous and friable mucosa in the middle trachea, lower trachea, main manuel, left main stem, left upper lobar bronchus, lingular lobar bronchus, left lower lobar bronchus, right main stem, right upper lobar bronchus, bronchus intermedius, right middle lobar bronchus and right lower lobar bronchus SPECIMENS: ID Type Source Tests Collected by Time Destination A :  Bronchial Lung, Right Upper Lobe Bronchoalveolar Lavage VIRUS CULTURE, BRONCHIAL CULTURE AND GRAM STAIN, LEGIONELLA CULTURE Oliviersanjuana Jain, DO 2/19/2024  5:10 PM  B :  Bronchial Lung, Left Lingula Lavage VIRUS CULTURE, BRONCHIAL CULTURE AND GRAM STAIN, LEGIONELLA CULTURE Oliviersanjuana Jain, DO 2/19/2024  5:11 PM  C :  Lavage Lung, Left Lingula BRONCHOALVEOLAR LAVAGE (BAL) DIFFERENTIAL Oliviersanjuana Jain, DO 2/19/2024  5:12 PM  D :  Lavage Lung, Right Upper Lobe BRONCHOALVEOLAR LAVAGE (BAL) DIFFERENTIAL Olivier Jain, DO 2/19/2024  5:12 PM  E :  Bronchial Bilateral washing VIRUS CULTURE, BRONCHIAL CULTURE AND GRAM STAIN, LEGIONELLA CULTURE Olivier Jain, DO 2/19/2024  5:13 PM       Impression: Minimal secretions and some erythematous mucosa. BAL of the L lingula (superior segment LB3) and anterior branch of the RUL (RB3). RECOMMENDATION:  Await pathology results Olivier GA  DO Susy Addendum: I was the pulmonary critical care attending by bedside during the entire procedure time on 2/19/2024 at 5 PM supervising and assisting. Zach Smith MD    Echo complete w/ contrast if indicated    Result Date: 2/19/2024  Narrative:   Left Ventricle: Left ventricular cavity size is normal. Wall thickness is mildly increased. There is concentric remodeling. The left ventricular ejection fraction is 55-60%. Systolic function is normal. Wall motion is normal. Diastolic function is mildly abnormal, consistent with grade I (abnormal) relaxation.   Right Ventricle: Right ventricular cavity size is normal. Systolic function is normal.   Left Atrium: The atrium is normal in size.   Right Atrium: The atrium is normal in size.     XR chest portable ICU    Result Date: 2/19/2024  Narrative: XR CHEST PORTABLE ICU INDICATION:  F/u PNA. COMPARISON: Chest CT 2/18/2024, CXR 2/16/2024. FINDINGS: No change in multifocal bilateral consolidation. No pneumothorax or pleural effusion. Unremarkable cardiomediastinal silhouette. Bones are unremarkable for age. Unremarkable upper abdomen.     Impression: No change in multifocal bilateral consolidation. Workstation performed: [id number]     CTA chest pe study    Result Date: 2/18/2024  Narrative: CTA - CHEST WITH IV CONTRAST - PULMONARY ANGIOGRAM INDICATION: Patient admitted with influenza, worsening hypoxia despite treatment. COMPARISON: None. TECHNIQUE: CTA examination of the chest was performed using angiographic technique according to a protocol specifically tailored to evaluate for pulmonary embolism. Multiplanar 2D reformatted images were created from the source data. In addition, coronal  3D MIP postprocessing was performed on the acquisition scanner. Radiation dose length product (DLP) for this visit: 558.15 mGy-cm . This examination, like all CT scans performed in the [facility], was performed utilizing techniques to minimize radiation dose  exposure, including the use of iterative reconstruction and automated exposure control. IV Contrast: 85 mL of iohexol (OMNIPAQUE) FINDINGS: PULMONARY ARTERIAL TREE: No pulmonary embolus is seen. LUNGS: Extensive bilateral groundglass and consolidative opacities with predominantly perihilar and peripheral/subpleural distribution consistent with viral/atypical pneumonia. There is no tracheal or endobronchial lesion. PLEURA: Unremarkable. HEART/GREAT VESSELS: Unremarkable for patient's age. No thoracic aortic aneurysm. MEDIASTINUM AND RAVI: Tiny hiatal hernia. Shotty subcentimeter mediastinal and hilar lymph nodes are seen. CHEST WALL AND LOWER NECK: Unremarkable. VISUALIZED STRUCTURES IN THE UPPER ABDOMEN: Right renal cyst. OSSEOUS STRUCTURES: No acute fracture or destructive osseous lesion.     Impression: No evidence for pulmonary embolus. Extensive bilateral groundglass and consolidative opacities with predominantly perihilar and peripheral distribution compatible with viral and/or atypical pneumonia, likely influenza given clinical history. Recommend clinical correlation and interval follow-up to resolution. Workstation performed: [id number]     XR chest pa & lateral    Result Date: 2/16/2024  Narrative: [id number]    CT head wo contrast    Result Date: 2/12/2024  Narrative: Initial report created on 2/12/2024 4:45:02 PM EST PROCEDURE INFORMATION: Exam: CT Head Without Contrast Exam date and time: 2/12/2024 4:33 PM Age: 58 years old Clinical indication: Headache, chronic, new features or increased frequency TECHNIQUE: Imaging protocol: Computed tomography of the head without contrast. Radiation optimization: All CT scans at this facility use at least one of these dose optimization techniques: automated exposure control; mA and/or kV adjustment per patient size (includes targeted exams where dose is matched to clinical indication); or iterative reconstruction. COMPARISON: No  relevant prior studies available. FINDINGS: Brain: No acute intracranial hemorrhage, mass effect, or midline shift. Cerebral ventricles: The ventricular system is not dilated. The cisterns are patent. Paranasal sinuses: The visualized portions of the paranasal sinuses are clear. No air-fluid levels are seen. Mastoid air cells: Visualized mastoid air cells are well aerated. Bones/joints: There is no depressed skull fracture. Soft tissues: Unremarkable.    Impression: No acute intracranial process. THIS DOCUMENT HAS BEEN ELECTRONICALLY SIGNED BY SOPHIA ZHAO MD      EKG reviewed personally: Sinus tachycardia  Telemetry reviewed personally: Sinus tachycardia        Code Status: Level 1 - Full Code

## 2024-03-11 NOTE — PROGRESS NOTES
"Progress Note - Pulmonary   Hussein Edward III 58 y.o. male MRN: [id number]  Unit/Bed#: [facility] 829-01 Encounter: [id number]      Assessment:  Acute hypoxic respiratory failure- required ETT and MV from 02/19-02/29  ARDS 2/2 influenza A and suspected bacterial superinfection  WALE on CPAP  Afib     Plan:  Repeat CXR pa/lat appears improved  Continue with airway clearance protocol  Wean O2 as able, maintain O2 sat > 88%, currently on RA  Continue with IS use, OOB to chair and ambulate as able      Subjective:   Patient states he feels his breathing is much improved. He is motivated to go to rehab. He walked the hallway. He denies fevers, chills, CP, SOB.    Objective:       Vitals: Blood pressure 100/57, pulse (!) 108, temperature 97.8 °F (36.6 °C), resp. rate 16, height 5' 5\" (1.651 m), weight 79 kg (174 lb 2.6 oz), SpO2 95%., RA, Body mass index is 28.98 kg/m².      Intake/Output Summary (Last 24 hours) at 3/11/2024 1055  Last data filed at 3/11/2024 0234  Gross per 24 hour   Intake --   Output 600 ml   Net -600 ml         Physical Exam  Physical Exam  Vitals and nursing note reviewed.   Constitutional:       Appearance: Normal appearance. He is normal weight.   HENT:      Head: Normocephalic and atraumatic.      Right Ear: External ear normal.      Left Ear: External ear normal.      Nose: Nose normal.      Mouth/Throat:      Mouth: Mucous membranes are moist.      Pharynx: Oropharynx is clear.   Eyes:      Conjunctiva/sclera: Conjunctivae normal.   Cardiovascular:      Rate and Rhythm: Normal rate and regular rhythm.      Pulses: Normal pulses.      Heart sounds: Normal heart sounds.   Pulmonary:      Effort: Pulmonary effort is normal.      Comments: Diminished breath sounds  Abdominal:      General: Abdomen is flat. Bowel sounds are normal.      Palpations: Abdomen is soft.   Musculoskeletal:         General: No swelling or tenderness.      Cervical back: Neck supple. No muscular tenderness.   Skin:     General: Skin " is warm and dry.      Capillary Refill: Capillary refill takes less than 2 seconds.   Neurological:      Mental Status: He is alert and oriented to person, place, and time. Mental status is at baseline.   Psychiatric:         Mood and Affect: Mood normal.         Behavior: Behavior normal.         Thought Content: Thought content normal.         Judgment: Judgment normal.         Labs: I have personally reviewed pertinent lab results.  Laboratory and Diagnostics  Results from last 7 days   Lab Units 03/11/24  0459 03/10/24  1320 03/09/24  1355 03/07/24 2214 03/05/24  0457   WBC Thousand/uL 8.78 10.15 11.54* 13.89* 13.01*   HEMOGLOBIN g/dL 12.5 12.3 13.1 12.7 12.0   HEMATOCRIT % 39.7 39.5 40.3 39.9 38.7   PLATELETS Thousands/uL 312 299 379 435* 492*   NEUTROS PCT % 58 61 72 80* 84*   MONOS PCT % 9 9 6 5 3*   EOS PCT % 13* 12* 10* 3 1     Results from last 7 days   Lab Units 03/11/24  0459 03/09/24  1355 03/05/24  0457   SODIUM mmol/L 140 140 142   POTASSIUM mmol/L 4.3 4.2 4.4   CHLORIDE mmol/L 106 104 103   CO2 mmol/L 25 28 30   ANION GAP mmol/L 9 8 9   BUN mg/dL 13 18 33*   CREATININE mg/dL 0.82 0.68 0.67   CALCIUM mg/dL 8.7 8.8 9.0   GLUCOSE RANDOM mg/dL 101 126 149*                   Results from last 7 days   Lab Units 03/08/24  0134   LACTIC ACID mmol/L 1.0                     Results from last 7 days   Lab Units 03/07/24 2214 03/05/24  0457   PROCALCITONIN ng/ml 0.07 0.13       ABG:       Microbiology:  RP2 negative     Imaging and other studies: I have personally reviewed pertinent reports.   and I have personally reviewed pertinent films in PACS  CXR 3/6/24:Persistent significant bilateral pulmonary infiltrates are redemonstrated, left side slightly more prominent than right. No significant improvement. Possible small effusions. No pneumothorax. Cardiomediastinal silhouette mostly obscured.     Trev Martin MD  Pulmonary/Critical Care Fellow PGY-V  [facility]  "[facility]ates      Disclaimer: Portions of the record may have been created with voice recognition software. Occasional wrong word or \"sound a like\" substitutions may have occurred due to the inherent limitations of voice recognition software. Careful consideration should be taken to recognize, using context, where substitutions have occurred.    "

## 2024-03-11 NOTE — SPEECH THERAPY NOTE
"Speech Language/Pathology    Speech/Language Pathology Progress Note    Patient Name: Hussein Edward III  Today's Date: 3/11/2024     Subjective:  Pt was alert and awake. He was sitting upright in his recliner. Pt stated, \"I'm good with most food but I'm not sure if I'm ready to try spinach yet.\"     Objective:  Pt was seen today for dysphagia therapy. Current diet is regular w/ thin liquids. Pt was on room air. Pt stated he had completed oral care after breakfast. The focus of today's session was to assess diet tolerance. Pt continues to express nerves for trying different food items, however, currently there appears to be no s/s of aspiration and pt does not express discomfort or pain related to swallowing. When discussing trials of various foods pt said, \"no need to rush.\" Voice continues to be mildly weak from baseline but pt stated \"it's getting there.\" Today pt had a lunch tray consisting of chicken marsala, white rice, and soft carrots with a cup of ice water.   Swallow function:  Bolus acceptance, manipulation and transfer all appeared functional. Mastication was complete and there was no oral residue noted throughout the meal. Pt had a steady pace of intake and pharyngeal swallow appeared prompt. No s/s of aspiration noted during today's session.     Assessment:  Swallow function has improved w/ current diet. Pt appears to be very self aware and capable of maintaining safe PO intake. He stated, \"If it gets too difficult I will take a break or try something softer.\"    Plan/Recommendations:  Continue regular diet w/ thin liquids. ST f/u not necessary at this time. Please reconsult w/ any changes or concerns.      "

## 2024-03-11 NOTE — UTILIZATION REVIEW
Continued Stay Review    Date: 3/9/24                          Current Patient Class: inpatient  Current Level of Care: med surg    HPI:58 y.o. male initially admitted on 2/19/24  Acute respiratory failure with hypoxia   Completed 5 days of ceftriaxone azithromycin, followed by 7 days of cefepime for fever thought to be pulmonary source   Completed steroid course  Encouraged IS, flutter valve.  C  CXR 3/8 with stable diffuse bilateral patchy airspace disease.   Pulmonology recommending outpatient CT chest in 4 weeks to ensure resolution    Assessment/Plan:  Continues to feel short of breath with exertion. Rhonchi present. Continue to monitor resp status, O2, check ambulating pulse ox. Pulmonology following. PT OT recommend rehab at discharge, plan for ARC when medically stable. Continue home meds. Continue med surg level of care.     Vital Signs:   Date/Time Temp Pulse Resp BP MAP (mmHg) SpO2 O2 Device Patient Position - Orthostatic VS   03/11/24 0739 -- -- -- -- -- 95 % -- --   03/11/24 07:28:43 97.8 °F (36.6 °C) 108 Abnormal  16 100/57 71 95 % -- --   03/11/24 02:34:23 97.8 °F (36.6 °C) 117 Abnormal  18 113/81 92 92 % -- --   03/10/24 22:33:08 98.1 °F (36.7 °C) 106 Abnormal  18 91/57 68 95 % -- --   03/10/24 1913 -- -- -- -- -- -- None (Room air) --   03/10/24 15:12:32 99.2 °F (37.3 °C) 115 Abnormal  18 -- -- 95 % -- --   03/10/24 1324 -- -- -- -- -- 96 % None (Room air) --   03/10/24 07:45:57 98.6 °F (37 °C) 106 Abnormal  20 102/70 81 99 % -- --   03/10/24 0745 -- -- -- -- -- -- None (Room air) --   03/10/24 0732 -- -- -- -- -- 95 % None (Room air) --   03/09/24 2300 -- 109 Abnormal  -- -- -- 93 % -- --   03/09/24 21:53:27 98.5 °F (36.9 °C) 124 Abnormal  20 103/69 80 92 % None (Room air) --   03/09/24 2050 -- -- -- -- -- 95 % -- --   03/09/24 15:33:32 98.6 °F (37 °C) 104 17 104/69 81 93 % -- --   03/09/24 1200 -- -- -- -- -- -- None (Room air) --   03/09/24 1100 -- -- -- -- -- -- None (Room air) --   03/09/24  0900 -- -- -- -- -- -- Nasal cannula --   03/09/24 07:29:04 97.4 °F (36.3 °C) Abnormal  95 19 114/80 91 100 % -- Sitting   03/09/24 0719 -- -- -- -- -- 99 % -- --     Pertinent Labs/Diagnostic Results:       Results from last 7 days   Lab Units 03/11/24  0459 03/10/24  1320 03/09/24  1355 03/07/24 2214 03/05/24 0457   WBC Thousand/uL 8.78 10.15 11.54* 13.89* 13.01*   HEMOGLOBIN g/dL 12.5 12.3 13.1 12.7 12.0   HEMATOCRIT % 39.7 39.5 40.3 39.9 38.7   PLATELETS Thousands/uL 312 299 379 435* 492*   NEUTROS ABS Thousands/µL 5.09 6.30 8.32* 11.13* 11.01*         Results from last 7 days   Lab Units 03/11/24  0459 03/09/24  1355 03/05/24 0457   SODIUM mmol/L 140 140 142   POTASSIUM mmol/L 4.3 4.2 4.4   CHLORIDE mmol/L 106 104 103   CO2 mmol/L 25 28 30   ANION GAP mmol/L 9 8 9   BUN mg/dL 13 18 33*   CREATININE mg/dL 0.82 0.68 0.67   EGFR ml/min/1.73sq m 98 106 107   CALCIUM mg/dL 8.7 8.8 9.0         Results from last 7 days   Lab Units 03/11/24  1131 03/11/24  0736 03/10/24  2112 03/10/24  1615 03/10/24  1118 03/10/24  0732 03/09/24  2043 03/09/24  1635 03/09/24  1252 03/09/24  1121 03/09/24  0822 03/08/24  2106   POC GLUCOSE mg/dl 132 91 120 93 119 95 114 100 122 69 149* 123     Results from last 7 days   Lab Units 03/11/24  0459 03/09/24  1355 03/05/24  0457   GLUCOSE RANDOM mg/dL 101 126 149*     Results from last 7 days   Lab Units 03/07/24 2214 03/05/24  0457   PROCALCITONIN ng/ml 0.07 0.13     Results from last 7 days   Lab Units 03/08/24  0134   LACTIC ACID mmol/L 1.0     Results from last 7 days   Lab Units 03/07/24  2217   BLOOD CULTURE  No Growth at 72 hrs.  No Growth at 72 hrs.     Medications:   Scheduled Medications:  enoxaparin, 40 mg, Subcutaneous, Q24H YEHUDA  levalbuterol, 1.25 mg, Nebulization, TID  loratadine, 5 mg, Oral, Daily  magnesium Oxide, 400 mg, Oral, Daily  midodrine, 5 mg, Oral, Q8H  PARoxetine, 10 mg, Oral, Daily  polyethylene glycol, 17 g, Oral, BID  sodium chloride, 4 mL, Nebulization,  TID  tamsulosin, 0.4 mg, Oral, Daily With Dinner      Continuous IV Infusions:  sodium chloride, 100 mL/hr, Intravenous, Continuous      PRN Meds:  acetaminophen, 650 mg, Oral, Q6H PRN  albuterol, 2 puff, Inhalation, Q4H PRN  HYDROmorphone, 1 mg, Intravenous, Q4H PRN  LORazepam, 0.5 mg, Oral, Q8H PRN  melatonin, 3 mg, Oral, HS PRN  oxyCODONE, 5 mg, Oral, Q6H PRN  sodium chloride, 1 spray, Each Nare, Q1H PRN        Discharge Plan: d    Network Utilization Review Department  ATTENTION: Please call with any questions or concerns to [phone] and carefully listen to the prompts so that you are directed to the right person. All voicemails are confidential.   For Discharge needs, contact Care Management DC Support Team at [phone] opt. 2  Send all requests for admission clinical reviews, approved or denied determinations and any other requests to dedicated fax number below belonging to the [facility] where the patient is receiving treatment. List of dedicated fax numbers for the Facilities:  FACILITY NAME UR FAX NUMBER   ADMISSION DENIALS (Administrative/Medical Necessity) [phone]   DISCHARGE SUPPORT TEAM (NETWORK) [phone]   PARENT CHILD HEALTH (Maternity/NICU/Pediatrics) [phone]   [facility] [phone]   [facility] [phone]   [facility] [phone]   [facility] [phone]   [facility] [phone]   [facility] [phone]   [facility] [phone]   [facility] [phone]   [facility] [phone]   [facility] [phone]   [facility] [phone]   [facility] [phone]

## 2024-03-11 NOTE — PLAN OF CARE
Problem: PAIN - ADULT  Goal: Verbalizes/displays adequate comfort level or baseline comfort level  Description: Interventions:  - Encourage patient to monitor pain and request assistance  - Assess pain using appropriate pain scale  - Administer analgesics based on type and severity of pain and evaluate response  - Implement non-pharmacological measures as appropriate and evaluate response  - Consider cultural and social influences on pain and pain management  - Notify physician/advanced practitioner if interventions unsuccessful or patient reports new pain  Outcome: Progressing     Problem: INFECTION - ADULT  Goal: Absence or prevention of progression during hospitalization  Description: INTERVENTIONS:  - Assess and monitor for signs and symptoms of infection  - Monitor lab/diagnostic results  - Monitor all insertion sites, i.e. indwelling lines, tubes, and drains  - Monitor endotracheal if appropriate and nasal secretions for changes in amount and color  - [place] appropriate cooling/warming therapies per order  - Administer medications as ordered  - Instruct and encourage patient and family to use good hand hygiene technique  - Identify and instruct in appropriate isolation precautions for identified infection/condition  Outcome: Progressing     Problem: DISCHARGE PLANNING  Goal: Discharge to home or other facility with appropriate resources  Description: INTERVENTIONS:  - Identify barriers to discharge w/patient and caregiver  - Arrange for needed discharge resources and transportation as appropriate  - Identify discharge learning needs (meds, wound care, etc.)  - Arrange for interpretive services to assist at discharge as needed  - Refer to Case Management Department for coordinating discharge planning if the patient needs post-hospital services based on physician/advanced practitioner order or complex needs related to functional status, cognitive ability, or social support system  Outcome: Progressing      Problem: Knowledge Deficit  Goal: Patient/family/caregiver demonstrates understanding of disease process, treatment plan, medications, and discharge instructions  Description: Complete learning assessment and assess knowledge base.  Interventions:  - Provide teaching at level of understanding  - Provide teaching via preferred learning methods  Outcome: Progressing     Problem: Nutrition/Hydration-ADULT  Goal: Nutrient/Hydration intake appropriate for improving, restoring or maintaining nutritional needs  Description: Monitor and assess patient's nutrition/hydration status for malnutrition. Collaborate with interdisciplinary team and initiate plan and interventions as ordered.  Monitor patient's weight and dietary intake as ordered or per policy. Utilize nutrition screening tool and intervene as necessary. Determine patient's food preferences and provide high-protein, high-caloric foods as appropriate.     INTERVENTIONS:  - Monitor oral intake, urinary output, labs, and treatment plans  - Assess nutrition and hydration status and recommend course of action  - Evaluate amount of meals eaten  - Assist patient with eating if necessary   - Allow adequate time for meals  - Recommend/ encourage appropriate diets, oral nutritional supplements, and vitamin/mineral supplements  - Order, calculate, and assess calorie counts as needed  - Recommend, monitor, and adjust tube feedings and TPN/PPN based on assessed needs  - Assess need for intravenous fluids  - Provide specific nutrition/hydration education as appropriate  - Include patient/family/caregiver in decisions related to nutrition  Outcome: Progressing     Problem: CARDIOVASCULAR - ADULT  Goal: Maintains optimal cardiac output and hemodynamic stability  Description: INTERVENTIONS:  - Monitor I/O, vital signs and rhythm  - Monitor for S/S and trends of decreased cardiac output  - Administer and titrate ordered vasoactive medications to optimize hemodynamic stability  -  Assess quality of pulses, skin color and temperature  - Assess for signs of decreased coronary artery perfusion  - Instruct patient to report change in severity of symptoms  Outcome: Progressing  Goal: Absence of cardiac dysrhythmias or at baseline rhythm  Description: INTERVENTIONS:  - Continuous cardiac monitoring, vital signs, obtain 12 lead EKG if ordered  - Administer antiarrhythmic and heart rate control medications as ordered  - Monitor electrolytes and administer replacement therapy as ordered  Outcome: Progressing     Problem: RESPIRATORY - ADULT  Goal: Achieves optimal ventilation and oxygenation  Description: INTERVENTIONS:  - Assess for changes in respiratory status  - Assess for changes in mentation and behavior  - Position to facilitate oxygenation and minimize respiratory effort  - Oxygen administered by appropriate delivery if ordered  - Initiate smoking cessation education as indicated  - Encourage broncho-pulmonary hygiene including cough, deep breathe, Incentive Spirometry  - Assess the need for suctioning and aspirate as needed  - Assess and instruct to report SOB or any respiratory difficulty  - Respiratory Therapy support as indicated  Outcome: Progressing     Problem: METABOLIC, FLUID AND ELECTROLYTES - ADULT  Goal: Electrolytes maintained within normal limits  Description: INTERVENTIONS:  - Monitor labs and assess patient for signs and symptoms of electrolyte imbalances  - Administer electrolyte replacement as ordered  - Monitor response to electrolyte replacements, including repeat lab results as appropriate  - Instruct patient on fluid and nutrition as appropriate  Outcome: Progressing  Goal: Fluid balance maintained  Description: INTERVENTIONS:  - Monitor labs   - Monitor I/O and WT  - Instruct patient on fluid and nutrition as appropriate  - Assess for signs & symptoms of volume excess or deficit  Outcome: Progressing

## 2024-03-11 NOTE — PROGRESS NOTES
[facility]  Progress Note  Name: Hussein Edward III I  MRN: [id number]  Unit/Bed#: [facility] 829-01 I Date of Admission: 2/19/2024   Date of Service: 3/11/2024 I Hospital Day: 21    Assessment/Plan   * Acute respiratory failure with hypoxia (HCC)  Assessment & Plan  Secondary to influenza A with suspected bacterial superinfection  TTE with normal systolic function and grade 1 diastolic dysfunction  Requiring ETT and MD from 2/19-2/29. While in the ICU patient require multiple doses of IV Lasix and ultimately was placed on a Lasix drip until net negative fluid balance was achieved  S/p bronchoscopy on 2/19 with cultures primarily groin Candida glabrata, no bacteria  Blood cultures with no growth to date  Completed 5 days of ceftriaxone azithromycin, followed by 7 days of cefepime for fever thought to be pulmonary source  Completed steroid course  Encouraged IS, flutter valve.   CXR 3/8 with stable diffuse bilateral patchy airspace disease.   Pulmonology recommending outpatient CT chest in 4 weeks to ensure resolution.  Cleared on their end for discharge to rehab  Patient on room air with saturations >92% ar rest, sats drop to mid 80s with ambulation on RA. Recovers quickly without oxygen supplementation    Tachycardia  Assessment & Plan  Patient's heart rate noted to be in the 100s at rest and with exertion  Upon discussion with patient he reports chronic tachycardia for years, average heart rate in the 90s.  I reviewed outpatient notes from [id number] with vital signs showing heart rate in the 90-100s.  He also endorses poor water intake  Patient is asymptomatic  Unclear etiology, suspect chronic sinus tachycardia likely related to anxiety dehydration and deconditioning. Less likely infection  Patient had a brief episode of A-fib with RVR in the ICU heart rate in the 150s s/p CVC placement that resolved with IV medications  Continue trial of IV fluis  Obtain TSH  EKG with sinus  tachycardia, reviewed by cardiology  24-hour telemetry with sinus tachycardia, continue  Chest x-ray stable  Obtain UA for completion of infectious workup   Will obtain cardiology evaluation     Ambulatory dysfunction  Assessment & Plan  Evaluated by PT/OT--recommended rehab at discharge  Plan for ARC at discharge.    Patient work with PT on 3/10 and O2 dropped to mid 80s while on room air with exertion.  Will continue to monitor  Fall precautions  Ambulate patient    ARDS (adult respiratory distress syndrome) (HCC)  Assessment & Plan  Secondary to influenza A and suspected bacterial superinfection  Requiring supplemental O2, wean as tolerated  Pulmonology following    Anxiety  Assessment & Plan  Ativan as needed    Insomnia  Assessment & Plan  PTA quviviq 25 mg at bedtime, not on formulary  Continue melatonin as needed    Chronic kidney disease  Assessment & Plan  Lab Results   Component Value Date    EGFR 98 03/11/2024    EGFR 106 03/09/2024    EGFR 107 03/05/2024    CREATININE 0.82 03/11/2024    CREATININE 0.68 03/09/2024    CREATININE 0.67 03/05/2024     At baseline  Avoid nephrotoxins    WALE (obstructive sleep apnea)  Assessment & Plan  Continue CPAP               VTE Pharmacologic Prophylaxis: VTE Score: 4 Moderate Risk (Score 3-4) - Pharmacological DVT Prophylaxis Ordered: enoxaparin (Lovenox).    Mobility:   Basic Mobility Inpatient Raw Score: 17  JH-HLM Goal: 5: Stand one or more mins  JH-HLM Achieved: 7: Walk 25 feet or more  HLM Goal achieved. Continue to encourage appropriate mobility.    Patient Centered Rounds: I performed bedside rounds with nursing staff today.   Discussions with Specialists or Other Care Team Provider: , cardiology    Education and Discussions with Family / Patient:  Patient will update family members.     Total Time Spent on Date of Encounter in care of patient: 35 mins. This time was spent on one or more of the following: performing physical exam; counseling and  coordination of care; obtaining or reviewing history; documenting in the medical record; reviewing/ordering tests, medications or procedures; communicating with other healthcare professionals and discussing with patient's family/caregivers.    Current Length of Stay: 21 day(s)  Current Patient Status: Inpatient   Certification Statement: The patient will continue to require additional inpatient hospital stay due to cardiology consult, monitoring of oxygen with exertion  Discharge Plan: Anticipate discharge in 24-48 hrs to rehab facility.    Code Status: Level 1 - Full Code    Subjective:   No acute events overnight.  Patient reports feeling significantly better.  No shortness of breath, cough is improved.  Tolerating oral intake.  He does not feel dizzy or have any palpitations.    Objective:     Vitals:   Temp (24hrs), Av.2 °F (36.8 °C), Min:97.8 °F (36.6 °C), Max:99.2 °F (37.3 °C)    Temp:  [97.8 °F (36.6 °C)-99.2 °F (37.3 °C)] 97.8 °F (36.6 °C)  HR:  [106-117] 108  Resp:  [16-18] 16  BP: ()/(57-81) 100/57  SpO2:  [92 %-96 %] 96 %  Body mass index is 28.98 kg/m².     Input and Output Summary (last 24 hours):     Intake/Output Summary (Last 24 hours) at 3/11/2024 1458  Last data filed at 3/11/2024 0234  Gross per 24 hour   Intake --   Output 600 ml   Net -600 ml       Physical Exam:   Physical Exam  Vitals and nursing note reviewed.   Constitutional:       General: He is not in acute distress.     Appearance: He is well-developed.   HENT:      Head: Normocephalic and atraumatic.   Eyes:      Conjunctiva/sclera: Conjunctivae normal.   Cardiovascular:      Rate and Rhythm: Regular rhythm. Tachycardia present.   Pulmonary:      Effort: Pulmonary effort is normal. No respiratory distress.      Breath sounds: Normal breath sounds. No wheezing or rales.   Musculoskeletal:      Cervical back: Neck supple.      Right lower leg: No edema.      Left lower leg: No edema.   Skin:     General: Skin is warm and dry.    Neurological:      Mental Status: He is alert.   Psychiatric:         Mood and Affect: Mood normal.         Behavior: Behavior normal.          Additional Data:     Labs:  Results from last 7 days   Lab Units 24  0459   WBC Thousand/uL 8.78   HEMOGLOBIN g/dL 12.5   HEMATOCRIT % 39.7   PLATELETS Thousands/uL 312   NEUTROS PCT % 58   LYMPHS PCT % 18   MONOS PCT % 9   EOS PCT % 13*     Results from last 7 days   Lab Units 24  0459   SODIUM mmol/L 140   POTASSIUM mmol/L 4.3   CHLORIDE mmol/L 106   CO2 mmol/L 25   BUN mg/dL 13   CREATININE mg/dL 0.82   ANION GAP mmol/L 9   CALCIUM mg/dL 8.7   GLUCOSE RANDOM mg/dL 101         Results from last 7 days   Lab Units 24  1131 24  0736 03/10/24  2112 03/10/24  1615 03/10/24  1118 03/10/24  0732 24  2043 24  1635 24  1252 24  1121 24  0822 24  2106   POC GLUCOSE mg/dl 132 91 120 93 119 95 114 100 122 69 149* 123         Results from last 7 days   Lab Units 24  0134 24  2214 24  0457   LACTIC ACID mmol/L 1.0  --   --    PROCALCITONIN ng/ml  --  0.07 0.13       Lines/Drains:  Invasive Devices       Peripheral Intravenous Line  Duration             Peripheral IV 24 Distal;Right;Upper;Ventral (anterior) Arm 2 days                      Telemetry:  Telemetry Orders (From admission, onward)               24 Hour Telemetry Monitoring  Continuous x 24 Hours (Telem)           Question:  Reason for 24 Hour Telemetry  Answer:  Arrhythmias requiring acute medical intervention / PPM or ICD malfunction                     Telemetry Reviewed: Sinus Tachycardia  Indication for Continued Telemetry Use: Arrthymias requiring medical therapy             Imaging: Reviewed radiology reports from this admission including: chest xray    Recent Cultures (last 7 days):   Results from last 7 days   Lab Units 24  2217   BLOOD CULTURE  No Growth at 72 hrs.  No Growth at 72 hrs.       Last 24 Hours Medication  List:   Current Facility-Administered Medications   Medication Dose Route Frequency Provider Last Rate    acetaminophen  650 mg Oral Q6H PRN Samiraniels Cano, DO      albuterol  2 puff Inhalation Q4H PRN Kenny Menard,       enoxaparin  40 mg Subcutaneous Q24H YEHUDA Dayne Garcia MD      HYDROmorphone  1 mg Intravenous Q4H PRN Dayne Garcia MD      levalbuterol  1.25 mg Nebulization TID Kenny Menard,       loratadine  5 mg Oral Daily Dayne Garcia MD      LORazepam  0.5 mg Oral Q8H PRN Dayne Garcia MD      magnesium Oxide  400 mg Oral Daily Dayne Garcia MD      melatonin  3 mg Oral HS PRN Teresa Crabtree PA-C      midodrine  5 mg Oral Q8H Dayne Garcia MD      oxyCODONE  5 mg Oral Q6H PRN Dayne Garcia MD      PARoxetine  10 mg Oral Daily Dayne Garcia MD      polyethylene glycol  17 g Oral BID Imani Lyles MD      sodium chloride  1 spray Each Nare Q1H PRN Dayne Garcia MD      sodium chloride  100 mL/hr Intravenous Continuous Imani Lyles  mL/hr (03/11/24 1209)    sodium chloride  4 mL Nebulization TID Kirby Alberts MD      tamsulosin  0.4 mg Oral Daily With Dinner Dayne Garcia MD          Today, Patient Was Seen By: Imani Lyles MD    **Please Note: This note may have been constructed using a voice recognition system.**

## 2024-03-11 NOTE — ASSESSMENT & PLAN NOTE
Secondary to influenza A with suspected bacterial superinfection  TTE with normal systolic function and grade 1 diastolic dysfunction  Requiring ETT and MD from 2/19-2/29. While in the ICU patient require multiple doses of IV Lasix and ultimately was placed on a Lasix drip until net negative fluid balance was achieved  S/p bronchoscopy on 2/19 with cultures primarily groin Candida glabrata, no bacteria  Blood cultures with no growth to date  Completed 5 days of ceftriaxone azithromycin, followed by 7 days of cefepime for fever thought to be pulmonary source  Completed steroid course  Encouraged IS, flutter valve.   CXR 3/8 with stable diffuse bilateral patchy airspace disease.   Pulmonology recommending outpatient CT chest in 4 weeks to ensure resolution.  Cleared on their end for discharge to rehab  Patient on room air with saturations >92% ar rest, sats drop to mid 80s with ambulation on RA. Recovers quickly without oxygen supplementation

## 2024-03-11 NOTE — ARC ADMISSION
Reviewed updates with ARC physician - will continue to follow patient's case at this time, monitoring for medical stability and functional tolerance for ARC program. Patient with consistent tachycardia over weekend, both at rest and with therapy. Improvement needs to be demonstrated prior to ARC consideration, as this would limit patient's ability to participate in aggressive therapy on ARC. CM has been updated.

## 2024-03-12 LAB
ANION GAP SERPL CALCULATED.3IONS-SCNC: 9 MMOL/L (ref 4–13)
BUN SERPL-MCNC: 15 MG/DL (ref 5–25)
CALCIUM SERPL-MCNC: 9 MG/DL (ref 8.4–10.2)
CHLORIDE SERPL-SCNC: 106 MMOL/L (ref 96–108)
CO2 SERPL-SCNC: 25 MMOL/L (ref 21–32)
CORTIS SERPL-MCNC: 13.3 UG/DL
CREAT SERPL-MCNC: 0.83 MG/DL (ref 0.6–1.3)
GFR SERPL CREATININE-BSD FRML MDRD: 98 ML/MIN/1.73SQ M
GLUCOSE SERPL-MCNC: 106 MG/DL (ref 65–140)
MAGNESIUM SERPL-MCNC: 2 MG/DL (ref 1.9–2.7)
POTASSIUM SERPL-SCNC: 4 MMOL/L (ref 3.5–5.3)
SODIUM SERPL-SCNC: 140 MMOL/L (ref 135–147)

## 2024-03-12 PROCEDURE — 97110 THERAPEUTIC EXERCISES: CPT

## 2024-03-12 PROCEDURE — 99232 SBSQ HOSP IP/OBS MODERATE 35: CPT | Performed by: INTERNAL MEDICINE

## 2024-03-12 PROCEDURE — 82533 TOTAL CORTISOL: CPT | Performed by: INTERNAL MEDICINE

## 2024-03-12 PROCEDURE — 83735 ASSAY OF MAGNESIUM: CPT | Performed by: INTERNAL MEDICINE

## 2024-03-12 PROCEDURE — 94664 DEMO&/EVAL PT USE INHALER: CPT

## 2024-03-12 PROCEDURE — 94760 N-INVAS EAR/PLS OXIMETRY 1: CPT

## 2024-03-12 PROCEDURE — 94640 AIRWAY INHALATION TREATMENT: CPT

## 2024-03-12 PROCEDURE — 80048 BASIC METABOLIC PNL TOTAL CA: CPT | Performed by: INTERNAL MEDICINE

## 2024-03-12 PROCEDURE — 97535 SELF CARE MNGMENT TRAINING: CPT

## 2024-03-12 PROCEDURE — 97116 GAIT TRAINING THERAPY: CPT

## 2024-03-12 RX ORDER — METOPROLOL SUCCINATE 25 MG/1
25 TABLET, EXTENDED RELEASE ORAL DAILY
Status: DISCONTINUED | OUTPATIENT
Start: 2024-03-13 | End: 2024-03-14 | Stop reason: HOSPADM

## 2024-03-12 RX ORDER — METOPROLOL SUCCINATE 50 MG/1
50 TABLET, EXTENDED RELEASE ORAL
Status: DISCONTINUED | OUTPATIENT
Start: 2024-03-12 | End: 2024-03-14 | Stop reason: HOSPADM

## 2024-03-12 RX ORDER — METOPROLOL SUCCINATE 50 MG/1
50 TABLET, EXTENDED RELEASE ORAL 2 TIMES DAILY
Status: DISCONTINUED | OUTPATIENT
Start: 2024-03-13 | End: 2024-03-12

## 2024-03-12 RX ORDER — MIDODRINE HYDROCHLORIDE 2.5 MG/1
2.5 TABLET ORAL EVERY 8 HOURS
Status: DISCONTINUED | OUTPATIENT
Start: 2024-03-13 | End: 2024-03-14

## 2024-03-12 RX ORDER — METOPROLOL SUCCINATE 25 MG/1
25 TABLET, EXTENDED RELEASE ORAL 2 TIMES DAILY
Status: DISCONTINUED | OUTPATIENT
Start: 2024-03-12 | End: 2024-03-12

## 2024-03-12 RX ORDER — ALBUTEROL SULFATE 2.5 MG/3ML
2.5 SOLUTION RESPIRATORY (INHALATION) EVERY 4 HOURS PRN
Status: DISCONTINUED | OUTPATIENT
Start: 2024-03-12 | End: 2024-03-14 | Stop reason: HOSPADM

## 2024-03-12 RX ADMIN — LORATADINE 5 MG: 10 TABLET ORAL at 08:19

## 2024-03-12 RX ADMIN — MELATONIN 3 MG: at 00:12

## 2024-03-12 RX ADMIN — MIDODRINE HYDROCHLORIDE 5 MG: 5 TABLET ORAL at 00:12

## 2024-03-12 RX ADMIN — MAGNESIUM OXIDE TAB 400 MG (241.3 MG ELEMENTAL MG) 400 MG: 400 (241.3 MG) TAB at 08:19

## 2024-03-12 RX ADMIN — LEVALBUTEROL HYDROCHLORIDE 1.25 MG: 1.25 SOLUTION RESPIRATORY (INHALATION) at 07:23

## 2024-03-12 RX ADMIN — MIDODRINE HYDROCHLORIDE 5 MG: 5 TABLET ORAL at 08:19

## 2024-03-12 RX ADMIN — ACETAMINOPHEN 650 MG: 325 TABLET, FILM COATED ORAL at 05:13

## 2024-03-12 RX ADMIN — SODIUM CHLORIDE SOLN NEBU 3% 4 ML: 3 NEBU SOLN at 07:23

## 2024-03-12 RX ADMIN — LORAZEPAM 0.5 MG: 0.5 TABLET ORAL at 00:12

## 2024-03-12 RX ADMIN — MIDODRINE HYDROCHLORIDE 5 MG: 5 TABLET ORAL at 16:15

## 2024-03-12 RX ADMIN — PAROXETINE 10 MG: 10 TABLET, FILM COATED ORAL at 08:19

## 2024-03-12 RX ADMIN — METOPROLOL SUCCINATE 25 MG: 25 TABLET, FILM COATED, EXTENDED RELEASE ORAL at 11:02

## 2024-03-12 RX ADMIN — ENOXAPARIN SODIUM 40 MG: 40 INJECTION SUBCUTANEOUS at 16:15

## 2024-03-12 RX ADMIN — METOPROLOL SUCCINATE 50 MG: 50 TABLET, EXTENDED RELEASE ORAL at 22:44

## 2024-03-12 RX ADMIN — TAMSULOSIN HYDROCHLORIDE 0.4 MG: 0.4 CAPSULE ORAL at 16:15

## 2024-03-12 NOTE — PLAN OF CARE
Problem: SAFETY ADULT  Goal: Maintain or return to baseline ADL function  Description: INTERVENTIONS:  -  Assess patient's ability to carry out ADLs; assess patient's baseline for ADL function and identify physical deficits which impact ability to perform ADLs (bathing, care of mouth/teeth, toileting, grooming, dressing, etc.)  - Assess/evaluate cause of self-care deficits   - Assess range of motion  - Assess patient's mobility; develop plan if impaired  - Assess patient's need for assistive devices and provide as appropriate  - Encourage maximum independence but intervene and supervise when necessary  - Involve family in performance of ADLs  - Assess for home care needs following discharge   - Consider OT consult to assist with ADL evaluation and planning for discharge  - Provide patient education as appropriate  Outcome: Progressing     Problem: RESPIRATORY - ADULT  Goal: Achieves optimal ventilation and oxygenation  Description: INTERVENTIONS:  - Assess for changes in respiratory status  - Assess for changes in mentation and behavior  - Position to facilitate oxygenation and minimize respiratory effort  - Oxygen administered by appropriate delivery if ordered  - Initiate smoking cessation education as indicated  - Encourage broncho-pulmonary hygiene including cough, deep breathe, Incentive Spirometry  - Assess the need for suctioning and aspirate as needed  - Assess and instruct to report SOB or any respiratory difficulty  - Respiratory Therapy support as indicated  Outcome: Progressing

## 2024-03-12 NOTE — PLAN OF CARE
Problem: Prexisting or High Potential for Compromised Skin Integrity  Goal: Skin integrity is maintained or improved  Description: INTERVENTIONS:  - Identify patients at risk for skin breakdown  - Assess and monitor skin integrity  - Assess and monitor nutrition and hydration status  - Monitor labs   - Assess for incontinence   - Turn and reposition patient  - Assist with mobility/ambulation  - Relieve pressure over bony prominences  - Avoid friction and shearing  - Provide appropriate hygiene as needed including keeping skin clean and dry  - Evaluate need for skin moisturizer/barrier cream  - Collaborate with interdisciplinary team   - Patient/family teaching  - Consider wound care consult   Outcome: Progressing     Problem: PAIN - ADULT  Goal: Verbalizes/displays adequate comfort level or baseline comfort level  Description: Interventions:  - Encourage patient to monitor pain and request assistance  - Assess pain using appropriate pain scale  - Administer analgesics based on type and severity of pain and evaluate response  - Implement non-pharmacological measures as appropriate and evaluate response  - Consider cultural and social influences on pain and pain management  - Notify physician/advanced practitioner if interventions unsuccessful or patient reports new pain  Outcome: Progressing     Problem: CARDIOVASCULAR - ADULT  Goal: Maintains optimal cardiac output and hemodynamic stability  Description: INTERVENTIONS:  - Monitor I/O, vital signs and rhythm  - Monitor for S/S and trends of decreased cardiac output  - Administer and titrate ordered vasoactive medications to optimize hemodynamic stability  - Assess quality of pulses, skin color and temperature  - Assess for signs of decreased coronary artery perfusion  - Instruct patient to report change in severity of symptoms  Outcome: Progressing  Goal: Absence of cardiac dysrhythmias or at baseline rhythm  Description: INTERVENTIONS:  - Continuous cardiac  monitoring, vital signs, obtain 12 lead EKG if ordered  - Administer antiarrhythmic and heart rate control medications as ordered  - Monitor electrolytes and administer replacement therapy as ordered  Outcome: Progressing

## 2024-03-12 NOTE — PROGRESS NOTES
[facility]  Progress Note  Name: Hussein Edward III I  MRN: [id number]  Unit/Bed#: [facility] 829-01 I Date of Admission: 2/19/2024   Date of Service: 3/12/2024 I Hospital Day: 22    Assessment/Plan   * Acute respiratory failure with hypoxia (HCC)  Assessment & Plan  Secondary to influenza A with suspected bacterial superinfection  TTE with normal systolic function and grade 1 diastolic dysfunction  Requiring ETT and MD from 2/19-2/29. While in the ICU patient require multiple doses of IV Lasix and ultimately was placed on a Lasix drip until net negative fluid balance was achieved  S/p bronchoscopy on 2/19 with cultures primarily groin Candida glabrata, no bacteria  Blood cultures with no growth to date  Completed 5 days of ceftriaxone azithromycin, followed by 7 days of cefepime for fever thought to be pulmonary source  Completed steroid course  Encouraged IS, flutter valve.   CXR 3/8 with stable diffuse bilateral patchy airspace disease.   Pulmonology recommending outpatient CT chest in 4 weeks to ensure resolution.  Cleared on their end for discharge to rehab  Patient on room air with saturations >92% ar rest, sats drop to mid 80s with ambulation on RA. Recovers quickly without oxygen supplementation    ARDS (adult respiratory distress syndrome) (HCC)  Assessment & Plan  Secondary to influenza A and suspected bacterial superinfection  Plan as above    Tachycardia  Assessment & Plan  Patient's heart rate noted to be in the 100s  Upon discussion with patient he reports chronic tachycardia for years, average heart rate in the 90s.  I reviewed outpatient notes with vital signs showing heart rate in the 90s.  He also endorses poor water intake  Patient is asymptomatic  Unclear etiology, suspect chronic sinus tachycardia likely related to poor water intake, deconditioning, albuterol use.  Less likely infection  Patient had a brief episode of A-fib with RVR in the ICU heart rate in the  150s s/p CVC placement that resolved with IV medications  Brief episode of PAF per cardiology  Stopping midodrine and increasing metoprolol today per cardiology  Continue to monitor Hrs on telemetry  Checked BMP, Mg, and cortisol today - normal    Ambulatory dysfunction  Assessment & Plan  Evaluated by PT/OT--recommended rehab at discharge  Plan for ARC at discharge.    Patient work with PT on 3/10 and O2 dropped to mid 80s while on room air with exertion.  Will continue to monitor  Fall precautions  Ambulate patient    Anxiety  Assessment & Plan  Ativan as needed    Insomnia  Assessment & Plan  PTA quviviq 25 mg at bedtime, not on formulary  Continue melatonin as needed    Chronic kidney disease  Assessment & Plan  Lab Results   Component Value Date    EGFR 98 03/12/2024    EGFR 98 03/11/2024    EGFR 106 03/09/2024    CREATININE 0.83 03/12/2024    CREATININE 0.82 03/11/2024    CREATININE 0.68 03/09/2024     At baseline  Avoid nephrotoxins    Obstructive sleep apnea (adult) (pediatric)  Assessment & Plan  Using his CPAP machine    WALE (obstructive sleep apnea)  Assessment & Plan  Continue CPAP           VTE Pharmacologic Prophylaxis: VTE Score: 4 Moderate Risk (Score 3-4) - Pharmacological DVT Prophylaxis Ordered: enoxaparin (Lovenox).    Mobility:   Basic Mobility Inpatient Raw Score: 16  JH-HLM Goal: 5: Stand one or more mins  JH-HLM Achieved: 7: Walk 25 feet or more  HLM Goal achieved. Continue to encourage appropriate mobility.    Patient Centered Rounds: I performed bedside rounds with nursing staff today.   Discussions with Specialists or Other Care Team Provider: nurse, CM, cardiology    Education and Discussions with Family / Patient: Updated  (wife) via phone.    Total Time Spent on Date of Encounter in care of patient: 45 mins. This time was spent on one or more of the following: performing physical exam; counseling and coordination of care; obtaining or reviewing history; documenting in the  medical record; reviewing/ordering tests, medications or procedures; communicating with other healthcare professionals and discussing with patient's family/caregivers.    Current Length of Stay: 22 day(s)  Current Patient Status: Inpatient   Certification Statement: The patient will continue to require additional inpatient hospital stay due to monitor Hrs and BP  Discharge Plan: Anticipate discharge in 24-48 hrs to rehab facility.    Code Status: Level 1 - Full Code    Subjective:   Denies any new complaints. Noted to have tachycardia with ambulation on telemetry    Objective:     Vitals:   Temp (24hrs), Av °F (36.7 °C), Min:97.6 °F (36.4 °C), Max:98.4 °F (36.9 °C)    Temp:  [97.6 °F (36.4 °C)-98.4 °F (36.9 °C)] 98 °F (36.7 °C)  HR:  [103-127] 108  Resp:  [18-58] 20  BP: ()/(62-77) 105/74  SpO2:  [93 %-99 %] 99 %  Body mass index is 28.98 kg/m².     Input and Output Summary (last 24 hours):     Intake/Output Summary (Last 24 hours) at 3/12/2024 1453  Last data filed at 3/12/2024 0513  Gross per 24 hour   Intake --   Output 1025 ml   Net -1025 ml       Physical Exam:   Physical Exam  Constitutional:       Appearance: Normal appearance.   HENT:      Head: Normocephalic.   Cardiovascular:      Rate and Rhythm: Normal rate and regular rhythm.   Pulmonary:      Effort: Pulmonary effort is normal.      Breath sounds: No wheezing or rales.   Abdominal:      Palpations: Abdomen is soft.   Musculoskeletal:         General: Normal range of motion.      Right lower leg: No edema.      Left lower leg: No edema.   Skin:     General: Skin is warm and dry.   Neurological:      Mental Status: He is alert and oriented to person, place, and time.   Psychiatric:         Mood and Affect: Mood normal.         Behavior: Behavior normal.          Additional Data:     Labs:  Results from last 7 days   Lab Units 24  0459   WBC Thousand/uL 8.78   HEMOGLOBIN g/dL 12.5   HEMATOCRIT % 39.7   PLATELETS Thousands/uL 312   NEUTROS  PCT % 58   LYMPHS PCT % 18   MONOS PCT % 9   EOS PCT % 13*     Results from last 7 days   Lab Units 03/12/24  1400   SODIUM mmol/L 140   POTASSIUM mmol/L 4.0   CHLORIDE mmol/L 106   CO2 mmol/L 25   BUN mg/dL 15   CREATININE mg/dL 0.83   ANION GAP mmol/L 9   CALCIUM mg/dL 9.0   GLUCOSE RANDOM mg/dL 106         Results from last 7 days   Lab Units 03/11/24  1131 03/11/24  0736 03/10/24  2112 03/10/24  1615 03/10/24  1118 03/10/24  0732 03/09/24  2043 03/09/24  1635 03/09/24  1252 03/09/24  1121 03/09/24  0822 03/08/24  2106   POC GLUCOSE mg/dl 132 91 120 93 119 95 114 100 122 69 149* 123         Results from last 7 days   Lab Units 03/08/24  0134 03/07/24  2214   LACTIC ACID mmol/L 1.0  --    PROCALCITONIN ng/ml  --  0.07       Lines/Drains:  Invasive Devices       Peripheral Intravenous Line  Duration             Peripheral IV 03/09/24 Distal;Right;Upper;Ventral (anterior) Arm 3 days                      Telemetry:  Telemetry Orders (From admission, onward)               24 Hour Telemetry Monitoring  Continuous x 24 Hours (Telem)        Expiring   Question:  Reason for 24 Hour Telemetry  Answer:  Arrhythmias requiring acute medical intervention / PPM or ICD malfunction                     Telemetry Reviewed: Sinus Tachycardia  Indication for Continued Telemetry Use: Arrthymias requiring medical therapy             Imaging: No pertinent imaging reviewed.    Recent Cultures (last 7 days):   Results from last 7 days   Lab Units 03/07/24  2217   BLOOD CULTURE  No Growth After 4 Days.  No Growth After 4 Days.       Last 24 Hours Medication List:   Current Facility-Administered Medications   Medication Dose Route Frequency Provider Last Rate    acetaminophen  650 mg Oral Q6H PRN Samira Cano, DO      albuterol  2 puff Inhalation Q4H PRN Kenny Menard DO      albuterol  2.5 mg Nebulization Q4H PRN Ced Boen MD      enoxaparin  40 mg Subcutaneous Q24H [facility] Dayne Garcia MD      HYDROmorphone  1 mg Intravenous  Q4H PRN Dayne Garcia MD      loratadine  5 mg Oral Daily Dayne Garcia MD      LORazepam  0.5 mg Oral Q8H PRN Dayne Garcia MD      magnesium Oxide  400 mg Oral Daily Dayne Garcia MD      melatonin  3 mg Oral HS PRN Teresa Crabtree PA-C      [START ON 3/13/2024] metoprolol succinate  25 mg Oral Daily Zachary Johnson MD      metoprolol succinate  50 mg Oral HS Zachary Johnson MD      midodrine  5 mg Oral Q8H Dayne Garcia MD      oxyCODONE  5 mg Oral Q6H PRN Dayne Garcia MD      PARoxetine  10 mg Oral Daily Dayne Garcia MD      polyethylene glycol  17 g Oral BID Imani Lyles MD      sodium chloride  1 spray Each Nare Q1H PRN Dayne Garcia MD      tamsulosin  0.4 mg Oral Daily With Dinner Dayne Garcia MD          Today, Patient Was Seen By: Ced Bone MD    **Please Note: This note may have been constructed using a voice recognition system.**

## 2024-03-12 NOTE — PLAN OF CARE
Problem: PHYSICAL THERAPY ADULT  Goal: Performs mobility at highest level of function for planned discharge setting.  See evaluation for individualized goals.  Description: Treatment/Interventions: OT, Spoke to case management, Spoke to nursing, Gait training, Bed mobility, Patient/family training, Endurance training, Functional transfer training, LE strengthening/ROM  Equipment Recommended:  (TBD)       See flowsheet documentation for full assessment, interventions and recommendations.  Outcome: Progressing  Note: Prognosis: Excellent  Problem List: Decreased endurance, Decreased mobility, Pain, Decreased strength  Assessment: Pt seen for PT treatment session this date. Pt cooperative and very pleasant .Therapy session focused on functional transfers, and ambulation in order to improve overall mobility and independence. Pt completes multiple STS transfers during session with CG A - Min A for completing task. Pt able to tolerate standing position with RW ~ 1-2 minutes , increased balance level. Demonstrates improvement in ambulation level, completing 3 attempts with Ax 1 and RW, slow gait speed 2* fatigue , but increased distance with each attempt. Requires seated rest break in between 2* GUERRA, fatigue. Noted to have improved activity tolerance as detailed with O2 sats with exertion. Pt performs therapeutic exercise seated in chair with minimal cueing.Pt making steady progress toward goals and demonstrates good potential for Rehab. Pt motivated to participate in therapy to maximize mobility level, improve endurance, strength and balance. Pt was left in recliner at the end of PT session with all needs in reach. Pt would benefit from continued PT services while in hospital to address remaining limitations. The patient's AM-PAC Basic Mobility Inpatient Short Form Raw Score is 16. A Raw score of less than or equal to 16 suggests the patient may benefit from discharge to post-acute rehabilitation services. Please also  refer to the recommendation of the Physical Therapist for safe discharge planning.Post dc recommendation is Level I at this time.  Barriers to Discharge: Inaccessible home environment, Decreased caregiver support     Rehab Resource Intensity Level, PT: I (Maximum Resource Intensity)    See flowsheet documentation for full assessment.

## 2024-03-12 NOTE — PHYSICAL THERAPY NOTE
PHYSICAL THERAPY NOTE          Patient Name: Hussein Edward III  Today's Date: 3/12/2024     03/12/24 0856   PT Last Visit   PT Visit Date 03/12/24   Note Type   Note Type Treatment for insurance authorization   Education   Education Provided Yes   Pain Assessment   Pain Assessment Tool 0-10   Pain Score No Pain   Restrictions/Precautions   Weight Bearing Precautions Per Order No   Other Precautions Chair Alarm;Bed Alarm;Fall Risk;Pain;Multiple lines   General   Chart Reviewed Yes   Response to Previous Treatment Patient with no complaints from previous session.   Family/Caregiver Present No   Cognition   Overall Cognitive Status WFL   Arousal/Participation Alert;Responsive   Attention Within functional limits   Orientation Level Oriented X4   Following Commands Follows one step commands without difficulty   Comments Pt very pleasant and cooperative during session   Subjective   Subjective Agreeable to mobilize   Bed Mobility   Supine to Sit Unable to assess   Sit to Supine Unable to assess   Additional Comments Pt OOB in chair upon arrival.   Transfers   Sit to Stand 4  Minimal assistance   Additional items Assist x 1;Increased time required   Stand to Sit 4  Minimal assistance   Additional items Assist x 1;Increased time required   Additional Comments w/RW - Completes 4 STS through session   Ambulation/Elevation   Gait pattern Foward flexed;Excessively slow;Short stride   Gait Assistance 4  Minimal assist   Additional items Assist x 1;Verbal cues   Assistive Device Rolling walker   Distance 35 ft + 50 ft+ 60 ft   Ambulation/Elevation Additional Comments Pt demonstrates gains in ambulation. Completes 3 ambulation trials , seated rest break in between 2* fatigue, GUERRA. O2 sats on RA between 89- 92% ,-130 during activity .   Balance   Static Sitting Fair +   Dynamic Sitting Fair   Static Standing Fair -   Dynamic Standing Poor +    Ambulatory Poor +   Endurance Deficit   Endurance Deficit Yes   Endurance Deficit Description GUERRA   Activity Tolerance   Activity Tolerance Patient limited by fatigue   Nurse Made Aware RN cleared pt for therapy   Exercises   Hip Flexion Sitting;5 reps;Bilateral   Hip Abduction Sitting;15 reps;Bilateral   Hip Adduction Sitting;15 reps;Bilateral   Ankle Pumps Sitting;Bilateral   Balance training  STS X4, Static +Dynamic standing   Assessment   Prognosis Excellent   Problem List Decreased endurance;Decreased mobility;Pain;Decreased strength   Assessment Pt seen for PT treatment session this date. Pt cooperative and very pleasant .Therapy session focused on functional transfers, and ambulation in order to improve overall mobility and independence. Pt completes multiple STS transfers during session with CG A - Min A for completing task. Pt able to tolerate standing position with RW ~ 1-2 minutes , increased balance level. Demonstrates improvement in ambulation level, completing 3 attempts with Ax 1 and RW, slow gait speed 2* fatigue , but increased distance with each attempt. Requires seated rest break in between 2* GUERRA, fatigue. Noted to have improved activity tolerance as detailed with O2 sats with exertion. Pt performs therapeutic exercise seated in chair with minimal cueing.Pt making steady progress toward goals and demonstrates good potential for Rehab. Pt motivated to participate in therapy to maximize mobility level, improve endurance, strength and balance. Pt was left in recliner at the end of PT session with all needs in reach. Pt would benefit from continued PT services while in hospital to address remaining limitations. The patient's AM-PAC Basic Mobility Inpatient Short Form Raw Score is 16. A Raw score of less than or equal to 16 suggests the patient may benefit from discharge to post-acute rehabilitation services. Please also refer to the recommendation of the Physical Therapist for safe discharge  planning.Post dc recommendation is Level I at this time.   Barriers to Discharge Inaccessible home environment;Decreased caregiver support   Goals   Patient Goals to get stronger   STG Expiration Date 03/26/24   Short Term Goal #1 Previous STG remain appropriate, care plan extended   PT Treatment Day 5   Plan   Treatment/Interventions Functional transfer training;Spoke to nursing;Gait training;Bed mobility;Therapeutic exercise;Elevations   Progress Progressing toward goals   PT Frequency 3-5x/wk   Discharge Recommendation   Rehab Resource Intensity Level, PT I (Maximum Resource Intensity)   Equipment Recommended Walker   AM-PAC Basic Mobility Inpatient   Turning in Flat Bed Without Bedrails 3   Lying on Back to Sitting on Edge of Flat Bed Without Bedrails 2   Moving Bed to Chair 3   Standing Up From Chair Using Arms 3   Walk in Room 3   Climb 3-5 Stairs With Railing 2   Basic Mobility Inpatient Raw Score 16   Basic Mobility Standardized Score 38.32   Highest Level Of Mobility   JH-HLM Goal 5: Stand one or more mins   JH-HLM Achieved 7: Walk 25 feet or more   Education   Education Provided Mobility training   Patient Demonstrates verbal understanding   End of Consult   Patient Position at End of Consult All needs within reach;Bedside chair;Bed/Chair alarm activated   Laxmi Klein PT DPT

## 2024-03-12 NOTE — PROGRESS NOTES
PHYSICAL MEDICINE AND REHABILITATION   PREADMISSION ASSESSMENT     Projected IGC and Rehabilitation Diagnoses:  Impairment of mobility, safety and Activities of Daily Living (ADLs) due to Pulmonary Disorders:  10.9  Other Pulmonary  Etiologic Dx: Influenza A with Bacterial Pneumonia  Date of Onset: 2/16/2024   Date of surgery: N/A    PATIENT INFORMATION  Name: Hussein Edward III Phone #: [phone] (home) [phone] (work)  Address: [address] [place] [address]  YOB: 1965 Age: 58 y.o. #   Marital Status: /Civil Union  Ethnicity:   Employment Status: currently employed  Extended Emergency Contact Information  Primary Emergency Contact: Kathi Edward   [facility]  Home Phone: [phone]  Mobile Phone: [phone]  Relation: Spouse  Secondary Emergency Contact: Mundo Edward  Mobile Phone: [phone]  Relation: Son  Advance Directive: Level 1 Full Code - unknown advanced directive    INSURANCE/COVERAGE:     Primary Payor: [facility] / Plan: T PPO / Product Type: PPO /   Secondary Payer: Private Pay   Payer Contact: Estephania Payer Contact:   Contact Phone: [phone]  Contact Fax: [phone] Contact Phone:     Authorization #: [id number]  Coverage Dates: 3/13 - 3/24  LCD: 3/24 with review  **Called Estephania,  at [facility], to update patient's start of care date. However, was notified that start of care date will not be adjusted until patient has physically arrived on rehab unit. Next review date will remain the same of 3/24. ARC CM has been updated.    Medical Record #: [id number]    **Confirmed patient's benefits for inpatient acute rehab as follows: deductible (met), OOP $3013 remains, $0 copay, 0% coinsurance    REFERRAL SOURCE:   Referring provider: Ced Bone MD  Referring facility: [facility]  Room: [place] [address]/[place] [address]  PCP: Iftikhar Roque MD PCP phone number:  "[phone]    MEDICAL INFORMATION  HPI: Per ICU transfer note on 3/1/2024, \"patient is a 58 y.o. male with a PMH of meningioma, WALE who presented to [facility] on 02/16 with worsening shortness of breath. Pt was recently diagnosed w/ influenza A 4 days PTA. Was seen in urgent care for is sx d/t increasing SOB. On presentation, there was concern pt may have pneumonia given CXR findings at [facility]. While at [facility], pt became increasingly hypoxic, requiring 15L and tachypneic to 30 bpm, requiring upgrading his level of care to critical care. Pt was started on Tamiflu in the hopes of decreasing severity of infection. He was also started on IV solumedrol to help w/ inflammation. Unfortunately, pt's oxygen demands continued to rise, requiring BiPAP. Due to his worsening respiratory status, decision was made to intubate the pt, which pt was amenable to. Pt was subsequently transferred to [facility] for evaluation by CT surgery for possible VV-ECMO. CTA was obtained with findings of extensive bilateral groundglass and consolidative opacities compatible with viral and/or atypical pneumonia. Patient was started on ceftriaxone and azithromycin on 02/21 and completed a 5-day course of both. He also underwent proning on 2/19 and 2/23, also underwent bronchoscopy on 2/19 which primarily grew Candida glabrata. Blood cultures also had no growth to date. He was also placed on a Lasix drip to help obtain a net negative fluid balance of 0.5-1 L. Lasix was made as needed after fluid balance goal was achieved after extubation. During placement of central line early in admission patient went into was given one-time dose of Lopressor and started on amiodarone drip which converted patient to sinus rhythm and did not have any further episodes of A-fib. Patient's course was also complicated by new onset ileus found on KUB and confirmed with CT chest abdomen pelvis with right colon measured at approximately 7.3 cm in its greatest " "caliber. Patient was started on a significant bowel regimen and had multiple bowel episodes. Also of note, patient had multiple episodes of fever spikes after completion of ceftriaxone and azithromycin. It was unclear what the likely source of the infection could have been. Further infectious and noninfectious workup was performed along with imaging studies which all returned negative, only with CT CAP showing ileus. Given multiple episodes of fever spikes after completing antibiotics patient was started again on cefepime for 7 days, treating as a likely pulmonary source. Patient showed slow steady improvement to extubation on 02/29 and continued to show significant clinical improvement s/p extubation.\" He completed 10 day course of IV antibiotics as of 3/2. Repeat CXR on 3/6 showed mild improvement LLL consolidation. He then developed increased leukocytosis, however repeat CXR showed stable diffuse bilateral patchy airspace disease. Additionally, noted with tachycardia, requiring IV fluid bolus and Cardiology consult. Per Cardiology, patient with acute on chronic tachycardia (outpatient HR 90s-100s). He was continued on IV fluids to achieve euvolemia, as patient with poor PO intake, and was trialled on low-dose betablocker, with noted improvement. Patient was initially evaluated by SLP on 3/1, with noted mild oropharyngeal dysphagia, and recommended for dysphagia 1 diet with HTL. However, patient has since been re-evaluated and is now recommended for Regular diet with thin liquids, aspiration precautions in place. Patient is overall hemodynamically stable and medically stable for discharge. PT/OT therapies were consulted, as well as patient's case reviewed with [facility] physician, and they are recommending patient for inpatient Acute Rehab. He has demonstrated that he can tolerate and participate in 3 hours of therapy per day.    Received COVID vaccine x2; COVID test resulted negative on 2/12/2024, 2/22/2024 and " "2/26/2024.    Past Medical History:   Past Surgical History:   Allergies:     Past Medical History:   Diagnosis Date    Chronic back pain     Migraine     Past Surgical History:   Procedure Laterality Date    HERNIA REPAIR      MANDIBLE FRACTURE SURGERY      MOUTH SURGERY      cyst in cheek    NASAL SEPTUM SURGERY       No Known Allergies      Medical/functional conditions requiring inpatient rehabilitation: Influenza A with bacterial pneumonia, leukocytosis, metabolic encephalopathy, non-traumatic rhabdomyolysis, ARDS, dysphagia, new onset Afib, ileus, LUIS ALBERTO, anxiety, acute on chronic tachycardia, impaired mobility and self care    Risk for medical/clinical complications: risk for falls, risk for skin breakdown, risk for infection, risk for DVT/PE, risk for respiratory distress, risk for aspiration    Comorbidities/Surgeries in the last 100 days:  meningioma on Acetazolamide, CKD, WALE with CPAP @ [facility]    CURRENT VITAL SIGNS:   Temp:  [97.3 °F (36.3 °C)-98.4 °F (36.9 °C)] 97.3 °F (36.3 °C)  HR:  [] 97  Resp:  [16-18] 16  BP: (100-143)/(71-98) 121/85   Intake/Output Summary (Last 24 hours) at 3/14/2024 1109  Last data filed at 3/14/2024 0830  Gross per 24 hour   Intake 780 ml   Output --   Net 780 ml        LABORATORY RESULTS:      Lab Results   Component Value Date    HGB 12.5 03/11/2024    HCT 39.7 03/11/2024    WBC 8.78 03/11/2024     Lab Results   Component Value Date    BUN 15 03/12/2024    BUN 14 02/14/2024     (H) 12/20/2017    K 4.0 03/12/2024    K 4.5 02/14/2024     03/12/2024     07/30/2021    GLUCOSE 223 (H) 02/19/2024    GLUCOSE 98 12/20/2017    CREATININE 0.83 03/12/2024    CREATININE 0.97 02/14/2024     No results found for: \"PROTIME\", \"INR\"     DIAGNOSTIC STUDIES:  XR chest portable ICU    Result Date: 2/24/2024  Impression: Similar appearance of bilateral patchy airspace opacity, left greater than right. Workstation performed: [id number]     XR chest portable ICU    Result Date: " 2/23/2024  Impression: Bilateral airspace opacities not significantly changed from radiograph of the previous day. Resident: SHIRLEY FRITZ I, the attending radiologist, have reviewed the images and agree with the final report above. Workstation performed: [id number]     XR chest portable ICU    Result Date: 2/22/2024  Impression: 1. Redemonstration of bilateral airspace opacities, not significantly changed from radiograph of the previous day when accounting for difference in technique. Workstation performed: [id number]     XR chest portable ICU    Result Date: 2/21/2024  Impression: No significant interval change. Workstation performed: [id number]     XR chest portable ICU    Result Date: 2/21/2024  Impression: No significant interval change. Workstation performed: [id number]     XR chest portable ICU    Result Date: 2/20/2024  Impression: Stable appearance of moderate diffuse bilateral patchy airspace opacity. Workstation performed: [id number]     Pre-prone CXR    Result Date: 2/20/2024  Impression: Interval placement of left internal jugular central venous catheter with tip overlying the right atrium without pneumothorax. Unchanged bilateral multifocal airspace opacities. Resident: SHIRLEY FRITZ I, the attending radiologist, have reviewed the images and agree with the final report above. Workstation performed: [id number]     XR chest portable ICU    Result Date: 2/20/2024  Impression: Patchy opacity seen in the both lungs Feeding tube in appropriate position extending down below the dome of the diaphragm Workstation performed: [id number]     XR chest portable ICU    Result Date: 2/20/2024  Impression: 1. Satisfactory positioning of the ETT, tip 3.8 cm above the manuel. 2. Stable multifocal lung consolidation. Workstation performed: [id number]     Bronchoscopy    Result Date: 2/19/2024  Impression: Minimal secretions and some erythematous mucosa. BAL of the L lingula (superior segment LB3) and anterior  branch of the RUL (RB3). RECOMMENDATION:  Await pathology results Olivier Jain DO Addendum: I was the pulmonary critical care attending by bedside during the entire procedure time on 2/19/2024 at 5 PM supervising and assisting. Zach Smith MD    XR chest portable ICU    Result Date: 2/19/2024  Impression: No change in multifocal bilateral consolidation. Workstation performed: [id number]       PRECAUTIONS/SPECIAL NEEDS:  Tobacco:   Social History     Tobacco Use   Smoking Status Never   Smokeless Tobacco Former    Types: Snuff    Quit date: 2003   , Alcohol:    Social History     Substance and Sexual Activity   Alcohol Use Never   , Anticoagulation:   Lovenox SQ , Edema Management, Safety Concerns, Pain Management, Aspiration Risk/Precautions, Language Preference: English, and Fall Precautions.    MEDICATIONS:     Current Facility-Administered Medications:     acetaminophen (TYLENOL) tablet 650 mg, 650 mg, Oral, Q6H PRN, Samira Cano DO, 650 mg at 03/12/24 0513    albuterol (PROVENTIL HFA,VENTOLIN HFA) inhaler 2 puff, 2 puff, Inhalation, Q4H PRN, Kenny Menard DO    albuterol inhalation solution 2.5 mg, 2.5 mg, Nebulization, Q4H PRN, Ced Bone MD    enoxaparin (LOVENOX) subcutaneous injection 40 mg, 40 mg, Subcutaneous, Q24H YEHUDA, Dayne Garcia MD, 40 mg at 03/13/24 1621    HYDROmorphone (DILAUDID) injection 1 mg, 1 mg, Intravenous, Q4H PRN, Dayne Garcia MD    loratadine (CLARITIN) tablet 5 mg, 5 mg, Oral, Daily, Dayne Garcia MD, 5 mg at 03/14/24 0840    LORazepam (ATIVAN) tablet 0.5 mg, 0.5 mg, Oral, Q8H PRN, Dayne Garcia MD, 0.5 mg at 03/12/24 0012    magnesium Oxide (MAG-OX) tablet 400 mg, 400 mg, Oral, Daily, Dayne Garcia MD, 400 mg at 03/14/24 0840    melatonin tablet 3 mg, 3 mg, Oral, HS PRN, Teresa Crabtree PA-C, 3 mg at 03/12/24 0012    metoprolol succinate (TOPROL-XL) 24 hr tablet 25 mg, 25 mg, Oral, Daily, Zachary Johnson MD, 25 mg at  03/14/24 0840    metoprolol succinate (TOPROL-XL) 24 hr tablet 50 mg, 50 mg, Oral, HS, Zachary Johnson MD, 50 mg at 03/13/24 2126    midodrine (PROAMATINE) tablet 2.5 mg, 2.5 mg, Oral, Q8H, Ced Bone MD, 2.5 mg at 03/14/24 0840    oxyCODONE (ROXICODONE) IR tablet 5 mg, 5 mg, Oral, Q6H PRN, Dayne Garcia MD, 5 mg at 03/13/24 0208    PARoxetine (PAXIL) tablet 10 mg, 10 mg, Oral, Daily, Dayne Garcia MD, 10 mg at 03/14/24 0840    polyethylene glycol (MIRALAX) packet 17 g, 17 g, Oral, BID, Imani Lyles MD    sodium chloride (OCEAN) 0.65 % nasal spray 1 spray, 1 spray, Each Nare, Q1H PRN, Dayne Garcia MD    tamsulosin (FLOMAX) capsule 0.4 mg, 0.4 mg, Oral, Daily With Dinner, Dayne Garcia MD, 0.4 mg at 03/13/24 1535    SKIN INTEGRITY:   ecchymoses - arm(s) bilateral, right cheek wound CICI    PRIOR LEVEL OF FUNCTION:  He lives in a(n) single family home  Hussein Edward III is  and lives with their family (spouse & son).  Self Care: Independent, Indoor Mobility: Independent, Stairs (in/outdoor): Independent, and Cognition: Independent  Prior to patient's admission, patient was fully Independent with ADLs and shares IADLs with family. He was Independent without use of AD for mobility. He was also working and driving.    FALLS IN THE LAST 6 MONTHS: none    HOME ENVIRONMENT:  The living area: can live on one level  There are 2 steps to enter the home.    The patient will have 24 hour supervision/physical assistance available upon discharge.  Patient's spouse is retired and able to assist as needed. Patient's son is able to provide physical assistance if needed.    PREVIOUS DME:  Equipment in home (previous DME): Grab Bars and CPAP    FUNCTIONAL STATUS:  Physical Therapy Occupational Therapy Speech Therapy   3/13/2024, per PT    Bed Mobility   Supine to Sit Unable to assess   Sit to Supine Unable to assess   Additional Comments Pt greeted in chair.   Transfers   Sit to  Stand 4  Minimal assistance   Additional items Assist x 1;Increased time required;Verbal cues;Armrests   Stand to Sit 4  Minimal assistance   Additional items Assist x 1;Armrests;Increased time required;Verbal cues   Additional Comments RW   Ambulation/Elevation   Gait pattern Excessively slow;Short stride;L Knee Yoli;Forward Flexion   Gait Assistance 4  Minimal assist   Additional items Assist x 1;Verbal cues;Tactile cues   Assistive Device Rolling walker  (+ chair follow)   Distance 70'x3 (seated rest breaks required)   Stair Management Assistance 4  Minimal assist   Additional items Assist x 1;Verbal cues   Stair Management Technique Two rails;Foreward;Backward  (ascending forward, descending backwards)   Number of Stairs 2   Balance   Static Sitting Good   Dynamic Sitting Fair +   Static Standing Fair   Dynamic Standing Fair -   Ambulatory Fair -  (RW)   Endurance Deficit   Endurance Deficit Yes   Endurance Deficit Description GUERRA (SpO2 77-95% RA, HR up to 106 w/ ambulation)   Activity Tolerance   Activity Tolerance Patient limited by fatigue   Nurse Made Aware yes - cleared for PT   Assessment   Prognosis Excellent   Problem List Decreased endurance;Decreased mobility;Decreased strength;Impaired balance   Assessment Pt seen for PT treatment session w/ interventions consisting of t/f training, gait training, stair training, + education in energy conservation. Pt motivated to participate. Pt able to increase distance this session. Seated rest breaks required due to SOB/desaturation on RA. HR up to 106 w/ ambulation. Cues for pacing and pursed lip breathing required. 2 intstances of L knee wobble w/ gait training. Pt also able to progress w/ stair negotiation this visit. Pt pleased w/ progress. Continue to recommend acute medical rehab upon d/c    3/12/2024, per OT    ADL   Where Assessed Chair   Eating Assistance 7  Independent   Grooming Assistance 5  Supervision/Setup   Grooming Deficit Setup;Wash/dry  "hands;Wash/dry face;Teeth care;Brushing hair   UB Bathing Assistance 5  Supervision/Setup   UB Bathing Deficit Chest;Right arm;Left arm;Abdomen;Perineal area;Buttocks;Right upper leg;Left upper leg;Right lower leg including foot;Left lower leg including foot   LB Bathing Assistance 4  Minimal Assistance   LB Bathing Deficit Perineal area;Buttocks;Right upper leg;Left upper leg;Right lower leg including foot;Left lower leg including foot   UB Dressing Assistance 5  Supervision/Setup   UB Dressing Deficit Thread RUE;Thread LUE;Pull over head;Pull around back;Pull down in back   LB Dressing Assistance 4  Minimal Assistance   LB Dressing Deficit Don/doff R sock;Don/doff L sock   Toileting Assistance  4  Minimal Assistance   Toileting Deficit Clothing management up;Clothing management down;Perineal hygiene   Bed Mobility   Supine to Sit Unable to assess   Sit to Supine Unable to assess   Additional Comments Upon arrival, pt found sitting upright in recliner; @ end of session, pt left sitting upright in recliner with all functional needs in reach   Transfers   Sit to Stand 4  Minimal assistance   Additional items Increased time required;Verbal cues   Stand to Sit 4  Minimal assistance   Additional items Increased time required;Verbal cues   Additional Comments w/ no DME; standing from recliner to complete LB dressing/toileting tasks   Subjective   Subjective \"I hope I can get up to rehab soon.\"   Cognition   Overall Cognitive Status WFL   Arousal/Participation Alert;Responsive;Arousable;Cooperative   Attention Within functional limits   Orientation Level Oriented X4   Memory Within functional limits   Following Commands Follows one step commands without difficulty   Comments Pt pleasant and cooperative   Activity Tolerance   Activity Tolerance Patient tolerated treatment well;Patient limited by fatigue   Medical Staff Made Aware RN present; pt tachycardic throughout with HR increasing to 135-140 bpm @ times during seated " "ADLs   Assessment   Assessment Pt is a 59 yo male who actively participated in skilled OT session on 3/12/2024. reatment focused to improve functional transfers with fall prevention strategies, static/dynamic balance, postural/trunk control, proper body mechanics, functional use of b/l UE's, safety awareness, and overall increased activity tolerance in ADL/IADL/leisure tasks. Upon arrival, pt found sitting upright in recliner and was agreeable to OT session. Pt performed seated UB dressing/bathing tasks @ supervision level, performed STS with Min A x1 w/ no DME to complete standing LB dressing/bathing tasks w/ Min A. Pt tacycardic throughout session, HR noted in 130s @ times, RN present and aware. At the end of the session, pt was left sitting upright in recliner with all functional needs in reach. Pt demonstrates gradual functional improvements towards OT goals however continues to be functioning below occupational baseline and is still limited by the following limitations/impairments which were addressed through skilled instruction: generalized weakness, balance, endurance/activity tolerance, postural/trunk control, strength, pain, and safety awareness. At this time, recommend discharge to post-acute rehab when medically stable. The patient's raw score on the AM-PAC Daily Activity Inpatient Short Form is 20. A raw score of greater than or equal to 19 suggests the patient may benefit from discharge to post-acute rehabilitation services. Please refer to the recommendation of the Occupational Therapist for safe discharge planning.  Established OT goals will be continued 2-3x/wk to address acute care needs and underlying performance skills to maximize occupational performance and safety to return to OF.    3/11/2024, per SLP    Subjective:  Pt was alert and awake. He was sitting upright in his recliner. Pt stated, \"I'm good with most food but I'm not sure if I'm ready to try spinach yet.\"      Objective:  Pt was " "seen today for dysphagia therapy. Current diet is regular w/ thin liquids. Pt was on room air. Pt stated he had completed oral care after breakfast. The focus of today's session was to assess diet tolerance. Pt continues to express nerves for trying different food items, however, currently there appears to be no s/s of aspiration and pt does not express discomfort or pain related to swallowing. When discussing trials of various foods pt said, \"no need to rush.\" Voice continues to be mildly weak from baseline but pt stated \"it's getting there.\" Today pt had a lunch tray consisting of chicken marsala, white rice, and soft carrots with a cup of ice water.   Swallow function:  Bolus acceptance, manipulation and transfer all appeared functional. Mastication was complete and there was no oral residue noted throughout the meal. Pt had a steady pace of intake and pharyngeal swallow appeared prompt. No s/s of aspiration noted during today's session.      Assessment:  Swallow function has improved w/ current diet. Pt appears to be very self aware and capable of maintaining safe PO intake. He stated, \"If it gets too difficult I will take a break or try something softer.\"     Plan/Recommendations:  Continue regular diet w/ thin liquids. ST f/u not necessary at this time. Please reconsult w/ any changes or concerns.      CARE SCORES:  Self Care:  Eatin: Independent  Oral hygiene: 05: Setup or clean-up assistance  Toilet hygiene: 04: Supervision or touching  assistance  Shower/bathing self: 04: Supervision or touching  assistance  Upper body dressin: Supervision or touching  assistance  Lower body dressin: Supervision or touching  assistance  Putting on/taking off footwear: 04: Supervision or touching  assistance  Transfers:  Roll left and right: 09: Not applicable  Sit to lyin: Not applicable  Lying to sitting on side of bed: 09: Not applicable  Sit to stand: 04: Supervision or touching  assistance  Chair/bed " to chair transfer: 04: Supervision or touching  assistance  Toilet transfer: 09: Not applicable  Mobility:  Walk 10 ft: 04: Supervision or touching  assistance  Walk 50 ft with two turns: 10: Not attempted due to environmental limitations  Walk 150ft: 88: Not attempted due to medical conditions or safety concerns    CURRENT GAP IN FUNCTION  Prior to Admission: Functional Status: Patient was independent with mobility/ambulation, transfers, ADL's, IADL's.    Expected functional outcomes: It is expected that with skilled acute rehabilitation services the patient will progress to Independent for self care and Independent for mobility     Estimated length of stay: 2 - 3 weeks    Anticipated Post-Discharge Disposition/Treatment  Disposition: Return to previous home/apartment.  Outpatient Services: Physical Therapy (PT) and Occupational Therapy (OT)    BARRIERS TO DISCHARGE  Lovenox, Weakness, Pain, Diminished cognition/Mentation change, Balance Difficulty, Fatigue, Home Accessibility, Caregiver Accessibility, Financial Resources, Equipment Needs, and Resource Availability    INTERVENTIONS FOR DISCHARGE  Adaptive equipment, Patient/Family/Caregiver Education, Community Resources, Financial Assistance, Arrange DME needs, Medication Changes as per MD recommendations, Therapy exercises, Center of balance support , and Energy conservation education     REQUIRED THERAPY:  Patient will require PT and OT 90 minutes each per day, five days per week to achieve rehab goals.     REQUIRED FUNCTIONAL AND MEDICAL MANAGEMENT FOR INPATIENT REHABILITATION:  Skin:  There are no pressure sores currently, B/L arm ecchymosis, right cheek wound CICI, Pain Management: Overall pain is well controlled, Deep Vein Thrombosis (DVT) Prophylaxis:  Lovenox SQ, further IM management of additional medical conditions while on ARC, he needs PT/OT intervention, patient/family education and training, possible Neuropsych and Pulmonology consults with any other  "needed consults prn, nursing medication review and management of bowel/bladder function.    RECOMMENDED LEVEL OF CARE:   Per ICU transfer note on 3/1/2024, \"patient is a 58 y.o. male with a PMH of meningioma, WALE who presented to [facility] on 02/16 with worsening shortness of breath. Pt was recently diagnosed w/ influenza A 4 days PTA. Was seen in urgent care for is sx d/t increasing SOB. On presentation, there was concern pt may have pneumonia given CXR findings at [facility]. While at [facility], pt became increasingly hypoxic, requiring 15L and tachypneic to 30 bpm, requiring upgrading his level of care to critical care. Pt was started on Tamiflu in the hopes of decreasing severity of infection. He was also started on IV solumedrol to help w/ inflammation. Unfortunately, pt's oxygen demands continued to rise, requiring BiPAP. Due to his worsening respiratory status, decision was made to intubate the pt, which pt was amenable to. Pt was subsequently transferred to [facility] for evaluation by CT surgery for possible VV-ECMO. CTA was obtained with findings of extensive bilateral groundglass and consolidative opacities compatible with viral and/or atypical pneumonia. Patient was started on ceftriaxone and azithromycin on 02/21 and completed a 5-day course of both. He also underwent proning on 2/19 and 2/23, also underwent bronchoscopy on 2/19 which primarily grew Candida glabrata. Blood cultures also had no growth to date. He was also placed on a Lasix drip to help obtain a net negative fluid balance of 0.5-1 L. Lasix was made as needed after fluid balance goal was achieved after extubation. During placement of central line early in admission patient went into was given one-time dose of Lopressor and started on amiodarone drip which converted patient to sinus rhythm and did not have any further episodes of A-fib. Patient's course was also complicated by new onset ileus found on KUB and confirmed with CT chest " "abdomen pelvis with right colon measured at approximately 7.3 cm in its greatest caliber. Patient was started on a significant bowel regimen and had multiple bowel episodes. Also of note, patient had multiple episodes of fever spikes after completion of ceftriaxone and azithromycin. It was unclear what the likely source of the infection could have been. Further infectious and noninfectious workup was performed along with imaging studies which all returned negative, only with CT CAP showing ileus. Given multiple episodes of fever spikes after completing antibiotics patient was started again on cefepime for 7 days, treating as a likely pulmonary source. Patient showed slow steady improvement to extubation on 02/29 and continued to show significant clinical improvement s/p extubation.\" He completed 10 day course of IV antibiotics as of 3/2. Repeat CXR on 3/6 showed mild improvement LLL consolidation. He then developed increased leukocytosis, however repeat CXR showed stable diffuse bilateral patchy airspace disease. Additionally, noted with tachycardia, requiring IV fluid bolus and Cardiology consult. Per Cardiology, patient with acute on chronic tachycardia (outpatient HR 90s-100s). He was continued on IV fluids to achieve euvolemia, as patient with poor PO intake, and was trialled on low-dose betablocker, with noted improvement. Patient was initially evaluated by SLP on 3/1, with noted mild oropharyngeal dysphagia, and recommended for dysphagia 1 diet with HTL. However, patient has since been re-evaluated and is now recommended for Regular diet with thin liquids, aspiration precautions in place. Prior to patient's admission, patient was fully Independent with ADLs and shares IADLs with family. He was Independent without use of AD for mobility. He was also working and driving. Currently, patient is Min assist with use of RW for gait and transfers, and Supervision for UB ADLs, Min assist for LB ADLs. Close medical " management and PM&R management is recommended at this time while patient is on the [facility]. Inpatient acute rehab is recommended for patient to maximize overall strength and mobility upon discharge to home with support of family.

## 2024-03-12 NOTE — PROGRESS NOTES
Cardiology Team 2 Progress Note - Hussein Edward III 58 y.o. male MRN: [id number]  Unit/Bed#: [facility] 829-01 Encounter: [id number]          Subjective:   Patient seen and examined.  No significant events overnight. Denies lightheadedness, dizziness, syncope, headache, vision changes, diaphoresis, chest pain, palpitations, shortness of breath, PND, orthopnea, nausea, vomiting, abdominal pain or lower extremity edema. Remained tachycardic overnight, Hrs 105-115 bpm, in sinus tachycardia.     Assessment:  Principal Problem:    Acute respiratory failure with hypoxia ([facility])  Active Problems:    WALE (obstructive sleep apnea)    Obstructive sleep apnea (adult) (pediatric)    Chronic kidney disease    Insomnia    Anxiety    ARDS (adult respiratory distress syndrome) ([facility])    Ambulatory dysfunction    Tachycardia      1. Chronic sinus tachycardia  Symptomatic since the morning of presentation, no pain reported on arrival, hemoglobin 12, no recent TSH  Tachycardia could be from ongoing respiratory illness however he is currently maintaining SpO2 above 90% on RA.  There is also a component of anxiety for his ongoing symptoms.  Did have a brief episode of atrial fibrillation on 2/19 noted in EKG during a procedure which converted to normal sinus rhythm with beta-blocker and digoxin  Telemetry sinus tachycardia     2. Paroxsymal Atrial fibrillation  Brief episode of trial fibrillation on 2/19 during line placement  Status post metoprolol and digoxin, now in sinus rhythm  Has WALE, having issues with CPAP machine, awaiting replacement     3. Influenza A with ARDS     Plan  Continue hydration with IV normal saline  Currently on Midodrine 5mg, discussed with primary and they are agreeable to DC considering alpha agonist properties.   Will increase Toprol 25mg QD to 50mg QD starting tomorrow morning.  Can be discharged tomorrow if otherwise not contraindicated  Will need a total of 2 weeks of Toprol XL for sinus tachycardia   Outpatient  "follow up with Cardiology     Case discussed and reviewed with Dr. Johnson who agrees with my assessment and plan.Thank you for involving us in the care of your patient.    Dulce Eid MD  Cardiology Fellow   PGY-5      Vitals: Blood pressure 105/74, pulse (!) 108, temperature 98 °F (36.7 °C), resp. rate 20, height 5' 5\" (1.651 m), weight 79 kg (174 lb 2.6 oz), SpO2 99%., Body mass index is 28.98 kg/m².,   Orthostatic Blood Pressures      Flowsheet Row Most Recent Value   Blood Pressure 105/74 filed at 03/12/2024 1102   Patient Position - Orthostatic VS Sitting filed at 03/09/2024 0729              Intake/Output Summary (Last 24 hours) at 3/12/2024 1331  Last data filed at 3/12/2024 0513  Gross per 24 hour   Intake --   Output 1025 ml   Net -1025 ml       Review of System:  Review of system was conducted and was negative except for as stated in the subjective course.    Physical Exam:    GEN: Hussein Edward III appears chronically ill, anxious, alert and oriented x 3, pleasant and cooperative, on RA  NECK: No JVD or carotid bruits   HEART: Sinus  rhythm, tachycardic rate, normal S1 and S2, no murmurs, clicks, gallops or rubs   LUNGS: Clear to auscultation bilaterally;wheezing in bibasilar lobes  ABDOMEN:  Normoactive bowel sounds, soft, no tenderness, no distention  EXTREMITIES: peripheral pulses palpable; no edema        Current Facility-Administered Medications:     acetaminophen (TYLENOL) tablet 650 mg, 650 mg, Oral, Q6H PRN, Samira Cano DO, 650 mg at 03/12/24 0513    albuterol (PROVENTIL HFA,VENTOLIN HFA) inhaler 2 puff, 2 puff, Inhalation, Q4H PRN, Kenny Menard DO    albuterol inhalation solution 2.5 mg, 2.5 mg, Nebulization, Q4H PRN, Ced Bone MD    enoxaparin (LOVENOX) subcutaneous injection 40 mg, 40 mg, Subcutaneous, Q24H [facility], Dayne Garcia MD, 40 mg at 03/11/24 0230    HYDROmorphone (DILAUDID) injection 1 mg, 1 mg, Intravenous, Q4H PRN, Dayne Garcia MD    loratadine " (CLARITIN) tablet 5 mg, 5 mg, Oral, Daily, Dayne Garcia MD, 5 mg at 03/12/24 0819    LORazepam (ATIVAN) tablet 0.5 mg, 0.5 mg, Oral, Q8H PRN, Dayne Garcia MD, 0.5 mg at 03/12/24 0012    magnesium Oxide (MAG-OX) tablet 400 mg, 400 mg, Oral, Daily, Dayne Garcia MD, 400 mg at 03/12/24 0819    melatonin tablet 3 mg, 3 mg, Oral, HS PRN, Teresa Crabtree PA-C, 3 mg at 03/12/24 0012    metoprolol succinate (TOPROL-XL) 24 hr tablet 25 mg, 25 mg, Oral, BID, Zachary Johnson MD, 25 mg at 03/12/24 1102    midodrine (PROAMATINE) tablet 5 mg, 5 mg, Oral, Q8H, Dayne Garcia MD, 5 mg at 03/12/24 0819    oxyCODONE (ROXICODONE) IR tablet 5 mg, 5 mg, Oral, Q6H PRN, Dayne Garcia MD, 5 mg at 03/11/24 0118    PARoxetine (PAXIL) tablet 10 mg, 10 mg, Oral, Daily, Dayne Garcia MD, 10 mg at 03/12/24 0819    polyethylene glycol (MIRALAX) packet 17 g, 17 g, Oral, BID, Imani Lyles MD    sodium chloride (OCEAN) 0.65 % nasal spray 1 spray, 1 spray, Each Nare, Q1H PRN, Dayne Garcia MD    tamsulosin (FLOMAX) capsule 0.4 mg, 0.4 mg, Oral, Daily With Dinner, Dayne Garcia MD, 0.4 mg at 03/11/24 1649    Labs & Results:          Results from last 7 days   Lab Units 03/11/24  0459 03/09/24  1355   POTASSIUM mmol/L 4.3 4.2   CO2 mmol/L 25 28   CHLORIDE mmol/L 106 104   BUN mg/dL 13 18   CREATININE mg/dL 0.82 0.68     Results from last 7 days   Lab Units 03/11/24  0459 03/10/24  1320 03/09/24  1355   HEMOGLOBIN g/dL 12.5 12.3 13.1   HEMATOCRIT % 39.7 39.5 40.3   PLATELETS Thousands/uL [phone]           Telemetry:   Personally reviewed by Dulce Eid MD: sinus tachycardia, Hrs in 100-115 bpm

## 2024-03-12 NOTE — CASE MANAGEMENT
Case Management Discharge Planning Note    Patient name Hussein Edward III  Location [facility] 829/[facility] 829-01 MRN [id number]  : 1965 Date 3/12/2024       Current Admission Date: 2024  Current Admission Diagnosis:Acute respiratory failure with hypoxia (HCC)   Patient Active Problem List    Diagnosis Date Noted    Tachycardia 03/10/2024    Ambulatory dysfunction 2024    Persistent fever 2024    ARDS (adult respiratory distress syndrome) ([facility]) 2024    Insomnia 2024    Migraine 2024    Anxiety 2024    Acute respiratory failure with hypoxia ([facility]) 2024    Metabolic acidosis 2024    Non-traumatic rhabdomyolysis 2024    Chronic kidney disease 2024    Obstructive sleep apnea (adult) (pediatric) 2023    Primary insomnia 2023    Abnormal finding on MRI of brain 10/01/2021    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    Meningioma ([facility]) 2021    Deviated nasal septum 2019    Hypertrophy of nasal turbinates 2019    Nasal obstruction 2019    Nasal septal deviation 2019    Seasonal allergic rhinitis 2019    Nasal turbinate hypertrophy 2019    Mixed dyslipidemia 2017    WALE (obstructive sleep apnea) 2016    Low back pain 2016    Strain of lumbar region 2015      LOS (days): 22  Geometric Mean LOS (GMLOS) (days):   Days to GMLOS:     OBJECTIVE:  Risk of Unplanned Readmission Score: 15.11         Current admission status: Inpatient   Preferred Pharmacy:   [id number] - [place] - [address] [place] [address] [address] [place] [id number]  Phone: [phone] Fax: [phone]    [id number] - [place] - [address]  [address]  [facility] [address]  Phone: [phone] Fax: [phone]    Primary Care Provider: Iftikhar Roque MD    Primary Insurance: [employer]ET[employer]  Secondary Insurance:     DISCHARGE DETAILS:               Auth tasked to [employer] for submission for [facility] [place]

## 2024-03-12 NOTE — PLAN OF CARE
Problem: Prexisting or High Potential for Compromised Skin Integrity  Goal: Skin integrity is maintained or improved  Description: INTERVENTIONS:  - Identify patients at risk for skin breakdown  - Assess and monitor skin integrity  - Assess and monitor nutrition and hydration status  - Monitor labs   - Assess for incontinence   - Turn and reposition patient  - Assist with mobility/ambulation  - Relieve pressure over bony prominences  - Avoid friction and shearing  - Provide appropriate hygiene as needed including keeping skin clean and dry  - Evaluate need for skin moisturizer/barrier cream  - Collaborate with interdisciplinary team   - Patient/family teaching  - Consider wound care consult   Outcome: Progressing     Problem: PAIN - ADULT  Goal: Verbalizes/displays adequate comfort level or baseline comfort level  Description: Interventions:  - Encourage patient to monitor pain and request assistance  - Assess pain using appropriate pain scale  - Administer analgesics based on type and severity of pain and evaluate response  - Implement non-pharmacological measures as appropriate and evaluate response  - Consider cultural and social influences on pain and pain management  - Notify physician/advanced practitioner if interventions unsuccessful or patient reports new pain  Outcome: Progressing     Problem: INFECTION - ADULT  Goal: Absence or prevention of progression during hospitalization  Description: INTERVENTIONS:  - Assess and monitor for signs and symptoms of infection  - Monitor lab/diagnostic results  - Monitor all insertion sites, i.e. indwelling lines, tubes, and drains  - Monitor endotracheal if appropriate and nasal secretions for changes in amount and color  - [place] appropriate cooling/warming therapies per order  - Administer medications as ordered  - Instruct and encourage patient and family to use good hand hygiene technique  - Identify and instruct in appropriate isolation precautions for  identified infection/condition  Outcome: Progressing     Problem: SAFETY ADULT  Goal: Patient will remain free of falls  Description: INTERVENTIONS:  - Educate patient/family on patient safety including physical limitations  - Instruct patient to call for assistance with activity   - Consult OT/PT to assist with strengthening/mobility   - Keep Call bell within reach  - Keep bed low and locked with side rails adjusted as appropriate  - Keep care items and personal belongings within reach  - Initiate and maintain comfort rounds  - Make Fall Risk Sign visible to staff  - Apply yellow socks and bracelet for high fall risk patients  - Consider moving patient to room near nurses station  Outcome: Progressing  Goal: Maintain or return to baseline ADL function  Description: INTERVENTIONS:  -  Assess patient's ability to carry out ADLs; assess patient's baseline for ADL function and identify physical deficits which impact ability to perform ADLs (bathing, care of mouth/teeth, toileting, grooming, dressing, etc.)  - Assess/evaluate cause of self-care deficits   - Assess range of motion  - Assess patient's mobility; develop plan if impaired  - Assess patient's need for assistive devices and provide as appropriate  - Encourage maximum independence but intervene and supervise when necessary  - Involve family in performance of ADLs  - Assess for home care needs following discharge   - Consider OT consult to assist with ADL evaluation and planning for discharge  - Provide patient education as appropriate  Outcome: Progressing     Problem: Nutrition/Hydration-ADULT  Goal: Nutrient/Hydration intake appropriate for improving, restoring or maintaining nutritional needs  Description: Monitor and assess patient's nutrition/hydration status for malnutrition. Collaborate with interdisciplinary team and initiate plan and interventions as ordered.  Monitor patient's weight and dietary intake as ordered or per policy. Utilize nutrition  screening tool and intervene as necessary. Determine patient's food preferences and provide high-protein, high-caloric foods as appropriate.     INTERVENTIONS:  - Monitor oral intake, urinary output, labs, and treatment plans  - Assess nutrition and hydration status and recommend course of action  - Evaluate amount of meals eaten  - Assist patient with eating if necessary   - Allow adequate time for meals  - Recommend/ encourage appropriate diets, oral nutritional supplements, and vitamin/mineral supplements  - Order, calculate, and assess calorie counts as needed  - Recommend, monitor, and adjust tube feedings and TPN/PPN based on assessed needs  - Assess need for intravenous fluids  - Provide specific nutrition/hydration education as appropriate  - Include patient/family/caregiver in decisions related to nutrition  Outcome: Progressing     Problem: RESPIRATORY - ADULT  Goal: Achieves optimal ventilation and oxygenation  Description: INTERVENTIONS:  - Assess for changes in respiratory status  - Assess for changes in mentation and behavior  - Position to facilitate oxygenation and minimize respiratory effort  - Oxygen administered by appropriate delivery if ordered  - Initiate smoking cessation education as indicated  - Encourage broncho-pulmonary hygiene including cough, deep breathe, Incentive Spirometry  - Assess the need for suctioning and aspirate as needed  - Assess and instruct to report SOB or any respiratory difficulty  - Respiratory Therapy support as indicated  Outcome: Progressing     Problem: METABOLIC, FLUID AND ELECTROLYTES - ADULT  Goal: Electrolytes maintained within normal limits  Description: INTERVENTIONS:  - Monitor labs and assess patient for signs and symptoms of electrolyte imbalances  - Administer electrolyte replacement as ordered  - Monitor response to electrolyte replacements, including repeat lab results as appropriate  - Instruct patient on fluid and nutrition as appropriate  Outcome:  Progressing  Goal: Fluid balance maintained  Description: INTERVENTIONS:  - Monitor labs   - Monitor I/O and WT  - Instruct patient on fluid and nutrition as appropriate  - Assess for signs & symptoms of volume excess or deficit  Outcome: Progressing

## 2024-03-12 NOTE — ASSESSMENT & PLAN NOTE
Patient's heart rate noted to be in the 100s  Upon discussion with patient he reports chronic tachycardia for years, average heart rate in the 90s.  I reviewed outpatient notes with vital signs showing heart rate in the 90s.  He also endorses poor water intake  Patient is asymptomatic  Unclear etiology, suspect chronic sinus tachycardia likely related to poor water intake, deconditioning, albuterol use.  Less likely infection  Patient had a brief episode of A-fib with RVR in the ICU heart rate in the 150s s/p CVC placement that resolved with IV medications  Brief episode of PAF per cardiology  Stopping midodrine and increasing metoprolol today per cardiology  Continue to monitor Hrs on telemetry  Checked BMP, Mg, and cortisol today - normal

## 2024-03-12 NOTE — OCCUPATIONAL THERAPY NOTE
Occupational Therapy Progress Note     Patient Name: Hussein Edward III  Today's Date: 3/12/2024  Problem List  Principal Problem:    Acute respiratory failure with hypoxia ([facility])  Active Problems:    WALE (obstructive sleep apnea)    Obstructive sleep apnea (adult) (pediatric)    Chronic kidney disease    Insomnia    Anxiety    ARDS (adult respiratory distress syndrome) ([facility])    Ambulatory dysfunction    Tachycardia            03/12/24 0859   OT Last Visit   OT Visit Date 03/12/24   Note Type   Note Type Treatment for insurance authorization   Pain Assessment   Pain Assessment Tool 0-10   Pain Score No Pain   Hospital Pain Intervention(s) Repositioned;Ambulation/increased activity;Emotional support   Restrictions/Precautions   Weight Bearing Precautions Per Order No   Other Precautions Chair Alarm;Multiple lines;Fall Risk;Pain   Lifestyle   Autonomy I adls and mobility w/o ad - i iadls - shares homemaking with family   Reciprocal Relationships supportive family   Service to Others works FT   Intrinsic Gratification active pta   ADL   Where Assessed Chair   Eating Assistance 7  Independent   Grooming Assistance 5  Supervision/Setup   Grooming Deficit Setup;Wash/dry hands;Wash/dry face;Teeth care;Brushing hair   UB Bathing Assistance 5  Supervision/Setup   UB Bathing Deficit Chest;Right arm;Left arm;Abdomen;Perineal area;Buttocks;Right upper leg;Left upper leg;Right lower leg including foot;Left lower leg including foot   LB Bathing Assistance 4  Minimal Assistance   LB Bathing Deficit Perineal area;Buttocks;Right upper leg;Left upper leg;Right lower leg including foot;Left lower leg including foot   UB Dressing Assistance 5  Supervision/Setup   UB Dressing Deficit Thread RUE;Thread LUE;Pull over head;Pull around back;Pull down in back   LB Dressing Assistance 4  Minimal Assistance   LB Dressing Deficit Don/doff R sock;Don/doff L sock   Toileting Assistance  4  Minimal Assistance   Toileting Deficit Clothing  "management up;Clothing management down;Perineal hygiene   Bed Mobility   Supine to Sit Unable to assess   Sit to Supine Unable to assess   Additional Comments Upon arrival, pt found sitting upright in recliner; @ end of session, pt left sitting upright in recliner with all functional needs in reach   Transfers   Sit to Stand 4  Minimal assistance   Additional items Increased time required;Verbal cues   Stand to Sit 4  Minimal assistance   Additional items Increased time required;Verbal cues   Additional Comments w/ no DME; standing from recliner to complete LB dressing/toileting tasks   Subjective   Subjective \"I hope I can get up to rehab soon.\"   Cognition   Overall Cognitive Status WFL   Arousal/Participation Alert;Responsive;Arousable;Cooperative   Attention Within functional limits   Orientation Level Oriented X4   Memory Within functional limits   Following Commands Follows one step commands without difficulty   Comments Pt pleasant and cooperative   Activity Tolerance   Activity Tolerance Patient tolerated treatment well;Patient limited by fatigue   Medical Staff Made Aware RN present; pt tachycardic throughout with HR increasing to 135-140 bpm @ times during seated ADLs   Assessment   Assessment Pt is a 57 yo male who actively participated in skilled OT session on 3/12/2024. reatment focused to improve functional transfers with fall prevention strategies, static/dynamic balance, postural/trunk control, proper body mechanics, functional use of b/l UE's, safety awareness, and overall increased activity tolerance in ADL/IADL/leisure tasks. Upon arrival, pt found sitting upright in recliner and was agreeable to OT session. Pt performed seated UB dressing/bathing tasks @ supervision level, performed STS with Min A x1 w/ no DME to complete standing LB dressing/bathing tasks w/ Min A. Pt tacycardic throughout session, HR noted in 130s @ times, RN present and aware. At the end of the session, pt was left sitting " upright in recliner with all functional needs in reach. Pt demonstrates gradual functional improvements towards OT goals however continues to be functioning below occupational baseline and is still limited by the following limitations/impairments which were addressed through skilled instruction: generalized weakness, balance, endurance/activity tolerance, postural/trunk control, strength, pain, and safety awareness. At this time, recommend discharge to post-acute rehab when medically stable. The patient's raw score on the AM-PAC Daily Activity Inpatient Short Form is 20. A raw score of greater than or equal to 19 suggests the patient may benefit from discharge to post-acute rehabilitation services. Please refer to the recommendation of the Occupational Therapist for safe discharge planning.  Established OT goals will be continued 2-3x/wk to address acute care needs and underlying performance skills to maximize occupational performance and safety to return to [facility].   Plan   Treatment Interventions ADL retraining;Functional transfer training;UE strengthening/ROM;Endurance training;Patient/family training;Equipment evaluation/education;Compensatory technique education;Continued evaluation;Energy conservation;Activityengagement   Goal Expiration Date 03/15/23   OT Treatment Day 3   OT Frequency 2-3x/wk   Discharge Recommendation   Rehab Resource Intensity Level, OT I (Maximum Resource Intensity)   AM-PAC Daily Activity Inpatient   Lower Body Dressing 3   Bathing 3   Toileting 3   Upper Body Dressing 3   Grooming 4   Eating 4   Daily Activity Raw Score 20   Daily Activity Standardized Score (Calc for Raw Score >=11) 42.03   AM-[facility] Applied Cognition Inpatient   Following a Speech/Presentation 4   Understanding Ordinary Conversation 4   Taking Medications 4   Remembering Where Things Are Placed or Put Away 4   Remembering List of 4-5 Errands 4   Taking Care of Complicated Tasks 4   Applied Cognition Raw Score 24   Applied  Cognition Standardized Score 62.21   End of Consult   Education Provided Yes   Patient Position at End of Consult Bedside chair;All needs within reach   Nurse Communication Nurse aware of consult       Izabella Rapp MS, OTR/L

## 2024-03-12 NOTE — UTILIZATION REVIEW
"Continued Stay Review    Date: 03/12                          Current Patient Class: IP  Current Level of Care: MS    HPI:58 y.o. male initially admitted on 02/19     Assessment/Plan: pt noted tachycardic w/ ambulation on tele. Stopping midodrine and increasing metoprolol today per cardiology. Continue to monitor Hrs on telemetry. Checked BMP, Mg, and cortisol today. PT/OT recommending IP acute rehab. CM following for placement- pending auth.    Vital Signs: /74   Pulse (!) 108   Temp 98.3 °F (36.8 °C)   Resp 18   Ht 5' 5\" (1.651 m)   Wt 79 kg (174 lb 2.6 oz)   SpO2 98%   BMI 28.98 kg/m²       Pertinent Labs/Diagnostic Results:   03/11 CXR:  Bilateral patchy airspace opacities without significant change from the previous exam. No pneumothorax or pleural effusion.     Enlarged cardiac silhouette.      Results from last 7 days   Lab Units 03/11/24  0459 03/10/24  1320 03/09/24  1355 03/07/24  2214   WBC Thousand/uL 8.78 10.15 11.54* 13.89*   HEMOGLOBIN g/dL 12.5 12.3 13.1 12.7   HEMATOCRIT % 39.7 39.5 40.3 39.9   PLATELETS Thousands/uL 312 299 379 435*   NEUTROS ABS Thousands/µL 5.09 6.30 8.32* 11.13*         Results from last 7 days   Lab Units 03/12/24  1400 03/11/24  0459 03/09/24  1355   SODIUM mmol/L 140 140 140   POTASSIUM mmol/L 4.0 4.3 4.2   CHLORIDE mmol/L 106 106 104   CO2 mmol/L 25 25 28   ANION GAP mmol/L 9 9 8   BUN mg/dL 15 13 18   CREATININE mg/dL 0.83 0.82 0.68   EGFR ml/min/1.73sq m 98 98 106   CALCIUM mg/dL 9.0 8.7 8.8   MAGNESIUM mg/dL 2.0  --   --          Results from last 7 days   Lab Units 03/11/24  1131 03/11/24  0736 03/10/24  2112 03/10/24  1615 03/10/24  1118 03/10/24  0732 03/09/24  2043 03/09/24  1635 03/09/24  1252 03/09/24  1121 03/09/24  0822 03/08/24  2106   POC GLUCOSE mg/dl 132 91 120 93 119 95 114 100 122 69 149* 123     Results from last 7 days   Lab Units 03/12/24  1400 03/11/24  0459 03/09/24  1355   GLUCOSE RANDOM mg/dL 106 101 126         Results from last 7 days "   Lab Units 03/11/24  1718   TSH 3RD GENERATON uIU/mL 1.768     Results from last 7 days   Lab Units 03/07/24  2214   PROCALCITONIN ng/ml 0.07     Results from last 7 days   Lab Units 03/08/24  0134   LACTIC ACID mmol/L 1.0             Results from last 7 days   Lab Units 03/11/24  1716   CLARITY UA  Clear   COLOR UA  Light Yellow   SPEC GRAV UA  1.013   PH UA  6.5   GLUCOSE UA mg/dl Negative   KETONES UA mg/dl Negative   BLOOD UA  Negative   PROTEIN UA mg/dl Negative   NITRITE UA  Negative   BILIRUBIN UA  Negative   UROBILINOGEN UA (BE) mg/dl <2.0   LEUKOCYTES UA  Negative                                 Results from last 7 days   Lab Units 03/07/24  2217   BLOOD CULTURE  No Growth After 4 Days.  No Growth After 4 Days.                   Medications:   Scheduled Medications:  enoxaparin, 40 mg, Subcutaneous, Q24H YEHUDA  loratadine, 5 mg, Oral, Daily  magnesium Oxide, 400 mg, Oral, Daily  [START ON 3/13/2024] metoprolol succinate, 25 mg, Oral, Daily  metoprolol succinate, 50 mg, Oral, HS  [START ON 3/13/2024] midodrine, 2.5 mg, Oral, Q8H  PARoxetine, 10 mg, Oral, Daily  polyethylene glycol, 17 g, Oral, BID  tamsulosin, 0.4 mg, Oral, Daily With Dinner      Continuous IV Infusions:  sodium chloride 0.9 % infusion  Rate: 100 mL/hr Dose: 100 mL/hr  Freq: Continuous Route: IV  Last Dose: Stopped (03/12/24 0012)  Start: 03/11/24 1200 End: 03/12/24 0008        PRN Meds:  acetaminophen, 650 mg, Oral, Q6H PRN 03/12 x 1  albuterol, 2 puff, Inhalation, Q4H PRN  albuterol, 2.5 mg, Nebulization, Q4H PRN  HYDROmorphone, 1 mg, Intravenous, Q4H PRN  LORazepam, 0.5 mg, Oral, Q8H PRN 03/12 x 1  melatonin, 3 mg, Oral, HS PRN 03/12 x 1  oxyCODONE, 5 mg, Oral, Q6H PRN  sodium chloride, 1 spray, Each Nare, Q1H PRN        Discharge Plan: D    Network Utilization Review Department  ATTENTION: Please call with any questions or concerns to [phone] and carefully listen to the prompts so that you are directed to the right person. All  voicemails are confidential.   For Discharge needs, contact Care Management DC Support Team at [phone] opt. [phone]  Send all requests for admission clinical reviews, approved or denied determinations and any other requests to dedicated fax number below belonging to the campus where the patient is receiving treatment. List of dedicated fax numbers for the Facilities:  FACILITY NAME UR FAX NUMBER   ADMISSION DENIALS (Administrative/Medical Necessity) [phone]   DISCHARGE SUPPORT TEAM (NETWORK) [phone]   PARENT CHILD HEALTH (Maternity/NICU/Pediatrics) [phone]   [facility] [phone]   [facility] [phone]   [facility] [phone]   [facility] [phone]   [facility] [phone]   [facility] [phone]   [facility] [phone]   [facility] [phone]   [facility] [phone]   [facility] [phone]   [facility] [phone]   [facility] [phone]

## 2024-03-12 NOTE — CASE MANAGEMENT
[facility] received request for authorization from Care Manager.  Authorization request for: Acute Rehab  Facility Name: [facility] NPI: [id number]  Facility MD: Ashley DePadua NPI: [id number]  Authorization initiated by contacting insurance: [employer]   Via: [employer]   Clinicals submitted via: [employer] attachment   Pending Reference #: [id number]     Care Manager notified: Mala Baer

## 2024-03-12 NOTE — ASSESSMENT & PLAN NOTE
Lab Results   Component Value Date    EGFR 98 03/12/2024    EGFR 98 03/11/2024    EGFR 106 03/09/2024    CREATININE 0.83 03/12/2024    CREATININE 0.82 03/11/2024    CREATININE 0.68 03/09/2024     At baseline  Avoid nephrotoxins

## 2024-03-12 NOTE — PLAN OF CARE
Problem: OCCUPATIONAL THERAPY ADULT  Goal: Performs self-care activities at highest level of function for planned discharge setting.  See evaluation for individualized goals.  Description: Treatment Interventions: ADL retraining, Functional transfer training, Endurance training, Patient/family training, Equipment evaluation/education, Compensatory technique education, Activityengagement, Energy conservation          See flowsheet documentation for full assessment, interventions and recommendations.   Outcome: Progressing  Note: Limitation: Decreased ADL status, Decreased endurance, Decreased self-care trans, Decreased high-level ADLs  Prognosis: Good  Assessment: Pt is a 57 yo male who actively participated in skilled OT session on 3/12/2024. reatment focused to improve functional transfers with fall prevention strategies, static/dynamic balance, postural/trunk control, proper body mechanics, functional use of b/l UE's, safety awareness, and overall increased activity tolerance in ADL/IADL/leisure tasks. Upon arrival, pt found sitting upright in recliner and was agreeable to OT session. Pt performed seated UB dressing/bathing tasks @ supervision level, performed STS with Min A x1 w/ no DME to complete standing LB dressing/bathing tasks w/ Min A. Pt tacycardic throughout session, HR noted in 130s @ times, RN present and aware. At the end of the session, pt was left sitting upright in recliner with all functional needs in reach. Pt demonstrates gradual functional improvements towards OT goals however continues to be functioning below occupational baseline and is still limited by the following limitations/impairments which were addressed through skilled instruction: generalized weakness, balance, endurance/activity tolerance, postural/trunk control, strength, pain, and safety awareness. At this time, recommend discharge to post-acute rehab when medically stable. The patient's raw score on the AM-PAC Daily Activity  Inpatient Short Form is 20. A raw score of greater than or equal to 19 suggests the patient may benefit from discharge to post-acute rehabilitation services. Please refer to the recommendation of the Occupational Therapist for safe discharge planning.  Established OT goals will be continued 2-3x/wk to address acute care needs and underlying performance skills to maximize occupational performance and safety to return to PLOF.     Rehab Resource Intensity Level, OT: I (Maximum Resource Intensity)

## 2024-03-12 NOTE — ARC ADMISSION
Reviewed updates with ARC physician - patient remains acute rehab appropriate, and is medically stable for ARC.    Insurance authorization to be initiated by [facility], and we await their determination at this time. CM has been updated. Will continue to follow patient's case and update as able.

## 2024-03-13 LAB
BACTERIA BLD CULT: NORMAL
BACTERIA BLD CULT: NORMAL

## 2024-03-13 PROCEDURE — 94664 DEMO&/EVAL PT USE INHALER: CPT

## 2024-03-13 PROCEDURE — 94760 N-INVAS EAR/PLS OXIMETRY 1: CPT

## 2024-03-13 PROCEDURE — 97530 THERAPEUTIC ACTIVITIES: CPT

## 2024-03-13 PROCEDURE — 97116 GAIT TRAINING THERAPY: CPT

## 2024-03-13 PROCEDURE — 94660 CPAP INITIATION&MGMT: CPT

## 2024-03-13 PROCEDURE — 99232 SBSQ HOSP IP/OBS MODERATE 35: CPT | Performed by: INTERNAL MEDICINE

## 2024-03-13 RX ADMIN — METOPROLOL SUCCINATE 25 MG: 25 TABLET, FILM COATED, EXTENDED RELEASE ORAL at 08:54

## 2024-03-13 RX ADMIN — MAGNESIUM OXIDE TAB 400 MG (241.3 MG ELEMENTAL MG) 400 MG: 400 (241.3 MG) TAB at 08:51

## 2024-03-13 RX ADMIN — PAROXETINE 10 MG: 10 TABLET, FILM COATED ORAL at 08:52

## 2024-03-13 RX ADMIN — OXYCODONE HYDROCHLORIDE 5 MG: 5 TABLET ORAL at 02:08

## 2024-03-13 RX ADMIN — METOPROLOL SUCCINATE 50 MG: 50 TABLET, EXTENDED RELEASE ORAL at 21:26

## 2024-03-13 RX ADMIN — MIDODRINE HYDROCHLORIDE 2.5 MG: 2.5 TABLET ORAL at 08:52

## 2024-03-13 RX ADMIN — MIDODRINE HYDROCHLORIDE 2.5 MG: 2.5 TABLET ORAL at 15:35

## 2024-03-13 RX ADMIN — MIDODRINE HYDROCHLORIDE 2.5 MG: 2.5 TABLET ORAL at 01:03

## 2024-03-13 RX ADMIN — ENOXAPARIN SODIUM 40 MG: 40 INJECTION SUBCUTANEOUS at 16:21

## 2024-03-13 RX ADMIN — LORATADINE 5 MG: 10 TABLET ORAL at 08:52

## 2024-03-13 RX ADMIN — TAMSULOSIN HYDROCHLORIDE 0.4 MG: 0.4 CAPSULE ORAL at 15:35

## 2024-03-13 NOTE — PROGRESS NOTES
[facility]  Progress Note  Name: Hussein Edward III I  MRN: [id number]  Unit/Bed#: [facility] 829-01 I Date of Admission: 2/19/2024   Date of Service: 3/13/2024 I Hospital Day: 23    Assessment/Plan   * Acute respiratory failure with hypoxia (HCC)  Assessment & Plan  Secondary to influenza A with suspected bacterial superinfection  TTE with normal systolic function and grade 1 diastolic dysfunction  Requiring ETT and MD from 2/19-2/29. While in the ICU patient require multiple doses of IV Lasix and ultimately was placed on a Lasix drip until net negative fluid balance was achieved  S/p bronchoscopy on 2/19 with cultures primarily groin Candida glabrata, no bacteria  Blood cultures with no growth to date  Completed 5 days of ceftriaxone azithromycin, followed by 7 days of cefepime for fever thought to be pulmonary source  Completed steroid course  Encouraged IS, flutter valve.   CXR 3/8 with stable diffuse bilateral patchy airspace disease.   Pulmonology recommending outpatient CT chest in 4 weeks to ensure resolution.  Cleared on their end for discharge to rehab  Improving oxygen    ARDS (adult respiratory distress syndrome) (HCC)  Assessment & Plan  Secondary to influenza A and suspected bacterial superinfection  Plan as above    Tachycardia  Assessment & Plan  Patient's heart rate noted to be in the 100s  Upon discussion with patient he reports chronic tachycardia for years, average heart rate in the 90s.  I reviewed outpatient notes with vital signs showing heart rate in the 90s.  He also endorses poor water intake  Patient is asymptomatic  Unclear etiology, suspect chronic sinus tachycardia likely related to poor water intake, deconditioning, albuterol use.  Less likely infection  Patient had a brief episode of A-fib with RVR in the ICU heart rate in the 150s s/p CVC placement that resolved with IV medications  Brief episode of PAF per cardiology  Electrolytes are WNL  Metoprolol  increased yesterday and midorine decreased, Hrs improved  Will wean off midodrine tomorrow if he remains stable      Ambulatory dysfunction  Assessment & Plan  Evaluated by PT/OT--recommended rehab at discharge  Plan for ARC at discharge.    Fall precautions  Ambulate patient    Anxiety  Assessment & Plan  Ativan as needed    Insomnia  Assessment & Plan  PTA quviviq 25 mg at bedtime, not on formulary  Continue melatonin as needed    Chronic kidney disease  Assessment & Plan  Lab Results   Component Value Date    EGFR 98 03/12/2024    EGFR 98 03/11/2024    EGFR 106 03/09/2024    CREATININE 0.83 03/12/2024    CREATININE 0.82 03/11/2024    CREATININE 0.68 03/09/2024     At baseline  Avoid nephrotoxins    Obstructive sleep apnea (adult) (pediatric)  Assessment & Plan  Using his CPAP machine    WALE (obstructive sleep apnea)  Assessment & Plan  Continue CPAP, pt not able to use his home machine, notes an odd smell coming from the machine  Willing to use a hospital machine now             VTE Pharmacologic Prophylaxis: VTE Score: 4 Moderate Risk (Score 3-4) - Pharmacological DVT Prophylaxis Ordered: heparin.    Mobility:   Basic Mobility Inpatient Raw Score: 19  JH-HLM Goal: 6: Walk 10 steps or more  JH-HLM Achieved: 7: Walk 25 feet or more  HLM Goal NOT achieved. Continue with multidisciplinary rounding and encourage appropriate mobility to improve upon HLM goals.    Patient Centered Rounds: I performed bedside rounds with nursing staff today.   Discussions with Specialists or Other Care Team Provider: nurse, DARSHAN    Education and Discussions with Family / Patient: Patient declined call to .     Total Time Spent on Date of Encounter in care of patient: 40 mins. This time was spent on one or more of the following: performing physical exam; counseling and coordination of care; obtaining or reviewing history; documenting in the medical record; reviewing/ordering tests, medications or procedures; communicating  with other healthcare professionals and discussing with patient's family/caregivers.    Current Length of Stay: 23 day(s)  Current Patient Status: Inpatient   Certification Statement: The patient will continue to require additional inpatient hospital stay due to discharge planning  Discharge Plan: Anticipate discharge tomorrow to rehab facility.    Code Status: Level 1 - Full Code    Subjective:   Denies any new complaints.    Objective:     Vitals:   Temp (24hrs), Av.9 °F (36.6 °C), Min:97.5 °F (36.4 °C), Max:98.2 °F (36.8 °C)    Temp:  [97.5 °F (36.4 °C)-98.2 °F (36.8 °C)] 97.5 °F (36.4 °C)  HR:  [] 102  Resp:  [14-22] 18  BP: ()/(65-98) 143/98  SpO2:  [88 %-95 %] 89 %  Body mass index is 28.98 kg/m².     Input and Output Summary (last 24 hours):     Intake/Output Summary (Last 24 hours) at 3/13/2024 1640  Last data filed at 3/13/2024 1200  Gross per 24 hour   Intake 700 ml   Output 900 ml   Net -200 ml       Physical Exam:   Physical Exam  Constitutional:       Appearance: Normal appearance.   HENT:      Head: Normocephalic and atraumatic.      Nose: Nose normal.   Eyes:      Extraocular Movements: Extraocular movements intact.   Cardiovascular:      Rate and Rhythm: Normal rate and regular rhythm.   Pulmonary:      Effort: Pulmonary effort is normal.      Breath sounds: No wheezing or rales.   Skin:     General: Skin is dry.   Neurological:      Mental Status: He is alert and oriented to person, place, and time.   Psychiatric:         Mood and Affect: Mood normal.         Behavior: Behavior normal.          Additional Data:     Labs:  Results from last 7 days   Lab Units 24  0459   WBC Thousand/uL 8.78   HEMOGLOBIN g/dL 12.5   HEMATOCRIT % 39.7   PLATELETS Thousands/uL 312   NEUTROS PCT % 58   LYMPHS PCT % 18   MONOS PCT % 9   EOS PCT % 13*     Results from last 7 days   Lab Units 24  1400   SODIUM mmol/L 140   POTASSIUM mmol/L 4.0   CHLORIDE mmol/L 106   CO2 mmol/L 25   BUN mg/dL 15    CREATININE mg/dL 0.83   ANION GAP mmol/L 9   CALCIUM mg/dL 9.0   GLUCOSE RANDOM mg/dL 106         Results from last 7 days   Lab Units 03/11/24  1131 03/11/24  0736 03/10/24  2112 03/10/24  1615 03/10/24  1118 03/10/24  0732 03/09/24  2043 03/09/24  1635 03/09/24  1252 03/09/24  1121 03/09/24  0822 03/08/24  2106   POC GLUCOSE mg/dl 132 91 120 93 119 95 114 100 122 69 149* 123         Results from last 7 days   Lab Units 03/08/24  0134 03/07/24  2214   LACTIC ACID mmol/L 1.0  --    PROCALCITONIN ng/ml  --  0.07       Lines/Drains:  Invasive Devices       None                         Imaging: No pertinent imaging reviewed.    Recent Cultures (last 7 days):   Results from last 7 days   Lab Units 03/07/24  2217   BLOOD CULTURE  No Growth After 5 Days.  No Growth After 5 Days.       Last 24 Hours Medication List:   Current Facility-Administered Medications   Medication Dose Route Frequency Provider Last Rate    acetaminophen  650 mg Oral Q6H PRN Samira Bratis, DO      albuterol  2 puff Inhalation Q4H PRN Kenny Bannahun, DO      albuterol  2.5 mg Nebulization Q4H PRN Ced Bone MD      enoxaparin  40 mg Subcutaneous Q24H YEHUDA Dayne Garcia MD      HYDROmorphone  1 mg Intravenous Q4H PRN Dayne Garcia MD      loratadine  5 mg Oral Daily Dayne Garcia MD      LORazepam  0.5 mg Oral Q8H PRN Dayne Garcia MD      magnesium Oxide  400 mg Oral Daily Dayne Garcia MD      melatonin  3 mg Oral HS PRN Teresa Crabtree PA-C      metoprolol succinate  25 mg Oral Daily Zachary Johnson MD      metoprolol succinate  50 mg Oral HS Zachary Johnson MD      midodrine  2.5 mg Oral Q8H Ced Bone MD      oxyCODONE  5 mg Oral Q6H PRN Dayne Garcia MD      PARoxetine  10 mg Oral Daily Dayne Garcia MD      polyethylene glycol  17 g Oral BID Imani Lyles MD      sodium chloride  1 spray Each Nare Q1H PRN Dayne Garcia MD      tamsulosin  0.4 mg Oral  Daily With Aniceto Garcia MD          Today, Patient Was Seen By: Ced Bone MD    **Please Note: This note may have been constructed using a voice recognition system.**

## 2024-03-13 NOTE — CASE MANAGEMENT
Support Center has received INTENT TO DENY for Acute Rehab Authorization.   Insurance: [employer]   Called in by Rep: Peyton   Intent to Deny Reason:  Facility: [facility]   Pending Auth #: [id number]   Peer to Peer Phone#: [phone] opt. 2 Deadline: Scheduled w/in 14 Days   Appeal P#: [phone]      Care Manager notified: Mala Baer   [facility] Physician Advisor Liaison Team contacted.

## 2024-03-13 NOTE — ASSESSMENT & PLAN NOTE
Continue CPAP, pt not able to use his home machine, notes an odd smell coming from the machine  Willing to use a hospital machine now

## 2024-03-13 NOTE — PLAN OF CARE
Problem: PHYSICAL THERAPY ADULT  Goal: Performs mobility at highest level of function for planned discharge setting.  See evaluation for individualized goals.  Description: Treatment/Interventions: OT, Spoke to case management, Spoke to nursing, Gait training, Bed mobility, Patient/family training, Endurance training, Functional transfer training, LE strengthening/ROM  Equipment Recommended:  (TBD)       See flowsheet documentation for full assessment, interventions and recommendations.  Outcome: Progressing  Note: Prognosis: Excellent  Problem List: Decreased endurance, Decreased mobility, Decreased strength, Impaired balance  Assessment: Pt seen for PT treatment session w/ interventions consisting of t/f training, gait training, stair training, + education in energy conservation. Pt motivated to participate. Pt able to increase distance this session. Seated rest breaks required due to SOB/desaturation on RA. HR up to 106 w/ ambulation. Cues for pacing and pursed lip breathing required. 2 intstances of L knee wobble w/ gait training. Pt also able to progress w/ stair negotiation this visit. Pt pleased w/ progress. Continue to recommend acute medical rehab upon d/c.  Barriers to Discharge: Inaccessible home environment, Decreased caregiver support     Rehab Resource Intensity Level, PT: I (Maximum Resource Intensity)    See flowsheet documentation for full assessment.

## 2024-03-13 NOTE — ASSESSMENT & PLAN NOTE
Secondary to influenza A with suspected bacterial superinfection  TTE with normal systolic function and grade 1 diastolic dysfunction  Requiring ETT and MD from 2/19-2/29. While in the ICU patient require multiple doses of IV Lasix and ultimately was placed on a Lasix drip until net negative fluid balance was achieved  S/p bronchoscopy on 2/19 with cultures primarily groin Candida glabrata, no bacteria  Blood cultures with no growth to date  Completed 5 days of ceftriaxone azithromycin, followed by 7 days of cefepime for fever thought to be pulmonary source  Completed steroid course  Encouraged IS, flutter valve.   CXR 3/8 with stable diffuse bilateral patchy airspace disease.   Pulmonology recommending outpatient CT chest in 4 weeks to ensure resolution.  Cleared on their end for discharge to rehab  Improving oxygen

## 2024-03-13 NOTE — PLAN OF CARE
Problem: Prexisting or High Potential for Compromised Skin Integrity  Goal: Skin integrity is maintained or improved  Description: INTERVENTIONS:  - Identify patients at risk for skin breakdown  - Assess and monitor skin integrity  - Assess and monitor nutrition and hydration status  - Monitor labs   - Assess for incontinence   - Turn and reposition patient  - Assist with mobility/ambulation  - Relieve pressure over bony prominences  - Avoid friction and shearing  - Provide appropriate hygiene as needed including keeping skin clean and dry  - Evaluate need for skin moisturizer/barrier cream  - Collaborate with interdisciplinary team   - Patient/family teaching  - Consider wound care consult   Outcome: Progressing     Problem: PAIN - ADULT  Goal: Verbalizes/displays adequate comfort level or baseline comfort level  Description: Interventions:  - Encourage patient to monitor pain and request assistance  - Assess pain using appropriate pain scale  - Administer analgesics based on type and severity of pain and evaluate response  - Implement non-pharmacological measures as appropriate and evaluate response  - Consider cultural and social influences on pain and pain management  - Notify physician/advanced practitioner if interventions unsuccessful or patient reports new pain  Outcome: Progressing     Problem: INFECTION - ADULT  Goal: Absence or prevention of progression during hospitalization  Description: INTERVENTIONS:  - Assess and monitor for signs and symptoms of infection  - Monitor lab/diagnostic results  - Monitor all insertion sites, i.e. indwelling lines, tubes, and drains  - Monitor endotracheal if appropriate and nasal secretions for changes in amount and color  - [place] appropriate cooling/warming therapies per order  - Administer medications as ordered  - Instruct and encourage patient and family to use good hand hygiene technique  - Identify and instruct in appropriate isolation precautions for  identified infection/condition  Outcome: Progressing     Problem: Knowledge Deficit  Goal: Patient/family/caregiver demonstrates understanding of disease process, treatment plan, medications, and discharge instructions  Description: Complete learning assessment and assess knowledge base.  Interventions:  - Provide teaching at level of understanding  - Provide teaching via preferred learning methods  Outcome: Progressing     Problem: Nutrition/Hydration-ADULT  Goal: Nutrient/Hydration intake appropriate for improving, restoring or maintaining nutritional needs  Description: Monitor and assess patient's nutrition/hydration status for malnutrition. Collaborate with interdisciplinary team and initiate plan and interventions as ordered.  Monitor patient's weight and dietary intake as ordered or per policy. Utilize nutrition screening tool and intervene as necessary. Determine patient's food preferences and provide high-protein, high-caloric foods as appropriate.     INTERVENTIONS:  - Monitor oral intake, urinary output, labs, and treatment plans  - Assess nutrition and hydration status and recommend course of action  - Evaluate amount of meals eaten  - Assist patient with eating if necessary   - Allow adequate time for meals  - Recommend/ encourage appropriate diets, oral nutritional supplements, and vitamin/mineral supplements  - Order, calculate, and assess calorie counts as needed  - Recommend, monitor, and adjust tube feedings and TPN/PPN based on assessed needs  - Assess need for intravenous fluids  - Provide specific nutrition/hydration education as appropriate  - Include patient/family/caregiver in decisions related to nutrition  Outcome: Progressing     Problem: METABOLIC, FLUID AND ELECTROLYTES - ADULT  Goal: Electrolytes maintained within normal limits  Description: INTERVENTIONS:  - Monitor labs and assess patient for signs and symptoms of electrolyte imbalances  - Administer electrolyte replacement as  ordered  - Monitor response to electrolyte replacements, including repeat lab results as appropriate  - Instruct patient on fluid and nutrition as appropriate  Outcome: Progressing  Goal: Fluid balance maintained  Description: INTERVENTIONS:  - Monitor labs   - Monitor I/O and WT  - Instruct patient on fluid and nutrition as appropriate  - Assess for signs & symptoms of volume excess or deficit  Outcome: Progressing

## 2024-03-13 NOTE — PHYSICAL THERAPY NOTE
Physical Therapy Treatment Note    Patient's Name: Hussein Edward III  : 24 1112   PT Last Visit   PT Visit Date 24   Note Type   Note Type Treatment   Pain Assessment   Pain Assessment Tool 0-10   Pain Score No Pain   Restrictions/Precautions   Weight Bearing Precautions Per Order No   Other Precautions Fall Risk;Telemetry   General   Chart Reviewed Yes   Response to Previous Treatment Patient with no complaints from previous session.   Family/Caregiver Present No   Subjective   Subjective Agreeable to mobilize.   Bed Mobility   Supine to Sit Unable to assess   Sit to Supine Unable to assess   Additional Comments Pt greeted in chair.   Transfers   Sit to Stand 4  Minimal assistance   Additional items Assist x 1;Increased time required;Verbal cues;Armrests   Stand to Sit 4  Minimal assistance   Additional items Assist x 1;Armrests;Increased time required;Verbal cues   Additional Comments RW   Ambulation/Elevation   Gait pattern Excessively slow;Short stride;L Knee Yoli;Forward Flexion   Gait Assistance 4  Minimal assist   Additional items Assist x 1;Verbal cues;Tactile cues   Assistive Device Rolling walker  (+ chair follow)   Distance 70'x3 (seated rest breaks required)   Stair Management Assistance 4  Minimal assist   Additional items Assist x 1;Verbal cues   Stair Management Technique Two rails;Foreward;Backward  (ascending forward, descending backwards)   Number of Stairs 2   Balance   Static Sitting Good   Dynamic Sitting Fair +   Static Standing Fair   Dynamic Standing Fair -   Ambulatory Fair -  (RW)   Endurance Deficit   Endurance Deficit Yes   Endurance Deficit Description GUERRA (SpO2 77-95% RA, HR up to 106 w/ ambulation)   Activity Tolerance   Activity Tolerance Patient limited by fatigue   Nurse Made Aware yes - cleared for PT   Assessment   Prognosis Excellent   Problem List Decreased endurance;Decreased mobility;Decreased strength;Impaired balance   Assessment Pt seen  for PT treatment session w/ interventions consisting of t/f training, gait training, stair training, + education in energy conservation. Pt motivated to participate. Pt able to increase distance this session. Seated rest breaks required due to SOB/desaturation on RA. HR up to 106 w/ ambulation. Cues for pacing and pursed lip breathing required. 2 intstances of L knee wobble w/ gait training. Pt also able to progress w/ stair negotiation this visit. Pt pleased w/ progress. Continue to recommend acute medical rehab upon d/c.   Goals   Patient Goals get stronger   PT Treatment Day 6   Plan   Treatment/Interventions Functional transfer training;LE strengthening/ROM;Elevations;Therapeutic exercise;Endurance training;Patient/family training;Equipment eval/education;Bed mobility;Gait training;Compensatory technique education;Spoke to nursing   Progress Progressing toward goals   PT Frequency 3-5x/wk   Discharge Recommendation   Rehab Resource Intensity Level, PT I (Maximum Resource Intensity)   Equipment Recommended Walker   AM-PAC Basic Mobility Inpatient   Turning in Flat Bed Without Bedrails 4   Lying on Back to Sitting on Edge of Flat Bed Without Bedrails 3   Moving Bed to Chair 3   Standing Up From Chair Using Arms 3   Walk in Room 3   Climb 3-5 Stairs With Railing 3   Basic Mobility Inpatient Raw Score 19   Basic Mobility Standardized Score 42.48   Highest Level Of Mobility   JH-HLM Goal 6: Walk 10 steps or more   JH-HLM Achieved 7: Walk 25 feet or more   Education   Education Provided Mobility training;Assistive device   Patient Demonstrates acceptance/verbal understanding;Reinforcement needed   End of Consult   Patient Position at End of Consult Bedside chair;All needs within reach       Geeta Barrios, PT, DPT

## 2024-03-13 NOTE — ASSESSMENT & PLAN NOTE
Patient's heart rate noted to be in the 100s  Upon discussion with patient he reports chronic tachycardia for years, average heart rate in the 90s.  I reviewed outpatient notes with vital signs showing heart rate in the 90s.  He also endorses poor water intake  Patient is asymptomatic  Unclear etiology, suspect chronic sinus tachycardia likely related to poor water intake, deconditioning, albuterol use.  Less likely infection  Patient had a brief episode of A-fib with RVR in the ICU heart rate in the 150s s/p CVC placement that resolved with IV medications  Brief episode of PAF per cardiology  Electrolytes are WNL  Metoprolol increased yesterday and midorine decreased, Hrs improved  Will wean off midodrine tomorrow if he remains stable

## 2024-03-13 NOTE — ASSESSMENT & PLAN NOTE
Evaluated by PT/OT--recommended rehab at discharge  Plan for ARC at discharge.    Fall precautions  Ambulate patient

## 2024-03-13 NOTE — PHYSICIAN ADVISOR
I did a peer to peer for the patient:    Patient:Hussein Edward III  Time of [id number] was 2 PM on 3/13/2024  Outcome approved for acute rehab  Medical Director :Dr.. Cortes

## 2024-03-13 NOTE — CASE MANAGEMENT
Case Management Discharge Planning Note    Patient name Hussein Edward III  Location [facility] 829/[facility] 829-01 MRN [id number]  : 1965 Date 3/13/2024       Current Admission Date: 2024  Current Admission Diagnosis:Acute respiratory failure with hypoxia (HCC)   Patient Active Problem List    Diagnosis Date Noted    Tachycardia 03/10/2024    Ambulatory dysfunction 2024    Persistent fever 2024    ARDS (adult respiratory distress syndrome) ([facility]) 2024    Insomnia 2024    Migraine 2024    Anxiety 2024    Acute respiratory failure with hypoxia ([facility]) 2024    Metabolic acidosis 2024    Non-traumatic rhabdomyolysis 2024    Chronic kidney disease 2024    Obstructive sleep apnea (adult) (pediatric) 2023    Primary insomnia 2023    Abnormal finding on MRI of brain 10/01/2021    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    Meningioma ([facility]) 2021    Deviated nasal septum 2019    Hypertrophy of nasal turbinates 2019    Nasal obstruction 2019    Nasal septal deviation 2019    Seasonal allergic rhinitis 2019    Nasal turbinate hypertrophy 2019    Mixed dyslipidemia 2017    WALE (obstructive sleep apnea) 2016    Low back pain 2016    Strain of lumbar region 2015      LOS (days): 23  Geometric Mean LOS (GMLOS) (days):   Days to GMLOS:     OBJECTIVE:  Risk of Unplanned Readmission Score: 15.21         Current admission status: Inpatient   Preferred Pharmacy:   [id number] - [place] - [address] [place] [address] [address] [place] [address]  Phone: [phone] Fax: [phone]    [id number] - [place] - [address]  [address]  [facility] [address]  Phone: [phone] Fax: [phone]    Primary Care Provider: Iftikhar Roque MD    Primary Insurance: AET[employer]  Secondary Insurance:     DISCHARGE DETAILS:         Peer to peer APPROVED.  AIDIN message sent to [facility] liaClementina bauman.   Reports no bed available today, but available tomorrow 3/14.   Will transfer to [facility] 3/14

## 2024-03-14 ENCOUNTER — HOSPITAL ENCOUNTER (INPATIENT)
Facility: HOSPITAL | Age: 59
LOS: 13 days | Discharge: HOME | DRG: 949 | End: 2024-03-27
Attending: INTERNAL MEDICINE | Admitting: INTERNAL MEDICINE
Payer: COMMERCIAL

## 2024-03-14 VITALS
OXYGEN SATURATION: 94 % | BODY MASS INDEX: 29.02 KG/M2 | RESPIRATION RATE: 16 BRPM | TEMPERATURE: 97.3 F | HEIGHT: 65 IN | HEART RATE: 97 BPM | SYSTOLIC BLOOD PRESSURE: 121 MMHG | DIASTOLIC BLOOD PRESSURE: 85 MMHG | WEIGHT: 174.16 LBS

## 2024-03-14 DIAGNOSIS — M54.50 LOW BACK PAIN WITHOUT SCIATICA, UNSPECIFIED BACK PAIN LATERALITY, UNSPECIFIED CHRONICITY: ICD-10-CM

## 2024-03-14 DIAGNOSIS — J30.2 SEASONAL ALLERGIC RHINITIS, UNSPECIFIED TRIGGER: ICD-10-CM

## 2024-03-14 DIAGNOSIS — R00.0 TACHYCARDIA: ICD-10-CM

## 2024-03-14 DIAGNOSIS — B37.0 THRUSH: ICD-10-CM

## 2024-03-14 DIAGNOSIS — J09.X1 CAP (COMMUNITY ACQUIRED PNEUMONIA) DUE TO INFLUENZA A VIRUS: ICD-10-CM

## 2024-03-14 DIAGNOSIS — G47.00 INSOMNIA, UNSPECIFIED TYPE: ICD-10-CM

## 2024-03-14 DIAGNOSIS — J96.01 ACUTE RESPIRATORY FAILURE WITH HYPOXIA (HCC): ICD-10-CM

## 2024-03-14 DIAGNOSIS — J80 ARDS (ADULT RESPIRATORY DISTRESS SYNDROME) (HCC): Primary | ICD-10-CM

## 2024-03-14 DIAGNOSIS — F41.9 ANXIETY: ICD-10-CM

## 2024-03-14 PROBLEM — R06.89 ACUTE RESPIRATORY INSUFFICIENCY: Status: ACTIVE | Noted: 2024-02-19

## 2024-03-14 PROBLEM — Z87.09 ARDS SURVIVOR: Status: ACTIVE | Noted: 2024-02-19

## 2024-03-14 PROBLEM — I48.0 PAF (PAROXYSMAL ATRIAL FIBRILLATION) (HCC): Status: ACTIVE | Noted: 2024-03-14

## 2024-03-14 PROCEDURE — 94760 N-INVAS EAR/PLS OXIMETRY 1: CPT

## 2024-03-14 PROCEDURE — 99222 1ST HOSP IP/OBS MODERATE 55: CPT | Performed by: INTERNAL MEDICINE

## 2024-03-14 PROCEDURE — 94660 CPAP INITIATION&MGMT: CPT

## 2024-03-14 PROCEDURE — 99255 IP/OBS CONSLTJ NEW/EST HI 80: CPT | Performed by: PHYSICAL MEDICINE & REHABILITATION

## 2024-03-14 PROCEDURE — NC001 PR NO CHARGE: Performed by: PHYSICAL MEDICINE & REHABILITATION

## 2024-03-14 PROCEDURE — 94664 DEMO&/EVAL PT USE INHALER: CPT

## 2024-03-14 PROCEDURE — 99239 HOSP IP/OBS DSCHRG MGMT >30: CPT | Performed by: INTERNAL MEDICINE

## 2024-03-14 RX ORDER — BISACODYL 10 MG
10 SUPPOSITORY, RECTAL RECTAL DAILY PRN
Status: DISCONTINUED | OUTPATIENT
Start: 2024-03-14 | End: 2024-03-18

## 2024-03-14 RX ORDER — LANOLIN ALCOHOL/MO/W.PET/CERES
3 CREAM (GRAM) TOPICAL
Status: ON HOLD
Start: 2024-03-14

## 2024-03-14 RX ORDER — METOPROLOL SUCCINATE 25 MG/1
25 TABLET, EXTENDED RELEASE ORAL DAILY
Status: ON HOLD
Start: 2024-03-15

## 2024-03-14 RX ORDER — ECHINACEA PURPUREA EXTRACT 125 MG
1 TABLET ORAL
Status: ON HOLD
Start: 2024-03-14

## 2024-03-14 RX ORDER — ALBUTEROL SULFATE 90 UG/1
2 AEROSOL, METERED RESPIRATORY (INHALATION) EVERY 4 HOURS PRN
Status: DISCONTINUED | OUTPATIENT
Start: 2024-03-14 | End: 2024-03-27 | Stop reason: HOSPADM

## 2024-03-14 RX ORDER — ALBUTEROL SULFATE 2.5 MG/3ML
2.5 SOLUTION RESPIRATORY (INHALATION) EVERY 4 HOURS PRN
Status: DISCONTINUED | OUTPATIENT
Start: 2024-03-14 | End: 2024-03-16

## 2024-03-14 RX ORDER — POLYETHYLENE GLYCOL 3350 17 G/17G
17 POWDER, FOR SOLUTION ORAL 2 TIMES DAILY
Status: DISCONTINUED | OUTPATIENT
Start: 2024-03-14 | End: 2024-03-18

## 2024-03-14 RX ORDER — METOPROLOL SUCCINATE 50 MG/1
50 TABLET, EXTENDED RELEASE ORAL
Status: ON HOLD
Start: 2024-03-14

## 2024-03-14 RX ORDER — ENOXAPARIN SODIUM 100 MG/ML
40 INJECTION SUBCUTANEOUS
Status: DISCONTINUED | OUTPATIENT
Start: 2024-03-14 | End: 2024-03-27 | Stop reason: HOSPADM

## 2024-03-14 RX ORDER — LANOLIN ALCOHOL/MO/W.PET/CERES
400 CREAM (GRAM) TOPICAL DAILY
Status: DISCONTINUED | OUTPATIENT
Start: 2024-03-15 | End: 2024-03-20

## 2024-03-14 RX ORDER — LORATADINE 10 MG/1
5 TABLET ORAL DAILY
Status: ON HOLD
Start: 2024-03-15

## 2024-03-14 RX ORDER — PAROXETINE HYDROCHLORIDE 20 MG/1
10 TABLET, FILM COATED ORAL DAILY
Status: DISCONTINUED | OUTPATIENT
Start: 2024-03-15 | End: 2024-03-27 | Stop reason: HOSPADM

## 2024-03-14 RX ORDER — LORATADINE 10 MG/1
5 TABLET ORAL DAILY
Status: DISCONTINUED | OUTPATIENT
Start: 2024-03-15 | End: 2024-03-27 | Stop reason: HOSPADM

## 2024-03-14 RX ORDER — LORAZEPAM 0.5 MG/1
0.5 TABLET ORAL EVERY 8 HOURS PRN
Status: DISCONTINUED | OUTPATIENT
Start: 2024-03-14 | End: 2024-03-26

## 2024-03-14 RX ORDER — METOPROLOL SUCCINATE 50 MG/1
50 TABLET, EXTENDED RELEASE ORAL
Status: DISCONTINUED | OUTPATIENT
Start: 2024-03-14 | End: 2024-03-19

## 2024-03-14 RX ORDER — ALBUTEROL SULFATE 90 UG/1
2 AEROSOL, METERED RESPIRATORY (INHALATION) EVERY 4 HOURS PRN
Status: ON HOLD
Start: 2024-03-14

## 2024-03-14 RX ORDER — ECHINACEA PURPUREA EXTRACT 125 MG
1 TABLET ORAL
Status: DISCONTINUED | OUTPATIENT
Start: 2024-03-14 | End: 2024-03-19

## 2024-03-14 RX ORDER — GUAIFENESIN 600 MG/1
600 TABLET, EXTENDED RELEASE ORAL EVERY 12 HOURS SCHEDULED
Status: DISCONTINUED | OUTPATIENT
Start: 2024-03-14 | End: 2024-03-27 | Stop reason: HOSPADM

## 2024-03-14 RX ORDER — ACETAMINOPHEN 325 MG/1
650 TABLET ORAL EVERY 6 HOURS PRN
Status: DISCONTINUED | OUTPATIENT
Start: 2024-03-14 | End: 2024-03-27 | Stop reason: HOSPADM

## 2024-03-14 RX ORDER — ALBUTEROL SULFATE 2.5 MG/3ML
2.5 SOLUTION RESPIRATORY (INHALATION) EVERY 4 HOURS PRN
Status: ON HOLD
Start: 2024-03-14

## 2024-03-14 RX ORDER — POLYETHYLENE GLYCOL 3350 17 G/17G
17 POWDER, FOR SOLUTION ORAL 2 TIMES DAILY
Status: ON HOLD
Start: 2024-03-14

## 2024-03-14 RX ORDER — METOPROLOL SUCCINATE 25 MG/1
25 TABLET, EXTENDED RELEASE ORAL DAILY
Status: DISCONTINUED | OUTPATIENT
Start: 2024-03-15 | End: 2024-03-19

## 2024-03-14 RX ORDER — PAROXETINE 10 MG/1
10 TABLET, FILM COATED ORAL DAILY
Status: ON HOLD
Start: 2024-03-15

## 2024-03-14 RX ORDER — LANOLIN ALCOHOL/MO/W.PET/CERES
3 CREAM (GRAM) TOPICAL
Status: DISCONTINUED | OUTPATIENT
Start: 2024-03-14 | End: 2024-03-27 | Stop reason: HOSPADM

## 2024-03-14 RX ORDER — TAMSULOSIN HYDROCHLORIDE 0.4 MG/1
0.4 CAPSULE ORAL
Status: DISCONTINUED | OUTPATIENT
Start: 2024-03-14 | End: 2024-03-27 | Stop reason: HOSPADM

## 2024-03-14 RX ORDER — OXYCODONE HYDROCHLORIDE 5 MG/1
5 TABLET ORAL EVERY 6 HOURS PRN
Status: DISCONTINUED | OUTPATIENT
Start: 2024-03-14 | End: 2024-03-27 | Stop reason: HOSPADM

## 2024-03-14 RX ADMIN — MAGNESIUM OXIDE TAB 400 MG (241.3 MG ELEMENTAL MG) 400 MG: 400 (241.3 MG) TAB at 08:40

## 2024-03-14 RX ADMIN — METOPROLOL SUCCINATE 25 MG: 25 TABLET, FILM COATED, EXTENDED RELEASE ORAL at 08:40

## 2024-03-14 RX ADMIN — GUAIFENESIN 600 MG: 600 TABLET, EXTENDED RELEASE ORAL at 21:32

## 2024-03-14 RX ADMIN — MIDODRINE HYDROCHLORIDE 2.5 MG: 2.5 TABLET ORAL at 00:57

## 2024-03-14 RX ADMIN — LORATADINE 5 MG: 10 TABLET ORAL at 08:40

## 2024-03-14 RX ADMIN — TAMSULOSIN HYDROCHLORIDE 0.4 MG: 0.4 CAPSULE ORAL at 16:54

## 2024-03-14 RX ADMIN — MIDODRINE HYDROCHLORIDE 2.5 MG: 2.5 TABLET ORAL at 08:40

## 2024-03-14 RX ADMIN — ENOXAPARIN SODIUM 40 MG: 40 INJECTION SUBCUTANEOUS at 16:54

## 2024-03-14 RX ADMIN — PAROXETINE 10 MG: 10 TABLET, FILM COATED ORAL at 08:40

## 2024-03-14 RX ADMIN — METOPROLOL SUCCINATE 50 MG: 50 TABLET, EXTENDED RELEASE ORAL at 21:32

## 2024-03-14 NOTE — ASSESSMENT & PLAN NOTE
Secondary to influenza A/superimposed bacterial infection  S/p intubation 2/19 through 2/29  Bronchoscopy 2/19 positive for Candida glabrata, no bacteria  S/p antibiotics  Recent chest x-ray 3/12 was unchanged  Pulmonology recommends F/U CT of chest in 4 weeks (4/4)  Has been off O2. Will monitor sats with therapy.  Add Mucinex.

## 2024-03-14 NOTE — H&P
[facility]  H&P  Name: Hussein Edward III 58 y.o. male I MRN: [id number]  Unit/Bed#: -01 I Date of Admission: 3/14/2024   Date of Service: 3/14/2024 I Hospital Day: 0      Assessment/Plan   Acute respiratory failure with hypoxia (HCC)  Assessment & Plan  Secondary to influenza A/superimposed bacterial infection  S/p intubation 2/19 through 2/29  Bronchoscopy 2/19 positive for Candida glabrata, no bacteria  S/p antibiotics  Recent chest x-ray 3/12 was unchanged  Pulmonology recommends F/U CT of chest in 4 weeks (4/4)  Has been off O2. Will monitor sats with therapy.  Add Mucinex.      PAF (paroxysmal atrial fibrillation) (HCC)  Assessment & Plan  D/T critical illness  Converted with IV amiodarone  Echo as above  F/U cardiology as outpatient    Tachycardia  Assessment & Plan  Baseline heart rate in the 90s prehospitalization  Currently on metoprolol twice daily  Echo = EF normal/grade 1 diastolic dysfunction  F/U cardiology on discharge    Anxiety  Assessment & Plan  Continue Ativan as needed    WALE (obstructive sleep apnea)  Assessment & Plan  Machine from home brought in as hospital machine was too strong.  Continue current settings.                History of Present Illness:   Hussein Edward III is a 58 y.o. male, with migraines, meningioma F/B NS, PTSD, WALE, nephrolithiasis presented to [facility] on 2/16 with worsening shortness of breath despite being on antibiotics x 4 days.  He was given Tamiflu, IV steroids, COVID swabs were negative he did test positive for influenza A.  He unfortunately continued to have worsening respiratory distress requiring intubation on 2/19 and was transferred to [facility] for ECMO.  Imaging did show bilateral pneumonia.  He was treated with IV antibiotics, he underwent bronchoscopy on 2/19/2024 cultures grew Candida glabrata, no bacteria blood cultures were negative.  He required intubation through 2/29 and was  successfully extubated.  During his time in ICU he also had an episode of paroxysmal atrial fibrillation requiring amiodarone, he also was on a Lasix drip at that time due to volume overload.  He had episodes of fevers, he did have a repeat course of antibiotics of cefepime x 10 days.  He developed an ileus that did resolve with multiple medications.  He has had significant improvement and is now cleared for ARC    Review of Systems: A 10 point ROS was performed; negative except as noted above.       Social History:    Substance Use History:   Social History     Substance and Sexual Activity   Alcohol Use Never     Social History     Tobacco Use   Smoking Status Never   Smokeless Tobacco Former    Types: Snuff    Quit date: 2003     Social History     Substance and Sexual Activity   Drug Use No       Family History:    Family History   Problem Relation Age of Onset    Breast cancer Mother     Diabetes Father     No Known Problems Family     Substance Abuse Neg Hx     Mental illness Neg Hx          Review of Scheduled Meds:  Current Facility-Administered Medications   Medication Dose Route Frequency Provider Last Rate    acetaminophen  650 mg Oral Q6H PRN Tomasa Whitlock PA-C      albuterol  2 puff Inhalation Q4H PRN Tomasa Whitlock PA-C      albuterol  2.5 mg Nebulization Q4H PRN Tomasa Whitlock PA-C      bisacodyl  10 mg Rectal Daily PRN Ashley Depadua, MD      enoxaparin  40 mg Subcutaneous Q24H YEHUDA Tomasa Whitlock PA-C      guaiFENesin  600 mg Oral Q12H YEHUDA Tomasa Whitlock PA-C      [START ON 3/15/2024] loratadine  5 mg Oral Daily Tomasa Whitlock PA-C      LORazepam  0.5 mg Oral Q8H PRN Tomasa Whitlock PA-C      [START ON 3/15/2024] magnesium Oxide  400 mg Oral Daily Tomasa Whitlock PA-C      melatonin  3 mg Oral HS PRN Tomasa Whitlock PA-C      [START ON 3/15/2024] metoprolol succinate  25 mg Oral Daily Tomasa Whitlock PA-C      metoprolol succinate  50 mg Oral  "HS Tomasa Whitlock PA-C      oxyCODONE  5 mg Oral Q6H PRN Tomasa Whitlock PA-C      [START ON 3/15/2024] PARoxetine  10 mg Oral Daily Tomasa Whitlock PA-C      polyethylene glycol  17 g Oral BID Tomasa Whitlock PA-C      sodium chloride  1 spray Each Nare Q1H PRN Tomasa Whitlock PA-C      tamsulosin  0.4 mg Oral Daily With Dinner Tomasa Whitlock PA-C         Physical Exam:  Temp:  [97.3 °F (36.3 °C)-98.4 °F (36.9 °C)] 98.2 °F (36.8 °C)  HR:  [] 90  Resp:  [16-18] 18  BP: (100-121)/(71-85) 111/72  SpO2:  [93 %-97 %] 94 %    General: alert, no apparent distress, cooperative, and comfortable  HEENT:  Head: Normocephalic, no lesions, without obvious abnormality.  Eye: Normal external eye, conjunctiva, lidsc cornea  Ears: Normal external ears  Nose: Normal external nose, mucus membranes  CARDIAC:  tachycardic  LUNGS:  shallow breaths with diminished breath sounds  ABDOMEN:  soft, non-tender, non-distended  EXTREMITIES:  extremities normal, warm and well-perfused; no cyanosis, clubbing, or edema  NEURO:  clear speech, following all commands, oriented x 3  PSYCH:  Normal.    Laboratory:    Results from last 7 days   Lab Units 03/11/24  0459 03/10/24  1320 03/09/24  1355   HEMOGLOBIN g/dL 12.5 12.3 13.1   HEMATOCRIT % 39.7 39.5 40.3   WBC Thousand/uL 8.78 10.15 11.54*     Results from last 7 days   Lab Units 03/12/24  1400 03/11/24  0459 03/09/24  1355   BUN mg/dL 15 13 18   SODIUM mmol/L 140 140 140   POTASSIUM mmol/L 4.0 4.3 4.2   CHLORIDE mmol/L 106 106 104   CREATININE mg/dL 0.83 0.82 0.68            Wt Readings from Last 1 Encounters:   03/14/24 81 kg (178 lb 9.2 oz)     Estimated body mass index is 29.72 kg/m² as calculated from the following:    Height as of this encounter: 5' 5\" (1.651 m).    Weight as of this encounter: 81 kg (178 lb 9.2 oz).    Imaging:  No orders to display       Level 1 - Full Code    Counseling / Coordination of Care:   Total floor / unit time spent today 60 " minutes. and Greater than 50% of total time was spent with the patient and / or family counseling and / or coordination of care.     ** Please Note: Fluency Direct voice to text software may have been used in the creation of this document. **

## 2024-03-14 NOTE — ASSESSMENT & PLAN NOTE
Secondary to influenza A with suspected bacterial superinfection  TTE with normal systolic function and grade 1 diastolic dysfunction  Requiring ETT and MD from 2/19-2/29. While in the ICU patient require multiple doses of IV Lasix and ultimately was placed on a Lasix drip until net negative fluid balance was achieved  S/p bronchoscopy on 2/19 with cultures primarily groin Candida glabrata, no bacteria  Blood cultures with no growth to date  Completed 5 days of ceftriaxone azithromycin, followed by 7 days of cefepime for fever thought to be pulmonary source  Completed steroid course  Encouraged IS, flutter valve.   CXR 3/8 with stable diffuse bilateral patchy airspace disease.   Pulmonology recommending outpatient CT chest in 4 weeks to ensure resolution.  Cleared on their end for discharge to rehab  Improving oxygenation

## 2024-03-14 NOTE — DISCHARGE SUMMARY
[facility]  Discharge- Hussein Edward III 1965, 58 y.o. male MRN: [id number]  [facility] Encounter: [id number]  Primary Care Provider: Iftikhar Roque MD   Date and time admitted to hospital: 2/19/2024  4:35 PM    * Acute respiratory failure with hypoxia (HCC)  Assessment & Plan  Secondary to influenza A with suspected bacterial superinfection  TTE with normal systolic function and grade 1 diastolic dysfunction  Requiring ETT and MD from 2/19-2/29. While in the ICU patient require multiple doses of IV Lasix and ultimately was placed on a Lasix drip until net negative fluid balance was achieved  S/p bronchoscopy on 2/19 with cultures primarily groin Candida glabrata, no bacteria  Blood cultures with no growth to date  Completed 5 days of ceftriaxone azithromycin, followed by 7 days of cefepime for fever thought to be pulmonary source  Completed steroid course  Encouraged IS, flutter valve.   CXR 3/8 with stable diffuse bilateral patchy airspace disease.   Pulmonology recommending outpatient CT chest in 4 weeks to ensure resolution.  Cleared on their end for discharge to rehab  Improving oxygenation    ARDS (adult respiratory distress syndrome) (HCC)  Assessment & Plan  Secondary to influenza A and suspected bacterial superinfection  Plan as above    Tachycardia  Assessment & Plan  Patient's heart rate noted to be in the 100s  Upon discussion with patient he reports chronic tachycardia for years, average heart rate in the 90s.  I reviewed outpatient notes with vital signs showing heart rate in the 90s.  He also endorses poor water intake  Patient is asymptomatic  Unclear etiology, suspect chronic sinus tachycardia likely related to poor water intake, deconditioning, albuterol use.  Less likely infection  Patient had a brief episode of A-fib with RVR in the ICU heart rate in the 150s s/p CVC placement that resolved with IV medications  Brief episode of PAF per  cardiology  Electrolytes are WNL  Metoprolol increased yesterday and midorine decreased, Hrs improved  Will wean off midodrine today      Ambulatory dysfunction  Assessment & Plan  Evaluated by PT/OT--recommended rehab at discharge  Plan for [facility] at discharge.    Fall precautions  Ambulate patient    Anxiety  Assessment & Plan  Ativan as needed    Insomnia  Assessment & Plan  PTA quviviq 25 mg at bedtime, not on formulary  Continue melatonin as needed    Chronic kidney disease  Assessment & Plan  Lab Results   Component Value Date    EGFR 98 03/12/2024    EGFR 98 03/11/2024    EGFR 106 03/09/2024    CREATININE 0.83 03/12/2024    CREATININE 0.82 03/11/2024    CREATININE 0.68 03/09/2024     At baseline  Avoid nephrotoxins    WALE (obstructive sleep apnea)  Assessment & Plan  Continue CPAP, pt not able to use his home machine, notes an odd smell coming from the machine  Pt's wife is contacting his CPAP company to obtain new equipment  Pt will try using nasal pillows today in [facility]      Medical Problems       Resolved Problems  Date Reviewed: 3/12/2024            Resolved    Influenza A 3/9/2024     Resolved by  Imani Lyles MD    Acute metabolic encephalopathy 3/5/2024     Resolved by  Imani Lyles MD    Morbid obesity (HCC) 2/27/2024     Resolved by  Monty Pires MD    Ileus (HCC) 3/5/2024     Resolved by  Imani Lyles MD    Oropharyngeal dysphagia 3/10/2024     Resolved by  Imani Lyles MD        Discharging Physician / Practitioner: Ced Boen MD  PCP: Iftikhar Roque MD  Admission Date:   Admission Orders (From admission, onward)       Ordered        02/19/24 1618  Inpatient Admission  Once                          Discharge Date: 03/14/24    Consultations During Hospital Stay:  Critical care  Cardiology    Procedures Performed:   Arterial line  Intubation  Central line  bronchoscopy    Significant Findings / Test Results:   No evidence for pulmonary embolus.     Extensive bilateral  groundglass and consolidative opacities with predominantly perihilar and peripheral distribution compatible with viral and/or atypical pneumonia, likely influenza given clinical history. Recommend clinical correlation and interval follow-up to resolution.  Multiple airspace opacities is consistent with bronchopneumonia. There is been some improvement since the study of February 18.New colonic distention to the level of the rectum is more likely related to ileus without any zone of transition. Clinical correlation for any signs of colitis whether infectious or postinfectious. Follow-up x-ray is advised to assess for any progressive distention. The right colon measures approximately 7.3 cm in its greatest caliber at the time of the scan. Small amount of ascites. No evidence of pneumoperitoneum. Nonobstructing renal calculi.  Bronchoscopy: Minimal secretions and some erythematous mucosa. BAL of the L lingula (superior segment LB3) and anterior branch of the RUL (RB3).   Left Ventricle: Left ventricular cavity size is normal. Wall thickness is mildly increased. There is concentric remodeling. The left ventricular ejection fraction is 55-60%. Systolic function is normal. Wall motion is normal. Diastolic function is mildly abnormal, consistent with grade I (abnormal) relaxation.    Right Ventricle: Right ventricular cavity size is normal. Systolic function is normal.    Left Atrium: The atrium is normal in size.    Right Atrium: The atrium is normal in size.  Incidental Findings:   Non obstructing renal calculi  I reviewed the above mentioned incidental findings with the patient and/or family and they expressed understanding.    Test Results Pending at Discharge (will require follow up):   None     Outpatient Tests Requested:  CT scan in 1 month    Complications:  None    Reason for Admission: shortness of breath, pneumonia    Hospital Course:   Hussein Edward III is a 58 y.o. male patient who originally presented to the  "[facility] on 2/19/2024 due to shortness of breath. He was admitted to internal medicine but rapidly declined and required admission to ICU. During his hospital course he developed ARDS and was intubated. He eventually recovered and was extubated. Afterwards he was transferred back to medicine where oxygen was weaned down. He continued to have sinus tachycardia and was evaluated by cardiology. Medications were adjusted and he was discharged to inpatient rehabilitation.      Please see above list of diagnoses and related plan for additional information.     Condition at Discharge: stable    Discharge Day Visit / Exam:   Subjective:  denies any new complaints.  Vitals: Blood Pressure: 121/85 (03/14/24 0740)  Pulse: 97 (03/14/24 0740)  Temperature: (!) 97.3 °F (36.3 °C) (03/14/24 0740)  Temp Source: Axillary (03/09/24 0729)  Respirations: 16 (03/14/24 0740)  Height: 5' 5\" (165.1 cm) (02/20/24 0830)  Weight - Scale: 79 kg (174 lb 2.6 oz) (03/11/24 0600)  SpO2: 94 % (03/14/24 0740)  Exam:   Physical Exam  Constitutional:       Appearance: Normal appearance.   HENT:      Head: Normocephalic and atraumatic.      Nose: Nose normal.   Eyes:      Extraocular Movements: Extraocular movements intact.   Cardiovascular:      Rate and Rhythm: Normal rate and regular rhythm.   Pulmonary:      Effort: Pulmonary effort is normal.      Breath sounds: No wheezing or rales.   Abdominal:      Palpations: Abdomen is soft.   Musculoskeletal:      Right lower leg: No edema.      Left lower leg: No edema.   Skin:     General: Skin is warm and dry.   Neurological:      Mental Status: He is alert and oriented to person, place, and time.   Psychiatric:         Mood and Affect: Mood normal.         Behavior: Behavior normal.          Discharge instructions/Information to patient and family:   See after visit summary for information provided to patient and family.      Provisions for Follow-Up Care:  See after visit summary for information related " to follow-up care and any pertinent home health orders.      Mobility at time of Discharge:   Basic Mobility Inpatient Raw Score: 19  JH-HLM Goal: 6: Walk 10 steps or more  JH-HLM Achieved: 6: Walk 10 steps or more  HLM Goal NOT achieved. Continue to encourage mobility in post discharge setting.     Disposition:   Acute Rehab at [facility]    Planned Readmission: No     Discharge Statement:  I spent 40 minutes discharging the patient. This time was spent on the day of discharge. I had direct contact with the patient on the day of discharge. Greater than 50% of the total time was spent examining patient, answering all patient questions, arranging and discussing plan of care with patient as well as directly providing post-discharge instructions.  Additional time then spent on discharge activities.    Discharge Medications:  See after visit summary for reconciled discharge medications provided to patient and/or family.      **Please Note: This note may have been constructed using a voice recognition system**

## 2024-03-14 NOTE — CASE MANAGEMENT
Case Management Discharge Planning Note    Patient name Hussein Edward III  Location [facility] 829/[facility] 829-01 MRN [id number]  : 1965 Date 3/14/2024       Current Admission Date: 2024  Current Admission Diagnosis:Acute respiratory failure with hypoxia (HCC)   Patient Active Problem List    Diagnosis Date Noted    Tachycardia 03/10/2024    Ambulatory dysfunction 2024    Persistent fever 2024    ARDS (adult respiratory distress syndrome) ([facility]) 2024    Insomnia 2024    Migraine 2024    Anxiety 2024    Acute respiratory failure with hypoxia ([facility]) 2024    Metabolic acidosis 2024    Non-traumatic rhabdomyolysis 2024    Chronic kidney disease 2024    Obstructive sleep apnea (adult) (pediatric) 2023    Primary insomnia 2023    Abnormal finding on MRI of brain 10/01/2021    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    Meningioma ([facility]) 2021    Deviated nasal septum 2019    Hypertrophy of nasal turbinates 2019    Nasal obstruction 2019    Nasal septal deviation 2019    Seasonal allergic rhinitis 2019    Nasal turbinate hypertrophy 2019    Mixed dyslipidemia 2017    WALE (obstructive sleep apnea) 2016    Low back pain 2016    Strain of lumbar region 2015      LOS (days): 24  Geometric Mean LOS (GMLOS) (days):   Days to GMLOS:     OBJECTIVE:  Risk of Unplanned Readmission Score: 15.4         Current admission status: Inpatient   Preferred Pharmacy:   [facility] - [place] - [address] [place] [address] [address] [place] [id number]  Phone: [phone] Fax: [phone]    [facility] - [place] - [address]  [address]  [facility] [address]  Phone: [phone] Fax: [phone]    Primary Care Provider: Iftikhar Roque MD    Primary Insurance: AETNA  Secondary Insurance:     DISCHARGE DETAILS:                 Accepting Facility Name, City & State : [facility]  Receiving Facility/Agency Phone Number: [facility] CHARGE RN          Pt to d/c to [facility] today 2pm.   Patient updated and aware  No IMM needed

## 2024-03-14 NOTE — ASSESSMENT & PLAN NOTE
BL HR typically in   Monitor volume status, encourage adequate hydration  Cards started on Toprol 25mg in the AM, 50mg in the PM   - Increased to 50mg BID on 3/19.  Monitor respiratory status on BB  Adjust as per IM  Recommend outpatient Cards evaluation.

## 2024-03-14 NOTE — TREATMENT PLAN
Individualized Plan of Care - [facility]  Hussein Edward III 58 y.o. male MRN: [id number]  Unit/Bed#: -01 Encounter: [id number]     PATIENT INFORMATION  ADMISSION DATE: 3/14/2024  2:35 PM HELEN CATEGORY:Pulmonary Disorders:  10.9  Other Pulmonary   ADMISSION DIAGNOSIS: Influenza A [J10.1]  Bacterial pneumonia [J15.9]  EXPECTED LOS: 2 weeks     MEDICAL/FUNCTIONAL PROGNOSIS  Based on my assessment of the patient's medical conditions and current functional status, the prognosis for attaining medical and functional goals or the IRF stay is:  Good    Medical Goals: Patient will be able to manage medical conditions and comorbid conditions with medications and follow up upon completion of rehab program.    1. Adequate pain control  2. Prevention of VTE  3. Adequate secretion management, and monitoring of respiratory status  4. Bowel/bladder management  5. Maintain appropriate nutrition and hydration for healing and hemodynamic stability  6. Emotional adaptation to impairment  7. Monitor for polypharmacy  8. Fall prevention  9. Patient and family caregiver training/education  10. Skin protection and prevention of pressure injury  11. Appropriate management of new and chronic comorbidites and sequelae of her acute hospitalization.   12. Prevention of delirium  13. Arrange appropriate outpatient f/u      ANTICIPATED DISCHARGE DISPOSITION AND SERVICES  COMMUNITY SETTING: Home - independent/modified independent    ANTICIPATED FOLLOW-UP SERVICE:   Outpatient Therapy Services: PT and OT         DISCIPLINE SPECIFIC PLANS:  Required Disciplines & Services: Rehabillitation Nursing and Psychology    REQUIRED THERAPY:  Therapy Hours per Day Days per Week Total Days   Physical Therapy 1.5 5 5   Occupational Therapy 1.5 5 5   NOTE: Additional therapy time(s) or changes to allocation of therapies as appropriate to meet patient needs and to achieve functional goals.    Patient will participate in above therapy  regimen consisting of PT and OT due to the following medical procedure/condition:Pulmonary Disorders:  10.9  Other Pulmonary    ANTICIPATED FUNCTIONAL OUTCOMES:  ADL:  mod Ind   Bladder/Bowel:   mod Ind   Transfers:  mod Ind   Locomotion:  mod Ind   Cognitive:  mod Ind     DISCHARGE PLANNING NEEDS  Equipment needs: Discharge needs to be reviewed with team      REHAB ANTICIPATED PARTICIPATION RESTRICTIONS:  None

## 2024-03-14 NOTE — CASE MANAGEMENT
Case Management Discharge Planning Note    Patient name Hussein Edward III  Location [facility] 829/[facility] 829-01 MRN [id number]  : 1965 Date 3/14/2024       Current Admission Date: 2024  Current Admission Diagnosis:Acute respiratory failure with hypoxia (HCC)   Patient Active Problem List    Diagnosis Date Noted    Tachycardia 03/10/2024    Ambulatory dysfunction 2024    Persistent fever 2024    ARDS (adult respiratory distress syndrome) ([facility]) 2024    Insomnia 2024    Migraine 2024    Anxiety 2024    Acute respiratory failure with hypoxia ([facility]) 2024    Metabolic acidosis 2024    Non-traumatic rhabdomyolysis 2024    Chronic kidney disease 2024    Obstructive sleep apnea (adult) (pediatric) 2023    Primary insomnia 2023    Abnormal finding on MRI of brain 10/01/2021    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    Meningioma ([facility]) 2021    Deviated nasal septum 2019    Hypertrophy of nasal turbinates 2019    Nasal obstruction 2019    Nasal septal deviation 2019    Seasonal allergic rhinitis 2019    Nasal turbinate hypertrophy 2019    Mixed dyslipidemia 2017    WALE (obstructive sleep apnea) 2016    Low back pain 2016    Strain of lumbar region 2015      LOS (days): 24  Geometric Mean LOS (GMLOS) (days):   Days to GMLOS:     OBJECTIVE:  Risk of Unplanned Readmission Score: 15.4         Current admission status: Inpatient   Preferred Pharmacy:   [facility] - [place] - [address] [place] [address] [address] [place] [address]  Phone: [phone] Fax: [phone]    [facility] - [place] - [address]  [address]  [facility] [address]  Phone: [phone] Fax: [phone]    Primary Care Provider: Iftikhar Roque MD    Primary Insurance: AETNA  Secondary Insurance:     DISCHARGE DETAILS:                                                                                                                Facility Insurance Auth Number: [id number]

## 2024-03-14 NOTE — PLAN OF CARE
Problem: PAIN - ADULT  Goal: Verbalizes/displays adequate comfort level or baseline comfort level  Description: Interventions:  - Encourage patient to monitor pain and request assistance  - Assess pain using appropriate pain scale  - Administer analgesics based on type and severity of pain and evaluate response  - Implement non-pharmacological measures as appropriate and evaluate response  - Consider cultural and social influences on pain and pain management  - Notify physician/advanced practitioner if interventions unsuccessful or patient reports new pain  3/14/2024 1929 by Heidi Chandler RN  Outcome: Progressing  3/14/2024 1852 by Heidi Chandler, RN  Outcome: Progressing

## 2024-03-14 NOTE — ASSESSMENT & PLAN NOTE
He is now on RA at rest, but will desaturate to 70s when ambulating   - Improving in therapies   - Requiring at least 2L NC currently, sometimes as high as 3-4L with activity, although this is improving as well. Recently requiring mostly 2-3L NC  Can get dyspneic even with speech  Shallow breaths  Primary limitation functionally at this point  O2 at night and with therapies  Wean as tolerated

## 2024-03-14 NOTE — CASE MANAGEMENT
CM received notification from Physician Advisor: Andrade Garcia  Denial Overturned to Approval   Level of Care: Acute Rehab  Insurer: [employer]   Insurer's Medical Director: Dr. Cortes  Date outcome received: 03/13  Called in by Rep: Kristin on 03/14   Facility: [facility] Auth #: [id number]    Start of Care: 03/13  Next Review Date: 03/24  Continued Stay Reviewer: Estephania GA#: [phone]  Continued Stay Submitted via F#: [phone]    Care Manager notified: Mala Baer

## 2024-03-14 NOTE — CONSULTS
PHYSICAL MEDICINE AND REHABILITATION [facility] REHAB CONSULT  Hussein Edward III 58 y.o. male MRN: [id number]  Unit/Bed#: [facility] 974-01 Encounter: [id number]     Rehab Diagnosis: Impairment of mobility, safety and Activities of Daily Living (ADLs) due to Pulmonary Disorders:  10.9  Other Pulmonary  Etiologic Diagnosis: ARDS 2/2 influenza with superimposed bacterial PNA.    History of Present Illness:   Hussein Edward III is a 58 y.o. male with medical history of meningioma on acetazolamide, WALE who presented to the [facility] on 2/16 after presenting to urgent care for SOB (he had been having UR symptoms a week prior and was diagnosed with influenza A and treated with tamiflu). CXR showed bilateral hazy opacities, and procal was mildly elevated, and he had an LUIS ALBERTO. He was started on abx for possible superimposed bacterial PNA, IVF, solumedrol and duonebs for wheezing.  He initially required 2L NC. CT Chest showed lateral groundglass opacities consistent with viral and/or atypical pneumonia, but no PE. He developed worsening hypoxia and on 2/18 required 15L NC and ultimately BiPAP and was transitioned to SD1. He was given Lasix in case of volume overload, but continued to worsen from a respiratory standpoint, ultimately was intubated and transferred to the ICU with concern for ARDS on 2/19. He was transferred to [facility] to escalate care and to evaluate for possible ECMO. He was placed on pressors. Developed an episode of new onset atrial fibrillation after CVC placement, treated with one time dose of lopressor, amio gtt, followed by digoxin with resolution. As it occurred in the setting of sepsis/pressors, team elected not to initiate anticoagulation. He completed 10 days of abx (original sputum cultures and blod cultures with no growth. First bronch grew Candida, negative viral culture). He was able to be weaned off of pressors on 2/27, but failed PSV trial. He was noted to have diarrhea/distention  and CTAP was ordered which showed distended galbladder and colonic distention without transition point felt to be related to ileus. He was eventually able to be extubated on 2/29, and at that time evaluated by SLP who recommended Pureed/HTL. He kept spiking fevers. Duplex was negative save for acute superficial thrombophlebitis in UE. 3/1 MBSS performed showed mild oropharyngeal dysphagia and he was kept on Pureed/HTL. He was able to transition out of the ICU on 3-4L NC on an airway clearance protocol, IS, flutter valave, and hypertonic saline with chest vest. TTE performed showed normal systolic function and G1DD. He completed his course of steroids. Pulm recommended repeat CT Chest in 4 weeks (From 3/7 - so early April). Repeat MBSS performed on 3/8 showed mild oropharyngeal dysphagia and he was able to be advanced to Regular/Thins. Repeat CXR on 3/8 stable with diffuse bilateral patchy airspace disease. O2 sats continued to drop with ambulation on RA to mid 80s, but would recover quickly with just rest. On 3/10 developed tachycardia, and was given IVF and repeat CXR ordered which was stable. Ultimately felt to be chronic, with a component of anxiety, dehydration. Improvement with BB and IVF. UA to r/o other sources of infection and Cards was consulted who recommended low dose BB and encouraged euvolemia. He was started on Toprol XL 25 in the Am, 50 in the PM with improvement with plan for outpatient f/u with Cardiology. His midodrine was decreased. He is now on RA. He was admitted to the [facility] on 3/14.     Subjective: Still gets very short of breath, even with speech. His endurance with therapies is poor, and will desaturate as low as the 70s when walking with therapies. AT rest feels short of breath at times, but overall improved. Still with some productive cough, is diligent with his IS/flutter valve. Stopped nebs two days ago.   Has not been able to use his CPAP as he needs to buy new tubing and filters for it.  He and his wife have been trying to reach out to [employer]. NC also dries his nose out, and he has noticed some dried blood at times. No CP, N/V, abdominal pain. He is continent of bowel/bladder and consistency of his stools is normal. No numbness/tingling.     Review of Systems: A 10-point review of systems was performed. Negative except as listed above.    Plan:     * ARDS survivor  Assessment & Plan  Admitted with respiratory failure 2/2 influenza with possible superimposed bacterial PNA  S/p 10 days antibotics, tamiflu course complicated by ARDS with extended vent time.  He is now on RA at rest, but will desaturate to 70s when ambulating  GUERRA with speech  Primary limitation to his function right now is his respiratory endurance  Evaluate performance with O2 in therapy and will try to wean while here.  IS/Flutter valve  PRN Nebs  PT/OT 3-5 hours/day, 5-7 days/week  Outpatient f/u with Pulmonology with repeat CT Chest in about 4 weeks (beginning of April) to ensure resolution.   - At this point most recent imaging with continued bilateral patchy airspace opacities as would be expected at this time.     PAF (paroxysmal atrial fibrillation) (HCC)  Assessment & Plan  One episode provoked right after CVC placed, patient was septic and on pressors.  Resolved with lopressor, amio gtt, followed by one dose of digoxin.  On BB for tachycardia (but has been sinus tachycardia)  At this time no need for anticoagulation  Follow-up with cardiology for further evaluation    Tachycardia  Assessment & Plan  BL HR typically in   Monitor volume status, encourage adequate hydration  Cards started on Toprol 25mg in the AM, 50mg in the PM  Monitor respiratory status on BB  Adjust as per IM  Recommend outpatient Cards evaluation.    Anxiety  Assessment & Plan  Started on Paxil and Lorazepam in the hospital  Wean Lorazepam as tolerated    Migraine  Assessment & Plan  Follows with Dr. Camargo  On Acetalozamide at home for this  and magnesium for this  Held in the hospital for metabolic acidosis  Monitor off at this time  Outpatient f/u with Neurology      Acute respiratory failure with hypoxia (HCC)  Assessment & Plan  He is now on RA at rest, but will desaturate to 70s when ambulating  Can get dyspneic even with speech  Shallow breaths  Primary limitation functionally at this point  O2 at night and with therapies  Wean as tolerated     WALE (obstructive sleep apnea)  Assessment & Plan  Uses home CPAP, but may need to replace tubing/filter with [employer]  Hard time tolerating hospital CPAP  Will do night time humidified oxygen at 2L.   While here, he and his wife will try to get new tubing/filter from [employer]  Outpatient f/u with his Pulmonologist        Health Maintenance  #Delirium/Sleep: At risk, optimize sleep/wake, bowel, bladder, and pain.  #Pain: Tylenol PRN   #Bowel: Last BM 3/14. Miralax daily. PRN Suppository  #Bladder: Voiding and continent   #Skin/Pressure Injury Prevention: Turn Q2hr in bed, with weight shifts [id number] in wheelchair. Float heels in bed.  #DVT Prophylaxis: Lovenox, SCDs  #GI Prophylaxis: Not indicated  #Code Status: Full Code  #FEN: Regular/Thins, [employer] two packets with lunch/dinner   #Dispo: ELOS 2 weeks with goals to discharge home at modified Ind level of function and family support.      80 minutes (39976) or greater spent for this encounter which included a combination of face-to-face time with the patient and non-face-to-face time which in part specifically includes management of sequelae of acute hospitalization/immobility, chronic and acute comorbidities, rehab course.  Face-to-face time included extended discussion with patient regarding current condition, medical history, mood, medical/rehabilitation management, and disposition.  Non face-to-face time included coordination of care with patient's co-managing AP and/or physician(s) thru communication and review of their recent documentation  as well as reviewing vitals, bowel/bladder function, recent labs, diagnostic imaging, and notes from therapy, CM, and nursing.     Drug regimen reviewed, all potential adverse effects identified and addressed:    Scheduled Meds:  Current Facility-Administered Medications   Medication Dose Route Frequency Provider Last Rate    acetaminophen  650 mg Oral Q6H PRN Tomasa Whitlock PA-C      albuterol  2 puff Inhalation Q4H PRN Tomasa Whitlock PA-C      albuterol  2.5 mg Nebulization Q4H PRN Tomasa Whitlock PA-C      bisacodyl  10 mg Rectal Daily PRN Ashley Depadua, MD      enoxaparin  40 mg Subcutaneous Q24H YEHUDA Tomasa Whitlock PA-C      [START ON 3/15/2024] loratadine  5 mg Oral Daily Tomasa Whitlock PA-C      LORazepam  0.5 mg Oral Q8H PRN Tomasa Whitlock PA-C      [START ON 3/15/2024] magnesium Oxide  400 mg Oral Daily Tomasa Whitlock PA-C      melatonin  3 mg Oral HS PRN Tomasa Whitlock PA-C      [START ON 3/15/2024] metoprolol succinate  25 mg Oral Daily Tomasa Whitlock PA-C      metoprolol succinate  50 mg Oral HS Tomasa Whitlock PA-C      oxyCODONE  5 mg Oral Q6H PRN Tomasa Whitlock PA-C      [START ON 3/15/2024] PARoxetine  10 mg Oral Daily Tomasa Whitlock PA-C      polyethylene glycol  17 g Oral BID Tomasa Whitlock PA-C      sodium chloride  1 spray Each Nare Q1H PRN Tomasa Whitlock PA-C      tamsulosin  0.4 mg Oral Daily With Dinner Tomasa Whitlock PA-C          Restrictions include:  Fall precautions      Functional History/Home Set-up - Prior to Admission:    Lives with wife/son in 1st floor home with 2 ROSA  Was independent with mobility, ADLs, and IADLs  Worked full time and was active prior to this admission.    Functional Status Upon Admission to [facility]:  Mobility: Jose G 70' x3 with RW with seated rest breaks   Transfers: Jose G  ADLs: Ind eating, Sup grooming, Sup UB bathing, Jose G LB bathing, Sup UB dressing, Jose G LB dressing, Jose G  "toileting     Physical Exam:  Temp:  [97.3 °F (36.3 °C)-98.4 °F (36.9 °C)] 98.2 °F (36.8 °C)  HR:  [] 90  Resp:  [16-18] 18  BP: (100-143)/(71-98) 111/72  SpO2:  [89 %-97 %] 94 %    Gen: No acute distress  HEENT: Moist mucus membranes, Normocephalic/Atraumatic  Cardiovascular: Regular rate, rhythm, S1/S2. Distal pulses palpable  Heme/Extr: No edema  Pulmonary: GUERRA with speech. Lungs CTAB. Shallow breaths. No accessory muscle use. Good air entry  : No raines  GI: Soft, non-tender, non-distended. BS+  MSK: ROM is WFL in all extremities. No effusions or deformities. Bulk is symmetric. See below for MMT scores.   Integumentary: Skin is warm, dry.   Neuro: AAOx3, Sensation intact to light touch throughout. Speech is intact. Appropriate to questioning. Tone is normal.   MMT:   Strength:   Right  Left  Site  Right  Left  Site    5 5  S Ab: Shoulder Abductors  5  5  HF: Hip Flexors    5 5  EF: Elbow Flexors  5  5 KF: Knee Flexors    5  5  EE: Elbow Extensors  5  5  KE: Knee Extensors    5  5  WE: Wrist Extensors  5  5  DR: Dorsi Flexors    5  5  FF: Finger Flexors  5  5  PF: Plantar Flexors    5  5  HI: Hand Intrinsics  5  5  EHL: Extensor Hallucis Longus   Psych: Normal mood and affect.       Laboratory:    Results from last 7 days   Lab Units 03/11/24  0459 03/10/24  1320 03/09/24  1355   HEMOGLOBIN g/dL 12.5 12.3 13.1   HEMATOCRIT % 39.7 39.5 40.3   WBC Thousand/uL 8.78 10.15 11.54*     Results from last 7 days   Lab Units 03/12/24  1400 03/11/24  0459 03/09/24  1355   BUN mg/dL 15 13 18   SODIUM mmol/L 140 140 140   POTASSIUM mmol/L 4.0 4.3 4.2   CHLORIDE mmol/L 106 106 104   CREATININE mg/dL 0.83 0.82 0.68            Wt Readings from Last 1 Encounters:   03/14/24 81 kg (178 lb 9.2 oz)     Estimated body mass index is 29.72 kg/m² as calculated from the following:    Height as of this encounter: 5' 5\" (1.651 m).    Weight as of this encounter: 81 kg (178 lb 9.2 oz).    Imaging: reviewed   2/18 CTA Chest PE:  No " evidence for pulmonary embolus.     Extensive bilateral groundglass and consolidative opacities with predominantly perihilar and peripheral distribution compatible with viral and/or atypical pneumonia, likely influenza given clinical history. Recommend clinical correlation and interval   follow-up to resolution.    2/26 UE Venous Doppler:  RIGHT UPPER LIMB:  No gross evidence of acute or chronic deep vein thrombosis.  Evidence of acute superficial thrombophlebitis was noted in the cephalic cara  at the distal upper arm.  Doppler evaluation shows a normal response to augmentation maneuvers.     LEFT UPPER LIMB:  No gross evidence of acute or chronic deep vein thrombosis.  Evidence of subacute vs. chronic superficial thrombophlebitis was noted in the  cephalic vein at the mid to distal upper arm.  Doppler evaluation shows a normal response to augmentation maneuvers.    2/26 Venous Doppler:  RIGHT LOWER LIMB:  No evidence of acute or chronic deep vein thrombosis.  No evidence of superficial thrombophlebitis noted.  Doppler evaluation shows a normal response to augmentation maneuvers..  Popliteal, posterior tibial and anterior tibial arterial Doppler waveform's are  triphasic.     LEFT LOWER LIMB:  No evidence of acute or chronic deep vein thrombosis.  No evidence of superficial thrombophlebitis noted.  Doppler evaluation shows a normal response to augmentation maneuvers.  Popliteal, posterior tibial and anterior tibial arterial Doppler waveform's are  triphasic.    2/27 CT Sinus:  Minimal left sphenoid sinus secretions. There are no sinus air-fluid levels to suggest acute sinusitis.     Endotracheal and nasogastric tubes are noted in place.    2/27 CT CAP:  Multiple airspace opacities is consistent with bronchopneumonia. There is been some improvement since the study of February 18.     New colonic distention to the level of the rectum is more likely related to ileus without any zone of transition. Clinical correlation  for any signs of colitis whether infectious or postinfectious. Follow-up x-ray is advised to assess for any progressive   distention. The right colon measures approximately 7.3 cm in its greatest caliber at the time of the scan.     Small amount of ascites.     No evidence of pneumoperitoneum.     Nonobstructing renal calculi.    3/11 CXR:  Bilateral patchy airspace opacities without significant change from the previous exam. No pneumothorax or pleural effusion.     Enlarged cardiac silhouette.     Bones are unremarkable for age.     Normal upper abdomen.    2/19 Echo:    Left Ventricle: Left ventricular cavity size is normal. Wall thickness is mildly increased. There is concentric remodeling. The left ventricular ejection fraction is 55-60%. Systolic function is normal. Wall motion is normal. Diastolic function is mildly abnormal, consistent with grade I (abnormal) relaxation.    Right Ventricle: Right ventricular cavity size is normal. Systolic function is normal.    Left Atrium: The atrium is normal in size.    Right Atrium: The atrium is normal in size.     Rehabilitation Prognosis: good     Tolerance for three hours of therapy a day: good     Family/Patient Goals:  Patient/family's goals: Return to previous home/apartment.    Patient will receive PT and OT 90 minutes each per day, five days per week to achieve rehab goals or participate in 900 minutes of therapy within a 7 day week period.    Mobility Goals: Modified [place]  Transfer Goals: Modified [place]  Activities of Daily Living (ADLs) Goals: Modified [place]    Discharge Planning:  Rehabilitation and discharge goals discussed with the patient and/or family.    Case Managment and Social Work to review patient/family resources and to coordinate Discharge Planning.    Estimated length of stay: 2 weeks    Patient and Family Education and Training:  Rehabilitation and discharge goals discussed with the patient and/or family.  Patient/family  education/training needs to be discussed in weekly team meeting.    Equipment/DME needs: Therapy teams to assess and evaluate for additional equipment/DME needs throughout rehabilitation stay    Past Medical History:   Past Surgical History:   Family History:   Social history:   Past Medical History:   Diagnosis Date    Chronic back pain     Migraine     Past Surgical History:   Procedure Laterality Date    HERNIA REPAIR      MANDIBLE FRACTURE SURGERY      MOUTH SURGERY      cyst in cheek    NASAL SEPTUM SURGERY       Family History   Problem Relation Age of Onset    Breast cancer Mother     Diabetes Father     No Known Problems Family     Substance Abuse Neg Hx     Mental illness Neg Hx       Social History     Socioeconomic History    Marital status: /Civil Union     Spouse name: Kathi    Number of children: 2    Years of education: Not on file    Highest education level: Not on file   Occupational History    Occupation: [employer]     Employer: [employer]   Tobacco Use    Smoking status: Never    Smokeless tobacco: Former     Types: Snuff     Quit date: 2003   Vaping Use    Vaping status: Never Used   Substance and Sexual Activity    Alcohol use: Never    Drug use: No    Sexual activity: Not on file   Other Topics Concern    Not on file   Social History Narrative    Not on file     Social Determinants of Health     Financial Resource Strain: Not on file   Food Insecurity: No Food Insecurity (2/20/2024)    Hunger Vital Sign     Worried About Running Out of Food in the Last Year: Never true     Ran Out of Food in the Last Year: Never true   Transportation Needs: No Transportation Needs (2/20/2024)    PRAPARE - Transportation     Lack of Transportation (Medical): No     Lack of Transportation (Non-Medical): No   Physical Activity: Not on file   Stress: Not on file   Social Connections: Not on file   Intimate Partner Violence: Not on file   Housing Stability: Low Risk  (2/20/2024)     Housing Stability Vital Sign     Unable to Pay for Housing in the Last Year: No     Number of Places Lived in the Last Year: 1     Unstable Housing in the Last Year: No          Current Medical Diagnosis Allergies   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 Admission: He is of High Risk due to the following comorbid conditions: Episode of ileus, volume overload, recurrent PNA, tachycardia, dysphagia, encephalopathy, new onset Afib, history of meningioma, CKD, WALE, hyperglycemia, risk of delirium, VTE, skin breakdown, falls. . In addition, the preadmission screen, post-admission physical evaluation, overall plan of care and admissions order demonstrate a reasonable expectation that the following criteria were met at the time of admission to the [facility].  The patient requires active and ongoing therapeutic intervention of multiple therapy disciplines (physical therapy, occupational therapy, speech-language pathology, or prosthetics/orthotics), one of which is physical or occupational therapy.    Patient requires an intensive rehabilitation therapy program, as defined in Chapter 1, section  110.2.2 of the CMS Medicare Policy Manual. This intensive rehabilitation therapy program will consist of at least 3 hours of therapy per day at least 5 days per week or at least 15 hours of intensive rehabilitation therapy within a 7 consecutive day period, beginning with the date of admission to the [facility].    The patient is reasonably expected to actively participate in, and benefit significantly from, the intensive rehabilitation therapy program as defined in Chapter 1, section 110.2.2 of the CMS Medicare Policy Manual at this time of admission to the [facility]. He can reasonably be expected to make measurable improvement (that will be of practical value to improve the patient’s functional capacity or adaptation to impairments) as a result of the rehabilitation treatment, as defined in section 110.3, and such improvement can be expected to be made within the prescribed period of time. As noted in the CMS Medicare Policy Manual, the patient need not be expected to achieve complete independence in the domain of self-care nor be expected to return to his or her prior level of functioning in order to meet this standard.  The patient must require physician supervision by a rehabilitation physician. As such, a rehabilitation physician will conduct face-to-face visits with the patient at least 3 days per week throughout the patient’s stay in the [facility] to assess the patient both medically and functionally, as well as to modify the course of treatment as needed to maximize the patient’s capacity to benefit from the rehabilitation process.  The patient requires an intensive and coordinated interdisciplinary approach to providing rehabilitation, as defined in Chapter 1, section 110.2.5 of the CMS Medicare Policy Manual. This will be achieved through periodic team conferences, conducted at least once in a 7-day period, and comprising of an interdisciplinary team of medical professionals consisting of: a rehabilitation physician,  registered nurse,  and/or , and a licensed/certified therapist from each therapy discipline involved in treating the patient.       ** Please Note: Fluency Direct voice to text software may have been used in the creation of this document. **

## 2024-03-14 NOTE — ASSESSMENT & PLAN NOTE
Baseline heart rate in the 90s prehospitalization  Currently on metoprolol twice daily  Echo = EF normal/grade 1 diastolic dysfunction  F/U cardiology on discharge

## 2024-03-14 NOTE — ASSESSMENT & PLAN NOTE
Follows with Dr. Camargo  On Acetalozamide at home for this and magnesium for this  Held in the hospital for metabolic acidosis  Monitor off at this time  Outpatient f/u with Neurology

## 2024-03-14 NOTE — ASSESSMENT & PLAN NOTE
Continue CPAP, pt not able to use his home machine, notes an odd smell coming from the machine  Pt's wife is contacting his CPAP company to obtain new equipment  Pt will try using nasal pillows today in [facility]

## 2024-03-14 NOTE — ASSESSMENT & PLAN NOTE
Uses home CPAP, but may need to replace tubing/filter with [employer]  Hard time tolerating hospital CPAP  Continue O2 via nasal cannula  We ordered new tubing and mask from [employer].   Outpatient f/u with his Pulmonologist   - He would like to get a new machine.

## 2024-03-14 NOTE — ASSESSMENT & PLAN NOTE
One episode provoked right after CVC placed, patient was septic and on pressors.  Resolved with lopressor, amio gtt, followed by one dose of digoxin.  On BB for tachycardia (but has been sinus tachycardia)  At this time no need for anticoagulation  Follow-up with cardiology for further evaluation

## 2024-03-14 NOTE — ASSESSMENT & PLAN NOTE
Patient's heart rate noted to be in the 100s  Upon discussion with patient he reports chronic tachycardia for years, average heart rate in the 90s.  I reviewed outpatient notes with vital signs showing heart rate in the 90s.  He also endorses poor water intake  Patient is asymptomatic  Unclear etiology, suspect chronic sinus tachycardia likely related to poor water intake, deconditioning, albuterol use.  Less likely infection  Patient had a brief episode of A-fib with RVR in the ICU heart rate in the 150s s/p CVC placement that resolved with IV medications  Brief episode of PAF per cardiology  Electrolytes are WNL  Metoprolol increased yesterday and midorine decreased, Hrs improved  Will wean off midodrine today

## 2024-03-14 NOTE — PLAN OF CARE
Problem: Prexisting or High Potential for Compromised Skin Integrity  Goal: Skin integrity is maintained or improved  Description: INTERVENTIONS:  - Identify patients at risk for skin breakdown  - Assess and monitor skin integrity  - Assess and monitor nutrition and hydration status  - Monitor labs   - Assess for incontinence   - Turn and reposition patient  - Assist with mobility/ambulation  - Relieve pressure over bony prominences  - Avoid friction and shearing  - Provide appropriate hygiene as needed including keeping skin clean and dry  - Evaluate need for skin moisturizer/barrier cream  - Collaborate with interdisciplinary team   - Patient/family teaching  - Consider wound care consult   Outcome: Progressing     Problem: PAIN - ADULT  Goal: Verbalizes/displays adequate comfort level or baseline comfort level  Description: Interventions:  - Encourage patient to monitor pain and request assistance  - Assess pain using appropriate pain scale  - Administer analgesics based on type and severity of pain and evaluate response  - Implement non-pharmacological measures as appropriate and evaluate response  - Consider cultural and social influences on pain and pain management  - Notify physician/advanced practitioner if interventions unsuccessful or patient reports new pain  Outcome: Progressing     Problem: INFECTION - ADULT  Goal: Absence or prevention of progression during hospitalization  Description: INTERVENTIONS:  - Assess and monitor for signs and symptoms of infection  - Monitor lab/diagnostic results  - Monitor all insertion sites, i.e. indwelling lines, tubes, and drains  - Monitor endotracheal if appropriate and nasal secretions for changes in amount and color  - [place] appropriate cooling/warming therapies per order  - Administer medications as ordered  - Instruct and encourage patient and family to use good hand hygiene technique  - Identify and instruct in appropriate isolation precautions for  identified infection/condition  Outcome: Progressing     Problem: SAFETY ADULT  Goal: Patient will remain free of falls  Description: INTERVENTIONS:  - Educate patient/family on patient safety including physical limitations  - Instruct patient to call for assistance with activity   - Consult OT/PT to assist with strengthening/mobility   - Keep Call bell within reach  - Keep bed low and locked with side rails adjusted as appropriate  - Keep care items and personal belongings within reach  - Initiate and maintain comfort rounds  - Make Fall Risk Sign visible to staff  - Offer Toileting every 2 Hours, in advance of need  - Initiate/Maintain chair alarm  - Obtain necessary fall risk management equipment: chair alarm  - Apply yellow socks and bracelet for high fall risk patients  - Consider moving patient to room near nurses station  Outcome: Progressing  Goal: Maintain or return to baseline ADL function  Description: INTERVENTIONS:  -  Assess patient's ability to carry out ADLs; assess patient's baseline for ADL function and identify physical deficits which impact ability to perform ADLs (bathing, care of mouth/teeth, toileting, grooming, dressing, etc.)  - Assess/evaluate cause of self-care deficits   - Assess range of motion  - Assess patient's mobility; develop plan if impaired  - Assess patient's need for assistive devices and provide as appropriate  - Encourage maximum independence but intervene and supervise when necessary  - Involve family in performance of ADLs  - Assess for home care needs following discharge   - Consider OT consult to assist with ADL evaluation and planning for discharge  - Provide patient education as appropriate  Outcome: Progressing  Goal: Maintains/Returns to pre admission functional level  Description: INTERVENTIONS:  - Perform AM-PAC 6 Click Basic Mobility/ Daily Activity assessment daily.  - Set and communicate daily mobility goal to care team and patient/family/caregiver.   -  Collaborate with rehabilitation services on mobility goals if consulted  - Perform Range of Motion 3 times a day.  - Reposition patient every 2 hours.  - Dangle patient 3 times a day  - Stand patient 3 times a day  - Ambulate patient 3 times a day  - Out of bed to chair 3 times a day   - Out of bed for meals 3 times a day  - Out of bed for toileting  - Record patient progress and toleration of activity level   Outcome: Progressing     Problem: DISCHARGE PLANNING  Goal: Discharge to home or other facility with appropriate resources  Description: INTERVENTIONS:  - Identify barriers to discharge w/patient and caregiver  - Arrange for needed discharge resources and transportation as appropriate  - Identify discharge learning needs (meds, wound care, etc.)  - Arrange for interpretive services to assist at discharge as needed  - Refer to Case Management Department for coordinating discharge planning if the patient needs post-hospital services based on physician/advanced practitioner order or complex needs related to functional status, cognitive ability, or social support system  Outcome: Progressing     Problem: Knowledge Deficit  Goal: Patient/family/caregiver demonstrates understanding of disease process, treatment plan, medications, and discharge instructions  Description: Complete learning assessment and assess knowledge base.  Interventions:  - Provide teaching at level of understanding  - Provide teaching via preferred learning methods  Outcome: Progressing     Problem: Nutrition/Hydration-ADULT  Goal: Nutrient/Hydration intake appropriate for improving, restoring or maintaining nutritional needs  Description: Monitor and assess patient's nutrition/hydration status for malnutrition. Collaborate with interdisciplinary team and initiate plan and interventions as ordered.  Monitor patient's weight and dietary intake as ordered or per policy. Utilize nutrition screening tool and intervene as necessary. Determine  patient's food preferences and provide high-protein, high-caloric foods as appropriate.     INTERVENTIONS:  - Monitor oral intake, urinary output, labs, and treatment plans  - Assess nutrition and hydration status and recommend course of action  - Evaluate amount of meals eaten  - Assist patient with eating if necessary   - Allow adequate time for meals  - Recommend/ encourage appropriate diets, oral nutritional supplements, and vitamin/mineral supplements  - Order, calculate, and assess calorie counts as needed  - Recommend, monitor, and adjust tube feedings and TPN/PPN based on assessed needs  - Assess need for intravenous fluids  - Provide specific nutrition/hydration education as appropriate  - Include patient/family/caregiver in decisions related to nutrition  Outcome: Progressing     Problem: CARDIOVASCULAR - ADULT  Goal: Maintains optimal cardiac output and hemodynamic stability  Description: INTERVENTIONS:  - Monitor I/O, vital signs and rhythm  - Monitor for S/S and trends of decreased cardiac output  - Administer and titrate ordered vasoactive medications to optimize hemodynamic stability  - Assess quality of pulses, skin color and temperature  - Assess for signs of decreased coronary artery perfusion  - Instruct patient to report change in severity of symptoms  Outcome: Progressing  Goal: Absence of cardiac dysrhythmias or at baseline rhythm  Description: INTERVENTIONS:  - Continuous cardiac monitoring, vital signs, obtain 12 lead EKG if ordered  - Administer antiarrhythmic and heart rate control medications as ordered  - Monitor electrolytes and administer replacement therapy as ordered  Outcome: Progressing     Problem: RESPIRATORY - ADULT  Goal: Achieves optimal ventilation and oxygenation  Description: INTERVENTIONS:  - Assess for changes in respiratory status  - Assess for changes in mentation and behavior  - Position to facilitate oxygenation and minimize respiratory effort  - Oxygen administered by  appropriate delivery if ordered  - Initiate smoking cessation education as indicated  - Encourage broncho-pulmonary hygiene including cough, deep breathe, Incentive Spirometry  - Assess the need for suctioning and aspirate as needed  - Assess and instruct to report SOB or any respiratory difficulty  - Respiratory Therapy support as indicated  Outcome: Progressing     Problem: METABOLIC, FLUID AND ELECTROLYTES - ADULT  Goal: Electrolytes maintained within normal limits  Description: INTERVENTIONS:  - Monitor labs and assess patient for signs and symptoms of electrolyte imbalances  - Administer electrolyte replacement as ordered  - Monitor response to electrolyte replacements, including repeat lab results as appropriate  - Instruct patient on fluid and nutrition as appropriate  Outcome: Progressing  Goal: Fluid balance maintained  Description: INTERVENTIONS:  - Monitor labs   - Monitor I/O and WT  - Instruct patient on fluid and nutrition as appropriate  - Assess for signs & symptoms of volume excess or deficit  Outcome: Progressing

## 2024-03-14 NOTE — ASSESSMENT & PLAN NOTE
Admitted with respiratory failure 2/2 influenza with possible superimposed bacterial PNA  S/p 10 days antibotics, tamiflu course complicated by ARDS with extended vent time.  He is now on RA at rest, but will desaturate to 70s when ambulating on admission  Now maintaining O2 sats in the 90s on 2-3L NC ATC.  GUERRA with speech improving at discharge   IS/Flutter valve  PRN Nebs  PT/OT 3-5 hours/day, 5-7 days/week - completed. Recommend outpatient cardiopulmonary rehab.  Outpatient f/u with Pulmonology with repeat CT Chest in the beginning of April to ensure resolution.         - At this point most recent imaging with continued bilateral patchy airspace opacities as would be expected at this time.

## 2024-03-14 NOTE — ARC ADMISSION
Noted per DSC and CM that peer to peer was completed and overturned, with auth #: [id number].    Patient will admit to [facility] room 974 with pickup at 2pm. RN can reach out to ARC charge RN role via TT for report. CM has been updated.

## 2024-03-15 LAB
DME PARACHUTE DELIVERY DATE REQUESTED: NORMAL
DME PARACHUTE ITEM DESCRIPTION: NORMAL
DME PARACHUTE ORDER STATUS: NORMAL
DME PARACHUTE SUPPLIER NAME: NORMAL
DME PARACHUTE SUPPLIER PHONE: NORMAL

## 2024-03-15 PROCEDURE — 99232 SBSQ HOSP IP/OBS MODERATE 35: CPT | Performed by: INTERNAL MEDICINE

## 2024-03-15 PROCEDURE — 97530 THERAPEUTIC ACTIVITIES: CPT

## 2024-03-15 PROCEDURE — 97110 THERAPEUTIC EXERCISES: CPT

## 2024-03-15 PROCEDURE — 97535 SELF CARE MNGMENT TRAINING: CPT

## 2024-03-15 PROCEDURE — 99233 SBSQ HOSP IP/OBS HIGH 50: CPT | Performed by: PHYSICAL MEDICINE & REHABILITATION

## 2024-03-15 PROCEDURE — 97163 PT EVAL HIGH COMPLEX 45 MIN: CPT

## 2024-03-15 PROCEDURE — 97166 OT EVAL MOD COMPLEX 45 MIN: CPT

## 2024-03-15 PROCEDURE — 94664 DEMO&/EVAL PT USE INHALER: CPT | Performed by: SOCIAL WORKER

## 2024-03-15 RX ADMIN — METOPROLOL SUCCINATE 50 MG: 50 TABLET, EXTENDED RELEASE ORAL at 21:19

## 2024-03-15 RX ADMIN — LORATADINE 5 MG: 10 TABLET ORAL at 09:58

## 2024-03-15 RX ADMIN — MAGNESIUM OXIDE TAB 400 MG (241.3 MG ELEMENTAL MG) 400 MG: 400 (241.3 MG) TAB at 09:58

## 2024-03-15 RX ADMIN — METOPROLOL SUCCINATE 25 MG: 25 TABLET, FILM COATED, EXTENDED RELEASE ORAL at 10:16

## 2024-03-15 RX ADMIN — GUAIFENESIN 600 MG: 600 TABLET, EXTENDED RELEASE ORAL at 21:19

## 2024-03-15 RX ADMIN — GUAIFENESIN 600 MG: 600 TABLET, EXTENDED RELEASE ORAL at 10:00

## 2024-03-15 RX ADMIN — ENOXAPARIN SODIUM 40 MG: 40 INJECTION SUBCUTANEOUS at 17:00

## 2024-03-15 RX ADMIN — PAROXETINE HYDROCHLORIDE 10 MG: 20 TABLET, FILM COATED ORAL at 09:57

## 2024-03-15 RX ADMIN — OXYCODONE HYDROCHLORIDE 5 MG: 5 TABLET ORAL at 02:25

## 2024-03-15 RX ADMIN — TAMSULOSIN HYDROCHLORIDE 0.4 MG: 0.4 CAPSULE ORAL at 17:00

## 2024-03-15 NOTE — RESPIRATORY THERAPY NOTE
Resp Care   03/14/24 2000   Respiratory Assessment   Assessment Type Assess only   General Appearance Awake;Alert   Respiratory Pattern Dyspnea with exertion   Chest Assessment Chest expansion symmetrical   Bilateral Breath Sounds Diminished;Clear   Resp Comments pt found on ra, spo2 is 91%, bs are diminished, no prn tx needed, pt is refusing hs cpap, will cont to monitor per resp protcol.   Oxygen Therapy/Pulse Ox   O2 Device None (Room air)   SpO2 91 %   SpO2 Activity At Rest   $ Pulse Oximetry Spot Check Charge Completed

## 2024-03-15 NOTE — UTILIZATION REVIEW
NOTIFICATION OF ADMISSION DISCHARGE   This is a Notification of Discharge from [facility]. Please be advised that this patient has been discharge from our facility. Below you will find the admission and discharge date and time including the patient’s disposition.   UTILIZATION REVIEW CONTACT:  Gal Newton  Utilization   Network Utilization Review Department  Phone: [phone] x carefully listen to the prompts. All voicemails are confidential.  Email: [email or web address]     ADMISSION INFORMATION  PRESENTATION DATE: 2/19/2024  4:35 PM  OBERVATION ADMISSION DATE:   INPATIENT ADMISSION DATE: 2/19/24  4:35 PM   DISCHARGE DATE: 3/14/2024  2:27 PM   DISPOSITION:[facility]    Network Utilization Review Department  ATTENTION: Please call with any questions or concerns to [phone] and carefully listen to the prompts so that you are directed to the right person. All voicemails are confidential.   For Discharge needs, contact Care Management DC Support Team at [phone] opt. 2  Send all requests for admission clinical reviews, approved or denied determinations and any other requests to dedicated fax number below belonging to the campus where the patient is receiving treatment. List of dedicated fax numbers for the Facilities:  FACILITY NAME UR FAX NUMBER   ADMISSION DENIALS (Administrative/Medical Necessity) [phone]   DISCHARGE SUPPORT TEAM ([facility]) [phone]   PARENT CHILD HEALTH (Maternity/NICU/Pediatrics) [phone]   [facility] [phone]   [facility] [phone]   [facility] [phone]   [facility] [phone]   [facility] [phone]   [facility] [phone]   [facility] [phone]   [facility] [phone]   [facility]  [facility] [phone]   [facility] [phone]   [facility] [phone]   [facility] [phone]

## 2024-03-15 NOTE — CASE MANAGEMENT
Met w/pt and reviewed rehab routine and cm role. Pt resides with wife and his 24 yr old son in a [place] home with 2 carmela. Pt has not had experience with [facility] or outpt therapy. Pt is currently on oxygen and is aware he may need on dc. Pt has a cpap at home and needs new tubing and a mask due to an odor.  Cm placed order via parachute with aidin as pt reports having some difficulty getting the order placed. Cm reviewed insurance update due on 3/24 with pt.

## 2024-03-15 NOTE — PROGRESS NOTES
OT Long Term Goals       03/15/24 0700   Rehab Team Goals   ADL Team Goal Patient will be independent with ADLs with least restrictive device upon completion of rehab program  (10-14 days)   Rehab Team Interventions   OT Interventions Self Care;Home Management;Therapeutic Exercise;Community Reintegration;Energy Conservation;Patient/Family Education   Eating Goal   Eating Goal 06. Independent - Patient completes the activity by him/herself with no assistance from a helper.   Status Target goal - two weeks   Interventions Optimal Position   Grooming Goal   Oral Hygiene Goal 06. Independent - Patient completes the activity by him/herself with no assistance from a helper.   Status Target goal - two weeks   Intervention Assistive Device;Balance Work;Therapeutic Exercise;Tolerance Work   Tub/Shower Transfer Goal   Method Shower Stall   Assist Device Seat with Back   Status Target goal - two weeks  (supv)   Interventions ADL Training;Assistive Device   Bathing Goal   Shower/bathe self Goal 04. Supervision or touching assistance- [place] provides VERBAL CUES or supervision throughout activity.   Status Target goal - two weeks   Intervention ADL Training;Assistive Device;Therapeutic Exercise   Upper Body Dressing Goal   Upper body dressing Goal 06. Independent - Patient completes the activity by him/herself with no assistance from a helper.   Status Target goal - two weeks   Intervention Assistive Device;Balance Work;Therapeutic Exercise;Tolerance Work   Lower Body Dressing Goal   Lower body dressing Goal 06. Independent - Patient completes the activity by him/herself with no assistance from a helper.   Putting on/taking off footwear Goal 06. Independent - Patient completes the activity by him/herself with no assistance from a helper.   Status Target goal - two weeks   Intervention Assistive Device;Balance Work;Therapeutic Exercise;Tolerance Work   Toileting Transfer Goal   Toilet transfer Goal 06. Independent - Patient  completes the activity by him/herself with no assistance from a helper.   Status Target goal - two weeks   Intervention ADL Training;Balance Work;Assistive Device   Toileting Goal   Toileting hygiene Goal 06. Independent - Patient completes the activity by him/herself with no assistance from a helper.   Status Target goal - two weeks   Intervention ADL Training;Balance Work;Assistive Device   Medication Management   Assist Level Independent   Status Target goal - two weeks

## 2024-03-15 NOTE — PROGRESS NOTES
03/15/24 1300   Restrictions/Precautions   Precautions Bed/chair alarms;Fall Risk;O2;Pain;Supervision on toilet/commode  (2 L of O2, SpO2 > 90%)   Cognition   Overall Cognitive Status WFL   Arousal/Participation Alert;Responsive;Arousable;Cooperative   Attention Within functional limits   Orientation Level Oriented X4   Memory Within functional limits   Following Commands Follows one step commands without difficulty   Subjective   Subjective Pt agreeable to additional PT session today   Sit to Lying   Type of Assistance Needed Incidental touching   Sit to Lying CARE Score 4   Lying to Sitting on Side of Bed   Type of Assistance Needed Incidental touching   Lying to Sitting on Side of Bed CARE Score 4   Sit to Stand   Type of Assistance Needed Physical assistance   Physical Assistance Level 25% or less   Comment Charu with no AD, CGA with RW, assisting with O2 line management   Sit to Stand CARE Score 3   Bed-Chair Transfer   Type of Assistance Needed Physical assistance   Physical Assistance Level 26%-50%   Comment Min/ModA with no AD; Charu with RW, assist with O2 line management   Chair/Bed-to-Chair Transfer CARE Score 3   Car Transfer   Type of Assistance Needed Physical assistance   Physical Assistance Level 26%-50%   Comment Min/ModA with no AD, Charu with RW   Car Transfer CARE Score 3   Walk 10 Feet   Type of Assistance Needed Physical assistance   Physical Assistance Level 26%-50%   Comment Min/ModA no AD, Charu with RW   Walk 10 Feet CARE Score 3   Walk 50 Feet with Two Turns   Type of Assistance Needed Physical assistance   Physical Assistance Level 26%-50%   Comment Min/ModA no AD, Charu with RW   Walk 50 Feet with Two Turns CARE Score 3   Walk 150 Feet   Comment limited by fatigue and SOB   Reason if not Attempted Safety concerns   Walk 150 Feet CARE Score 88   Ambulation   Primary Mode of Locomotion Prior to Admission Walk   Distance Walked (feet) 58 ft  (52 ft, 10 ft)   Assist Device Roller Walker    Limitations Noted In Balance;Endurance;Heel Strike;Posture;Safety;Speed;Strength   Provided Assistance with: Balance   Walk Assist Level Minimum Assist;Moderate Assist   Findings First bout of ambulation, patient on 3L of O2 and oxygen saturation remained above 94% on RA. Second bout of ambulation ( 52 ft), patient on 2L of O2 and remained above 90%.   Therapeutic Interventions   Strengthening Step Ups 2x5 each LE with bilateral UE support, rest break required between each set   Equipment Use   NuStep Level 0 x10 minutes b/l UEs and LEs - 3L of O2 remained above 90%. Instructed patient to pace himself   Other Comments   Comments (S)  Goal is for patient to be titrated off supplemental O2, but during OT evaluation this AM patient was desaturating to below 90% on 2L of O2. Can titrate up to 5L of O2 with acitivity. Notify physician if needing to go above 5L. Patient supplemental had to be increased to 3L of O2 during PT session for saturation to remain above 90%   Assessment   Treatment Assessment Patient pariticpated in 48 minute skilled physical therapy session focusing on therapeutic exercises to improve overall stamina and endurance as that is patient's biggest barrier at this time. Pt was on 3L of O2 for all TE/TA and oxygen saturation remained above 90%. He requires frequent rest breaks 2/2 to SOB. Patient will benefit from continued skilled PT servies to maximize safety and independence.   Problem List Decreased endurance;Decreased mobility;Decreased strength;Impaired balance   Barriers to Discharge Inaccessible home environment   Plan   Treatment/Interventions Functional transfer training;LE strengthening/ROM;Therapeutic exercise;Endurance training;Patient/family training;Equipment eval/education;Bed mobility;Gait training   Progress Progressing toward goals   PT Therapy Minutes   PT Time In 1300   PT Time Out 1348   PT Total Time (minutes) 48   PT Mode of treatment - Individual (minutes) 48   PT Mode of  treatment - Concurrent (minutes) 0   PT Mode of treatment - Group (minutes) 0   PT Mode of treatment - Co-treat (minutes) 0   PT Mode of Treatment - Total time(minutes) 48 minutes   PT Cumulative Minutes 138     Valencia Catherine, PT, DPT

## 2024-03-15 NOTE — PROGRESS NOTES
PT ARC GOALS        03/15/24 1000   Rehab Team Goals   Transfer Team Goal Patient will be independent with transfers with least restrictive device upon completion of rehab program   Locomotion Team Goal Patient will be independent with locomotion with least restrictive device upon completion of rehab program   Rehab Team Interventions   PT Interventions Gait Training;Therapeutic Exercise;Neuromuscualr Reeducation;Transfer Training;Bed Mobility;Wheelchair Mobility;Patient/Family Education;Modalities   PT Transfer Goal   Roll left and right Goal 06. Independent - Patient completes the activity by him/herself with no assistance from a helper.   Sit to lying Goal 06. Independent - Patient completes the activity by him/herself with no assistance from a helper.   Lying to sitting on side of bed Goal 06. Independent - Patient completes the activity by him/herself with no assistance from a helper.   Sit to stand Goal 06. Independent - Patient completes the activity by him/herself with no assistance from a helper.   Chair/bed-to-chair transfer Goal 06. Independent - Patient completes the activity by him/herself with no assistance from a helper.   Car Transfer Goal 05. Setup or clean-up assistance - [place] SETS UP or CLEANS UP, patient completes activity. [place] assists only prior to or following the activity.   Assistive Device Roller Walker   Status Target goal - one week;Target goal - two weeks  (10-14 days)   Locomotion Goal   Primary discharge mode of locomotion Walking   Target Walk Distance 150 ft  (increase distance as able to improve overall stamina/endurance)   Assist Device Roller Walker   Environment Level Surface;Uneven Surface;Well Lit   Walk 10 feet Goal 06. Independent - Patient completes the activity by him/herself with no assistance from a helper.   Walk 50 feet with 2 turns Goal 06. Independent - Patient completes the activity by him/herself with no assistance from a helper.   Walk 150 feet Goal 06.  Independent - Patient completes the activity by him/herself with no assistance from a helper.   Walking 10 feet on uneven surface 04. Supervision or touching assistance- [place] provides VERBAL CUES or supervision throughout activity.   Walking Goal Status Target goal - one week;Target goal - two weeks  (10-14 days)   Wheel 50 feet with 2 turns Goal 09. Not applicable   Wheel 150 feet Goal 09. Not applicable   Stairs Goal   1 step or curb goal 04. Supervision or touching assistance- [place] provides VERBAL CUES or supervision throughout activity.   4 steps Goal 09. Not applicable   12 steps Goal 09. Not applicable   Number of Stairs 2   Technique Reciprocal;Non-reciprocal   Status Target goal - one week;Target goal - two weeks  (10-14 days)   Object Retrieval Goal   Picking up object Goal 06. Independent - Patient completes the activity by him/herself with no assistance from a helper.   Assistive Device  None   Small Object Picked Up marker

## 2024-03-15 NOTE — PROGRESS NOTES
Physical Medicine and Rehabilitation Progress Note  Hussein Edward III 58 y.o. male MRN: [id number]  Unit/Bed#: [facility] 974-01 Encounter: [id number]    To Review: Hussein Edward III is a 58 y.o. male with medical history of meningioma on acetazolamide, WALE who presented to the [facility] on 2/16 after presenting to urgent care for SOB (he had been having UR symptoms a week prior and was diagnosed with influenza A and treated with tamiflu).Ultimately found to have developed likely superimposed bacterial pneumonia and progressed to ARDS. Did not require ECMo. Was able to be extubated on 2/29 after progressing to needing intubation on 2/19. Treated with steroids, airway protocol, proning, antibiotics. Course c/b provoked afib (after CVC placement, septic, and on pressors), not on anticoagulation at this time. He also had ileus which has resolved, dysphagia, which has improved, and tachycardia which is due to deconditioning, atelectasis, hypovolemia, and anxiety. He was admitted to the [facility] on 3/14.       Chief Complaint: Feeling better    Interval History/Subjective:  No major issues today. He feels better back on oxygen, and felt he was able to do more with therapy, although he did get tangled in the tubing last night. Denies any new CP, cough, N/V, abdominal pain. Last BM 3/14. Has been appropriately requesting help. Continent of bowel/bladder. Wife is present, all questions answered to her satisfaction.    ROS:  A 10 point review of systems was negative except for what is noted in the HPI.    Today's Changes:  Discussed with team, would keep O2 on for increased WOB at rest, and he does still desaturate to the low 80s at rest. He required up to 4-5L in therapies at times, but his O2 sats would improve quickly with rest. He feels more comfortable with the O2 on. Reviewed that we would attempt to wean it off throughout his stay.  Appreciate case management ordering new tubing and mask for his CPAP  machine.     Total visit time: 35 minutes, with more than 50% spent counseling/coordinating care. Counseling includes discussion with patient re: progress in therapies, functional issues observed by therapy staff, and discussion with patient regarding their current medical state and wellbeing. Coordination of patient's care was performed in conjunction with Internal Medicine service to monitor patient's labs, vitals, and management of their comorbidities.    Assessment/Plan:    * ARDS survivor  Assessment & Plan  Admitted with respiratory failure 2/2 influenza with possible superimposed bacterial PNA  S/p 10 days antibotics, tamiflu course complicated by ARDS with extended vent time.  He is now on RA at rest, but will desaturate to 70s when ambulating  GUERRA with speech  Primary limitation to his function right now is his respiratory endurance   - I suspect he still needs O2. He desats to the 80s at times at rest, has shortness of breath with speech, and he desats with activity (although rebounds with rest).   - Will continue NC O2 humidified for now, and will wean as his respiratory status improves.   IS/Flutter valve  PRN Nebs  PT/OT 3-5 hours/day, 5-7 days/week  Outpatient f/u with Pulmonology with repeat CT Chest in about 4 weeks (beginning of April) to ensure resolution.   - At this point most recent imaging with continued bilateral patchy airspace opacities as would be expected at this time.     PAF (paroxysmal atrial fibrillation) ([facility])  Assessment & Plan  One episode provoked right after CVC placed, patient was septic and on pressors.  Resolved with lopressor, amio gtt, followed by one dose of digoxin.  On BB for tachycardia (but has been sinus tachycardia)  At this time no need for anticoagulation  Follow-up with cardiology for further evaluation    Tachycardia  Assessment & Plan  BL HR typically in   Monitor volume status, encourage adequate hydration  Cards started on Toprol 25mg in the AM, 50mg in  the PM  Monitor respiratory status on BB  Adjust as per IM  Recommend outpatient Cards evaluation.    Anxiety  Assessment & Plan  Started on Paxil and Lorazepam in the hospital  Wean Lorazepam as tolerated    Migraine  Assessment & Plan  Follows with Dr. Camargo  On Acetalozamide at home for this and magnesium for this  Held in the hospital for metabolic acidosis  Monitor off at this time  Outpatient f/u with Neurology      Acute respiratory failure with hypoxia (HCC)  Assessment & Plan  He is now on RA at rest, but will desaturate to 70s when ambulating  Can get dyspneic even with speech  Shallow breaths  Primary limitation functionally at this point  O2 at night and with therapies  Wean as tolerated     WALE (obstructive sleep apnea)  Assessment & Plan  Uses home CPAP, but may need to replace tubing/filter with [employer]  Hard time tolerating hospital CPAP  Continue O2 via nasal cannula  We ordered new tubing and mask from [employer].   Outpatient f/u with his Pulmonologist        Health Maintenance  #Delirium/Sleep: At risk, optimize sleep/wake, bowel, bladder, and pain.  #Pain: Tylenol PRN   #Bowel: Last BM 3/14. Miralax daily. PRN Suppository  #Bladder: Voiding and continent   #Skin/Pressure Injury Prevention: Turn Q2hr in bed, with weight shifts [id number] in wheelchair. Float heels in bed.  #DVT Prophylaxis: Lovenox, SCDs  #GI Prophylaxis: Not indicated  #Code Status: Full Code  #FEN: Regular/Thins, [employer] two packets with lunch/dinner   #Dispo: ELOS 2 weeks with goals to discharge home at modified Ind level of function and family support.       Objective:    Functional Update: pending  PT  OT  SLP    Allergies per EMR    Physical Exam:  Temp:  [97.7 °F (36.5 °C)-98.2 °F (36.8 °C)] 97.9 °F (36.6 °C)  HR:  [] 99  Resp:  [17-18] 18  BP: (111-135)/(72-80) 135/80  Oxygen Therapy  SpO2: 90 %      Gen: No acute distress  HEENT: Moist mucus membranes  Cardiovascular: Regular rate, rhythm, S1/S2. Distal  pulses palpable  Heme/Extr: No edema  Pulmonary: Improved Sob with speech with O2 in place. Unable to take deep breaths, but has good air entry without adventitious sounds.   : No raines  GI: Soft, non-tender, non-distended.   MSK: ROM is full in all extremities. No effusions or deformities. Bulk is symmetric. See below for MMT scores.   Integumentary: Skin is warm, dry.  Neuro: AAOx3,Speech is intact. Appropriate to questioning.   Psych: Normal mood and affect.     Diagnostic Studies: reviewed, no new imaging  No results found.    Laboratory:  Reviewed   Results from last 7 days   Lab Units 03/11/24  0459 03/10/24  1320 03/09/24  1355   HEMOGLOBIN g/dL 12.5 12.3 13.1   HEMATOCRIT % 39.7 39.5 40.3   WBC Thousand/uL 8.78 10.15 11.54*     Results from last 7 days   Lab Units 03/12/24  1400 03/11/24  0459 03/09/24  1355   BUN mg/dL 15 13 18   POTASSIUM mmol/L 4.0 4.3 4.2   CHLORIDE mmol/L 106 106 104   CREATININE mg/dL 0.83 0.82 0.68            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[facility])    Primary insomnia    Non-traumatic rhabdomyolysis    Chronic kidney disease    Metabolic acidosis    Acute respiratory failure with hypoxia ([facility])    ARDS survivor    Insomnia    Migraine    Anxiety    Persistent fever    ARDS (adult respiratory distress syndrome) ([facility])    Ambulatory dysfunction    Tachycardia    PAF (paroxysmal atrial fibrillation) ([facility])         Medications  Current Facility-Administered Medications   Medication Dose Route Frequency Provider Last Rate    acetaminophen  650 mg Oral Q6H PRN Tomasa Whitlock PA-C      albuterol  2 puff  Inhalation Q4H PRN Tomasa Whitlock PA-C      albuterol  2.5 mg Nebulization Q4H PRN Tomasa Whitlock PA-C      bisacodyl  10 mg Rectal Daily PRN Ashley Depadua, MD      enoxaparin  40 mg Subcutaneous Q24H YEHUDA Tomasa Whitlock PA-C      guaiFENesin  600 mg Oral Q12H YEHUDA Tomasa Whitlock PA-C      loratadine  5 mg Oral Daily Tomasasidney Whitlock PA-C      LORazepam  0.5 mg Oral Q8H PRN Tomasa Whitlock PA-C      magnesium Oxide  400 mg Oral Daily Tomasasidney Whitlock PA-C      melatonin  3 mg Oral HS PRN Tomasa Whitlock PA-C      metoprolol succinate  25 mg Oral Daily Tomasasidney Whitlock PA-C      metoprolol succinate  50 mg Oral HS Tomasasidney Whitlock PA-C      oxyCODONE  5 mg Oral Q6H PRN Tomasa Whitlock PA-C      PARoxetine  10 mg Oral Daily Tomasasidney Whitlock PA-C      polyethylene glycol  17 g Oral BID Tomasa Whitlock PA-C      sodium chloride  1 spray Each Nare Q1H PRN Tomasa Whitlock PA-C      tamsulosin  0.4 mg Oral Daily With Dinner Tomasa Whitlock PA-C            ** Please Note: [employer] voice to text software may have been used in the creation of this document. **

## 2024-03-15 NOTE — ASSESSMENT & PLAN NOTE
Secondary to influenza A/superimposed bacterial infection  S/p intubation 2/19 through 2/29  Bronchoscopy 2/19 positive for Candida glabrata, no bacteria  S/p antibiotics  Recent chest x-ray 3/12 was unchanged  Pulmonology recommends F/U CT of chest in 4 weeks (4/4)  Continue Mucinex.  Pt came to the [facility] without O2 but he has needed it in therapy.

## 2024-03-15 NOTE — PROGRESS NOTES
Health system  Progress Note  Name: Hussein Edward III I  MRN: [id number]  Unit/Bed#: -01 I Date of Admission: 3/14/2024   Date of Service: 3/15/2024 I Hospital Day: 1    Assessment/Plan   Acute respiratory failure with hypoxia (HCC)  Assessment & Plan  Secondary to influenza A/superimposed bacterial infection  S/p intubation 2/19 through 2/29  Bronchoscopy 2/19 positive for Candida glabrata, no bacteria  S/p antibiotics  Recent chest x-ray 3/12 was unchanged  Pulmonology recommends F/U CT of chest in 4 weeks (4/4)  Continue Mucinex.  Pt came to the [facility] without O2 but he has needed it in therapy.      PAF (paroxysmal atrial fibrillation) (HCC)  Assessment & Plan  D/T critical illness  Converted with IV amiodarone  Echo as above  F/U cardiology as outpatient    Tachycardia  Assessment & Plan  Baseline heart rate in the 90s prehospitalization  Currently on metoprolol twice daily  Echo = EF normal/grade 1 diastolic dysfunction  F/U cardiology on discharge    Anxiety  Assessment & Plan  Continue Ativan as needed    WALE (obstructive sleep apnea)  Assessment & Plan  Machine from home brought in as hospital machine was too strong.  Continue current settings.             The above assessment and plan was reviewed and updated as determined by my evaluation of the patient on 3/15/2024.    Labs:   Results from last 7 days   Lab Units 03/11/24  0459 03/10/24  1320   WBC Thousand/uL 8.78 10.15   HEMOGLOBIN g/dL 12.5 12.3   HEMATOCRIT % 39.7 39.5   PLATELETS Thousands/uL 312 299     Results from last 7 days   Lab Units 03/12/24  1400 03/11/24  0459   SODIUM mmol/L 140 140   POTASSIUM mmol/L 4.0 4.3   CHLORIDE mmol/L 106 106   CO2 mmol/L 25 25   BUN mg/dL 15 13   CREATININE mg/dL 0.83 0.82   CALCIUM mg/dL 9.0 8.7             Results from last 7 days   Lab Units 03/11/24  1131 03/11/24  0736 03/10/24  2112   POC GLUCOSE mg/dl 132 91 120       Imaging  No orders to display       Review of  Scheduled Meds:  Current Facility-Administered Medications   Medication Dose Route Frequency Provider Last Rate    acetaminophen  650 mg Oral Q6H PRN Tomasa Whitlock PA-C      albuterol  2 puff Inhalation Q4H PRN Tomasa Whitlock PA-C      albuterol  2.5 mg Nebulization Q4H PRN Tomasa Whitlock PA-C      bisacodyl  10 mg Rectal Daily PRN Ashley Depadua, MD      enoxaparin  40 mg Subcutaneous Q24H YEHUDA Tomasa Whitlock PA-C      guaiFENesin  600 mg Oral Q12H YEHUDA Tomasa Whitlock PA-C      loratadine  5 mg Oral Daily Tomasa Whitlock PA-C      LORazepam  0.5 mg Oral Q8H PRN Tomasa Whitlock PA-C      magnesium Oxide  400 mg Oral Daily Tomasa Whitlock PA-C      melatonin  3 mg Oral HS PRN Tomasa Whitlock PA-C      metoprolol succinate  25 mg Oral Daily Tomasa Whitlock PA-C      metoprolol succinate  50 mg Oral HS Tomasa Whitlock PA-C      oxyCODONE  5 mg Oral Q6H PRN Tomasa Whitlock PA-C      PARoxetine  10 mg Oral Daily Tomasa Whitlock PA-C      polyethylene glycol  17 g Oral BID Tomasa Whitlock PA-C      sodium chloride  1 spray Each Nare Q1H PRN Tomasa Whitlock PA-C      tamsulosin  0.4 mg Oral Daily With Dinner Tomasa Whitlock PA-C         Subjective/ HPI: Patient seen and examined. Patients overnight issues or events were reviewed with nursing staff. New or overnight issues include the following:     Pt seen in his room. He states that therapy has been rough today. He denies any other complaints.    ROS:   A 10 point ROS was performed; negative except as noted above.        *Labs /Radiology studies Reviewed  *Medications  reviewed and reconciled as needed  *Please refer to order section for additional ordered labs studies      Physical Examination:  Vitals:   Vitals:    03/14/24 2000 03/14/24 2049 03/15/24 0530 03/15/24 0956   BP:  123/75 135/80 102/68   BP Location:  Right arm Right arm Right arm   Pulse:  102 99 103   Resp:  17 18    Temp:   97.7 °F (36.5 °C) 97.9 °F (36.6 °C)    TempSrc:  Oral Oral    SpO2: 91% 91% 90%    Weight:       Height:           General Appearance: NAD; pleasant  HEENT: PERRLA, conjuctiva normal; mucous membranes moist; face symmetrical  Neck:  Supple  Lungs: shallow breaths on 2L O2 via NC, no retractions, expiratory effort normal  CV: regular rate and rhythm, no murmurs rubs or gallops noted   ABD: soft non tender, +BS x4  EXT: DP pulses intact, no lymphadenopathy, no edema  Skin: normal turgor, normal texture, no rash  Psych: affect normal, mood normal  Neuro: AAOx3       The above physical exam was reviewed and updated as determined by my evaluation of the patient on 3/15/2024.    Invasive Devices       None                      VTE Pharmacologic Prophylaxis: Enoxaparin  Code Status: Level 1 - Full Code  Current Length of Stay: 1 day(s)    Total floor / unit time spent today 30 minutes  Coordination of patient's care was performed in conjunction with primary service. Time invested included review of patient's labs, vitals, and management of their comorbidities with continued monitoring, examination of patient as well as answering patient questions, documenting her findings and creating progress note in electronic medical record,  ordering appropriate diagnostic testing.       ** Please Note:  voice to text software may have been used in the creation of this document. Although proof errors in transcription or interpretation are a potential of such software**

## 2024-03-15 NOTE — PROGRESS NOTES
OT Initial Evaluation       03/15/24 0700   Patient Data   Rehab Impairment Impairment of mobility, safety and Activities of Daily Living (ADLs) due to Pulmonary Disorders:  10.9  Other Pulmonary   Etiologic Diagnosis Influenza A with Bacterial Pneumonia   Date of Onset 02/16/24   Support System   Name Kathi   Relationship spouse   Able to provide 24 hour supervision Yes   Able to provide physical help? Yes   Multiple Support Systems Yes   Support System 2   Name 2 Mundo   Relationship 2 son   Able to provide 24 hour supervision 2 No  (works PT and goes to school)   Able to provide physical help? 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 Independent - Patient completed the activities by him/herself, with or without an assistive device, with no assistance from a helper.   Indoor-Mobility (Ambulation) 3. Independent - Patient completed the activities by him/herself, with or without an assistive device, with no assistance from a helper.   Stairs 3. Independent - Patient completed the activities by him/herself, with or without an assistive device, with no assistance from a helper.   Functional Cognition 3. Independent - Patient completed the activities by him/herself, with or without an assistive device, with no assistance from a  helper.   Prior Assistance Needed for Household Chores/Cleaning;Meal Preparation;Money Management;Shopping  (mod I with med mgmt from bottle)   Prior Device Used Z. None of the above   Falls in the Last Year   Number of falls in the past 12 months 1   Type of Injury Associated with Fall Major injury  (bicep tear - did have reconstruction)   Patient Preference   Nickname (Patient Preference) arjun   Psychosocial   Psychosocial (WDL) WDL   Patient Behaviors/Mood Calm;Cooperative   Ability to Express Feelings Able to express   Ability to Express Needs Able to express   Ability to Express Thoughts Able to express   Ability to Understand Others Understands   Restrictions/Precautions   Precautions Bed/chair alarms;Fall Risk;O2;Pain;Supervision on toilet/commode  (2Lo2, SPO2 > 90%)   Pain Assessment   Pain Assessment Tool 0-10   Pain Score 1   Pain Location/Orientation Orientation: Left;Orientation: Lower;Location: Back   Hospital Pain Intervention(s) Emotional support;Shower/Bath   Eating Assessment   Type of Assistance Needed Independent   Physical Assistance Level No physical assistance   Eating CARE Score 6   Oral Hygiene   Type of Assistance Needed Physical assistance   Physical Assistance Level 25% or less   Comment Min A/CG in stance at sink; pt with poor standing tolerance/endurance and was required to sit for OH at mod I level   Oral Hygiene CARE Score 3   Tub/Shower Transfer   Findings pt has walk in shower at home with built in seats; CGA lateral step in to shower today   Shower/Bathe Self   Type of Assistance Needed Physical assistance   Physical Assistance Level 51%-75%   Comment would req mod A in to simualte baseline functioning; pt completed full shower seated on TTB requiring increased time with multiple rest breaks. O2 on during shower and checked sporadically. pt O2 unable to get > 90% on 2L so O2 was increased to 4 L where pt ranged between the 88-90%. When completing damien bathing in stance, pt did  drop to 83% with 4 LO2 but returned to 90% within 3 minutes. pt able to bathe 10/10 parts but O2 sats and poor endurance was pt largest barrier to bathing safely.   Shower/Bathe Self CARE Score 2   Bathing   Assessed Bath Style Shower   Anticipated D/C Bath Style Shower   Dressing/Undressing Clothing   Type of Assistance Needed Supervision   Physical Assistance Level No physical assistance   Comment seated; cues for O2 line mgmt   Upper Body Dressing CARE Score 4   Type of Assistance Needed Physical assistance   Physical Assistance Level 25% or less   Comment min A to steady in stance as pt donned over hips. rest breaks required between donning underwear and shorts 2//2 decreased endurance with O2 at 87% on 4 L   Lower Body Dressing CARE Score 3   Putting On/Taking Off Footwear   Type of Assistance Needed Supervision   Physical Assistance Level No physical assistance   Comment seated in chair via cross leg technique   Putting On/Taking Off Footwear CARE Score 4   Toileting Hygiene   Type of Assistance Needed Physical assistance   Physical Assistance Level 25% or less   Comment min A to steady in stance during CM; rest breaks required   Toileting Hygiene CARE Score 3   Toilet Transfer   Type of Assistance Needed Physical assistance   Physical Assistance Level 26%-50%   Comment min/mod A no AD, min A/CG with RW; assist for O2 line mgmt. standard toilet height   Toilet Transfer CARE Score 3   Transfer Bed/Chair/Wheelchair   Type of Assistance Needed Physical assistance   Physical Assistance Level 26%-50%   Comment min/mod A no AD; min/CG with RW; assist for O2 line mgmt   Chair/Bed-to-Chair Transfer CARE Score 3   Sit to Lying   Type of Assistance Needed Incidental touching   Physical Assistance Level No physical assistance   Comment HOB flat   Sit to Lying CARE Score 4   Lying to Sitting on Side of Bed   Type of Assistance Needed Incidental touching   Physical Assistance Level No physical assistance   Comment HOB flat  "  Lying to Sitting on Side of Bed CARE Score 4   Sit to Stand   Type of Assistance Needed Physical assistance   Physical Assistance Level 25% or less   Comment no AD; CG with RW   Sit to Stand CARE Score 3   Comprehension   QI: Comprehension 4. Undestands: Clear comprehension without cues or repetitions   Expression   QI: Expression 4. Express complex messages without difficulty and with speech that is clear and easy to understan   RUE Assessment   RUE Assessment WFL   LUE Assessment   LUE Assessment WFL   Cognition   Overall Cognitive Status WFL   Arousal/Participation Alert;Responsive;Arousable;Cooperative   Attention Within functional limits   Orientation Level Oriented X4   Memory Within functional limits   Following Commands Follows one step commands without difficulty   Comments pleasant and cooperative, good insight into deficits. can initiate rest breaks and \"feels\" when O2 sats drop   Objective Measure   OT Measure(s) SPO2   OT Findings (S)  SPO2 81% on RA at start of session, 88% on 2LO2 at rest but with any activity, would drop to as low as 81%. During shower/ADL, O2 required to raise to 4L to maintain as close to 90% but when in stance would still drop to as low as 83%. pt reports mornings are the worst with O2 sats because pt should be using CPAP at night and does not because equipment is too strong for him. Will need to very closely monitor O2 during therapy sessions.   Discharge Information   Vocational Plan Return to work;Full Time   Barriers to Return to Vocation Environmental Access;Skill Set Limitation;Transportation Issues;Strength;Endurance   Patient's Discharge Plan DC home mod I   Patient's Rehab Expectations \"I don't want to go back to acute, I want to go home\"   Barriers to Discharge Home Decreased Strength;Decreased Endurance;Safety Considerations   Impressions Pt is a 58 y.o. male who was admitted on 2/16/24 due to worsening SOB. Pt was dx with Influenza A with Bacterial Pneumonia " requiring as much as 15L O2. Once stabilized, pt transferred to [facility] to receive skilled therapy services. Pt  has a past medical history of Chronic back pain and Migraine. Current precautions include bed/chair alarms, O2, fall risk and supervision on toilet/commode. See flowsheet for details of pts home setup, PLOF and current functional status. Pts impairments include severe dyspnea on exertion, pain, endurance, activity tolerance, functional mobility, functional standing tolerance, and decreased I w/ ADLS/IADLS. These impairments along with  steps to enter, difficulty performing ADLs, and difficulty performing IADLs causes the inability to safely complete A/IADLs including Grooming, Bathing, UB dressing, LB dressing, Toileting, Toilet transfer, and Tub/shower Transfer. Pt presents with good rehab potential with motivation to participate and achieve goal of DC home. Pt is unsafe to DC home at this time, recommending 10-14 days to achieve independent with assistive device level goals with bedside commode and RW. Plan to achieve stated goals with interventions focused on ADL Retraining , IADL training , Medication management , Functional Transfers, Standing tolerance, Standing balance , DME training/education, Family training/education, Energy conservation training/education, healthy coping education, Leisure and social pursuits, community re-integration, and return to work simulation.   OT Therapy Minutes   OT Time In 0700   OT Time Out 0830   OT Total Time (minutes) 90   OT Mode of treatment - Individual (minutes) 90   OT Mode of treatment - Concurrent (minutes) 0   OT Mode of treatment - Group (minutes) 0   OT Mode of treatment - Co-treat (minutes) 0   OT Mode of Treatment - Total time(minutes) 90 minutes   OT Cumulative Minutes 90   Cumulative Minutes   Cumulative therapy minutes 90

## 2024-03-15 NOTE — PROGRESS NOTES
PT ARC EVALUATION      03/15/24 1000   Patient Data   Rehab Impairment Impairment of mobility, safety and Activities of Daily Living (ADLs) due to Pulmonary Disorders: 10.9 Other Pulmonary   Etiologic Diagnosis Influenza A with Bacterial Pneumonia   Date of Onset 02/16/24   Support System   Name Kathi   Relationship spouse   Able to provide 24 hour supervision Yes   Able to provide physical help? Yes   Multiple Support Systems Yes   Support System 2   Name 2 Mundo   Relationship 2 son   Able to provide 24 hour supervision 2 No  (works and goes to school)   Able to provide physical help? 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 Independent - Patient completed the activities by him/herself, with or without an assistive device, with no assistance from a helper.   Indoor-Mobility (Ambulation) 3. Independent - Patient completed the activities by him/herself, with or without an assistive device, with no assistance from a helper.   Stairs 3. Independent - Patient completed the activities by him/herself, with or without an assistive device, with no assistance from a helper.   Functional Cognition 3. Independent - Patient completed the activities by him/herself, with or without an assistive device, with no assistance from a helper.   Prior Device Used Z. None of the above   Falls in the Last Year   Number of falls in the past 12 months 1   Type of Injury Associated with Fall Major injury  (bicep tear with surgery to repair)    Restrictions/Precautions   Precautions Bed/chair alarms;Fall Risk;O2;Pain;Supervision on toilet/commode  (2 L of O2, SpO2 > 90%)   Pain Assessment   Pain Assessment Tool 0-10   Pain Score 3   Pain Location/Orientation Location: Back   Hospital Pain Intervention(s) Rest   Transfer Bed/Chair/Wheelchair   Type of Assistance Needed Physical assistance   Physical Assistance Level 26%-50%   Comment Min/ModA with no AD; Charu with RW, assist with O2 line management   Chair/Bed-to-Chair Transfer CARE Score 3   Roll Left and Right   Type of Assistance Needed Supervision   Roll Left and Right CARE Score 4   Sit to Lying   Type of Assistance Needed Incidental touching   Comment HOB flat   Sit to Lying CARE Score 4   Lying to Sitting on Side of Bed   Type of Assistance Needed Incidental touching   Comment HOB flat   Lying to Sitting on Side of Bed CARE Score 4   Sit to Stand   Type of Assistance Needed Physical assistance   Physical Assistance Level 25% or less   Comment Charu with no AD, CGA with RW, assisting with O2 line management   Sit to Stand CARE Score 3   Picking Up Object   Type of Assistance Needed Physical assistance   Physical Assistance Level 25% or less   Comment Charu with no AD   Picking Up Object CARE Score 3   Ambulation   Primary Mode of Locomotion Prior to Admission Walk   Distance Walked (feet) 125 ft  (74 ft, 53 ft, 20 ft with no AD)   Assist Device Roller Walker   Limitations Noted In Balance;Endurance;Heel Strike;Posture;Safety;Speed;Strength   Provided Assistance with: Balance   Walk Assist Level Minimum Assist;Moderate Assist   Findings First bout of ambulation (74 ft), patient was on 2L of O2 and desaturated down to 85%. Trialed deep breathing on 2L of O2 to increase oxygen saturation, but pt had to be placed on 3L of O2 to get oxygen saturation to 94%. For remaining distances ambulated 53 ft and 125 ft on 3L of O2 and oxygen saturation remained above 90%  (Educated patient on the use of RW from an  energy conservation perspective.)   Walk 10 Feet   Type of Assistance Needed Physical assistance   Physical Assistance Level 26%-50%   Comment Min/ModA no AD, Charu with RW   Walk 10 Feet CARE Score 3   Walk 50 Feet with Two Turns   Type of Assistance Needed Physical assistance   Physical Assistance Level 26%-50%   Comment Min/ModA no AD, Charu with RW   Walk 50 Feet with Two Turns CARE Score 3   Walk 150 Feet   Comment limited by fatigue and SOB   Reason if not Attempted Safety concerns   Walk 150 Feet CARE Score 88   Walking 10 Feet on Uneven Surfaces   Type of Assistance Needed Physical assistance   Physical Assistance Level 26%-50%   Comment Min/ModA with no AD over mat surface, Charu with RW   Walking 10 Feet on Uneven Surfaces CARE Score 3   Wheel 50 Feet with Two Turns   Reason if not Attempted Activity not applicable   Wheel 50 Feet with Two Turns CARE Score 9   Wheel 150 Feet   Reason if not Attempted Activity not applicable   Wheel 150 Feet CARE Score 9   Curb or Single Stair   Style negotiated Curb   Type of Assistance Needed Physical assistance   Physical Assistance Level 51%-75%   Comment ModA with no AD, Min/ModA with RW   1 Step (Curb) CARE Score 2   4 Steps   Comment Pt has 2 steps with no HR to enter his home via the garage. Stairs can be performed as a strengthening and endurance building activity   Reason if not Attempted Activity not applicable   4 Steps CARE Score 9   12 Steps   Comment Pt has 2 steps with no HR to enter his home via the garage. Stairs can be performed as a strengthening and endurance building activity   Reason if not Attempted Activity not applicable   12 Steps CARE Score 9   Stairs   Type  Steps   # of Steps 2   Assist Devices Roller Walker   Findings Pt has 2 ROSA his home via the garage. Reports son will install two grab rails when he returns home. In preparation for returning home, practiced placing RW on platform on  steps, ascend fowards/descend backwards. Charu  "(26-50%) with RW   Comprehension   QI: Comprehension 4. Undestands: Clear comprehension without cues or repetitions   Expression   QI: Expression 4. Express complex messages without difficulty and with speech that is clear and easy to understan   RLE Assessment   RLE Assessment   (grossly 4/5)   LLE Assessment   LLE Assessment   (grossly 4/5)   Cognition   Overall Cognitive Status WFL   Arousal/Participation Alert;Responsive;Arousable;Cooperative   Attention Within functional limits   Orientation Level Oriented X4   Memory Within functional limits   Following Commands Follows one step commands without difficulty   Objective Measure   PT Measure(s) 5x STS: 39.83 seconds hands on thighs; TUG with RW 23.41 seconds   PT Findings (S)  Goal is for patient to be titrated off supplemental O2, but during OT evaluation this AM patient was desaturating to below 90% on 2L of O2. Can titrate up to 5L of O2 with acitivity. Notify physician if needing to go above 5L. Patient supplemental had to be increased to 3L of O2 during PT session for saturation to remain above 90%  (Provided numerous rest breaks throughout the session due to poor stamina/endurance.)   Discharge Information   Vocational Plan Return to work;Full Time   Barriers to Return to Vocation Environmental Access;Transportation Issues;Strength;Endurance;Skill Set Limitation   Patient's Discharge Plan DC home mod I   Patient's Rehab Expectations \"To improve my stamina.\"   Barriers to Discharge Home Unsafe Home Setup;Decreased Strength;Decreased Endurance;Pain;Safety Considerations   Impressions Patient is a 58 year old male who presents to [facility] following hospitalization for ARDS. PMH significant for meningioma on acetazolamide, WALE. Prior to admission, patient was independent for ADLs, + , ambulating without assistive device. Patient lives with with spouse and son, in a 1SH with 2 ROSA. On initial evaluation, patient presents decreased strength, imbalance, " decreased endurance, fatigue, pain, and delayed righting reactions resulting in increased assistance for all functional mobility. Patient requires Contact Guard  for bed mobility, Min Assistance for transfers, Min/Mod Assistance for ambulation, Min/Mod Assistance  for stair navigation.  Patient is high risk for falls secondary to deficits found on initial evaluation and via outcome measures. Patient will benefit from physical therapy services to address the deficits identified on evaluation and to maximize safety and independence with all functional mobility and to decrease caregiver burden. Barriers to discharge home at this time include: inaccessible home environment/high risk for falls/poor stamina. Patient is a good rehabilitation candidate. Patient's estimated length of stay is 10 to 14 days with goals set for [place]. PT plan of care to focus on bed mobility/transfers/gait training/stair navigation/strength training/balance training/improving activity tolerance.   PT Therapy Minutes   PT Time In 1000   PT Time Out 1130   PT Total Time (minutes) 90   PT Mode of treatment - Individual (minutes) 90   PT Mode of treatment - Concurrent (minutes) 0   PT Mode of treatment - Group (minutes) 0   PT Mode of treatment - Co-treat (minutes) 0   PT Mode of Treatment - Total time(minutes) 90 minutes   PT Cumulative Minutes 90   Cumulative Minutes   Cumulative therapy minutes 180     Valencia Catherine, PT, DPT

## 2024-03-16 PROCEDURE — 97530 THERAPEUTIC ACTIVITIES: CPT

## 2024-03-16 PROCEDURE — 97110 THERAPEUTIC EXERCISES: CPT

## 2024-03-16 PROCEDURE — 99232 SBSQ HOSP IP/OBS MODERATE 35: CPT | Performed by: INTERNAL MEDICINE

## 2024-03-16 RX ADMIN — ENOXAPARIN SODIUM 40 MG: 40 INJECTION SUBCUTANEOUS at 16:48

## 2024-03-16 RX ADMIN — METOPROLOL SUCCINATE 25 MG: 25 TABLET, FILM COATED, EXTENDED RELEASE ORAL at 08:29

## 2024-03-16 RX ADMIN — GUAIFENESIN 600 MG: 600 TABLET, EXTENDED RELEASE ORAL at 21:27

## 2024-03-16 RX ADMIN — MAGNESIUM OXIDE TAB 400 MG (241.3 MG ELEMENTAL MG) 400 MG: 400 (241.3 MG) TAB at 08:28

## 2024-03-16 RX ADMIN — PAROXETINE HYDROCHLORIDE 10 MG: 20 TABLET, FILM COATED ORAL at 08:29

## 2024-03-16 RX ADMIN — METOPROLOL SUCCINATE 50 MG: 50 TABLET, EXTENDED RELEASE ORAL at 21:27

## 2024-03-16 RX ADMIN — GUAIFENESIN 600 MG: 600 TABLET, EXTENDED RELEASE ORAL at 08:28

## 2024-03-16 RX ADMIN — TAMSULOSIN HYDROCHLORIDE 0.4 MG: 0.4 CAPSULE ORAL at 16:48

## 2024-03-16 RX ADMIN — LORATADINE 5 MG: 10 TABLET ORAL at 08:27

## 2024-03-16 RX ADMIN — OXYCODONE HYDROCHLORIDE 5 MG: 5 TABLET ORAL at 02:26

## 2024-03-16 NOTE — ASSESSMENT & PLAN NOTE
D/T critical illness  Converted with IV amiodarone  Echo as above  F/U cardiology as outpatient  Not on AC  Continue Toprol 25mg qam/50mg qpm

## 2024-03-16 NOTE — PROGRESS NOTES
03/16/24 1430   Pain Assessment   Pain Assessment Tool 0-10   Pain Score No Pain   Restrictions/Precautions   Precautions Supervision on toilet/commode;Fall Risk;Bed/chair alarms;O2  (SpO2 >90% , 2L at rest, 3L with activities)   Cognition   Overall Cognitive Status WFL   Arousal/Participation Cooperative;Arousable   Subjective   Subjective pt woken up from nap and was agreeable to PT   Roll Left and Right   Type of Assistance Needed Supervision   Roll Left and Right CARE Score 4   Sit to Lying   Type of Assistance Needed Incidental touching   Comment HOB flat, no rail   Sit to Lying CARE Score 4   Lying to Sitting on Side of Bed   Type of Assistance Needed Incidental touching   Comment HOB flat, no rail   Lying to Sitting on Side of Bed CARE Score 4   Sit to Stand   Type of Assistance Needed Physical assistance;Verbal cues   Physical Assistance Level 26%-50%   Comment min-mod no AD, min-CG with RW   Sit to Stand CARE Score 3   Bed-Chair Transfer   Type of Assistance Needed Physical assistance;Verbal cues;Adaptive equipment   Physical Assistance Level 26%-50%   Comment min-mod no AD, min-CG with RW   Chair/Bed-to-Chair Transfer CARE Score 3   Walk 10 Feet   Type of Assistance Needed Physical assistance;Verbal cues;Adaptive equipment   Physical Assistance Level 26%-50%   Walk 10 Feet CARE Score 3   Walk 50 Feet with Two Turns   Type of Assistance Needed Physical assistance;Verbal cues;Adaptive equipment   Physical Assistance Level 26%-50%   Comment 129' with RW without standing rest break   Walk 50 Feet with Two Turns CARE Score 3   Walk 150 Feet   Type of Assistance Needed Physical assistance;Verbal cues;Adaptive equipment   Physical Assistance Level 26%-50%   Comment min-mod no AD, min-CG with RW x 231' with 2 short standing rest breaks   Walk 150 Feet CARE Score 3   Wheelchair mobility   Findings w/c propulsion for strengthening purpose only x 50', 100'   Therapeutic Interventions   Other pt only able to  tolerate IS x 2 reps reaching ~600ml with demonstrated coughing spells but unproductive   Equipment Use   NuStep lvl 1 x 5 mins with bilat UE/LE but SpO2 on 3 ranged 90-93% with SPM 50-60. LE only x 10 mins SpO2 on 3L stayed > 95%, RI  bpm without reported or noted respiratory distress   Assessment   Treatment Assessment Pt tolerated skilled PT for 60 mins with inc focused on increasing functional indep and activity tolerance during level surface mobilities using a RW while monitoring SpO2 on 2-3L via NC. Trialed SPT on 2L during initial transfer but SpO2 dropped  to 88% so for the rest of session pt was on 3L then back to 2L when assisted back to bed post tx. Pt able to amb x 231' at best with noted dropping of SpO2 <90%, RI  bpm about initial 75' then 60' requiring 2 standing rest breaks with recovery time ~ 30-45 secs without noted/reported respiratory distress although pt cued for PLB. No LOB or knee buckling with mobilities demonstrated. SpO2 on 3L during w/c porpulsion for LE strengthening purpose also stayed above 95%. Pt will benefit from additional skilled PT to work on strengthening, standing balance/tolerance training and increasing functional indep while monitoring vitals with goal of weaning pt off supplemental O2.   Family/Caregiver Present no   Barriers to Discharge Inaccessible home environment;Decreased caregiver support   PT Barriers   Functional Limitation Car transfers;Ramp negotiation;Stair negotiation;Standing;Transfers;Walking   Plan   Treatment/Interventions Functional transfer training;LE strengthening/ROM;Therapeutic exercise;Endurance training;Spoke to nursing;Spoke to advanced practitioner;Bed mobility;Gait training;Equipment eval/education   Progress Progressing toward goals   PT Therapy Minutes   PT Time In 1430   PT Time Out 1530   PT Total Time (minutes) 60   PT Mode of treatment - Individual (minutes) 60   PT Mode of treatment - Concurrent (minutes) 0   PT Mode of  treatment - Group (minutes) 0   PT Mode of treatment - Co-treat (minutes) 0   PT Mode of Treatment - Total time(minutes) 60 minutes   PT Cumulative Minutes 258

## 2024-03-16 NOTE — PLAN OF CARE
Problem: INFECTION - ADULT  Goal: Absence or prevention of progression during hospitalization  Description: INTERVENTIONS:  - Assess and monitor for signs and symptoms of infection  - Monitor lab/diagnostic results  - Monitor all insertion sites, i.e. indwelling lines, tubes, and drains  - Monitor endotracheal if appropriate and nasal secretions for changes in amount and color  - Minerva appropriate cooling/warming therapies per order  - Administer medications as ordered  - Instruct and encourage patient and family to use good hand hygiene technique  - Identify and instruct in appropriate isolation precautions for identified infection/condition  Outcome: Progressing

## 2024-03-16 NOTE — PROGRESS NOTES
[facility]  Progress Note  Name: Hussein Edward III I  MRN: [id number]  Unit/Bed#: -01 I Date of Admission: 3/14/2024   Date of Service: 3/16/2024 I Hospital Day: 2    Assessment/Plan   Acute respiratory failure with hypoxia (HCC)  Assessment & Plan  Secondary to influenza A/superimposed bacterial infection  S/p intubation 2/19 through 2/29  Bronchoscopy 2/19 positive for Candida glabrata, no bacteria  S/p antibiotics  Recent chest x-ray 3/12 was unchanged  Pulmonology recommends F/U CT of chest in 4 weeks (4/4/24)  Continue Mucinex.  Pt came to the [facility] without O2 but he has needed it in therapy.  Needed 3L NC for ambulation in therapy today      PAF (paroxysmal atrial fibrillation) ([facility])  Assessment & Plan  D/T critical illness  Converted with IV amiodarone  Echo as above  F/U cardiology as outpatient  Not on AC  Continue Toprol 25mg qam/50mg qpm    Tachycardia  Assessment & Plan  Baseline heart rate in the 90s prehospitalization  Currently on Toprol 25mg qam/50mg qpm  Echo = EF normal/grade 1 diastolic dysfunction  F/U cardiology on discharge    Anxiety  Assessment & Plan  Continue Ativan as needed but has not needed it  stable    WALE (obstructive sleep apnea)  Assessment & Plan  Machine from home brought in as hospital machine was too strong.  Continue current settings.         The above assessment and plan was reviewed and updated as determined by my evaluation of the patient on 3/16/2024.    Labs:   Results from last 7 days   Lab Units 03/11/24  0459 03/10/24  1320   WBC Thousand/uL 8.78 10.15   HEMOGLOBIN g/dL 12.5 12.3   HEMATOCRIT % 39.7 39.5   PLATELETS Thousands/uL 312 299     Results from last 7 days   Lab Units 03/12/24  1400 03/11/24  0459   SODIUM mmol/L 140 140   POTASSIUM mmol/L 4.0 4.3   CHLORIDE mmol/L 106 106   CO2 mmol/L 25 25   BUN mg/dL 15 13   CREATININE mg/dL 0.83 0.82   CALCIUM mg/dL 9.0 8.7             Results from last 7 days   Lab Units  03/11/24  1131 03/11/24  0736 03/10/24  2112   POC GLUCOSE mg/dl 132 91 120       Imaging  No orders to display       Review of Scheduled Meds:  Current Facility-Administered Medications   Medication Dose Route Frequency Provider Last Rate    acetaminophen  650 mg Oral Q6H PRN Tomasa Whitlock PA-C      albuterol  2 puff Inhalation Q4H PRN Tomasa Whitlock PA-C      albuterol  2.5 mg Nebulization Q4H PRN Tomasa Whitlock PA-C      bisacodyl  10 mg Rectal Daily PRN Ashley Depadua, MD      enoxaparin  40 mg Subcutaneous Q24H YEHUDA Tomasa Whitlock PA-C      guaiFENesin  600 mg Oral Q12H YEHUDA Tomasa Whitlock PA-C      loratadine  5 mg Oral Daily Toamsa Whitlock PA-C      LORazepam  0.5 mg Oral Q8H PRN Tomasa Whitlock PA-C      magnesium Oxide  400 mg Oral Daily Tomasa Whitlock PA-C      melatonin  3 mg Oral HS PRN Tomasa Whitlock PA-C      metoprolol succinate  25 mg Oral Daily Tomasa Whitlock PA-C      metoprolol succinate  50 mg Oral HS Tomasa Whitlock PA-C      oxyCODONE  5 mg Oral Q6H PRN KAMLESH Lo-MORIS      PARoxetine  10 mg Oral Daily Tomasa Whitlock PA-C      polyethylene glycol  17 g Oral BID Tomasa Whitlock PA-C      sodium chloride  1 spray Each Nare Q1H PRN Tomasa Whitlock PA-C      tamsulosin  0.4 mg Oral Daily With Dinner Tomasa Whitlock PA-C         Subjective/ HPI: Patient seen and examined. Patients overnight issues or events were reviewed with nursing staff. New or overnight issues include the following:     No new or overnight issues.  Offers no complaints    ROS:   A 10 point ROS was performed; negative except as noted above.        *Labs /Radiology studies Reviewed  *Medications  reviewed and reconciled as needed  *Please refer to order section for additional ordered labs studies      Physical Examination:  Vitals:   Vitals:    03/15/24 2100 03/16/24 0518 03/16/24 0829 03/16/24 1300   BP: 100/62 122/62 104/66 115/70   BP  Location: Left arm Right arm  Left arm   Pulse: 103 95 (!) 107 88   Resp:  16  20   Temp: 98.6 °F (37 °C) 97.5 °F (36.4 °C)  97.7 °F (36.5 °C)   TempSrc: Oral Oral  Oral   SpO2: 95% 96%  93%   Weight:       Height:           General Appearance: no distress, non toxic appearing  HEENT: PERRLA, conjuctiva normal; oropharynx clear; mucous membranes moist   Neck:  Supple, normal ROM  Lungs: CTA but overall decreased, normal respiratory effort, no retractions, expiratory effort normal; on 2L NC  CV: regular rate and rhythm; no rubs/murmurs/gallops, PMI normal   ABD: soft; ND/NT; +BS  EXT: no edema  Skin: normal turgor, normal texture, no rashes  Psych: affect normal, mood normal  Neuro: AAO           The above physical exam was reviewed and updated as determined by my evaluation of the patient on 3/16/2024.    Invasive Devices       None                      VTE Pharmacologic Prophylaxis: Enoxaparin  Code Status: Level 1 - Full Code  Current Length of Stay: 2 day(s)    Total floor / unit time spent today 30 minutes  Coordination of patient's care was performed in conjunction with primary service. Time invested included review of patient's labs, vitals, and management of their comorbidities with continued monitoring, examination of patient as well as answering patient questions, documenting her findings and creating progress note in electronic medical record,  ordering appropriate diagnostic testing.       ** Please Note:  voice to text software may have been used in the creation of this document. Although proof errors in transcription or interpretation are a potential of such software**

## 2024-03-16 NOTE — PROGRESS NOTES
03/16/24 0900   Pain Assessment   Pain Assessment Tool 0-10   Restrictions/Precautions   Precautions Bed/chair alarms;Fall Risk;O2;Pain;Supervision on toilet/commode  (2 L of O2, SpO2 > 90%)   Subjective   Subjective pt agreeable to perform skilled PT   Roll Left and Right   Type of Assistance Needed Supervision   Physical Assistance Level No physical assistance   Roll Left and Right CARE Score 4   Sit to Lying   Type of Assistance Needed Incidental touching   Sit to Lying CARE Score 4   Lying to Sitting on Side of Bed   Type of Assistance Needed Incidental touching   Lying to Sitting on Side of Bed CARE Score 4   Sit to Stand   Type of Assistance Needed Physical assistance;Adaptive equipment   Physical Assistance Level 25% or less   Comment RW   Sit to Stand CARE Score 3   Bed-Chair Transfer   Type of Assistance Needed Physical assistance   Physical Assistance Level 26%-50%   Chair/Bed-to-Chair Transfer CARE Score 3   Walk 10 Feet   Type of Assistance Needed Physical assistance;Adaptive equipment   Physical Assistance Level 26%-50%   Comment Charu/ModA RW   Walk 10 Feet CARE Score 3   Walk 50 Feet with Two Turns   Type of Assistance Needed Physical assistance;Adaptive equipment   Physical Assistance Level 26%-50%   Comment Charu/ModA RW   Walk 50 Feet with Two Turns CARE Score 3   Ambulation   Primary Mode of Locomotion Prior to Admission Walk   Distance Walked (feet) 60 ft  (x2)   Assist Device Roller Walker   Does the patient walk? 2. Yes   Wheel 50 Feet with Two Turns   Reason if not Attempted Activity not applicable   Wheel 50 Feet with Two Turns CARE Score 9   Wheel 150 Feet   Reason if not Attempted Activity not applicable   Wheel 150 Feet CARE Score 9   Curb or Single Stair   Style negotiated Single stair   Type of Assistance Needed Physical assistance   Physical Assistance Level 51%-75%   Comment BLHR 6 step  steps   1 Step (Curb) CARE Score 2   4 Steps   Type of Assistance Needed Physical  assistance   Physical Assistance Level 51%-75%   Comment BLHR 6 step  steps   4 Steps CARE Score 2   Therapeutic Interventions   Strengthening LAQ AP marching 10-20 reps  STS x5   Flexibility manual stretch LE   Balance standing balance   Equipment Use   NuStep lvl 1 for 12 min   Assessment   Treatment Assessment pt focus on functional mobility with RW and TE for LE and strengthening and conditioning . Pt also progressing with 02 L2 90-95 % today with activitives. Pt perform tranier steps up and down descending BWD with BLHR , pt will cont to need skilled PT to meet DC goals   Barriers to Discharge Inaccessible home environment;Decreased caregiver support   Plan   Progress Progressing toward goals   PT Therapy Minutes   PT Time In 0900   PT Time Out 1000   PT Total Time (minutes) 60   PT Mode of treatment - Individual (minutes) 60   PT Mode of treatment - Concurrent (minutes) 0   PT Mode of treatment - Group (minutes) 0   PT Mode of treatment - Co-treat (minutes) 0   PT Mode of Treatment - Total time(minutes) 60 minutes   PT Cumulative Minutes 198   Therapy Time missed   Time missed? No

## 2024-03-16 NOTE — ASSESSMENT & PLAN NOTE
Baseline heart rate in the 90s prehospitalization  Currently on Toprol 25mg qam/50mg qpm  Echo = EF normal/grade 1 diastolic dysfunction  F/U cardiology on discharge

## 2024-03-16 NOTE — ASSESSMENT & PLAN NOTE
Secondary to influenza A/superimposed bacterial infection  S/p intubation 2/19 through 2/29  Bronchoscopy 2/19 positive for Candida glabrata, no bacteria  S/p antibiotics  Recent chest x-ray 3/12 was unchanged  Pulmonology recommends F/U CT of chest in 4 weeks (4/4/24)  Continue Mucinex.  Pt came to the [facility] without O2 but he has needed it in therapy.  Needed 3L NC for ambulation in therapy today

## 2024-03-17 PROCEDURE — 99232 SBSQ HOSP IP/OBS MODERATE 35: CPT | Performed by: INTERNAL MEDICINE

## 2024-03-17 PROCEDURE — 97116 GAIT TRAINING THERAPY: CPT

## 2024-03-17 PROCEDURE — 97110 THERAPEUTIC EXERCISES: CPT

## 2024-03-17 RX ADMIN — METOPROLOL SUCCINATE 25 MG: 25 TABLET, FILM COATED, EXTENDED RELEASE ORAL at 08:00

## 2024-03-17 RX ADMIN — MAGNESIUM OXIDE TAB 400 MG (241.3 MG ELEMENTAL MG) 400 MG: 400 (241.3 MG) TAB at 08:00

## 2024-03-17 RX ADMIN — OXYCODONE HYDROCHLORIDE 5 MG: 5 TABLET ORAL at 08:04

## 2024-03-17 RX ADMIN — OXYCODONE HYDROCHLORIDE 5 MG: 5 TABLET ORAL at 00:40

## 2024-03-17 RX ADMIN — ENOXAPARIN SODIUM 40 MG: 40 INJECTION SUBCUTANEOUS at 16:14

## 2024-03-17 RX ADMIN — LORATADINE 5 MG: 10 TABLET ORAL at 08:00

## 2024-03-17 RX ADMIN — TAMSULOSIN HYDROCHLORIDE 0.4 MG: 0.4 CAPSULE ORAL at 16:13

## 2024-03-17 RX ADMIN — GUAIFENESIN 600 MG: 600 TABLET, EXTENDED RELEASE ORAL at 21:05

## 2024-03-17 RX ADMIN — GUAIFENESIN 600 MG: 600 TABLET, EXTENDED RELEASE ORAL at 08:00

## 2024-03-17 RX ADMIN — METOPROLOL SUCCINATE 50 MG: 50 TABLET, EXTENDED RELEASE ORAL at 21:05

## 2024-03-17 RX ADMIN — PAROXETINE HYDROCHLORIDE 10 MG: 20 TABLET, FILM COATED ORAL at 08:00

## 2024-03-17 NOTE — ASSESSMENT & PLAN NOTE
Secondary to influenza A/superimposed bacterial infection  S/p intubation 2/19 through 2/29  Bronchoscopy 2/19 positive for Candida glabrata, no bacteria  S/p antibiotics  Recent chest x-ray 3/12 was unchanged  Pulmonology recommends F/U CT of chest in 4 weeks (4/4/24)  Continue Mucinex.  Pt came to the [facility] without O2 but he has needed it in therapy.  Needed 3L NC for ambulation in therapy 3/16/24

## 2024-03-17 NOTE — PROGRESS NOTES
03/17/24 1400   Pain Assessment   Pain Assessment Tool 0-10   Pain Score No Pain   Restrictions/Precautions   Precautions Supervision on toilet/commode;Fall Risk;Bed/chair alarms;O2  (Sp02 > 90%, 2L rest 3 L activity)   Weight Bearing Restrictions No   ROM Restrictions No   Cognition   Overall Cognitive Status WFL   Arousal/Participation Cooperative;Alert   Attention Within functional limits   Orientation Level Oriented X4   Memory Within functional limits   Following Commands Follows one step commands without difficulty   Subjective   Subjective Pt agreeable to PT   Sit to Stand   Type of Assistance Needed Incidental touching   Physical Assistance Level No physical assistance   Comment CGA RW   Sit to Stand CARE Score 4   Bed-Chair Transfer   Type of Assistance Needed Physical assistance   Physical Assistance Level No physical assistance   Comment CGA RW   Chair/Bed-to-Chair Transfer CARE Score -   Transfer Bed/Chair/Wheelchair   Findings verbal cues for pacing and 02 line management   Walk 10 Feet   Type of Assistance Needed Physical assistance   Physical Assistance Level 25% or less   Comment RW   Walk 10 Feet CARE Score 3   Walk 50 Feet with Two Turns   Type of Assistance Needed Physical assistance   Physical Assistance Level 25% or less   Comment RW   Walk 50 Feet with Two Turns CARE Score 3   Ambulation   Primary Mode of Locomotion Prior to Admission Walk   Distance Walked (feet) 60 ft  (30 ft, 50 ft)   Assist Device Roller Walker   Limitations Noted In Balance;Endurance   Provided Assistance with: Balance   Walk Assist Level Minimum Assist   Findings limited by decreased Sp02 to 87%, quickly increased to >90% with seated rest VCs to pacing and sequencing with RW   Does the patient walk? 2. Yes   Therapeutic Interventions   Strengthening standing with RW and CGA 1 min marching   Equipment Use   NuStep Level 1 x 10 minutes LE only   Other Comments   Comments rest Sp02 95% 2L 02 NC; at rest with activity 98%  Sp02   Assessment   Treatment Assessment Hussein agreeable to 60 minute skilled PT session. Session focused on transfer and gait training to maximize functional independence. Barriers continue to be decreased Sp02 with activity. This session lowest 87% but with rest would quickly return to >90%. Again on 3L 02 NC with activity and 2L 02 NC at rest. Continue to focus on improving ambulation distance and stair negotiation training to maximize independence and decrease risk of falls.   Family/Caregiver Present no   Barriers to Discharge Inaccessible home environment;Decreased caregiver support   PT Barriers   Functional Limitation Car transfers;Ramp negotiation;Stair negotiation;Standing;Walking;Transfers   Plan   Treatment/Interventions ADL retraining;Functional transfer training;LE strengthening/ROM;Therapeutic exercise;Elevations;Patient/family training;Equipment eval/education;Bed mobility;Gait training   Progress Progressing toward goals   PT Therapy Minutes   PT Time In 1400   PT Time Out 1445   PT Total Time (minutes) 45   PT Mode of treatment - Individual (minutes) 45   PT Mode of treatment - Concurrent (minutes) 0   PT Mode of treatment - Group (minutes) 0   PT Mode of treatment - Co-treat (minutes) 0   PT Mode of Treatment - Total time(minutes) 45 minutes   PT Cumulative Minutes 303   Therapy Time missed   Time missed? No

## 2024-03-17 NOTE — PROGRESS NOTES
03/17/24 1400   Pain Assessment   Pain Assessment Tool 0-10   Pain Score No Pain   Restrictions/Precautions   Precautions Supervision on toilet/commode;Fall Risk;Bed/chair alarms;O2  (Sp02 > 90%, 2L rest 3 L activity)   Weight Bearing Restrictions No   ROM Restrictions No   Cognition   Overall Cognitive Status WFL   Arousal/Participation Cooperative;Alert   Attention Within functional limits   Orientation Level Oriented X4   Memory Within functional limits   Following Commands Follows one step commands without difficulty   Subjective   Subjective Pt agreeable to PT   Sit to Stand   Type of Assistance Needed Incidental touching   Physical Assistance Level No physical assistance   Comment CGA RW   Sit to Stand CARE Score 4   Bed-Chair Transfer   Type of Assistance Needed Physical assistance   Physical Assistance Level No physical assistance   Comment CGA RW   Chair/Bed-to-Chair Transfer CARE Score -   Transfer Bed/Chair/Wheelchair   Findings verbal cues for pacing and 02 line management   Walk 10 Feet   Type of Assistance Needed Physical assistance   Physical Assistance Level 25% or less   Comment RW   Walk 10 Feet CARE Score 3   Walk 50 Feet with Two Turns   Type of Assistance Needed Physical assistance   Physical Assistance Level 25% or less   Comment RW   Walk 50 Feet with Two Turns CARE Score 3   Ambulation   Primary Mode of Locomotion Prior to Admission Walk   Distance Walked (feet) 60 ft  (30 ft, 50 ft)   Assist Device Roller Walker   Limitations Noted In Balance;Endurance   Provided Assistance with: Balance   Walk Assist Level Minimum Assist   Findings limited by decreased Sp02 to 87%, quickly increased to >90% with seated rest VCs to pacing and sequencing with RW   Does the patient walk? 2. Yes   Therapeutic Interventions   Strengthening standing with RW and CGA 1 min marching   Equipment Use   NuStep Level 1 x 10 minutes LE only   Other Comments   Comments rest Sp02 95% 2L 02 NC; at rest with activity 98%  Sp02   Assessment   Treatment Assessment Hussein agreeable to 60 minute skilled PT session. Session focused on transfer and gait training to maximize functional independence. Barriers continue to be decreased Sp02 with activity. This session lowest 87% but with rest would quickly return to >90%. Again on 3L 02 NC with activity and 2L 02 NC at rest. Continue to focus on improving ambulation distance and stair negotiation training to maximize independence and decrease risk of falls.   Family/Caregiver Present no   Barriers to Discharge Inaccessible home environment;Decreased caregiver support   PT Barriers   Functional Limitation Car transfers;Ramp negotiation;Stair negotiation;Standing;Walking;Transfers   Plan   Treatment/Interventions ADL retraining;Functional transfer training;LE strengthening/ROM;Therapeutic exercise;Elevations;Patient/family training;Equipment eval/education;Bed mobility;Gait training   Progress Progressing toward goals   PT Therapy Minutes   PT Time In 1400   PT Time Out 1500   PT Total Time (minutes) 60   PT Mode of treatment - Individual (minutes) 60   PT Mode of treatment - Concurrent (minutes) 0   PT Mode of treatment - Group (minutes) 0   PT Mode of treatment - Co-treat (minutes) 0   PT Mode of Treatment - Total time(minutes) 60 minutes   PT Cumulative Minutes 318   Therapy Time missed   Time missed? No

## 2024-03-17 NOTE — PLAN OF CARE
Problem: MOBILITY - ADULT  Goal: Maintain or return to baseline ADL function  Description: INTERVENTIONS:  -  Assess patient's ability to carry out ADLs; assess patient's baseline for ADL function and identify physical deficits which impact ability to perform ADLs (bathing, care of mouth/teeth, toileting, grooming, dressing, etc.)  - Assess/evaluate cause of self-care deficits   - Assess range of motion  - Assess patient's mobility; develop plan if impaired  - Assess patient's need for assistive devices and provide as appropriate  - Encourage maximum independence but intervene and supervise when necessary  - Involve family in performance of ADLs  - Assess for home care needs following discharge   - Consider OT consult to assist with ADL evaluation and planning for discharge  - Provide patient education as appropriate  Outcome: Progressing  Goal: Maintains/Returns to pre admission functional level  Description: INTERVENTIONS:  - Perform AM-PAC 6 Click Basic Mobility/ Daily Activity assessment daily.  - Set and communicate daily mobility goal to care team and patient/family/caregiver.   - Collaborate with rehabilitation services on mobility goals if consulted  - Perform Range of Motion  times a day.  - Reposition patient every  hours.  - Dangle patient  times a day  - Stand patient  times a day  - Ambulate patient  times a day  - Out of bed to chair  times a day   - Out of bed for meals  times a day  - Out of bed for toileting  - Record patient progress and toleration of activity level   Outcome: Progressing     Problem: PAIN - ADULT  Goal: Verbalizes/displays adequate comfort level or baseline comfort level  Description: Interventions:  - Encourage patient to monitor pain and request assistance  - Assess pain using appropriate pain scale  - Administer analgesics based on type and severity of pain and evaluate response  - Implement non-pharmacological measures as appropriate and evaluate response  - Consider  cultural and social influences on pain and pain management  - Notify physician/advanced practitioner if interventions unsuccessful or patient reports new pain  Outcome: Progressing     Problem: INFECTION - ADULT  Goal: Absence or prevention of progression during hospitalization  Description: INTERVENTIONS:  - Assess and monitor for signs and symptoms of infection  - Monitor lab/diagnostic results  - Monitor all insertion sites, i.e. indwelling lines, tubes, and drains  - Monitor endotracheal if appropriate and nasal secretions for changes in amount and color  - [place] appropriate cooling/warming therapies per order  - Administer medications as ordered  - Instruct and encourage patient and family to use good hand hygiene technique  - Identify and instruct in appropriate isolation precautions for identified infection/condition  Outcome: Progressing  Goal: Absence of fever/infection during neutropenic period  Description: INTERVENTIONS:  - Monitor WBC    Outcome: Progressing     Problem: SAFETY ADULT  Goal: Patient will remain free of falls  Description: INTERVENTIONS:  - Educate patient/family on patient safety including physical limitations  - Instruct patient to call for assistance with activity   - Consult OT/PT to assist with strengthening/mobility   - Keep Call bell within reach  - Keep bed low and locked with side rails adjusted as appropriate  - Keep care items and personal belongings within reach  - Initiate and maintain comfort rounds  - Make Fall Risk Sign visible to staff  - Offer Toileting every  Hours, in advance of need  - Initiate/Maintain alarm  - Obtain necessary fall risk management equipment:   - Apply yellow socks and bracelet for high fall risk patients  - Consider moving patient to room near nurses station  Outcome: Progressing  Goal: Maintain or return to baseline ADL function  Description: INTERVENTIONS:  -  Assess patient's ability to carry out ADLs; assess patient's baseline for ADL  function and identify physical deficits which impact ability to perform ADLs (bathing, care of mouth/teeth, toileting, grooming, dressing, etc.)  - Assess/evaluate cause of self-care deficits   - Assess range of motion  - Assess patient's mobility; develop plan if impaired  - Assess patient's need for assistive devices and provide as appropriate  - Encourage maximum independence but intervene and supervise when necessary  - Involve family in performance of ADLs  - Assess for home care needs following discharge   - Consider OT consult to assist with ADL evaluation and planning for discharge  - Provide patient education as appropriate  Outcome: Progressing  Goal: Maintains/Returns to pre admission functional level  Description: INTERVENTIONS:  - Perform AM-PAC 6 Click Basic Mobility/ Daily Activity assessment daily.  - Set and communicate daily mobility goal to care team and patient/family/caregiver.   - Collaborate with rehabilitation services on mobility goals if consulted  - Perform Range of Motion  times a day.  - Reposition patient every  hours.  - Dangle patient  times a day  - Stand patient  times a day  - Ambulate patient  times a day  - Out of bed to chair  times a day   - Out of bed for meals  times a day  - Out of bed for toileting  - Record patient progress and toleration of activity level   Outcome: Progressing     Problem: DISCHARGE PLANNING  Goal: Discharge to home or other facility with appropriate resources  Description: INTERVENTIONS:  - Identify barriers to discharge w/patient and caregiver  - Arrange for needed discharge resources and transportation as appropriate  - Identify discharge learning needs (meds, wound care, etc.)  - Arrange for interpretive services to assist at discharge as needed  - Refer to Case Management Department for coordinating discharge planning if the patient needs post-hospital services based on physician/advanced practitioner order or complex needs related to functional  status, cognitive ability, or social support system  Outcome: Progressing

## 2024-03-17 NOTE — PROGRESS NOTES
[facility]  Progress Note  Name: Hussein Edward III I  MRN: [id number]  Unit/Bed#: [facility] 974-01 I Date of Admission: 3/14/2024   Date of Service: 3/17/2024 I Hospital Day: 3    Assessment/Plan   Acute respiratory failure with hypoxia (HCC)  Assessment & Plan  Secondary to influenza A/superimposed bacterial infection  S/p intubation 2/19 through 2/29  Bronchoscopy 2/19 positive for Candida glabrata, no bacteria  S/p antibiotics  Recent chest x-ray 3/12 was unchanged  Pulmonology recommends F/U CT of chest in 4 weeks (4/4/24)  Continue Mucinex.  Pt came to the [facility] without O2 but he has needed it in therapy.  Needed 3L NC for ambulation in therapy 3/16/24      PAF (paroxysmal atrial fibrillation) ([facility])  Assessment & Plan  D/T critical illness  Converted with IV amiodarone  Echo as above  F/U cardiology as outpatient  Not on AC  Continue Toprol 25mg qam/50mg qpm    Tachycardia  Assessment & Plan  Baseline heart rate in the 90s prehospitalization  Currently on Toprol 25mg qam/50mg qpm  Echo = EF normal/grade 1 diastolic dysfunction  F/U cardiology on discharge    Anxiety  Assessment & Plan  Continue Ativan as needed but has not needed it  stable    WALE (obstructive sleep apnea)  Assessment & Plan  Machine from home brought in as hospital machine was too strong.  Continue current settings.           The above assessment and plan was reviewed and updated as determined by my evaluation of the patient on 3/17/2024.    Labs:   Results from last 7 days   Lab Units 03/11/24  0459 03/10/24  1320   WBC Thousand/uL 8.78 10.15   HEMOGLOBIN g/dL 12.5 12.3   HEMATOCRIT % 39.7 39.5   PLATELETS Thousands/uL 312 299     Results from last 7 days   Lab Units 03/12/24  1400 03/11/24  0459   SODIUM mmol/L 140 140   POTASSIUM mmol/L 4.0 4.3   CHLORIDE mmol/L 106 106   CO2 mmol/L 25 25   BUN mg/dL 15 13   CREATININE mg/dL 0.83 0.82   CALCIUM mg/dL 9.0 8.7             Results from last 7 days   Lab Units  03/11/24  1131 03/11/24  0736 03/10/24  2112   POC GLUCOSE mg/dl 132 91 120       Imaging  No orders to display       Review of Scheduled Meds:  Current Facility-Administered Medications   Medication Dose Route Frequency Provider Last Rate    acetaminophen  650 mg Oral Q6H PRN Tomasa Whitlock PA-C      albuterol  2 puff Inhalation Q4H PRN Tomasa Whitlock PA-C      bisacodyl  10 mg Rectal Daily PRN Ashley Depadua, MD      enoxaparin  40 mg Subcutaneous Q24H YEHUDA Tomasa Whitlock PA-C      guaiFENesin  600 mg Oral Q12H YEHUDA Tomasa Whitlock PA-C      loratadine  5 mg Oral Daily Tomasa Whitlock PA-C      LORazepam  0.5 mg Oral Q8H PRN Tomasa Whitlock PA-C      magnesium Oxide  400 mg Oral Daily Tomasa Whitlock PA-C      melatonin  3 mg Oral HS PRN Tomasa Whitlock PA-C      metoprolol succinate  25 mg Oral Daily Tomasa Whitlock PA-C      metoprolol succinate  50 mg Oral HS Tomasa Whitlock PA-C      oxyCODONE  5 mg Oral Q6H PRN Tomasa Whitlock PA-C      PARoxetine  10 mg Oral Daily Tomasa Whitlock PA-C      polyethylene glycol  17 g Oral BID Tomasa Whitlock PA-C      sodium chloride  1 spray Each Nare Q1H PRN Tomasa Whitlock PA-C      tamsulosin  0.4 mg Oral Daily With Dinner Tomasa Whitlock PA-C         Subjective/ HPI: Patient seen and examined. Patients overnight issues or events were reviewed with nursing staff. New or overnight issues include the following:     No new or overnight issues.  Offers no complaints    ROS:   A 10 point ROS was performed; negative except as noted above.        *Labs /Radiology studies Reviewed  *Medications  reviewed and reconciled as needed  *Please refer to order section for additional ordered labs studies      Physical Examination:  Vitals:   Vitals:    03/16/24 0829 03/16/24 1300 03/16/24 2125 03/17/24 0543   BP: 104/66 115/70 105/74 106/57   BP Location:  Left arm Right arm Left arm   Pulse: (!) 107 88 97 91    Resp:  20 20 17   Temp:  97.7 °F (36.5 °C) 98.1 °F (36.7 °C) 98.1 °F (36.7 °C)   TempSrc:  Oral Oral Oral   SpO2:  93% 96% 95%   Weight:       Height:           General Appearance: no distress, nontoxic appearing  HEENT:  External ear normal.  Nose normal w/o drainage. Mucous membranes are moist. Oropharynx is clear. Conjunctiva clear w/o icterus or redness.  Neck:  Supple, normal ROM  Lungs: BBS without crackles/wheeze/rhonchi; respirations unlabored with normal inspiratory/expiratory effort.  No retractions noted.  On 2 L NC at rest and 3L NC with exertion  CV: regular rate and rhythm; no rubs/murmurs/gallops, PMI normal   ABD: Abdomen is soft.  Bowel sounds all quadrants.  Nontender with no distention.    EXT: no edema  Skin: normal turgor, normal texture, no rashes  Psych: affect normal, mood normal  Neuro: AAO          The above physical exam was reviewed and updated as determined by my evaluation of the patient on 3/17/2024.    Invasive Devices       None                      VTE Pharmacologic Prophylaxis: Enoxaparin  Code Status: Level 1 - Full Code  Current Length of Stay: 3 day(s)    Total floor / unit time spent today 30 minutes  Coordination of patient's care was performed in conjunction with primary service. Time invested included review of patient's labs, vitals, and management of their comorbidities with continued monitoring, examination of patient as well as answering patient questions, documenting her findings and creating progress note in electronic medical record,  ordering appropriate diagnostic testing.       ** Please Note:  voice to text software may have been used in the creation of this document. Although proof errors in transcription or interpretation are a potential of such software**

## 2024-03-18 LAB
ALBUMIN SERPL BCP-MCNC: 3.7 G/DL (ref 3.5–5)
ALP SERPL-CCNC: 89 U/L (ref 34–104)
ALT SERPL W P-5'-P-CCNC: 31 U/L (ref 7–52)
ANION GAP SERPL CALCULATED.3IONS-SCNC: 6 MMOL/L (ref 4–13)
AST SERPL W P-5'-P-CCNC: 16 U/L (ref 13–39)
ATRIAL RATE: 104 BPM
BASOPHILS # BLD AUTO: 0.06 THOUSANDS/ÂΜL (ref 0–0.1)
BASOPHILS NFR BLD AUTO: 1 % (ref 0–1)
BILIRUB SERPL-MCNC: 0.47 MG/DL (ref 0.2–1)
BUN SERPL-MCNC: 14 MG/DL (ref 5–25)
CALCIUM SERPL-MCNC: 9.2 MG/DL (ref 8.4–10.2)
CARDIAC TROPONIN I PNL SERPL HS: 5 NG/L
CHLORIDE SERPL-SCNC: 104 MMOL/L (ref 96–108)
CO2 SERPL-SCNC: 31 MMOL/L (ref 21–32)
CREAT SERPL-MCNC: 0.7 MG/DL (ref 0.6–1.3)
DME PARACHUTE DELIVERY DATE ACTUAL: NORMAL
DME PARACHUTE DELIVERY DATE REQUESTED: NORMAL
DME PARACHUTE ITEM DESCRIPTION: NORMAL
DME PARACHUTE ORDER STATUS: NORMAL
DME PARACHUTE SUPPLIER NAME: NORMAL
DME PARACHUTE SUPPLIER PHONE: NORMAL
EOSINOPHIL # BLD AUTO: 0.37 THOUSAND/ÂΜL (ref 0–0.61)
EOSINOPHIL NFR BLD AUTO: 4 % (ref 0–6)
ERYTHROCYTE [DISTWIDTH] IN BLOOD BY AUTOMATED COUNT: 13.2 % (ref 11.6–15.1)
GFR SERPL CREATININE-BSD FRML MDRD: 105 ML/MIN/1.73SQ M
GLUCOSE SERPL-MCNC: 98 MG/DL (ref 65–140)
HCT VFR BLD AUTO: 37 % (ref 36.5–49.3)
HGB BLD-MCNC: 12.1 G/DL (ref 12–17)
IMM GRANULOCYTES # BLD AUTO: 0.03 THOUSAND/UL (ref 0–0.2)
IMM GRANULOCYTES NFR BLD AUTO: 0 % (ref 0–2)
LYMPHOCYTES # BLD AUTO: 1.08 THOUSANDS/ÂΜL (ref 0.6–4.47)
LYMPHOCYTES NFR BLD AUTO: 11 % (ref 14–44)
MCH RBC QN AUTO: 29.3 PG (ref 26.8–34.3)
MCHC RBC AUTO-ENTMCNC: 32.7 G/DL (ref 31.4–37.4)
MCV RBC AUTO: 90 FL (ref 82–98)
MONOCYTES # BLD AUTO: 0.7 THOUSAND/ÂΜL (ref 0.17–1.22)
MONOCYTES NFR BLD AUTO: 7 % (ref 4–12)
NEUTROPHILS # BLD AUTO: 7.45 THOUSANDS/ÂΜL (ref 1.85–7.62)
NEUTS SEG NFR BLD AUTO: 77 % (ref 43–75)
NRBC BLD AUTO-RTO: 0 /100 WBCS
P AXIS: 43 DEGREES
PLATELET # BLD AUTO: 184 THOUSANDS/UL (ref 149–390)
PMV BLD AUTO: 8.3 FL (ref 8.9–12.7)
POTASSIUM SERPL-SCNC: 4.3 MMOL/L (ref 3.5–5.3)
PR INTERVAL: 158 MS
PROT SERPL-MCNC: 6.8 G/DL (ref 6.4–8.4)
QRS AXIS: -24 DEGREES
QRSD INTERVAL: 80 MS
QT INTERVAL: 332 MS
QTC INTERVAL: 436 MS
RBC # BLD AUTO: 4.13 MILLION/UL (ref 3.88–5.62)
SODIUM SERPL-SCNC: 141 MMOL/L (ref 135–147)
T WAVE AXIS: 0 DEGREES
VENTRICULAR RATE: 104 BPM
WBC # BLD AUTO: 9.69 THOUSAND/UL (ref 4.31–10.16)

## 2024-03-18 PROCEDURE — 85025 COMPLETE CBC W/AUTO DIFF WBC: CPT | Performed by: INTERNAL MEDICINE

## 2024-03-18 PROCEDURE — 99223 1ST HOSP IP/OBS HIGH 75: CPT | Performed by: INTERNAL MEDICINE

## 2024-03-18 PROCEDURE — 99232 SBSQ HOSP IP/OBS MODERATE 35: CPT | Performed by: PHYSICAL MEDICINE & REHABILITATION

## 2024-03-18 PROCEDURE — 97110 THERAPEUTIC EXERCISES: CPT

## 2024-03-18 PROCEDURE — 97535 SELF CARE MNGMENT TRAINING: CPT

## 2024-03-18 PROCEDURE — 93005 ELECTROCARDIOGRAM TRACING: CPT

## 2024-03-18 PROCEDURE — 97530 THERAPEUTIC ACTIVITIES: CPT

## 2024-03-18 PROCEDURE — 93010 ELECTROCARDIOGRAM REPORT: CPT | Performed by: INTERNAL MEDICINE

## 2024-03-18 PROCEDURE — 84484 ASSAY OF TROPONIN QUANT: CPT | Performed by: INTERNAL MEDICINE

## 2024-03-18 PROCEDURE — 80053 COMPREHEN METABOLIC PANEL: CPT | Performed by: INTERNAL MEDICINE

## 2024-03-18 RX ORDER — FLUTICASONE PROPIONATE 50 MCG
1 SPRAY, SUSPENSION (ML) NASAL DAILY
Status: DISCONTINUED | OUTPATIENT
Start: 2024-03-18 | End: 2024-03-27 | Stop reason: HOSPADM

## 2024-03-18 RX ORDER — BISACODYL 5 MG/1
5 TABLET, DELAYED RELEASE ORAL DAILY PRN
Status: DISCONTINUED | OUTPATIENT
Start: 2024-03-18 | End: 2024-03-18

## 2024-03-18 RX ADMIN — MAGNESIUM OXIDE TAB 400 MG (241.3 MG ELEMENTAL MG) 400 MG: 400 (241.3 MG) TAB at 08:39

## 2024-03-18 RX ADMIN — GUAIFENESIN 600 MG: 600 TABLET, EXTENDED RELEASE ORAL at 21:57

## 2024-03-18 RX ADMIN — TAMSULOSIN HYDROCHLORIDE 0.4 MG: 0.4 CAPSULE ORAL at 17:24

## 2024-03-18 RX ADMIN — ENOXAPARIN SODIUM 40 MG: 40 INJECTION SUBCUTANEOUS at 17:25

## 2024-03-18 RX ADMIN — GUAIFENESIN 600 MG: 600 TABLET, EXTENDED RELEASE ORAL at 08:39

## 2024-03-18 RX ADMIN — FLUTICASONE PROPIONATE 1 SPRAY: 50 SPRAY, METERED NASAL at 12:14

## 2024-03-18 RX ADMIN — LORATADINE 5 MG: 10 TABLET ORAL at 08:39

## 2024-03-18 RX ADMIN — METOPROLOL SUCCINATE 50 MG: 50 TABLET, EXTENDED RELEASE ORAL at 21:58

## 2024-03-18 RX ADMIN — PAROXETINE HYDROCHLORIDE 10 MG: 20 TABLET, FILM COATED ORAL at 08:39

## 2024-03-18 RX ADMIN — METOPROLOL SUCCINATE 25 MG: 25 TABLET, FILM COATED, EXTENDED RELEASE ORAL at 08:39

## 2024-03-18 NOTE — PROGRESS NOTES
03/18/24 1238   Pain Assessment   Pain Assessment Tool 0-10   Pain Score No Pain   Restrictions/Precautions   Precautions Bed/chair alarms;Fall Risk;Multiple lines;Supervision on toilet/commode;O2   ROM Restrictions No   General   Change In Medical/Functional Status At beginning of session, Dr. Denton present.  reported all testing completed earlier this morning was normal.   Cognition   Overall Cognitive Status WFL   Arousal/Participation Cooperative;Alert   Attention Within functional limits   Orientation Level Oriented X4   Memory Within functional limits   Following Commands Follows one step commands without difficulty   Roll Left and Right   Type of Assistance Needed Independent   Roll Left and Right CARE Score 6   Sit to Lying   Type of Assistance Needed Supervision   Sit to Lying CARE Score 4   Lying to Sitting on Side of Bed   Type of Assistance Needed Supervision   Lying to Sitting on Side of Bed CARE Score 4   Sit to Stand   Type of Assistance Needed Incidental touching;Adaptive equipment   Comment CGA with RW   Sit to Stand CARE Score 4   Bed-Chair Transfer   Type of Assistance Needed Incidental touching;Adaptive equipment   Comment CGA with RW   Chair/Bed-to-Chair Transfer CARE Score 4   Walk 10 Feet   Type of Assistance Needed Physical assistance   Physical Assistance Level 25% or less   Comment Charu/CGA with RW, assistance for management of O2 tank and tubbing   Walk 10 Feet CARE Score 3   Walk 50 Feet with Two Turns   Type of Assistance Needed Physical assistance   Physical Assistance Level 25% or less   Comment Charu/CGA with RW, assistance for management of O2 tank and tubbing   Walk 50 Feet with Two Turns CARE Score 3   Walk 150 Feet   Comment not focus of session due to presentation in earlier PT session   Reason if not Attempted Safety concerns   Walk 150 Feet CARE Score 88   Walking 10 Feet on Uneven Surfaces   Comment (S)  Please reassess tomorrow   Ambulation   Primary Mode of Locomotion  Prior to Admission Walk   Distance Walked (feet) 58 ft  (x4)   Assist Device Roller Walker   Limitations Noted In Balance;Endurance   Provided Assistance with: Balance   Walk Assist Level Modified Independent;Contact Guard   Findings Pt on 2L of O2 for first 3 trials and oxygen saturation remained above 90%.  Last trial, patient started to desaturate to 87% on 2L of O2 so increased to 3L where oxygen saturation remained above 90%   Does the patient walk? 2. Yes   Toilet Transfer   Type of Assistance Needed Incidental touching;Adaptive equipment   Comment CGA with RW   Toilet Transfer CARE Score 4   Therapeutic Interventions   Strengthening seated knee extension 3# 3x10 each LE, seated marching 3# 3x20, seated hip abduction with RTB 2x10, seated hamstring curl with RTB 3x10 each LE. Provided with frequent rest breaks between sets and each exercise.   Assessment   Treatment Assessment Patient participated in 60 minute skilled physical therapy session focusing on transfers, short distance ambulation with RW, and therex. Pt was able to nap before lunch and reports feeling better. Focused on shorter distance ambulation due high level of fatigue and significant oxygen desaturation this AM with OT. Oxygen saturation closely monitored thorughout PT session and remained above 90% on 2-3L of O2. Physical therapy to continue to work on gait training with RW increasing distance as able, LE strengthening, and TE/TA to improve overall stamina/endurance. Tomorrow assesss if patient can be progressed to CGA for ambulation with RW.   Problem List Decreased strength;Decreased endurance;Impaired balance;Decreased mobility;Pain   Barriers to Discharge Inaccessible home environment;Decreased caregiver support   PT Barriers   Functional Limitation Car transfers;Ramp negotiation;Stair negotiation;Standing;Walking;Transfers   Plan   Treatment/Interventions Functional transfer training;LE strengthening/ROM;Therapeutic exercise;Endurance  training;Patient/family training;Equipment eval/education;Gait training;Bed mobility;Compensatory technique education   Progress Progressing toward goals   Discharge Recommendation   Rehab Resource Intensity Level, PT   (TBd pending progress)   Equipment Recommended Walker   PT Therapy Minutes   PT Time In 1238   PT Time Out 1338   PT Total Time (minutes) 60   PT Mode of treatment - Individual (minutes) 60   PT Mode of treatment - Concurrent (minutes) 0   PT Mode of treatment - Group (minutes) 0   PT Mode of treatment - Co-treat (minutes) 0   PT Mode of Treatment - Total time(minutes) 60 minutes   PT Cumulative Minutes 418   Therapy Time missed   Time missed? No     Valencia Catherine, PT, DPT

## 2024-03-18 NOTE — ASSESSMENT & PLAN NOTE
Baseline heart rate in the 90s prehospitalization  Currently on Toprol 25mg qam/50mg qpm  Echo = EF normal/grade 1 diastolic dysfunction  Likely physiological/due to anxiety  F/U cardiology on discharge  3/18-patient with palpitations/fatigue, no chest pain/leg swelling  EKG-sinus tachycardia unchanged compared to previous EKG 3/10  Troponin/CBC/CMP-within normal limits  Softer BP limiting increasing BB  Continue to monitor symptoms

## 2024-03-18 NOTE — ASSESSMENT & PLAN NOTE
Machine from home brought in as hospital machine was too strong.  Reportedly has intolerance to CPAP  Possible plan for a DISE as outpatient with ENT  Continue current settings.

## 2024-03-18 NOTE — PROGRESS NOTES
"   03/18/24 0700   Pain Assessment   Pain Assessment Tool 0-10   Pain Score No Pain   Restrictions/Precautions   Precautions Bed/chair alarms;Fall Risk;Multiple lines;Supervision on toilet/commode;O2  (Sp02 > 90%, 2L rest 3 L activity)   Weight Bearing Restrictions No   ROM Restrictions No   Lifestyle   Autonomy \"mornings are really hard for me\"   Eating   Type of Assistance Needed Independent   Physical Assistance Level No physical assistance   Eating CARE Score 6   Oral Hygiene   Type of Assistance Needed Supervision   Physical Assistance Level No physical assistance   Comment seated at sink to complete oral care. recommend seated at this time due to significant SOB with standing tasks.   Oral Hygiene CARE Score 4   Shower/Bathe Self   Type of Assistance Needed Physical assistance   Physical Assistance Level 25% or less   Comment full shower completed with pt mostly seated due to significant SOB during shower requiring 6L O2 towards end of shower with O2 remaining only 90-91% on 6L. pt physically able to bathe all parts, would anticipate pt require CG/Min A in stance when bathing rear.   Shower/Bathe Self CARE Score 3   Upper Body Dressing   Type of Assistance Needed Supervision   Physical Assistance Level No physical assistance   Comment seated   Upper Body Dressing CARE Score 4   Lower Body Dressing   Type of Assistance Needed Physical assistance   Physical Assistance Level 25% or less   Comment seated to thread underwear/pants over feet, stands with CG/min A for CM   Lower Body Dressing CARE Score 3   Putting On/Taking Off Footwear   Type of Assistance Needed Supervision   Physical Assistance Level No physical assistance   Comment seated via cross leg tech   Putting On/Taking Off Footwear CARE Score 4   Sit to Stand   Type of Assistance Needed Incidental touching   Physical Assistance Level No physical assistance   Comment CG with RW   Sit to Stand CARE Score 4   Bed-Chair Transfer   Type of Assistance Needed " Incidental touching   Physical Assistance Level No physical assistance   Comment CG with RW; pt demo's improvement for O2 tubing management during transfers   Chair/Bed-to-Chair Transfer CARE Score 4   Toileting Hygiene   Type of Assistance Needed Physical assistance   Physical Assistance Level 25% or less   Comment CG/min A in stance for CM; seated to complete rear hygiene   Toileting Hygiene CARE Score 3   Toilet Transfer   Type of Assistance Needed Incidental touching   Physical Assistance Level No physical assistance   Comment CG with RW to standard toilet; improvements with O2 tubing management   Toilet Transfer CARE Score 4   Cognition   Overall Cognitive Status WFL   Arousal/Participation Cooperative;Alert   Attention Within functional limits   Orientation Level Oriented X4   Memory Within functional limits   Following Commands Follows one step commands without difficulty   Activity Tolerance   Activity Tolerance Treatment limited secondary to medical complications (Comment)  (SOB requiring up to 6L O2 to maintain safe O2 levels)   Medical Staff Made Aware PT/OT informed of increase in O2, placed on stand up sheet for Dr. DePadua.   Assessment   Treatment Assessment pt engages in 90 minute skilled OT session focusing on ADL routine and func transfers. pt on 2L O2 upon arrival with O2 remaining 93-94%, 's. pt agreeable to shower. completes transfers at CG/min A with RW, demo's good insight and carryover with line management. significant SOB noted with all tasks requiring 3-4L for transfers and grooming tasks despite being from seated position. pt insightful to decreased endurance/stamina, having bathroom set up specifically for seated tasks in order to conserve energy. during shower, pt did require 6L O2 in order to maintain safe O2 levels of 90-91% with pt requiring 6L for approx 15 minutes before returning down to 4L. during shower/dressing, pt 's consistently. at end of shower/dressing tasks, pt  "resting upright in bed, able to return to 2L O2 safely with pt reporting feeling \"relief\" in his breathing while on the 6L. pt does also report that mornings are \"not good for him\" so plan to complete therapy sessions after 8am. endurance/stamina remain large barrier toward pt IND warranting continued skilled care to focus on activity tolerance, strength, endurance, standing tolerance, in order to decrease burden of care at d/c.   Prognosis Good   Problem List Decreased strength;Decreased endurance;Impaired balance;Decreased mobility;Pain   Plan   Treatment/Interventions ADL retraining;Functional transfer training;Therapeutic exercise;Endurance training;Patient/family training;Equipment eval/education;Compensatory technique education   OT Therapy Minutes   OT Time In 0700   OT Time Out 0830   OT Total Time (minutes) 90   OT Mode of treatment - Individual (minutes) 90   OT Mode of treatment - Concurrent (minutes) 0   OT Mode of treatment - Group (minutes) 0   OT Mode of treatment - Co-treat (minutes) 0   OT Mode of Treatment - Total time(minutes) 90 minutes   OT Cumulative Minutes 240   Therapy Time missed   Time missed? No     "

## 2024-03-18 NOTE — PROGRESS NOTES
[facility]  Progress Note  Name: Hussein Edward III I  MRN: [id number]  Unit/Bed#: -01 I Date of Admission: 3/14/2024   Date of Service: 3/18/2024 I Hospital Day: 4    Assessment/Plan   PAF (paroxysmal atrial fibrillation) (HCC)  Assessment & Plan  D/T critical illness  Converted with IV amiodarone  Echo as above  F/U cardiology as outpatient  Not on AC  Continue Toprol 25mg qam/50mg qpm    Tachycardia  Assessment & Plan  Baseline heart rate in the 90s prehospitalization  Currently on Toprol 25mg qam/50mg qpm  Echo = EF normal/grade 1 diastolic dysfunction  Likely physiological/due to anxiety  F/U cardiology on discharge  3/18-patient with palpitations/fatigue, no chest pain/leg swelling  EKG-sinus tachycardia unchanged compared to previous EKG 3/10  Troponin/CBC/CMP-within normal limits  Softer BP limiting increasing BB  Continue to monitor symptoms    Anxiety  Assessment & Plan  Continue Paxil/Ativan as needed but has not needed it  stable    Acute respiratory failure with hypoxia (HCC)  Assessment & Plan  Secondary to influenza A/superimposed bacterial infection  S/p intubation 2/19 through 2/29  Bronchoscopy 2/19 positive for Candida glabrata, no bacteria  S/p antibiotics  Recent chest x-ray 3/12 was unchanged  Pulmonology recommends F/U CT of chest in 4 weeks (4/4/24)  Continue Mucinex.  Pt came to the [facility] without O2 but he has needed it in therapy.  Needed 3L NC for ambulation in therapy 3/16/24      WALE (obstructive sleep apnea)  Assessment & Plan  Machine from home brought in as hospital machine was too strong.  Reportedly has intolerance to CPAP  Possible plan for a DISE as outpatient with ENT  Continue current settings.                      The above assessment and plan was reviewed and updated as determined by my evaluation of the patient on 3/18/2024.    Labs:   Results from last 7 days   Lab Units 03/18/24  1059   WBC Thousand/uL 9.69   HEMOGLOBIN g/dL 12.1    HEMATOCRIT % 37.0   PLATELETS Thousands/uL 184     Results from last 7 days   Lab Units 03/18/24  1059 03/12/24  1400   SODIUM mmol/L 141 140   POTASSIUM mmol/L 4.3 4.0   CHLORIDE mmol/L 104 106   CO2 mmol/L 31 25   BUN mg/dL 14 15   CREATININE mg/dL 0.70 0.83   CALCIUM mg/dL 9.2 9.0                   Imaging  No orders to display       Review of Scheduled Meds:  Current Facility-Administered Medications   Medication Dose Route Frequency Provider Last Rate    acetaminophen  650 mg Oral Q6H PRN Tomasa Whitlock PA-C      albuterol  2 puff Inhalation Q4H PRN Tomasa Whiltock PA-C      bisacodyl  10 mg Rectal Daily PRN Ashley Depadua, MD      enoxaparin  40 mg Subcutaneous Q24H YEHUDA Tomasa Whitlock PA-C      fluticasone  1 spray Each Nare Daily Ashley Depadua, MD      guaiFENesin  600 mg Oral Q12H [facility] Tomasa Whitlock PA-C      loratadine  5 mg Oral Daily Tomasa Whitlock PA-C      LORazepam  0.5 mg Oral Q8H PRN Tomasa Whitlock PA-C      magnesium Oxide  400 mg Oral Daily Tomasa Whitlock PA-C      melatonin  3 mg Oral HS PRN Tomasa Whitlock PA-C      metoprolol succinate  25 mg Oral Daily Tomasa Whitlock PA-C      metoprolol succinate  50 mg Oral HS Tomasa Whitlock PA-C      oxyCODONE  5 mg Oral Q6H PRN Tomasa Whitlock PA-C      PARoxetine  10 mg Oral Daily Tomasa Whitlock PA-C      polyethylene glycol  17 g Oral BID Tomasa Whitlock PA-C      sodium chloride  1 spray Each Nare Q1H PRN Tomasa Whitlock PA-C      tamsulosin  0.4 mg Oral Daily With Dinner Tomasa Whitlock PA-C         Subjective/ HPI: Patient seen and examined. Patients overnight issues or events were reviewed with nursing staff. New or overnight issues include the following:     Patient reports that fatigue after completing OT exercise.  Also reports  palpitation with mild SOB on exertion without any chest pain, orthopnea, leg swelling, cough, fever, or belly pain.  Reports poor sleep  last night has been the case while he has remained in the hospital    ROS:   A 10 point ROS was performed; negative except as noted above.        *Labs /Radiology studies Reviewed, no new imaging  *Medications  reviewed and reconciled as needed  *Please refer to order section for additional ordered labs studies      Physical Examination:  Vitals:   Vitals:    03/17/24 1339 03/17/24 2018 03/18/24 0524 03/18/24 0838   BP: 122/78 118/62 115/81 108/67   BP Location: Right arm Right arm Right arm Right arm   Pulse: 96 96 100 104   Resp: 17 18 18    Temp: 98 °F (36.7 °C) 98.3 °F (36.8 °C) 97.8 °F (36.6 °C)    TempSrc: Oral Oral Oral    SpO2: 94% 94% 93% 96%   Weight:       Height:           General Appearance: NAD; pleasant  HEENT: PERRLA, conjuctiva normal; mucous membranes moist; face symmetrical  Neck:  Supple, no JVD  Lungs: clear bilaterally, normal respiratory effort, no retractions, expiratory effort normal, on 2 L of oxygen via nasal cannula  CV: Tachycardic, regular, no murmurs rubs or gallops noted   ABD: soft non tender, +BS x4  EXT: DP pulses intact, no lymphadenopathy, no edema  Skin: normal turgor, normal texture, no rash  Psych: affect normal, mood normal  Neuro: AAOx3       The above physical exam was reviewed and updated as determined by my evaluation of the patient on 3/18/2024.    Invasive Devices       None                      VTE Pharmacologic Prophylaxis: Enoxaparin  Code Status: Level 1 - Full Code  Current Length of Stay: 4 day(s)    Total floor / unit time spent today 30 minutes  Coordination of patient's care was performed in conjunction with primary service. Time invested included review of patient's labs, vitals, and management of their comorbidities with continued monitoring, examination of patient as well as answering patient questions, documenting her findings and creating progress note in electronic medical record,  ordering appropriate diagnostic testing.       ** Please Note:  voice to  text software may have been used in the creation of this document. Although proof errors in transcription or interpretation are a potential of such software**

## 2024-03-18 NOTE — PROGRESS NOTES
Physical Medicine and Rehabilitation Progress Note  Hussein Edward III 58 y.o. male MRN: [id number]  Unit/Bed#: [facility] 974-01 Encounter: [id number]    To Review: Hussein Edward III is a 58 y.o. male with medical history of meningioma on acetazolamide, WALE who presented to the [facility] on 2/16 after presenting to urgent care for SOB (he had been having UR symptoms a week prior and was diagnosed with influenza A and treated with tamiflu).Ultimately found to have developed likely superimposed bacterial pneumonia and progressed to ARDS. Did not require ECMo. Was able to be extubated on 2/29 after progressing to needing intubation on 2/19. Treated with steroids, airway protocol, proning, antibiotics. Course c/b provoked afib (after CVC placement, septic, and on pressors), not on anticoagulation at this time. He also had ileus which has resolved, dysphagia, which has improved, and tachycardia which is due to deconditioning, atelectasis, hypovolemia, and anxiety. He was admitted to the [facility] on 3/14.       Chief Complaint: Palpitation this AM.    Interval History/Subjective:  No major events overnight. Last BM was 3/15. Has been declining miralax. No new CP, SOB, fevers, chills, N/V, abdominal pain. He would like to keep being offered the miralax if he needs its. Generally requiring 2L NC at rest. With therapies, sometimes needs to be bumped up to 4-6L, but does improve with rest. Continues with cough, and dryness in his nose. Feels congested today.     ROS:  A 10 point review of systems was negative except for what is noted in the HPI.    Today's Changes:  Has PRN nasal spray and added daily fluticasone for congested feeling.  Continue to monitor O2 use in therapy. Reviewed with patient and CM, he may need to go home with O2 with activity at least.  Checked EKG for palpitations. Sinus tach unchanged, Felt by IM to be physiological/due to anxiety. Trop WNL. Soft BP limiting adjustment of metoprolol.  Monitoring for now.     Total visit time: 35 minutes, with more than 50% spent counseling/coordinating care. Counseling includes discussion with patient re: progress in therapies, functional issues observed by therapy staff, and discussion with patient regarding their current medical state and wellbeing. Coordination of patient's care was performed in conjunction with Internal Medicine service to monitor patient's labs, vitals, and management of their comorbidities.    Assessment/Plan:    * ARDS survivor  Assessment & Plan  Admitted with respiratory failure 2/2 influenza with possible superimposed bacterial PNA  S/p 10 days antibotics, tamiflu course complicated by ARDS with extended vent time.  He is now on RA at rest, but will desaturate to 70s when ambulating  GUERRA with speech  Primary limitation to his function right now is his respiratory endurance   - I suspect he still needs O2. He desats to the 80s at times at rest, has shortness of breath with speech, and he desats with activity (although rebounds with rest).   - Will continue NC O2 humidified for now, and will wean as his respiratory status improves.   IS/Flutter valve  PRN Nebs  PT/OT 3-5 hours/day, 5-7 days/week  Outpatient f/u with Pulmonology with repeat CT Chest in about 4 weeks (beginning of April) to ensure resolution.   - At this point most recent imaging with continued bilateral patchy airspace opacities as would be expected at this time.     PAF (paroxysmal atrial fibrillation) ([facility])  Assessment & Plan  One episode provoked right after CVC placed, patient was septic and on pressors.  Resolved with lopressor, amio gtt, followed by one dose of digoxin.  On BB for tachycardia (but has been sinus tachycardia)  At this time no need for anticoagulation  Follow-up with cardiology for further evaluation    Tachycardia  Assessment & Plan  BL HR typically in   Monitor volume status, encourage adequate hydration  Cards started on Toprol 25mg in the AM,  50mg in the PM  Monitor respiratory status on BB  Adjust as per IM  Recommend outpatient Cards evaluation.    Anxiety  Assessment & Plan  Started on Paxil and Lorazepam in the hospital  Wean Lorazepam as tolerated    Migraine  Assessment & Plan  Follows with Dr. Camargo  On Acetalozamide at home for this and magnesium for this  Held in the hospital for metabolic acidosis  Monitor off at this time  Outpatient f/u with Neurology      Acute respiratory failure with hypoxia (HCC)  Assessment & Plan  He is now on RA at rest, but will desaturate to 70s when ambulating  Can get dyspneic even with speech  Shallow breaths  Primary limitation functionally at this point  O2 at night and with therapies  Wean as tolerated     WALE (obstructive sleep apnea)  Assessment & Plan  Uses home CPAP, but may need to replace tubing/filter with [employer]  Hard time tolerating hospital CPAP  Continue O2 via nasal cannula  We ordered new tubing and mask from [employer].   Outpatient f/u with his Pulmonologist        Health Maintenance  #Delirium/Sleep: At risk, optimize sleep/wake, bowel, bladder, and pain.  #Pain: Tylenol PRN   #Bowel: Last BM 3/15. Miralax daily. PRN Suppository and bisacodyl tab  #Bladder: Voiding and continent   #Skin/Pressure Injury Prevention: Turn Q2hr in bed, with weight shifts [id number] in wheelchair. Float heels in bed.  #DVT Prophylaxis: Lovenox, SCDs  #GI Prophylaxis: Not indicated  #Code Status: Full Code  #FEN: Regular/Thins, [employer] two packets with lunch/dinner   #Dispo: ELOS 2 weeks with goals to discharge home at modified Ind level of function and family support.       Objective:    Functional Update:   PT: CGA-Jose G with ambulation required O2.   OT: Sup-CGA with ADLs      Allergies per EMR    Physical Exam:  Temp:  [97.8 °F (36.6 °C)-98.3 °F (36.8 °C)] 97.8 °F (36.6 °C)  HR:  [] 104  Resp:  [17-18] 18  BP: (108-122)/(62-81) 108/67  Oxygen Therapy  SpO2: 96 %  O2 Flow Rate (L/min): 2  "L/min    Gen: No acute distress, Well-nourished, well-appearing.  HEENT: Moist mucus membranes, Normocephalic/Atraumatic  Cardiovascular: Sinus tachycardia  Heme/Extr: No edema  Pulmonary: Non-labored breathing. Lungs CTAB. Short shallow breaths.   : No raines  GI: Soft, non-tender, non-distended.   MSK: ROM is WFL in all extremities. No effusions or deformities. Bulk is symmetric. See below for MMT scores.   Integumentary: Skin is warm, dry  Neuro: AAOx3, . Speech is intact. Appropriate to questioning  Psych: Normal mood and affect.     Diagnostic Studies: Reviewed, no new imaging    Laboratory:  Reviewed         Invalid input(s): \"PLTCT\"    Results from last 7 days   Lab Units 03/12/24  1400   BUN mg/dL 15   POTASSIUM mmol/L 4.0   CHLORIDE mmol/L 106   CREATININE mg/dL 0.83            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)    Primary insomnia    Non-traumatic rhabdomyolysis    Chronic kidney disease    Metabolic acidosis    Acute respiratory failure with hypoxia ([facility])    ARDS survivor    Insomnia    Migraine    Anxiety    Persistent fever    ARDS (adult respiratory distress syndrome) ([facility])    Ambulatory dysfunction    Tachycardia    PAF (paroxysmal atrial fibrillation) ([facility])         Medications  Current Facility-Administered Medications   Medication Dose Route Frequency Provider Last Rate    acetaminophen  650 mg Oral Q6H PRN Tomasa Whitlock PA-C      albuterol  2 puff Inhalation Q4H PRN Tomasa Whitlock PA-C      bisacodyl  10 mg Rectal Daily PRN Ashley Depadua, MD      enoxaparin  40 mg Subcutaneous Q24H [facility] Tomasa " MAYKEL Whitlock      guaiFENesin  600 mg Oral Q12H YEHUDA Tomasa Whitlock PA-C      loratadine  5 mg Oral Daily Tomasa Whitlock PA-C      LORazepam  0.5 mg Oral Q8H PRN Tomasa Whitlock PA-C      magnesium Oxide  400 mg Oral Daily Tomasa Whitlock PA-C      melatonin  3 mg Oral HS PRN Tomasa Whitlock PA-C      metoprolol succinate  25 mg Oral Daily Tomasa Whitlock PA-C      metoprolol succinate  50 mg Oral HS Tomasasidney Whitlock PA-C      oxyCODONE  5 mg Oral Q6H PRN Tomasa Whitlock PA-C      PARoxetine  10 mg Oral Daily Tomasa Whitlock PA-C      polyethylene glycol  17 g Oral BID Tomasa Whitlock PA-C      sodium chloride  1 spray Each Nare Q1H PRN Tomasa Whitlock PA-C      tamsulosin  0.4 mg Oral Daily With Dinner Tomasa Whitlock PA-C            ** Please Note: Flu[employer] Direct voice to text software may have been used in the creation of this document. **

## 2024-03-19 PROCEDURE — 97530 THERAPEUTIC ACTIVITIES: CPT

## 2024-03-19 PROCEDURE — 97110 THERAPEUTIC EXERCISES: CPT

## 2024-03-19 PROCEDURE — 99232 SBSQ HOSP IP/OBS MODERATE 35: CPT | Performed by: PHYSICAL MEDICINE & REHABILITATION

## 2024-03-19 PROCEDURE — 99232 SBSQ HOSP IP/OBS MODERATE 35: CPT | Performed by: INTERNAL MEDICINE

## 2024-03-19 PROCEDURE — 97116 GAIT TRAINING THERAPY: CPT

## 2024-03-19 RX ORDER — ECHINACEA PURPUREA EXTRACT 125 MG
2 TABLET ORAL EVERY 6 HOURS
Status: DISCONTINUED | OUTPATIENT
Start: 2024-03-19 | End: 2024-03-27 | Stop reason: HOSPADM

## 2024-03-19 RX ORDER — METOPROLOL SUCCINATE 50 MG/1
50 TABLET, EXTENDED RELEASE ORAL EVERY 12 HOURS
Status: DISCONTINUED | OUTPATIENT
Start: 2024-03-19 | End: 2024-03-27 | Stop reason: HOSPADM

## 2024-03-19 RX ORDER — LIDOCAINE HYDROCHLORIDE 20 MG/ML
JELLY TOPICAL EVERY 6 HOURS PRN
Status: DISCONTINUED | OUTPATIENT
Start: 2024-03-19 | End: 2024-03-19

## 2024-03-19 RX ADMIN — PAROXETINE HYDROCHLORIDE 10 MG: 20 TABLET, FILM COATED ORAL at 08:30

## 2024-03-19 RX ADMIN — METOPROLOL SUCCINATE 50 MG: 50 TABLET, EXTENDED RELEASE ORAL at 21:48

## 2024-03-19 RX ADMIN — GUAIFENESIN 600 MG: 600 TABLET, EXTENDED RELEASE ORAL at 21:48

## 2024-03-19 RX ADMIN — TAMSULOSIN HYDROCHLORIDE 0.4 MG: 0.4 CAPSULE ORAL at 17:21

## 2024-03-19 RX ADMIN — ENOXAPARIN SODIUM 40 MG: 40 INJECTION SUBCUTANEOUS at 17:21

## 2024-03-19 RX ADMIN — GUAIFENESIN 600 MG: 600 TABLET, EXTENDED RELEASE ORAL at 08:30

## 2024-03-19 RX ADMIN — Medication 2 SPRAY: at 21:49

## 2024-03-19 RX ADMIN — LORATADINE 5 MG: 10 TABLET ORAL at 08:31

## 2024-03-19 RX ADMIN — Medication 2 SPRAY: at 17:20

## 2024-03-19 RX ADMIN — FLUTICASONE PROPIONATE 1 SPRAY: 50 SPRAY, METERED NASAL at 08:31

## 2024-03-19 RX ADMIN — METOPROLOL SUCCINATE 50 MG: 50 TABLET, EXTENDED RELEASE ORAL at 10:38

## 2024-03-19 NOTE — PCC OCCUPATIONAL THERAPY
3/19/24  Pt is demonstrating fair progress with occupational therapy and is progressing toward long term goals for ADL, IADL, and functional transfers/mobility. Pts long term goals for ADLs are supervision- Independent with Rolling Walker. Pt continues to present with impairments in activity tolerance, endurance, standing balance/tolerance, and UE strength. Occupational performance remains limited by fatigue, SOB, GUERRA, and risk for falls. Family training/education will be required prior to D/C. Pt will continue to benefit from skilled acute rehab OT services to address above mentioned barriers and maximize functional independence in baseline areas of occupation to meet established treatment goals with overall decreased burden of care. Plan of care to continue to focus on ADL Retraining , Functional Transfers, Standing tolerance, Standing balance , Gross motor strengthening , DME training/education, Family training/education, Energy conservation training/education, healthy coping education, and Leisure and social pursuits. Goals for the upcoming week are: increase activity tolerance and endurance in order to complete full shower routine and dressing tasks in timely manner, safely tolerate longer distance transfers vs SPT, wean down/off O2 as able if safe     D/C date TBD    3/26/24  Pt has made good progress towards goals since admission. Pt continues to demo impaired activity tolerance, balance, and functional strength with SOB and GUERRA. Despite this pt has reached goals and completes shower stall transfers and bathing with supervision, UB/LBD with mod(I) using RW for item retrieval, and functional transfers and toileting with mod(I) using RW. O2 being ordered for home use as O2 still desats with functional mobility and ADLs, and typically completes all functional tasks with 3L O2 and requires 4L O2 during showers, and 2L when at rest. Other necessary DME in place.  Anticipate d/c home tomorrow 3/27/24 with family  support (wife, son) and cardiac rehab.

## 2024-03-19 NOTE — ASSESSMENT & PLAN NOTE
Change toprol to 50mg bid and lower hold parameters to 100  Echo = EF normal/grade 1 diastolic dysfunction  Likely physiological/due to anxiety and chronic at baseline in 90's  F/U cardiology on discharge  3/18-patient with palpitations/fatigue, no chest pain/leg swelling  EKG-sinus tachycardia unchanged compared to previous EKG 3/10  Troponin/CBC/CMP-within normal limits  Continue to monitor symptoms

## 2024-03-19 NOTE — PROGRESS NOTES
Physical Medicine and Rehabilitation Progress Note  Hussein Edward III 58 y.o. male MRN: [id number]  Unit/Bed#: [facility] 974-01 Encounter: [id number]    To Review: Hussein Edward III is a 58 y.o. male with medical history of meningioma on acetazolamide, WALE who presented to the [facility] on 2/16 after presenting to urgent care for SOB (he had been having UR symptoms a week prior and was diagnosed with influenza A and treated with tamiflu).Ultimately found to have developed likely superimposed bacterial pneumonia and progressed to ARDS. Did not require ECMo. Was able to be extubated on 2/29 after progressing to needing intubation on 2/19. Treated with steroids, airway protocol, proning, antibiotics. Course c/b provoked afib (after CVC placement, septic, and on pressors), not on anticoagulation at this time. He also had ileus which has resolved, dysphagia, which has improved, and tachycardia which is due to deconditioning, atelectasis, hypovolemia, and anxiety. He was admitted to the [facility] on 3/14.       Chief Complaint: Feeling much better today.     Interval History/Subjective:  No acute overnight events. Last BM 3/18. No new CP, SOB, fevers, chills, N/V, abdominal pain. No palpitations today. Walked 100' x2 with therapy maintaing sats > 90% on 2L NC. HR in the 120s. Using a rollator today for seated rest breaks. Dryness in nose.     ROS:  A 10 point review of systems was negative except for what is noted in the HPI.    Today's Changes:  Discussed with IM - they are scheduling ocean nasal spray at night to help with dryness due to some epistaxis.  Continue to monitor O2 sats and wean oxygen as tolerated  IM is increasing metoprolol and monitoring his HR/BP/tiredness on this medication. They lowered hold parameters     Total visit time: 35 minutes, with more than 50% spent counseling/coordinating care. Counseling includes discussion with patient re: progress in therapies, functional issues  observed by therapy staff, and discussion with patient regarding their current medical state and wellbeing. Coordination of patient's care was performed in conjunction with Internal Medicine service to monitor patient's labs, vitals, and management of their comorbidities.    Assessment/Plan:    * ARDS survivor  Assessment & Plan  Admitted with respiratory failure 2/2 influenza with possible superimposed bacterial PNA  S/p 10 days antibotics, tamiflu course complicated by ARDS with extended vent time.  He is now on RA at rest, but will desaturate to 70s when ambulating  GUERRA with speech  Primary limitation to his function right now is his respiratory endurance   - I suspect he still needs O2. He desats to the 80s at times at rest, has shortness of breath with speech, and he desats with activity (although rebounds with rest).   - Will continue NC O2 humidified for now, and will wean as his respiratory status improves.   IS/Flutter valve  PRN Nebs  PT/OT 3-5 hours/day, 5-7 days/week  Outpatient f/u with Pulmonology with repeat CT Chest in about 4 weeks (beginning of April) to ensure resolution.   - At this point most recent imaging with continued bilateral patchy airspace opacities as would be expected at this time.     PAF (paroxysmal atrial fibrillation) (HCC)  Assessment & Plan  One episode provoked right after CVC placed, patient was septic and on pressors.  Resolved with lopressor, amio gtt, followed by one dose of digoxin.  On BB for tachycardia (but has been sinus tachycardia)  At this time no need for anticoagulation  Follow-up with cardiology for further evaluation    Tachycardia  Assessment & Plan  BL HR typically in   Monitor volume status, encourage adequate hydration  Cards started on Toprol 25mg in the AM, 50mg in the PM   - Increased to 50mg BID on 3/19.  Monitor respiratory status on BB  Adjust as per IM  Recommend outpatient Cards evaluation.    Anxiety  Assessment & Plan  Started on Paxil and  Lorazepam in the hospital  Wean Lorazepam as tolerated    Migraine  Assessment & Plan  Follows with Dr. Camargo  On Acetalozamide at home for this and magnesium for this  Held in the hospital for metabolic acidosis  Monitor off at this time  Outpatient f/u with Neurology      Acute respiratory failure with hypoxia (HCC)  Assessment & Plan  He is now on RA at rest, but will desaturate to 70s when ambulating   - Improving in therapies   - Requiring at least 2L NC currently, sometimes as high as 4-6L with activity, although this is improving as well.   Can get dyspneic even with speech  Shallow breaths  Primary limitation functionally at this point  O2 at night and with therapies  Wean as tolerated     WALE (obstructive sleep apnea)  Assessment & Plan  Uses home CPAP, but may need to replace tubing/filter with [employer]  Hard time tolerating hospital CPAP  Continue O2 via nasal cannula  We ordered new tubing and mask from [employer].   Outpatient f/u with his Pulmonologist        Health Maintenance  #Delirium/Sleep: At risk, optimize sleep/wake, bowel, bladder, and pain.  #Pain: Tylenol PRN   #Bowel: Last BM 3/18. Miralax daily. PRN Suppository and bisacodyl tab  #Bladder: Voiding and continent   #Skin/Pressure Injury Prevention: Turn Q2hr in bed, with weight shifts [id number] in wheelchair. Float heels in bed.  #DVT Prophylaxis: Lovenox, SCDs  #GI Prophylaxis: Not indicated  #Code Status: Full Code  #FEN: Regular/Thins, [employer] two packets with lunch/dinner   #Dispo: ELOS 2 weeks with goals to discharge home at modified Ind level of function and family support.       Objective:    Functional Update:   PT: CGA-Jose G with ambulation required O2.   OT: Sup-CGA with ADLs      Allergies per EMR    Physical Exam:  Temp:  [97.5 °F (36.4 °C)-98.8 °F (37.1 °C)] 97.5 °F (36.4 °C)  HR:  [] 92  Resp:  [18-20] 18  BP: (108-122)/(63-81) 109/72  Oxygen Therapy  SpO2: 97 %  O2 Flow Rate (L/min): 2 L/min    Gen: No acute  distress, Well-nourished, well-appearing.  HEENT: Moist mucus membranes, Normocephalic/Atraumatic  Cardiovascular: Regular rate, rhythm, S1/S2. Distal pulses palpable  Heme/Extr: No edema  Pulmonary: Non-labored breathing. Short shallow breaths.   : No raines  GI: Soft, non-tender, non-distended. BS+  MSK: ROM is WFL in all extremities. No effusions or deformities. Bulk is symmetric. See below for MMT scores.   Integumentary: Skin is warm, dry.   Neuro: AAOx3,  Speech is intact. Appropriate to questioning.   Psych: Normal mood and affect.     Diagnostic Studies: Reviewed, no new imaging    Laboratory:  Reviewed   Results from last 7 days   Lab Units 03/18/24  1059   HEMOGLOBIN g/dL 12.1   HEMATOCRIT % 37.0   WBC Thousand/uL 9.69       Results from last 7 days   Lab Units 03/18/24  1059 03/12/24  1400   BUN mg/dL 14 15   POTASSIUM mmol/L 4.3 4.0   CHLORIDE mmol/L 104 106   CREATININE mg/dL 0.70 0.83   AST U/L 16  --    ALT U/L 31  --             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)    Primary insomnia    Non-traumatic rhabdomyolysis    Chronic kidney disease    Metabolic acidosis    Acute respiratory failure with hypoxia ([facility])    ARDS survivor    Insomnia    Migraine    Anxiety    Persistent fever    ARDS (adult respiratory distress syndrome) ([facility])    Ambulatory dysfunction    Tachycardia    PAF (paroxysmal atrial fibrillation) ([facility])         Medications  Current Facility-Administered Medications   Medication Dose Route Frequency Provider Last Rate    acetaminophen  650 mg Oral Q6H PRN Tomasa Whitlock PA-C      albuterol  2 puff  Inhalation Q4H PRN Tomasa Whitlock PA-C      enoxaparin  40 mg Subcutaneous Q24H [facility] Tomasa Whitlock PA-C      fluticasone  1 spray Each Nare Daily Ashley Depadua, MD      guaiFENesin  600 mg Oral Q12H [facility]HU[facility] Tomasa Whitlock PA-C      loratadine  5 mg Oral Daily Tomasa Whitlock PA-C      LORazepam  0.5 mg Oral Q8H PRN Tomasa Whitlock PA-C      magnesium Oxide  400 mg Oral Daily Tomasa Whitlock PA-C      melatonin  3 mg Oral HS PRN Tomasa Whitlock PA-C      metoprolol succinate  25 mg Oral Daily Tomasa Whitlock PA-C      metoprolol succinate  50 mg Oral HS Tomasa Whitlock PA-C      oxyCODONE  5 mg Oral Q6H PRN Tomasa Whitlock PA-C      PARoxetine  10 mg Oral Daily Tomasa Whitlock PA-C      sodium chloride  1 spray Each Nare Q1H PRN Tomasa Whitlock PA-C      tamsulosin  0.4 mg Oral Daily With Dinner Tomasa Whitlock PA-C            ** Please Note: [employer] voice to text software may have been used in the creation of this document. **

## 2024-03-19 NOTE — PROGRESS NOTES
[facility]  Progress Note  Name: Hussein Edward III I  MRN: [id number]  Unit/Bed#: [facility] 974-01 I Date of Admission: 3/14/2024   Date of Service: 3/19/2024 I Hospital Day: 5    Assessment/Plan   Acute respiratory failure with hypoxia (HCC)  Assessment & Plan  Secondary to influenza A/superimposed bacterial infection  S/p intubation 2/19 through 2/29  Bronchoscopy 2/19 positive for Candida glabrata, no bacteria  S/p antibiotics  Recent chest x-ray 3/12 was unchanged  Pulmonology recommends F/U CT of chest in 4 weeks (4/4/24)  Continue Mucinex.  Pt came to the [facility] without O2 but he has needed it in therapy          PAF (paroxysmal atrial fibrillation) ([facility])  Assessment & Plan  D/T critical illness  Converted with IV amiodarone  Echo as above  F/U cardiology as outpatient  Not on AC  EKG with palpitations done this admission was ST    Tachycardia  Assessment & Plan  Change toprol to 50mg bid and lower hold parameters to 100  Echo = EF normal/grade 1 diastolic dysfunction  Likely physiological/due to anxiety and chronic at baseline in 90's  F/U cardiology on discharge  3/18-patient with palpitations/fatigue, no chest pain/leg swelling  EKG-sinus tachycardia unchanged compared to previous EKG 3/10  Troponin/CBC/CMP-within normal limits  Continue to monitor symptoms    WALE (obstructive sleep apnea)  Assessment & Plan  Machine from home brought in as hospital machine was too strong.  Reportedly has intolerance to CPAP  Possible plan for a DISE as outpatient with ENT  Continue current settings.                 The above assessment and plan was reviewed and updated as determined by my evaluation of the patient on 3/19/2024.    Labs:   Results from last 7 days   Lab Units 03/18/24  1059   WBC Thousand/uL 9.69   HEMOGLOBIN g/dL 12.1   HEMATOCRIT % 37.0   PLATELETS Thousands/uL 184     Results from last 7 days   Lab Units 03/18/24  1059 03/12/24  1400   SODIUM mmol/L 141 140   POTASSIUM mmol/L  4.3 4.0   CHLORIDE mmol/L 104 106   CO2 mmol/L 31 25   BUN mg/dL 14 15   CREATININE mg/dL 0.70 0.83   CALCIUM mg/dL 9.2 9.0                   Imaging  No orders to display       Review of Scheduled Meds:  Current Facility-Administered Medications   Medication Dose Route Frequency Provider Last Rate    acetaminophen  650 mg Oral Q6H PRN Tomasa Whitlock PA-C      albuterol  2 puff Inhalation Q4H PRN Tomasa Whitlock PA-C      enoxaparin  40 mg Subcutaneous Q24H [facility] Tomasa Whitlock PA-C      fluticasone  1 spray Each Nare Daily Ashley Depadua, MD      guaiFENesin  600 mg Oral Q12H [facility] Tomasa Whitlock PA-C      loratadine  5 mg Oral Daily Tomasa Whitlock PA-C      LORazepam  0.5 mg Oral Q8H PRN Tomasa Whitlock PA-C      magnesium Oxide  400 mg Oral Daily Tomasa Whitlock PA-C      melatonin  3 mg Oral HS PRN Tomasa Whitlock PA-C      metoprolol succinate  50 mg Oral Q12H Lalita Arechiga, CRNP      oxyCODONE  5 mg Oral Q6H PRN Tomasa Whitlock PA-C      PARoxetine  10 mg Oral Daily Tomasa Whitlock PA-C      sodium chloride  2 spray Each Nare Q6H Lalita Arechiga, CRNP      tamsulosin  0.4 mg Oral Daily With Dinner Tomasa Whitlock PA-C         Subjective/ HPI: Patient seen and examined. Patients overnight issues or events were reviewed with nursing staff. New or overnight issues include the following:     Pt seen at therapy. He states that at home his HR stays in the 100-110's at baseline. At this point I will try and push the metoprolol to 50mg bid however if he feels tired or fatigued or BP drops then I will drop back to 25mg bid. The patient and I discussed this in detail and he is ok with this plan    ROS:   A 10 point ROS was performed; negative except as noted above.        *Labs /Radiology studies Reviewed  *Medications  reviewed and reconciled as needed  *Please refer to order section for additional ordered labs studies      Physical Examination:  Vitals:   Vitals:     03/18/24 1318 03/18/24 1728 03/18/24 1951 03/19/24 0539   BP: 115/63  122/81 109/72   BP Location: Right arm  Right arm Right arm   Pulse: 88 103 (!) 108 92   Resp: 20 18 18   Temp: 97.9 °F (36.6 °C)  98.8 °F (37.1 °C) 97.5 °F (36.4 °C)   TempSrc: Oral  Oral Oral   SpO2: 95% 91% 94% 97%   Weight:       Height:           General Appearance: NAD; pleasant  HEENT: PERRLA, conjuctiva normal; mucous membranes moist; face symmetrical  Neck:  Supple  Lungs: clear bilaterally but decreased thorughout, GUERRA noted, normal respiratory effort, no retractions, on 2L NC and 3L with activity  CV: regular rate and rhythm, tachycardic, no murmurs rubs or gallops noted   ABD: soft non tender, +BS x4  EXT: DP pulses intact, no lymphadenopathy, no edema  Skin: normal turgor, normal texture, no rash  Psych: affect normal, mood normal  Neuro: AAOx3       The above physical exam was reviewed and updated as determined by my evaluation of the patient on 3/19/2024.    Invasive Devices       None                      VTE Pharmacologic Prophylaxis: Enoxaparin  Code Status: Level 1 - Full Code  Current Length of Stay: 5 day(s)    Total floor / unit time spent today 30 minutes  Coordination of patient's care was performed in conjunction with primary service. Time invested included review of patient's labs, vitals, and management of their comorbidities with continued monitoring, examination of patient as well as answering patient questions, documenting her findings and creating progress note in electronic medical record,  ordering appropriate diagnostic testing.       ** Please Note:  voice to text software may have been used in the creation of this document. Although proof errors in transcription or interpretation are a potential of such software**

## 2024-03-19 NOTE — PROGRESS NOTES
03/19/24 0831   Pain Assessment   Pain Assessment Tool 0-10   Pain Score No Pain   Restrictions/Precautions   Precautions Fall Risk;Multiple lines;Supervision on toilet/commode;O2   Cognition   Overall Cognitive Status WFL   Arousal/Participation Cooperative;Alert   Attention Within functional limits   Orientation Level Oriented X4   Memory Within functional limits   Following Commands Follows one step commands without difficulty   Roll Left and Right   Type of Assistance Needed Independent   Roll Left and Right CARE Score 6   Sit to Lying   Type of Assistance Needed Independent   Sit to Lying CARE Score 6   Lying to Sitting on Side of Bed   Type of Assistance Needed Independent   Lying to Sitting on Side of Bed CARE Score 6   Sit to Stand   Type of Assistance Needed Incidental touching;Adaptive equipment   Comment CGA with RW   Sit to Stand CARE Score 4   Bed-Chair Transfer   Type of Assistance Needed Incidental touching;Adaptive equipment   Comment CGA with RW   Chair/Bed-to-Chair Transfer CARE Score 4   Car Transfer   Comment (S)  please assess tomorrow   Walk 10 Feet   Type of Assistance Needed Incidental touching;Adaptive equipment   Comment CGA with RW or rollator   Walk 10 Feet CARE Score 4   Walk 50 Feet with Two Turns   Type of Assistance Needed Incidental touching;Adaptive equipment   Comment CGA with RW or rollator   Walk 50 Feet with Two Turns CARE Score 4   Walk 150 Feet   Type of Assistance Needed Incidental touching;Adaptive equipment   Comment CGA with RW or rollator   Walk 150 Feet CARE Score 4   Walking 10 Feet on Uneven Surfaces   Comment (S)  assess tomorrow   Ambulation   Primary Mode of Locomotion Prior to Admission Walk   Distance Walked (feet) 150 ft  (x2, 195 ft, 188 ft, 58 ft)   Assist Device Roller Walker;Rollator   Limitations Noted In Endurance   Walk Assist Level Contact Guard   Findings Trialed use of rollator so patient can take seated rest breaks as needed. Rollator is a great  option for longer distance mobility for the patient Instructed patient on how to use rollator breaks and to safely sit and take a seated rest break, etc. While ambulating distances less than 150 ft on 2L of O2, oxygen saturation was above 90%. When ambulating distances longer than 150ft, required supplemental O2 to be increased to 3L of O2 to maintain oxygen saturation greater than 90%   Does the patient walk? 2. Yes   Wheel 50 Feet with Two Turns   Reason if not Attempted Activity not applicable   Wheel 50 Feet with Two Turns CARE Score 9   Wheel 150 Feet   Reason if not Attempted Activity not applicable   Wheel 150 Feet CARE Score 9   Curb or Single Stair   Style negotiated Curb   Type of Assistance Needed Physical assistance   Physical Assistance Level 25% or less   Comment Charu for management of rollator on 6 inch curb step   1 Step (Curb) CARE Score 3   12 Steps   Reason if not Attempted Activity not applicable   12 Steps CARE Score 9   Stairs   Type Stairs   # of Steps 2  (x2)   Weight Bearing Precautions Fall Risk   Assist Devices Roller Walker   Findings Pt has two steps to enter with no HR. On  steps, practiced placing RW on platform then ascending forward and descend backwards, CGA for safety. Pt reports son will be installing grab bars after her returns home. Practiced placing hnad on top of bilateral HRs and then ascending forward/descend backwards, CGA for safety   Picking Up Object   Comment (S)  reassess next session   Therapeutic Interventions   Strengthening STS 2x5 with hands on thighs   Equipment Use   NuStep Level 2 x 10 minutes with bilateral UEs and LEs on 2 L of O2. Oxygen saturation remained above 93%   Assessment   Treatment Assessment Patient participated in 90 minute skilled physical therapy focusing on gait training with rollator, stair managment, and TE/TA to improve overall stamina/endurance. With 2-3 L of O2 patient oxygen saturation remained above 90% with all functional  mobility and activites. Initiated gait training with rollator as this will be a great option for energy conservation at discharge. Physical therapy to continue to work on gait training with rollator, stair management, and TE/TA to improve overall stamina/endurance.   Problem List Decreased strength;Decreased endurance;Impaired balance;Decreased mobility;Pain   Barriers to Discharge Inaccessible home environment;Decreased caregiver support   PT Barriers   Functional Limitation Car transfers;Ramp negotiation;Stair negotiation;Standing;Walking;Transfers   Plan   Treatment/Interventions Functional transfer training;LE strengthening/ROM;Therapeutic exercise;Endurance training;Patient/family training;Equipment eval/education;Bed mobility;Gait training   Progress Progressing toward goals   Discharge Recommendation   Equipment Recommended Walker   PT Therapy Minutes   PT Time In 0830   PT Time Out 1000   PT Total Time (minutes) 90   PT Mode of treatment - Individual (minutes) 90   PT Mode of treatment - Concurrent (minutes) 0   PT Mode of treatment - Group (minutes) 0   PT Mode of treatment - Co-treat (minutes) 0   PT Mode of Treatment - Total time(minutes) 90 minutes   PT Cumulative Minutes 508   Therapy Time missed   Time missed? No     Valencia Catherine, PT, DPT

## 2024-03-19 NOTE — PCC CARE MANAGEMENT
Pt admitted s/p medical illness and is participating well with therapy. Pt currently requiring oxygen and may need on dc. Following for contd stay review due 3/25 and for recommendations of services on dc.

## 2024-03-19 NOTE — PCC PHYSICAL THERAPY
Date: 3/20/2024  Date of Evaluation: 3/15/2024  Estimated Length of Stay: 10-14 days     Barriers to Discharge Home: 2 ROSA with no handrails, poor activity tolerance/stamina, new to supplemental O2 and managing tubbing, etc   PT Interventions to address Barriers: bed mobility, transfer training, gait training, stair navigation  Equipments Needs: Will likely need RW   PT Plan of Care for the Week: focus on 2 step stair navigation with RW, closely monitor oxygen saturation and titrate supplemental O2 as able, TE/TA to improve overall activity tolerance, education on pacing, NuStep for cardiovascular endurance   Family Training Needed: TBD pending progress but goals are for Mod I  Discharge Planning: plan to discharge home with supportive wife. HH PT vs pulmonary rehab vs OP PT pending progress     03/26/2024  Pt being DC tomorrow with all needs met with RW/Rollator and 02 tank supplies. Pt progressing as indep lvl and will f/t with his MD post DC

## 2024-03-19 NOTE — PLAN OF CARE
Problem: SAFETY ADULT  Goal: Patient will remain free of falls  Description: INTERVENTIONS:  - Educate patient/family on patient safety including physical limitations  - Instruct patient to call for assistance with activity   - Consult OT/PT to assist with strengthening/mobility   - Keep Call bell within reach  - Keep bed low and locked with side rails adjusted as appropriate  - Keep care items and personal belongings within reach  - Initiate and maintain comfort rounds  - Make Fall Risk Sign visible to staff  - Offer Toileting every 2 Hours, in advance of need  - Initiate/Maintain bed/chair alarm  - Obtain necessary fall risk management equipment: non skid footwear  - Apply yellow socks and bracelet for high fall risk patients  - Consider moving patient to room near nurses station  Outcome: Progressing

## 2024-03-19 NOTE — ASSESSMENT & PLAN NOTE
D/T critical illness  Converted with IV amiodarone  Echo as above  F/U cardiology as outpatient  Not on AC  EKG with palpitations done this admission was ST

## 2024-03-19 NOTE — ASSESSMENT & PLAN NOTE
Secondary to influenza A/superimposed bacterial infection  S/p intubation 2/19 through 2/29  Bronchoscopy 2/19 positive for Candida glabrata, no bacteria  S/p antibiotics  Recent chest x-ray 3/12 was unchanged  Pulmonology recommends F/U CT of chest in 4 weeks (4/4/24)  Continue Mucinex.  Pt came to the [facility] without O2 but he has needed it in therapy

## 2024-03-20 PROCEDURE — 99233 SBSQ HOSP IP/OBS HIGH 50: CPT | Performed by: PHYSICAL MEDICINE & REHABILITATION

## 2024-03-20 PROCEDURE — 97110 THERAPEUTIC EXERCISES: CPT

## 2024-03-20 PROCEDURE — 99232 SBSQ HOSP IP/OBS MODERATE 35: CPT | Performed by: INTERNAL MEDICINE

## 2024-03-20 PROCEDURE — 97116 GAIT TRAINING THERAPY: CPT

## 2024-03-20 PROCEDURE — 97530 THERAPEUTIC ACTIVITIES: CPT

## 2024-03-20 RX ADMIN — TAMSULOSIN HYDROCHLORIDE 0.4 MG: 0.4 CAPSULE ORAL at 17:42

## 2024-03-20 RX ADMIN — METOPROLOL SUCCINATE 50 MG: 50 TABLET, EXTENDED RELEASE ORAL at 21:56

## 2024-03-20 RX ADMIN — GUAIFENESIN 600 MG: 600 TABLET, EXTENDED RELEASE ORAL at 21:56

## 2024-03-20 RX ADMIN — PAROXETINE HYDROCHLORIDE 10 MG: 20 TABLET, FILM COATED ORAL at 08:02

## 2024-03-20 RX ADMIN — Medication 2 SPRAY: at 05:43

## 2024-03-20 RX ADMIN — METOPROLOL SUCCINATE 50 MG: 50 TABLET, EXTENDED RELEASE ORAL at 10:02

## 2024-03-20 RX ADMIN — OXYCODONE HYDROCHLORIDE 5 MG: 5 TABLET ORAL at 05:43

## 2024-03-20 RX ADMIN — FLUTICASONE PROPIONATE 1 SPRAY: 50 SPRAY, METERED NASAL at 08:02

## 2024-03-20 RX ADMIN — Medication 2 SPRAY: at 12:14

## 2024-03-20 RX ADMIN — GUAIFENESIN 600 MG: 600 TABLET, EXTENDED RELEASE ORAL at 08:02

## 2024-03-20 RX ADMIN — LORATADINE 5 MG: 10 TABLET ORAL at 08:02

## 2024-03-20 RX ADMIN — ENOXAPARIN SODIUM 40 MG: 40 INJECTION SUBCUTANEOUS at 17:42

## 2024-03-20 NOTE — PROGRESS NOTES
Physical Medicine and Rehabilitation Progress Note  Hussein Edward III 58 y.o. male MRN: [id number]  Unit/Bed#: [facility] 974-01 Encounter: [id number]    To Review: Hussein Edward III is a 58 y.o. male with medical history of meningioma on acetazolamide, WALE who presented to the [facility] on 2/16 after presenting to urgent care for SOB (he had been having UR symptoms a week prior and was diagnosed with influenza A and treated with tamiflu).Ultimately found to have developed likely superimposed bacterial pneumonia and progressed to ARDS. Did not require ECM). Was able to be extubated on 2/29 after progressing to needing intubation on 2/19. Treated with steroids, airway protocol, proning, antibiotics. Course c/b provoked afib (after CVC placement, septic, and on pressors), not on anticoagulation at this time. He also had ileus which has resolved, dysphagia, which has improved, and tachycardia which is due to deconditioning, atelectasis, hypovolemia, and anxiety. He was admitted to the [facility] on 3/14.       Chief Complaint: Doing well today.    Interval History/Subjective:  No acute events overnight. Alarms stopped as he has been reliable with asking for help. Breathing does feel better. Saturating 88%-95% on 3L NC, and bounces back more quickly with rest in therapy. HE was able to stay above 90% walking 100' on 2L NC.  No chest pain or palpitations today. No lightheadedness/dizziness. No new CP, SOB, fevers, chills, N/V, abdominal pain. Last BM was 3/19 per patient report. Had an episode where he lost balance in therapy today trying to catch an object that fell. No injuries, and he was a bit sheepish after.     ROS:  A 10 point review of systems was negative except for what is noted in the HPI.    Today's Changes:  Tolerating metoprolol well.  Continue to work on weaning down oxygen requirements  Team Conference today. See dispo below and separate conference note for more details.     Total time  spent:  50 minutes, with more than 50% spent counseling/coordinating care. Counseling includes discussion with patient re: progress in therapies, functional issues observed by therapy staff, and discussion with patient his/her current medical state/wellbeing. Coordination of patient's care was performed in conjunction with Internal Medicine service to monitor patient's labs, vitals, and management of their comorbidities. In addition, this patient was discussed by the interdisciplinary team in weekly case conference today. The care of the patient was extensively discussed with all care providers and an appropriate rehabilitation plan was formulated unique for this patient. Barriers were identified preventing progression of therapy and appropriate interventions were discussed with each discipline. Please see the team note for input from all disciplines regarding barriers, intervention, and discharge planning.    Assessment/Plan:    * ARDS survivor  Assessment & Plan  Admitted with respiratory failure 2/2 influenza with possible superimposed bacterial PNA  S/p 10 days antibotics, tamiflu course complicated by ARDS with extended vent time.  He is now on RA at rest, but will desaturate to 70s when ambulating  GUERRA with speech  Primary limitation to his function right now is his respiratory endurance   - Still requiring O2 for comfort/increased WOB and desaturations in therapy, although oxygen requirements are slowly improving.   - Will continue NC O2 humidified for now, and will wean as his respiratory status improves.   IS/Flutter valve  PRN Nebs  PT/OT 3-5 hours/day, 5-7 days/week  Outpatient f/u with Pulmonology with repeat CT Chest in about 4 weeks (beginning of April) to ensure resolution.   - At this point most recent imaging with continued bilateral patchy airspace opacities as would be expected at this time.     PAF (paroxysmal atrial fibrillation) (HCC)  Assessment & Plan  One episode provoked right after CVC  placed, patient was septic and on pressors.  Resolved with lopressor, amio gtt, followed by one dose of digoxin.  On BB for tachycardia (but has been sinus tachycardia)  At this time no need for anticoagulation  Follow-up with cardiology for further evaluation    Tachycardia  Assessment & Plan  BL HR typically in   Monitor volume status, encourage adequate hydration  Cards started on Toprol 25mg in the AM, 50mg in the PM   - Increased to 50mg BID on 3/19.  Monitor respiratory status on BB  Adjust as per IM  Recommend outpatient Cards evaluation.    Anxiety  Assessment & Plan  Started on Paxil and Lorazepam in the hospital  Wean Lorazepam as tolerated    Migraine  Assessment & Plan  Follows with Dr. Camargo  On Acetalozamide at home for this and magnesium for this  Held in the hospital for metabolic acidosis  Monitor off at this time  Outpatient f/u with Neurology      Acute respiratory failure with hypoxia (HCC)  Assessment & Plan  He is now on RA at rest, but will desaturate to 70s when ambulating   - Improving in therapies   - Requiring at least 2L NC currently, sometimes as high as 3-4L with activity, although this is improving as well.   Can get dyspneic even with speech  Shallow breaths  Primary limitation functionally at this point  O2 at night and with therapies  Wean as tolerated     WALE (obstructive sleep apnea)  Assessment & Plan  Uses home CPAP, but may need to replace tubing/filter with [employer]  Hard time tolerating hospital CPAP  Continue O2 via nasal cannula  We ordered new tubing and mask from [employer].   Outpatient f/u with his Pulmonologist        Health Maintenance  #Delirium/Sleep: At risk, optimize sleep/wake, bowel, bladder, and pain.  #Pain: Tylenol PRN   #Bowel: Last BM 3/18. Miralax daily. PRN Suppository and bisacodyl tab  #Bladder: Voiding and continent   #Skin/Pressure Injury Prevention: Turn Q2hr in bed, with weight shifts [id number] in wheelchair. Float heels in bed.  #DVT  Prophylaxis: Lovenox, SCDs  #GI Prophylaxis: Not indicated  #Code Status: Full Code  #FEN: Regular/Thins, [employer] two packets with lunch/dinner   #Dispo: Team 3/20: ADD 3/27 with cardiopulmonary rehab - will have to check what is available near there. ELOS 2 weeks with goals to discharge home at modified Ind level of function and family support.       Objective:    Functional Update:   PT: Sup transfers, Ind bed mobility, CGA ambulation 198' RW, CGA stairs  OT: Ind eating, Sup grooming, Jose G bathing, Sup UB dressing, Jose G LB dressing, Jose G toileting, CGA toilet transfers       Allergies per EMR    Physical Exam:  Temp:  [97.8 °F (36.6 °C)-98.1 °F (36.7 °C)] 97.9 °F (36.6 °C)  HR:  [] 87  Resp:  [16-18] 18  BP: (100-122)/(68-77) 116/74  Oxygen Therapy  SpO2: 94 %  O2 Flow Rate (L/min): 2 L/min    Gen: No acute distress, Well-nourished, well-appearing.  HEENT: Moist mucus membranes, Normocephalic/Atraumatic  Cardiovascular: Regular rate, rhythm, S1/S2. Distal pulses palpable  Heme/Extr: No edema  Pulmonary: Non-labored breathing. Lungs CTAB, but shallow breaths.   : No raines  GI: Soft, non-tender, non-distended.   MSK: ROM is WFL in all extremities. No effusions or deformities. Bulk is symmetric. See below for MMT scores.   Integumentary: Skin is warm, dry.   Neuro: AAOx3, Speech is intact. Appropriate to questioning.   Psych: Normal mood and affect.     Diagnostic Studies: Reviewed, no new imaging    Laboratory:  Reviewed   Results from last 7 days   Lab Units 03/18/24  1059   HEMOGLOBIN g/dL 12.1   HEMATOCRIT % 37.0   WBC Thousand/uL 9.69       Results from last 7 days   Lab Units 03/18/24  1059   BUN mg/dL 14   POTASSIUM mmol/L 4.3   CHLORIDE mmol/L 104   CREATININE mg/dL 0.70   AST U/L 16   ALT U/L 31            Patient Active Problem List   Diagnosis    Low back pain    Strain of lumbar region    WALE (obstructive sleep apnea)    Mixed dyslipidemia    Nasal septal deviation    Seasonal allergic rhinitis     Nasal turbinate hypertrophy    Nasal obstruction    Deviated nasal septum    Hypertrophy of nasal turbinates    Meningioma (HCC)    Abnormal finding on MRI of brain    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)    Primary insomnia    Non-traumatic rhabdomyolysis    Chronic kidney disease    Metabolic acidosis    Acute respiratory failure with hypoxia ([facility])    ARDS survivor    Insomnia    Migraine    Anxiety    Persistent fever    ARDS (adult respiratory distress syndrome) ([facility])    Ambulatory dysfunction    Tachycardia    PAF (paroxysmal atrial fibrillation) ([facility])         Medications  Current Facility-Administered Medications   Medication Dose Route Frequency Provider Last Rate    acetaminophen  650 mg Oral Q6H PRN KAMLESH Lo-MORIS      albuterol  2 puff Inhalation Q4H PRN KAMLESH Lo-MORIS      enoxaparin  40 mg Subcutaneous Q24H [facility] Tomasa Whitlock PA-C      fluticasone  1 spray Each Nare Daily Ashley Depadua, MD      guaiFENesin  600 mg Oral Q12H [facility] Tomasa Whitlock PA-C      loratadine  5 mg Oral Daily TomasaKAMLESH Momin-MORIS      LORazepam  0.5 mg Oral Q8H PRN TomasaKAMLESH Momin-MORIS      magnesium Oxide  400 mg Oral Daily Tomasa MAYKEL Whitlock      melatonin  3 mg Oral HS PRN Tomasasidney Whitlock PA-C      metoprolol succinate  50 mg Oral Q12H Lalita JD ArechigaNP      oxyCODONE  5 mg Oral Q6H PRN TomasaKAMLESH Momin-MORIS      PARoxetine  10 mg Oral Daily Tomasasidney Whitlock PA-C      sodium chloride  2 spray Each Nare Q6H JD MooreNP      tamsulosin  0.4 mg Oral Daily With Dinner Tomasa Whitlock PA-C            ** Please Note: [employer] voice to text software may have been used in the creation of this document. **

## 2024-03-20 NOTE — ASSESSMENT & PLAN NOTE
Secondary to influenza A/superimposed bacterial infection  S/p intubation 2/19 through 2/29  Bronchoscopy 2/19 positive for Candida glabrata, no bacteria  S/p antibiotics  Recent chest x-ray 3/12 was unchanged  Pulmonology recommends F/U CT of chest in 4 weeks (4/4/24)  Continue Mucinex.  Remains on oxygen will continue to attempt to wean as tolerated

## 2024-03-20 NOTE — TEAM CONFERENCE
Acute RehabilitationTeam Conference Note  Date: 3/20/2024   Time: 11:47 AM       Patient Name:  Hussein Edward III       Medical Record Number: [id number]   YOB: 1965  Sex: Male          Room/Bed:  [facility]4/[facility] 974-01  Payor Info:  Payor: AETNA / Plan: [employer]ET[employer] PPO / Product Type: PPO /      Admitting Diagnosis: Influenza A [J10.1]  Bacterial pneumonia [J15.9]   Admit Date/Time:  3/14/2024  2:35 PM  Admission Comments: No comment available     Primary Diagnosis:  ARDS survivor  Principal Problem: ARDS survivor    Patient Active Problem List    Diagnosis Date Noted    PAF (paroxysmal atrial fibrillation) ([facility]) 03/14/2024    Tachycardia 03/10/2024    Ambulatory dysfunction 03/05/2024    Persistent fever 02/27/2024    ARDS (adult respiratory distress syndrome) ([facility]) 02/27/2024    ARDS survivor 02/19/2024    Insomnia 02/19/2024    Migraine 02/19/2024    Anxiety 02/19/2024    Acute respiratory failure with hypoxia ([facility]) 02/18/2024    Metabolic acidosis 02/17/2024    Non-traumatic rhabdomyolysis 02/16/2024    Chronic kidney disease 02/16/2024    Primary insomnia 06/20/2023    Abnormal finding on MRI of brain 10/01/2021    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[facility]) 10/01/2021    Meningioma ([facility]) 06/28/2021    Deviated nasal septum 05/30/2019    Hypertrophy of nasal turbinates 05/30/2019    Nasal obstruction 05/09/2019    Nasal septal deviation 04/13/2019    Seasonal allergic rhinitis 04/13/2019    Nasal turbinate hypertrophy 04/13/2019    Mixed dyslipidemia 12/26/2017    WALE (obstructive sleep apnea) 11/02/2016    Low back pain 05/24/2016    Strain of lumbar region 12/02/2015       Physical Therapy:    Weight Bearing Status: Weight Bearing as Tolerated  Transfers: Supervision  Bed Mobility: Independent  Amulation Distance (ft): 198 feet  Ambulation: Incidental Touching  Assistive Device for Ambulation: Rollator (and RW)  Wheelchair Mobility Distance:  (n/a)  Number of Stairs: 2  Assistive Device for  Stairs: Walker  Stair Assistance: Incidental Touching  Discharge Recommendations: Home with:  DC Home with:: Family Support    Date: 3/20/2024  Date of Evaluation: 3/15/2024  Estimated Length of Stay: 10-14 days     Barriers to Discharge Home: 2 ROSA with no handrails, poor activity tolerance/stamina, new to supplemental O2 and managing tubbing, etc   PT Interventions to address Barriers: bed mobility, transfer training, gait training, stair navigation  Equipments Needs: Will likely need RW   PT Plan of Care for the Week: focus on 2 step stair navigation with RW, closely monitor oxygen saturation and titrate supplemental O2 as able, TE/TA to improve overall activity tolerance, education on pacing, NuStep for cardiovascular endurance   Family Training Needed: TBD pending progress but goals are for Mod I  Discharge Planning: plan to discharge home with supportive wife. HH PT vs pulmonary rehab vs OP PT pending progress       Occupational Therapy:  Eating: Independent  Grooming: Supervision  Bathing: Minimal Assistance  Bathing: Minimal Assistance  Upper Body Dressing: Supervision  Lower Body Dressing: Minimal Assistance  Toileting: Minimal Assistance  Toilet Transfer: Incidental Touching  Cognition: Within Defined Limits  Orientation: Person, Place, Time, Situation  Discharge Recommendations: Home with:  DC Home with:: Family Support       Pt is demonstrating fair progress with occupational therapy and is progressing toward long term goals for ADL, IADL, and functional transfers/mobility. Pts long term goals for ADLs are supervision- Independent with Rolling Walker. Pt continues to present with impairments in activity tolerance, endurance, standing balance/tolerance, and UE strength. Occupational performance remains limited by fatigue, SOB, GUERRA, and risk for falls. Family training/education will be required prior to D/C. Pt will continue to benefit from skilled acute rehab OT services to address above mentioned  barriers and maximize functional independence in baseline areas of occupation to meet established treatment goals with overall decreased burden of care. Plan of care to continue to focus on ADL Retraining , Functional Transfers, Standing tolerance, Standing balance , Gross motor strengthening , DME training/education, Family training/education, Energy conservation training/education, healthy coping education, and Leisure and social pursuits. Goals for the upcoming week are: increase activity tolerance and endurance in order to complete full shower routine and dressing tasks in timely manner, safely tolerate longer distance transfers vs SPT, wean down/off O2 as able if safe       D/C date TBD    Speech Therapy:           No notes on file    Nursing Notes:  Appetite: Fair  Diet Type: Regular/House                      Diet Patient/Family Education Complete: Yes                            Bladder: Continent     Bladder Patient/Family Education: Yes  Bowel: Continent     Bowel Patient/Family Education: Yes  Pain Location/Orientation: Orientation: Bilateral, Location: Back  Pain Score: 7                       Hospital Pain Intervention(s): Medication (See MAR)  Pain Patient/Family Education: Yes  Medication Management/Safety  Injectable: Lovenox (will not need at d/c)  Safe Administration: Yes  Medication Patient/Family Education Complete: Yes    Pt admitted s/p Acute respiratory failure with hypoxia  Secondary to influenza A/superimposed bacterial infection S/p intubation 2/19 through 2/29 Bronchoscopy 2/19 positive for Candida glabrata, no bacteria S/p antibiotics Recent chest x-ray 3/12 was unchanged  Pulmonology recommends F/U CT of chest in 4 weeks (4/4/24) Continue Mucinex. O2 at hs and prn PAF  D/T critical illness Converted with IV amiodarone Not on AC EKG with palpitations done this admission was ST Tachycardia- Change toprol to 50mg bid and lower hold parameters to 100 Echo = EF normal/grade 1 diastolic  dysfunction Likely physiological/due to anxiety and chronic at baseline in 90's  3/18-patient with palpitations/fatigue, no chest pain/leg swelling EKG-sinus tachycardia unchanged compared to previous EKG 3/10  Troponin/CBC/CMP-within normal limits WALE - Reportedly has intolerance to CPAP Possible plan for a DISE as outpatient with ENT. Pt is continent of bowel and bladder. Requires alarms for safety.    This week we will encourage independence with ADLs.  We will monitor labs and vital signs.  We will educate pt/family about repositioning to prevent skin breakdown.  We will assist w repositioning and perform routine skin checks.  We will monitor for adequate pain control.  We will monitor for constipation and medicate pt as ordered.  We will increase safety awareness and keep pt free from falls.                              Case Management:     Discharge Planning  Living Arrangements: Lives w/ Spouse/significant other, Lives w/ Children  Support Systems: Self, Spouse/significant other, Son  Assistance Needed: TBD  Type of Current Residence: Private residence  Current Home Care Services: No  Pt admitted s/p medical illness and is participating well with therapy. Pt currently requiring oxygen and may need on dc. Following for contd stay review due 3/25 and for recommendations of services on dc.     Is the patient actively participating in therapies? yes  List any modifications to the treatment plan:     Barriers Interventions   Activity tolerance, endurance Energy conservation education   Use of oxygen Titrate vs assess for home need   2 carmela Therapy stair training             Is the patient making expected progress toward goals? yes  List any update or changes to goals:     Medical Goals: Patient will be medically stable for discharge to [facility] upon completion of rehab program and Patient will be able to manage medical conditions and comorbid conditions with medications and follow up upon  completion of rehab program    Weekly Team Goals:   Rehab Team Goals  ADL Team Goal: Patient will be independent with ADLs with least restrictive device upon completion of rehab program (10-14 days)  Transfer Team Goal: Patient will be independent with transfers with least restrictive device upon completion of rehab program  Locomotion Team Goal: Patient will be independent with locomotion with least restrictive device upon completion of rehab program    Discussion: pt is presenting with the above barriers and is functioning at contact guard for transfers and ambulation with a roller walker. Pt is min a to contact guard for adls, requiring assist with line mgmt. Oxygen sats range from 88-95% on 3 liters. Pt aware he may require on dc. Recommendations are for contd outpt cardiac pulmonary rehab.     Anticipated Discharge Date:  3/27/24  [facility] Team Members Present:The following team members are supervising care for this patient and were present during this Weekly Team Conference.    Physician: Dr. DePadua, MD  : ROSETTE Perales  Registered Nurse: Jaki Telles RN  Physical Therapist: Valencia Catherine DPT  Occupational Therapist: Kathi Stevens MS, OTR/L  Speech Therapist:

## 2024-03-20 NOTE — PCC NURSING
Pt admitted s/p Acute respiratory failure with hypoxia  Secondary to influenza A/superimposed bacterial infection S/p intubation 2/19 through 2/29 Bronchoscopy 2/19 positive for Candida glabrata, no bacteria S/p antibiotics Recent chest x-ray 3/12 was unchanged  Pulmonology recommends F/U CT of chest in 4 weeks (4/4/24) Continue Mucinex. O2 at hs and prn PAF  D/T critical illness Converted with IV amiodarone Not on AC EKG with palpitations done this admission was ST Tachycardia- Change toprol to 50mg bid and lower hold parameters to 100 Echo = EF normal/grade 1 diastolic dysfunction Likely physiological/due to anxiety and chronic at baseline in 90's  3/18-patient with palpitations/fatigue, no chest pain/leg swelling EKG-sinus tachycardia unchanged compared to previous EKG 3/10  Troponin/CBC/CMP-within normal limits WALE - Reportedly has intolerance to CPAP Possible plan for a DISE as outpatient with ENT. Pt is continent of bowel and bladder. Requires alarms for safety.    This week we will encourage independence with ADLs.  We will monitor labs and vital signs.  We will educate pt/family about repositioning to prevent skin breakdown.  We will assist w repositioning and perform routine skin checks.  We will monitor for adequate pain control.  We will monitor for constipation and medicate pt as ordered.  We will increase safety awareness and keep pt free from falls.

## 2024-03-20 NOTE — PROGRESS NOTES
[facility]  Progress Note  Name: Hussein Edward III I  MRN: [id number]  Unit/Bed#: -01 I Date of Admission: 3/14/2024   Date of Service: 3/20/2024 I Hospital Day: 6    Assessment/Plan   Acute respiratory failure with hypoxia (HCC)  Assessment & Plan  Secondary to influenza A/superimposed bacterial infection  S/p intubation 2/19 through 2/29  Bronchoscopy 2/19 positive for Candida glabrata, no bacteria  S/p antibiotics  Recent chest x-ray 3/12 was unchanged  Pulmonology recommends F/U CT of chest in 4 weeks (4/4/24)  Continue Mucinex.  Remains on oxygen will continue to attempt to wean as tolerated          PAF (paroxysmal atrial fibrillation) (HCC)  Assessment & Plan  D/T critical illness  Converted with IV amiodarone  Echo as above  F/U cardiology as outpatient  Not on AC  EKG with palpitations done this admission was ST    Tachycardia  Assessment & Plan  Change toprol to 50mg bid and lower hold parameters to 100  Echo = EF normal/grade 1 diastolic dysfunction  Likely physiological/due to anxiety and chronic at baseline in 's  F/U cardiology on discharge  3/18-patient with palpitations/fatigue, no chest pain/leg swelling  EKG-sinus tachycardia unchanged compared to previous EKG 3/10  Troponin/CBC/CMP-within normal limits  Continue to monitor symptoms  Mildly improved    WALE (obstructive sleep apnea)  Assessment & Plan  Machine from home brought in as hospital machine was too strong.  Reportedly has intolerance to CPAP  Possible plan for a DISE as outpatient with ENT  Continue current settings.                 The above assessment and plan was reviewed and updated as determined by my evaluation of the patient on 3/20/2024.    Labs:   Results from last 7 days   Lab Units 03/18/24  1059   WBC Thousand/uL 9.69   HEMOGLOBIN g/dL 12.1   HEMATOCRIT % 37.0   PLATELETS Thousands/uL 184     Results from last 7 days   Lab Units 03/18/24  1059   SODIUM mmol/L 141   POTASSIUM  mmol/L 4.3   CHLORIDE mmol/L 104   CO2 mmol/L 31   BUN mg/dL 14   CREATININE mg/dL 0.70   CALCIUM mg/dL 9.2                   Imaging  No orders to display       Review of Scheduled Meds:  Current Facility-Administered Medications   Medication Dose Route Frequency Provider Last Rate    acetaminophen  650 mg Oral Q6H PRN Tomasa Whitlock PA-C      albuterol  2 puff Inhalation Q4H PRN Tomasa Whitlock PA-C      enoxaparin  40 mg Subcutaneous Q24H Replaced by [facility] Tomasa Whitlock PA-C      fluticasone  1 spray Each Nare Daily Ashley Depadua, MD      guaiFENesin  600 mg Oral Q12H Replaced by [facility] Tomasa Whitlock PA-C      loratadine  5 mg Oral Daily Tomasa Whitlock PA-C      LORazepam  0.5 mg Oral Q8H PRN Tomasa Whitlock PA-C      melatonin  3 mg Oral HS PRN Tomasa Whitlock PA-C      metoprolol succinate  50 mg Oral Q12H Lalita Arechiga CRNP      oxyCODONE  5 mg Oral Q6H PRN Tomasa Whitlock PA-C      PARoxetine  10 mg Oral Daily Tomasa Whitlock PA-C      sodium chloride  2 spray Each Nare Q6H Lalita Arechiga, CRNP      tamsulosin  0.4 mg Oral Daily With Dinner Tomasa Whitlock PA-C         Subjective/ HPI: Patient seen and examined. Patients overnight issues or events were reviewed with nursing staff. New or overnight issues include the following:     Pt seen at bedside. Slept better overnight. Participated in therapy this am and felt he had more energy. Quite tearful this am regarding his experience of his medically induced coma. He is having flashbacks of same and would like to speak to neuropsychology.     ROS:   A 10 point ROS was performed; negative except as noted above.        *Labs /Radiology studies Reviewed  *Medications  reviewed and reconciled as needed  *Please refer to order section for additional ordered labs studies      Physical Examination:  Vitals:   Vitals:    03/19/24 1753 03/19/24 2037 03/20/24 0541 03/20/24 1002   BP: 116/77 122/74 116/74 113/86   BP Location: Right arm Right arm  Right arm    Pulse: 101 99 87 (!) 107   Resp: 16 16 18    Temp: 97.8 °F (36.6 °C) 98.1 °F (36.7 °C) 97.9 °F (36.6 °C)    TempSrc: Oral Oral Oral    SpO2: 95% 95% 94%    Weight:   80 kg (176 lb 5.9 oz)    Height:           General Appearance: NAD; pleasant  HEENT: PERRLA, conjuctiva normal; mucous membranes moist; face symmetrical  Neck:  Supple  Lungs: clear bilaterally, normal respiratory effort, no retractions, expiratory effort normal, on 2L NC  CV: regular rate and rhythm, no murmurs rubs or gallops noted   ABD: soft non tender, +BS x4  EXT: DP pulses intact, no lymphadenopathy, no edema; generalized deconditioning  Skin: normal turgor, normal texture, no rash  Psych: affect normal, mood depressed, tearful  Neuro: AAOx3       The above physical exam was reviewed and updated as determined by my evaluation of the patient on 3/20/2024.    Invasive Devices       None                      VTE Pharmacologic Prophylaxis: Enoxaparin  Code Status: Level 1 - Full Code  Current Length of Stay: 6 day(s)    Total floor / unit time spent today 30 minutes  Coordination of patient's care was performed in conjunction with primary service. Time invested included review of patient's labs, vitals, and management of their comorbidities with continued monitoring, examination of patient as well as answering patient questions, documenting her findings and creating progress note in electronic medical record,  ordering appropriate diagnostic testing.       ** Please Note:  voice to text software may have been used in the creation of this document. Although proof errors in transcription or interpretation are a potential of such software**

## 2024-03-20 NOTE — ASSESSMENT & PLAN NOTE
Change toprol to 50mg bid and lower hold parameters to 100  Echo = EF normal/grade 1 diastolic dysfunction  Likely physiological/due to anxiety and chronic at baseline in 's  F/U cardiology on discharge  3/18-patient with palpitations/fatigue, no chest pain/leg swelling  EKG-sinus tachycardia unchanged compared to previous EKG 3/10  Troponin/CBC/CMP-within normal limits  Continue to monitor symptoms  Mildly improved

## 2024-03-20 NOTE — PROGRESS NOTES
"OT Daily Treatment Note      Occupational Therapy LTG's  Eating Oral care Bathing LB dress UB dress   Eating Goal: 06. Independent - Patient completes the activity by him/herself with no assistance from a helper. Oral Hygiene Goal: 06. Independent - Patient completes the activity by him/herself with no assistance from a helper.  Shower/bathe self Goal: 04. Supervision or touching assistance- Stone provides VERBAL CUES or supervision throughout activity. Lower body dressing Goal: 06. Independent - Patient completes the activity by him/herself with no assistance from a helper. Upper body dressing Goal: 06. Independent - Patient completes the activity by him/herself with no assistance from a helper.   Toileting Toilet txf Func txf IADL Med    Toileting hygiene Goal: 06. Independent - Patient completes the activity by him/herself with no assistance from a helper. Toilet transfer Goal: 06. Independent - Patient completes the activity by him/herself with no assistance from a helper.     Assist Level: Independent     Discharge Recommendation: home mod I with family support   DME needs: TBD       03/20/24 0830   Pain Assessment   Pain Assessment Tool 0-10   Pain Score No Pain   Restrictions/Precautions   Precautions Fall Risk;O2;Multiple lines   Lifestyle   Autonomy \"I know I really can't be doing that stuff\"   Sit to Lying   Type of Assistance Needed Independent   Physical Assistance Level No physical assistance   Sit to Lying CARE Score 6   Lying to Sitting on Side of Bed   Type of Assistance Needed Independent   Physical Assistance Level No physical assistance   Lying to Sitting on Side of Bed CARE Score 6   Sit to Stand   Type of Assistance Needed Incidental touching   Physical Assistance Level No physical assistance   Comment CG/CS with and without RW   Sit to Stand CARE Score 4   Bed-Chair Transfer   Type of Assistance Needed Incidental touching   Physical Assistance Level No physical assistance   Comment mostly CS " for SPT with and without RW but did intermittently require CG 2/2 quick movements requiring requires cues to slow down for safety   Chair/Bed-to-Chair Transfer CARE Score 4   Commmunity Re-entry   Community Re-entry Level Walker   Community Re-entry Level of Assistance Contact guard   Community Re-entry Pt participated in functional mobility task with use of RW to simulate home and community distances required for ADL/IADL completion. Task focused on increasing functional activity tolerance, endurance, strength and dynamic standing balance to improve independence with ADL completion.   Functional Standing Tolerance   Activity session grossly focused on endurance, fxl standing tolerance, dynamic standing balance in unsupported stance, O2 line mgmt and leisure pursuit. Pt instructed to ambulate around [employer] gym, watering, cutting and propogating plants as pt reports really enjoying gardening and horticulture. pt ambulating with RW and was able to maintain stance for ~2-3 minutes at a time. Pt able to initiate rest breaks. O2 checked frequently and pt able to maintain between 88-95% with 3LO2. At one point during the activity, the pt was standing unsupported at the counter, turned to the R, accidentally knocked over a figurine and his reflex was to quickly turn to the far right in attempt to catch it. at this time, with the pt moving so quickly, pt had LOB and lowered down to his knee. pt was able to stand himself back up with use of his RW and immediately took a break. Pt was SOB and was immediately regretful in his decision to try and catch the figurine, reporting its instinctual and normally at home, he kicks his foot out to catch items 2/2 his history of playing soccer but today he knew he couldnt kick out and attempted to catch it with his hands. Pt was remorseful and insightful to the dangers of his actions. Used this moment as a educational platform for fall risk prevention in the future which pt also initiated  "stating not wanting to ambulate alone at home. Pt had no injuries but SAEED Ayala and Dr Depadua was notified.   Exercise Tools   UE Ergometer Pt engaged in UE SciFit for 7 minutes (in 2-3 minute increments) in prograde and 7 minutes in retrograde with Minimal resistance (1.0 level) focusing on increasing pts UB strength, endurance and functional activity tolerance to increase independence with ADL tasks. rest break required in between sets for fatigue mgmt. Education provided on energy conservation and breathing techniques with Positive carryover of provided education. Pt reporting this activity \"feels great for his chest\"   Cognition   Overall Cognitive Status WFL   Arousal/Participation Cooperative;Alert   Attention Within functional limits   Orientation Level Oriented X4   Memory Within functional limits   Following Commands Follows one step commands without difficulty   Assessment   Treatment Assessment Pt participated in skilled OT session with focus on functional transfer training, UE strengthening/ROM, endurance training, compensatory technique education, continued education, energy conservation, and activity engagement . See flowsheet for details of session and current functional status. Pt is limited by decreased endurance, increased fall risk, decreased ADLS, decreased activity tolerance, impaired judgement, SOB upon exertion, and decreased strength and requires skilled OT services to increase independence and safety with ADL completion in prep for DC home. Plan to continue ADL retraining, functional transfer training, UE strengthening/ROM, endurance training, Pt/caregiver education, equipment evaluation/education, compensatory technique education, continued education, energy conservation, and activity engagement  to address barriers mentioned above.   Prognosis Good   Problem List Decreased strength;Decreased range of motion;Decreased endurance;Impaired balance   Barriers to Discharge Inaccessible home " environment;Decreased caregiver support   Plan   Treatment/Interventions ADL retraining;Functional transfer training;Therapeutic exercise;Endurance training;Patient/family training;Compensatory technique education   Progress Progressing toward goals   OT Therapy Minutes   OT Time In 0830   OT Time Out 1000   OT Total Time (minutes) 90   OT Mode of treatment - Individual (minutes) 90   OT Mode of treatment - Concurrent (minutes) 0   OT Mode of treatment - Group (minutes) 0   OT Mode of treatment - Co-treat (minutes) 0   OT Mode of Treatment - Total time(minutes) 90 minutes   OT Cumulative Minutes 420   Therapy Time missed   Time missed? No

## 2024-03-20 NOTE — PROGRESS NOTES
OT Daily Treatment Note    *this OT is blowing this note over as treating COLIN forgot to do so*       03/19/24 1230   Pain Assessment   Pain Assessment Tool 0-10   Pain Score No Pain   Restrictions/Precautions   Precautions O2;Multiple lines;Fall Risk;Supervision on toilet/commode   Weight Bearing Restrictions No   ROM Restrictions No   Sit to Stand   Type of Assistance Needed Incidental touching   Physical Assistance Level No physical assistance   Comment CGA with and w/o RW   Sit to Stand CARE Score 4   Bed-Chair Transfer   Type of Assistance Needed Incidental touching   Physical Assistance Level No physical assistance   Comment CGA with and w/o RW   Chair/Bed-to-Chair Transfer CARE Score 4   Functional Standing Tolerance   Activity bean bag toss and catch   Comments pt engaged in 2 rounds of  bean bag toss and catch in unsupported stance for increased endurance, strength and stamina. CG provided for safety during standing. pt provided with seated rest breaks in between rounds to manage fatigue and SOB. pt demos good insight of when to take breaks and for how long.   Exercise Tools   Exercise Tools Yes   UE Ergometer pt engaged in tabletop UBE seated in w/c focusing on increasing strength, endurance for ADLs/transfers. pt completed 4 rounds of 2 mintues on mild resistance with rest breaks in between. pt demos good insight in how long he needed to rest. O2 and HR were monitor during first 2 rounds. O2 stayed within 95-97% on 3L O2 with HR ranging from 112-117.   Cognition   Overall Cognitive Status WFL   Arousal/Participation Cooperative;Alert   Attention Within functional limits   Orientation Level Oriented X4   Memory Within functional limits   Following Commands Follows one step commands without difficulty   Additional Activities   Additional Activities Comments pt engaged in max repition of func transfers with line mgmt in OT gym with and without RW. pt transfrered from arm chair to mat using CGA for safety  within short distance to focus on increased endurance. pt was provided rest breaks inbetween transfers. pt demos G awareness of where the line should be during transfers. O2 was on 3L with O2 ranging from 91-93%, HR 120s.   Activity Tolerance   Activity Tolerance Patient tolerated treatment well   Assessment   Treatment Assessment pt engaged in a 90 mins skilled OT session focsuing on repetitive func transfers with and without RW, line mgmt, and strength/endurance. pt presented better endurance and activity tolerance wise today then yesterday with O2 level between 91-95% on 3L O2 during exercises/transfers. pt remains highly insightful to limitations and home set up, already problem solving how to perform kitchen mobility anditem transportation. completed mass repetition with and w/o RW; pt reports he is interested in rollator for community purposes and RW vs no AD for household distances. recommendation to cont OT to work on improved overall strength/endurance, func transfers with and w/o RW, standing tolerance/balance, ADL retraining, energy conservation tech, to increase IND with ADLs and IADLs and decrease burden of care at d/c.   Problem List Decreased strength;Decreased range of motion;Decreased endurance;Impaired balance   Plan   Treatment/Interventions Therapeutic exercise;Functional transfer training;Equipment eval/education;Endurance training;ADL retraining;Patient/family training;Compensatory technique education   Progress Progressing toward goals   OT Therapy Minutes   OT Time In 1230   OT Time Out 1400   OT Total Time (minutes) 90   OT Mode of treatment - Individual (minutes) 90   OT Mode of treatment - Concurrent (minutes) 0   OT Mode of treatment - Group (minutes) 0   OT Mode of treatment - Co-treat (minutes) 0   OT Mode of Treatment - Total time(minutes) 90 minutes   OT Cumulative Minutes 330   Therapy Time missed   Time missed? No

## 2024-03-20 NOTE — CASE MANAGEMENT
Met w/pt and reviewed team update and dc date of 3/27. Pt in agreement and stated his wife could be present at 12 for dc. Pt aware oxygen still may be needed on dc. Cm reviewed recommendations for contd outpt pulmonary rehab and pt stated he sees his pulmonologist on 3/28 and will discuss. Pt inquired if his wife and/or son need to come in for therapy. Cm deferred to therapists and stated they would discuss with him if needed.

## 2024-03-21 PROCEDURE — 97110 THERAPEUTIC EXERCISES: CPT

## 2024-03-21 PROCEDURE — 97535 SELF CARE MNGMENT TRAINING: CPT

## 2024-03-21 PROCEDURE — 99232 SBSQ HOSP IP/OBS MODERATE 35: CPT | Performed by: PHYSICAL MEDICINE & REHABILITATION

## 2024-03-21 PROCEDURE — 97530 THERAPEUTIC ACTIVITIES: CPT

## 2024-03-21 PROCEDURE — 99232 SBSQ HOSP IP/OBS MODERATE 35: CPT | Performed by: INTERNAL MEDICINE

## 2024-03-21 RX ADMIN — METOPROLOL SUCCINATE 50 MG: 50 TABLET, EXTENDED RELEASE ORAL at 10:05

## 2024-03-21 RX ADMIN — Medication 2 SPRAY: at 11:43

## 2024-03-21 RX ADMIN — ENOXAPARIN SODIUM 40 MG: 40 INJECTION SUBCUTANEOUS at 17:08

## 2024-03-21 RX ADMIN — LORATADINE 5 MG: 10 TABLET ORAL at 08:44

## 2024-03-21 RX ADMIN — GUAIFENESIN 600 MG: 600 TABLET, EXTENDED RELEASE ORAL at 21:08

## 2024-03-21 RX ADMIN — METOPROLOL SUCCINATE 50 MG: 50 TABLET, EXTENDED RELEASE ORAL at 21:08

## 2024-03-21 RX ADMIN — FLUTICASONE PROPIONATE 1 SPRAY: 50 SPRAY, METERED NASAL at 08:46

## 2024-03-21 RX ADMIN — GUAIFENESIN 600 MG: 600 TABLET, EXTENDED RELEASE ORAL at 08:44

## 2024-03-21 RX ADMIN — Medication 2 SPRAY: at 06:01

## 2024-03-21 RX ADMIN — PAROXETINE HYDROCHLORIDE 10 MG: 20 TABLET, FILM COATED ORAL at 08:44

## 2024-03-21 RX ADMIN — OXYCODONE HYDROCHLORIDE 5 MG: 5 TABLET ORAL at 06:04

## 2024-03-21 RX ADMIN — TAMSULOSIN HYDROCHLORIDE 0.4 MG: 0.4 CAPSULE ORAL at 17:08

## 2024-03-21 NOTE — PROGRESS NOTES
"   03/21/24 7720   Pain Assessment   Pain Assessment Tool 0-10   Pain Score No Pain   Restrictions/Precautions   Precautions Fall Risk;O2;Multiple lines   Lifestyle   Autonomy \"I hope i can go back to work part time.\"   Intrinsic Gratification pt reports enjoying woodworking, caring for exotic plants.   Bed-Chair Transfer   Type of Assistance Needed Supervision   Comment RW, assit for O2 line   Chair/Bed-to-Chair Transfer CARE Score 4   Right Upper Extremity- Strength   RUE Strength Comment Pt participates in seated UE therapeutic exercise intended to enable the patient to Maintain ROM, increase flexibility, increase strength, promote Endurance in order to increase independence and safety w/ ADL's, daily occupations along with functional transfers. Pt is able to safely complete all exercises w/ no C/O pain and self perceived exertion within personal tolerance. Completed forearm based strengthening with use of flexbar red resistance. Complete variations for sup/pronation, wrist ext/flex, elbow stabilizing. Completed cycles for 15 min per personal tolerance. In addition completing constant conversation for cont pulmonary endurance.   Assessment   Treatment Assessment Pt participated in skilled OT treatment session with treatment focus on UE strengthening and UE endurance. Pt tolerated session well, following 60 min OT session prior. Pt in good spirits and motivated to participate. Today SPO2 ranging 97-94% with light seated activity on 2L O2 NC. HR ranging 107-105 BPM. Pt is limited by decreased endurance, increased fall risk, decreased ADLS, decreased activity tolerance, impaired judgement, SOB upon exertion, and decreased strength and requires skilled OT services to increase independence and safety with ADL completion in prep for DC home. Plan to continue ADL retraining, functional transfer training, UE strengthening/ROM, endurance training, Pt/caregiver education, equipment evaluation/education, compensatory " technique education, continued education, energy conservation, and activity engagement to address barriers mentioned above.   Prognosis Good   Plan   Progress Progressing toward goals   OT Therapy Minutes   OT Time In 1400   OT Time Out 1430   OT Total Time (minutes) 30   OT Mode of treatment - Individual (minutes) 30   OT Mode of treatment - Concurrent (minutes) 0   OT Mode of treatment - Group (minutes) 0   OT Mode of treatment - Co-treat (minutes) 0   OT Mode of Treatment - Total time(minutes) 30 minutes   OT Cumulative Minutes 510

## 2024-03-21 NOTE — ASSESSMENT & PLAN NOTE
Changed toprol to 50mg bid and lower hold parameters to 100  Echo = EF normal/grade 1 diastolic dysfunction  Likely physiological/due to anxiety and chronic at baseline in 's  F/U cardiology on discharge  3/18-patient with palpitations/fatigue, no chest pain/leg swelling  EKG-sinus tachycardia unchanged compared to previous EKG 3/10  Troponin/CBC/CMP-within normal limits  Continue to monitor symptoms  Mildly improved

## 2024-03-21 NOTE — PLAN OF CARE
Problem: MOBILITY - ADULT  Goal: Maintain or return to baseline ADL function  Description: INTERVENTIONS:  -  Assess patient's ability to carry out ADLs; assess patient's baseline for ADL function and identify physical deficits which impact ability to perform ADLs (bathing, care of mouth/teeth, toileting, grooming, dressing, etc.)  - Assess/evaluate cause of self-care deficits   - Assess range of motion  - Assess patient's mobility; develop plan if impaired  - Assess patient's need for assistive devices and provide as appropriate  - Encourage maximum independence but intervene and supervise when necessary  - Involve family in performance of ADLs  - Assess for home care needs following discharge   - Consider OT consult to assist with ADL evaluation and planning for discharge  - Provide patient education as appropriate  Outcome: Progressing  Goal: Maintains/Returns to pre admission functional level  Description: INTERVENTIONS:  - Perform AM-PAC 6 Click Basic Mobility/ Daily Activity assessment daily.  - Set and communicate daily mobility goal to care team and patient/family/caregiver.   - Collaborate with rehabilitation services on mobility goals if consulted  - Perform Range of Motion 3 times a day.  - Reposition patient every 2 hours.  - Dangle patient 3 times a day  - Stand patient 3 times a day  - Ambulate patient 3 times a day  - Out of bed to chair 3 times a day   - Out of bed for meals 3 times a day  - Out of bed for toileting  - Record patient progress and toleration of activity level   Outcome: Progressing     Problem: PAIN - ADULT  Goal: Verbalizes/displays adequate comfort level or baseline comfort level  Description: Interventions:  - Encourage patient to monitor pain and request assistance  - Assess pain using appropriate pain scale  - Administer analgesics based on type and severity of pain and evaluate response  - Implement non-pharmacological measures as appropriate and evaluate response  - Consider  cultural and social influences on pain and pain management  - Notify physician/advanced practitioner if interventions unsuccessful or patient reports new pain  Outcome: Progressing     Problem: INFECTION - ADULT  Goal: Absence or prevention of progression during hospitalization  Description: INTERVENTIONS:  - Assess and monitor for signs and symptoms of infection  - Monitor lab/diagnostic results  - Monitor all insertion sites, i.e. indwelling lines, tubes, and drains  - Monitor endotracheal if appropriate and nasal secretions for changes in amount and color  - Mary Esther appropriate cooling/warming therapies per order  - Administer medications as ordered  - Instruct and encourage patient and family to use good hand hygiene technique  - Identify and instruct in appropriate isolation precautions for identified infection/condition  Outcome: Progressing  Goal: Absence of fever/infection during neutropenic period  Description: INTERVENTIONS:  - Monitor WBC    Outcome: Progressing     Problem: SAFETY ADULT  Goal: Patient will remain free of falls  Description: INTERVENTIONS:  - Educate patient/family on patient safety including physical limitations  - Instruct patient to call for assistance with activity   - Consult OT/PT to assist with strengthening/mobility   - Keep Call bell within reach  - Keep bed low and locked with side rails adjusted as appropriate  - Keep care items and personal belongings within reach  - Initiate and maintain comfort rounds  - Make Fall Risk Sign visible to staff  - Offer Toileting every 2 Hours, in advance of need  - Initiate/Maintain bed/chair alarm  - Obtain necessary fall risk management equipment: nonskid socks  - Apply yellow socks and bracelet for high fall risk patients  - Consider moving patient to room near nurses station  Outcome: Progressing  Goal: Maintain or return to baseline ADL function  Description: INTERVENTIONS:  -  Assess patient's ability to carry out ADLs; assess patient's  baseline for ADL function and identify physical deficits which impact ability to perform ADLs (bathing, care of mouth/teeth, toileting, grooming, dressing, etc.)  - Assess/evaluate cause of self-care deficits   - Assess range of motion  - Assess patient's mobility; develop plan if impaired  - Assess patient's need for assistive devices and provide as appropriate  - Encourage maximum independence but intervene and supervise when necessary  - Involve family in performance of ADLs  - Assess for home care needs following discharge   - Consider OT consult to assist with ADL evaluation and planning for discharge  - Provide patient education as appropriate  Outcome: Progressing  Goal: Maintains/Returns to pre admission functional level  Description: INTERVENTIONS:  - Perform AM-PAC 6 Click Basic Mobility/ Daily Activity assessment daily.  - Set and communicate daily mobility goal to care team and patient/family/caregiver.   - Collaborate with rehabilitation services on mobility goals if consulted  - Perform Range of Motion 3 times a day.  - Reposition patient every 2 hours.  - Dangle patient 3 times a day  - Stand patient 3 times a day  - Ambulate patient 3 times a day  - Out of bed to chair 3 times a day   - Out of bed for meals 3 times a day  - Out of bed for toileting  - Record patient progress and toleration of activity level   Outcome: Progressing     Problem: DISCHARGE PLANNING  Goal: Discharge to home or other facility with appropriate resources  Description: INTERVENTIONS:  - Identify barriers to discharge w/patient and caregiver  - Arrange for needed discharge resources and transportation as appropriate  - Identify discharge learning needs (meds, wound care, etc.)  - Arrange for interpretive services to assist at discharge as needed  - Refer to Case Management Department for coordinating discharge planning if the patient needs post-hospital services based on physician/advanced practitioner order or complex needs  related to functional status, cognitive ability, or social support system  Outcome: Progressing

## 2024-03-21 NOTE — PROGRESS NOTES
"OT TREATMENT NOTE       03/21/24 1300   Pain Assessment   Pain Assessment Tool 0-10   Pain Score No Pain   Restrictions/Precautions   Precautions Fall Risk;O2;Multiple lines   Weight Bearing Restrictions No   ROM Restrictions No   Lifestyle   Autonomy \"I did a lot of work today. It's good for me\"   Putting On/Taking Off Footwear   Type of Assistance Needed Supervision   Physical Assistance Level No physical assistance   Comment setup for retrieval only. pt donned socks and shoes seated at EOB via figure 4 technique   Putting On/Taking Off Footwear CARE Score 4   Lying to Sitting on Side of Bed   Type of Assistance Needed Independent   Comment transition from supine to EOB without use of hospital bed features   Lying to Sitting on Side of Bed CARE Score 6   Sit to Stand   Type of Assistance Needed Supervision   Physical Assistance Level No physical assistance   Comment Supervision for line management and balance with use of RW   Sit to Stand CARE Score 4   Bed-Chair Transfer   Type of Assistance Needed Supervision   Physical Assistance Level No physical assistance   Comment ambulatory transfers to/from bed and chair with use of RW, verbal cuing for line management.   Chair/Bed-to-Chair Transfer CARE Score 4   Cognition   Overall Cognitive Status WFL   Arousal/Participation Alert;Cooperative   Attention Within functional limits   Orientation Level Oriented X4   Memory Within functional limits   Following Commands Follows all commands and directions without difficulty   Activity Tolerance   Activity Tolerance Patient tolerated treatment well   Assessment   Treatment Assessment Pt participated in skilled OT session with focus on functional transfers, endurance and dynamic standing balance. Pt performed functional ambulation of household distances with use of RW and Supervision for line management x2 trials. Seated in arm chair pt completed forward trunk flexion and B shoulder flexion with BUE over weighted table top " (20lbs) and return to upright and verbal cuing for deep breathing technique throughout 3 sets x10 trials. Pt participated in the following dynamic standing balance activities for improved endurance and independence with functional activities:  - In stance without UE support with BUE grasp of small therapy ball pt performed reaching outside CHANTEL in all directions maintaining standing ~3 mins x2 trials.  - In stance with BUE grasp of small therapy ball pt performed shoulder flexion from midline to elevated target outside CHANTEL anteriorly 3 sets x10 trials with S for balance.  - In stance at EOM pt completed 2 sets x10 trials of anterior trunk flexion/return to upright via rolling large therapy ball from EOM forward with BUE.  Pt's SPO2 monitored throughout activity completion, maintaining 88-95% with seated rest breaks at needed. Pt completed session with O2 via NC at 2L/min via portable tank, requiring 1 incidence of increase to 3L to recover >92%. Pt tolerated treatment well, pt remained seated in arm chair with all immediate needs met in direct care of [facility], O2 via portable tank via NC at 2L/min at rest. Pt will continue to benefit from skilled OT services to ensure safe discharge. Continue plan of care with focus on functional transfers, endurance, strength and energy conservation.   Prognosis Good   Problem List Decreased strength;Decreased endurance;Impaired balance   Barriers to Discharge Inaccessible home environment   Plan   Treatment/Interventions ADL retraining;Functional transfer training;Therapeutic exercise;Endurance training;Patient/family training;Equipment eval/education   Progress Progressing toward goals   Discharge Recommendation   Rehab Resource Intensity Level, OT   (pending progress)   OT Therapy Minutes   OT Time In 1300   OT Time Out 1400   OT Total Time (minutes) 60   OT Mode of treatment - Individual (minutes) 60   OT Mode of treatment - Concurrent (minutes) 0   OT Mode of treatment -  Group (minutes) 0   OT Mode of treatment - Co-treat (minutes) 0   OT Mode of Treatment - Total time(minutes) 60 minutes   OT Cumulative Minutes 480   Therapy Time missed   Time missed? No

## 2024-03-21 NOTE — PROGRESS NOTES
03/21/24 0930   Pain Assessment   Pain Assessment Tool 0-10   Pain Score No Pain   Restrictions/Precautions   Precautions Fall Risk;O2;Multiple lines   Weight Bearing Restrictions No   ROM Restrictions No   Cognition   Overall Cognitive Status WFL   Arousal/Participation Cooperative;Alert   Attention Within functional limits   Orientation Level Oriented X4   Memory Within functional limits   Following Commands Follows one step commands without difficulty   Roll Left and Right   Type of Assistance Needed Independent   Roll Left and Right CARE Score 6   Sit to Lying   Type of Assistance Needed Independent   Sit to Lying CARE Score 6   Lying to Sitting on Side of Bed   Type of Assistance Needed Independent   Lying to Sitting on Side of Bed CARE Score 6   Sit to Stand   Type of Assistance Needed Supervision;Adaptive equipment   Comment Supervision with RW or rollator   Sit to Stand CARE Score 4   Bed-Chair Transfer   Type of Assistance Needed Supervision;Adaptive equipment   Comment Supervision with RW or rollator   Chair/Bed-to-Chair Transfer CARE Score 4   Car Transfer   Type of Assistance Needed Supervision   Comment (S)  CS with RW, asked pt to have family take measurements of personal car. Ground height to car seat height and ground height to running board so we can mimic car transfer in PT gym. Please follow up with pt next session to see if measurements were provided   Car Transfer CARE Score 4   Walk 10 Feet   Type of Assistance Needed Supervision;Adaptive equipment   Comment CS with RW/rollator   Walk 10 Feet CARE Score 4   Walk 50 Feet with Two Turns   Type of Assistance Needed Supervision;Adaptive equipment   Comment CS with RW/rollator   Walk 50 Feet with Two Turns CARE Score 4   Walk 150 Feet   Type of Assistance Needed Supervision;Adaptive equipment   Comment CS with RW/rollator   Walk 150 Feet CARE Score 4   Walking 10 Feet on Uneven Surfaces   Type of Assistance Needed Supervision;Adaptive equipment    Comment CS with rollator   Walking 10 Feet on Uneven Surfaces CARE Score 4   Ambulation   Primary Mode of Locomotion Prior to Admission Walk   Distance Walked (feet) 350 ft  (68 ft, 145 ft)   Assist Device Roller Walker;Rollator   Limitations Noted In Endurance   Walk Assist Level Close Supervision   Findings Used rollator when ambulating 350ft and was on 2 L of O2. Oxygen saturation closely monitored and remained at or above 90% on 2 L of O2. Pt plans to use rollator for community ambulation and RW for household ambulation when he discharges home.   Does the patient walk? 2. Yes   Wheel 50 Feet with Two Turns   Reason if not Attempted Activity not applicable   Wheel 50 Feet with Two Turns CARE Score 9   Wheel 150 Feet   Reason if not Attempted Activity not applicable   Wheel 150 Feet CARE Score 9   Curb or Single Stair   Style negotiated Single stair   Type of Assistance Needed Supervision;Adaptive equipment   Comment CS to manage single step with RW   1 Step (Curb) CARE Score 4   4 Steps   Comment Pt has 2 ROSA. stairs can be performed as strengthening/enduance exercise.   Reason if not Attempted Activity not applicable   4 Steps CARE Score 9   12 Steps   Reason if not Attempted Activity not applicable   12 Steps CARE Score 9   Stairs   Type Stairs   # of Steps 2  (x2)   Assist Devices Roller Walker   Findings Practiced placing RW on platform of  steps ascending forward, descending backwards. Required Supervision. able to manage the RW on his own. Has 2 ROSA his home with no handrails.   Therapeutic Interventions   Strengthening STS 4# ball 2x5, Marching with no UE support 2x30, Lunges with unilateral UE support x8 each LE. Oxygen saturation monitored. On 2L of O2 remained above 90%   Other Patient had questions about returning to work and getting supplemental oxygen at home. Inquired about [employer]ogen portable oxygen. Dr. Kearns present during portion of session and informed of above.   Equipment Use   [facility]  Level 2 x 12 minutes on 2L of O2. Oxygen saturation stayed above 90%.   Other Comments   Comments (S)  Tomorrow, please work on having patient manage O2 tubbing in room when ambulating to and from the bathroom with RW. Goals are for Mod I and would like to progress to IRP likely early next week.   Assessment   Treatment Assessment Patient participated in 90 minute skilled physical therapy session focusing on improving overall stamina/activity tolerance and strengthening. Patient was able to maintain oxygen saturation above 90% on 2L  of O2 with all functional mobility and therapeutic exercise. If weather allows, work on outdoor mobility with rollator. PT to continue to focus on TE/TA to improve overall  stamina/activity tolerance.   Problem List Decreased strength;Decreased range of motion;Decreased endurance;Impaired balance   Barriers to Discharge Inaccessible home environment   PT Barriers   Functional Limitation Car transfers;Ramp negotiation;Stair negotiation;Standing;Walking;Transfers   Plan   Treatment/Interventions Functional transfer training;LE strengthening/ROM;Therapeutic exercise;Endurance training;Patient/family training;Equipment eval/education;Bed mobility;Gait training   Progress Progressing toward goals   Discharge Recommendation   Rehab Resource Intensity Level, PT   (cardiacpulomonary rehab)   Equipment Recommended Walker  (confirmed with pt that he will be ordering RW and rollator for home. He and his wife have already looked at ones on [employer].)   PT Therapy Minutes   PT Time In 0930   PT Time Out 1100   PT Total Time (minutes) 90   PT Mode of treatment - Individual (minutes) 90   PT Mode of treatment - Concurrent (minutes) 0   PT Mode of treatment - Group (minutes) 0   PT Mode of treatment - Co-treat (minutes) 0   PT Mode of Treatment - Total time(minutes) 90 minutes   PT Cumulative Minutes 688   Therapy Time missed   Time missed? No     Valencia Catherine, PT, DPT

## 2024-03-21 NOTE — PROGRESS NOTES
Physical Medicine and Rehabilitation Progress Note  Hussein Edward III 58 y.o. male MRN: [id number]  Unit/Bed#: [facility] 974-01 Encounter: [id number]    To Review: Hussein Edward III is a 58 y.o. male with medical history of meningioma on acetazolamide, WALE who presented to the [facility] on 2/16 after presenting to urgent care for SOB (he had been having UR symptoms a week prior and was diagnosed with influenza A and treated with tamiflu).Ultimately found to have developed likely superimposed bacterial pneumonia and progressed to ARDS. Did not require ECM). Was able to be extubated on 2/29 after progressing to needing intubation on 2/19. Treated with steroids, airway protocol, proning, antibiotics. Course c/b provoked afib (after CVC placement, septic, and on pressors), not on anticoagulation at this time. He also had ileus which has resolved, dysphagia, which has improved, and tachycardia which is due to deconditioning, atelectasis, hypovolemia, and anxiety. He was admitted to the [facility] on 3/14.       Chief Complaint: No new concerns.    Interval History/Subjective:  No acute events overnight. Did well and walked 350' with therapies with rollator, which he finds easier to use than the rolling walker. Maintained O2 sats on 2L NC during this and didn't feel too short of breath. No CP, or palpitations. Denies any new N/V, abdominal pain. Last BM was 3/18. He has already started reaching out to his outpatient Pulm provider and has follow-up arranged for 3/28. He is wondering about return to work.     ROS:  A 10 point review of systems was negative except for what is noted in the HPI.    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 He will look into this.  I told him to get  settled, and make sure his schedule allows him time to participate in therapies. He may want to take an additional week to get settled.   He reached out to his outpatient physician re: cardiopulmonary rehab. We are also working on this.     Total visit time: 35 minutes, with more than 50% spent counseling/coordinating care. Counseling includes discussion with patient re: progress in therapies, functional issues observed by therapy staff, and discussion with patient regarding their current medical state and wellbeing. Coordination of patient's care was performed in conjunction with Internal Medicine service to monitor patient's labs, vitals, and management of their comorbidities.    Assessment/Plan:    * ARDS survivor  Assessment & Plan  Admitted with respiratory failure 2/2 influenza with possible superimposed bacterial PNA  S/p 10 days antibotics, tamiflu course complicated by ARDS with extended vent time.  He is now on RA at rest, but will desaturate to 70s when ambulating  GUERRA with speech  Primary limitation to his function right now is his respiratory endurance   - Still requiring O2 for comfort/increased WOB and desaturations in therapy, although oxygen requirements are slowly improving.   - Will continue NC O2 humidified for now, and will wean as his respiratory status improves.   IS/Flutter valve  PRN Nebs  PT/OT 3-5 hours/day, 5-7 days/week  Outpatient f/u with Pulmonology with repeat CT Chest in about 4 weeks (beginning of April) to ensure resolution.   - At this point most recent imaging with continued bilateral patchy airspace opacities as would be expected at this time.     PAF (paroxysmal atrial fibrillation) (HCC)  Assessment & Plan  One episode provoked right after CVC placed, patient was septic and on pressors.  Resolved with lopressor, amio gtt, followed by one dose of digoxin.  On BB for tachycardia (but has been sinus tachycardia)  At this time no need for anticoagulation  Follow-up with  cardiology for further evaluation    Tachycardia  Assessment & Plan  BL HR typically in   Monitor volume status, encourage adequate hydration  Cards started on Toprol 25mg in the AM, 50mg in the PM   - Increased to 50mg BID on 3/19.  Monitor respiratory status on BB  Adjust as per IM  Recommend outpatient Cards evaluation.    Anxiety  Assessment & Plan  Started on Paxil and Lorazepam in the hospital  Wean Lorazepam as tolerated    Migraine  Assessment & Plan  Follows with Dr. Camargo  On Acetalozamide at home for this and magnesium for this  Held in the hospital for metabolic acidosis  Monitor off at this time  Outpatient f/u with Neurology      Acute respiratory failure with hypoxia (HCC)  Assessment & Plan  He is now on RA at rest, but will desaturate to 70s when ambulating   - Improving in therapies   - Requiring at least 2L NC currently, sometimes as high as 3-4L with activity, although this is improving as well. Today only needed 2L with activity and maintained sats  Can get dyspneic even with speech  Shallow breaths  Primary limitation functionally at this point  O2 at night and with therapies  Wean as tolerated     WALE (obstructive sleep apnea)  Assessment & Plan  Uses home CPAP, but may need to replace tubing/filter with [employer]  Hard time tolerating hospital CPAP  Continue O2 via nasal cannula  We ordered new tubing and mask from [employer].   Outpatient f/u with his Pulmonologist        Health Maintenance  #Delirium/Sleep: At risk, optimize sleep/wake, bowel, bladder, and pain.  #Pain: Tylenol PRN   #Bowel: Last BM 3/18. Miralax daily. PRN Suppository and bisacodyl tab  #Bladder: Voiding and continent   #Skin/Pressure Injury Prevention: Turn Q2hr in bed, with weight shifts [id number] in wheelchair. Float heels in bed.  #DVT Prophylaxis: Lovenox, SCDs  #GI Prophylaxis: Not indicated  #Code Status: Full Code  #FEN: Regular/Thins, [employer] two packets with lunch/dinner   #Dispo: Team 3/20: ADD  3/27 with cardiopulmonary rehab - will have to check what is available near there. ELOS 2 weeks with goals to discharge home at modified Ind level of function and family support.       Objective:    Functional Update:   PT: Sup transfers, Ind bed mobility, CGA ambulation 198' RW, CGA stairs  OT: Ind eating, Sup grooming, Jose G bathing, Sup UB dressing, Jose G LB dressing, Jose G toileting, CGA toilet transfers       Allergies per EMR    Physical Exam:  Temp:  [97.9 °F (36.6 °C)-98.1 °F (36.7 °C)] 98 °F (36.7 °C)  HR:  [] 91  Resp:  [16-17] 16  BP: (113-119)/(65-86) 115/74  Oxygen Therapy  SpO2: 97 %  O2 Flow Rate (L/min): 2 L/min    Gen: No acute distress, Well-nourished, well-appearing.  HEENT: Moist mucus membranes, Normocephalic/Atraumatic  Cardiovascular: Regular rate, rhythm, S1/S2. Distal pulses palpable  Heme/Extr: No edema  Pulmonary: Much less dyspnea at rest with O2 on, still a little when he starts to talk. Lungs CTAB - short shallow breaths though.  : No raines  GI: Soft, non-tender, non-distended  MSK: ROM is WFL in all extremities. No effusions or deformities. Bulk is symmetric. See below for MMT scores.   Integumentary: Skin is warm, dry.   Neuro: AAOx3,  Speech is intact. Appropriate to questioning.   Psych: Normal mood and affect.     Diagnostic Studies: Reviewed, no new imaging    Laboratory:  Reviewed   Results from last 7 days   Lab Units 03/18/24  1059   HEMOGLOBIN g/dL 12.1   HEMATOCRIT % 37.0   WBC Thousand/uL 9.69       Results from last 7 days   Lab Units 03/18/24  1059   BUN mg/dL 14   POTASSIUM mmol/L 4.3   CHLORIDE mmol/L 104   CREATININE mg/dL 0.70   AST U/L 16   ALT U/L 31            Patient Active Problem List   Diagnosis    Low back pain    Strain of lumbar region    WALE (obstructive sleep apnea)    Mixed dyslipidemia    Nasal septal deviation    Seasonal allergic rhinitis    Nasal turbinate hypertrophy    Nasal obstruction    Deviated nasal septum    Hypertrophy of nasal turbinates     Meningioma (HCC)    Abnormal finding on MRI of brain    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)    Primary insomnia    Non-traumatic rhabdomyolysis    Chronic kidney disease    Metabolic acidosis    Acute respiratory failure with hypoxia ([facility])    ARDS survivor    Insomnia    Migraine    Anxiety    Persistent fever    ARDS (adult respiratory distress syndrome) ([facility])    Ambulatory dysfunction    Tachycardia    PAF (paroxysmal atrial fibrillation) ([facility])         Medications  Current Facility-Administered Medications   Medication Dose Route Frequency Provider Last Rate    acetaminophen  650 mg Oral Q6H PRN KAMLESH Lo-MORIS      albuterol  2 puff Inhalation Q4H PRN KAMLESH Lo-MORIS      enoxaparin  40 mg Subcutaneous Q24H [facility] Tomasa Whitlock PA-C      fluticasone  1 spray Each Nare Daily Ashley Depadua, MD      guaiFENesin  600 mg Oral Q12H [facility] Tomasa Whitlock PA-C      loratadine  5 mg Oral Daily Tomasa Whitlock PA-C      LORazepam  0.5 mg Oral Q8H PRN Tomasa Whitlock PA-C      melatonin  3 mg Oral HS PRN Tomasa Whitlock PA-C      metoprolol succinate  50 mg Oral Q12H Lalita Arechiga, CRNP      oxyCODONE  5 mg Oral Q6H PRN Tomasa Whitlock PA-C      PARoxetine  10 mg Oral Daily Tomasasidney Whitlock PA-C      sodium chloride  2 spray Each Nare Q6H Lalita Arechiga, CRNP      tamsulosin  0.4 mg Oral Daily With Dinner Tomasa Whitlock PA-C            ** Please Note: [employer] voice to text software may have been used in the creation of this document. **

## 2024-03-21 NOTE — PROGRESS NOTES
[facility]  Progress Note  Name: Hussein Edward III I  MRN: [id number]  Unit/Bed#: -01 I Date of Admission: 3/14/2024   Date of Service: 3/21/2024 I Hospital Day: 7    Assessment/Plan   Acute respiratory failure with hypoxia (HCC)  Assessment & Plan  Secondary to influenza A/superimposed bacterial infection  S/p intubation 2/19 through 2/29  Bronchoscopy 2/19 positive for Candida glabrata, no bacteria  S/p antibiotics  Recent chest x-ray 3/12 was unchanged  Pulmonology recommends F/U CT of chest in 4 weeks (4/4/24)  Continue Mucinex.  Remains on oxygen will continue to attempt to wean as tolerated          PAF (paroxysmal atrial fibrillation) (HCC)  Assessment & Plan  D/T critical illness  Converted with IV amiodarone  Echo as above  F/U cardiology as outpatient  Not on AC  EKG with palpitations done this admission was ST    Tachycardia  Assessment & Plan  Changed toprol to 50mg bid and lower hold parameters to 100  Echo = EF normal/grade 1 diastolic dysfunction  Likely physiological/due to anxiety and chronic at baseline in 's  F/U cardiology on discharge  3/18-patient with palpitations/fatigue, no chest pain/leg swelling  EKG-sinus tachycardia unchanged compared to previous EKG 3/10  Troponin/CBC/CMP-within normal limits  Continue to monitor symptoms  Mildly improved    Anxiety  Assessment & Plan  Continue Paxil/Ativan as needed but has not needed it  stable    WALE (obstructive sleep apnea)  Assessment & Plan  Machine from home brought in as hospital machine was too strong.  Reportedly has intolerance to CPAP  Possible plan for a DISE as outpatient with ENT  Continue current settings.             The above assessment and plan was reviewed and updated as determined by my evaluation of the patient on 3/21/2024.    Labs:   Results from last 7 days   Lab Units 03/18/24  1059   WBC Thousand/uL 9.69   HEMOGLOBIN g/dL 12.1   HEMATOCRIT % 37.0   PLATELETS Thousands/uL 184      Results from last 7 days   Lab Units 03/18/24  1059   SODIUM mmol/L 141   POTASSIUM mmol/L 4.3   CHLORIDE mmol/L 104   CO2 mmol/L 31   BUN mg/dL 14   CREATININE mg/dL 0.70   CALCIUM mg/dL 9.2                   Imaging  No orders to display       Review of Scheduled Meds:  Current Facility-Administered Medications   Medication Dose Route Frequency Provider Last Rate    acetaminophen  650 mg Oral Q6H PRN Tomasa Whitlock PA-C      albuterol  2 puff Inhalation Q4H PRN Tomasa Whitlock PA-C      enoxaparin  40 mg Subcutaneous Q24H [facility] Tomasa Whitlock PA-C      fluticasone  1 spray Each Nare Daily Ashley Depadua, MD      guaiFENesin  600 mg Oral Q12H [facility] Tomasa Whitlock PA-C      loratadine  5 mg Oral Daily Tomasa Whitlock PA-C      LORazepam  0.5 mg Oral Q8H PRN Tomasa Whitlock PA-C      melatonin  3 mg Oral HS PRN Tomasa Whitlock PA-C      metoprolol succinate  50 mg Oral Q12H Lalita Arechiga, CRNP      oxyCODONE  5 mg Oral Q6H PRN Tomasa Whitlock PA-C      PARoxetine  10 mg Oral Daily Tomasa Whitlock PA-C      sodium chloride  2 spray Each Nare Q6H Lalita Arechiga, CRNP      tamsulosin  0.4 mg Oral Daily With Dinner Tomasa Whitlock PA-C         Subjective/ HPI: Patient seen and examined. Patients overnight issues or events were reviewed with nursing staff. New or overnight issues include the following:     Pt seen in his room. He was on the phone with [place] and feels winded. He denies any other complaints.    ROS:   A 10 point ROS was performed; negative except as noted above.        *Labs /Radiology studies Reviewed  *Medications  reviewed and reconciled as needed  *Please refer to order section for additional ordered labs studies      Physical Examination:  Vitals:   Vitals:    03/20/24 1256 03/20/24 2108 03/21/24 0559 03/21/24 1005   BP: 119/74 117/65 115/74 100/68   BP Location: Right arm Right arm Right arm    Pulse: 95 91 91 (!) 112   Resp: 17 16 16    Temp: 97.9 °F  (36.6 °C) 98.1 °F (36.7 °C) 98 °F (36.7 °C)    TempSrc: Oral Oral Oral    SpO2: 94% 98% 97%    Weight:       Height:           General Appearance: NAD; pleasant  HEENT: PERRLA, conjuctiva normal; mucous membranes moist; face symmetrical  Neck:  Supple  Lungs: SOB when speaking. no retractions, expiratory effort normal, on O2 at 2L NC  CV: tachy, no murmurs rubs or gallops noted   ABD: soft non tender, +BS x4  EXT: DP pulses intact, no lymphadenopathy, no edema  Skin: normal turgor, normal texture, no rash  Psych: affect normal, mood normal  Neuro: AAOx3       The above physical exam was reviewed and updated as determined by my evaluation of the patient on 3/21/2024.    Invasive Devices       None                      VTE Pharmacologic Prophylaxis: Enoxaparin  Code Status: Level 1 - Full Code  Current Length of Stay: 7 day(s)    Total floor / unit time spent today 30 minutes  Coordination of patient's care was performed in conjunction with primary service. Time invested included review of patient's labs, vitals, and management of their comorbidities with continued monitoring, examination of patient as well as answering patient questions, documenting her findings and creating progress note in electronic medical record,  ordering appropriate diagnostic testing.       ** Please Note:  voice to text software may have been used in the creation of this document. Although proof errors in transcription or interpretation are a potential of such software**

## 2024-03-22 DIAGNOSIS — J80 ARDS (ADULT RESPIRATORY DISTRESS SYNDROME) (HCC): Primary | ICD-10-CM

## 2024-03-22 DIAGNOSIS — G47.33 OSA (OBSTRUCTIVE SLEEP APNEA): Primary | ICD-10-CM

## 2024-03-22 PROCEDURE — 97530 THERAPEUTIC ACTIVITIES: CPT | Performed by: PHYSICAL THERAPIST

## 2024-03-22 PROCEDURE — 97535 SELF CARE MNGMENT TRAINING: CPT

## 2024-03-22 PROCEDURE — 97530 THERAPEUTIC ACTIVITIES: CPT

## 2024-03-22 PROCEDURE — 99232 SBSQ HOSP IP/OBS MODERATE 35: CPT | Performed by: INTERNAL MEDICINE

## 2024-03-22 PROCEDURE — 97110 THERAPEUTIC EXERCISES: CPT

## 2024-03-22 PROCEDURE — 97110 THERAPEUTIC EXERCISES: CPT | Performed by: PHYSICAL THERAPIST

## 2024-03-22 RX ADMIN — ENOXAPARIN SODIUM 40 MG: 40 INJECTION SUBCUTANEOUS at 17:04

## 2024-03-22 RX ADMIN — FLUTICASONE PROPIONATE 1 SPRAY: 50 SPRAY, METERED NASAL at 08:27

## 2024-03-22 RX ADMIN — LORATADINE 5 MG: 10 TABLET ORAL at 08:27

## 2024-03-22 RX ADMIN — METOPROLOL SUCCINATE 50 MG: 50 TABLET, EXTENDED RELEASE ORAL at 09:03

## 2024-03-22 RX ADMIN — METOPROLOL SUCCINATE 50 MG: 50 TABLET, EXTENDED RELEASE ORAL at 21:54

## 2024-03-22 RX ADMIN — Medication 2 SPRAY: at 05:48

## 2024-03-22 RX ADMIN — PAROXETINE HYDROCHLORIDE 10 MG: 20 TABLET, FILM COATED ORAL at 08:27

## 2024-03-22 RX ADMIN — GUAIFENESIN 600 MG: 600 TABLET, EXTENDED RELEASE ORAL at 08:27

## 2024-03-22 RX ADMIN — Medication 2 SPRAY: at 17:04

## 2024-03-22 RX ADMIN — GUAIFENESIN 600 MG: 600 TABLET, EXTENDED RELEASE ORAL at 21:54

## 2024-03-22 RX ADMIN — OXYCODONE HYDROCHLORIDE 5 MG: 5 TABLET ORAL at 05:50

## 2024-03-22 RX ADMIN — TAMSULOSIN HYDROCHLORIDE 0.4 MG: 0.4 CAPSULE ORAL at 17:04

## 2024-03-22 NOTE — PROGRESS NOTES
"OT Daily Treatment Note      Occupational Therapy LTG's  Eating Oral care Bathing LB dress UB dress   Eating Goal: 06. Independent - Patient completes the activity by him/herself with no assistance from a helper. Oral Hygiene Goal: 06. Independent - Patient completes the activity by him/herself with no assistance from a helper.  Shower/bathe self Goal: 04. Supervision or touching assistance- K[place]iller provides VERBAL CUES or supervision throughout activity. Lower body dressing Goal: 06. Independent - Patient completes the activity by him/herself with no assistance from a helper. Upper body dressing Goal: 06. Independent - Patient completes the activity by him/herself with no assistance from a helper.   Toileting Toilet txf Func txf IADL Med    Toileting hygiene Goal: 06. Independent - Patient completes the activity by him/herself with no assistance from a helper. Toilet transfer Goal: 06. Independent - Patient completes the activity by him/herself with no assistance from a helper.     Assist Level: Independent     Discharge Recommendation: home with family support  DME needs: TBD       03/22/24 0930   Pain Assessment   Pain Assessment Tool 0-10   Pain Score No Pain   Restrictions/Precautions   Precautions Fall Risk;O2   Lifestyle   Autonomy \"I feel like today is going to be a good day\"   Oral Hygiene   Type of Assistance Needed Independent   Physical Assistance Level No physical assistance   Comment seated at sink   Oral Hygiene CARE Score 6   Shower/Bathe Self   Type of Assistance Needed Supervision   Physical Assistance Level No physical assistance   Comment full shower seated on TTB; supv provided in stance only for safety but overall pt able to bathe 10/10 parts. O2 inc to 4L during shower 2/2 pt being 81-83%   Shower/Bathe Self CARE Score 4   Tub/Shower Transfer   Findings supv lateral step in to walk in shower   Upper Body Dressing   Type of Assistance Needed Set-up / clean-up   Physical Assistance Level No " physical assistance   Upper Body Dressing CARE Score 5   Lower Body Dressing   Type of Assistance Needed Supervision   Physical Assistance Level No physical assistance   Comment seated to thread; supv in stance when donning over hips   Lower Body Dressing CARE Score 4   Putting On/Taking Off Footwear   Type of Assistance Needed Independent   Physical Assistance Level No physical assistance   Comment seated   Putting On/Taking Off Footwear CARE Score 6   Roll Left and Right   Type of Assistance Needed Independent   Physical Assistance Level No physical assistance   Roll Left and Right CARE Score 6   Sit to Lying   Type of Assistance Needed Independent   Physical Assistance Level No physical assistance   Sit to Lying CARE Score 6   Lying to Sitting on Side of Bed   Type of Assistance Needed Independent   Physical Assistance Level No physical assistance   Lying to Sitting on Side of Bed CARE Score 6   Sit to Stand   Type of Assistance Needed Supervision   Physical Assistance Level No physical assistance   Comment DS RW; biggest barrier is line mgmt   Sit to Stand CARE Score 4   Bed-Chair Transfer   Type of Assistance Needed Supervision   Physical Assistance Level No physical assistance   Comment SPT RW; supv with line mgmt   Chair/Bed-to-Chair Transfer CARE Score 4   Toileting Hygiene   Type of Assistance Needed Supervision   Physical Assistance Level No physical assistance   Comment supv for CM in stance only   Toileting Hygiene CARE Score 4   Toilet Transfer   Type of Assistance Needed Supervision   Physical Assistance Level No physical assistance   Comment amb <> BA with RW; standard toilet height   Toilet Transfer CARE Score 4   Commmunity Re-entry   Community Re-entry Level Walker   Community Re-entry Level of Assistance Close supervision   Community Re-entry Pt participated in functional mobility task with use of RW to simulate home and community distances required for ADL/IADL completion. Task focused on  "increasing functional activity tolerance, endurance, strength and dynamic standing balance to improve independence with ADL completion.   Exercise Tools   UE Ergometer Pt engaged in UE SciFit for 9 minutes (in 3 minute increments) in prograde and 6 minutes in retrograde with Minimal resistance (1.5 level) focusing on increasing pts UB strength, endurance and functional activity tolerance to increase independence with ADL tasks. rest break required in between sets for fatigue mgmt. Education provided on energy conservation and breathing techniques with Positive carryover of provided education. Pt reporting this activity \"feels great for his chest\"   Cognition   Overall Cognitive Status WFL   Arousal/Participation Alert;Cooperative   Attention Within functional limits   Orientation Level Oriented X4   Memory Within functional limits   Following Commands Follows all commands and directions without difficulty   Additional Activities   Additional Activities Other (Comment)   Additional Activities Comments pt engaged in bean bag toss in unsupported stance on blue foam balance board focusing on unsupported dynamic standing balance, endurance, fxl activity tolerance. pt completed 10 x 3 tosses with min A/CG to stabilize in stance. rest breaks required for fatigue mmgt   Activity Tolerance   Activity Tolerance Patient tolerated treatment well   Other Comments   Assessment SPO2 maintained <90% on 3L today. at rest, did well dropping to 2L but with increased activity, pt unable to maintain above 90%   Assessment   Treatment Assessment Pt participated in skilled OT session with focus on ADL retraining, functional transfer training, UE strengthening/ROM, endurance training, and energy conservation. See flowsheet for details of session and current functional status. Pt is limited by impaired balance, increased fall risk, and decreased strength and requires skilled OT services to increase independence and safety with ADL " completion in prep for DC home. Plan to continue ADL retraining, functional transfer training, UE strengthening/ROM, endurance training, continued education, cardiac education, and energy conservation to address barriers mentioned above. PLEASE PROGRESS TO IRP ON MONDAY. Please also assess for any DME needs if applicable.   Prognosis Good   Problem List Decreased strength;Decreased endurance;Impaired balance   Barriers to Discharge Inaccessible home environment   Plan   Treatment/Interventions ADL retraining;Functional transfer training;Therapeutic exercise;Endurance training;Equipment eval/education;Compensatory technique education   Progress Progressing toward goals   OT Therapy Minutes   OT Time In 0930   OT Time Out 1100   OT Total Time (minutes) 90   OT Mode of treatment - Individual (minutes) 90   OT Mode of treatment - Concurrent (minutes) 0   OT Mode of treatment - Group (minutes) 0   OT Mode of treatment - Co-treat (minutes) 0   OT Mode of Treatment - Total time(minutes) 90 minutes   OT Cumulative Minutes 600   Therapy Time missed   Time missed? No

## 2024-03-22 NOTE — PLAN OF CARE
Problem: MOBILITY - ADULT  Goal: Maintain or return to baseline ADL function  Description: INTERVENTIONS:  -  Assess patient's ability to carry out ADLs; assess patient's baseline for ADL function and identify physical deficits which impact ability to perform ADLs (bathing, care of mouth/teeth, toileting, grooming, dressing, etc.)  - Assess/evaluate cause of self-care deficits   - Assess range of motion  - Assess patient's mobility; develop plan if impaired  - Assess patient's need for assistive devices and provide as appropriate  - Encourage maximum independence but intervene and supervise when necessary  - Involve family in performance of ADLs  - Assess for home care needs following discharge   - Consider OT consult to assist with ADL evaluation and planning for discharge  - Provide patient education as appropriate  Outcome: Progressing  Goal: Maintains/Returns to pre admission functional level  Description: INTERVENTIONS:  - Perform AM-PAC 6 Click Basic Mobility/ Daily Activity assessment daily.  - Set and communicate daily mobility goal to care team and patient/family/caregiver.   - Collaborate with rehabilitation services on mobility goals if consulted  - Perform Range of Motion 3 times a day.  - Reposition patient every 2 hours.  - Dangle patient 3 times a day  - Stand patient 3 times a day  - Ambulate patient 3 times a day  - Out of bed to chair 3 times a day   - Out of bed for meals 3 times a day  - Out of bed for toileting  - Record patient progress and toleration of activity level   Outcome: Progressing     Problem: PAIN - ADULT  Goal: Verbalizes/displays adequate comfort level or baseline comfort level  Description: Interventions:  - Encourage patient to monitor pain and request assistance  - Assess pain using appropriate pain scale  - Administer analgesics based on type and severity of pain and evaluate response  - Implement non-pharmacological measures as appropriate and evaluate response  - Consider  cultural and social influences on pain and pain management  - Notify physician/advanced practitioner if interventions unsuccessful or patient reports new pain  Outcome: Progressing     Problem: INFECTION - ADULT  Goal: Absence or prevention of progression during hospitalization  Description: INTERVENTIONS:  - Assess and monitor for signs and symptoms of infection  - Monitor lab/diagnostic results  - Monitor all insertion sites, i.e. indwelling lines, tubes, and drains  - Monitor endotracheal if appropriate and nasal secretions for changes in amount and color  - [place] appropriate cooling/warming therapies per order  - Administer medications as ordered  - Instruct and encourage patient and family to use good hand hygiene technique  - Identify and instruct in appropriate isolation precautions for identified infection/condition  Outcome: Progressing  Goal: Absence of fever/infection during neutropenic period  Description: INTERVENTIONS:  - Monitor WBC    Outcome: Progressing     Problem: SAFETY ADULT  Goal: Patient will remain free of falls  Description: INTERVENTIONS:  - Educate patient/family on patient safety including physical limitations  - Instruct patient to call for assistance with activity   - Consult OT/PT to assist with strengthening/mobility   - Keep Call bell within reach  - Keep bed low and locked with side rails adjusted as appropriate  - Keep care items and personal belongings within reach  - Initiate and maintain comfort rounds  - Make Fall Risk Sign visible to staff  - Offer Toileting every 2 Hours, in advance of need  - Apply yellow socks and bracelet for high fall risk patients  - Consider moving patient to room near nurses station  Outcome: Progressing  Goal: Maintain or return to baseline ADL function  Description: INTERVENTIONS:  -  Assess patient's ability to carry out ADLs; assess patient's baseline for ADL function and identify physical deficits which impact ability to perform ADLs  (bathing, care of mouth/teeth, toileting, grooming, dressing, etc.)  - Assess/evaluate cause of self-care deficits   - Assess range of motion  - Assess patient's mobility; develop plan if impaired  - Assess patient's need for assistive devices and provide as appropriate  - Encourage maximum independence but intervene and supervise when necessary  - Involve family in performance of ADLs  - Assess for home care needs following discharge   - Consider OT consult to assist with ADL evaluation and planning for discharge  - Provide patient education as appropriate  Outcome: Progressing  Goal: Maintains/Returns to pre admission functional level  Description: INTERVENTIONS:  - Perform AM-PAC 6 Click Basic Mobility/ Daily Activity assessment daily.  - Set and communicate daily mobility goal to care team and patient/family/caregiver.   - Collaborate with rehabilitation services on mobility goals if consulted  - Perform Range of Motion 3 times a day.  - Reposition patient every 2 hours.  - Dangle patient 3 times a day  - Stand patient 3 times a day  - Ambulate patient 3 times a day  - Out of bed to chair 3 times a day   - Out of bed for meals 3 times a day  - Out of bed for toileting  - Record patient progress and toleration of activity level   Outcome: Progressing     Problem: DISCHARGE PLANNING  Goal: Discharge to home or other facility with appropriate resources  Description: INTERVENTIONS:  - Identify barriers to discharge w/patient and caregiver  - Arrange for needed discharge resources and transportation as appropriate  - Identify discharge learning needs (meds, wound care, etc.)  - Arrange for interpretive services to assist at discharge as needed  - Refer to Case Management Department for coordinating discharge planning if the patient needs post-hospital services based on physician/advanced practitioner order or complex needs related to functional status, cognitive ability, or social support system  Outcome:  Progressing

## 2024-03-22 NOTE — PROGRESS NOTES
"   03/22/24 1330   Pain Assessment   Pain Score No Pain   Restrictions/Precautions   Precautions Fall Risk;O2   Cognition   Overall Cognitive Status WFL   Arousal/Participation Alert;Cooperative   Subjective   Subjective Pt with no new complaints, on 2L O2 at rest   Sit to Stand   Type of Assistance Needed Supervision   Physical Assistance Level No physical assistance   Sit to Stand CARE Score 4   Bed-Chair Transfer   Type of Assistance Needed Supervision   Physical Assistance Level No physical assistance   Chair/Bed-to-Chair Transfer CARE Score 4   Walk 10 Feet   Type of Assistance Needed Supervision;Adaptive equipment   Physical Assistance Level No physical assistance   Walk 10 Feet CARE Score 4   Walk 50 Feet with Two Turns   Type of Assistance Needed Supervision;Adaptive equipment   Physical Assistance Level No physical assistance   Walk 50 Feet with Two Turns CARE Score 4   Walk 150 Feet   Type of Assistance Needed Supervision;Adaptive equipment   Physical Assistance Level No physical assistance   Walk 150 Feet CARE Score 4   Ambulation   Primary Mode of Locomotion Prior to Admission Walk   Distance Walked (feet) 300 ft   Assist Device Roller Walker   Gait Pattern Slow Anahi   Limitations Noted In Endurance   Does the patient walk? 2. Yes   Curb or Single Stair   Style negotiated Single stair   Type of Assistance Needed Supervision;Adaptive equipment   Physical Assistance Level No physical assistance   1 Step (Curb) CARE Score 4   4 Steps   Type of Assistance Needed Supervision;Adaptive equipment   Physical Assistance Level No physical assistance   4 Steps CARE Score 4   12 Steps   Reason if not Attempted Activity not applicable   12 Steps CARE Score 9   Stairs   Type Stairs;Curb   Findings practiced 8\" curb step with RW 4x total (3L O2 and SpO2 dropped to 89% but with quick return to 90s)   Therapeutic Interventions   Strengthening Pt on 3L O2 for entire session. 1 min interval  stairs but SpO2 " dropped to 76% increased to 4L O2 and it increased to 93% within a few seconds of seated rest. Back on 3L tried 30 sec interval of  stairs and SpO2 91% after. 30 sec sit>stands completed 10 reps, SpO2 89%. Lunges with 1 UE support 10 reps each, SpO2 80%. Standing marching no UE support 30 reps (back in room on 2L) SpO2 95%. Heel raises no UE support (back in room on 2L O2) SpO2 93%.   Other Simulated stepping up into truck using 9 inch step and mat table raised to 28 inches. Performed twice, SpO2 > 90%. Ambulated 100 ft, 120 ft, 300 ft, SpO2 decreased to 85-88% after each walk but quick increase back to 92-93%. Practiced walking to/from bathroom in his room with focus on O2 tubing management; SpO2 dropped to 82% but quick increase back to 96% with seated rest.   Equipment Use   NuStep L2 x 10 min total (3L O2, SpO2 remained >90%)   Assessment   Treatment Assessment Pt tolerated PT session well today with focus on endurance and functional mobility. Initially on 2L O2 in his room with SpO2 93% at rest. Trialed walking to bathroom with focus on O2 tubing management while on 2L but he decreased to 82% after so increased to 3L for remainder of session. Even on 3L O2 he still decreased to 80's after most activities, however had a quick increase back up to >90%. Overall short rest breaks required. He did well with managing O2 tubing independently; anticipate progression to in room privileges within next few days. He had no loss of balance throughout session, main limitation is endurance. Back to room end of session and put back on 2L to perform standing marching and heel raises in front of bed; SpO2 remained> 90% for these 2 exercises while back on 2L. SpO2 had increased to 97% on 2L at end of session..He requires continued skilled PT to increase endurance and maximize independence and safety with all functional mobility.   Problem List Decreased strength;Decreased endurance;Decreased mobility   Barriers to  Discharge Inaccessible home environment   Plan   Treatment/Interventions Functional transfer training;LE strengthening/ROM;Elevations;Therapeutic exercise;Endurance training;Patient/family training;Bed mobility;Gait training   Progress Progressing toward goals   Discharge Recommendation   Equipment Recommended Walker   PT Therapy Minutes   PT Time In 1330   PT Time Out 1500   PT Total Time (minutes) 90   PT Mode of treatment - Individual (minutes) 90   PT Mode of treatment - Concurrent (minutes) 0   PT Mode of treatment - Group (minutes) 0   PT Mode of treatment - Co-treat (minutes) 0   PT Mode of Treatment - Total time(minutes) 90 minutes   PT Cumulative Minutes 778   Therapy Time missed   Time missed? No

## 2024-03-22 NOTE — PROGRESS NOTES
[facility]  Progress Note  Name: Hussein Edward III I  MRN: [id number]  Unit/Bed#: -01 I Date of Admission: 3/14/2024   Date of Service: 3/22/2024 I Hospital Day: 8    Assessment/Plan   Acute respiratory failure with hypoxia (HCC)  Assessment & Plan  Secondary to influenza A/superimposed bacterial infection  S/p intubation 2/19 through 2/29  Bronchoscopy 2/19 positive for Candida glabrata, no bacteria  S/p antibiotics  Recent chest x-ray 3/12 was unchanged  Pulmonology recommends F/U CT of chest in 4 weeks (4/4/24)  Continue Mucinex.  Remains on oxygen will continue to attempt to wean as tolerated          PAF (paroxysmal atrial fibrillation) (HCC)  Assessment & Plan  D/T critical illness  Converted with IV amiodarone  Echo as above  F/U cardiology as outpatient  Not on AC  EKG with palpitations done this admission was ST    Tachycardia  Assessment & Plan  Changed toprol to 50mg bid and lower hold parameters to 100  Echo = EF normal/grade 1 diastolic dysfunction  Likely physiological/due to anxiety and chronic at baseline in 's  F/U cardiology on discharge  3/18-patient with palpitations/fatigue, no chest pain/leg swelling  EKG-sinus tachycardia unchanged compared to previous EKG 3/10  Troponin/CBC/CMP-within normal limits  Continue to monitor symptoms  Mildly improved    Anxiety  Assessment & Plan  Continue Paxil/Ativan as needed but has not needed it  stable    WALE (obstructive sleep apnea)  Assessment & Plan  Machine from home brought in as hospital machine was too strong.  Reportedly has intolerance to CPAP  Possible plan for a DISE as outpatient with ENT  Continue current settings.             The above assessment and plan was reviewed and updated as determined by my evaluation of the patient on 3/22/2024.    Labs:   Results from last 7 days   Lab Units 03/18/24  1059   WBC Thousand/uL 9.69   HEMOGLOBIN g/dL 12.1   HEMATOCRIT % 37.0   PLATELETS Thousands/uL 184      Results from last 7 days   Lab Units 03/18/24  1059   SODIUM mmol/L 141   POTASSIUM mmol/L 4.3   CHLORIDE mmol/L 104   CO2 mmol/L 31   BUN mg/dL 14   CREATININE mg/dL 0.70   CALCIUM mg/dL 9.2                   Imaging  No orders to display       Review of Scheduled Meds:  Current Facility-Administered Medications   Medication Dose Route Frequency Provider Last Rate    acetaminophen  650 mg Oral Q6H PRN Tomasa Whitlock PA-C      albuterol  2 puff Inhalation Q4H PRN Tomasa Whitlock PA-C      enoxaparin  40 mg Subcutaneous Q24H [facility] Tomasa Whitlock PA-C      fluticasone  1 spray Each Nare Daily Ashley Depadua, MD      guaiFENesin  600 mg Oral Q12H [facility] Tomasa Whitlock PA-C      loratadine  5 mg Oral Daily Tomasa Whitlock PA-C      LORazepam  0.5 mg Oral Q8H PRN Tomasa Whitlock PA-C      melatonin  3 mg Oral HS PRN Tomasa Whitlock PA-C      metoprolol succinate  50 mg Oral Q12H Lalita Arechiga, CRNP      oxyCODONE  5 mg Oral Q6H PRN Tomasa Whitlock PA-C      PARoxetine  10 mg Oral Daily Tomasa Whitlock PA-C      sodium chloride  2 spray Each Nare Q6H Lalita Arechiga, CRNP      tamsulosin  0.4 mg Oral Daily With Dinner Tomasa Whitlock PA-C         Subjective/ HPI: Patient seen and examined. Patients overnight issues or events were reviewed with nursing staff. New or overnight issues include the following:     Pt seen in PT. He was winded after going up and down steps. Sats did drop to 76% but improved quickly with rest and O2 increased to 4L. He denies any other complaints.    ROS:   A 10 point ROS was performed; negative except as noted above.        *Labs /Radiology studies Reviewed  *Medications  reviewed and reconciled as needed  *Please refer to order section for additional ordered labs studies      Physical Examination:  Vitals:   Vitals:    03/21/24 1321 03/21/24 2020 03/22/24 0544 03/22/24 0903   BP: 106/67 116/73 107/70 113/73   BP Location: Right arm Right arm Right  arm Right arm   Pulse: 88 95 88 100   Resp: 20 18 18    Temp: 98.4 °F (36.9 °C) 98.4 °F (36.9 °C) 97.9 °F (36.6 °C)    TempSrc: Oral Oral Oral    SpO2: 95% 97% 95%    Weight:       Height:           General Appearance: NAD; pleasant  HEENT: PERRLA, conjuctiva normal; mucous membranes moist; face symmetrical  Neck:  Supple  Lungs: clear bilaterally, normal respiratory effort, no retractions, expiratory effort normal, on 3L NC  CV: regular rate and rhythm, no murmurs rubs or gallops noted   ABD: soft non tender, +BS x4  EXT: DP pulses intact, no lymphadenopathy, no edema  Skin: normal turgor, normal texture, no rash  Psych: affect normal, mood normal  Neuro: AAOx3       The above physical exam was reviewed and updated as determined by my evaluation of the patient on 3/22/2024.    Invasive Devices       None                      VTE Pharmacologic Prophylaxis: Enoxaparin  Code Status: Level 1 - Full Code  Current Length of Stay: 8 day(s)    Total floor / unit time spent today 30 minutes  Coordination of patient's care was performed in conjunction with primary service. Time invested included review of patient's labs, vitals, and management of their comorbidities with continued monitoring, examination of patient as well as answering patient questions, documenting her findings and creating progress note in electronic medical record,  ordering appropriate diagnostic testing.       ** Please Note:  voice to text software may have been used in the creation of this document. Although proof errors in transcription or interpretation are a potential of such software**

## 2024-03-23 PROCEDURE — 97110 THERAPEUTIC EXERCISES: CPT

## 2024-03-23 PROCEDURE — 97530 THERAPEUTIC ACTIVITIES: CPT

## 2024-03-23 RX ORDER — NYSTATIN 100000 [USP'U]/G
POWDER TOPICAL 2 TIMES DAILY
Status: DISCONTINUED | OUTPATIENT
Start: 2024-03-23 | End: 2024-03-27 | Stop reason: HOSPADM

## 2024-03-23 RX ADMIN — OXYCODONE HYDROCHLORIDE 5 MG: 5 TABLET ORAL at 05:42

## 2024-03-23 RX ADMIN — Medication 2 SPRAY: at 05:43

## 2024-03-23 RX ADMIN — Medication 2 SPRAY: at 12:57

## 2024-03-23 RX ADMIN — NYSTATIN 1 APPLICATION: 100000 POWDER TOPICAL at 17:47

## 2024-03-23 RX ADMIN — GUAIFENESIN 600 MG: 600 TABLET, EXTENDED RELEASE ORAL at 08:28

## 2024-03-23 RX ADMIN — METOPROLOL SUCCINATE 50 MG: 50 TABLET, EXTENDED RELEASE ORAL at 20:28

## 2024-03-23 RX ADMIN — FLUTICASONE PROPIONATE 1 SPRAY: 50 SPRAY, METERED NASAL at 08:28

## 2024-03-23 RX ADMIN — ENOXAPARIN SODIUM 40 MG: 40 INJECTION SUBCUTANEOUS at 16:12

## 2024-03-23 RX ADMIN — METOPROLOL SUCCINATE 50 MG: 50 TABLET, EXTENDED RELEASE ORAL at 10:15

## 2024-03-23 RX ADMIN — GUAIFENESIN 600 MG: 600 TABLET, EXTENDED RELEASE ORAL at 20:26

## 2024-03-23 RX ADMIN — Medication 2 SPRAY: at 00:18

## 2024-03-23 RX ADMIN — LORATADINE 5 MG: 10 TABLET ORAL at 08:28

## 2024-03-23 RX ADMIN — TAMSULOSIN HYDROCHLORIDE 0.4 MG: 0.4 CAPSULE ORAL at 16:12

## 2024-03-23 RX ADMIN — Medication 2 SPRAY: at 17:47

## 2024-03-23 RX ADMIN — PAROXETINE HYDROCHLORIDE 10 MG: 20 TABLET, FILM COATED ORAL at 08:28

## 2024-03-23 RX ADMIN — NYSTATIN 1 APPLICATION: 100000 POWDER TOPICAL at 13:12

## 2024-03-23 NOTE — PROGRESS NOTES
03/23/24 1040   Pain Assessment   Pain Assessment Tool 0-10   Pain Score No Pain   Restrictions/Precautions   Precautions O2;Fall Risk   Cognition   Overall Cognitive Status WFL   Arousal/Participation Alert;Cooperative   Attention Within functional limits   Orientation Level Oriented X4   Memory Within functional limits   Following Commands Follows all commands and directions without difficulty   Roll Left and Right   Type of Assistance Needed Independent   Roll Left and Right CARE Score 6   Sit to Lying   Type of Assistance Needed Independent   Sit to Lying CARE Score 6   Lying to Sitting on Side of Bed   Type of Assistance Needed Independent   Lying to Sitting on Side of Bed CARE Score 6   Sit to Stand   Type of Assistance Needed Supervision;Adaptive equipment   Comment with RW   Sit to Stand CARE Score 4   Bed-Chair Transfer   Type of Assistance Needed Supervision;Adaptive equipment   Comment with RW   Chair/Bed-to-Chair Transfer CARE Score 4   Walk 10 Feet   Type of Assistance Needed Supervision;Adaptive equipment   Comment with RW or rollator   Walk 10 Feet CARE Score 4   Walk 50 Feet with Two Turns   Type of Assistance Needed Supervision;Adaptive equipment   Comment with RW or rollator   Walk 50 Feet with Two Turns CARE Score 4   Walk 150 Feet   Type of Assistance Needed Supervision;Adaptive equipment   Comment with RW or rollator   Walk 150 Feet CARE Score 4   Ambulation   Primary Mode of Locomotion Prior to Admission Walk   Distance Walked (feet) 350 ft  (125 ft, 350 ft)   Assist Device Roller Walker;Rollator   Gait Pattern Slow Anahi   Limitations Noted In Endurance   Does the patient walk? 2. Yes   Wheel 50 Feet with Two Turns   Reason if not Attempted Activity not applicable   Wheel 50 Feet with Two Turns CARE Score 9   Wheel 150 Feet   Reason if not Attempted Activity not applicable   Wheel 150 Feet CARE Score 9   Curb or Single Stair   Style negotiated Single stair   Type of Assistance Needed  Supervision;Adaptive equipment   Comment CS to manage single six 6inch step on  steps with RW   1 Step (Curb) CARE Score 4   4 Steps   Type of Assistance Needed Supervision;Adaptive equipment   Comment Patient ascend/descended 2 six inch  steps for total of 4 with RW. Ascend forwards/descend backwards   4 Steps CARE Score 4   12 Steps   Reason if not Attempted Activity not applicable   12 Steps CARE Score 9   Therapeutic Interventions   Strengthening STS 4# ball 1x10, 2x5; Modified mountain climbers at countertop 2x20; Side Step Ambulation with RTB 2x30 ft   Equipment Use   NuStep Level 2 x 12 minutes with LEs   Other Comments   Comments Patient was on 2 L of O2 during entirety of session. Pt remained above 90% on 2L of O2 with one brief instance of dropping to 85% which recovered under 30 seconds to greater than 90% on 2L of O2.   Assessment   Treatment Assessment Patient participated in 60 minute skilled physical therapy session focusing on transfers, gait training focusing on longer distance, and LE strengthening. Confirmed with patient that he was ordered RW and rollator for at home. Biggest barrier at this time continues to be patient's endurance level. Plan is for patient to transition to cardiopulmonary rehab upon discharge. Patient to be progressed to IRP on Monday with RW. Patient discharging home on Wednesday, 3/27 with supportive wife.   Problem List Impaired balance;Decreased safety awareness;Decreased endurance;Decreased strength   Barriers to Discharge Inaccessible home environment   PT Barriers   Functional Limitation Car transfers;Stair negotiation;Walking   Plan   Treatment/Interventions LE strengthening/ROM;Therapeutic exercise;Endurance training;Gait training   Progress Progressing toward goals   Discharge Recommendation   Equipment Recommended   (Rollator/RW)   PT Therapy Minutes   PT Time In 1040   PT Time Out 1140   PT Total Time (minutes) 60   PT Mode of treatment - Individual  (minutes) 60   PT Mode of treatment - Concurrent (minutes) 0   PT Mode of treatment - Group (minutes) 0   PT Mode of treatment - Co-treat (minutes) 0   PT Mode of Treatment - Total time(minutes) 60 minutes   PT Cumulative Minutes 838   Therapy Time missed   Time missed? No     Valencia Catherine, PT, DPT

## 2024-03-23 NOTE — PROGRESS NOTES
03/23/24 0830   Pain Assessment   Pain Assessment Tool 0-10   Pain Score No Pain   Restrictions/Precautions   Precautions O2;Fall Risk   Weight Bearing Restrictions No   ROM Restrictions No   Lifestyle   Autonomy pt was agreebale to particiapte in session   Sit to Stand   Type of Assistance Needed Supervision   Physical Assistance Level No physical assistance   Comment w RW   Sit to Stand CARE Score 4   Bed-Chair Transfer   Type of Assistance Needed Supervision   Physical Assistance Level No physical assistance   Comment RW   Chair/Bed-to-Chair Transfer CARE Score 4   Exercise Tools   Exercise Tools Yes   UE Ergometer pt enaged in 5 sets of 3 mins seated at UEB with resistance at 1.5 to focus on increasing his endurance for IND in preparation for d/c on 3/27. pt completed 3 sets forward and 2 sets backwards. pt was kept at 3L of O2 ranging from 90-94% with a HR ranging from 122-125.   Theraband pt engaged in HEP focusing on endurance and strength in UE using red theraband. pt completed 3 sets of 10 pull aparts in stance. O2 was steady at 90% for completion of set. pt became fatiugued and dropped down to 2 sets of 10 for anchored pull and side anchored pull still completed in stance. O2 remained between 93-95%. pt was provided rest breaks in between each set.   Cognition   Overall Cognitive Status WFL   Arousal/Participation Alert;Cooperative   Attention Within functional limits   Orientation Level Oriented X4   Memory Within functional limits   Following Commands Follows all commands and directions without difficulty   Assessment   Treatment Assessment pt engaged in a 60 min OT session focusing on increased endurance, strength for func mobility, activity tolerance and IND in ADLs. Pt mentioned that he felt good today and is looking forward to going home. pt is still limited due to fatigue and SOB. pt is aware that wife will have to help manage more then he is use to. Pt shows awareness in safety and O2 management  during func transfers. Pt is recommneded to cont OT for increased endurance and strength for max IND for d/c on 3/27. plan to asses for and progress to IRP on monday.   Prognosis Good   Problem List Impaired balance;Decreased safety awareness;Decreased endurance;Decreased strength   Plan   Treatment/Interventions Endurance training;Therapeutic exercise;Functional transfer training;Patient/family training;Equipment eval/education;Compensatory technique education;ADL retraining   Progress Improving as expected   OT Therapy Minutes   OT Time In 0830   OT Time Out 0930   OT Total Time (minutes) 60   OT Mode of treatment - Individual (minutes) 60   OT Mode of treatment - Concurrent (minutes) 0   OT Mode of treatment - Group (minutes) 0   OT Mode of treatment - Co-treat (minutes) 0   OT Mode of Treatment - Total time(minutes) 60 minutes   OT Cumulative Minutes 660   Therapy Time missed   Time missed? No

## 2024-03-23 NOTE — PLAN OF CARE
Problem: MOBILITY - ADULT  Goal: Maintain or return to baseline ADL function  Description: INTERVENTIONS:  -  Assess patient's ability to carry out ADLs; assess patient's baseline for ADL function and identify physical deficits which impact ability to perform ADLs (bathing, care of mouth/teeth, toileting, grooming, dressing, etc.)  - Assess/evaluate cause of self-care deficits   - Assess range of motion  - Assess patient's mobility; develop plan if impaired  - Assess patient's need for assistive devices and provide as appropriate  - Encourage maximum independence but intervene and supervise when necessary  - Involve family in performance of ADLs  - Assess for home care needs following discharge   - Consider OT consult to assist with ADL evaluation and planning for discharge  - Provide patient education as appropriate  Outcome: Progressing  Goal: Maintains/Returns to pre admission functional level  Description: INTERVENTIONS:  - Perform AM-PAC 6 Click Basic Mobility/ Daily Activity assessment daily.  - Set and communicate daily mobility goal to care team and patient/family/caregiver.   - Collaborate with rehabilitation services on mobility goals if consulted  - Perform Range of Motion  times a day.  - Reposition patient every  hours.  - Dangle patient  times a day  - Stand patient  times a day  - Ambulate patient  times a day  - Out of bed to chair  times a day   - Out of bed for meals  times a day  - Out of bed for toileting  - Record patient progress and toleration of activity level   Outcome: Progressing     Problem: PAIN - ADULT  Goal: Verbalizes/displays adequate comfort level or baseline comfort level  Description: Interventions:  - Encourage patient to monitor pain and request assistance  - Assess pain using appropriate pain scale  - Administer analgesics based on type and severity of pain and evaluate response  - Implement non-pharmacological measures as appropriate and evaluate response  - Consider  cultural and social influences on pain and pain management  - Notify physician/advanced practitioner if interventions unsuccessful or patient reports new pain  Outcome: Progressing     Problem: INFECTION - ADULT  Goal: Absence or prevention of progression during hospitalization  Description: INTERVENTIONS:  - Assess and monitor for signs and symptoms of infection  - Monitor lab/diagnostic results  - Monitor all insertion sites, i.e. indwelling lines, tubes, and drains  - Monitor endotracheal if appropriate and nasal secretions for changes in amount and color  - [place] appropriate cooling/warming therapies per order  - Administer medications as ordered  - Instruct and encourage patient and family to use good hand hygiene technique  - Identify and instruct in appropriate isolation precautions for identified infection/condition  Outcome: Progressing  Goal: Absence of fever/infection during neutropenic period  Description: INTERVENTIONS:  - Monitor WBC    Outcome: Progressing     Problem: SAFETY ADULT  Goal: Patient will remain free of falls  Description: INTERVENTIONS:  - Educate patient/family on patient safety including physical limitations  - Instruct patient to call for assistance with activity   - Consult OT/PT to assist with strengthening/mobility   - Keep Call bell within reach  - Keep bed low and locked with side rails adjusted as appropriate  - Keep care items and personal belongings within reach  - Initiate and maintain comfort rounds  - Make Fall Risk Sign visible to staff  - Offer Toileting every  Hours, in advance of need  - Initiate/Maintain alarm  - Obtain necessary fall risk management equipment:   - Apply yellow socks and bracelet for high fall risk patients  - Consider moving patient to room near nurses station  Outcome: Progressing  Goal: Maintain or return to baseline ADL function  Description: INTERVENTIONS:  -  Assess patient's ability to carry out ADLs; assess patient's baseline for ADL  function and identify physical deficits which impact ability to perform ADLs (bathing, care of mouth/teeth, toileting, grooming, dressing, etc.)  - Assess/evaluate cause of self-care deficits   - Assess range of motion  - Assess patient's mobility; develop plan if impaired  - Assess patient's need for assistive devices and provide as appropriate  - Encourage maximum independence but intervene and supervise when necessary  - Involve family in performance of ADLs  - Assess for home care needs following discharge   - Consider OT consult to assist with ADL evaluation and planning for discharge  - Provide patient education as appropriate  Outcome: Progressing  Goal: Maintains/Returns to pre admission functional level  Description: INTERVENTIONS:  - Perform AM-PAC 6 Click Basic Mobility/ Daily Activity assessment daily.  - Set and communicate daily mobility goal to care team and patient/family/caregiver.   - Collaborate with rehabilitation services on mobility goals if consulted  - Perform Range of Motion  times a day.  - Reposition patient every  hours.  - Dangle patient  times a day  - Stand patient  times a day  - Ambulate patient  times a day  - Out of bed to chair  times a day   - Out of bed for meals  times a day  - Out of bed for toileting  - Record patient progress and toleration of activity level   Outcome: Progressing     Problem: DISCHARGE PLANNING  Goal: Discharge to home or other facility with appropriate resources  Description: INTERVENTIONS:  - Identify barriers to discharge w/patient and caregiver  - Arrange for needed discharge resources and transportation as appropriate  - Identify discharge learning needs (meds, wound care, etc.)  - Arrange for interpretive services to assist at discharge as needed  - Refer to Case Management Department for coordinating discharge planning if the patient needs post-hospital services based on physician/advanced practitioner order or complex needs related to functional  status, cognitive ability, or social support system  Outcome: Progressing

## 2024-03-23 NOTE — PROGRESS NOTES
03/23/24 1330   Pain Assessment   Pain Assessment Tool 0-10   Pain Score No Pain   Restrictions/Precautions   Precautions O2;Fall Risk   Cognition   Overall Cognitive Status WFL   Arousal/Participation Alert;Cooperative   Attention Within functional limits   Orientation Level Oriented X4   Memory Within functional limits   Following Commands Follows all commands and directions without difficulty   Roll Left and Right   Type of Assistance Needed Independent   Roll Left and Right CARE Score 6   Sit to Lying   Type of Assistance Needed Independent   Sit to Lying CARE Score 6   Lying to Sitting on Side of Bed   Type of Assistance Needed Independent   Lying to Sitting on Side of Bed CARE Score 6   Sit to Stand   Type of Assistance Needed Adaptive equipment;Set-up / clean-up   Comment DS with RW   Sit to Stand CARE Score 5   Bed-Chair Transfer   Type of Assistance Needed Adaptive equipment;Set-up / clean-up   Comment DS with RW   Chair/Bed-to-Chair Transfer CARE Score 5   Car Transfer   Type of Assistance Needed Supervision;Adaptive equipment   Comment Mimicked car transfer training in PT gym. Hi/low mat raised to 28 inches and 8 inch stool used to replicated foot/running board on patient's trunk. Pt was able to complete transfer with supervision for safety.   Car Transfer CARE Score 4   Walk 10 Feet   Type of Assistance Needed Set-up / clean-up;Adaptive equipment   Comment DS with RW   Walk 10 Feet CARE Score 5   Walk 50 Feet with Two Turns   Type of Assistance Needed Supervision;Adaptive equipment   Comment CS with RW   Walk 50 Feet with Two Turns CARE Score 4   Walk 150 Feet   Type of Assistance Needed Supervision;Adaptive equipment   Comment CS with RW   Walk 150 Feet CARE Score 4   Walking 10 Feet on Uneven Surfaces   Type of Assistance Needed Supervision;Adaptive equipment   Comment with rollator over 30 ft ramp. on first floor. good control of rollator when descending.   Walking 10 Feet on Uneven Surfaces CARE  Score 4   Ambulation   Primary Mode of Locomotion Prior to Admission Walk   Distance Walked (feet) 400 ft  (125 ft, 200 ft, 68 ft)   Assist Device Roller Walker;Rollator   Gait Pattern Slow Anahi   Limitations Noted In Endurance   Walk Assist Level Distant Supervision;Close Supervision   Findings Was on 3L of O2 for all functional mobility this afternoon to ensure oxygen saturation remained above 90%. Coughing fits throughout the session contributed to desaturation when pt was on 2 L of O2.   Does the patient walk? 2. Yes   Wheel 50 Feet with Two Turns   Reason if not Attempted Activity not applicable   Wheel 50 Feet with Two Turns CARE Score 9   Wheel 150 Feet   Reason if not Attempted Activity not applicable   Wheel 150 Feet CARE Score 9   Curb or Single Stair   Style negotiated Curb   Type of Assistance Needed Supervision;Adaptive equipment   Comment CS to ascend/descend 6 inch curb with rollator, ascend forward/descend backward. Pt able to safely manage rollator without assistance.   1 Step (Curb) CARE Score 4   Picking Up Object   Type of Assistance Needed Set-up / clean-up;Adaptive equipment   Comment DS with reacher in standing position, trialed without AD and required CGA for safety   Picking Up Object CARE Score 5   Therapeutic Interventions   Other in preparation for inroom privileges on Monday, in patient's room had him practice O2 line management with RW. Patient does does like to have O2 extension tubbing on while in bed. From seated position on bed, patient practiced replacing and removing extension tubbing which he completed it safely. Pt was then able to manage O2 tubbing while ambulating with the bathroom. One incident, tubbning was caught under RW wheel but pt was able to self correct.   Assessment   Treatment Assessment Patient participated in 60 minute skilled physical therapy session focusing on transfers, longer distance ambulation, and mimicing car transfer in PT gym. Pt with cough fit at  beginning of PT session. Oxygen desaturated to 83% on 2L of O2 resulting O2 being increased to 3 L. Remained at 3L throughout the session. For longer distance ambulation, required 3L of O2 to remain above 90%.   Problem List Decreased mobility;Decreased endurance   Barriers to Discharge Inaccessible home environment   PT Barriers   Functional Limitation Car transfers;Transfers;Walking;Stair negotiation   Plan   Treatment/Interventions Functional transfer training;LE strengthening/ROM;Therapeutic exercise;Endurance training;Gait training   Progress Progressing toward goals   Discharge Recommendation   Rehab Resource Intensity Level, PT   (cardiopulmonary rehab)   Equipment Recommended   (RW/rollator)   PT Therapy Minutes   PT Time In 1330   PT Time Out 1430   PT Total Time (minutes) 60   PT Mode of treatment - Individual (minutes) 60   PT Mode of treatment - Concurrent (minutes) 0   PT Mode of treatment - Group (minutes) 0   PT Mode of treatment - Co-treat (minutes) 0   PT Mode of Treatment - Total time(minutes) 60 minutes   PT Cumulative Minutes 898     Valencia Catherine, PT, DPT

## 2024-03-24 PROCEDURE — 99232 SBSQ HOSP IP/OBS MODERATE 35: CPT | Performed by: INTERNAL MEDICINE

## 2024-03-24 RX ADMIN — NYSTATIN: 100000 POWDER TOPICAL at 17:09

## 2024-03-24 RX ADMIN — METOPROLOL SUCCINATE 50 MG: 50 TABLET, EXTENDED RELEASE ORAL at 21:02

## 2024-03-24 RX ADMIN — NYSTATIN 1 APPLICATION: 100000 POWDER TOPICAL at 08:55

## 2024-03-24 RX ADMIN — Medication 2 SPRAY: at 05:38

## 2024-03-24 RX ADMIN — OXYCODONE HYDROCHLORIDE 5 MG: 5 TABLET ORAL at 07:44

## 2024-03-24 RX ADMIN — PAROXETINE HYDROCHLORIDE 10 MG: 20 TABLET, FILM COATED ORAL at 08:55

## 2024-03-24 RX ADMIN — TAMSULOSIN HYDROCHLORIDE 0.4 MG: 0.4 CAPSULE ORAL at 16:01

## 2024-03-24 RX ADMIN — FLUTICASONE PROPIONATE 1 SPRAY: 50 SPRAY, METERED NASAL at 08:55

## 2024-03-24 RX ADMIN — ENOXAPARIN SODIUM 40 MG: 40 INJECTION SUBCUTANEOUS at 16:01

## 2024-03-24 RX ADMIN — GUAIFENESIN 600 MG: 600 TABLET, EXTENDED RELEASE ORAL at 21:02

## 2024-03-24 RX ADMIN — LORATADINE 5 MG: 10 TABLET ORAL at 08:57

## 2024-03-24 RX ADMIN — Medication 2 SPRAY: at 00:26

## 2024-03-24 RX ADMIN — Medication 2 SPRAY: at 17:09

## 2024-03-24 RX ADMIN — Medication 2 SPRAY: at 12:29

## 2024-03-24 RX ADMIN — GUAIFENESIN 600 MG: 600 TABLET, EXTENDED RELEASE ORAL at 08:55

## 2024-03-24 RX ADMIN — METOPROLOL SUCCINATE 50 MG: 50 TABLET, EXTENDED RELEASE ORAL at 09:00

## 2024-03-24 NOTE — PROGRESS NOTES
[facility]  Progress Note  Name: Hussein Edward III I  MRN: [id number]  Unit/Bed#: -01 I Date of Admission: 3/14/2024   Date of Service: 3/24/2024 I Hospital Day: 10    Assessment/Plan   Acute respiratory failure with hypoxia (HCC)  Assessment & Plan  Secondary to influenza A/superimposed bacterial infection  S/p intubation 2/19 through 2/29  Bronchoscopy 2/19 positive for Candida glabrata, no bacteria  S/p antibiotics  Recent chest x-ray 3/12 was unchanged  Pulmonology recommends F/U CT of chest in 4 weeks (4/4/24)  Continue Mucinex.  Continue to attempt to wean oxygen  Sats are improving         PAF (paroxysmal atrial fibrillation) (HCC)  Assessment & Plan  D/T critical illness  Converted with IV amiodarone  Echo as above  F/U cardiology as outpatient      Tachycardia  Assessment & Plan  Changed toprol to 50mg bid and lower hold parameters to 100  Echo = EF normal/grade 1 diastolic dysfunction  Likely physiological/due to anxiety and chronic at baseline in 's  F/U cardiology on discharge  3/18-patient with palpitations/fatigue, no chest pain/leg swelling  W/u negative  Continue to monitor symptoms  Improved and tolerating BB as above    WALE (obstructive sleep apnea)  Assessment & Plan  Machine from home brought in as hospital machine was too strong.  Reportedly has intolerance to CPAP  Possible plan for a DISE as outpatient with ENT  Continue current settings.                 The above assessment and plan was reviewed and updated as determined by my evaluation of the patient on 3/24/2024.    Labs:   Results from last 7 days   Lab Units 03/18/24  1059   WBC Thousand/uL 9.69   HEMOGLOBIN g/dL 12.1   HEMATOCRIT % 37.0   PLATELETS Thousands/uL 184     Results from last 7 days   Lab Units 03/18/24  1059   SODIUM mmol/L 141   POTASSIUM mmol/L 4.3   CHLORIDE mmol/L 104   CO2 mmol/L 31   BUN mg/dL 14   CREATININE mg/dL 0.70   CALCIUM mg/dL 9.2                   Imaging  No  orders to display       Review of Scheduled Meds:  Current Facility-Administered Medications   Medication Dose Route Frequency Provider Last Rate    acetaminophen  650 mg Oral Q6H PRN Tomasa Whitlock PA-C      albuterol  2 puff Inhalation Q4H PRN Tomasa Whitlock PA-C      enoxaparin  40 mg Subcutaneous Q24H YEHUDA Tomasa Whitlock PA-C      fluticasone  1 spray Each Nare Daily Ashley Depadua, MD      guaiFENesin  600 mg Oral Q12H YEHUDA Tomasa Whitlock PA-C      loratadine  5 mg Oral Daily Tomasa Whitlock PA-C      LORazepam  0.5 mg Oral Q8H PRN Tomasa Whitlock PA-C      melatonin  3 mg Oral HS PRN Tomasa Whitlock PA-C      metoprolol succinate  50 mg Oral Q12H Lalita Arechiga, CRNP      nystatin   Topical BID Lalita Arechiga, CRNP      oxyCODONE  5 mg Oral Q6H PRN Tomasa Whitlock PA-C      PARoxetine  10 mg Oral Daily Tomasa Whitlock PA-C      sodium chloride  2 spray Each Nare Q6H Lalita Arechiga, CRNP      tamsulosin  0.4 mg Oral Daily With Dinner Tomasa Whitlock PA-C         Subjective/ HPI: Patient seen and examined. Patients overnight issues or events were reviewed with nursing staff. New or overnight issues include the following:     Pt seen and examined at bedside. No overnight issues. Breathing is stable, looking forward to having off today from therapy.     ROS:   A 10 point ROS was performed; negative except as noted above.        *Labs /Radiology studies Reviewed  *Medications  reviewed and reconciled as needed  *Please refer to order section for additional ordered labs studies      Physical Examination:  Vitals:   Vitals:    03/23/24 1015 03/23/24 1317 03/23/24 2055 03/24/24 0540   BP: 98/64 118/70 122/66 124/77   BP Location:  Right arm Left arm Right arm   Pulse: 104 86 87 89   Resp:  18 16 16   Temp:  97.8 °F (36.6 °C) 98 °F (36.7 °C) 97.5 °F (36.4 °C)   TempSrc:  Oral Oral Oral   SpO2:  96% 97% 96%   Weight:       Height:           General Appearance: NAD;  pleasant  HEENT: PERRLA, conjuctiva normal; mucous membranes moist; face symmetrical  Neck:  Supple  Lungs: clear bilaterally but decreased, normal respiratory effort, no retractions, expiratory effort normal, 2L NC  CV: regular rate and rhythm, no murmurs rubs or gallops noted   ABD: soft non tender, +BS x4  EXT: DP pulses intact, no lymphadenopathy, no edema  Skin: normal turgor, normal texture, no rash  Psych: affect normal, mood normal  Neuro: AAOx3       The above physical exam was reviewed and updated as determined by my evaluation of the patient on 3/24/2024.    Invasive Devices       None                      VTE Pharmacologic Prophylaxis: Enoxaparin  Code Status: Level 1 - Full Code  Current Length of Stay: 10 day(s)    Total floor / unit time spent today 30 minutes  Coordination of patient's care was performed in conjunction with primary service. Time invested included review of patient's labs, vitals, and management of their comorbidities with continued monitoring, examination of patient as well as answering patient questions, documenting her findings and creating progress note in electronic medical record,  ordering appropriate diagnostic testing.       ** Please Note:  voice to text software may have been used in the creation of this document. Although proof errors in transcription or interpretation are a potential of such software**

## 2024-03-24 NOTE — ASSESSMENT & PLAN NOTE
Changed toprol to 50mg bid and lower hold parameters to 100  Echo = EF normal/grade 1 diastolic dysfunction  Likely physiological/due to anxiety and chronic at baseline in 's  F/U cardiology on discharge  3/18-patient with palpitations/fatigue, no chest pain/leg swelling  W/u negative  Continue to monitor symptoms  Improved and tolerating BB as above

## 2024-03-24 NOTE — PLAN OF CARE
Problem: MOBILITY - ADULT  Goal: Maintain or return to baseline ADL function  Description: INTERVENTIONS:  -  Assess patient's ability to carry out ADLs; assess patient's baseline for ADL function and identify physical deficits which impact ability to perform ADLs (bathing, care of mouth/teeth, toileting, grooming, dressing, etc.)  - Assess/evaluate cause of self-care deficits   - Assess range of motion  - Assess patient's mobility; develop plan if impaired  - Assess patient's need for assistive devices and provide as appropriate  - Encourage maximum independence but intervene and supervise when necessary  - Involve family in performance of ADLs  - Assess for home care needs following discharge   - Consider OT consult to assist with ADL evaluation and planning for discharge  - Provide patient education as appropriate  Outcome: Progressing  Goal: Maintains/Returns to pre admission functional level  Description: INTERVENTIONS:  - Perform AM-PAC 6 Click Basic Mobility/ Daily Activity assessment daily.  - Set and communicate daily mobility goal to care team and patient/family/caregiver.   - Collaborate with rehabilitation services on mobility goals if consulted  - Perform Range of Motion  times a day.  - Reposition patient every  hours.  - Dangle patient  times a day  - Stand patient  times a day  - Ambulate patient  times a day  - Out of bed to chair  times a day   - Out of bed for meals  times a day  - Out of bed for toileting  - Record patient progress and toleration of activity level   Outcome: Progressing     Problem: PAIN - ADULT  Goal: Verbalizes/displays adequate comfort level or baseline comfort level  Description: Interventions:  - Encourage patient to monitor pain and request assistance  - Assess pain using appropriate pain scale  - Administer analgesics based on type and severity of pain and evaluate response  - Implement non-pharmacological measures as appropriate and evaluate response  - Consider  cultural and social influences on pain and pain management  - Notify physician/advanced practitioner if interventions unsuccessful or patient reports new pain  Outcome: Progressing     Problem: INFECTION - ADULT  Goal: Absence or prevention of progression during hospitalization  Description: INTERVENTIONS:  - Assess and monitor for signs and symptoms of infection  - Monitor lab/diagnostic results  - Monitor all insertion sites, i.e. indwelling lines, tubes, and drains  - Monitor endotracheal if appropriate and nasal secretions for changes in amount and color  - [place] appropriate cooling/warming therapies per order  - Administer medications as ordered  - Instruct and encourage patient and family to use good hand hygiene technique  - Identify and instruct in appropriate isolation precautions for identified infection/condition  Outcome: Progressing  Goal: Absence of fever/infection during neutropenic period  Description: INTERVENTIONS:  - Monitor WBC    Outcome: Progressing     Problem: SAFETY ADULT  Goal: Patient will remain free of falls  Description: INTERVENTIONS:  - Educate patient/family on patient safety including physical limitations  - Instruct patient to call for assistance with activity   - Consult OT/PT to assist with strengthening/mobility   - Keep Call bell within reach  - Keep bed low and locked with side rails adjusted as appropriate  - Keep care items and personal belongings within reach  - Initiate and maintain comfort rounds  - Make Fall Risk Sign visible to staff  - Offer Toileting every  Hours, in advance of need  - Initiate/Maintain alarm  - Obtain necessary fall risk management equipment:   - Apply yellow socks and bracelet for high fall risk patients  - Consider moving patient to room near nurses station  Outcome: Progressing  Goal: Maintain or return to baseline ADL function  Description: INTERVENTIONS:  -  Assess patient's ability to carry out ADLs; assess patient's baseline for ADL  function and identify physical deficits which impact ability to perform ADLs (bathing, care of mouth/teeth, toileting, grooming, dressing, etc.)  - Assess/evaluate cause of self-care deficits   - Assess range of motion  - Assess patient's mobility; develop plan if impaired  - Assess patient's need for assistive devices and provide as appropriate  - Encourage maximum independence but intervene and supervise when necessary  - Involve family in performance of ADLs  - Assess for home care needs following discharge   - Consider OT consult to assist with ADL evaluation and planning for discharge  - Provide patient education as appropriate  Outcome: Progressing  Goal: Maintains/Returns to pre admission functional level  Description: INTERVENTIONS:  - Perform AM-PAC 6 Click Basic Mobility/ Daily Activity assessment daily.  - Set and communicate daily mobility goal to care team and patient/family/caregiver.   - Collaborate with rehabilitation services on mobility goals if consulted  - Perform Range of Motion  times a day.  - Reposition patient every  hours.  - Dangle patient  times a day  - Stand patient multiple times a day  - Ambulate patient multiple times a day  - Out of bed to chair multiple times a day   - Out of bed for meals  times a day  - Out of bed for toileting  - Record patient progress and toleration of activity level   Outcome: Progressing     Problem: DISCHARGE PLANNING  Goal: Discharge to home or other facility with appropriate resources  Description: INTERVENTIONS:  - Identify barriers to discharge w/patient and caregiver  - Arrange for needed discharge resources and transportation as appropriate  - Identify discharge learning needs (meds, wound care, etc.)  - Arrange for interpretive services to assist at discharge as needed  - Refer to Case Management Department for coordinating discharge planning if the patient needs post-hospital services based on physician/advanced practitioner order or complex needs  related to functional status, cognitive ability, or social support system  Outcome: Progressing

## 2024-03-24 NOTE — ASSESSMENT & PLAN NOTE
Secondary to influenza A/superimposed bacterial infection  S/p intubation 2/19 through 2/29  Bronchoscopy 2/19 positive for Candida glabrata, no bacteria  S/p antibiotics  Recent chest x-ray 3/12 was unchanged  Pulmonology recommends F/U CT of chest in 4 weeks (4/4/24)  Continue Mucinex.  Continue to attempt to wean oxygen  Sats are improving

## 2024-03-25 LAB
ANION GAP SERPL CALCULATED.3IONS-SCNC: 9 MMOL/L (ref 4–13)
BASOPHILS # BLD AUTO: 0.1 THOUSANDS/ÂΜL (ref 0–0.1)
BASOPHILS NFR BLD AUTO: 2 % (ref 0–1)
BUN SERPL-MCNC: 13 MG/DL (ref 5–25)
CALCIUM SERPL-MCNC: 9.2 MG/DL (ref 8.4–10.2)
CHLORIDE SERPL-SCNC: 105 MMOL/L (ref 96–108)
CO2 SERPL-SCNC: 27 MMOL/L (ref 21–32)
CREAT SERPL-MCNC: 0.56 MG/DL (ref 0.6–1.3)
EOSINOPHIL # BLD AUTO: 0.52 THOUSAND/ÂΜL (ref 0–0.61)
EOSINOPHIL NFR BLD AUTO: 9 % (ref 0–6)
ERYTHROCYTE [DISTWIDTH] IN BLOOD BY AUTOMATED COUNT: 13.3 % (ref 11.6–15.1)
GFR SERPL CREATININE-BSD FRML MDRD: 115 ML/MIN/1.73SQ M
GLUCOSE P FAST SERPL-MCNC: 87 MG/DL (ref 65–99)
GLUCOSE SERPL-MCNC: 87 MG/DL (ref 65–140)
HCT VFR BLD AUTO: 35.2 % (ref 36.5–49.3)
HGB BLD-MCNC: 11.3 G/DL (ref 12–17)
IMM GRANULOCYTES # BLD AUTO: 0.04 THOUSAND/UL (ref 0–0.2)
IMM GRANULOCYTES NFR BLD AUTO: 1 % (ref 0–2)
LYMPHOCYTES # BLD AUTO: 1.23 THOUSANDS/ÂΜL (ref 0.6–4.47)
LYMPHOCYTES NFR BLD AUTO: 22 % (ref 14–44)
MCH RBC QN AUTO: 29.1 PG (ref 26.8–34.3)
MCHC RBC AUTO-ENTMCNC: 32.1 G/DL (ref 31.4–37.4)
MCV RBC AUTO: 91 FL (ref 82–98)
MONOCYTES # BLD AUTO: 0.53 THOUSAND/ÂΜL (ref 0.17–1.22)
MONOCYTES NFR BLD AUTO: 9 % (ref 4–12)
NEUTROPHILS # BLD AUTO: 3.21 THOUSANDS/ÂΜL (ref 1.85–7.62)
NEUTS SEG NFR BLD AUTO: 57 % (ref 43–75)
NRBC BLD AUTO-RTO: 0 /100 WBCS
PLATELET # BLD AUTO: 190 THOUSANDS/UL (ref 149–390)
PMV BLD AUTO: 8.4 FL (ref 8.9–12.7)
POTASSIUM SERPL-SCNC: 3.8 MMOL/L (ref 3.5–5.3)
RBC # BLD AUTO: 3.88 MILLION/UL (ref 3.88–5.62)
SODIUM SERPL-SCNC: 141 MMOL/L (ref 135–147)
WBC # BLD AUTO: 5.63 THOUSAND/UL (ref 4.31–10.16)

## 2024-03-25 PROCEDURE — 97535 SELF CARE MNGMENT TRAINING: CPT

## 2024-03-25 PROCEDURE — 99232 SBSQ HOSP IP/OBS MODERATE 35: CPT | Performed by: INTERNAL MEDICINE

## 2024-03-25 PROCEDURE — 97110 THERAPEUTIC EXERCISES: CPT

## 2024-03-25 PROCEDURE — 97110 THERAPEUTIC EXERCISES: CPT | Performed by: PHYSICAL THERAPIST

## 2024-03-25 PROCEDURE — 97530 THERAPEUTIC ACTIVITIES: CPT | Performed by: PHYSICAL THERAPIST

## 2024-03-25 PROCEDURE — 85025 COMPLETE CBC W/AUTO DIFF WBC: CPT | Performed by: NURSE PRACTITIONER

## 2024-03-25 PROCEDURE — 97116 GAIT TRAINING THERAPY: CPT | Performed by: PHYSICAL THERAPIST

## 2024-03-25 PROCEDURE — 99232 SBSQ HOSP IP/OBS MODERATE 35: CPT | Performed by: PHYSICAL MEDICINE & REHABILITATION

## 2024-03-25 PROCEDURE — 80048 BASIC METABOLIC PNL TOTAL CA: CPT | Performed by: NURSE PRACTITIONER

## 2024-03-25 PROCEDURE — 97530 THERAPEUTIC ACTIVITIES: CPT

## 2024-03-25 RX ADMIN — LORATADINE 5 MG: 10 TABLET ORAL at 10:09

## 2024-03-25 RX ADMIN — GUAIFENESIN 600 MG: 600 TABLET, EXTENDED RELEASE ORAL at 10:09

## 2024-03-25 RX ADMIN — NYSTATIN: 100000 POWDER TOPICAL at 16:40

## 2024-03-25 RX ADMIN — TAMSULOSIN HYDROCHLORIDE 0.4 MG: 0.4 CAPSULE ORAL at 16:41

## 2024-03-25 RX ADMIN — Medication 2 SPRAY: at 00:47

## 2024-03-25 RX ADMIN — NYSTATIN: 100000 POWDER TOPICAL at 10:13

## 2024-03-25 RX ADMIN — GUAIFENESIN 600 MG: 600 TABLET, EXTENDED RELEASE ORAL at 21:40

## 2024-03-25 RX ADMIN — ENOXAPARIN SODIUM 40 MG: 40 INJECTION SUBCUTANEOUS at 16:41

## 2024-03-25 RX ADMIN — Medication 2 SPRAY: at 11:35

## 2024-03-25 RX ADMIN — METOPROLOL SUCCINATE 50 MG: 50 TABLET, EXTENDED RELEASE ORAL at 21:40

## 2024-03-25 RX ADMIN — METOPROLOL SUCCINATE 50 MG: 50 TABLET, EXTENDED RELEASE ORAL at 10:10

## 2024-03-25 RX ADMIN — ALBUTEROL SULFATE 2 PUFF: 90 AEROSOL, METERED RESPIRATORY (INHALATION) at 16:39

## 2024-03-25 RX ADMIN — Medication 2 SPRAY: at 16:42

## 2024-03-25 RX ADMIN — FLUTICASONE PROPIONATE 1 SPRAY: 50 SPRAY, METERED NASAL at 10:11

## 2024-03-25 RX ADMIN — PAROXETINE HYDROCHLORIDE 10 MG: 20 TABLET, FILM COATED ORAL at 10:09

## 2024-03-25 RX ADMIN — Medication 2 SPRAY: at 05:35

## 2024-03-25 NOTE — PROGRESS NOTES
"   03/25/24 8820   Pain Assessment   Pain Assessment Tool 0-10   Pain Score No Pain   Restrictions/Precautions   Precautions O2;Fall Risk   Lifestyle   Autonomy \"I'm feeling pretty confident\"   Sit to Stand   Type of Assistance Needed Independent   Comment mod I w/ RW, 3L O2 for activity w/ long O2 line in pts room   Sit to Stand CARE Score 6   Toileting Hygiene   Type of Assistance Needed Independent   Comment mod I w/ RW, 3L O2 for activity w/ long O2 line in pts room. Pt able to manage long O2 line IND   Toileting Hygiene CARE Score 6   Toilet Transfer   Type of Assistance Needed Independent   Comment mod I w/ RW, 3L O2 for activity w/ long O2 line in pts room. Pt able to manage long O2 line IND   Toilet Transfer CARE Score 6   Commmunity Re-entry   Community Re-entry Pt participated in functional mobility task with use of RW to simulate home and community distances required for ADL/IADL completion. Task focused on increasing functional activity tolerance, endurance, strength and dynamic standing balance to improve independence with ADL completion. Intro pt to object transportation options including walker trays. Pt trialed use of folding and standard RW tray, sucessful w/ both, also discussed use of tote bag for item transport. pt open to all suggestions and provided handouts of RW trays for pt to discuss w/ wife for purchase if he desires.   Health Management   Health Management (S)  Pt demo's good insight to frequent SpO2 checks, when he can trial activity on less O2 (ranged from RA but mostly 2-3L O2). Trialed several bouts of mobility including mobility in room to bathroom w/o O2, on these trials pts SpO2 drops to 80s, even w/ trials w/ 3L O2 pt briefly drops to 80s. Pulse almost consistently 108-118bpm for all mobilty trials. W/ rest, reapplication of O2 NC,  and pursed lip breathing pt's SpO2 returns to over 90%, often 95-96%. Pt will requrie O2 upon DC for safety to maintain SpO2 at safe levels for ADLs " and fxnl mobility.   Exercise Tools   UE Ergometer pt enaged in 4 sets of 3 mins seated at UEB with resistance at 1.5 to focus on increasing his activity tolerance. 2 set prograde, 2 sets retrograde. pt was kept at 2L of O2 ranging from 90-96% with a HR ranging from 118-124   Theraband pt engaged in HEP focusing on endurance and strength in UE using red theraband. pt completed 3 sets of 10 pull aparts in stance. 2L O2 donned t/o. SpO2 94%-95% and -112bpm.   Cognition   Overall Cognitive Status WFL   Arousal/Participation Alert;Cooperative   Attention Within functional limits   Orientation Level Oriented X4   Memory Within functional limits   Following Commands Follows all commands and directions without difficulty   Comments pleasant, cooperative. good insight related to monitoring SpO2 and managing O2 line   Activity Tolerance   Activity Tolerance Patient limited by fatigue   Other Comments   Assessment Assessed for IRP, pt now IRP w/ RW and 2-3L O2. Reviewed procedure for IRP pt in agreement. pt demo's Mod I for all mobility and Good safety insight regarding EC and monitoring his SpO2 and HR.   Assessment   Treatment Assessment OT session focusingon IRP assessement, UE TE, fxnl mobility, edu regarding RW tray/bags, activity tolerance. Pt in good spirits and ready for session, looking forward to DC home later this week. Following trials of mobility and activity on RA -3L O2 pt does consistently require O2 for safe ADL and mobility at this time. Anticipate pt will requrie O2 for home use upon DC. Pt on track for DC home from OT standpoint. Pt to participate in DC ADL tomorrow.   Prognosis Good   Problem List Decreased endurance;Impaired balance;Decreased mobility   Plan   Treatment/Interventions ADL retraining;Functional transfer training;Therapeutic exercise;Endurance training;Patient/family training;Equipment eval/education;Compensatory technique education;Continued evaluation;Spoke to nursing   Progress  Progressing toward goals   OT Therapy Minutes   OT Time In 1240   OT Time Out 1410   OT Total Time (minutes) 90   OT Mode of treatment - Individual (minutes) 90   OT Mode of treatment - Concurrent (minutes) 0   OT Mode of treatment - Group (minutes) 0   OT Mode of treatment - Co-treat (minutes) 0   OT Mode of Treatment - Total time(minutes) 90 minutes   OT Cumulative Minutes 750   Therapy Time missed   Time missed? No

## 2024-03-25 NOTE — PLAN OF CARE
Problem: MOBILITY - ADULT  Goal: Maintain or return to baseline ADL function  Description: INTERVENTIONS:  -  Assess patient's ability to carry out ADLs; assess patient's baseline for ADL function and identify physical deficits which impact ability to perform ADLs (bathing, care of mouth/teeth, toileting, grooming, dressing, etc.)  - Assess/evaluate cause of self-care deficits   - Assess range of motion  - Assess patient's mobility; develop plan if impaired  - Assess patient's need for assistive devices and provide as appropriate  - Encourage maximum independence but intervene and supervise when necessary  - Involve family in performance of ADLs  - Assess for home care needs following discharge   - Consider OT consult to assist with ADL evaluation and planning for discharge  - Provide patient education as appropriate  Outcome: Progressing  Goal: Maintains/Returns to pre admission functional level  Description: INTERVENTIONS:  - Perform AM-PAC 6 Click Basic Mobility/ Daily Activity assessment daily.  - Set and communicate daily mobility goal to care team and patient/family/caregiver.   - Collaborate with rehabilitation services on mobility goals if consulted  - Perform Range of Motion  times a day.  - Reposition patient every  hours.  - Dangle patient  times a day  - Stand patient  times a day  - Ambulate patient  times a day  - Out of bed to chair  times a day   - Out of bed for meals  times a day  - Out of bed for toileting  - Record patient progress and toleration of activity level   Outcome: Progressing     Problem: PAIN - ADULT  Goal: Verbalizes/displays adequate comfort level or baseline comfort level  Description: Interventions:  - Encourage patient to monitor pain and request assistance  - Assess pain using appropriate pain scale  - Administer analgesics based on type and severity of pain and evaluate response  - Implement non-pharmacological measures as appropriate and evaluate response  - Consider  cultural and social influences on pain and pain management  - Notify physician/advanced practitioner if interventions unsuccessful or patient reports new pain  Outcome: Progressing     Problem: INFECTION - ADULT  Goal: Absence or prevention of progression during hospitalization  Description: INTERVENTIONS:  - Assess and monitor for signs and symptoms of infection  - Monitor lab/diagnostic results  - Monitor all insertion sites, i.e. indwelling lines, tubes, and drains  - Monitor endotracheal if appropriate and nasal secretions for changes in amount and color  - [place] appropriate cooling/warming therapies per order  - Administer medications as ordered  - Instruct and encourage patient and family to use good hand hygiene technique  - Identify and instruct in appropriate isolation precautions for identified infection/condition  Outcome: Progressing  Goal: Absence of fever/infection during neutropenic period  Description: INTERVENTIONS:  - Monitor WBC    Outcome: Progressing     Problem: SAFETY ADULT  Goal: Patient will remain free of falls  Description: INTERVENTIONS:  - Educate patient/family on patient safety including physical limitations  - Instruct patient to call for assistance with activity   - Consult OT/PT to assist with strengthening/mobility   - Keep Call bell within reach  - Keep bed low and locked with side rails adjusted as appropriate  - Keep care items and personal belongings within reach  - Initiate and maintain comfort rounds  - Make Fall Risk Sign visible to staff  - Offer Toileting every  Hours, in advance of need  - Initiate/Maintain alarm  - Obtain necessary fall risk management equipment:   - Apply yellow socks and bracelet for high fall risk patients  - Consider moving patient to room near nurses station  Outcome: Progressing  Goal: Maintain or return to baseline ADL function  Description: INTERVENTIONS:  -  Assess patient's ability to carry out ADLs; assess patient's baseline for ADL  function and identify physical deficits which impact ability to perform ADLs (bathing, care of mouth/teeth, toileting, grooming, dressing, etc.)  - Assess/evaluate cause of self-care deficits   - Assess range of motion  - Assess patient's mobility; develop plan if impaired  - Assess patient's need for assistive devices and provide as appropriate  - Encourage maximum independence but intervene and supervise when necessary  - Involve family in performance of ADLs  - Assess for home care needs following discharge   - Consider OT consult to assist with ADL evaluation and planning for discharge  - Provide patient education as appropriate  Outcome: Progressing  Goal: Maintains/Returns to pre admission functional level  Description: INTERVENTIONS:  - Perform AM-PAC 6 Click Basic Mobility/ Daily Activity assessment daily.  - Set and communicate daily mobility goal to care team and patient/family/caregiver.   - Collaborate with rehabilitation services on mobility goals if consulted  - Perform Range of Motion  times a day.  - Reposition patient every  hours.  - Dangle patient  times a day  - Stand patient  times a day  - Ambulate patient  times a day  - Out of bed to chair  times a day   - Out of bed for meals  times a day  - Out of bed for toileting  - Record patient progress and toleration of activity level   Outcome: Progressing     Problem: DISCHARGE PLANNING  Goal: Discharge to home or other facility with appropriate resources  Description: INTERVENTIONS:  - Identify barriers to discharge w/patient and caregiver  - Arrange for needed discharge resources and transportation as appropriate  - Identify discharge learning needs (meds, wound care, etc.)  - Arrange for interpretive services to assist at discharge as needed  - Refer to Case Management Department for coordinating discharge planning if the patient needs post-hospital services based on physician/advanced practitioner order or complex needs related to functional  status, cognitive ability, or social support system  Outcome: Progressing

## 2024-03-25 NOTE — ASSESSMENT & PLAN NOTE
Secondary to influenza A/superimposed bacterial infection  S/p intubation 2/19 through 2/29  Bronchoscopy 2/19 positive for Candida glabrata, no bacteria  S/p antibiotics  Recent chest x-ray 3/12 was unchanged  Pulmonology recommends F/U CT of chest in 4 weeks (4/4/24)  Continue Mucinex.  Continue to attempt to wean oxygen  Sats are improving  DC 3/27/24

## 2024-03-25 NOTE — DISCHARGE SUMMARY
Discharge Summary   Hussein Edward III 58 y.o. male MRN: [id number]  Unit/Bed#: [facility] 974-01 Encounter: [id number]  Admission Date: 3/14/2024     Discharge Date: 3/27/2024    Discharging Provider: Lalita Arechiga    PCP:  [phone]      HPI: Please see pt's records for full details of hospitalization.    Summary of [facility] Course: Hussein Edward was admitted to the [facility] 3/14/24 after being hospitalized due to acute respiratory failure due to influenza A with a superimposed bacterial infection. He did require intubation. He was treated with abx and was able to be extubated. He also had an event of atrial fibrillation due to critical illness. He has required oxygen at rest and with activity.    Problem List/Comorbidities:    Acute respiratory failure with hypoxia (HCC)  Assessment & Plan  Secondary to influenza A/superimposed bacterial infection  S/p intubation 2/19 through 2/29  Bronchoscopy 2/19 positive for Candida glabrata, no bacteria  S/p antibiotics  Recent chest x-ray 3/12 was unchanged  Pulmonology recommends F/U CT of chest in 4 weeks (4/4/24)  Continue Mucinex.  Continues to require oxygen ATC at 2L NC  Sats are stable  DC 3/27/24        PAF (paroxysmal atrial fibrillation) (HCC)  Assessment & Plan  D/T critical illness  Converted with IV amiodarone  Echo as above  F/U cardiology as outpatient      Tachycardia  Assessment & Plan  Changed toprol to 50mg bid and lower hold parameters to 100  Echo = EF normal/grade 1 diastolic dysfunction  Likely physiological/due to anxiety and chronic at baseline in 's  F/U cardiology on discharge  3/18-patient with palpitations/fatigue, no chest pain/leg swelling/w/u negative  Improved and tolerating BB as above    WALE (obstructive sleep apnea)  Assessment & Plan  Machine from home brought in as hospital machine was too strong.  Reportedly has intolerance to CPAP  Possible plan for a DISE as outpatient with ENT  Continue current settings.        Discharge Physical  "Examination:  /64 (BP Location: Right arm)   Pulse 86   Temp 97.6 °F (36.4 °C) (Oral)   Resp 17   Ht 5' 5\" (1.651 m)   Wt 81 kg (178 lb 9.2 oz)   SpO2 96%   BMI 29.72 kg/m²     GEN: NAD; pleasant  NEURO: Alert and oriented x4; appropriate  HEENT: Pupils are equal/reactive; normocephalic, face is symmetrical, hearing is normal  CV: S1 S2 regular, no murmur/rub/gallops, 2/4 pedal pulses, no LE edema present  RESP: Lungs are clear bilaterally, decreased throughout, non productive cough, no wheezes rales or rhonchi, on 2L NC, no distress, respirations are easy and non labored  GI: Abdomen is flat, soft, non tender, +BS x4; non distended  : Voiding without issues  MUSC: Moves all extremities except generalized deconditioning  SKIN: pink, warm, normal turgor, no rashes or lesions noted      Significant Findings, Care, Treatment and Services Provided: Acute comprehensive interdisciplinary inpatient rehabilitation including PT, OT, SLP, RN, CM, SW, dietary, psychology, etc.      Physiatry Additions/Comorbidities:    * ARDS survivor  Assessment & Plan  Admitted with respiratory failure 2/2 influenza with possible superimposed bacterial PNA  S/p 10 days antibotics, tamiflu course complicated by ARDS with extended vent time.  He is now on RA at rest, but will desaturate to 70s when ambulating on admission  Now maintaining O2 sats in the 90s on 2-3L NC ATC.  GUERRA with speech improving at discharge   IS/Flutter valve  PRN Nebs  PT/OT 3-5 hours/day, 5-7 days/week - completed. Recommend outpatient cardiopulmonary rehab.  Outpatient f/u with Pulmonology with repeat CT Chest in the beginning of April to ensure resolution.         - At this point most recent imaging with continued bilateral patchy airspace opacities as would be expected at this time.    - Ordered O2 for home.    Anxiety  Assessment & Plan  Started on Paxil and Lorazepam in the hospital  Did not require lorazepam throughout rehab stay - discontinued at " discharge    #Delirium/Sleep: At risk, optimize sleep/wake, bowel, bladder, and pain.  #Pain: Tylenol PRN, Oxycodone PRN - wean off.  #Bowel: Last BM 3/24. Miralax daily. PRN Suppository and bisacodyl tab  #Bladder: Voiding and continent   #Skin/Pressure Injury Prevention: Turn Q2hr in bed, with weight shifts [id number] in wheelchair. Float heels in bed.  #DVT Prophylaxis: Lovenox, SCDs. Will not need lovenox at discharge    Acute Rehabilitation Center Course: Patient participated in a comprehensive interdisciplinary inpatient rehabilitation program which included involvment of Rehab MD, therapies (PT, OT, and/or SLP), RN, CM, SW, dietary, and psychology services.     Etiologic/Rehabilitation Diagnosis: Impairment of mobility, safety and Activities of Daily Living (ADLs) due to Pulmonary Disorders:  10.9  Other Pulmonary    Functional Status Upon Admission to [facility]:  Mobility: Jose G 70' x3 with RW with seated rest breaks   Transfers: Jose G  ADLs: Ind eating, Sup grooming, Sup UB bathing, Jose G LB bathing, Sup UB dressing, Jose G LB dressing, Jose G toileting    Functional Status Upon Discharge from [facility]:   PT: Ind bed mobility, Ind transfers, ambulation 400' with RW, Sup uneven surfaces, Sup curb/stairs.  OT: Ind-Sup with ADLs    Condition at Discharge: good     Rehab Physician Signature:   Ashley de Padua, MD  Physical Medicine and Rehabilitation    Discharge instructions/Information to patient and family:   See after visit summary for information provided to patient and family.      Provisions for Follow-Up Care:  See after visit summary for information related to follow-up care and any pertinent home health orders.      Future Appointments   Date Time Provider Department Center   3/28/2024 10:00 AM Bernabe Cook MD [facility]   4/4/2024  1:00 PM [facility] EVAL ROOM [facility]   6/26/2024 10:00 AM Laxmi Camargo MD [facility]           Disposition: Home    Planned Readmission:  No    Discharge Statement   I spent 30 minutes or more discharging the patient.  I had direct contact with the patient on the day of discharge. Greater than 50% of the total time was spent examining patient, answering all patient questions, arranging and discussing plan of care with patient and care team as well as directly providing post-discharge instructions. Additional time then spent on discharge activities.    Discharge Medications:  See after visit summary for reconciled discharge medications provided to patient and family.

## 2024-03-25 NOTE — PROGRESS NOTES
[facility]  Progress Note  Name: Hussein Edward III I  MRN: [id number]  Unit/Bed#: -01 I Date of Admission: 3/14/2024   Date of Service: 3/25/2024 I Hospital Day: 11    Assessment/Plan   Acute respiratory failure with hypoxia (HCC)  Assessment & Plan  Secondary to influenza A/superimposed bacterial infection  S/p intubation 2/19 through 2/29  Bronchoscopy 2/19 positive for Candida glabrata, no bacteria  S/p antibiotics  Recent chest x-ray 3/12 was unchanged  Pulmonology recommends F/U CT of chest in 4 weeks (4/4/24)  Continue Mucinex.  Continue to attempt to wean oxygen  Sats are improving  DC 3/27/24        PAF (paroxysmal atrial fibrillation) (HCC)  Assessment & Plan  D/T critical illness  Converted with IV amiodarone  Echo as above  F/U cardiology as outpatient      Tachycardia  Assessment & Plan  Changed toprol to 50mg bid and lower hold parameters to 100  Echo = EF normal/grade 1 diastolic dysfunction  Likely physiological/due to anxiety and chronic at baseline in 's  F/U cardiology on discharge  3/18-patient with palpitations/fatigue, no chest pain/leg swelling  W/u negative  Continue to monitor symptoms  Improved and tolerating BB as above    Anxiety  Assessment & Plan  Continue Paxil/Ativan as needed but has not needed it  stable    WALE (obstructive sleep apnea)  Assessment & Plan  Machine from home brought in as hospital machine was too strong.  Reportedly has intolerance to CPAP  Possible plan for a DISE as outpatient with ENT  Continue current settings.             The above assessment and plan was reviewed and updated as determined by my evaluation of the patient on 3/25/2024.    Labs:   Results from last 7 days   Lab Units 03/25/24  0524 03/18/24  1059   WBC Thousand/uL 5.63 9.69   HEMOGLOBIN g/dL 11.3* 12.1   HEMATOCRIT % 35.2* 37.0   PLATELETS Thousands/uL 190 184     Results from last 7 days   Lab Units 03/25/24  0524 03/18/24  1059   SODIUM mmol/L  141 141   POTASSIUM mmol/L 3.8 4.3   CHLORIDE mmol/L 105 104   CO2 mmol/L 27 31   BUN mg/dL 13 14   CREATININE mg/dL 0.56* 0.70   CALCIUM mg/dL 9.2 9.2                   Imaging  No orders to display       Review of Scheduled Meds:  Current Facility-Administered Medications   Medication Dose Route Frequency Provider Last Rate    acetaminophen  650 mg Oral Q6H PRN Tomasa Whitlock PA-C      albuterol  2 puff Inhalation Q4H PRN Tomasa Whitlock PA-C      enoxaparin  40 mg Subcutaneous Q24H [facility] Tomasa Whitlock PA-C      fluticasone  1 spray Each Nare Daily Ashley Depadua, MD      guaiFENesin  600 mg Oral Q12H [facility] Tomasa Whitlock PA-C      loratadine  5 mg Oral Daily Tomasa Whitlock PA-C      LORazepam  0.5 mg Oral Q8H PRN Tomasa Whitlock PA-C      melatonin  3 mg Oral HS PRN Tomasa Whitlock PA-C      metoprolol succinate  50 mg Oral Q12H Lalita Arechiga, CRNP      nystatin   Topical BID Lalita Arechiga, CRNP      oxyCODONE  5 mg Oral Q6H PRN Tomasa Whitlock PA-C      PARoxetine  10 mg Oral Daily Tomasa Whitlock PA-C      sodium chloride  2 spray Each Nare Q6H Lalita Arechiga, CRNP      tamsulosin  0.4 mg Oral Daily With Dinner Tomasa Whitlock PA-C         Subjective/ HPI: Patient seen and examined. Patients overnight issues or events were reviewed with nursing staff. New or overnight issues include the following:     Pt seen in his room. He is looking forward to DC this week. He states that he is slowly improving. Therapy to get O2 readings today for home oxygen. He denies any other complaints.    ROS:   A 10 point ROS was performed; negative except as noted above.        *Labs /Radiology studies Reviewed  *Medications  reviewed and reconciled as needed  *Please refer to order section for additional ordered labs studies      Physical Examination:  Vitals:   Vitals:    03/24/24 1323 03/24/24 2014 03/25/24 0041 03/25/24 0518   BP: 118/68 127/79 112/57 124/77   BP Location: Right arm  Right arm Right arm Right arm   Pulse: 80 90 82 85   Resp: 16 18 18 18   Temp: 98.7 °F (37.1 °C) 98.6 °F (37 °C) 98.7 °F (37.1 °C) 98.4 °F (36.9 °C)   TempSrc: Oral Oral Oral Oral   SpO2: 95% 97% 95% 95%   Weight:       Height:           General Appearance: NAD; pleasant  HEENT: PERRLA, conjuctiva normal; mucous membranes moist; face symmetrical  Neck:  Supple  Lungs: clear bilaterally but decreased, normal respiratory effort, no retractions, expiratory effort normal, on 2L NC  CV: regular rate and rhythm, no murmurs rubs or gallops noted   ABD: soft non tender, +BS x4  EXT: DP pulses intact, no lymphadenopathy, no edema  Skin: normal turgor, normal texture, no rash  Psych: affect normal, mood normal  Neuro: AAOx3       The above physical exam was reviewed and updated as determined by my evaluation of the patient on 3/25/2024.    Invasive Devices       None                      VTE Pharmacologic Prophylaxis: Enoxaparin  Code Status: Level 1 - Full Code  Current Length of Stay: 11 day(s)    Total floor / unit time spent today 30 minutes  Coordination of patient's care was performed in conjunction with primary service. Time invested included review of patient's labs, vitals, and management of their comorbidities with continued monitoring, examination of patient as well as answering patient questions, documenting her findings and creating progress note in electronic medical record,  ordering appropriate diagnostic testing.       ** Please Note:  voice to text software may have been used in the creation of this document. Although proof errors in transcription or interpretation are a potential of such software**

## 2024-03-25 NOTE — CASE MANAGEMENT
Clinical update faxed to ye at [facility] via [employer] [phone]. Awaiting determination.     [id number] received call from Giselle at [facility] approving days with lcd and update due 4/2. Yared confirmed info was sent that pt is scheduled for dc 3/27. Giselle confirmed and asked for dc summary to be faxed when pt is dc'd.

## 2024-03-25 NOTE — PROGRESS NOTES
03/25/24 0830   Pain Assessment   Pain Assessment Tool 0-10   Pain Score No Pain   Restrictions/Precautions   Precautions O2;Fall Risk   Weight Bearing Restrictions No   ROM Restrictions No   Cognition   Overall Cognitive Status WFL   Arousal/Participation Alert;Cooperative   Attention Within functional limits   Orientation Level Oriented X4   Memory Within functional limits   Following Commands Follows all commands and directions without difficulty   Subjective   Subjective pt sitting upright on bed upon arrival.  pt reports that he has some SOB (on 2L O2) but is ready for PT.   Roll Left and Right   Type of Assistance Needed Independent   Roll Left and Right CARE Score 6   Sit to Lying   Type of Assistance Needed Independent   Sit to Lying CARE Score 6   Lying to Sitting on Side of Bed   Type of Assistance Needed Independent   Lying to Sitting on Side of Bed CARE Score 6   Sit to Stand   Type of Assistance Needed Adaptive equipment   Physical Assistance Level No physical assistance   Comment DS w/ RW; one instance in PT gym where pt left RW and put hands on chair facing the chair, then turned and sat.  VC of proper form was given and dangers of performing STS in this manner.   Sit to Stand CARE Score 6   Bed-Chair Transfer   Type of Assistance Needed Supervision;Adaptive equipment   Physical Assistance Level No physical assistance   Comment DS RW   Chair/Bed-to-Chair Transfer CARE Score 4   Walk 10 Feet   Type of Assistance Needed Adaptive equipment   Physical Assistance Level No physical assistance   Comment DS RW   Walk 10 Feet CARE Score 6   Walk 50 Feet with Two Turns   Type of Assistance Needed Supervision;Adaptive equipment   Physical Assistance Level No physical assistance   Comment RW assisted with O2 tank   Walk 50 Feet with Two Turns CARE Score 4   Walk 150 Feet   Type of Assistance Needed Supervision;Adaptive equipment   Physical Assistance Level No physical assistance   Comment RW assisted with O2  tank   Walk 150 Feet CARE Score 4   Ambulation   Primary Mode of Locomotion Prior to Admission Walk   Distance Walked (feet) 350 ft  (+ another 350 RW 1 seated rest break)   Assist Device Roller Walker   Gait Pattern Slow Anahi   Limitations Noted In Endurance   Provided Assistance with: Direction   Walk Assist Level Supervision   Findings 3L O2 able to maintain O2 mireille 92% once completed.   Does the patient walk? 2. Yes   Wheel 50 Feet with Two Turns   Reason if not Attempted Activity not applicable   Wheel 50 Feet with Two Turns CARE Score 9   Wheel 150 Feet   Reason if not Attempted Activity not applicable   Wheel 150 Feet CARE Score 9   Toilet Transfer   Type of Assistance Needed Supervision;Adaptive equipment   Physical Assistance Level No physical assistance   Comment DS RW; pt able to ambulate from EOB to bathroom with good management of O2 cord + extension.  occassionally will run over cord with RW but is able to manage this independently. pt has good awareness of symptoms and to take periodic breaks.  pt is aware of breathing in through nose when resting to improve O2 levels.   Toilet Transfer CARE Score 4   Therapeutic Interventions   Strengthening marching at counter 5 laps 1UE, STS x10, side stepping rtb 5 laps BUE   Equipment Use   NuStep Lv2 10' LEs   Other Comments   Comments baseline measures on 2L O2 93% 114bpm; O2 increased to 3L for exercise.  pt was able to maintain O2 >90% for majority of session.  pt's O2 will occassionally decreased into mid/upper 80s but will increased quickly with seated rest.   Assessment   Treatment Assessment pt particiapted in 90 minutes of skilled care focusing on IRPs and endurance traning. pt demonstrates good awareness of symptoms and is aware of needing frequent rest breaks in order to manage O2 levels. practice was completed of in bed mobility and out of bed mobility.  pt is able to manage covers and is able to don and doff socks and shoes well.  pt was able to  demonstarate ability to mange clothing and urinal, manage tray table and manage walker palcement when getting into and out of bed.  pt demonstrtaes good management of O2 line when ambulating to/from bathroom and good problem solving skills (rolling over tube with walker).  pt also has BSC set up next to toilet in order for him to sit and complete hygiene in order to improve O2. pt participated in LE strengthening with focus on endurance requiring periodic rest breaks for symptom management.  at end of session, pt was left laying in bed with call bell with in reach.   Family/Caregiver Present no   Problem List Decreased mobility;Decreased endurance   Barriers to Discharge Inaccessible home environment   PT Barriers   Functional Limitation Car transfers;Transfers;Walking;Stair negotiation   Plan   Treatment/Interventions Functional transfer training;LE strengthening/ROM;Elevations;Therapeutic exercise;Endurance training;Bed mobility;Gait training;Spoke to MD  (Dr. Depadua present at end of session.)   Progress Progressing toward goals   PT Therapy Minutes   PT Time In 0830   PT Time Out 1000   PT Total Time (minutes) 90   PT Mode of treatment - Individual (minutes) 90   PT Mode of treatment - Concurrent (minutes) 0   PT Mode of treatment - Group (minutes) 0   PT Mode of treatment - Co-treat (minutes) 0   PT Mode of Treatment - Total time(minutes) 90 minutes   PT Cumulative Minutes 988

## 2024-03-25 NOTE — PROGRESS NOTES
Physical Medicine and Rehabilitation Progress Note  Hussein Edward III 58 y.o. male MRN: [id number]  Unit/Bed#: [facility] 974-01 Encounter: [id number]    To Review: Hussein Edward III is a 58 y.o. male with medical history of meningioma on acetazolamide, WALE who presented to the [facility] on 2/16 after presenting to urgent care for SOB (he had been having UR symptoms a week prior and was diagnosed with influenza A and treated with tamiflu).Ultimately found to have developed likely superimposed bacterial pneumonia and progressed to ARDS. Did not require ECM). Was able to be extubated on 2/29 after progressing to needing intubation on 2/19. Treated with steroids, airway protocol, proning, antibiotics. Course c/b provoked afib (after CVC placement, septic, and on pressors), not on anticoagulation at this time. He also had ileus which has resolved, dysphagia, which has improved, and tachycardia which is due to deconditioning, atelectasis, hypovolemia, and anxiety. He was admitted to the [facility] on 3/14.       Chief Complaint: Feeling well and in good spirits.     Interval History/Subjective:  No acute events over the weekend. Finds  that when he uses longer tubing, he feels he needs to bump his oxygen up to 3L NC to feel it in his nose and feel more comfortable, but has been stable for the most part at 2L. He already has set up cardiopulmonary rehab and his outpatient follow-up with his pulmonologist. He is feeling ready for discharge this week. No new CP, SOB, fevers, chills, N/V, abdominal pain. Last BM was 3/24     ROS:  A 10 point review of systems was negative except for what is noted in the HPI.    Today's Changes:  Requested therapies document O2 usage and ambulatory sats. Will likely need portable arrangement at time of discharge for 2-3L of NC ATC.  Otherwise continue discharge planning for 3/27.     Total visit time: 35 minutes, with more than 50% spent counseling/coordinating care. Counseling  includes discussion with patient re: progress in therapies, functional issues observed by therapy staff, and discussion with patient regarding their current medical state and wellbeing. Coordination of patient's care was performed in conjunction with Internal Medicine service to monitor patient's labs, vitals, and management of their comorbidities.    Assessment/Plan:    * ARDS survivor  Assessment & Plan  Admitted with respiratory failure 2/2 influenza with possible superimposed bacterial PNA  S/p 10 days antibotics, tamiflu course complicated by ARDS with extended vent time.  He is now on RA at rest, but will desaturate to 70s when ambulating  GUERRA with speech  Primary limitation to his function right now is his respiratory endurance   - Still requiring O2 for comfort/increased WOB and desaturations in therapy, although oxygen requirements are slowly improving. Requiring about 2L ATC  IS/Flutter valve  PRN Nebs  PT/OT 3-5 hours/day, 5-7 days/week  Outpatient f/u with Pulmonology with repeat CT Chest in the beginning of April to ensure resolution.   - At this point most recent imaging with continued bilateral patchy airspace opacities as would be expected at this time.     PAF (paroxysmal atrial fibrillation) (HCC)  Assessment & Plan  One episode provoked right after CVC placed, patient was septic and on pressors.  Resolved with lopressor, amio gtt, followed by one dose of digoxin.  On BB for tachycardia (but has been sinus tachycardia)  At this time no need for anticoagulation  Follow-up with cardiology for further evaluation    Tachycardia  Assessment & Plan  BL HR typically in   Monitor volume status, encourage adequate hydration  Cards started on Toprol 25mg in the AM, 50mg in the PM   - Increased to 50mg BID on 3/19.  Monitor respiratory status on BB  Adjust as per IM  Recommend outpatient Cards evaluation.    Anxiety  Assessment & Plan  Started on Paxil and Lorazepam in the hospital  Wean Lorazepam as  tolerated    Migraine  Assessment & Plan  Follows with Dr. Camargo  On Acetalozamide at home for this and magnesium for this  Held in the hospital for metabolic acidosis  Monitor off at this time  Outpatient f/u with Neurology      Acute respiratory failure with hypoxia (HCC)  Assessment & Plan  He is now on RA at rest, but will desaturate to 70s when ambulating   - Improving in therapies   - Requiring at least 2L NC currently, sometimes as high as 3-4L with activity, although this is improving as well. Recently requiring mostly 2-3L NC  Can get dyspneic even with speech  Shallow breaths  Primary limitation functionally at this point  O2 at night and with therapies  Wean as tolerated     WALE (obstructive sleep apnea)  Assessment & Plan  Uses home CPAP, but may need to replace tubing/filter with [employer]  Hard time tolerating hospital CPAP  Continue O2 via nasal cannula  We ordered new tubing and mask from [employer].   Outpatient f/u with his Pulmonologist   - He would like to get a new machine.        Health Maintenance  #Delirium/Sleep: At risk, optimize sleep/wake, bowel, bladder, and pain.  #Pain: Tylenol PRN   #Bowel: Last BM 3/24. Miralax daily. PRN Suppository and bisacodyl tab  #Bladder: Voiding and continent   #Skin/Pressure Injury Prevention: Turn Q2hr in bed, with weight shifts [id number] in wheelchair. Float heels in bed.  #DVT Prophylaxis: Lovenox, SCDs  #GI Prophylaxis: Not indicated  #Code Status: Full Code  #FEN: Regular/Thins, [employer] two packets with lunch/dinner   #Dispo: Team 3/20: ADD 3/27 with cardiopulmonary rehab - will have to check what is available near there. ELOS 2 weeks with goals to discharge home at modified Ind level of function and family support.       Objective:    Functional Update:   PT: Sup transfers, Ind bed mobility, CGA ambulation 198' RW, CGA stairs  OT: Ind eating, Sup grooming, Jose G bathing, Sup UB dressing, Jose G LB dressing, Jose G toileting, CGA toilet transfers        Allergies per EMR    Physical Exam:  Temp:  [98 °F (36.7 °C)-98.7 °F (37.1 °C)] 98 °F (36.7 °C)  HR:  [] 109  Resp:  [17-18] 17  BP: ()/(57-79) 119/70  Oxygen Therapy  SpO2: 95 %  O2 Flow Rate (L/min): 2 L/min    Gen: No acute distress, Well-nourished, well-appearing.  HEENT: Moist mucus membranes, Normocephalic/Atraumatic  Cardiovascular: Regular rate, rhythm, S1/S2. Distal pulses palpable  Heme/Extr: No edema  Pulmonary: Non-labored breathing. Lungs CTAB. Short shallow breaths. Much less dyspnea with speech.   : No raines  GI: Soft, non-tender, non-distended.   MSK: ROM is WFL in all extremities. No effusions or deformities. Bulk is symmetric. See below for MMT scores.   Integumentary: Skin is warm, dry.   Neuro: AAOx3 Speech is intact. Appropriate to questioning.   Psych: Normal mood and affect.     Diagnostic Studies: Reviewed, no new imaging    Laboratory:  Reviewed   Results from last 7 days   Lab Units 03/25/24  0524   HEMOGLOBIN g/dL 11.3*   HEMATOCRIT % 35.2*   WBC Thousand/uL 5.63       Results from last 7 days   Lab Units 03/25/24  0524   BUN mg/dL 13   POTASSIUM mmol/L 3.8   CHLORIDE mmol/L 105   CREATININE mg/dL 0.56*            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)    Primary insomnia    Non-traumatic rhabdomyolysis    Chronic kidney disease    Metabolic acidosis    Acute respiratory failure with hypoxia (HCC)    ARDS survivor    Insomnia    Migraine    Anxiety    Persistent fever    ARDS (adult respiratory distress syndrome) (HCC)    Ambulatory dysfunction    Tachycardia    PAF (paroxysmal atrial  fibrillation) (HCC)         Medications  Current Facility-Administered Medications   Medication Dose Route Frequency Provider Last Rate    acetaminophen  650 mg Oral Q6H PRN Tomasa Whitlock PA-C      albuterol  2 puff Inhalation Q4H PRN Tomasa Whitlock PA-C      enoxaparin  40 mg Subcutaneous Q24H [facility] Tomasa Whitlock PA-C      fluticasone  1 spray Each Nare Daily Ashley Depadua, MD      guaiFENesin  600 mg Oral Q12H [facility] Tomasa Whitlock PA-C      loratadine  5 mg Oral Daily Tomasasidney Whitlock PA-C      LORazepam  0.5 mg Oral Q8H PRN Tomasa Whitlock PA-C      melatonin  3 mg Oral HS PRN Tomasa Whitlock PA-C      metoprolol succinate  50 mg Oral Q12H Lalita Arechiga, CRNP      nystatin   Topical BID Lalita Arechiga, CRNP      oxyCODONE  5 mg Oral Q6H PRN Tomasa Whitlock PA-C      PARoxetine  10 mg Oral Daily Tomasa Whitlock PA-C      sodium chloride  2 spray Each Nare Q6H Lalita Arechiga, CRNP      tamsulosin  0.4 mg Oral Daily With Dinner Tomasa Whitlock PA-C            ** Please Note: Fluency Direct voice to text software may have been used in the creation of this document. **

## 2024-03-25 NOTE — DISCHARGE INSTR - AVS FIRST PAGE
Medical Discharge Instructions    You will need a CT of the chest around 4/4/24.  An order is in your chart.  Call [phone] to schedule      DISCHARGE INSTRUCTIONS: [facility]    Bring these instructions with you to your Outpatient Physician appointments so they can order and follow-up any additional lab work or imaging recommended at time of discharge.    If you (or your health care proxy) have any questions or concerns regarding your acute rehabilitation stay including issues with medications, rehabilitation, and follow-up plan, please call:          [facility] in [place] at [phone] or [phone].    Should you develop fevers, chills, new weakness, changes in sensation, difficulty speaking, facial weakness, confusion, shortness of breath, chest pain, or other concerning symptoms please call 911 and/or obtain transportation to nearest ER immediately.    Driving restrictions:  **You should NOT operate a motor vehicle while under the influence of an opiate medication, muscle relaxant, or other sedative.  Doing so may lead to an accident resulting in serious injury or death to yourself or others.  You have agreed to avoid driving when under the influence of this medication.      Sedating Medications with increased risk of complications:    This(these) medication(s) was(were) started prior to your acute rehabilitation course as recommended by your prior physicians.  It was continued during your acute rehabilitation course.  This(these) medication(s) will need to be managed by your outpatient physician(s) after discharge.  Follow-up with the appropriate provider as soon as possible to ensure appropriate use.    Oxycodone (opiate) pain medication has been used to help your acute pain.  You tolerated this medication adequately during your recent hospital stay.       You will be given a limited supply of opiate pain medications on  discharge; should you require additional refills/medications, your PCP and/or surgeon may prescribe at his/her discretion.   This can be quite helpful particularly for severe acute pain.      There are risks associated with opioid medications. They can increase your risk of falling, injury, and breathing difficulties which can be severe and even life-threatening.  Note it is advisable to limit use of opiate pain medications as they can become habit forming and lead to addiction.  Other potential side effects of the medication include, but are not limited to, constipation, drowsiness, impaired judgment and risk of fatal overdose if not taken as prescribed. You should not drive while taking this medication  The patient was warned against driving while taking sedation medications.  Sharing medications is a felony. At this point in time, the patient is showing no signs of addiction, abuse, diversion or suicidal ideation.  The patient (you/or relevant caregiver) understands and agrees to use this medication only as prescribed.    MEDICAL MANAGEMENT AT HOME specific to you:    Low oxygen  You were co-managed by internal medicine and recommended to follow-up with PULMONARY after discharge. You are recommended to use approximately 2-3 L of home oxygen with goal Oxygen Saturation >89%.  Obtain pulse oximeter to check and adjust oxygen accordingly.  Obtain transport to ER or call 911 if oxygen levels drop below 89% and you need more than 4 L of oxygen or you have increased shortness or breath, chest pain, or confusion.      Case management has set-up home health vendor to arrange home oxygen.  Direct calls to vendor as related to issues obtaining oxygen or with the equipment itself  - If unable to obtain adequate assistance or are unable to reach the vendor contact   - [facility]  at [phone].  - If you are still unable to obtain adequate assistance you may call the rehab unit below until your are established  back with your outpatient providers  - [facility] in [place] at [phone] or [phone].  - If you have increased shortness of breath, chest pain, increased confusion, or unable to keep oxygen adequately elevated call 911 or obtain immediate transport to ED.    Please note a summary of your hospital stay with relevant information for your doctors will try to be sent to them.  Please confirm with your doctors at your follow up visits that they have received this summary and have them contact [facility] Medical Records if they have not received them along with any other medical records they may require.     [facility] Phone Number:  [phone]

## 2024-03-26 PROCEDURE — 99232 SBSQ HOSP IP/OBS MODERATE 35: CPT | Performed by: PHYSICAL MEDICINE & REHABILITATION

## 2024-03-26 PROCEDURE — 97110 THERAPEUTIC EXERCISES: CPT

## 2024-03-26 PROCEDURE — 97530 THERAPEUTIC ACTIVITIES: CPT

## 2024-03-26 PROCEDURE — 99232 SBSQ HOSP IP/OBS MODERATE 35: CPT | Performed by: INTERNAL MEDICINE

## 2024-03-26 PROCEDURE — 97535 SELF CARE MNGMENT TRAINING: CPT

## 2024-03-26 RX ORDER — OXYCODONE HYDROCHLORIDE 5 MG/1
TABLET ORAL
Qty: 10 TABLET | Refills: 0 | Status: SHIPPED | OUTPATIENT
Start: 2024-03-26

## 2024-03-26 RX ADMIN — METOPROLOL SUCCINATE 50 MG: 50 TABLET, EXTENDED RELEASE ORAL at 21:01

## 2024-03-26 RX ADMIN — NYSTATIN: 100000 POWDER TOPICAL at 17:27

## 2024-03-26 RX ADMIN — Medication 2 SPRAY: at 17:27

## 2024-03-26 RX ADMIN — Medication 2 SPRAY: at 10:59

## 2024-03-26 RX ADMIN — GUAIFENESIN 600 MG: 600 TABLET, EXTENDED RELEASE ORAL at 09:39

## 2024-03-26 RX ADMIN — GUAIFENESIN 600 MG: 600 TABLET, EXTENDED RELEASE ORAL at 21:01

## 2024-03-26 RX ADMIN — Medication 2 SPRAY: at 05:21

## 2024-03-26 RX ADMIN — ENOXAPARIN SODIUM 40 MG: 40 INJECTION SUBCUTANEOUS at 17:26

## 2024-03-26 RX ADMIN — LORATADINE 5 MG: 10 TABLET ORAL at 09:39

## 2024-03-26 RX ADMIN — PAROXETINE HYDROCHLORIDE 10 MG: 20 TABLET, FILM COATED ORAL at 09:39

## 2024-03-26 RX ADMIN — NYSTATIN: 100000 POWDER TOPICAL at 10:59

## 2024-03-26 RX ADMIN — TAMSULOSIN HYDROCHLORIDE 0.4 MG: 0.4 CAPSULE ORAL at 17:26

## 2024-03-26 RX ADMIN — ALBUTEROL SULFATE 2 PUFF: 90 AEROSOL, METERED RESPIRATORY (INHALATION) at 15:01

## 2024-03-26 RX ADMIN — METOPROLOL SUCCINATE 50 MG: 50 TABLET, EXTENDED RELEASE ORAL at 09:46

## 2024-03-26 RX ADMIN — FLUTICASONE PROPIONATE 1 SPRAY: 50 SPRAY, METERED NASAL at 09:46

## 2024-03-26 NOTE — ASSESSMENT & PLAN NOTE
Changed toprol to 50mg bid and lower hold parameters to 100  Echo = EF normal/grade 1 diastolic dysfunction  Likely physiological/due to anxiety and chronic at baseline in 's  F/U cardiology on discharge  3/18-patient with palpitations/fatigue, no chest pain/leg swelling/w/u negative  Improved and tolerating BB as above

## 2024-03-26 NOTE — PROGRESS NOTES
[facility]  Progress Note  Name: Hussein Edward III I  MRN: [id number]  Unit/Bed#: -01 I Date of Admission: 3/14/2024   Date of Service: 3/26/2024 I Hospital Day: 12    Assessment/Plan   Acute respiratory failure with hypoxia (HCC)  Assessment & Plan  Secondary to influenza A/superimposed bacterial infection  S/p intubation 2/19 through 2/29  Bronchoscopy 2/19 positive for Candida glabrata, no bacteria  S/p antibiotics  Recent chest x-ray 3/12 was unchanged  Pulmonology recommends F/U CT of chest in 4 weeks (4/4/24)  Continue Mucinex.  Continues to require oxygen ATC at 2L NC  Sats are improving  DC 3/27/24        PAF (paroxysmal atrial fibrillation) (HCC)  Assessment & Plan  D/T critical illness  Converted with IV amiodarone  Echo as above  F/U cardiology as outpatient      Tachycardia  Assessment & Plan  Changed toprol to 50mg bid and lower hold parameters to 100  Echo = EF normal/grade 1 diastolic dysfunction  Likely physiological/due to anxiety and chronic at baseline in 's  F/U cardiology on discharge  3/18-patient with palpitations/fatigue, no chest pain/leg swelling/w/u negative  Improved and tolerating BB as above    WALE (obstructive sleep apnea)  Assessment & Plan  Machine from home brought in as hospital machine was too strong.  Reportedly has intolerance to CPAP  Possible plan for a DISE as outpatient with ENT  Continue current settings.                 The above assessment and plan was reviewed and updated as determined by my evaluation of the patient on 3/26/2024.    Labs:   Results from last 7 days   Lab Units 03/25/24  0524   WBC Thousand/uL 5.63   HEMOGLOBIN g/dL 11.3*   HEMATOCRIT % 35.2*   PLATELETS Thousands/uL 190     Results from last 7 days   Lab Units 03/25/24  0524   SODIUM mmol/L 141   POTASSIUM mmol/L 3.8   CHLORIDE mmol/L 105   CO2 mmol/L 27   BUN mg/dL 13   CREATININE mg/dL 0.56*   CALCIUM mg/dL 9.2                   Imaging  No orders to  display       Review of Scheduled Meds:  Current Facility-Administered Medications   Medication Dose Route Frequency Provider Last Rate    acetaminophen  650 mg Oral Q6H PRN Tomasa Whitlock PA-C      albuterol  2 puff Inhalation Q4H PRN Tomasa Whitlock PA-C      enoxaparin  40 mg Subcutaneous Q24H YEHUDA Tomasa Whitlock PA-C      fluticasone  1 spray Each Nare Daily Ashley Depadua, MD      guaiFENesin  600 mg Oral Q12H YEHUDA Tomasa Whitlock PA-C      loratadine  5 mg Oral Daily Tomasa Whitlock PA-C      melatonin  3 mg Oral HS PRN Tomasa Whitlock PA-C      metoprolol succinate  50 mg Oral Q12H Lalita Arechiga, CRNP      nystatin   Topical BID Lalita Arechiga, CRNP      oxyCODONE  5 mg Oral Q6H PRN Tomasa Whitlock PA-C      PARoxetine  10 mg Oral Daily Tomasa Whitlock PA-C      sodium chloride  2 spray Each Nare Q6H Lalita ArechigaROSALINO      tamsulosin  0.4 mg Oral Daily With Dinner Tomasa Whitlock PA-C         Subjective/ HPI: Patient seen and examined. Patients overnight issues or events were reviewed with nursing staff. New or overnight issues include the following:     Pt seen and examined at bedside. Overall he feels comfortable going home, a little apprehensive which I told him was normal. He has scheduled f/u with pulmonology 3/27/24. He will likely go home on oxygen 2L ATC    ROS:   A 10 point ROS was performed; negative except as noted above.        *Labs /Radiology studies Reviewed  *Medications  reviewed and reconciled as needed  *Please refer to order section for additional ordered labs studies      Physical Examination:  Vitals:   Vitals:    03/25/24 1008 03/25/24 1419 03/25/24 2125 03/26/24 0530   BP: 94/60 119/70 101/55 107/64   BP Location: Right arm Right arm Right arm Right arm   Pulse: (!) 116 (!) 109 89 82   Resp:  17 14 18   Temp:  98 °F (36.7 °C) 98.3 °F (36.8 °C) 97.9 °F (36.6 °C)   TempSrc:  Oral Oral Oral   SpO2:  95% 97% 99%   Weight:       Height:            General Appearance: NAD; pleasant  HEENT: PERRLA, conjuctiva normal; mucous membranes moist; face symmetrical  Neck:  Supple  Lungs: clear bilaterally but decreased bilaterally, normal respiratory effort, no retractions, expiratory effort normal, on 2L NC  CV: regular rate and rhythm, no murmurs rubs or gallops noted   ABD: soft non tender, +BS x4  EXT: DP pulses intact, no lymphadenopathy, no edema  Skin: normal turgor, normal texture, no rash  Psych: affect normal, mood normal  Neuro: AAOx3       The above physical exam was reviewed and updated as determined by my evaluation of the patient on 3/26/2024.    Invasive Devices       None                      VTE Pharmacologic Prophylaxis: Enoxaparin  Code Status: Level 1 - Full Code  Current Length of Stay: 12 day(s)    Total floor / unit time spent today 30 minutes  Coordination of patient's care was performed in conjunction with primary service. Time invested included review of patient's labs, vitals, and management of their comorbidities with continued monitoring, examination of patient as well as answering patient questions, documenting her findings and creating progress note in electronic medical record,  ordering appropriate diagnostic testing.       ** Please Note:  voice to text software may have been used in the creation of this document. Although proof errors in transcription or interpretation are a potential of such software**

## 2024-03-26 NOTE — CASE MANAGEMENT
Cm ordered home oxygen set up for pt and also requested assmt for the conserving device and home fill. Order placed with [employer] via [employer]: [place]. Portable tank was asked to be delivered to pts room prior to 11a tomorrow

## 2024-03-26 NOTE — PROGRESS NOTES
Physical Medicine and Rehabilitation Progress Note  Hussein Edward III 58 y.o. male MRN: [id number]  Unit/Bed#: [facility] 974-01 Encounter: [id number]    To Review: Hussein Edward III is a 58 y.o. male with medical history of meningioma on acetazolamide, WALE who presented to the [facility] on 2/16 after presenting to urgent care for SOB (he had been having UR symptoms a week prior and was diagnosed with influenza A and treated with tamiflu).Ultimately found to have developed likely superimposed bacterial pneumonia and progressed to ARDS. Did not require ECM). Was able to be extubated on 2/29 after progressing to needing intubation on 2/19. Treated with steroids, airway protocol, proning, antibiotics. Course c/b provoked afib (after CVC placement, septic, and on pressors), not on anticoagulation at this time. He also had ileus which has resolved, dysphagia, which has improved, and tachycardia which is due to deconditioning, atelectasis, hypovolemia, and anxiety. He was admitted to the [facility] on 3/14.       Chief Complaint: No new concerns.    Interval History/Subjective:  He is asking for a small amount of oxycodone - maybe uses one per day, and is working on weaning off/down. Breathing is stable on nasal cannula. Denies any new CP, SOB, fevers, chills, N/V, abdominal pain. Last BM 3/24.    ROS:  A 10 point review of systems was negative except for what is noted in the HPI.    Today's Changes:  Ordered O2 for home.  He would like scripts to [facility] [place].    Total visit time: 35 minutes, with more than 50% spent counseling/coordinating care. Counseling includes discussion with patient re: progress in therapies, functional issues observed by therapy staff, and discussion with patient regarding their current medical state and wellbeing. Coordination of patient's care was performed in conjunction with Internal Medicine service to monitor patient's labs, vitals, and management of their  comorbidities.    Assessment/Plan:    * ARDS survivor  Assessment & Plan  Admitted with respiratory failure 2/2 influenza with possible superimposed bacterial PNA  S/p 10 days antibotics, tamiflu course complicated by ARDS with extended vent time.  He is now on RA at rest, but will desaturate to 70s when ambulating on admission  Now maintaining O2 sats in the 90s on 2-3L NC ATC.  GUERRA with speech improving at discharge   IS/Flutter valve  PRN Nebs  PT/OT 3-5 hours/day, 5-7 days/week - completed. Recommend outpatient cardiopulmonary rehab.  Outpatient f/u with Pulmonology with repeat CT Chest in the beginning of April to ensure resolution.         - At this point most recent imaging with continued bilateral patchy airspace opacities as would be expected at this time.     PAF (paroxysmal atrial fibrillation) ([facility])  Assessment & Plan  One episode provoked right after CVC placed, patient was septic and on pressors.  Resolved with lopressor, amio gtt, followed by one dose of digoxin.  On BB for tachycardia (but has been sinus tachycardia)  At this time no need for anticoagulation  Follow-up with cardiology for further evaluation    Tachycardia  Assessment & Plan  BL HR typically in   Monitor volume status, encourage adequate hydration  Cards started on Toprol 25mg in the AM, 50mg in the PM   - Increased to 50mg BID on 3/19.  Monitor respiratory status on BB  Adjust as per IM  Recommend outpatient Cards evaluation.    Anxiety  Assessment & Plan  Started on Paxil and Lorazepam in the hospital  Wean Lorazepam as tolerated    Migraine  Assessment & Plan  Follows with Dr. Camargo  On Acetalozamide at home for this and magnesium for this  Held in the hospital for metabolic acidosis  Monitor off at this time  Outpatient f/u with Neurology      Acute respiratory failure with hypoxia (HCC)  Assessment & Plan  He is now on RA at rest, but will desaturate to 70s when ambulating   - Improving in therapies   - Requiring at  least 2L NC currently, sometimes as high as 3-4L with activity, although this is improving as well. Recently requiring mostly 2-3L NC  Can get dyspneic even with speech  Shallow breaths  Primary limitation functionally at this point  O2 at night and with therapies  Wean as tolerated     WALE (obstructive sleep apnea)  Assessment & Plan  Uses home CPAP, but may need to replace tubing/filter with [email or web address]  Hard time tolerating hospital CPAP  Continue O2 via nasal cannula  We ordered new tubing and mask from [employer].   Outpatient f/u with his Pulmonologist   - He would like to get a new machine.        Health Maintenance  #Delirium/Sleep: At risk, optimize sleep/wake, bowel, bladder, and pain.  #Pain: Tylenol PRN   #Bowel: Last BM 3/24. Miralax daily. PRN Suppository and bisacodyl tab  #Bladder: Voiding and continent   #Skin/Pressure Injury Prevention: Turn Q2hr in bed, with weight shifts [id number] in wheelchair. Float heels in bed.  #DVT Prophylaxis: Lovenox, SCDs  #GI Prophylaxis: Not indicated  #Code Status: Full Code  #FEN: Regular/Thins, [employer] two packets with lunch/dinner   #Dispo: Team 3/20: ADD 3/27 with cardiopulmonary rehab - will have to check what is available near there. ELOS 2 weeks with goals to discharge home at modified Ind level of function and family support.       Objective:    Functional Update:   PT: Ind bed mobility, Ind transfers, ambulation 400' with RW, Sup uneven surfaces, Sup curb/stairs.  OT: Ind-Sup with ADLs      Allergies per EMR    Physical Exam:  Temp:  [97.9 °F (36.6 °C)-98.3 °F (36.8 °C)] 97.9 °F (36.6 °C)  HR:  [] 82  Resp:  [14-18] 18  BP: ()/(55-70) 107/64  Oxygen Therapy  SpO2: 99 %  O2 Flow Rate (L/min): 2 L/min    Gen: No acute distress, Well-nourished, well-appearing.  HEENT: Moist mucus membranes, Normocephalic/Atraumatic  Cardiovascular: Regular rate, rhythm, S1/S2. Distal pulses palpable  Heme/Extr: No edema  Pulmonary: Non-labored breathing. Lungs  CTAB. Less GUERRA with speech  : No raines  GI: Soft, non-tender, non-distended. BS+  MSK: ROM is WFL in all extremities. No effusions or deformities. Bulk is symmetric. See below for MMT scores.   Integumentary: Skin is warm, dry.   Neuro: AAOx3, Speech is intact. Appropriate to questioning.  Psych: Normal mood and affect.       Diagnostic Studies: Reviewed, no new imaging    Laboratory:  Reviewed  Results from last 7 days   Lab Units 03/25/24  0524   HEMOGLOBIN g/dL 11.3*   HEMATOCRIT % 35.2*   WBC Thousand/uL 5.63       Results from last 7 days   Lab Units 03/25/24  0524   BUN mg/dL 13   POTASSIUM mmol/L 3.8   CHLORIDE mmol/L 105   CREATININE mg/dL 0.56*            Patient Active Problem List   Diagnosis    Low back pain    Strain of lumbar region    WALE (obstructive sleep apnea)    Mixed dyslipidemia    Nasal septal deviation    Seasonal allergic rhinitis    Nasal turbinate hypertrophy    Nasal obstruction    Deviated nasal septum    Hypertrophy of nasal turbinates    Meningioma ([facility])    Abnormal finding on MRI of brain    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[facility])    Primary insomnia    Non-traumatic rhabdomyolysis    Chronic kidney disease    Metabolic acidosis    Acute respiratory failure with hypoxia ([facility])    ARDS survivor    Insomnia    Migraine    Anxiety    Persistent fever    ARDS (adult respiratory distress syndrome) ([facility])    Ambulatory dysfunction    Tachycardia    PAF (paroxysmal atrial fibrillation) ([facility])         Medications  Current Facility-Administered Medications   Medication Dose Route Frequency Provider Last Rate    acetaminophen  650 mg Oral Q6H PRN Tomasa Whitlock PA-C      albuterol  2 puff Inhalation Q4H PRN Tomasa Whitlock PA-C      enoxaparin  40 mg Subcutaneous Q24H [facility] Tomasa Whitlock PA-C      fluticasone  1 spray Each Nare Daily Ashley Depadua, MD      guaiFENesin  600 mg Oral Q12H [facility] Tomasa Whitlock PA-C      loratadine  5 mg Oral Daily Tomasa  MAYKEL Whitlock      LORazepam  0.5 mg Oral Q8H PRN Tomasasidney Whitlock PA-C      melatonin  3 mg Oral HS PRN Tomasasidney Whitlock PA-C      metoprolol succinate  50 mg Oral Q12H Lalita Arechiga, CRNP      nystatin   Topical BID Lalita Arechiga, CRNP      oxyCODONE  5 mg Oral Q6H PRN Tomasasidney Whitlock PA-C      PARoxetine  10 mg Oral Daily Tomasa MAYKEL Whitlock      sodium chloride  2 spray Each Nare Q6H Lalita Arechiga, ROSALINO      tamsulosin  0.4 mg Oral Daily With Dinner Tomasasidney Whitlock PA-C            ** Please Note: [employer] voice to text software may have been used in the creation of this document. **

## 2024-03-26 NOTE — PROGRESS NOTES
"Occupational Therapy Note       03/26/24 1300   Pain Assessment   Pain Assessment Tool 0-10   Pain Score No Pain   Restrictions/Precautions   Precautions Fall Risk;O2   Weight Bearing Restrictions No   ROM Restrictions No   Lifestyle   Autonomy \"Showering always takes it out of me,\" referring to energy level following.   Oral Hygiene   Type of Assistance Needed Independent   Physical Assistance Level No physical assistance   Comment seated   Oral Hygiene CARE Score 6   Grooming   Able To Wash/Dry Face;Wash/Dry Hands;Brush/Clean Teeth   Findings IND seated   Shower/Bathe Self   Type of Assistance Needed Supervision;Verbal cues   Physical Assistance Level No physical assistance   Comment Pt completing shower at STS level from TTB->GB with SUP. Pt self-initiates rest breaks as needed, but occasional vc's for deep breathing techniques/pursed lip breathing; pt with frequent coughing fits throughout shower, reports this has been typical throughout stay. O2 spot checked during shower, 87% on 4L and following shower 91% on 4L.   Shower/Bathe Self CARE Score 4   Tub/Shower Transfer   Findings functional mobility room->shower stall using RW/GB with SUP   Upper Body Dressing   Type of Assistance Needed Independent;Adaptive equipment   Physical Assistance Level No physical assistance   Comment item retrieval using RW   Upper Body Dressing CARE Score 6   Lower Body Dressing   Type of Assistance Needed Independent;Adaptive equipment   Physical Assistance Level No physical assistance   Comment item retrieval using RW   Lower Body Dressing CARE Score 6   Putting On/Taking Off Footwear   Type of Assistance Needed Independent   Physical Assistance Level No physical assistance   Comment seated using xleg technique   Putting On/Taking Off Footwear CARE Score 6   Sit to Stand   Type of Assistance Needed Independent;Adaptive equipment   Physical Assistance Level No physical assistance   Comment using RW   Sit to Stand CARE Score 6 " "  Bed-Chair Transfer   Type of Assistance Needed Independent;Adaptive equipment   Physical Assistance Level No physical assistance   Comment using RW   Chair/Bed-to-Chair Transfer CARE Score 6   Toilet Transfer   Type of Assistance Needed Independent;Adaptive equipment   Physical Assistance Level No physical assistance   Comment using RW   Toilet Transfer CARE Score 6   Exercise Tools   UE Ergometer Pt completed 4 sets of scifit level 1.5, 2 sets prograde, 2 sets retrograde for increased activity tolerance for ADLs. O2 spot checked: 94% on 3L, ; 93% on 2L .   Cognition   Overall Cognitive Status WFL   Arousal/Participation Alert;Cooperative   Attention Within functional limits   Orientation Level Oriented X4   Memory Within functional limits   Following Commands Follows all commands and directions without difficulty   Additional Activities   Additional Activities Comments Pt participating in standing balance and activity tolerance tasks to maximize functional performance with ADL tasks: STS from EOM without UE support x5reps; then STS 5x1 reps holding 3# ball, then STS 5x1 reps completing chest press using 3# ball with each STS. Pt then completing STS from EOM alternating tapping each LE on 6\" step without UE support x10 reps. Following last activity, O2=87% on 3L,  and increasing to 94% within 1-2minutes.   Activity Tolerance   Activity Tolerance Patient limited by fatigue   Assessment   Treatment Assessment Pt seen for 90 min OT session focusing on functional transfers/mobility, ADL routine including shower, and exercises to increase standing balance and activity tolerance for ADLs. Of note, O2 sats at rest = 92% without O2, 95% on 2L. O2 sats with walking household distances= 84% without O2, and ranging 90-96% on 3L O2. In addition, O2 at 87% on 4L during shower, increases to 91% after completion of shower and brief rest. Pt will require O2 for use at home in order to maintain safety during ADL " tasks and functional mobility. Anticipate d/c home with family tomorrow 3/27 and cardiac rehab.   Prognosis Good   Problem List Decreased strength;Decreased endurance;Impaired balance;Decreased mobility   Barriers to Discharge Decreased caregiver support   Plan   Treatment/Interventions ADL retraining;Functional transfer training;Therapeutic exercise;Endurance training;Patient/family training;Equipment eval/education;Gait training;Compensatory technique education   Progress Progressing toward goals   Discharge Recommendation   Rehab Resource Intensity Level, OT   (d/c home 3/27 with family and cardiac rehab)   OT Therapy Minutes   OT Time In 1300   OT Time Out 1430   OT Total Time (minutes) 90   OT Mode of treatment - Individual (minutes) 90   OT Mode of treatment - Concurrent (minutes) 0   OT Mode of treatment - Group (minutes) 0   OT Mode of treatment - Co-treat (minutes) 0   OT Mode of Treatment - Total time(minutes) 90 minutes   OT Cumulative Minutes 840   Therapy Time missed   Time missed? No

## 2024-03-26 NOTE — OCCUPATIONAL THERAPY NOTE
Occupational Therapy Note    O2 sats and HR checked both at rest and with functional mobility household distances at start of session:    Rest without O2: 92%, HR: 106bpm  Rest with O2: 95% on 2L, HR: 107bpm    Walking without O2: 84%, HR: 121 bpm  Walking with O2: 90% on 3L, HR: 112  With seated rest and pursed lip breathing following functional mobility, O2 increases to 95% in ~2min

## 2024-03-26 NOTE — PROGRESS NOTES
03/26/24 0915   Pain Assessment   Pain Assessment Tool 0-10   Pain Score No Pain   Restrictions/Precautions   Precautions O2   General   Change In Medical/Functional Status IRP with RW   Subjective   Subjective pt agreeable to perfrom skilled PT   Roll Left and Right   Type of Assistance Needed Independent   Roll Left and Right CARE Score 6   Sit to Lying   Type of Assistance Needed Independent   Sit to Lying CARE Score 6   Lying to Sitting on Side of Bed   Type of Assistance Needed Independent   Lying to Sitting on Side of Bed CARE Score 6   Sit to Stand   Type of Assistance Needed Independent   Sit to Stand CARE Score 6   Bed-Chair Transfer   Type of Assistance Needed Independent;Adaptive equipment   Comment RW   Chair/Bed-to-Chair Transfer CARE Score 6   Car Transfer   Type of Assistance Needed Set-up / clean-up   Comment RW   Car Transfer CARE Score 5   Walk 10 Feet   Type of Assistance Needed Independent;Adaptive equipment   Comment RW   Walk 10 Feet CARE Score 6   Walk 50 Feet with Two Turns   Type of Assistance Needed Independent;Adaptive equipment   Comment RW   Walk 50 Feet with Two Turns CARE Score 6   Walk 150 Feet   Type of Assistance Needed Independent;Adaptive equipment   Comment RW   Walk 150 Feet CARE Score 6   Walking 10 Feet on Uneven Surfaces   Type of Assistance Needed Supervision;Adaptive equipment   Comment floor mat RW   Walking 10 Feet on Uneven Surfaces CARE Score 4   Ambulation   Primary Mode of Locomotion Prior to Admission Walk   Distance Walked (feet) 400 ft  (300 x1)   Assist Device Roller Walker  (rollator for longer distance and pt and sit if fatigue)   Findings 2L 02 With and mild activities but with inc longer distance walking with RW or rollator pt to inc 3L 02 to keep 90 plus to 98 %   Does the patient walk? 2. Yes   Wheel 50 Feet with Two Turns   Reason if not Attempted Activity not applicable   Wheel 50 Feet with Two Turns CARE Score 9   Wheel 150 Feet   Reason if not  Attempted Activity not applicable   Wheel 150 Feet CARE Score 9   Curb or Single Stair   Style negotiated Curb   Type of Assistance Needed Supervision;Adaptive equipment   Comment RW and Rollator at S lvl   1 Step (Curb) CARE Score 4   4 Steps   Reason if not Attempted Activity not applicable   4 Steps CARE Score 9   12 Steps   Reason if not Attempted Activity not applicable   12 Steps CARE Score 9   Picking Up Object   Type of Assistance Needed Independent;Set-up / clean-up   Comment reacher marker using RW support   Picking Up Object CARE Score -   Toilet Transfer   Type of Assistance Needed Independent;Adaptive equipment   Comment RW   Toilet Transfer CARE Score 6   Therapeutic Interventions   Strengthening 3 # LAQ AP marching gluts 10-20 reps   Flexibility manual stretch LE   Balance standing balance   Equipment Use   NuStep lvl 2 for 10 min   Assessment   Treatment Assessment pt focus on RW /Rollator for home DC and line management w/o any problems . Pt overall 02 at 2L /  and 3 L if inc pt activities ... 93-98 % and HR  with rest and during activities. Pt instructed and ed/ on fall risk and cont with 02 line management for safety . Pt will cont to f/u for cardiopulmonary rehab amd possible DC tomorrow with all needs / DMEs met .   Barriers to Discharge Decreased caregiver support   Plan   Progress Progressing toward goals   Discharge Recommendation   Rehab Resource Intensity Level, PT   (cardiopulmonary rehab)   PT Therapy Minutes   PT Time In 0915   PT Time Out 1045   PT Total Time (minutes) 90   PT Mode of treatment - Individual (minutes) 45   PT Mode of treatment - Concurrent (minutes) 45   PT Mode of treatment - Group (minutes) 0   PT Mode of treatment - Co-treat (minutes) 0   PT Mode of Treatment - Total time(minutes) 90 minutes   PT Cumulative Minutes 1078   Therapy Time missed   Time missed? No

## 2024-03-26 NOTE — ASSESSMENT & PLAN NOTE
Secondary to influenza A/superimposed bacterial infection  S/p intubation 2/19 through 2/29  Bronchoscopy 2/19 positive for Candida glabrata, no bacteria  S/p antibiotics  Recent chest x-ray 3/12 was unchanged  Pulmonology recommends F/U CT of chest in 4 weeks (4/4/24)  Continue Mucinex.  Continues to require oxygen ATC at 2L NC  Sats are improving  DC 3/27/24

## 2024-03-27 ENCOUNTER — TRANSITIONAL CARE MANAGEMENT (OUTPATIENT)
Dept: FAMILY MEDICINE CLINIC | Facility: CLINIC | Age: 59
End: 2024-03-27

## 2024-03-27 VITALS
HEIGHT: 65 IN | BODY MASS INDEX: 29.75 KG/M2 | DIASTOLIC BLOOD PRESSURE: 75 MMHG | TEMPERATURE: 97.6 F | HEART RATE: 105 BPM | RESPIRATION RATE: 17 BRPM | WEIGHT: 178.57 LBS | SYSTOLIC BLOOD PRESSURE: 110 MMHG | OXYGEN SATURATION: 96 %

## 2024-03-27 PROCEDURE — 99232 SBSQ HOSP IP/OBS MODERATE 35: CPT | Performed by: PHYSICAL MEDICINE & REHABILITATION

## 2024-03-27 PROCEDURE — 97530 THERAPEUTIC ACTIVITIES: CPT

## 2024-03-27 PROCEDURE — 97110 THERAPEUTIC EXERCISES: CPT

## 2024-03-27 PROCEDURE — 99238 HOSP IP/OBS DSCHRG MGMT 30/<: CPT | Performed by: INTERNAL MEDICINE

## 2024-03-27 RX ORDER — NYSTATIN 100000 [USP'U]/G
POWDER TOPICAL 2 TIMES DAILY
Qty: 60 G | Refills: 0 | Status: SHIPPED | OUTPATIENT
Start: 2024-03-27

## 2024-03-27 RX ORDER — GUAIFENESIN 600 MG/1
600 TABLET, EXTENDED RELEASE ORAL EVERY 12 HOURS SCHEDULED
Start: 2024-03-27

## 2024-03-27 RX ORDER — ALBUTEROL SULFATE 90 UG/1
2 AEROSOL, METERED RESPIRATORY (INHALATION) EVERY 4 HOURS PRN
Qty: 18 G | Refills: 0 | Status: SHIPPED | OUTPATIENT
Start: 2024-03-27

## 2024-03-27 RX ORDER — LORATADINE 10 MG/1
5 TABLET ORAL DAILY
Qty: 30 TABLET | Refills: 0 | Status: SHIPPED | OUTPATIENT
Start: 2024-03-27

## 2024-03-27 RX ORDER — METOPROLOL SUCCINATE 50 MG/1
50 TABLET, EXTENDED RELEASE ORAL EVERY 12 HOURS
Qty: 60 TABLET | Refills: 0 | Status: SHIPPED | OUTPATIENT
Start: 2024-03-27 | End: 2024-03-29 | Stop reason: SDUPTHER

## 2024-03-27 RX ORDER — PAROXETINE 10 MG/1
10 TABLET, FILM COATED ORAL DAILY
Qty: 30 TABLET | Refills: 0 | Status: SHIPPED | OUTPATIENT
Start: 2024-03-27 | End: 2024-03-29

## 2024-03-27 RX ORDER — FLUTICASONE PROPIONATE 50 MCG
1 SPRAY, SUSPENSION (ML) NASAL DAILY
Qty: 48 G | Refills: 0 | Status: SHIPPED | OUTPATIENT
Start: 2024-03-27

## 2024-03-27 RX ADMIN — METOPROLOL SUCCINATE 50 MG: 50 TABLET, EXTENDED RELEASE ORAL at 09:30

## 2024-03-27 RX ADMIN — LORATADINE 5 MG: 10 TABLET ORAL at 09:30

## 2024-03-27 RX ADMIN — NYSTATIN: 100000 POWDER TOPICAL at 09:32

## 2024-03-27 RX ADMIN — FLUTICASONE PROPIONATE 1 SPRAY: 50 SPRAY, METERED NASAL at 09:30

## 2024-03-27 RX ADMIN — Medication 3 MG: at 02:24

## 2024-03-27 RX ADMIN — GUAIFENESIN 600 MG: 600 TABLET, EXTENDED RELEASE ORAL at 09:30

## 2024-03-27 RX ADMIN — PAROXETINE HYDROCHLORIDE 10 MG: 20 TABLET, FILM COATED ORAL at 09:30

## 2024-03-27 NOTE — NURSING NOTE
Patient stable for discharge home with spouse. Instructions reviewed and all questions answered. Portable oxygen delivered to room prior to discharge. Patient verbalized understanding of how to use.

## 2024-03-27 NOTE — PROGRESS NOTES
Physical Medicine and Rehabilitation Progress Note  Hussein Edward III 58 y.o. male MRN: [id number]  Unit/Bed#: [facility] 974-01 Encounter: [id number]    To Review: Hussein Edward III is a 58 y.o. male with medical history of meningioma on acetazolamide, WALE who presented to the [facility] on 2/16 after presenting to urgent care for SOB (he had been having UR symptoms a week prior and was diagnosed with influenza A and treated with tamiflu).Ultimately found to have developed likely superimposed bacterial pneumonia and progressed to ARDS. Did not require ECM). Was able to be extubated on 2/29 after progressing to needing intubation on 2/19. Treated with steroids, airway protocol, proning, antibiotics. Course c/b provoked afib (after CVC placement, septic, and on pressors), not on anticoagulation at this time. He also had ileus which has resolved, dysphagia, which has improved, and tachycardia which is due to deconditioning, atelectasis, hypovolemia, and anxiety. He was admitted to the [facility] on 3/14.       Chief Complaint: Feeling well. Feels ready for discharge.     Interval History/Subjective:  No acute events overnight. Doing well on 2L NC, sometimes 3L with activities. His oxygen and CPAP supplies were sent to the wrong address, so we are working on getting those sent to his home so he can discharge today. No new CP, SOB, fevers, chills, N/V, abdominal pain. Last BM 3/26.     ROS:  A 10 point review of systems was negative except for what is noted in the HPI.    Today's Changes:  Cleared for discharge today as long as he can get his O2 equipment to his home today.    Total visit time: 35 minutes, with more than 50% spent counseling/coordinating care. Counseling includes discussion with patient re: progress in therapies, functional issues observed by therapy staff, and discussion with patient regarding their current medical state and wellbeing. Coordination of patient's care was performed in  conjunction with Internal Medicine service to monitor patient's labs, vitals, and management of their comorbidities.    Assessment/Plan:    * ARDS survivor  Assessment & Plan  Admitted with respiratory failure 2/2 influenza with possible superimposed bacterial PNA  S/p 10 days antibotics, tamiflu course complicated by ARDS with extended vent time.  He is now on RA at rest, but will desaturate to 70s when ambulating on admission  Now maintaining O2 sats in the 90s on 2-3L NC ATC.  GUERRA with speech improving at discharge   IS/Flutter valve  PRN Nebs  PT/OT 3-5 hours/day, 5-7 days/week - completed. Recommend outpatient cardiopulmonary rehab.  Outpatient f/u with Pulmonology with repeat CT Chest in the beginning of April to ensure resolution.         - At this point most recent imaging with continued bilateral patchy airspace opacities as would be expected at this time.     PAF (paroxysmal atrial fibrillation) (HCC)  Assessment & Plan  One episode provoked right after CVC placed, patient was septic and on pressors.  Resolved with lopressor, amio gtt, followed by one dose of digoxin.  On BB for tachycardia (but has been sinus tachycardia)  At this time no need for anticoagulation  Follow-up with cardiology for further evaluation    Tachycardia  Assessment & Plan  BL HR typically in   Monitor volume status, encourage adequate hydration  Cards started on Toprol 25mg in the AM, 50mg in the PM   - Increased to 50mg BID on 3/19.  Monitor respiratory status on BB  Adjust as per IM  Recommend outpatient Cards evaluation.    Anxiety  Assessment & Plan  Started on Paxil and Lorazepam in the hospital  Wean Lorazepam as tolerated    Migraine  Assessment & Plan  Follows with Dr. Camargo  On Acetalozamide at home for this and magnesium for this  Held in the hospital for metabolic acidosis  Monitor off at this time  Outpatient f/u with Neurology      Acute respiratory failure with hypoxia (HCC)  Assessment & Plan  He is now on  RA at rest, but will desaturate to 70s when ambulating   - Improving in therapies   - Requiring at least 2L NC currently, sometimes as high as 3-4L with activity, although this is improving as well. Recently requiring mostly 2-3L NC  Can get dyspneic even with speech  Shallow breaths  Primary limitation functionally at this point  O2 at night and with therapies  Wean as tolerated     WALE (obstructive sleep apnea)  Assessment & Plan  Uses home CPAP, but may need to replace tubing/filter with [employer]  Hard time tolerating hospital CPAP  Continue O2 via nasal cannula  We ordered new tubing and mask from [employer].   Outpatient f/u with his Pulmonologist   - He would like to get a new machine.        Health Maintenance  #Delirium/Sleep: At risk, optimize sleep/wake, bowel, bladder, and pain.  #Pain: Tylenol PRN   #Bowel: Last BM 3/26 Miralax daily. PRN Suppository and bisacodyl tab  #Bladder: Voiding and continent   #Skin/Pressure Injury Prevention: Turn Q2hr in bed, with weight shifts [id number] in wheelchair. Float heels in bed.  #DVT Prophylaxis: Lovenox, SCDs  #GI Prophylaxis: Not indicated  #Code Status: Full Code  #FEN: Regular/Thins, [employer] two packets with lunch/dinner   #Dispo: Team 3/20: ADD 3/27 with cardiopulmonary rehab - will have to check what is available near there. ELOS 2 weeks with goals to discharge home at modified Ind level of function and family support.       Objective:    Functional Update:   PT: Ind bed mobility, Ind transfers, ambulation 400' with RW, Sup uneven surfaces, Sup curb/stairs.  OT: Ind-Sup with ADLs      Allergies per EMR    Physical Exam:  Temp:  [97.6 °F (36.4 °C)-98.4 °F (36.9 °C)] 97.6 °F (36.4 °C)  HR:  [] 86  Resp:  [16-17] 17  BP: (106-124)/(52-78) 106/64  Oxygen Therapy  SpO2: 96 %  O2 Flow Rate (L/min): 2 L/min    Gen: No acute distress, Well-nourished, well-appearing.  HEENT: Moist mucus membranes, Normocephalic/Atraumatic  Cardiovascular: Regular rate,  rhythm, S1/S2. Distal pulses palpable  Heme/Extr: No edema  Pulmonary: Non-labored breathing. Lungs CTAB. Less GUERRA at rest and with speech with NC on. Short shallow breaths.  : No raines  GI: Soft, non-tender, non-distended. BS+  MSK: ROM is WFL in all extremities. No effusions or deformities. Bulk is symmetric. See below for MMT scores.   Integumentary: Skin is warm, dry.   Neuro: AAOx3, CN 2-12 intact.  Speech is intact. Appropriate to questioning. Tone is normal.   MMT:   Strength:   Right  Left  Site  Right  Left  Site    5 5  S Ab: Shoulder Abductors  5  5  HF: Hip Flexors    5 5  EF: Elbow Flexors  5  5 KF: Knee Flexors    5  5  EE: Elbow Extensors  5  5  KE: Knee Extensors    5  5  WE: Wrist Extensors  5  5  DR: Dorsi Flexors    5  5  FF: Finger Flexors  5  5  PF: Plantar Flexors    5  5  HI: Hand Intrinsics  5  5  EHL: Extensor Hallucis Longus   Psych: Normal mood and affect.         Diagnostic Studies: Reviewed, no new imaging    Laboratory:  Reviewed   Results from last 7 days   Lab Units 03/25/24  0524   HEMOGLOBIN g/dL 11.3*   HEMATOCRIT % 35.2*   WBC Thousand/uL 5.63       Results from last 7 days   Lab Units 03/25/24  0524   BUN mg/dL 13   POTASSIUM mmol/L 3.8   CHLORIDE mmol/L 105   CREATININE mg/dL 0.56*            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)    Primary insomnia    Non-traumatic rhabdomyolysis    Chronic kidney disease    Metabolic acidosis    Acute respiratory failure with hypoxia ([facility])    ARDS survivor    Insomnia    Migraine    Anxiety    Persistent fever    ARDS (adult respiratory distress syndrome) ([facility])     Ambulatory dysfunction    Tachycardia    PAF (paroxysmal atrial fibrillation) ([facility])         Medications  Current Facility-Administered Medications   Medication Dose Route Frequency Provider Last Rate    acetaminophen  650 mg Oral Q6H PRN Tomasa Whitlock PA-C      albuterol  2 puff Inhalation Q4H PRN Tomasa Whitlock PA-C      enoxaparin  40 mg Subcutaneous Q24H [facility] Tomasa Whitlock PA-C      fluticasone  1 spray Each Nare Daily Ashley Depadua, MD      guaiFENesin  600 mg Oral Q12H YE[facility]DA Tomasa Whitlock PA-C      loratadine  5 mg Oral Daily Tomasa Whitlock PA-C      melatonin  3 mg Oral HS PRN Tomasa Whitlock PA-C      metoprolol succinate  50 mg Oral Q12H Lalita Arechiga, CRNP      nystatin   Topical BID Lalita Arechiga CRNP      oxyCODONE  5 mg Oral Q6H PRN Tomasa Whitlock PA-C      PARoxetine  10 mg Oral Daily Tomasa Whitlock PA-C      sodium chloride  2 spray Each Nare Q6H Lalita ArechigaROSALINO      tamsulosin  0.4 mg Oral Daily With Dinner Tomasa Whitlock PA-C            ** Please Note: Flu[employer] Direct voice to text software may have been used in the creation of this document. **

## 2024-03-27 NOTE — PROGRESS NOTES
03/27/24 0830   Pain Assessment   Pain Assessment Tool 0-10   Pain Score No Pain   Restrictions/Precautions   Precautions Fall Risk;O2   Subjective   Subjective pt agreeable to perform skilled PT and concern about [place] tank going to his new address .spoke with lawrence and MD and DARSHAN   Roll Left and Right   Type of Assistance Needed Independent   Roll Left and Right CARE Score 6   Sit to Lying   Type of Assistance Needed Independent   Sit to Lying CARE Score 6   Lying to Sitting on Side of Bed   Type of Assistance Needed Independent   Lying to Sitting on Side of Bed CARE Score 6   Sit to Stand   Type of Assistance Needed Independent   Sit to Stand CARE Score 6   Bed-Chair Transfer   Type of Assistance Needed Independent;Adaptive equipment   Comment RW   Chair/Bed-to-Chair Transfer CARE Score 6   Car Transfer   Type of Assistance Needed Set-up / clean-up;Adaptive equipment   Comment RW   Car Transfer CARE Score 5   Walk 10 Feet   Type of Assistance Needed Independent;Adaptive equipment   Comment RW   Walk 10 Feet CARE Score 6   Walk 50 Feet with Two Turns   Type of Assistance Needed Independent;Adaptive equipment   Comment RW   Walk 50 Feet with Two Turns CARE Score 6   Walk 150 Feet   Type of Assistance Needed Independent;Adaptive equipment   Comment RW   Walk 150 Feet CARE Score 6   Walking 10 Feet on Uneven Surfaces   Type of Assistance Needed Supervision;Set-up / clean-up   Comment  floor mat   Walking 10 Feet on Uneven Surfaces CARE Score 4   Ambulation   Primary Mode of Locomotion Prior to Admission Walk   Distance Walked (feet) 400 ft  (X2)   Assist Device Roller Walker   Does the patient walk? 2. Yes   Wheel 50 Feet with Two Turns   Reason if not Attempted Activity not applicable   Wheel 50 Feet with Two Turns CARE Score 9   Wheel 150 Feet   Reason if not Attempted Activity not applicable   Wheel 150 Feet CARE Score 9   Picking Up Object   Type of Assistance Needed Independent;Adaptive equipment   Comment in  his room using reacher for marker   Picking Up Object CARE Score 6   Toilet Transfer   Type of Assistance Needed Independent;Adaptive equipment   Comment RW   Toilet Transfer CARE Score 6   Equipment Use   NuStep lvl 3 for 15 min 02 at 3L LE only   Assessment   Treatment Assessment pt overall 02 at 3L with walking activites at 95 % AND hr 103 . also 02 lvl at 2L lvl w/o walking or just seated activities . Pt at Indep lvl with with ambulation bed transfers IRP with RW . Pt going home with 02 supplies and all DME for home DC today . Pt will cont outpt cardiopulmonary rehab .   Barriers to Discharge Inaccessible home environment;Decreased caregiver support   Plan   Progress Progressing toward goals   Discharge Recommendation   Rehab Resource Intensity Level, PT   (cardiopulmonary rehab)   PT Therapy Minutes   PT Time In 0830   PT Time Out 0930   PT Total Time (minutes) 60   PT Mode of treatment - Individual (minutes) 60   PT Mode of treatment - Concurrent (minutes) 0   PT Mode of treatment - Group (minutes) 0   PT Mode of treatment - Co-treat (minutes) 0   PT Mode of Treatment - Total time(minutes) 60 minutes   PT Cumulative Minutes 1138   Therapy Time missed   Time missed? No

## 2024-03-27 NOTE — ASSESSMENT & PLAN NOTE
Secondary to influenza A/superimposed bacterial infection  S/p intubation 2/19 through 2/29  Bronchoscopy 2/19 positive for Candida glabrata, no bacteria  S/p antibiotics  Recent chest x-ray 3/12 was unchanged  Pulmonology recommends F/U CT of chest in 4 weeks (4/4/24)  Continue Mucinex.  Continues to require oxygen ATC at 2L NC  Sats are stable  DC 3/27/24

## 2024-03-27 NOTE — CASE MANAGEMENT
Team dc summary - pt made good gains and returned home with wife with continued outpt cardiac/pulmonary physical therapy at [facility]. Pt was following up with his cardiologist on 3/28.  Pt received home oxygen through [employer], eclipse unit provided to pts in his room with education occurring from John WALKER liaison for [facility]. Wife confirmed delivery of concentrator was scheduled for 3/28. Wife present for dc instructions and aware of pts functional ability.

## 2024-03-28 ENCOUNTER — OFFICE VISIT (OUTPATIENT)
Age: 59
End: 2024-03-28
Payer: COMMERCIAL

## 2024-03-28 VITALS
WEIGHT: 181 LBS | HEART RATE: 122 BPM | BODY MASS INDEX: 30.16 KG/M2 | SYSTOLIC BLOOD PRESSURE: 112 MMHG | TEMPERATURE: 98.4 F | DIASTOLIC BLOOD PRESSURE: 68 MMHG | HEIGHT: 65 IN | OXYGEN SATURATION: 98 %

## 2024-03-28 DIAGNOSIS — Z87.09 ARDS SURVIVOR: ICD-10-CM

## 2024-03-28 DIAGNOSIS — J96.01 ACUTE RESPIRATORY FAILURE WITH HYPOXIA (HCC): Primary | ICD-10-CM

## 2024-03-28 DIAGNOSIS — F51.01 PRIMARY INSOMNIA: ICD-10-CM

## 2024-03-28 DIAGNOSIS — G47.33 OSA (OBSTRUCTIVE SLEEP APNEA): ICD-10-CM

## 2024-03-28 LAB

## 2024-03-28 PROCEDURE — 99214 OFFICE O/P EST MOD 30 MIN: CPT | Performed by: INTERNAL MEDICINE

## 2024-03-28 RX ORDER — ALBUTEROL SULFATE 2.5 MG/3ML
2.5 SOLUTION RESPIRATORY (INHALATION) EVERY 8 HOURS PRN
Qty: 125 ML | Refills: 1 | Status: SHIPPED | OUTPATIENT
Start: 2024-03-28 | End: 2024-06-26

## 2024-03-28 NOTE — PROGRESS NOTES
Sleep Medicine Outpatient Follow Up Note   Hussein Edward III 58 y.o. male MRN: [id number]  3/28/2024      Reason for Consultation:    Chief Complaint   Patient presents with    Sleep Apnea    Hypoxia    ARDS     Assessment/Plan:    1. Acute respiratory failure with hypoxia (HCC)  Assessment & Plan:  Severe influenza A and superimposed bacterial pneumonia resulting in ARDS and intubated from 2/19 to 2/29 with proning.  Bronchoscopy 2/19 positive for Candida glabrata but no bacteria.  He completed Tamiflu and antibiotic course.    Last chest x-ray on 3/14 shows some resolution of the right upper lobe infiltrates in the left upper lobe infiltrates.    He is scheduled for repeat chest CT in early April which I will review with him at next visit.  I anticipate resolving infiltrates, possibly some pulmonary scarring    Still needs 2 to 3 L with ambulation and sleep.  He has a portable concentrator.  On room air at rest.        2. ARDS survivor  Assessment & Plan:  Severe hypoxic respiratory failure from influenza and possible bacterial pneumonia resulting in ARDS with prolonged hospitalization in February to March 2024.  He required proning and prolonged ventilator time.  Fortunately he was able to be successfully extubated.    He has lost 25 pounds during the hospitalization, currently appetite is okay and he is working very hard with physical therapy.    I recommend that he continue albuterol nebulized as needed during this period of prolonged recovery before physical therapy or rehab or when he is short of breath after exertion.  He gained benefit from nebulizations in the hospital.    I have referred him to pulmonary rehab and he is going to schedule that    He initially had severe nightmares and hallucinations due to prolonged sedation and required Paxil.  He has held Paxil recently due to resolution of most of the neurologic symptoms.  He can continue to hold Paxil if he does not need it     Orders:  -      Nebulizer  -     Nebulizer Supplies  -     albuterol (2.5 mg/3 mL) 0.083 % nebulizer solution; Take 3 mL (2.5 mg total) by nebulization every 8 (eight) hours as needed for wheezing or shortness of breath    3. WALE (obstructive sleep apnea)  Assessment & Plan:  Long history of WALE and has been on CPAP for 8 to 10 years.  He has used nasal mask and fullface masks.  Previous study in 2016 showed AHI 7.9, supine AHI 95.5, REM AHI 15.9 with SpO2 denys 89%.  Repeat HST 2023 shows SIMA 35  He has a AirSense 10 which we do not have compliance data since he does not use a DME.  Previously he needed to bring data via [employer] bring the machine for compliance visit.  He has multiple masks at home.  Last visit in September we had discussed hypoglossal nerve stimulator therapy     After this prolonged hospitalization due to ARDS he is still recovering from hypoxic respiratory failure and lung injury.  I recommended that he hold off on CPAP at this time since he feels excessive pressure even at the lowest pressures likely due to lung injury.  He should continue with nocturnal oxygen at 3 L/min.    When he is more recovered from a pulmonary standpoint, we can send a prescription for new CPAP to the DME as his old CPAP is having motor mechanical issues and is broken beyond repair.  We can also revisit hypoglossal nerve stimulation placement in the future after he recovers    Orders:  -     CPAP Auto New DME    4. Primary insomnia  Assessment & Plan:  Prior to hospitalization he was improving sleep hygiene and doing more stimulus control with some improvement.      He is currently on daridorexant 25 mg nightly as needed which he can continue.  This is mostly effective.        Health Maintenance  Immunization History   Administered Date(s) Administered    COVID-19 J&J (Samuel) vaccine 0.5 mL 03/24/2021    Influenza Quadrivalent Preservative Free 3 years and older IM 12/07/2017    Influenza, seasonal, injectable 11/02/2016    Tdap  03/11/2019        Return in about 6 weeks (around 5/9/2024).    History of Present Illness   HPI:  Hussein Edward III is a 58 y.o. male who has a past medical history of R frontal meningioma, HLD, low back pain, migraines, nasal septal deviation s/p septoplasty, seasonal allergic rhinitis, severe WALE with difficulty tolerating CPAP, insomnia, recent influenza a/bacterial pneumonia resulting in ARDS with subsequent hypoxic respiratory failure who is presenting for follow-up after hospitalization    3/28/24 -follow-up with me-here with his wife currently on 2 L with exertion.  Very motivated to do more rehab.  Will schedule pulmonary rehab.  Still having cough     3/20/24 -patient requested decrease in CPAP pressures.  I decreased to 5-10 cm H2O.  I placed an order for pulmonary rehab    3/14-3/27/2024-admitted to [facility] for rehab after hospitalization.  He is on room air at rest but will desaturate with ambulation and requires 2 to 3 L with ambulation.  Speech improving at discharge.  Pulmonary rehab recommended.  Started on Paxil in the hospitalization.  Did not need lorazepam throughout rehab stay.    2/12-3/14/24 -extensive hospitalization for acute hypoxic respiratory failure due to ARDS from influenza A and likely superimposed bacterial pneumonia.  Intubated from 2/19 - 2/29 and required proning, paralytics.  Course complicated by some postextubation delirium, reports of hallucinations during sedation.  Successfully extubated and transferred to rehab on 3/14.    12/20/23  -telephone encounter, prescribed daridorexant for insomnia    9/12/23 -follow-up with me - last seen by me 6/20/23 - HST shows 32.5 events per hour with 33 minutes <89% SpO2.  He uses CPAP intermittently using a AirSense S10.  He doesn't use a DME and buys supplies online.  He has tried 8-10 different masks.  He reports mask leaking/hissing which wakes him up at night.  He has snoring with CPAP.  CPAP has not made him feel better.     He has some  sleep initiation issues and takes him several hours to fall as sleep due to racing thoughts at times.      6/20/23 - consult with me - He endorses snoring, morning headaches, apneic events, seen by his wife. His neurologist recently started him on acetazolamide 125 mg a day.  He is prescribed diazepam and temazepam but he has not taken this recently.  He is taking tramadol a few times a week for back pain.     He had previously been treated with CPAP for 8-10 years although he feels like CPAP was very uncomfortable.  He did use nasal masks and fullface masks.  He still has a CPAP and only uses it occasionally.  Previous sleep study: 2016, AHI 7.9, supine 95.5, REM 15.9, Spo2 denys 89%     His sleep routine typically involves eating between 8-9 PM and going to bed around 10 PM and getting out of bed at 4-4:30 AM, though he does watch or listen to TV at night time. He states that the TV helps to distract him from tinnitus. He gets 5 hour of sleep per night on average, but often will find himself getting less than 2 hours of sleep due to distracting thoughts.      He reports waking about 7 times per night, due to the CPAP mask hissing, and once or twice per night to urinate. He sleeps on his side most nights. He only drinks coffee in the mornings, and denies caffeinated drinks in the afternoon. He had used chewing tobacco in the past, but quit 20 years ago and no longer uses nicotine. He rarely drinks alcohol, once or twice per year. He denies other drug use.       Meds/Allergies     Current Outpatient Medications:     albuterol (2.5 mg/3 mL) 0.083 % nebulizer solution, Take 3 mL (2.5 mg total) by nebulization every 8 (eight) hours as needed for wheezing or shortness of breath, Disp: 125 mL, Rfl: 1    albuterol (PROVENTIL HFA,VENTOLIN HFA) 90 mcg/act inhaler, Inhale 2 puffs every 4 (four) hours as needed for wheezing, Disp: 18 g, Rfl: 0    Daridorexant HCl 25 MG TABS, Take 25 mg by mouth daily at bedtime as needed  "(sleep) for up to 10 days, Disp: , Rfl:     fluticasone (FLONASE) 50 mcg/act nasal spray, 1 spray into each nostril daily, Disp: 48 g, Rfl: 0    guaiFENesin (MUCINEX) 600 mg 12 hr tablet, Take 1 tablet (600 mg total) by mouth every 12 (twelve) hours, Disp: , Rfl:     loratadine (CLARITIN) 10 mg tablet, Take 0.5 tablets (5 mg total) by mouth daily, Disp: 30 tablet, Rfl: 0    metoprolol succinate (TOPROL-XL) 50 mg 24 hr tablet, Take 1 tablet (50 mg total) by mouth every 12 (twelve) hours, Disp: 60 tablet, Rfl: 0    nystatin (MYCOSTATIN) powder, Apply topically 2 (two) times a day, Disp: 60 g, Rfl: 0    oxyCODONE (ROXICODONE) 5 immediate release tablet, Use 0.5-1 tablet by mouth every 8 hours as needed for moderate-severe pain., Disp: 10 tablet, Rfl: 0    PARoxetine (PAXIL) 10 mg tablet, Take 1 tablet (10 mg total) by mouth daily, Disp: 30 tablet, Rfl: 0    Rimegepant Sulfate (Nurtec) 75 MG TBDP, Take one NURTEC 75 mg under tongue every other day. Limit 1 in 24 hours, Disp: 16 tablet, Rfl: 11    sodium chloride (OCEAN) 0.65 % nasal spray, 1 spray into each nostril every hour as needed for congestion, Disp: , Rfl:     tamsulosin (FLOMAX) 0.4 mg, Take 1 capsule (0.4 mg total) by mouth daily with dinner, Disp: 30 capsule, Rfl: 0    magnesium oxide (MAG-OX) 400 mg, Take 1 tablet (400 mg total) by mouth daily Nightly (Patient not taking: Reported on 3/28/2024), Disp: 90 tablet, Rfl: 3  No current facility-administered medications for this visit.  No Known Allergies    Vitals: Blood pressure 112/68, pulse (!) 122, temperature 98.4 °F (36.9 °C), temperature source Tympanic, height 5' 5\" (1.651 m), weight 82.1 kg (181 lb), SpO2 98%. Body mass index is 30.12 kg/m². Oxygen Therapy  SpO2: 98 %  Oxygen Therapy: Supplemental oxygen  O2 Delivery Method: Nasal cannula  O2 Flow Rate (L/min): 2 L/min    Physical Exam  Vitals and nursing note reviewed.   Constitutional:       General: He is not in acute distress.     Appearance: Normal " appearance. He is well-developed. He is not ill-appearing, toxic-appearing or diaphoretic.   HENT:      Head: Normocephalic and atraumatic.      Mouth/Throat:      Mouth: Mucous membranes are moist.      Pharynx: Oropharynx is clear. No oropharyngeal exudate.   Eyes:      Conjunctiva/sclera: Conjunctivae normal.   Cardiovascular:      Rate and Rhythm: Normal rate and regular rhythm.   Pulmonary:      Effort: Pulmonary effort is normal. No respiratory distress.      Breath sounds: Normal breath sounds. No stridor. No wheezing, rhonchi or rales.   Abdominal:      Tenderness: There is no guarding.   Musculoskeletal:         General: No swelling.      Cervical back: Normal range of motion and neck supple. No rigidity.      Right lower leg: No edema.      Left lower leg: No edema.   Skin:     General: Skin is warm and dry.   Neurological:      General: No focal deficit present.      Mental Status: He is alert and oriented to person, place, and time. Mental status is at baseline.   Psychiatric:         Mood and Affect: Mood normal.             Labs:   I have personally reviewed pertinent lab results.    ABG:   Lab Results   Component Value Date    PHART 7.530 (H) 03/03/2024    HUD9NHQ 34.3 (L) 03/03/2024    PO2ART 48.3 (LL) 03/03/2024    QIC3VHO 28.0 03/03/2024    BEART 5.5 03/03/2024    SOURCE Brachial, Right 03/03/2024   ,   CBC:  Lab Results   Component Value Date    WBC 5.63 03/25/2024    HGB 11.3 (L) 03/25/2024    HCT 35.2 (L) 03/25/2024    MCV 91 03/25/2024     03/25/2024    EOSPCT 9 (H) 03/25/2024    EOSABS 0.52 03/25/2024    NEUTOPHILPCT 57 03/25/2024    LYMPHOPCT 22 03/25/2024   ,   CMP:   Lab Results   Component Value Date    SODIUM 141 03/25/2024    K 3.8 03/25/2024     03/25/2024    CO2 27 03/25/2024    BUN 13 03/25/2024    CREATININE 0.56 (L) 03/25/2024    GLUCOSE 223 (H) 02/19/2024    CALCIUM 9.2 03/25/2024    AST 16 03/18/2024    ALT 31 03/18/2024    ALKPHOS 89 03/18/2024    PROT 6.8 12/20/2017  "   BILITOT 0.3 12/20/2017    EGFR 115 03/25/2024   ,   Ferrtin: No components found for: \"FERRTIN\",  Magensium: No results found for: \"MAGNESIUM\",    Imaging and other studies: I have personally reviewed pertinent reports.   and I have personally reviewed pertinent films in PACS  3/11/24 CXR -bilateral patchy opacities more prominent in the left lung and right lower lobe    2/27/2024 chest CT without contrast-patchy groundglass and airspace opacities scattered throughout more dense in the lower lobes with air bronchograms.  Lack of effusions.  Normal cardiac silhouette.  Small coronary cardial effusion.  No adenopathy.  Endotracheal tube in place.    Sleep Study:  7/20/2023-HST-severe WALE SIMA 32.5.  A 237. significantly higher AHI on the right side of 59 SIMA and 10 on left side.  Baseline oxygen saturation normal    Sleep study: 2016, AHI 7.9, supine 95.5, REM 15.9, Spo2 denys 89%      Compliance data:  His machine is not linked to the DME and therefore no compliance data found.    Transthoracic Echo:  2/19/24    Left Ventricle: Left ventricular cavity size is normal. Wall thickness is mildly increased. There is concentric remodeling. The left ventricular ejection fraction is 55-60%. Systolic function is normal. Wall motion is normal. Diastolic function is mildly abnormal, consistent with grade I (abnormal) relaxation.    Right Ventricle: Right ventricular cavity size is normal. Systolic function is normal.    Left Atrium: The atrium is normal in size.    Right Atrium: The atrium is normal in size.    Pulmonary Function Testing: none        Bernabe Cook MD  Pulmonary, Critical Care and Sleep Medicine  [facility]     Portions of the record may have been created with voice recognition software. Occasional wrong word or \"sound a like\" substitutions may have occurred due to the inherent limitations of voice recognition software. Please read the chart carefully and recognize, using " context, where substitutions have occurred.

## 2024-03-28 NOTE — ASSESSMENT & PLAN NOTE
Severe influenza A and superimposed bacterial pneumonia resulting in ARDS and intubated from 2/19 to 2/29 with proning.  Bronchoscopy 2/19 positive for Candida glabrata but no bacteria.  He completed Tamiflu and antibiotic course.    Last chest x-ray on 3/14 shows some resolution of the right upper lobe infiltrates in the left upper lobe infiltrates.    He is scheduled for repeat chest CT in early April which I will review with him at next visit.  I anticipate resolving infiltrates, possibly some pulmonary scarring    Still needs 2 to 3 L with ambulation and sleep.  He has a portable concentrator.  On room air at rest.

## 2024-03-28 NOTE — ASSESSMENT & PLAN NOTE
Severe hypoxic respiratory failure from influenza and possible bacterial pneumonia resulting in ARDS with prolonged hospitalization in February to March 2024.  He required proning and prolonged ventilator time.  Fortunately he was able to be successfully extubated.    He has lost 25 pounds during the hospitalization, currently appetite is okay and he is working very hard with physical therapy.    I recommend that he continue albuterol nebulized as needed during this period of prolonged recovery before physical therapy or rehab or when he is short of breath after exertion.  He gained benefit from nebulizations in the hospital.    I have referred him to pulmonary rehab and he is going to schedule that    He initially had severe nightmares and hallucinations due to prolonged sedation and required Paxil.  He has held Paxil recently due to resolution of most of the neurologic symptoms.  He can continue to hold Paxil if he does not need it

## 2024-03-28 NOTE — ASSESSMENT & PLAN NOTE
Long history of WALE and has been on CPAP for 8 to 10 years.  He has used nasal mask and fullface masks.  Previous study in 2016 showed AHI 7.9, supine AHI 95.5, REM AHI 15.9 with SpO2 denys 89%.  Repeat HST 2023 shows SIMA 35  He has a AirSense 10 which we do not have compliance data since he does not use a DME.  Previously he needed to bring data via [employer] bring the machine for compliance visit.  He has multiple masks at home.  Last visit in September we had discussed hypoglossal nerve stimulator therapy     After this prolonged hospitalization due to ARDS he is still recovering from hypoxic respiratory failure and lung injury.  I recommended that he hold off on CPAP at this time since he feels excessive pressure even at the lowest pressures likely due to lung injury.  He should continue with nocturnal oxygen at 3 L/min.    When he is more recovered from a pulmonary standpoint, we can send a prescription for new CPAP to the DME as his old CPAP is having motor mechanical issues and is broken beyond repair.  We can also revisit hypoglossal nerve stimulation placement in the future after he recovers

## 2024-03-28 NOTE — ASSESSMENT & PLAN NOTE
Prior to hospitalization he was improving sleep hygiene and doing more stimulus control with some improvement.      He is currently on daridorexant 25 mg nightly as needed which he can continue.  This is mostly effective.

## 2024-03-29 ENCOUNTER — TELEMEDICINE (OUTPATIENT)
Dept: FAMILY MEDICINE CLINIC | Facility: CLINIC | Age: 59
End: 2024-03-29
Payer: COMMERCIAL

## 2024-03-29 DIAGNOSIS — R00.0 TACHYCARDIA: ICD-10-CM

## 2024-03-29 DIAGNOSIS — J80 ARDS (ADULT RESPIRATORY DISTRESS SYNDROME) (HCC): ICD-10-CM

## 2024-03-29 DIAGNOSIS — G47.33 OSA (OBSTRUCTIVE SLEEP APNEA): ICD-10-CM

## 2024-03-29 DIAGNOSIS — Z87.09 ARDS SURVIVOR: Primary | ICD-10-CM

## 2024-03-29 DIAGNOSIS — J96.01 ACUTE RESPIRATORY FAILURE WITH HYPOXIA (HCC): ICD-10-CM

## 2024-03-29 PROCEDURE — 99495 TRANSJ CARE MGMT MOD F2F 14D: CPT | Performed by: FAMILY MEDICINE

## 2024-03-29 RX ORDER — METOPROLOL SUCCINATE 50 MG/1
50 TABLET, EXTENDED RELEASE ORAL EVERY 12 HOURS
Qty: 60 TABLET | Refills: 5 | Status: SHIPPED | OUTPATIENT
Start: 2024-03-29

## 2024-03-29 NOTE — PROGRESS NOTES
Virtual TCM Visit:    Verification of patient location:    Patient is located at Home in the following state in which I hold an active license [place]    Assessment/Plan:        Problem List Items Addressed This Visit       WALE (obstructive sleep apnea)    Acute respiratory failure with hypoxia (HCC)    ARDS survivor - Primary    ARDS (adult respiratory distress syndrome) (HCC)    Tachycardia    Relevant Medications    metoprolol succinate (TOPROL-XL) 50 mg 24 hr tablet        Reason for visit is f/u ARDs/TCM    Encounter provider Iftikhar Roque MD     Provider located at [address] [id number]    Recent Visits  No visits were found meeting these conditions.  Showing recent visits within past 7 days and meeting all other requirements  Today's Visits  Date Type Provider Dept   03/29/24 Telemedicine Iftikhar Roque MD [facility]   Showing today's visits and meeting all other requirements  Future Appointments  No visits were found meeting these conditions.  Showing future appointments within next 150 days and meeting all other requirements       After connecting through [employer], the patient was identified by name and date of birth. Hussein Edward III was informed that this is a telemedicine visit and that the visit is being conducted through the Epic Embedded platform. He agrees to proceed..  My office door was closed. No one else was in the room.  He acknowledged consent and understanding of privacy and security of the video platform. The patient has agreed to participate and understands they can discontinue the visit at any time.    Patient is aware this is a billable service.    Transitional Care Management Review:  Hussein Edward III is a 58 y.o. male here for TCM follow up.     During the TCM phone call patient stated:    TCM Call       Date and time call was made  3/27/2024  2:22 PM    Hospital care reviewed   Records reviewed    Patient was hospitialized at  [facility]    Date of Admission  03/14/24    Date of discharge  03/27/24    Diagnosis  ARDS survivor    Disposition  Home; Home health services    Were the patients medications reviewed and updated  Yes    Current Symptoms  None          TCM Call       Post hospital issues  None    Scheduled for follow up?  Yes    Patients specialists  Neurologist; Pulmonlolgist    Did you obtain your prescribed medications  Yes    Do you need help managing your prescriptions or medications  No    Is transportation to your appointment needed  No    I have advised the patient to call PCP with any new or worsening symptoms  Kim Varela MA          Subjective:     Patient ID: Hussein Edward III is a 58 y.o. male.    HPI  Review of Systems   Constitutional:  Positive for fatigue. Negative for fever and unexpected weight change.   HENT:  Negative for congestion, sinus pain and sore throat.    Eyes:  Negative for visual disturbance.   Respiratory:  Positive for cough, shortness of breath and wheezing.    Cardiovascular:  Negative for chest pain and palpitations.   Gastrointestinal:  Negative for abdominal pain, nausea and vomiting.   Musculoskeletal: Negative.  Negative for arthralgias and myalgias.   Neurological:  Negative for syncope, weakness and numbness.   Psychiatric/Behavioral: Negative.  Negative for confusion, dysphoric mood and suicidal ideas.          Objective:    There were no vitals filed for this visit.    Physical Exam  Constitutional:       Appearance: Normal appearance.   Pulmonary:      Effort: Pulmonary effort is normal.   Neurological:      General: No focal deficit present.      Mental Status: He is alert and oriented to person, place, and time.             I spent 15 minutes with the patient during this visit.    Iftikhar Roque MD      VIRTUAL VISIT DISCLAIMER    Hussein Edward III verbally agrees to participate in Virtual Care Services. Pt is aware that  Virtual Care Services could be limited without vital signs or the ability to perform a full hands-on physical exam. Hussein Edward III understands he or the provider may request at any time to terminate the video visit and request the patient to seek care or treatment in person.

## 2024-03-31 LAB
DME PARACHUTE DELIVERY DATE ACTUAL: NORMAL
DME PARACHUTE DELIVERY DATE REQUESTED: NORMAL
DME PARACHUTE ITEM DESCRIPTION: NORMAL
DME PARACHUTE ORDER STATUS: NORMAL
DME PARACHUTE SUPPLIER NAME: NORMAL
DME PARACHUTE SUPPLIER PHONE: NORMAL

## 2024-04-01 NOTE — PHYSICAL THERAPY NOTE
PT ARC DISCHARGE SUMMARY     Hussein Edward presented to the [facility] AR[facility] following hospitalization for ARDS  resulting in significant decline in functional mobility.  Physical therapy plan of care focused on bed mobility, transfer training, gait training, stair navigation, LE strengthening , and activities to improve endurance/activity tolerance. Patient made excellent  progress during their stay. Goals were met. On discharge, patient was independent with bed mobility, transfers, ambulation with RW or rollator, and supervision for stair navigation. Patient discharged home with supportive wife and referral to Carddiopulmonary rehabilitation to continue to improve patient's stamina/endurance.

## 2024-04-03 NOTE — OCCUPATIONAL THERAPY NOTE
OT Discharge Summary    Pt has made good  progress during time of stay at the ARC. Pt is overall functioning at Independent  for ADLs, Independent  for functional transfers and Independent  for functional mobility. Prior to DC Home, DME recommendations include bedside commode and RW. DME was ordered to maximize functional independence and decrease fall risk. Based on pts functional performance, pt is safe to DC Home w/ recommendations noted above. Pt would benefit from cardiac rehab upon DC home.

## 2024-04-04 ENCOUNTER — CLINICAL SUPPORT (OUTPATIENT)
Dept: PULMONOLOGY | Facility: HOSPITAL | Age: 59
End: 2024-04-04
Payer: COMMERCIAL

## 2024-04-04 DIAGNOSIS — J80 ARDS (ADULT RESPIRATORY DISTRESS SYNDROME) (HCC): Primary | ICD-10-CM

## 2024-04-04 PROCEDURE — 94625 PHY/QHP OP PULM RHB W/O MNTR: CPT

## 2024-04-04 NOTE — PROGRESS NOTES
"Pulmonary Rehabilitation Assessment and Individualized Treatment Plan  INITIAL     Today's date: 2024  Patient name: Hussein Edward III     : 1965       MRN: [id number]  PCP: Iftikhar Roque MD  Referring Physician: Bernabe Cook MD  Pulmonologist: Dr. Cook    Provider: Nury  Clinician: Madhavi Horton MS, CEP      Dx:    Encounter Diagnosis   Name Primary?    ARDS (adult respiratory distress syndrome) (HCC)      Date of onset: 2024            ASSESSMENT      Weight:    Wt Readings from Last 1 Encounters:   24 82.1 kg (181 lb)      Height:   Ht Readings from Last 1 Encounters:   24 5' 5\" (1.651 m)       Medical History:   Past Medical History:   Diagnosis Date    Chronic back pain     Migraine        Family History:  Family History   Problem Relation Age of Onset    Breast cancer Mother     Diabetes Father     No Known Problems Family     Substance Abuse Neg Hx     Mental illness Neg Hx        Allergies:   Patient has no known allergies.    ETOH:   Social History     Substance and Sexual Activity   Alcohol Use Never       Current Medications:   Current Outpatient Medications   Medication Sig Dispense Refill    albuterol (2.5 mg/3 mL) 0.083 % nebulizer solution Take 3 mL (2.5 mg total) by nebulization every 8 (eight) hours as needed for wheezing or shortness of breath 125 mL 1    albuterol (PROVENTIL HFA,VENTOLIN HFA) 90 mcg/act inhaler Inhale 2 puffs every 4 (four) hours as needed for wheezing 18 g 0    fluticasone (FLONASE) 50 mcg/act nasal spray 1 spray into each nostril daily 48 g 0    guaiFENesin (MUCINEX) 600 mg 12 hr tablet Take 1 tablet (600 mg total) by mouth every 12 (twelve) hours      loratadine (CLARITIN) 10 mg tablet Take 0.5 tablets (5 mg total) by mouth daily 30 tablet 0    metoprolol succinate (TOPROL-XL) 50 mg 24 hr tablet Take 1 tablet (50 mg total) by mouth every 12 (twelve) hours 60 tablet 5    nystatin (MYCOSTATIN) powder Apply topically 2 (two) times a " day 60 g 0    oxyCODONE (ROXICODONE) 5 immediate release tablet Use 0.5-1 tablet by mouth every 8 hours as needed for moderate-severe pain. 10 tablet 0    Rimegepant Sulfate (Nurtec) 75 MG TBDP Take one NURTEC 75 mg under tongue every other day. Limit 1 in 24 hours 16 tablet 11    sodium chloride (OCEAN) 0.65 % nasal spray 1 spray into each nostril every hour as needed for congestion       No current facility-administered medications for this visit.       Physical Limitations: none    Fall Risk: Low   Comments: Ambulates with a steady gait with no assist device and using rollator to sit on if need to take breaks    Oxygen needs:   Rest:  supplemental O2 via nasal cannula @ 2L/min   Exercise/physical activity:  supplemental O2 via nasal cannula @ 3-4L/min   Sleep:   supplemental O2 via nasal cannula @ 2L/min     Does Pt monitor home SpO2? yes   Average SpO2 at rest:  98%   Average SpO2 with ADLs/physical activity:  80-95%      Use of Rescue Inhaler: Yes:  2-3 times per day    Use of Maintenance Inhaler: Yes:  2 times per day    Use of Nebulizer Treatments:  Yes:  2 times per day    Patient practices breathing techniques at home:  Reviewed with patient on:  4/4/2024-gave harmonica to practice with pursed lip breathing    Pulmonary Disease Risk Factors:  none      CAD Risk Factors:   Cholesterol: No  Smoking: Never used  HTN: No  DM: No  Obesity: No   Inactivity: Yes  Stress:  perceived  stress: 6/10   Stressors:health related   Goals for Stress Management: practice Relaxation Techniques, keep a positive mindset, and spend time with family      Current Functional Status:  Occupation: plans to return to work [employer]  Recreation: none  ADL’s:Capable of performing light ADLs only  [place]: No limitations  Exercise: some walking, short intervals as tolerated  Home exercise equipment: none  Other: n/a    Nutrition:  Pt's reported dietary habits:  Overall healthy eating plan, did not complete diet survey  today-will complete at next session    Patient Specific Goals:     EXERCISE GOALS (home exercise, ADLs):   Be able to walk further distance with less shortness of breath, return to normal ADLs and return to work without limitations   NUTRITION GOALS (wt management, diabetes management, dietary modifications):   Attend healthy eating class and make healthy eating choices   PSYCHOCOSOCIAL GOALS (stress, emotional well being, social support):   Improve feelings of anxiety and return to normal self as he gets better   CORE COMPONENT GOALS (smoking, BP control, medication):   BP control     Ability to reach goals/rehabilitation potential:  Good    Projected return to function: 12-18 weeks  Objective tests: 6 MWT    Cultural needs: none    Comments: n/a        INDIVIDUALIZED TREATMENT PLAN        SUMMARY OF PROGRESS:  Today is Hussein's initial evaluation to begin Pulmonary Rehab for the diagnosis of ARDS. Patient does not have a PFT on file. He was hospitalized on 2/16/2024 after having the flu. The patient has been experiencing increased dyspnea, increased sitting time, decreased physical activity, increased fatigue, and weakness.  They report dyspnea, weakness, and fatigue when completing ADLs . The patient currently does follow a home exercise program.  Home exercise includes walking, 5-10 minutes, 3-4 days/wk.   Depression screening using the PHQ-9 interprets the patient's score of 8 5-9 = Mild Depression. Anxiety screening using the CLARA-7 interprets the patients score of 5  5-9 = Mild anxiety. When addressed, the patient denies having depression/anxiety. Patient reports excellent social/emotional support from his wife.  Information to begin using [employer] was provided as well as contact information for counseling through [facility].  PHQ-9 score will be reassessed in 30 days.The patient is a non-smoker. .Patient admits to 100% medication compliance.    At rest, the patient rated dyspnea 5/10 with SpO2  95% on supplemental O2 2L/min via nasal canula.  They completed an initial 6MWT, walking 510 ft on supplemental O2 2-3L/min via nasal canula. The patient’s rating of perceived dyspnea during the 6MWT was 8-9/10 with SpO2 desaturating to 91%.  Patient took 1 rest break for 1:30 min. Telemetry revealed ST.   Resting  /70 with appropriate hemodynamic response to exercise reaching 142/78.  Patient will exercise on supplemental O2 3-6L/min via nasal canula. Group and individualized education will be provided on smoking cessation, oxygen use, breathing techniques, pulmonary anatomy, exercise for the pulmonary patient, healthy eating, stress, and relaxation.  Patient specific goals include: Be able to walk further distance with less shortness of breath, return to normal ADLs and return to work without limitations, Attend healthy eating class and make healthy eating choices, Improve feelings of anxiety and return to normal self as he gets better, and BP control.  Hussein will attend 24-36 exercise sessions beginning 4/10/2024.  Will progress patient as tolerated over the next 30 days.  See outlined plan of care below for specific patient goals in each component of care.         Medication compliance: Yes   Comments: Pt reports to be compliant with medications      Assessment of progression of lung disease and functional status:  CAT: 36/40  Shortness of breath questionnaire: 99/120      EXERCISE ASSESSMENT and PLAN    Current Exercise Program in Rehab:       Frequency: 2 days/week   Supplement with home exercise 2+ days/wk as tolerated        Minutes: 20-40         METS: 1.5-2.0              SpO2: >88%              RPD: 4-6                      HR: 50-85% HRR or RHR +30-40bpm: 100-120   RPE: 4-5         Modalities: Treadmill, UBE, Lifecycle, NuStep, Recumbent bike, and Room walking      Exercise Progression 30 Day Goals :    Frequency: 2-3 days/week    Supplement with home exercise 2+ days/wk as tolerated        Minutes: 30-40        METS: 2.0              SpO2: >88%              RPD: 4-6                      HR: 100-120   RPE: 4-5        Modalities: Treadmill, UBE, Lifecycle, NuStep, Recumbent bike, and Room walking     Strength trainin-3 days / week  12-15 repetitions  1-2 sets per modality   Will be added following 2-3 weeks of monitored exercise sessions   Modalities: Pull Downs, Lateral Raise, Arm Extension, Arm Curl, Sit to Stands, Front Raises, Shoulder Shrugs, and leg ext    Home Exercise: Type: walking, Frequency: 3-4 days/week, Duration: 5-10 mins    Education: pursed lipped breathing, diaphragmatic breathing, relaxation breathing, home exercise guidelines, benefits of exercise for pulmonary disease, RPE scale, RPD scale, O2 saturation monitoring, appropriate O2 response to exercise, energy conservation, and education class: Exercise For The Pulmonary Patient    SMART Goals:   reduced score on CAT, improved 6MWT distance, reduced dyspnea during exercise, improved exercise tolerance based on peak METs tolerated in pulmonary rehab exercise session, SpO2 >88% during exercise, and reduced RPD at rest    Patient Specific EXERCISE GOALS:   (home exercise, ADLs): reduced dependence on supplemental O2, attend pulmonary rehab regularly, decrease sitting time at home, start a walking program, begin a consistent exercise regimen , decreased rest needed with physical activity/exercise, increased muscular strength, increased energy, and increased stamina with ADLs  Be able to walk further distance with less shortness of breath, return to work without limitations    Patient's progress toward SMART and personal goals: set goals at evaluation today    Patient specific plan for next 30 days: will plan to start attending [place] sessions 2x/week, and possibly progress to 3x/week once he increases strength, will continue walking at home 3-4x/week working up to 10-15 min intervals    Plan: Titrate supplemental oxygen as needed  to maintain SpO2>88% with exercise, learn to conserve energy with ADLs , practice diaphragmatic breathing, reduce time sitting at home, increased strength of respiratory muscles, utilize PLB with physical activity, and begin a home exercise program , use IMT and harmonica therapy at home     Readiness to change: Preparation:  (Getting ready to change)       NUTRITION ASSESSMENT AND PLAN    Weight control:    Starting weight: 181 lb   Current weight:       Diabetes: N/A  A1c: N/A    last measured: N/A    SMART Goals:   Improved Rate Your Plate score  >58, eat 6 or more servings of grain products per day, and eat 5 or more servings of fruits and vegetables a day      Patient Specific NUTRITION GOALS:  (wt control, diabetes management, dietary modifications): increase water intake to reduce/thin mucus production improve hydration increased muscle mass, attend heart healthy eating class and make healthy eating choices    Patient's progress toward SMART and personal goals: set goals at evaluation today    Patient specific plan for next 30 days: will attend healthy eating education class during [place] and can focus on heathy eating choices    Plan: group class: Reading Food Labels and group Class: Heart Healthy Eating    Measurable goals were based Rate Your Plate Dietary Self-Assessment. These are the areas in which the patient could score higher on the assessment.  Goals include recommendations for a heart healthy diet based on American Heart Association.    Education: heart healthy eating principles  maintaining hydration    Readiness to change: Preparation:  (Getting ready to change)       PSYCHOSOCIAL ASSESSMENT AND PLAN    Emotional:  Depression assessment:  PHQ-9 = 8  5-9 = Mild Depression            Anxiety measure:  CLARA-7 = 5  5-9 = Mild anxiety    Assessment of depression and anxiety    Patient reports feelings of depression   Patient reports feelings of anxiety  Reports sufficient emotional support   Provided  contact information for [facility]    Self-reported stress level:  6-health related  Stress Management: practice Relaxation Techniques, keep a positive mindset, and spend time with family    Patient's rating of Social support: Excellent   Social Support Network: spouse    Psychosocial Assessment as it relates to rehabilitation: Patient denies issues with his/her family or home life that may affect their rehabilitation efforts.       SMART Goals:    improved [facility] QOL < 27, PHQ-9 - reduced severity by one level, Physical Fitness in [facility] Score < 3, Daily Activity in [place] Score < 3, Social Activities in [place] Score < 3, Pain in [place] Score < 3, Overall Health in [facility] Score < 3, Quality of Life in [place] Score < 3 , Change in Health in [facility] Score < 3 ,  Increased interest and pleasure in doing things, feel less tired with more energy, improved concentration, and take time to relax    Patient Specific PSYCHOCOSOCIAL GOALS:  (stress, emotional well being, social support): begin using [employer] and spend time with family  Improve feelings of anxiety and return to normal self as he gets better    Patient's progress toward SMART and personal goals: set goals at evaluation today    Patient specific plan for next 30 days: [place] attending ID sessions to help recovery progress and will work towards increased strength and endurance to return to normal activities and feel less anxiety and more like himself    Plan: Class: Relaxation, Class:  Stress and Pulmonary Disease, Enroll in [employer], Practice relaxation techniques, Keep a positive mindset, and Enjoy family    Education: signs/sxs of depression, benefits of a positive support system, stress management techniques, benefits of enrolling in [employer], and tools to manage fear/anxiety related to becoming SOB    Readiness to change: Preparation:  (Getting ready to change)       OTHER CORE COMPONENTS     Tobacco:    Social History     Tobacco Use   Smoking Status Never   Smokeless Tobacco Former    Types: Snuff    Quit date:        Tobacco Use Intervention: Referral to tobacco expert:   N/A:  Patient is a non-smoker     Blood pressure:    Restin/70   Exercise: 142/78    Oxygen Goal: Maintain SpO2>88% during exercise or as advised by pulmonolgist    SMART Goals: medication compliance, compliance with supplemental oxygen, compliance with CPAP, and monitor home pulse oximetry    Patient Specific CORE COMPONENT GOALS (smoking, BP control, angina control, medication compliance): reduced dietary sodium <2000mg, medication compliance, compliance with supplemental oxygen, compliance with CPAP, and monitor home pulse oximetry  BP control-manage medication as BPs are high and low    Patient's progress toward SMART and personal goals: set goal at evaluation today    Patient specific plan for next 30 days: continue with medication regiment, talk with MD about medication to control BP better as BP is high and low    Plan: medication compliance, avoid processed foods, engage in regular exercise, eliminate salt shaker at the table, use salt substitutes, check labels for sodium content, monitor home BP, group class: Pulmonary Anatomy and Physiology, group class:  Pulmonary Medications, and home harmonica therapy practice    Education:  pathophysiology of pulmonary disease, preventing infections, control coughing, inspiratory muscle training, environmental triggers, nebulizer use, bronchodilators, bronchial hygiene, and harmonica therapy    Readiness to change: Preparation:  (Getting ready to change)

## 2024-04-05 LAB

## 2024-04-08 ENCOUNTER — TELEPHONE (OUTPATIENT)
Dept: NEUROLOGY | Facility: CLINIC | Age: 59
End: 2024-04-08

## 2024-04-08 ENCOUNTER — HOSPITAL ENCOUNTER (OUTPATIENT)
Dept: RADIOLOGY | Age: 59
Discharge: HOME | End: 2024-04-08
Payer: COMMERCIAL

## 2024-04-08 DIAGNOSIS — R91.8 ABNORMAL CT SCAN OF LUNG: ICD-10-CM

## 2024-04-08 PROCEDURE — 71250 CT THORAX DX C-: CPT

## 2024-04-08 NOTE — TELEPHONE ENCOUNTER
Called and spoke to patient to offer him a sooner appointment.  Patient had to check with his wife and then he is going to call back.  If he calls back there is a spot on hold for him for 4/11/24 at 12:00

## 2024-04-10 ENCOUNTER — CLINICAL SUPPORT (OUTPATIENT)
Dept: PULMONOLOGY | Facility: HOSPITAL | Age: 59
End: 2024-04-10
Payer: COMMERCIAL

## 2024-04-10 DIAGNOSIS — J80 ARDS (ADULT RESPIRATORY DISTRESS SYNDROME) (HCC): Primary | ICD-10-CM

## 2024-04-10 PROCEDURE — 94625 PHY/QHP OP PULM RHB W/O MNTR: CPT

## 2024-04-11 ENCOUNTER — TELEMEDICINE (OUTPATIENT)
Dept: NEUROLOGY | Facility: CLINIC | Age: 59
End: 2024-04-11
Payer: COMMERCIAL

## 2024-04-11 DIAGNOSIS — G43.009 MIGRAINE WITHOUT AURA AND WITHOUT STATUS MIGRAINOSUS, NOT INTRACTABLE: Primary | ICD-10-CM

## 2024-04-11 PROCEDURE — 99215 OFFICE O/P EST HI 40 MIN: CPT | Performed by: PSYCHIATRY & NEUROLOGY

## 2024-04-11 RX ORDER — MAGNESIUM OXIDE 400 MG/1
400 TABLET ORAL DAILY
COMMUNITY
End: 2024-04-11

## 2024-04-11 RX ORDER — NAPROXEN 500 MG/1
500 TABLET ORAL 2 TIMES DAILY WITH MEALS
COMMUNITY
Start: 2024-02-14

## 2024-04-11 RX ORDER — RIMEGEPANT SULFATE 75 MG/75MG
TABLET, ORALLY DISINTEGRATING ORAL
Qty: 16 TABLET | Refills: 11 | Status: SHIPPED | OUTPATIENT
Start: 2024-04-11

## 2024-04-11 RX ORDER — DARIDOREXANT 25 MG/1
TABLET, FILM COATED ORAL
COMMUNITY

## 2024-04-11 NOTE — PROGRESS NOTES
Virtual Regular Visit    Verification of patient location:    Patient is located at Home in the following state in which I hold an active license [place]      Assessment/Plan:    Problem List Items Addressed This Visit       Migraine - Primary    Relevant Medications        rimegepant sulfate (Nurtec) 75 mg TBDP            Reason for visit is   Chief Complaint   Patient presents with    Migraine        Encounter provider Laxmi Camargo MD    Provider located at [facility]  [facility] [address] [place] [address] [phone]      Recent Visits  Date Type Provider Dept   04/08/24 Telephone Carla Conley Pg [facility]   Showing recent visits within past 7 days and meeting all other requirements  Today's Visits  Date Type Provider Dept   04/11/24 Telemedicine Laxmi Camargo MD [facility]   Showing today's visits and meeting all other requirements  Future Appointments  No visits were found meeting these conditions.  Showing future appointments within next 150 days and meeting all other requirements       The patient was identified by name and date of birth. Hussein Edward III was informed that this is a telemedicine visit and that the visit is being conducted through the Epic Embedded platform. He agrees to proceed..  My office door was closed. No one else was in the room, but med student called him first for info [employer] Walker.  He acknowledged consent and understanding of privacy and security of the video platform. The patient has agreed to participate and understands they can discontinue the visit at any time.    Patient is aware this is a billable service.     Subjective    Assessment/Plan:   Hussein Edward III is a very pleasant 58 y.o. 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  My initial evaluation 5/26/2021     Migraine without aura and without status migrainosus, not intractable  WALE not on CPAP regularly, but trying (2016, AHI 7.9, supine 95.5, REM 15.9, oxygen down to 89%)  He reports a history of migraines dating back to mid 40s.  He does not know if he has a family history of migraines.  He reports severe migraines are diffuse pressure that used to improve over 1-2 days with rest and eletriptan until recently,  moderate migraines are typically unilateral left retro-orbital stabbing like a narrow and out the other side  That typically improve with rest and NSAIDs although still last for over 4 hours.  He denies classic aura,  Does feel like his chronic tinnitus changes prior to onset of migraine.  Reports typical associated migrainous features without autonomic features.  - as of 5/26/2021: on average severe migraines 5-6 times a year lasting 1-2 days, moderate migraines 3-4 times a month, most recently lasted 3 weeks in April 2021.   Trial of magnesium for prevention, rizatriptan for abortive.  If his migraines remain uncontrolled will recommend prescription preventative.  Recommended treating sleep apnea.  - as of 9/10/2021: Reports  Mild headaches 2-3 times per week that improved with OTC meds and migraines are stable but to 3 per month and improved with rizatriptan although he reports side effects, therefore will start trial of nurtec for abortive.   Last visit migraine improved with Toradol injection.  Recommended trying to take magnesium consistently for prevention.  - as of 3/18/2022: Migraines stable at about 3-4 times a month. Nurtec works for abortive, slower than rizatriptan, but without side effects.   - as of  9/21/2022: He reports significant  migraines about 3-4 times a month, milder migraines/headaches 3-4 times a week. Working on treating WALE and will follow up with Sleep Medicine.  Trial of low-dose gabapentin for prevention which may also help with other issues such as sleep for other pain.  Continue Nurtec for rescue which works well suggesting other CGRP meds will work well in future if needed.   - as of 3/14/2023: He did not follow-up with sleep medicine and is still having difficulty tolerating the CPAP machine and we discussed I highly recommend following up with sleep medicine to find any way to treat this better since it can impact many symptoms.  Migraines 3-4 a month on average, but the last 6 weeks or so has been 4 to 5 days a week. MRI brain with and without contrast scheduled for 4/11/2023 to follow-up meningioma with mild mass effect and neurosurgery follow-up 4/17/2023.  Gabapentin initially seemed to help but no longer seems to be helping and we will wean off.  Magnesium is helping and so is Nurtec for migraine rescue which we will transition to be used for prevention instead.  Toradol IM 1/26/2023 helped migraine flare and denies needing this today.    - as of 6/12/2023: Repeat MRI brain reviewed with unchanged meningioma although there are some findings consistent with possible tight cervical medullary junction with slight increased intracranial pressure picture and we discussed trial of acetazolamide/Diamox to see if this can reduce his headaches and migraines which are currently well controlled at approximately 1 to 2 a week on Nurtec 16 tabs a month which he typically takes every other day or every few days and works well.  We were able to wean off gabapentin 300 mg nightly and he has an appointment to follow-up with new sleep medicine provider to get help with tolerating the CPAP.  - as of 11/7/2023: He reports ups and downs since last visit, he is having approximately 9 migraines per month that  typically improve with Nurtec and sometimes rest needed as well. 10 headaches per month that typically improve with acetaminophen. We discussed I hope this will improve more transitioning from short acting acetazolamide/Diamox 250 mg twice a day to long-acting 500 mg twice a day.  Nurtec helps for migraine rescue and the more he takes it also can help with prevention he will be following up with the sleep specialist soon to try and maximize treatment of sleep apnea which we discussed is imperative.  He also will have improvement with upcoming facet injection which typically works very well for him lasting 6 months or more.   - as of 4/11/2024: He reports despite nearly dying with ARDS, and the depressing situation of not being able to currently do what he previously could endurance wise, he reports overall headaches and migraines are not poorly controlled.  They did stop acetazolamide during hospitalization and he ended up needed large amount of Lasix from what I could see and we discussed certainly this is something we could consider adding back, but the more he treats his sleep apnea with his new machine (on day 2), he may not need a stat acetazolamide back especially with the addition of metoprolol since discharge.  He forgot about Nurtec which I resent and will make sure prior authorization is updated so we can take this as needed and the more he takes this can also help with prevention.    Workup:  -Following with neurosurgery for meningioma monitoring  -   MRI brain with without contrast 06/13/2021: Right frontal meningioma laterally within the anterior cranial fossa measuring 1.5 x 2.4 x 2.8 cm.  This extends into the sylvian fissure without edema in the adjacent brain. Several white matter hyperintensities on T2 and FLAIR imaging, primarily within the frontal lobes are nonspecific and may represent precocious chronic microangiopathic change.  -MRI brain with without contrast 9/30/2021: Stable right anterior  frontal operculum meningioma.  Stable small white matter hyperintensities on T2 and FLAIR imaging within the cerebral hemispheres, presumably precocious chronic microangiopathic change.  -MRI brain with and without contrast 4/2/2022: 1.  Right frontal opercular region meningioma is stable, measuring approximately 2.2 cm.  2.  A few white matter lesions are also unchanged, possibly precocious microangiopathy.  3.  No acute infarction, intracranial hemorrhage or new mass lesion.  -MRI brain with and without contrast 4/11/2023: 1. Stable lateral right frontal convexity meningioma compared to 4/2/2022.  2. No acute infarction, edema, or pathologic intra-axial enhancement.  3. Mild chronic microangiopathic ischemic changes.    Preventative:  - we discussed headache hygiene and lifestyle factors that may improve headaches  -He is not currently interested in prescription preventative for headaches  - Through other providers: ambien 5 mg switched to quviviq through sleep med following discharge early 2024, metoprolol 50 mg BID added during hospitalization 3/2024, loratadine/Claritin  - Past/ failed/contraindicated: topiramate contraindicated due to history of kidney stones, amitriptyline would be contraindicated due to age, gabapentin,  Temazepam/Restoril, magnesium diarrhea in the hospital and helped stopping a few days later diarrhea better, acetazloamide prior to hospital 500 mg twice daily long-acting was helping significantly  - future options:   Verapamil discussed next, CGRP Med    Abortive:  - discussed not taking over-the-counter or prescription pain medications more than 3 days per week to prevent medication overuse/rebound headache  -  He is on through other providers: Oxycodone infrequently, methocarbamol appears to have been discontinued during hospital stay March 2024, Voltaren switched to naproxen after hospitalization, previously Valium is only when he is getting a procedure   - continue nurtec 75 mg as  needed -up to 16 times a month and also can help for prevention. discussed proper use, possible side effects and risks.  - Past/ failed/contraindicated: eletriptan does not always work, rizatriptan works but gets panic attack  - past/helped:  toradol shot worked  well  - future options:   prochlorperazine, Toradol IM or p.o., could consider trial for 5 days of Depakote or dexamethasone 4 prolonged migraine, ubrelvy, reyvow, nurtec    WALE (obstructive sleep apnea)   -     Ambulatory referral to Sleep Medicine placed 9/10/21 and 9/21/22  3/18/2022 again discussed morbidity and mortality of untreated WALE, do not drive if not feeling cognitively well, he will consider following up for known WALE    - as of 6/12/2023: He is trying to use the machine but struggling and thankfully has upcoming sleep medicine follow-up to get help  - as of 4/11/2024: Follow-up with sleep medicine just get a new machine and lowered pressure and tolerate a little bit better     Meningioma  - continue to follow with neurosurgery       Patient instructions   Please do follow-up with eye doctor for dilated eye exam and let me know if they see any signs of papilledema behind your eyes although that would be shocking in your situation, regardless please try and obtain note.    Continue to follow with neurosurgery for meningioma    Do whatever you can in your power to treat the sleep apnea       Headache/migraine treatment:   Abortive medications (for immediate treatment of a headache):   It is ok to take ibuprofen, acetaminophen or naproxen (Advil, Tylenol,  Aleve, Excedrin) if they help your headaches you should limit these to No more than 3 times a week to avoid medication overuse/rebound headaches.     For your more moderate to severe migraines take this medication early   nurtec 75 mg under the tongue. Do not repeat in 24 hours.        Prescription preventive medications for headaches/migraines   (to take every day to help prevent headaches -  not to take at the time of headache):  [x]     We have options if needed      Lifestyle Recommendations:  [x] SLEEP - Maintain a regular sleep schedule: Adults need at least 7-8 hours of uninterrupted a night. Maintain good sleep hygiene:  Going to bed and waking up at consistent times, avoiding excessive daytime naps, avoiding caffeinated beverages in the evening, avoid excessive stimulation in the evening and generally using bed primarily for sleeping.  One hour before bedtime would recommend turning lights down lower, decreasing your activity (may read quietly, listen to music at a low volume). When you get into bed, should eliminate all technology (no texting, emailing, playing with your phone, iPad or tablet in bed).  [x] HYDRATION - Maintain good hydration.  Drink  2L of fluid a day (4 typical small water bottles)  [x] DIET - Maintain good nutrition. In particular don't skip meals and try and eat healthy balanced meals regularly.  [x] TRIGGERS - Look for other triggers and avoid them: Limit caffeine to 1-2 cups a day or less. Avoid dietary triggers that you have noticed bring on your headaches (this could include aged cheese, peanuts, MSG, aspartame and nitrates).  [x] EXERCISE - physical exercise as we all know is good for you in many ways, and not only is good for your heart, but also is beneficial for your mental health, cognitive health and  chronic pain/headaches. I would encourage at the least 5 days of physical exercise weekly for at least 30 minutes.     Education and Follow-up  [x] Please call with any questions or concerns. Of course if any new concerning symptoms go to the emergency department.  [x] Follow up 6/26/24, sooner if needed          CC:   We had the pleasure of evaluating Hussein Edward III in neurological consultation today. Hussein Edward III is a   Right and left handed male who presents today for evaluation of headaches.     History obtained from patient as well as available medical  record review.  History of Present Illness:   Interval history as of 4/11/2024  -We discussed that I was keeping an eye on him during his prolonged hospital stay recently  -He met with the med student shadowing me today to gather some information about how he is doing before we did our virtual visit    He was hospitalized from 2/12/2024 -3/14/2024 for ARDS, medical student spoke with him for  He was discharged from the hospital 3 weeks ago for ARDS 2/2 influenza infection. He had an x-ray that showed opacities and was told to go to the ER and went into respiratory failure, intubated was in the ICU for several days.     He reports that since the hospital he has been trying to take it easy. Has good days and bad days but has significant SOB with exertion (even with speaking). At baseline he is on 2L of oxygen and with exertion uses 3-4L. He was found to have atrial fibrillation and was started on metoprolol 50mg BID. Takes oxycodone PRN for back pain about 1/week typically after PT.     Since then he has had 5 HA total, 7-8/10 and takes a tylenol that reduces it to 3-4/10 but does not completely relieve the pain. He has not been taking the acetazolamide since the hospital and forgot he could use Nurtec as a rescue, but says he will start now that he remembers.     Sleep has been poor, he has issues falling asleep and staying asleep. He started on a new medication Quiviviq 25 mg to help with insomnia. He was not using his CPAP immediately afterwards because his doctor preferred he use oxygen at night. He just started back up again this week, he found an oxygen adapter he can add to the machine.      Has an appointment with cardiology 5/20 and appointment with his pulmonary and sleep doctor 5/8    Doing better now at home because he is able to rest more at home rather than in the hospital     Even in the medical coma could still hear people, nothing is linear in the time line   Remembers bits and pieces  Feels like he  can remember turning him to be proned for ARDS  Was thinking why can't I move or open his eyes or move his hands   Every once in a while would look up   Was thinking about family and how are they going to get a long without me    Had an elevated pulse and low BP, at times  and BP dropped and therefore they added metoprolol, went from 110-115 to 95 or so     WALE  Sometimes too much pressure   Whole new machine, old machine started at 5 and would go up, this one is more senstive and max last night was 4.2, starts at 0 and can check report on his phone - first night 6 hours and 5 events, first night score 86, second night  Also has adapter for oxygen tube  Following with Dr. Cook     Gets tired quickly, has to take a walker to the store as may need to sit down and rest for a moment    Headaches and migraines   5 headaches in 3 weeks since discharge  8/10  No aura migraines in a while     Preventative:    - Through other providers: ambien 5 mg switched to quvivik taking 25 mg and was told he could take 50 mg  through sleep med - working well, can feel mind settling   metoprolol 50 mg BID,     Abortive:   - acetaminophen helps bring down the severity   - Nurtec - forgot about it   - through other providers: oxycodone/roxicodone 5 mg sometimes after PT for back pain (once in past two weeks)  Denies bothersome side effects       Interval history as of 11/7/2023  - no significant new or concerning neurologic symptoms since last visit   - about 3 months ago -accidentally tripped and fell in his yard in a divot that is now fixed and hurt the right knee and tore his left bicep tendon of the elbow but torn away that they cannot fix it and is just rehab  - wearing his CPAP and waking up with headaches, waiting on mask fitting, beard may be playing a role, working on it - wearing most of the time, wife will wake him up if off typically   - been sick off and on - lots of sinus infection   - facet injections next week  -  knee doc thinks walking issues is due to back     Headaches and migraines   9 per month   Nurtec helps, may need sleep sometimes after  Frontal on left  Tinnitus that changes in tone and frequency     10 headaches a month better with tylenol     Preventative:   - trial of acetazolamide/Diamox up to 250 mg BID - 630 BID - doesn't think he has wearing off - tingling at times where he tore his left elbow tendon  - mag ox     Abortive:   Nurtec helps  Denies bothersome side effects       Interval history as of 6/12/2023  - no significant new or concerning neurologic symptoms since last visit, he was scheduled 6/16/2023 and could not make the appointment and therefore was moved to today  -Recent eye doctor evaluation without papilledema    -MRI brain with and without contrast 4/11/2023: 1. Stable lateral right frontal convexity meningioma compared to 4/2/2022.  2. No acute infarction, edema, or pathologic intra-axial enhancement.  3. Mild chronic microangiopathic ischemic changes.    - did call sleep medicine, tries using nightly, occasionally would fall asleep without it, apmt next week   Unfortunately he has still not followed up with sleep medicine to treat the sleep apnea and we discussed this is driving many of his symptoms could not chronic headaches, tinnitus  - 3-8 hours, brain always going 100 miles an hour and hard to wind down   - sleeping almost on face     Headaches and migraines   Much better on nurtec, maybe 1-2 a week    Preventative:   -Trial of Nurtec every other day-16 tabs   -Magnesium 40 mg nightly  - Gabapentin 300 mg    Abortive:   -Nurtec typically works well  -  He is on through other providers:  Methocarbamol, Voltaren  Denies bothersome side effects      Interval history as of 3/14/2023  - no significant new or concerning neurologic symptoms since last visit   - did not call sleep medicine yet, tries using nightly     Headaches and migraines   4-5 headaches a week lately for the last 6 weeks  and prior to that was 3-4 a month     Preventative:   - continue magnesium 400 mg helping  -  Trial of gabapentin with gradual titration up to 300 mg nightly - helped initially and less so now    Abortive:   nurtec works - loves it brings from 8-9/10, 6-7 last month, typically 2-3 times a month   toradol IM 1/26/23  -  He is on through other providers:  Methocarbamol, Voltaren  Denies bothersome side effects     Interval history as of 9/21/2022  - denies any new or concerning neurologic symptoms since last visit   - known WALE not treated, has not followed up with them, has tried 6 masks, feels worse with the mask he thinks, trying to use the mask now and will see them - 9/14/17    Headaches and migraines   He reports migraines with visual aura about 3 to 4 times a month and milder migraines/headaches 3 to 4 times a week, can wake up with them or middle of day     Preventative:  Magnesium 400 mg   Abortive: Nurtec helps within 20 mins   Denies bothersome side effects     Interval history as of 3/18/2022  - denies any new or concerning neurologic symptoms since last visit   - he is following with Neurosurgery for  Meningioma  - known WALE not treated, has not followed up with them, has tried 6 masks, feels worse with the mask he thinks, will consider following with them     Headaches and migraines   Migraines 3-4 times a month, feels stable    Can not recall milder headaches number    Preventative: trial of magnesium for prevention  Abortive:  trial of Nurtec-approved 08/16/2021 - works without side effects but slower than rizatriptan which works faster but makes him tired     Interval history as of 9/10/2021  - denies any new or concerning neurologic symptoms since last visit   - -MRI brain with without contrast 06/13/2021: Right frontal meningioma laterally within the anterior cranial fossa measuring 1.5 x 2.4 x 2.8 cm.  This extends into the sylvian fissure without edema in the adjacent brain.  Several white matter  hyperintensities on T2 and FLAIR imaging, primarily within the frontal lobes are nonspecific and may represent precocious chronic microangiopathic change.  -  Follow-up with Neurosurgery and getting repeat scan  -has not followed up with Sleep Medicine, but using mask more     Headaches and migraines   - migraines 2-3 per month, stable  - mild headaches 2-3 times per week that improve with OTC meds     Preventative:    - magnesium - not taking consistently     Abortive:   - Rizatriptan causes heart racing/panic attacks   6-8 pills a week ibuprofen/tylenol  - last visit toradol shot worked      Headaches started at what age? Worse around 47   How often do the headaches occur?   -  Typical migraine once every 2-3 months and resolves over hours with triptan  -  04/22/2021 went to emergency room for 2 weeks of migraine  - as of 5/26/2021: on average severe migraines 5-6 times a year lasting 1-2 days, moderate migraines 3-4 times a month, most recently lasted 3 weeks in April 2021  - As of 9/10/2021, 2-3 times a month. Last for 2-3 hours with rizatriptan and go to sleep afterward. W/o rizatriptan could last from a 4-5 hours to 3 days. No change with tinnitus, flashes of light.  Patients's other headaches has no other symptoms associated with it  What time of the day do the headaches start?  No particular time of day   How long do the headaches last? 1-2 days usually, over 4 hours for moderate   Are you ever headache free? Yes    Aura? without aura    Prodrome: frequency of tinnitus changes    Last eye exam: glasses 1-2 years ago     Where is your headache located and pain quality?   - severe migraines whole head - pressure  - can be unilateral on the left - stabbing to left eye like an arrow out the other side     What is the intensity of pain? Average: 4/10, worst 7 when at hospital/10  Associated symptoms:   [x] Nausea       [] Vomiting       [x] Photophobia     [x]Phonophobia      [x] Osmophobia  [] Blurred vision     [x] Light-headed or dizzy  - just recently    [x] Tinnitus - chronic bilateral and worse with migraine can be louder on one ear   [] Hands or feet tingle or feel numb/paresthesias      [] Ptosis      [] Facial droop  [] Lacrimation - both   [] Nasal congestion/rhinorrhea        Things that make the headache worse? No specific movements, any movement     Headache triggers:  Unknown     Have you seen someone else for headaches or pain? Yes, neurology just recently   Have you had trigger point injection performed and how often? No  Have you had Botox injection performed and how often? No    Have you had epidural injections or transforaminal injections performed? No  Have you ever had any Brain imaging? Yes several MRI Brain     What medications do you take or have you taken for your headaches?   ABORTIVE:    OTC medications have been ineffective      eletriptan/Relpax 40 mg - 1-2 usually minimizes it     Meclizine - for BPPV in the past helped   Methocarbamol - for back - prn - maybe 3 times a month   Diclofenac 50 mg - for back prn - a couple times a month      past   Cyclobenzaprine  ED for 02/20/2021: Toradol 30 mg, Benadryl 25 mg, Reglan 10 mg,  IV fluids, Decadron   Medrol Dosepak - didn't seem to help     PREVENTIVE:        for sleep: Temazepam prn     Past/ failed/contraindicated:  -topiramate contraindicated due to history of kidney stones       LIFESTYLE WALE not on CPAP regularly (2016, AHI 7.9, supine 95.5, REM 15.9, oxygen down to 89%)  Sleep - WALE not tolerate of CPAP - has seen 3 different specialists    DATA REVIEW  Sleep Study: 12/19/16  SLEEP-DISORDERED BREATHING:   Type AHI Supine AHI Lateral AHI REM AHI GALINA SaO2 Derik % SpO2% ?88% min.   Baseline 7.9 95.5 5.6 15.9 5.7 89.0 0.0     - averages: 3-8 hours, usually 5   Problems falling asleep?:   Yes  Problems staying asleep?:  Yes    Water: 3 - 20 oz per day  Caffeine: cup in am and at work - cutting back     Mood: denies recent stress   Denies history  of anxiety or depression or other diagnosed mood disorder    The following portions of the patient's history were reviewed and updated as appropriate: allergies, current medications, past family history, past medical history, past social history, past surgical history and problem list.    Pertinent family history:  Family history of headaches:  no known family members with significant headaches  Any family history of aneurysms - No    Pertinent social history:  Work:  at [employer]   Lives with wife, 2 kids - 26, 20    Illicit Drugs: denies  Alcohol/tobacco: Denies alcohol use, Denies tobacco use      Past Medical History:   Diagnosis Date    Chronic back pain     Migraine        Past Surgical History:   Procedure Laterality Date    HERNIA REPAIR      MANDIBLE FRACTURE SURGERY      MOUTH SURGERY      cyst in cheek    NASAL SEPTUM SURGERY         Current Outpatient Medications   Medication Sig Dispense Refill    albuterol (2.5 mg/3 mL) 0.083 % nebulizer solution Take 3 mL (2.5 mg total) by nebulization every 8 (eight) hours as needed for wheezing or shortness of breath 125 mL 1    albuterol (PROVENTIL HFA,VENTOLIN HFA) 90 mcg/act inhaler Inhale 2 puffs every 4 (four) hours as needed for wheezing 18 g 0    Daridorexant HCl (Quviviq) 25 MG TABS Take by mouth      fluticasone (FLONASE) 50 mcg/act nasal spray 1 spray into each nostril daily 48 g 0    guaiFENesin (MUCINEX) 600 mg 12 hr tablet Take 1 tablet (600 mg total) by mouth every 12 (twelve) hours      loratadine (CLARITIN) 10 mg tablet Take 0.5 tablets (5 mg total) by mouth daily 30 tablet 0    metoprolol succinate (TOPROL-XL) 50 mg 24 hr tablet Take 1 tablet (50 mg total) by mouth every 12 (twelve) hours 60 tablet 5    naproxen (NAPROSYN) 500 mg tablet Take 500 mg by mouth 2 (two) times a day with meals      nystatin (MYCOSTATIN) powder Apply topically 2 (two) times a day (Patient taking differently: Apply topically if needed) 60 g  0    oxyCODONE (ROXICODONE) 5 immediate release tablet Use 0.5-1 tablet by mouth every 8 hours as needed for moderate-severe pain. 10 tablet 0    rimegepant sulfate (Nurtec) 75 mg TBDP Take one NURTEC 75 mg under tongue every other day. Limit 1 in 24 hours 16 tablet 11    sodium chloride (OCEAN) 0.65 % nasal spray 1 spray into each nostril every hour as needed for congestion       No current facility-administered medications for this visit.        No Known Allergies      Objective:     Exam limited by Video  Physical Exam:                                                                 Vitals:            Constitutional:    There were no vitals taken for this visit.  BP Readings from Last 3 Encounters:   03/28/24 112/68   03/27/24 110/75   03/14/24 121/85     Pulse Readings from Last 3 Encounters:   03/28/24 (!) 122   03/27/24 105   03/14/24 97         Well developed, well nourished, No dysmorphic features.       HEENT: Nasal cannula in place   Psychiatric:  Normal behavior and appropriate affect        Neurological Examination:   Cranial Nerves:  VII-facial expression symmetric  Motor Exam: symmetric bulk throughout. no atrophy, fasciculations or abnormal movements noted.   Coordination:  no apparent dysmetria, ataxia or tremor noted              Pertinent lab results:   See EMR for recent labs   04/22/2021 BMP and CBC unremarkable, ESR 6  01/10/2020 LFTs with elevated alkaline phosphatase     Imaging: I have personally reviewed imaging and radiology read   -  noncontrast head CT 04/22/2021:  No acute intracranial abnormality (seen, but meningioma was there hidden)  -   MRI brain with without contrast 06/13/2021: Right frontal meningioma laterally within the anterior cranial fossa measuring 1.5 x 2.4 x 2.8 cm.  This extends into the sylvian fissure without edema in the adjacent brain. Several white matter hyperintensities on T2 and FLAIR imaging, primarily within the frontal lobes are nonspecific and may represent  precocious chronic microangiopathic change.  -MRI brain with without contrast 9/30/2021: Stable right anterior frontal operculum meningioma.  Stable small white matter hyperintensities on T2 and FLAIR imaging within the cerebral hemispheres, presumably precocious chronic microangiopathic change.  -MRI brain with and without contrast 4/2/2022: 1.  Right frontal opercular region meningioma is stable, measuring approximately 2.2 cm.  2.  A few white matter lesions are also unchanged, possibly precocious microangiopathy.  3.  No acute infarction, intracranial hemorrhage or new mass lesion.    Review of Systems:   ROS obtained by medical assistant and reviewed, but if any symptoms listed below say negative, does not mean patient has not had this symptom since last visit, please see HPI for details of symptoms discussed this visit.  Regarding any abnormal or positive findings in ROS that are not neurologic related, patient instructed to address these issues with PCP or go to the ER if they are severe.       Review of Systems   Constitutional:  Negative for appetite change and fever.   HENT: Negative.  Negative for hearing loss, tinnitus, trouble swallowing and voice change.    Eyes: Negative.  Negative for photophobia and pain.   Respiratory: Negative.  Negative for shortness of breath.    Cardiovascular: Negative.  Negative for palpitations.   Gastrointestinal: Negative.  Negative for nausea and vomiting.   Endocrine: Negative.  Negative for cold intolerance.   Genitourinary: Negative.  Negative for dysuria, frequency and urgency.   Musculoskeletal: Negative.  Negative for myalgias and neck pain.   Skin: Negative.  Negative for rash.   Neurological:  Positive for headaches. Negative for dizziness, tremors, seizures, syncope, facial asymmetry, speech difficulty, weakness, light-headedness and numbness.   Hematological: Negative.  Does not bruise/bleed easily.   Psychiatric/Behavioral:  Positive for sleep disturbance  (insomnia). Negative for confusion and hallucinations.    All other systems reviewed and are negative.            I have spent 27 minutes with Patient today in which greater than 50% of this time was spent in counseling/coordination of care regarding Risks and benefits of tx options, Instructions for management, Patient and family education, Importance of tx compliance, Risk factor reductions, Impressions, Counseling / Coordination of care, Documenting in the medical record, Obtaining or reviewing history  , and Communicating with other healthcare professionals . I also spent 20 minutes non face to face for this patient the same day.           Visit Time  Total Visit Duration: 47

## 2024-04-11 NOTE — PROGRESS NOTES
Review of Systems   Constitutional:  Negative for appetite change and fever.   HENT: Negative.  Negative for hearing loss, tinnitus, trouble swallowing and voice change.    Eyes: Negative.  Negative for photophobia and pain.   Respiratory: Negative.  Negative for shortness of breath.    Cardiovascular: Negative.  Negative for palpitations.   Gastrointestinal: Negative.  Negative for nausea and vomiting.   Endocrine: Negative.  Negative for cold intolerance.   Genitourinary: Negative.  Negative for dysuria, frequency and urgency.   Musculoskeletal: Negative.  Negative for myalgias and neck pain.   Skin: Negative.  Negative for rash.   Neurological:  Positive for headaches. Negative for dizziness, tremors, seizures, syncope, facial asymmetry, speech difficulty, weakness, light-headedness and numbness.   Hematological: Negative.  Does not bruise/bleed easily.   Psychiatric/Behavioral:  Positive for sleep disturbance (insomnia). Negative for confusion and hallucinations.    All other systems reviewed and are negative.

## 2024-04-11 NOTE — PATIENT INSTRUCTIONS
Please do follow-up with eye doctor for dilated eye exam and let me know if they see any signs of papilledema behind your eyes although that would be shocking in your situation, regardless please try and obtain note.    Continue to follow with neurosurgery for meningioma    Do whatever you can in your power to treat the sleep apnea       Headache/migraine treatment:   Abortive medications (for immediate treatment of a headache):   It is ok to take ibuprofen, acetaminophen or naproxen (Advil, Tylenol,  Aleve, Excedrin) if they help your headaches you should limit these to No more than 3 times a week to avoid medication overuse/rebound headaches.     For your more moderate to severe migraines take this medication early   nurtec 75 mg under the tongue. Do not repeat in 24 hours.        Prescription preventive medications for headaches/migraines   (to take every day to help prevent headaches - not to take at the time of headache):  [x]     We have options if needed      Lifestyle Recommendations:  [x] SLEEP - Maintain a regular sleep schedule: Adults need at least 7-8 hours of uninterrupted a night. Maintain good sleep hygiene:  Going to bed and waking up at consistent times, avoiding excessive daytime naps, avoiding caffeinated beverages in the evening, avoid excessive stimulation in the evening and generally using bed primarily for sleeping.  One hour before bedtime would recommend turning lights down lower, decreasing your activity (may read quietly, listen to music at a low volume). When you get into bed, should eliminate all technology (no texting, emailing, playing with your phone, iPad or tablet in bed).  [x] HYDRATION - Maintain good hydration.  Drink  2L of fluid a day (4 typical small water bottles)  [x] DIET - Maintain good nutrition. In particular don't skip meals and try and eat healthy balanced meals regularly.  [x] TRIGGERS - Look for other triggers and avoid them: Limit caffeine to 1-2 cups a day or  less. Avoid dietary triggers that you have noticed bring on your headaches (this could include aged cheese, peanuts, MSG, aspartame and nitrates).  [x] EXERCISE - physical exercise as we all know is good for you in many ways, and not only is good for your heart, but also is beneficial for your mental health, cognitive health and  chronic pain/headaches. I would encourage at the least 5 days of physical exercise weekly for at least 30 minutes.     Education and Follow-up  [x] Please call with any questions or concerns. Of course if any new concerning symptoms go to the emergency department.  [x] Follow up 6/26/24, sooner if needed

## 2024-04-15 ENCOUNTER — CLINICAL SUPPORT (OUTPATIENT)
Dept: PULMONOLOGY | Facility: HOSPITAL | Age: 59
End: 2024-04-15
Payer: COMMERCIAL

## 2024-04-15 DIAGNOSIS — J80 ARDS (ADULT RESPIRATORY DISTRESS SYNDROME) (HCC): Primary | ICD-10-CM

## 2024-04-15 PROCEDURE — 94625 PHY/QHP OP PULM RHB W/O MNTR: CPT

## 2024-04-17 ENCOUNTER — CLINICAL SUPPORT (OUTPATIENT)
Dept: PULMONOLOGY | Facility: HOSPITAL | Age: 59
End: 2024-04-17
Payer: COMMERCIAL

## 2024-04-17 DIAGNOSIS — J80 ARDS (ADULT RESPIRATORY DISTRESS SYNDROME) (HCC): Primary | ICD-10-CM

## 2024-04-17 PROCEDURE — 94625 PHY/QHP OP PULM RHB W/O MNTR: CPT

## 2024-04-18 ENCOUNTER — TELEPHONE (OUTPATIENT)
Dept: NEUROLOGY | Facility: CLINIC | Age: 59
End: 2024-04-18

## 2024-04-18 DIAGNOSIS — G43.009 MIGRAINE WITHOUT AURA AND WITHOUT STATUS MIGRAINOSUS, NOT INTRACTABLE: ICD-10-CM

## 2024-04-18 RX ORDER — RIMEGEPANT SULFATE 75 MG/75MG
TABLET, ORALLY DISINTEGRATING ORAL
Qty: 16 TABLET | Refills: 11 | Status: SHIPPED | OUTPATIENT
Start: 2024-04-18

## 2024-04-18 NOTE — TELEPHONE ENCOUNTER
Laxmi Camargo MD   to [facility] Clinical Team 5         4/18/24  3:09 PM   Resent Nurtec to clarify the prescription, as needed

## 2024-04-18 NOTE — TELEPHONE ENCOUNTER
----- Message from Laxmi Camargo MD sent at 2024 12:48 PM EDT -----  I am not sure if his insurance requires a prior Auth for nurtec but the last one I can see  in  if you could make sure he has an active prior Auth on file since he is going to be picking it up after a long hospitalization since he has not had in a while. Please and thank you

## 2024-04-18 NOTE — TELEPHONE ENCOUNTER
Called [facility] pharmacy, spoke to Felipe.   [facility] [id number]  [facility]  [facility] [id number]  Id [id number]  [phone]    Urgent PA started on CMM. Key: [id number]    Dr Camargo, sig: take qod but your office notes to take as needed.   Pls clarify if preventative or abortive    thanks

## 2024-04-22 ENCOUNTER — CLINICAL SUPPORT (OUTPATIENT)
Dept: PULMONOLOGY | Facility: HOSPITAL | Age: 59
End: 2024-04-22
Payer: COMMERCIAL

## 2024-04-22 DIAGNOSIS — J80 ARDS (ADULT RESPIRATORY DISTRESS SYNDROME) (HCC): Primary | ICD-10-CM

## 2024-04-22 PROCEDURE — 94625 PHY/QHP OP PULM RHB W/O MNTR: CPT

## 2024-04-23 NOTE — TELEPHONE ENCOUNTER
PA approved     Called [facility] pharmacy and left a detailed message on their answering machine letting them know of the approval. Cb if any questions.

## 2024-04-24 ENCOUNTER — CLINICAL SUPPORT (OUTPATIENT)
Dept: PULMONOLOGY | Facility: HOSPITAL | Age: 59
End: 2024-04-24
Payer: COMMERCIAL

## 2024-04-24 DIAGNOSIS — J80 ARDS (ADULT RESPIRATORY DISTRESS SYNDROME) (HCC): Primary | ICD-10-CM

## 2024-04-24 PROCEDURE — 94625 PHY/QHP OP PULM RHB W/O MNTR: CPT

## 2024-04-29 ENCOUNTER — CLINICAL SUPPORT (OUTPATIENT)
Dept: PULMONOLOGY | Facility: HOSPITAL | Age: 59
End: 2024-04-29
Payer: COMMERCIAL

## 2024-04-29 DIAGNOSIS — J80 ARDS (ADULT RESPIRATORY DISTRESS SYNDROME) (HCC): Primary | ICD-10-CM

## 2024-04-29 PROCEDURE — 94625 PHY/QHP OP PULM RHB W/O MNTR: CPT

## 2024-04-29 NOTE — PROGRESS NOTES
"Pulmonary Rehabilitation Assessment and Individualized Treatment Plan  30 DAY    Today's date: 2024  Patient name: Hussein Edward III     : 1965       MRN: [id number]  PCP: Iftikhar Roque MD  Referring Physician: Bernabe Cook MD  Pulmonologist: Dr. Cook    Provider: Nury  Clinician: Herlinda Hewitt MS, CEP      Dx:    Encounter Diagnosis   Name Primary?    ARDS (adult respiratory distress syndrome) (HCC) Yes     Date of onset: 2024            ASSESSMENT      Weight:    Wt Readings from Last 1 Encounters:   24 82.1 kg (181 lb)      Height:   Ht Readings from Last 1 Encounters:   24 5' 5\" (1.651 m)       Medical History:   Past Medical History:   Diagnosis Date    Chronic back pain     Migraine        Family History:  Family History   Problem Relation Age of Onset    Breast cancer Mother     Diabetes Father     No Known Problems Family     Substance Abuse Neg Hx     Mental illness Neg Hx        Allergies:   Patient has no known allergies.    ETOH:   Social History     Substance and Sexual Activity   Alcohol Use Never       Current Medications:   Current Outpatient Medications   Medication Sig Dispense Refill    albuterol (2.5 mg/3 mL) 0.083 % nebulizer solution Take 3 mL (2.5 mg total) by nebulization every 8 (eight) hours as needed for wheezing or shortness of breath 125 mL 1    albuterol (PROVENTIL HFA,VENTOLIN HFA) 90 mcg/act inhaler Inhale 2 puffs every 4 (four) hours as needed for wheezing 18 g 0    Daridorexant HCl (Quviviq) 25 MG TABS Take by mouth      fluticasone (FLONASE) 50 mcg/act nasal spray 1 spray into each nostril daily 48 g 0    guaiFENesin (MUCINEX) 600 mg 12 hr tablet Take 1 tablet (600 mg total) by mouth every 12 (twelve) hours      loratadine (CLARITIN) 10 mg tablet Take 0.5 tablets (5 mg total) by mouth daily 30 tablet 0    metoprolol succinate (TOPROL-XL) 50 mg 24 hr tablet Take 1 tablet (50 mg total) by mouth every 12 (twelve) hours 60 tablet 5    " naproxen (NAPROSYN) 500 mg tablet Take 500 mg by mouth 2 (two) times a day with meals      nystatin (MYCOSTATIN) powder Apply topically 2 (two) times a day (Patient taking differently: Apply topically if needed) 60 g 0    oxyCODONE (ROXICODONE) 5 immediate release tablet Use 0.5-1 tablet by mouth every 8 hours as needed for moderate-severe pain. 10 tablet 0    rimegepant sulfate (Nurtec) 75 mg TBDP Take one NURTEC 75 mg under or on tongue at migraine onset if needed daily 16 tablet 11    sodium chloride (OCEAN) 0.65 % nasal spray 1 spray into each nostril every hour as needed for congestion       No current facility-administered medications for this visit.       Physical Limitations: none    Fall Risk: Low   Comments: Ambulates with a steady gait with no assist device and using rollator to sit on if need to take breaks    Oxygen needs:   Rest:  supplemental O2 via nasal cannula @ 2L/min   Exercise/physical activity:  supplemental O2 via nasal cannula @ 3-4L/min   Sleep:   supplemental O2 via nasal cannula @ 2L/min     Does Pt monitor home SpO2? yes   Average SpO2 at rest:  98%   Average SpO2 with ADLs/physical activity:  80-95%      Use of Rescue Inhaler: Yes:  2-3 times per day    Use of Maintenance Inhaler: Yes:  2 times per day    Use of Nebulizer Treatments:  Yes:  2 times per day    Patient practices breathing techniques at home:  Reviewed with patient on:  4/4/2024-gave harmonica to practice with pursed lip breathing    Pulmonary Disease Risk Factors:  none      CAD Risk Factors:   Cholesterol: No  Smoking: Never used  HTN: No  DM: No  Obesity: No   Inactivity: Yes  Stress:  perceived  stress: 6/10   Stressors:health related   Goals for Stress Management: practice Relaxation Techniques, keep a positive mindset, and spend time with family      Current Functional Status:  Occupation: plans to return to work [employer]  Recreation: none  ADL’s:Capable of performing light ADLs only  [place]: No  limitations  Exercise: some walking, short intervals as tolerated  Home exercise equipment: none  Other: n/a    Nutrition:  Pt's reported dietary habits:  Overall healthy eating plan, did not complete diet survey today-will complete at next session    Patient Specific Goals:     EXERCISE GOALS (home exercise, ADLs):   Be able to walk further distance with less shortness of breath, return to normal ADLs and return to work without limitations   NUTRITION GOALS (wt management, diabetes management, dietary modifications):   Attend healthy eating class and make healthy eating choices   PSYCHOCOSOCIAL GOALS (stress, emotional well being, social support):   Improve feelings of anxiety and return to normal self as he gets better   CORE COMPONENT GOALS (smoking, BP control, medication):   BP control     Ability to reach goals/rehabilitation potential:  Good    Projected return to function: 12-18 weeks  Objective tests: 6 MWT    Cultural needs: none    Comments: n/a        INDIVIDUALIZED TREATMENT PLAN        SUMMARY OF PROGRESS:  Hussein is compliant attending pulmonary rehab exercise sessions 2x/wk having attended 7 sessions  in the past 30 days. The patient tolerates 30-40 mins at 2.1 - 3.0 METs on supplemental O2 3L/min via nasal canula. A light strength training component will be added in a future exercise session.. Resting SpO2 96 - 98% with maintained O2 saturation during exercise 95 - 98%. Will titrate supplemental O2 to maintain SpO2 >90% Resting BP  102/70 - 136/82. RHR 84 - 125, ExHR 102 - 127. The patient reports dyspnea and fatigue during exercise. Rating of perceived dyspnea with exercise 5-6/10 at max METs. [facility] to encourage the practice of breathing exercises at home. Patient reports excellent social/emotional support. Patient attends group educational classes on pulmonary disease. Pursed lipped breathing continues to be demonstrated, practiced, and  reinforced with the patient. His exercise program will be progressed as tolerated. 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  Pt will continue to be educated on lifestyle modifications and encouraged to supplement with a home exercise program to reach the following goals in the next 30 days:  increased walking time/distance.    Medication compliance: Yes   Comments: Pt reports to be compliant with medications      Assessment of progression of lung disease and functional status:  CAT: 36/40  Shortness of breath questionnaire: 99/120      EXERCISE ASSESSMENT and PLAN    Current Exercise Program in Rehab:       Frequency: 2 days/week   Supplement with home exercise 2+ days/wk as tolerated        Minutes: 20-40         METS: 2.1-3.0              SpO2: >88%              RPD: 4-6                      HR: 50-85% HRR or RHR +30-40bpm: 100-120   RPE: 4-5         Modalities: Treadmill, UBE, Lifecycle, NuStep, Recumbent bike, and Room walking      Exercise Progression 30 Day Goals :    Frequency: 2-3 days/week    Supplement with home exercise 2+ days/wk as tolerated       Minutes: 30-40        METS: 2.5-3.5              SpO2: >88%              RPD: 4-6                      HR: 100-120   RPE: 4-5        Modalities: Treadmill, UBE, Lifecycle, NuStep, Recumbent bike, and Room walking     Strength trainin-3 days / week  12-15 repetitions  1-2 sets per modality   Will be added following 2-3 weeks of monitored exercise sessions   Modalities: Pull Downs, Lateral Raise, Arm Extension, Arm Curl, Sit to Stands, Front Raises, Shoulder Shrugs, and leg ext    Home Exercise: Type: walking, Frequency: 3-4 days/week, Duration: 5-10 mins    Education: pursed lipped breathing, diaphragmatic breathing, relaxation breathing, home exercise guidelines, benefits of exercise for pulmonary disease, RPE scale, RPD scale, O2  saturation monitoring, appropriate O2 response to exercise, energy conservation, and education class: Exercise For The Pulmonary Patient    SMART Goals:   reduced score on CAT, improved 6MWT distance, reduced dyspnea during exercise, improved exercise tolerance based on peak METs tolerated in pulmonary rehab exercise session, SpO2 >88% during exercise, and reduced RPD at rest    Patient Specific EXERCISE GOALS:   (home exercise, ADLs): reduced dependence on supplemental O2, attend pulmonary rehab regularly, decrease sitting time at home, start a walking program, begin a consistent exercise regimen , decreased rest needed with physical activity/exercise, increased muscular strength, increased energy, and increased stamina with ADLs  Be able to walk further distance with less shortness of breath, return to work without limitations    Patient's progress toward SMART and personal goals: Pt has attended DE regularly in the last 30 days, decreased rest needed with PA and exercise, able to walk for a longer amount of time without a break. Still on 3 lpm.     Patient specific plan for next 30 days: will plan to start attending DE sessions 2x/week, and possibly progress to 3x/week once he increases strength, will continue walking at home 3-4x/week working up to 10-15 min intervals    Plan: Titrate supplemental oxygen as needed to maintain SpO2>88% with exercise, learn to conserve energy with ADLs , practice diaphragmatic breathing, reduce time sitting at home, increased strength of respiratory muscles, utilize PLB with physical activity, and begin a home exercise program , use IMT and harmonica therapy at home     Readiness to change: Action:  (Changing behavior)      NUTRITION ASSESSMENT AND PLAN    Weight control:    Starting weight: 181 lb   Current weight:   183.0    Diabetes: N/A  A1c: N/A    last measured: N/A    SMART Goals:   Improved Rate Your Plate score  >58, eat 6 or more servings of grain products per day, and  eat 5 or more servings of fruits and vegetables a day      Patient Specific NUTRITION GOALS:  (wt control, diabetes management, dietary modifications): increase water intake to reduce/thin mucus production improve hydration increased muscle mass, attend heart healthy eating class and make healthy eating choices    Patient's progress toward SMART and personal goals: Reports increased water intake    Patient specific plan for next 30 days: will attend healthy eating education class during AR and can focus on heathy eating choices    Plan: group class: Reading Food Labels and group Class: Heart Healthy Eating    Measurable goals were based Rate Your Plate Dietary Self-Assessment. These are the areas in which the patient could score higher on the assessment.  Goals include recommendations for a heart healthy diet based on American Heart Association.    Education: heart healthy eating principles  maintaining hydration    Readiness to change: Preparation:  (Getting ready to change)       PSYCHOSOCIAL ASSESSMENT AND PLAN    Emotional:  Depression assessment:  PHQ-9 = 8  5-9 = Mild Depression            Anxiety measure:  CLARA-7 = 5  5-9 = Mild anxiety   *PHQ-9/CLARA-7 will be assessed at next session and scanned in*    Assessment of depression and anxiety    Patient reports feelings of depression   Patient reports feelings of anxiety  Reports sufficient emotional support   Provided contact information for [facility]    Self-reported stress level:  6-health related  Stress Management: practice Relaxation Techniques, keep a positive mindset, and spend time with family    Patient's rating of Social support: Excellent   Social Support Network: spouse    Psychosocial Assessment as it relates to rehabilitation: Patient denies issues with his/her family or home life that may affect their rehabilitation efforts.       SMART Goals:    improved [facility] QOL < 27, PHQ-9 - reduced severity by one level, Physical Fitness  in [place] Score < 3, Daily Activity in [facility] Score < 3, Social Activities in [facility] Score < 3, Pain in [facility] Score < 3, Overall Health in [facility] Score < 3, Quality of Life in [facility] Score < 3 , Change in Health in [facility] Score < 3 ,  Increased interest and pleasure in doing things, feel less tired with more energy, improved concentration, and take time to relax    Patient Specific PSYCHOCOSOCIAL GOALS:  (stress, emotional well being, social support): begin using [employer] and spend time with family  Improve feelings of anxiety and return to normal self as he gets better    Patient's progress toward SMART and personal goals: Hussein reports he has been spending time with his family    Patient specific plan for next 30 days: fontaine continue attending NM sessions to help recovery progress and will work towards increased strength and endurance to return to normal activities and feel less anxiety and more like himself    Plan: Class: Relaxation, Class:  Stress and Pulmonary Disease, Enroll in [employer], Practice relaxation techniques, Keep a positive mindset, and Enjoy family    Education: signs/sxs of depression, benefits of a positive support system, stress management techniques, benefits of enrolling in [employer], and tools to manage fear/anxiety related to becoming SOB    Readiness to change: Action:  (Changing behavior)      OTHER CORE COMPONENTS     Tobacco:   Social History     Tobacco Use   Smoking Status Never   Smokeless Tobacco Former    Types: Snuff    Quit date:        Tobacco Use Intervention: Referral to tobacco expert:   N/A:  Patient is a non-smoker     Blood pressure:    Restin//82   Exercise: 142/78    Oxygen Goal: Maintain SpO2>88% during exercise or as advised by pulmonolgist    SMART Goals: medication compliance, compliance with supplemental oxygen, compliance with CPAP, and monitor home pulse oximetry    Patient Specific CORE COMPONENT GOALS (smoking, BP  control, angina control, medication compliance): reduced dietary sodium <2000mg, medication compliance, compliance with supplemental oxygen, compliance with CPAP, and monitor home pulse oximetry  BP control-manage medication as BPs are high and low    Patient's progress toward SMART and personal goals: Hussein has been working to reduce his dietary sodium, reports medication compliance, compliance with supplemental oxygen and CPAP, monitors pulse oximetry at home    Patient specific plan for next 30 days: continue with medication regiment, talk with MD about medication to control BP better as BP is high and low    Plan: medication compliance, avoid processed foods, engage in regular exercise, eliminate salt shaker at the table, use salt substitutes, check labels for sodium content, monitor home BP, group class: Pulmonary Anatomy and Physiology, group class:  Pulmonary Medications, and home harmonica therapy practice    Education:  pathophysiology of pulmonary disease, preventing infections, control coughing, inspiratory muscle training, environmental triggers, nebulizer use, bronchodilators, bronchial hygiene, and harmonica therapy    Readiness to change: Action:  (Changing behavior)

## 2024-05-01 ENCOUNTER — CLINICAL SUPPORT (OUTPATIENT)
Dept: PULMONOLOGY | Facility: HOSPITAL | Age: 59
End: 2024-05-01
Payer: COMMERCIAL

## 2024-05-01 DIAGNOSIS — J80 ARDS (ADULT RESPIRATORY DISTRESS SYNDROME) (HCC): Primary | ICD-10-CM

## 2024-05-01 PROBLEM — R50.9 PERSISTENT FEVER: Status: RESOLVED | Noted: 2024-02-27 | Resolved: 2024-05-01

## 2024-05-01 PROCEDURE — 94625 PHY/QHP OP PULM RHB W/O MNTR: CPT

## 2024-05-06 ENCOUNTER — CLINICAL SUPPORT (OUTPATIENT)
Dept: PULMONOLOGY | Facility: HOSPITAL | Age: 59
End: 2024-05-06
Payer: COMMERCIAL

## 2024-05-06 DIAGNOSIS — J80 ARDS (ADULT RESPIRATORY DISTRESS SYNDROME) (HCC): Primary | ICD-10-CM

## 2024-05-06 PROCEDURE — 94625 PHY/QHP OP PULM RHB W/O MNTR: CPT

## 2024-05-08 ENCOUNTER — APPOINTMENT (OUTPATIENT)
Dept: PULMONOLOGY | Facility: HOSPITAL | Age: 59
End: 2024-05-08
Payer: COMMERCIAL

## 2024-05-08 ENCOUNTER — OFFICE VISIT (OUTPATIENT)
Dept: PULMONOLOGY | Facility: CLINIC | Age: 59
End: 2024-05-08
Payer: COMMERCIAL

## 2024-05-08 VITALS
TEMPERATURE: 98.3 F | HEART RATE: 93 BPM | SYSTOLIC BLOOD PRESSURE: 132 MMHG | HEIGHT: 65 IN | BODY MASS INDEX: 31.65 KG/M2 | WEIGHT: 190 LBS | DIASTOLIC BLOOD PRESSURE: 82 MMHG | OXYGEN SATURATION: 97 %

## 2024-05-08 DIAGNOSIS — Z87.09 ARDS SURVIVOR: ICD-10-CM

## 2024-05-08 DIAGNOSIS — F51.01 PRIMARY INSOMNIA: ICD-10-CM

## 2024-05-08 DIAGNOSIS — J96.01 ACUTE RESPIRATORY FAILURE WITH HYPOXIA (HCC): Primary | ICD-10-CM

## 2024-05-08 DIAGNOSIS — R00.0 TACHYCARDIA: ICD-10-CM

## 2024-05-08 DIAGNOSIS — M25.561 CHRONIC PAIN OF RIGHT KNEE: ICD-10-CM

## 2024-05-08 DIAGNOSIS — G89.29 CHRONIC PAIN OF RIGHT KNEE: ICD-10-CM

## 2024-05-08 DIAGNOSIS — G47.33 OSA (OBSTRUCTIVE SLEEP APNEA): ICD-10-CM

## 2024-05-08 PROCEDURE — 99214 OFFICE O/P EST MOD 30 MIN: CPT | Performed by: INTERNAL MEDICINE

## 2024-05-08 PROCEDURE — 94618 PULMONARY STRESS TESTING: CPT | Performed by: INTERNAL MEDICINE

## 2024-05-08 RX ORDER — METOPROLOL SUCCINATE 50 MG/1
50 TABLET, EXTENDED RELEASE ORAL EVERY 12 HOURS
Qty: 60 TABLET | Refills: 5 | Status: SHIPPED | OUTPATIENT
Start: 2024-05-08

## 2024-05-08 RX ORDER — DARIDOREXANT 25 MG/1
25 TABLET, FILM COATED ORAL
Qty: 90 TABLET | Refills: 3 | Status: SHIPPED | OUTPATIENT
Start: 2024-05-08

## 2024-05-08 RX ORDER — MELOXICAM 15 MG/1
15 TABLET ORAL DAILY
Qty: 60 TABLET | Refills: 0 | Status: SHIPPED | OUTPATIENT
Start: 2024-05-08

## 2024-05-08 NOTE — ASSESSMENT & PLAN NOTE
He needs a refill on his metoprolol which I provided for him.  He is due to see cardiology at the end of the month.

## 2024-05-08 NOTE — ASSESSMENT & PLAN NOTE
Severe hypoxic respiratory failure from influenza and possible bacterial pneumonia resulting in ARDS with prolonged hospitalization in February to March 2024.  He required proning and prolonged ventilator time.  Fortunately he was able to be successfully extubated.     He has lost 25 pounds during the hospitalization.  He is working with pulmonary rehab    I recommend continuing albuterol inhaled and nebulized as needed.  I gave him a spacer to optimize the albuterol inhaler.  I also gave him a sample of Breztri to see if that would improve his chest tightness and difficulty breathing with exertion.    He initially had severe nightmares and hallucinations due to prolonged sedation and required Paxil.  He has held Paxil recently due to resolution of most of the neurologic symptoms.  He can continue to hold Paxil if he does not need it

## 2024-05-08 NOTE — ASSESSMENT & PLAN NOTE
Long history of WALE and has been on CPAP for 8 to 10 years.  He has used nasal mask and fullface masks.  Previous study in 2016 showed AHI 7.9, supine AHI 95.5, REM AHI 15.9 with SpO2 denys 89%.  Repeat HST 2023 shows SIMA 35    He has a new CPAP set at 4-6 cm H2O.  Compliance data reviewed today shows that pressure is usually between 5.5-5.9 cm H2O.  Residual AHI 0.6.  Pressure requirements have decreased significantly likely due to weight loss from critical illness    I have lowered his pressures of 4-5 cm H2O and set his EPR at 2.     We can consider switching to BiPAP in the future or reevaluating his sleep apnea with a sleep study if he continues to have difficulty tolerating expiration with CPAP.

## 2024-05-08 NOTE — PROGRESS NOTES
Pulmonary/Sleep Medicine Outpatient Follow Up Note   Hussein Edward III 58 y.o. male MRN: [id number]  5/8/2024      Reason for Consultation:    Chief Complaint   Patient presents with    Sleep Apnea    Hypoxia     1. Acute respiratory failure with hypoxia (HCC)  Assessment & Plan:  Severe influenza A and superimposed bacterial pneumonia resulting in ARDS and intubated from 2/19 to 2/29 with proning.  Bronchoscopy 2/19 positive for Candida glabrata but no bacteria.  He completed Tamiflu and antibiotic course.    2 L with ambulation and sleep.  I have ordered a portable concentrator.  On room air at rest    Orders:  -     POCT 6 minute walk  -     Portable Concentrators    2. Primary insomnia  Assessment & Plan:  Prior to hospitalization he was improving sleep hygiene and doing more stimulus control with some improvement.       He is currently on daridorexant 25 mg nightly as needed which he can continue.  This is mostly effective.    Orders:  -     Daridorexant HCl (Quviviq) 25 MG TABS; Take 25 mg by mouth daily at bedtime    3. Chronic pain of right knee  Assessment & Plan:  He is bilateral knee pain and hip pain that has been resistant to Tylenol and over-the-counter Aleve.  This is worsened since his critical illness likely due to muscle strength loss.    I prescribed some meloxicam to see if that will help.  He is advised on potential GI bleeding with NSAID use.  He is advised to avoid other NSAIDs.  If meloxicam is not effective he can ask his PCP to consider a trial of tramadol        Orders:  -     meloxicam (Mobic) 15 mg tablet; Take 1 tablet (15 mg total) by mouth daily    4. ARDS survivor  Assessment & Plan:  Severe hypoxic respiratory failure from influenza and possible bacterial pneumonia resulting in ARDS with prolonged hospitalization in February to March 2024.  He required proning and prolonged ventilator time.  Fortunately he was able to be successfully extubated.     He has lost 25 pounds during the  hospitalization.  He is working with pulmonary rehab    I recommend continuing albuterol inhaled and nebulized as needed.  I gave him a spacer to optimize the albuterol inhaler.  I also gave him a sample of Breztri to see if that would improve his chest tightness and difficulty breathing with exertion.    He initially had severe nightmares and hallucinations due to prolonged sedation and required Paxil.  He has held Paxil recently due to resolution of most of the neurologic symptoms.  He can continue to hold Paxil if he does not need it       5. WALE (obstructive sleep apnea)  Assessment & Plan:  Long history of WALE and has been on CPAP for 8 to 10 years.  He has used nasal mask and fullface masks.  Previous study in 2016 showed AHI 7.9, supine AHI 95.5, REM AHI 15.9 with SpO2 denys 89%.  Repeat HST 2023 shows SIMA 35    He has a new CPAP set at 4-6 cm H2O.  Compliance data reviewed today shows that pressure is usually between 5.5-5.9 cm H2O.  Residual AHI 0.6.  Pressure requirements have decreased significantly likely due to weight loss from critical illness    I have lowered his pressures of 4-5 cm H2O and set his EPR at 2.     We can consider switching to BiPAP in the future or reevaluating his sleep apnea with a sleep study if he continues to have difficulty tolerating expiration with CPAP.      6. Tachycardia  Assessment & Plan:  He needs a refill on his metoprolol which I provided for him.  He is due to see cardiology at the end of the month.    Orders:  -     metoprolol succinate (TOPROL-XL) 50 mg 24 hr tablet; Take 1 tablet (50 mg total) by mouth every 12 (twelve) hours        Health Maintenance  Immunization History   Administered Date(s) Administered    COVID-19 J&J (Samuel) vaccine 0.5 mL 03/24/2021, 03/21/2022    Influenza Quadrivalent Preservative Free 3 years and older IM 12/07/2017    Influenza, seasonal, injectable 11/02/2016    Tdap 03/11/2019        Return in about 3 months (around  8/8/2024).    History of Present Illness   HPI:  Hussein Edward III is a 58 y.o. male who has a past medical history of R frontal meningioma, HLD, low back pain, migraines, nasal septal deviation s/p septoplasty, seasonal allergic rhinitis, severe WALE with difficulty tolerating CPAP, insomnia, recent influenza a/bacterial pneumonia resulting in ARDS with subsequent hypoxic respiratory failure who is presenting for follow-up after hospitalization    5/8/24 - follow up with me -currently doing pulmonary rehab.  Treadmill exercise is difficult for him.  He can go without oxygen at rest but after 15 minutes he gets some chest tightness like he cannot take a deep breath.  He uses albuterol inhaler and nebulizer but he feels the nebulizer provides him the most benefit which does not last too long.  No significant cough/fever/chills.  He is gaining weight back.  CPAP is difficult to tolerate as he cannot breathe out too much against the pressure.  He bleeds oxygen through his CPAP.  He is try to use it more and he has received a new machine from his DME.    3/28/24 -follow-up with me-here with his wife currently on 2 L with exertion.  Very motivated to do more rehab.  Will schedule pulmonary rehab.  Still having cough     3/20/24 -patient requested decrease in CPAP pressures.  I decreased to 5-10 cm H2O.  I placed an order for pulmonary rehab    3/14-3/27/2024-admitted to [facility] for rehab after hospitalization.  He is on room air at rest but will desaturate with ambulation and requires 2 to 3 L with ambulation.  Speech improving at discharge.  Pulmonary rehab recommended.  Started on Paxil in the hospitalization.  Did not need lorazepam throughout rehab stay.    2/12-3/14/24 -extensive hospitalization for acute hypoxic respiratory failure due to ARDS from influenza A and likely superimposed bacterial pneumonia.  Intubated from 2/19 - 2/29 and required proning, paralytics.  Course complicated by some postextubation delirium,  reports of hallucinations during sedation.  Successfully extubated and transferred to rehab on 3/14.    12/20/23  -telephone encounter, prescribed daridorexant for insomnia    9/12/23 -follow-up with me - last seen by me 6/20/23 - HST shows 32.5 events per hour with 33 minutes <89% SpO2.  He uses CPAP intermittently using a AirSense S10.  He doesn't use a DME and buys supplies online.  He has tried 8-10 different masks.  He reports mask leaking/hissing which wakes him up at night.  He has snoring with CPAP.  CPAP has not made him feel better.     He has some sleep initiation issues and takes him several hours to fall as sleep due to racing thoughts at times.      6/20/23 - consult with me - He endorses snoring, morning headaches, apneic events, seen by his wife. His neurologist recently started him on acetazolamide 125 mg a day.  He is prescribed diazepam and temazepam but he has not taken this recently.  He is taking tramadol a few times a week for back pain.     He had previously been treated with CPAP for 8-10 years although he feels like CPAP was very uncomfortable.  He did use nasal masks and fullface masks.  He still has a CPAP and only uses it occasionally.  Previous sleep study: 2016, AHI 7.9, supine 95.5, REM 15.9, Spo2 denys 89%     His sleep routine typically involves eating between 8-9 PM and going to bed around 10 PM and getting out of bed at 4-4:30 AM, though he does watch or listen to TV at night time. He states that the TV helps to distract him from tinnitus. He gets 5 hour of sleep per night on average, but often will find himself getting less than 2 hours of sleep due to distracting thoughts.      He reports waking about 7 times per night, due to the CPAP mask hissing, and once or twice per night to urinate. He sleeps on his side most nights. He only drinks coffee in the mornings, and denies caffeinated drinks in the afternoon. He had used chewing tobacco in the past, but quit 20 years ago and no  longer uses nicotine. He rarely drinks alcohol, once or twice per year. He denies other drug use.       Meds/Allergies     Current Outpatient Medications:     albuterol (2.5 mg/3 mL) 0.083 % nebulizer solution, Take 3 mL (2.5 mg total) by nebulization every 8 (eight) hours as needed for wheezing or shortness of breath, Disp: 125 mL, Rfl: 1    albuterol (PROVENTIL HFA,VENTOLIN HFA) 90 mcg/act inhaler, Inhale 2 puffs every 4 (four) hours as needed for wheezing, Disp: 18 g, Rfl: 0    Daridorexant HCl (Quviviq) 25 MG TABS, Take 25 mg by mouth daily at bedtime, Disp: 90 tablet, Rfl: 3    loratadine (CLARITIN) 10 mg tablet, Take 0.5 tablets (5 mg total) by mouth daily, Disp: 30 tablet, Rfl: 0    meloxicam (Mobic) 15 mg tablet, Take 1 tablet (15 mg total) by mouth daily, Disp: 60 tablet, Rfl: 0    metoprolol succinate (TOPROL-XL) 50 mg 24 hr tablet, Take 1 tablet (50 mg total) by mouth every 12 (twelve) hours, Disp: 60 tablet, Rfl: 5    oxyCODONE (ROXICODONE) 5 immediate release tablet, Use 0.5-1 tablet by mouth every 8 hours as needed for moderate-severe pain., Disp: 10 tablet, Rfl: 0    rimegepant sulfate (Nurtec) 75 mg TBDP, Take one NURTEC 75 mg under or on tongue at migraine onset if needed daily, Disp: 16 tablet, Rfl: 11    fluticasone (FLONASE) 50 mcg/act nasal spray, 1 spray into each nostril daily (Patient not taking: Reported on 5/8/2024), Disp: 48 g, Rfl: 0    guaiFENesin (MUCINEX) 600 mg 12 hr tablet, Take 1 tablet (600 mg total) by mouth every 12 (twelve) hours (Patient not taking: Reported on 5/8/2024), Disp: , Rfl:     nystatin (MYCOSTATIN) powder, Apply topically 2 (two) times a day (Patient not taking: Reported on 5/8/2024), Disp: 60 g, Rfl: 0    sodium chloride (OCEAN) 0.65 % nasal spray, 1 spray into each nostril every hour as needed for congestion (Patient not taking: Reported on 5/8/2024), Disp: , Rfl:   No Known Allergies    Vitals: Blood pressure 132/82, pulse 93, temperature 98.3 °F (36.8 °C),  "temperature source Tympanic, height 5' 5\" (1.651 m), weight 86.2 kg (190 lb), SpO2 97%. Body mass index is 31.62 kg/m². Oxygen Therapy  SpO2: 97 %  Oxygen Therapy: Supplemental oxygen  O2 Delivery Method: Nasal cannula  O2 Flow Rate (L/min): 2 L/min    Physical Exam  Vitals and nursing note reviewed.   Constitutional:       General: He is not in acute distress.     Appearance: Normal appearance. He is well-developed. He is not ill-appearing, toxic-appearing or diaphoretic.   HENT:      Head: Normocephalic and atraumatic.      Mouth/Throat:      Mouth: Mucous membranes are moist.      Pharynx: Oropharynx is clear. No oropharyngeal exudate.   Eyes:      Conjunctiva/sclera: Conjunctivae normal.   Cardiovascular:      Rate and Rhythm: Normal rate and regular rhythm.   Pulmonary:      Effort: Pulmonary effort is normal. No respiratory distress.      Breath sounds: Normal breath sounds. No stridor. No wheezing, rhonchi or rales.   Abdominal:      Tenderness: There is no guarding.   Musculoskeletal:         General: No swelling.      Cervical back: Normal range of motion and neck supple. No rigidity.      Right lower leg: No edema.      Left lower leg: No edema.   Skin:     General: Skin is warm and dry.   Neurological:      General: No focal deficit present.      Mental Status: He is alert and oriented to person, place, and time. Mental status is at baseline.   Psychiatric:         Mood and Affect: Mood normal.             Labs:   I have personally reviewed pertinent lab results.    ABG:   Lab Results   Component Value Date    PHART 7.530 (H) 03/03/2024    CNK6AAI 34.3 (L) 03/03/2024    PO2ART 48.3 (LL) 03/03/2024    ZTN0LLT 28.0 03/03/2024    BEART 5.5 03/03/2024    SOURCE Brachial, Right 03/03/2024   ,   CBC:  Lab Results   Component Value Date    WBC 5.63 03/25/2024    HGB 11.3 (L) 03/25/2024    HCT 35.2 (L) 03/25/2024    MCV 91 03/25/2024     03/25/2024    EOSPCT 9 (H) 03/25/2024    EOSABS 0.52 03/25/2024    " "NEUTOPHILPCT 57 03/25/2024    LYMPHOPCT 22 03/25/2024   ,   CMP:   Lab Results   Component Value Date    SODIUM 141 03/25/2024    K 3.8 03/25/2024     03/25/2024    CO2 27 03/25/2024    BUN 13 03/25/2024    CREATININE 0.56 (L) 03/25/2024    GLUCOSE 223 (H) 02/19/2024    CALCIUM 9.2 03/25/2024    AST 16 03/18/2024    ALT 31 03/18/2024    ALKPHOS 89 03/18/2024    PROT 6.8 12/20/2017    BILITOT 0.3 12/20/2017    EGFR 115 03/25/2024   ,   Ferrtin: No components found for: \"FERRTIN\",  Magensium: No results found for: \"MAGNESIUM\",    Imaging and other studies: I have personally reviewed pertinent reports.   and I have personally reviewed pertinent films in PACS  4/8/24 CT Chest without contrast  Bilateral opacities and scarring improving compared to prior CT    3/11/24 CXR -bilateral patchy opacities more prominent in the left lung and right lower lobe    2/27/2024 chest CT without contrast-patchy groundglass and airspace opacities scattered throughout more dense in the lower lobes with air bronchograms.  Lack of effusions.  Normal cardiac silhouette.  Small coronary cardial effusion.  No adenopathy.  Endotracheal tube in place.    Sleep Study:  7/20/2023-HST-severe WALE SIMA 32.5.  A 237. significantly higher AHI on the right side of 59 SIMA and 10 on left side.  Baseline oxygen saturation normal    Sleep study: 2016, AHI 7.9, supine 95.5, REM 15.9, Spo2 denys 89%      Compliance data:  His machine is not linked to the DME and therefore no compliance data found.    Transthoracic Echo:  2/19/24    Left Ventricle: Left ventricular cavity size is normal. Wall thickness is mildly increased. There is concentric remodeling. The left ventricular ejection fraction is 55-60%. Systolic function is normal. Wall motion is normal. Diastolic function is mildly abnormal, consistent with grade I (abnormal) relaxation.    Right Ventricle: Right ventricular cavity size is normal. Systolic function is normal.    Left Atrium: The atrium " "is normal in size.    Right Atrium: The atrium is normal in size.    Pulmonary Function Testing:   Patient SpO2 was 98% on room air at rest.  Heart rate was 87 bpm.   Patient walked 6 minutes with with a walker for 203 m.  Patient's lowest SpO2 was 88% requiring 2 L to correct at the end of the walk.  Highest heart rate was 107 bpm.   Recommend 2 L/min of supplemental oxygen with portable concentrator     Bernabe Cook MD  Pulmonary, Critical Care and Sleep Medicine  [facility]     Portions of the record may have been created with voice recognition software. Occasional wrong word or \"sound a like\" substitutions may have occurred due to the inherent limitations of voice recognition software. Please read the chart carefully and recognize, using context, where substitutions have occurred.     "

## 2024-05-08 NOTE — ASSESSMENT & PLAN NOTE
He is bilateral knee pain and hip pain that has been resistant to Tylenol and over-the-counter Aleve.  This is worsened since his critical illness likely due to muscle strength loss.    I prescribed some meloxicam to see if that will help.  He is advised on potential GI bleeding with NSAID use.  He is advised to avoid other NSAIDs.  If meloxicam is not effective he can ask his PCP to consider a trial of tramadol

## 2024-05-08 NOTE — ASSESSMENT & PLAN NOTE
Severe influenza A and superimposed bacterial pneumonia resulting in ARDS and intubated from 2/19 to 2/29 with proning.  Bronchoscopy 2/19 positive for Candida glabrata but no bacteria.  He completed Tamiflu and antibiotic course.    2 L with ambulation and sleep.  I have ordered a portable concentrator.  On room air at rest

## 2024-05-09 ENCOUNTER — TELEPHONE (OUTPATIENT)
Age: 59
End: 2024-05-09

## 2024-05-09 ENCOUNTER — APPOINTMENT (OUTPATIENT)
Dept: LAB | Facility: HOSPITAL | Age: 59
End: 2024-05-09
Payer: COMMERCIAL

## 2024-05-09 DIAGNOSIS — R10.32 LEFT INGUINAL PAIN: ICD-10-CM

## 2024-05-09 DIAGNOSIS — N20.0 KIDNEY STONE: ICD-10-CM

## 2024-05-09 DIAGNOSIS — R10.32 LEFT INGUINAL PAIN: Primary | ICD-10-CM

## 2024-05-09 LAB
BACTERIA UR QL AUTO: ABNORMAL /HPF
BILIRUB UR QL STRIP: NEGATIVE
CAOX CRY URNS QL MICRO: ABNORMAL /HPF
CLARITY UR: CLEAR
COLOR UR: YELLOW
GLUCOSE UR STRIP-MCNC: NEGATIVE MG/DL
HGB UR QL STRIP.AUTO: NEGATIVE
KETONES UR STRIP-MCNC: NEGATIVE MG/DL
LEUKOCYTE ESTERASE UR QL STRIP: ABNORMAL
NITRITE UR QL STRIP: NEGATIVE
NON-SQ EPI CELLS URNS QL MICRO: ABNORMAL /HPF
PH UR STRIP.AUTO: 6 [PH]
PROT UR STRIP-MCNC: NEGATIVE MG/DL
RBC #/AREA URNS AUTO: ABNORMAL /HPF
SP GR UR STRIP.AUTO: 1.02 (ref 1–1.03)
UROBILINOGEN UR STRIP-ACNC: <2 MG/DL
WBC #/AREA URNS AUTO: ABNORMAL /HPF

## 2024-05-09 PROCEDURE — 87086 URINE CULTURE/COLONY COUNT: CPT

## 2024-05-09 PROCEDURE — 81001 URINALYSIS AUTO W/SCOPE: CPT

## 2024-05-09 NOTE — TELEPHONE ENCOUNTER
Spoke to patient who called with complaint of intermittent left side groin pain for a month. He states his pain level is a 3-4 with walking, and it increases to an pain scale of 8 when laying down, no noted pain radiating to other area. He denies any penile or testicular swelling, no hematuria or dysuria, no burning with urination. Denies any N/V fevers or chills. He was previously on Flomax and he stopped taking that. He was recently prescribed Meloxocam for joint pain. He is not having difficulty walking from groin pain he states his difficulty walking is from other issues. He is hydrating well. He was reviewed ER precautions. Urine orders placed and pharmacy updated during the call. Please advise of any further recommendations.

## 2024-05-09 NOTE — TELEPHONE ENCOUNTER
Patient wanted to know the status of his call earlier and I told him that an order for urine test was put in to Epic and he said he would go get them done right away.

## 2024-05-09 NOTE — TELEPHONE ENCOUNTER
Patient seen by Leann at [facility]    Patient called stating he is having pain on lower left groin area into his penis.  He stated it started for about 4 weeks but today the pain is strong.  He is drinking a lot of water.  He is able to urinate but has pain. He does have a history of kidney stones . He is not sure if it could be a stone stuck in the ureter. He would like to know how to proceed.     Patient can be reached at [phone]

## 2024-05-09 NOTE — TELEPHONE ENCOUNTER
Thanks-agree with urine testing it looks like it is in process.  I added a renal bladder ultrasound given his stone history as well

## 2024-05-10 ENCOUNTER — HOSPITAL ENCOUNTER (OUTPATIENT)
Dept: RADIOLOGY | Age: 59
Discharge: HOME | End: 2024-05-10
Payer: COMMERCIAL

## 2024-05-10 DIAGNOSIS — R10.32 LEFT INGUINAL PAIN: ICD-10-CM

## 2024-05-10 DIAGNOSIS — N20.0 KIDNEY STONE: ICD-10-CM

## 2024-05-10 LAB — BACTERIA UR CULT: NORMAL

## 2024-05-10 PROCEDURE — 76775 US EXAM ABDO BACK WALL LIM: CPT

## 2024-05-10 NOTE — TELEPHONE ENCOUNTER
Call placed to patient and made him aware of order for renal ultrasound due to stone history. Patient was provided with central scheduling phone number and will call to schedule US. Aware urine testing is in process and office to call with final culture results.

## 2024-05-13 ENCOUNTER — CLINICAL SUPPORT (OUTPATIENT)
Dept: PULMONOLOGY | Facility: HOSPITAL | Age: 59
End: 2024-05-13
Payer: COMMERCIAL

## 2024-05-13 DIAGNOSIS — J80 ARDS (ADULT RESPIRATORY DISTRESS SYNDROME) (HCC): Primary | ICD-10-CM

## 2024-05-13 PROCEDURE — 94625 PHY/QHP OP PULM RHB W/O MNTR: CPT

## 2024-05-15 ENCOUNTER — CLINICAL SUPPORT (OUTPATIENT)
Dept: PULMONOLOGY | Facility: HOSPITAL | Age: 59
End: 2024-05-15
Payer: COMMERCIAL

## 2024-05-15 DIAGNOSIS — J80 ARDS (ADULT RESPIRATORY DISTRESS SYNDROME) (HCC): Primary | ICD-10-CM

## 2024-05-15 PROCEDURE — 94625 PHY/QHP OP PULM RHB W/O MNTR: CPT

## 2024-05-20 ENCOUNTER — CLINICAL SUPPORT (OUTPATIENT)
Dept: PULMONOLOGY | Facility: HOSPITAL | Age: 59
End: 2024-05-20
Payer: COMMERCIAL

## 2024-05-20 DIAGNOSIS — J80 ARDS (ADULT RESPIRATORY DISTRESS SYNDROME) (HCC): Primary | ICD-10-CM

## 2024-05-20 PROCEDURE — 94625 PHY/QHP OP PULM RHB W/O MNTR: CPT

## 2024-05-20 NOTE — PROGRESS NOTES
"Pulmonary Rehabilitation Assessment and Individualized Treatment Plan  60 DAY    Today's date: May 20, 2024  Patient name: Hussein Edward III     : 1965       MRN: [id number]  PCP: Iftikhar Roque MD  Referring Physician: Bernabe Cook MD  Pulmonologist: Dr. Cook    Provider: Nury  Clinician: Herlinda Hewitt MS, CEP      Dx:    Encounter Diagnosis   Name Primary?    ARDS (adult respiratory distress syndrome) (HCC) Yes     Date of onset: 2024            ASSESSMENT      Weight:    Wt Readings from Last 1 Encounters:   24 86.2 kg (190 lb)      Height:   Ht Readings from Last 1 Encounters:   24 5' 5\" (1.651 m)       Medical History:   Past Medical History:   Diagnosis Date    Chronic back pain     Migraine        Family History:  Family History   Problem Relation Age of Onset    Breast cancer Mother     Diabetes Father     No Known Problems Family     Substance Abuse Neg Hx     Mental illness Neg Hx        Allergies:   Patient has no known allergies.    ETOH:   Social History     Substance and Sexual Activity   Alcohol Use Never       Current Medications:   Current Outpatient Medications   Medication Sig Dispense Refill    albuterol (2.5 mg/3 mL) 0.083 % nebulizer solution Take 3 mL (2.5 mg total) by nebulization every 8 (eight) hours as needed for wheezing or shortness of breath 125 mL 1    albuterol (PROVENTIL HFA,VENTOLIN HFA) 90 mcg/act inhaler Inhale 2 puffs every 4 (four) hours as needed for wheezing 18 g 0    Daridorexant HCl (Quviviq) 25 MG TABS Take 25 mg by mouth daily at bedtime 90 tablet 3    fluticasone (FLONASE) 50 mcg/act nasal spray 1 spray into each nostril daily (Patient not taking: Reported on 2024) 48 g 0    guaiFENesin (MUCINEX) 600 mg 12 hr tablet Take 1 tablet (600 mg total) by mouth every 12 (twelve) hours (Patient not taking: Reported on 2024)      loratadine (CLARITIN) 10 mg tablet Take 0.5 tablets (5 mg total) by mouth daily 30 tablet 0    meloxicam " (Mobic) 15 mg tablet Take 1 tablet (15 mg total) by mouth daily 60 tablet 0    metoprolol succinate (TOPROL-XL) 50 mg 24 hr tablet Take 1 tablet (50 mg total) by mouth every 12 (twelve) hours 60 tablet 5    nystatin (MYCOSTATIN) powder Apply topically 2 (two) times a day (Patient not taking: Reported on 5/8/2024) 60 g 0    oxyCODONE (ROXICODONE) 5 immediate release tablet Use 0.5-1 tablet by mouth every 8 hours as needed for moderate-severe pain. 10 tablet 0    rimegepant sulfate (Nurtec) 75 mg TBDP Take one NURTEC 75 mg under or on tongue at migraine onset if needed daily 16 tablet 11    sodium chloride (OCEAN) 0.65 % nasal spray 1 spray into each nostril every hour as needed for congestion (Patient not taking: Reported on 5/8/2024)       No current facility-administered medications for this visit.       Physical Limitations: none    Fall Risk: Low   Comments: Ambulates with a steady gait with no assist device and using rollator to sit on if need to take breaks    Oxygen needs:   Rest:  supplemental O2 via nasal cannula @ 2L/min   Exercise/physical activity:  supplemental O2 via nasal cannula @ 3-4L/min   Sleep:   supplemental O2 via nasal cannula @ 2L/min     Does Pt monitor home SpO2? yes   Average SpO2 at rest:  98%   Average SpO2 with ADLs/physical activity:  80-95%      Use of Rescue Inhaler: Yes:  2-3 times per day    Use of Maintenance Inhaler: Yes:  2 times per day    Use of Nebulizer Treatments:  Yes:  2 times per day    Patient practices breathing techniques at home:  Reviewed with patient on:  4/4/2024-gave harmonica to practice with pursed lip breathing    Pulmonary Disease Risk Factors:  none      CAD Risk Factors:   Cholesterol: No  Smoking: Never used  HTN: No  DM: No  Obesity: No   Inactivity: Yes  Stress:  perceived  stress: 6/10   Stressors:health related   Goals for Stress Management: practice Relaxation Techniques, keep a positive mindset, and spend time with family      Current Functional  Status:  Occupation: plans to return to work [employer]  Recreation: none  ADL’s:Capable of performing light ADLs only  [place]: No limitations  Exercise: some walking, short intervals as tolerated  Home exercise equipment: none  Other: n/a    Nutrition:  Pt's reported dietary habits:  Overall healthy eating plan, did not complete diet survey today-will complete at next session    Patient Specific Goals:     EXERCISE GOALS (home exercise, ADLs):   Be able to walk further distance with less shortness of breath, return to normal ADLs and return to work without limitations   NUTRITION GOALS (wt management, diabetes management, dietary modifications):   Attend healthy eating class and make healthy eating choices   PSYCHOCOSOCIAL GOALS (stress, emotional well being, social support):   Improve feelings of anxiety and return to normal self as he gets better   CORE COMPONENT GOALS (smoking, BP control, medication):   BP control     Ability to reach goals/rehabilitation potential:  Good    Projected return to function: 12-18 weeks  Objective tests: 6 MWT    Cultural needs: none    Comments: n/a        INDIVIDUALIZED TREATMENT PLAN        SUMMARY OF PROGRESS:  Hussein is compliant attending pulmonary rehab exercise sessions 2x/wk having attended 12 sessions  in the past 60 days. The patient tolerates 40 - 44 mins at 2.3 - 3.0 METs on supplemental O2 3L/min via nasal canula. A light strength training component will be added in a future exercise session.. Average SpO2 94-97% on room air.  Resting BP  120/68 - 126/78. RHR 74 - 109, ExHR 95 - 115. The patient reports dyspnea and fatigue during exercise. Rating of perceived dyspnea with exercise 5-6/10 at max METs. Hussein was introduced to the harmonica therapy in pulmonary rehab to encourage the practice of breathing exercises at home. Patient reports excellent social/emotional support. Patient attends group educational classes on pulmonary disease. Pursed lipped  breathing continues to be demonstrated, practiced, and reinforced with the patient. His exercise program will be progressed as tolerated. 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  Pt will continue to be educated on lifestyle modifications and encouraged to supplement with a home exercise program to reach the following goals in the next 30 days:  increased walking time/distance.    Medication compliance: Yes   Comments: Pt reports to be compliant with medications      Assessment of progression of lung disease and functional status:  CAT: 36/40  Shortness of breath questionnaire: 99/120      EXERCISE ASSESSMENT and PLAN    Current Exercise Program in Rehab:       Frequency: 2 days/week   Supplement with home exercise 2+ days/wk as tolerated        Minutes: 20-40         METS: 2.3-3.0              SpO2: >88%              RPD: 4-6                      HR: 50-85% HRR or RHR +30-40bpm: 100-120   RPE: 4-5         Modalities: Treadmill, UBE, Lifecycle, NuStep, Recumbent bike, and Room walking      Exercise Progression 30 Day Goals :    Frequency: 2-3 days/week    Supplement with home exercise 2+ days/wk as tolerated       Minutes: 30-40        METS: 2.5-3.5              SpO2: >88%              RPD: 4-6                      HR: 100-120   RPE: 4-5        Modalities: Treadmill, UBE, Lifecycle, NuStep, Recumbent bike, and Room walking     Strength trainin-3 days / week  12-15 repetitions  1-2 sets per modality   Will be added following 2-3 weeks of monitored exercise sessions   Modalities: Pull Downs, Lateral Raise, Arm Extension, Arm Curl, Sit to Stands, Front Raises, Shoulder Shrugs, and leg ext    Home Exercise: Type: walking, Frequency: 3-4 days/week, Duration: 5-10 mins    Education: pursed lipped breathing, diaphragmatic breathing, relaxation breathing, home exercise guidelines, benefits of  exercise for pulmonary disease, RPE scale, RPD scale, O2 saturation monitoring, appropriate O2 response to exercise, energy conservation, and education class: Exercise For The Pulmonary Patient    SMART Goals:   reduced score on CAT, improved 6MWT distance, reduced dyspnea during exercise, improved exercise tolerance based on peak METs tolerated in pulmonary rehab exercise session, SpO2 >88% during exercise, and reduced RPD at rest    Patient Specific EXERCISE GOALS:   (home exercise, ADLs): reduced dependence on supplemental O2, attend pulmonary rehab regularly, decrease sitting time at home, start a walking program, begin a consistent exercise regimen , decreased rest needed with physical activity/exercise, increased muscular strength, increased energy, and increased stamina with ADLs  Be able to walk further distance with less shortness of breath, return to work without limitations    Patient's progress toward SMART and personal goals: Pt has attended [place] regularly in the last 30 days, decreased rest needed with PA and exercise, able to walk for a longer amount of time without a break. No longer on supplemental oxygen and has been able to maintain SpO2 >90% with exertion.      Patient specific plan for next 30 days: will plan to start attending [place] sessions 2x/week, and possibly progress to 3x/week once he increases strength, will continue walking at home 3-4x/week working up to 10-15 min intervals    Plan: Titrate supplemental oxygen as needed to maintain SpO2>88% with exercise, learn to conserve energy with ADLs , practice diaphragmatic breathing, reduce time sitting at home, increased strength of respiratory muscles, utilize PLB with physical activity, and begin a home exercise program , use IMT and harmonica therapy at home     Readiness to change: Action:  (Changing behavior)      NUTRITION ASSESSMENT AND PLAN    Weight control:    Starting weight: 181 lb   Current weight:   188.4 lbs    Diabetes: N/A  A1c:  N/A    last measured: N/A    SMART Goals:   Improved Rate Your Plate score  >58, eat 6 or more servings of grain products per day, and eat 5 or more servings of fruits and vegetables a day      Patient Specific NUTRITION GOALS:  (wt control, diabetes management, dietary modifications): increase water intake to reduce/thin mucus production improve hydration increased muscle mass, attend heart healthy eating class and make healthy eating choices    Patient's progress toward SMART and personal goals: Reports increased water intake    Patient specific plan for next 30 days: will attend healthy eating education class during [place] and can focus on heathy eating choices    Plan: group class: Reading Food Labels and group Class: Heart Healthy Eating    Measurable goals were based Rate Your Plate Dietary Self-Assessment. These are the areas in which the patient could score higher on the assessment.  Goals include recommendations for a heart healthy diet based on American Heart Association.    Education: heart healthy eating principles  maintaining hydration    Readiness to change: Action:  (Changing behavior)      PSYCHOSOCIAL ASSESSMENT AND PLAN    Emotional:  Depression assessment:  PHQ-9 = 8  5-9 = Mild Depression            Anxiety measure:  CLARA-7 = 5  5-9 = Mild anxiety   *PHQ-9/CLARA-7 will be assessed at next session and scanned in*    Assessment of depression and anxiety    Patient reports feelings of depression   Patient reports feelings of anxiety  Reports sufficient emotional support   Provided contact information for [facility]    Self-reported stress level:  6-health related  Stress Management: practice Relaxation Techniques, keep a positive mindset, and spend time with family    Patient's rating of Social support: Excellent   Social Support Network: spouse    Psychosocial Assessment as it relates to rehabilitation: Patient denies issues with his/her family or home life that may affect their  rehabilitation efforts.       SMART Goals:    improved [facility] QOL < 27, PHQ-9 - reduced severity by one level, Physical Fitness in [facility] Score < 3, Daily Activity in [facility] Score < 3, Social Activities in [facility] Score < 3, Pain in [facility] Score < 3, Overall Health in [facility] Score < 3, Quality of Life in [facility] Score < 3 , Change in Health in [facility] Score < 3 ,  Increased interest and pleasure in doing things, feel less tired with more energy, improved concentration, and take time to relax    Patient Specific PSYCHOCOSOCIAL GOALS:  (stress, emotional well being, social support): begin using [employer] and spend time with family  Improve feelings of anxiety and return to normal self as he gets better    Patient's progress toward SMART and personal goals: Hussein reports he has been spending time with his family    Patient specific plan for next 30 days: fontaine continue attending [place] sessions to help recovery progress and will work towards increased strength and endurance to return to normal activities and feel less anxiety and more like himself    Plan: Class: Relaxation, Class:  Stress and Pulmonary Disease, Enroll in [employer], Practice relaxation techniques, Keep a positive mindset, and Enjoy family    Education: signs/sxs of depression, benefits of a positive support system, stress management techniques, benefits of enrolling in [employer], tools to manage fear/anxiety related to becoming SOB, and class:  Stress and Pulmonary Disease    Readiness to change: Action:  (Changing behavior)      OTHER CORE COMPONENTS     Tobacco:   Social History     Tobacco Use   Smoking Status Never   Smokeless Tobacco Former    Types: Snuff    Quit date:        Tobacco Use Intervention: Referral to tobacco expert:   N/A:  Patient is a non-smoker     Blood pressure:    Restin/68 - 126/78   Exercise: 142/78    Oxygen Goal: Maintain SpO2>88% during exercise or as advised by pulmonolgist    SMART  Goals: medication compliance, compliance with supplemental oxygen, compliance with CPAP, and monitor home pulse oximetry    Patient Specific CORE COMPONENT GOALS (smoking, BP control, angina control, medication compliance): reduced dietary sodium <2000mg, medication compliance, compliance with supplemental oxygen, compliance with CPAP, and monitor home pulse oximetry  BP control-manage medication as BPs are high and low    Patient's progress toward SMART and personal goals: Hussein has been working to reduce his dietary sodium, reports medication compliance, compliance with supplemental oxygen and CPAP, monitors pulse oximetry at home    Patient specific plan for next 30 days: continue with medication regiment, talk with MD about medication to control BP better as BP is high and low    Plan: medication compliance, avoid processed foods, engage in regular exercise, eliminate salt shaker at the table, use salt substitutes, check labels for sodium content, monitor home BP, group class: Pulmonary Anatomy and Physiology, group class:  Pulmonary Medications, and home harmonica therapy practice    Education:  pathophysiology of pulmonary disease, preventing infections, control coughing, inspiratory muscle training, environmental triggers, nebulizer use, bronchodilators, bronchial hygiene, and harmonica therapy    Readiness to change: Action:  (Changing behavior)

## 2024-05-22 ENCOUNTER — CLINICAL SUPPORT (OUTPATIENT)
Dept: PULMONOLOGY | Facility: HOSPITAL | Age: 59
End: 2024-05-22
Payer: COMMERCIAL

## 2024-05-22 DIAGNOSIS — J80 ARDS (ADULT RESPIRATORY DISTRESS SYNDROME) (HCC): Primary | ICD-10-CM

## 2024-05-22 LAB
DME PARACHUTE DELIVERY DATE REQUESTED: NORMAL
DME PARACHUTE DELIVERY NOTE: NORMAL
DME PARACHUTE ITEM DESCRIPTION: NORMAL
DME PARACHUTE ORDER STATUS: NORMAL
DME PARACHUTE SUPPLIER NAME: NORMAL
DME PARACHUTE SUPPLIER PHONE: NORMAL

## 2024-05-22 PROCEDURE — 94625 PHY/QHP OP PULM RHB W/O MNTR: CPT

## 2024-05-28 ENCOUNTER — OFFICE VISIT (OUTPATIENT)
Dept: CARDIOLOGY CLINIC | Facility: CLINIC | Age: 59
End: 2024-05-28
Payer: COMMERCIAL

## 2024-05-28 VITALS
HEIGHT: 67 IN | SYSTOLIC BLOOD PRESSURE: 126 MMHG | DIASTOLIC BLOOD PRESSURE: 80 MMHG | HEART RATE: 87 BPM | WEIGHT: 192 LBS | BODY MASS INDEX: 30.13 KG/M2

## 2024-05-28 DIAGNOSIS — I48.0 PAF (PAROXYSMAL ATRIAL FIBRILLATION) (HCC): Primary | ICD-10-CM

## 2024-05-28 DIAGNOSIS — R00.0 SINUS TACHYCARDIA: ICD-10-CM

## 2024-05-28 DIAGNOSIS — R00.0 TACHYCARDIA: ICD-10-CM

## 2024-05-28 PROCEDURE — 99214 OFFICE O/P EST MOD 30 MIN: CPT | Performed by: INTERNAL MEDICINE

## 2024-05-28 RX ORDER — METOPROLOL SUCCINATE 50 MG/1
50 TABLET, EXTENDED RELEASE ORAL EVERY 12 HOURS
Qty: 180 TABLET | Refills: 3 | Status: SHIPPED | OUTPATIENT
Start: 2024-05-28

## 2024-05-28 NOTE — PROGRESS NOTES
Cardiology   Hussein Edward III 58 y.o. male MRN: [id number]        Impression:  Sinus tachycardia - resolved on metoprolol.  Paroxysmal atrial fibrillation - in setting of ARDS.  No need for anticoagulation.   ARDS - slowly recovering.  Undergoing cardiac rehab.    Recommendations:  Continue b-blockers.   Follow up in 6 months.       HPI: Hussein Edward III is a 58 y.o. year old male with sinus tachycardia, brief episode of PAF 2/19/24 during procedure, who was [facility] to [facility] with Influenza A/bacterial pneumonia, and transferred to [facility] due to ARDS.  Had afib during a central line placement, and converted to NSR on amio/dig. Had persistent tachycardia in setting of hypoxemia and tachypnea.  Echo 2/24 demonstrated normal EF, mild LVH, normal atrial size, and no valvular abnormalities.       Review of Systems   Constitutional: Negative.    HENT: Negative.     Eyes: Negative.    Respiratory:  Positive for shortness of breath. Negative for chest tightness.    Cardiovascular:  Negative for chest pain, palpitations and leg swelling.   Gastrointestinal: Negative.    Endocrine: Negative.    Genitourinary: Negative.    Musculoskeletal: Negative.    Skin: Negative.    Allergic/Immunologic: Negative.    Neurological: Negative.    Hematological: Negative.    Psychiatric/Behavioral: Negative.     All other systems reviewed and are negative.        Past Medical History:   Diagnosis Date    Chronic back pain     Migraine      Past Surgical History:   Procedure Laterality Date    HERNIA REPAIR      MANDIBLE FRACTURE SURGERY      MOUTH SURGERY      cyst in cheek    NASAL SEPTUM SURGERY       Social History     Substance and Sexual Activity   Alcohol Use Never     Social History     Substance and Sexual Activity   Drug Use No     Social History     Tobacco Use   Smoking Status Never   Smokeless Tobacco Former    Types: Snuff    Quit date: 2003     Family History   Problem Relation Age of Onset    Breast cancer Mother      Diabetes Father     No Known Problems Family     Substance Abuse Neg Hx     Mental illness Neg Hx        Allergies:  No Known Allergies    Medications:     Current Outpatient Medications:     albuterol (2.5 mg/3 mL) 0.083 % nebulizer solution, Take 3 mL (2.5 mg total) by nebulization every 8 (eight) hours as needed for wheezing or shortness of breath, Disp: 125 mL, Rfl: 1    albuterol (PROVENTIL HFA,VENTOLIN HFA) 90 mcg/act inhaler, Inhale 2 puffs every 4 (four) hours as needed for wheezing, Disp: 18 g, Rfl: 0    Daridorexant HCl (Quviviq) 25 MG TABS, Take 25 mg by mouth daily at bedtime, Disp: 90 tablet, Rfl: 3    fluticasone (FLONASE) 50 mcg/act nasal spray, 1 spray into each nostril daily, Disp: 48 g, Rfl: 0    meloxicam (Mobic) 15 mg tablet, Take 1 tablet (15 mg total) by mouth daily, Disp: 60 tablet, Rfl: 0    metoprolol succinate (TOPROL-XL) 50 mg 24 hr tablet, Take 1 tablet (50 mg total) by mouth every 12 (twelve) hours, Disp: 60 tablet, Rfl: 5    oxyCODONE (ROXICODONE) 5 immediate release tablet, Use 0.5-1 tablet by mouth every 8 hours as needed for moderate-severe pain., Disp: 10 tablet, Rfl: 0    rimegepant sulfate (Nurtec) 75 mg TBDP, Take one NURTEC 75 mg under or on tongue at migraine onset if needed daily, Disp: 16 tablet, Rfl: 11      Wt Readings from Last 3 Encounters:   05/28/24 87.1 kg (192 lb)   05/08/24 86.2 kg (190 lb)   03/28/24 82.1 kg (181 lb)     Temp Readings from Last 3 Encounters:   05/08/24 98.3 °F (36.8 °C) (Tympanic)   03/28/24 98.4 °F (36.9 °C) (Tympanic)   03/27/24 97.6 °F (36.4 °C) (Oral)     BP Readings from Last 3 Encounters:   05/28/24 126/80   05/08/24 132/82   03/28/24 112/68     Pulse Readings from Last 3 Encounters:   05/28/24 87   05/08/24 93   03/28/24 (!) 122         Physical Exam  HENT:      Head: Atraumatic.   Cardiovascular:      Rate and Rhythm: Normal rate and regular rhythm.   Pulmonary:      Breath sounds: Decreased air movement present. Decreased breath sounds  present.   Abdominal:      General: Abdomen is flat.   Musculoskeletal:         General: Normal range of motion.      Cervical back: Normal range of motion.   Skin:     General: Skin is warm.   Neurological:      General: No focal deficit present.      Mental Status: He is alert and oriented to person, place, and time.   Psychiatric:         Mood and Affect: Mood normal.         Behavior: Behavior normal.           Laboratory Studies:  CMP:  Lab Results   Component Value Date     (H) 12/20/2017    K 3.8 03/25/2024     03/25/2024    CO2 27 03/25/2024    BUN 13 03/25/2024    CREATININE 0.56 (L) 03/25/2024    GLUCOSE 223 (H) 02/19/2024    AST 16 03/18/2024    ALT 31 03/18/2024    BILITOT 0.3 12/20/2017    EGFR 115 03/25/2024       Lipid Profile:   Lab Results   Component Value Date    CHOL 185 12/20/2017     Lab Results   Component Value Date    HDL 32 (L) 07/30/2021     Lab Results   Component Value Date    LDLCALC 155 (H) 07/30/2021     Lab Results   Component Value Date    TRIG 383 (H) 02/26/2024

## 2024-05-29 ENCOUNTER — CLINICAL SUPPORT (OUTPATIENT)
Dept: PULMONOLOGY | Facility: HOSPITAL | Age: 59
End: 2024-05-29
Payer: COMMERCIAL

## 2024-05-29 DIAGNOSIS — J80 ARDS (ADULT RESPIRATORY DISTRESS SYNDROME) (HCC): Primary | ICD-10-CM

## 2024-05-29 PROCEDURE — 94625 PHY/QHP OP PULM RHB W/O MNTR: CPT

## 2024-06-03 ENCOUNTER — CLINICAL SUPPORT (OUTPATIENT)
Dept: PULMONOLOGY | Facility: HOSPITAL | Age: 59
End: 2024-06-03
Payer: COMMERCIAL

## 2024-06-03 DIAGNOSIS — J80 ARDS (ADULT RESPIRATORY DISTRESS SYNDROME) (HCC): Primary | ICD-10-CM

## 2024-06-03 LAB
DME PARACHUTE DELIVERY DATE EXPECTED: NORMAL
DME PARACHUTE DELIVERY DATE REQUESTED: NORMAL
DME PARACHUTE DELIVERY NOTE: NORMAL
DME PARACHUTE ITEM DESCRIPTION: NORMAL
DME PARACHUTE ORDER STATUS: NORMAL
DME PARACHUTE SUPPLIER NAME: NORMAL
DME PARACHUTE SUPPLIER PHONE: NORMAL

## 2024-06-03 PROCEDURE — 94625 PHY/QHP OP PULM RHB W/O MNTR: CPT

## 2024-06-05 ENCOUNTER — CLINICAL SUPPORT (OUTPATIENT)
Dept: PULMONOLOGY | Facility: HOSPITAL | Age: 59
End: 2024-06-05
Payer: COMMERCIAL

## 2024-06-05 ENCOUNTER — OFFICE VISIT (OUTPATIENT)
Dept: FAMILY MEDICINE CLINIC | Facility: CLINIC | Age: 59
End: 2024-06-05
Payer: COMMERCIAL

## 2024-06-05 VITALS
DIASTOLIC BLOOD PRESSURE: 88 MMHG | BODY MASS INDEX: 29.44 KG/M2 | OXYGEN SATURATION: 97 % | HEART RATE: 97 BPM | WEIGHT: 188 LBS | SYSTOLIC BLOOD PRESSURE: 138 MMHG

## 2024-06-05 DIAGNOSIS — M25.50 ARTHRALGIA, UNSPECIFIED JOINT: ICD-10-CM

## 2024-06-05 DIAGNOSIS — G43.001 MIGRAINE WITHOUT AURA AND WITH STATUS MIGRAINOSUS, NOT INTRACTABLE: Primary | ICD-10-CM

## 2024-06-05 DIAGNOSIS — D33.0 BENIGN NEOPLASM OF SUPRATENTORIAL REGION OF BRAIN (HCC): ICD-10-CM

## 2024-06-05 DIAGNOSIS — R26.2 AMBULATORY DYSFUNCTION: ICD-10-CM

## 2024-06-05 DIAGNOSIS — Z00.00 WELLNESS EXAMINATION: ICD-10-CM

## 2024-06-05 DIAGNOSIS — J80 ARDS (ADULT RESPIRATORY DISTRESS SYNDROME) (HCC): Primary | ICD-10-CM

## 2024-06-05 DIAGNOSIS — Z87.09 ARDS SURVIVOR: ICD-10-CM

## 2024-06-05 PROCEDURE — 99396 PREV VISIT EST AGE 40-64: CPT | Performed by: FAMILY MEDICINE

## 2024-06-05 PROCEDURE — 99214 OFFICE O/P EST MOD 30 MIN: CPT | Performed by: FAMILY MEDICINE

## 2024-06-05 PROCEDURE — 94625 PHY/QHP OP PULM RHB W/O MNTR: CPT

## 2024-06-05 RX ORDER — TRAMADOL HYDROCHLORIDE 50 MG/1
50 TABLET ORAL EVERY 6 HOURS PRN
Qty: 90 TABLET | Refills: 5 | Status: SHIPPED | OUTPATIENT
Start: 2024-06-05

## 2024-06-05 NOTE — PROGRESS NOTES
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 male.    HPI    The following portions of the patient's history were reviewed and updated as appropriate: allergies, current medications, past family history, past medical history, past social history, past surgical history and problem list.    Review of Systems   Constitutional:  Negative for fatigue, fever and unexpected weight change.   HENT:  Negative for congestion, sinus pain and sore throat.    Eyes:  Negative for visual disturbance.   Respiratory:  Negative for shortness of breath and wheezing.    Cardiovascular:  Negative for chest pain and palpitations.   Gastrointestinal:  Negative for abdominal pain, nausea and vomiting.   Musculoskeletal: Negative.  Negative for arthralgias and myalgias.   Neurological:  Negative for syncope, weakness and numbness.   Psychiatric/Behavioral: Negative.  Negative for confusion, dysphoric mood and suicidal ideas.          Objective:  Vitals:    06/05/24 1618   BP: 138/88   BP Location: Left arm   Patient Position: Sitting   Cuff Size: Standard   Pulse: 97   SpO2: 97%   Weight: 85.3 kg (188 lb)      Physical Exam  Constitutional:       Appearance: He is well-developed.   HENT:      Right Ear: Ear canal normal. Tympanic membrane is not injected.      Left Ear: Ear canal  normal. Tympanic membrane is not injected.      Nose: Nose normal.   Eyes:      General:         Right eye: No discharge.         Left eye: No discharge.      Conjunctiva/sclera: Conjunctivae normal.      Pupils: Pupils are equal, round, and reactive to light.   Neck:      Thyroid: No thyromegaly.   Cardiovascular:      Rate and Rhythm: Normal rate and regular rhythm.      Heart sounds: Normal heart sounds. No murmur heard.  Pulmonary:      Effort: Pulmonary effort is normal. No respiratory distress.      Breath sounds: Normal breath sounds. No wheezing.   Abdominal:      General: Bowel sounds are normal. There is no distension.      Palpations: Abdomen is soft.      Tenderness: There is no abdominal tenderness.   Musculoskeletal:         General: Normal range of motion.      Cervical back: Normal range of motion and neck supple.   Lymphadenopathy:      Cervical: No cervical adenopathy.   Skin:     General: Skin is warm and dry.   Neurological:      Mental Status: He is alert and oriented to person, place, and time. He is not disoriented.      Sensory: No sensory deficit.      Motor: No weakness.      Coordination: Coordination normal.      Gait: Gait normal.      Deep Tendon Reflexes: Reflexes are normal and symmetric.   Psychiatric:         Speech: Speech normal.         Behavior: Behavior normal.         Thought Content: Thought content normal.         Judgment: Judgment normal.

## 2024-06-05 NOTE — ASSESSMENT & PLAN NOTE
Hosp records reviewed---severe pain in all major joints since 6 wks in patient---rheum consult pending--has done well on tramadol in past

## 2024-06-05 NOTE — PROGRESS NOTES
HPI:  Hussein Edward III is a 58 y.o. male here for his yearly health maintenance exam.   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 Advanced Directive: n     2. Durable Power of  for Healthcare: n     3. Social History:           Drug and alcohol History: n                  4. Immunizations up to date: y                 Lifestyle:                           Healthy Diet:y                          Alcohol Use:n                          Tobacco Use:n                          Regular exercise:y                          Weight concerns:n                               5. Over the past 2 weeks, how often have you been bothered by the following:              Little interest or pleasure in doing things:n              Felling down, depressed or hopeless:n       Current Outpatient Medications   Medication Sig Dispense Refill    albuterol (2.5 mg/3 mL) 0.083 % nebulizer solution Take 3 mL (2.5 mg total) by nebulization every 8 (eight) hours as needed for wheezing or shortness of breath 125 mL 1    albuterol (PROVENTIL HFA,VENTOLIN HFA) 90 mcg/act inhaler Inhale 2 puffs every 4 (four) hours as needed for wheezing 18 g 0    Daridorexant HCl  (Quviviq) 25 MG TABS Take 25 mg by mouth daily at bedtime 90 tablet 3    fluticasone (FLONASE) 50 mcg/act nasal spray 1 spray into each nostril daily 48 g 0    meloxicam (Mobic) 15 mg tablet Take 1 tablet (15 mg total) by mouth daily 60 tablet 0    metoprolol succinate (TOPROL-XL) 50 mg 24 hr tablet Take 1 tablet (50 mg total) by mouth every 12 (twelve) hours 180 tablet 3    rimegepant sulfate (Nurtec) 75 mg TBDP Take one NURTEC 75 mg under or on tongue at migraine onset if needed daily 16 tablet 11    oxyCODONE (ROXICODONE) 5 immediate release tablet Use 0.5-1 tablet by mouth every 8 hours as needed for moderate-severe pain. (Patient not taking: Reported on 6/5/2024) 10 tablet 0     No current facility-administered medications for this visit.     No Known Allergies  Immunization History   Administered Date(s) Administered    COVID-19 J&J (Tysdo) vaccine 0.5 mL 03/25/2021, 03/21/2022    Influenza Quadrivalent Preservative Free 3 years and older IM 12/07/2017    Influenza, seasonal, injectable 11/02/2016    Tdap 03/11/2019       Patient Care Team:  Iftikhar Roque MD as PCP - General  MD Flako Sequeira MD Wei-Shen Lin, MD    Review of Systems   Constitutional:  Negative for fatigue, fever and unexpected weight change.   HENT:  Negative for congestion, sinus pain and sore throat.    Eyes:  Negative for visual disturbance.   Respiratory:  Negative for shortness of breath and wheezing.    Cardiovascular:  Negative for chest pain and palpitations.   Gastrointestinal:  Negative for abdominal pain, nausea and vomiting.   Musculoskeletal: Negative.  Negative for arthralgias and myalgias.   Neurological:  Negative for syncope, weakness and numbness.   Psychiatric/Behavioral: Negative.  Negative for confusion, dysphoric mood and suicidal ideas.        Physical Exam :  Physical Exam  Constitutional:       Appearance: He is well-developed.   HENT:      Right Ear: Ear canal normal. Tympanic membrane is not  injected.      Left Ear: Ear canal normal. Tympanic membrane is not injected.      Nose: Nose normal.   Eyes:      General:         Right eye: No discharge.         Left eye: No discharge.      Conjunctiva/sclera: Conjunctivae normal.      Pupils: Pupils are equal, round, and reactive to light.   Neck:      Thyroid: No thyromegaly.   Cardiovascular:      Rate and Rhythm: Normal rate and regular rhythm.      Heart sounds: Normal heart sounds. No murmur heard.  Pulmonary:      Effort: Pulmonary effort is normal. No respiratory distress.      Breath sounds: Normal breath sounds. No wheezing.   Abdominal:      General: Bowel sounds are normal. There is no distension.      Palpations: Abdomen is soft.      Tenderness: There is no abdominal tenderness.   Musculoskeletal:         General: Normal range of motion.      Cervical back: Normal range of motion and neck supple.   Lymphadenopathy:      Cervical: No cervical adenopathy.   Skin:     General: Skin is warm and dry.   Neurological:      Mental Status: He is alert and oriented to person, place, and time. He is not disoriented.      Sensory: No sensory deficit.      Motor: No weakness.      Coordination: Coordination normal.      Gait: Gait normal.      Deep Tendon Reflexes: Reflexes are normal and symmetric.   Psychiatric:         Speech: Speech normal.         Behavior: Behavior normal.         Thought Content: Thought content normal.         Judgment: Judgment normal.           Assessment and Plan:  1. Migraine without aura and with status migrainosus, not intractable        2. 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 Ambulatory dysfunction        4. Wellness examination        5. ARDS survivor            Health Maintenance Due   Topic Date Due    Hepatitis C Screening  Never done    Pneumococcal Vaccine: Pediatrics (0 to 5 Years) and At-Risk Patients (6 to 64 Years) (1 of 2 - PCV) Never done    HIV Screening  Never done    Zoster Vaccine (1  of 2) Never done    COVID-19 Vaccine (3 - 2023-24 season) 09/01/2023    Influenza Vaccine (Season Ended) 09/01/2024

## 2024-06-10 ENCOUNTER — CLINICAL SUPPORT (OUTPATIENT)
Dept: PULMONOLOGY | Facility: HOSPITAL | Age: 59
End: 2024-06-10
Payer: COMMERCIAL

## 2024-06-10 DIAGNOSIS — J80 ARDS (ADULT RESPIRATORY DISTRESS SYNDROME) (HCC): Primary | ICD-10-CM

## 2024-06-10 PROCEDURE — 94625 PHY/QHP OP PULM RHB W/O MNTR: CPT

## 2024-06-12 ENCOUNTER — CLINICAL SUPPORT (OUTPATIENT)
Dept: PULMONOLOGY | Facility: HOSPITAL | Age: 59
End: 2024-06-12
Payer: COMMERCIAL

## 2024-06-12 DIAGNOSIS — J80 ARDS (ADULT RESPIRATORY DISTRESS SYNDROME) (HCC): Primary | ICD-10-CM

## 2024-06-12 LAB

## 2024-06-12 PROCEDURE — 94625 PHY/QHP OP PULM RHB W/O MNTR: CPT

## 2024-06-15 DIAGNOSIS — M25.50 ARTHRALGIA, UNSPECIFIED JOINT: ICD-10-CM

## 2024-06-17 ENCOUNTER — CLINICAL SUPPORT (OUTPATIENT)
Dept: PULMONOLOGY | Facility: HOSPITAL | Age: 59
End: 2024-06-17
Payer: COMMERCIAL

## 2024-06-17 DIAGNOSIS — J80 ARDS (ADULT RESPIRATORY DISTRESS SYNDROME) (HCC): Primary | ICD-10-CM

## 2024-06-17 PROCEDURE — 94625 PHY/QHP OP PULM RHB W/O MNTR: CPT

## 2024-06-17 RX ORDER — TRAMADOL HYDROCHLORIDE 50 MG/1
50 TABLET ORAL EVERY 6 HOURS PRN
Qty: 90 TABLET | Refills: 0 | Status: SHIPPED | OUTPATIENT
Start: 2024-06-17

## 2024-06-17 NOTE — PROGRESS NOTES
"Pulmonary Rehabilitation Assessment and Individualized Treatment Plan  90 DAY    Today's date: 2024  Patient name: Hussein Edward III     : 1965       MRN: [id number]  PCP: Iftikhar Roque MD  Referring Physician: Bernabe Cook MD  Pulmonologist: Dr. Cook    Provider: Nury  Clinician: Herlinda Hewitt MS, CEP      Dx:    Encounter Diagnosis   Name Primary?    ARDS (adult respiratory distress syndrome) (HCC) Yes     Date of onset: 2024            ASSESSMENT      Weight:    Wt Readings from Last 1 Encounters:   24 85.3 kg (188 lb)      Height:   Ht Readings from Last 1 Encounters:   24 5' 7\" (1.702 m)       Medical History:   Past Medical History:   Diagnosis Date    Chronic back pain     Migraine        Family History:  Family History   Problem Relation Age of Onset    Breast cancer Mother     Diabetes Father     No Known Problems Family     Substance Abuse Neg Hx     Mental illness Neg Hx        Allergies:   Patient has no known allergies.    ETOH:   Social History     Substance and Sexual Activity   Alcohol Use Never       Current Medications:   Current Outpatient Medications   Medication Sig Dispense Refill    albuterol (2.5 mg/3 mL) 0.083 % nebulizer solution Take 3 mL (2.5 mg total) by nebulization every 8 (eight) hours as needed for wheezing or shortness of breath 125 mL 1    albuterol (PROVENTIL HFA,VENTOLIN HFA) 90 mcg/act inhaler Inhale 2 puffs every 4 (four) hours as needed for wheezing 18 g 0    Daridorexant HCl (Quviviq) 25 MG TABS Take 25 mg by mouth daily at bedtime 90 tablet 3    fluticasone (FLONASE) 50 mcg/act nasal spray 1 spray into each nostril daily 48 g 0    meloxicam (Mobic) 15 mg tablet Take 1 tablet (15 mg total) by mouth daily 60 tablet 0    metoprolol succinate (TOPROL-XL) 50 mg 24 hr tablet Take 1 tablet (50 mg total) by mouth every 12 (twelve) hours 180 tablet 3    oxyCODONE (ROXICODONE) 5 immediate release tablet Use 0.5-1 tablet by mouth every 8 " hours as needed for moderate-severe pain. (Patient not taking: Reported on 6/5/2024) 10 tablet 0    rimegepant sulfate (Nurtec) 75 mg TBDP Take one NURTEC 75 mg under or on tongue at migraine onset if needed daily 16 tablet 11    traMADol (ULTRAM) 50 mg tablet Take 1 tablet (50 mg total) by mouth every 6 (six) hours as needed for moderate pain 90 tablet 0     No current facility-administered medications for this visit.       Physical Limitations: none    Fall Risk: Low   Comments: Ambulates with a steady gait with no assist device and using rollator to sit on if need to take breaks    Oxygen needs:   Rest:  supplemental O2 via nasal cannula @ 2L/min   Exercise/physical activity:  supplemental O2 via nasal cannula @ 3-4L/min   Sleep:   supplemental O2 via nasal cannula @ 2L/min     Does Pt monitor home SpO2? yes   Average SpO2 at rest:  98%   Average SpO2 with ADLs/physical activity:  80-95%      Use of Rescue Inhaler: Yes:  2-3 times per day    Use of Maintenance Inhaler: Yes:  2 times per day    Use of Nebulizer Treatments:  Yes:  2 times per day    Patient practices breathing techniques at home:  Reviewed with patient on:  4/4/2024-gave harmonica to practice with pursed lip breathing    Pulmonary Disease Risk Factors:  none      CAD Risk Factors:   Cholesterol: No  Smoking: Never used  HTN: No  DM: No  Obesity: No   Inactivity: Yes  Stress:  perceived  stress: 6/10   Stressors:health related   Goals for Stress Management: practice Relaxation Techniques, keep a positive mindset, and spend time with family      Current Functional Status:  Occupation: plans to return to work [employer]  Recreation: none  ADL’s:Capable of performing light ADLs only  [place]: No limitations  Exercise: some walking, short intervals as tolerated  Home exercise equipment: none  Other: n/a    Nutrition:  Pt's reported dietary habits:  Overall healthy eating plan, did not complete diet survey today-will complete at next  session    Patient Specific Goals:     EXERCISE GOALS (home exercise, ADLs):   Be able to walk further distance with less shortness of breath, return to normal ADLs and return to work without limitations   NUTRITION GOALS (wt management, diabetes management, dietary modifications):   Attend healthy eating class and make healthy eating choices   PSYCHOCOSOCIAL GOALS (stress, emotional well being, social support):   Improve feelings of anxiety and return to normal self as he gets better   CORE COMPONENT GOALS (smoking, BP control, medication):   BP control     Ability to reach goals/rehabilitation potential:  Good    Projected return to function: 12-18 weeks  Objective tests: 6 MWT    Cultural needs: none    Comments: n/a        INDIVIDUALIZED TREATMENT PLAN        SUMMARY OF PROGRESS:  Hussein is compliant attending pulmonary rehab exercise sessions 2x/wk having attended 19 sessions  in the past 90 days. The patient tolerates 40 - 44 mins at 2.8 - 3.9 METs on supplemental O2 2L/min via nasal canula. A light strength training component has been added to their exercise program..     Medication compliance: Yes   Comments: Pt reports to be compliant with medications      Assessment of progression of lung disease and functional status:  CAT: 36/40  Shortness of breath questionnaire: 99/120      EXERCISE ASSESSMENT and PLAN    Current Exercise Program in Rehab:       Frequency: 2 days/week   Supplement with home exercise 2+ days/wk as tolerated        Minutes: 20-40         METS: 2.8 - 3.9              SpO2: >88%              RPD: 4-6                      HR: 50-85% HRR or RHR +30-40bpm: 100-120   RPE: 4-5         Modalities: Treadmill, UBE, Lifecycle, NuStep, Recumbent bike, and Room walking      Exercise Progression 30 Day Goals :    Frequency: 2-3 days/week    Supplement with home exercise 2+ days/wk as tolerated       Minutes: 30-40        METS: 3.2 - 4.2              SpO2: >88%              RPD: 4-6                       HR: 100-120   RPE: 4-5        Modalities: Treadmill, UBE, Lifecycle, NuStep, Recumbent bike, and Room walking     Strength trainin-3 days / week  12-15 repetitions  1-2 sets per modality    Modalities: Pull Downs, Lateral Raise, Arm Extension, Arm Curl, Sit to Stands, Front Raises, Shoulder Shrugs, and leg ext    Home Exercise: Type: walking, Frequency: 3-4 days/week, Duration: 5-10 mins    Education: pursed lipped breathing, diaphragmatic breathing, relaxation breathing, home exercise guidelines, benefits of exercise for pulmonary disease, RPE scale, RPD scale, O2 saturation monitoring, appropriate O2 response to exercise, energy conservation, and education class: Exercise For The Pulmonary Patient    SMART Goals:   reduced score on CAT, improved 6MWT distance, reduced dyspnea during exercise, improved exercise tolerance based on peak METs tolerated in pulmonary rehab exercise session, SpO2 >88% during exercise, and reduced RPD at rest    Patient Specific EXERCISE GOALS:   (home exercise, ADLs): reduced dependence on supplemental O2, attend pulmonary rehab regularly, decrease sitting time at home, start a walking program, begin a consistent exercise regimen , decreased rest needed with physical activity/exercise, increased muscular strength, increased energy, and increased stamina with ADLs  Be able to walk further distance with less shortness of breath, return to work without limitations    Patient's progress toward SMART and personal goals: Pt has attended VT regularly in the last 30 days, decreased rest needed with PA and exercise, able to walk for a longer amount of time without a break. No longer on supplemental oxygen and has been able to maintain SpO2 >90% with exertion.      Patient specific plan for next 30 days: will plan to start attending VT sessions 2x/week, and possibly progress to 3x/week once he increases strength, will continue walking at home 3-4x/week working up to 10-15 min  intervals    Plan: Titrate supplemental oxygen as needed to maintain SpO2>88% with exercise, learn to conserve energy with ADLs , practice diaphragmatic breathing, reduce time sitting at home, increased strength of respiratory muscles, utilize PLB with physical activity, and begin a home exercise program , use IMT and harmonica therapy at home     Readiness to change: Action:  (Changing behavior)      NUTRITION ASSESSMENT AND PLAN    Weight control:    Starting weight: 181 lb   Current weight:   188.4 lbs    Diabetes: N/A  A1c: N/A    last measured: N/A    SMART Goals:   Improved Rate Your Plate score  >58, eat 6 or more servings of grain products per day, and eat 5 or more servings of fruits and vegetables a day      Patient Specific NUTRITION GOALS:  (wt control, diabetes management, dietary modifications): increase water intake to reduce/thin mucus production improve hydration increased muscle mass, attend heart healthy eating class and make healthy eating choices    Patient's progress toward SMART and personal goals: Reports increased water intake    Patient specific plan for next 30 days: will attend healthy eating education class during [place] and can focus on heathy eating choices    Plan: group class: Reading Food Labels and group Class: Heart Healthy Eating    Measurable goals were based Rate Your Plate Dietary Self-Assessment. These are the areas in which the patient could score higher on the assessment.  Goals include recommendations for a heart healthy diet based on American Heart Association.    Education: heart healthy eating principles  maintaining hydration    Readiness to change: Action:  (Changing behavior)      PSYCHOSOCIAL ASSESSMENT AND PLAN    Emotional:  Depression assessment:  PHQ-9 = 8  5-9 = Mild Depression            Anxiety measure:  CLARA-7 = 5  5-9 = Mild anxiety   *PHQ-9/CLARA-7 will be assessed at next session and scanned in*    Assessment of depression and anxiety    Patient reports feelings  of depression   Patient reports feelings of anxiety  Reports sufficient emotional support   Provided contact information for [facility]    Self-reported stress level:  6-health related  Stress Management: practice Relaxation Techniques, keep a positive mindset, and spend time with family    Patient's rating of Social support: Excellent   Social Support Network: spouse    Psychosocial Assessment as it relates to rehabilitation: Patient denies issues with his/her family or home life that may affect their rehabilitation efforts.       SMART Goals:    improved [facility] QOL < 27, PHQ-9 - reduced severity by one level, Physical Fitness in [facility] Score < 3, Daily Activity in [place] Score < 3, Social Activities in [facility] Score < 3, [facility] in [facility] Score < 3, Overall Health in [facility] Score < 3, Quality of Life in [facility] Score < 3 , Change in Health in [facility] Score < 3 ,  Increased interest and pleasure in doing things, feel less tired with more energy, improved concentration, and take time to relax    Patient Specific PSYCHOCOSOCIAL GOALS:  (stress, emotional well being, social support): begin using [employer] and spend time with family  Improve feelings of anxiety and return to normal self as he gets better    Patient's progress toward SMART and personal goals: Hussein reports he has been spending time with his family    Patient specific plan for next 30 days: fontaine continue attending [facility] sessions to help recovery progress and will work towards increased strength and endurance to return to normal activities and feel less anxiety and more like himself    Plan: Class: Relaxation, Class:  Stress and Pulmonary Disease, Enroll in [employer], Practice relaxation techniques, Keep a positive mindset, and Enjoy family    Education: signs/sxs of depression, benefits of a positive support system, stress management techniques, benefits of enrolling in [employer], tools to manage fear/anxiety  related to becoming SOB, and class:  Stress and Pulmonary Disease    Readiness to change: Action:  (Changing behavior)      OTHER CORE COMPONENTS     Tobacco:   Social History     Tobacco Use   Smoking Status Never   Smokeless Tobacco Former    Types: Snuff    Quit date:        Tobacco Use Intervention: Referral to tobacco expert:   N/A:  Patient is a non-smoker     Blood pressure:    Restin/68 - 124/78   Exercise: 142/78    Oxygen Goal: Maintain SpO2>88% during exercise or as advised by pulmonolgist    SMART Goals: medication compliance, compliance with supplemental oxygen, compliance with CPAP, and monitor home pulse oximetry    Patient Specific CORE COMPONENT GOALS (smoking, BP control, angina control, medication compliance): reduced dietary sodium <2000mg, medication compliance, compliance with supplemental oxygen, compliance with CPAP, and monitor home pulse oximetry  BP control-manage medication as BPs are high and low    Patient's progress toward SMART and personal goals: Hussein has been working to reduce his dietary sodium, reports medication compliance, compliance with supplemental oxygen and CPAP, monitors pulse oximetry at home    Patient specific plan for next 30 days: continue with medication regiment, talk with MD about medication to control BP better as BP is high and low    Plan: medication compliance, avoid processed foods, engage in regular exercise, eliminate salt shaker at the table, use salt substitutes, check labels for sodium content, monitor home BP, group class: Pulmonary Anatomy and Physiology, group class:  Pulmonary Medications, and home harmonica therapy practice    Education:  pathophysiology of pulmonary disease, preventing infections, control coughing, inspiratory muscle training, environmental triggers, nebulizer use, bronchodilators, bronchial hygiene, and harmonica therapy    Readiness to change: Action:  (Changing behavior)

## 2024-06-19 ENCOUNTER — TELEPHONE (OUTPATIENT)
Dept: NEUROLOGY | Facility: CLINIC | Age: 59
End: 2024-06-19

## 2024-06-19 ENCOUNTER — CLINICAL SUPPORT (OUTPATIENT)
Dept: PULMONOLOGY | Facility: HOSPITAL | Age: 59
End: 2024-06-19
Payer: COMMERCIAL

## 2024-06-19 DIAGNOSIS — J80 ARDS (ADULT RESPIRATORY DISTRESS SYNDROME) (HCC): Primary | ICD-10-CM

## 2024-06-19 PROCEDURE — 94625 PHY/QHP OP PULM RHB W/O MNTR: CPT

## 2024-06-19 NOTE — TELEPHONE ENCOUNTER
Called patient and left voicemail to confirm their upcoming appointment with Dr. Camargo. Informed patient about arriving in the [place] location 15 minutes prior to appointment to get checked in and go over chart.

## 2024-06-24 ENCOUNTER — CLINICAL SUPPORT (OUTPATIENT)
Dept: PULMONOLOGY | Facility: HOSPITAL | Age: 59
End: 2024-06-24
Payer: COMMERCIAL

## 2024-06-24 DIAGNOSIS — J80 ARDS (ADULT RESPIRATORY DISTRESS SYNDROME) (HCC): Primary | ICD-10-CM

## 2024-06-24 PROCEDURE — 94625 PHY/QHP OP PULM RHB W/O MNTR: CPT

## 2024-06-26 ENCOUNTER — OFFICE VISIT (OUTPATIENT)
Dept: NEUROLOGY | Facility: CLINIC | Age: 59
End: 2024-06-26
Payer: COMMERCIAL

## 2024-06-26 ENCOUNTER — CLINICAL SUPPORT (OUTPATIENT)
Dept: PULMONOLOGY | Facility: HOSPITAL | Age: 59
End: 2024-06-26
Payer: COMMERCIAL

## 2024-06-26 VITALS
HEART RATE: 70 BPM | SYSTOLIC BLOOD PRESSURE: 140 MMHG | BODY MASS INDEX: 29.24 KG/M2 | WEIGHT: 186.3 LBS | HEIGHT: 67 IN | TEMPERATURE: 98.5 F | DIASTOLIC BLOOD PRESSURE: 79 MMHG

## 2024-06-26 DIAGNOSIS — G43.009 MIGRAINE WITHOUT AURA AND WITHOUT STATUS MIGRAINOSUS, NOT INTRACTABLE: Primary | ICD-10-CM

## 2024-06-26 DIAGNOSIS — J80 ARDS (ADULT RESPIRATORY DISTRESS SYNDROME) (HCC): Primary | ICD-10-CM

## 2024-06-26 PROCEDURE — 99417 PROLNG OP E/M EACH 15 MIN: CPT | Performed by: PSYCHIATRY & NEUROLOGY

## 2024-06-26 PROCEDURE — 99215 OFFICE O/P EST HI 40 MIN: CPT | Performed by: PSYCHIATRY & NEUROLOGY

## 2024-06-26 PROCEDURE — 94625 PHY/QHP OP PULM RHB W/O MNTR: CPT

## 2024-06-26 NOTE — PATIENT INSTRUCTIONS
"  Continue to follow with eye doctor regularly     Continue to follow with neurosurgery for meningioma    Do whatever you can in your power to treat the sleep apnea any time you sleep (understand if unable to with ARDS, I would defer to pulmonary regarding that)    Consider listening or reading any of the following books   - \"Rewire Your Anxious Brain: How to Use the Neuroscience of Fear to End Anxiety, Panic, and Worry\" By Bell PhD  - consider reading or listening to the book \"The body keeps the score\" - interesting book on how PTSD and stress can impact the body and cause physical symptoms  - \"why we sleep\" - by Bernabe Juarez      Headache/migraine treatment:   Abortive medications (for immediate treatment of a headache):   It is ok to take ibuprofen, acetaminophen or naproxen (Advil, Tylenol,  Aleve, Excedrin) if they help your headaches you should limit these to No more than 3 times a week to avoid medication overuse/rebound headaches.     For your more moderate to severe migraines take this medication early   nurtec 75 mg under or on the tongue as needed for migraine up to daily (16 tabs a month)       Prescription preventive medications for headaches/migraines   (to take every day to help prevent headaches - not to take at the time of headache):  [x]     We have options if needed/wanted     Lifestyle Recommendations:  [x] SLEEP - Maintain a regular sleep schedule: Adults need at least 7-8 hours of uninterrupted a night. Maintain good sleep hygiene:  Going to bed and waking up at consistent times, avoiding excessive daytime naps, avoiding caffeinated beverages in the evening, avoid excessive stimulation in the evening and generally using bed primarily for sleeping.  One hour before bedtime would recommend turning lights down lower, decreasing your activity (may read quietly, listen to music at a low volume). When you get into bed, should eliminate all technology (no texting, emailing, playing with your " phone, iPad or tablet in bed).  [x] HYDRATION - Maintain good hydration.  Drink  2L of fluid a day (4 typical small water bottles)  [x] DIET - Maintain good nutrition. In particular don't skip meals and try and eat healthy balanced meals regularly.  [x] TRIGGERS - Look for other triggers and avoid them: Limit caffeine to 1-2 cups a day or less. Avoid dietary triggers that you have noticed bring on your headaches (this could include aged cheese, peanuts, MSG, aspartame and nitrates).  [x] EXERCISE - physical exercise as we all know is good for you in many ways, and not only is good for your heart, but also is beneficial for your mental health, cognitive health and  chronic pain/headaches. I would encourage at the least 5 days of physical exercise weekly for at least 30 minutes.     Education and Follow-up  [x] Please call with any questions or concerns. Of course if any new concerning symptoms go to the emergency department.  [x] Follow up 3-4 months, sooner if needed    If headaches and migraines remain the same at that time and no issues could push back to 6 months if you do not feel needed

## 2024-06-26 NOTE — PROGRESS NOTES
[facility] Consult - Follow up   PATIENT:  Hussein Edward III  MRN:  [id number]  :  1965  DATE OF SERVICE:  2024  REFERRED BY: No ref. provider found  PMD: Iftikhar Roque MD    Assessment/Plan:   Hussein Edward III is a very pleasant 58 y.o. 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  My initial evaluation 2021     Migraine without aura and without status migrainosus, not intractable  WALE not on CPAP nightly, but trying (2016, AHI 7.9, supine 95.5, REM 15.9, oxygen down to 89%)  He reports a history of migraines dating back to mid 40s.  He does not know if he has a family history of migraines.  He reports severe migraines are diffuse pressure that used to improve over 1-2 days with rest and eletriptan until recently,  moderate migraines are typically unilateral left retro-orbital stabbing like a narrow and out the other side  That typically improve with rest and NSAIDs although still last for over 4 hours.  He denies classic aura,  Does feel like his chronic tinnitus changes prior to onset of migraine.  Reports typical associated migrainous features without autonomic features.  - as of 2021: on average severe migraines 5-6 times a year lasting 1-2 days, moderate migraines 3-4 times a month, most recently lasted 3 weeks in 2021.   Trial of magnesium for prevention, rizatriptan for abortive.  If his migraines remain uncontrolled will recommend prescription preventative.  Recommended treating sleep apnea.  - as of 9/10/2021: Reports  Mild headaches 2-3 times per week  that improved with OTC meds and migraines are stable but to 3 per month and improved with rizatriptan although he reports side effects, therefore will start trial of nurtec for abortive.   Last visit migraine improved with Toradol injection.  Recommended trying to take magnesium consistently for prevention.  - as of 3/18/2022: Migraines stable at about 3-4 times a month. Nurtec works for abortive, slower than rizatriptan, but without side effects.   - as of 9/21/2022: He reports significant  migraines about 3-4 times a month, milder migraines/headaches 3-4 times a week. Working on treating WALE and will follow up with Sleep Medicine.  Trial of low-dose gabapentin for prevention which may also help with other issues such as sleep for other pain.  Continue Nurtec for rescue which works well suggesting other CGRP meds will work well in future if needed.   - as of 3/14/2023: He did not follow-up with sleep medicine and is still having difficulty tolerating the CPAP machine and we discussed I highly recommend following up with sleep medicine to find any way to treat this better since it can impact many symptoms.  Migraines 3-4 a month on average, but the last 6 weeks or so has been 4 to 5 days a week. MRI brain with and without contrast scheduled for 4/11/2023 to follow-up meningioma with mild mass effect and neurosurgery follow-up 4/17/2023.  Gabapentin initially seemed to help but no longer seems to be helping and we will wean off.  Magnesium is helping and so is Nurtec for migraine rescue which we will transition to be used for prevention instead.  Toradol IM 1/26/2023 helped migraine flare and denies needing this today.    - as of 6/12/2023: Repeat MRI brain reviewed with unchanged meningioma although there are some findings consistent with possible tight cervical medullary junction with slight increased intracranial pressure picture and we discussed trial of acetazolamide/Diamox to see if this can reduce his  headaches and migraines which are currently well controlled at approximately 1 to 2 a week on Nurtec 16 tabs a month which he typically takes every other day or every few days and works well.  We were able to wean off gabapentin 300 mg nightly and he has an appointment to follow-up with new sleep medicine provider to get help with tolerating the CPAP.  - as of 11/7/2023: He reports ups and downs since last visit, he is having approximately 9 migraines per month that typically improve with Nurtec and sometimes rest needed as well. 10 headaches per month that typically improve with acetaminophen. We discussed I hope this will improve more transitioning from short acting acetazolamide/Diamox 250 mg twice a day to long-acting 500 mg twice a day.  Nurtec helps for migraine rescue and the more he takes it also can help with prevention he will be following up with the sleep specialist soon to try and maximize treatment of sleep apnea which we discussed is imperative.  He also will have improvement with upcoming facet injection which typically works very well for him lasting 6 months or more.   - as of 4/11/2024: He reports despite nearly dying with ARDS, and the depressing situation of not being able to currently do what he previously could endurance wise, he reports overall headaches and migraines are not poorly controlled.  He is off/they stopped acetazolamide and he ended up needed large amount of Lasix from what I could see and we discussed certainly this is something we could consider adding back, but the more he treats his sleep apnea with his new machine (on day 2), he may not need to start acetazolamide back especially with the addition of metoprolol since discharge.  He forgot about Nurtec which I resent and will make sure prior authorization is updated so we can take this as needed and the more he takes this can also help with prevention.  - as of 6/26/2024: He reports he is doing well as far as  headaches/migraines go and he will have about 1 a week and nurtec helps. He takes nurtec every 3 days and this may be helping with prevention. Toradol IM works if ever needed for unrelenting migraine. We spent much of the visit discussing his hospital stay, the ARDS and the severe deconditioning he has from this. He is following with a team of people and making gradual improvements. We discussed counseling for post traumatic stress and he is not currently interested, but also listed some resources. Recommended treating sleep apnea more, he feels too hard for his lung sometimes, continue to follow with team for this.    Workup:  -Following with neurosurgery for meningioma monitoring  -   MRI brain with without contrast 06/13/2021: Right frontal meningioma laterally within the anterior cranial fossa measuring 1.5 x 2.4 x 2.8 cm.  This extends into the sylvian fissure without edema in the adjacent brain. Several white matter hyperintensities on T2 and FLAIR imaging, primarily within the frontal lobes are nonspecific and may represent precocious chronic microangiopathic change.  -MRI brain with without contrast 9/30/2021: Stable right anterior frontal operculum meningioma.  Stable small white matter hyperintensities on T2 and FLAIR imaging within the cerebral hemispheres, presumably precocious chronic microangiopathic change.  -MRI brain with and without contrast 4/2/2022: 1.  Right frontal opercular region meningioma is stable, measuring approximately 2.2 cm.  2.  A few white matter lesions are also unchanged, possibly precocious microangiopathy.  3.  No acute infarction, intracranial hemorrhage or new mass lesion.  -MRI brain with and without contrast 4/11/2023: 1. Stable lateral right frontal convexity meningioma compared to 4/2/2022.  2. No acute infarction, edema, or pathologic intra-axial enhancement.  3. Mild chronic microangiopathic ischemic changes.    Preventative:  - we discussed headache hygiene and  lifestyle factors that may improve headaches  - He is not currently interested in prescription preventative for headaches   - Through other providers: quvivik 25 mg (and was told he could take 50 mg  through sleep med - working well - takes only as needed around 2 times a week, can feel mind settling about 1 hour later or longer ), metoprolol 50 mg BID  - Past/ failed/contraindicated: topiramate contraindicated due to history of kidney stones, amitriptyline would be contraindicated due to age, gabapentin,  Temazepam/Restoril, magnesium diarrhea in the hospital and helped stopping a few days later diarrhea better, acetazloamide prior to hospital 500 mg twice daily long-acting was helping significantly, he stopped claritin after 30 days for breathing 1/2 tab BID  - future options:   Verapamil discussed, CGRP Med    Abortive:  - discussed not taking over-the-counter or prescription pain medications more than 3 days per week to prevent medication overuse/rebound headache  - continue nurtec 75 mg as needed -up to 16 times a month and also can help for prevention. discussed proper use, possible side effects and risks.  - through other providers: oxycodone switched to tramadol about a month ago for hips knees ankles, meloxicam/mobic 15 mg - recommended only using sedating meds at night if has CPAP  - Past/ failed/contraindicated: eletriptan does not always work, rizatriptan works but gets panic attack  - past/helped:  toradol shot works  well when he gets it, methocarbamol, voltaren, previously Valium is only when he is getting a procedure   - future options:   prochlorperazine, Toradol IM or p.o., could consider trial for 5 days of Depakote or dexamethasone 4 prolonged migraine, ubrelvy, reyvow    Patient instructions     Continue to follow with eye doctor regularly     Continue to follow with neurosurgery for meningioma    Do whatever you can in your power to treat the sleep apnea any time you sleep (understand if unable  "to with ARDS, I would defer to pulmonary regarding that)    Consider listening or reading any of the following books   - \"Rewire Your Anxious Brain: How to Use the Neuroscience of Fear to End Anxiety, Panic, and Worry\" By Bell PhD  - consider reading or listening to the book \"The body keeps the score\" - interesting book on how PTSD and stress can impact the body and cause physical symptoms  - \"why we sleep\" - by Bernabe Juarez      Headache/migraine treatment:   Abortive medications (for immediate treatment of a headache):   It is ok to take ibuprofen, acetaminophen or naproxen (Advil, Tylenol,  Aleve, Excedrin) if they help your headaches you should limit these to No more than 3 times a week to avoid medication overuse/rebound headaches.     For your more moderate to severe migraines take this medication early   nurtec 75 mg under or on the tongue as needed for migraine up to daily (16 tabs a month)       Prescription preventive medications for headaches/migraines   (to take every day to help prevent headaches - not to take at the time of headache):  [x]     We have options if needed/wanted     Lifestyle Recommendations:  [x] SLEEP - Maintain a regular sleep schedule: Adults need at least 7-8 hours of uninterrupted a night. Maintain good sleep hygiene:  Going to bed and waking up at consistent times, avoiding excessive daytime naps, avoiding caffeinated beverages in the evening, avoid excessive stimulation in the evening and generally using bed primarily for sleeping.  One hour before bedtime would recommend turning lights down lower, decreasing your activity (may read quietly, listen to music at a low volume). When you get into bed, should eliminate all technology (no texting, emailing, playing with your phone, iPad or tablet in bed).  [x] HYDRATION - Maintain good hydration.  Drink  2L of fluid a day (4 typical small water bottles)  [x] DIET - Maintain good nutrition. In particular don't skip meals and try and " eat healthy balanced meals regularly.  [x] TRIGGERS - Look for other triggers and avoid them: Limit caffeine to 1-2 cups a day or less. Avoid dietary triggers that you have noticed bring on your headaches (this could include aged cheese, peanuts, MSG, aspartame and nitrates).  [x] EXERCISE - physical exercise as we all know is good for you in many ways, and not only is good for your heart, but also is beneficial for your mental health, cognitive health and  chronic pain/headaches. I would encourage at the least 5 days of physical exercise weekly for at least 30 minutes.     Education and Follow-up  [x] Please call with any questions or concerns. Of course if any new concerning symptoms go to the emergency department.  [x] Follow up 3-4 months, sooner if needed    If headaches and migraines remain the same at that time and no issues could push back to 6 months if you do not feel needed      CC:   We had the pleasure of evaluating Hussein Edward III in neurological consultation today. Hussein Edward III is a   Right and left handed male who presents today for evaluation of headaches.     History obtained from patient as well as available medical record review.  History of Present Illness:   Interval history as of 6/26/2024  - no significant new or concerning neurologic symptoms since last visit   - eye doctor around May 2024 and slight increase in prescription, one far sighted and one near sighted - they told him they are watching glaucoma and scar from [place] (he reports years ago they said it was detached and was seeing halos around people)    Headaches and migraines   He reports improvement with less frequent headaches and migraines  Currently one a week     Preventative:    - Through other providers: quvivik 25 mg (and was told he could take 50 mg  through sleep med - working well - takes only as needed around 2 times a week, can feel mind settling about 1 hour later or longer ), metoprolol 50 mg BID    Abortive:    - acetaminophen helps bring down the severity   - Nurtec - taking every third day if needed helps  - through other providers: oxycodone switched to tramadol about a month ago for hips knees ankles, meloxicam/mobic 15 mg   Denies bothersome side effects      -Has been following with cardiology and pulm rehab for recovery following ARDS  - rehabing arm after surgery from Dec surg at  in [place] [facility] - desterity  - pulm and cardiac and rehab [facility] at [facility] - treadmill and bike, working on stairs, doing those backwards  - when he goes out now he has kids around him to make sure he doesn't fall due to the deconditioning  - sits down if feeling too tired, carries portable oxygen with him - drops quickly if he exerts himself, so much pain all the time, 34 pounds down   - back at work and overwhelmed a little bit - 2nd week now and slowly getting back into it, 4 8 hour days and home on Fridays or when has insomnia   - WALE not on CPAP regularly, but trying (2016, AHI 7.9, supine 95.5, REM 15.9, oxygen down to 89%) - new machine and works well, 4 times a week, falls asleep in his recliner a lot - working on it   - following with rheum at [facility]  - recalls events of the hospital stay to me today, some PTS, was on paxil for a while and not with counselor right now, - was off the walker in 6 weeks and often people are on walkers for months, he gets sad at times, but overall is feeling way better, happy to be improving as much as he is and as fast as he is, not interested in counseling   - no longer coughing   - really good support team he says        Interval history as of 4/11/2024  -We discussed that I was keeping an eye on him during his prolonged hospital stay recently  -He met with the med student shadowing me today to gather some information about how he is doing before we did our virtual visit    He was hospitalized from 2/12/2024 -3/14/2024 for ARDS, medical student spoke with him for  He was discharged  from the hospital 3 weeks ago for ARDS 2/2 influenza infection. He had an x-ray that showed opacities and was told to go to the ER and went into respiratory failure, intubated was in the ICU for several days.     He reports that since the hospital he has been trying to take it easy. Has good days and bad days but has significant SOB with exertion (even with speaking). At baseline he is on 2L of oxygen and with exertion uses 3-4L. He was found to have atrial fibrillation and was started on metoprolol 50mg BID. Takes oxycodone PRN for back pain about 1/week typically after PT.     Since then he has had 5 HA total, 7-8/10 and takes a tylenol that reduces it to 3-4/10 but does not completely relieve the pain. He has not been taking the acetazolamide since the hospital and forgot he could use Nurtec as a rescue, but says he will start now that he remembers.     Sleep has been poor, he has issues falling asleep and staying asleep. He started on a new medication Quiviviq 25 mg to help with insomnia. He was not using his CPAP immediately afterwards because his doctor preferred he use oxygen at night. He just started back up again this week, he found an oxygen adapter he can add to the machine.      Has an appointment with cardiology 5/20 and appointment with his pulmonary and sleep doctor 5/8    Doing better now at home because he is able to rest more at home rather than in the hospital     Even in the medical coma could still hear people, nothing is linear in the time line   Remembers bits and pieces  Feels like he can remember turning him to be proned for ARDS  Was thinking why can't I move or open his eyes or move his hands   Every once in a while would look up   Was thinking about family and how are they going to get a long without me    Had an elevated pulse and low BP, at times  and BP dropped and therefore they added metoprolol, went from 110-115 to 95 or so     WALE  Sometimes too much pressure   Whole new  machine, old machine started at 5 and would go up, this one is more senstive and max last night was 4.2, starts at 0 and can check report on his phone - first night 6 hours and 5 events, first night score 86, second night  Also has adapter for oxygen tube  Following with Dr. Cook     Gets tired quickly, has to take a walker to the store as may need to sit down and rest for a moment    Headaches and migraines   5 headaches in 3 weeks since discharge  8/10  No aura migraines in a while     Preventative:    - Through other providers: ambien 5 mg switched to quvivik taking 25 mg and was told he could take 50 mg  through sleep med - working well, can feel mind settling   metoprolol 50 mg BID,     Abortive:   - acetaminophen helps bring down the severity   - Nurtec - forgot about it   - through other providers: oxycodone/roxicodone 5 mg sometimes after PT for back pain (once in past two weeks)  Denies bothersome side effects       Interval history as of 11/7/2023  - no significant new or concerning neurologic symptoms since last visit   - about 3 months ago -accidentally tripped and fell in his yard in a divot that is now fixed and hurt the right knee and tore his left bicep tendon of the elbow but torn away that they cannot fix it and is just rehab  - wearing his CPAP and waking up with headaches, waiting on mask fitting, beard may be playing a role, working on it - wearing most of the time, wife will wake him up if off typically   - been sick off and on - lots of sinus infection   - facet injections next week  - knee doc thinks walking issues is due to back     Headaches and migraines   9 per month   Nurtec helps, may need sleep sometimes after  Frontal on left  Tinnitus that changes in tone and frequency     10 headaches a month better with tylenol     Preventative:   - trial of acetazolamide/Diamox up to 250 mg BID - 630 BID - doesn't think he has wearing off - tingling at times where he tore his left elbow tendon  -  mag ox     Abortive:   Nurtec helps  Denies bothersome side effects       Interval history as of 6/12/2023  - no significant new or concerning neurologic symptoms since last visit, he was scheduled 6/16/2023 and could not make the appointment and therefore was moved to today  -Recent eye doctor evaluation without papilledema    -MRI brain with and without contrast 4/11/2023: 1. Stable lateral right frontal convexity meningioma compared to 4/2/2022.  2. No acute infarction, edema, or pathologic intra-axial enhancement.  3. Mild chronic microangiopathic ischemic changes.    - did call sleep medicine, tries using nightly, occasionally would fall asleep without it, apmt next week   Unfortunately he has still not followed up with sleep medicine to treat the sleep apnea and we discussed this is driving many of his symptoms could not chronic headaches, tinnitus  - 3-8 hours, brain always going 100 miles an hour and hard to wind down   - sleeping almost on face     Headaches and migraines   Much better on nurtec, maybe 1-2 a week    Preventative:   -Trial of Nurtec every other day-16 tabs   -Magnesium 40 mg nightly  - Gabapentin 300 mg    Abortive:   -Nurtec typically works well  -  He is on through other providers:  Methocarbamol, Voltaren  Denies bothersome side effects      Interval history as of 3/14/2023  - no significant new or concerning neurologic symptoms since last visit   - did not call sleep medicine yet, tries using nightly     Headaches and migraines   4-5 headaches a week lately for the last 6 weeks and prior to that was 3-4 a month     Preventative:   - continue magnesium 400 mg helping  -  Trial of gabapentin with gradual titration up to 300 mg nightly - helped initially and less so now    Abortive:   nurtec works - loves it brings from 8-9/10, 6-7 last month, typically 2-3 times a month   toradol IM 1/26/23  -  He is on through other providers:  Methocarbamol, Voltaren  Denies bothersome side effects      Interval history as of 9/21/2022  - denies any new or concerning neurologic symptoms since last visit   - known WALE not treated, has not followed up with them, has tried 6 masks, feels worse with the mask he thinks, trying to use the mask now and will see them - 9/14/17    Headaches and migraines   He reports migraines with visual aura about 3 to 4 times a month and milder migraines/headaches 3 to 4 times a week, can wake up with them or middle of day     Preventative:  Magnesium 400 mg   Abortive: Nurtec helps within 20 mins   Denies bothersome side effects     Interval history as of 3/18/2022  - denies any new or concerning neurologic symptoms since last visit   - he is following with Neurosurgery for  Meningioma  - known WALE not treated, has not followed up with them, has tried 6 masks, feels worse with the mask he thinks, will consider following with them     Headaches and migraines   Migraines 3-4 times a month, feels stable    Can not recall milder headaches number    Preventative: trial of magnesium for prevention  Abortive:  trial of Nurtec-approved 08/16/2021 - works without side effects but slower than rizatriptan which works faster but makes him tired     Interval history as of 9/10/2021  - denies any new or concerning neurologic symptoms since last visit   - -MRI brain with without contrast 06/13/2021: Right frontal meningioma laterally within the anterior cranial fossa measuring 1.5 x 2.4 x 2.8 cm.  This extends into the sylvian fissure without edema in the adjacent brain.  Several white matter hyperintensities on T2 and FLAIR imaging, primarily within the frontal lobes are nonspecific and may represent precocious chronic microangiopathic change.  -  Follow-up with Neurosurgery and getting repeat scan  -has not followed up with Sleep Medicine, but using mask more     Headaches and migraines   - migraines 2-3 per month, stable  - mild headaches 2-3 times per week that improve with OTC meds      Preventative:    - magnesium - not taking consistently     Abortive:   - Rizatriptan causes heart racing/panic attacks   6-8 pills a week ibuprofen/tylenol  - last visit toradol shot worked      Headaches started at what age? Worse around 47   How often do the headaches occur?   -  Typical migraine once every 2-3 months and resolves over hours with triptan  -  04/22/2021 went to emergency room for 2 weeks of migraine  - as of 5/26/2021: on average severe migraines 5-6 times a year lasting 1-2 days, moderate migraines 3-4 times a month, most recently lasted 3 weeks in April 2021  - As of 9/10/2021, 2-3 times a month. Last for 2-3 hours with rizatriptan and go to sleep afterward. W/o rizatriptan could last from a 4-5 hours to 3 days. No change with tinnitus, flashes of light.  Patients's other headaches has no other symptoms associated with it  What time of the day do the headaches start?  No particular time of day   How long do the headaches last? 1-2 days usually, over 4 hours for moderate   Are you ever headache free? Yes    Aura? without aura    Prodrome: frequency of tinnitus changes    Last eye exam: glasses 1-2 years ago     Where is your headache located and pain quality?   - severe migraines whole head - pressure  - can be unilateral on the left - stabbing to left eye like an arrow out the other side     What is the intensity of pain? Average: 4/10, worst 7 when at hospital/10  Associated symptoms:   [x] Nausea       [] Vomiting       [x] Photophobia     [x]Phonophobia      [x] Osmophobia  [] Blurred vision    [x] Light-headed or dizzy  - just recently    [x] Tinnitus - chronic bilateral and worse with migraine can be louder on one ear   [] Hands or feet tingle or feel numb/paresthesias      [] Ptosis      [] Facial droop  [] Lacrimation - both   [] Nasal congestion/rhinorrhea        Things that make the headache worse? No specific movements, any movement     Headache triggers:  Unknown     Have you seen  someone else for headaches or pain? Yes, neurology just recently   Have you had trigger point injection performed and how often? No  Have you had Botox injection performed and how often? No    Have you had epidural injections or transforaminal injections performed? No  Have you ever had any Brain imaging? Yes several MRI Brain     What medications do you take or have you taken for your headaches?   ABORTIVE:    OTC medications have been ineffective      eletriptan/Relpax 40 mg - 1-2 usually minimizes it     Meclizine - for BPPV in the past helped   Methocarbamol - for back - prn - maybe 3 times a month   Diclofenac 50 mg - for back prn - a couple times a month      past   Cyclobenzaprine  ED for 02/20/2021: Toradol 30 mg, Benadryl 25 mg, Reglan 10 mg,  IV fluids, Decadron   Medrol Dosepak - didn't seem to help     PREVENTIVE:        for sleep: Temazepam prn     Past/ failed/contraindicated:  -topiramate contraindicated due to history of kidney stones       LIFESTYLE WALE not on CPAP regularly (2016, AHI 7.9, supine 95.5, REM 15.9, oxygen down to 89%)  Sleep - WALE not tolerate of CPAP - has seen 3 different specialists    DATA REVIEW  Sleep Study: 12/19/16  SLEEP-DISORDERED BREATHING:   Type AHI Supine AHI Lateral AHI REM AHI GALINA SaO2 Derik % SpO2% ?88% min.   Baseline 7.9 95.5 5.6 15.9 5.7 89.0 0.0     - averages: 3-8 hours, usually 5   Problems falling asleep?:   Yes  Problems staying asleep?:  Yes    Water: 3 - 20 oz per day  Caffeine: cup in am and at work - cutting back     Mood: denies recent stress   Denies history of anxiety or depression or other diagnosed mood disorder    The following portions of the patient's history were reviewed and updated as appropriate: allergies, current medications, past family history, past medical history, past social history, past surgical history and problem list.    Pertinent family history:  Family history of headaches:  no known family members with significant headaches  Any  family history of aneurysms - No    Pertinent social history:  Work:  at [employer]   Lives with wife, 2 kids - 26, 20    Illicit Drugs: denies  Alcohol/tobacco: Denies alcohol use, Denies tobacco use            Pertinent lab results:   See EMR for recent labs   04/22/2021 BMP and CBC unremarkable, ESR 6  01/10/2020 LFTs with elevated alkaline phosphatase     Imaging: I have personally reviewed imaging and radiology read   -  noncontrast head CT 04/22/2021:  No acute intracranial abnormality (seen, but meningioma was there hidden)  -   MRI brain with without contrast 06/13/2021: Right frontal meningioma laterally within the anterior cranial fossa measuring 1.5 x 2.4 x 2.8 cm.  This extends into the sylvian fissure without edema in the adjacent brain. Several white matter hyperintensities on T2 and FLAIR imaging, primarily within the frontal lobes are nonspecific and may represent precocious chronic microangiopathic change.  -MRI brain with without contrast 9/30/2021: Stable right anterior frontal operculum meningioma.  Stable small white matter hyperintensities on T2 and FLAIR imaging within the cerebral hemispheres, presumably precocious chronic microangiopathic change.  -MRI brain with and without contrast 4/2/2022: 1.  Right frontal opercular region meningioma is stable, measuring approximately 2.2 cm.  2.  A few white matter lesions are also unchanged, possibly precocious microangiopathy.  3.  No acute infarction, intracranial hemorrhage or new mass lesion.    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)    Primary insomnia    Non-traumatic rhabdomyolysis    Chronic kidney disease    Metabolic acidosis    Acute respiratory failure with hypoxia (HCC)    ARDS survivor    Insomnia    Migraine    Anxiety    Ambulatory dysfunction    Sinus tachycardia    PAF (paroxysmal atrial fibrillation) ([facility])    Chronic pain of right knee    Arthralgia       Medications:      Current Outpatient Medications   Medication Sig Dispense Refill    albuterol (2.5 mg/3 mL) 0.083 % nebulizer solution Take 3 mL (2.5 mg total) by nebulization every 8 (eight) hours as needed for wheezing or shortness of breath 125 mL 1    albuterol (PROVENTIL HFA,VENTOLIN HFA) 90 mcg/act inhaler Inhale 2 puffs every 4 (four) hours as needed for wheezing 18 g 0    Daridorexant HCl (Quviviq) 25 MG TABS Take 25 mg by mouth daily at bedtime 90 tablet 3    fluticasone (FLONASE) 50 mcg/act nasal spray 1 spray into each nostril daily (Patient taking differently: 1 spray into each nostril if needed) 48 g 0    meloxicam (Mobic) 15 mg tablet Take 1 tablet (15 mg total) by mouth daily 60 tablet 0    metoprolol succinate (TOPROL-XL) 50 mg 24 hr tablet Take 1 tablet (50 mg total) by mouth every 12 (twelve) hours 180 tablet 3    rimegepant sulfate (Nurtec) 75 mg TBDP Take one NURTEC 75 mg under or on tongue at migraine onset if needed daily 16 tablet 11    traMADol (ULTRAM) 50 mg tablet Take 1 tablet (50 mg total) by mouth every 6 (six) hours as needed for moderate pain 90 tablet 0     No current facility-administered medications for this visit.        Allergies:    No Known Allergies    Family History:     Family History   Problem Relation Age of Onset    Breast cancer Mother     Diabetes Father     No Known Problems Family     Substance Abuse Neg Hx     Mental illness Neg Hx        Social History:     Social History     Socioeconomic History    Marital status: /Civil  "Union     Spouse name: Kathi    Number of children: 2    Years of education: Not on file    Highest education level: Not on file   Occupational History    Occupation:      Employer: [employer]   Tobacco Use    Smoking status: Never    Smokeless tobacco: Former     Types: Snuff     Quit date: 2003   Vaping Use    Vaping status: Never Used   Substance and Sexual Activity    Alcohol use: Never    Drug use: No    Sexual activity: Not on file   Other Topics Concern    Not on file   Social History Narrative    Not on file     Social Determinants of Health     Financial Resource Strain: Not on file   Food Insecurity: No Food Insecurity (3/14/2024)    Hunger Vital Sign     Worried About Running Out of Food in the Last Year: Never true     Ran Out of Food in the Last Year: Never true   Transportation Needs: No Transportation Needs (3/14/2024)    PRAPARE - Transportation     Lack of Transportation (Medical): No     Lack of Transportation (Non-Medical): No   Physical Activity: Not on file   Stress: Not on file   Social Connections: Not on file   Intimate Partner Violence: Not on file   Housing Stability: Low Risk  (3/14/2024)    Housing Stability Vital Sign     Unable to Pay for Housing in the Last Year: No     Number of Times Moved in the Last Year: 1     Homeless in the Last Year: No         Objective:       Physical Exam:                                                                 Vitals:            Constitutional:    /79 (BP Location: Left arm, Patient Position: Sitting, Cuff Size: Standard)   Pulse 70   Temp 98.5 °F (36.9 °C) (Temporal)   Ht 5' 7\" (1.702 m)   Wt 84.5 kg (186 lb 4.8 oz)   BMI 29.18 kg/m²   BP Readings from Last 3 Encounters:   06/26/24 140/79   06/05/24 138/88   05/28/24 126/80     Pulse Readings from Last 3 Encounters:   06/26/24 70   06/05/24 97   05/28/24 87         Well developed, well nourished, well groomed. No dysmorphic features.       Psychiatric:  " Normal behavior and appropriate affect        Neurological Examination:     Mental status/cognitive function:   Recent and remote memory intact to history. Attention span and concentration as well as fund of knowledge are appropriate for age. Normal language and spontaneous speech.     Cranial Nerves:  III, IV, VI-Pupils were equal, round, and reactive to light and accommodation. Extraocular movements were full and conjugate without nystagmus.   V-facial sensation symmetric.    VII-facial expression symmetric, intact forehead wrinkle, strong eye closure, symmetric smile     XI-shoulder shrug strength intact      Motor Exam: symmetric bulk and tone throughout, no pronator drift. Power/strength 5/5 bilateral upper and lower extremities, no atrophy, fasciculations or abnormal movements noted. Generally can give effort but breakthrough at times due to deconditioning   Sensory: grossly intact light touch in all extremities.   Coordination: Finger nose finger intact bilaterally, no apparent dysmetria, ataxia or tremor noted       Review of Systems:   ROS obtained by medical assistant and reviewed, but if any symptoms listed below say negative, does not mean patient has not had this symptom since last visit, please see HPI for details of symptoms discussed this visit.  Regarding any abnormal or positive findings in ROS that are not neurologic related, patient instructed to address these issues with PCP or go to the ER if they are severe.      Review of Systems   Constitutional: Negative.    HENT: Negative.     Eyes: Negative.    Respiratory: Negative.     Cardiovascular: Negative.    Gastrointestinal: Negative.    Endocrine: Negative.    Genitourinary: Negative.    Musculoskeletal: Negative.    Skin: Negative.    Allergic/Immunologic: Negative.    Neurological:  Positive for headaches.   Hematological: Negative.    Psychiatric/Behavioral: Negative.       I have spent 47 minutes with Patient  today in which greater than 50%  of this time was spent in counseling/coordination of care regarding Prognosis, Risks and benefits of tx options, Instructions for management, Patient education, Importance of tx compliance, Risk factor reductions, Impressions, Counseling / Coordination of care, Documenting in the medical record, Reviewing / ordering tests, medicine, procedures  , Obtaining or reviewing history  , and Communicating with other healthcare professionals . I also spent 10 minutes non face to face for this patient the same day.       Author:  Laxmi Camargo MD 6/26/2024 9:26 AM

## 2024-07-01 ENCOUNTER — CLINICAL SUPPORT (OUTPATIENT)
Dept: PULMONOLOGY | Facility: HOSPITAL | Age: 59
End: 2024-07-01
Payer: COMMERCIAL

## 2024-07-01 DIAGNOSIS — G89.29 CHRONIC PAIN OF RIGHT KNEE: ICD-10-CM

## 2024-07-01 DIAGNOSIS — J80 ARDS (ADULT RESPIRATORY DISTRESS SYNDROME) (HCC): Primary | ICD-10-CM

## 2024-07-01 DIAGNOSIS — M25.561 CHRONIC PAIN OF RIGHT KNEE: ICD-10-CM

## 2024-07-01 PROCEDURE — 94625 PHY/QHP OP PULM RHB W/O MNTR: CPT

## 2024-07-01 RX ORDER — MELOXICAM 15 MG/1
15 TABLET ORAL DAILY
Qty: 60 TABLET | Refills: 0 | Status: SHIPPED | OUTPATIENT
Start: 2024-07-01

## 2024-07-03 ENCOUNTER — CLINICAL SUPPORT (OUTPATIENT)
Dept: PULMONOLOGY | Facility: HOSPITAL | Age: 59
End: 2024-07-03
Payer: COMMERCIAL

## 2024-07-03 DIAGNOSIS — J80 ARDS (ADULT RESPIRATORY DISTRESS SYNDROME) (HCC): Primary | ICD-10-CM

## 2024-07-03 PROCEDURE — 94625 PHY/QHP OP PULM RHB W/O MNTR: CPT

## 2024-07-08 ENCOUNTER — CLINICAL SUPPORT (OUTPATIENT)
Dept: PULMONOLOGY | Facility: HOSPITAL | Age: 59
End: 2024-07-08
Payer: COMMERCIAL

## 2024-07-08 DIAGNOSIS — J80 ARDS (ADULT RESPIRATORY DISTRESS SYNDROME) (HCC): Primary | ICD-10-CM

## 2024-07-08 PROCEDURE — 94625 PHY/QHP OP PULM RHB W/O MNTR: CPT

## 2024-07-10 ENCOUNTER — CLINICAL SUPPORT (OUTPATIENT)
Dept: PULMONOLOGY | Facility: HOSPITAL | Age: 59
End: 2024-07-10
Payer: COMMERCIAL

## 2024-07-10 DIAGNOSIS — J80 ARDS (ADULT RESPIRATORY DISTRESS SYNDROME) (HCC): Primary | ICD-10-CM

## 2024-07-10 PROCEDURE — 94625 PHY/QHP OP PULM RHB W/O MNTR: CPT

## 2024-07-15 ENCOUNTER — APPOINTMENT (OUTPATIENT)
Dept: PULMONOLOGY | Facility: HOSPITAL | Age: 59
End: 2024-07-15
Payer: COMMERCIAL

## 2024-07-15 NOTE — PROGRESS NOTES
"Pulmonary Rehabilitation Assessment and Individualized Treatment Plan  120 DAY    Today's date: July 15, 2024  Patient name: Hussein Edward III     : 1965       MRN: [id number]  PCP: Iftikhar Roque MD  Referring Physician: Bernabe Cook MD  Pulmonologist: Dr. Cook    Provider: Nury  Clinician: Herlinda Hewitt MS, CEP      Dx:    Encounter Diagnosis   Name Primary?    ARDS (adult respiratory distress syndrome) (HCC) Yes     Date of onset: 2024            ASSESSMENT      Weight:    Wt Readings from Last 1 Encounters:   24 84.5 kg (186 lb 4.8 oz)      Height:   Ht Readings from Last 1 Encounters:   24 5' 7\" (1.702 m)       Medical History:   Past Medical History:   Diagnosis Date    Chronic back pain     Migraine        Family History:  Family History   Problem Relation Age of Onset    Breast cancer Mother     Diabetes Father     No Known Problems Family     Substance Abuse Neg Hx     Mental illness Neg Hx        Allergies:   Patient has no known allergies.    ETOH:   Social History     Substance and Sexual Activity   Alcohol Use Never       Current Medications:   Current Outpatient Medications   Medication Sig Dispense Refill    albuterol (PROVENTIL HFA,VENTOLIN HFA) 90 mcg/act inhaler Inhale 2 puffs every 4 (four) hours as needed for wheezing 18 g 0    Daridorexant HCl (Quviviq) 25 MG TABS Take 25 mg by mouth daily at bedtime 90 tablet 3    fluticasone (FLONASE) 50 mcg/act nasal spray 1 spray into each nostril daily (Patient taking differently: 1 spray into each nostril if needed) 48 g 0    meloxicam (Mobic) 15 mg tablet Take 1 tablet (15 mg total) by mouth daily 60 tablet 0    metoprolol succinate (TOPROL-XL) 50 mg 24 hr tablet Take 1 tablet (50 mg total) by mouth every 12 (twelve) hours 180 tablet 3    rimegepant sulfate (Nurtec) 75 mg TBDP Take one NURTEC 75 mg under or on tongue at migraine onset if needed daily 16 tablet 11    traMADol (ULTRAM) 50 mg tablet Take 1 tablet (50 mg " total) by mouth every 6 (six) hours as needed for moderate pain 90 tablet 0     No current facility-administered medications for this visit.       Physical Limitations: none    Fall Risk: Low   Comments: Ambulates with a steady gait with no assist device and using rollator to sit on if need to take breaks    Oxygen needs:   Rest:  supplemental O2 via nasal cannula @ 2L/min   Exercise/physical activity:  supplemental O2 via nasal cannula @ 3-4L/min   Sleep:   supplemental O2 via nasal cannula @ 2L/min     Does Pt monitor home SpO2? yes   Average SpO2 at rest:  98%   Average SpO2 with ADLs/physical activity:  80-95%      Use of Rescue Inhaler: Yes:  2-3 times per day    Use of Maintenance Inhaler: Yes:  2 times per day    Use of Nebulizer Treatments:  Yes:  2 times per day    Patient practices breathing techniques at home:  Reviewed with patient on:  4/4/2024-gave harmonica to practice with pursed lip breathing    Pulmonary Disease Risk Factors:  none      CAD Risk Factors:   Cholesterol: No  Smoking: Never used  HTN: No  DM: No  Obesity: No   Inactivity: Yes  Stress:  perceived  stress: 6/10   Stressors:health related   Goals for Stress Management: practice Relaxation Techniques, keep a positive mindset, and spend time with family      Current Functional Status:  Occupation: plans to return to work IT [employer]  Recreation: none  ADL’s:Capable of performing light ADLs only  Hale: No limitations  Exercise: some walking, short intervals as tolerated  Home exercise equipment: none  Other: n/a    Nutrition:  Pt's reported dietary habits:  Overall healthy eating plan, did not complete diet survey today-will complete at next session    Patient Specific Goals:     EXERCISE GOALS (home exercise, ADLs):   Be able to walk further distance with less shortness of breath, return to normal ADLs and return to work without limitations   NUTRITION GOALS (wt management, diabetes management, dietary modifications):    Attend healthy eating class and make healthy eating choices   PSYCHOCOSOCIAL GOALS (stress, emotional well being, social support):   Improve feelings of anxiety and return to normal self as he gets better   CORE COMPONENT GOALS (smoking, BP control, medication):   BP control     Ability to reach goals/rehabilitation potential:  Good    Projected return to function: 12-18 weeks  Objective tests: 6 MWT    Cultural needs: none    Comments: n/a        INDIVIDUALIZED TREATMENT PLAN        SUMMARY OF PROGRESS:  Hussein is compliant attending pulmonary rehab exercise sessions 2x/wk having attended 26 sessions  in the past 120 days. The patient tolerates 47 - 61 mins at 2.3 - 4.0 METs on room air and supplemental O2 2L/min via nasal canula. Hussein will exercise on room air depending on how he is feeling and use supplemental O2 once he feels he needs it or if SpO2 <88%. A light strength training component has been added to their exercise program..     Medication compliance: Yes   Comments: Pt reports to be compliant with medications      Assessment of progression of lung disease and functional status:  CAT: 36/40  Shortness of breath questionnaire: 99/120      EXERCISE ASSESSMENT and PLAN    Current Exercise Program in Rehab:       Frequency: 2 days/week   Supplement with home exercise 2+ days/wk as tolerated        Minutes: 20-40         METS: 2.8 - 3.9              SpO2: >88%              RPD: 4-6                      HR: 50-85% HRR or RHR +30-40bpm: 100-120   RPE: 4-5         Modalities: Treadmill, UBE, Lifecycle, NuStep, Recumbent bike, and Room walking      Exercise Progression 30 Day Goals :    Frequency: 2-3 days/week    Supplement with home exercise 2+ days/wk as tolerated       Minutes: 30-40        METS: 3.2 - 4.2              SpO2: >88%              RPD: 4-6                      HR: 100-120   RPE: 4-5        Modalities: Treadmill, UBE, Lifecycle, NuStep, Recumbent bike, and Room walking     Strength training:   2-3 days / week  12-15 repetitions  1-2 sets per modality    Modalities: Pull Downs, Lateral Raise, Arm Extension, Arm Curl, Sit to Stands, Front Raises, Shoulder Shrugs, and leg ext    Home Exercise: Type: walking, Frequency: 3-4 days/week, Duration: 5-10 mins    Education: pursed lipped breathing, diaphragmatic breathing, relaxation breathing, home exercise guidelines, benefits of exercise for pulmonary disease, RPE scale, RPD scale, O2 saturation monitoring, appropriate O2 response to exercise, energy conservation, and education class: Exercise For The Pulmonary Patient    SMART Goals:   reduced score on CAT, improved 6MWT distance, reduced dyspnea during exercise, improved exercise tolerance based on peak METs tolerated in pulmonary rehab exercise session, SpO2 >88% during exercise, and reduced RPD at rest    Patient Specific EXERCISE GOALS:   (home exercise, ADLs): reduced dependence on supplemental O2, attend pulmonary rehab regularly, decrease sitting time at home, start a walking program, begin a consistent exercise regimen , decreased rest needed with physical activity/exercise, increased muscular strength, increased energy, and increased stamina with ADLs  Be able to walk further distance with less shortness of breath, return to work without limitations    Patient's progress toward SMART and personal goals: Pt has attended NC regularly in the last 30 days, decreased rest needed with PA and exercise, able to walk for a longer amount of time without a break. No longer on supplemental oxygen with all activity and has been able to maintain SpO2 >90% with exertion.      Patient specific plan for next 30 days: will plan to start attending NC sessions 2x/week, and possibly progress to 3x/week once he increases strength, will continue walking at home 3-4x/week working up to 10-15 min intervals    Plan: Titrate supplemental oxygen as needed to maintain SpO2>88% with exercise, learn to conserve energy with ADLs ,  practice diaphragmatic breathing, reduce time sitting at home, increased strength of respiratory muscles, utilize PLB with physical activity, and begin a home exercise program , use IMT and harmonica therapy at home     Readiness to change: Action:  (Changing behavior)      NUTRITION ASSESSMENT AND PLAN    Weight control:    Starting weight: 181 lb   Current weight:   184.8 lbs    Diabetes: N/A  A1c: N/A    last measured: N/A    SMART Goals:   Improved Rate Your Plate score  >58, eat 6 or more servings of grain products per day, and eat 5 or more servings of fruits and vegetables a day      Patient Specific NUTRITION GOALS:  (wt control, diabetes management, dietary modifications): increase water intake to reduce/thin mucus production improve hydration increased muscle mass, attend heart healthy eating class and make healthy eating choices    Patient's progress toward SMART and personal goals: Reports increased water intake and working to make dietary changes    Patient specific plan for next 30 days: will attend healthy eating education class during [place] and can focus on heathy eating choices    Plan: group class: Reading Food Labels and group Class: Heart Healthy Eating    Measurable goals were based Rate Your Plate Dietary Self-Assessment. These are the areas in which the patient could score higher on the assessment.  Goals include recommendations for a heart healthy diet based on American Heart Association.    Education: heart healthy eating principles  maintaining hydration    Readiness to change: Action:  (Changing behavior)      PSYCHOSOCIAL ASSESSMENT AND PLAN    Emotional:  Depression assessment:  PHQ-9 = 8  5-9 = Mild Depression            Anxiety measure:  CLARA-7 = 5  5-9 = Mild anxiety   *PHQ-9/CLARA-7 will be assessed at next 30 days due to patient not in attendance today*    Assessment of depression and anxiety    Patient reports feelings of depression   Patient reports feelings of anxiety  Reports  sufficient emotional support   Provided contact information for [facility]    Self-reported stress level:  6-health related  Stress Management: practice Relaxation Techniques, keep a positive mindset, and spend time with family    Patient's rating of Social support: Excellent   Social Support Network: spouse    Psychosocial Assessment as it relates to rehabilitation: Patient denies issues with his/her family or home life that may affect their rehabilitation efforts.       SMART Goals:    improved [facility] QOL < 27, PHQ-9 - reduced severity by one level, Physical Fitness in [facility] Score < 3, Daily Activity in [facility] Score < 3, Social Activities in [facility] Score < 3, Pain in [facility] Score < 3, Overall Health in [facility] Score < 3, Quality of Life in [facility] Score < 3 , Change in Health in [facility] Score < 3 ,  Increased interest and pleasure in doing things, feel less tired with more energy, improved concentration, and take time to relax    Patient Specific PSYCHOCOSOCIAL GOALS:  (stress, emotional well being, social support): begin using [employer] and spend time with family  Improve feelings of anxiety and return to normal self as he gets better    Patient's progress toward SMART and personal goals: Hussein reports he has been spending time with his family    Patient specific plan for next 30 days: fontaine continue attending AL sessions to help recovery progress and will work towards increased strength and endurance to return to normal activities and feel less anxiety and more like himself    Plan: Class: Relaxation, Class:  Stress and Pulmonary Disease, Enroll in [employer], Practice relaxation techniques, Keep a positive mindset, and Enjoy family    Education: signs/sxs of depression, benefits of a positive support system, stress management techniques, benefits of enrolling in [employer], tools to manage fear/anxiety related to becoming SOB, and class:  Stress and Pulmonary  Disease    Readiness to change: Action:  (Changing behavior)      OTHER CORE COMPONENTS     Tobacco:   Social History     Tobacco Use   Smoking Status Never   Smokeless Tobacco Former    Types: Snuff    Quit date:        Tobacco Use Intervention: Referral to tobacco expert:   N/A:  Patient is a non-smoker     Blood pressure:    Restin/68 - 128/70   Exercise: 142/78    Oxygen Goal: Maintain SpO2>88% during exercise or as advised by pulmonolgist    SMART Goals: medication compliance, compliance with supplemental oxygen, compliance with CPAP, and monitor home pulse oximetry    Patient Specific CORE COMPONENT GOALS (smoking, BP control, angina control, medication compliance): reduced dietary sodium <2000mg, medication compliance, compliance with supplemental oxygen, compliance with CPAP, and monitor home pulse oximetry  BP control-manage medication as BPs are high and low    Patient's progress toward SMART and personal goals: Hussein has been working to reduce his dietary sodium, reports medication compliance, compliance with supplemental oxygen and CPAP, monitors pulse oximetry at home    Patient specific plan for next 30 days: continue with medication regiment, talk with MD about medication to control BP better as BP is high and low    Plan: medication compliance, avoid processed foods, engage in regular exercise, eliminate salt shaker at the table, use salt substitutes, check labels for sodium content, monitor home BP, group class: Pulmonary Anatomy and Physiology, group class:  Pulmonary Medications, and home harmonica therapy practice    Education:  pathophysiology of pulmonary disease, preventing infections, control coughing, inspiratory muscle training, environmental triggers, nebulizer use, bronchodilators, bronchial hygiene, and harmonica therapy    Readiness to change: Action:  (Changing behavior)

## 2024-07-17 ENCOUNTER — CLINICAL SUPPORT (OUTPATIENT)
Dept: PULMONOLOGY | Facility: HOSPITAL | Age: 59
End: 2024-07-17
Payer: COMMERCIAL

## 2024-07-17 DIAGNOSIS — J80 ARDS (ADULT RESPIRATORY DISTRESS SYNDROME) (HCC): Primary | ICD-10-CM

## 2024-07-17 PROCEDURE — 94625 PHY/QHP OP PULM RHB W/O MNTR: CPT

## 2024-07-18 DIAGNOSIS — M25.50 ARTHRALGIA, UNSPECIFIED JOINT: ICD-10-CM

## 2024-07-18 RX ORDER — TRAMADOL HYDROCHLORIDE 50 MG/1
50 TABLET ORAL EVERY 6 HOURS PRN
Qty: 90 TABLET | Refills: 0 | Status: SHIPPED | OUTPATIENT
Start: 2024-07-18

## 2024-07-22 ENCOUNTER — CLINICAL SUPPORT (OUTPATIENT)
Dept: PULMONOLOGY | Facility: HOSPITAL | Age: 59
End: 2024-07-22
Payer: COMMERCIAL

## 2024-07-22 DIAGNOSIS — J80 ARDS (ADULT RESPIRATORY DISTRESS SYNDROME) (HCC): Primary | ICD-10-CM

## 2024-07-22 PROCEDURE — 94625 PHY/QHP OP PULM RHB W/O MNTR: CPT

## 2024-07-24 ENCOUNTER — CLINICAL SUPPORT (OUTPATIENT)
Dept: PULMONOLOGY | Facility: HOSPITAL | Age: 59
End: 2024-07-24
Payer: COMMERCIAL

## 2024-07-24 DIAGNOSIS — J80 ARDS (ADULT RESPIRATORY DISTRESS SYNDROME) (HCC): Primary | ICD-10-CM

## 2024-07-24 PROCEDURE — 94625 PHY/QHP OP PULM RHB W/O MNTR: CPT

## 2024-07-29 ENCOUNTER — APPOINTMENT (OUTPATIENT)
Dept: PULMONOLOGY | Facility: HOSPITAL | Age: 59
End: 2024-07-29
Payer: COMMERCIAL

## 2024-07-29 NOTE — RESULT NOTES
Doxycycline Counseling:  Patient counseled regarding possible photosensitivity and increased risk for sunburn.  Patient instructed to avoid sunlight, if possible.  When exposed to sunlight, patients should wear protective clothing, sunglasses, and sunscreen.  The patient was instructed to call the office immediately if the following severe adverse effects occur:  hearing changes, easy bruising/bleeding, severe headache, or vision changes.  The patient verbalized understanding of the proper use and possible adverse effects of doxycycline.  All of the patient's questions and concerns were addressed. Verified Results  * XR CHEST PA & LATERAL [id number] 11:38AM [place] [facility] Order Number: [id number]     Test Name Result Flag Reference   XR CHEST PA & LATERAL (Report)     CHEST      INDICATION: Cough  Congestion  Shortness of breath  COMPARISON: None     VIEWS: Frontal and lateral projections; 2 images     FINDINGS:        Cardiomediastinal silhouette appears unremarkable  The lungs are clear  No pneumothorax or pleural effusion  Visualized osseous structures appear within normal limits for the patient's age  IMPRESSION:     No active pulmonary disease         Workstation performed: [id number]     Signed by:   Simi Vizcarra MD   11/9/16 Topical Sulfur Applications Counseling: Topical Sulfur Counseling: Patient counseled that this medication may cause skin irritation or allergic reactions.  In the event of skin irritation, the patient was advised to reduce the amount of the drug applied or use it less frequently.   The patient verbalized understanding of the proper use and possible adverse effects of topical sulfur application.  All of the patient's questions and concerns were addressed. High Dose Vitamin A Pregnancy And Lactation Text: High dose vitamin A therapy is contraindicated during pregnancy and breast feeding. Tetracycline Counseling: Patient counseled regarding possible photosensitivity and increased risk for sunburn.  Patient instructed to avoid sunlight, if possible.  When exposed to sunlight, patients should wear protective clothing, sunglasses, and sunscreen.  The patient was instructed to call the office immediately if the following severe adverse effects occur:  hearing changes, easy bruising/bleeding, severe headache, or vision changes.  The patient verbalized understanding of the proper use and possible adverse effects of tetracycline.  All of the patient's questions and concerns were addressed. Patient understands to avoid pregnancy while on therapy due to potential birth defects. Benzoyl Peroxide Pregnancy And Lactation Text: This medication is Pregnancy Category C. It is unknown if benzoyl peroxide is excreted in breast milk. Detail Level: Detailed Dapsone Counseling: I discussed with the patient the risks of dapsone including but not limited to hemolytic anemia, agranulocytosis, rashes, methemoglobinemia, kidney failure, peripheral neuropathy, headaches, GI upset, and liver toxicity.  Patients who start dapsone require monitoring including baseline LFTs and weekly CBCs for the first month, then every month thereafter.  The patient verbalized understanding of the proper use and possible adverse effects of dapsone.  All of the patient's questions and concerns were addressed. Topical Clindamycin Counseling: Patient counseled that this medication may cause skin irritation or allergic reactions.  In the event of skin irritation, the patient was advised to reduce the amount of the drug applied or use it less frequently.   The patient verbalized understanding of the proper use and possible adverse effects of clindamycin.  All of the patient's questions and concerns were addressed. Isotretinoin Pregnancy And Lactation Text: This medication is Pregnancy Category X and is considered extremely dangerous during pregnancy. It is unknown if it is excreted in breast milk. Spironolactone Counseling: Patient advised regarding risks of diarrhea, abdominal pain, hyperkalemia, birth defects (for female patients), liver toxicity and renal toxicity. The patient may need blood work to monitor liver and kidney function and potassium levels while on therapy. The patient verbalized understanding of the proper use and possible adverse effects of spironolactone.  All of the patient's questions and concerns were addressed. Azelaic Acid Pregnancy And Lactation Text: This medication is considered safe during pregnancy and breast feeding. Use Enhanced Medication Counseling?: No Birth Control Pills Counseling: Birth Control Pill Counseling: I discussed with the patient the potential side effects of OCPs including but not limited to increased risk of stroke, heart attack, thrombophlebitis, deep venous thrombosis, hepatic adenomas, breast changes, GI upset, headaches, and depression.  The patient verbalized understanding of the proper use and possible adverse effects of OCPs. All of the patient's questions and concerns were addressed. Winlevi Pregnancy And Lactation Text: This medication is considered safe during pregnancy and breastfeeding. Aklief Pregnancy And Lactation Text: It is unknown if this medication is safe to use during pregnancy.  It is unknown if this medication is excreted in breast milk.  Breastfeeding women should use the topical cream on the smallest area of the skin for the shortest time needed while breastfeeding.  Do not apply to nipple and areola. Tazorac Counseling:  Patient advised that medication is irritating and drying.  Patient may need to apply sparingly and wash off after an hour before eventually leaving it on overnight.  The patient verbalized understanding of the proper use and possible adverse effects of tazorac.  All of the patient's questions and concerns were addressed. Erythromycin Pregnancy And Lactation Text: This medication is Pregnancy Category B and is considered safe during pregnancy. It is also excreted in breast milk. Sarecycline Counseling: Patient advised regarding possible photosensitivity and discoloration of the teeth, skin, lips, tongue and gums.  Patient instructed to avoid sunlight, if possible.  When exposed to sunlight, patients should wear protective clothing, sunglasses, and sunscreen.  The patient was instructed to call the office immediately if the following severe adverse effects occur:  hearing changes, easy bruising/bleeding, severe headache, or vision changes.  The patient verbalized understanding of the proper use and possible adverse effects of sarecycline.  All of the patient's questions and concerns were addressed. Minocycline Counseling: Patient advised regarding possible photosensitivity and discoloration of the teeth, skin, lips, tongue and gums.  Patient instructed to avoid sunlight, if possible.  When exposed to sunlight, patients should wear protective clothing, sunglasses, and sunscreen.  The patient was instructed to call the office immediately if the following severe adverse effects occur:  hearing changes, easy bruising/bleeding, severe headache, or vision changes.  The patient verbalized understanding of the proper use and possible adverse effects of minocycline.  All of the patient's questions and concerns were addressed. Tetracycline Pregnancy And Lactation Text: This medication is Pregnancy Category D and not consider safe during pregnancy. It is also excreted in breast milk. Doxycycline Pregnancy And Lactation Text: This medication is Pregnancy Category D and not consider safe during pregnancy. It is also excreted in breast milk but is considered safe for shorter treatment courses. Bactrim Counseling:  I discussed with the patient the risks of sulfa antibiotics including but not limited to GI upset, allergic reaction, drug rash, diarrhea, dizziness, photosensitivity, and yeast infections.  Rarely, more serious reactions can occur including but not limited to aplastic anemia, agranulocytosis, methemoglobinemia, blood dyscrasias, liver or kidney failure, lung infiltrates or desquamative/blistering drug rashes. Topical Retinoid counseling:  Patient advised to apply a pea-sized amount only at bedtime and wait 30 minutes after washing their face before applying.  If too drying, patient may add a non-comedogenic moisturizer. The patient verbalized understanding of the proper use and possible adverse effects of retinoids.  All of the patient's questions and concerns were addressed. Topical Sulfur Applications Pregnancy And Lactation Text: This medication is Pregnancy Category C and has an unknown safety profile during pregnancy. It is unknown if this topical medication is excreted in breast milk. Azithromycin Counseling:  I discussed with the patient the risks of azithromycin including but not limited to GI upset, allergic reaction, drug rash, diarrhea, and yeast infections. Topical Clindamycin Pregnancy And Lactation Text: This medication is Pregnancy Category B and is considered safe during pregnancy. It is unknown if it is excreted in breast milk. Benzoyl Peroxide Counseling: Patient counseled that medicine may cause skin irritation and bleach clothing.  In the event of skin irritation, the patient was advised to reduce the amount of the drug applied or use it less frequently.   The patient verbalized understanding of the proper use and possible adverse effects of benzoyl peroxide.  All of the patient's questions and concerns were addressed. High Dose Vitamin A Counseling: Side effects reviewed, pt to contact office should one occur. Spironolactone Pregnancy And Lactation Text: This medication can cause feminization of the male fetus and should be avoided during pregnancy. The active metabolite is also found in breast milk. Dapsone Pregnancy And Lactation Text: This medication is Pregnancy Category C and is not considered safe during pregnancy or breast feeding. Birth Control Pills Pregnancy And Lactation Text: This medication should be avoided if pregnant and for the first 30 days post-partum. Isotretinoin Counseling: Patient should get monthly blood tests, not donate blood, not drive at night if vision affected, not share medication, and not undergo elective surgery for 6 months after tx completed. Side effects reviewed, pt to contact office should one occur. Tazorac Pregnancy And Lactation Text: This medication is not safe during pregnancy. It is unknown if this medication is excreted in breast milk. Azelaic Acid Counseling: Patient counseled that medicine may cause skin irritation and to avoid applying near the eyes.  In the event of skin irritation, the patient was advised to reduce the amount of the drug applied or use it less frequently.   The patient verbalized understanding of the proper use and possible adverse effects of azelaic acid.  All of the patient's questions and concerns were addressed. Aklief counseling:  Patient advised to apply a pea-sized amount only at bedtime and wait 30 minutes after washing their face before applying.  If too drying, patient may add a non-comedogenic moisturizer.  The most commonly reported side effects including irritation, redness, scaling, dryness, stinging, burning, itching, and increased risk of sunburn.  The patient verbalized understanding of the proper use and possible adverse effects of retinoids.  All of the patient's questions and concerns were addressed. Topical Retinoid Pregnancy And Lactation Text: This medication is Pregnancy Category C. It is unknown if this medication is excreted in breast milk. Bactrim Pregnancy And Lactation Text: This medication is Pregnancy Category D and is known to cause fetal risk.  It is also excreted in breast milk. Erythromycin Counseling:  I discussed with the patient the risks of erythromycin including but not limited to GI upset, allergic reaction, drug rash, diarrhea, increase in liver enzymes, and yeast infections. Winlevi Counseling:  I discussed with the patient the risks of topical clascoterone including but not limited to erythema, scaling, itching, and stinging. Patient voiced their understanding. Azithromycin Pregnancy And Lactation Text: This medication is considered safe during pregnancy and is also secreted in breast milk.

## 2024-07-31 ENCOUNTER — TELEPHONE (OUTPATIENT)
Dept: FAMILY MEDICINE CLINIC | Facility: CLINIC | Age: 59
End: 2024-07-31

## 2024-07-31 ENCOUNTER — CLINICAL SUPPORT (OUTPATIENT)
Dept: PULMONOLOGY | Facility: HOSPITAL | Age: 59
End: 2024-07-31
Payer: COMMERCIAL

## 2024-07-31 DIAGNOSIS — J80 ARDS (ADULT RESPIRATORY DISTRESS SYNDROME) (HCC): Primary | ICD-10-CM

## 2024-07-31 PROCEDURE — 94625 PHY/QHP OP PULM RHB W/O MNTR: CPT

## 2024-08-01 ENCOUNTER — OFFICE VISIT (OUTPATIENT)
Dept: FAMILY MEDICINE CLINIC | Facility: CLINIC | Age: 59
End: 2024-08-01
Payer: COMMERCIAL

## 2024-08-01 ENCOUNTER — TELEPHONE (OUTPATIENT)
Age: 59
End: 2024-08-01

## 2024-08-01 VITALS — OXYGEN SATURATION: 97 % | DIASTOLIC BLOOD PRESSURE: 86 MMHG | HEART RATE: 94 BPM | SYSTOLIC BLOOD PRESSURE: 138 MMHG

## 2024-08-01 DIAGNOSIS — R26.2 AMBULATORY DYSFUNCTION: ICD-10-CM

## 2024-08-01 DIAGNOSIS — F11.20 CONTINUOUS OPIOID DEPENDENCE (HCC): ICD-10-CM

## 2024-08-01 DIAGNOSIS — Z87.09 ARDS SURVIVOR: Primary | ICD-10-CM

## 2024-08-01 DIAGNOSIS — R52 PAIN: ICD-10-CM

## 2024-08-01 DIAGNOSIS — R00.0 SINUS TACHYCARDIA: ICD-10-CM

## 2024-08-01 PROCEDURE — 99214 OFFICE O/P EST MOD 30 MIN: CPT | Performed by: FAMILY MEDICINE

## 2024-08-01 RX ORDER — DULOXETIN HYDROCHLORIDE 30 MG/1
CAPSULE, DELAYED RELEASE ORAL
Qty: 180 CAPSULE | Refills: 3 | Status: SHIPPED | OUTPATIENT
Start: 2024-08-01

## 2024-08-01 RX ORDER — MULTIVIT-MIN/IRON/FOLIC ACID/K 18-600-40
CAPSULE ORAL
COMMUNITY

## 2024-08-01 NOTE — PROGRESS NOTES
Assessment/Plan:    Pain  Trial cymbalta--? RLS---? Requip in future--? Adderall in future for motivation       Diagnoses and all orders for this visit:    ARDS survivor    Sinus tachycardia    Ambulatory dysfunction    Pain  -     DULoxetine (CYMBALTA) 30 mg delayed release capsule; 1-2 daily    Continuous opioid dependence (HCC)    Other orders  -     Vitamin D, Cholecalciferol, 50 MCG (2000 UT) CAPS; Take by mouth          Subjective:   Chief Complaint   Patient presents with    Follow-up     ARDS        Patient ID: Hussein Edward III is a 58 y.o. male.    HPI    The following portions of the patient's history were reviewed and updated as appropriate: allergies, current medications, past family history, past medical history, past social history, past surgical history and problem list.    Review of Systems   Constitutional:  Negative for fatigue, fever and unexpected weight change.   HENT:  Negative for congestion, sinus pain and sore throat.    Eyes:  Negative for visual disturbance.   Respiratory:  Negative for shortness of breath and wheezing.    Cardiovascular:  Negative for chest pain and palpitations.   Gastrointestinal:  Negative for abdominal pain, nausea and vomiting.   Musculoskeletal: Negative.  Negative for arthralgias and myalgias.   Neurological:  Negative for syncope, weakness and numbness.   Psychiatric/Behavioral: Negative.  Negative for confusion, dysphoric mood and suicidal ideas.          Objective:  Vitals:    08/01/24 0844   BP: 138/86   Pulse: 94   SpO2: 97%      Physical Exam  Constitutional:       Appearance: He is well-developed.   HENT:      Right Ear: Ear canal normal. Tympanic membrane is not injected.      Left Ear: Ear canal normal. Tympanic membrane is not injected.      Nose: Nose normal.   Eyes:      General:         Right eye: No discharge.         Left eye: No discharge.      Conjunctiva/sclera: Conjunctivae normal.      Pupils: Pupils are equal, round, and reactive to light.    Neck:      Thyroid: No thyromegaly.   Cardiovascular:      Rate and Rhythm: Normal rate and regular rhythm.      Heart sounds: Normal heart sounds. No murmur heard.  Pulmonary:      Effort: Pulmonary effort is normal. No respiratory distress.      Breath sounds: Normal breath sounds. No wheezing.   Abdominal:      General: Bowel sounds are normal. There is no distension.      Palpations: Abdomen is soft.      Tenderness: There is no abdominal tenderness.   Musculoskeletal:         General: Normal range of motion.      Cervical back: Normal range of motion and neck supple.   Lymphadenopathy:      Cervical: No cervical adenopathy.   Skin:     General: Skin is warm and dry.   Neurological:      Mental Status: He is alert and oriented to person, place, and time. He is not disoriented.      Sensory: No sensory deficit.      Motor: No weakness.      Coordination: Coordination normal.      Gait: Gait normal.      Deep Tendon Reflexes: Reflexes are normal and symmetric.   Psychiatric:         Speech: Speech normal.         Behavior: Behavior normal.         Thought Content: Thought content normal.         Judgment: Judgment normal.

## 2024-08-01 NOTE — TELEPHONE ENCOUNTER
Pharmacy called in regarding new prescription for Cymbalta concerning possible drug reaction. Patient is prescribed Tramadol which in combination can cause seizures. Please notify pharmacy if they are okay to fill.

## 2024-08-05 ENCOUNTER — CLINICAL SUPPORT (OUTPATIENT)
Dept: PULMONOLOGY | Facility: HOSPITAL | Age: 59
End: 2024-08-05
Payer: COMMERCIAL

## 2024-08-05 DIAGNOSIS — J80 ARDS (ADULT RESPIRATORY DISTRESS SYNDROME) (HCC): Primary | ICD-10-CM

## 2024-08-05 PROCEDURE — 94625 PHY/QHP OP PULM RHB W/O MNTR: CPT

## 2024-08-07 ENCOUNTER — CLINICAL SUPPORT (OUTPATIENT)
Dept: PULMONOLOGY | Facility: HOSPITAL | Age: 59
End: 2024-08-07
Payer: COMMERCIAL

## 2024-08-07 DIAGNOSIS — J80 ARDS (ADULT RESPIRATORY DISTRESS SYNDROME) (HCC): Primary | ICD-10-CM

## 2024-08-07 PROCEDURE — 94625 PHY/QHP OP PULM RHB W/O MNTR: CPT

## 2024-08-12 ENCOUNTER — CLINICAL SUPPORT (OUTPATIENT)
Dept: PULMONOLOGY | Facility: HOSPITAL | Age: 59
End: 2024-08-12
Payer: COMMERCIAL

## 2024-08-12 DIAGNOSIS — J80 ARDS (ADULT RESPIRATORY DISTRESS SYNDROME) (HCC): Primary | ICD-10-CM

## 2024-08-12 PROCEDURE — 94625 PHY/QHP OP PULM RHB W/O MNTR: CPT

## 2024-08-14 ENCOUNTER — CLINICAL SUPPORT (OUTPATIENT)
Dept: PULMONOLOGY | Facility: HOSPITAL | Age: 59
End: 2024-08-14
Payer: COMMERCIAL

## 2024-08-14 DIAGNOSIS — J80 ARDS (ADULT RESPIRATORY DISTRESS SYNDROME) (HCC): Primary | ICD-10-CM

## 2024-08-14 PROCEDURE — 94625 PHY/QHP OP PULM RHB W/O MNTR: CPT

## 2024-08-14 NOTE — PROGRESS NOTES
"Pulmonary Rehabilitation Assessment and Individualized Treatment Plan  DISCHARGE      Today's date: 2024  Patient name: Hussein Edward III     : 1965       MRN: [id number]  PCP: Iftikhar Roque MD  Referring Physician: Bernabe Cook MD  Pulmonologist: Dr. Cook    Provider: Nury  Clinician: Herlinda Hewitt MS, CEP      Dx:    Encounter Diagnosis   Name Primary?    ARDS (adult respiratory distress syndrome) (HCC) Yes     Date of onset: 2024            ASSESSMENT      Weight:    Wt Readings from Last 1 Encounters:   24 84.5 kg (186 lb 4.8 oz)      Height:   Ht Readings from Last 1 Encounters:   24 5' 7\" (1.702 m)       Medical History:   Past Medical History:   Diagnosis Date    Chronic back pain     Migraine        Family History:  Family History   Problem Relation Age of Onset    Breast cancer Mother     Diabetes Father     No Known Problems Family     Substance Abuse Neg Hx     Mental illness Neg Hx        Allergies:   Patient has no known allergies.    ETOH:   Social History     Substance and Sexual Activity   Alcohol Use Never       Current Medications:   Current Outpatient Medications   Medication Sig Dispense Refill    albuterol (PROVENTIL HFA,VENTOLIN HFA) 90 mcg/act inhaler Inhale 2 puffs every 4 (four) hours as needed for wheezing 18 g 0    Daridorexant HCl (Quviviq) 25 MG TABS Take 25 mg by mouth daily at bedtime 90 tablet 3    DULoxetine (CYMBALTA) 30 mg delayed release capsule 1-2 daily 180 capsule 3    meloxicam (Mobic) 15 mg tablet Take 1 tablet (15 mg total) by mouth daily 60 tablet 0    metoprolol succinate (TOPROL-XL) 50 mg 24 hr tablet Take 1 tablet (50 mg total) by mouth every 12 (twelve) hours 180 tablet 3    rimegepant sulfate (Nurtec) 75 mg TBDP Take one NURTEC 75 mg under or on tongue at migraine onset if needed daily 16 tablet 11    traMADol (ULTRAM) 50 mg tablet Take 1 tablet (50 mg total) by mouth every 6 (six) hours as needed for moderate pain 90 " tablet 0    Vitamin D, Cholecalciferol, 50 MCG (2000 UT) CAPS Take by mouth       No current facility-administered medications for this visit.       Physical Limitations: none    Fall Risk: Low   Comments: Ambulates with a steady gait with no assist device and using rollator to sit on if need to take breaks    Oxygen needs:   Rest:  supplemental O2 via nasal cannula @ 0-2L/min   Exercise/physical activity:  supplemental O2 via nasal cannula @ 2-3L/min   Sleep:   supplemental O2 via nasal cannula @ 2L/min     Does Pt monitor home SpO2? yes   Average SpO2 at rest:  98%   Average SpO2 with ADLs/physical activity:  90-99%      Use of Rescue Inhaler: Yes:  2-3 times per day    Use of Maintenance Inhaler: Yes:  2 times per day    Use of Nebulizer Treatments:  Yes:  2 times per day    Patient practices breathing techniques at home:  Reviewed with patient on:  4/4/2024-gave harmonica to practice with pursed lip breathing    Pulmonary Disease Risk Factors:  none      CAD Risk Factors:   Cholesterol: No  Smoking: Never used  HTN: No  DM: No  Obesity: No   Inactivity: Yes  Stress:  perceived  stress: 6/10   Stressors:health related   Goals for Stress Management: practice Relaxation Techniques, keep a positive mindset, and spend time with family      Current Functional Status:  Occupation: plans to return to work [employer]  Recreation: none  ADL’s:Capable of performing light ADLs only  [place]: No limitations  Exercise: some walking, short intervals as tolerated  Home exercise equipment: none  Other: n/a    Nutrition:  Pt's reported dietary habits:  Overall healthy eating plan, did not complete diet survey today-will complete at next session    Patient Specific Goals:     EXERCISE GOALS (home exercise, ADLs):   Be able to walk further distance with less shortness of breath, return to normal ADLs and return to work without limitations   NUTRITION GOALS (wt management, diabetes management, dietary modifications):    Attend healthy eating class and make healthy eating choices   PSYCHOCOSOCIAL GOALS (stress, emotional well being, social support):   Improve feelings of anxiety and return to normal self as he gets better   CORE COMPONENT GOALS (smoking, BP control, medication):   BP control     Ability to reach goals/rehabilitation potential:  Good    Projected return to function: 12-18 weeks  Objective tests: 6 MWT    Cultural needs: none    Comments: n/a        INDIVIDUALIZED TREATMENT PLAN        SUMMARY OF PROGRESS:  Hussein has attended 34 sessions of pulmonary rehab and is being discharged at this time. He has been able to increase his peak METs in [place] by 129.4% and increased his 6MWT distance by 41.2%. He also was able to complete his 6MWT w/o rest breaks. He plans to go to his community center to see what exercise equipment they have available to continue with his exercise. He also is going to inquire about physical therapy to help increase his leg strength. He also stated he is going to speak to his pulmonologist about possibly continuing pulmonary rehab due to still feeling limited by his SOB. In rehab he used supplemental O2 as needed up to 2 lpm.     Medication compliance: Yes   Comments: Pt reports to be compliant with medications      Assessment of progression of lung disease and functional status:  CAT: 27/40  Shortness of breath questionnaire: 90/120      EXERCISE ASSESSMENT and PLAN    Current Exercise Program in Rehab:       Frequency: 2 days/week   Supplement with home exercise 2+ days/wk as tolerated        Minutes: 20-40         METS: 2.8 - 3.9              SpO2: >88%              RPD: 4-6                      HR: 50-85% HRR or RHR +30-40bpm: 100-120   RPE: 4-5         Modalities: Treadmill, UBE, Lifecycle, NuStep, Recumbent bike, and Room walking      Strength trainin-3 days / week  12-15 repetitions  1-2 sets per modality    Modalities: Pull Downs, Lateral Raise, Arm Extension, Arm Curl, Sit to Stands,  Front Raises, Shoulder Shrugs, and leg ext    Home Exercise: Type: walking, Frequency: 3-4 days/week, Duration: 5-10 mins    Education: pursed lipped breathing, diaphragmatic breathing, relaxation breathing, home exercise guidelines, benefits of exercise for pulmonary disease, RPE scale, RPD scale, O2 saturation monitoring, appropriate O2 response to exercise, energy conservation, and education class: Exercise For The Pulmonary Patient    SMART Goals:   reduced score on CAT, improved 6MWT distance, reduced dyspnea during exercise, improved exercise tolerance based on peak METs tolerated in pulmonary rehab exercise session, SpO2 >88% during exercise, and reduced RPD at rest    Patient Specific EXERCISE GOALS:   (home exercise, ADLs): reduced dependence on supplemental O2, attend pulmonary rehab regularly, decrease sitting time at home, start a walking program, begin a consistent exercise regimen , decreased rest needed with physical activity/exercise, increased muscular strength, increased energy, and increased stamina with ADLs  Be able to walk further distance with less shortness of breath, return to work without limitations    Patient's progress toward SMART and personal goals: Reduced score on CAT, improved 6MWT distance, reduced dyspnea during exercise, improved exercise tolerance based on peak METs tolerate in [place], SpO2 >88% during exercise, reduced RPD at rest. Patient does not use supplemental O2 at all times, he uses as needed. He uses 2-3 lpm w/ activity, prior to beginning rehab he was using up to 4 lpm. Pt attended [place] regularly, Pt reports increase in muscular strength, energy, and stamina, however still does not feel like he is back to his status prior to hospitalization. He has returned to work.     Patient specific plan for next 30 days: Continue exercise at [facility] center, inquire about physical therapy to help increase leg strength    Plan: After discharge patient will continue to follow a regular  exercise regimen with the goal of a minimum of 150 minutes per week of moderate intensity exercise.       Readiness to change: Maintenance: (Maintaining the behavior change)      NUTRITION ASSESSMENT AND PLAN    Weight control:    Starting weight: 181 lb   Current weight:   184.2 lbs    Diabetes: N/A  A1c: N/A    last measured: N/A    SMART Goals:   Improved Rate Your Plate score  >58, eat 6 or more servings of grain products per day, and eat 5 or more servings of fruits and vegetables a day      Patient Specific NUTRITION GOALS:  (wt control, diabetes management, dietary modifications): increase water intake to reduce/thin mucus production improve hydration increased muscle mass, attend heart healthy eating class and make healthy eating choices    Patient's progress toward SMART and personal goals: Reports increased water intake and working to make dietary changes    Patient specific plan for next 30 days: Continue to make dietary modifications following discharge    Plan: After discharge patient will continue to maintain healthy lifestyle habits and focus on risk factor modification.    Measurable goals were based Rate Your Plate Dietary Self-Assessment. These are the areas in which the patient could score higher on the assessment.  Goals include recommendations for a heart healthy diet based on American Heart Association.    Education: heart healthy eating principles  maintaining hydration    Readiness to change: Maintenance: (Maintaining the behavior change)      PSYCHOSOCIAL ASSESSMENT AND PLAN    Emotional:  Depression assessment:  PHQ-9 = 11  10-14 = Moderate Depression            Anxiety measure:  CLARA-7 = 4  0-4  = Not anxious    Assessment of depression and anxiety    Patient reports feelings of depression   Patient reports feelings of anxiety  Reports sufficient emotional support   Provided contact information for [facility]    Self-reported stress level:  6-health related  Stress  Management: practice Relaxation Techniques, keep a positive mindset, and spend time with family    Patient's rating of Social support: Excellent   Social Support Network: spouse    Psychosocial Assessment as it relates to rehabilitation: Patient denies issues with his/her family or home life that may affect their rehabilitation efforts.       SMART Goals:    improved [place] QOL < 27, PHQ-9 - reduced severity by one level, Physical Fitness in [place] Score < 3, Daily Activity in [place] Score < 3, Social Activities in [place] Score < 3, Pain in [place] Score < 3, Overall Health in [place] Score < 3, Quality of Life in [place] Score < 3 , Change in Health in [place] Score < 3 ,  Increased interest and pleasure in doing things, feel less tired with more energy, improved concentration, and take time to relax    Patient Specific PSYCHOCOSOCIAL GOALS:  (stress, emotional well being, social support): begin using [employer] and spend time with family  Improve feelings of anxiety and return to normal self as he gets better    Patient's progress toward SMART and personal goals: [place] QOL score remains >27, PHQ-9 increased severity by one level, physical fitness in [place] score =4, daily activity in [place] score=4, social activities in [place] score =4, pain in [place] score =5, overall health in [place] score =4, quality of life in [place] score =4, change in health in [place] score =3. Feels an increase in energy, however still does not feel back to status prior to hospitalization    Patient specific plan for next 30 days: Continue to lifestyle modifications following discharge    Plan: After discharge patient will continue to maintain healthy lifestyle habits and focus on risk factor modification.    Education: signs/sxs of depression, benefits of a positive support system, stress management techniques, benefits of enrolling in [employer], tools to manage fear/anxiety related to  becoming SOB, and class:  Stress and Pulmonary Disease    Readiness to change: Maintenance: (Maintaining the behavior change)      OTHER CORE COMPONENTS     Tobacco:   Social History     Tobacco Use   Smoking Status Never   Smokeless Tobacco Former    Types: Snuff    Quit date:        Tobacco Use Intervention: Referral to tobacco expert:   N/A:  Patient is a non-smoker     Blood pressure:    Restin/68 - 128/70   Exercise: 142/78    Oxygen Goal: Maintain SpO2>88% during exercise or as advised by pulmonolgist    SMART Goals: medication compliance, compliance with supplemental oxygen, compliance with CPAP, and monitor home pulse oximetry    Patient Specific CORE COMPONENT GOALS (smoking, BP control, angina control, medication compliance): reduced dietary sodium <2000mg, medication compliance, compliance with supplemental oxygen, compliance with CPAP, and monitor home pulse oximetry  BP control-manage medication as BPs are high and low    Patient's progress toward SMART and personal goals: Medication compliance reported, compliance with supplemental O2 reported, monitors home pulse oximetry, working to reduce dietary sodium <2000 mg/day, monitors home pulse oximetry    Patient specific plan for next 30 days: Continue to make lifestyle changes following discharge.     Plan: After discharge patient will continue to maintain healthy lifestyle habits and focus on risk factor modification.    Education:  pathophysiology of pulmonary disease, preventing infections, control coughing, inspiratory muscle training, environmental triggers, nebulizer use, bronchodilators, bronchial hygiene, and harmonica therapy    Readiness to change: Maintenance: (Maintaining the behavior change)

## 2024-08-20 ENCOUNTER — DOCUMENTATION (OUTPATIENT)
Age: 59
End: 2024-08-20

## 2024-08-20 ENCOUNTER — OFFICE VISIT (OUTPATIENT)
Age: 59
End: 2024-08-20
Payer: COMMERCIAL

## 2024-08-20 VITALS
HEART RATE: 78 BPM | OXYGEN SATURATION: 99 % | TEMPERATURE: 97 F | DIASTOLIC BLOOD PRESSURE: 80 MMHG | WEIGHT: 186.6 LBS | HEIGHT: 67 IN | BODY MASS INDEX: 29.29 KG/M2 | SYSTOLIC BLOOD PRESSURE: 124 MMHG

## 2024-08-20 DIAGNOSIS — G62.89 OTHER POLYNEUROPATHY: ICD-10-CM

## 2024-08-20 DIAGNOSIS — G47.33 OSA (OBSTRUCTIVE SLEEP APNEA): ICD-10-CM

## 2024-08-20 DIAGNOSIS — Z87.09 ARDS SURVIVOR: Primary | ICD-10-CM

## 2024-08-20 DIAGNOSIS — G89.29 CHRONIC PAIN OF RIGHT KNEE: ICD-10-CM

## 2024-08-20 DIAGNOSIS — F51.01 PRIMARY INSOMNIA: ICD-10-CM

## 2024-08-20 DIAGNOSIS — R06.02 SHORTNESS OF BREATH: ICD-10-CM

## 2024-08-20 DIAGNOSIS — M25.561 CHRONIC PAIN OF RIGHT KNEE: ICD-10-CM

## 2024-08-20 PROCEDURE — 99214 OFFICE O/P EST MOD 30 MIN: CPT | Performed by: INTERNAL MEDICINE

## 2024-08-20 RX ORDER — ALBUTEROL SULFATE 0.83 MG/ML
2.5 SOLUTION RESPIRATORY (INHALATION) EVERY 6 HOURS PRN
Qty: 125 ML | Refills: 3 | Status: SHIPPED | OUTPATIENT
Start: 2024-08-20

## 2024-08-20 RX ORDER — GABAPENTIN 100 MG/1
100 CAPSULE ORAL 3 TIMES DAILY
Qty: 90 CAPSULE | Refills: 2 | Status: SHIPPED | OUTPATIENT
Start: 2024-08-20

## 2024-08-20 NOTE — PROGRESS NOTES
Pulmonary/Sleep Medicine Outpatient Follow Up Note   Hussein Edward III 58 y.o. male MRN: [id number]  8/22/2024      Reason for Consultation:    Chief Complaint   Patient presents with    Shortness of Breath     1. ARDS survivor  Assessment & Plan:  Severe hypoxic respiratory failure due to influenza and superimposed bacterial pneumonia resulting in arts with prolonged hospitalization in February to March 2024.  He required proning and prolonged ventilator time.  Fortunately was able to be successfully extubated.    He lost 25 pounds from the hospitalization.  He has completed pulmonary rehab.  I have prescribed physical therapy to help him with leg strength.  Still using a 4 point walker.  However no longer oxygen dependent.  6-minute walk at the end of pulmonary rehab, he was able to walk more than 700 feet without stopping with lowest SpO2 98%.  Heart rates in the 100s.    Using albuterol inhaled and nebulized as needed.  Albuterol nebulizer seems to help him quite a bit with symptoms.    Repeat a chest Xray PA/Lat  Orders:  -     XR chest pa & lateral; Future; Expected date: 08/20/2024  2. WALE (obstructive sleep apnea)  Assessment & Plan:  Long history of WALE and been on CPAP for 10 years.  Uses nasal mask and fullface mask in the past.  Sleep study in 2016 showed AHI 7.9, supine AHI 9.5, REM AHI 15.9 with SpO2 denys 89%.  Repeat HST in 2023 showed a SIMA 35.    8/17/2024  19% use over the past 90 days, 14% more than 4 hours  Average use 4 hours and 56 minutes  Serial #[id number]  AirSense 10 4-5 cm H2O EPR 2  Median pressure 4.9, 95th percentile 4.9  Median leak 0.2, 95th percentile 3.4  AHI 0.9    Compliance data reviewed.  Currently on very low CPAP pressures likely due to weight loss from critical illness.  I told him that I am not sure he even has sleep apnea at this point.    However he continues to have worsening migraines in the past week so I will order a overnight pulse ox to ensure that he is not having  nocturnal hypoxemia  Orders:  -     Pulse oximetry overnight  3. Primary insomnia  Assessment & Plan:  Continue with daridorexant 25mg qhs.  No significant side effects.  4. Chronic pain of right knee  Assessment & Plan:  Having leg pain that is consistent with neuropathy.  Prescribed gabapentin for neuropathic pain 100 mg 3 times daily.  He can go up to 200 mg if ineffective.  I cautioned him on side effects including balance issues and drowsiness.  Orders:  -     Ambulatory Referral to Physical Therapy; Future  -     gabapentin (Neurontin) 100 mg capsule; Take 1 capsule (100 mg total) by mouth 3 (three) times a day  5. Other polyneuropathy  -     gabapentin (Neurontin) 100 mg capsule; Take 1 capsule (100 mg total) by mouth 3 (three) times a day  6. Shortness of breath  -     albuterol (2.5 mg/3 mL) 0.083 % nebulizer solution; Take 3 mL (2.5 mg total) by nebulization every 6 (six) hours as needed for wheezing or shortness of breath        Health Maintenance  Immunization History   Administered Date(s) Administered    COVID-19 J&J (Floop) vaccine 0.5 mL 03/25/2021, 03/21/2022    Influenza Quadrivalent Preservative Free 3 years and older IM 12/07/2017    Influenza, seasonal, injectable 11/02/2016    Tdap 03/11/2019      Return in about 6 months (around 2/20/2025).    History of Present Illness   HPI:  Hussein Edward III is a 58 y.o. male who has a past medical history of R frontal meningioma, HLD, low back pain, migraines, nasal septal deviation s/p septoplasty, seasonal allergic rhinitis, severe WALE with difficulty tolerating CPAP, insomnia, recent influenza a/bacterial pneumonia resulting in ARDS with subsequent hypoxic respiratory failure who is presenting for follow-up     8/20/24 - FU with me - completed pulmonary rehab.  No longer oxygen dependent at rest.  Still with dyspnea with exertion but improving.  Sleep on recliner, going back on CPAP.  Daridorexant every other night or every third night.  Metoprolol  is decreased from 50 to 25mg due to lightheadedness.  Migraines are coming back  Walking is still tough.  Still wants to use portable oxygen despite not being hypoxic with exertion.  His work schedule is 5 days a week.  He talked with HR and will take a day off on Wednesdays.      5/8/24 - follow up with me -currently doing pulmonary rehab.  Treadmill exercise is difficult for him.  He can go without oxygen at rest but after 15 minutes he gets some chest tightness like he cannot take a deep breath.  He uses albuterol inhaler and nebulizer but he feels the nebulizer provides him the most benefit which does not last too long.  No significant cough/fever/chills.  He is gaining weight back.  CPAP is difficult to tolerate as he cannot breathe out too much against the pressure.  He bleeds oxygen through his CPAP.  He is try to use it more and he has received a new machine from his DME.    3/28/24 -follow-up with me-here with his wife currently on 2 L with exertion.  Very motivated to do more rehab.  Will schedule pulmonary rehab.  Still having cough     3/20/24 -patient requested decrease in CPAP pressures.  I decreased to 5-10 cm H2O.  I placed an order for pulmonary rehab    3/14-3/27/2024-admitted to [facility] for rehab after hospitalization.  He is on room air at rest but will desaturate with ambulation and requires 2 to 3 L with ambulation.  Speech improving at discharge.  Pulmonary rehab recommended.  Started on Paxil in the hospitalization.  Did not need lorazepam throughout rehab stay.    2/12-3/14/24 -extensive hospitalization for acute hypoxic respiratory failure due to ARDS from influenza A and likely superimposed bacterial pneumonia.  Intubated from 2/19 - 2/29 and required proning, paralytics.  Course complicated by some postextubation delirium, reports of hallucinations during sedation.  Successfully extubated and transferred to rehab on 3/14.    12/20/23  -telephone encounter, prescribed daridorexant for  insomnia    9/12/23 -follow-up with me - last seen by me 6/20/23 - HST shows 32.5 events per hour with 33 minutes <89% SpO2.  He uses CPAP intermittently using a AirSense S10.  He doesn't use a DME and buys supplies online.  He has tried 8-10 different masks.  He reports mask leaking/hissing which wakes him up at night.  He has snoring with CPAP.  CPAP has not made him feel better.     He has some sleep initiation issues and takes him several hours to fall as sleep due to racing thoughts at times.      6/20/23 - consult with me - He endorses snoring, morning headaches, apneic events, seen by his wife. His neurologist recently started him on acetazolamide 125 mg a day.  He is prescribed diazepam and temazepam but he has not taken this recently.  He is taking tramadol a few times a week for back pain.     He had previously been treated with CPAP for 8-10 years although he feels like CPAP was very uncomfortable.  He did use nasal masks and fullface masks.  He still has a CPAP and only uses it occasionally.  Previous sleep study: 2016, AHI 7.9, supine 95.5, REM 15.9, Spo2 denys 89%     His sleep routine typically involves eating between 8-9 PM and going to bed around 10 PM and getting out of bed at 4-4:30 AM, though he does watch or listen to TV at night time. He states that the TV helps to distract him from tinnitus. He gets 5 hour of sleep per night on average, but often will find himself getting less than 2 hours of sleep due to distracting thoughts.      He reports waking about 7 times per night, due to the CPAP mask hissing, and once or twice per night to urinate. He sleeps on his side most nights. He only drinks coffee in the mornings, and denies caffeinated drinks in the afternoon. He had used chewing tobacco in the past, but quit 20 years ago and no longer uses nicotine. He rarely drinks alcohol, once or twice per year. He denies other drug use.       Meds/Allergies     Current Outpatient Medications:      "albuterol (2.5 mg/3 mL) 0.083 % nebulizer solution, Take 3 mL (2.5 mg total) by nebulization every 6 (six) hours as needed for wheezing or shortness of breath, Disp: 125 mL, Rfl: 3    albuterol (PROVENTIL HFA,VENTOLIN HFA) 90 mcg/act inhaler, Inhale 2 puffs every 4 (four) hours as needed for wheezing, Disp: 18 g, Rfl: 0    Daridorexant HCl (Quviviq) 25 MG TABS, Take 25 mg by mouth daily at bedtime, Disp: 90 tablet, Rfl: 3    DULoxetine (CYMBALTA) 30 mg delayed release capsule, 1-2 daily, Disp: 180 capsule, Rfl: 3    gabapentin (Neurontin) 100 mg capsule, Take 1 capsule (100 mg total) by mouth 3 (three) times a day, Disp: 90 capsule, Rfl: 2    meloxicam (Mobic) 15 mg tablet, Take 1 tablet (15 mg total) by mouth daily, Disp: 60 tablet, Rfl: 0    metoprolol succinate (TOPROL-XL) 50 mg 24 hr tablet, Take 1 tablet (50 mg total) by mouth every 12 (twelve) hours, Disp: 180 tablet, Rfl: 3    rimegepant sulfate (Nurtec) 75 mg TBDP, Take one NURTEC 75 mg under or on tongue at migraine onset if needed daily, Disp: 16 tablet, Rfl: 11    traMADol (ULTRAM) 50 mg tablet, Take 1 tablet (50 mg total) by mouth every 6 (six) hours as needed for moderate pain, Disp: 90 tablet, Rfl: 0    Vitamin D, Cholecalciferol, 50 MCG (2000 UT) CAPS, Take by mouth, Disp: , Rfl:   No Known Allergies    Vitals: Blood pressure 124/80, pulse 78, temperature (!) 97 °F (36.1 °C), temperature source Tympanic, height 5' 7\" (1.702 m), weight 84.6 kg (186 lb 9.6 oz), SpO2 99%. Body mass index is 29.23 kg/m². Oxygen Therapy  SpO2: 99 %  Oxygen Therapy: None (Room air)    Physical Exam  Vitals and nursing note reviewed.   Constitutional:       General: He is not in acute distress.     Appearance: Normal appearance. He is well-developed. He is not ill-appearing, toxic-appearing or diaphoretic.   HENT:      Head: Normocephalic and atraumatic.      Mouth/Throat:      Mouth: Mucous membranes are moist.      Pharynx: Oropharynx is clear. No oropharyngeal exudate. " "  Eyes:      Conjunctiva/sclera: Conjunctivae normal.   Cardiovascular:      Rate and Rhythm: Normal rate and regular rhythm.   Pulmonary:      Effort: Pulmonary effort is normal. No respiratory distress.      Breath sounds: Normal breath sounds. No wheezing, rhonchi or rales.   Abdominal:      Tenderness: There is no guarding.   Musculoskeletal:         General: No swelling.      Cervical back: Normal range of motion and neck supple. No rigidity.      Right lower leg: No edema.      Left lower leg: No edema.   Skin:     General: Skin is warm and dry.   Neurological:      General: No focal deficit present.      Mental Status: He is alert and oriented to person, place, and time. Mental status is at baseline.   Psychiatric:         Mood and Affect: Mood normal.             Labs:   I have personally reviewed pertinent lab results.    ABG:   Lab Results   Component Value Date    PHART 7.530 (H) 03/03/2024    JVO0GMV 34.3 (L) 03/03/2024    PO2ART 48.3 (LL) 03/03/2024    SVI7AZS 28.0 03/03/2024    BEART 5.5 03/03/2024    SOURCE Brachial, Right 03/03/2024   ,   CBC:  Lab Results   Component Value Date    WBC 5.63 03/25/2024    HGB 11.3 (L) 03/25/2024    HCT 35.2 (L) 03/25/2024    MCV 91 03/25/2024     03/25/2024    EOSPCT 9 (H) 03/25/2024    EOSABS 0.52 03/25/2024    NEUTOPHILPCT 57 03/25/2024    LYMPHOPCT 22 03/25/2024   ,   CMP:   Lab Results   Component Value Date    SODIUM 141 03/25/2024    K 3.8 03/25/2024     03/25/2024    CO2 27 03/25/2024    BUN 13 03/25/2024    CREATININE 0.56 (L) 03/25/2024    GLUCOSE 223 (H) 02/19/2024    CALCIUM 9.2 03/25/2024    AST 16 03/18/2024    ALT 31 03/18/2024    ALKPHOS 89 03/18/2024    PROT 6.8 12/20/2017    BILITOT 0.3 12/20/2017    EGFR 115 03/25/2024   ,   Ferrtin: No components found for: \"FERRTIN\",  Magensium: No results found for: \"MAGNESIUM\",    Imaging and other studies: I have personally reviewed pertinent reports.   and I have personally reviewed pertinent films " in PACS  4/8/24 CT Chest without contrast  Bilateral opacities and scarring improving compared to prior CT    3/11/24 CXR -bilateral patchy opacities more prominent in the left lung and right lower lobe    2/27/2024 chest CT without contrast-patchy groundglass and airspace opacities scattered throughout more dense in the lower lobes with air bronchograms.  Lack of effusions.  Normal cardiac silhouette.  Small coronary cardial effusion.  No adenopathy.  Endotracheal tube in place.    Sleep Study:  7/20/2023-HST-severe WALE SIMA 32.5.  A 237. significantly higher AHI on the right side of 59 SIMA and 10 on left side.  Baseline oxygen saturation normal    Sleep study: 2016, AHI 7.9, supine 95.5, REM 15.9, Spo2 denys 89%      Compliance data:  8/17/2024  19% use over the past 90 days, 14% more than 4 hours  Average use 4 hours and 56 minutes  Serial #[id number]  AirSense 10 4-5 cm H2O EPR 2  Median pressure 4.9, 95th percentile 4.9  Median leak 0.2, 95th percentile 3.4  AHI 0.9    Transthoracic Echo:  2/19/24    Left Ventricle: Left ventricular cavity size is normal. Wall thickness is mildly increased. There is concentric remodeling. The left ventricular ejection fraction is 55-60%. Systolic function is normal. Wall motion is normal. Diastolic function is mildly abnormal, consistent with grade I (abnormal) relaxation.    Right Ventricle: Right ventricular cavity size is normal. Systolic function is normal.    Left Atrium: The atrium is normal in size.    Right Atrium: The atrium is normal in size.    Pulmonary Function Testing:   Patient SpO2 was 98% on room air at rest.  Heart rate was 87 bpm.   Patient walked 6 minutes with with a walker for 203 m.  Patient's lowest SpO2 was 88% requiring 2 L to correct at the end of the walk.  Highest heart rate was 107 bpm.   Recommend 2 L/min of supplemental oxygen with portable concentrator     Bernabe Cook MD  Pulmonary, Critical Care and Sleep Medicine  [facility]  [facility]     Portions of the record may have been created with voice recognition software. Occasional wrong word or \"sound a like\" substitutions may have occurred due to the inherent limitations of voice recognition software. Please read the chart carefully and recognize, using context, where substitutions have occurred.     "

## 2024-08-20 NOTE — PATIENT INSTRUCTIONS
Repeat a chest X-ray  I prescribed gabapentin 100mg three times a day.  You can go up to 200mg if 100mg is not effective.  Watch for side effects of dizziness, balance issues.  I ordered a overnight oxygen test to make sure your oxygen levels are okay during sleep.  Please do off of CPAP  I placed an order to refer you to physical therapy

## 2024-08-23 NOTE — ASSESSMENT & PLAN NOTE
Having leg pain that is consistent with neuropathy.  Prescribed gabapentin for neuropathic pain 100 mg 3 times daily.  He can go up to 200 mg if ineffective.  I cautioned him on side effects including balance issues and drowsiness.

## 2024-08-23 NOTE — ASSESSMENT & PLAN NOTE
Severe hypoxic respiratory failure due to influenza and superimposed bacterial pneumonia resulting in arts with prolonged hospitalization in February to March 2024.  He required proning and prolonged ventilator time.  Fortunately was able to be successfully extubated.    He lost 25 pounds from the hospitalization.  He has completed pulmonary rehab.  I have prescribed physical therapy to help him with leg strength.  Still using a 4 point walker.  However no longer oxygen dependent.  6-minute walk at the end of pulmonary rehab, he was able to walk more than 700 feet without stopping with lowest SpO2 98%.  Heart rates in the 100s.    Using albuterol inhaled and nebulized as needed.  Albuterol nebulizer seems to help him quite a bit with symptoms.    Repeat a chest Xray PA/Lat

## 2024-08-23 NOTE — ASSESSMENT & PLAN NOTE
Long history of WALE and been on CPAP for 10 years.  Uses nasal mask and fullface mask in the past.  Sleep study in 2016 showed AHI 7.9, supine AHI 9.5, REM AHI 15.9 with SpO2 denys 89%.  Repeat HST in 2023 showed a SIMA 35.    8/17/2024  19% use over the past 90 days, 14% more than 4 hours  Average use 4 hours and 56 minutes  Serial #[id number]  AirSense 10 4-5 cm H2O EPR 2  Median pressure 4.9, 95th percentile 4.9  Median leak 0.2, 95th percentile 3.4  AHI 0.9    Compliance data reviewed.  Currently on very low CPAP pressures likely due to weight loss from critical illness.  I told him that I am not sure he even has sleep apnea at this point.    However he continues to have worsening migraines in the past week so I will order a overnight pulse ox to ensure that he is not having nocturnal hypoxemia

## 2024-08-26 DIAGNOSIS — M25.50 ARTHRALGIA, UNSPECIFIED JOINT: ICD-10-CM

## 2024-08-26 DIAGNOSIS — R00.0 TACHYCARDIA: ICD-10-CM

## 2024-08-27 RX ORDER — TRAMADOL HYDROCHLORIDE 50 MG/1
50 TABLET ORAL EVERY 6 HOURS PRN
Qty: 90 TABLET | Refills: 3 | Status: SHIPPED | OUTPATIENT
Start: 2024-08-27

## 2024-08-27 RX ORDER — METOPROLOL SUCCINATE 50 MG/1
50 TABLET, EXTENDED RELEASE ORAL EVERY 12 HOURS
Qty: 180 TABLET | Refills: 1 | Status: SHIPPED | OUTPATIENT
Start: 2024-08-27

## 2024-09-06 ENCOUNTER — EVALUATION (OUTPATIENT)
Dept: PHYSICAL THERAPY | Facility: CLINIC | Age: 59
End: 2024-09-06
Payer: COMMERCIAL

## 2024-09-06 VITALS — OXYGEN SATURATION: 98 % | HEART RATE: 75 BPM | SYSTOLIC BLOOD PRESSURE: 150 MMHG | DIASTOLIC BLOOD PRESSURE: 90 MMHG

## 2024-09-06 DIAGNOSIS — G89.29 CHRONIC PAIN OF RIGHT KNEE: ICD-10-CM

## 2024-09-06 DIAGNOSIS — M25.561 CHRONIC PAIN OF RIGHT KNEE: ICD-10-CM

## 2024-09-06 DIAGNOSIS — J80 ACUTE RESPIRATORY DISTRESS SYNDROME (HCC): ICD-10-CM

## 2024-09-06 DIAGNOSIS — Z91.81 RISK FOR FALLS: ICD-10-CM

## 2024-09-06 DIAGNOSIS — Z78.9 POOR TOLERANCE FOR AMBULATION: ICD-10-CM

## 2024-09-06 DIAGNOSIS — R53.1 GENERALIZED WEAKNESS: Primary | ICD-10-CM

## 2024-09-06 PROCEDURE — 97163 PT EVAL HIGH COMPLEX 45 MIN: CPT | Performed by: PHYSICAL THERAPIST

## 2024-09-06 PROCEDURE — 97110 THERAPEUTIC EXERCISES: CPT | Performed by: PHYSICAL THERAPIST

## 2024-09-06 NOTE — PROGRESS NOTES
PT Evaluation     Today's date: 2024  Patient name: Hussein Edward III  : 1965  MRN: [id number]  Referring provider: Bernabe Cook MD  Dx:   Encounter Diagnosis     ICD-10-CM    1. Generalized weakness  R53.1       2. Poor tolerance for ambulation  Z78.9       3. Risk for falls  Z91.81       4. Chronic pain of right knee  M25.561 Ambulatory Referral to Physical Therapy    G89.29       5. Acute respiratory distress syndrome (HCC)  J80           Start Time: 930  Stop Time: 1030  Total time in clinic (min): 60 minutes    Assessment  Impairments: abnormal gait, activity intolerance, impaired physical strength, poor posture , unable to perform ADL, activity limitations and endurance  Symptom irritability: moderate    Assessment details: Hussein presents to physical therapy with R legs pain. Patient has history of ARDS which presents endurance deficits. Patient had difficulty getting out of car and walking 100 ft into PT without fatigue. Patient did require longer rest periods when taking subjective history. Hussein did have decreased strength with both BUE and BLE MMT. Patient does carry SpO2 but did not require it to be used. Vitals taken prior to treatment (BP: 150/90, HR: 75bpm, SpO2: 98) and post strength testing (HR: 69, SpO2: 99). Due to patient ARDS dx, improvement in PT will be slow  and will require increased rest between exercise due to long term recovery. Patient did show increased fatigue and SOB throughout examination. Will test 5xSTS and SLS balance next visit, deferred today secondary to visible patient fatigue. Patient would benefit from skilled PT to address functional deficits of decreased endurance and strength to improve quality of life.   Understanding of Dx/Px/POC: good     Prognosis: good    Goals  STG- 6wks  Pt will have increased knee extension strength of 4+ MMT  Pt will have increased hip flexion strength of 4+ MMT  Pt will have increased knee flexion strength of 4+  "MMT    LTG-12wks  Pt will be able to walk 100ft without rest  Pt will be able to complete 4 steps without fatigue  Pt will demonstrate increased endurance throughout 45 min session indicated by decreased rest breaks between activity    Plan  Patient would benefit from: skilled physical therapy    Planned therapy interventions: ADL training, activity modification, neuromuscular re-education, postural training, strengthening, therapeutic exercise, therapeutic activities and functional ROM exercises    Frequency: 2x week  Duration in weeks: 12  Plan of Care beginning date: 9/6/2024  Plan of Care expiration date: 11/17/2024  Treatment plan discussed with: patient      Subjective Evaluation    History of Present Illness  Mechanism of injury: Hussein presents to physical therapy clinic with R leg pain. Patient has hx of ARDS in February. In medical coma for several weeks in [facility] before d/c to [facility] for acute rehab. Patient completed 36 sessions with respiratory therapy following d/c from [facility]. Patient does carry portable O2 but only uses if SpO2 drops to 93-96%.Patient has been taking Metoprolol per MD. Difficulty with walking 100 ft from parking lot into office and completing 8-9 stairs at work. Patient lives in [facility] with wife who helps with ADLs and chores around the house. Patient does have difficulty with getting in and out of car without fatigue. Has trouble bending down to pick items off ground due to SOB and leg pain. Patient feels \"spasms\" in the back of both thighs with pain along the superior patellar joint line and gastroc.  Quality of life: fair    Patient Goals  Patient goals for therapy: increased strength, independence with ADLs/IADLs, increased motion and improved balance    Pain  Quality: pulling and tight    Social Support  Lives in: one-story house  Lives with: spouse    Employment status: working  Treatments  Previous treatment: physical therapy (repiratory therapy)  Current treatment: physical " "therapy    Objective     Strength/Myotome Testing     Left Shoulder     Planes of Motion   Flexion: 3+   Abduction: 3+   External rotation at 0°: 4+   Internal rotation at 0°: 4+     Right Shoulder     Planes of Motion   Flexion: 4-   Abduction: 4-   External rotation at 0°: 4+   Internal rotation at 0°: 4+     Left Elbow   Flexion: 4-  Extension: 3+    Right Elbow   Flexion: 4  Extension: 4    Left Hip   Planes of Motion   Flexion: 4-    Right Hip   Planes of Motion   Flexion: 4+    Left Knee   Flexion: 4-  Extension: 4-    Right Knee   Flexion: 4  Extension: 4    Ambulation   Weight-Bearing Status   Weight-Bearing Status (Left): full weight bearing   Weight-Bearing Status (Right): full weight-bearing    Assistive device used: rollator walker    Observational Gait   Increased left stance time. Decreased walking speed, stride length and right stance time.   Left foot contact pattern: foot flat  Right foot contact pattern: foot flat  Base of support: decreased      Flowsheet Rows      Flowsheet Row Most Recent Value   PT/OT G-Codes    Current Score 23   Projected Score 55               Precautions: Hx of ARDS Feb 2024. Patient will require increased rest break between activity. Does carry portable O2 concentrator.       Manuals 9/6                                                                Neuro Re-Ed             Scapular retraction 10x                                                                                          Ther Ex             Seated HS stretch 5\"x10            Seated HR/TR 2x10            Seated marching             Bike for endurance             TB shoulder extension             TB IR                                       Ther Activity                                       Gait Training                                       Modalities                                          Patient treated by LAWRENCE Espinosa under direct supervision of Bradley Reyes PT. DPT.    "

## 2024-09-09 ENCOUNTER — OFFICE VISIT (OUTPATIENT)
Dept: PHYSICAL THERAPY | Facility: CLINIC | Age: 59
End: 2024-09-09
Payer: COMMERCIAL

## 2024-09-09 DIAGNOSIS — Z91.81 RISK FOR FALLS: ICD-10-CM

## 2024-09-09 DIAGNOSIS — G89.29 CHRONIC PAIN OF RIGHT KNEE: ICD-10-CM

## 2024-09-09 DIAGNOSIS — M25.561 CHRONIC PAIN OF RIGHT KNEE: ICD-10-CM

## 2024-09-09 DIAGNOSIS — J80 ACUTE RESPIRATORY DISTRESS SYNDROME (HCC): ICD-10-CM

## 2024-09-09 DIAGNOSIS — Z78.9 POOR TOLERANCE FOR AMBULATION: ICD-10-CM

## 2024-09-09 DIAGNOSIS — R53.1 GENERALIZED WEAKNESS: Primary | ICD-10-CM

## 2024-09-09 PROCEDURE — 97110 THERAPEUTIC EXERCISES: CPT | Performed by: PHYSICAL THERAPIST

## 2024-09-09 PROCEDURE — 97112 NEUROMUSCULAR REEDUCATION: CPT | Performed by: PHYSICAL THERAPIST

## 2024-09-09 NOTE — PROGRESS NOTES
"Daily Note     Today's date: 2024  Patient name: Hussein Edward III  : 1965  MRN: [id number]  Referring provider: Bernabe Cook MD  Dx:   Encounter Diagnosis     ICD-10-CM    1. Generalized weakness  R53.1       2. Poor tolerance for ambulation  Z78.9       3. Chronic pain of right knee  M25.561     G89.29       4. Risk for falls  Z91.81       5. Acute respiratory distress syndrome (HCC)  J80                      Subjective: Patient reports feeling well today. He took yesterday off from extra exercising aside from HEP. He reports his fatigue will fluctuate depending on the activity throughout the day.      Objective: See treatment diary below      Assessment: Tolerated treatment well as indicated by ability to complete all prescribed exercises with little rest break. Hussein was able to walk 100ft from the parking lot into the clinic without needing to stop. Patient did experience fatigue near end of treatment with BW squats and lateral step ups. Patient would benefit from continued PT to address decreased strength of UE/LE and endurance to improve quality of life.    Plan: Continue per plan of care. If patient reports feeling well continue plan as completed below, alternate between UE and LE. If patient reports increased SOB, keeps exercises seated.     Precautions: Hx of ARDS 2024. Patient will require increased rest break between activity. Does carry portable O2 concentrator.       Manuals                                                                Neuro Re-Ed             Scapular retraction 10x                                                                                          Ther Ex             Seated HS stretch 5\"x10            Seated HR/TR 2x10            Seated marching  3#  10x           Bike for endurance  5'  Lv1           TB shoulder extension  Green  x15           TB IR  Red  10x           TB ER  Green  10x           Knee flexion  #2  10x           BW squat  10x         "   Standing knee flexion  3#  10x           Ther Activity             Lateral step ups  Lv3  10x ea                        Gait Training                                       Modalities

## 2024-09-10 ENCOUNTER — HOSPITAL ENCOUNTER (OUTPATIENT)
Dept: RADIOLOGY | Facility: HOSPITAL | Age: 59
Discharge: HOME | End: 2024-09-10
Payer: COMMERCIAL

## 2024-09-10 DIAGNOSIS — Z87.09 ARDS SURVIVOR: ICD-10-CM

## 2024-09-10 PROCEDURE — 71046 X-RAY EXAM CHEST 2 VIEWS: CPT

## 2024-09-11 ENCOUNTER — APPOINTMENT (OUTPATIENT)
Dept: PHYSICAL THERAPY | Facility: CLINIC | Age: 59
End: 2024-09-11
Payer: COMMERCIAL

## 2024-09-12 ENCOUNTER — OFFICE VISIT (OUTPATIENT)
Dept: PHYSICAL THERAPY | Facility: CLINIC | Age: 59
End: 2024-09-12
Payer: COMMERCIAL

## 2024-09-12 DIAGNOSIS — G89.29 CHRONIC PAIN OF RIGHT KNEE: ICD-10-CM

## 2024-09-12 DIAGNOSIS — J80 ACUTE RESPIRATORY DISTRESS SYNDROME (HCC): ICD-10-CM

## 2024-09-12 DIAGNOSIS — M25.561 CHRONIC PAIN OF RIGHT KNEE: ICD-10-CM

## 2024-09-12 DIAGNOSIS — Z78.9 POOR TOLERANCE FOR AMBULATION: ICD-10-CM

## 2024-09-12 DIAGNOSIS — Z91.81 RISK FOR FALLS: ICD-10-CM

## 2024-09-12 DIAGNOSIS — R53.1 GENERALIZED WEAKNESS: Primary | ICD-10-CM

## 2024-09-12 PROCEDURE — 97110 THERAPEUTIC EXERCISES: CPT | Performed by: PHYSICAL THERAPIST

## 2024-09-12 PROCEDURE — 97112 NEUROMUSCULAR REEDUCATION: CPT | Performed by: PHYSICAL THERAPIST

## 2024-09-12 NOTE — PROGRESS NOTES
"Daily Note     Today's date: 2024  Patient name: Hussein Edward III  : 1965  MRN: [id number]  Referring provider: Bernabe Cook MD  Dx:   Encounter Diagnosis     ICD-10-CM    1. Generalized weakness  R53.1       2. Poor tolerance for ambulation  Z78.9       3. Chronic pain of right knee  M25.561     G89.29       4. Risk for falls  Z91.81       5. Acute respiratory distress syndrome (HCC)  J80                      Subjective: Hussein reports feeling well after previous visit with no increased pain in the knee. He did note increased fatigue after the session. 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      Objective: See treatment diary below      Assessment: Tolerated treatment fair, indicated by increased fatigue throughout the session. Hip abd added into todays plan. Patient did require increased rest breaks today. Patient would benefit from continued PT to increase LE strength and endurance to improve quality of life.      Plan: Continue per plan of care. If patient reports feeling well next visit, increase exercises to 2x10     Precautions: Hx of ARDS 2024. Patient will require increased rest break between activity. Does carry portable O2 concentrator.       Manuals                                                               Neuro Re-Ed             Scapular retraction 10x  10x  Row  red                                                                                        Ther Ex             Seated HS stretch 5\"x10            Seated HR/TR 2x10            Seated marching  3#  10x 3#  10x          Bike for endurance  5'  Lv1 5'  Lv1          TB shoulder extension  Green  x15 Green  x15          TB IR  Red  10x Red  10x          TB ER  Green  10x Green   10x          Knee flexion  #2  10x 2#  10x          BW squat  10x           seated knee extension  3#  10x 3#  10x          Seated hip flexion   3#  10x          Standing hip abd   " 3#  10x          Ther Activity             Lateral step ups  Lv3  10x ea Lv3  10x  ea                       Gait Training                                       Modalities                                         Patient treated by LAWRENCE Espinosa under direct supervision of Bradley Reyes PT. DPT.

## 2024-09-13 ENCOUNTER — APPOINTMENT (OUTPATIENT)
Dept: PHYSICAL THERAPY | Facility: CLINIC | Age: 59
End: 2024-09-13
Payer: COMMERCIAL

## 2024-09-14 DIAGNOSIS — M25.561 CHRONIC PAIN OF RIGHT KNEE: ICD-10-CM

## 2024-09-14 DIAGNOSIS — G62.89 OTHER POLYNEUROPATHY: ICD-10-CM

## 2024-09-14 DIAGNOSIS — G89.29 CHRONIC PAIN OF RIGHT KNEE: ICD-10-CM

## 2024-09-16 ENCOUNTER — OFFICE VISIT (OUTPATIENT)
Dept: PHYSICAL THERAPY | Facility: CLINIC | Age: 59
End: 2024-09-16
Payer: COMMERCIAL

## 2024-09-16 DIAGNOSIS — G89.29 CHRONIC PAIN OF RIGHT KNEE: ICD-10-CM

## 2024-09-16 DIAGNOSIS — R53.1 GENERALIZED WEAKNESS: Primary | ICD-10-CM

## 2024-09-16 DIAGNOSIS — Z91.81 RISK FOR FALLS: ICD-10-CM

## 2024-09-16 DIAGNOSIS — J80 ACUTE RESPIRATORY DISTRESS SYNDROME (HCC): ICD-10-CM

## 2024-09-16 DIAGNOSIS — M25.561 CHRONIC PAIN OF RIGHT KNEE: ICD-10-CM

## 2024-09-16 DIAGNOSIS — Z78.9 POOR TOLERANCE FOR AMBULATION: ICD-10-CM

## 2024-09-16 PROCEDURE — 97110 THERAPEUTIC EXERCISES: CPT | Performed by: PHYSICAL THERAPIST

## 2024-09-16 PROCEDURE — 97530 THERAPEUTIC ACTIVITIES: CPT | Performed by: PHYSICAL THERAPIST

## 2024-09-16 RX ORDER — MELOXICAM 15 MG/1
15 TABLET ORAL DAILY
Qty: 60 TABLET | Refills: 5 | Status: SHIPPED | OUTPATIENT
Start: 2024-09-16

## 2024-09-16 RX ORDER — GABAPENTIN 100 MG/1
100 CAPSULE ORAL 3 TIMES DAILY
Qty: 90 CAPSULE | Refills: 1 | Status: SHIPPED | OUTPATIENT
Start: 2024-09-16 | End: 2024-09-23 | Stop reason: SDUPTHER

## 2024-09-16 NOTE — PROGRESS NOTES
"Daily Note     Today's date: 2024  Patient name: Hussein Edward III  : 1965  MRN: [id number]  Referring provider: Bernabe Cook MD  Dx:   Encounter Diagnosis     ICD-10-CM    1. Generalized weakness  R53.1       2. Poor tolerance for ambulation  Z78.9       3. Chronic pain of right knee  M25.561     G89.29       4. Risk for falls  Z91.81       5. Acute respiratory distress syndrome (HCC)  J80                      Subjective: Pt reports increased fatigue at beginning of visit today due to playing with granddaughters over the weekend. Pt did fall over the weekend about a foot onto the ground due to losing balance when playing with grandchild.      Objective: See treatment diary below      Assessment: Tolerated treatment well indicated by ability to complete increased number of reps from x10 to 2x10. Cueing needed to maintain upright posture throughout exercises. Patient would benefit from continued PT to address functional deficits of decreased hip and knee strength, along with decreased endurance with daily activities      Plan: Continue per plan of care. If patient reports feeling well next visit, add hip abduction back into plan. Also add SLS next visit.     Precautions: Hx of ARDS 2024. Patient will require increased rest break between activity. Does carry portable O2 concentrator.       Manuals                                                              Neuro Re-Ed             Scapular retraction 10x  10x  Row  red nv         SLS                                                                              Ther Ex             Seated HS stretch 5\"x10            Seated HR/TR 2x10            Seated marching  3#  10x 3#  10x          Bike for endurance  5'  Lv1 5'  Lv1 5'  Lv1         TB shoulder extension  Green  x15 Green  x15 nv         TB IR  Red  10x Red  10x Red  10x         TB ER  Green  10x Green   10x Green  2x10         Knee flexion  #2  10x 2#  10x 2#  2x10         BW " squat  10x  10x         seated knee extension  3#  10x 3#  10x 2.5#  10x         Seated hip flexion   3#  10x 3# x10  2# x10         Standing hip abd   3#  10x nv         Ther Activity             Lateral step ups  Lv3  10x ea Lv3  10x  ea Lv2  10x ea                      Gait Training                                       Modalities                                         Patient treated by LAWRENCE Espinosa under direct supervision of Bradley Reyes PT. DPT.

## 2024-09-18 ENCOUNTER — APPOINTMENT (OUTPATIENT)
Dept: PHYSICAL THERAPY | Facility: CLINIC | Age: 59
End: 2024-09-18
Payer: COMMERCIAL

## 2024-09-19 ENCOUNTER — OFFICE VISIT (OUTPATIENT)
Dept: PHYSICAL THERAPY | Facility: CLINIC | Age: 59
End: 2024-09-19
Payer: COMMERCIAL

## 2024-09-19 DIAGNOSIS — J80 ACUTE RESPIRATORY DISTRESS SYNDROME (HCC): ICD-10-CM

## 2024-09-19 DIAGNOSIS — Z91.81 RISK FOR FALLS: ICD-10-CM

## 2024-09-19 DIAGNOSIS — R53.1 GENERALIZED WEAKNESS: Primary | ICD-10-CM

## 2024-09-19 DIAGNOSIS — M25.561 CHRONIC PAIN OF RIGHT KNEE: ICD-10-CM

## 2024-09-19 DIAGNOSIS — G89.29 CHRONIC PAIN OF RIGHT KNEE: ICD-10-CM

## 2024-09-19 DIAGNOSIS — Z78.9 POOR TOLERANCE FOR AMBULATION: ICD-10-CM

## 2024-09-19 PROCEDURE — 97110 THERAPEUTIC EXERCISES: CPT | Performed by: PHYSICAL THERAPIST

## 2024-09-19 PROCEDURE — 97530 THERAPEUTIC ACTIVITIES: CPT | Performed by: PHYSICAL THERAPIST

## 2024-09-19 NOTE — PROGRESS NOTES
"Daily Note     Today's date: 2024  Patient name: Hussein Edward III  : 1965  MRN: [id number]  Referring provider: Bernabe Cook MD  Dx:   Encounter Diagnosis     ICD-10-CM    1. Generalized weakness  R53.1       2. Poor tolerance for ambulation  Z78.9       3. Risk for falls  Z91.81       4. Chronic pain of right knee  M25.561     G89.29       5. Acute respiratory distress syndrome (HCC)  J80           Start Time: 1445  Stop Time: 1540  Total time in clinic (min): 55 minutes    Subjective: Hussein reports increased soreness since previous visit in legs. Pt has been using heating pad and stretching throughout the day to relieve symptoms. He has relied move of support from his walker due to increased soreness.      Objective: See treatment diary below      Assessment: Tolerated treatment fair. Verbal cues needed for maintaining upright posture with standing abdominal iso ball squeeze. Cueing also needed for maintaining proper body mechanics with BW squatting. Patient would benefit from continued PT to progress endurance and LE strength to improve quality of life.      Plan: Continue per plan of care.      Precautions: Hx of ARDS 2024. Patient will require increased rest break between activity. Does carry portable O2 concentrator.       Manuals                                                             Neuro Re-Ed             Scapular retraction 10x  10x  Row  red nv         SLS                                                                              Ther Ex             Seated HS stretch 5\"x10    5'x10        Seated HR/TR 2x10            Seated marching  3#  10x 3#  10x          Bike for endurance  5'  Lv1 5'  Lv1 5'  Lv1 6'  Lv1        TB shoulder extension  Green  x15 Green  x15 nv         TB IR  Red  10x Red  10x Red  10x         TB ER  Green  10x Green   10x Green  2x10 Green  2x10        Knee flexion  #2  10x 2#  10x 2#  2x10         BW squat  10x  10x         seated knee " "extension  3#  10x 3#  10x 2.5#  10x         Seated hip flexion   3#  10x 3# x10  2# x10         Standing hip abd   3#  10x nv 2x10  No weight        Seated hip iso     5\"x20        Standing rows     nv        Abdominal iso ball squeze     10x5\"        Ther Activity             Lateral step ups  Lv3  10x ea Lv3  10x  ea Lv2  10x ea         BW squat`     10x        Gait Training                                       Modalities                                         Patient treated by Saundra Pereira SPT under direct supervision of Bradley Reyes PT. DPT.           "

## 2024-09-20 ENCOUNTER — APPOINTMENT (OUTPATIENT)
Dept: PHYSICAL THERAPY | Facility: CLINIC | Age: 59
End: 2024-09-20
Payer: COMMERCIAL

## 2024-09-23 DIAGNOSIS — M25.561 CHRONIC PAIN OF RIGHT KNEE: ICD-10-CM

## 2024-09-23 DIAGNOSIS — G62.89 OTHER POLYNEUROPATHY: ICD-10-CM

## 2024-09-23 DIAGNOSIS — G89.29 CHRONIC PAIN OF RIGHT KNEE: ICD-10-CM

## 2024-09-24 RX ORDER — GABAPENTIN 100 MG/1
100 CAPSULE ORAL 3 TIMES DAILY
Qty: 90 CAPSULE | Refills: 5 | Status: SHIPPED | OUTPATIENT
Start: 2024-09-24

## 2024-09-26 ENCOUNTER — OFFICE VISIT (OUTPATIENT)
Dept: PHYSICAL THERAPY | Facility: CLINIC | Age: 59
End: 2024-09-26
Payer: COMMERCIAL

## 2024-09-26 DIAGNOSIS — G89.29 CHRONIC PAIN OF RIGHT KNEE: ICD-10-CM

## 2024-09-26 DIAGNOSIS — M25.561 CHRONIC PAIN OF RIGHT KNEE: ICD-10-CM

## 2024-09-26 DIAGNOSIS — Z91.81 RISK FOR FALLS: ICD-10-CM

## 2024-09-26 DIAGNOSIS — J80 ACUTE RESPIRATORY DISTRESS SYNDROME (HCC): ICD-10-CM

## 2024-09-26 DIAGNOSIS — R53.1 GENERALIZED WEAKNESS: Primary | ICD-10-CM

## 2024-09-26 DIAGNOSIS — Z78.9 POOR TOLERANCE FOR AMBULATION: ICD-10-CM

## 2024-09-26 PROCEDURE — 97112 NEUROMUSCULAR REEDUCATION: CPT | Performed by: PHYSICAL THERAPIST

## 2024-09-26 PROCEDURE — 97110 THERAPEUTIC EXERCISES: CPT | Performed by: PHYSICAL THERAPIST

## 2024-09-26 NOTE — PROGRESS NOTES
"Daily Note     Today's date: 2024  Patient name: Hussein Edward III  : 1965  MRN: [id number]  Referring provider: Bernabe Cook MD  Dx:   Encounter Diagnosis     ICD-10-CM    1. Generalized weakness  R53.1       2. Poor tolerance for ambulation  Z78.9       3. Risk for falls  Z91.81       4. Chronic pain of right knee  M25.561     G89.29       5. Acute respiratory distress syndrome (HCC)  J80                      Subjective: Pt reports feeling fair since previous visit as he feels his endurance is improving however he continues to have pain and weakness in the legs. He notes his coworkers have noticed an improvement in his ability to talk for longer durations without getting as SOB      Objective: See treatment diary below      Assessment: Tolerated treatment fair indicated by ability to complete all planned exercises with need for breaks in between each exercise. However, patient was able to take shorter breaks. He did have increased fatigue with 5 min walk at end of session. SPO2 was 99% prior to exercise and 98% after today's session. Pt educated on DOMS 24-48 hrs after session and to continue HEP. Pt does require verbal cues to maintain upright posture for breathing.  Patient would benefit from continued PT to address functional deficits of decreased endurance and strength to improve quality of life.      Plan: Continue per plan of care.  If pt reports increased soreness of LE next visit, focus on UE strengthening.     Precautions: Hx of ARDS 2024. Patient will require increased rest break between activity. Does carry portable O2 concentrator.       Manuals                                                            Neuro Re-Ed             Scapular retraction 10x  10x  Row  red nv         SLS      5x5\"                                                                        Ther Ex             Seated HS stretch 5\"x10    5'x10        Seated HR/TR 2x10            Seated " "marching  3#  10x 3#  10x          Bike for endurance  5'  Lv1 5'  Lv1 5'  Lv1 6'  Lv1 5'  Lv1       TB shoulder extension  Green  x15 Green  x15 nv         TB IR  Red  10x Red  10x Red  10x         TB ER  Green  10x Green   10x Green  2x10 Green  2x10 Blue  2x10       Knee flexion  #2  10x 2#  10x 2#  2x10  2.5#  2x10       BW squat  10x  10x         seated knee extension  3#  10x 3#  10x 2.5#  10x  2.5#  10x2       Seated hip flexion   3#  10x 3# x10  2# x10  2.5#  2x10       Standing hip abd   3#  10x nv 2x10  No weight 2x10  2.5#       Seated hip iso     5\"x20        Standing rows     nv 2x10  blue       Abdominal iso ball squeze     10x5\"        Ther Activity             Lateral step ups  Lv3  10x ea Lv3  10x  ea Lv2  10x ea         BW squat`     10x        walking      5'  inside       Gait Training                                       Modalities                                         Patient treated by Saundra Pereira, SPT under direct supervision of Bradley Reyes PT. DPT.             "

## 2024-09-27 ENCOUNTER — APPOINTMENT (OUTPATIENT)
Dept: PHYSICAL THERAPY | Facility: CLINIC | Age: 59
End: 2024-09-27
Payer: COMMERCIAL

## 2024-10-01 ENCOUNTER — OFFICE VISIT (OUTPATIENT)
Dept: PHYSICAL THERAPY | Facility: CLINIC | Age: 59
End: 2024-10-01
Payer: COMMERCIAL

## 2024-10-01 DIAGNOSIS — Z91.81 RISK FOR FALLS: ICD-10-CM

## 2024-10-01 DIAGNOSIS — M25.561 CHRONIC PAIN OF RIGHT KNEE: ICD-10-CM

## 2024-10-01 DIAGNOSIS — R53.1 GENERALIZED WEAKNESS: Primary | ICD-10-CM

## 2024-10-01 DIAGNOSIS — J80 ACUTE RESPIRATORY DISTRESS SYNDROME (HCC): ICD-10-CM

## 2024-10-01 DIAGNOSIS — G89.29 CHRONIC PAIN OF RIGHT KNEE: ICD-10-CM

## 2024-10-01 DIAGNOSIS — Z78.9 POOR TOLERANCE FOR AMBULATION: ICD-10-CM

## 2024-10-01 PROCEDURE — 97112 NEUROMUSCULAR REEDUCATION: CPT

## 2024-10-01 PROCEDURE — 97110 THERAPEUTIC EXERCISES: CPT

## 2024-10-01 NOTE — PROGRESS NOTES
"Daily Note     Today's date: 10/1/2024  Patient name: Hussein Edward III  : 1965  MRN: [id number]  Referring provider: Bernabe Cook MD  Dx:   Encounter Diagnosis     ICD-10-CM    1. Generalized weakness  R53.1       2. Acute respiratory distress syndrome (HCC)  J80       3. Poor tolerance for ambulation  Z78.9       4. Risk for falls  Z91.81       5. Chronic pain of right knee  M25.561     G89.29           Start Time: 1445  Stop Time: 1530  Total time in clinic (min): 45 minutes    Subjective: Pt reports elevated fatigue today from prolonged amb when grocery shopping otherwise denies other sx today.       Objective: See treatment diary below      Assessment: Tolerated treatment well. Pt is given CG with amb and transfers to ensure steadiness with mobility. He is additionally given frequent rest breaks to ensure no SOB or sig fatigue. He demonstrates good tolerance to all exercises, 2# resistance used today due to sig LE fatigue, resume 2.5# NV. Patient demonstrated fatigue post treatment, exhibited good technique with therapeutic exercises, and would benefit from continued PT      Plan: Continue per plan of care.      Precautions: Hx of ARDS 2024. Patient will require increased rest break between activity. Does carry portable O2 concentrator.       Manuals 9/6 9/9 9/12 9/16 9/19 9/26 10/1                                                          Neuro Re-Ed             Scapular retraction 10x  10x  Row  red nv         SLS      5x5\"                                                                        Ther Ex             Seated HS stretch 5\"x10    5'x10        Seated HR/TR 2x10            Seated marching  3#  10x 3#  10x          Bike for endurance  5'  Lv1 5'  Lv1 5'  Lv1 6'  Lv1 5'  Lv1 5'  Lv1      TB shoulder extension  Green  x15 Green  x15 nv         TB IR  Red  10x Red  10x Red  10x   Blue 2x10       TB ER  Green  10x Green   10x Green  2x10 Green  2x10 Blue  2x10 Blue  2x10        Knee flexion  " "#2  10x 2#  10x 2#  2x10  2.5#  2x10 2# 2x10       BW squat  10x  10x         seated knee extension  3#  10x 3#  10x 2.5#  10x  2.5#  10x2 2# 2x10       Seated hip flexion   3#  10x 3# x10  2# x10  2.5#  2x10 2# 2x10       Standing hip abd   3#  10x nv 2x10  No weight 2x10  2.5# 2# 2x10      Seated hip iso     5\"x20        Standing rows     nv 2x10  blue Blue  2x10      Abdominal iso ball squeze     10x5\"        Ther Activity             Lateral step ups  Lv3  10x ea Lv3  10x  ea Lv2  10x ea         BW squat`     10x  10x STS w/ foam       walking      5'  inside 5'  inside      Gait Training                                       Modalities                                       \               "

## 2024-10-03 ENCOUNTER — OFFICE VISIT (OUTPATIENT)
Dept: PHYSICAL THERAPY | Facility: CLINIC | Age: 59
End: 2024-10-03
Payer: COMMERCIAL

## 2024-10-03 DIAGNOSIS — Z78.9 POOR TOLERANCE FOR AMBULATION: ICD-10-CM

## 2024-10-03 DIAGNOSIS — R53.1 GENERALIZED WEAKNESS: Primary | ICD-10-CM

## 2024-10-03 DIAGNOSIS — G89.29 CHRONIC PAIN OF RIGHT KNEE: ICD-10-CM

## 2024-10-03 DIAGNOSIS — M25.561 CHRONIC PAIN OF RIGHT KNEE: ICD-10-CM

## 2024-10-03 DIAGNOSIS — J80 ACUTE RESPIRATORY DISTRESS SYNDROME (HCC): ICD-10-CM

## 2024-10-03 PROCEDURE — 97110 THERAPEUTIC EXERCISES: CPT | Performed by: PHYSICAL THERAPIST

## 2024-10-03 PROCEDURE — 97112 NEUROMUSCULAR REEDUCATION: CPT | Performed by: PHYSICAL THERAPIST

## 2024-10-03 NOTE — PROGRESS NOTES
"Daily Note     Today's date: 10/3/2024  Patient name: Hussein Edward III  : 1965  MRN: [id number]  Referring provider: Bernabe Cook MD  Dx:   Encounter Diagnosis     ICD-10-CM    1. Generalized weakness  R53.1       2. Acute respiratory distress syndrome (HCC)  J80       3. Poor tolerance for ambulation  Z78.9       4. Chronic pain of right knee  M25.561     G89.29                      Subjective: pt notes that he is not too bad today secondary to having off today.      Objective: See treatment diary below      Assessment: pt did well with all exericses. Pt does have some difficulty with temp regulation with exercises so we used a c/s cp around the neck during exercise to help with the regulation which worked well.   Plan: Continue per plan of care.      Precautions: Hx of ARDS 2024. Patient will require increased rest break between activity. Does carry portable O2 concentrator.       Manuals 9/6 9/9 9/12 9/16 9/19 9/26 10/1 10/3                                                         Neuro Re-Ed             Scapular retraction 10x  10x  Row  red nv         SLS      5x5\"                                                                        Ther Ex             Seated HS stretch 5\"x10    5'x10        Seated HR/TR 2x10            Seated marching  3#  10x 3#  10x          Bike for endurance  5'  Lv1 5'  Lv1 5'  Lv1 6'  Lv1 5'  Lv1 5'  Lv1 Lv 1 6'     TB shoulder extension  Green  x15 Green  x15 nv         TB IR  Red  10x Red  10x Red  10x   Blue 2x10  Blue 2x10       TB ER  Green  10x Green   10x Green  2x10 Green  2x10 Blue  2x10 Blue  2x10   Gtb bilateral 2x10     Knee flexion  #2  10x 2#  10x 2#  2x10  2.5#  2x10 2# 2x10  2# 2x10     BW squat  10x  10x         seated knee extension  3#  10x 3#  10x 2.5#  10x  2.5#  10x2 2# 2x10  2# 2x10      Seated hip flexion   3#  10x 3# x10  2# x10  2.5#  2x10 2# 2x10  2# 2x10      Standing hip abd   3#  10x nv 2x10  No weight 2x10  2.5# 2# 2x10 2# 2x10      Standing " "hip ext        2# 2x10      Standing rows     nv 2x10  blue Blue  2x10 Blue 2x10      Abdominal iso ball squeze     10x5\"        Ther Activity             Lateral step ups  Lv3  10x ea Lv3  10x  ea Lv2  10x ea         BW squat`     10x  10x STS w/ foam  10x sts w/ foam     walking      5'  inside 5'  inside      Gait Training                                       Modalities                                       \                 "

## 2024-10-04 ENCOUNTER — APPOINTMENT (OUTPATIENT)
Dept: PHYSICAL THERAPY | Facility: CLINIC | Age: 59
End: 2024-10-04
Payer: COMMERCIAL

## 2024-10-05 DIAGNOSIS — M25.50 ARTHRALGIA, UNSPECIFIED JOINT: ICD-10-CM

## 2024-10-07 ENCOUNTER — OFFICE VISIT (OUTPATIENT)
Dept: PHYSICAL THERAPY | Facility: CLINIC | Age: 59
End: 2024-10-07
Payer: COMMERCIAL

## 2024-10-07 DIAGNOSIS — F51.01 PRIMARY INSOMNIA: ICD-10-CM

## 2024-10-07 DIAGNOSIS — J80 ACUTE RESPIRATORY DISTRESS SYNDROME (HCC): ICD-10-CM

## 2024-10-07 DIAGNOSIS — R53.1 GENERALIZED WEAKNESS: Primary | ICD-10-CM

## 2024-10-07 DIAGNOSIS — Z78.9 POOR TOLERANCE FOR AMBULATION: ICD-10-CM

## 2024-10-07 DIAGNOSIS — Z91.81 RISK FOR FALLS: ICD-10-CM

## 2024-10-07 PROCEDURE — 97110 THERAPEUTIC EXERCISES: CPT | Performed by: PHYSICAL THERAPIST

## 2024-10-07 PROCEDURE — 97112 NEUROMUSCULAR REEDUCATION: CPT | Performed by: PHYSICAL THERAPIST

## 2024-10-07 RX ORDER — TRAMADOL HYDROCHLORIDE 50 MG/1
50 TABLET ORAL EVERY 6 HOURS PRN
Qty: 90 TABLET | Refills: 5 | Status: SHIPPED | OUTPATIENT
Start: 2024-10-07

## 2024-10-07 RX ORDER — DARIDOREXANT 25 MG/1
25 TABLET, FILM COATED ORAL
Qty: 90 TABLET | Refills: 0 | Status: SHIPPED | OUTPATIENT
Start: 2024-10-07

## 2024-10-07 NOTE — PROGRESS NOTES
"Daily Note     Today's date: 10/7/2024  Patient name: Hussein Edward III  : 1965  MRN: [id number]  Referring provider: Bernabe Cook MD  Dx:   Encounter Diagnosis     ICD-10-CM    1. Generalized weakness  R53.1       2. Acute respiratory distress syndrome (HCC)  J80       3. Poor tolerance for ambulation  Z78.9       4. Risk for falls  Z91.81                      Subjective: pt notes that he is feeling good today      Objective: See treatment diary below      Assessment: pt was able to tolerate more activity today, but is slow and methodical thorugh all exercises. Did try and alternate LE and UE as much as possible to reduce down time.     Plan: Continue per plan of care.      Precautions: Hx of ARDS 2024. Patient will require increased rest break between activity. Does carry portable O2 concentrator.       Manuals 9/6 9/9 9/12 9/16 9/19 9/26 10/1 10/3 10/7                                                        Neuro Re-Ed             Scapular retraction 10x  10x  Row  red nv         SLS      5x5\"                                                                        Ther Ex             Seated HS stretch 5\"x10    5'x10        Seated HR/TR 2x10            Seated marching  3#  10x 3#  10x          Bike for endurance  5'  Lv1 5'  Lv1 5'  Lv1 6'  Lv1 5'  Lv1 5'  Lv1 Lv 1 6' Lv 1 6'    TB shoulder extension  Green  x15 Green  x15 nv     Blue 2x10    TB IR  Red  10x Red  10x Red  10x   Blue 2x10  Blue 2x10   Blue 2x10    TB ER  Green  10x Green   10x Green  2x10 Green  2x10 Blue  2x10 Blue  2x10   Gtb bilateral 2x10 [place] gtb 2x10    Knee flexion  #2  10x 2#  10x 2#  2x10  2.5#  2x10 2# 2x10  2# 2x10 2# 2x12    BW squat  10x  10x         seated knee extension  3#  10x 3#  10x 2.5#  10x  2.5#  10x2 2# 2x10  2# 2x10  2# 2x12    Seated hip flexion   3#  10x 3# x10  2# x10  2.5#  2x10 2# 2x10  2# 2x10  2# 2x10     Standing hip abd   3#  10x nv 2x10  No weight 2x10  2.5# 2# 2x10 2# 2x10  2# 2x10     Standing hip ext  " "      2# 2x10  2# 2x10     Standing rows     nv 2x10  blue Blue  2x10 Blue 2x10  Blk 2x10    Abdominal iso ball squeze     10x5\"        Ther Activity             Lateral step ups  Lv3  10x ea Lv3  10x  ea Lv2  10x ea         BW squat`     10x  10x STS w/ foam  10x sts w/ foam 10 x sts w/ foam    walking      5'  inside 5'  inside      Gait Training                                       Modalities                                       \                   "

## 2024-10-10 ENCOUNTER — OFFICE VISIT (OUTPATIENT)
Dept: PHYSICAL THERAPY | Facility: CLINIC | Age: 59
End: 2024-10-10
Payer: COMMERCIAL

## 2024-10-10 DIAGNOSIS — Z91.81 RISK FOR FALLS: ICD-10-CM

## 2024-10-10 DIAGNOSIS — R53.1 GENERALIZED WEAKNESS: Primary | ICD-10-CM

## 2024-10-10 DIAGNOSIS — G89.29 CHRONIC PAIN OF RIGHT KNEE: ICD-10-CM

## 2024-10-10 DIAGNOSIS — M25.561 CHRONIC PAIN OF RIGHT KNEE: ICD-10-CM

## 2024-10-10 DIAGNOSIS — J80 ACUTE RESPIRATORY DISTRESS SYNDROME (HCC): ICD-10-CM

## 2024-10-10 DIAGNOSIS — Z78.9 POOR TOLERANCE FOR AMBULATION: ICD-10-CM

## 2024-10-10 PROCEDURE — 97110 THERAPEUTIC EXERCISES: CPT

## 2024-10-10 PROCEDURE — 97112 NEUROMUSCULAR REEDUCATION: CPT

## 2024-10-10 NOTE — PROGRESS NOTES
"Daily Note     Today's date: 10/10/2024  Patient name: Hussein Edward III  : 1965  MRN: [id number]  Referring provider: Bernabe Cook MD  Dx:   Encounter Diagnosis     ICD-10-CM    1. Generalized weakness  R53.1       2. Acute respiratory distress syndrome (HCC)  J80       3. Poor tolerance for ambulation  Z78.9       4. Risk for falls  Z91.81       5. Chronic pain of right knee  M25.561     G89.29                      Subjective: reports that he feels like he is at a plateau right now with progress but knows that this happens at times.       Objective: See treatment diary below      Assessment: Tolerated treatment with advancement in weight for BLE strengthening exercises with ability to complete all with short rest periods.. Patient demonstrated fatigue post treatment, exhibited good technique with therapeutic exercises, and would benefit from continued PT      Plan: Progress treatment as tolerated.       Precautions: Hx of ARDS 2024. Patient will require increased rest break between activity. Does carry portable O2 concentrator.       Manuals 9/6 9/9 9/12 9/16 9/19 9/26 10/1 10/3 10/7 10/10                                                       Neuro Re-Ed             Scapular retraction 10x  10x  Row  red nv         SLS      5x5\"                                                                        Ther Ex             Seated HS stretch 5\"x10    5'x10        Seated HR/TR 2x10            Seated marching  3#  10x 3#  10x          Bike for endurance  5'  Lv1 5'  Lv1 5'  Lv1 6'  Lv1 5'  Lv1 5'  Lv1 Lv 1 6' Lv 1 6' Lv1 6'   TB shoulder extension  Green  x15 Green  x15 nv     Blue 2x10 Blue 2x10   TB IR  Red  10x Red  10x Red  10x   Blue 2x10  Blue 2x10   Blue 2x10 nv   TB ER  Green  10x Green   10x Green  2x10 Green  2x10 Blue  2x10 Blue  2x10   Gtb bilateral 2x10 [place] gtb 2x10 B/l   Gtb 2x10   Knee flexion  #2  10x 2#  10x 2#  2x10  2.5#  2x10 2# 2x10  2# 2x10 2# 2x12 2.5#  2x10   BW squat  10x  10x       " "  seated knee extension  3#  10x 3#  10x 2.5#  10x  2.5#  10x2 2# 2x10  2# 2x10  2# 2x12 2.5#  2x10   Seated hip flexion   3#  10x 3# x10  2# x10  2.5#  2x10 2# 2x10  2# 2x10  2# 2x10  2.5#  2x10   Standing hip abd   3#  10x nv 2x10  No weight 2x10  2.5# 2# 2x10 2# 2x10  2# 2x10  2.5#  2x10   Standing hip ext        2# 2x10  2# 2x10  2.5#  2x10   Standing rows     nv 2x10  blue Blue  2x10 Blue 2x10  Blk 2x10 Blue 2x10   Abdominal iso ball squeze     10x5\"        Ther Activity             Lateral step ups  Lv3  10x ea Lv3  10x  ea Lv2  10x ea         BW squat`     10x  10x STS w/ foam  10x sts w/ foam 10 x sts w/ foam 10x sts w 1 foam    walking      5'  inside 5'  inside      Gait Training                                       Modalities                                       \                     "

## 2024-10-14 ENCOUNTER — APPOINTMENT (OUTPATIENT)
Dept: PHYSICAL THERAPY | Facility: CLINIC | Age: 59
End: 2024-10-14
Payer: COMMERCIAL

## 2024-10-17 ENCOUNTER — OFFICE VISIT (OUTPATIENT)
Dept: PHYSICAL THERAPY | Facility: CLINIC | Age: 59
End: 2024-10-17
Payer: COMMERCIAL

## 2024-10-17 DIAGNOSIS — R53.1 GENERALIZED WEAKNESS: Primary | ICD-10-CM

## 2024-10-17 DIAGNOSIS — J80 ACUTE RESPIRATORY DISTRESS SYNDROME (HCC): ICD-10-CM

## 2024-10-17 DIAGNOSIS — G89.29 CHRONIC PAIN OF RIGHT KNEE: ICD-10-CM

## 2024-10-17 DIAGNOSIS — M25.561 CHRONIC PAIN OF RIGHT KNEE: ICD-10-CM

## 2024-10-17 DIAGNOSIS — Z91.81 RISK FOR FALLS: ICD-10-CM

## 2024-10-17 DIAGNOSIS — Z78.9 POOR TOLERANCE FOR AMBULATION: ICD-10-CM

## 2024-10-17 PROCEDURE — 97112 NEUROMUSCULAR REEDUCATION: CPT

## 2024-10-17 PROCEDURE — 97530 THERAPEUTIC ACTIVITIES: CPT

## 2024-10-17 PROCEDURE — 97110 THERAPEUTIC EXERCISES: CPT

## 2024-10-17 NOTE — PROGRESS NOTES
Daily Note     Today's date: 10/17/2024  Patient name: Hussein Edward III  : 1965  MRN: [id number]  Referring provider: Bernabe Cook MD  Dx:   Encounter Diagnosis     ICD-10-CM    1. Generalized weakness  R53.1       2. Acute respiratory distress syndrome (HCC)  J80       3. Poor tolerance for ambulation  Z78.9       4. Risk for falls  Z91.81       5. Chronic pain of right knee  M25.561     G89.29                      Subjective: pt notes that he is feeling fatigued today.       Objective: See treatment diary below      Assessment: . Patient exhibited good technique with therapeutic exercises and would benefit from continued PT  initiated lateral step up and over and this was challenging for Hussein with fatigue setting in quickly.      Plan: Continue per plan of care.      Precautions: Hx of ARDS 2024. Patient will require increased rest break between activity. Does carry portable O2 concentrator.       Manuals 10/17        10/7 10/10                                                       Neuro Re-Ed             Scapular retraction             SLS                                                                              Ther Ex             Seated HS stretch             Seated HR/TR             Seated marching             Bike for endurance Lv1 6'        Lv 1 6' Lv1 6'   TB shoulder extension Blk 2x10        Blue 2x10 Blue 2x10   TB IR Blue 2x10        Blue 2x10 nv   TB ER [place]  Blue  2x10        [place] gtb 2x10 B/l   Gtb 2x10   Knee flexion         2# 2x12 2.5#  2x10                seated knee extension 2.5#  2x10        2# 2x12 2.5#  2x10   Seated hip flexion 2.5#  2x10        2# 2x10  2.5#  2x10   Standing hip abd         2# 2x10  2.5#  2x10   Standing hip ext         2# 2x10  2.5#  2x10   Standing rows Blk 2x10        Blk 2x10 Blue 2x10   Abdominal iso ball squeze             Ther Activity             Lateral step ups Lv2 x6            Sit to stand 1 foam   x10        10 x sts w/ foam 10x sts w 1 foam     walking             Gait Training                                       Modalities                                       \

## 2024-10-21 ENCOUNTER — APPOINTMENT (EMERGENCY)
Dept: CT IMAGING | Facility: HOSPITAL | Age: 59
End: 2024-10-21
Payer: COMMERCIAL

## 2024-10-21 ENCOUNTER — HOSPITAL ENCOUNTER (EMERGENCY)
Facility: HOSPITAL | Age: 59
Discharge: HOME | End: 2024-10-21
Attending: EMERGENCY MEDICINE
Payer: COMMERCIAL

## 2024-10-21 ENCOUNTER — APPOINTMENT (EMERGENCY)
Dept: RADIOLOGY | Facility: HOSPITAL | Age: 59
End: 2024-10-21
Payer: COMMERCIAL

## 2024-10-21 ENCOUNTER — APPOINTMENT (OUTPATIENT)
Dept: PHYSICAL THERAPY | Facility: CLINIC | Age: 59
End: 2024-10-21
Payer: COMMERCIAL

## 2024-10-21 VITALS
WEIGHT: 185 LBS | BODY MASS INDEX: 28.98 KG/M2 | OXYGEN SATURATION: 100 % | RESPIRATION RATE: 18 BRPM | TEMPERATURE: 97.7 F | DIASTOLIC BLOOD PRESSURE: 87 MMHG | SYSTOLIC BLOOD PRESSURE: 157 MMHG | HEART RATE: 63 BPM

## 2024-10-21 DIAGNOSIS — R06.09 EXERTIONAL DYSPNEA: Primary | ICD-10-CM

## 2024-10-21 LAB
ALBUMIN SERPL BCG-MCNC: 4.5 G/DL (ref 3.5–5)
ALP SERPL-CCNC: 87 U/L (ref 34–104)
ALT SERPL W P-5'-P-CCNC: 23 U/L (ref 7–52)
ANION GAP SERPL CALCULATED.3IONS-SCNC: 10 MMOL/L (ref 4–13)
AST SERPL W P-5'-P-CCNC: 19 U/L (ref 13–39)
ATRIAL RATE: 66 BPM
BASE EXCESS BLDA CALC-SCNC: 3 MMOL/L (ref -2–3)
BASOPHILS # BLD AUTO: 0.12 THOUSANDS/ΜL (ref 0–0.1)
BASOPHILS NFR BLD AUTO: 1 % (ref 0–1)
BILIRUB SERPL-MCNC: 0.51 MG/DL (ref 0.2–1)
BNP SERPL-MCNC: 37 PG/ML (ref 0–100)
BUN SERPL-MCNC: 22 MG/DL (ref 5–25)
CA-I BLD-SCNC: 1.16 MMOL/L (ref 1.12–1.32)
CALCIUM SERPL-MCNC: 9.5 MG/DL (ref 8.4–10.2)
CARDIAC TROPONIN I PNL SERPL HS: 4 NG/L
CHLORIDE SERPL-SCNC: 105 MMOL/L (ref 96–108)
CO2 SERPL-SCNC: 26 MMOL/L (ref 21–32)
CREAT SERPL-MCNC: 0.8 MG/DL (ref 0.6–1.3)
D DIMER PPP FEU-MCNC: 0.69 UG/ML FEU
EOSINOPHIL # BLD AUTO: 0.21 THOUSAND/ΜL (ref 0–0.61)
EOSINOPHIL NFR BLD AUTO: 2 % (ref 0–6)
ERYTHROCYTE [DISTWIDTH] IN BLOOD BY AUTOMATED COUNT: 12 % (ref 11.6–15.1)
GFR SERPL CREATININE-BSD FRML MDRD: 99 ML/MIN/1.73SQ M
GLUCOSE SERPL-MCNC: 84 MG/DL (ref 65–140)
GLUCOSE SERPL-MCNC: 88 MG/DL (ref 65–140)
HCO3 BLDA-SCNC: 26.8 MMOL/L (ref 24–30)
HCT VFR BLD AUTO: 42.6 % (ref 36.5–49.3)
HCT VFR BLD CALC: 42 % (ref 36.5–49.3)
HGB BLD-MCNC: 14.7 G/DL (ref 12–17)
HGB BLDA-MCNC: 14.3 G/DL (ref 12–17)
IMM GRANULOCYTES # BLD AUTO: 0.05 THOUSAND/UL (ref 0–0.2)
IMM GRANULOCYTES NFR BLD AUTO: 1 % (ref 0–2)
LYMPHOCYTES # BLD AUTO: 2.54 THOUSANDS/ΜL (ref 0.6–4.47)
LYMPHOCYTES NFR BLD AUTO: 29 % (ref 14–44)
MCH RBC QN AUTO: 29.8 PG (ref 26.8–34.3)
MCHC RBC AUTO-ENTMCNC: 34.5 G/DL (ref 31.4–37.4)
MCV RBC AUTO: 86 FL (ref 82–98)
MONOCYTES # BLD AUTO: 0.68 THOUSAND/ΜL (ref 0.17–1.22)
MONOCYTES NFR BLD AUTO: 8 % (ref 4–12)
NEUTROPHILS # BLD AUTO: 5.27 THOUSANDS/ΜL (ref 1.85–7.62)
NEUTS SEG NFR BLD AUTO: 59 % (ref 43–75)
NRBC BLD AUTO-RTO: 0 /100 WBCS
P AXIS: 41 DEGREES
PCO2 BLD: 28 MMOL/L (ref 21–32)
PCO2 BLD: 37.1 MM HG (ref 42–50)
PH BLD: 7.47 [PH] (ref 7.3–7.4)
PLATELET # BLD AUTO: 244 THOUSANDS/UL (ref 149–390)
PMV BLD AUTO: 8.5 FL (ref 8.9–12.7)
PO2 BLD: 23 MM HG (ref 35–45)
POTASSIUM BLD-SCNC: 3.9 MMOL/L (ref 3.5–5.3)
POTASSIUM SERPL-SCNC: 4 MMOL/L (ref 3.5–5.3)
PR INTERVAL: 168 MS
PROT SERPL-MCNC: 7.1 G/DL (ref 6.4–8.4)
QRS AXIS: -19 DEGREES
QRSD INTERVAL: 98 MS
QT INTERVAL: 420 MS
QTC INTERVAL: 440 MS
RBC # BLD AUTO: 4.93 MILLION/UL (ref 3.88–5.62)
SAO2 % BLD FROM PO2: 46 % (ref 60–85)
SODIUM BLD-SCNC: 141 MMOL/L (ref 136–145)
SODIUM SERPL-SCNC: 141 MMOL/L (ref 135–147)
SPECIMEN SOURCE: ABNORMAL
T WAVE AXIS: 19 DEGREES
VENTRICULAR RATE: 66 BPM
WBC # BLD AUTO: 8.87 THOUSAND/UL (ref 4.31–10.16)

## 2024-10-21 PROCEDURE — 84484 ASSAY OF TROPONIN QUANT: CPT | Performed by: EMERGENCY MEDICINE

## 2024-10-21 PROCEDURE — 93005 ELECTROCARDIOGRAM TRACING: CPT

## 2024-10-21 PROCEDURE — 82330 ASSAY OF CALCIUM: CPT

## 2024-10-21 PROCEDURE — 83880 ASSAY OF NATRIURETIC PEPTIDE: CPT | Performed by: EMERGENCY MEDICINE

## 2024-10-21 PROCEDURE — 82803 BLOOD GASES ANY COMBINATION: CPT

## 2024-10-21 PROCEDURE — 85014 HEMATOCRIT: CPT

## 2024-10-21 PROCEDURE — 93010 ELECTROCARDIOGRAM REPORT: CPT | Performed by: INTERNAL MEDICINE

## 2024-10-21 PROCEDURE — 82947 ASSAY GLUCOSE BLOOD QUANT: CPT

## 2024-10-21 PROCEDURE — 84132 ASSAY OF SERUM POTASSIUM: CPT

## 2024-10-21 PROCEDURE — 84295 ASSAY OF SERUM SODIUM: CPT

## 2024-10-21 PROCEDURE — 85025 COMPLETE CBC W/AUTO DIFF WBC: CPT | Performed by: EMERGENCY MEDICINE

## 2024-10-21 PROCEDURE — 85379 FIBRIN DEGRADATION QUANT: CPT | Performed by: EMERGENCY MEDICINE

## 2024-10-21 PROCEDURE — 36415 COLL VENOUS BLD VENIPUNCTURE: CPT | Performed by: EMERGENCY MEDICINE

## 2024-10-21 PROCEDURE — 71275 CT ANGIOGRAPHY CHEST: CPT

## 2024-10-21 PROCEDURE — 99285 EMERGENCY DEPT VISIT HI MDM: CPT | Performed by: EMERGENCY MEDICINE

## 2024-10-21 PROCEDURE — 99285 EMERGENCY DEPT VISIT HI MDM: CPT

## 2024-10-21 PROCEDURE — 80053 COMPREHEN METABOLIC PANEL: CPT | Performed by: EMERGENCY MEDICINE

## 2024-10-21 PROCEDURE — 71045 X-RAY EXAM CHEST 1 VIEW: CPT

## 2024-10-21 RX ORDER — ACETAMINOPHEN 325 MG/1
975 TABLET ORAL ONCE
Status: COMPLETED | OUTPATIENT
Start: 2024-10-21 | End: 2024-10-21

## 2024-10-21 RX ADMIN — ACETAMINOPHEN 975 MG: 325 TABLET ORAL at 13:03

## 2024-10-21 RX ADMIN — IOHEXOL 85 ML: 350 INJECTION, SOLUTION INTRAVENOUS at 13:55

## 2024-10-21 NOTE — ED PROVIDER NOTES
Time reflects when diagnosis was documented in both MDM as applicable and the Disposition within this note       Time User Action Codes Description Comment    10/21/2024  3:29 PM Jody Maguire Add [R06.09] Exertional dyspnea           ED Disposition       ED Disposition   Discharge    Condition   Stable    Date/Time   Mon Oct 21, 2024  3:31 PM    Comment   Hussein Edward III discharge to home/self care.                   Assessment & Plan       Medical Decision Making  59 year old male with history of ARDS presents for evaluation of worsening generalized weakness, hot flashes and exertional shortness of breath.  Moderate risk Well's score for PE with elevated D-dimer.  Labs otherwise unremarkable.  CT PE study negative for PE.  Oxygen saturation 100% on 2 L NC in the ED.  Patient to follow up with his pulmonologist as well as cardiology.  Return precautions provided.    Amount and/or Complexity of Data Reviewed  Labs: ordered. Decision-making details documented in ED Course.  Radiology: ordered and independent interpretation performed.    Risk  OTC drugs.  Prescription drug management.        ED Course as of 10/21/24 1534   Mon Oct 21, 2024   1334 D-Dimer, Quant(!): 0.69  Positive. Moderate risk Well's       Medications   acetaminophen (TYLENOL) tablet 975 mg (975 mg Oral Given 10/21/24 1303)   iohexol (OMNIPAQUE) 350 MG/ML injection (MULTI-DOSE) 85 mL (85 mL Intravenous Given 10/21/24 1355)       ED Risk Strat Scores   HEART Risk Score      Flowsheet Row Most Recent Value   Heart Score Risk Calculator    History 0 Filed at: 10/21/2024 1524   ECG 1 Filed at: 10/21/2024 1524   Age 1 Filed at: 10/21/2024 1524   Risk Factors 2 Filed at: 10/21/2024 1524   Troponin 0 Filed at: 10/21/2024 1524   HEART Score 4 Filed at: 10/21/2024 1524                               SBIRT 20yo+      Flowsheet Row Most Recent Value   Initial Alcohol Screen: US AUDIT-C     1. How often do you have a drink containing alcohol? 0 Filed at:  10/21/2024 1245   2. How many drinks containing alcohol do you have on a typical day you are drinking?  0 Filed at: 10/21/2024 1245   3a. Male UNDER 65: How often do you have five or more drinks on one occasion? 0 Filed at: 10/21/2024 1245   3b. FEMALE Any Age, or MALE 65+: How often do you have 4 or more drinks on one occassion? 0 Filed at: 10/21/2024 1245   Audit-C Score 0 Filed at: 10/21/2024 1245   [place]: How many times in the past year have you...    Used an illegal drug or used a prescription medication for non-medical reasons? Never Filed at: 10/21/2024 1245            Wells' Criteria for PE      Flowsheet Row Most Recent Value   Wells' Criteria for PE    Clinical signs and symptoms of DVT 0 Filed at: 10/21/2024 1305   PE is primary diagnosis or equally likely 3 Filed at: 10/21/2024 1305   HR >100 0 Filed at: 10/21/2024 1305   Immobilization at least 3 days or Surgery in the previous 4 weeks 0 Filed at: 10/21/2024 1305   Previous, objectively diagnosed PE or DVT 0 Filed at: 10/21/2024 1305   Hemoptysis 0 Filed at: 10/21/2024 1305   Malignancy with treatment within 6 months or palliative 0 Filed at: 10/21/2024 1305   Wells' Criteria Total 3 Filed at: 10/21/2024 1305                        History of Present Illness       Chief Complaint   Patient presents with    Weakness - Generalized     Pt to ED c/o weakness, SOB. History of ARDS. Chest feels tight and feels very tired.        Past Medical History:   Diagnosis Date    Chronic back pain     Migraine       Past Surgical History:   Procedure Laterality Date    HERNIA REPAIR      MANDIBLE FRACTURE SURGERY      MOUTH SURGERY      cyst in cheek    NASAL SEPTUM SURGERY        Family History   Problem Relation Age of Onset    Breast cancer Mother     Diabetes Father     No Known Problems Family     Substance Abuse Neg Hx     Mental illness Neg Hx       Social History     Tobacco Use    Smoking status: Never    Smokeless tobacco: Former     Types: Snuff     Quit  date: 2003   Vaping Use    Vaping status: Never Used   Substance Use Topics    Alcohol use: Never    Drug use: No      E-Cigarette/Vaping    E-Cigarette Use Never User       E-Cigarette/Vaping Substances    Nicotine No     THC No     CBD No     Flavoring No     Other No     Unknown No       I have reviewed and agree with the history as documented.     59 year old male presents for evaluation of shortness of breath.  Patient states he was hospitalized for ARDS and has had a difficult recovery.  He had been slowly improving up until 2-3 weeks ago when he began having hot flashes and worsening exertional shortness of breath.  He states that he has been using supplemental oxygen intermittently, usually 2-3 L when exerting himself such as at physical therapy.  He has had leg pain and generalized weakness which has also been worsening.  He reports chest tightness, but denies pleuritic pain.            Review of Systems        Objective       ED Triage Vitals   Temperature Pulse Blood Pressure Respirations SpO2 Patient Position - Orthostatic VS   10/21/24 1243 10/21/24 1243 10/21/24 1243 10/21/24 1243 10/21/24 1243 10/21/24 1243   97.7 °F (36.5 °C) 60 169/83 18 99 % Sitting      Temp Source Heart Rate Source BP Location FiO2 (%) Pain Score    10/21/24 1243 10/21/24 1243 10/21/24 1243 -- 10/21/24 1303    Temporal Monitor Left arm  5      Vitals      Date and Time Temp Pulse SpO2 Resp BP Pain Score FACES Pain Rating User   10/21/24 1530 -- 65 99 % 18 162/90 -- -- CA   10/21/24 1415 -- 64 99 % 18 152/86 -- -- CA   10/21/24 1333 -- -- -- -- -- 8 -- CA   10/21/24 1303 -- -- -- -- -- 5 -- CA   10/21/24 1243 97.7 °F (36.5 °C) 60 99 % 18 169/83 -- -- ML            Physical Exam  Vitals and nursing note reviewed.   HENT:      Head: Normocephalic and atraumatic.   Cardiovascular:      Rate and Rhythm: Normal rate and regular rhythm.      Pulses: Normal pulses.      Heart sounds: Normal heart sounds.   Pulmonary:      Breath sounds:  Normal breath sounds.      Comments: Shallow breaths  Abdominal:      General: There is no distension.      Palpations: Abdomen is soft.      Tenderness: There is no abdominal tenderness.   Musculoskeletal:      Right lower leg: No edema.      Left lower leg: Edema (trace) present.   Skin:     General: Skin is warm and dry.   Neurological:      Mental Status: He is alert.         Results Reviewed       Procedure Component Value Units Date/Time    Comprehensive metabolic panel [[id number]] Collected: 10/21/24 1313    Lab Status: Final result Specimen: Blood from Arm, Right Updated: 10/21/24 1343     Sodium 141 mmol/L      Potassium 4.0 mmol/L      Chloride 105 mmol/L      CO2 26 mmol/L      ANION GAP 10 mmol/L      BUN 22 mg/dL      Creatinine 0.80 mg/dL      Glucose 84 mg/dL      Calcium 9.5 mg/dL      AST 19 U/L      ALT 23 U/L      Alkaline Phosphatase 87 U/L      Total Protein 7.1 g/dL      Albumin 4.5 g/dL      Total Bilirubin 0.51 mg/dL      eGFR 99 ml/min/1.73sq m     Narrative:      [employer] guidelines for Chronic Kidney Disease (CKD):     Stage 1 with normal or high GFR (GFR > 90 mL/min/1.73 square meters)    Stage 2 Mild CKD (GFR = 60-89 mL/min/1.73 square meters)    Stage 3A Moderate CKD (GFR = 45-59 mL/min/1.73 square meters)    Stage 3B Moderate CKD (GFR = 30-44 mL/min/1.73 square meters)    Stage 4 Severe CKD (GFR = 15-29 mL/min/1.73 square meters)    Stage 5 End Stage CKD (GFR <15 mL/min/1.73 square meters)  Note: GFR calculation is accurate only with a steady state creatinine    HS Troponin 0hr (reflex protocol) [[id number]]  (Normal) Collected: 10/21/24 1313    Lab Status: Final result Specimen: Blood from Arm, Right Updated: 10/21/24 1340     hs TnI 0hr 4 ng/L     B-Type Natriuretic Peptide(BNP) [[id number]]  (Normal) Collected: 10/21/24 1313    Lab Status: Final result Specimen: Blood from Arm, Right Updated: 10/21/24 1340     BNP 37 pg/mL     D-Dimer [[id number]]  (Abnormal)  Collected: 10/21/24 1313    Lab Status: Final result Specimen: Blood from Arm, Right Updated: 10/21/24 1332     D-Dimer, Quant 0.69 ug/ml FEU     Narrative:      In the evaluation for possible pulmonary embolism, in the appropriate (Well's Score of 4 or less) patient, the age adjusted d-dimer cutoff for this patient can be calculated as:    Age x 0.01 (in ug/mL) for Age-adjusted D-dimer exclusion threshold for a patient over 50 years.    POCT Blood Gas (CG8+) [[id number]]  (Abnormal) Collected: 10/21/24 1321    Lab Status: Final result Specimen: Venous Updated: 10/21/24 1325     ph, Guy ISTAT 7.467     pCO2, Guy i-STAT 37.1 mm HG      pO2, Guy i-STAT 23.0 mm HG      BE, i-STAT 3 mmol/L      HCO3, Guy i-STAT 26.8 mmol/L      CO2, i-STAT 28 mmol/L      O2 Sat, i-STAT 46 %      SODIUM, I-STAT 141 mmol/l      Potassium, i-STAT 3.9 mmol/L      Calcium, Ionized i-STAT 1.16 mmol/L      Hct, i-STAT 42 %      Hgb, i-STAT 14.3 g/dl      Glucose, i-STAT 88 mg/dl      Specimen Type VENOUS    CBC and differential [[id number]]  (Abnormal) Collected: 10/21/24 1313    Lab Status: Final result Specimen: Blood from Arm, Right Updated: 10/21/24 1319     WBC 8.87 Thousand/uL      RBC 4.93 Million/uL      Hemoglobin 14.7 g/dL      Hematocrit 42.6 %      MCV 86 fL      MCH 29.8 pg      MCHC 34.5 g/dL      RDW 12.0 %      MPV 8.5 fL      Platelets 244 Thousands/uL      nRBC 0 /100 WBCs      Segmented % 59 %      Immature Grans % 1 %      Lymphocytes % 29 %      Monocytes % 8 %      Eosinophils Relative 2 %      Basophils Relative 1 %      Absolute Neutrophils 5.27 Thousands/µL      Absolute Immature Grans 0.05 Thousand/uL      Absolute Lymphocytes 2.54 Thousands/µL      Absolute Monocytes 0.68 Thousand/µL      Eosinophils Absolute 0.21 Thousand/µL      Basophils Absolute 0.12 Thousands/µL             CTA chest pe study   Final Interpretation by E. Alec Schoenberger, MD (10/21 1502)      No acute pulmonary embolism.                   Workstation performed: [id number]         XR chest 1 view portable   ED Interpretation by Jody Maguire MD (10/21 1321)   Pulmonary edema slightly increased from prior.  No infiltrate      Final Interpretation by Skip Decker MD (10/21 144)      No acute cardiopulmonary disease.            Workstation performed: [id number]             ECG 12 Lead Documentation Only    Date/Time: 10/21/2024 12:57 PM    Performed by: Jody Maguire MD  Authorized by: Jody Maguire MD    Indications / Diagnosis:  Shortness of breath  ECG reviewed by me, the ED Provider: yes    Patient location:  ED  Previous ECG:     Previous ECG:  Compared to current    Comparison ECG info:  3/18/24 sinus tachycardia, twave inversion in III and aVF and nonspecific st elevation in V2    Similarity:  No change  Interpretation:     Interpretation: non-specific    Rate:     ECG rate:  66    ECG rate assessment: normal    Rhythm:     Rhythm: sinus rhythm    Ectopy:     Ectopy: none    QRS:     QRS axis:  Normal    QRS intervals:  Normal  Conduction:     Conduction: normal    ST segments:     ST segments:  Non-specific    Elevation:  V2  T waves:     T waves: inverted      Inverted:  III      ED Medication and Procedure Management   Prior to Admission Medications   Prescriptions Last Dose Informant Patient Reported? Taking?   DULoxetine (CYMBALTA) 30 mg delayed release capsule   No No   Si-2 daily   Daridorexant HCl (Quviviq) 25 MG TABS   No No   Sig: Take 25 mg by mouth daily at bedtime   Vitamin D, Cholecalciferol, 50 MCG (2000 UT) CAPS   Yes No   Sig: Take by mouth   albuterol (2.5 mg/3 mL) 0.083 % nebulizer solution   No No   Sig: Take 3 mL (2.5 mg total) by nebulization every 6 (six) hours as needed for wheezing or shortness of breath   albuterol (PROVENTIL HFA,VENTOLIN HFA) 90 mcg/act inhaler  Self No No   Sig: Inhale 2 puffs every 4 (four) hours as needed for wheezing   gabapentin (Neurontin) 100 mg capsule    No No   Sig: Take 1 capsule (100 mg total) by mouth 3 (three) times a day   meloxicam (Mobic) 15 mg tablet   No No   Sig: Take 1 tablet (15 mg total) by mouth daily   metoprolol succinate (TOPROL-XL) 50 mg 24 hr tablet   No No   Sig: Take 1 tablet (50 mg total) by mouth every 12 (twelve) hours   rimegepant sulfate (Nurtec) 75 mg TBDP  Self No No   Sig: Take one NURTEC 75 mg under or on tongue at migraine onset if needed daily   traMADol (ULTRAM) 50 mg tablet   No No   Sig: Take 1 tablet (50 mg total) by mouth every 6 (six) hours as needed for moderate pain      Facility-Administered Medications: None     Patient's Medications   Discharge Prescriptions    No medications on file       ED SEPSIS DOCUMENTATION   Time reflects when diagnosis was documented in both MDM as applicable and the Disposition within this note       Time User Action Codes Description Comment    10/21/2024  3:29 PM Jody Maguire Add [R06.09] Exertional dyspnea                  Jody Maguire MD  10/21/24 1532

## 2024-10-21 NOTE — Clinical Note
Hussein Edward was seen and treated in our emergency department on 10/21/2024.                Diagnosis:     Hussein  .    He may return on this date: 10/24/2024         If you have any questions or concerns, please don't hesitate to call.      Jody Maguire MD    ______________________________           _______________          _______________  Hospital Representative                              Date                                Time

## 2024-10-24 ENCOUNTER — APPOINTMENT (OUTPATIENT)
Dept: PHYSICAL THERAPY | Facility: CLINIC | Age: 59
End: 2024-10-24
Payer: COMMERCIAL

## 2024-10-24 ENCOUNTER — OFFICE VISIT (OUTPATIENT)
Dept: CARDIOLOGY CLINIC | Facility: CLINIC | Age: 59
End: 2024-10-24
Payer: COMMERCIAL

## 2024-10-24 VITALS
BODY MASS INDEX: 29.82 KG/M2 | DIASTOLIC BLOOD PRESSURE: 88 MMHG | HEART RATE: 70 BPM | WEIGHT: 190 LBS | TEMPERATURE: 96.8 F | HEIGHT: 67 IN | SYSTOLIC BLOOD PRESSURE: 136 MMHG | OXYGEN SATURATION: 97 %

## 2024-10-24 DIAGNOSIS — E78.2 MIXED DYSLIPIDEMIA: ICD-10-CM

## 2024-10-24 DIAGNOSIS — R00.0 TACHYCARDIA: ICD-10-CM

## 2024-10-24 DIAGNOSIS — R06.09 EXERTIONAL DYSPNEA: ICD-10-CM

## 2024-10-24 DIAGNOSIS — R00.0 SINUS TACHYCARDIA: ICD-10-CM

## 2024-10-24 DIAGNOSIS — I48.0 PAF (PAROXYSMAL ATRIAL FIBRILLATION) (HCC): ICD-10-CM

## 2024-10-24 DIAGNOSIS — Z87.09 ARDS SURVIVOR: ICD-10-CM

## 2024-10-24 DIAGNOSIS — R23.2 HOT FLASHES: ICD-10-CM

## 2024-10-24 DIAGNOSIS — R68.2 DRY MOUTH: ICD-10-CM

## 2024-10-24 PROCEDURE — 99214 OFFICE O/P EST MOD 30 MIN: CPT

## 2024-10-24 PROCEDURE — 93000 ELECTROCARDIOGRAM COMPLETE: CPT

## 2024-10-24 RX ORDER — METOPROLOL SUCCINATE 25 MG/1
12.5 TABLET, EXTENDED RELEASE ORAL EVERY 12 HOURS
Qty: 30 TABLET | Refills: 1
Start: 2024-10-24

## 2024-10-24 NOTE — PROGRESS NOTES
Hussein Edward III  1965  [id number]  [facility] [address] [facility] [address] [phone]    1. PAF (paroxysmal atrial fibrillation) (HCC)  POCT ECG      2. Sinus tachycardia  POCT ECG      3. Tachycardia  metoprolol succinate (TOPROL-XL) 25 mg 24 hr tablet      4. ARDS survivor        5. Exertional dyspnea  Ambulatory Referral to Cardiology      6. Dry mouth  Hemoglobin A1C    TSH, 3rd generation with Free T4 reflex    Hemoglobin A1C    TSH, 3rd generation with Free T4 reflex      7. Hot flashes  Hemoglobin A1C    TSH, 3rd generation with Free T4 reflex    Hemoglobin A1C    TSH, 3rd generation with Free T4 reflex      8. Mixed dyslipidemia  Lipid Panel with Direct LDL reflex    Lipid Panel with Direct LDL reflex          Summary/Discussion:  ARDS survivor with chronic shortness of breath/malaise/fatigue:  - influenza a/bacterial pneumonia resulting in ARDS with subsequent hypoxic respiratory failure   - follows with pulmonology   - s/p ED visit on 10/21/2024 for worsening generalized weakness, hot flashes and exertional shortness of breath  elevated D-dimer with negative CT PE study  initial EKG demonstrated sinus rhythm with no acute ischemic changes  troponin negative x 1  BNP 37  he was discharged home with recommendations to follow-up with cardiology outpatient  - today he reports symptoms of overall malaise/fatigue. He continues to have shortness of breath which is chronic for him. He denies any chest pain/tightness/pressure  he ? if his fatigue is related to his metoprolol with reports of slight improvement of the same on decreased dose  fatigue probable multifactorial however, will try to decrease his metoprolol to 12.5 mg twice daily   instructed him to contact me next week with an update on his symptoms as well as blood pressures   if his fatigue improves will consider discontinuing his metoprolol and placing him on a 2 week zio patch to assess  average heart rate/recurrent atrial fibrillation  will likely need to be placed on different antihypertensive medication if stopping metoprolol for blood pressure control  - echo (2/2024): LV wall thickness mildly increases, concentric remodeling LVEF 55-60%, normal systolic function, no RWMA, G1DD, normal RV, normal LA/RA   - consider an ischemic evaluation in the near future due to his risk factors for CAD and no prior stress testing   - no clinical s/s of volume overload on exam     Hot flashes/dry mouth:  - obtain hemoglobin A1c and repeat TSH  - would recommend following up with his PCP for further evaluation     Paroxysmal atrial fibrillation:  - brief episode in 2/2024 likely in the setting of ARDS   - no documented recurrence   - remains in sinus rhythm based on EKG today   - historically not on anticoagulation   - on metoprolol as noted above     Hypertension:  - 136/88  - decrease metoprolol as noted above  - lifestyle modification   - close blood pressure monitoring   - he will begin checking his blood pressure once daily at home and notify the office with updated readings    Dyslipidemia:  -  on lipids from 2/2021  - not on statin therapy   - repeat lipids   - encouraged low cholesterol, mediterranean diet, and annual lipid follow up    Interval History: Hussein Edward III is a 59 y.o. year old male with history mentioned in problem list who presents to the office today for an ED visit hospital follow up.    He resented to the ED on 10/21/2024  for an evaluation of worsening generalized weakness, hot flashes and exertional shortness of breath.  Moderate risk Well's score for PE with elevated D-dimer.  Labs otherwise unremarkable.  CT PE study negative for PE.  Oxygen saturation 100% on 2 L NC in the ED.  He was discharged home with recommendations to follow up with cardiology and pulmonology outpatient.      Since his ED visit he reports continued symptoms of overall malaise/fatigue and shortness  of breath which is chronic for him. He denies any chest pain/tightness/pressure. He states that he was recently instructed to decrease his metoprolol due to his symptoms of fatigue which he noticed slight improvement of the same.  His wife is present during our visit today and expresses the same concerns of him overall feeling malaise/fatigue. States that over the last several weeks they feel like they are taking steps back in his recovery process for ARDS. He is currently completing PT and was last seen on 10/17 with reports of good technique with therapeutic exercises but limited due to fatigue. No reported symptoms of chest pain during exercises.  He denies lower extremity edema, orthopnea, and PND. He denies lightheadedness, dizziness, and syncope. She denies palpitations.  He also reports frequent episodes of hot flashes that can last throughout the day and consistent dry mouth.     He will RTO on 12/11/2024 with Dr. Gar or sooner if necessary. He will call with any concerns.       Medical Problems       Problem List       Low back pain    Strain of lumbar region    WALE (obstructive sleep apnea)    Mixed dyslipidemia    Nasal septal deviation    Seasonal allergic rhinitis    Nasal turbinate hypertrophy    Nasal obstruction    Deviated nasal septum    Overview Signed 5/30/2019  9:18 AM by Patricia Penrose     Added automatically from request for surgery [id number]         Hypertrophy of nasal turbinates    Overview Signed 5/30/2019  9:18 AM by Patricia Penrose     Added automatically from request for surgery [id number]         Meningioma (HCC)    Abnormal finding on MRI of brain    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)    Primary insomnia    Non-traumatic rhabdomyolysis    Chronic kidney disease    Lab Results   Component Value Date    EGFR 99 10/21/2024    EGFR 115 03/25/2024    EGFR 105 03/18/2024    CREATININE 0.80 10/21/2024    CREATININE 0.56 (L) 03/25/2024    CREATININE 0.70 03/18/2024          Metabolic acidosis    Acute respiratory failure with hypoxia (HCC)    ARDS survivor    Insomnia    Migraine    Anxiety    Ambulatory dysfunction    Sinus tachycardia    PAF (paroxysmal atrial fibrillation) (HCC)    Chronic pain of right knee    Arthralgia    Continuous opioid dependence (HCC)    Pain        Past Medical History:   Diagnosis Date    Chronic back pain     Migraine      Social History     Socioeconomic History    Marital status: /Civil Union     Spouse name: Kathi    Number of children: 2    Years of education: Not on file    Highest education level: Not on file   Occupational History    Occupation:      Employer: [employer]   Tobacco Use    Smoking status: Never    Smokeless tobacco: Former     Types: Snuff     Quit date: 2003   Vaping Use    Vaping status: Never Used   Substance and Sexual Activity    Alcohol use: Never    Drug use: No    Sexual activity: Not on file   Other Topics Concern    Not on file   Social History Narrative    Not on file     Social Determinants of Health     Financial Resource Strain: Not on file   Food Insecurity: No Food Insecurity (3/14/2024)    Nursing - Inadequate Food Risk Classification     Worried About Running Out of Food in the Last Year: Never true     Ran Out of Food in the Last Year: Never true     Ran Out of Food in the Last Year: Not on file   Transportation Needs: No Transportation Needs (3/14/2024)    PRAPARE - Transportation     Lack of Transportation (Medical): No     Lack of Transportation (Non-Medical): No   Physical Activity: Not on file   Stress: Not on file   Social Connections: Not on file   Intimate Partner Violence: Not on file   Housing Stability: Low Risk  (3/14/2024)    Housing Stability Vital Sign     Unable to Pay for Housing in the Last Year: No     Number of Times Moved in the Last Year: 1     Homeless in the Last Year: No      Family History   Problem Relation Age of Onset    Breast cancer Mother      Diabetes Father     No Known Problems Family     Substance Abuse Neg Hx     Mental illness Neg Hx      Past Surgical History:   Procedure Laterality Date    HERNIA REPAIR      MANDIBLE FRACTURE SURGERY      MOUTH SURGERY      cyst in cheek    NASAL SEPTUM SURGERY         Current Outpatient Medications:     albuterol (2.5 mg/3 mL) 0.083 % nebulizer solution, Take 3 mL (2.5 mg total) by nebulization every 6 (six) hours as needed for wheezing or shortness of breath, Disp: 125 mL, Rfl: 3    albuterol (PROVENTIL HFA,VENTOLIN HFA) 90 mcg/act inhaler, Inhale 2 puffs every 4 (four) hours as needed for wheezing, Disp: 18 g, Rfl: 0    Daridorexant HCl (Quviviq) 25 MG TABS, Take 25 mg by mouth daily at bedtime, Disp: 90 tablet, Rfl: 0    DULoxetine (CYMBALTA) 30 mg delayed release capsule, 1-2 daily, Disp: 180 capsule, Rfl: 3    gabapentin (Neurontin) 100 mg capsule, Take 1 capsule (100 mg total) by mouth 3 (three) times a day, Disp: 90 capsule, Rfl: 5    meloxicam (Mobic) 15 mg tablet, Take 1 tablet (15 mg total) by mouth daily, Disp: 60 tablet, Rfl: 5    METHOCARBAMOL PO, PRN, Disp: , Rfl:     metoprolol succinate (TOPROL-XL) 25 mg 24 hr tablet, Take 0.5 tablets (12.5 mg total) by mouth every 12 (twelve) hours, Disp: 30 tablet, Rfl: 1    rimegepant sulfate (Nurtec) 75 mg TBDP, Take one NURTEC 75 mg under or on tongue at migraine onset if needed daily, Disp: 16 tablet, Rfl: 11    traMADol (ULTRAM) 50 mg tablet, Take 1 tablet (50 mg total) by mouth every 6 (six) hours as needed for moderate pain, Disp: 90 tablet, Rfl: 5    Vitamin D, Cholecalciferol, 50 MCG (2000 UT) CAPS, Take by mouth, Disp: , Rfl:   No Known Allergies    Labs:     Chemistry        Component Value Date/Time     (H) 12/20/2017 0727    K 4.0 10/21/2024 1313    K 4.5 02/14/2024 1213     10/21/2024 1313     07/30/2021 0843    CO2 28 10/21/2024 1321    CO2 26 10/21/2024 1313    CO2 17.2 (L) 02/14/2024 1213    BUN 22 10/21/2024 1313    BUN 14  "02/14/2024 1213    CREATININE 0.80 10/21/2024 1313    CREATININE 0.97 02/14/2024 1213        Component Value Date/Time    CALCIUM 9.5 10/21/2024 1313    CALCIUM 8.6 02/14/2024 1213    ALKPHOS 87 10/21/2024 1313    ALKPHOS 89 02/12/2024 1623    AST 19 10/21/2024 1313    AST 40 (H) 02/12/2024 1623    ALT 23 10/21/2024 1313    ALT 36 02/12/2024 1623    BILITOT 0.3 12/20/2017 0727            Lab Results   Component Value Date    CHOL 185 12/20/2017     Lab Results   Component Value Date    HDL 32 (L) 07/30/2021    HDL 29 (L) 12/20/2017     Lab Results   Component Value Date    LDLCALC 155 (H) 07/30/2021    LDLCALC 111 (H) 12/20/2017     Lab Results   Component Value Date    TRIG 383 (H) 02/26/2024    TRIG 553 (H) 02/25/2024    TRIG 566 (H) 02/24/2024     No results found for: \"CHOLHDL\"    Imaging: CTA chest pe study    Result Date: 10/21/2024  Narrative: CTA - CHEST WITH IV CONTRAST - PULMONARY ANGIOGRAM INDICATION: Exertional dyspnea, positive dimer. COMPARISON: Chest CT dated 4/8/2024. TECHNIQUE: CTA examination of the chest was performed using angiographic technique according to a protocol specifically tailored to evaluate for pulmonary embolism. Multiplanar 2D reformatted images were created from the source data. In addition, coronal  3D MIP postprocessing was performed on the acquisition scanner. Radiation dose length product (DLP) for this visit: 511 mGy-cm . This examination, like all CT scans performed in the [facility], was performed utilizing techniques to minimize radiation dose exposure, including the use of iterative reconstruction and automated exposure control. IV Contrast: 85 mL of iohexol (OMNIPAQUE) FINDINGS: PULMONARY ARTERIAL TREE:  No pulmonary embolus. LUNGS: There is respiratory motion with bibasilar subsegmental atelectasis. Decreased interstitial prominence and groundglass opacity.  The central bronchi are patent. PLEURA: Unremarkable. HEART/GREAT VESSELS: Stable mild " "cardiomegaly. No thoracic aortic aneurysm. MEDIASTINUM AND RAVI: Unremarkable. CHEST WALL AND LOWER NECK: Unremarkable. VISUALIZED STRUCTURES IN THE UPPER ABDOMEN: Right renal cyst. Tiny nonobstructing right renal stone. OSSEOUS STRUCTURES: No acute fracture or destructive osseous lesion. Spinal degenerative changes.     Impression: No acute pulmonary embolism. Workstation performed: [id number]     XR chest 1 view portable    Result Date: 10/21/2024  Narrative: XR CHEST PORTABLE INDICATION: shortness of breath. COMPARISON: 9/10/2024 FINDINGS: Clear lungs. Elevated right hemidiaphragm noted. No pneumothorax or pleural effusion. Normal cardiomediastinal silhouette. Bones are unremarkable for age. Normal upper abdomen.     Impression: No acute cardiopulmonary disease. Workstation performed: [id number]       ECG: Sinus rhythm    Review of Systems   Constitutional: Positive for malaise/fatigue.        Hot flashes    HENT:          Dry mouth   Eyes: Negative.    Cardiovascular:  Positive for dyspnea on exertion. Negative for chest pain, irregular heartbeat, leg swelling, near-syncope, orthopnea, palpitations, paroxysmal nocturnal dyspnea and syncope.   Respiratory:  Positive for shortness of breath.    Endocrine: Negative.    Hematologic/Lymphatic: Negative.    Skin: Negative.    Musculoskeletal:  Positive for joint pain and muscle weakness.   Gastrointestinal: Negative.    Genitourinary: Negative.    Neurological:  Positive for weakness. Negative for dizziness and light-headedness.   Psychiatric/Behavioral: Negative.     Allergic/Immunologic: Negative.        Vitals:    10/24/24 0830   BP: 136/88   Pulse: 70   Temp: (!) 96.8 °F (36 °C)   SpO2: 97%     Vitals:    10/24/24 0830   Weight: 86.2 kg (190 lb)     Height: 5' 7\" (170.2 cm)   Body mass index is 29.76 kg/m².    Physical Exam  Vitals and nursing note reviewed.   HENT:      Head: Normocephalic.      Nose: Nose normal.      Mouth/Throat:      Pharynx: Oropharynx is " clear.   Cardiovascular:      Rate and Rhythm: Normal rate and regular rhythm.      Heart sounds: No murmur heard.  Pulmonary:      Breath sounds: Decreased breath sounds present. No wheezing.   Musculoskeletal:      Cervical back: Normal range of motion.      Right lower leg: No edema.      Left lower leg: No edema.      Comments: Uses a walker at baseline    Skin:     General: Skin is warm and dry.   Neurological:      Mental Status: He is alert and oriented to person, place, and time.

## 2024-10-28 ENCOUNTER — OFFICE VISIT (OUTPATIENT)
Dept: PHYSICAL THERAPY | Facility: CLINIC | Age: 59
End: 2024-10-28
Payer: COMMERCIAL

## 2024-10-28 DIAGNOSIS — R53.1 GENERALIZED WEAKNESS: Primary | ICD-10-CM

## 2024-10-28 DIAGNOSIS — M25.561 CHRONIC PAIN OF RIGHT KNEE: ICD-10-CM

## 2024-10-28 DIAGNOSIS — R00.0 TACHYCARDIA: ICD-10-CM

## 2024-10-28 DIAGNOSIS — Z78.9 POOR TOLERANCE FOR AMBULATION: ICD-10-CM

## 2024-10-28 DIAGNOSIS — J80 ACUTE RESPIRATORY DISTRESS SYNDROME (HCC): ICD-10-CM

## 2024-10-28 DIAGNOSIS — G89.29 CHRONIC PAIN OF RIGHT KNEE: ICD-10-CM

## 2024-10-28 PROCEDURE — 97110 THERAPEUTIC EXERCISES: CPT | Performed by: PHYSICAL THERAPIST

## 2024-10-28 NOTE — PROGRESS NOTES
Daily Note     Today's date: 10/28/2024  Patient name: Hussein Edward III  : 1965  MRN: [id number]  Referring provider: Bernabe Cook MD  Dx: No diagnosis found.               Subjective: pt notes that he has been feeling  better since he has not been at work. He is not sure if he will go back. Knows he needs to focus on his recovery      Objective: See treatment diary below      Assessment: pt did well with exercises. Is very weak on the left tricep and bicep from prior to ards surgery. Will continue to add exercises as pt is able to tolerate.       Plan: Continue per plan of care.      Precautions: Hx of ARDS 2024. Patient will require increased rest break between activity. Does carry portable O2 concentrator.       Manuals 10/17 10/28       10/7 10/10                                                       Neuro Re-Ed             Scapular retraction             SLS                                                                              Ther Ex             Seated HS stretch             Seated HR/TR             Seated marching             Bike for endurance Lv1 6' Lv 1 5'       Lv 1 6' Lv1 6'   TB shoulder extension Blk 2x10 Blk 2x10       Blue 2x10 Blue 2x10   TB IR Blue 2x10        Blue 2x10 nv   TB ER [place]  Blue  2x10 Blue 2x10        [place] gtb 2x10 B/l   Gtb 2x10   Knee flexion         2# 2x12 2.5#  2x10                seated knee extension 2.5#  2x10 nv       2# 2x12 2.5#  2x10   Seated hip flexion 2.5#  2x10 nv       2# 2x10  2.5#  2x10   Bicep curl x2  4# 2x10       2# 2x10  2.5#  2x10   OH tricep ext  2# 2x10       2# 2x10  2.5#  2x10   Standing rows Blk 2x10 Blk 2x10       Blk 2x10 Blue 2x10   Abdominal iso ball squeze             Ther Activity             Lateral step ups Lv2 x6 nv           Sit to stand 1 foam   x10 1 foam x10        10 x sts w/ foam 10x sts w 1 foam    walking             Gait Training                                       Modalities                                        \

## 2024-10-28 NOTE — TELEPHONE ENCOUNTER
Medication: metoprolol succinate 25mg tablet    Dose/Frequency: .5 tablet twice a day    Quantity: 30 tablets    Pharmacy: [facility]    Office:   [] PCP/Provider -   [x] Speciality/Provider -     Does the patient have enough for 3 days?   [x] Yes   [] No - Send as HP to POD

## 2024-10-29 RX ORDER — METOPROLOL SUCCINATE 25 MG/1
12.5 TABLET, EXTENDED RELEASE ORAL EVERY 12 HOURS
Qty: 90 TABLET | Refills: 0 | Status: SHIPPED | OUTPATIENT
Start: 2024-10-29

## 2024-10-31 ENCOUNTER — OFFICE VISIT (OUTPATIENT)
Dept: PHYSICAL THERAPY | Facility: CLINIC | Age: 59
End: 2024-10-31
Payer: COMMERCIAL

## 2024-10-31 DIAGNOSIS — M25.561 CHRONIC PAIN OF RIGHT KNEE: ICD-10-CM

## 2024-10-31 DIAGNOSIS — J80 ACUTE RESPIRATORY DISTRESS SYNDROME (HCC): ICD-10-CM

## 2024-10-31 DIAGNOSIS — R53.1 GENERALIZED WEAKNESS: Primary | ICD-10-CM

## 2024-10-31 DIAGNOSIS — G89.29 CHRONIC PAIN OF RIGHT KNEE: ICD-10-CM

## 2024-10-31 DIAGNOSIS — Z78.9 POOR TOLERANCE FOR AMBULATION: ICD-10-CM

## 2024-10-31 DIAGNOSIS — Z91.81 RISK FOR FALLS: ICD-10-CM

## 2024-10-31 PROCEDURE — 97110 THERAPEUTIC EXERCISES: CPT

## 2024-10-31 NOTE — PROGRESS NOTES
Daily Note     Today's date: 10/31/2024  Patient name: Hussein Edward III  : 1965  MRN: [id number]  Referring provider: Bernabe Cook MD  Dx:   Encounter Diagnosis     ICD-10-CM    1. Generalized weakness  R53.1       2. Acute respiratory distress syndrome (HCC)  J80       3. Poor tolerance for ambulation  Z78.9       4. Chronic pain of right knee  M25.561     G89.29       5. Risk for falls  Z91.81                      Subjective: Hussein states that his knees were a little sore after last visit but feeling better today.  He is not currently working and has more energy coming into PT. And he is sleeping better      Objective: See treatment diary below      Assessment: Tolerated treatment with ability to resume seated LE strengthening and added reps where noted in flowsheet. . Patient exhibited good technique with therapeutic exercises      Plan: Continue per plan of care.      Precautions: Hx of ARDS 2024. Patient will require increased rest break between activity. Does carry portable O2 concentrator.       Manuals 10/17 10/28 10/31      10/7 10/10                                                       Neuro Re-Ed             Scapular retraction             SLS                                                                              Ther Ex             Seated HS stretch             Seated HR/TR             Seated marching             Bike for endurance Lv1 6' Lv 1 5' Lv1 6'      Lv 1 6' Lv1 6'   TB shoulder extension Blk 2x10 Blk 2x10 Blk 2x10      Blue 2x10 Blue 2x10   TB IR Blue 2x10        Blue 2x10 nv   TB ER [place]  Blue  2x10 Blue 2x10  Blue 2x10      [place] gtb 2x10 B/l   Gtb 2x10   Knee flexion         2# 2x12 2.5#  2x10                seated knee extension 2.5#  2x10 nv 2.5#  2x15      2# 2x12 2.5#  2x10   Seated hip flexion 2.5#  2x10 nv 2.5#  2x15      2# 2x10  2.5#  2x10   Bicep curl x2  4# 2x10 4#  2x15      2# 2x10  2.5#  2x10   OH tricep ext  2# 2x10 2#  2x15      2# 2x10  2.5#  2x10    Standing rows Blk 2x10 Blk 2x10 Blk 2x10      Blk 2x10 Blue 2x10   Abdominal iso ball squeze             Ther Activity             Lateral step ups Lv2 x6 nv           Sit to stand 1 foam   x10 1 foam x10        10 x sts w/ foam 10x sts w 1 foam    walking             Gait Training                                       Modalities                                       \

## 2024-11-05 ENCOUNTER — OFFICE VISIT (OUTPATIENT)
Dept: PHYSICAL THERAPY | Facility: CLINIC | Age: 59
End: 2024-11-05
Payer: COMMERCIAL

## 2024-11-05 DIAGNOSIS — J80 ACUTE RESPIRATORY DISTRESS SYNDROME (HCC): ICD-10-CM

## 2024-11-05 DIAGNOSIS — M25.561 CHRONIC PAIN OF RIGHT KNEE: ICD-10-CM

## 2024-11-05 DIAGNOSIS — G89.29 CHRONIC PAIN OF RIGHT KNEE: ICD-10-CM

## 2024-11-05 DIAGNOSIS — R53.1 GENERALIZED WEAKNESS: Primary | ICD-10-CM

## 2024-11-05 DIAGNOSIS — Z91.81 RISK FOR FALLS: ICD-10-CM

## 2024-11-05 DIAGNOSIS — Z78.9 POOR TOLERANCE FOR AMBULATION: ICD-10-CM

## 2024-11-05 PROCEDURE — 97110 THERAPEUTIC EXERCISES: CPT

## 2024-11-05 NOTE — PROGRESS NOTES
Daily Note     Today's date: 2024  Patient name: Hussein Edward III  : 1965  MRN: [id number]  Referring provider: Bernabe Cook MD  Dx:   Encounter Diagnosis     ICD-10-CM    1. Generalized weakness  R53.1       2. Acute respiratory distress syndrome (HCC)  J80       3. Poor tolerance for ambulation  Z78.9       4. Chronic pain of right knee  M25.561     G89.29       5. Risk for falls  Z91.81                      Subjective: pt with no c/o offered.       Objective: See treatment diary below      Assessment: Tolerated treatment with fatigue noted during exercises but minimal rest periods needed between exercises.  No LOB during standing activity.  . Patient exhibited good technique with therapeutic exercises and would benefit from continued PT      Plan: Continue per plan of care.      Precautions: Hx of ARDS 2024. Patient will require increased rest break between activity. Does carry portable O2 concentrator.       Manuals 10/17 10/28 10/31 11/5                                                             Neuro Re-Ed             Scapular retraction             SLS                                                                              Ther Ex             Seated HS stretch             Seated HR/TR             Seated marching             Bike for endurance Lv1 6' Lv 1 5' Lv1 6' Lv1          TB shoulder extension Blk 2x10 Blk 2x10 Blk 2x10 Blke 2x10         TB IR Blue 2x10            TB ER [place]  Blue  2x10 Blue 2x10  Blue 2x10          Knee flexion                          seated knee extension 2.5#  2x10 nv 2.5#  2x15 2.5#  2x15         Seated hip flexion 2.5#  2x10 nv 2.5#  2x15 2.5#  2x15         Bicep curl x2  4# 2x10 4#  2x15 4#  2x15         OH tricep ext  2# 2x10 2#  2x15 2#  2x15         Standing rows Blk 2x10 Blk 2x10 Blk 2x10 [id number]         Abdominal iso ball squeze             Ther Activity             Lateral step ups Lv2 x6 nv           Sit to stand 1 foam   x10 1 foam x10   1 foam    2x10         walking             Gait Training                                       Modalities                                       \

## 2024-11-06 ENCOUNTER — TELEPHONE (OUTPATIENT)
Age: 59
End: 2024-11-06

## 2024-11-06 NOTE — TELEPHONE ENCOUNTER
Pharmacy called regarding script for tramadol. For them to continue filling she said they need to know that provider is aware that it has a drug interaction with gabapentin, and the daridorexant, and insurance is denying for the reaction with gabapentin    They need a diagnosis code as to why he's on, and if he's tried other medications, had xrays or MRI, other testing, or if he's been referred to pain management.    Please review and advise.

## 2024-11-07 ENCOUNTER — OFFICE VISIT (OUTPATIENT)
Dept: PHYSICAL THERAPY | Facility: CLINIC | Age: 59
End: 2024-11-07
Payer: COMMERCIAL

## 2024-11-07 DIAGNOSIS — M25.561 CHRONIC PAIN OF RIGHT KNEE: ICD-10-CM

## 2024-11-07 DIAGNOSIS — J80 ACUTE RESPIRATORY DISTRESS SYNDROME (HCC): ICD-10-CM

## 2024-11-07 DIAGNOSIS — Z91.81 RISK FOR FALLS: ICD-10-CM

## 2024-11-07 DIAGNOSIS — R53.1 GENERALIZED WEAKNESS: Primary | ICD-10-CM

## 2024-11-07 DIAGNOSIS — G89.29 CHRONIC PAIN OF RIGHT KNEE: ICD-10-CM

## 2024-11-07 DIAGNOSIS — Z78.9 POOR TOLERANCE FOR AMBULATION: ICD-10-CM

## 2024-11-07 PROCEDURE — 97110 THERAPEUTIC EXERCISES: CPT

## 2024-11-07 NOTE — TELEPHONE ENCOUNTER
Pharmacy called advised recommendations. Pharmacy states will D/C med until patient seen and able to seen documentation and new script.      Please review.  Thank you

## 2024-11-07 NOTE — PROGRESS NOTES
Daily Note     Today's date: 2024  Patient name: Hussein Edward III  : 1965  MRN: [id number]  Referring provider: Bernabe Cook MD  Dx:   Encounter Diagnosis     ICD-10-CM    1. Generalized weakness  R53.1       2. Acute respiratory distress syndrome (HCC)  J80       3. Poor tolerance for ambulation  Z78.9       4. Chronic pain of right knee  M25.561     G89.29       5. Risk for falls  Z91.81                      Subjective: pt reports that he is feeling better today.   He notes that L arm is stiff today.      Objective: See treatment diary below      Assessment: Tolerated treatment with increase in row band. Patient demonstrated fatigue post treatment and would benefit from continued PT      Plan: Continue per plan of care.   Increase to 3# on ankle nv.     Precautions: Hx of ARDS 2024. Patient will require increased rest break between activity. Does carry portable O2 concentrator.       Manuals 10/17 10/28 10/31 11/5 11/7                                                            Neuro Re-Ed             Scapular retraction             SLS                                                                              Ther Ex             Seated HS stretch             Seated HR/TR             Seated marching             Bike for endurance Lv1 6' Lv 1 5' Lv1 6' Lv1  Lv1 10'        TB shoulder extension Blk 2x10 Blk 2x10 Blk 2x10 Blke 2x10 Blk 2x15        TB IR Blue 2x10            TB ER [place]  Blue  2x10 Blue 2x10  Blue 2x10  Blue 2x10        Knee flexion                          seated knee extension 2.5#  2x10 nv 2.5#  2x15 2.5#  2x15 2.5#  2x15        Seated hip flexion 2.5#  2x10 nv 2.5#  2x15 2.5#  2x15 2.5#  2x15        Bicep curl x2  4# 2x10 4#  2x15 4#  2x15 4#  2x1 ea        OH tricep ext  2# 2x10 2#  2x15 2#  2x15 2#  2x15        Standing rows Blk 2x10 Blk 2x10 Blk 2x10 [id number] Slv   2x15        Abdominal iso ball squeze             Ther Activity             Lateral step ups Lv2 x6 nv            Sit to stand 1 foam   x10 1 foam x10   1 foam   2x10 1 foam 2x10        walking             Gait Training                                       Modalities                                       \

## 2024-11-07 NOTE — TELEPHONE ENCOUNTER
Pt called refill line stating he missed a call from office. I explained that the pharmacy/insurance needed documentation in regards to Tramadol to continue prescribing and someone reached out to schedule appt. Due to connection issues with [employer] and it glitching I was unable to warm transfer to scheduling department and pt got disconnected(?)    Please call pt ASAP to schedule with Dr. Roque

## 2024-11-12 ENCOUNTER — OFFICE VISIT (OUTPATIENT)
Dept: PHYSICAL THERAPY | Facility: CLINIC | Age: 59
End: 2024-11-12
Payer: COMMERCIAL

## 2024-11-12 DIAGNOSIS — G89.29 CHRONIC PAIN OF RIGHT KNEE: ICD-10-CM

## 2024-11-12 DIAGNOSIS — M25.561 CHRONIC PAIN OF RIGHT KNEE: ICD-10-CM

## 2024-11-12 DIAGNOSIS — J80 ACUTE RESPIRATORY DISTRESS SYNDROME (HCC): ICD-10-CM

## 2024-11-12 DIAGNOSIS — Z91.81 RISK FOR FALLS: ICD-10-CM

## 2024-11-12 DIAGNOSIS — Z78.9 POOR TOLERANCE FOR AMBULATION: ICD-10-CM

## 2024-11-12 DIAGNOSIS — R53.1 GENERALIZED WEAKNESS: Primary | ICD-10-CM

## 2024-11-12 PROCEDURE — 97112 NEUROMUSCULAR REEDUCATION: CPT

## 2024-11-12 PROCEDURE — 97110 THERAPEUTIC EXERCISES: CPT

## 2024-11-12 NOTE — PROGRESS NOTES
Daily Note     Today's date: 2024  Patient name: Hussein Edward III  : 1965  MRN: [id number]  Referring provider: Bernabe Cook MD  Dx:   Encounter Diagnosis     ICD-10-CM    1. Generalized weakness  R53.1       2. Acute respiratory distress syndrome (HCC)  J80       3. Poor tolerance for ambulation  Z78.9       4. Chronic pain of right knee  M25.561     G89.29       5. Risk for falls  Z91.81                      Subjective: Hussein states that he wokde stiff today.       Objective: See treatment diary below      Assessment: Tolerated treatment with advancement to 3# weigh for LE exercises with good resistance noted. . Patient would benefit from continued PT      Plan: Continue per plan of care.      Precautions: Hx of ARDS 2024. Patient will require increased rest break between activity. Does carry portable O2 concentrator.       Manuals 10/17 10/28 10/31 11/5 11/7 11/12                                                           Neuro Re-Ed             Scapular retraction             SLS                                                                              Ther Ex             Seated HS stretch             Seated HR/TR             Seated marching             Bike for endurance Lv1 6' Lv 1 5' Lv1 6' Lv1  Lv1 10' Lv1 10'       TB shoulder extension Blk 2x10 Blk 2x10 Blk 2x10 Blke 2x10 Blk 2x15 Slv 2x15       TB IR Blue 2x10            TB ER [place]  Blue  2x10 Blue 2x10  Blue 2x10  Blue 2x10 B blue 3x10       Knee flexion                          seated knee extension 2.5#  2x10 nv 2.5#  2x15 2.5#  2x15 2.5#  2x15 3#  2x10       Seated hip flexion 2.5#  2x10 nv 2.5#  2x15 2.5#  2x15 2.5#  2x15 3#  2x10       Bicep curl x2  4# 2x10 4#  2x15 4#  2x15 4#  2x1 ea 4#  2x10       OH tricep ext  2# 2x10 2#  2x15 2#  2x15 2#  2x15 2#  2x15       Standing rows Blk 2x10 Blk 2x10 Blk 2x10 [id number] Slv   2x15 Slv 2x15       Abdominal iso ball squeze             Ther Activity             Lateral step ups Lv2 x6  nv           Sit to stand 1 foam   x10 1 foam x10   1 foam   2x10 1 foam 2x10 1 foam 2x10       walking             Gait Training                                       Modalities                                       \

## 2024-11-14 ENCOUNTER — OFFICE VISIT (OUTPATIENT)
Dept: PHYSICAL THERAPY | Facility: CLINIC | Age: 59
End: 2024-11-14
Payer: COMMERCIAL

## 2024-11-14 DIAGNOSIS — J80 ACUTE RESPIRATORY DISTRESS SYNDROME (HCC): ICD-10-CM

## 2024-11-14 DIAGNOSIS — G89.29 CHRONIC PAIN OF RIGHT KNEE: ICD-10-CM

## 2024-11-14 DIAGNOSIS — Z78.9 POOR TOLERANCE FOR AMBULATION: ICD-10-CM

## 2024-11-14 DIAGNOSIS — Z91.81 RISK FOR FALLS: ICD-10-CM

## 2024-11-14 DIAGNOSIS — M25.561 CHRONIC PAIN OF RIGHT KNEE: ICD-10-CM

## 2024-11-14 DIAGNOSIS — R53.1 GENERALIZED WEAKNESS: Primary | ICD-10-CM

## 2024-11-14 PROCEDURE — 97110 THERAPEUTIC EXERCISES: CPT

## 2024-11-14 PROCEDURE — 97112 NEUROMUSCULAR REEDUCATION: CPT

## 2024-11-14 NOTE — PROGRESS NOTES
Daily Note     Today's date: 2024  Patient name: Hussein Edward III  : 1965  MRN: [id number]  Referring provider: Bernabe Cook MD  Dx:   Encounter Diagnosis     ICD-10-CM    1. Generalized weakness  R53.1       2. Acute respiratory distress syndrome (HCC)  J80       3. Poor tolerance for ambulation  Z78.9       4. Chronic pain of right knee  M25.561     G89.29       5. Risk for falls  Z91.81                      Subjective: pt states that he continues to feel better with not being at work and having more energy for PT.      Objective: See treatment diary below      Assessment: Tolerated treatment with advancement to 5# weights for biceps curls , did note fatigue triceps more with increase of biceps.. Patient demonstrated fatigue post treatment      Plan: Continue per plan of care.      Precautions: Hx of ARDS 2024. Patient will require increased rest break between activity. Does carry portable O2 concentrator.       Manuals 10/17 10/28 10/31 11/5 11/7 11/12 11/14                                                          Neuro Re-Ed             Scapular retraction             SLS                                                                              Ther Ex             Seated HS stretch             Seated HR/TR             Seated marching             Bike for endurance Lv1 6' Lv 1 5' Lv1 6' Lv1  Lv1 10' Lv1 10' Lv1 8'      TB shoulder extension Blk 2x10 Blk 2x10 Blk 2x10 Blke 2x10 Blk 2x15 Slv 2x15 Slv 2x15      TB IR Blue 2x10            TB ER [place]  Blue  2x10 Blue 2x10  Blue 2x10  Blue 2x10 B blue 3x10 B blue 3x10      Knee flexion                          seated knee extension 2.5#  2x10 nv 2.5#  2x15 2.5#  2x15 2.5#  2x15 3#  2x10 3#  2x15      Seated hip flexion 2.5#  2x10 nv 2.5#  2x15 2.5#  2x15 2.5#  2x15 3#  2x10 3#  2x15      Bicep curl x2  4# 2x10 4#  2x15 4#  2x15 4#  2x1 ea 4#  2x10 5#  2x10      OH tricep ext  2# 2x10 2#  2x15 2#  2x15 2#  2x15 2#  2x15 2#  2x15      Standing rows  Blk 2x10 Blk 2x10 Blk 2x10 [id number] Slv   2x15 Slv 2x15 Slv 2x15      Abdominal iso ball squeze       nv      Ther Activity             Lateral step ups Lv2 x6 nv     nv      Sit to stand 1 foam   x10 1 foam x10   1 foam   2x10 1 foam 2x10 1 foam 2x10       walking             Gait Training                                       Modalities                                       \

## 2024-11-19 ENCOUNTER — OFFICE VISIT (OUTPATIENT)
Dept: PHYSICAL THERAPY | Facility: CLINIC | Age: 59
End: 2024-11-19
Payer: COMMERCIAL

## 2024-11-19 DIAGNOSIS — M25.561 CHRONIC PAIN OF RIGHT KNEE: ICD-10-CM

## 2024-11-19 DIAGNOSIS — G89.29 CHRONIC PAIN OF RIGHT KNEE: ICD-10-CM

## 2024-11-19 DIAGNOSIS — R53.1 GENERALIZED WEAKNESS: Primary | ICD-10-CM

## 2024-11-19 DIAGNOSIS — Z78.9 POOR TOLERANCE FOR AMBULATION: ICD-10-CM

## 2024-11-19 DIAGNOSIS — J80 ACUTE RESPIRATORY DISTRESS SYNDROME (HCC): ICD-10-CM

## 2024-11-19 PROCEDURE — 97110 THERAPEUTIC EXERCISES: CPT

## 2024-11-19 PROCEDURE — 97112 NEUROMUSCULAR REEDUCATION: CPT

## 2024-11-19 NOTE — PROGRESS NOTES
Daily Note     Today's date: 2024  Patient name: Hussein Edward III  : 1965  MRN: [id number]  Referring provider: Bernabe Cook MD  Dx:   Encounter Diagnosis     ICD-10-CM    1. Generalized weakness  R53.1       2. Acute respiratory distress syndrome (HCC)  J80       3. Poor tolerance for ambulation  Z78.9       4. Chronic pain of right knee  M25.561     G89.29                      Subjective: pt reports that he was at [employer] and it was very hot in house and had to walk out to cool and is still feeling affects today.      Objective: See treatment diary below      Assessment: Tolerated treatment with more rest periods needed with increased fatigue noted.  He was still able to accomplish exercises as noted below.  . Patient demonstrated fatigue post treatment, exhibited good technique with therapeutic exercises, and would benefit from continued PT      Plan: Progress treatment as tolerated.       Precautions: Hx of ARDS 2024. Patient will require increased rest break between activity. Does carry portable O2 concentrator.       Manuals 10/17 10/28 10/31 11/5 11/7 11/12 11/14 11/19                                                         Neuro Re-Ed             Scapular retraction             SLS                                                                              Ther Ex             Seated HS stretch             Seated HR/TR             Seated marching             Bike for endurance Lv1 6' Lv 1 5' Lv1 6' Lv1  Lv1 10' Lv1 10' Lv1 8' Lv1 8'     TB shoulder extension Blk 2x10 Blk 2x10 Blk 2x10 Blke 2x10 Blk 2x15 Slv 2x15 Slv 2x15 Slv 2x15     TB IR Blue 2x10            TB ER [place]  Blue  2x10 Blue 2x10  Blue 2x10  Blue 2x10 B blue 3x10 B blue 3x10      Knee flexion                          seated knee extension 2.5#  2x10 nv 2.5#  2x15 2.5#  2x15 2.5#  2x15 3#  2x10 3#  2x15 3#  2x15     Seated hip flexion 2.5#  2x10 nv 2.5#  2x15 2.5#  2x15 2.5#  2x15 3#  2x10 3#  2x15 3#  2x15     Bicep curl x2  4#  2x10 4#  2x15 4#  2x15 4#  2x1 ea 4#  2x10 5#  2x10 5#  2x15     OH tricep ext  2# 2x10 2#  2x15 2#  2x15 2#  2x15 2#  2x15 2#  2x15 2#  2x15     Standing rows Blk 2x10 Blk 2x10 Blk 2x10 [id number] Slv   2x15 Slv 2x15 Slv 2x15 Slv 2x15     Abdominal iso ball squeze       nv      Ther Activity             Lateral step ups Lv2 x6 nv     nv      Sit to stand 1 foam   x10 1 foam x10   1 foam   2x10 1 foam 2x10 1 foam 2x10  1 foam 2x10     walking             Gait Training                                       Modalities                                       \

## 2024-11-21 ENCOUNTER — OFFICE VISIT (OUTPATIENT)
Dept: PHYSICAL THERAPY | Facility: CLINIC | Age: 59
End: 2024-11-21
Payer: COMMERCIAL

## 2024-11-21 ENCOUNTER — OFFICE VISIT (OUTPATIENT)
Dept: FAMILY MEDICINE CLINIC | Facility: CLINIC | Age: 59
End: 2024-11-21
Payer: COMMERCIAL

## 2024-11-21 VITALS — SYSTOLIC BLOOD PRESSURE: 130 MMHG | DIASTOLIC BLOOD PRESSURE: 80 MMHG

## 2024-11-21 DIAGNOSIS — Z91.81 RISK FOR FALLS: ICD-10-CM

## 2024-11-21 DIAGNOSIS — G25.81 RLS (RESTLESS LEGS SYNDROME): ICD-10-CM

## 2024-11-21 DIAGNOSIS — M25.50 ARTHRALGIA, UNSPECIFIED JOINT: ICD-10-CM

## 2024-11-21 DIAGNOSIS — G89.29 CHRONIC PAIN OF RIGHT KNEE: ICD-10-CM

## 2024-11-21 DIAGNOSIS — G63 NEUROPATHY DUE TO MEDICAL CONDITION (HCC): Primary | ICD-10-CM

## 2024-11-21 DIAGNOSIS — J80 ACUTE RESPIRATORY DISTRESS SYNDROME (HCC): ICD-10-CM

## 2024-11-21 DIAGNOSIS — Z78.9 POOR TOLERANCE FOR AMBULATION: ICD-10-CM

## 2024-11-21 DIAGNOSIS — R53.1 GENERALIZED WEAKNESS: Primary | ICD-10-CM

## 2024-11-21 DIAGNOSIS — M25.561 CHRONIC PAIN OF RIGHT KNEE: ICD-10-CM

## 2024-11-21 LAB
EST. AVERAGE GLUCOSE BLD GHB EST-MCNC: 111 MG/DL
HBA1C MFR BLD: 5.5 % (ref 4.8–5.6)
TSH SERPL DL<=0.005 MIU/L-ACNC: 1.54 UIU/ML (ref 0.45–4.5)

## 2024-11-21 PROCEDURE — 97110 THERAPEUTIC EXERCISES: CPT

## 2024-11-21 PROCEDURE — 97112 NEUROMUSCULAR REEDUCATION: CPT

## 2024-11-21 PROCEDURE — 99214 OFFICE O/P EST MOD 30 MIN: CPT | Performed by: FAMILY MEDICINE

## 2024-11-21 RX ORDER — ROPINIROLE 2 MG/1
2 TABLET, FILM COATED ORAL 3 TIMES DAILY
Qty: 90 TABLET | Refills: 5 | Status: SHIPPED | OUTPATIENT
Start: 2024-11-21

## 2024-11-21 RX ORDER — TRAMADOL HYDROCHLORIDE 50 MG/1
50 TABLET ORAL EVERY 6 HOURS PRN
Qty: 120 TABLET | Refills: 5 | Status: SHIPPED | OUTPATIENT
Start: 2024-11-21

## 2024-11-21 NOTE — PROGRESS NOTES
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 male.    HPI    The following portions of the patient's history were reviewed and updated as appropriate: allergies, current medications, past family history, past medical history, past social history, past surgical history and problem list.    Review of Systems   Constitutional:  Negative for fatigue, fever and unexpected weight change.   HENT:  Negative for congestion, sinus pain and sore throat.    Eyes:  Negative for visual disturbance.   Respiratory:  Negative for shortness of breath and wheezing.    Cardiovascular:  Negative for chest pain and palpitations.   Gastrointestinal:  Negative for abdominal pain, nausea and vomiting.   Musculoskeletal: Negative.  Negative for arthralgias and myalgias.   Neurological:  Negative for syncope, weakness and numbness.   Psychiatric/Behavioral: Negative.  Negative for confusion, dysphoric mood and suicidal ideas.          Objective:  Vitals:    11/21/24 1201   BP: 130/80   BP Location: Left arm   Patient Position: Sitting   Cuff Size: Standard      Physical Exam  Constitutional:       Appearance: He is well-developed.   HENT:      Right Ear: Ear canal normal. Tympanic membrane is not injected.      Left Ear: Ear canal normal. Tympanic membrane is not injected.      Nose: Nose normal.   Eyes:      General:         Right eye: No discharge.         Left eye: No discharge.      Conjunctiva/sclera: Conjunctivae normal.      Pupils: Pupils are equal, round, and reactive to light.   Neck:      Thyroid: No  thyromegaly.   Cardiovascular:      Rate and Rhythm: Normal rate and regular rhythm.      Heart sounds: Normal heart sounds. No murmur heard.  Pulmonary:      Effort: Pulmonary effort is normal. No respiratory distress.      Breath sounds: Normal breath sounds. No wheezing.   Abdominal:      General: Bowel sounds are normal. There is no distension.      Palpations: Abdomen is soft.      Tenderness: There is no abdominal tenderness.   Musculoskeletal:         General: Normal range of motion.      Cervical back: Normal range of motion and neck supple.   Lymphadenopathy:      Cervical: No cervical adenopathy.   Skin:     General: Skin is warm and dry.   Neurological:      Mental Status: He is alert and oriented to person, place, and time. He is not disoriented.      Sensory: No sensory deficit.      Motor: No weakness.      Coordination: Coordination normal.      Gait: Gait normal.      Deep Tendon Reflexes: Reflexes are normal and symmetric.   Psychiatric:         Speech: Speech normal.         Behavior: Behavior normal.         Thought Content: Thought content normal.         Judgment: Judgment normal.

## 2024-11-21 NOTE — PROGRESS NOTES
"Daily Note     Today's date: 2024  Patient name: Hussein Edward III  : 1965  MRN: [id number]  Referring provider: Bernabe Cook MD  Dx:   Encounter Diagnosis     ICD-10-CM    1. Generalized weakness  R53.1       2. Acute respiratory distress syndrome (HCC)  J80       3. Poor tolerance for ambulation  Z78.9       4. Chronic pain of right knee  M25.561     G89.29       5. Risk for falls  Z91.81                      Subjective: pt is without c/o offered today.      Objective: See treatment diary below      Assessment: added seated iso ball press down with god tolerance and form.  . Patient demonstrated fatigue post treatment      Plan: Continue per plan of care.      Precautions: Hx of ARDS 2024. Patient will require increased rest break between activity. Does carry portable O2 concentrator.       Manuals 10/17 10/28 10/31 11/5 11/7 11/12 11/14 11/19                                                         Neuro Re-Ed             Scapular retraction             SLS             Seated  abdominal press down          Ball 5\"2x10                                                        Ther Ex             Seated HS stretch             Seated HR/TR             Seated marching             Bike for endurance Lv1 6' Lv 1 5' Lv1 6' Lv1  Lv1 10' Lv1 10' Lv1 8' Lv1 8' Lv1 8'    TB shoulder extension Blk 2x10 Blk 2x10 Blk 2x10 Blke 2x10 Blk 2x15 Slv 2x15 Slv 2x15 Slv 2x15 Lv 2x15    TB IR Blue 2x10            TB ER [place]  Blue  2x10 Blue 2x10  Blue 2x10  Blue 2x10 B blue 3x10 B blue 3x10      Knee flexion                          seated knee extension 2.5#  2x10 nv 2.5#  2x15 2.5#  2x15 2.5#  2x15 3#  2x10 3#  2x15 3#  2x15 3#  2x15    Seated hip flexion 2.5#  2x10 nv 2.5#  2x15 2.5#  2x15 2.5#  2x15 3#  2x10 3#  2x15 3#  2x15 3#  2x15    Bicep curl x2  4# 2x10 4#  2x15 4#  2x15 4#  2x1 ea 4#  2x10 5#  2x10 5#  2x15 5#  2x15    OH tricep ext  2# 2x10 2#  2x15 2#  2x15 2#  2x15 2#  2x15 2#  2x15 2#  2x15 2#  2x15  "   Standing rows Blk 2x10 Blk 2x10 Blk 2x10 [id number] Slv   2x15 Slv 2x15 Slv 2x15 Slv 2x15 Slv 2x15                 Ther Activity             Lateral step ups Lv2 x6 nv     nv      Sit to stand 1 foam   x10 1 foam x10   1 foam   2x10 1 foam 2x10 1 foam 2x10  1 foam 2x10  1 foam 2x10    walking             Gait Training                                       Modalities                                       \

## 2024-11-21 NOTE — ASSESSMENT & PLAN NOTE
Pt w/ severe pain since ARDS earlier this yr---tried/failed mult meds gabapentin/cymbalta---pain MD has no more options---MRI/xrays non diagnostic---pt stable on current med regimen including Tramadol QID

## 2024-11-27 ENCOUNTER — OFFICE VISIT (OUTPATIENT)
Age: 59
End: 2024-11-27
Payer: COMMERCIAL

## 2024-11-27 VITALS
DIASTOLIC BLOOD PRESSURE: 84 MMHG | WEIGHT: 189 LBS | TEMPERATURE: 98.5 F | OXYGEN SATURATION: 98 % | BODY MASS INDEX: 29.66 KG/M2 | SYSTOLIC BLOOD PRESSURE: 130 MMHG | HEIGHT: 67 IN | HEART RATE: 102 BPM

## 2024-11-27 DIAGNOSIS — Z87.09 ARDS SURVIVOR: ICD-10-CM

## 2024-11-27 DIAGNOSIS — G47.00 INSOMNIA, UNSPECIFIED TYPE: ICD-10-CM

## 2024-11-27 DIAGNOSIS — R53.83 OTHER FATIGUE: ICD-10-CM

## 2024-11-27 DIAGNOSIS — R61 DIAPHORESIS: Primary | ICD-10-CM

## 2024-11-27 DIAGNOSIS — G47.33 OSA (OBSTRUCTIVE SLEEP APNEA): ICD-10-CM

## 2024-11-27 PROCEDURE — 99214 OFFICE O/P EST MOD 30 MIN: CPT | Performed by: INTERNAL MEDICINE

## 2024-11-27 NOTE — PROGRESS NOTES
Name: Hussein Edward III      : 1965      MRN: [id number]  Encounter Provider: Bernabe Cook MD  Encounter Date: 2024   Encounter department: [facility] PULMONARY [facility]  :  Assessment & Plan  Diaphoresis  Persistent fatigue and now having occasional diaphoresis.  TSH recently was normal  Fatigue attributable to post-ARDS deconditioning, muscle loss    However, would like to rule out endocrine cause of symptoms  Check AM cortisol  Check iron panel/vitamin B12 (especially with extremity tingling)  Refer to endocrinology to rule out adrenal insufficiency  Orders:    Cortisol Level, AM Specimen; Future    Iron Panel (Includes Ferritin, Iron Sat%, Iron, and TIBC); Future    Vitamin B12; Future    Ambulatory Referral to Endocrinology; Future    Other fatigue  Plan as detailed under diaphresis  Orders:    Cortisol Level, AM Specimen; Future    Iron Panel (Includes Ferritin, Iron Sat%, Iron, and TIBC); Future    Vitamin B12; Future    Ambulatory Referral to Endocrinology; Future    ARDS survivor  Severe hypoxic respiratory failure due to influenza and superimposed bacterial pneumonia resulting in arts with prolonged hospitalization in February to 2024.  He required proning and prolonged ventilator time.  Fortunately was able to be successfully extubated.     He lost 25 pounds from the hospitalization.  He has completed pulmonary rehab and is doing PT.  Still using a 4 point walker.  However no longer oxygen dependent at rest.      Using albuterol inhaled and nebulized as needed.    Check PFTs w/ bronchodilator response  Orders:    Complete PFT with post bronchodilator; Future    WALE (obstructive sleep apnea)  Long history of WALE and been on CPAP for 10 years.  Uses nasal mask and fullface mask in the past.  Sleep study in  showed AHI 7.9, supine AHI 9.5, REM AHI 15.9 with SpO2 denys 89%.  Repeat HST in  showed a SIMA 35.    11//  30% use over past 30 days, 27% more than 4  hours  Average use 6 hours 46 minutes  Airsense 10  4-5cm H2O EPR 2  P95 4.9  AHI 4.0    Compliance data reviewed.  AHI increasing.    Increased pressures on AirView to 4-7cm H2O       Insomnia, unspecified type  Continue with daridorexant 25mg qhs. No significant side effects.          Follow up in 4 months    History of Present Illness     HPI  Hussein Edward III is a 59 y.o. male who has a past medical history of R frontal meningioma, HLD, low back pain, migraines, nasal septal deviation s/p septoplasty, seasonal allergic rhinitis, severe WALE with difficulty tolerating CPAP, insomnia, recent influenza a/bacterial pneumonia resulting in ARDS with subsequent hypoxic respiratory failure who is presenting for follow-up     11/27/24 - FU with me-still having a lot of fatigue and muscular weakness but overall improving.  He has decided to retire and is looking for a work from home job.  He had a ER visit in October evaluating for generalized weakness and had a CT PE study that showed no PE and improving glass opacity.   He still gets a lot of tingling in his legs after walking and has been put on Requip which he is not sure if it helps him.  2 mg made him feel very uncomfortable  Working very well with physical therapy.  He feels like he is getting his muscle strength back but very slowly.  Still use oxygen with exertion as needed.    He is trying his CPAP again but doesn't think it gives him much symptomatic benefit.  He mainly uses it for snoring.  He sleeps a lot through a 24 hour period - sometimes up to 12 hours a day  He gets occasional episodes of significant sweating without exertion.  This is accompanied by fatigue.    Does not feel like gabapentin is benefiting him too much.    He does get benefit from albuterol    8/20/24 - FU with me - completed pulmonary rehab.  No longer oxygen dependent at rest.  Still with dyspnea with exertion but improving.  Sleep on recliner, going back on CPAP.  Daridorexant every  other night or every third night.  Metoprolol is decreased from 50 to 25mg due to lightheadedness.  Migraines are coming back  Walking is still tough.  Still wants to use portable oxygen despite not being hypoxic with exertion.  His work schedule is 5 days a week.  He talked with HR and will take a day off on Wednesdays.       5/8/24 - follow up with me -currently doing pulmonary rehab.  Treadmill exercise is difficult for him.  He can go without oxygen at rest but after 15 minutes he gets some chest tightness like he cannot take a deep breath.  He uses albuterol inhaler and nebulizer but he feels the nebulizer provides him the most benefit which does not last too long.  No significant cough/fever/chills.  He is gaining weight back.  CPAP is difficult to tolerate as he cannot breathe out too much against the pressure.  He bleeds oxygen through his CPAP.  He is try to use it more and he has received a new machine from his DME.     3/28/24 -follow-up with me-here with his wife currently on 2 L with exertion.  Very motivated to do more rehab.  Will schedule pulmonary rehab.  Still having cough      3/20/24 -patient requested decrease in CPAP pressures.  I decreased to 5-10 cm H2O.  I placed an order for pulmonary rehab     3/14-3/27/2024-admitted to [facility] for rehab after hospitalization.  He is on room air at rest but will desaturate with ambulation and requires 2 to 3 L with ambulation.  Speech improving at discharge.  Pulmonary rehab recommended.  Started on Paxil in the hospitalization.  Did not need lorazepam throughout rehab stay.     2/12-3/14/24 -extensive hospitalization for acute hypoxic respiratory failure due to ARDS from influenza A and likely superimposed bacterial pneumonia.  Intubated from 2/19 - 2/29 and required proning, paralytics.  Course complicated by some postextubation delirium, reports of hallucinations during sedation.  Successfully extubated and transferred to rehab on 3/14.     12/20/23   -telephone encounter, prescribed daridorexant for insomnia     9/12/23 -follow-up with me - last seen by me 6/20/23 - HST shows 32.5 events per hour with 33 minutes <89% SpO2.  He uses CPAP intermittently using a AirSense S10.  He doesn't use a DME and buys supplies online.  He has tried 8-10 different masks.  He reports mask leaking/hissing which wakes him up at night.  He has snoring with CPAP.  CPAP has not made him feel better.     He has some sleep initiation issues and takes him several hours to fall as sleep due to racing thoughts at times.      6/20/23 - consult with me - He endorses snoring, morning headaches, apneic events, seen by his wife. His neurologist recently started him on acetazolamide 125 mg a day.  He is prescribed diazepam and temazepam but he has not taken this recently.  He is taking tramadol a few times a week for back pain.     He had previously been treated with CPAP for 8-10 years although he feels like CPAP was very uncomfortable.  He did use nasal masks and fullface masks.  He still has a CPAP and only uses it occasionally.  Previous sleep study: 2016, AHI 7.9, supine 95.5, REM 15.9, Spo2 denys 89%.  He had used chewing tobacco in the past, but quit 20 years ago and no longer uses nicotine. He rarely drinks alcohol, once or twice per year. He denies other drug use.       History obtained from: patient    Review of Systems  Past Medical History   Past Medical History:   Diagnosis Date    Chronic back pain     Migraine      Past Surgical History:   Procedure Laterality Date    HERNIA REPAIR      MANDIBLE FRACTURE SURGERY      MOUTH SURGERY      cyst in cheek    NASAL SEPTUM SURGERY       Family History   Problem Relation Age of Onset    Breast cancer Mother     Diabetes Father     No Known Problems Family     Substance Abuse Neg Hx     Mental illness Neg Hx       reports that he has never smoked. He quit smokeless tobacco use about 21 years ago.  His smokeless tobacco use included snuff.  He reports that he does not drink alcohol and does not use drugs.  Current Outpatient Medications on File Prior to Visit   Medication Sig Dispense Refill    albuterol (2.5 mg/3 mL) 0.083 % nebulizer solution Take 3 mL (2.5 mg total) by nebulization every 6 (six) hours as needed for wheezing or shortness of breath 125 mL 3    albuterol (PROVENTIL HFA,VENTOLIN HFA) 90 mcg/act inhaler Inhale 2 puffs every 4 (four) hours as needed for wheezing 18 g 0    Daridorexant HCl (Quviviq) 25 MG TABS Take 25 mg by mouth daily at bedtime 90 tablet 0    DULoxetine (CYMBALTA) 30 mg delayed release capsule 1-2 daily 180 capsule 3    meloxicam (Mobic) 15 mg tablet Take 1 tablet (15 mg total) by mouth daily 60 tablet 5    METHOCARBAMOL PO PRN      metoprolol succinate (TOPROL-XL) 25 mg 24 hr tablet Take 0.5 tablets (12.5 mg total) by mouth every 12 (twelve) hours 90 tablet 0    rimegepant sulfate (Nurtec) 75 mg TBDP Take one NURTEC 75 mg under or on tongue at migraine onset if needed daily 16 tablet 11    rOPINIRole (REQUIP) 2 mg tablet Take 1 tablet (2 mg total) by mouth 3 (three) times a day 90 tablet 5    traMADol (ULTRAM) 50 mg tablet Take 1 tablet (50 mg total) by mouth every 6 (six) hours as needed for moderate pain 120 tablet 5    Vitamin D, Cholecalciferol, 50 MCG (2000 UT) CAPS Take by mouth      [DISCONTINUED] diclofenac sodium (VOLTAREN) 50 mg EC tablet Take 1 tablet(s) twice a day by oral route as needed.  0     No current facility-administered medications on file prior to visit.   No Known Allergies      Objective   There were no vitals taken for this visit.     Physical Exam  Vitals and nursing note reviewed.   Constitutional:       General: He is not in acute distress.     Appearance: Normal appearance. He is well-developed. He is not ill-appearing, toxic-appearing or diaphoretic.   HENT:      Head: Normocephalic and atraumatic.   Eyes:      Conjunctiva/sclera: Conjunctivae normal.   Cardiovascular:      Rate and Rhythm:  Normal rate.   Pulmonary:      Effort: Pulmonary effort is normal. No respiratory distress.   Abdominal:      Tenderness: There is no guarding.   Musculoskeletal:      Cervical back: Normal range of motion. No rigidity.   Neurological:      General: No focal deficit present.      Mental Status: He is alert and oriented to person, place, and time. Mental status is at baseline.   Psychiatric:         Mood and Affect: Mood normal.         Imaging and other studies: I have personally reviewed pertinent reports.   and I have personally reviewed pertinent films in PACS  10/21/24 CTA   No PE.  Improving GGO, interstitial prominence.  B/l subsegmental atelectasis    4/8/24 CT Chest without contrast  Bilateral opacities and scarring improving compared to prior CT     3/11/24 CXR -bilateral patchy opacities more prominent in the left lung and right lower lobe     2/27/2024 chest CT without contrast-patchy groundglass and airspace opacities scattered throughout more dense in the lower lobes with air bronchograms.  Lack of effusions.  Normal cardiac silhouette.  Small coronary cardial effusion.  No adenopathy.  Endotracheal tube in place.     Sleep Study:  7/20/2023-HST-severe WALE SIMA 32.5.  A 237. significantly higher AHI on the right side of 59 SIMA and 10 on left side.  Baseline oxygen saturation normal     Sleep study: 2016, AHI 7.9, supine 95.5, REM 15.9, Spo2 denys 89%        Compliance data:  11/27/24  30% use over past 30 days, 27% more than 4 hours  Average use 6 hours 46 minutes  Airsense 10  4-5cm H2O EPR 2  P95 4.9  AHI 4.0    8/17/2024  19% use over the past 90 days, 14% more than 4 hours  Average use 4 hours and 56 minutes  Serial #[id number]  AirSense 10 4-5 cm H2O EPR 2  Median pressure 4.9, 95th percentile 4.9  Median leak 0.2, 95th percentile 3.4  AHI 0.9     Transthoracic Echo:  2/19/24    Left Ventricle: Left ventricular cavity size is normal. Wall thickness is mildly increased. There is concentric  remodeling. The left ventricular ejection fraction is 55-60%. Systolic function is normal. Wall motion is normal. Diastolic function is mildly abnormal, consistent with grade I (abnormal) relaxation.    Right Ventricle: Right ventricular cavity size is normal. Systolic function is normal.    Left Atrium: The atrium is normal in size.    Right Atrium: The atrium is normal in size.     Pulmonary Function Testing:   Patient SpO2 was 98% on room air at rest.  Heart rate was 87 bpm.   Patient walked 6 minutes with with a walker for 203 m.  Patient's lowest SpO2 was 88% requiring 2 L to correct at the end of the walk.  Highest heart rate was 107 bpm.   Recommend 2 L/min of supplemental oxygen with portable concentrator

## 2024-11-27 NOTE — ASSESSMENT & PLAN NOTE
Severe hypoxic respiratory failure due to influenza and superimposed bacterial pneumonia resulting in arts with prolonged hospitalization in February to March 2024.  He required proning and prolonged ventilator time.  Fortunately was able to be successfully extubated.     He lost 25 pounds from the hospitalization.  He has completed pulmonary rehab and is doing PT.  Still using a 4 point walker.  However no longer oxygen dependent at rest.      Using albuterol inhaled and nebulized as needed.    Check PFTs w/ bronchodilator response  Orders:    Complete PFT with post bronchodilator; Future

## 2024-11-29 ENCOUNTER — TELEPHONE (OUTPATIENT)
Age: 59
End: 2024-11-29

## 2024-11-29 NOTE — ASSESSMENT & PLAN NOTE
Long history of WALE and been on CPAP for 10 years.  Uses nasal mask and fullface mask in the past.  Sleep study in 2016 showed AHI 7.9, supine AHI 9.5, REM AHI 15.9 with SpO2 denys 89%.  Repeat HST in 2023 showed a SIMA 35.    11/27/24  30% use over past 30 days, 27% more than 4 hours  Average use 6 hours 46 minutes  Airsense 10  4-5cm H2O EPR 2  P95 4.9  AHI 4.0    Compliance data reviewed.  AHI increasing.    Increased pressures on AirView to 4-7cm H2O

## 2024-12-03 ENCOUNTER — OFFICE VISIT (OUTPATIENT)
Dept: PHYSICAL THERAPY | Facility: CLINIC | Age: 59
End: 2024-12-03
Payer: COMMERCIAL

## 2024-12-03 DIAGNOSIS — R53.1 GENERALIZED WEAKNESS: Primary | ICD-10-CM

## 2024-12-03 DIAGNOSIS — Z91.81 RISK FOR FALLS: ICD-10-CM

## 2024-12-03 DIAGNOSIS — G89.29 CHRONIC PAIN OF RIGHT KNEE: ICD-10-CM

## 2024-12-03 DIAGNOSIS — M25.561 CHRONIC PAIN OF RIGHT KNEE: ICD-10-CM

## 2024-12-03 DIAGNOSIS — J80 ACUTE RESPIRATORY DISTRESS SYNDROME (HCC): ICD-10-CM

## 2024-12-03 DIAGNOSIS — Z78.9 POOR TOLERANCE FOR AMBULATION: ICD-10-CM

## 2024-12-03 PROCEDURE — 97110 THERAPEUTIC EXERCISES: CPT

## 2024-12-03 PROCEDURE — 97112 NEUROMUSCULAR REEDUCATION: CPT

## 2024-12-03 NOTE — PROGRESS NOTES
"Daily Note     Today's date: 12/3/2024  Patient name: Hussein Edward III  : 1965  MRN: [id number]  Referring provider: Bernabe Cook MD  Dx:   Encounter Diagnosis     ICD-10-CM    1. Generalized weakness  R53.1       2. Acute respiratory distress syndrome (HCC)  J80       3. Poor tolerance for ambulation  Z78.9       4. Chronic pain of right knee  M25.561     G89.29       5. Risk for falls  Z91.81                      Subjective: pt states that he was not doing well last week but better this week.       Objective: See treatment diary below      Assessment: . Patient demonstrated fatigue post treatment and would benefit from continued PT  introduced new exercises to program with fatigue noted.  Step up and over laterally and standing sh ext with march.  We are working toward more standing and functional movements for patient as he is gaining gross strength throughout/       Plan: Continue per plan of care.      Precautions: Hx of ARDS 2024. Patient will require increased rest break between activity. Does carry portable O2 concentrator.       Manuals 10/17 10/28 10/31 11/5 11/7 11/12 11/14 11/19  12/3                                                       Neuro Re-Ed             Scapular retraction             SLS             Seated  abdominal press down          Ball 5\"2x10 Ball 5\" 2x10                                                       Ther Ex             Hip abd stand                                       Bike for endurance Lv1 6' Lv 1 5' Lv1 6' Lv1  Lv1 10' Lv1 10' Lv1 8' Lv1 8' Lv1 8' Lv2 5'  Lv1 2'   TB shoulder extension Blk 2x10 Blk 2x10 Blk 2x10 Blke 2x10 Blk 2x15 Slv 2x15 Slv 2x15 Slv 2x15 Lv 2x15 / march  blk x10   TB IR Blue 2x10            TB ER [place]  Blue  2x10 Blue 2x10  Blue 2x10  Blue 2x10 B blue 3x10 B blue 3x10   B blue 3x10   Knee flexion                          seated knee extension 2.5#  2x10 nv 2.5#  2x15 2.5#  2x15 2.5#  2x15 3#  2x10 3#  2x15 3#  2x15 3#  2x15    Seated hip " flexion 2.5#  2x10 nv 2.5#  2x15 2.5#  2x15 2.5#  2x15 3#  2x10 3#  2x15 3#  2x15 3#  2x15    Bicep curl x2  4# 2x10 4#  2x15 4#  2x15 4#  2x1 ea 4#  2x10 5#  2x10 5#  2x15 5#  2x15    OH tricep ext  2# 2x10 2#  2x15 2#  2x15 2#  2x15 2#  2x15 2#  2x15 2#  2x15 2#  2x15 2#  2x15   Standing rows Blk 2x10 Blk 2x10 Blk 2x10 [id number] Slv   2x15 Slv 2x15 Slv 2x15 Slv 2x15 Slv 2x15 On foam FT   Slv                 Ther Activity             Lateral step ups and over Lv2 x6 nv     nv   Lv2 x10   Sit to stand 1 foam   x10 1 foam x10   1 foam   2x10 1 foam 2x10 1 foam 2x10  1 foam 2x10  1 foam 2x10 1 foam 2x10   walking             Gait Training                                       Modalities                                       \

## 2024-12-05 ENCOUNTER — TELEPHONE (OUTPATIENT)
Age: 59
End: 2024-12-05

## 2024-12-05 ENCOUNTER — OFFICE VISIT (OUTPATIENT)
Dept: PHYSICAL THERAPY | Facility: CLINIC | Age: 59
End: 2024-12-05
Payer: COMMERCIAL

## 2024-12-05 DIAGNOSIS — J80 ACUTE RESPIRATORY DISTRESS SYNDROME (HCC): ICD-10-CM

## 2024-12-05 DIAGNOSIS — R53.1 GENERALIZED WEAKNESS: Primary | ICD-10-CM

## 2024-12-05 DIAGNOSIS — Z78.9 POOR TOLERANCE FOR AMBULATION: ICD-10-CM

## 2024-12-05 DIAGNOSIS — Z91.81 RISK FOR FALLS: ICD-10-CM

## 2024-12-05 DIAGNOSIS — M25.561 CHRONIC PAIN OF RIGHT KNEE: ICD-10-CM

## 2024-12-05 DIAGNOSIS — G89.29 CHRONIC PAIN OF RIGHT KNEE: ICD-10-CM

## 2024-12-05 PROCEDURE — 97112 NEUROMUSCULAR REEDUCATION: CPT

## 2024-12-05 PROCEDURE — 97110 THERAPEUTIC EXERCISES: CPT

## 2024-12-05 NOTE — PROGRESS NOTES
"Daily Note     Today's date: 2024  Patient name: Hussein Edward III  : 1965  MRN: [id number]  Referring provider: Bernabe Cook MD  Dx:   Encounter Diagnosis     ICD-10-CM    1. Generalized weakness  R53.1       2. Acute respiratory distress syndrome (HCC)  J80       3. Poor tolerance for ambulation  Z78.9       4. Chronic pain of right knee  M25.561     G89.29       5. Risk for falls  Z91.81                      Subjective: Hussein states that he was a bit sore after last visit.        Objective: See treatment diary below      Assessment: Tolerated treatment with more challenge on balance with marching with RLE stationary and L lifting., required CL S to min A of 1 . Patient demonstrated fatigue post treatment and would benefit from continued PT      Plan: Continue per plan of care.      Precautions: Hx of ARDS 2024. Patient will require increased rest break between activity. Does carry portable O2 concentrator.       Manuals 12/5      11/14 11/19  12/3                                                       Neuro Re-Ed             Scapular retraction             SLS             Seated  abdominal press down  5\" 2x10        Ball 5\"2x10 Ball 5\" 2x10                                                       Ther Ex             Hip abd stand                                       Bike for endurance Lv2 6'  Lv1 1'      Lv1 8' Lv1 8' Lv1 8' Lv2 5'  Lv1 2'   TB shoulder extension / march blk x10      Slv 2x15 Slv 2x15 Lv 2x15 / march  blk x10   TB IR             TB ER Blue 3x10 b/l      B blue 3x10   B blue 3x10   Knee flexion                          seated knee extension       3#  2x15 3#  2x15 3#  2x15    Seated hip flexion       3#  2x15 3#  2x15 3#  2x15    Bicep curl x2       5#  2x10 5#  2x15 5#  2x15    OH tricep ext 2#  2x15      2#  2x15 2#  2x15 2#  2x15 2#  2x15   Standing rows On foam blk 2x15      Slv 2x15 Slv 2x15 Slv 2x15 On foam FT   Slv                 Ther Activity             Lateral step " ups and over Lv2 x10      nv   Lv2 x10   Sit to stand        1 foam 2x10  1 foam 2x10 1 foam 2x10   walking             Gait Training                                       Modalities                                       \

## 2024-12-09 ENCOUNTER — HOSPITAL ENCOUNTER (OUTPATIENT)
Dept: PULMONOLOGY | Facility: HOSPITAL | Age: 59
Discharge: HOME | End: 2024-12-09
Attending: INTERNAL MEDICINE
Payer: COMMERCIAL

## 2024-12-09 DIAGNOSIS — Z87.09 ARDS SURVIVOR: ICD-10-CM

## 2024-12-09 PROCEDURE — 94060 EVALUATION OF WHEEZING: CPT | Performed by: INTERNAL MEDICINE

## 2024-12-09 PROCEDURE — 94726 PLETHYSMOGRAPHY LUNG VOLUMES: CPT | Performed by: INTERNAL MEDICINE

## 2024-12-09 PROCEDURE — 94729 DIFFUSING CAPACITY: CPT

## 2024-12-09 PROCEDURE — 94729 DIFFUSING CAPACITY: CPT | Performed by: INTERNAL MEDICINE

## 2024-12-09 PROCEDURE — 94060 EVALUATION OF WHEEZING: CPT

## 2024-12-09 PROCEDURE — 94726 PLETHYSMOGRAPHY LUNG VOLUMES: CPT

## 2024-12-09 PROCEDURE — 94760 N-INVAS EAR/PLS OXIMETRY 1: CPT

## 2024-12-09 RX ORDER — ALBUTEROL SULFATE 0.83 MG/ML
2.5 SOLUTION RESPIRATORY (INHALATION) ONCE
Status: COMPLETED | OUTPATIENT
Start: 2024-12-09 | End: 2024-12-09

## 2024-12-09 RX ADMIN — ALBUTEROL SULFATE 2.5 MG: 2.5 SOLUTION RESPIRATORY (INHALATION) at 11:06

## 2024-12-10 ENCOUNTER — OFFICE VISIT (OUTPATIENT)
Dept: PHYSICAL THERAPY | Facility: CLINIC | Age: 59
End: 2024-12-10
Payer: COMMERCIAL

## 2024-12-10 DIAGNOSIS — Z78.9 POOR TOLERANCE FOR AMBULATION: ICD-10-CM

## 2024-12-10 DIAGNOSIS — J80 ACUTE RESPIRATORY DISTRESS SYNDROME (HCC): ICD-10-CM

## 2024-12-10 DIAGNOSIS — R53.1 GENERALIZED WEAKNESS: Primary | ICD-10-CM

## 2024-12-10 DIAGNOSIS — Z91.81 RISK FOR FALLS: ICD-10-CM

## 2024-12-10 PROCEDURE — 97112 NEUROMUSCULAR REEDUCATION: CPT | Performed by: PHYSICAL THERAPIST

## 2024-12-10 PROCEDURE — 97110 THERAPEUTIC EXERCISES: CPT | Performed by: PHYSICAL THERAPIST

## 2024-12-10 NOTE — PROGRESS NOTES
"Daily Note     Today's date: 12/10/2024  Patient name: Hussein Edward III  : 1965  MRN: [id number]  Referring provider: Bernabe Cook MD  Dx:   Encounter Diagnosis     ICD-10-CM    1. Generalized weakness  R53.1       2. Acute respiratory distress syndrome (HCC)  J80       3. Poor tolerance for ambulation  Z78.9       4. Risk for falls  Z91.81                      Subjective: pt notes that for shorter walks he feels okay, but for longer walks he needs his walker to take breaks. He thinks that the distance is getting better that he can walk      Objective: See treatment diary below      Assessment: pt is showingimprovements in his strength and endurance. I would like to work on the speed of exercises and the rest breaks between in order to increase endurance as well as how much were are able to accomplish during routines.       Plan: Continue per plan of care.      Precautions: Hx of ARDS 2024. Patient will require increased rest break between activity. Does carry portable O2 concentrator.       Manuals 12/5 12/10     11/14 11/19  12/3                                                       Neuro Re-Ed             Scapular retraction             SLS             Seated  abdominal press down  5\" 2x10 5''x2x10       Ball 5\"2x10 Ball 5\" 2x10                                                       Ther Ex             Hip abd stand                          UBE for endurance.  Lv 1 6'           Bike for endurance Lv2 6'  Lv1 1'      Lv1 8' Lv1 8' Lv1 8' Lv2 5'  Lv1 2'   TB shoulder extension W/ march blk x10 W/ march 2x10 ea blk     Slv 2x15 Slv 2x15 Lv 2x15 W/ march  blk x10   TB IR             TB ER Blue 3x10 b/l Blue 3x10 b/l     B blue 3x10   B blue 3x10   Knee flexion                          seated knee extension       3#  2x15 3#  2x15 3#  2x15    Seated hip flexion       3#  2x15 3#  2x15 3#  2x15    Bicep curl x2       5#  2x10 5#  2x15 5#  2x15    OH tricep ext 2#  2x15 3# 2x15     2#  2x15 2#  2x15 " 2#  2x15 2#  2x15   Standing rows On foam blk 2x15 On foam blk 2x15        Slv 2x15 Slv 2x15 Slv 2x15 On foam FT   Slv                 Ther Activity             Lateral step ups and over Lv2 x10 Lv2 x10     nv   Lv2 x10   Sit to stand        1 foam 2x10  1 foam 2x10 1 foam 2x10   walking             Gait Training                                       Modalities                                       \

## 2024-12-12 ENCOUNTER — OFFICE VISIT (OUTPATIENT)
Dept: PHYSICAL THERAPY | Facility: CLINIC | Age: 59
End: 2024-12-12
Payer: COMMERCIAL

## 2024-12-12 ENCOUNTER — OFFICE VISIT (OUTPATIENT)
Dept: CARDIOLOGY CLINIC | Facility: CLINIC | Age: 59
End: 2024-12-12
Payer: COMMERCIAL

## 2024-12-12 VITALS
HEIGHT: 67 IN | WEIGHT: 192 LBS | DIASTOLIC BLOOD PRESSURE: 82 MMHG | HEART RATE: 110 BPM | SYSTOLIC BLOOD PRESSURE: 124 MMHG | BODY MASS INDEX: 30.13 KG/M2

## 2024-12-12 DIAGNOSIS — R53.1 GENERALIZED WEAKNESS: Primary | ICD-10-CM

## 2024-12-12 DIAGNOSIS — Z87.09 ARDS SURVIVOR: ICD-10-CM

## 2024-12-12 DIAGNOSIS — M25.561 CHRONIC PAIN OF RIGHT KNEE: ICD-10-CM

## 2024-12-12 DIAGNOSIS — J80 ACUTE RESPIRATORY DISTRESS SYNDROME (HCC): ICD-10-CM

## 2024-12-12 DIAGNOSIS — E78.2 MIXED DYSLIPIDEMIA: ICD-10-CM

## 2024-12-12 DIAGNOSIS — Z91.81 RISK FOR FALLS: ICD-10-CM

## 2024-12-12 DIAGNOSIS — Z78.9 POOR TOLERANCE FOR AMBULATION: ICD-10-CM

## 2024-12-12 DIAGNOSIS — G89.29 CHRONIC PAIN OF RIGHT KNEE: ICD-10-CM

## 2024-12-12 DIAGNOSIS — I48.0 PAF (PAROXYSMAL ATRIAL FIBRILLATION) (HCC): Primary | ICD-10-CM

## 2024-12-12 DIAGNOSIS — R00.0 TACHYCARDIA: ICD-10-CM

## 2024-12-12 PROCEDURE — 97110 THERAPEUTIC EXERCISES: CPT

## 2024-12-12 PROCEDURE — 93000 ELECTROCARDIOGRAM COMPLETE: CPT | Performed by: INTERNAL MEDICINE

## 2024-12-12 PROCEDURE — 99214 OFFICE O/P EST MOD 30 MIN: CPT | Performed by: INTERNAL MEDICINE

## 2024-12-12 PROCEDURE — 97112 NEUROMUSCULAR REEDUCATION: CPT

## 2024-12-12 PROCEDURE — 97530 THERAPEUTIC ACTIVITIES: CPT

## 2024-12-12 RX ORDER — DIPHENOXYLATE HYDROCHLORIDE AND ATROPINE SULFATE 2.5; .025 MG/1; MG/1
1 TABLET ORAL DAILY
COMMUNITY

## 2024-12-12 RX ORDER — METOPROLOL SUCCINATE 25 MG/1
25 TABLET, EXTENDED RELEASE ORAL EVERY 12 HOURS
Qty: 180 TABLET | Refills: 3 | Status: SHIPPED | OUTPATIENT
Start: 2024-12-12

## 2024-12-12 NOTE — PATIENT INSTRUCTIONS
Recommendations:  Increase Metoprolol Succinate to 25mg 2x/day.  Continue remainder of medications .  Follow up in 6 months.

## 2024-12-12 NOTE — PROGRESS NOTES
"Daily Note     Today's date: 2024  Patient name: Hussein Edward III  : 1965  MRN: [id number]  Referring provider: Bernabe Cook MD  Dx:   Encounter Diagnosis     ICD-10-CM    1. Generalized weakness  R53.1       2. Acute respiratory distress syndrome (HCC)  J80       3. Poor tolerance for ambulation  Z78.9       4. Risk for falls  Z91.81       5. Chronic pain of right knee  M25.561     G89.29                      Subjective: pt is with no c/o offered.       Objective: See treatment diary below      Assessment: Tolerated treatment with  more fatigue throughout session as he had increased bike to level 3 instead of 2. Patient demonstrated fatigue post treatment, exhibited good technique with therapeutic exercises, and would benefit from continued PT      Plan: Continue per plan of care.      Precautions: Hx of ARDS 2024. Patient will require increased rest break between activity. Does carry portable O2 concentrator.       Manuals 12/5 12/10 12/12                                                              Neuro Re-Ed             Scapular retraction             SLS             Seated  abdominal press down  5\" 2x10 5''x2x10 5\"x20                                                              Ther Ex             Hip abd stand                          UBE for endurance.  Lv 1 6'           Bike for endurance Lv2 6'  Lv1 1'  Lv3 6'          TB shoulder extension / march blk x10 / march 2x10 ea blk W march blk 2x10          TB IR             TB ER-bilateral Blue 3x10 b/l Blue 3x10 b/l Blue 3x10          Knee flexion                          seated knee extension             Seated hip flexion             Bicep curl x2             OH tricep ext 2#  2x15 3# 2x15 3# 2x15          Standing rows On foam blk 2x15 On foam blk 2x15                           Ther Activity             Lateral step ups and over Lv2 x10 Lv2 x10           Sit to stand   Thin 2x10                       Gait Training                      "                  Modalities                                       \

## 2024-12-12 NOTE — PROGRESS NOTES
Cardiology   Hussein Edward III 59 y.o. male MRN: [id number]        Impression:  Sinus tachycardia - not adequate control.   Paroxysmal atrial fibrillation - in setting of ARDS.  No need for anticoagulation.   ARDS - slowly recovering.  Undergoing cardiac rehab.     Recommendations:  Increase Metoprolol Succinate to 25mg 2x/day.  Continue remainder of medications .  Follow up in 6 months.         HPI: Hussein Edward III is a 59 y.o. year old malewith sinus tachycardia, brief episode of PAF 2/19/24 during procedure, who was admitted to [facility] with Influenza A/bacterial pneumonia, and transferred to [facility] due to ARDS who returns for follow up.  Last saw Michelle Webb 10/24/24 for diaphoresis. Had afib during a central line placement, and converted to NSR on amio/dig. Had persistent tachycardia in setting of hypoxemia and tachypnea. Echo 2/24 demonstrated normal EF, mild LVH, normal atrial size, and no valvular abnormalities.         Review of Systems   Constitutional: Negative.    HENT: Negative.     Eyes: Negative.    Respiratory:  Positive for shortness of breath. Negative for chest tightness.    Cardiovascular:  Positive for palpitations. Negative for chest pain and leg swelling.   Gastrointestinal: Negative.    Endocrine: Negative.    Genitourinary: Negative.    Musculoskeletal:  Positive for gait problem.   Skin: Negative.    Allergic/Immunologic: Negative.    Hematological: Negative.    Psychiatric/Behavioral: Negative.     All other systems reviewed and are negative.        Past Medical History:   Diagnosis Date    Chronic back pain     Migraine      Past Surgical History:   Procedure Laterality Date    HERNIA REPAIR      MANDIBLE FRACTURE SURGERY      MOUTH SURGERY      cyst in cheek    NASAL SEPTUM SURGERY       Social History     Substance and Sexual Activity   Alcohol Use Never     Social History     Substance and Sexual Activity   Drug Use No     Social History     Tobacco Use   Smoking  Status Never   Smokeless Tobacco Former    Types: Snuff    Quit date: 2003     Family History   Problem Relation Age of Onset    Breast cancer Mother     Diabetes Father     No Known Problems Family     Substance Abuse Neg Hx     Mental illness Neg Hx        Allergies:  No Known Allergies    Medications:     Current Outpatient Medications:     albuterol (2.5 mg/3 mL) 0.083 % nebulizer solution, Take 3 mL (2.5 mg total) by nebulization every 6 (six) hours as needed for wheezing or shortness of breath, Disp: 125 mL, Rfl: 3    albuterol (PROVENTIL HFA,VENTOLIN HFA) 90 mcg/act inhaler, Inhale 2 puffs every 4 (four) hours as needed for wheezing, Disp: 18 g, Rfl: 0    Daridorexant HCl (Quviviq) 25 MG TABS, Take 25 mg by mouth daily at bedtime, Disp: 90 tablet, Rfl: 0    DULoxetine (CYMBALTA) 30 mg delayed release capsule, 1-2 daily, Disp: 180 capsule, Rfl: 3    meloxicam (Mobic) 15 mg tablet, Take 1 tablet (15 mg total) by mouth daily, Disp: 60 tablet, Rfl: 5    METHOCARBAMOL PO, PRN, Disp: , Rfl:     metoprolol succinate (TOPROL-XL) 25 mg 24 hr tablet, Take 0.5 tablets (12.5 mg total) by mouth every 12 (twelve) hours, Disp: 90 tablet, Rfl: 0    multivitamin (THERAGRAN) TABS, Take 1 tablet by mouth daily, Disp: , Rfl:     rimegepant sulfate (Nurtec) 75 mg TBDP, Take one NURTEC 75 mg under or on tongue at migraine onset if needed daily, Disp: 16 tablet, Rfl: 11    traMADol (ULTRAM) 50 mg tablet, Take 1 tablet (50 mg total) by mouth every 6 (six) hours as needed for moderate pain, Disp: 120 tablet, Rfl: 5    Vitamin D, Cholecalciferol, 50 MCG (2000 UT) CAPS, Take by mouth, Disp: , Rfl:     rOPINIRole (REQUIP) 2 mg tablet, Take 1 tablet (2 mg total) by mouth 3 (three) times a day (Patient not taking: Reported on 12/12/2024), Disp: 90 tablet, Rfl: 5      Wt Readings from Last 3 Encounters:   12/12/24 87.1 kg (192 lb)   11/27/24 85.7 kg (189 lb)   10/24/24 86.2 kg (190 lb)     Temp Readings from Last 3 Encounters:   11/27/24  98.5 °F (36.9 °C) (Tympanic)   10/24/24 (!) 96.8 °F (36 °C) (Tympanic)   10/21/24 97.7 °F (36.5 °C) (Temporal)     BP Readings from Last 3 Encounters:   12/12/24 124/82   11/27/24 130/84   11/21/24 130/80     Pulse Readings from Last 3 Encounters:   12/12/24 (!) 110   11/27/24 102   10/24/24 70         Physical Exam  HENT:      Head: Atraumatic.      Mouth/Throat:      Mouth: Mucous membranes are moist.   Eyes:      Extraocular Movements: Extraocular movements intact.   Cardiovascular:      Rate and Rhythm: Normal rate and regular rhythm.      Heart sounds: Normal heart sounds.   Pulmonary:      Effort: Pulmonary effort is normal.      Breath sounds: Normal breath sounds.   Abdominal:      General: Abdomen is flat.   Musculoskeletal:         General: Normal range of motion.      Cervical back: Normal range of motion.   Skin:     General: Skin is warm.   Neurological:      General: No focal deficit present.      Mental Status: He is alert and oriented to person, place, and time.   Psychiatric:         Mood and Affect: Mood normal.         Behavior: Behavior normal.           Laboratory Studies:  CMP:  Lab Results   Component Value Date     (H) 12/20/2017    K 4.0 10/21/2024     10/21/2024    CO2 28 10/21/2024    BUN 22 10/21/2024    CREATININE 0.80 10/21/2024    GLUCOSE 88 10/21/2024    AST 19 10/21/2024    ALT 23 10/21/2024    BILITOT 0.3 12/20/2017    EGFR 99 10/21/2024       Lipid Profile:   Lab Results   Component Value Date    CHOL 185 12/20/2017     Lab Results   Component Value Date    HDL 32 (L) 07/30/2021     Lab Results   Component Value Date    LDLCALC 155 (H) 07/30/2021     Lab Results   Component Value Date    TRIG 383 (H) 02/26/2024       Cardiac testing:   EKG reviewed personally: Sinus Tachycardia 110

## 2024-12-16 ENCOUNTER — RESULTS FOLLOW-UP (OUTPATIENT)
Dept: SLEEP CENTER | Facility: CLINIC | Age: 59
End: 2024-12-16

## 2024-12-17 ENCOUNTER — OFFICE VISIT (OUTPATIENT)
Dept: PHYSICAL THERAPY | Facility: CLINIC | Age: 59
End: 2024-12-17
Payer: COMMERCIAL

## 2024-12-17 DIAGNOSIS — Z91.81 RISK FOR FALLS: ICD-10-CM

## 2024-12-17 DIAGNOSIS — M25.561 CHRONIC PAIN OF RIGHT KNEE: ICD-10-CM

## 2024-12-17 DIAGNOSIS — R53.1 GENERALIZED WEAKNESS: Primary | ICD-10-CM

## 2024-12-17 DIAGNOSIS — G89.29 CHRONIC PAIN OF RIGHT KNEE: ICD-10-CM

## 2024-12-17 DIAGNOSIS — Z78.9 POOR TOLERANCE FOR AMBULATION: ICD-10-CM

## 2024-12-17 DIAGNOSIS — J80 ACUTE RESPIRATORY DISTRESS SYNDROME (HCC): ICD-10-CM

## 2024-12-17 PROCEDURE — 97110 THERAPEUTIC EXERCISES: CPT

## 2024-12-17 PROCEDURE — 97530 THERAPEUTIC ACTIVITIES: CPT

## 2024-12-17 PROCEDURE — 97112 NEUROMUSCULAR REEDUCATION: CPT

## 2024-12-17 NOTE — PROGRESS NOTES
"Daily Note     Today's date: 2024  Patient name: Hussein Edward III  : 1965  MRN: [id number]  Referring provider: Bernabe Cook MD  Dx:   Encounter Diagnosis     ICD-10-CM    1. Generalized weakness  R53.1       2. Acute respiratory distress syndrome (HCC)  J80       3. Poor tolerance for ambulation  Z78.9       4. Risk for falls  Z91.81       5. Chronic pain of right knee  M25.561     G89.29                      Subjective: pt states that he is having a good day.      Objective: See treatment diary below      Assessment: Tolerated treatment with noted increased LE fatigue. Patient would benefit from continued PT to achieve goals and increase functional ability.       Plan: Continue per plan of care.      Precautions: Hx of ARDS 2024. Patient will require increased rest break between activity. Does carry portable O2 concentrator.       Manuals 12/5 12/10 12/12 12/17                                                             Neuro Re-Ed             Scapular retraction             SLS             Seated  abdominal press down  5\" 2x10 5''x2x10 5\"x20 5\"x20                                                             Ther Ex             Hip abd stand                          UBE for endurance.  Lv 1 6'           Bike for endurance Lv2 6'  Lv1 1'  Lv3 6' Lv2 8'         TB shoulder extension / march blk x10 / march 2x10 ea blk W march blk 2x10 / march blk 2x10         TB IR             TB ER-bilateral Blue 3x10 b/l Blue 3x10 b/l Blue 3x10 Blue 3x10         Knee flexion                          seated knee extension             Seated hip flexion             Bicep curl x2             OH tricep ext 2#  2x15 3# 2x15 3# 2x15          Standing rows On foam blk 2x15 On foam blk 2x15     Slv 2x10                      Ther Activity             Lateral step ups and over Lv2 x10 Lv2 x10  Lv2 x15         Sit to stand   Thin 2x10 2x10                      Gait Training                                     "   Modalities                                       \

## 2024-12-18 LAB
CORTIS AM PEAK SERPL-MCNC: 12.5 UG/DL (ref 6.2–19.4)
IRON SATN MFR SERPL: 25 % (ref 15–55)
IRON SERPL-MCNC: 85 UG/DL (ref 38–169)
TIBC SERPL-MCNC: 345 UG/DL (ref 250–450)
UIBC SERPL-MCNC: 260 UG/DL (ref 111–343)
VIT B12 SERPL-MCNC: 562 PG/ML (ref 232–1245)

## 2024-12-19 ENCOUNTER — OFFICE VISIT (OUTPATIENT)
Dept: PHYSICAL THERAPY | Facility: CLINIC | Age: 59
End: 2024-12-19
Payer: COMMERCIAL

## 2024-12-19 DIAGNOSIS — M25.561 CHRONIC PAIN OF RIGHT KNEE: ICD-10-CM

## 2024-12-19 DIAGNOSIS — R53.1 GENERALIZED WEAKNESS: Primary | ICD-10-CM

## 2024-12-19 DIAGNOSIS — G89.29 CHRONIC PAIN OF RIGHT KNEE: ICD-10-CM

## 2024-12-19 DIAGNOSIS — Z78.9 POOR TOLERANCE FOR AMBULATION: ICD-10-CM

## 2024-12-19 DIAGNOSIS — J80 ACUTE RESPIRATORY DISTRESS SYNDROME (HCC): ICD-10-CM

## 2024-12-19 PROCEDURE — 97530 THERAPEUTIC ACTIVITIES: CPT | Performed by: PHYSICAL THERAPIST

## 2024-12-19 PROCEDURE — 97110 THERAPEUTIC EXERCISES: CPT | Performed by: PHYSICAL THERAPIST

## 2024-12-19 NOTE — PROGRESS NOTES
"Daily Note     Today's date: 2024  Patient name: Hussein Edward III  : 1965  MRN: [id number]  Referring provider: Bernabe Cook MD  Dx:   Encounter Diagnosis     ICD-10-CM    1. Generalized weakness  R53.1       2. Acute respiratory distress syndrome (HCC)  J80       3. Poor tolerance for ambulation  Z78.9       4. Chronic pain of right knee  M25.561     G89.29                      Subjective: pt notes that he feels as if the strength is improving in his legs.      Objective: See treatment diary below      Assessment: will add in leg press to program next visit. Pt is showing progress in endurance, but still has very slow progress secondary to ARDS    Plan: Continue per plan of care.      Precautions: Hx of ARDS 2024. Patient will require increased rest break between activity. Does carry portable O2 concentrator.       Manuals 12/5 12/10 12/12 12/17 12/19                                                            Neuro Re-Ed             Scapular retraction             SLS             Seated  abdominal press down  5\" 2x10 5''x2x10 5\"x20 5\"x20 5''x20                                                            Ther Ex             Hip abd stand                          UBE for endurance.  Lv 1 6'           Bike for endurance Lv2 6'  Lv1 1'  Lv3 6' Lv2 8' Lv 2 10'        TB shoulder extension / march blk x10 / march 2x10 ea blk W march blk 2x10 W/ march blk 2x10 W/ march blk 2x10        TB IR             TB ER-bilateral Blue 3x10 b/l Blue 3x10 b/l Blue 3x10 Blue 3x10 Blue 3x10        Knee flexion                          seated knee extension             Seated hip flexion             Bicep curl x2             OH tricep ext 2#  2x15 3# 2x15 3# 2x15  4# 2x15        Standing rows On foam blk 2x15 On foam blk 2x15     Slv 2x10 Slv 2x10                     Ther Activity             Lateral step ups and over Lv2 x10 Lv2 x10  Lv2 x15 Lv 2 x15        Sit to stand   Thin 2x10 2x10 2x10                     Gait " Training                                       Modalities                                       \

## 2024-12-26 ENCOUNTER — TELEPHONE (OUTPATIENT)
Age: 59
End: 2024-12-26

## 2024-12-26 ENCOUNTER — OFFICE VISIT (OUTPATIENT)
Dept: PHYSICAL THERAPY | Facility: CLINIC | Age: 59
End: 2024-12-26
Payer: COMMERCIAL

## 2024-12-26 DIAGNOSIS — J80 ACUTE RESPIRATORY DISTRESS SYNDROME (HCC): ICD-10-CM

## 2024-12-26 DIAGNOSIS — Z91.81 RISK FOR FALLS: ICD-10-CM

## 2024-12-26 DIAGNOSIS — R53.1 GENERALIZED WEAKNESS: Primary | ICD-10-CM

## 2024-12-26 DIAGNOSIS — G89.29 CHRONIC PAIN OF RIGHT KNEE: ICD-10-CM

## 2024-12-26 DIAGNOSIS — M25.561 CHRONIC PAIN OF RIGHT KNEE: ICD-10-CM

## 2024-12-26 DIAGNOSIS — Z78.9 POOR TOLERANCE FOR AMBULATION: ICD-10-CM

## 2024-12-26 DIAGNOSIS — D32.9 MENINGIOMA (HCC): Primary | ICD-10-CM

## 2024-12-26 PROCEDURE — 97110 THERAPEUTIC EXERCISES: CPT

## 2024-12-26 PROCEDURE — 97530 THERAPEUTIC ACTIVITIES: CPT

## 2024-12-26 NOTE — TELEPHONE ENCOUNTER
PATIENT CALLED BACK SCHEDULED FOR   2/4/25 / 10:15 / [place]       12/26/24:  LMOM W/ SNPX W/ HDM ON 2/4/25 - 9:15 / 10:15 / 12:45 / 2:30.  REQUESTED HE CALL BACK  TO SCHEDULE. EXPRESSED THAT THESE TIMES ARE WHAT WERE AVAILABLE AT THE TIME I CALLED 1:30PM

## 2024-12-26 NOTE — TELEPHONE ENCOUNTER
PT CALLED REQUESTING MRI BRAIN W WO BE RENEWED SO THAT HE CAN CALL CENTRAL SCHEDULING TO SCHEDULE HIS MRI FOR 2YR F/U PER LOV 4/17/2023 HDM/ML.    PLEASE NOTIFY PT WHEN MRI ORDER IS UP TO DATE SO THAT HE CAN SCHEDULE MRI.    PT WILL NEED CB AFTER IMAGING DATE IS SET TO SCHED 2YR F/U SNPX HDM      THANK YOU

## 2024-12-26 NOTE — PROGRESS NOTES
"Daily Note     Today's date: 2024  Patient name: Hussein Edward III  : 1965  MRN: [id number]  Referring provider: Bernabe Cook MD  Dx:   Encounter Diagnosis     ICD-10-CM    1. Generalized weakness  R53.1       2. Acute respiratory distress syndrome (HCC)  J80       3. Poor tolerance for ambulation  Z78.9       4. Chronic pain of right knee  M25.561     G89.29       5. Risk for falls  Z91.81                      Subjective: pt states that he had to do 5 steps at a restaurant and wife stated that he looked better doing them more fluent and Hussein states they felt better       Objective: See treatment diary below      Assessment: Tolerated treatment with a little more fatigue noted today compared to other days.  . Patient did have LOB times 1 with min A of 1 to right.        Plan: Continue per plan of care.      Precautions: Hx of ARDS 2024. Patient will require increased rest break between activity. Does carry portable O2 concentrator.       Manuals 12/5 12/10 12/12 12/17 12/19 12/26                                                           Neuro Re-Ed             Scapular retraction             SLS             Seated  abdominal press down  5\" 2x10 5''x2x10 5\"x20 5\"x20 5''x20                                                            Ther Ex             Hip abd stand                          UBE for endurance.  Lv 1 6'           Bike for endurance Lv2 6'  Lv1 1'  Lv3 6' Lv2 8' Lv 2 10' Lv 2 10'       TB shoulder extension / march blk x10 W/ march 2x10 ea blk W k 2x10 W/ k 2x10 W/ march blk 2x10 W/ k 2x10       TB IR             TB ER-bilateral Blue 3x10 b/l Blue 3x10 b/l Blue 3x10 Blue 3x10 Blue 3x10 Blue 3x10       Knee flexion                          seated knee extension             Seated hip flexion             Bicep curl x2             OH tricep ext 2#  2x15 3# 2x15 3# 2x15  4# 2x15 4#  2x15       Standing rows On foam blk 2x15 On foam blk 2x15     Slv 2x10 Slv 2x10  "                    Ther Activity             Lateral step ups and over Lv2 x10 Lv2 x10  Lv2 x15 Lv 2 x15 Lv2 x15       Sit to stand   Thin 2x10 2x10 2x10 2x10                    Gait Training                                       Modalities                                       \

## 2025-01-07 ENCOUNTER — OFFICE VISIT (OUTPATIENT)
Dept: PHYSICAL THERAPY | Facility: CLINIC | Age: 60
End: 2025-01-07
Payer: COMMERCIAL

## 2025-01-07 DIAGNOSIS — R53.1 GENERALIZED WEAKNESS: Primary | ICD-10-CM

## 2025-01-07 DIAGNOSIS — G89.29 CHRONIC PAIN OF RIGHT KNEE: ICD-10-CM

## 2025-01-07 DIAGNOSIS — J80 ACUTE RESPIRATORY DISTRESS SYNDROME (HCC): ICD-10-CM

## 2025-01-07 DIAGNOSIS — M25.561 CHRONIC PAIN OF RIGHT KNEE: ICD-10-CM

## 2025-01-07 DIAGNOSIS — Z91.81 RISK FOR FALLS: ICD-10-CM

## 2025-01-07 DIAGNOSIS — Z78.9 POOR TOLERANCE FOR AMBULATION: ICD-10-CM

## 2025-01-07 PROCEDURE — 97110 THERAPEUTIC EXERCISES: CPT

## 2025-01-07 PROCEDURE — 97530 THERAPEUTIC ACTIVITIES: CPT

## 2025-01-07 NOTE — PROGRESS NOTES
"Daily Note     Today's date: 2025  Patient name: Hussein Edward III  : 1965  MRN: [id number]  Referring provider: Bernbae Cook MD  Dx:   Encounter Diagnosis     ICD-10-CM    1. Generalized weakness  R53.1       2. Acute respiratory distress syndrome (HCC)  J80       3. Poor tolerance for ambulation  Z78.9       4. Chronic pain of right knee  M25.561     G89.29       5. Risk for falls  Z91.81                      Subjective: pt is with no new c/o but does note being hot presently.        Objective: See treatment diary below      Assessment: Tolerated treatment fair with increased fatigue 2* to being warm in clinic. Some modifications made due to this challenge  Patient demonstrated fatigue post treatment      Plan: Continue per plan of care.      Precautions: Hx of ARDS 2024. Patient will require increased rest break between activity. Does carry portable O2 concentrator.       Manuals 12/5 12/10 12/12 12/17 12/19 12/26 1/7                                                          Neuro Re-Ed             Scapular retraction             SLS             Seated  abdominal press down  5\" 2x10 5''x2x10 5\"x20 5\"x20 5''x20                                                            Ther Ex             Hip abd stand                          UBE for endurance.  Lv 1 6'           Bike for endurance Lv2 6'  Lv1 1'  Lv3 6' Lv2 8' Lv 2 10' Lv 2 10' Lv2   8'      TB shoulder extension W/ march blk x10 W/ march 2x10 ea blk W k 2x10 W/ march blk 2x10 W/ march blk 2x10 W/ march blk 2x10 W/ march blk  x15      TB IR             TB ER-bilateral Blue 3x10 b/l Blue 3x10 b/l Blue 3x10 Blue 3x10 Blue 3x10 Blue 3x10 Blue 3x10      Knee flexion                          seated knee extension             Seated hip flexion             Bicep curl x2             OH tricep ext 2#  2x15 3# 2x15 3# 2x15  4# 2x15 4#  2x15 4#  2x15      Standing rows On foam blk 2x15 On foam blk 2x15     Slv 2x10 Slv 2x10                   "   Ther Activity             Lateral step ups and over Lv2 x10 Lv2 x10  Lv2 x15 Lv 2 x15 Lv2 x15 Lv2 x12      Sit to stand   Thin 2x10 2x10 2x10 2x10 Thin 2x10                   Gait Training                                       Modalities                                       \

## 2025-01-08 DIAGNOSIS — M25.50 ARTHRALGIA, UNSPECIFIED JOINT: ICD-10-CM

## 2025-01-08 DIAGNOSIS — F51.01 PRIMARY INSOMNIA: ICD-10-CM

## 2025-01-08 RX ORDER — DARIDOREXANT 25 MG/1
25 TABLET, FILM COATED ORAL
Qty: 90 TABLET | Refills: 0 | Status: SHIPPED | OUTPATIENT
Start: 2025-01-08

## 2025-01-09 ENCOUNTER — OFFICE VISIT (OUTPATIENT)
Dept: PHYSICAL THERAPY | Facility: CLINIC | Age: 60
End: 2025-01-09
Payer: COMMERCIAL

## 2025-01-09 DIAGNOSIS — Z91.81 RISK FOR FALLS: ICD-10-CM

## 2025-01-09 DIAGNOSIS — Z78.9 POOR TOLERANCE FOR AMBULATION: ICD-10-CM

## 2025-01-09 DIAGNOSIS — G89.29 CHRONIC PAIN OF RIGHT KNEE: ICD-10-CM

## 2025-01-09 DIAGNOSIS — M25.561 CHRONIC PAIN OF RIGHT KNEE: ICD-10-CM

## 2025-01-09 DIAGNOSIS — R53.1 GENERALIZED WEAKNESS: Primary | ICD-10-CM

## 2025-01-09 DIAGNOSIS — J80 ACUTE RESPIRATORY DISTRESS SYNDROME (HCC): ICD-10-CM

## 2025-01-09 PROCEDURE — 97530 THERAPEUTIC ACTIVITIES: CPT

## 2025-01-09 PROCEDURE — 97110 THERAPEUTIC EXERCISES: CPT

## 2025-01-09 RX ORDER — TRAMADOL HYDROCHLORIDE 50 MG/1
50 TABLET ORAL EVERY 6 HOURS PRN
Qty: 120 TABLET | Refills: 5 | Status: SHIPPED | OUTPATIENT
Start: 2025-01-09

## 2025-01-09 NOTE — PROGRESS NOTES
"Daily Note     Today's date: 2025  Patient name: Hussein Edward III  : 1965  MRN: [id number]  Referring provider: Bernabe Cook MD  Dx:   Encounter Diagnosis     ICD-10-CM    1. Generalized weakness  R53.1       2. Acute respiratory distress syndrome (HCC)  J80       3. Poor tolerance for ambulation  Z78.9       4. Chronic pain of right knee  M25.561     G89.29       5. Risk for falls  Z91.81                      Subjective: Hussein states that it is a better day today.       Objective: See treatment diary below      Assessment: Tolerated treatment with no LOB during exercises or walking in clinic without AD.  Pt did well with leg press added today . Patient demonstrated fatigue post treatment, exhibited good technique with therapeutic exercises, and would benefit from continued PT      Plan: Continue per plan of care.      Precautions: Hx of ARDS 2024. Patient will require increased rest break between activity. Does carry portable O2 concentrator.       Manuals 12/5 12/10 12/12 12/17 12/19 12/26 1/7 1/9                                                         Neuro Re-Ed             Scapular retraction             SLS             Seated  abdominal press down  5\" 2x10 5''x2x10 5\"x20 5\"x20 5''x20   5\"x20                                                         Ther Ex             Hip abd stand                          UBE for endurance.  Lv 1 6'           Bike for endurance Lv2 6'  Lv1 1'  Lv3 6' Lv2 8' Lv 2 10' Lv 2 10' Lv2   8' Lv2  9'     TB shoulder extension / k x10 W/ march 2x10 ea blk W k 2x10 W/ k 2x10 W/ k 2x10 W/ k 2x10 W/ k  x15      TB IR             TB ER-bilateral Blue 3x10 b/l Blue 3x10 b/l Blue 3x10 Blue 3x10 Blue 3x10 Blue 3x10 Blue 3x10 Blue 2x15     Knee flexion                          seated knee extension             Leg press        85#  2x10     Bicep curl x2        6#  2x15     OH tricep ext 2#  2x15 3# 2x15 3# 2x15  4# 2x15 " 4#  2x15 4#  2x15      Standing rows On foam blk 2x15 On foam blk 2x15     Slv 2x10 Slv 2x10                     Ther Activity             Lateral step ups and over Lv2 x10 Lv2 x10  Lv2 x15 Lv 2 x15 Lv2 x15 Lv2 x12 Lv2   x15     Sit to stand   Thin 2x10 2x10 2x10 2x10 Thin 2x10 Thin 2x10                  Gait Training                                       Modalities                                       \

## 2025-01-14 ENCOUNTER — EVALUATION (OUTPATIENT)
Dept: PHYSICAL THERAPY | Facility: CLINIC | Age: 60
End: 2025-01-14
Payer: COMMERCIAL

## 2025-01-14 DIAGNOSIS — Z91.81 RISK FOR FALLS: ICD-10-CM

## 2025-01-14 DIAGNOSIS — R53.1 GENERALIZED WEAKNESS: Primary | ICD-10-CM

## 2025-01-14 DIAGNOSIS — Z78.9 POOR TOLERANCE FOR AMBULATION: ICD-10-CM

## 2025-01-14 DIAGNOSIS — J80 ACUTE RESPIRATORY DISTRESS SYNDROME (HCC): ICD-10-CM

## 2025-01-14 PROCEDURE — 97112 NEUROMUSCULAR REEDUCATION: CPT | Performed by: PHYSICAL THERAPIST

## 2025-01-14 PROCEDURE — 97110 THERAPEUTIC EXERCISES: CPT | Performed by: PHYSICAL THERAPIST

## 2025-01-14 NOTE — PROGRESS NOTES
PT Re-Evaluation     Today's date: 2025  Patient name: Hussein Edward III  : 1965  MRN: [id number]  Referring provider: Bernabe Cook MD  Dx:   Encounter Diagnosis     ICD-10-CM    1. Generalized weakness  R53.1       2. Acute respiratory distress syndrome (HCC)  J80       3. Poor tolerance for ambulation  Z78.9       4. Risk for falls  Z91.81                      Assessment  Impairments: abnormal gait, activity intolerance, impaired physical strength, poor posture , unable to perform ADL, participation limitations, activity limitations and endurance  Symptom irritability: moderate    Assessment details: Hussein Edward III has been compliant with attending PT and home exercise program since initial eval.  Hussein  has made improvements in objective data since initial eval but is still limited compared to prior level of function. Hussein continues with above listed impairments and would benefit from additional skilled PT to address these deficits to return to prior level of function.  Due to chronic lung damage and hx of ARDS pt's progress is slow but pt is progressing. Pt originally had to take breaks walking into the clinic. Pt is now able to walk through clinic and other short distances with out RW. Pt continues to get fatigued very easily, and off balance after more intense exercises secondary to the weakness and fatigue in the legs.  Pt is still negotiating stairs going up/down sideways, and over the next 8 weeks we will work on normal reciprocal gait on stairs. Pt will continue to benefit from skilled PT to return him to a more functional condition.     Understanding of Dx/Px/POC: good     Prognosis: good    Goals  STG- 6wks  Pt will have increased knee extension strength of 4+ MMT  Pt will have increased hip flexion strength of 4+ MMT  Pt will have increased knee flexion strength of 4+ MMT    LTG-12wks  Pt will be able to walk 100ft without rest  Pt will be able to complete 4 steps without  fatigue  Pt will demonstrate increased endurance throughout 45 min session indicated by decreased rest breaks between activity    Plan  Patient would benefit from: skilled physical therapy    Planned therapy interventions: ADL training, activity modification, neuromuscular re-education, postural training, strengthening, therapeutic exercise, therapeutic activities and functional ROM exercises    Frequency: 2x week  Duration in weeks: 12  Plan of Care beginning date: 1/15/2025  Plan of Care expiration date: 3/12/2025  Treatment plan discussed with: patient        Subjective Evaluation    History of Present Illness  Mechanism of injury: Pt notes that since starting PT he has stopped working as he couldn't handle work and his recovery. It was too taxing. He has since been focusing on rehabing. He now feels as if he is getting stronger, though slowly. Pt notes that he still goes up and down stairs sideways because of both hands on the rail then, as well as feeling weakness when going up normally. Pt notes that he has been able to walk around the houes at times with out his rw. He notes that his overall walking endurance is better. He notes no longer needing rests when walking into the PT clinic.   Quality of life: fair    Patient Goals  Patient goals for therapy: increased strength, independence with ADLs/IADLs, increased motion, improved balance and return to sport/leisure activities    Pain  Quality: pulling and tight    Social Support  Lives in: one-story house  Lives with: spouse    Employment status: working  Treatments  Previous treatment: physical therapy (repiratory therapy)  Current treatment: physical therapy      Objective     Strength/Myotome Testing     Left Shoulder     Planes of Motion   Flexion: 4-   Abduction: 4-   External rotation at 0°: 4+   Internal rotation at 0°: 4+     Right Shoulder     Planes of Motion   Flexion: 4-   Abduction: 4-   External rotation at 0°: 4+   Internal rotation at 0°: 4+  "    Left Elbow   Flexion: 4-  Extension: 4-    Right Elbow   Flexion: 4  Extension: 4    Left Hip   Planes of Motion   Flexion: 4-  Abduction: 4-  Adduction: 4-    Right Hip   Planes of Motion   Flexion: 4+  Abduction: 4-  Adduction: 4-    Left Knee   Flexion: 4-  Extension: 4    Right Knee   Flexion: 4  Extension: 4    Left Ankle/Foot   Dorsiflexion: 4-  Plantar flexion: 3+    Right Ankle/Foot   Dorsiflexion: 4-  Plantar flexion: 3+    Muscle Activation   Patient able to activate left transverse abdominals, left external obliques, left internal obliques, right transverse abdominals, right external obliques and right internal obliques.     Ambulation   Weight-Bearing Status   Assistive device used: rollator walker    Ambulation: Stairs   Ascending: step-to  Ascend stairs: minimum assist  Railings: 2  Descending: step-to  Descend stairs: minimum assist  Railings: 2    Observational Gait   Gait: asymmetric and crouched   Increased left swing time and right swing time. Decreased walking speed, stride length, left stance time and right stance time.   Left foot contact pattern: foot flat  Right foot contact pattern: foot flat  Base of support: decreased    Functional Assessment      Squat    Trunk lean left, sitting toward left side, left valgus and right valgus.     Posterior weight shift: moderate    Depth of femur height: above 90 degrees  Neuro Exam:     Functional outcomes   6 minute walk test: 500  5x sit to stand: 15 (seconds)  TU (seconds)               Precautions: Hx of ARDS 2024. Patient will require increased rest break between activity. Does carry portable O2 concentrator.         Manuals 12/5 12/10 12/12 12/17 12/19 12/26 1/7 1/9 1/14                                                        Neuro Re-Ed             Scapular retraction             SLS             Seated  abdominal press down  5\" 2x10 5''x2x10 5\"x20 5\"x20 5''x20   5\"x20 5''x20                                                        Ther " Ex             Hip abd stand                          UBE for endurance.  Lv 1 6'           Bike for endurance Lv2 6'  Lv1 1'  Lv3 6' Lv2 8' Lv 2 10' Lv 2 10' Lv2   8' Lv2  9' Lv 2 8'    TB shoulder extension W/ march blk x10 W/ march 2x10 ea blk W march blk 2x10 W/ march blk 2x10 W/ march blk 2x10 W/ march blk 2x10 W/ march blk  x15      TB IR             TB ER-bilateral Blue 3x10 b/l Blue 3x10 b/l Blue 3x10 Blue 3x10 Blue 3x10 Blue 3x10 Blue 3x10 Blue 2x15 Blue 2x15    Knee flexion                          seated knee extension             Leg press        85#  2x10 85# 2X10    Bicep curl x2        6#  2x15 6# 2x15    OH tricep ext 2#  2x15 3# 2x15 3# 2x15  4# 2x15 4#  2x15 4#  2x15  4# 2x15    Standing rows On foam blk 2x15 On foam blk 2x15     Slv 2x10 Slv 2x10                     Ther Activity             Lateral step ups and over Lv2 x10 Lv2 x10  Lv2 x15 Lv 2 x15 Lv2 x15 Lv2 x12 Lv2   x15 Lv 2 x15    Sit to stand   Thin 2x10 2x10 2x10 2x10 Thin 2x10 Thin 2x10 Thin 2x10                 Gait Training                                       Modalities                                       \

## 2025-01-16 ENCOUNTER — APPOINTMENT (OUTPATIENT)
Dept: PHYSICAL THERAPY | Facility: CLINIC | Age: 60
End: 2025-01-16
Payer: COMMERCIAL

## 2025-01-17 ENCOUNTER — OFFICE VISIT (OUTPATIENT)
Dept: PHYSICAL THERAPY | Facility: CLINIC | Age: 60
End: 2025-01-17
Payer: COMMERCIAL

## 2025-01-17 DIAGNOSIS — J80 ACUTE RESPIRATORY DISTRESS SYNDROME (HCC): ICD-10-CM

## 2025-01-17 DIAGNOSIS — Z91.81 RISK FOR FALLS: ICD-10-CM

## 2025-01-17 DIAGNOSIS — M25.561 CHRONIC PAIN OF RIGHT KNEE: Primary | ICD-10-CM

## 2025-01-17 DIAGNOSIS — G89.29 CHRONIC PAIN OF RIGHT KNEE: Primary | ICD-10-CM

## 2025-01-17 DIAGNOSIS — Z78.9 POOR TOLERANCE FOR AMBULATION: ICD-10-CM

## 2025-01-17 DIAGNOSIS — R53.1 GENERALIZED WEAKNESS: ICD-10-CM

## 2025-01-17 PROCEDURE — 97530 THERAPEUTIC ACTIVITIES: CPT

## 2025-01-17 PROCEDURE — 97110 THERAPEUTIC EXERCISES: CPT

## 2025-01-17 NOTE — PROGRESS NOTES
"Daily Note     Today's date: 2025  Patient name: Hussein Edward III  : 1965  MRN: [id number]  Referring provider: Bernabe Cook MD  Dx:   Encounter Diagnosis     ICD-10-CM    1. Chronic pain of right knee  M25.561     G89.29       2. Generalized weakness  R53.1       3. Acute respiratory distress syndrome (HCC)  J80       4. Poor tolerance for ambulation  Z78.9       5. Risk for falls  Z91.81           Start Time: 930  Stop Time: 1015  Total time in clinic (min): 45 minutes    Subjective: Notes some fatigue prior to start of session.      Objective: See treatment diary below      Assessment: Tolerated treatment well. Patient demonstrated fatigue post treatment and would benefit from continued PT. Rest breaks taken t/o session to inc tolerance to activity. INC UE fatigue this session with activities.       Plan: Continue per plan of care.      Precautions: Hx of ARDS 2024. Patient will require increased rest break between activity. Does carry portable O2 concentrator.         Manuals 12/5 12/10 12/12 12/17 12/19 12/26 1/7 1/9 1/14 1/17                                                       Neuro Re-Ed             Scapular retraction             SLS             Seated  abdominal press down  5\" 2x10 5''x2x10 5\"x20 5\"x20 5''x20   5\"x20 5''x20 5''x20                                                       Ther Ex             Hip abd stand                          UBE for endurance.  Lv 1 6'           Bike for endurance Lv2 6'  Lv1 1'  Lv3 6' Lv2 8' Lv 2 10' Lv 2 10' Lv2   8' Lv2  9' Lv 2 8' Lv 2 8'   TB shoulder extension / march blk x10 W/ march 2x10 ea blk W march blk 2x10 W/ march blk 2x10 W/ march blk 2x10 W/ march blk 2x10 W/ march blk  x15      TB IR             TB ER-bilateral Blue 3x10 b/l Blue 3x10 b/l Blue 3x10 Blue 3x10 Blue 3x10 Blue 3x10 Blue 3x10 Blue 2x15 Blue 2x15 Blue 2x15   Knee flexion                          seated knee extension             Leg press        85#  2x10 85# 2X10 85# " 2X10   Bicep curl x2        6#  2x15 6# 2x15 6# 2x15   OH tricep ext 2#  2x15 3# 2x15 3# 2x15  4# 2x15 4#  2x15 4#  2x15  4# 2x15 4# 2x15   Standing rows On foam blk 2x15 On foam blk 2x15     Slv 2x10 Slv 2x10                     Ther Activity             Lateral step ups and over Lv2 x10 Lv2 x10  Lv2 x15 Lv 2 x15 Lv2 x15 Lv2 x12 Lv2   x15 Lv 2 x15 Lv 2 x15   Sit to stand   Thin 2x10 2x10 2x10 2x10 Thin 2x10 Thin 2x10 Thin 2x10 Thin 2x10                Gait Training                                       Modalities                                       \

## 2025-01-20 NOTE — PROGRESS NOTES
New Patient Progress Note      No chief complaint on file.     Referring Provider  Bernabe Cook Md  [address] [place] [address]     1. Decreased muscle strength  -     Testosterone, free, total; Future  -     Testosterone, free, total  2. Diaphoresis  -     Ambulatory Referral to Endocrinology  -     TSH, 3rd generation; Future  -     T4, free; Future  -     ACTH; Future  -     TSH, 3rd generation  -     T4, free  -     ACTH  3. Other fatigue  -     Ambulatory Referral to Endocrinology  -     Vitamin D 25 hydroxy; Future  -     Testosterone, free, total; Future  -     Cortisol Level,7-9 AM Specimen  -     ACTH; Future  -     Vitamin D 25 hydroxy  -     Testosterone, free, total  -     ACTH  4. Lightheadedness  -     TSH, 3rd generation; Future  -     T4, free; Future  -     Cortisol Level,7-9 AM Specimen  -     ACTH; Future  -     TSH, 3rd generation  -     T4, free  -     ACTH  5. Heat intolerance    Patient has significant symptoms of fatigue, generalized weakness with decreased muscle strength and significant heat intolerance.  While fatigue and generalized weakness may be secondary to post ARDS deconditioning, will check a.m. testosterone and vitamin D levels.  Severe heat intolerance may be secondary to a thermoregulatory dysfunction independent of an endocrinological cause and should be investigated further by other specialties. TSH has been wnl however a FT4 has not been checked, will check TFTs to evaluate for endocrinological etiology of his symptoms.  Patient has been referred by pulmonology with suspicion for underlying adrenal insufficiency.  Reviewed labs  Cortisol level checked in March in afternoon and in am in Dec 2024 appears satisfactory and may not indicate adrenal insufficiency which is less likely an etiology for his fatigue and generalized weakness however will repeat check an am cortisol with ACTH level.  Will reach out with lab test results      History of Present Illness:  "  Patient is a 59-year-old male with past medical history of WALE with difficulty using CPAP, nephrolithiasis, BPPV, chronic tinnitus, paroxysmal Afib, meningioma, HLD, low back pain, insomnia, migraines who presents as a new patient for evaluation of adrenal insufficiency/endocrinological cause of fatigue referred by his pulmonologist Dr. Cook.  Patient was hospitalized a year ago for influenza/pneumonia complications with ARDS requiring prolonged intubation for 10 days followed by [facility] admission and pulmonary rehabilitation.  He has reported reported continued fatigue and generalized weakness considerable enough to cause him to retire from work; pulmonology ordered labs including a.m. cortisol to check for underlying adrenal insufficiency or other endocrinological etiology for symptoms.  Reports he has been feeling very hot since the summer of 2024 and has had severe heat intolerance, had to leave his job because they were concerned of him -passing out, he can barely sleep at night, sweats profusely and sleeps with fan on and window pulled down at night these cold days of January. Feels fatigued and \"physically wiped out\". Has trouble using his cpap because of PTSD from the hospital stay. Reports lightheadedness and losing especially when he bends over. Denies skin pigmentation. Has not lost consciousness  Denies OTC herbs/supplement use. Lost about 40 lbs during and after his hospital stay and gained back 15 lbs, not losing any more weight.   TSH within normal limits on a few occasions, last checked in Nov 2024.  Cortisol was checked at 2 PM, at 13.3 within normal limits in March 2024, repeat in Dec 2024 checked in am at 12.5  Due for MRI brain next week for his meningioma surveillance. Reports he may haave had a concussion early in life.   Patient has not been on long-term steroids, denies recent steroid use. 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)    Primary insomnia    Non-traumatic rhabdomyolysis    Chronic kidney disease    Metabolic acidosis    Acute respiratory failure with hypoxia (HCC)    ARDS survivor    Insomnia    Migraine    Anxiety    Ambulatory dysfunction    Sinus tachycardia    PAF (paroxysmal atrial fibrillation) ([facility])    Chronic pain of right knee    Arthralgia    Continuous opioid dependence (HCC)    Pain    Neuropathy due to medical condition ([facility])      Past Medical History:   Diagnosis Date    Chronic back pain     Migraine       Past Surgical History:   Procedure Laterality Date    HERNIA REPAIR      MANDIBLE FRACTURE SURGERY      MOUTH SURGERY      cyst in cheek    NASAL SEPTUM SURGERY        Family History   Problem Relation Age of Onset    Breast cancer Mother     Diabetes Father     No Known Problems Family     Substance Abuse Neg Hx     Mental illness Neg Hx      Social History     Tobacco Use    Smoking status: Never    Smokeless tobacco: Former     Types: Snuff     Quit date: 2003   Substance Use Topics    Alcohol use: Never     Allergies   Allergen Reactions    Ropinirole GI Intolerance     [unfilled]    Review of Systems  A 10 point ROS performed, negative except stated above in HPI    Physical Exam:  Body mass index is 29.38 kg/m².  /98   Pulse 92   Ht 5' 7\" (1.702 m)   Wt 85.1 kg (187 lb 9.6 oz)   BMI 29.38 kg/m²    [unfilled]     Physical Exam  Constitutional:       General: He is not in acute distress.     Appearance: He is not ill-appearing or toxic-appearing.   HENT:      Head: Normocephalic.   Eyes:      Conjunctiva/sclera: Conjunctivae normal.      " "Comments: No proptosis   Cardiovascular:      Rate and Rhythm: Normal rate and regular rhythm.   Pulmonary:      Effort: Pulmonary effort is normal.   Musculoskeletal:      Comments: Ambulates with walker   Skin:     General: Skin is warm.      Comments: flushed   Neurological:      Mental Status: He is alert and oriented to person, place, and time. Mental status is at baseline.      Comments: No tremors of outstretched hands   Psychiatric:         Mood and Affect: Mood normal.         Thought Content: Thought content normal.           Labs:   Component  Ref Range & Units (hover) 12/17/24  7:51 AM   Cortisol AM 12.5              Narrative     Latest Reference Range & Units Most Recent   Cortisol, Random ug/dL 13.3  3/12/24 14:00   Hemoglobin A1C 4.8 - 5.6 % 5.5  11/20/24 11:03   eAG, EST AVG Glucose mg/dL 111  11/20/24 11:03   POC Glucose 65 - 140 mg/dl 132  3/11/24 11:31   CALCIUM 8.7 - 10.2 mg/dL 9.3  7/30/21 08:43   TSH, POC 0.450 - 4.500 uIU/mL 1.540  11/20/24 11:03   TSH 3RD GENERATON 0.450 - 4.500 uIU/mL 1.768  3/11/24 17:18     Lab Results   Component Value Date    CREATININE 0.80 10/21/2024    CREATININE 0.56 (L) 03/25/2024    CREATININE 0.70 03/18/2024    BUN 22 10/21/2024     (H) 12/20/2017    K 4.0 10/21/2024     10/21/2024    CO2 28 10/21/2024      Latest Reference Range & Units Most Recent   Sodium 135 - 147 mmol/L 141  10/21/24 13:13   Potassium 3.5 - 5.3 mmol/L 4.0  10/21/24 13:13   Chloride 96 - 108 mmol/L 105  10/21/24 13:13   Carbon Dioxide 21 - 32 mmol/L 26  10/21/24 13:13   ANION GAP 4 - 13 mmol/L 10  10/21/24 13:13   BUN 5 - 25 mg/dL 22  10/21/24 13:13   Creatinine 0.60 - 1.30 mg/dL 0.80  10/21/24 13:13     GFR, Calculated   Date Value Ref Range Status   02/14/2024 91.6 >60.0 mL/min/1.73m*2 Final     EGFR   Date Value Ref Range Status   02/14/2024 80.06  Final     eGFR   Date Value Ref Range Status   10/21/2024 99 ml/min/1.73sq m Final     No components found for: \"MALBCRER\"    Lab " Results   Component Value Date    CHOL 185 12/20/2017    HDL 32 (L) 07/30/2021    TRIG 383 (H) 02/26/2024       Lab Results   Component Value Date    ALT 23 10/21/2024    AST 19 10/21/2024    ALKPHOS 87 10/21/2024    BILITOT 0.3 12/20/2017       Lab Results   Component Value Date    TSH 1.540 11/20/2024   Study Result    Narrative & Impression   MRI BRAIN WITH AND WITHOUT CONTRAST     INDICATION: D32.9: Benign neoplasm of meninges, unspecified.     COMPARISON:  MRI brain 4/2/2022     TECHNIQUE:  Multiplanar, multisequence imaging of the brain was performed before and after gadolinium administration.        IV Contrast:  9 mL of Gadobutrol injection (SINGLE-DOSE)      IMAGE QUALITY:   Diagnostic.     FINDINGS:     BRAIN PARENCHYMA:    Stable 2.4 x 1.4 x 2.2 cm (AP x TRV x CC) homogeneously enhancing meningioma along the lateral right frontal convexity and sylvian fissure.  There is no associated vasogenic edema.     There is midline shift. There is no intracranial hemorrhage.  Normal posterior fossa.  Diffusion imaging is unremarkable.         Few punctate hyperintensities on T2/FLAIR imaging are noted in the periventricular and subcortical white matter demonstrating an appearance that is statistically most likely to represent mild microangiopathic change.     There is no pathologic intra-axial enhancement.     VENTRICLES:  Normal for the patient's age.     SELLA AND PITUITARY GLAND:  Normal.     ORBITS:  Normal.     PARANASAL SINUSES:  Normal.     VASCULATURE:  Evaluation of the major intracranial vasculature demonstrates appropriate flow voids.     CALVARIUM AND SKULL BASE:  Normal.     EXTRACRANIAL SOFT TISSUES:  Normal.     IMPRESSION:     1. Stable lateral right frontal convexity meningioma compared to 4/2/2022.     2. No acute infarction, edema, or pathologic intra-axial enhancement.     3. Mild chronic microangiopathic ischemic changes.      Workstation performed: [id number]        Imaging    MRI brain with and  without contrast (Order: [id number]) - 4/11/2023    Result History    MRI brain with and without contrast (Order #[id number]) on 4/13/2023 - Order Result History Report

## 2025-01-21 ENCOUNTER — OFFICE VISIT (OUTPATIENT)
Dept: ENDOCRINOLOGY | Facility: CLINIC | Age: 60
End: 2025-01-21
Payer: COMMERCIAL

## 2025-01-21 ENCOUNTER — HOSPITAL ENCOUNTER (OUTPATIENT)
Dept: MRI IMAGING | Facility: HOSPITAL | Age: 60
Discharge: HOME | End: 2025-01-21
Attending: NEUROLOGICAL SURGERY
Payer: COMMERCIAL

## 2025-01-21 ENCOUNTER — OFFICE VISIT (OUTPATIENT)
Dept: PHYSICAL THERAPY | Facility: CLINIC | Age: 60
End: 2025-01-21
Payer: COMMERCIAL

## 2025-01-21 VITALS
WEIGHT: 187.6 LBS | BODY MASS INDEX: 29.44 KG/M2 | HEIGHT: 67 IN | SYSTOLIC BLOOD PRESSURE: 142 MMHG | HEART RATE: 92 BPM | DIASTOLIC BLOOD PRESSURE: 98 MMHG

## 2025-01-21 DIAGNOSIS — R42 LIGHTHEADEDNESS: ICD-10-CM

## 2025-01-21 DIAGNOSIS — R53.1 GENERALIZED WEAKNESS: ICD-10-CM

## 2025-01-21 DIAGNOSIS — Z91.81 RISK FOR FALLS: ICD-10-CM

## 2025-01-21 DIAGNOSIS — M25.561 CHRONIC PAIN OF RIGHT KNEE: Primary | ICD-10-CM

## 2025-01-21 DIAGNOSIS — G89.29 CHRONIC PAIN OF RIGHT KNEE: Primary | ICD-10-CM

## 2025-01-21 DIAGNOSIS — D32.9 MENINGIOMA (HCC): ICD-10-CM

## 2025-01-21 DIAGNOSIS — M62.81 DECREASED MUSCLE STRENGTH: Primary | ICD-10-CM

## 2025-01-21 DIAGNOSIS — R61 DIAPHORESIS: ICD-10-CM

## 2025-01-21 DIAGNOSIS — Z78.9 POOR TOLERANCE FOR AMBULATION: ICD-10-CM

## 2025-01-21 DIAGNOSIS — R68.89 HEAT INTOLERANCE: ICD-10-CM

## 2025-01-21 DIAGNOSIS — R53.83 OTHER FATIGUE: ICD-10-CM

## 2025-01-21 DIAGNOSIS — J80 ACUTE RESPIRATORY DISTRESS SYNDROME (HCC): ICD-10-CM

## 2025-01-21 PROCEDURE — 70553 MRI BRAIN STEM W/O & W/DYE: CPT

## 2025-01-21 PROCEDURE — 97530 THERAPEUTIC ACTIVITIES: CPT

## 2025-01-21 PROCEDURE — A9585 GADOBUTROL INJECTION: HCPCS | Performed by: NEUROLOGICAL SURGERY

## 2025-01-21 PROCEDURE — 99204 OFFICE O/P NEW MOD 45 MIN: CPT | Performed by: INTERNAL MEDICINE

## 2025-01-21 PROCEDURE — 97110 THERAPEUTIC EXERCISES: CPT

## 2025-01-21 RX ORDER — GADOBUTROL 604.72 MG/ML
8 INJECTION INTRAVENOUS
Status: COMPLETED | OUTPATIENT
Start: 2025-01-21 | End: 2025-01-21

## 2025-01-21 RX ADMIN — GADOBUTROL 8 ML: 604.72 INJECTION INTRAVENOUS at 18:20

## 2025-01-21 NOTE — PROGRESS NOTES
"Daily Note     Today's date: 2025  Patient name: Hussein Edward III  : 1965  MRN: [id number]  Referring provider: Bernabe Cook MD  Dx:   Encounter Diagnosis     ICD-10-CM    1. Chronic pain of right knee  M25.561     G89.29       2. Generalized weakness  R53.1       3. Acute respiratory distress syndrome (HCC)  J80       4. Poor tolerance for ambulation  Z78.9       5. Risk for falls  Z91.81                      Subjective: pt notes that he did go outside to move cars around in snow the other day.  Also states that he had a hard time in doctor;s office today with the office being so warm.       Objective: See treatment diary below      Assessment: Tolerated treatment with more difficulty performing standing march with band with LOB x1 that required min A of1 and ability to help self right.. Patient demonstrated fatigue post treatment, exhibited good technique with therapeutic exercises, and would benefit from continued PT      Plan: Potential discharge next visit.     Precautions: Hx of ARDS 2024. Patient will require increased rest break between activity. Does carry portable O2 concentrator.         Manuals                                                        Neuro Re-Ed             Scapular retraction             SLS             Seated  abdominal press down  5\"x20       5\"x20 5''x20 5''x20                                                       Ther Ex             Hip abd stand                          UBE for endurance.             Bike for endurance Lv2 7'  Lv1 2'       Lv2  9' Lv 2 8' Lv 2 8'   TB shoulder extension Blk x15            TB IR             TB ER-bilateral Blue 2x15       Blue 2x15 Blue 2x15 Blue 2x15   Knee flexion                          seated knee extension             Leg press 85# 2x10       85#  2x10 85# 2X10 85# 2X10   Bicep curl x2 nv       6#  2x15 6# 2x15 6# 2x15   OH tricep ext         4# 2x15 4# 2x15   Standing rows                        "   Ther Activity             Lateral step ups and over nv       Lv2   x15 Lv 2 x15 Lv 2 x15   Sit to stand 2x10 thin       Thin 2x10 Thin 2x10 Thin 2x10                Gait Training                                       Modalities                                       \

## 2025-01-23 ENCOUNTER — OFFICE VISIT (OUTPATIENT)
Dept: PHYSICAL THERAPY | Facility: CLINIC | Age: 60
End: 2025-01-23
Payer: COMMERCIAL

## 2025-01-23 DIAGNOSIS — M25.561 CHRONIC PAIN OF RIGHT KNEE: ICD-10-CM

## 2025-01-23 DIAGNOSIS — G89.29 CHRONIC PAIN OF RIGHT KNEE: ICD-10-CM

## 2025-01-23 DIAGNOSIS — J80 ACUTE RESPIRATORY DISTRESS SYNDROME (HCC): ICD-10-CM

## 2025-01-23 DIAGNOSIS — Z91.81 RISK FOR FALLS: Primary | ICD-10-CM

## 2025-01-23 DIAGNOSIS — R53.1 GENERALIZED WEAKNESS: ICD-10-CM

## 2025-01-23 DIAGNOSIS — Z78.9 POOR TOLERANCE FOR AMBULATION: ICD-10-CM

## 2025-01-23 PROCEDURE — 97110 THERAPEUTIC EXERCISES: CPT

## 2025-01-23 PROCEDURE — 97116 GAIT TRAINING THERAPY: CPT

## 2025-01-23 NOTE — PROGRESS NOTES
"Daily Note     Today's date: 2025  Patient name: Hussein Edward III  : 1965  MRN: [id number]  Referring provider: Bernabe Cook MD  Dx:   Encounter Diagnosis     ICD-10-CM    1. Risk for falls  Z91.81       2. Chronic pain of right knee  M25.561     G89.29       3. Acute respiratory distress syndrome (HCC)  J80       4. Generalized weakness  R53.1       5. Poor tolerance for ambulation  Z78.9             Start Time: 1445  Stop Time: 1542  Total time in clinic (min): 57 minutes  No charge placed on rest breaks    Subjective: Patient notes he had blood work prior to start of session. Notes that this week has been busy from doctor's appointments and 2* to this has inc LE fatigue.      Objective: See treatment diary below      Assessment: Continued good tolerance to program with mod therapeutic rest breaks taken t/o. No LOB with activities. Patient would benefit from continued PT to improve function.       Plan: Potential discharge next visit.     Precautions: Hx of ARDS 2024. Patient will require increased rest break between activity. Does carry portable O2 concentrator.         Manuals                                                        Neuro Re-Ed             Scapular retraction             SLS             Seated  abdominal press down  5\"x20 5\"x20      5\"x20 5''x20 5''x20                                                       Ther Ex             Hip abd stand                          UBE for endurance.             Bike for endurance Lv2 7'  Lv1 2' Lv2 6'  Lv1 3'      Lv2  9' Lv 2 8' Lv 2 8'   TB shoulder extension Blk x15            TB IR             TB ER-bilateral Blue 2x15 Blue 2x15 Black?     Blue 2x15 Blue 2x15 Blue 2x15   Knee flexion                          seated knee extension             Leg press 85# 2x10 85# 2x10      85#  2x10 85# 2X10 85# 2X10   Bicep curl x2 nv 6# 2x15      6#  2x15 6# 2x15 6# 2x15   OH tricep ext         4# 2x15 4# 2x15   Standing rows   "                        Ther Activity             Lateral step ups and over nv NV      Lv2   x15 Lv 2 x15 Lv 2 x15   Sit to stand 2x10 thin 2x10 thin      Thin 2x10 Thin 2x10 Thin 2x10                Gait Training                                       Modalities                                       \

## 2025-01-24 LAB
25(OH)D3+25(OH)D2 SERPL-MCNC: 28.1 NG/ML (ref 30–100)
ACTH PLAS-MCNC: 21.1 PG/ML (ref 7.2–63.3)
CORTIS AM PEAK SERPL-MCNC: 12.4 UG/DL (ref 6.2–19.4)
T4 FREE SERPL-MCNC: 1.15 NG/DL (ref 0.82–1.77)
TESTOST FREE SERPL-MCNC: 7 PG/ML (ref 7.2–24)
TESTOST SERPL-MCNC: 382 NG/DL (ref 264–916)
TSH SERPL DL<=0.005 MIU/L-ACNC: 3.36 UIU/ML (ref 0.45–4.5)

## 2025-01-28 ENCOUNTER — OFFICE VISIT (OUTPATIENT)
Dept: PHYSICAL THERAPY | Facility: CLINIC | Age: 60
End: 2025-01-28
Payer: COMMERCIAL

## 2025-01-28 DIAGNOSIS — R90.89 ABNORMAL FINDING ON MRI OF BRAIN: ICD-10-CM

## 2025-01-28 DIAGNOSIS — Z87.09 ARDS SURVIVOR: ICD-10-CM

## 2025-01-28 DIAGNOSIS — G89.29 CHRONIC PAIN OF RIGHT KNEE: ICD-10-CM

## 2025-01-28 DIAGNOSIS — D33.0 BENIGN NEOPLASM OF SUPRATENTORIAL REGION OF BRAIN (HCC): ICD-10-CM

## 2025-01-28 DIAGNOSIS — R53.1 GENERALIZED WEAKNESS: ICD-10-CM

## 2025-01-28 DIAGNOSIS — M25.561 CHRONIC PAIN OF RIGHT KNEE: ICD-10-CM

## 2025-01-28 DIAGNOSIS — Z91.81 RISK FOR FALLS: Primary | ICD-10-CM

## 2025-01-28 DIAGNOSIS — Z78.9 POOR TOLERANCE FOR AMBULATION: ICD-10-CM

## 2025-01-28 DIAGNOSIS — J80 ACUTE RESPIRATORY DISTRESS SYNDROME (HCC): ICD-10-CM

## 2025-01-28 DIAGNOSIS — M62.81 MUSCLE WEAKNESS (GENERALIZED): Primary | ICD-10-CM

## 2025-01-28 PROCEDURE — 97110 THERAPEUTIC EXERCISES: CPT

## 2025-01-28 PROCEDURE — 97116 GAIT TRAINING THERAPY: CPT

## 2025-01-28 NOTE — PROGRESS NOTES
"Daily Note     Today's date: 2025  Patient name: Hussein Edward III  : 1965  MRN: [id number]  Referring provider: Bernabe Cook MD  Dx:   Encounter Diagnosis     ICD-10-CM    1. Risk for falls  Z91.81       2. Chronic pain of right knee  M25.561     G89.29       3. Acute respiratory distress syndrome (HCC)  J80       4. Generalized weakness  R53.1       5. Poor tolerance for ambulation  Z78.9                      Subjective: No new changes since LV      Objective: See treatment diary below      Assessment: Tolerated treatment well, but with frequent rest breaks and alternating between UE/LE TE. Worked on hip hinge/ant weight shifting to facilitate STS, with good carryover. Progressed with increased resistance for bilat shoulder ER, with good tolerance. Trialed 95# for the leg press, but he is unable to complete 1 rep.  EDU on using AD vs reaching for equipment/furniture support- patient verbalized good understanding. Patient demonstrated fatigue post session and would benefit from continued PT.          Plan: Continue per plan of care.       Precautions: Hx of ARDS 2024. Patient will require increased rest break between activity. Does carry portable O2 concentrator.         Manuals                                                        Neuro Re-Ed             Scapular retraction             SLS             Seated  abdominal press down  5\"x20 5\"x20      5\"x20 5''x20 5''x20                                                       Ther Ex             Hip abd stand                          UBE for endurance.             Bike for endurance Lv2 7'  Lv1 2' Lv2 6'  Lv1 3' Lv2 8'  Lv1 2'     Lv2  9' Lv 2 8' Lv 2 8'   TB shoulder extension Blk x15            TB IR             TB ER-bilateral Blue 2x15 Blue 2x15 [id number]     Blue 2x15 Blue 2x15 Blue 2x15   Knee flexion                          seated knee extension             Leg press 85# 2x10 85# 2x10 85# 2x10     85#  2x10 85# " 2X10 85# 2X10   Bicep curl x2 nv 6# 2x15 6# 2x15     6#  2x15 6# 2x15 6# 2x15   OH tricep ext         4# 2x15 4# 2x15   Standing rows                          Ther Activity             Lateral step ups and over nv NV      Lv2   x15 Lv 2 x15 Lv 2 x15   Sit to stand 2x10 thin 2x10 thin 2x10 thin      Thin 2x10 Thin 2x10 Thin 2x10                Gait Training                                       Modalities                                       \

## 2025-01-30 ENCOUNTER — OFFICE VISIT (OUTPATIENT)
Dept: PHYSICAL THERAPY | Facility: CLINIC | Age: 60
End: 2025-01-30
Payer: COMMERCIAL

## 2025-01-30 DIAGNOSIS — R53.1 GENERALIZED WEAKNESS: ICD-10-CM

## 2025-01-30 DIAGNOSIS — M25.561 CHRONIC PAIN OF RIGHT KNEE: ICD-10-CM

## 2025-01-30 DIAGNOSIS — Z78.9 POOR TOLERANCE FOR AMBULATION: ICD-10-CM

## 2025-01-30 DIAGNOSIS — G89.29 CHRONIC PAIN OF RIGHT KNEE: ICD-10-CM

## 2025-01-30 DIAGNOSIS — Z91.81 RISK FOR FALLS: Primary | ICD-10-CM

## 2025-01-30 DIAGNOSIS — J80 ACUTE RESPIRATORY DISTRESS SYNDROME (HCC): ICD-10-CM

## 2025-01-30 PROCEDURE — 97110 THERAPEUTIC EXERCISES: CPT | Performed by: PHYSICAL THERAPY ASSISTANT

## 2025-01-30 PROCEDURE — 97116 GAIT TRAINING THERAPY: CPT | Performed by: PHYSICAL THERAPY ASSISTANT

## 2025-01-30 NOTE — PROGRESS NOTES
"Daily Note     Today's date: 2025  Patient name: Hussein Edward III  : 1965  MRN: [id number]  Referring provider: Bernabe Cook MD  Dx:   Encounter Diagnosis     ICD-10-CM    1. Risk for falls  Z91.81       2. Chronic pain of right knee  M25.561     G89.29       3. Acute respiratory distress syndrome (HCC)  J80       4. Generalized weakness  R53.1       5. Poor tolerance for ambulation  Z78.9                      Subjective: Pt reports no changes since LV.       Objective: See treatment diary below      Assessment: Tolerated treatment well, but with frequent rest breaks and alternating between UE/LE TE. Pt demonstrates good technique with TE. Patient demonstrated fatigue post session and would benefit from continued PT.          Plan: Continue per plan of care.       Precautions: Hx of ARDS 2024. Patient will require increased rest break between activity. Does carry portable O2 concentrator.         Manuals                                                        Neuro Re-Ed             Scapular retraction             SLS             Seated  abdominal press down  5\"x20 5\"x20  5\" x20    5\"x20 5''x20 5''x20                                                       Ther Ex             Hip abd stand                          UBE for endurance.             Bike for endurance Lv2 7'  Lv1 2' Lv2 6'  Lv1 3' Lv2 8'  Lv1 2' L2 8'  L1 2'    Lv2  9' Lv 2 8' Lv 2 8'   TB shoulder extension Blk x15            TB IR             TB ER-bilateral Blue 2x15 Blue 2x15 [id number] Blk  2x15    Blue 2x15 Blue 2x15 Blue 2x15   Knee flexion                          seated knee extension             Leg press 85# 2x10 85# 2x10 85# 2x10 85# 2x10    85#  2x10 85# 2X10 85# 2X10   Bicep curl x2 nv 6# 2x15 6# 2x15 6#   2x15    6#  2x15 6# 2x15 6# 2x15   OH tricep ext         4# 2x15 4# 2x15   Standing rows                          Ther Activity             Lateral step ups and over nv NV      Lv2   x15 " Lv 2 x15 Lv 2 x15   Sit to stand 2x10 thin 2x10 thin 2x10 thin  2x10 thin    Thin 2x10 Thin 2x10 Thin 2x10                Gait Training                                       Modalities                                       \

## 2025-01-31 ENCOUNTER — TELEPHONE (OUTPATIENT)
Dept: ENDOCRINOLOGY | Facility: CLINIC | Age: 60
End: 2025-01-31

## 2025-01-31 NOTE — TELEPHONE ENCOUNTER
----- Message from Edith Ramos MD sent at 1/28/2025 11:26 PM EST -----  Please help with scheduling ACTH stimulation test for this patient thank you

## 2025-01-31 NOTE — TELEPHONE ENCOUNTER
Looking at patients chart they have had this test done. There is a duplicate order in chart for future use.

## 2025-02-04 ENCOUNTER — OFFICE VISIT (OUTPATIENT)
Dept: NEUROSURGERY | Facility: CLINIC | Age: 60
End: 2025-02-04
Payer: COMMERCIAL

## 2025-02-04 ENCOUNTER — OFFICE VISIT (OUTPATIENT)
Dept: PHYSICAL THERAPY | Facility: CLINIC | Age: 60
End: 2025-02-04
Payer: COMMERCIAL

## 2025-02-04 VITALS
DIASTOLIC BLOOD PRESSURE: 72 MMHG | BODY MASS INDEX: 28.56 KG/M2 | TEMPERATURE: 98.6 F | WEIGHT: 182 LBS | SYSTOLIC BLOOD PRESSURE: 118 MMHG | HEIGHT: 67 IN | RESPIRATION RATE: 18 BRPM | HEART RATE: 86 BPM

## 2025-02-04 DIAGNOSIS — Z78.9 POOR TOLERANCE FOR AMBULATION: ICD-10-CM

## 2025-02-04 DIAGNOSIS — R53.1 GENERALIZED WEAKNESS: ICD-10-CM

## 2025-02-04 DIAGNOSIS — G89.29 CHRONIC PAIN OF RIGHT KNEE: ICD-10-CM

## 2025-02-04 DIAGNOSIS — Z91.81 RISK FOR FALLS: Primary | ICD-10-CM

## 2025-02-04 DIAGNOSIS — D32.9 MENINGIOMA (HCC): Primary | ICD-10-CM

## 2025-02-04 DIAGNOSIS — M25.561 CHRONIC PAIN OF RIGHT KNEE: ICD-10-CM

## 2025-02-04 DIAGNOSIS — J80 ACUTE RESPIRATORY DISTRESS SYNDROME (HCC): ICD-10-CM

## 2025-02-04 PROCEDURE — 99214 OFFICE O/P EST MOD 30 MIN: CPT | Performed by: NURSE PRACTITIONER

## 2025-02-04 PROCEDURE — 97110 THERAPEUTIC EXERCISES: CPT

## 2025-02-04 PROCEDURE — 97530 THERAPEUTIC ACTIVITIES: CPT

## 2025-02-04 NOTE — PROGRESS NOTES
"Daily Note     Today's date: 2025  Patient name: Hussein Edward III  : 1965  MRN: [id number]  Referring provider: Bernabe Cook MD  Dx:   Encounter Diagnosis     ICD-10-CM    1. Risk for falls  Z91.81       2. Chronic pain of right knee  M25.561     G89.29       3. Acute respiratory distress syndrome (HCC)  J80       4. Generalized weakness  R53.1       5. Poor tolerance for ambulation  Z78.9                      Subjective: pt notes that he is tired today.      Objective: See treatment diary below      Assessment: . Patient demonstrated fatigue post treatment and would benefit from continued PT  one episode of getting stuck and difficulty lifting LLE to advance leg when walking but no LOB but did have CGA of therapist. He did have a bit more challenge with exercises 2* to fatigue in both UE and LE      Plan: Continue per plan of care.      Precautions: Hx of ARDS 2024. Patient will require increased rest break between activity. Does carry portable O2 concentrator.         Manuals                                                             Neuro Re-Ed             Scapular retraction             SLS             Seated  abdominal press down  5\"x20 5\"x20  5\" x20 5\"x20                                                            Ther Ex             Hip abd stand                          UBE for endurance.             Bike for endurance Lv2 7'  Lv1 2' Lv2 6'  Lv1 3' Lv2 8'  Lv1 2' L2 8'  L1 2' Lv2 8' lv1 2'        TB shoulder extension Blk x15            TB IR             TB ER-bilateral Blue 2x15 Blue 2x15 [id number] Blk  2x15 Blk 2x15        Knee flexion                          seated knee extension             Leg press 85# 2x10 85# 2x10 85# 2x10 85# 2x10 85# 2x15        Bicep curl x2 nv 6# 2x15 6# 2x15 6#   2x15 6#  2x15        OH tricep ext             Standing rows                          Ther Activity             Lateral step ups and over nv NV           Sit to stand 2x10 thin " 2x10 thin 2x10 thin  2x10 thin Thin   X10,x5                     Gait Training                                       Modalities                                       \

## 2025-02-04 NOTE — PROGRESS NOTES
Name: Hussein Edward III      : 1965      MRN: [id number]  Encounter Provider: ROSALINO Pitts  Encounter Date: 2025   Encounter department[facility]  :  Assessment & Plan  Meningioma (HCC)  As addressed in HPI  RTO  for the first 2 year f/u visit  for meningioma surveillance, has been stable since  discovery in     Nonfocal neurological examination  He presents with no complaints  He denies symptoms  associated with meningioma .   He continues to endorse difficulty with migraine management by neurology. He also reports  subtle memory impairment and tinnitus that are chronic.       Imagining   2025 MRI Brain w/wo enhancing right frontal convexity dural based lesion measuring 2.7 x 1.5 cm, not significantly changed from 2021. Unchanged local mass effect without midline shift.   Reviewed imaging with patient, meningioma remains stable since  finding  Reinforced natural nature of meningioma  On going imagining surveillance . Ordered MRI brain w/wo --due 2027 , 2 years. Surveillance in concordance with NCCN guidelines.   Discussed various treatment options including SRS/radation, and surgical resection.Rerecommend continuing with surveillance and follow-up.short term imaging follow up   Explained that this meningioma is very unlikely to be causing his headaches   Advised checkout to schedule f/u vist 3 days to omne week of imagining completion.  AVS teaching meningioma .  Advised if additional questions or concerns contact the office.     Orders:    MRI brain with and without contrast; Future        History of Present Illness   Meningioma , presents for 2 year f/u surveillance visit.  incidental 2.5 cm right parietotemporal meningioma discovered  in 2021 during work-up for migraines.      Since that time finding has been stable on surveillance imaging.             HPI  Review of Systems   Constitutional: Negative.    HENT:  Positive for  tinnitus (eringing both ears).    Eyes: Negative.    Respiratory: Negative.     Cardiovascular: Negative.    Gastrointestinal: Negative.    Endocrine: Negative.    Genitourinary: Negative.    Musculoskeletal:  Positive for gait problem (unsteady uses walker).   Skin: Negative.    Allergic/Immunologic: Negative.    Neurological:  Positive for dizziness (occ), speech difficulty (slow to respond words dont come out and slurred at times), weakness (generalized weakness more in legs) and numbness (fingers both hands). Negative for seizures.   Hematological: Negative.    Psychiatric/Behavioral:  Positive for confusion (stm loss confused at times).     I have personally reviewed the MA's review of systems and made changes as necessary.    Pertinent Medical History         Medical History Reviewed by provider this encounter:     .  Past Medical History   Past Medical History:   Diagnosis Date    Chronic back pain     Migraine      Past Surgical History:   Procedure Laterality Date    HERNIA REPAIR      MANDIBLE FRACTURE SURGERY      MOUTH SURGERY      cyst in cheek    NASAL SEPTUM SURGERY       Family History   Problem Relation Age of Onset    Breast cancer Mother     Diabetes Father     No Known Problems Family     Substance Abuse Neg Hx     Mental illness Neg Hx       reports that he has never smoked. He quit smokeless tobacco use about 22 years ago.  His smokeless tobacco use included snuff. He reports that he does not drink alcohol and does not use drugs.  Current Outpatient Medications on File Prior to Visit   Medication Sig Dispense Refill    albuterol (2.5 mg/3 mL) 0.083 % nebulizer solution Take 3 mL (2.5 mg total) by nebulization every 6 (six) hours as needed for wheezing or shortness of breath 125 mL 3    albuterol (PROVENTIL HFA,VENTOLIN HFA) 90 mcg/act inhaler Inhale 2 puffs every 4 (four) hours as needed for wheezing 18 g 0    Daridorexant HCl (Quviviq) 25 MG TABS Take 25 mg by mouth daily at bedtime 90 tablet  0    DULoxetine (CYMBALTA) 30 mg delayed release capsule 1-2 daily 180 capsule 3    meloxicam (Mobic) 15 mg tablet Take 1 tablet (15 mg total) by mouth daily 60 tablet 5    METHOCARBAMOL PO PRN      metoprolol succinate (TOPROL-XL) 25 mg 24 hr tablet Take 1 tablet (25 mg total) by mouth every 12 (twelve) hours 180 tablet 3    multivitamin (THERAGRAN) TABS Take 1 tablet by mouth daily      rimegepant sulfate (Nurtec) 75 mg TBDP Take one NURTEC 75 mg under or on tongue at migraine onset if needed daily 16 tablet 11    rOPINIRole (REQUIP) 2 mg tablet Take 1 tablet (2 mg total) by mouth 3 (three) times a day 90 tablet 5    traMADol (ULTRAM) 50 mg tablet Take 1 tablet (50 mg total) by mouth every 6 (six) hours as needed for moderate pain 120 tablet 5    Vitamin D, Cholecalciferol, 50 MCG (2000 UT) CAPS Take by mouth      [DISCONTINUED] diclofenac sodium (VOLTAREN) 50 mg EC tablet Take 1 tablet(s) twice a day by oral route as needed.  0     No current facility-administered medications on file prior to visit.     Allergies   Allergen Reactions    Ropinirole GI Intolerance      Current Outpatient Medications on File Prior to Visit   Medication Sig Dispense Refill    albuterol (2.5 mg/3 mL) 0.083 % nebulizer solution Take 3 mL (2.5 mg total) by nebulization every 6 (six) hours as needed for wheezing or shortness of breath 125 mL 3    albuterol (PROVENTIL HFA,VENTOLIN HFA) 90 mcg/act inhaler Inhale 2 puffs every 4 (four) hours as needed for wheezing 18 g 0    Daridorexant HCl (Quviviq) 25 MG TABS Take 25 mg by mouth daily at bedtime 90 tablet 0    DULoxetine (CYMBALTA) 30 mg delayed release capsule 1-2 daily 180 capsule 3    meloxicam (Mobic) 15 mg tablet Take 1 tablet (15 mg total) by mouth daily 60 tablet 5    METHOCARBAMOL PO PRN      metoprolol succinate (TOPROL-XL) 25 mg 24 hr tablet Take 1 tablet (25 mg total) by mouth every 12 (twelve) hours 180 tablet 3    multivitamin (THERAGRAN) TABS Take 1 tablet by mouth daily    "   rimegepant sulfate (Nurtec) 75 mg TBDP Take one NURTEC 75 mg under or on tongue at migraine onset if needed daily 16 tablet 11    rOPINIRole (REQUIP) 2 mg tablet Take 1 tablet (2 mg total) by mouth 3 (three) times a day 90 tablet 5    traMADol (ULTRAM) 50 mg tablet Take 1 tablet (50 mg total) by mouth every 6 (six) hours as needed for moderate pain 120 tablet 5    Vitamin D, Cholecalciferol, 50 MCG (2000 UT) CAPS Take by mouth      [DISCONTINUED] diclofenac sodium (VOLTAREN) 50 mg EC tablet Take 1 tablet(s) twice a day by oral route as needed.  0     No current facility-administered medications on file prior to visit.      Social History     Tobacco Use    Smoking status: Never    Smokeless tobacco: Former     Types: Snuff     Quit date: 2003   Vaping Use    Vaping status: Never Used   Substance and Sexual Activity    Alcohol use: Never    Drug use: No    Sexual activity: Not on file        Objective   /72 (BP Location: Left arm, Patient Position: Sitting, Cuff Size: Standard)   Pulse 86   Temp 98.6 °F (37 °C) (Temporal)   Resp 18   Ht 5' 7\" (1.702 m)   Wt 82.6 kg (182 lb)   PF 97 L/min   BMI 28.51 kg/m²     Physical Exam  Vitals and nursing note reviewed.   Constitutional:       General: He is not in acute distress.     Appearance: Normal appearance. He is not ill-appearing, toxic-appearing or diaphoretic.   HENT:      Head: Normocephalic and atraumatic.   Eyes:      General: No scleral icterus.        Right eye: No discharge.         Left eye: No discharge.      Conjunctiva/sclera: Conjunctivae normal.   Pulmonary:      Effort: Pulmonary effort is normal. No respiratory distress.   Musculoskeletal:      Right lower leg: No edema.      Left lower leg: No edema.   Neurological:      Mental Status: He is alert.      Motor: Motor strength is normal.     Coordination: Coordination is intact.   Psychiatric:         Mood and Affect: Mood normal.         Behavior: Behavior normal.       Neurological " Exam  Mental Status  Alert. Oriented to person, place, time and situation.    Motor  Normal muscle bulk throughout. Normal muscle tone. Strength is 5/5 throughout all four extremities.    Sensory  Sensation is intact to light touch, pinprick, vibration and proprioception in all four extremities.    Coordination    Finger-to-nose, rapid alternating movements and heel-to-shin normal bilaterally without dysmetria.    Gait  Normal casual, toe, heel and tandem gait.      Radiology Results Review: I have reviewed radiology reports from [facility]/PACS including: MRI brain.                Administrative Statements   I have spent a total time of 30 minutes in caring for this patient on the day of the visit/encounter including Diagnostic results, Risks and benefits of tx options, Instructions for management, Patient and family education, Importance of tx compliance, Risk factor reductions, Impressions, Counseling / Coordination of care, Documenting in the medical record, Reviewing / ordering tests, medicine, procedures  , Obtaining or reviewing history  , and Communicating with other healthcare professionals .

## 2025-02-06 ENCOUNTER — APPOINTMENT (OUTPATIENT)
Dept: PHYSICAL THERAPY | Facility: CLINIC | Age: 60
End: 2025-02-06
Payer: COMMERCIAL

## 2025-02-06 NOTE — ASSESSMENT & PLAN NOTE
As addressed in HPI  RTO  for the first 2 year f/u visit  for meningioma surveillance, has been stable since  discovery in 2021    Nonfocal neurological examination  He presents with no complaints  He denies symptoms  associated with meningioma .   He continues to endorse difficulty with migraine management by neurology. He also reports  subtle memory impairment and tinnitus that are chronic.       Imagining   1/21/2025 MRI Brain w/wo enhancing right frontal convexity dural based lesion measuring 2.7 x 1.5 cm, not significantly changed from 6/13/2021. Unchanged local mass effect without midline shift.   Reviewed imaging with patient, meningioma remains stable since 2021 finding  Reinforced natural nature of meningioma  On going imagining surveillance . Ordered MRI brain w/wo --due 1/12/2027 , 2 years. Surveillance in concordance with NCCN guidelines.   Discussed various treatment options including SRS/radation, and surgical resection.Rerecommend continuing with surveillance and follow-up.short term imaging follow up   Explained that this meningioma is very unlikely to be causing his headaches   Advised checkout to schedule f/u vist 3 days to omne week of imagining completion.  AVS teaching meningioma .  Advised if additional questions or concerns contact the office.     Orders:    MRI brain with and without contrast; Future

## 2025-02-11 ENCOUNTER — OFFICE VISIT (OUTPATIENT)
Dept: PHYSICAL THERAPY | Facility: CLINIC | Age: 60
End: 2025-02-11
Payer: COMMERCIAL

## 2025-02-11 DIAGNOSIS — G89.29 CHRONIC PAIN OF RIGHT KNEE: Primary | ICD-10-CM

## 2025-02-11 DIAGNOSIS — Z78.9 POOR TOLERANCE FOR AMBULATION: ICD-10-CM

## 2025-02-11 DIAGNOSIS — Z91.81 RISK FOR FALLS: ICD-10-CM

## 2025-02-11 DIAGNOSIS — M25.561 CHRONIC PAIN OF RIGHT KNEE: Primary | ICD-10-CM

## 2025-02-11 DIAGNOSIS — R53.1 GENERALIZED WEAKNESS: ICD-10-CM

## 2025-02-11 DIAGNOSIS — J80 ACUTE RESPIRATORY DISTRESS SYNDROME (HCC): ICD-10-CM

## 2025-02-11 PROCEDURE — 97110 THERAPEUTIC EXERCISES: CPT

## 2025-02-11 NOTE — PROGRESS NOTES
"Daily Note     Today's date: 2025  Patient name: Hussein Edward III  : 1965  MRN: [id number]  Referring provider: Bernabe Cook MD  Dx:   Encounter Diagnosis     ICD-10-CM    1. Chronic pain of right knee  M25.561     G89.29       2. Risk for falls  Z91.81       3. Acute respiratory distress syndrome (HCC)  J80       4. Generalized weakness  R53.1       5. Poor tolerance for ambulation  Z78.9                      Subjective: Hussein states that he has been feeling good lately.       Objective: See treatment diary below      Assessment: Tolerated treatment with slow progression through reps. But completed all as noted below.  . Patient demonstrated fatigue post treatment      Plan: Continue per plan of care.      Precautions: Hx of ARDS 2024. Patient will require increased rest break between activity. Does carry portable O2 concentrator.         Manuals                                                            Neuro Re-Ed             Scapular retraction             SLS             Seated  abdominal press down  5\"x20 5\"x20  5\" x20 5\"x20                                                            Ther Ex             Hip abd stand                          UBE for endurance.             Bike for endurance Lv2 7'  Lv1 2' Lv2 6'  Lv1 3' Lv2 8'  Lv1 2' L2 8'  L1 2' Lv2 8' lv1 2' Lv2 8'  Lv1 2'       TB shoulder extension Blk x15            TB IR             TB ER-bilateral Blue 2x15 Blue 2x15 [id number] Blk  2x15 Blk 2x15 Blk 2x15       Knee flexion                          seated knee extension             Leg press 85# 2x10 85# 2x10 85# 2x10 85# 2x10 85# 2x15 85#  2x15       Bicep curl x2 nv 6# 2x15 6# 2x15 6#   2x15 6#  2x15        OH tricep ext      4# 2x15       Standing rows                          Ther Activity             Lateral step ups and over nv NV           Sit to stand 2x10 thin 2x10 thin 2x10 thin  2x10 thin Thin   X10,x5 Thin 2x10                    Gait Training  "                                      Modalities                                       \

## 2025-02-13 ENCOUNTER — OFFICE VISIT (OUTPATIENT)
Dept: PHYSICAL THERAPY | Facility: CLINIC | Age: 60
End: 2025-02-13
Payer: COMMERCIAL

## 2025-02-13 ENCOUNTER — TELEPHONE (OUTPATIENT)
Dept: ENDOCRINOLOGY | Facility: CLINIC | Age: 60
End: 2025-02-13

## 2025-02-13 DIAGNOSIS — G89.29 CHRONIC PAIN OF RIGHT KNEE: Primary | ICD-10-CM

## 2025-02-13 DIAGNOSIS — M25.561 CHRONIC PAIN OF RIGHT KNEE: Primary | ICD-10-CM

## 2025-02-13 DIAGNOSIS — R53.1 GENERALIZED WEAKNESS: ICD-10-CM

## 2025-02-13 DIAGNOSIS — Z78.9 POOR TOLERANCE FOR AMBULATION: ICD-10-CM

## 2025-02-13 DIAGNOSIS — Z91.81 RISK FOR FALLS: ICD-10-CM

## 2025-02-13 DIAGNOSIS — J80 ACUTE RESPIRATORY DISTRESS SYNDROME (HCC): ICD-10-CM

## 2025-02-13 PROCEDURE — 97110 THERAPEUTIC EXERCISES: CPT

## 2025-02-13 NOTE — TELEPHONE ENCOUNTER
Called [facility] infusion [phone] to schedule ACTH     Time 8 am   Date 2-26-25     If not able to keep this appt please have them call [phone] per infusion center the ACTH Needs to be first appt of day (8 am)       [facility] office

## 2025-02-13 NOTE — TELEPHONE ENCOUNTER
----- Message from Edith Ramos MD sent at 2/12/2025  5:05 PM EST -----  I have renewed the plan for patient is ACTH stimulation test, please help with scheduling

## 2025-02-13 NOTE — PROGRESS NOTES
"Daily Note     Today's date: 2025  Patient name: Hussein Edward III  : 1965  MRN: [id number]  Referring provider: Bernabe Cook MD  Dx:   Encounter Diagnosis     ICD-10-CM    1. Chronic pain of right knee  M25.561     G89.29       2. Risk for falls  Z91.81       3. Acute respiratory distress syndrome (HCC)  J80       4. Generalized weakness  R53.1       5. Poor tolerance for ambulation  Z78.9                      Subjective: pt is feeling pretty good today but is hot.        Objective: See treatment diary below      Assessment: Pt struggled with sit to stand with being over heated and had LOB that required min A of 1 and able to help self to lower into seat.  . Patient demonstrated fatigue post treatment, exhibited good technique with therapeutic exercises, and would benefit from continued PT      Plan: Continue per plan of care.      Precautions: Hx of ARDS 2024. Patient will require increased rest break between activity. Does carry portable O2 concentrator.         Manuals                                                           Neuro Re-Ed             Scapular retraction             SLS             Seated  abdominal press down  5\"x20 5\"x20  5\" x20 5\"x20  5\"x20                                                          Ther Ex             Hip abd stand                          UBE for endurance.             Bike for endurance Lv2 7'  Lv1 2' Lv2 6'  Lv1 3' Lv2 8'  Lv1 2' L2 8'  L1 2' Lv2 8' lv1 2' Lv2 8'  Lv1 2' Lv2 10'      TB shoulder extension Blk x15      Blk       TB IR             TB ER-bilateral Blue 2x15 Blue 2x15 [id number] Blk  2x15 Blk 2x15 Blk 2x15       Knee flexion                          seated knee extension             Leg press 85# 2x10 85# 2x10 85# 2x10 85# 2x10 85# 2x15 85#  2x15       Bicep curl x2 nv 6# 2x15 6# 2x15 6#   2x15 6#  2x15  6#  2x15      OH tricep ext      4# 2x15 4#  2x15      Standing rows                          Ther Activity     "         Lateral step ups and over nv NV           Sit to stand 2x10 thin 2x10 thin 2x10 thin  2x10 thin Thin   X10,x5 Thin 2x10 Thin   x15                   Gait Training                                       Modalities                                       \

## 2025-02-18 ENCOUNTER — OFFICE VISIT (OUTPATIENT)
Dept: PHYSICAL THERAPY | Facility: CLINIC | Age: 60
End: 2025-02-18
Payer: COMMERCIAL

## 2025-02-18 DIAGNOSIS — J80 ACUTE RESPIRATORY DISTRESS SYNDROME (HCC): ICD-10-CM

## 2025-02-18 DIAGNOSIS — Z78.9 POOR TOLERANCE FOR AMBULATION: ICD-10-CM

## 2025-02-18 DIAGNOSIS — M25.561 CHRONIC PAIN OF RIGHT KNEE: Primary | ICD-10-CM

## 2025-02-18 DIAGNOSIS — G89.29 CHRONIC PAIN OF RIGHT KNEE: Primary | ICD-10-CM

## 2025-02-18 DIAGNOSIS — Z91.81 RISK FOR FALLS: ICD-10-CM

## 2025-02-18 PROCEDURE — 97110 THERAPEUTIC EXERCISES: CPT | Performed by: PHYSICAL THERAPIST

## 2025-02-18 NOTE — PROGRESS NOTES
"Daily Note     Today's date: 2025  Patient name: Hussein Edward III  : 1965  MRN: [id number]  Referring provider: Bernabe Cook MD  Dx:   Encounter Diagnosis     ICD-10-CM    1. Chronic pain of right knee  M25.561     G89.29       2. Risk for falls  Z91.81       3. Acute respiratory distress syndrome (HCC)  J80       4. Poor tolerance for ambulation  Z78.9                      Subjective: pt notes that his doctor wants some blood work to look at his cortisol levels to see if that is what is affecting his energy levels      Objective: See treatment diary below      Assessment: Pt is showing some signs of increased endurance, as he was able to increase his rpms on the bike today. Though he still fatigues easily with the OH tricep ext      Plan: Continue per plan of care.      Precautions: Hx of ARDS 2024. Patient will require increased rest break between activity. Does carry portable O2 concentrator.         Manuals                                                          Neuro Re-Ed             Scapular retraction             SLS             Seated  abdominal press down  5\"x20 5\"x20  5\" x20 5\"x20  5\"x20 5''x20                                                         Ther Ex             Hip abd stand                          UBE for endurance.             Bike for endurance Lv2 7'  Lv1 2' Lv2 6'  Lv1 3' Lv2 8'  Lv1 2' L2 8'  L1 2' Lv2 8' lv1 2' Lv2 8'  Lv1 2' Lv2 10' Lv 2 10'     TB shoulder extension Blk x15      Blk       TB IR             TB ER-bilateral Blue 2x15 Blue 2x15 [id number] Blk  2x15 Blk 2x15 Blk 2x15       Knee flexion                          seated knee extension             Leg press 85# 2x10 85# 2x10 85# 2x10 85# 2x10 85# 2x15 85#  2x15  85#2x15     Bicep curl x2 nv 6# 2x15 6# 2x15 6#   2x15 6#  2x15  6#  2x15 6# 2x15     OH tricep ext      4# 2x15 4#  2x15 4# 2x15     Standing rows                          Ther Activity             Lateral step " ups and over nv NV           Sit to stand 2x10 thin 2x10 thin 2x10 thin  2x10 thin Thin   X10,x5 Thin 2x10 Thin   x15 Thin x15                  Gait Training                                       Modalities                                       \

## 2025-02-19 ENCOUNTER — TELEPHONE (OUTPATIENT)
Dept: NEUROLOGY | Facility: CLINIC | Age: 60
End: 2025-02-19

## 2025-02-20 ENCOUNTER — APPOINTMENT (OUTPATIENT)
Dept: PHYSICAL THERAPY | Facility: CLINIC | Age: 60
End: 2025-02-20
Payer: COMMERCIAL

## 2025-02-25 ENCOUNTER — OFFICE VISIT (OUTPATIENT)
Dept: PHYSICAL THERAPY | Facility: CLINIC | Age: 60
End: 2025-02-25
Payer: COMMERCIAL

## 2025-02-25 DIAGNOSIS — Z78.9 POOR TOLERANCE FOR AMBULATION: ICD-10-CM

## 2025-02-25 DIAGNOSIS — Z91.81 RISK FOR FALLS: ICD-10-CM

## 2025-02-25 DIAGNOSIS — J80 ACUTE RESPIRATORY DISTRESS SYNDROME (HCC): ICD-10-CM

## 2025-02-25 DIAGNOSIS — R53.1 GENERALIZED WEAKNESS: ICD-10-CM

## 2025-02-25 DIAGNOSIS — M25.561 CHRONIC PAIN OF RIGHT KNEE: Primary | ICD-10-CM

## 2025-02-25 DIAGNOSIS — G89.29 CHRONIC PAIN OF RIGHT KNEE: Primary | ICD-10-CM

## 2025-02-25 PROCEDURE — 97110 THERAPEUTIC EXERCISES: CPT

## 2025-02-25 NOTE — PROGRESS NOTES
"Daily Note     Today's date: 2025  Patient name: Hussein Edward III  : 1965  MRN: [id number]  Referring provider: Bernabe Cook MD  Dx:   Encounter Diagnosis     ICD-10-CM    1. Chronic pain of right knee  M25.561     G89.29       2. Risk for falls  Z91.81       3. Acute respiratory distress syndrome (HCC)  J80       4. Poor tolerance for ambulation  Z78.9       5. Generalized weakness  R53.1                      Subjective: pt notes that he was ill last week, feeling better now.        Objective: See treatment diary below      Assessment: Pt was challenged by sit to stand today because of being hot and unsteady during activity . Patient demonstrated fatigue post treatment, exhibited good technique with therapeutic exercises, and would benefit from continued PT      Plan: Continue per plan of care.      Precautions: Hx of ARDS 2024. Patient will require increased rest break between activity. Does carry portable O2 concentrator.         Manuals                                                         Neuro Re-Ed             Scapular retraction             SLS             Seated  abdominal press down  5\"x20 5\"x20  5\" x20 5\"x20  5\"x20 5''x20 5\"x20                                                        Ther Ex             Hip abd stand                          UBE for endurance.             Bike for endurance Lv2 7'  Lv1 2' Lv2 6'  Lv1 3' Lv2 8'  Lv1 2' L2 8'  L1 2' Lv2 8' lv1 2' Lv2 8'  Lv1 2' Lv2 10' Lv 2 10' Lv2 11'    TB shoulder extension Blk x15      Blk       TB IR             TB ER-bilateral Blue 2x15 Blue 2x15 [id number] Blk  2x15 Blk 2x15 Blk 2x15   Blk 2x15    Knee flexion                          seated knee extension             Leg press 85# 2x10 85# 2x10 85# 2x10 85# 2x10 85# 2x15 85#  2x15  85#2x15 85#  2x15    Bicep curl x2 nv 6# 2x15 6# 2x15 6#   2x15 6#  2x15  6#  2x15 6# 2x15 6#  2x15    OH tricep ext      4# 2x15 4#  2x15 4# 2x15 4#  2x15  "   Standing rows                          Ther Activity             Lateral step ups and over nv NV           Sit to stand 2x10 thin 2x10 thin 2x10 thin  2x10 thin Thin   X10,x5 Thin 2x10 Thin   x15 Thin x15 Thin x5                 Gait Training                                       Modalities                                       \

## 2025-02-27 ENCOUNTER — RESULTS FOLLOW-UP (OUTPATIENT)
Dept: ENDOCRINOLOGY | Facility: CLINIC | Age: 60
End: 2025-02-27

## 2025-02-27 ENCOUNTER — HOSPITAL ENCOUNTER (OUTPATIENT)
Dept: INFUSION CENTER | Facility: HOSPITAL | Age: 60
Discharge: HOME | End: 2025-02-27
Payer: COMMERCIAL

## 2025-02-27 ENCOUNTER — OFFICE VISIT (OUTPATIENT)
Dept: PHYSICAL THERAPY | Facility: CLINIC | Age: 60
End: 2025-02-27
Payer: COMMERCIAL

## 2025-02-27 VITALS
DIASTOLIC BLOOD PRESSURE: 84 MMHG | SYSTOLIC BLOOD PRESSURE: 134 MMHG | RESPIRATION RATE: 18 BRPM | TEMPERATURE: 97.4 F | OXYGEN SATURATION: 95 % | HEART RATE: 101 BPM

## 2025-02-27 DIAGNOSIS — M25.561 CHRONIC PAIN OF RIGHT KNEE: Primary | ICD-10-CM

## 2025-02-27 DIAGNOSIS — G89.29 CHRONIC PAIN OF RIGHT KNEE: Primary | ICD-10-CM

## 2025-02-27 DIAGNOSIS — D33.0 BENIGN NEOPLASM OF SUPRATENTORIAL REGION OF BRAIN (HCC): ICD-10-CM

## 2025-02-27 DIAGNOSIS — Z91.81 RISK FOR FALLS: ICD-10-CM

## 2025-02-27 DIAGNOSIS — Z87.09 ARDS SURVIVOR: ICD-10-CM

## 2025-02-27 DIAGNOSIS — M62.81 MUSCLE WEAKNESS (GENERALIZED): Primary | ICD-10-CM

## 2025-02-27 DIAGNOSIS — J80 ACUTE RESPIRATORY DISTRESS SYNDROME (HCC): ICD-10-CM

## 2025-02-27 DIAGNOSIS — R90.89 ABNORMAL FINDING ON MRI OF BRAIN: ICD-10-CM

## 2025-02-27 DIAGNOSIS — R53.1 GENERALIZED WEAKNESS: ICD-10-CM

## 2025-02-27 LAB
CORTIS SERPL-MCNC: 10.7 UG/DL
CORTIS SERPL-MCNC: 23.4 UG/DL
CORTIS SERPL-MCNC: 27 UG/DL

## 2025-02-27 PROCEDURE — 82024 ASSAY OF ACTH: CPT | Performed by: STUDENT IN AN ORGANIZED HEALTH CARE EDUCATION/TRAINING PROGRAM

## 2025-02-27 PROCEDURE — 97110 THERAPEUTIC EXERCISES: CPT | Performed by: PHYSICAL THERAPIST

## 2025-02-27 PROCEDURE — 82533 TOTAL CORTISOL: CPT | Performed by: STUDENT IN AN ORGANIZED HEALTH CARE EDUCATION/TRAINING PROGRAM

## 2025-02-27 RX ADMIN — COSYNTROPIN 0.25 MG: 0.25 INJECTION, POWDER, LYOPHILIZED, FOR SOLUTION INTRAMUSCULAR; INTRAVENOUS at 08:50

## 2025-02-27 NOTE — PROGRESS NOTES
"Daily Note     Today's date: 2025  Patient name: Hussein Edward III  : 1965  MRN: [id number]  Referring provider: Bernabe Cook MD  Dx:   Encounter Diagnosis     ICD-10-CM    1. Chronic pain of right knee  M25.561     G89.29       2. Risk for falls  Z91.81       3. Acute respiratory distress syndrome (HCC)  J80       4. Generalized weakness  R53.1                      Subjective: pt notes that today was a good day working out secondary to improved temp in the PT area. The temp definitely changes his endurance with activity.       Objective: See treatment diary below      Assessment: pt was able to perform more sit to stands today but still took 1 min 10 sec for 5 x sit to stand secondary to slow methodical movement. Pt is getting more muscle mass in his legs but continues to have very poor speed of movement and recovery time.     Plan: Continue per plan of care.      Precautions: Hx of ARDS 2024. Patient will require increased rest break between activity. Does carry portable O2 concentrator.         Manuals                                                        Neuro Re-Ed             Scapular retraction             SLS             Seated  abdominal press down  5\"x20 5\"x20  5\" x20 5\"x20  5\"x20 5''x20 5\"x20 5''x20                                                       Ther Ex             Hip abd stand                          UBE for endurance.             Bike for endurance Lv2 7'  Lv1 2' Lv2 6'  Lv1 3' Lv2 8'  Lv1 2' L2 8'  L1 2' Lv2 8' lv1 2' Lv2 8'  Lv1 2' Lv2 10' Lv 2 10' Lv2 11' Lv 2 11'   TB shoulder extension Blk x15      Blk       TB IR             TB ER-bilateral Blue 2x15 Blue 2x15 [id number] Blk  2x15 Blk 2x15 Blk 2x15   Blk 2x15 Blk 2x15   Knee flexion                          seated knee extension             Leg press 85# 2x10 85# 2x10 85# 2x10 85# 2x10 85# 2x15 85#  2x15  85#2x15 85#  2x15 85# 2x15   Bicep curl x2 nv 6# 2x15 6# 2x15 6# "   2x15 6#  2x15  6#  2x15 6# 2x15 6#  2x15 6#  2x15   OH tricep ext      4# 2x15 4#  2x15 4# 2x15 4#  2x15 4#  2x15   Standing rows                          Ther Activity             Lateral step ups and over nv NV           Sit to stand 2x10 thin 2x10 thin 2x10 thin  2x10 thin Thin   X10,x5 Thin 2x10 Thin   x15 Thin x15 Thin x5 2x10                Gait Training                                       Modalities                                       \

## 2025-02-27 NOTE — PROGRESS NOTES
Hussein Edward III  tolerated treatment well with no complications.      Hussein Edward III is aware of future follow up with provider    AVS printed and given to Hussein Edward III:  No (Declined by Hussein Edward III)

## 2025-02-28 LAB — ACTH PLAS-MCNC: 13.4 PG/ML (ref 7.2–63.3)

## 2025-03-04 ENCOUNTER — OFFICE VISIT (OUTPATIENT)
Dept: PHYSICAL THERAPY | Facility: CLINIC | Age: 60
End: 2025-03-04
Payer: COMMERCIAL

## 2025-03-04 DIAGNOSIS — Z91.81 RISK FOR FALLS: ICD-10-CM

## 2025-03-04 DIAGNOSIS — Z78.9 POOR TOLERANCE FOR AMBULATION: ICD-10-CM

## 2025-03-04 DIAGNOSIS — G89.29 CHRONIC PAIN OF RIGHT KNEE: Primary | ICD-10-CM

## 2025-03-04 DIAGNOSIS — M25.561 CHRONIC PAIN OF RIGHT KNEE: Primary | ICD-10-CM

## 2025-03-04 DIAGNOSIS — J80 ACUTE RESPIRATORY DISTRESS SYNDROME (HCC): ICD-10-CM

## 2025-03-04 PROCEDURE — 97110 THERAPEUTIC EXERCISES: CPT | Performed by: PHYSICAL THERAPIST

## 2025-03-04 NOTE — PROGRESS NOTES
"Daily Note     Today's date: 3/4/2025  Patient name: Hussein Edward III  : 1965  MRN: [id number]  Referring provider: Bernabe Cook MD  Dx:   Encounter Diagnosis     ICD-10-CM    1. Chronic pain of right knee  M25.561     G89.29       2. Risk for falls  Z91.81       3. Acute respiratory distress syndrome (HCC)  J80       4. Poor tolerance for ambulation  Z78.9                      Subjective: pt notes that he is more tired today as he was out all day doing errands prior to PT.      Objective: See treatment diary below      Assessment: pt did fine with exercises today  but did require more rest breaks than normal in order to complete all exercises.       Plan: Continue per plan of care.      Precautions: Hx of ARDS 2024. Patient will require increased rest break between activity. Does carry portable O2 concentrator.         Manuals 1/21 1/23 1/28 1/30 2/4 2/11 2/13 2/18 2/25 2/27 3/4                                                           Neuro Re-Ed              Scapular retraction              SLS              Seated  abdominal press down  5\"x20 5\"x20  5\" x20 5\"x20  5\"x20 5''x20 5\"x20 5''x20 5''x20                                                           Ther Ex              Hip abd stand                            UBE for endurance.              Bike for endurance Lv2 7'  Lv1 2' Lv2 6'  Lv1 3' Lv2 8'  Lv1 2' L2 8'  L1 2' Lv2 8' lv1 2' Lv2 8'  Lv1 2' Lv2 10' Lv 2 10' Lv2 11' Lv 2 11' Lv 2 10'   TB shoulder extension Blk x15      Blk        TB IR              TB ER-bilateral Blue 2x15 Blue 2x15 [id number] Blk  2x15 Blk 2x15 Blk 2x15   Blk 2x15 Blk 2x15 Blk 2x15   Knee flexion                            seated knee extension              Leg press 85# 2x10 85# 2x10 85# 2x10 85# 2x10 85# 2x15 85#  2x15  85#2x15 85#  2x15 85# 2x15 85# 2x15   Bicep curl x2 nv 6# 2x15 6# 2x15 6#   2x15 6#  2x15  6#  2x15 6# 2x15 6#  2x15 6#  2x15 6# 2x15   OH tricep ext      4# 2x15 4#  2x15 4# 2x15 4#  2x15 4#  2x15 4# " 2x15   Standing rows                            Ther Activity              Lateral step ups and over nv NV            Sit to stand 2x10 thin 2x10 thin 2x10 thin  2x10 thin Thin   X10,x5 Thin 2x10 Thin   x15 Thin x15 Thin x5 2x10 2x10                 Gait Training                                          Modalities                                          \

## 2025-03-05 ENCOUNTER — TELEPHONE (OUTPATIENT)
Dept: CARDIOLOGY CLINIC | Facility: CLINIC | Age: 60
End: 2025-03-05

## 2025-03-06 ENCOUNTER — OFFICE VISIT (OUTPATIENT)
Dept: PHYSICAL THERAPY | Facility: CLINIC | Age: 60
End: 2025-03-06
Payer: COMMERCIAL

## 2025-03-06 DIAGNOSIS — G89.29 CHRONIC PAIN OF RIGHT KNEE: Primary | ICD-10-CM

## 2025-03-06 DIAGNOSIS — J80 ACUTE RESPIRATORY DISTRESS SYNDROME (HCC): ICD-10-CM

## 2025-03-06 DIAGNOSIS — M25.561 CHRONIC PAIN OF RIGHT KNEE: Primary | ICD-10-CM

## 2025-03-06 DIAGNOSIS — Z78.9 POOR TOLERANCE FOR AMBULATION: ICD-10-CM

## 2025-03-06 DIAGNOSIS — Z91.81 RISK FOR FALLS: ICD-10-CM

## 2025-03-06 DIAGNOSIS — R53.1 GENERALIZED WEAKNESS: ICD-10-CM

## 2025-03-06 PROCEDURE — 97110 THERAPEUTIC EXERCISES: CPT

## 2025-03-06 NOTE — PROGRESS NOTES
"Daily Note     Today's date: 3/6/2025  Patient name: Hussein Edward III  : 1965  MRN: [id number]  Referring provider: Bernabe Cook MD  Dx:   Encounter Diagnosis     ICD-10-CM    1. Chronic pain of right knee  M25.561     G89.29       2. Risk for falls  Z91.81       3. Acute respiratory distress syndrome (HCC)  J80       4. Poor tolerance for ambulation  Z78.9       5. Generalized weakness  R53.1                      Subjective: Hussein reports that he is feeling pretty good today.  Was up and walking more today doing things around his house.       Objective: See treatment diary below      Assessment: Tolerated treatment with more difficulty with amb without walker as he was more fatigued coming into PT today. . Patient demonstrated fatigue post treatment and would benefit from continued PT      Plan: Continue per plan of care.      Precautions: Hx of ARDS 2024. Patient will require increased rest break between activity. Does carry portable O2 concentrator.         Manuals 3/6      2/13 2/18 2/25 2/27 3/                                                           Neuro Re-Ed              Scapular retraction              SLS              Seated  abdominal press down  5\"x20      5\"x20 5''x20 5\"x20 5''x20 5''x20                                                           Ther Ex              Hip abd stand                            UBE for endurance.              Bike for endurance Lv2 10'      Lv2 10' Lv 2 10' Lv2 11' Lv 2 11' Lv 2 10'   TB shoulder extension       Blk        TB IR              TB ER-bilateral Blk 2x15        Blk 2x15 Blk 2x15 Blk 2x15   Knee flexion                            seated knee extension              Leg press 85#  2x15       85#2x15 85#  2x15 85# 2x15 85# 2x15   Bicep curl x2       6#  2x15 6# 2x15 6#  2x15 6#  2x15 6# 2x15   OH tricep ext 4#  2x15      4#  2x15 4# 2x15 4#  2x15 4#  2x15 4# 2x15   Standing rows Slv 2x15                           Ther Activity              Lateral " step ups and over              Sit to stand       Thin   x15 Thin x15 Thin x5 2x10 2x10                 Gait Training                                          Modalities                                          \

## 2025-03-07 ENCOUNTER — TELEPHONE (OUTPATIENT)
Dept: NEUROLOGY | Facility: CLINIC | Age: 60
End: 2025-03-07

## 2025-03-07 NOTE — TELEPHONE ENCOUNTER
Received JAVIER from [employer], in the [place] mail. Forms have been scanned into the patients chart. And faxed to [facility] for completion

## 2025-03-11 ENCOUNTER — OFFICE VISIT (OUTPATIENT)
Dept: PHYSICAL THERAPY | Facility: CLINIC | Age: 60
End: 2025-03-11
Payer: COMMERCIAL

## 2025-03-11 DIAGNOSIS — R53.1 GENERALIZED WEAKNESS: ICD-10-CM

## 2025-03-11 DIAGNOSIS — M25.561 CHRONIC PAIN OF RIGHT KNEE: Primary | ICD-10-CM

## 2025-03-11 DIAGNOSIS — Z78.9 POOR TOLERANCE FOR AMBULATION: ICD-10-CM

## 2025-03-11 DIAGNOSIS — Z91.81 RISK FOR FALLS: ICD-10-CM

## 2025-03-11 DIAGNOSIS — G89.29 CHRONIC PAIN OF RIGHT KNEE: Primary | ICD-10-CM

## 2025-03-11 DIAGNOSIS — J80 ACUTE RESPIRATORY DISTRESS SYNDROME (HCC): ICD-10-CM

## 2025-03-11 PROCEDURE — 97110 THERAPEUTIC EXERCISES: CPT

## 2025-03-11 NOTE — PROGRESS NOTES
"Daily Note     Today's date: 3/11/2025  Patient name: Hussein Edward III  : 1965  MRN: [id number]  Referring provider: Bernabe Cook MD  Dx:   Encounter Diagnosis     ICD-10-CM    1. Chronic pain of right knee  M25.561     G89.29       2. Risk for falls  Z91.81       3. Acute respiratory distress syndrome (HCC)  J80       4. Poor tolerance for ambulation  Z78.9       5. Generalized weakness  R53.1                      Subjective: no c/o offered       Objective: See treatment diary below      Assessment: Tolerated treatment with increase in weight on leg press which was challenging for pt.. Patient demonstrated fatigue post treatment and exhibited good technique with therapeutic exercises      Plan: Continue per plan of care.      Precautions: Hx of ARDS 2024. Patient will require increased rest break between activity. Does carry portable O2 concentrator.         Manuals 3/6 3/11     2/13 2/18 2/25 2/27 3                                                           Neuro Re-Ed              Scapular retraction              SLS              Seated  abdominal press down  5\"x20 5\"x20     5\"x20 5''x20 5\"x20 5''x20 5''x20                                                           Ther Ex              Hip abd stand                            UBE for endurance.              Bike for endurance Lv2 10' Lv2 10'     Lv2 10' Lv 2 10' Lv2 11' Lv 2 11' Lv 2 10'   TB shoulder extension       Blk        TB IR              TB ER-bilateral Blk 2x15 Blk 2x15       Blk 2x15 Blk 2x15 Blk 2x15   Knee flexion                            seated knee extension              Leg press 85#  2x15 85# x5  95#  X18      85#2x15 85#  2x15 85# 2x15 85# 2x15   Bicep curl x2  6#  2x15     6#  2x15 6# 2x15 6#  2x15 6#  2x15 6# 2x15   OH tricep ext 4#  2x15 4#  2x15     4#  2x15 4# 2x15 4#  2x15 4#  2x15 4# 2x15   Standing rows Slv 2x15                           Ther Activity              Lateral step ups and over              Sit to stand  " Thin  2x10     Thin   x15 Thin x15 Thin x5 2x10 2x10                 Gait Training                                          Modalities                                          \

## 2025-03-13 ENCOUNTER — OFFICE VISIT (OUTPATIENT)
Dept: PHYSICAL THERAPY | Facility: CLINIC | Age: 60
End: 2025-03-13
Payer: COMMERCIAL

## 2025-03-13 DIAGNOSIS — R53.1 GENERALIZED WEAKNESS: ICD-10-CM

## 2025-03-13 DIAGNOSIS — M25.561 CHRONIC PAIN OF RIGHT KNEE: Primary | ICD-10-CM

## 2025-03-13 DIAGNOSIS — G89.29 CHRONIC PAIN OF RIGHT KNEE: Primary | ICD-10-CM

## 2025-03-13 DIAGNOSIS — Z91.81 RISK FOR FALLS: ICD-10-CM

## 2025-03-13 PROCEDURE — 97110 THERAPEUTIC EXERCISES: CPT | Performed by: PHYSICAL THERAPIST

## 2025-03-13 NOTE — PROGRESS NOTES
"Daily Note     Today's date: 3/13/2025  Patient name: Hussein Edward III  : 1965  MRN: [id number]  Referring provider: Bernabe Cook MD  Dx:   Encounter Diagnosis     ICD-10-CM    1. Chronic pain of right knee  M25.561     G89.29       2. Risk for falls  Z91.81       3. Generalized weakness  R53.1                      Subjective: pt notes that he is feeling good today and he had a good nights sleep last night which has helped.      Objective: See treatment diary below      Assessment:  Pt with much better time for completion of sit to stands today going under 3 min for all 20 of them.     Plan: Continue per plan of care.      Precautions: Hx of ARDS 2024. Patient will require increased rest break between activity. Does carry portable O2 concentrator.         Manuals 3/6 3/11 3/13    2/13 2/18 2/25 2/27 3/4                                                           Neuro Re-Ed              Scapular retraction              SLS              Seated  abdominal press down  5\"x20 5\"x20 5''x20    5\"x20 5''x20 5\"x20 5''x20 5''x20                                                           Ther Ex              Hip abd stand                            UBE for endurance.              Bike for endurance Lv2 10' Lv2 10' Lv 2 13'    Lv2 10' Lv 2 10' Lv2 11' Lv 2 11' Lv 2 10'   TB shoulder extension       Blk        TB IR              TB ER-bilateral Blk 2x15 Blk 2x15 Blk 2x15      Blk 2x15 Blk 2x15 Blk 2x15   Knee flexion                            seated knee extension              Leg press 85#  2x15 85# x5  95#  X18 95# 2x10     85#2x15 85#  2x15 85# 2x15 85# 2x15   Bicep curl x2  6#  2x15 6#  2x15    6#  2x15 6# 2x15 6#  2x15 6#  2x15 6# 2x15   OH tricep ext 4#  2x15 4#  2x15 4#  2x15    4#  2x15 4# 2x15 4#  2x15 4#  2x15 4# 2x15   Standing rows Slv 2x15                           Ther Activity              Lateral step ups and over              Sit to stand  Thin  2x10 Thin 2x10     Thin   x15 Thin x15 Thin x5 2x10 " 2x10                 Gait Training                                          Modalities                                          \

## 2025-03-18 ENCOUNTER — OFFICE VISIT (OUTPATIENT)
Dept: PHYSICAL THERAPY | Facility: CLINIC | Age: 60
End: 2025-03-18
Payer: COMMERCIAL

## 2025-03-18 DIAGNOSIS — M25.561 CHRONIC PAIN OF RIGHT KNEE: Primary | ICD-10-CM

## 2025-03-18 DIAGNOSIS — G89.29 CHRONIC PAIN OF RIGHT KNEE: Primary | ICD-10-CM

## 2025-03-18 DIAGNOSIS — J80 ACUTE RESPIRATORY DISTRESS SYNDROME (HCC): ICD-10-CM

## 2025-03-18 DIAGNOSIS — R53.1 GENERALIZED WEAKNESS: ICD-10-CM

## 2025-03-18 DIAGNOSIS — Z91.81 RISK FOR FALLS: ICD-10-CM

## 2025-03-18 DIAGNOSIS — Z78.9 POOR TOLERANCE FOR AMBULATION: ICD-10-CM

## 2025-03-18 PROCEDURE — 97110 THERAPEUTIC EXERCISES: CPT | Performed by: PHYSICAL THERAPIST

## 2025-03-18 NOTE — PROGRESS NOTES
"Daily Note     Today's date: 3/18/2025  Patient name: Hussein Edward III  : 1965  MRN: [id number]  Referring provider: Bernabe Cook MD  Dx:   Encounter Diagnosis     ICD-10-CM    1. Chronic pain of right knee  M25.561     G89.29       2. Risk for falls  Z91.81       3. Generalized weakness  R53.1       4. Acute respiratory distress syndrome (HCC)  J80       5. Poor tolerance for ambulation  Z78.9              Subjective: Pt reported that he has been slowly getting better with his endurance and strength, \"but it has been very very slow.\"     Objective: See treatment diary below    Assessment: Tolerated treatment well. Patient demonstrated fatigue post treatment, exhibited good technique with therapeutic exercises, and would benefit from continued PT    Plan: Continue per plan of care.      Precautions: Hx of ARDS 2024. Patient will require increased rest break between activity. Does carry portable O2 concentrator.       Manuals 3/6 3/11 3/13 3/18   2/13 2/18 2/25 2/27 3/4                                                           Neuro Re-Ed    3/18          Scapular retraction              SLS              Seated  abdominal press down  5\"x20 5\"x20 5''x20    5\"x20 5''x20 5\"x20 5''x20 5''x20                                                           Ther Ex    3/18          Hip abd stand                            UBE for endurance.              Bike for endurance Lv2 10' Lv2 10' Lv 2 13' Lvl2 10'   Lv2 10' Lv 2 10' Lv2 11' Lv 2 11' Lv 2 10'   TB shoulder extension       Blk        TB IR              TB ER-bilateral Blk 2x15 Blk 2x15 Blk 2x15        Blk 2x15 Blk 2x15 Blk 2x15   Knee flexion                            seated knee extension              Leg press 85#  2x15 85# x5  95#  X18 95# 2x10 95# seat7 2x10    85#2x15 85#  2x15 85# 2x15 85# 2x15   Bicep curl x2  6#  2x15 6#  2x15 6# 2x15   6#  2x15 6# 2x15 6#  2x15 6#  2x15 6# 2x15   OH tricep ext 4#  2x15 4#  2x15 4#  2x15 4# 2x15   4#  2x15 4# 2x15 " 4#  2x15 4#  2x15 4# 2x15   Standing rows Slv 2x15                           Ther Activity              Lateral step ups and over              Sit to stand  Thin  2x10 Thin 2x10  Thin   2x10   Thin   x15 Thin x15 Thin x5 2x10 2x10                 Gait Training                                          Modalities                                          \

## 2025-03-20 ENCOUNTER — OFFICE VISIT (OUTPATIENT)
Age: 60
End: 2025-03-20
Payer: COMMERCIAL

## 2025-03-20 VITALS
TEMPERATURE: 97.2 F | OXYGEN SATURATION: 99 % | WEIGHT: 187 LBS | DIASTOLIC BLOOD PRESSURE: 82 MMHG | SYSTOLIC BLOOD PRESSURE: 120 MMHG | BODY MASS INDEX: 29.35 KG/M2 | HEART RATE: 72 BPM | HEIGHT: 67 IN

## 2025-03-20 DIAGNOSIS — R53.1 WEAKNESS ACQUIRED IN ICU: ICD-10-CM

## 2025-03-20 DIAGNOSIS — Z99.11 DEPENDENCE ON RESPIRATOR (VENTILATOR) STATUS (HCC): ICD-10-CM

## 2025-03-20 DIAGNOSIS — R68.89 HEAT INTOLERANCE: ICD-10-CM

## 2025-03-20 DIAGNOSIS — J96.11 CHRONIC HYPOXIC RESPIRATORY FAILURE (HCC): ICD-10-CM

## 2025-03-20 DIAGNOSIS — I50.31 ACUTE DIASTOLIC (CONGESTIVE) HEART FAILURE (HCC): ICD-10-CM

## 2025-03-20 DIAGNOSIS — G47.33 OSA (OBSTRUCTIVE SLEEP APNEA): ICD-10-CM

## 2025-03-20 DIAGNOSIS — Z87.09 ARDS SURVIVOR: Primary | ICD-10-CM

## 2025-03-20 DIAGNOSIS — G47.00 INSOMNIA, UNSPECIFIED TYPE: ICD-10-CM

## 2025-03-20 DIAGNOSIS — G47.19 EXCESSIVE DAYTIME SLEEPINESS: ICD-10-CM

## 2025-03-20 PROCEDURE — 99214 OFFICE O/P EST MOD 30 MIN: CPT | Performed by: INTERNAL MEDICINE

## 2025-03-20 RX ORDER — MODAFINIL 100 MG/1
100 TABLET ORAL DAILY
Qty: 14 TABLET | Refills: 0 | Status: SHIPPED | OUTPATIENT
Start: 2025-03-20

## 2025-03-20 NOTE — PROGRESS NOTES
Name: Hussein Edward III      : 1965      MRN: [id number]  Encounter Provider: Bernabe Cook MD  Encounter Date: 3/20/2025   Encounter department: [facility] PULMONARY [facility]  :  Assessment & Plan  Weakness acquired in ICU  Working with physical therapy  Having significant  difficulty still with endurance, fatigue, changes in speech patterns  Will follow-up with neurology  Continue physical therapy       Excessive daytime sleepiness  Having excessive daytime sleepiness a lot of fatigue despite the control of obstructive sleep apnea probably also related to post ICU syndrome    Trial modafinil 100 mg daily to be taken before noon.  Cautioned on side effects of tachycardia, hypertension, headaches, dizziness.  Orders:    modafinil (PROVIGIL) 100 mg tablet; Take 1 tablet (100 mg total) by mouth daily    ARDS survivor  Severe hypoxic respiratory failure due to influenza and superimposed bacterial pneumonia resulting in arts with prolonged hospitalization in February to 2024.  He required proning and prolonged ventilator time.  Fortunately was able to be successfully extubated.     He lost 25 pounds from the hospitalization.  He has completed pulmonary rehab and is doing PT.  Still using a 4 point walker.  However no longer oxygen dependent at rest.      Using albuterol inhaled and nebulized as needed.    Orders:    Ambulatory Referral to Physical Therapy; Future    Chronic hypoxic respiratory failure (HCC)  On room air at rest, uses 2 L with exertion.       WALE (obstructive sleep apnea)  Long history of WALE and been on CPAP for 10 years.  Uses nasal mask and fullface mask in the past.  Sleep study in  showed AHI 7.9, supine AHI 9.5, REM AHI 15.9 with SpO2 denys 89%.  Repeat HST in  showed a SIMA 35.    CPAP use since ARDS has been more suboptimal due to difficulties tolerating pressures, fatigue.     Currently on 4-7 cm H2O with residual AHI 0.8       Insomnia, unspecified  type  Continue with daridorexant 25mg qhs         Heat intolerance  Etiology unclear.  Related to post ICU syndrome.  Extensive workup by endocrinology shows no evidence of adrenal insufficiency or thyroid dysfunction         Follow up in 4 months    History of Present Illness     HPI  Hussein Edward III is a 59 y.o. male who has a past medical history of R frontal meningioma, HLD, low back pain, migraines, nasal septal deviation s/p septoplasty, seasonal allergic rhinitis, severe WALE with difficulty tolerating CPAP, insomnia, recent influenza a/bacterial pneumonia resulting in ARDS with subsequent hypoxic respiratory failure who is presenting for follow-up     3/19/25 - Follow up - work up with endocrine including with ACTH stimulation did not show adrenal insufficiency.  Some drop foot with physical therapy.  Progress is extremely slow.  Having a lot of weakness, fatigue.  Breathing is relatively okay.  He uses bronchodilators occasionally.  Still using oxygen more for comfort.    11/27/24 - FU with me-still having a lot of fatigue and muscular weakness but overall improving.  He has decided to retire and is looking for a work from home job.  He had a ER visit in October evaluating for generalized weakness and had a CT PE study that showed no PE and improving glass opacity.   He still gets a lot of tingling in his legs after walking and has been put on Requip which he is not sure if it helps him.  2 mg made him feel very uncomfortable  Working very well with physical therapy.  He feels like he is getting his muscle strength back but very slowly.  Still use oxygen with exertion as needed.    He is trying his CPAP again but doesn't think it gives him much symptomatic benefit.  He mainly uses it for snoring.  He sleeps a lot through a 24 hour period - sometimes up to 12 hours a day  He gets occasional episodes of significant sweating without exertion.  This is accompanied by fatigue.    Does not feel like gabapentin  is benefiting him too much.    He does get benefit from albuterol    8/20/24 - FU with me - completed pulmonary rehab.  No longer oxygen dependent at rest.  Still with dyspnea with exertion but improving.  Sleep on recliner, going back on CPAP.  Daridorexant every other night or every third night.  Metoprolol is decreased from 50 to 25mg due to lightheadedness.  Migraines are coming back  Walking is still tough.  Still wants to use portable oxygen despite not being hypoxic with exertion.  His work schedule is 5 days a week.  He talked with HR and will take a day off on Wednesdays.       5/8/24 - follow up with me -currently doing pulmonary rehab.  Treadmill exercise is difficult for him.  He can go without oxygen at rest but after 15 minutes he gets some chest tightness like he cannot take a deep breath.  He uses albuterol inhaler and nebulizer but he feels the nebulizer provides him the most benefit which does not last too long.  No significant cough/fever/chills.  He is gaining weight back.  CPAP is difficult to tolerate as he cannot breathe out too much against the pressure.  He bleeds oxygen through his CPAP.  He is try to use it more and he has received a new machine from his DME.     3/28/24 -follow-up with me-here with his wife currently on 2 L with exertion.  Very motivated to do more rehab.  Will schedule pulmonary rehab.  Still having cough      3/20/24 -patient requested decrease in CPAP pressures.  I decreased to 5-10 cm H2O.  I placed an order for pulmonary rehab     3/14-3/27/2024-admitted to [facility] for rehab after hospitalization.  He is on room air at rest but will desaturate with ambulation and requires 2 to 3 L with ambulation.  Speech improving at discharge.  Pulmonary rehab recommended.  Started on Paxil in the hospitalization.  Did not need lorazepam throughout rehab stay.     2/12-3/14/24 -extensive hospitalization for acute hypoxic respiratory failure due to ARDS from influenza A and likely  superimposed bacterial pneumonia.  Intubated from 2/19 - 2/29 and required proning, paralytics.  Course complicated by some postextubation delirium, reports of hallucinations during sedation.  Successfully extubated and transferred to rehab on 3/14.     12/20/23  -telephone encounter, prescribed daridorexant for insomnia     9/12/23 -follow-up with me - last seen by me 6/20/23 - HST shows 32.5 events per hour with 33 minutes <89% SpO2.  He uses CPAP intermittently using a AirSense S10.  He doesn't use a DME and buys supplies online.  He has tried 8-10 different masks.  He reports mask leaking/hissing which wakes him up at night.  He has snoring with CPAP.  CPAP has not made him feel better.     He has some sleep initiation issues and takes him several hours to fall as sleep due to racing thoughts at times.      6/20/23 - consult with me - He endorses snoring, morning headaches, apneic events, seen by his wife. His neurologist recently started him on acetazolamide 125 mg a day.  He is prescribed diazepam and temazepam but he has not taken this recently.  He is taking tramadol a few times a week for back pain.     He had previously been treated with CPAP for 8-10 years although he feels like CPAP was very uncomfortable.  He did use nasal masks and fullface masks.  He still has a CPAP and only uses it occasionally.  Previous sleep study: 2016, AHI 7.9, supine 95.5, REM 15.9, Spo2 denys 89%.  He had used chewing tobacco in the past, but quit 20 years ago and no longer uses nicotine. He rarely drinks alcohol, once or twice per year. He denies other drug use.       History obtained from: patient    Review of Systems  Past Medical History   Past Medical History:   Diagnosis Date    Chronic back pain     Migraine      Past Surgical History:   Procedure Laterality Date    HERNIA REPAIR      MANDIBLE FRACTURE SURGERY      MOUTH SURGERY      cyst in cheek    NASAL SEPTUM SURGERY       Family History   Problem Relation Age of  Onset    Breast cancer Mother     Diabetes Father     No Known Problems Family     Substance Abuse Neg Hx     Mental illness Neg Hx       reports that he has never smoked. He quit smokeless tobacco use about 22 years ago.  His smokeless tobacco use included snuff. He reports that he does not drink alcohol and does not use drugs.  Current Outpatient Medications on File Prior to Visit   Medication Sig Dispense Refill    albuterol (2.5 mg/3 mL) 0.083 % nebulizer solution Take 3 mL (2.5 mg total) by nebulization every 6 (six) hours as needed for wheezing or shortness of breath 125 mL 3    albuterol (PROVENTIL HFA,VENTOLIN HFA) 90 mcg/act inhaler Inhale 2 puffs every 4 (four) hours as needed for wheezing 18 g 0    Daridorexant HCl (Quviviq) 25 MG TABS Take 25 mg by mouth daily at bedtime 90 tablet 0    DULoxetine (CYMBALTA) 30 mg delayed release capsule 1-2 daily 180 capsule 3    meloxicam (Mobic) 15 mg tablet Take 1 tablet (15 mg total) by mouth daily 60 tablet 5    METHOCARBAMOL PO PRN      metoprolol succinate (TOPROL-XL) 25 mg 24 hr tablet Take 1 tablet (25 mg total) by mouth every 12 (twelve) hours 180 tablet 3    multivitamin (THERAGRAN) TABS Take 1 tablet by mouth daily      rimegepant sulfate (Nurtec) 75 mg TBDP Take one NURTEC 75 mg under or on tongue at migraine onset if needed daily 16 tablet 11    rOPINIRole (REQUIP) 2 mg tablet Take 1 tablet (2 mg total) by mouth 3 (three) times a day 90 tablet 5    traMADol (ULTRAM) 50 mg tablet Take 1 tablet (50 mg total) by mouth every 6 (six) hours as needed for moderate pain 120 tablet 5    Vitamin D, Cholecalciferol, 50 MCG (2000 UT) CAPS Take by mouth      [DISCONTINUED] diclofenac sodium (VOLTAREN) 50 mg EC tablet Take 1 tablet(s) twice a day by oral route as needed.  0     No current facility-administered medications on file prior to visit.     Allergies   Allergen Reactions    Ropinirole GI Intolerance         Objective   There were no vitals taken for this  visit.     Physical Exam  Vitals and nursing note reviewed.   Constitutional:       General: He is not in acute distress.     Appearance: Normal appearance. He is well-developed. He is not ill-appearing, toxic-appearing or diaphoretic.   HENT:      Head: Normocephalic and atraumatic.   Eyes:      Conjunctiva/sclera: Conjunctivae normal.   Cardiovascular:      Rate and Rhythm: Normal rate.   Pulmonary:      Effort: Pulmonary effort is normal. No respiratory distress.      Breath sounds: No stridor. No wheezing, rhonchi or rales.   Abdominal:      Tenderness: There is no guarding.   Musculoskeletal:      Cervical back: Normal range of motion. No rigidity.   Neurological:      General: No focal deficit present.      Mental Status: He is alert and oriented to person, place, and time. Mental status is at baseline.   Psychiatric:         Mood and Affect: Mood normal.         Imaging and other studies: I have personally reviewed pertinent reports.   and I have personally reviewed pertinent films in PACS  10/21/24 CTA   No PE.  Improving GGO, interstitial prominence.  B/l subsegmental atelectasis    4/8/24 CT Chest without contrast  Bilateral opacities and scarring improving compared to prior CT     3/11/24 CXR -bilateral patchy opacities more prominent in the left lung and right lower lobe     2/27/2024 chest CT without contrast-patchy groundglass and airspace opacities scattered throughout more dense in the lower lobes with air bronchograms.  Lack of effusions.  Normal cardiac silhouette.  Small coronary cardial effusion.  No adenopathy.  Endotracheal tube in place.     Sleep Study:  7/20/2023-HST-severe WALE SIMA 32.5.  A 237. significantly higher AHI on the right side of 59 SIMA and 10 on left side.  Baseline oxygen saturation normal     Sleep study: 2016, AHI 7.9, supine 95.5, REM 15.9, Spo2 denys 89%        Compliance data:  11/27/24  30% use over past 30 days, 27% more than 4 hours  Average use 6 hours 46  minutes  Airsense 10  4-5cm H2O EPR 2  P95 4.9  AHI 4.0    8/17/2024  19% use over the past 90 days, 14% more than 4 hours  Average use 4 hours and 56 minutes  Serial #[id number]  AirSense 10 4-5 cm H2O EPR 2  Median pressure 4.9, 95th percentile 4.9  Median leak 0.2, 95th percentile 3.4  AHI 0.9     Transthoracic Echo:  2/19/24    Left Ventricle: Left ventricular cavity size is normal. Wall thickness is mildly increased. There is concentric remodeling. The left ventricular ejection fraction is 55-60%. Systolic function is normal. Wall motion is normal. Diastolic function is mildly abnormal, consistent with grade I (abnormal) relaxation.    Right Ventricle: Right ventricular cavity size is normal. Systolic function is normal.    Left Atrium: The atrium is normal in size.    Right Atrium: The atrium is normal in size.     Pulmonary Function Testing:   Date FEV1 FVC Ratio TLC RV DLCO (cor) Distance O2 Rest O2 Exertion Comments   12/9/24 2.73 (87) 3.11 (78) 88 4.7 (73) 1.91 (94) 22.86 (88)    MEP 22%, MIP 85%; air trapping   5/8/24       203 RA 2L HR max 107 bpm

## 2025-03-20 NOTE — ASSESSMENT & PLAN NOTE
Long history of WALE and been on CPAP for 10 years.  Uses nasal mask and fullface mask in the past.  Sleep study in 2016 showed AHI 7.9, supine AHI 9.5, REM AHI 15.9 with SpO2 denys 89%.  Repeat HST in 2023 showed a SIMA 35.    CPAP use since ARDS has been more suboptimal due to difficulties tolerating pressures, fatigue.     Currently on 4-7 cm H2O with residual AHI 0.8

## 2025-03-20 NOTE — ASSESSMENT & PLAN NOTE
Severe hypoxic respiratory failure due to influenza and superimposed bacterial pneumonia resulting in arts with prolonged hospitalization in February to March 2024.  He required proning and prolonged ventilator time.  Fortunately was able to be successfully extubated.     He lost 25 pounds from the hospitalization.  He has completed pulmonary rehab and is doing PT.  Still using a 4 point walker.  However no longer oxygen dependent at rest.      Using albuterol inhaled and nebulized as needed.    Orders:    Ambulatory Referral to Physical Therapy; Future

## 2025-03-20 NOTE — ASSESSMENT & PLAN NOTE
Wt Readings from Last 3 Encounters:   03/20/25 84.8 kg (187 lb)   02/04/25 82.6 kg (182 lb)   01/21/25 85.1 kg (187 lb 9.6 oz)

## 2025-03-25 ENCOUNTER — APPOINTMENT (OUTPATIENT)
Dept: PHYSICAL THERAPY | Facility: CLINIC | Age: 60
End: 2025-03-25
Payer: COMMERCIAL

## 2025-03-27 ENCOUNTER — OFFICE VISIT (OUTPATIENT)
Dept: PHYSICAL THERAPY | Facility: CLINIC | Age: 60
End: 2025-03-27
Payer: COMMERCIAL

## 2025-03-27 DIAGNOSIS — R53.1 GENERALIZED WEAKNESS: ICD-10-CM

## 2025-03-27 DIAGNOSIS — M25.561 CHRONIC PAIN OF RIGHT KNEE: Primary | ICD-10-CM

## 2025-03-27 DIAGNOSIS — G89.29 CHRONIC PAIN OF RIGHT KNEE: Primary | ICD-10-CM

## 2025-03-27 DIAGNOSIS — J80 ACUTE RESPIRATORY DISTRESS SYNDROME (HCC): ICD-10-CM

## 2025-03-27 DIAGNOSIS — Z78.9 POOR TOLERANCE FOR AMBULATION: ICD-10-CM

## 2025-03-27 DIAGNOSIS — Z91.81 RISK FOR FALLS: ICD-10-CM

## 2025-03-27 PROCEDURE — 97110 THERAPEUTIC EXERCISES: CPT

## 2025-03-27 NOTE — PROGRESS NOTES
"Daily Note     Today's date: 3/27/2025  Patient name: Hussein Edward III  : 1965  MRN: [id number]  Referring provider: Bernabe Cook MD  Dx:   Encounter Diagnosis     ICD-10-CM    1. Chronic pain of right knee  M25.561     G89.29       2. Risk for falls  Z91.81       3. Generalized weakness  R53.1       4. Acute respiratory distress syndrome (HCC)  J80       5. Poor tolerance for ambulation  Z78.9                      Subjective: Pt is requesting to decrease to one time a week, as he is going to start to go to community gym with his wife.        Objective: See treatment diary below      Assessment: Tolerated treatment with good form and fluency with sit to stands but leg press was more challenging . Patient demonstrated fatigue post treatment and would benefit from continued PT      Plan: Continue per plan of care.      Precautions: Hx of ARDS 2024. Patient will require increased rest break between activity. Does carry portable O2 concentrator.       Manuals 3/6 3/11 3/13 3/18 3/27      3/4                                                           Neuro Re-Ed    3/18          Scapular retraction              SLS              Seated  abdominal press down  5\"x20 5\"x20 5''x20  5\"x20      5''x20                                                           Ther Ex    3/18          Hip abd stand                            UBE for endurance.              Bike for endurance Lv2 10' Lv2 10' Lv 2 13' Lvl2 10' Lv2 10'      Lv 2 10'   TB shoulder extension              TB IR              TB ER-bilateral Blk 2x15 Blk 2x15 Blk 2x15          Blk 2x15   Knee flexion                            seated knee extension              Leg press 85#  2x15 85# x5  95#  X18 95# 2x10 95# seat7 2x10 95#  2x10      85# 2x15   Bicep curl x2  6#  2x15 6#  2x15 6# 2x15 6#  2x15      6# 2x15   OH tricep ext 4#  2x15 4#  2x15 4#  2x15 4# 2x15 4#  2x15      4# 2x15   Standing rows Slv 2x15                           Ther Activity            "   Lateral step ups and over              Sit to stand  Thin  2x10 Thin 2x10  Thin   2x10 Thin 2x10      2x10                 Gait Training                                          Modalities                                          \

## 2025-04-01 ENCOUNTER — APPOINTMENT (OUTPATIENT)
Dept: PHYSICAL THERAPY | Facility: CLINIC | Age: 60
End: 2025-04-01
Payer: COMMERCIAL

## 2025-04-02 DIAGNOSIS — G47.19 EXCESSIVE DAYTIME SLEEPINESS: ICD-10-CM

## 2025-04-03 ENCOUNTER — OFFICE VISIT (OUTPATIENT)
Dept: PHYSICAL THERAPY | Facility: CLINIC | Age: 60
End: 2025-04-03
Payer: COMMERCIAL

## 2025-04-03 DIAGNOSIS — J80 ACUTE RESPIRATORY DISTRESS SYNDROME (HCC): ICD-10-CM

## 2025-04-03 DIAGNOSIS — M25.561 CHRONIC PAIN OF RIGHT KNEE: Primary | ICD-10-CM

## 2025-04-03 DIAGNOSIS — Z78.9 POOR TOLERANCE FOR AMBULATION: ICD-10-CM

## 2025-04-03 DIAGNOSIS — G89.29 CHRONIC PAIN OF RIGHT KNEE: Primary | ICD-10-CM

## 2025-04-03 DIAGNOSIS — R53.1 GENERALIZED WEAKNESS: ICD-10-CM

## 2025-04-03 DIAGNOSIS — Z91.81 RISK FOR FALLS: ICD-10-CM

## 2025-04-03 PROCEDURE — 97110 THERAPEUTIC EXERCISES: CPT | Performed by: PHYSICAL THERAPIST

## 2025-04-03 RX ORDER — MODAFINIL 100 MG/1
100 TABLET ORAL DAILY
Qty: 30 TABLET | Refills: 2 | Status: SHIPPED | OUTPATIENT
Start: 2025-04-03

## 2025-04-03 NOTE — PROGRESS NOTES
"Daily Note     Today's date: 4/3/2025  Patient name: Hussein Edward III  : 1965  MRN: [id number]  Referring provider: Bernabe Cook MD  Dx:   Encounter Diagnosis     ICD-10-CM    1. Chronic pain of right knee  M25.561     G89.29       2. Risk for falls  Z91.81       3. Poor tolerance for ambulation  Z78.9       4. Generalized weakness  R53.1       5. Acute respiratory distress syndrome (HCC)  J80                      Subjective: pt notes that overall he has ups and downs physically doesn't have neuro until       Objective: See treatment diary below      Assessment: Pt continues to have poor tolerance to progression, and overall endurance. Pt does continue to benefit from PT though to maintain current physical function    Plan: Continue per plan of care.      Precautions: Hx of ARDS 2024. Patient will require increased rest break between activity. Does carry portable O2 concentrator.       Manuals 3/6 3/11 3/13 3/18 3/27 4/3     3/4                                                           Neuro Re-Ed    3/18          Scapular retraction              SLS              Seated  abdominal press down  5\"x20 5\"x20 5''x20  5\"x20 5''x20     5''x20                                                           Ther Ex    3/18          Hip abd stand                            UBE for endurance.              Bike for endurance Lv2 10' Lv2 10' Lv 2 13' Lvl2 10' Lv2 10' Lv 2 10'     Lv 2 10'   TB shoulder extension              TB IR              TB ER-bilateral Blk 2x15 Blk 2x15 Blk 2x15          Blk 2x15   Knee flexion                            seated knee extension              Leg press 85#  2x15 85# x5  95#  X18 95# 2x10 95# seat7 2x10 95#  2x10 95# 2x10     85# 2x15   Bicep curl x2  6#  2x15 6#  2x15 6# 2x15 6#  2x15 6# 2x15     6# 2x15   OH tricep ext 4#  2x15 4#  2x15 4#  2x15 4# 2x15 4#  2x15 4# 2x15     4# 2x15   Standing rows Slv 2x15                           Ther Activity              Lateral step ups and " over              Sit to stand  Thin  2x10 Thin 2x10  Thin   2x10 Thin 2x10 Thin 2x10     2x10                 Gait Training                                          Modalities                                          \

## 2025-04-08 ENCOUNTER — APPOINTMENT (OUTPATIENT)
Dept: PHYSICAL THERAPY | Facility: CLINIC | Age: 60
End: 2025-04-08
Payer: COMMERCIAL

## 2025-04-10 ENCOUNTER — OFFICE VISIT (OUTPATIENT)
Dept: PHYSICAL THERAPY | Facility: CLINIC | Age: 60
End: 2025-04-10
Payer: COMMERCIAL

## 2025-04-10 DIAGNOSIS — J80 ACUTE RESPIRATORY DISTRESS SYNDROME (HCC): Primary | ICD-10-CM

## 2025-04-10 PROCEDURE — 97110 THERAPEUTIC EXERCISES: CPT

## 2025-04-10 NOTE — PROGRESS NOTES
"Daily Note     Today's date: 4/10/2025  Patient name: Hussein Edward III  : 1965  MRN: [id number]  Referring provider: Bernabe Cook MD  Dx:   Encounter Diagnosis     ICD-10-CM    1. Acute respiratory distress syndrome (HCC)  J80                      Subjective: states that he continues with pain in legs, hamstring region, knees and lateral calves that feels like maría elena horses.        Objective: See treatment diary below      Assessment: Tolerated treatment with only fatigue noted with program.  . Patient exhibited good technique with therapeutic exercises and would benefit from continued PT      Plan: Continue per plan of care.      Precautions: Hx of ARDS 2024. Patient will require increased rest break between activity. Does carry portable O2 concentrator.       Manuals 3/6 3/11 3/13 3/18 3/27 4/3 4/10    3/4                                                           Neuro Re-Ed    3/18          Scapular retraction              SLS              Seated  abdominal press down  5\"x20 5\"x20 5''x20  5\"x20 5''x20 5\"x20    5''x20                                                           Ther Ex    3/18          Hip abd stand                            UBE for endurance.              Bike for endurance Lv2 10' Lv2 10' Lv 2 13' Lvl2 10' Lv2 10' Lv 2 10' Lv2 10'    Lv 2 10'   TB shoulder extension              TB IR              TB ER-bilateral Blk 2x15 Blk 2x15 Blk 2x15          Blk 2x15   Knee flexion                            seated knee extension              Leg press 85#  2x15 85# x5  95#  X18 95# 2x10 95# seat7 2x10 95#  2x10 95# 2x10 95#  2x10    85# 2x15   Bicep curl x2  6#  2x15 6#  2x15 6# 2x15 6#  2x15 6# 2x15 6#  2x15    6# 2x15   OH tricep ext 4#  2x15 4#  2x15 4#  2x15 4# 2x15 4#  2x15 4# 2x15 4#  2x15    4# 2x15   Standing rows Slv 2x15                           Ther Activity              Lateral step ups and over              Sit to stand  Thin  2x10 Thin 2x10  Thin   2x10 Thin 2x10 Thin 2x10 " Thin x15    2x10                 Gait Training                                          Modalities                                          \

## 2025-04-11 DIAGNOSIS — F51.01 PRIMARY INSOMNIA: ICD-10-CM

## 2025-04-14 RX ORDER — DARIDOREXANT 25 MG/1
1 TABLET, FILM COATED ORAL
Qty: 90 TABLET | Refills: 0 | Status: SHIPPED | OUTPATIENT
Start: 2025-04-14

## 2025-04-15 ENCOUNTER — APPOINTMENT (OUTPATIENT)
Dept: PHYSICAL THERAPY | Facility: CLINIC | Age: 60
End: 2025-04-15
Payer: COMMERCIAL

## 2025-04-16 ENCOUNTER — OFFICE VISIT (OUTPATIENT)
Dept: PHYSICAL THERAPY | Facility: CLINIC | Age: 60
End: 2025-04-16
Attending: INTERNAL MEDICINE
Payer: COMMERCIAL

## 2025-04-16 DIAGNOSIS — R53.1 GENERALIZED WEAKNESS: ICD-10-CM

## 2025-04-16 DIAGNOSIS — Z91.81 RISK FOR FALLS: ICD-10-CM

## 2025-04-16 DIAGNOSIS — M25.561 CHRONIC PAIN OF RIGHT KNEE: ICD-10-CM

## 2025-04-16 DIAGNOSIS — G89.29 CHRONIC PAIN OF RIGHT KNEE: ICD-10-CM

## 2025-04-16 DIAGNOSIS — Z78.9 POOR TOLERANCE FOR AMBULATION: ICD-10-CM

## 2025-04-16 DIAGNOSIS — J80 ACUTE RESPIRATORY DISTRESS SYNDROME (HCC): Primary | ICD-10-CM

## 2025-04-16 PROCEDURE — 97110 THERAPEUTIC EXERCISES: CPT

## 2025-04-16 NOTE — PROGRESS NOTES
"Daily Note     Today's date: 2025  Patient name: Hussein dEward III  : 1965  MRN: [id number]  Referring provider: Bernabe Cook MD  Dx:   Encounter Diagnosis     ICD-10-CM    1. Acute respiratory distress syndrome (HCC)  J80       2. Chronic pain of right knee  M25.561     G89.29       3. Risk for falls  Z91.81       4. Poor tolerance for ambulation  Z78.9       5. Generalized weakness  R53.1                      Subjective: pt is without new c/o offered.      Objective: See treatment diary below      Assessment: Tolerated treatment with challenge resuming standing ext with march.  . Patient demonstrated fatigue post treatment      Plan: Continue per plan of care.      Precautions: Hx of ARDS 2024. Patient will require increased rest break between activity. Does carry portable O2 concentrator.       Manuals 3/6 3/11 3/13 3/18 3/27 4/3 4/10 4/16   3/4                                                           Neuro Re-Ed    3/18          Scapular retraction              SLS              Seated  abdominal press down  5\"x20 5\"x20 5''x20  5\"x20 5''x20 5\"x20 5\"x20   5''x20                                                           Ther Ex    3/18          Hip abd stand                            UBE for endurance.              Bike for endurance Lv2 10' Lv2 10' Lv 2 13' Lvl2 10' Lv2 10' Lv 2 10' Lv2 10' Lv3 3'  Lv2 7'   Lv 2 10'   TB shoulder extension        Blue  x10      TB IR              TB ER-bilateral Blk 2x15 Blk 2x15 Blk 2x15       Blk 2x15   Blk 2x15   Knee flexion                            seated knee extension              Leg press 85#  2x15 85# x5  95#  X18 95# 2x10 95# seat7 2x10 95#  2x10 95# 2x10 95#  2x10    85# 2x15   Bicep curl x2  6#  2x15 6#  2x15 6# 2x15 6#  2x15 6# 2x15 6#  2x15    6# 2x15   OH tricep ext 4#  2x15 4#  2x15 4#  2x15 4# 2x15 4#  2x15 4# 2x15 4#  2x15 4#  2x15   4# 2x15   Standing rows Slv 2x15                           Ther Activity              Lateral step ups and " over              Sit to stand  Thin  2x10 Thin 2x10  Thin   2x10 Thin 2x10 Thin 2x10 Thin x15 2x10    2x10                 Gait Training                                          Modalities                                          \

## 2025-04-17 ENCOUNTER — APPOINTMENT (OUTPATIENT)
Dept: PHYSICAL THERAPY | Facility: CLINIC | Age: 60
End: 2025-04-17
Payer: COMMERCIAL

## 2025-04-22 ENCOUNTER — APPOINTMENT (OUTPATIENT)
Dept: PHYSICAL THERAPY | Facility: CLINIC | Age: 60
End: 2025-04-22
Payer: COMMERCIAL

## 2025-04-24 ENCOUNTER — OFFICE VISIT (OUTPATIENT)
Dept: PHYSICAL THERAPY | Facility: CLINIC | Age: 60
End: 2025-04-24
Attending: INTERNAL MEDICINE
Payer: COMMERCIAL

## 2025-04-24 DIAGNOSIS — Z91.81 RISK FOR FALLS: ICD-10-CM

## 2025-04-24 DIAGNOSIS — J80 ACUTE RESPIRATORY DISTRESS SYNDROME (HCC): Primary | ICD-10-CM

## 2025-04-24 DIAGNOSIS — Z78.9 POOR TOLERANCE FOR AMBULATION: ICD-10-CM

## 2025-04-24 DIAGNOSIS — M25.561 CHRONIC PAIN OF RIGHT KNEE: ICD-10-CM

## 2025-04-24 DIAGNOSIS — R53.1 GENERALIZED WEAKNESS: ICD-10-CM

## 2025-04-24 DIAGNOSIS — G89.29 CHRONIC PAIN OF RIGHT KNEE: ICD-10-CM

## 2025-04-24 PROCEDURE — 97110 THERAPEUTIC EXERCISES: CPT

## 2025-04-24 NOTE — PROGRESS NOTES
"Daily Note     Today's date: 2025  Patient name: Hussein Edward III  : 1965  MRN: [id number]  Referring provider: Bernabe Cook MD  Dx:   Encounter Diagnosis     ICD-10-CM    1. Acute respiratory distress syndrome (HCC)  J80       2. Chronic pain of right knee  M25.561     G89.29       3. Risk for falls  Z91.81       4. Poor tolerance for ambulation  Z78.9       5. Generalized weakness  R53.1                      Subjective: pt states that he was doing outdoor work but fatigue quickly and decreased stamina, but the warmth also drains energy      Objective: See treatment diary below      Assessment: Pt with more dragging of R foot today throughout treatment session.  . Patient demonstrated fatigue post treatment, exhibited good technique with therapeutic exercises, and would benefit from continued PT      Plan: Continue per plan of care.      Precautions: Hx of ARDS 2024. Patient will require increased rest break between activity. Does carry portable O2 concentrator.       Manuals 3/6 3/11 3/13 3/18 3/27 4/3 4/10 4/16 4/24  3/4                                                           Neuro Re-Ed    3/18          Scapular retraction              SLS              Seated  abdominal press down  5\"x20 5\"x20 5''x20  5\"x20 5''x20 5\"x20 5\"x20 5\"x20  5''x20                                                           Ther Ex    3/18          Hip abd stand                            UBE for endurance.              Bike for endurance Lv2 10' Lv2 10' Lv 2 13' Lvl2 10' Lv2 10' Lv 2 10' Lv2 10' Lv3 3'  Lv2 7' Lv2 10'  Lv 2 10'   TB shoulder extension        Blue  x10 Blk x10     TB IR              TB ER-bilateral Blk 2x15 Blk 2x15 Blk 2x15       Blk 2x15   Blk 2x15   Knee flexion                            seated knee extension              Leg press 85#  2x15 85# x5  95#  X18 95# 2x10 95# seat7 2x10 95#  2x10 95# 2x10 95#  2x10  95#  2x10  85# 2x15   Bicep curl x2  6#  2x15 6#  2x15 6# 2x15 6#  2x15 6# 2x15 " 6#  2x15  6#  2x15  6# 2x15   OH tricep ext 4#  2x15 4#  2x15 4#  2x15 4# 2x15 4#  2x15 4# 2x15 4#  2x15 4#  2x15 4#  2x15  4# 2x15   Standing rows Slv 2x15                           Ther Activity              Lateral step ups and over              Sit to stand  Thin  2x10 Thin 2x10  Thin   2x10 Thin 2x10 Thin 2x10 Thin x15 2x10    2x10                 Gait Training                                          Modalities                                          \

## 2025-04-29 ENCOUNTER — PATIENT MESSAGE (OUTPATIENT)
Dept: NEUROLOGY | Facility: CLINIC | Age: 60
End: 2025-04-29

## 2025-04-29 ENCOUNTER — APPOINTMENT (OUTPATIENT)
Dept: PHYSICAL THERAPY | Facility: CLINIC | Age: 60
End: 2025-04-29
Payer: COMMERCIAL

## 2025-04-29 DIAGNOSIS — G90.1 DYSAUTONOMIA (HCC): Primary | ICD-10-CM

## 2025-04-30 NOTE — PATIENT COMMUNICATION
Hussein Edward III to P Neurology [facility] Clinical (supporting Laxmi Camargo MD)         4/29/25  3:45 PM  I had an appointment with a physiatrist at the [facility], but he specialized in the spine. He did provide some general information but nothing specific.     Is there a recommended comprehensive neurologist that you would recommend?   I am willing to go out of network fora specialist as well.  I am available any time but Fridays.     I'm also open to a [facility] physiatry specialist.  Or do you have any recommendation out of network?     I retired officially in December, because my body temp and leg spasms/pain were becoming a safety concern.

## 2025-04-30 NOTE — PATIENT COMMUNICATION
All Conversations: Dysautonomia & Physiatry  (Oldest Message First)Hussein Edward III to P Neurology [facility] Clinical (supporting Laxmi Camargo MD)         4/29/25  3:44 PM  I had an appointed with a physiatrist at the [facility], but he specialized in the spine. He did provide some general information but nothing specific.     Is there a recommended comprehensive neurologist that you would recommend?  I am available any time but Fridays.     I'm also open to a [facility] physiatry specialist.     I retired officially in December, because my body temp and leg spasms/pain were becoming a safety concern,

## 2025-05-01 ENCOUNTER — OFFICE VISIT (OUTPATIENT)
Dept: PHYSICAL THERAPY | Facility: CLINIC | Age: 60
End: 2025-05-01
Attending: INTERNAL MEDICINE
Payer: COMMERCIAL

## 2025-05-01 DIAGNOSIS — Z91.81 RISK FOR FALLS: ICD-10-CM

## 2025-05-01 DIAGNOSIS — R53.1 GENERALIZED WEAKNESS: ICD-10-CM

## 2025-05-01 DIAGNOSIS — M25.561 CHRONIC PAIN OF RIGHT KNEE: ICD-10-CM

## 2025-05-01 DIAGNOSIS — Z78.9 POOR TOLERANCE FOR AMBULATION: ICD-10-CM

## 2025-05-01 DIAGNOSIS — J80 ACUTE RESPIRATORY DISTRESS SYNDROME (HCC): Primary | ICD-10-CM

## 2025-05-01 DIAGNOSIS — G89.29 CHRONIC PAIN OF RIGHT KNEE: ICD-10-CM

## 2025-05-01 PROCEDURE — 97110 THERAPEUTIC EXERCISES: CPT

## 2025-05-01 NOTE — PROGRESS NOTES
"Daily Note     Today's date: 2025  Patient name: Hussein Edward III  : 1965  MRN: [id number]  Referring provider: Bernabe Cook MD  Dx:   Encounter Diagnosis     ICD-10-CM    1. Acute respiratory distress syndrome (HCC)  J80       2. Chronic pain of right knee  M25.561     G89.29       3. Risk for falls  Z91.81       4. Poor tolerance for ambulation  Z78.9       5. Generalized weakness  R53.1                      Subjective: pt is with no new c/o offered.       Objective: See treatment diary below      Assessment: Tolerated treatment with challenge with standing balance during shoulder extension and march, did require min A of 1 for righting. . Patient demonstrated fatigue post treatment and would benefit from continued PT      Plan: Continue per plan of care.      Precautions: Hx of ARDS 2024. Patient will require increased rest break between activity. Does carry portable O2 concentrator.       Manuals 3/6 3/11 3/13 3/18 3/27 4/3 4/10 4/16 4/24 5/1 3/4                                                           Neuro Re-Ed    3/18          Scapular retraction              SLS              Seated  abdominal press down  5\"x20 5\"x20 5''x20  5\"x20 5''x20 5\"x20 5\"x20 5\"x20 5\"x20 5''x20                                                           Ther Ex    3/18          Hip abd stand                            UBE for endurance.              Bike for endurance Lv2 10' Lv2 10' Lv 2 13' Lvl2 10' Lv2 10' Lv 2 10' Lv2 10' Lv3 3'  Lv2 7' Lv2 10' Lv3 5'  Lv2 5' Lv 2 10'   TB shoulder extension        Blue  x10 Blk x10     TB IR              TB ER-bilateral Blk 2x15 Blk 2x15 Blk 2x15       Blk 2x15   Blk 2x15   Knee flexion                            seated knee extension              Leg press 85#  2x15 85# x5  95#  X18 95# 2x10 95# seat7 2x10 95#  2x10 95# 2x10 95#  2x10  95#  2x10 nv 85# 2x15   Bicep curl x2  6#  2x15 6#  2x15 6# 2x15 6#  2x15 6# 2x15 6#  2x15  6#  2x15 6#  2x15 6# 2x15   OH tricep ext 4#  2x15 " 4#  2x15 4#  2x15 4# 2x15 4#  2x15 4# 2x15 4#  2x15 4#  2x15 4#  2x15 4#  2x15 4# 2x15   Standing rows Slv 2x15                           Ther Activity              Lateral step ups and over              Sit to stand  Thin  2x10 Thin 2x10  Thin   2x10 Thin 2x10 Thin 2x10 Thin x15 2x10    2x10                 Gait Training                                          Modalities                                          \

## 2025-05-06 ENCOUNTER — APPOINTMENT (OUTPATIENT)
Dept: PHYSICAL THERAPY | Facility: CLINIC | Age: 60
End: 2025-05-06
Payer: COMMERCIAL

## 2025-05-08 ENCOUNTER — OFFICE VISIT (OUTPATIENT)
Dept: PHYSICAL THERAPY | Facility: CLINIC | Age: 60
End: 2025-05-08
Payer: COMMERCIAL

## 2025-05-08 ENCOUNTER — TELEPHONE (OUTPATIENT)
Age: 60
End: 2025-05-08

## 2025-05-08 DIAGNOSIS — G89.29 CHRONIC PAIN OF RIGHT KNEE: ICD-10-CM

## 2025-05-08 DIAGNOSIS — J80 ACUTE RESPIRATORY DISTRESS SYNDROME (HCC): Primary | ICD-10-CM

## 2025-05-08 DIAGNOSIS — R53.1 GENERALIZED WEAKNESS: ICD-10-CM

## 2025-05-08 DIAGNOSIS — M25.561 CHRONIC PAIN OF RIGHT KNEE: ICD-10-CM

## 2025-05-08 DIAGNOSIS — Z91.81 RISK FOR FALLS: ICD-10-CM

## 2025-05-08 PROCEDURE — 97110 THERAPEUTIC EXERCISES: CPT | Performed by: PHYSICAL THERAPIST

## 2025-05-08 NOTE — PROGRESS NOTES
"Daily Note     Today's date: 2025  Patient name: Hussein Edward III  : 1965  MRN: [id number]  Referring provider: Bernabe Cook MD  Dx:   Encounter Diagnosis     ICD-10-CM    1. Acute respiratory distress syndrome (HCC)  J80       2. Chronic pain of right knee  M25.561     G89.29       3. Risk for falls  Z91.81       4. Generalized weakness  R53.1                      Subjective: pt notes that he has been focusing more on lifting the right leg when walking in order to keep from dragging it. He notes that as he does more work the right leg still gets much more tired than the left.       Objective: See treatment diary below      Assessment: pt did definitely drag the toe more post PT than before. Will continue to work on pt's strength and endurance.     Plan: Continue per plan of care.      Precautions: Hx of ARDS 2024. Patient will require increased rest break between activity. Does carry portable O2 concentrator.       Manuals 3/6 3/11 3/13 3/18 3/27 4/3 4/10 4/16 4/24 5/1 5/8                                                           Neuro Re-Ed    3/18          Scapular retraction              SLS              Seated  abdominal press down  5\"x20 5\"x20 5''x20  5\"x20 5''x20 5\"x20 5\"x20 5\"x20 5\"x20 5''x20                                                           Ther Ex    3/18          Hip abd stand                            UBE for endurance.              Bike for endurance Lv2 10' Lv2 10' Lv 2 13' Lvl2 10' Lv2 10' Lv 2 10' Lv2 10' Lv3 3'  Lv2 7' Lv2 10' Lv3 5'  Lv2 5' Lv 3 5' vl 2 5'   TB shoulder extension        Blue  x10 Blk x10  Blk / march 2x10   TB IR              TB ER-bilateral Blk 2x15 Blk 2x15 Blk 2x15       Blk 2x15      Knee flexion                            seated knee extension              Leg press 85#  2x15 85# x5  95#  X18 95# 2x10 95# seat7 2x10 95#  2x10 95# 2x10 95#  2x10  95#  2x10 nv 95# 2x10   Bicep curl x2  6#  2x15 6#  2x15 6# 2x15 6#  2x15 6# 2x15 6#  2x15  6#  2x15 " 6#  2x15 6# 2x15   OH tricep ext 4#  2x15 4#  2x15 4#  2x15 4# 2x15 4#  2x15 4# 2x15 4#  2x15 4#  2x15 4#  2x15 4#  2x15 4# 2x15   Standing rows Slv 2x15                           Ther Activity              Lateral step ups and over              Sit to stand  Thin  2x10 Thin 2x10  Thin   2x10 Thin 2x10 Thin 2x10 Thin x15 2x10                     Gait Training                                          Modalities                                          \

## 2025-05-13 ENCOUNTER — TELEPHONE (OUTPATIENT)
Age: 60
End: 2025-05-13

## 2025-05-13 ENCOUNTER — APPOINTMENT (OUTPATIENT)
Dept: PHYSICAL THERAPY | Facility: CLINIC | Age: 60
End: 2025-05-13
Payer: COMMERCIAL

## 2025-05-13 NOTE — TELEPHONE ENCOUNTER
[facility] Neurology office calling requesting LOV notes and any medical records office can send to them as they can not schedule pt just off a referral.   Fax number [phone]    Please assist thank you.

## 2025-05-15 ENCOUNTER — OFFICE VISIT (OUTPATIENT)
Dept: PHYSICAL THERAPY | Facility: CLINIC | Age: 60
End: 2025-05-15
Attending: INTERNAL MEDICINE
Payer: COMMERCIAL

## 2025-05-15 DIAGNOSIS — R53.1 GENERALIZED WEAKNESS: ICD-10-CM

## 2025-05-15 DIAGNOSIS — Z91.81 RISK FOR FALLS: ICD-10-CM

## 2025-05-15 DIAGNOSIS — Z78.9 POOR TOLERANCE FOR AMBULATION: ICD-10-CM

## 2025-05-15 DIAGNOSIS — J80 ACUTE RESPIRATORY DISTRESS SYNDROME (HCC): Primary | ICD-10-CM

## 2025-05-15 DIAGNOSIS — M25.561 CHRONIC PAIN OF RIGHT KNEE: ICD-10-CM

## 2025-05-15 DIAGNOSIS — G89.29 CHRONIC PAIN OF RIGHT KNEE: ICD-10-CM

## 2025-05-15 PROCEDURE — 97110 THERAPEUTIC EXERCISES: CPT

## 2025-05-15 NOTE — PROGRESS NOTES
"Daily Note     Today's date: 5/15/2025  Patient name: Hussein Edward III  : 1965  MRN: [id number]  Referring provider: Bernabe Cook MD  Dx:   Encounter Diagnosis     ICD-10-CM    1. Acute respiratory distress syndrome (HCC)  J80       2. Chronic pain of right knee  M25.561     G89.29       3. Risk for falls  Z91.81       4. Generalized weakness  R53.1       5. Poor tolerance for ambulation  Z78.9                      Subjective: pt reports that his right foot turns outward when he is walking without AD and that he is trying to actively stretch inward and thinks about it.        Objective: See treatment diary below      Assessment: Tolerated treatment with no LOB does fatigue but small rest periods in between activity. . Patient demonstrated fatigue post treatment      Plan: Continue per plan of care.      Precautions: Hx of ARDS 2024. Patient will require increased rest break between activity. Does carry portable O2 concentrator.       Manuals 5/15     4/3 4/10 4/16 4/24 5/1 5/8                                                           Neuro Re-Ed              Scapular retraction              SLS              Seated  abdominal press down  5\"x20     5''x20 5\"x20 5\"x20 5\"x20 5\"x20 5''x20                                                           Ther Ex              Hip abd stand                            UBE for endurance.              Bike for endurance Lv3 5' lv2 10'     Lv 2 10' Lv2 10' Lv3 3'  Lv2 7' Lv2 10' Lv3 5'  Lv2 5' Lv 3 5' vl 2 5'   TB shoulder extension        Blue  x10 Blk x10  Blk / march 2x10   TB IR              TB ER-bilateral        Blk 2x15      Knee flexion                            seated knee extension              Leg press 95#  2x10     95# 2x10 95#  2x10  95#  2x10 nv 95# 2x10   Bicep curl x2 6#  2x15     6# 2x15 6#  2x15  6#  2x15 6#  2x15 6# 2x15   OH tricep ext 4#  2x15     4# 2x15 4#  2x15 4#  2x15 4#  2x15 4#  2x15 4# 2x15   Standing rows                            Ther " Activity              Lateral step ups and over              Sit to stand Thin 2x10     Thin 2x10 Thin x15 2x10                     Gait Training                                          Modalities                                          \

## 2025-05-20 ENCOUNTER — APPOINTMENT (OUTPATIENT)
Dept: PHYSICAL THERAPY | Facility: CLINIC | Age: 60
End: 2025-05-20
Payer: COMMERCIAL

## 2025-05-21 ENCOUNTER — OFFICE VISIT (OUTPATIENT)
Dept: PHYSICAL THERAPY | Facility: CLINIC | Age: 60
End: 2025-05-21
Attending: INTERNAL MEDICINE
Payer: COMMERCIAL

## 2025-05-21 DIAGNOSIS — M25.561 CHRONIC PAIN OF RIGHT KNEE: ICD-10-CM

## 2025-05-21 DIAGNOSIS — R53.1 GENERALIZED WEAKNESS: ICD-10-CM

## 2025-05-21 DIAGNOSIS — G89.29 CHRONIC PAIN OF RIGHT KNEE: ICD-10-CM

## 2025-05-21 DIAGNOSIS — Z91.81 RISK FOR FALLS: ICD-10-CM

## 2025-05-21 DIAGNOSIS — Z78.9 POOR TOLERANCE FOR AMBULATION: ICD-10-CM

## 2025-05-21 DIAGNOSIS — J80 ACUTE RESPIRATORY DISTRESS SYNDROME (HCC): Primary | ICD-10-CM

## 2025-05-21 PROCEDURE — 97110 THERAPEUTIC EXERCISES: CPT

## 2025-05-21 NOTE — PROGRESS NOTES
"Daily Note     Today's date: 2025  Patient name: Hussein Edward III  : 1965  MRN: [id number]  Referring provider: Bernabe Cook MD  Dx:   Encounter Diagnosis     ICD-10-CM    1. Acute respiratory distress syndrome (HCC)  J80       2. Chronic pain of right knee  M25.561     G89.29       3. Risk for falls  Z91.81       4. Generalized weakness  R53.1       5. Poor tolerance for ambulation  Z78.9                      Subjective: Hussein states that the back of both legs in hamstring, feels like it is going to cramp constantly,  is trying to stretch at home.      Objective: See treatment diary below      Assessment: Tolerated treatment with fatigue. Reviewed stretching with him of BLE he is performing at moderate stretch and shorter hold times.       Plan: Continue per plan of care.      Precautions: Hx of ARDS 2024. Patient will require increased rest break between activity. Does carry portable O2 concentrator.       Manuals 5/15 5/21    4/3 4/10 4/16 4/24 5/1 5/8                                                           Neuro Re-Ed                                          Seated  abdominal press down  5\"x20 5\"x20    5''x20 5\"x20 5\"x20 5\"x20 5\"x20 5''x20                                                           Ther Ex              Hip abd stand                            UBE for endurance.              Bike for endurance Lv3 5' lv2 10' Lv3 5'  Lv2 5'    Lv 2 10' Lv2 10' Lv3 3'  Lv2 7' Lv2 10' Lv3 5'  Lv2 5' Lv 3 5' vl 2 5'   TB shoulder extension        Blue  x10 Blk x10  Blk  2x10   TB IR              TB ER-bilateral  Blk 2x15      Blk 2x15      Knee flexion                            seated knee extension              Leg press 95#  2x10     95# 2x10 95#  2x10  95#  2x10 nv 95# 2x10   Bicep curl x2 6#  2x15     6# 2x15 6#  2x15  6#  2x15 6#  2x15 6# 2x15   OH tricep ext 4#  2x15 4#  2x15    4# 2x15 4#  2x15 4#  2x15 4#  2x15 4#  2x15 4# 2x15   Standing rows                            Ther " Activity              Lateral step ups and over              Sit to stand Thin 2x10     Thin 2x10 Thin x15 2x10                     Gait Training                                          Modalities                                          \

## 2025-05-22 ENCOUNTER — APPOINTMENT (OUTPATIENT)
Dept: PHYSICAL THERAPY | Facility: CLINIC | Age: 60
End: 2025-05-22
Payer: COMMERCIAL

## 2025-05-23 ENCOUNTER — TELEPHONE (OUTPATIENT)
Age: 60
End: 2025-05-23

## 2025-05-24 ENCOUNTER — TELEPHONE (OUTPATIENT)
Dept: NEUROLOGY | Facility: CLINIC | Age: 60
End: 2025-05-24

## 2025-05-24 DIAGNOSIS — G43.009 MIGRAINE WITHOUT AURA AND WITHOUT STATUS MIGRAINOSUS, NOT INTRACTABLE: ICD-10-CM

## 2025-05-27 ENCOUNTER — APPOINTMENT (OUTPATIENT)
Dept: PHYSICAL THERAPY | Facility: CLINIC | Age: 60
End: 2025-05-27
Payer: COMMERCIAL

## 2025-05-28 RX ORDER — RIMEGEPANT SULFATE 75 MG/75MG
TABLET, ORALLY DISINTEGRATING ORAL
Qty: 16 TABLET | Refills: 11 | Status: SHIPPED | OUTPATIENT
Start: 2025-05-28

## 2025-05-29 ENCOUNTER — TELEPHONE (OUTPATIENT)
Dept: FAMILY MEDICINE CLINIC | Facility: CLINIC | Age: 60
End: 2025-05-29

## 2025-05-29 ENCOUNTER — OFFICE VISIT (OUTPATIENT)
Dept: PHYSICAL THERAPY | Facility: CLINIC | Age: 60
End: 2025-05-29
Attending: INTERNAL MEDICINE
Payer: COMMERCIAL

## 2025-05-29 DIAGNOSIS — J80 ACUTE RESPIRATORY DISTRESS SYNDROME (HCC): Primary | ICD-10-CM

## 2025-05-29 DIAGNOSIS — M25.561 CHRONIC PAIN OF RIGHT KNEE: ICD-10-CM

## 2025-05-29 DIAGNOSIS — R53.1 GENERALIZED WEAKNESS: ICD-10-CM

## 2025-05-29 DIAGNOSIS — Z91.81 RISK FOR FALLS: ICD-10-CM

## 2025-05-29 DIAGNOSIS — G89.29 CHRONIC PAIN OF RIGHT KNEE: ICD-10-CM

## 2025-05-29 DIAGNOSIS — Z78.9 POOR TOLERANCE FOR AMBULATION: ICD-10-CM

## 2025-05-29 PROCEDURE — 97110 THERAPEUTIC EXERCISES: CPT

## 2025-05-29 PROCEDURE — 97530 THERAPEUTIC ACTIVITIES: CPT

## 2025-05-29 PROCEDURE — 97140 MANUAL THERAPY 1/> REGIONS: CPT

## 2025-05-29 NOTE — PROGRESS NOTES
"Daily Note     Today's date: 2025  Patient name: Hussein Edward III  : 1965  MRN: [id number]  Referring provider: Bernabe Cook MD  Dx:   Encounter Diagnosis     ICD-10-CM    1. Acute respiratory distress syndrome (HCC)  J80       2. Chronic pain of right knee  M25.561     G89.29       3. Risk for falls  Z91.81       4. Generalized weakness  R53.1       5. Poor tolerance for ambulation  Z78.9                      Subjective: Hussein states that his left shoulder is bothering him today but unsure why.  Posterior shoulder is most sore.        Objective: See treatment diary below      Assessment: TPR performed to posterior shoulder to decrease tightness Tolerated treatment with modifications made 2* to shoulder pain.  He did have less sxs after manuals. Patient would benefit from continued PT      Plan: Continue per plan of care.      Precautions: Hx of ARDS 2024. Patient will require increased rest break between activity. Does carry portable O2 concentrator.       Manuals 5/15 5/21 5/29   4/3 4/10 4/16 4/24 5/1 5/8   Tpr to post shoulder   DL                                                     Neuro Re-Ed                                          Seated  abdominal press down  5\"x20 5\"x20 5\"x20   5''x20 5\"x20 5\"x20 5\"x20 5\"x20 5''x20                                                           Ther Ex              Hip abd stand                            UBE for endurance.              Bike for endurance Lv3 5' lv2 10' Lv3 5'  Lv2 5' Lv3 5'  Lv2 5'   Lv 2 10' Lv2 10' Lv3 3'  Lv2 7' Lv2 10' Lv3 5'  Lv2 5' Lv 3 5' vl 2 5'   TB shoulder extension        Blue  x10 Blk x10  Blk / march 2x10   TB IR              TB ER-bilateral  Blk 2x15      Blk 2x15      Knee flexion                            seated knee extension              Leg press 95#  2x10  95#  2x10   95# 2x10 95#  2x10  95#  2x10 nv 95# 2x10   Bicep curl x2 6#  2x15  6#  2x15   6# 2x15 6#  2x15  6#  2x15 6#  2x15 6# 2x15   OH tricep ext 4#  2x15 " 4#  2x15 nv   4# 2x15 4#  2x15 4#  2x15 4#  2x15 4#  2x15 4# 2x15   Standing rows                            Ther Activity              Lateral step ups and over              Sit to stand Thin 2x10  Thin 2x10   Thin 2x10 Thin x15 2x10                     Gait Training                                          Modalities                                          \

## 2025-06-02 NOTE — TELEPHONE ENCOUNTER
Jenise from [facility] called requesting PA for patient's Nurtec medication.    Please assist

## 2025-06-03 ENCOUNTER — PATIENT MESSAGE (OUTPATIENT)
Dept: NEUROLOGY | Facility: CLINIC | Age: 60
End: 2025-06-03

## 2025-06-03 NOTE — TELEPHONE ENCOUNTER
Attempted to submit PA via [facility], Key [id number] (scanned to media and received message:   There was an error with your request  The Prior Authorization Department is unable to review this request at this time. Our records indicate that this is not one of our members. Please review the member's insurance demographics and submit to the appropriate party. Thank you in advance.    I called pharmacy who confirmed insurance: ID [id number]  [id number] sherry brunson [facility] [id number]  [phone]    I called same, was told patient was terminated from insurance as of 5/31/2025.  No longer has insurance.     I called patient to discuss, patient confirmed he has new insurance; he was unable to confirm new insurance but said will have his wife send copy of card through my chart or call us back with information.

## 2025-06-04 NOTE — TELEPHONE ENCOUNTER
All Conversations: Insurance Update  (Oldest Message First)             Hussein Edward III to P Neurology Pod Clinical (supporting Laxmi Camargo MD)        6/3/25  1:27 PM  I have updated my insurance coverage to [employer] [facility].     I talked to a [facility] pharmacy tech, they have a Nurtec discount card on file in case insurance doesn't cover it.  The cost is currently $20 using the card.

## 2025-06-05 ENCOUNTER — EVALUATION (OUTPATIENT)
Dept: PHYSICAL THERAPY | Facility: CLINIC | Age: 60
End: 2025-06-05
Attending: INTERNAL MEDICINE
Payer: COMMERCIAL

## 2025-06-05 DIAGNOSIS — M25.561 CHRONIC PAIN OF RIGHT KNEE: ICD-10-CM

## 2025-06-05 DIAGNOSIS — Z78.9 POOR TOLERANCE FOR AMBULATION: ICD-10-CM

## 2025-06-05 DIAGNOSIS — G89.29 CHRONIC PAIN OF RIGHT KNEE: ICD-10-CM

## 2025-06-05 DIAGNOSIS — J80 ACUTE RESPIRATORY DISTRESS SYNDROME (HCC): ICD-10-CM

## 2025-06-05 DIAGNOSIS — R53.1 GENERALIZED WEAKNESS: ICD-10-CM

## 2025-06-05 DIAGNOSIS — Z91.81 RISK FOR FALLS: ICD-10-CM

## 2025-06-05 DIAGNOSIS — R29.898 SEVERE MUSCLE DECONDITIONING: Primary | ICD-10-CM

## 2025-06-05 PROCEDURE — 97164 PT RE-EVAL EST PLAN CARE: CPT | Performed by: PHYSICAL THERAPIST

## 2025-06-05 PROCEDURE — 97110 THERAPEUTIC EXERCISES: CPT | Performed by: PHYSICAL THERAPIST

## 2025-06-05 NOTE — PROGRESS NOTES
PT Re-Evaluation     Today's date: 2025  Patient name: Hussein Edward III  : 1965  MRN: [id number]  Referring provider: Bernabe Cook MD  Dx:   Encounter Diagnosis     ICD-10-CM    1. Acute respiratory distress syndrome (HCC)  J80       2. Chronic pain of right knee  M25.561     G89.29       3. Risk for falls  Z91.81       4. Generalized weakness  R53.1       5. Poor tolerance for ambulation  Z78.9                      Assessment  Impairments: abnormal gait, activity intolerance, impaired balance, impaired physical strength, poor posture , unable to perform ADL, participation limitations, activity limitations and endurance  Symptom irritability: moderate    Assessment details: Hussein Edward III has been compliant with attending PT and home exercise program since initial eval.  Hussein  has made improvements in objective data since initial eval but is still limited compared to prior level of function. Hussein continues with above listed impairments and would benefit from additional skilled PT to address these deficits to return to prior level of function.  Due to chronic lung damage, hx of ARDS, and chronicity of symptoms post induced coma, pt's progress is slow but pt is progressing. Pt originally had to take breaks walking into the clinic. Pt is now able to walk through clinic and other short distances with out RW. Pt continues to get fatigued very easily, and off balance after more intense exercises secondary to the weakness and fatigue in the legs.    Understanding of Dx/Px/POC: good     Prognosis: good    Goals  STG- 6wks  Pt will have increased knee extension strength of 4+ MMT  Pt will have increased hip flexion strength of 4+ MMT  Pt will have increased knee flexion strength of 4+ MMT    LTG-12wks  Pt will be able to walk 100ft without rest  Pt will be able to complete 4 steps without fatigue  Pt will demonstrate increased endurance throughout 45 min session indicated by decreased rest breaks  between activity    Plan  Patient would benefit from: skilled physical therapy    Planned therapy interventions: ADL training, activity modification, neuromuscular re-education, postural training, strengthening, therapeutic exercise, therapeutic activities and functional ROM exercises    Frequency: 1x week  Duration in weeks: 12  Plan of Care beginning date: 2025  Plan of Care expiration date: 2025  Treatment plan discussed with: patient        Subjective Evaluation    History of Present Illness  Mechanism of injury: Pt notes that he is now able to get his grand dtr from school, and watch her with out any assistance. He notes that he still has to consistently think about his use of the right leg when walking as he is always catching his toe otherwise. He notes that since he has stopped walking he is less fatigued on a daily basis, but still needs a day of rest after PT.   Quality of life: fair    Patient Goals  Patient goals for therapy: increased strength, independence with ADLs/IADLs, increased motion, improved balance and return to sport/leisure activities    Pain  At worst pain ratin  Location: right knee  Quality: pulling and tight    Social Support  Lives in: one-story house  Lives with: spouse    Employment status: working  Treatments  Previous treatment: physical therapy (repiratory therapy)  Current treatment: physical therapy      Objective     Static Posture   General Observations  Guarded and scoliosis.     Head  Forward.    Shoulders  Asymmetric shoulders and depressed.    Thoracic Spine  Hyperkyphosis.    Strength/Myotome Testing     Left Shoulder     Planes of Motion   Flexion: 4-   Abduction: 4-   External rotation at 0°: 4+   Internal rotation at 0°: 4+     Right Shoulder     Planes of Motion   Flexion: 4-   Abduction: 4-   External rotation at 0°: 4+   Internal rotation at 0°: 4+     Left Elbow   Flexion: 4-  Extension: 4-    Right Elbow   Flexion: 4  Extension: 4    Left Hip   Planes  "of Motion   Flexion: 4-  Abduction: 4-  Adduction: 4-    Right Hip   Planes of Motion   Flexion: 4+  Abduction: 4-  Adduction: 4-    Left Knee   Flexion: 4-  Extension: 4    Right Knee   Flexion: 4  Extension: 4    Left Ankle/Foot   Dorsiflexion: 4-  Plantar flexion: 3+    Right Ankle/Foot   Dorsiflexion: 4-  Plantar flexion: 3+    Muscle Activation   Patient able to activate left transverse abdominals, left external obliques, left internal obliques, right transverse abdominals, right external obliques and right internal obliques.     Ambulation   Weight-Bearing Status   Assistive device used: rollator walker    Ambulation: Stairs   Ascending: step-to  Ascend stairs: minimum assist  Railings: 2  Descending: step-to  Descend stairs: minimum assist  Railings: 2    Observational Gait   Gait: asymmetric and crouched   Increased left swing time and right swing time. Decreased walking speed, stride length, left stance time and right stance time.   Left foot contact pattern: foot flat  Right foot contact pattern: foot flat  Base of support: decreased    Additional Observational Gait Details  Increased hip flexion on the right to avoid catching right toe on carpet.    Functional Assessment      Squat    Trunk lean left, sitting toward left side, left valgus and right valgus.     Posterior weight shift: moderate    Depth of femur height: above 90 degrees  Neuro Exam:     Functional outcomes   6 minute walk test: 672  5x sit to stand: 15 (seconds)  TU (seconds)               Precautions: Hx of ARDS 2024. Patient will require increased rest break between activity. Does carry portable O2 concentrator.       Manuals 5/15 5/21 5/29 6/5  4/3 4/10 4/16 4/24 5 5/8   Tpr to post shoulder   DL nv                                                    Neuro Re-Ed                                          Seated  abdominal press down  5\"x20 5\"x20 5\"x20 5''x20  5''x20 5\"x20 5\"x20 5\"x20 5\"x20 5''x20                                   "                         Ther Ex              Hip abd stand                            UBE for endurance.              Bike for endurance Lv3 5' lv2 10' Lv3 5'  Lv2 5' Lv3 5'  Lv2 5' Lv3 5'  Lv2 5'  Lv 2 10' Lv2 10' Lv3 3'  Lv2 7' Lv2 10' Lv3 5'  Lv2 5' Lv 3 5' vl 2 5'   TB shoulder extension        Blue  x10 Blk x10  Blk w/ march 2x10   TB IR              TB ER-bilateral  Blk 2x15  nv    Blk 2x15      Knee flexion                            seated knee extension              Leg press 95#  2x10  95#  2x10 95# 2x10  95# 2x10 95#  2x10  95#  2x10 nv 95# 2x10   Bicep curl x2 6#  2x15  6#  2x15 6# 2x15  6# 2x15 6#  2x15  6#  2x15 6#  2x15 6# 2x15   OH tricep ext 4#  2x15 4#  2x15 nv 4# 2x15  4# 2x15 4#  2x15 4#  2x15 4#  2x15 4#  2x15 4# 2x15   Standing rows                            Ther Activity              Lateral step ups and over              Sit to stand Thin 2x10  Thin 2x10 nv  Thin 2x10 Thin x15 2x10                     Gait Training                                          Modalities                                            \

## 2025-06-06 NOTE — TELEPHONE ENCOUNTER
Recd approval for [employer]. Approved from 6/1/25-12/3/25  Notified [facility]; spoke with [place] pharmacist. Medication processed through successfully.   [employer] message sent to pt

## 2025-06-12 ENCOUNTER — OFFICE VISIT (OUTPATIENT)
Dept: PHYSICAL THERAPY | Facility: CLINIC | Age: 60
End: 2025-06-12
Attending: INTERNAL MEDICINE
Payer: COMMERCIAL

## 2025-06-12 DIAGNOSIS — Z78.9 POOR TOLERANCE FOR AMBULATION: ICD-10-CM

## 2025-06-12 DIAGNOSIS — J80 ACUTE RESPIRATORY DISTRESS SYNDROME (HCC): ICD-10-CM

## 2025-06-12 DIAGNOSIS — R53.1 GENERALIZED WEAKNESS: ICD-10-CM

## 2025-06-12 DIAGNOSIS — Z91.81 RISK FOR FALLS: ICD-10-CM

## 2025-06-12 DIAGNOSIS — R29.898 SEVERE MUSCLE DECONDITIONING: Primary | ICD-10-CM

## 2025-06-12 DIAGNOSIS — G89.29 CHRONIC PAIN OF RIGHT KNEE: ICD-10-CM

## 2025-06-12 DIAGNOSIS — M25.561 CHRONIC PAIN OF RIGHT KNEE: ICD-10-CM

## 2025-06-12 PROCEDURE — 97110 THERAPEUTIC EXERCISES: CPT

## 2025-06-12 NOTE — PROGRESS NOTES
"Daily Note     Today's date: 2025  Patient name: Hussein Edward III  : 1965  MRN: [id number]  Referring provider: Bernabe Cook MD  Dx:   Encounter Diagnosis     ICD-10-CM    1. Severe muscle deconditioning  R29.898       2. Chronic pain of right knee  M25.561     G89.29       3. Risk for falls  Z91.81       4. Generalized weakness  R53.1       5. Poor tolerance for ambulation  Z78.9       6. Acute respiratory distress syndrome (HCC)  J80                      Subjective: Hussein was watching his 2 year old granddaughter yesterday and is fatigued.        Objective: See treatment diary below      Assessment: He did have some increased dragging of RLE. He had some more fatigue during exercises.  Patient demonstrated fatigue post treatment      Plan: Continue per plan of care.      Precautions: Hx of ARDS 2024. Patient will require increased rest break between activity. Does carry portable O2 concentrator.       Manuals 5/15 5/21 5/29 6/5 6/12   4/16 4/24 5/1 5   Tpr to post shoulder   DL nv                                                    Neuro Re-Ed                                          Seated  abdominal press down  5\"x20 5\"x20 5\"x20 5''x20 5\"x30   5\"x20 5\"x20 5\"x20 5''x20                                                           Ther Ex              Hip abd stand                            UBE for endurance.              Bike for endurance Lv3 5' lv2 10' Lv3 5'  Lv2 5' Lv3 5'  Lv2 5' Lv3 5'  Lv2 5' Lv3 10'   Lv3 3'  Lv2 7' Lv2 10' Lv3 5'  Lv2 5' Lv 3 5' vl 2 5'   TB shoulder extension        Blue  x10 Blk x10  Blk / march 2x10   TB IR              TB ER-bilateral  Blk 2x15  nv Blk 3x10   Blk 2x15      Knee flexion                            seated knee extension              Leg press 95#  2x10  95#  2x10 95# 2x10 95#  3x10    95#  2x10 nv 95# 2x10   Bicep curl x2 6#  2x15  6#  2x15 6# 2x15 6#  2x15    6#  2x15 6#  2x15 6# 2x15   OH tricep ext 4#  2x15 4#  2x15 nv 4# 2x15 4#  2x15   4#  2x15 " 4#  2x15 4#  2x15 4# 2x15   Standing rows                            Ther Activity              Lateral step ups and over              Sit to stand Thin 2x10  Thin 2x10 nv    2x10                     Gait Training                                          Modalities                                            \

## 2025-06-19 ENCOUNTER — OFFICE VISIT (OUTPATIENT)
Dept: PHYSICAL THERAPY | Facility: CLINIC | Age: 60
End: 2025-06-19
Attending: INTERNAL MEDICINE
Payer: COMMERCIAL

## 2025-06-19 DIAGNOSIS — M25.561 CHRONIC PAIN OF RIGHT KNEE: ICD-10-CM

## 2025-06-19 DIAGNOSIS — J80 ACUTE RESPIRATORY DISTRESS SYNDROME (HCC): ICD-10-CM

## 2025-06-19 DIAGNOSIS — R29.898 SEVERE MUSCLE DECONDITIONING: Primary | ICD-10-CM

## 2025-06-19 DIAGNOSIS — Z78.9 POOR TOLERANCE FOR AMBULATION: ICD-10-CM

## 2025-06-19 DIAGNOSIS — G89.29 CHRONIC PAIN OF RIGHT KNEE: ICD-10-CM

## 2025-06-19 DIAGNOSIS — Z91.81 RISK FOR FALLS: ICD-10-CM

## 2025-06-19 DIAGNOSIS — R53.1 GENERALIZED WEAKNESS: ICD-10-CM

## 2025-06-19 PROCEDURE — 97110 THERAPEUTIC EXERCISES: CPT

## 2025-06-19 NOTE — PROGRESS NOTES
"Daily Note     Today's date: 2025  Patient name: Hussein Edward III  : 1965  MRN: [id number]  Referring provider: Bernabe Cook MD  Dx:   Encounter Diagnosis     ICD-10-CM    1. Severe muscle deconditioning  R29.898       2. Chronic pain of right knee  M25.561     G89.29       3. Risk for falls  Z91.81       4. Generalized weakness  R53.1       5. Poor tolerance for ambulation  Z78.9       6. Acute respiratory distress syndrome (HCC)  J80                      Subjective: pt notes he is doing well.        Objective: See treatment diary below      Assessment: Tolerated treatment with advancement in weight on leg press,. Patient demonstrated fatigue post treatment and would benefit from continued PT      Plan: Continue per plan of care.      Precautions: Hx of ARDS 2024. Patient will require increased rest break between activity. Does carry portable O2 concentrator.       Manuals 5/15 5/21 5/29 6/5 6/12 6/19     5   Tpr to post shoulder   DL nv                                                    Neuro Re-Ed                                          Seated  abdominal press down  5\"x20 5\"x20 5\"x20 5''x20 5\"x30 5\"x30     5''x20                                                           Ther Ex              Hip abd stand                            UBE for endurance.              Bike for endurance Lv3 5' lv2 10' Lv3 5'  Lv2 5' Lv3 5'  Lv2 5' Lv3 5'  Lv2 5' Lv3 10' Lv3 10'     Lv 3 5' vl 2 5'   TB shoulder extension           Blk / march 2x10   TB IR              TB ER-bilateral  Blk 2x15  nv Blk 3x10         Knee flexion                            seated knee extension              Leg press 95#  2x10  95#  2x10 95# 2x10 95#  3x10 95# x5  105#  2x10     95# 2x10   Bicep curl x2 6#  2x15  6#  2x15 6# 2x15 6#  2x15 6#  2x15     6# 2x15   OH tricep ext 4#  2x15 4#  2x15 nv 4# 2x15 4#  2x15 4#  2x15     4# 2x15   Standing rows                            Ther Activity              Lateral step ups and over      "         Sit to stand Thin 2x10  Thin 2x10 nv                        Gait Training                                          Modalities                                            \

## 2025-06-23 ENCOUNTER — OFFICE VISIT (OUTPATIENT)
Dept: NEUROLOGY | Facility: CLINIC | Age: 60
End: 2025-06-23
Payer: COMMERCIAL

## 2025-06-23 VITALS
BODY MASS INDEX: 29.03 KG/M2 | DIASTOLIC BLOOD PRESSURE: 90 MMHG | HEART RATE: 72 BPM | HEIGHT: 67 IN | OXYGEN SATURATION: 95 % | TEMPERATURE: 97.9 F | SYSTOLIC BLOOD PRESSURE: 138 MMHG | WEIGHT: 185 LBS

## 2025-06-23 DIAGNOSIS — G43.009 MIGRAINE WITHOUT AURA AND WITHOUT STATUS MIGRAINOSUS, NOT INTRACTABLE: Primary | ICD-10-CM

## 2025-06-23 DIAGNOSIS — Q04.8 CEREBELLAR TONSILLAR ECTOPIA (HCC): ICD-10-CM

## 2025-06-23 DIAGNOSIS — R20.0 LEFT ARM NUMBNESS: ICD-10-CM

## 2025-06-23 DIAGNOSIS — G63 NEUROPATHY DUE TO MEDICAL CONDITION (HCC): ICD-10-CM

## 2025-06-23 DIAGNOSIS — R29.898 UPPER EXTREMITY WEAKNESS: ICD-10-CM

## 2025-06-23 DIAGNOSIS — R29.898 WEAKNESS OF BOTH LOWER EXTREMITIES: ICD-10-CM

## 2025-06-23 DIAGNOSIS — M21.371 RIGHT FOOT DROP: ICD-10-CM

## 2025-06-23 DIAGNOSIS — R79.0 LOW FERRITIN: ICD-10-CM

## 2025-06-23 PROCEDURE — 99215 OFFICE O/P EST HI 40 MIN: CPT | Performed by: PSYCHIATRY & NEUROLOGY

## 2025-06-23 NOTE — ASSESSMENT & PLAN NOTE
Assessment/Plan:   Hussein Edward III is a very pleasant 59 y.o. 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  My initial evaluation 5/26/2021     Migraine without aura and without status migrainosus, not intractable  Meningioma with mild mass effect-following with neurosurgery for management of this  WALE minimally on CPAP nightly (2016, AHI 7.9, supine 95.5, REM 15.9, oxygen down to 89%)  He reports a history of migraines dating back to mid 40s.  He does not know if he has a family history of migraines.  He reports severe migraines are diffuse pressure that used to improve over 1-2 days with rest and eletriptan until recently,  moderate migraines are typically unilateral left retro-orbital stabbing like a narrow and out the other side  That typically improve with rest and NSAIDs although still last for over 4 hours.  He denies classic aura,  Does feel like his chronic tinnitus changes prior to onset of migraine.  Reports typical associated migrainous features without autonomic features.  - as of 5/26/2021: on average severe migraines 5-6 times a year lasting 1-2 days, moderate migraines 3-4 times a month, most recently lasted 3 weeks in April 2021.   Trial of magnesium for prevention, rizatriptan for abortive.  If his migraines remain uncontrolled will recommend prescription preventative.  Recommended treating sleep apnea.  - as of 9/10/2021: Reports  Mild headaches 2-3 times per week that improved with OTC meds and migraines are stable but to 3 per month and improved with rizatriptan although he reports side effects,  therefore will start trial of nurtec for abortive.   Last visit migraine improved with Toradol injection.  Recommended trying to take magnesium consistently for prevention.  - as of 3/18/2022: Migraines stable at about 3-4 times a month. Nurtec works for abortive, slower than rizatriptan, but without side effects.   - as of 9/21/2022: He reports significant  migraines about 3-4 times a month, milder migraines/headaches 3-4 times a week. Working on treating WALE and will follow up with Sleep Medicine.  Trial of low-dose gabapentin for prevention which may also help with other issues such as sleep for other pain.  Continue Nurtec for rescue which works well suggesting other CGRP meds will work well in future if needed.   - as of 3/14/2023: He did not follow-up with sleep medicine and is still having difficulty tolerating the CPAP machine and we discussed I highly recommend following up with sleep medicine to find any way to treat this better since it can impact many symptoms.  Migraines 3-4 a month on average, but the last 6 weeks or so has been 4 to 5 days a week. MRI brain with and without contrast scheduled for 4/11/2023 to follow-up meningioma with mild mass effect and neurosurgery follow-up 4/17/2023.  Gabapentin initially seemed to help but no longer seems to be helping and we will wean off.  Magnesium is helping and so is Nurtec for migraine rescue which we will transition to be used for prevention instead.  Toradol IM 1/26/2023 helped migraine flare and denies needing this today.    - as of 6/12/2023: Repeat MRI brain reviewed with unchanged meningioma although there are some findings consistent with possible tight cervical medullary junction with slight increased intracranial pressure picture and we discussed trial of acetazolamide/Diamox to see if this can reduce his headaches and migraines which are currently well controlled at approximately 1 to 2 a week on Nurtec 16 tabs a month which he typically takes  every other day or every few days and works well.  We were able to wean off gabapentin 300 mg nightly and he has an appointment to follow-up with new sleep medicine provider to get help with tolerating the CPAP.  - as of 11/7/2023: He reports ups and downs since last visit, he is having approximately 9 migraines per month that typically improve with Nurtec and sometimes rest needed as well. 10 headaches per month that typically improve with acetaminophen. We discussed I hope this will improve more transitioning from short acting acetazolamide/Diamox 250 mg twice a day to long-acting 500 mg twice a day.  Nurtec helps for migraine rescue and the more he takes it also can help with prevention he will be following up with the sleep specialist soon to try and maximize treatment of sleep apnea which we discussed is imperative.  He also will have improvement with upcoming facet injection which typically works very well for him lasting 6 months or more.   - as of 4/11/2024: He reports despite nearly dying with ARDS, and the depressing situation of not being able to currently do what he previously could endurance wise, he reports overall headaches and migraines are not poorly controlled.  He is off/they stopped acetazolamide and he ended up needed large amount of Lasix from what I could see and we discussed certainly this is something we could consider adding back, but the more he treats his sleep apnea with his new machine (on day 2), he may not need to start acetazolamide back especially with the addition of metoprolol since discharge.  He forgot about Nurtec which I resent and will make sure prior authorization is updated so we can take this as needed and the more he takes this can also help with prevention.  - as of 6/26/2024: He reports he is doing well as far as headaches/migraines go and he will have about 1 a week and nurtec helps. He takes nurtec every 3 days and this may be helping with prevention. Toradol IM works  "if ever needed for unrelenting migraine. We spent much of the visit discussing his hospital stay, the ARDS and the severe deconditioning he has from this. He is following with a team of people and making gradual improvements. We discussed counseling for post traumatic stress and he is not currently interested, but also listed some resources. Recommended treating sleep apnea more, he feels too hard for his lung sometimes, continue to follow with team for this.  - as of 6/23/2025: He comes in with his wife today and reports that possibly around mid April he started noticing TINy (like a TIN roof he means) pulse headaches, a few times a day and then they became daily, worse every 2 to 3 days.  More recently they have waxed and waned, had for 10 days, had a few days break, and then has not had since last week.  He will get 3-4 pulses like a heartbeat, it will stop for 5 to 10 seconds, then come back and the whole thing will last about 3 hours. \"It feels like crumbled aluminum foil and slightly behind bilateral temporal region where jaw muscles go into the skull .\" These have been only occasionally responsive to Nurtec or Excedrin Migraine (he reports that he uses the equivalent [employer] headache relief).  He had been on acetazolamide for headache and migraine prevention prior to his hospitalization in part due to how it was one of the only meds that helped/tolerated, but also as it may have decreased apneas some as he has always had troubles treating his sleep apnea regularly.  He still follows with sleep medicine and reports when he does use his machine which is maybe 2 to 3 hours 3 nights a week max, his residual AHI is low and we discussed in great detail however I would absolutely try and use this all night every night as possible.  He feels the headaches are heat related and has had major issues with temperature dysregulation since his prolonged hospitalization for ARDS. (Discussed elsewhere and from which she " will be seeing specialists for further evaluation.)  Sometimes a few Excedrin Migraine helps a little, but mostly he feels better when he cools off.  Nurtec helps 6 migraines last month, typically takes 3 times a week as he knows it also helps with prevention.  We certainly could consider starting something again for headache and migraine prevention, but it seems the bigger issue is his other neurologic problems at this time especially since he is currently headache free and we will see what they recommend regarding those issues first, if his temperature could be better regulated, he feels he would have less headaches.  Treating his sleep apnea may help.  He reports he started duloxetine/Cymbalta around the fall 2024, does not feel it is helping and wonders if this temperature issue which seem to have worsened around that time could be related.  He also had his metoprolol decreased from 50 mg BID to 25 mg BID around October 2024 -this was added during hospitalization.  Consider iron supplementation if ferritin under 80 and continue to follow with PCP.    Workup:  -Following with neurosurgery for meningioma monitoring  -   MRI brain with without contrast 06/13/2021: Right frontal meningioma laterally within the anterior cranial fossa measuring 1.5 x 2.4 x 2.8 cm.  This extends into the sylvian fissure without edema in the adjacent brain. Several white matter hyperintensities on T2 and FLAIR imaging, primarily within the frontal lobes are nonspecific and may represent precocious chronic microangiopathic change.  -MRI brain with without contrast 9/30/2021: Stable right anterior frontal operculum meningioma.  Stable small white matter hyperintensities on T2 and FLAIR imaging within the cerebral hemispheres, presumably precocious chronic microangiopathic change.  -MRI brain with and without contrast 4/2/2022: 1.  Right frontal opercular region meningioma is stable, measuring approximately 2.2 cm.  2.  A few white matter  lesions are also unchanged, possibly precocious microangiopathy.  3.  No acute infarction, intracranial hemorrhage or new mass lesion.  -MRI brain with and without contrast 4/11/2023: 1. Stable lateral right frontal convexity meningioma compared to 4/2/2022.  2. No acute infarction, edema, or pathologic intra-axial enhancement.  3. Mild chronic microangiopathic ischemic changes.    Preventative:  - we discussed headache hygiene and lifestyle factors that may improve headaches  - He is not currently interested in prescription preventative for headaches   - Through other providers: quvivik 50 mg (sleep medicine), metoprolol 25 mg BID (down from 50 mg BID in Oct 2024), duloxetine/cymbalta 60 mg at 9 AM (through PCP, he does not feel it is helping and feels some of his symptoms may have worsened after starting -plans to come off), meloxicam 15 mg daily,   - Past/ failed/contraindicated: topiramate contraindicated due to history of kidney stones, amitriptyline would be contraindicated due to age, gabapentin,  Temazepam/Restoril, magnesium diarrhea in the hospital and helped stopping a few days later diarrhea better, acetazloamide prior to hospital 500 mg twice daily long-acting was helping significantly, he stopped claritin after 30 days for breathing 1/2 tab BID, trial of modafinil 100 mg daily - didn't do anything in 4/2025 and stopped after 3 weeks, ropirirole caused nausea and vomiting, acetazolamide/Diamox helped and was discontinued during hospitalization due to amount of Lasix he needed  - future options: Resuming acetazolamide, verapamil,  CGRP Med    Abortive:  - discussed not taking over-the-counter or prescription pain medications more than 3 days per week to prevent medication overuse/rebound headache  - continue nurtec 75 mg as needed -up to 16 times a month and also can help for prevention. discussed proper use, possible side effects and risks.  - through other providers: oxycodone switched to tramadol  about a month ago for hips knees ankles, meloxicam/mobic 15 mg - recommended only using sedating meds at night if has CPAP  - Past/ failed/contraindicated: eletriptan does not always work, rizatriptan works but gets panic attack  - past/helped:  toradol shot works  well when he gets it, methocarbamol, voltaren, previously Valium is only when he is getting a procedure   - future options:   prochlorperazine, Toradol IM or p.o., could consider trial for 5 days of Depakote or dexamethasone 4 prolonged migraine, ubrelvy, reyvow    Not the reason for referral to concussion clinic/headache team:  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  He has had trouble getting into the autonomic center at [facility] and therefore today comes in with his wife to discuss his headaches as above, but also see whom I would recommend regarding his other symptoms.  Is also seeking any other location that can get him in sooner.  We discussed I would be happy to reach out to my colleagues who see these sorts of issues to see if they could evaluate him as they had seemed to be open to this prior to him deciding to go to [facility].  - Following prolonged hospital course with immobilization due to ARDS would be most likely to have critical illness polyneuropathy and/or myopathy which could sometimes have focal defects such as foot drop, autonomic dysfunction, including temperature dysregulation or the peroneal nerve palsy could be related to prolonged immobilization ICU causing compressive neuropathy at the fibular head. Could it be something like a chronic inflammatory demyelinating polyneuropathy - will ask the neuromuscular team to weigh in.  EMG/NCS was ordered to further  evaluate since this can take time to get an.  The symptoms are certainly not my area of expertise and would defer to my colleagues regarding whether spinal imaging or CSF analysis would be indicated, symptoms are not acute.  MRI brain with and without contrast was obtained in January 2025 which showed per radiology no acute abnormality besides the unchanged meningioma with mild mass effect without midline shift.  He likely needs detailed autonomic testing.      Patient instructions     Could consider trial of off cymbalta if not helping, but would gradually wean off due to SE and discuss with Dr. Roque first -only if he also recommends    Could consider going back on metoprolol to see if that helps headaches since they are pulsating but would discuss with cardiologist     Continue to follow with neurosurgery for meningioma with mild mass effect    PLEASE get the ferritin drawn   If your ferritin comes back under 80 I recommend:  Ferritin should be above 80 (some sources say 100) as ferritin less than this may contribute to a lot of issues including headaches, migraines, fatigue, weakness, dizziness, pale skin and brittle nails, shortness of breath, heart palpitations, brain fog, mental fatigue, difficulty focusing, increased susceptibility to infections, cold intolerance, hair loss/telogen effluvium, insomnia/RLS etc. and I recommend considering iron supplementation.      Typically people buy over-the-counter ferrous sulfate which contains 65 mg of elemental iron, I have seen sleep medicine recommend Vitron C which is ferrous fumarate one of the better absorbed iron forms which can be gentler on the stomach and help with absorption compared to ferrous sulfate at times.  It also contains 65 mg of elemental iron +125 mg of vitamin C which boost absorption and has lower risk of constipation compared to ferrous sulfate and less GI discomfort for many people.  Ideally should be taking on an empty stomach for best  "absorption, but can take with food if stomach irritation occurs.  Many patients take before bed for best tolerance.  There are other types of over-the-counter iron that I am not familiar with, I wish I had time to look into and research all the different types, but certainly whichever form is best tolerated for you.  If not tolerating this oral supplementation, or if iron comes back in the single digits, please discuss with PCP       Do whatever you can in your power to treat the sleep apnea any time you sleep (understand if unable to with ARDS, I would defer to pulmonary regarding that)    Consider listening or reading any of the following books   - \"Rewire Your Anxious Brain: How to Use the Neuroscience of Fear to End Anxiety, Panic, and Worry\" By Bell PhD  - consider reading or listening to the book \"The body keeps the score\" - interesting book on how PTSD and stress can impact the body and cause physical symptoms  - \"why we sleep\" - by Bernabe Juarez      Headache/migraine treatment:   Abortive medications (for immediate treatment of a headache):   It is ok to take ibuprofen, acetaminophen or naproxen (Advil, Tylenol,  Aleve, Excedrin) if they help your headaches you should limit these to No more than 3 times a week to avoid medication overuse/rebound headaches.     For your more moderate to severe migraines take this medication early   nurtec 75 mg under or on the tongue as needed for migraine up to daily (16 tabs a month)       Prescription preventive medications for headaches/migraines   (to take every day to help prevent headaches - not to take at the time of headache):  [x]     We have options if needed in the future      Lifestyle Recommendations:  [x] SLEEP - Maintain a regular sleep schedule: Adults need at least 7-8 hours of uninterrupted a night. Maintain good sleep hygiene:  Going to bed and waking up at consistent times, avoiding excessive daytime naps, avoiding caffeinated beverages in the " evening, avoid excessive stimulation in the evening and generally using bed primarily for sleeping.  One hour before bedtime would recommend turning lights down lower, decreasing your activity (may read quietly, listen to music at a low volume). When you get into bed, should eliminate all technology (no texting, emailing, playing with your phone, iPad or tablet in bed).  [x] HYDRATION - Maintain good hydration.  Drink  2L of fluid a day (4 typical small water bottles)  [x] DIET - Maintain good nutrition. In particular don't skip meals and try and eat healthy balanced meals regularly.  [x] TRIGGERS - Look for other triggers and avoid them: Limit caffeine to 1-2 cups a day or less. Avoid dietary triggers that you have noticed bring on your headaches (this could include aged cheese, peanuts, MSG, aspartame and nitrates).  [x] EXERCISE - physical exercise as we all know is good for you in many ways, and not only is good for your heart, but also is beneficial for your mental health, cognitive health and  chronic pain/headaches. I would encourage at the least 5 days of physical exercise weekly for at least 30 minutes.     Education and Follow-up  [x] Please call with any questions or concerns. Of course if any new concerning symptoms go to the emergency department.  [x] Follow up 3 months 60 mins, sooner if needed    Will have you follow up with our team for the dysautonomia once I ask them who would be most appropriate

## 2025-06-23 NOTE — PATIENT INSTRUCTIONS
Could consider trial of off cymbalta if not helping, but would gradually wean off due to SE and discuss with Dr. Roque first     Could consider going back on metoprolol to see if that helps headaches since they are pulsating but would discuss with cardiologist     Continue to follow with neurosurgery for meningioma    PLEASE get the ferritin drawn   If your ferritin comes back under 80 I recommend:  Ferritin should be above 80 (some sources say 100) as ferritin less than this may contribute to a lot of issues including headaches, migraines, fatigue, weakness, dizziness, pale skin and brittle nails, shortness of breath, heart palpitations, brain fog, mental fatigue, difficulty focusing, increased susceptibility to infections, cold intolerance, hair loss/telogen effluvium, insomnia/RLS etc. and I recommend considering iron supplementation.      Typically people buy over-the-counter ferrous sulfate which contains 65 mg of elemental iron, I have seen sleep medicine recommend Vitron C which is ferrous fumarate one of the better absorbed iron forms which can be gentler on the stomach and help with absorption compared to ferrous sulfate at times.  It also contains 65 mg of elemental iron +125 mg of vitamin C which boost absorption and has lower risk of constipation compared to ferrous sulfate and less GI discomfort for many people.  Ideally should be taking on an empty stomach for best absorption, but can take with food if stomach irritation occurs.  Many patients take before bed for best tolerance.  There are other types of over-the-counter iron that I am not familiar with, I wish I had time to look into and research all the different types, but certainly whichever form is best tolerated for you.  If not tolerating this oral supplementation, or if iron comes back in the single digits, please discuss with PCP       Do whatever you can in your power to treat the sleep apnea any time you sleep (understand if unable to  "with ARDS, I would defer to pulmonary regarding that)    Consider listening or reading any of the following books   - \"Rewire Your Anxious Brain: How to Use the Neuroscience of Fear to End Anxiety, Panic, and Worry\" By Bell PhD  - consider reading or listening to the book \"The body keeps the score\" - interesting book on how PTSD and stress can impact the body and cause physical symptoms  - \"why we sleep\" - by Bernabe Juarez      Headache/migraine treatment:   Abortive medications (for immediate treatment of a headache):   It is ok to take ibuprofen, acetaminophen or naproxen (Advil, Tylenol,  Aleve, Excedrin) if they help your headaches you should limit these to No more than 3 times a week to avoid medication overuse/rebound headaches.     For your more moderate to severe migraines take this medication early   nurtec 75 mg under or on the tongue as needed for migraine up to daily (16 tabs a month)       Prescription preventive medications for headaches/migraines   (to take every day to help prevent headaches - not to take at the time of headache):  [x]     We have options if needed in the future      Lifestyle Recommendations:  [x] SLEEP - Maintain a regular sleep schedule: Adults need at least 7-8 hours of uninterrupted a night. Maintain good sleep hygiene:  Going to bed and waking up at consistent times, avoiding excessive daytime naps, avoiding caffeinated beverages in the evening, avoid excessive stimulation in the evening and generally using bed primarily for sleeping.  One hour before bedtime would recommend turning lights down lower, decreasing your activity (may read quietly, listen to music at a low volume). When you get into bed, should eliminate all technology (no texting, emailing, playing with your phone, iPad or tablet in bed).  [x] HYDRATION - Maintain good hydration.  Drink  2L of fluid a day (4 typical small water bottles)  [x] DIET - Maintain good nutrition. In particular don't skip meals and " try and eat healthy balanced meals regularly.  [x] TRIGGERS - Look for other triggers and avoid them: Limit caffeine to 1-2 cups a day or less. Avoid dietary triggers that you have noticed bring on your headaches (this could include aged cheese, peanuts, MSG, aspartame and nitrates).  [x] EXERCISE - physical exercise as we all know is good for you in many ways, and not only is good for your heart, but also is beneficial for your mental health, cognitive health and  chronic pain/headaches. I would encourage at the least 5 days of physical exercise weekly for at least 30 minutes.     Education and Follow-up  [x] Please call with any questions or concerns. Of course if any new concerning symptoms go to the emergency department.  [x] Follow up 3 months 60 mins, sooner if needed    Will have you follow up with our team for the dysautonomia once I ask them who would be most appropriate

## 2025-06-23 NOTE — PROGRESS NOTES
Neurology Ambulatory Visit  Name: Hussein Edward III       : 1965       MRN: [id number]   Encounter Provider: Laxmi Camargo MD   Encounter Date: 2025  Encounter department: [facility]      :  Assessment & Plan  Migraine without aura and without status migrainosus, not intractable    Assessment/Plan:   Hussein Edward III is a very pleasant 59 y.o. 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  My initial evaluation 2021     Migraine without aura and without status migrainosus, not intractable  Meningioma with mild mass effect-following with neurosurgery for management of this  WALE minimally on CPAP nightly (, AHI 7.9, supine 95.5, REM 15.9, oxygen down to 89%)  He reports a history of migraines dating back to mid 40s.  He does not know if he has a family history of migraines.  He reports severe migraines are diffuse pressure that used to improve over 1-2 days with rest and eletriptan until recently,  moderate migraines are typically unilateral left retro-orbital stabbing like a narrow and out the other side  That typically improve with rest and NSAIDs although still last for over 4 hours.  He denies classic aura,  Does feel like his chronic tinnitus changes prior to onset of migraine.  Reports typical associated migrainous features without autonomic features.  - as of 2021: on average severe migraines 5-6 times a year lasting 1-2 days, moderate migraines 3-4 times a month, most recently lasted 3 weeks in 2021.   Trial of magnesium for prevention, rizatriptan for  abortive.  If his migraines remain uncontrolled will recommend prescription preventative.  Recommended treating sleep apnea.  - as of 9/10/2021: Reports  Mild headaches 2-3 times per week that improved with OTC meds and migraines are stable but to 3 per month and improved with rizatriptan although he reports side effects, therefore will start trial of nurtec for abortive.   Last visit migraine improved with Toradol injection.  Recommended trying to take magnesium consistently for prevention.  - as of 3/18/2022: Migraines stable at about 3-4 times a month. Nurtec works for abortive, slower than rizatriptan, but without side effects.   - as of 9/21/2022: He reports significant  migraines about 3-4 times a month, milder migraines/headaches 3-4 times a week. Working on treating WALE and will follow up with Sleep Medicine.  Trial of low-dose gabapentin for prevention which may also help with other issues such as sleep for other pain.  Continue Nurtec for rescue which works well suggesting other CGRP meds will work well in future if needed.   - as of 3/14/2023: He did not follow-up with sleep medicine and is still having difficulty tolerating the CPAP machine and we discussed I highly recommend following up with sleep medicine to find any way to treat this better since it can impact many symptoms.  Migraines 3-4 a month on average, but the last 6 weeks or so has been 4 to 5 days a week. MRI brain with and without contrast scheduled for 4/11/2023 to follow-up meningioma with mild mass effect and neurosurgery follow-up 4/17/2023.  Gabapentin initially seemed to help but no longer seems to be helping and we will wean off.  Magnesium is helping and so is Nurtec for migraine rescue which we will transition to be used for prevention instead.  Toradol IM 1/26/2023 helped migraine flare and denies needing this today.    - as of 6/12/2023: Repeat MRI brain reviewed with unchanged meningioma although there are some findings  consistent with possible tight cervical medullary junction with slight increased intracranial pressure picture and we discussed trial of acetazolamide/Diamox to see if this can reduce his headaches and migraines which are currently well controlled at approximately 1 to 2 a week on Nurtec 16 tabs a month which he typically takes every other day or every few days and works well.  We were able to wean off gabapentin 300 mg nightly and he has an appointment to follow-up with new sleep medicine provider to get help with tolerating the CPAP.  - as of 11/7/2023: He reports ups and downs since last visit, he is having approximately 9 migraines per month that typically improve with Nurtec and sometimes rest needed as well. 10 headaches per month that typically improve with acetaminophen. We discussed I hope this will improve more transitioning from short acting acetazolamide/Diamox 250 mg twice a day to long-acting 500 mg twice a day.  Nurtec helps for migraine rescue and the more he takes it also can help with prevention he will be following up with the sleep specialist soon to try and maximize treatment of sleep apnea which we discussed is imperative.  He also will have improvement with upcoming facet injection which typically works very well for him lasting 6 months or more.   - as of 4/11/2024: He reports despite nearly dying with ARDS, and the depressing situation of not being able to currently do what he previously could endurance wise, he reports overall headaches and migraines are not poorly controlled.  He is off/they stopped acetazolamide and he ended up needed large amount of Lasix from what I could see and we discussed certainly this is something we could consider adding back, but the more he treats his sleep apnea with his new machine (on day 2), he may not need to start acetazolamide back especially with the addition of metoprolol since discharge.  He forgot about Nurtec which I resent and will make sure prior  "authorization is updated so we can take this as needed and the more he takes this can also help with prevention.  - as of 6/26/2024: He reports he is doing well as far as headaches/migraines go and he will have about 1 a week and nurtec helps. He takes nurtec every 3 days and this may be helping with prevention. Toradol IM works if ever needed for unrelenting migraine. We spent much of the visit discussing his hospital stay, the ARDS and the severe deconditioning he has from this. He is following with a team of people and making gradual improvements. We discussed counseling for post traumatic stress and he is not currently interested, but also listed some resources. Recommended treating sleep apnea more, he feels too hard for his lung sometimes, continue to follow with team for this.  - as of 6/23/2025: He comes in with his wife today and reports that possibly around mid April he started noticing TINy (like a TIN roof he means) pulse headaches, a few times a day and then they became daily, worse every 2 to 3 days.  More recently they have waxed and waned, had for 10 days, had a few days break, and then has not had since last week.  He will get 3-4 pulses like a heartbeat, it will stop for 5 to 10 seconds, then come back and the whole thing will last about 3 hours. \"It feels like crumbled aluminum foil and slightly behind bilateral temporal region where jaw muscles go into the skull .\" These have been only occasionally responsive to Nurtec or Excedrin Migraine (he reports that he uses the equivalent Equate Brand headache relief).  He had been on acetazolamide for headache and migraine prevention prior to his hospitalization in part due to how it was one of the only meds that helped/tolerated, but also as it may have decreased apneas some as he has always had troubles treating his sleep apnea regularly.  He still follows with sleep medicine and reports when he does use his machine which is maybe 2 to 3 hours 3 nights " a week max, his residual AHI is low and we discussed in great detail however I would absolutely try and use this all night every night as possible.  He feels the headaches are heat related and has had major issues with temperature dysregulation since his prolonged hospitalization for ARDS. (Discussed elsewhere and from which she will be seeing specialists for further evaluation.)  Sometimes a few Excedrin Migraine helps a little, but mostly he feels better when he cools off.  Nurtec helps 6 migraines last month, typically takes 3 times a week as he knows it also helps with prevention.  We certainly could consider starting something again for headache and migraine prevention, but it seems the bigger issue is his other neurologic problems at this time especially since he is currently headache free and we will see what they recommend regarding those issues first, if his temperature could be better regulated, he feels he would have less headaches.  Treating his sleep apnea may help.  He reports he started duloxetine/Cymbalta around the fall 2024, does not feel it is helping and wonders if this temperature issue which seem to have worsened around that time could be related.  He also had his metoprolol decreased from 50 mg BID to 25 mg BID around October 2024 -this was added during hospitalization.  Consider iron supplementation if ferritin under 80 and continue to follow with PCP.    Workup:  -Following with neurosurgery for meningioma monitoring  -   MRI brain with without contrast 06/13/2021: Right frontal meningioma laterally within the anterior cranial fossa measuring 1.5 x 2.4 x 2.8 cm.  This extends into the sylvian fissure without edema in the adjacent brain. Several white matter hyperintensities on T2 and FLAIR imaging, primarily within the frontal lobes are nonspecific and may represent precocious chronic microangiopathic change.  -MRI brain with without contrast 9/30/2021: Stable right anterior frontal  operculum meningioma.  Stable small white matter hyperintensities on T2 and FLAIR imaging within the cerebral hemispheres, presumably precocious chronic microangiopathic change.  -MRI brain with and without contrast 4/2/2022: 1.  Right frontal opercular region meningioma is stable, measuring approximately 2.2 cm.  2.  A few white matter lesions are also unchanged, possibly precocious microangiopathy.  3.  No acute infarction, intracranial hemorrhage or new mass lesion.  -MRI brain with and without contrast 4/11/2023: 1. Stable lateral right frontal convexity meningioma compared to 4/2/2022.  2. No acute infarction, edema, or pathologic intra-axial enhancement.  3. Mild chronic microangiopathic ischemic changes.    Preventative:  - we discussed headache hygiene and lifestyle factors that may improve headaches  - He is not currently interested in prescription preventative for headaches   - Through other providers: quvivik 50 mg (sleep medicine), metoprolol 25 mg BID (down from 50 mg BID in Oct 2024), duloxetine/cymbalta 60 mg at 9 AM (through PCP, he does not feel it is helping and feels some of his symptoms may have worsened after starting -plans to come off), meloxicam 15 mg daily,   - Past/ failed/contraindicated: topiramate contraindicated due to history of kidney stones, amitriptyline would be contraindicated due to age, gabapentin,  Temazepam/Restoril, magnesium diarrhea in the hospital and helped stopping a few days later diarrhea better, acetazloamide prior to hospital 500 mg twice daily long-acting was helping significantly, he stopped claritin after 30 days for breathing 1/2 tab BID, trial of modafinil 100 mg daily - didn't do anything in 4/2025 and stopped after 3 weeks, ropirirole caused nausea and vomiting, acetazolamide/Diamox helped and was discontinued during hospitalization due to amount of Lasix he needed  - future options: Resuming acetazolamide, verapamil,  CGRP Med    Abortive:  - discussed not  taking over-the-counter or prescription pain medications more than 3 days per week to prevent medication overuse/rebound headache  - continue nurtec 75 mg as needed -up to 16 times a month and also can help for prevention. discussed proper use, possible side effects and risks.  - through other providers: oxycodone switched to tramadol about a month ago for hips knees ankles, meloxicam/mobic 15 mg - recommended only using sedating meds at night if has CPAP  - Past/ failed/contraindicated: eletriptan does not always work, rizatriptan works but gets panic attack  - past/helped:  toradol shot works  well when he gets it, methocarbamol, voltaren, previously Valium is only when he is getting a procedure   - future options:   prochlorperazine, Toradol IM or p.o., could consider trial for 5 days of Depakote or dexamethasone 4 prolonged migraine, ubrelvy, reyvow    Not the reason for referral to concussion clinic/headache team:  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  He has had trouble getting into the autonomic center at [facility] and therefore today comes in with his wife to discuss his headaches as above, but also see whom I would recommend regarding his other symptoms.  Is also seeking any other location that can get him in sooner.  We discussed I would be happy to reach out to my colleagues who see these sorts of issues to see if they could evaluate him as they had seemed to be open to this prior to him deciding to go to [facility].  - Following prolonged hospital course with immobilization due to ARDS would be most likely to have critical illness polyneuropathy and/or myopathy which could sometimes have focal defects such as foot drop, autonomic  dysfunction, including temperature dysregulation or the peroneal nerve palsy could be related to prolonged immobilization ICU causing compressive neuropathy at the fibular head. Could it be something like a chronic inflammatory demyelinating polyneuropathy - will ask the neuromuscular team to weigh in.  EMG/NCS was ordered to further evaluate since this can take time to get an.  The symptoms are certainly not my area of expertise and would defer to my colleagues regarding whether spinal imaging or CSF analysis would be indicated, symptoms are not acute.  MRI brain with and without contrast was obtained in January 2025 which showed per radiology no acute abnormality besides the unchanged meningioma with mild mass effect without midline shift.  He likely needs detailed autonomic testing.      Patient instructions     Could consider trial of off cymbalta if not helping, but would gradually wean off due to SE and discuss with Dr. Roque first -only if he also recommends    Could consider going back on metoprolol to see if that helps headaches since they are pulsating but would discuss with cardiologist     Continue to follow with neurosurgery for meningioma with mild mass effect    PLEASE get the ferritin drawn   If your ferritin comes back under 80 I recommend:  Ferritin should be above 80 (some sources say 100) as ferritin less than this may contribute to a lot of issues including headaches, migraines, fatigue, weakness, dizziness, pale skin and brittle nails, shortness of breath, heart palpitations, brain fog, mental fatigue, difficulty focusing, increased susceptibility to infections, cold intolerance, hair loss/telogen effluvium, insomnia/RLS etc. and I recommend considering iron supplementation.      Typically people buy over-the-counter ferrous sulfate which contains 65 mg of elemental iron, I have seen sleep medicine recommend Vitron C which is ferrous fumarate one of the better absorbed iron forms which can be  "gentler on the stomach and help with absorption compared to ferrous sulfate at times.  It also contains 65 mg of elemental iron +125 mg of vitamin C which boost absorption and has lower risk of constipation compared to ferrous sulfate and less GI discomfort for many people.  Ideally should be taking on an empty stomach for best absorption, but can take with food if stomach irritation occurs.  Many patients take before bed for best tolerance.  There are other types of over-the-counter iron that I am not familiar with, I wish I had time to look into and research all the different types, but certainly whichever form is best tolerated for you.  If not tolerating this oral supplementation, or if iron comes back in the single digits, please discuss with PCP       Do whatever you can in your power to treat the sleep apnea any time you sleep (understand if unable to with ARDS, I would defer to pulmonary regarding that)    Consider listening or reading any of the following books   - \"Rewire Your Anxious Brain: How to Use the Neuroscience of Fear to End Anxiety, Panic, and Worry\" By Bell PhD  - consider reading or listening to the book \"The body keeps the score\" - interesting book on how PTSD and stress can impact the body and cause physical symptoms  - \"why we sleep\" - by Bernabe Juarez      Headache/migraine treatment:   Abortive medications (for immediate treatment of a headache):   It is ok to take ibuprofen, acetaminophen or naproxen (Advil, Tylenol,  Aleve, Excedrin) if they help your headaches you should limit these to No more than 3 times a week to avoid medication overuse/rebound headaches.     For your more moderate to severe migraines take this medication early   nurtec 75 mg under or on the tongue as needed for migraine up to daily (16 tabs a month)       Prescription preventive medications for headaches/migraines   (to take every day to help prevent headaches - not to take at the time of headache):  [x]     We " have options if needed in the future      Lifestyle Recommendations:  [x] SLEEP - Maintain a regular sleep schedule: Adults need at least 7-8 hours of uninterrupted a night. Maintain good sleep hygiene:  Going to bed and waking up at consistent times, avoiding excessive daytime naps, avoiding caffeinated beverages in the evening, avoid excessive stimulation in the evening and generally using bed primarily for sleeping.  One hour before bedtime would recommend turning lights down lower, decreasing your activity (may read quietly, listen to music at a low volume). When you get into bed, should eliminate all technology (no texting, emailing, playing with your phone, iPad or tablet in bed).  [x] HYDRATION - Maintain good hydration.  Drink  2L of fluid a day (4 typical small water bottles)  [x] DIET - Maintain good nutrition. In particular don't skip meals and try and eat healthy balanced meals regularly.  [x] TRIGGERS - Look for other triggers and avoid them: Limit caffeine to 1-2 cups a day or less. Avoid dietary triggers that you have noticed bring on your headaches (this could include aged cheese, peanuts, MSG, aspartame and nitrates).  [x] EXERCISE - physical exercise as we all know is good for you in many ways, and not only is good for your heart, but also is beneficial for your mental health, cognitive health and  chronic pain/headaches. I would encourage at the least 5 days of physical exercise weekly for at least 30 minutes.     Education and Follow-up  [x] Please call with any questions or concerns. Of course if any new concerning symptoms go to the emergency department.  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 Hussein Edward III presents today for evaluation of headaches.   History obtained from patient as well as available medical record review.  History of Present Illness:   Interval history as of 6/23/2025  - Of note/managed by others:   Please see EMR for details since last visit  - Referred to autonomic clinic for dysautonomia -[facility] said they needed more documentation before scheduling the appointment-it is not clear what they need, but nursing team faxed last office note 6/4/2025  - Followed up with multiple providers for prolonged symptoms after hospitalization/arts including cardiology, pulmonary, PT, PCP, neurosurgery for meningioma, endocrinology, physiatry  - applied for disability  and was declined despite his profound disabilities, 7 different packets to fill out - wife plans to send one to my office     Just prior to hospitalization was dealing with postsurgery recovery following Dec 2023 torn bicep ligament, 6 weeks recovery when he was hospitalized on [facility] Feb 2024  Over the last year his legs spasms more all the time, maría elena horse all the time  All 4 extremities weakness  Poor fine motor skills - drops planting pots - gardening enjoys  Knees hurt the most  Tingling outer and posterior thighs, tingling of the tips of fingers  EMG/NCS   No stgh in right foot, needs AFO  Body tempurature always hot, room set to 55 degrees  Movement disorder of neck and hands   Mouth dryness   Problems breathing makes movements worse  ARDS wise lungs are looking better, only needs oxygen occasionally but has it with him 24/7 for security  "  Chronic tinnitus  Memory Impairment per his wife without driving safety issues  Resilient   PTSD - through \"[employer]\" helped virtually   PT - medical company [employer] helped   He is driving, denies any issues with this   Slow word finding difficulty   Poor memory without driving safety issues   Used to be sharp and quick as a [employer]  - he had a lot of trouble trying to help her - very slow processing   Heat diarrhea  No trouble swallowing or choking     - diagnostics of note (please see EMR for others/details):   - 12/17/2024 TIBC 345, UIBC 260, iron 85, iron saturation 25 and no ferritin  B12 562  1/23/25 Vit D   Cortisol normal 12.4    - Testosterone 382, free testosterone 7 point low  Free T41.15, TSH 3.3  Vitamin D 28.1  ACTH 21.1  - last eye exam: very good last week, good nerve   A few floaters    - sleep:   WALE suboptimally on CPAP nightly, but trying (2016, AHI 7.9, supine 95.5, REM 15.9, oxygen down to 89%) - sleep medicine  PTSD - flashbacks of intubation   Sleeps up in recliner since the 2 months in the hospital in the recliner, gets a few hours 2-3 hours maybe 3 times a max  Joey said sleep apnea is not good     Headaches and migraines   He reached out 6/3/2025 regarding 6 weeks of tiny pulse headaches a few times a day then and now daily and worse every 2 to 3 days - just started noticing them for 10 days and then a few days break and then last was last week     Nonresponsive to Nurtec or Equate Brand headache relief/exedrin migraine - do not help these headaches    Will get 3-4 pulses like a heart beat and then stop for 5-10 seconds, TINy as it feels like crumbled aluminum foil and slightly behind bilateral temporal where jaw muscles go into the skull and that whole headache will last 3 hours     He feels these are heat related  Feels better when he cools off  Sometimes will take a few Excedrin Migraine to feel better    Nurtec helped the 6 migraines last month, takes 3 times a week " "      Preventative:     - Through other providers: quvivik 50 mg (and was told he could take 50 mg  through sleep med - previously working well - takes only as needed around 2 times a week, can feel mind settling about 1 hour later or longer ), metoprolol 25 mg BID (down from 50 mg BID in Oct 2024), duloxetine/cymbalta 60 mg at 9 AM, meloxicam 15 mg daily, trial of modafinil 100 mg daily - didn't do anything in 4/2025 and stopped after 3 weeks, ropirirole caused nausea and vomiting     Abortive:   - acetaminophen helps bring down the severity   - Nurtec - taking every third day if needed helps  - through other providers: oxycodone switched to tramadol about a month ago for hips knees ankles, meloxicam/mobic 15 mg     No reported bothersome side effects      Please see previous notes/EMR for details from previous visits.     Objective     Physical Exam:                                                                 Vitals:            Constitutional:    /90 (BP Location: Left arm, Patient Position: Sitting, Cuff Size: Standard)   Pulse 72   Temp 97.9 °F (36.6 °C) (Temporal)   Ht 5' 7\" (1.702 m)   Wt 83.9 kg (185 lb)   SpO2 95%   BMI 28.98 kg/m²   BP Readings from Last 3 Encounters:   06/23/25 138/90   03/20/25 120/82   02/27/25 134/84     Pulse Readings from Last 3 Encounters:   06/23/25 72   03/20/25 72   02/27/25 101                Psychiatric:  Normal behavior and appropriate affect      We discussed I had help to do a thorough exam as well as discuss all of the issues they wanted to discuss, but since we spent about 80 minutes reviewing his history, recent complaints and updates with him and his wife, a more comprehensive neurologic exam will have to be completed next visit and certainly in neuromuscular specialists are also experts in this regard for this specific issue    Neurological Examination (brief after already going 50 mins over what was scheduled as a 30 minute visit):   Able to provide " history of recent events, thorough cognitive exam not completed today.  Appears to have paraspinal muscle weakness or myopathy contributing to impaired head support and altered cervical posture  Appears to have diffuse weakness overall, although able to resist on exam BUE for the most part, 4+ Biceps bilaterally 5- Triceps bilaterally  Right  weakness (he felt like left was worse but right was weaker)  Right lower extremity hypersensitivity  Tingling outer and posterior thighs, tingling of the tips of fingers  Slow coordination with finger-nose-finger, at times appears to have almost chorea like movements of upper extremities and head when talking (he reports that problems breathing makes movements worse)  Right foot drop, drags right foot with walking, uses walker for stability        Administrative Statements   I have spent a total time of 89 minutes in caring for this patient on the day of the visit/encounter including Risks and benefits of tx options, Instructions for management, Patient and family education, Importance of tx compliance, Risk factor reductions, Impressions, Counseling / Coordination of care, Documenting in the medical record, Reviewing/placing orders in the medical record (including tests, medications, and/or procedures), Obtaining or reviewing history  , and Communicating with other healthcare professionals .

## 2025-06-24 ENCOUNTER — OFFICE VISIT (OUTPATIENT)
Dept: FAMILY MEDICINE CLINIC | Facility: CLINIC | Age: 60
End: 2025-06-24
Payer: COMMERCIAL

## 2025-06-24 VITALS
WEIGHT: 186 LBS | OXYGEN SATURATION: 97 % | BODY MASS INDEX: 29.13 KG/M2 | SYSTOLIC BLOOD PRESSURE: 138 MMHG | DIASTOLIC BLOOD PRESSURE: 86 MMHG | HEART RATE: 100 BPM

## 2025-06-24 DIAGNOSIS — J96.01 ACUTE RESPIRATORY FAILURE WITH HYPOXIA (HCC): ICD-10-CM

## 2025-06-24 DIAGNOSIS — F43.10 PTSD (POST-TRAUMATIC STRESS DISORDER): ICD-10-CM

## 2025-06-24 DIAGNOSIS — Z00.00 WELLNESS EXAMINATION: ICD-10-CM

## 2025-06-24 DIAGNOSIS — G60.3 IDIOPATHIC PROGRESSIVE NEUROPATHY: ICD-10-CM

## 2025-06-24 DIAGNOSIS — G90.1 DYSAUTONOMIA (HCC): ICD-10-CM

## 2025-06-24 DIAGNOSIS — R26.2 AMBULATORY DYSFUNCTION: Primary | ICD-10-CM

## 2025-06-24 PROCEDURE — 99214 OFFICE O/P EST MOD 30 MIN: CPT | Performed by: FAMILY MEDICINE

## 2025-06-24 PROCEDURE — 99396 PREV VISIT EST AGE 40-64: CPT | Performed by: FAMILY MEDICINE

## 2025-06-24 NOTE — PROGRESS NOTES
Adult Annual Physical  Name: Hussein Edward III      : 1965      MRN: [id number]  Encounter Provider: Iftikhar Roque MD  Encounter Date: 2025   Encounter department: [facility]    :  Assessment & Plan  Ambulatory dysfunction  Wheelchair bound         Acute respiratory failure with hypoxia (HCC)  Severe----significant muscle weakness/neuropathy         PTSD (post-traumatic stress disorder)         Dysautonomia (HCC)         Idiopathic progressive neuropathy         Wellness examination                 Immunizations:  - Immunizations due: Prevnar 20         History of Present Illness     Adult Annual Physical:  Patient presents for annual physical.     Diet and Physical Activity:  - Diet/Nutrition: well balanced diet.  - Exercise: no formal exercise.    Depression Screening:  - PHQ-2 Score: 2    General Health:  - Sleep: sleeps well and 7-8 hours of sleep on average.  - Hearing: normal hearing bilateral ears.  - Vision: no vision problems.  - Dental: regular dental visits and brushes teeth twice daily.     Health:    - Urinary symptoms: none.     Advanced Care Planning:  - Has an advanced directive?: no    - Has a durable medical POA?: no    - ACP document given to patient?: no      Review of Systems   Constitutional:  Negative for fatigue, fever and unexpected weight change.   HENT:  Negative for congestion, sinus pain and sore throat.    Eyes:  Negative for visual disturbance.   Respiratory:  Negative for shortness of breath and wheezing.    Cardiovascular:  Negative for chest pain and palpitations.   Gastrointestinal:  Negative for abdominal pain, nausea and vomiting.   Musculoskeletal: Negative.  Negative for arthralgias and myalgias.   Neurological:  Positive for weakness and numbness. Negative for syncope.   Psychiatric/Behavioral: Negative.  Negative for confusion, dysphoric mood and suicidal ideas.          Objective   /86 (BP Location: Left arm, Patient  Position: Sitting, Cuff Size: Standard)   Pulse 100   Wt 84.4 kg (186 lb)   SpO2 97%   BMI 29.13 kg/m²     Physical Exam  Vitals and nursing note reviewed.   Constitutional:       General: He is not in acute distress.     Appearance: He is well-developed.   HENT:      Head: Normocephalic and atraumatic.     Eyes:      Conjunctiva/sclera: Conjunctivae normal.       Cardiovascular:      Rate and Rhythm: Normal rate and regular rhythm.      Heart sounds: No murmur heard.  Pulmonary:      Effort: Pulmonary effort is normal. No respiratory distress.      Breath sounds: Normal breath sounds.   Abdominal:      Palpations: Abdomen is soft.      Tenderness: There is no abdominal tenderness.     Musculoskeletal:         General: No swelling.      Cervical back: Neck supple.     Skin:     General: Skin is warm and dry.      Capillary Refill: Capillary refill takes less than 2 seconds.     Neurological:      Mental Status: He is alert.     Psychiatric:         Mood and Affect: Mood normal.     Wheelchair bound/invol head/arm moveement---severe m weakness

## 2025-06-26 ENCOUNTER — OFFICE VISIT (OUTPATIENT)
Dept: PHYSICAL THERAPY | Facility: CLINIC | Age: 60
End: 2025-06-26
Attending: INTERNAL MEDICINE
Payer: COMMERCIAL

## 2025-06-26 DIAGNOSIS — Z78.9 POOR TOLERANCE FOR AMBULATION: ICD-10-CM

## 2025-06-26 DIAGNOSIS — G89.29 CHRONIC PAIN OF RIGHT KNEE: ICD-10-CM

## 2025-06-26 DIAGNOSIS — R29.898 SEVERE MUSCLE DECONDITIONING: Primary | ICD-10-CM

## 2025-06-26 DIAGNOSIS — M25.561 CHRONIC PAIN OF RIGHT KNEE: ICD-10-CM

## 2025-06-26 DIAGNOSIS — Z91.81 RISK FOR FALLS: ICD-10-CM

## 2025-06-26 DIAGNOSIS — J80 ACUTE RESPIRATORY DISTRESS SYNDROME (HCC): ICD-10-CM

## 2025-06-26 PROCEDURE — 97110 THERAPEUTIC EXERCISES: CPT | Performed by: PHYSICAL THERAPIST

## 2025-06-26 NOTE — PROGRESS NOTES
"  Daily Note     Today's date: 2025  Patient name: Hussein Edward III  : 1965  MRN: [id number]  Referring provider: Bernabe Cook MD  Dx:   Encounter Diagnosis     ICD-10-CM    1. Severe muscle deconditioning  R29.898       2. Chronic pain of right knee  M25.561     G89.29       3. Risk for falls  Z91.81       4. Poor tolerance for ambulation  Z78.9       5. Acute respiratory distress syndrome (HCC)  J80                      Subjective: pt notes that he saw his neurologist, and he would like him to get an EMG for  the reduction in ability to use his hands for the fine motor activities.       Objective: See treatment diary below      Assessment: Pt has been able the past 2 days  in treatment, and more consistently taking resistance through the bike at highter levels but still with an inability to progress to higher weight for the bicep and tricep exercises. Pt also continues to need cues for improved posture and avoiding dragging of the right right foot.    Plan: Continue per plan of care.      Precautions: Hx of ARDS 2024. Patient will require increased rest break between activity. Does carry portable O2 concentrator.       Manuals 5/15 5/21 5/29 6/    5/8   Tpr to post shoulder   DL nv                                                    Neuro Re-Ed                                          Seated  abdominal press down  5\"x20 5\"x20 5\"x20 5''x20 5\"x30 5\"x30 nv    5''x20                                                           Ther Ex              Hip abd stand                            UBE for endurance.              Bike for endurance Lv3 5' lv2 10' Lv3 5'  Lv2 5' Lv3 5'  Lv2 5' Lv3 5'  Lv2 5' Lv3 10' Lv3 10' Lv 3 10'    Lv 3 5' vl 2 5'   TB shoulder extension       Blk / march 2x10    Blk / march 2x10   TB IR              TB ER-bilateral  Blk 2x15  nv Blk 3x10         Knee flexion                            seated knee extension              Leg press 95#  2x10  95#  2x10 95# 2x10 " 95#  3x10 95# x5  105#  2x10 95# x5 105# 2x10    95# 2x10   Bicep curl x2 6#  2x15  6#  2x15 6# 2x15 6#  2x15 6#  2x15 6# 2x15    6# 2x15   OH tricep ext 4#  2x15 4#  2x15 nv 4# 2x15 4#  2x15 4#  2x15 4# 2x15    4# 2x15   Standing rows                            Ther Activity              Lateral step ups and over              Sit to stand Thin 2x10  Thin 2x10 nv                        Gait Training                                          Modalities                                            \

## 2025-06-27 ENCOUNTER — OFFICE VISIT (OUTPATIENT)
Dept: CARDIOLOGY CLINIC | Facility: CLINIC | Age: 60
End: 2025-06-27
Payer: COMMERCIAL

## 2025-06-27 VITALS
BODY MASS INDEX: 29.51 KG/M2 | HEIGHT: 67 IN | WEIGHT: 188 LBS | SYSTOLIC BLOOD PRESSURE: 146 MMHG | HEART RATE: 81 BPM | DIASTOLIC BLOOD PRESSURE: 82 MMHG

## 2025-06-27 DIAGNOSIS — E78.2 MIXED DYSLIPIDEMIA: ICD-10-CM

## 2025-06-27 DIAGNOSIS — R00.0 SINUS TACHYCARDIA: ICD-10-CM

## 2025-06-27 DIAGNOSIS — Z87.09 ARDS SURVIVOR: ICD-10-CM

## 2025-06-27 DIAGNOSIS — I48.0 PAF (PAROXYSMAL ATRIAL FIBRILLATION) (HCC): Primary | ICD-10-CM

## 2025-06-27 PROCEDURE — 93000 ELECTROCARDIOGRAM COMPLETE: CPT | Performed by: INTERNAL MEDICINE

## 2025-06-27 PROCEDURE — 99214 OFFICE O/P EST MOD 30 MIN: CPT | Performed by: INTERNAL MEDICINE

## 2025-06-27 NOTE — PROGRESS NOTES
Cardiology   Hussein Edward III 59 y.o. male MRN: [id number]        Impression:  Sinus tachycardia - controlled.   Paroxysmal atrial fibrillation - in setting of ARDS.  No need for anticoagulation.   ARDS - slowly recovering.  Still with significant dysautonomia.      Recommendations:  Continue current medications.   Follow up in 12 months.            HPI: Hussein Edward III is a 59 y.o. year old male with sinus tachycardia, brief episode of PAF 2/19/24 during procedure, who was admitted to [facility] with Influenza A/bacterial pneumonia, and transferred to [facility] due to ARDS who returns for follow up. Has residual dysautonomia since ARDS.  Had afib during a central line placement, and converted to NSR on amio/dig. Had persistent tachycardia in setting of hypoxemia and tachypnea. Echo 2/24 demonstrated normal EF, mild LVH, normal atrial size, and no valvular abnormalities. Has issues with body temperature regulation. Does have occasional palpitations with heat.         Review of Systems   Constitutional:  Positive for diaphoresis and fatigue.   HENT: Negative.     Eyes: Negative.    Respiratory:  Negative for chest tightness and shortness of breath.    Cardiovascular:  Negative for chest pain, palpitations and leg swelling.   Gastrointestinal: Negative.    Endocrine: Negative.    Genitourinary: Negative.    Musculoskeletal: Negative.    Skin: Negative.    Allergic/Immunologic: Negative.    Neurological: Negative.    Hematological: Negative.    Psychiatric/Behavioral: Negative.     All other systems reviewed and are negative.        Past Medical History[1]  Past Surgical History[2]  Social History     Substance and Sexual Activity   Alcohol Use Never     Social History     Substance and Sexual Activity   Drug Use No     Tobacco Use History[3]  Family History[4]    Allergies:  Allergies[5]    Medications:   Current Medications[6]      Wt Readings from Last 3 Encounters:   06/27/25 85.3 kg (188 lb)   06/24/25  84.4 kg (186 lb)   06/23/25 83.9 kg (185 lb)     Temp Readings from Last 3 Encounters:   06/23/25 97.9 °F (36.6 °C) (Temporal)   03/20/25 (!) 97.2 °F (36.2 °C) (Tympanic)   02/27/25 (!) 97.4 °F (36.3 °C) (Temporal)     BP Readings from Last 3 Encounters:   06/27/25 146/82   06/24/25 138/86   06/23/25 138/90     Pulse Readings from Last 3 Encounters:   06/27/25 81   06/24/25 100   06/23/25 72         Physical Exam  HENT:      Head: Atraumatic.      Mouth/Throat:      Mouth: Mucous membranes are moist.     Eyes:      Extraocular Movements: Extraocular movements intact.       Cardiovascular:      Rate and Rhythm: Normal rate and regular rhythm.      Heart sounds: Normal heart sounds.   Pulmonary:      Effort: Pulmonary effort is normal.      Breath sounds: Normal breath sounds.   Abdominal:      General: Abdomen is flat.     Musculoskeletal:         General: Normal range of motion.      Cervical back: Normal range of motion.     Skin:     General: Skin is warm.     Neurological:      Mental Status: He is alert. Mental status is at baseline.     Psychiatric:         Mood and Affect: Mood normal.         Behavior: Behavior normal.           Laboratory Studies:  CMP:  Lab Results   Component Value Date     (H) 12/20/2017    K 4.0 10/21/2024     10/21/2024    CO2 28 10/21/2024    BUN 22 10/21/2024    CREATININE 0.80 10/21/2024    GLUCOSE 88 10/21/2024    AST 19 10/21/2024    ALT 23 10/21/2024    BILITOT 0.3 12/20/2017    EGFR 99 10/21/2024       Lipid Profile:   Lab Results   Component Value Date    CHOL 185 12/20/2017     Lab Results   Component Value Date    HDL 32 (L) 07/30/2021     Lab Results   Component Value Date    LDLCALC 155 (H) 07/30/2021     Lab Results   Component Value Date    TRIG 383 (H) 02/26/2024       Cardiac testing:   EKG reviewed personally: NSR 83 Nml               [1]   Past Medical History:  Diagnosis Date    Chronic back pain     Migraine    [2]   Past Surgical History:  Procedure  Laterality Date    HERNIA REPAIR      MANDIBLE FRACTURE SURGERY      MOUTH SURGERY      cyst in cheek    NASAL SEPTUM SURGERY     [3]   Social History  Tobacco Use   Smoking Status Never   Smokeless Tobacco Former    Types: Snuff    Quit date: 2003   [4]   Family History  Problem Relation Name Age of Onset    Breast cancer Mother      Diabetes Father      No Known Problems Family      Substance Abuse Neg Hx      Mental illness Neg Hx     [5]   Allergies  Allergen Reactions    Ropinirole GI Intolerance   [6]   Current Outpatient Medications:     albuterol (2.5 mg/3 mL) 0.083 % nebulizer solution, Take 3 mL (2.5 mg total) by nebulization every 6 (six) hours as needed for wheezing or shortness of breath, Disp: 125 mL, Rfl: 3    albuterol (PROVENTIL HFA,VENTOLIN HFA) 90 mcg/act inhaler, Inhale 2 puffs every 4 (four) hours as needed for wheezing, Disp: 18 g, Rfl: 0    meloxicam (Mobic) 15 mg tablet, Take 1 tablet (15 mg total) by mouth daily, Disp: 60 tablet, Rfl: 5    METHOCARBAMOL PO, PRN, Disp: , Rfl:     metoprolol succinate (TOPROL-XL) 25 mg 24 hr tablet, Take 1 tablet (25 mg total) by mouth every 12 (twelve) hours, Disp: 180 tablet, Rfl: 3    multivitamin (THERAGRAN) TABS, Take 1 tablet by mouth in the morning., Disp: , Rfl:     Quviviq 25 MG TABS, TAKE 1 TABLET BY MOUTH ONCE DAILY AT BEDTIME, Disp: 90 tablet, Rfl: 0    rimegepant sulfate (Nurtec) 75 mg TBDP, PLACE 1 TABLET UNDER THE TONGUE AT ONSET OF MIGRAINE IF NEEDED DAILY, Disp: 16 tablet, Rfl: 11    traMADol (ULTRAM) 50 mg tablet, Take 1 tablet (50 mg total) by mouth every 6 (six) hours as needed for moderate pain, Disp: 120 tablet, Rfl: 5

## 2025-06-30 NOTE — PATIENT COMMUNICATION
Spoke to pt to schedule OVL with Dr. Velasco or Dr. Maradiaga in [facility]. Offered pt 10/7/25 with Dr. Maradiaga in [facility].

## 2025-07-02 ENCOUNTER — OFFICE VISIT (OUTPATIENT)
Dept: PHYSICAL THERAPY | Facility: CLINIC | Age: 60
End: 2025-07-02
Attending: INTERNAL MEDICINE
Payer: COMMERCIAL

## 2025-07-02 DIAGNOSIS — R53.1 GENERALIZED WEAKNESS: ICD-10-CM

## 2025-07-02 DIAGNOSIS — Z91.81 RISK FOR FALLS: ICD-10-CM

## 2025-07-02 DIAGNOSIS — Z78.9 POOR TOLERANCE FOR AMBULATION: ICD-10-CM

## 2025-07-02 DIAGNOSIS — M25.561 CHRONIC PAIN OF RIGHT KNEE: ICD-10-CM

## 2025-07-02 DIAGNOSIS — J80 ACUTE RESPIRATORY DISTRESS SYNDROME (HCC): ICD-10-CM

## 2025-07-02 DIAGNOSIS — R29.898 SEVERE MUSCLE DECONDITIONING: Primary | ICD-10-CM

## 2025-07-02 DIAGNOSIS — G89.29 CHRONIC PAIN OF RIGHT KNEE: ICD-10-CM

## 2025-07-02 PROCEDURE — 97110 THERAPEUTIC EXERCISES: CPT

## 2025-07-02 NOTE — PROGRESS NOTES
"Daily Note     Today's date: 2025  Patient name: Hussein Edward III  : 1965  MRN: [id number]  Referring provider: Bernabe Cook MD  Dx:   Encounter Diagnosis     ICD-10-CM    1. Severe muscle deconditioning  R29.898       2. Chronic pain of right knee  M25.561     G89.29       3. Risk for falls  Z91.81       4. Poor tolerance for ambulation  Z78.9       5. Acute respiratory distress syndrome (HCC)  J80       6. Generalized weakness  R53.1                      Subjective: pt states that he has been trying to lift right leg more when walking.       Objective: See treatment diary below      Assessment: Tolerated treatment with ability to advance with 1 set of biceps curl but was very challenged by weight. . Patient demonstrated fatigue post treatment and would benefit from continued PT      Plan: Continue per plan of care.      Precautions: Hx of ARDS 2024. Patient will require increased rest break between activity. Does carry portable O2 concentrator.       Manuals 5/15 5/21 5/29 6/5 6/12 6/19 6/26 7/2   5/8   Tpr to post shoulder   DL nv                                                    Neuro Re-Ed                                          Seated  abdominal press down  5\"x20 5\"x20 5\"x20 5''x20 5\"x30 5\"x30 nv 5\"x30   5''x20                                                           Ther Ex              Hip abd stand                            UBE for endurance.              Bike for endurance Lv3 5' lv2 10' Lv3 5'  Lv2 5' Lv3 5'  Lv2 5' Lv3 5'  Lv2 5' Lv3 10' Lv3 10' Lv 3 10' Lv3 10'   Lv 3 5' vl 2 5'   TB shoulder extension       Blk / march 2x10 Blk x20   Blk / march 2x10   TB IR              TB ER-bilateral  Blk 2x15  nv Blk 3x10         Knee flexion                            seated knee extension              Leg press 95#  2x10  95#  2x10 95# 2x10 95#  3x10 95# x5  105#  2x10 95# x5 105# 2x10 105#  2x10   95# 2x10   Bicep curl x2 6#  2x15  6#  2x15 6# 2x15 6#  2x15 6#  2x15 6# 2x15 7# x15  6#x15 "   6# 2x15   OH tricep ext 4#  2x15 4#  2x15 nv 4# 2x15 4#  2x15 4#  2x15 4# 2x15 4#  2x15   4# 2x15   Standing rows                            Ther Activity              Lateral step ups and over              Sit to stand Thin 2x10  Thin 2x10 nv                        Gait Training                                          Modalities                                            \

## 2025-07-08 DIAGNOSIS — M25.50 ARTHRALGIA, UNSPECIFIED JOINT: ICD-10-CM

## 2025-07-08 NOTE — TELEPHONE ENCOUNTER
Patients wife is calling to request a refill on the pended medication, confirmed pharmacy on file    Please rreview

## 2025-07-09 NOTE — TELEPHONE ENCOUNTER
Patient called in to check on Tramadol refill. I let him know it is still pending. He states he will be out of it as of tomorrow. Please review and refill as soon as possible.

## 2025-07-10 ENCOUNTER — OFFICE VISIT (OUTPATIENT)
Dept: PHYSICAL THERAPY | Facility: CLINIC | Age: 60
End: 2025-07-10
Attending: INTERNAL MEDICINE
Payer: COMMERCIAL

## 2025-07-10 ENCOUNTER — TELEPHONE (OUTPATIENT)
Dept: FAMILY MEDICINE CLINIC | Facility: CLINIC | Age: 60
End: 2025-07-10

## 2025-07-10 DIAGNOSIS — R53.1 GENERALIZED WEAKNESS: ICD-10-CM

## 2025-07-10 DIAGNOSIS — J80 ACUTE RESPIRATORY DISTRESS SYNDROME (HCC): ICD-10-CM

## 2025-07-10 DIAGNOSIS — G89.29 CHRONIC PAIN OF RIGHT KNEE: ICD-10-CM

## 2025-07-10 DIAGNOSIS — R29.898 SEVERE MUSCLE DECONDITIONING: Primary | ICD-10-CM

## 2025-07-10 DIAGNOSIS — Z91.81 RISK FOR FALLS: ICD-10-CM

## 2025-07-10 DIAGNOSIS — M25.561 CHRONIC PAIN OF RIGHT KNEE: ICD-10-CM

## 2025-07-10 DIAGNOSIS — Z78.9 POOR TOLERANCE FOR AMBULATION: ICD-10-CM

## 2025-07-10 PROCEDURE — 97110 THERAPEUTIC EXERCISES: CPT

## 2025-07-10 RX ORDER — TRAMADOL HYDROCHLORIDE 50 MG/1
50 TABLET ORAL EVERY 6 HOURS PRN
Qty: 120 TABLET | Refills: 5 | Status: SHIPPED | OUTPATIENT
Start: 2025-07-10

## 2025-07-10 NOTE — TELEPHONE ENCOUNTER
PA for traMADol (ULTRAM) 50 mg tablet  SUBMITTED to [facility]    via    []CMM-KEY:    [x]Surescripts-Case ID # [id number]   []Availity-Auth ID #  NDC #    []Faxed to plan   []Other website    []Phone call Case ID #      [x]PA sent as URGENT    All office notes, labs and other pertaining documents and studies sent. Clinical questions answered. Awaiting determination from insurance company.     Turnaround time for your insurance to make a decision on your Prior Authorization can take 7-21 business days.

## 2025-07-10 NOTE — PROGRESS NOTES
"Daily Note     Today's date: 7/10/2025  Patient name: Hussein Edward III  : 1965  MRN: [id number]  Referring provider: Bernabe Cook MD  Dx:   Encounter Diagnosis     ICD-10-CM    1. Severe muscle deconditioning  R29.898       2. Chronic pain of right knee  M25.561     G89.29       3. Risk for falls  Z91.81       4. Poor tolerance for ambulation  Z78.9       5. Acute respiratory distress syndrome (HCC)  J80       6. Generalized weakness  R53.1                      Subjective: pt states that he continues to purposely march with right leg while ambulating so he is not dragging foot.       Objective: See treatment diary below      Assessment: Tolerated treatment with continued challenge with biceps curl.  Increased difficulty balancing with tband ext with march.  . Patient demonstrated fatigue post treatment      Plan: Continue per plan of care.      Precautions: Hx of ARDS 2024. Patient will require increased rest break between activity. Does carry portable O2 concentrator.       Manuals 5/15 5/21 5/29 6/5 6/12 6/19 6/26 7/2 7/10     Tpr to post shoulder   DL nv                                                    Neuro Re-Ed                                          Seated  abdominal press down  5\"x20 5\"x20 5\"x20 5''x20 5\"x30 5\"x30 nv 5\"x30 5\"30                                                             Ther Ex              Hip abd stand                            UBE for endurance.              Bike for endurance Lv3 5' lv2 10' Lv3 5'  Lv2 5' Lv3 5'  Lv2 5' Lv3 5'  Lv2 5' Lv3 10' Lv3 10' Lv 3 10' Lv3 10' Lv3 10'     TB shoulder extension       Blk / march 2x10 Blk x20 Blk w/march x15     TB IR              TB ER-bilateral  Blk 2x15  nv Blk 3x10         Knee flexion                            seated knee extension              Leg press 95#  2x10  95#  2x10 95# 2x10 95#  3x10 95# x5  105#  2x10 95# x5 105# 2x10 105#  2x10 105#  2x15     Bicep curl x2 6#  2x15  6#  2x15 6# 2x15 6#  2x15 6#  2x15 6# 2x15 7# " x15  6#x15 7# x15  6# x15     OH tricep ext 4#  2x15 4#  2x15 nv 4# 2x15 4#  2x15 4#  2x15 4# 2x15 4#  2x15 4# 2x15     Standing rows                            Ther Activity              Lateral step ups and over              Sit to stand Thin 2x10  Thin 2x10 nv                        Gait Training                                          Modalities                                            \

## 2025-07-11 ENCOUNTER — TELEPHONE (OUTPATIENT)
Age: 60
End: 2025-07-11

## 2025-07-11 NOTE — TELEPHONE ENCOUNTER
PA for     traMADol (ULTRAM) 50 mg tablet   APPROVED     Date(s) approved 7/11/2025 - 1/11/2026    Case # [id number]       Patient advised by          []Evolitahart Message  []Phone call   []LMOM  []L/M to call office as no active Communication consent on file  []Unable to leave detailed message as VM not approved on Communication consent       Pharmacy advised by    [x]Fax  []Phone call  []Secure Chat    Specialty Pharmacy    []      Approval letter scanned into Media Yes

## 2025-07-14 ENCOUNTER — PATIENT MESSAGE (OUTPATIENT)
Dept: CARDIOLOGY CLINIC | Facility: CLINIC | Age: 60
End: 2025-07-14

## 2025-07-14 ENCOUNTER — TELEPHONE (OUTPATIENT)
Age: 60
End: 2025-07-14

## 2025-07-14 ENCOUNTER — OFFICE VISIT (OUTPATIENT)
Dept: OBGYN CLINIC | Facility: OTHER | Age: 60
End: 2025-07-14
Payer: COMMERCIAL

## 2025-07-14 ENCOUNTER — TELEPHONE (OUTPATIENT)
Dept: NEUROLOGY | Facility: CLINIC | Age: 60
End: 2025-07-14

## 2025-07-14 ENCOUNTER — APPOINTMENT (OUTPATIENT)
Dept: RADIOLOGY | Facility: OTHER | Age: 60
End: 2025-07-14
Attending: ORTHOPAEDIC SURGERY
Payer: COMMERCIAL

## 2025-07-14 VITALS
SYSTOLIC BLOOD PRESSURE: 157 MMHG | DIASTOLIC BLOOD PRESSURE: 103 MMHG | WEIGHT: 188 LBS | TEMPERATURE: 99.4 F | BODY MASS INDEX: 29.51 KG/M2 | HEART RATE: 91 BPM | HEIGHT: 67 IN

## 2025-07-14 DIAGNOSIS — M79.642 LEFT HAND PAIN: ICD-10-CM

## 2025-07-14 DIAGNOSIS — I10 UNCONTROLLED HYPERTENSION: ICD-10-CM

## 2025-07-14 DIAGNOSIS — M79.642 LEFT HAND PAIN: Primary | ICD-10-CM

## 2025-07-14 DIAGNOSIS — M65.341 TRIGGER FINGER, RIGHT RING FINGER: ICD-10-CM

## 2025-07-14 DIAGNOSIS — M18.12 PRIMARY OSTEOARTHRITIS OF FIRST CARPOMETACARPAL JOINT OF LEFT HAND: ICD-10-CM

## 2025-07-14 PROCEDURE — 99214 OFFICE O/P EST MOD 30 MIN: CPT | Performed by: ORTHOPAEDIC SURGERY

## 2025-07-14 PROCEDURE — 73130 X-RAY EXAM OF HAND: CPT

## 2025-07-14 NOTE — ASSESSMENT & PLAN NOTE
Advised the patient to wear a Comfort Cool brace and apply topical lidocaine ointment for symptomatic relief.  Will avoid doing a corticosteroid injection of this joint due to patient's significantly elevated blood pressure on today's office visit.

## 2025-07-14 NOTE — PROGRESS NOTES
Name: Hussein Edward III      : 1965      MRN: [id number]  Encounter Provider: Ivanna Hassan MD  Encounter Date: 2025   Encounter department: [facility]  :  Assessment & Plan  Primary osteoarthritis of first carpometacarpal joint of left hand  Advised the patient to wear a Comfort Cool brace and apply topical lidocaine ointment for symptomatic relief.  Will avoid doing a corticosteroid injection of this joint due to patient's significantly elevated blood pressure on today's office visit.       Trigger finger, right ring finger  I explained to patient the pathophysiology of this condition.  Explained that we can perform a trigger finger tendon sheath cortisone injection if symptoms persist despite 2 injections then he may be a candidate for trigger finger release.  However, given his uncontrolled hypertension on today's office visit we deferred doing any injections in this regard.       Uncontrolled hypertension  Patient had reported feeling lightheaded and very hot with significantly elevated blood pressure readings.  His blood pressure improved with laying down supine, rest.  I advised him to follow-up with his primary care physician/cardiology in this regard for further evaluation and management.  I also explained that if he gets any significant headache or again starts feeling dizzy/lightheaded he should go to emergency room/call 911 for his symptoms.       Left hand pain    Orders:    XR hand 3+ vw left; Future    Total time spent in today's office visit was over 35 minutes which included preencounter chart review, clinical encounter, counseling and post encounter charting as well as care coordination.    History of Present Illness   HPI  Hussein Edward III is a 59 y.o. male who presents for evaluation of:  #1.  Left hand pain -started about 2 months ago when he tried to catch himself during a fall and felt pain at the base of the left thumb.  The symptom has  "persistent he describes this as an aching pain which is made worse with trying to make a  or pinching activity.  Denies any new onset tingling numbness of the left hand.  #2.  Triggering of the right ring finger -no preceding trauma.  Symptoms present for several weeks.  Associated with some mild discomfort.    During the office visit, patient reported feeling \" very hot\" and reports that he usually carries a fan with him since he frequently feels similarly hot and dizzy.  He denied having any chest pain or shortness of breath.  He denied having any significant headaches but he does mention frequent headaches as well.      Review of Systems       Objective   Ht 5' 7\" (1.702 m)   Wt 85.3 kg (188 lb)   BMI 29.44 kg/m²      Physical Exam         Right Hand Exam     Tenderness   Right hand tenderness location: Mild tenderness to palpation over the right hand fourth digit/ring finger metacarpophalangeal joint on the volar aspect at the level of A1 pulley.    Comments:  Mild triggering noted over the right hand ring finger A1 pulley region with finger movement.      Left Hand Exam     Tenderness   Left hand tenderness location: Tender to palpation of the first carpometacarpal joint at the base of thumb.     Range of Motion   Wrist   Extension:  normal   Flexion:  normal   Pronation:  normal   Supination:  normal     Tests   Phalen’s Sign: negative  Tinel's sign (median nerve): negative  Finkelstein's test: negative    Comments:  Increased pain with pinching activity of the thumb and index finger.  Normal sensation to light touch in all digits of the left hand and grade 5/5 strength of left thumb opposition and 4+/5 strength of left thumb abduction limited by pain.           I have personally reviewed pertinent films in PACS and my interpretation is plain radiograph of the left hand performed today does not reveal any acute osseous injury.  First carpometacarpal joint osteoarthritis noted.  Cystic changes also noted " "in the scaphoid and capitate..   Procedures  Portions of the record may have been created with voice recognition software. Occasional wrong word or \"sound alike\" substitutions may have occurred due to the inherent limitations of voice recognition software. Please review the chart carefully and recognize, using context, where substitutions/typographical errors may have occurred.      "

## 2025-07-14 NOTE — TELEPHONE ENCOUNTER
Received Forms in the CV mail, I am not sure if Dr Camargo can fill these forms out, please advise so payment could be collected.     Forms scanned under .

## 2025-07-14 NOTE — LETTER
July 31, 2025     Patient: Hussein Edward III  YOB: 1965  Date of Visit: 7/14/2025      To Whom it May Concern:    Hussein Edward is under my professional care. Hussein was last seen in my office on 6/23/25.    Although I do not have specific training in evaluating and at last visit we did not have the paperwork ahead of time to address all the aspects of his medical source statement in regards to how severely disabled he is, I wanted to document formally in a separate letter some of what we discussed (and I witnessed) at last visit which is going to be more specifically an thoroughly evaluated by one of my colleagues who has expertise in neuromuscular and autonomic disorders on 10/7/2025 after EMG 8/20/2025.  As I have been following him for headache/migraine which was our focus prior to his significant recent decline following his prolonged hospital course due to ARDS and subsequent worsening of symptoms.    Over the last year he has been dealing with many medically comorbid and neurologic issues, including, but not limited to  -Daily headaches, worse every 2 to 3 days  -Trouble sleeping  -Gait instability  - lung issues related to ARDS, still needs oxygen occasionally and has it with him 24/7  - Constant leg spasms, including a feeling of charley horse in his legs all the time  - Weak in all 4 extremities  - Poor fine motor skills -although he used to enjoy gardening, he drops planting pots and other items in his hands  -Significant knee pain more so than the pain in the rest of his body  -Tingling and paresthesias is the most at the tips of his fingers and outer and posterior thighs  - Dropfoot on the right, needs an assistive device called an AFO  -Dysautonomia  - Always feels very hot even when the room is set to 55 degrees  - Movement disorder of his neck and hands causing inability to function at times  - Significant problems breathing which make the movements worse  - Mouth dryness  - Chronic  tinnitus        - Memory Impairment - used to be sharp and quick as a  and now he even has trouble trying to help with simple computer problems around the house  -Very slow processing  - Diarrhea  - Posttraumatic stress disorder with intrusions including flashbacks, depression and anxiety, although he is resilient, getting help, has to sleep in a recliner  - Very poor sleep only getting 2 to 3 hours at night  - Word finding difficulty      If you have any questions or concerns, please don't hesitate to call.         Sincerely,          Laxmi Camargo MD

## 2025-07-14 NOTE — ASSESSMENT & PLAN NOTE
I explained to patient the pathophysiology of this condition.  Explained that we can perform a trigger finger tendon sheath cortisone injection if symptoms persist despite 2 injections then he may be a candidate for trigger finger release.  However, given his uncontrolled hypertension on today's office visit we deferred doing any injections in this regard.

## 2025-07-14 NOTE — ASSESSMENT & PLAN NOTE
Patient had reported feeling lightheaded and very hot with significantly elevated blood pressure readings.  His blood pressure improved with laying down supine, rest.  I advised him to follow-up with his primary care physician/cardiology in this regard for further evaluation and management.  I also explained that if he gets any significant headache or again starts feeling dizzy/lightheaded he should go to emergency room/call 911 for his symptoms.

## 2025-07-16 ENCOUNTER — PATIENT MESSAGE (OUTPATIENT)
Dept: CARDIOLOGY CLINIC | Facility: CLINIC | Age: 60
End: 2025-07-16

## 2025-07-16 DIAGNOSIS — I48.0 PAF (PAROXYSMAL ATRIAL FIBRILLATION) (HCC): Primary | ICD-10-CM

## 2025-07-16 RX ORDER — DILTIAZEM HYDROCHLORIDE 240 MG/1
240 CAPSULE, COATED, EXTENDED RELEASE ORAL DAILY
Qty: 90 CAPSULE | Refills: 3 | Status: SHIPPED | OUTPATIENT
Start: 2025-07-16

## 2025-07-17 ENCOUNTER — TELEPHONE (OUTPATIENT)
Age: 60
End: 2025-07-17

## 2025-07-17 ENCOUNTER — OFFICE VISIT (OUTPATIENT)
Dept: PHYSICAL THERAPY | Facility: CLINIC | Age: 60
End: 2025-07-17
Attending: INTERNAL MEDICINE
Payer: COMMERCIAL

## 2025-07-17 DIAGNOSIS — R53.1 GENERALIZED WEAKNESS: ICD-10-CM

## 2025-07-17 DIAGNOSIS — Z91.81 RISK FOR FALLS: ICD-10-CM

## 2025-07-17 DIAGNOSIS — R29.898 SEVERE MUSCLE DECONDITIONING: Primary | ICD-10-CM

## 2025-07-17 DIAGNOSIS — Z78.9 POOR TOLERANCE FOR AMBULATION: ICD-10-CM

## 2025-07-17 DIAGNOSIS — G89.29 CHRONIC PAIN OF RIGHT KNEE: ICD-10-CM

## 2025-07-17 DIAGNOSIS — M25.561 CHRONIC PAIN OF RIGHT KNEE: ICD-10-CM

## 2025-07-17 DIAGNOSIS — J80 ACUTE RESPIRATORY DISTRESS SYNDROME (HCC): ICD-10-CM

## 2025-07-17 PROCEDURE — 97110 THERAPEUTIC EXERCISES: CPT

## 2025-07-17 NOTE — TELEPHONE ENCOUNTER
Caller: Hussein (patient)    Doctor: Dr. Gar    Reason for call: Hussein would like to schedule a BP check and EKG with a nurse this morning. He was told there was availability around 8:30 - 9:30.. We tried calling office but there was no answer. Please call patient to schedule nurse visit. Thank you     Call back#: [phone]

## 2025-07-17 NOTE — PROGRESS NOTES
"Daily Note     Today's date: 2025  Patient name: Hussein Edward III  : 1965  MRN: [id number]  Referring provider: Bernabe Cook MD  Dx:   Encounter Diagnosis     ICD-10-CM    1. Severe muscle deconditioning  R29.898       2. Chronic pain of right knee  M25.561     G89.29       3. Risk for falls  Z91.81       4. Poor tolerance for ambulation  Z78.9       5. Acute respiratory distress syndrome (HCC)  J80       6. Generalized weakness  R53.1                      Subjective: pt states that he has a brace for his thumb as he has arthritis in it on L hand and trigger finger on other hand       Objective: See treatment diary below      Assessment: Tolerated treatment with fatigue when performing biceps curl with 7# weight can achieve one set with god form.  . Patient demonstrated fatigue post treatment and would benefit from continued PT      Plan: Continue per plan of care.      Precautions: Hx of ARDS 2024. Patient will require increased rest break between activity. Does carry portable O2 concentrator.       Manuals 5/15 5/21 5/29 6/5 6/12 6/19 6/26 7/2 7/10 7/17    Tpr to post shoulder   DL nv                                                    Neuro Re-Ed                                          Seated  abdominal press down  5\"x20 5\"x20 5\"x20 5''x20 5\"x30 5\"x30 nv 5\"x30 5\"30 5\"x30                                                            Ther Ex              Hip abd stand                            UBE for endurance.              Bike for endurance Lv3 5' lv2 10' Lv3 5'  Lv2 5' Lv3 5'  Lv2 5' Lv3 5'  Lv2 5' Lv3 10' Lv3 10' Lv 3 10' Lv3 10' Lv3 10' Lv3 10'    TB shoulder extension       Blk / march 2x10 Blk x20 Blk /march x15     TB IR              TB ER-bilateral  Blk 2x15  nv Blk 3x10         Knee flexion                            seated knee extension              Leg press 95#  2x10  95#  2x10 95# 2x10 95#  3x10 95# x5  105#  2x10 95# x5 105# 2x10 105#  2x10 105#  2x15 105# 2x15    Bicep curl x2 " 6#  2x15  6#  2x15 6# 2x15 6#  2x15 6#  2x15 6# 2x15 7# x15  6#x15 7# x15  6# x15 7#x15  6#x15    OH tricep ext 4#  2x15 4#  2x15 nv 4# 2x15 4#  2x15 4#  2x15 4# 2x15 4#  2x15 4# 2x15 4#  2x15    Standing rows                            Ther Activity              Lateral step ups and over              Sit to stand Thin 2x10  Thin 2x10 nv                        Gait Training                                          Modalities                                            \

## 2025-07-18 ENCOUNTER — CLINICAL SUPPORT (OUTPATIENT)
Dept: CARDIOLOGY CLINIC | Facility: CLINIC | Age: 60
End: 2025-07-18
Payer: COMMERCIAL

## 2025-07-18 VITALS
HEIGHT: 67 IN | BODY MASS INDEX: 29.51 KG/M2 | WEIGHT: 188 LBS | SYSTOLIC BLOOD PRESSURE: 154 MMHG | HEART RATE: 81 BPM | DIASTOLIC BLOOD PRESSURE: 88 MMHG

## 2025-07-18 DIAGNOSIS — I48.0 PAF (PAROXYSMAL ATRIAL FIBRILLATION) (HCC): Primary | ICD-10-CM

## 2025-07-18 PROCEDURE — 93000 ELECTROCARDIOGRAM COMPLETE: CPT

## 2025-07-18 PROCEDURE — RECHECK: Performed by: INTERNAL MEDICINE

## 2025-07-18 NOTE — PROGRESS NOTES
Pt here for EKG visit per Dr Gar.     Pt has been feeling palpitations and having elevated BP readings at home.    Pt started taking Diltiazem  mg this morning. Pt reports issues with temperature regulation and has had pulsating headaches. High BP readings and palpitations. He reports being hot today but he is not diaphoretic.     /88  P 81    EKG read by Dr Amaral and BP discussed. EKG OK. No AF. Pt to continue taking Diltiazem and log BP. He is to let us know how his BP readings and how he is feeling after the weekend. Advised to go to ER with any new or worsening sx.

## 2025-07-22 DIAGNOSIS — I10 UNCONTROLLED HYPERTENSION: Primary | ICD-10-CM

## 2025-07-22 RX ORDER — LOSARTAN POTASSIUM 50 MG/1
50 TABLET ORAL DAILY
Qty: 90 TABLET | Refills: 3 | Status: SHIPPED | OUTPATIENT
Start: 2025-07-22

## 2025-07-23 NOTE — TELEPHONE ENCOUNTER
Dr. Camargo reviewed form and there are parts we cannot complete but I may be able to complete some of it as well as provide him with the last office note. Will need to contact him for more questions to complete.

## 2025-07-24 ENCOUNTER — EVALUATION (OUTPATIENT)
Dept: PHYSICAL THERAPY | Facility: CLINIC | Age: 60
End: 2025-07-24
Attending: INTERNAL MEDICINE
Payer: COMMERCIAL

## 2025-07-24 DIAGNOSIS — G90.1 DYSAUTONOMIA (HCC): ICD-10-CM

## 2025-07-24 DIAGNOSIS — Z91.81 RISK FOR FALLS: ICD-10-CM

## 2025-07-24 DIAGNOSIS — R25.9 ABNORMAL MOVEMENT: ICD-10-CM

## 2025-07-24 DIAGNOSIS — Z78.9 POOR TOLERANCE FOR AMBULATION: ICD-10-CM

## 2025-07-24 DIAGNOSIS — J80 ACUTE RESPIRATORY DISTRESS SYNDROME (HCC): ICD-10-CM

## 2025-07-24 DIAGNOSIS — G89.29 CHRONIC PAIN OF RIGHT KNEE: ICD-10-CM

## 2025-07-24 DIAGNOSIS — R29.898 SEVERE MUSCLE DECONDITIONING: Primary | ICD-10-CM

## 2025-07-24 DIAGNOSIS — M25.561 CHRONIC PAIN OF RIGHT KNEE: ICD-10-CM

## 2025-07-24 DIAGNOSIS — R53.1 GENERALIZED WEAKNESS: ICD-10-CM

## 2025-07-24 PROCEDURE — 97164 PT RE-EVAL EST PLAN CARE: CPT | Performed by: PHYSICAL THERAPIST

## 2025-07-24 PROCEDURE — 97110 THERAPEUTIC EXERCISES: CPT | Performed by: PHYSICAL THERAPIST

## 2025-07-24 NOTE — PROGRESS NOTES
PT Re-Evaluation     Today's date: 2025  Patient name: Hussein Edward III  : 1965  MRN: [id number]  Referring provider: Bernabe Cook MD  Dx:   Encounter Diagnosis     ICD-10-CM    1. Severe muscle deconditioning  R29.898       2. Chronic pain of right knee  M25.561     G89.29       3. Acute respiratory distress syndrome (HCC)  J80       4. Generalized weakness  R53.1       5. Risk for falls  Z91.81       6. Poor tolerance for ambulation  Z78.9       7. Abnormal movement  R25.9       8. Dysautonomia (HCC)  G90.1                      Assessment  Impairments: abnormal coordination, abnormal gait, abnormal muscle firing, abnormal movement, activity intolerance, impaired balance, impaired physical strength, pain with function, safety issue, poor posture , unable to perform ADL, participation limitations, activity limitations and endurance  Symptom irritability: moderate    Assessment details: Hussein Edward III has been compliant with attending PT and home exercise program since initial eval.  Hussein has made a few improvements in his time in PT, but at this time seems to need to transition to a more maintenance based program given the longevity of his syptoms, as well as the continued inability to regulate his body temperature. Pt at this time continues to have poor gait, balance, and ability to push the limits of his mobility. Will continue with PT at this time to keep pt free from falling.   Understanding of Dx/Px/POC: good     Prognosis: good    Goals  STG- 6wks  Pt will have increased knee extension strength of 4+ MMT  Pt will have increased hip flexion strength of 4+ MMT  Pt will have increased knee flexion strength of 4+ MMT    LTG-12wks  Pt will be able to walk 100ft without rest  Pt will be able to complete 4 steps without fatigue  Pt will demonstrate increased endurance throughout 45 min session indicated by decreased rest breaks between activity    Plan  Patient would benefit from: skilled  physical therapy    Planned therapy interventions: ADL training, activity modification, neuromuscular re-education, postural training, strengthening, therapeutic exercise, therapeutic activities and functional ROM exercises    Frequency: 1x week  Duration in weeks: 12  Plan of Care beginning date: 2025  Plan of Care expiration date: 2025  Treatment plan discussed with: patient        Subjective Evaluation    History of Present Illness  Mechanism of injury: Pt notes that he sometimes feels as if he is going backwards. He notes that he is having more difficulty redulating his temp but also notes that he is not able to see a specialist for this for at least 8 months. He notes that he has to think about lifting his right leg more to keep from dragging it. He notes that he still fatigues very easily, and when he gets hot and overheats he just needs to sleep.  Quality of life: fair    Patient Goals  Patient goals for therapy: increased strength, independence with ADLs/IADLs, increased motion, improved balance and return to sport/leisure activities    Pain  At worst pain ratin  Location: right knee  Quality: pulling and tight    Social Support  Lives in: one-story house  Lives with: spouse    Employment status: working  Treatments  Previous treatment: physical therapy (repiratory therapy)  Current treatment: physical therapy      Objective     Static Posture   General Observations  Guarded and scoliosis.     Head  Forward.    Shoulders  Asymmetric shoulders and depressed.    Thoracic Spine  Hyperkyphosis.    Strength/Myotome Testing     Left Shoulder     Planes of Motion   Flexion: 4-   Abduction: 4-   External rotation at 0°: 4+   Internal rotation at 0°: 4+     Right Shoulder     Planes of Motion   Flexion: 4-   Abduction: 4-   External rotation at 0°: 4+   Internal rotation at 0°: 4+     Left Elbow   Flexion: 4-  Extension: 4-    Right Elbow   Flexion: 4  Extension: 4    Left Hip   Planes of Motion  "  Flexion: 4-  Abduction: 4-  Adduction: 4-    Right Hip   Planes of Motion   Flexion: 4  Abduction: 4-  Adduction: 4-    Left Knee   Flexion: 4-  Extension: 4    Right Knee   Flexion: 4-  Extension: 4    Left Ankle/Foot   Dorsiflexion: 4+  Plantar flexion: 3+    Right Ankle/Foot   Dorsiflexion: 4-  Plantar flexion: 3+    Muscle Activation   Patient able to activate left transverse abdominals, left external obliques, left internal obliques, right transverse abdominals, right external obliques and right internal obliques.     Ambulation   Weight-Bearing Status   Assistive device used: rollator walker    Ambulation: Stairs   Ascending: step-to  Ascend stairs: minimum assist  Railings: 2  Descending: step-to  Descend stairs: minimum assist  Railings: 2    Observational Gait   Gait: asymmetric and crouched   Increased left swing time and right swing time. Decreased walking speed, stride length, left stance time and right stance time.   Left foot contact pattern: foot flat  Right foot contact pattern: foot flat  Base of support: decreased    Additional Observational Gait Details  Increased hip flexion on the right to avoid catching right toe on carpet.    Functional Assessment      Squat    Trunk lean left, sitting toward left side, left valgus and right valgus.     Posterior weight shift: moderate    Depth of femur height: above 90 degrees  Neuro Exam:     Functional outcomes   6 minute walk test: 600  5x sit to stand: 30 (seconds)  TU (seconds)               Precautions: Hx of ARDS 2024. Patient will require increased rest break between activity. Does carry portable O2 concentrator.         Manuals 5/15 5/21 5/29 6/ 6 7/2 7/10 7/17    Tpr to post shoulder   DL nv                                                    Neuro Re-Ed                                          Seated  abdominal press down  5\"x20 5\"x20 5\"x20 5''x20 5\"x30 5\"x30 nv 5\"x30 5\"30 5\"x30                                                "             Ther Ex              Hip abd stand                            UBE for endurance.              Bike for endurance Lv3 5' lv2 10' Lv3 5'  Lv2 5' Lv3 5'  Lv2 5' Lv3 5'  Lv2 5' Lv3 10' Lv3 10' Lv 3 10' Lv3 10' Lv3 10' Lv3 10' Lv 3 10'   TB shoulder extension       Blk w/ march 2x10 Blk x20 Blk w/march x15     TB IR              TB ER-bilateral  Blk 2x15  nv Blk 3x10         Knee flexion                            seated knee extension              Leg press 95#  2x10  95#  2x10 95# 2x10 95#  3x10 95# x5  105#  2x10 95# x5 105# 2x10 105#  2x10 105#  2x15 105# 2x15    Bicep curl x2 6#  2x15  6#  2x15 6# 2x15 6#  2x15 6#  2x15 6# 2x15 7# x15  6#x15 7# x15  6# x15 7#x15  6#x15    OH tricep ext 4#  2x15 4#  2x15 nv 4# 2x15 4#  2x15 4#  2x15 4# 2x15 4#  2x15 4# 2x15 4#  2x15    Standing rows                            Ther Activity              Lateral step ups and over              Sit to stand Thin 2x10  Thin 2x10 nv                        Gait Training                                          Modalities                                            \

## 2025-07-29 ENCOUNTER — OFFICE VISIT (OUTPATIENT)
Dept: CARDIOLOGY CLINIC | Facility: CLINIC | Age: 60
End: 2025-07-29
Payer: COMMERCIAL

## 2025-07-29 VITALS
HEIGHT: 67 IN | DIASTOLIC BLOOD PRESSURE: 86 MMHG | HEART RATE: 99 BPM | BODY MASS INDEX: 30.13 KG/M2 | WEIGHT: 192 LBS | SYSTOLIC BLOOD PRESSURE: 146 MMHG

## 2025-07-29 DIAGNOSIS — I10 UNCONTROLLED HYPERTENSION: ICD-10-CM

## 2025-07-29 DIAGNOSIS — E78.2 MIXED DYSLIPIDEMIA: ICD-10-CM

## 2025-07-29 DIAGNOSIS — R00.0 SINUS TACHYCARDIA: ICD-10-CM

## 2025-07-29 DIAGNOSIS — I48.0 PAF (PAROXYSMAL ATRIAL FIBRILLATION) (HCC): Primary | ICD-10-CM

## 2025-07-29 DIAGNOSIS — I50.31 ACUTE DIASTOLIC (CONGESTIVE) HEART FAILURE (HCC): ICD-10-CM

## 2025-07-29 DIAGNOSIS — Z87.09 ARDS SURVIVOR: ICD-10-CM

## 2025-07-29 DIAGNOSIS — R00.0 TACHYCARDIA: ICD-10-CM

## 2025-07-29 PROCEDURE — 99214 OFFICE O/P EST MOD 30 MIN: CPT | Performed by: INTERNAL MEDICINE

## 2025-07-29 PROCEDURE — 93000 ELECTROCARDIOGRAM COMPLETE: CPT | Performed by: INTERNAL MEDICINE

## 2025-07-29 RX ORDER — METOPROLOL SUCCINATE 50 MG/1
50 TABLET, EXTENDED RELEASE ORAL EVERY 12 HOURS
Qty: 180 TABLET | Refills: 3 | Status: SHIPPED | OUTPATIENT
Start: 2025-07-29

## 2025-07-29 RX ORDER — METHYLPREDNISOLONE 4 MG/1
TABLET ORAL
COMMUNITY
Start: 2025-07-25

## 2025-07-31 ENCOUNTER — APPOINTMENT (OUTPATIENT)
Dept: PHYSICAL THERAPY | Facility: CLINIC | Age: 60
End: 2025-07-31
Attending: INTERNAL MEDICINE
Payer: COMMERCIAL

## 2025-08-07 ENCOUNTER — OFFICE VISIT (OUTPATIENT)
Dept: PHYSICAL THERAPY | Facility: CLINIC | Age: 60
End: 2025-08-07
Attending: INTERNAL MEDICINE
Payer: COMMERCIAL

## 2025-08-07 DIAGNOSIS — G90.1 DYSAUTONOMIA (HCC): ICD-10-CM

## 2025-08-07 DIAGNOSIS — R29.898 SEVERE MUSCLE DECONDITIONING: Primary | ICD-10-CM

## 2025-08-07 DIAGNOSIS — G89.29 CHRONIC PAIN OF RIGHT KNEE: ICD-10-CM

## 2025-08-07 DIAGNOSIS — M25.561 CHRONIC PAIN OF RIGHT KNEE: ICD-10-CM

## 2025-08-07 DIAGNOSIS — J80 ACUTE RESPIRATORY DISTRESS SYNDROME (HCC): ICD-10-CM

## 2025-08-07 DIAGNOSIS — Z78.9 POOR TOLERANCE FOR AMBULATION: ICD-10-CM

## 2025-08-07 DIAGNOSIS — Z91.81 RISK FOR FALLS: ICD-10-CM

## 2025-08-07 DIAGNOSIS — R53.1 GENERALIZED WEAKNESS: ICD-10-CM

## 2025-08-07 DIAGNOSIS — R25.9 ABNORMAL MOVEMENT: ICD-10-CM

## 2025-08-07 PROCEDURE — 97112 NEUROMUSCULAR REEDUCATION: CPT

## 2025-08-07 PROCEDURE — 97110 THERAPEUTIC EXERCISES: CPT

## 2025-08-14 ENCOUNTER — OFFICE VISIT (OUTPATIENT)
Dept: PHYSICAL THERAPY | Facility: CLINIC | Age: 60
End: 2025-08-14
Attending: INTERNAL MEDICINE
Payer: COMMERCIAL

## 2025-08-19 ENCOUNTER — OFFICE VISIT (OUTPATIENT)
Dept: OBGYN CLINIC | Facility: OTHER | Age: 60
End: 2025-08-19
Payer: COMMERCIAL

## 2025-08-19 VITALS — BODY MASS INDEX: 29.03 KG/M2 | HEIGHT: 67 IN | WEIGHT: 185 LBS

## 2025-08-19 DIAGNOSIS — M65.341 TRIGGER FINGER, RIGHT RING FINGER: ICD-10-CM

## 2025-08-19 DIAGNOSIS — M79.642 LEFT HAND PAIN: Primary | ICD-10-CM

## 2025-08-19 PROCEDURE — 99213 OFFICE O/P EST LOW 20 MIN: CPT | Performed by: ORTHOPAEDIC SURGERY

## 2025-08-20 PROBLEM — R20.0 LEFT ARM NUMBNESS: Status: ACTIVE | Noted: 2025-08-20

## 2025-08-20 PROBLEM — M21.371 RIGHT FOOT DROP: Status: ACTIVE | Noted: 2025-08-20

## 2025-08-20 PROBLEM — R29.898 WEAKNESS OF BOTH LOWER EXTREMITIES: Status: ACTIVE | Noted: 2025-08-20

## 2025-08-21 ENCOUNTER — OFFICE VISIT (OUTPATIENT)
Dept: PODIATRY | Facility: CLINIC | Age: 60
End: 2025-08-21
Payer: COMMERCIAL

## 2025-08-21 VITALS — BODY MASS INDEX: 29.03 KG/M2 | WEIGHT: 185 LBS | HEIGHT: 67 IN

## 2025-08-21 DIAGNOSIS — B35.3 TINEA PEDIS OF BOTH FEET: ICD-10-CM

## 2025-08-21 DIAGNOSIS — B35.1 ONYCHOMYCOSIS: Primary | ICD-10-CM

## 2025-08-21 PROCEDURE — 99203 OFFICE O/P NEW LOW 30 MIN: CPT | Performed by: PODIATRIST

## 2025-08-21 RX ORDER — TERBINAFINE HYDROCHLORIDE 250 MG/1
250 TABLET ORAL DAILY
Qty: 14 TABLET | Refills: 0 | Status: SHIPPED | OUTPATIENT
Start: 2025-08-21 | End: 2025-09-04

## 2025-08-21 RX ORDER — CICLOPIROX 80 MG/ML
SOLUTION TOPICAL
Qty: 6 ML | Refills: 2 | Status: SHIPPED | OUTPATIENT
Start: 2025-08-21

## 2025-08-21 RX ORDER — CLOTRIMAZOLE AND BETAMETHASONE DIPROPIONATE 10; .64 MG/G; MG/G
CREAM TOPICAL 2 TIMES DAILY
Qty: 45 G | Refills: 0 | Status: SHIPPED | OUTPATIENT
Start: 2025-08-21